# Patient Record
Sex: MALE | Race: WHITE | NOT HISPANIC OR LATINO | Employment: OTHER | URBAN - METROPOLITAN AREA
[De-identification: names, ages, dates, MRNs, and addresses within clinical notes are randomized per-mention and may not be internally consistent; named-entity substitution may affect disease eponyms.]

---

## 2017-02-13 ENCOUNTER — TELEPHONE (OUTPATIENT)
Dept: HEPATOLOGY | Facility: CLINIC | Age: 52
End: 2017-02-13

## 2017-02-13 NOTE — TELEPHONE ENCOUNTER
I spoke with patient.  He reports not feeling well and asked that I reschedule his appt to 3/21/17; done and reminder notice mailed.

## 2017-02-20 ENCOUNTER — HOSPITAL ENCOUNTER (INPATIENT)
Facility: HOSPITAL | Age: 52
LOS: 3 days | Discharge: HOME | DRG: 638 | End: 2017-02-24
Attending: EMERGENCY MEDICINE | Admitting: INTERNAL MEDICINE
Payer: MEDICARE

## 2017-02-20 DIAGNOSIS — K74.60 CIRRHOSIS OF LIVER WITH ASCITES, UNSPECIFIED HEPATIC CIRRHOSIS TYPE: ICD-10-CM

## 2017-02-20 DIAGNOSIS — L03.90 CELLULITIS, UNSPECIFIED CELLULITIS SITE: Primary | ICD-10-CM

## 2017-02-20 DIAGNOSIS — L98.499 INFECTED ULCER OF SKIN, WITH UNSPECIFIED SEVERITY: ICD-10-CM

## 2017-02-20 DIAGNOSIS — I25.10 CORONARY ARTERY DISEASE INVOLVING NATIVE CORONARY ARTERY OF NATIVE HEART WITHOUT ANGINA PECTORIS: ICD-10-CM

## 2017-02-20 DIAGNOSIS — L08.9 INFECTED ULCER OF SKIN: ICD-10-CM

## 2017-02-20 DIAGNOSIS — N18.9 ANEMIA IN CHRONIC KIDNEY DISEASE(285.21): ICD-10-CM

## 2017-02-20 DIAGNOSIS — I10 POORLY-CONTROLLED HYPERTENSION: ICD-10-CM

## 2017-02-20 DIAGNOSIS — D63.1 ANEMIA IN CHRONIC KIDNEY DISEASE(285.21): ICD-10-CM

## 2017-02-20 DIAGNOSIS — R10.9 ABDOMINAL PAIN, UNSPECIFIED LOCATION: ICD-10-CM

## 2017-02-20 DIAGNOSIS — N18.6 ESRF (END STAGE RENAL FAILURE): ICD-10-CM

## 2017-02-20 DIAGNOSIS — R18.8 CIRRHOSIS OF LIVER WITH ASCITES, UNSPECIFIED HEPATIC CIRRHOSIS TYPE: ICD-10-CM

## 2017-02-20 DIAGNOSIS — L98.499 INFECTED ULCER OF SKIN: ICD-10-CM

## 2017-02-20 DIAGNOSIS — L08.9 INFECTED ULCER OF SKIN, WITH UNSPECIFIED SEVERITY: ICD-10-CM

## 2017-02-20 PROCEDURE — 96365 THER/PROPH/DIAG IV INF INIT: CPT

## 2017-02-20 PROCEDURE — 99284 EMERGENCY DEPT VISIT MOD MDM: CPT | Mod: 25

## 2017-02-20 RX ORDER — HYDRALAZINE HYDROCHLORIDE 25 MG/1
25 TABLET, FILM COATED ORAL 3 TIMES DAILY
Status: ON HOLD | COMMUNITY
End: 2017-04-05

## 2017-02-20 NOTE — IP AVS SNAPSHOT
[address] [place] [address]  Phone: [phone]           Patient Discharge Instructions     Our goal is to set you up for success. This packet includes information on your condition, medications, and your home care. It will help you to care for yourself so you don't get sicker and need to go back to the hospital.     Please ask your nurse if you have any questions.        There are many details to remember when preparing to leave the hospital. Here is what you will need to do:    1. Take your medicine. If you are prescribed medications, review your Medication List in the following pages. You may have new medications to  at the pharmacy and others that you'll need to stop taking. Review the instructions for how and when to take your medications. Talk with your doctor or nurses if you are unsure of what to do.     2. Go to your follow-up appointments. Specific follow-up information is listed in the following pages. Your may be contacted by a transition nurse or clinical provider about future appointments. Be sure we have all of the phone numbers to reach you, if needed. Please contact your provider's office if you are unable to make an appointment.     3. Watch for warning signs. Your doctor or nurse will give you detailed warning signs to watch for and when to call for assistance. These instructions may also include educational information about your condition. If you experience any of warning signs to your health, call your doctor.               ** Verify the list of medication(s) below is accurate and up to date. Carry this with you in case of emergency. If your medications have changed, please notify your healthcare provider.             Medication List      START taking these medications        Additional Info                      ciprofloxacin HCl 500 MG tablet   Commonly known as:  CIPRO   Quantity:  10 tablet   Refills:  0   Dose:  500 mg     Instructions:  Take 1 tablet (500 mg total) by mouth once daily. Take at noon time     Begin Date    AM    Noon    PM    Bedtime         CHANGE how you take these medications        Additional Info                      methadone 10 MG tablet   Commonly known as:  DOLOPHINE   Refills:  0   Dose:  120 mg   What changed:  Another medication with the same name was removed. Continue taking this medication, and follow the directions you see here.    Last time this was given:  120 mg on 2/24/2017  7:57 AM   Instructions:  Take 120 mg by mouth once daily.     Begin Date    AM    Noon    PM    Bedtime         CONTINUE taking these medications        Additional Info                      amlodipine 5 MG tablet   Commonly known as:  NORVASC   Quantity:  30 tablet   Refills:  0   Dose:  5 mg    Last time this was given:  5 mg on 2/24/2017  7:58 AM   Instructions:  Take 1 tablet (5 mg total) by mouth 2 (two) times daily.     Begin Date    AM    Noon    PM    Bedtime       aspirin 325 MG tablet   Quantity:  30 tablet   Refills:  1   Dose:  325 mg    Last time this was given:  325 mg on 2/24/2017  7:58 AM   Instructions:  Take 1 tablet (325 mg total) by mouth once daily.     Begin Date    AM    Noon    PM    Bedtime       atorvastatin 40 MG tablet   Commonly known as:  LIPITOR   Quantity:  30 tablet   Refills:  1   Dose:  40 mg    Last time this was given:  40 mg on 2/23/2017  9:09 PM   Instructions:  Take 1 tablet (40 mg total) by mouth every evening.     Begin Date    AM    Noon    PM    Bedtime       blood-glucose meter Misc   Commonly known as:  PHARMACIST CHOICE GLUCOSE SYS   Quantity:  1 each   Refills:  0   Dose:  1 Device    Instructions:  1 Device by Misc.(Non-Drug; Combo Route) route once.     Begin Date    AM    Noon    PM    Bedtime       calcium acetate 667 mg capsule   Commonly known as:  PHOSLO   Refills:  0   Dose:  2001 mg    Last time this was given:  2,001 mg on 2/24/2017 12:24 PM   Instructions:  Take 2,001 mg  by mouth 3 (three) times daily with meals.     Begin Date    AM    Noon    PM    Bedtime       carvedilol 12.5 MG tablet   Commonly known as:  COREG   Quantity:  60 tablet   Refills:  0   Dose:  12.5 mg    Last time this was given:  12.5 mg on 2/24/2017  7:58 AM   Instructions:  Take 1 tablet (12.5 mg total) by mouth 2 (two) times daily.     Begin Date    AM    Noon    PM    Bedtime       clopidogrel 75 mg tablet   Commonly known as:  PLAVIX   Quantity:  30 tablet   Refills:  1   Dose:  75 mg    Last time this was given:  75 mg on 2/24/2017  7:57 AM   Instructions:  Take 1 tablet (75 mg total) by mouth once daily.     Begin Date    AM    Noon    PM    Bedtime       famotidine 20 MG tablet   Commonly known as:  PEPCID   Quantity:  30 tablet   Refills:  1   Dose:  20 mg    Last time this was given:  20 mg on 2/24/2017  7:58 AM   Instructions:  Take 1 tablet (20 mg total) by mouth once daily.     Begin Date    AM    Noon    PM    Bedtime       hydrALAZINE 25 MG tablet   Commonly known as:  APRESOLINE   Refills:  0   Dose:  25 mg    Last time this was given:  50 mg on 2/24/2017  5:05 AM   Instructions:  Take 25 mg by mouth 3 (three) times daily.     Begin Date    AM    Noon    PM    Bedtime       lisinopril 40 MG tablet   Commonly known as:  PRINIVIL,ZESTRIL   Quantity:  30 tablet   Refills:  1   Dose:  40 mg    Last time this was given:  40 mg on 2/24/2017  7:57 AM   Instructions:  Take 1 tablet (40 mg total) by mouth once daily.     Begin Date    AM    Noon    PM    Bedtime       minoxidil 2.5 MG tablet   Commonly known as:  LONITEN   Quantity:  120 tablet   Refills:  0   Dose:  5 mg    Last time this was given:  5 mg on 2/24/2017  7:58 AM   Instructions:  Take 2 tablets (5 mg total) by mouth 2 (two) times daily.     Begin Date    AM    Noon    PM    Bedtime       ondansetron 4 MG tablet   Commonly known as:  ZOFRAN   Quantity:  12 tablet   Refills:  0   Dose:  4 mg    Instructions:  Take 1 tablet (4 mg total) by  mouth every 8 (eight) hours as needed for Nausea.     Begin Date    AM    Noon    PM    Bedtime         STOP taking these medications     sucralfate 1 gram tablet   Commonly known as:  CARAFATE            Where to Get Your Medications      You can get these medications from any pharmacy     Bring a paper prescription for each of these medications     ciprofloxacin HCl 500 MG tablet                  Please bring to all follow up appointments:    1. A copy of your discharge instructions.  2. All medicines you are currently taking in their original bottles.  3. Identification and insurance card.    Please arrive 15 minutes ahead of scheduled appointment time.    Please call 24 hours in advance if you must reschedule your appointment and/or time.        Your Scheduled Appointments     Mar 21, 2017  9:40 AM CDT   Consult with Jennifer B. Scheuermann, PA   [facility]    [address]  [facility] [address] [phone]              Follow-up Information     Follow up with Delbert Redd NP In 1 week.    Specialty:  Family Medicine    Contact information:    [facility]  [place] [address] [phone]          Follow up with Yamil Perrin DPM In 1 week.    Specialty:  Podiatry    Contact information:    [address] [facility] [address]  [facility]  [facility] [address] [phone]          Please follow up.    Contact information:    Continue routine HD as before.        Discharge Instructions     Future Orders    Call MD for:     Comments:    For worsening symptoms, chest pain, shortness of breath, increased abdominal pain, high grade fever, stroke or stroke like symptoms, immediately go to the nearest Emergency Room or call 911 as soon as possible.    Diet general     Comments:    Cardiac/ 2 gram sodium low cholesterol diet    Questions:    Total calories:      Fat restriction, if any:      Protein restriction, if any:      Na restriction, if any:      Fluid restriction:      Additional  restrictions:      Diet renal     Other restrictions (specify):     Comments:    Fall precautions        Primary Diagnosis     Your primary diagnosis was:  Infected Ulcer Of Skin      Admission Information     Date & Time Provider Department CSN    2/20/2017 11:59 PM Adela Peng MD [facility] [id number]      Care Providers     Provider Role Specialty Primary office phone    Adela Peng MD Attending Provider Internal Medicine [phone]    Tej Carranza MD Consulting Physician  Nephrology [phone]    Bryant Ding DPM Consulting Physician  Podiatry [phone]    Rufina Lenz MD Consulting Physician  Infectious Diseases [phone]    Antwan Dove MD Consulting Physician  Orthopedic Surgery [phone]    Yamil Perrin DPM Consulting Physician  Podiatry [phone]      Your Vitals Were     BP                   175/87 (BP Location: Right arm)           Recent Lab Values        6/5/2015 5/16/2016 5/17/2016 7/28/2016                  3:08 AM  5:48 PM 12:07 AM  8:10 AM        A1C 4.7 4.2 (L) 4.3 (L) 4.5        Comment for A1C at  8:10 AM on 7/28/2016:  According to ADA guidelines, hemoglobin A1C <7.0% represents  optimal control in non-pregnant diabetic patients.  Different  metrics may apply to specific populations.   Standards of Medical Care in Diabetes - 2016.  For the purpose of screening for the presence of diabetes:  <5.7%     Consistent with the absence of diabetes  5.7-6.4%  Consistent with increasing risk for diabetes   (prediabetes)  >or=6.5%  Consistent with diabetes  Currently no consensus exists for use of hemoglobin A1C  for diagnosis of diabetes for children.        Pending Labs     Order Current Status    Blood culture Preliminary result    Blood culture Preliminary result      Allergies as of 2/24/2017        Reactions    Antibiotic Hc     Hx of in 2013      [facility]     [facility] Nurse Care Line - 24/7 Assistance  Unless otherwise directed  by your provider, please contact [facility], our nurse care line that is available for 24/7 assistance.     Registered nurses in the [facility] provide clinical advisement, health education, appointment booking, and other advisory services.  Call for this free service at [phone].        Advance Directives     An advance directive is a document which, in the event you are no longer able to make decisions for yourself, tells your healthcare team what kind of treatment you do or do not want to receive, or who you would like to make those decisions for you.  If you do not currently have an advance directive, [facility] encourages you to create one.  For more information call:  [phone]), [phone] ([phone]),  or log on to [email or web address].        Language Assistance Services     ATTENTION: Language assistance services are available, free of charge. Please call [phone].      ATENCIÓN: Si habla español, tiene a iraheta disposición servicios gratuitos de asistencia lingüística. Llame al [phone].     CHÚ Ý: N?u b?n nói Ti?ng Vi?t, có các d?ch v? h? tr? ngôn ng? mi?n phí dành cho b?n. G?i s? [phone].        Stroke Education              Pneumonmia Discharge Instructions                Chronic Kindey Disease Education             Diabetes Discharge Instructions                                   [facility] Sign-Up     Activating your [facility] account is as easy as 1-2-3!     1) Visit [email or web address], select Sign Up Now, enter this activation code and your date of birth, then select Next.  [email or web address]  Expires: 4/10/2017  1:43 PM      2) Create a username and password to use when you visit [facility] in the future and select a security question in case you lose your password and select Next.    3) Enter your e-mail address and click Sign Up!    Additional Information  If you have questions, please e-mail [email or web address] or call [phone] to talk  to our [facility] staff. Remember, [facility] is NOT to be used for urgent needs. For medical emergencies, dial 911.          [facility] complies with applicable Federal civil rights laws and does not discriminate on the basis of race, color, national origin, age, disability, or sex.

## 2017-02-21 PROBLEM — K74.60 CIRRHOSIS OF LIVER WITH ASCITES: Status: ACTIVE | Noted: 2017-02-21

## 2017-02-21 PROBLEM — B18.2 CHRONIC HEPATITIS C WITHOUT HEPATIC COMA: Status: ACTIVE | Noted: 2017-02-21

## 2017-02-21 PROBLEM — F11.20 METHADONE DEPENDENCE: Status: ACTIVE | Noted: 2017-02-21

## 2017-02-21 PROBLEM — E87.20 METABOLIC ACIDOSIS: Status: ACTIVE | Noted: 2017-02-21

## 2017-02-21 PROBLEM — E87.5 HYPERKALEMIA: Status: ACTIVE | Noted: 2017-02-21

## 2017-02-21 PROBLEM — R18.8 CIRRHOSIS OF LIVER WITH ASCITES: Status: ACTIVE | Noted: 2017-02-21

## 2017-02-21 LAB
ALBUMIN SERPL BCP-MCNC: 3.6 G/DL
ALP SERPL-CCNC: 91 U/L
ALT SERPL W/O P-5'-P-CCNC: 7 U/L
ANION GAP SERPL CALC-SCNC: 17 MMOL/L
AST SERPL-CCNC: 14 U/L
BASOPHILS # BLD AUTO: 0 K/UL
BASOPHILS NFR BLD: 0.1 %
BILIRUB SERPL-MCNC: 0.6 MG/DL
BUN SERPL-MCNC: 68 MG/DL
CALCIUM SERPL-MCNC: 8.3 MG/DL
CHLORIDE SERPL-SCNC: 99 MMOL/L
CO2 SERPL-SCNC: 22 MMOL/L
CREAT SERPL-MCNC: 7.2 MG/DL
CRP SERPL-MCNC: 27.9 MG/L
DIFFERENTIAL METHOD: ABNORMAL
EOSINOPHIL # BLD AUTO: 0.3 K/UL
EOSINOPHIL NFR BLD: 3.4 %
ERYTHROCYTE [DISTWIDTH] IN BLOOD BY AUTOMATED COUNT: 15.9 %
ERYTHROCYTE [SEDIMENTATION RATE] IN BLOOD BY WESTERGREN METHOD: 41 MM/HR
EST. GFR  (AFRICAN AMERICAN): 9 ML/MIN/1.73 M^2
EST. GFR  (NON AFRICAN AMERICAN): 8 ML/MIN/1.73 M^2
GLUCOSE SERPL-MCNC: 78 MG/DL
HCT VFR BLD AUTO: 26.3 %
HGB BLD-MCNC: 8.8 G/DL
LYMPHOCYTES # BLD AUTO: 1.5 K/UL
LYMPHOCYTES NFR BLD: 16.4 %
MCH RBC QN AUTO: 31.3 PG
MCHC RBC AUTO-ENTMCNC: 33.5 %
MCV RBC AUTO: 93 FL
MONOCYTES # BLD AUTO: 0.8 K/UL
MONOCYTES NFR BLD: 8.5 %
NEUTROPHILS # BLD AUTO: 6.6 K/UL
NEUTROPHILS NFR BLD: 71.6 %
PLATELET # BLD AUTO: 212 K/UL
PMV BLD AUTO: 6.7 FL
POTASSIUM SERPL-SCNC: 5.9 MMOL/L
PROT SERPL-MCNC: 7.8 G/DL
RBC # BLD AUTO: 2.82 M/UL
SODIUM SERPL-SCNC: 138 MMOL/L
WBC # BLD AUTO: 9.2 K/UL

## 2017-02-21 PROCEDURE — 12000002 HC ACUTE/MED SURGE SEMI-PRIVATE ROOM

## 2017-02-21 PROCEDURE — 85651 RBC SED RATE NONAUTOMATED: CPT

## 2017-02-21 PROCEDURE — 25000003 PHARM REV CODE 250: Performed by: INTERNAL MEDICINE

## 2017-02-21 PROCEDURE — 87040 BLOOD CULTURE FOR BACTERIA: CPT

## 2017-02-21 PROCEDURE — 87070 CULTURE OTHR SPECIMN AEROBIC: CPT

## 2017-02-21 PROCEDURE — 99900035 HC TECH TIME PER 15 MIN (STAT)

## 2017-02-21 PROCEDURE — 85025 COMPLETE CBC W/AUTO DIFF WBC: CPT

## 2017-02-21 PROCEDURE — 94761 N-INVAS EAR/PLS OXIMETRY MLT: CPT

## 2017-02-21 PROCEDURE — 94640 AIRWAY INHALATION TREATMENT: CPT

## 2017-02-21 PROCEDURE — 25000003 PHARM REV CODE 250: Performed by: PHYSICIAN ASSISTANT

## 2017-02-21 PROCEDURE — 86140 C-REACTIVE PROTEIN: CPT

## 2017-02-21 PROCEDURE — 87077 CULTURE AEROBIC IDENTIFY: CPT

## 2017-02-21 PROCEDURE — 63600175 PHARM REV CODE 636 W HCPCS: Performed by: PHYSICIAN ASSISTANT

## 2017-02-21 PROCEDURE — 80100016 HC MAINTENANCE HEMODIALYSIS

## 2017-02-21 PROCEDURE — 80053 COMPREHEN METABOLIC PANEL: CPT

## 2017-02-21 PROCEDURE — 27000221 HC OXYGEN, UP TO 24 HOURS

## 2017-02-21 PROCEDURE — 36415 COLL VENOUS BLD VENIPUNCTURE: CPT

## 2017-02-21 PROCEDURE — 99223 1ST HOSP IP/OBS HIGH 75: CPT | Mod: ,,, | Performed by: INTERNAL MEDICINE

## 2017-02-21 PROCEDURE — 25000003 PHARM REV CODE 250: Performed by: EMERGENCY MEDICINE

## 2017-02-21 PROCEDURE — 87186 SC STD MICRODIL/AGAR DIL: CPT

## 2017-02-21 PROCEDURE — 25000242 PHARM REV CODE 250 ALT 637 W/ HCPCS: Performed by: PHYSICIAN ASSISTANT

## 2017-02-21 PROCEDURE — 63600175 PHARM REV CODE 636 W HCPCS: Performed by: INTERNAL MEDICINE

## 2017-02-21 RX ORDER — MINOXIDIL 2.5 MG/1
5 TABLET ORAL 2 TIMES DAILY
Status: DISCONTINUED | OUTPATIENT
Start: 2017-02-21 | End: 2017-02-24 | Stop reason: HOSPADM

## 2017-02-21 RX ORDER — HYDRALAZINE HYDROCHLORIDE 20 MG/ML
10 INJECTION INTRAMUSCULAR; INTRAVENOUS EVERY 6 HOURS PRN
Status: DISCONTINUED | OUTPATIENT
Start: 2017-02-21 | End: 2017-02-21

## 2017-02-21 RX ORDER — LISINOPRIL 40 MG/1
40 TABLET ORAL DAILY
Status: DISCONTINUED | OUTPATIENT
Start: 2017-02-21 | End: 2017-02-24 | Stop reason: HOSPADM

## 2017-02-21 RX ORDER — CARVEDILOL 6.25 MG/1
12.5 TABLET ORAL 2 TIMES DAILY
Status: DISCONTINUED | OUTPATIENT
Start: 2017-02-21 | End: 2017-02-24 | Stop reason: HOSPADM

## 2017-02-21 RX ORDER — FAMOTIDINE 20 MG/1
20 TABLET, FILM COATED ORAL DAILY
Status: DISCONTINUED | OUTPATIENT
Start: 2017-02-21 | End: 2017-02-24 | Stop reason: HOSPADM

## 2017-02-21 RX ORDER — CLOPIDOGREL BISULFATE 75 MG/1
75 TABLET ORAL DAILY
Status: DISCONTINUED | OUTPATIENT
Start: 2017-02-21 | End: 2017-02-24 | Stop reason: HOSPADM

## 2017-02-21 RX ORDER — HEPARIN SODIUM 5000 [USP'U]/ML
5000 INJECTION, SOLUTION INTRAVENOUS; SUBCUTANEOUS EVERY 8 HOURS
Status: DISCONTINUED | OUTPATIENT
Start: 2017-02-21 | End: 2017-02-24 | Stop reason: HOSPADM

## 2017-02-21 RX ORDER — HYDRALAZINE HYDROCHLORIDE 25 MG/1
25 TABLET, FILM COATED ORAL 3 TIMES DAILY
Status: DISCONTINUED | OUTPATIENT
Start: 2017-02-21 | End: 2017-02-22

## 2017-02-21 RX ORDER — IPRATROPIUM BROMIDE AND ALBUTEROL SULFATE 2.5; .5 MG/3ML; MG/3ML
3 SOLUTION RESPIRATORY (INHALATION) EVERY 6 HOURS
Status: DISCONTINUED | OUTPATIENT
Start: 2017-02-21 | End: 2017-02-24 | Stop reason: HOSPADM

## 2017-02-21 RX ORDER — ACETAMINOPHEN 325 MG/1
650 TABLET ORAL EVERY 6 HOURS PRN
Status: DISCONTINUED | OUTPATIENT
Start: 2017-02-21 | End: 2017-02-24 | Stop reason: HOSPADM

## 2017-02-21 RX ORDER — HYDRALAZINE HYDROCHLORIDE 20 MG/ML
10 INJECTION INTRAMUSCULAR; INTRAVENOUS
Status: DISCONTINUED | OUTPATIENT
Start: 2017-02-21 | End: 2017-02-24 | Stop reason: HOSPADM

## 2017-02-21 RX ORDER — ASPIRIN 325 MG
325 TABLET ORAL DAILY
Status: DISCONTINUED | OUTPATIENT
Start: 2017-02-21 | End: 2017-02-24 | Stop reason: HOSPADM

## 2017-02-21 RX ORDER — SODIUM CHLORIDE 9 MG/ML
INJECTION, SOLUTION INTRAVENOUS ONCE
Status: DISCONTINUED | OUTPATIENT
Start: 2017-02-21 | End: 2017-02-24

## 2017-02-21 RX ORDER — SODIUM CHLORIDE 9 MG/ML
INJECTION, SOLUTION INTRAVENOUS
Status: DISCONTINUED | OUTPATIENT
Start: 2017-02-21 | End: 2017-02-24

## 2017-02-21 RX ORDER — ATORVASTATIN CALCIUM 40 MG/1
40 TABLET, FILM COATED ORAL NIGHTLY
Status: DISCONTINUED | OUTPATIENT
Start: 2017-02-21 | End: 2017-02-24 | Stop reason: HOSPADM

## 2017-02-21 RX ORDER — AMLODIPINE BESYLATE 5 MG/1
5 TABLET ORAL 2 TIMES DAILY
Status: DISCONTINUED | OUTPATIENT
Start: 2017-02-21 | End: 2017-02-24 | Stop reason: HOSPADM

## 2017-02-21 RX ORDER — METHADONE HYDROCHLORIDE 10 MG/1
120 TABLET ORAL DAILY
Status: ON HOLD | COMMUNITY
End: 2017-03-05 | Stop reason: HOSPADM

## 2017-02-21 RX ORDER — CLINDAMYCIN PHOSPHATE 900 MG/50ML
900 INJECTION, SOLUTION INTRAVENOUS
Status: COMPLETED | OUTPATIENT
Start: 2017-02-21 | End: 2017-02-21

## 2017-02-21 RX ORDER — AMOXICILLIN 250 MG
1 CAPSULE ORAL 2 TIMES DAILY PRN
Status: DISCONTINUED | OUTPATIENT
Start: 2017-02-21 | End: 2017-02-24 | Stop reason: HOSPADM

## 2017-02-21 RX ORDER — CLINDAMYCIN PHOSPHATE 600 MG/50ML
600 INJECTION, SOLUTION INTRAVENOUS
Status: DISCONTINUED | OUTPATIENT
Start: 2017-02-21 | End: 2017-02-21

## 2017-02-21 RX ORDER — ONDANSETRON 2 MG/ML
4 INJECTION INTRAMUSCULAR; INTRAVENOUS EVERY 6 HOURS PRN
Status: DISCONTINUED | OUTPATIENT
Start: 2017-02-21 | End: 2017-02-24 | Stop reason: HOSPADM

## 2017-02-21 RX ORDER — IPRATROPIUM BROMIDE AND ALBUTEROL SULFATE 2.5; .5 MG/3ML; MG/3ML
3 SOLUTION RESPIRATORY (INHALATION) EVERY 4 HOURS PRN
Status: DISCONTINUED | OUTPATIENT
Start: 2017-02-21 | End: 2017-02-21

## 2017-02-21 RX ORDER — METHADONE HYDROCHLORIDE 10 MG/1
120 TABLET ORAL DAILY
Status: DISCONTINUED | OUTPATIENT
Start: 2017-02-21 | End: 2017-02-24 | Stop reason: HOSPADM

## 2017-02-21 RX ADMIN — LISINOPRIL 40 MG: 40 TABLET ORAL at 09:02

## 2017-02-21 RX ADMIN — MINOXIDIL 5 MG: 2.5 TABLET ORAL at 09:02

## 2017-02-21 RX ADMIN — EPOETIN ALFA 5000 UNITS: 20000 SOLUTION INTRAVENOUS; SUBCUTANEOUS at 09:02

## 2017-02-21 RX ADMIN — FAMOTIDINE 20 MG: 20 TABLET, FILM COATED ORAL at 09:02

## 2017-02-21 RX ADMIN — ATORVASTATIN CALCIUM 40 MG: 40 TABLET, FILM COATED ORAL at 03:02

## 2017-02-21 RX ADMIN — CLINDAMYCIN IN 5 PERCENT DEXTROSE 900 MG: 18 INJECTION, SOLUTION INTRAVENOUS at 01:02

## 2017-02-21 RX ADMIN — HEPARIN SODIUM 5000 UNITS: 5000 INJECTION, SOLUTION INTRAVENOUS; SUBCUTANEOUS at 03:02

## 2017-02-21 RX ADMIN — HYDRALAZINE HYDROCHLORIDE 10 MG: 20 INJECTION INTRAMUSCULAR; INTRAVENOUS at 03:02

## 2017-02-21 RX ADMIN — ATORVASTATIN CALCIUM 40 MG: 40 TABLET, FILM COATED ORAL at 10:02

## 2017-02-21 RX ADMIN — ASPIRIN 325 MG ORAL TABLET 325 MG: 325 PILL ORAL at 09:02

## 2017-02-21 RX ADMIN — IPRATROPIUM BROMIDE AND ALBUTEROL SULFATE 3 ML: .5; 3 SOLUTION RESPIRATORY (INHALATION) at 11:02

## 2017-02-21 RX ADMIN — HYDRALAZINE HYDROCHLORIDE 25 MG: 25 TABLET, FILM COATED ORAL at 03:02

## 2017-02-21 RX ADMIN — HEPARIN SODIUM 5000 UNITS: 5000 INJECTION, SOLUTION INTRAVENOUS; SUBCUTANEOUS at 10:02

## 2017-02-21 RX ADMIN — HYDRALAZINE HYDROCHLORIDE 25 MG: 25 TABLET, FILM COATED ORAL at 05:02

## 2017-02-21 RX ADMIN — PIPERACILLIN SODIUM AND TAZOBACTAM SODIUM 4.5 G: 4; .5 INJECTION, POWDER, LYOPHILIZED, FOR SOLUTION INTRAVENOUS at 06:02

## 2017-02-21 RX ADMIN — ONDANSETRON 4 MG: 2 INJECTION INTRAMUSCULAR; INTRAVENOUS at 06:02

## 2017-02-21 RX ADMIN — HYDRALAZINE HYDROCHLORIDE 10 MG: 20 INJECTION INTRAMUSCULAR; INTRAVENOUS at 12:02

## 2017-02-21 RX ADMIN — HEPARIN SODIUM 5000 UNITS: 5000 INJECTION, SOLUTION INTRAVENOUS; SUBCUTANEOUS at 05:02

## 2017-02-21 RX ADMIN — HYDRALAZINE HYDROCHLORIDE 25 MG: 25 TABLET, FILM COATED ORAL at 10:02

## 2017-02-21 RX ADMIN — VANCOMYCIN HYDROCHLORIDE 1250 MG: 1 INJECTION, POWDER, LYOPHILIZED, FOR SOLUTION INTRAVENOUS at 10:02

## 2017-02-21 RX ADMIN — MINOXIDIL 5 MG: 2.5 TABLET ORAL at 10:02

## 2017-02-21 RX ADMIN — AMLODIPINE BESYLATE 5 MG: 5 TABLET ORAL at 10:02

## 2017-02-21 RX ADMIN — CARVEDILOL 12.5 MG: 6.25 TABLET, FILM COATED ORAL at 10:02

## 2017-02-21 RX ADMIN — ONDANSETRON 4 MG: 2 INJECTION INTRAMUSCULAR; INTRAVENOUS at 02:02

## 2017-02-21 RX ADMIN — METHADONE HYDROCHLORIDE 120 MG: 10 TABLET ORAL at 09:02

## 2017-02-21 RX ADMIN — CLOPIDOGREL BISULFATE 75 MG: 75 TABLET ORAL at 09:02

## 2017-02-21 RX ADMIN — AMLODIPINE BESYLATE 5 MG: 5 TABLET ORAL at 09:02

## 2017-02-21 NOTE — PLAN OF CARE
The pt was awake and alert and able to verify info on the face sheet as correct. He lives at home with his son. He uses [facility] and has Medicare and [place] Medicaid insurance. He goes to dialysis at [facility] in [place], at 8 am and Sat at 10 am. He has no questions or concerns at this time. Brenda BERTHA Chavira, [facility]     02/21/17 1356   Discharge Assessment   Assessment Type Discharge Planning Assessment   Confirmed/corrected address and phone number on facesheet? Yes   Assessment information obtained from? Patient   Communicated expected length of stay with patient/caregiver yes   Type of Healthcare Directive Received (spouse Soledad Aguirre [phone])   If Healthcare Directive is received, is it scanned into Epic? no (comment)   Prior to hospitilization cognitive status: Alert/Oriented   Prior to hospitalization functional status: Independent   Current cognitive status: Alert/Oriented   Current Functional Status: Independent   Arrived From home or self-care   Lives With child(suni), adult   Able to Return to Prior Arrangements yes   Is patient able to care for self after discharge? Yes   Readmission Within The Last 30 Days no previous admission in last 30 days   Patient currently being followed by outpatient case management? No   Patient currently receives home health services? No   Does the patient currently use HME? No   Patient currently receives private duty nursing? No   Patient currently receives any other outside agency services? No   Equipment Currently Used at Home glucometer   Do you have any problems affording any of your prescribed medications? No  ([facility] )   Is the patient taking medications as prescribed? yes   Do you have any financial concerns preventing you from receiving the healthcare you need? No   Does the patient have transportation to healthcare appointments? Yes   Transportation Available car   On Dialysis? Yes   If yes, what is the name of the dialysis unit? [facility]  in [place], S 8 am and 10 am aon Saturdays    Does the patient receive outpatient dialysis? Yes   Does the patient receive services at the Coumadin Clinic? No   Are there any open cases? No   Discharge Plan A Home   Discharge Plan B Home with family   Patient/Family In Agreement With Plan yes

## 2017-02-21 NOTE — ASSESSMENT & PLAN NOTE
Chronic  Serial abdominal exams  Consider further evaluation - CT, paracentesis, consulting GI for EGD

## 2017-02-21 NOTE — ED NOTES
Pt presents to ED with c/o right lower leg swelling and pain. Pt reports that symptoms began a few days ago and have progressively worsened. Pt is AAOx4. Skin warm, dry to touch. Respirations even, nonlabored. NAD noted. Warmth and redness also noted to right lower leg.

## 2017-02-21 NOTE — PROGRESS NOTES
Was notified by charge nurse that Dr. Ding will not be back until next Wednesday. Called and informed Dr. Peng.

## 2017-02-21 NOTE — PLAN OF CARE
02/21/17 1150   Patient Assessment/Suction   Level of Consciousness (AVPU) alert   Expansion/Accessory Muscles/Retractions expansion symmetric   All Lung Fields Breath Sounds wheezes, inspiratory;wheezes, expiratory;coarse   Rhythm/Pattern, Respiratory shortness of breath reported   PRE-TX-O2-ETCO2   O2 Device (Oxygen Therapy) nasal cannula   $ Is the patient on Oxygen? Yes   Flow (L/min) 2   Oxygen Concentration (%) 28   SpO2 96 %   Pulse (!) 45   Resp 16   Aerosol Therapy   $ Aerosol Therapy Charges Aerosol Treatment   Respiratory Treatment Status given   SVN/Inhaler Treatment Route mask   Patient Tolerance good   Post-Treatment   Post-treatment Heart Rate (beats/min) 46   Post-treatment Resp Rate (breaths/min) 16   All Fields Breath Sounds aeration increased   Ready to Wean/Extubation Screen   FIO2<60 (chart decimal) 0.28   prn tx given for wheezing

## 2017-02-21 NOTE — CONSULTS
[facility] Consultation Note    Reason for consult: esrd    Referring Physician:     HPI: pt with foot ulcer.  He undergoes hd three times per week.  Needs inhouse hd    Past Medical History   Diagnosis Date    Anticoagulant long-term use     Arthritis     Asthma     Back pain     Diabetes mellitus     Encounter for blood transfusion     Eye abnormality right eye     injured as a child    Gastritis     Hemodialysis patient     Hypertension     Pneumonia 2013     Spend 6weeks in hospital at [place]    Renal disorder     Stroke          Past Surgical History   Procedure Laterality Date    Back surgery      Av fistula placement      Cholecystectomy      Eye surgery       R eye         Social History     Social History    Marital status:      Spouse name: N/A    Number of children: N/A    Years of education: N/A     Social History Main Topics    Smoking status: Former Smoker     Years: 10.00     Quit date: 9/1/2015    Smokeless tobacco: Former User     Quit date: 2/16/2016    Alcohol use No    Drug use: No    Sexual activity: Yes     Other Topics Concern    None     Social History Narrative         Family History   Problem Relation Age of Onset    Kidney disease Brother          Review of patient's allergies indicates:   Allergen Reactions    Antibiotic hc      Hx of in 2013           No current facility-administered medications on file prior to encounter.      Current Outpatient Prescriptions on File Prior to Encounter   Medication Sig Dispense Refill    aspirin 325 MG tablet Take 1 tablet (325 mg total) by mouth once daily. 30 tablet 1    atorvastatin (LIPITOR) 40 MG tablet Take 1 tablet (40 mg total) by mouth every evening. 30 tablet 1    carvedilol (COREG) 12.5 MG tablet Take 1 tablet (12.5 mg total) by mouth 2 (two) times daily. 60 tablet 0    clopidogrel (PLAVIX) 75 mg tablet Take 1 tablet (75 mg total) by mouth once daily. 30 tablet 1    famotidine (PEPCID)  20 MG tablet Take 1 tablet (20 mg total) by mouth once daily. 30 tablet 1    lisinopril (PRINIVIL,ZESTRIL) 40 MG tablet Take 1 tablet (40 mg total) by mouth once daily. 30 tablet 1    minoxidil (LONITEN) 2.5 MG tablet Take 2 tablets (5 mg total) by mouth 2 (two) times daily. 120 tablet 0    [DISCONTINUED] methadone (DOLOPHINE) 5 mg/5 mL solution Take 80 mg by mouth once daily.      amlodipine (NORVASC) 5 MG tablet Take 1 tablet (5 mg total) by mouth 2 (two) times daily. 30 tablet 0    blood-glucose meter (PHARMACIST CHOICE GLUCOSE SYS) Misc 1 Device by Misc.(Non-Drug; Combo Route) route once. 1 each 0    ondansetron (ZOFRAN) 4 MG tablet Take 1 tablet (4 mg total) by mouth every 8 (eight) hours as needed for Nausea. 12 tablet 0         Scheduled Meds:   sodium chloride 0.9%   Intravenous Once    amlodipine  5 mg Oral BID    aspirin  325 mg Oral Daily    atorvastatin  40 mg Oral QHS    carvedilol  12.5 mg Oral BID    clopidogrel  75 mg Oral Daily    epoetin harshad (PROCRIT) injection  5,000 Units Intravenous Once    famotidine  20 mg Oral Daily    heparin (porcine)  5,000 Units Subcutaneous Q8H    hydrALAZINE  25 mg Oral TID    lisinopril  40 mg Oral Daily    methadone  120 mg Oral Daily    minoxidil  5 mg Oral BID    piperacillin-tazobactam 4.5 g in dextrose 5 % 100 mL IVPB (ready to mix system)  4.5 g Intravenous Q12H    vancomycin (VANCOCIN) IVPB  15 mg/kg Intravenous Once     Continuous Infusions:   PRN Meds:.sodium chloride 0.9%, acetaminophen, albuterol-ipratropium 2.5mg-0.5mg/3mL, hydrALAZINE, ondansetron, senna-docusate 8.6-50 mg    Constitutional: no fever or chills   Eyes: no visual changes ns  Respiratory: mild sob  Cardiovascular: no chest pain or palpitations .  Gastrointestinal: no nausea or vomiting, no abdominal pain or change in bowel habits   Genitourinary: no hematuria or dysuria   Musculoskeletal: back pain  Neurological: no seizures or tremors, Increased mobility problems,  freezing,   bradykinesia. No asterixis         heent one eye has a pale opacity   Lungs bilateral wheezing  Neuro alert  Ext good avf  Neuro alert      Vitals:    02/21/17 0132 02/21/17 0200 02/21/17 0454 02/21/17 0709   BP: (!) 178/89 (!) 237/96 (!) 208/96 (!) 228/93   BP Location:  Right arm Right arm Right arm   Patient Position:  Lying Lying Lying   BP Method:  Automatic Automatic Automatic   Pulse:  (!) 52 (!) 59 (!) 47   Resp:  18 18 18   Temp:  98.7 °F (37.1 °C) 98.2 °F (36.8 °C) 98.2 °F (36.8 °C)   TempSrc:  Oral Oral Oral   SpO2:  95% 98% (!) 94%   Weight:       Height:           I/O last 3 completed shifts:  In: 420 [P.O.:420]  Out: 200 [Emesis/NG output:200]        Recent Labs  Lab 02/21/17  0040   CALCIUM 8.3*   PROT 7.8      K 5.9*   CO2 22*   CL 99   BUN 68*   CREATININE 7.2*   ALKPHOS 91   ALT 7*   AST 14   BILITOT 0.6         Recent Labs  Lab 02/21/17  0040   WBC 9.20   RBC 2.82*   HGB 8.8*   HCT 26.3*      MCV 93   MCH 31.3*   MCHC 33.5           RADIOLOGY: pending      ASSESSMENT:  1. esrd  2.hyperkalemia  3.  anemia      PLAN:  1. Hemodialysis today  2. uf with hd as tolerated  3.  Fluid restrict  4.  reagan with hd

## 2017-02-21 NOTE — PROGRESS NOTES
Called and confirmed with methadone clinic [phone] that pt takes 120mg of methadone every morning. Called and informed Dr. Peng of dosage confirmation and that pt's BP was 228/93 and HR 49. Order to resume methadone, hold coreg for HR below 55, and hydralazine IV PRN. OK to give morning minoxidil, amlodipine, and lisinopril.

## 2017-02-21 NOTE — ED PROVIDER NOTES
Encounter Date: 2/20/2017    SCRIBE #1 NOTE: Kathy LOPEZ am scribing for, and in the presence of, Dr. Narayanan.       History     Chief Complaint   Patient presents with    Cellulitis     right lower extremity. Reports was checked out by a Nurse practitioner at dialysis. Dialysis [facility], Sat     Review of patient's allergies indicates:   Allergen Reactions    Antibiotic hc      Hx of in 2013       HPI Comments: 02/21/2017  12:22 AM     Chief Complaint: Right leg swelling and pain      The patient is a 52 y.o. male with a PMHx of anticoagulant long-term use; arthritis; asthma; back pain; DM; encounter for blood transfusion; eye abnormality-right eye; gastritis; hemodialysis patient; HTN; PNA; renal disorder; and stroke who is presenting with an acute onset of worsening right leg swelling and pain that started 4 days ago. Pt stated that he believes he has cellulitis. Associated symptom of intermittent chills. Pt denied missing his most recent dialysis and his next dialysis appointment is this morning. No vomiting or diarrhea. Pt has a past surgical history that includes Back surgery; AV fistula placement; Cholecystectomy; and Eye surgery.      The history is provided by the patient.     Past Medical History   Diagnosis Date    Anticoagulant long-term use     Arthritis     Asthma     Back pain     Diabetes mellitus     Encounter for blood transfusion     Eye abnormality right eye     injured as a child    Gastritis     Hemodialysis patient     Hypertension     Pneumonia 2013     Spend 6weeks in hospital at [place]    Renal disorder     Stroke      No past medical history pertinent negatives.  Past Surgical History   Procedure Laterality Date    Back surgery      Av fistula placement      Cholecystectomy      Eye surgery       R eye     Family History   Problem Relation Age of Onset    Kidney disease Brother      Social History   Substance Use Topics    Smoking status: Former Smoker      Years: 10.00     Quit date: 9/1/2015    Smokeless tobacco: Former User     Quit date: 2/16/2016    Alcohol use No     Review of Systems   Constitutional: Positive for chills (Intermittent chills.).   HENT: Negative for sore throat.    Eyes: Negative for visual disturbance.   Respiratory: Negative for shortness of breath.    Cardiovascular: Positive for leg swelling (Right leg swelling.). Negative for chest pain.   Gastrointestinal: Negative for diarrhea and vomiting.   Genitourinary: Negative for dysuria.   Musculoskeletal: Positive for myalgias (Right leg pain.).   Skin: Negative for rash.   Neurological: Negative for weakness.   Hematological: Does not bruise/bleed easily.   Psychiatric/Behavioral: Negative for confusion.   All other systems reviewed and are negative.      Physical Exam   Initial Vitals   BP Pulse Resp Temp SpO2   02/20/17 2254 02/20/17 2254 02/20/17 2254 02/20/17 2254 02/20/17 2254   216/89 55 20 97.9 °F (36.6 °C) 98 %     Physical Exam    Nursing note and vitals reviewed.  Constitutional: He appears well-developed and well-nourished. He is not diaphoretic.  Non-toxic appearance. He does not have a sickly appearance. He does not appear ill. No distress.   HENT:   Head: Normocephalic and atraumatic.   Eyes: EOM are normal.   Blind in right eye.   Neck: Normal range of motion. Neck supple. Normal range of motion present. No rigidity.   Cardiovascular: Normal rate, regular rhythm, normal heart sounds and intact distal pulses. Exam reveals no gallop and no friction rub.    No murmur heard.  Pulmonary/Chest: Breath sounds normal. No respiratory distress. He has no wheezes. He has no rhonchi. He has no rales.   Musculoskeletal: Normal range of motion.   Right lower extremity swollen and tender.   Neurological: He is alert and oriented to person, place, and time.   Skin: Skin is warm and dry. No rash noted.   Right lower extremity warm.   Psychiatric: He has a normal mood and affect. His behavior is  normal. Judgment and thought content normal.         ED Course   Procedures  Labs Reviewed - No data to display          Medical Decision Making:   History:   Old Medical Records: I decided to obtain old medical records.  Clinical Tests:   Lab Tests: Ordered and Reviewed  Radiological Study: Ordered and Reviewed            Scribe Attestation:   Scribe #1: I performed the above scribed service and the documentation accurately describes the services I performed. I attest to the accuracy of the note.    Attending Attestation:           Physician Attestation for Scribe:  Physician Attestation Statement for Scribe #1: I, Dr. Narayanan, reviewed documentation, as scribed by Kathy Sharma in my presence, and it is both accurate and complete.                 ED Course   Comment By Time   IMPRESSION:  No evidence of deep venous thrombosis within the right lower extremity.  Right groin lymphadenopathy, with the largest abnormal in appearance. Correlation with physical  exam is recommended, with possible follow-up imaging or biopsy as clinically indicated. Yazan Narayanan MD 02/21 8133     Clinical Impression:   The encounter diagnosis was Cellulitis, unspecified cellulitis site.      52-year-old male with a history of dialysis presents to the ER for right lower extremity warmth and swelling.  Patient concerned he has cellulitis and he almost certainly does.  Patient be admitted to hospital medicine for further care.  No evidence of a DVT.  No evidence of sepsis at this time.     Yazan Narayanan MD  02/21/17 2048

## 2017-02-21 NOTE — H&P
[facility] Hospital Medicine  History & Physical    Patient Name: João Aguirre  MRN: [id number]  Admission Date: 2/20/2017  Attending Physician: Adela Peng MD   Primary Care Provider: Delbert Redd NP         Patient information was obtained from patient, past medical records and ER records.     Subjective:     Principal Problem:Infected ulcer of skin    Chief Complaint:   Chief Complaint   Patient presents with    Cellulitis     right lower extremity. Reports was checked out by a Nurse practitioner at dialysis. Dialysis Tues, Thurs, Sat        HPI: Mr. Aguirre presents for evaluation of RIGHT foot swelling and pain. He reports chronic ulcer to RIGHT plantar surface. He is unsure how long he has had it. He denies discharge. He reports swelling and pain to his RIGHT leg for the past few days. He reports chronic bilateral lower extremity paresthesia which has not worsened. He denies coldness to his extremities. He denies fever but reports chills. He reports shortness of breath and congestive type cough. He reports history of smoking cigars but denies smoking cigars in many years. He reports being inappropriately diagnosed with asthma. He no longer uses an inhaler. He denies chest pain or palpitations. He reports chronic abdominal pain and vomiting. He reports being diagnosed with cirrhosis. He denies alcohol consumption. He denies history of IV drug use. He reports history of DM which he states resolved with HD.    Past Medical History   Diagnosis Date    Anticoagulant long-term use     Arthritis     Asthma     Back pain     Diabetes mellitus     Encounter for blood transfusion     Eye abnormality right eye     injured as a child    Gastritis     Hemodialysis patient     Hypertension     Pneumonia 2013     Spend 6weeks in hospital at [place]    Renal disorder     Stroke        Past Surgical History   Procedure Laterality Date    Back surgery      Av fistula placement       Cholecystectomy      Eye surgery       R eye       Review of patient's allergies indicates:   Allergen Reactions    Antibiotic hc      Hx of in 2013         No current facility-administered medications on file prior to encounter.      Current Outpatient Prescriptions on File Prior to Encounter   Medication Sig    aspirin 325 MG tablet Take 1 tablet (325 mg total) by mouth once daily.    atorvastatin (LIPITOR) 40 MG tablet Take 1 tablet (40 mg total) by mouth every evening.    carvedilol (COREG) 12.5 MG tablet Take 1 tablet (12.5 mg total) by mouth 2 (two) times daily.    clopidogrel (PLAVIX) 75 mg tablet Take 1 tablet (75 mg total) by mouth once daily.    famotidine (PEPCID) 20 MG tablet Take 1 tablet (20 mg total) by mouth once daily.    lisinopril (PRINIVIL,ZESTRIL) 40 MG tablet Take 1 tablet (40 mg total) by mouth once daily.    methadone (DOLOPHINE) 5 mg/5 mL solution Take 80 mg by mouth once daily.    minoxidil (LONITEN) 2.5 MG tablet Take 2 tablets (5 mg total) by mouth 2 (two) times daily.    amlodipine (NORVASC) 5 MG tablet Take 1 tablet (5 mg total) by mouth 2 (two) times daily.    blood-glucose meter (PHARMACIST CHOICE GLUCOSE SYS) Misc 1 Device by Misc.(Non-Drug; Combo Route) route once.    ondansetron (ZOFRAN) 4 MG tablet Take 1 tablet (4 mg total) by mouth every 8 (eight) hours as needed for Nausea.     Family History     Problem Relation (Age of Onset)    Kidney disease Brother        Social History Main Topics    Smoking status: Former Smoker     Years: 10.00     Quit date: 9/1/2015    Smokeless tobacco: Former User     Quit date: 2/16/2016    Alcohol use No    Drug use: No    Sexual activity: Yes     Review of Systems   Constitutional: Positive for chills. Negative for fever.   HENT: Positive for congestion. Negative for sore throat.    Eyes: Negative for photophobia and discharge.   Respiratory: Positive for cough, shortness of breath and wheezing.    Cardiovascular: Positive  for leg swelling. Negative for chest pain and palpitations.   Gastrointestinal: Positive for abdominal pain, nausea and vomiting.   Genitourinary: Negative for flank pain and hematuria.   Musculoskeletal: Negative for neck pain and neck stiffness.   Skin: Positive for wound. Negative for rash.   Neurological: Negative for syncope and headaches.   Psychiatric/Behavioral: Negative for confusion. The patient is nervous/anxious.      Objective:     Vital Signs (Most Recent):  Temp: 98.2 °F (36.8 °C) (02/21/17 0454)  Pulse: (!) 59 (02/21/17 0454)  Resp: 18 (02/21/17 0454)  BP: (!) 208/96 (02/21/17 0454)  SpO2: 98 % (02/21/17 0454) Vital Signs (24h Range):  Temp:  [97.9 °F (36.6 °C)-98.7 °F (37.1 °C)] 98.2 °F (36.8 °C)  Pulse:  [52-59] 59  Resp:  [18-20] 18  SpO2:  [90 %-98 %] 98 %  BP: (178-237)/() 208/96     Weight: 90.7 kg (200 lb)  Body mass index is 27.12 kg/(m^2).    Physical Exam   Constitutional: He is oriented to person, place, and time. He appears well-developed.   HENT:   Head: Normocephalic and atraumatic.   Eyes: Right eye exhibits no discharge. Left eye exhibits no discharge. No scleral icterus.   Neck: Neck supple. No JVD present.   Cardiovascular: Normal rate, regular rhythm and intact distal pulses.    Murmur heard.  Pulses equal   Pulmonary/Chest: Effort normal. He has wheezes. He has rhonchi.   Abdominal: Soft. Bowel sounds are normal. There is hepatomegaly. There is tenderness. There is no rebound and no guarding.   Musculoskeletal: He exhibits edema and tenderness.   See photos   Neurological: He is alert and oriented to person, place, and time.   Skin: Skin is warm and dry. He is not diaphoretic.   See photos   Psychiatric: His behavior is normal. Thought content normal.   Nursing note and vitals reviewed.                   Significant Labs:   CBC:   Recent Labs  Lab 02/21/17  0040   WBC 9.20   HGB 8.8*   HCT 26.3*        CMP:   Recent Labs  Lab 02/21/17  0040      K 5.9*   CL 99    CO2 22*   GLU 78   BUN 68*   CREATININE 7.2*   CALCIUM 8.3*   PROT 7.8   ALBUMIN 3.6   BILITOT 0.6   ALKPHOS 91   AST 14   ALT 7*   ANIONGAP 17*   EGFRNONAA 8*       Significant Imaging:     RIGHT lower extremity venous doppler:  No DVT  Lymphadenopathy    Assessment/Plan:     * Infected ulcer of skin  Blood cultures  IV Vancomycin and Zosyn  MRI to rule out OM        Poorly-controlled hypertension  Continue Carvedilol, Lisinopril, Hydralazine, Amlodipine      Hyperkalemia  Consult a nephrologist for routine HD  Monitor on telemtry      ESRD (T,Th,Sat) dialysis onset 2013  Consult nephrologist for routine HD      Abdominal pain  Chronic  Serial abdominal exams  Consider further evaluation - CT, paracentesis, consulting GI for EGD    Metabolic acidosis  Due to ESRD and infection.  HD. Continue treatment of infection as per above.      Coronary artery disease involving native coronary artery of native heart without angina pectoris  Continue Aspirin, BP-control, Lipid control  Monitor for signs and symptoms of ACS      Anemia in chronic kidney disease  Trend H/H  Monitor for bleeding  Transfuse if Hb <7      Cirrhosis of liver with ascites  Consider paracentesis to rule out SBP      Methadone dependence  Contact Methadone clinic to confirm dose      VTE Risk Mitigation         Ordered     heparin (porcine) injection 5,000 Units  Every 8 hours     Route:  Subcutaneous        02/21/17 0154     Medium Risk of VTE  Once      02/21/17 0154        Valdo Agrawal PA-C  Department of Hospital Medicine   [facility]

## 2017-02-21 NOTE — CONSULTS
Consult Note  Infectious Disease    Reason for Consult:  DIABETIC FOOT INFECTION    HPI: João Aguirre is a chronically ill appearing 52 y.o. male with ESRD on hemodialysis, a history of diabetes with neuropathy, and chronic hep C with cirrhosis, noted increasing redness and swelling of the right leg for a few days PTA. He has had an ulcer on the plantar surface for months, has not sought any podiatry care, does not check his feet daily, and does not wear appropriate shoes. He denies walking barefoot. He came to the ED because of his right leg and was admitted with a diagnosis of cellulitis. He has no history of sTaph infections. His last tetanus is unknown.    Review of patient's allergies indicates:   Allergen Reactions    Antibiotic hc      Hx of in 2013       Past Medical History   Diagnosis Date    Anticoagulant long-term use     Arthritis     Asthma     Back pain     Diabetes mellitus     Encounter for blood transfusion     Eye abnormality right eye     injured as a child    Gastritis     Hemodialysis patient     Hypertension     Pneumonia 2013     Spend 6weeks in hospital at [place]    Renal disorder     Stroke    CHRONIC HEPATITIS C WITH CIRRHOSIS, HISTORY OF ASCITES AND NEED FOR PARACENTESIS  HISTORY OF ELEVATED C PEPTIDE, WAS SUPPOSED TO HAVE EUS, BUT THIS NEVER OCCURRED  OPIATE DEPENDENCE ON METHADONE  PROBABLE COPD,       Past Surgical History   Procedure Laterality Date    Back surgery      Av fistula placement      Cholecystectomy      Eye surgery       R eye     Social History     Social History    Marital status:      Spouse name: N/A    Number of children: N/A    Years of education: N/A     Social History Main Topics    Smoking status: Former Smoker     Years: 10.00     Quit date: 9/1/2015    Smokeless tobacco: Former User     Quit date: 2/16/2016    Alcohol use No    Drug use: USES MARIJUANA BID    Sexual activity: Yes     Other Topics Concern    None      Social History Narrative     Family History   Problem Relation Age of Onset    Kidney disease Brother        Pertinent medications noted:     Review of Systems:   Chills,no documented fever, no sweats  No sinus congestion, purulent nasal discharge,  No pain in mouth or throat.  No chest pain,   positive cough, no sputum production, pleurisy, hemoptysis,   Am nausea, vomiting, no diarrhea, constipation, blood in stool, but has had abdominal pain for over a year. He reports that he did not follow up with hepatology/[facility] because appointments were cancelled and then he was told that they do not take his insurance. He does not have a PCP and has not pursued gastroenterology care on his own. He becomes very tearful regarding these events.   No swelling of joints, redness of joints, injuries,  No diabetes, thyroid, hypogonadal conditions. Reports he had no required treatment for diabetes since he went on dialysis.   No bleeding, lymphadenopathy, malignancy,     EXAM & DIAGNOSTICS REVIEWED:   Vitals:     Temp:  [97.9 °F (36.6 °C)-98.7 °F (37.1 °C)]   Temp: 97.9 °F (36.6 °C) (02/21/17 1100)  Pulse: (!) 45 (02/21/17 1150)  Resp: 16 (02/21/17 1150)  BP: (!) 176/83 (02/21/17 1529)  SpO2: 96 % (02/21/17 1150)    Intake/Output Summary (Last 24 hours) at 02/21/17 1617  Last data filed at 02/21/17 0500   Gross per 24 hour   Intake              420 ml   Output              200 ml   Net              220 ml       General:  In NAD. Looks non toxic. Alert and attentive, cooperative. Becomes tearful  Eyes:  Anicteric, right cornea is scarred,  EOMI  ENT:  Mouth w/ pink MMM, no lesions/exudate, poor dentition, largely edentulous  Neck:  Trachea midline, supple, no adenopathy appreciated  Lungs: Wet cough, loose rhonchi  Heart:  RRR, no gallop/murmur noted  Abd:  soft, left sided soreness to palpation, also epigastrium,  ND, normal BS, no masses/organomegaly appreciated. Do not appreciate any ascites  :  Voids  Musc:  Joints without  effusion, swelling,  erythema, synovitis,   Skin:  Generally warm, dry, normal for color. No rashes. No palmar or plantar    lesions. No subungual petechiae. Poor hygiene  Wound: Right foot Plantar ulcer, less than 1 cm with palpable abscess between ulcer and base of 3 rd toe. Able to express pus. Prepped with chlorhexidine and betadine, then expressed pus for culture.   Neuro: AAOx3, speech clear, moves all extrems equally  Extrem: Right foot and lower leg are cellulitic to proximal tibia. No lymphangitis or palpable lymphadenitis. Pulses are bounding. No ischemia  VAD:    Lines/Tubes/Drains:    General Labs reviewed:    Recent Labs  Lab 02/21/17 0040   WBC 9.20   RBC 2.82*   HGB 8.8*   HCT 26.3*      MCV 93   MCH 31.3*   MCHC 33.5       Recent Labs  Lab 02/21/17 0040   CALCIUM 8.3*   PROT 7.8      K 5.9*   CO2 22*   CL 99   BUN 68*   CREATININE 7.2*   ALKPHOS 91   ALT 7*   AST 14   BILITOT 0.6       Micro:  Microbiology Results (last 7 days)     Procedure Component Value Units Date/Time    Aerobic culture [[id number]]     Order Status:  No result Specimen:  Abscess from Foot, Right     Blood culture [[id number]] Collected:  02/21/17 0634    Order Status:  Sent Specimen:  Blood Updated:  02/21/17 0954    Narrative:       Take 2 sets. Take both aerobic and anaerobic bottles.    Blood culture [[id number]] Collected:  02/21/17 0640    Order Status:  Sent Specimen:  Blood from Antecubital, Right Updated:  02/21/17 0954    Narrative:       PLEASE OBTAIN CULTURE WITH DIALYSIS. Take 2 sets. Take both  aerobic and anaerobic bottles.        Imaging Reviewed:   MRI of the foot shows no osteomyelitis, but also did not show the abscess that is present clinically      IMPRESSION & PLAN   1. Abscess right foot due to chronic plantar ulcer and cellulitis of foot and right lower leg  2. Diabetes (history of ) with neuropathy  3. ESRD on hemodialysis  4. COPD, smoking marijuana BID with chronic bronchitic cough  5.   Cirrhosis, chronic hep C, no GI or hepatology follow up since last year when this was diagnosed  6. Chronic opiate dependence, on methadone  7. depression    Recommendation:   Continue vanc and zosyn  I am sending a culture of the abscess  Will need surgical  incision and drainage  Discussed daily foot exam in all patients with neuropathy  Check hemoglobin a1c (though sugars normal here)  Administer Tdap  Encouraged him to seek regular Gi/hepatology care and to minimize use of marijuana because of his lungs    Thanks , will follow

## 2017-02-21 NOTE — ED NOTES
Dr. Narayanan notified of pt BP. Pt has taken his BP medication for tonight. Was ordered to send pt to the floor at this time. Attempted to call reports to PCU. Was told the RN will call back for report shortly.

## 2017-02-21 NOTE — ED NOTES
Pt O2 saturation noted to be 89% on room air. Pt placed on O2 at 2 L/min by NC. Pt shows no sign of respiratory distress. Respirations even, nonlabored. NAD noted.

## 2017-02-21 NOTE — SUBJECTIVE & OBJECTIVE
Past Medical History   Diagnosis Date    Anticoagulant long-term use     Arthritis     Asthma     Back pain     Diabetes mellitus     Encounter for blood transfusion     Eye abnormality right eye     injured as a child    Gastritis     Hemodialysis patient     Hypertension     Pneumonia 2013     Spend 6weeks in hospital at [facility]    Renal disorder     Stroke        Past Surgical History   Procedure Laterality Date    Back surgery      Av fistula placement      Cholecystectomy      Eye surgery       R eye       Review of patient's allergies indicates:   Allergen Reactions    Antibiotic hc      Hx of in 2013         No current facility-administered medications on file prior to encounter.      Current Outpatient Prescriptions on File Prior to Encounter   Medication Sig    aspirin 325 MG tablet Take 1 tablet (325 mg total) by mouth once daily.    atorvastatin (LIPITOR) 40 MG tablet Take 1 tablet (40 mg total) by mouth every evening.    carvedilol (COREG) 12.5 MG tablet Take 1 tablet (12.5 mg total) by mouth 2 (two) times daily.    clopidogrel (PLAVIX) 75 mg tablet Take 1 tablet (75 mg total) by mouth once daily.    famotidine (PEPCID) 20 MG tablet Take 1 tablet (20 mg total) by mouth once daily.    lisinopril (PRINIVIL,ZESTRIL) 40 MG tablet Take 1 tablet (40 mg total) by mouth once daily.    methadone (DOLOPHINE) 5 mg/5 mL solution Take 80 mg by mouth once daily.    minoxidil (LONITEN) 2.5 MG tablet Take 2 tablets (5 mg total) by mouth 2 (two) times daily.    amlodipine (NORVASC) 5 MG tablet Take 1 tablet (5 mg total) by mouth 2 (two) times daily.    blood-glucose meter (PHARMACIST CHOICE GLUCOSE SYS) Misc 1 Device by Misc.(Non-Drug; Combo Route) route once.    ondansetron (ZOFRAN) 4 MG tablet Take 1 tablet (4 mg total) by mouth every 8 (eight) hours as needed for Nausea.     Family History     Problem Relation (Age of Onset)    Kidney disease Brother        Social History Main Topics     Smoking status: Former Smoker     Years: 10.00     Quit date: 9/1/2015    Smokeless tobacco: Former User     Quit date: 2/16/2016    Alcohol use No    Drug use: No    Sexual activity: Yes     Review of Systems   Constitutional: Positive for chills. Negative for fever.   HENT: Positive for congestion. Negative for sore throat.    Eyes: Negative for photophobia and discharge.   Respiratory: Positive for cough, shortness of breath and wheezing.    Cardiovascular: Positive for leg swelling. Negative for chest pain and palpitations.   Gastrointestinal: Positive for abdominal pain, nausea and vomiting.   Genitourinary: Negative for flank pain and hematuria.   Musculoskeletal: Negative for neck pain and neck stiffness.   Skin: Positive for wound. Negative for rash.   Neurological: Negative for syncope and headaches.   Psychiatric/Behavioral: Negative for confusion. The patient is nervous/anxious.      Objective:     Vital Signs (Most Recent):  Temp: 98.2 °F (36.8 °C) (02/21/17 0454)  Pulse: (!) 59 (02/21/17 0454)  Resp: 18 (02/21/17 0454)  BP: (!) 208/96 (02/21/17 0454)  SpO2: 98 % (02/21/17 0454) Vital Signs (24h Range):  Temp:  [97.9 °F (36.6 °C)-98.7 °F (37.1 °C)] 98.2 °F (36.8 °C)  Pulse:  [52-59] 59  Resp:  [18-20] 18  SpO2:  [90 %-98 %] 98 %  BP: (178-237)/() 208/96     Weight: 90.7 kg (200 lb)  Body mass index is 27.12 kg/(m^2).    Physical Exam   Constitutional: He is oriented to person, place, and time. He appears well-developed.   HENT:   Head: Normocephalic and atraumatic.   Eyes: Right eye exhibits no discharge. Left eye exhibits no discharge. No scleral icterus.   Neck: Neck supple. No JVD present.   Cardiovascular: Normal rate, regular rhythm and intact distal pulses.    Murmur heard.  Pulses equal   Pulmonary/Chest: Effort normal. He has wheezes. He has rhonchi.   Abdominal: Soft. Bowel sounds are normal. There is hepatomegaly. There is tenderness. There is no rebound and no guarding.    Musculoskeletal: He exhibits edema and tenderness.   See photos   Neurological: He is alert and oriented to person, place, and time.   Skin: Skin is warm and dry. He is not diaphoretic.   See photos   Psychiatric: His behavior is normal. Thought content normal.   Nursing note and vitals reviewed.                   Significant Labs:   CBC:   Recent Labs  Lab 02/21/17 0040   WBC 9.20   HGB 8.8*   HCT 26.3*        CMP:   Recent Labs  Lab 02/21/17 0040      K 5.9*   CL 99   CO2 22*   GLU 78   BUN 68*   CREATININE 7.2*   CALCIUM 8.3*   PROT 7.8   ALBUMIN 3.6   BILITOT 0.6   ALKPHOS 91   AST 14   ALT 7*   ANIONGAP 17*   EGFRNONAA 8*       Significant Imaging:     RIGHT lower extremity venous doppler:  No DVT  Lymphadenopathy

## 2017-02-22 LAB
ALBUMIN SERPL BCP-MCNC: 3.1 G/DL
ALP SERPL-CCNC: 79 U/L
ALT SERPL W/O P-5'-P-CCNC: 8 U/L
ANION GAP SERPL CALC-SCNC: 11 MMOL/L
AST SERPL-CCNC: 11 U/L
BASOPHILS # BLD AUTO: 0 K/UL
BASOPHILS NFR BLD: 0.2 %
BILIRUB SERPL-MCNC: 0.6 MG/DL
BUN SERPL-MCNC: 48 MG/DL
CALCIUM SERPL-MCNC: 8.1 MG/DL
CHLORIDE SERPL-SCNC: 101 MMOL/L
CO2 SERPL-SCNC: 26 MMOL/L
CREAT SERPL-MCNC: 5.7 MG/DL
DIFFERENTIAL METHOD: ABNORMAL
EOSINOPHIL # BLD AUTO: 0.2 K/UL
EOSINOPHIL NFR BLD: 3.9 %
ERYTHROCYTE [DISTWIDTH] IN BLOOD BY AUTOMATED COUNT: 16.6 %
EST. GFR  (AFRICAN AMERICAN): 12 ML/MIN/1.73 M^2
EST. GFR  (NON AFRICAN AMERICAN): 11 ML/MIN/1.73 M^2
GLUCOSE SERPL-MCNC: 77 MG/DL
HCT VFR BLD AUTO: 24.7 %
HGB BLD-MCNC: 8.1 G/DL
LYMPHOCYTES # BLD AUTO: 0.9 K/UL
LYMPHOCYTES NFR BLD: 14.7 %
MAGNESIUM SERPL-MCNC: 2.3 MG/DL
MCH RBC QN AUTO: 30.8 PG
MCHC RBC AUTO-ENTMCNC: 32.9 %
MCV RBC AUTO: 94 FL
MONOCYTES # BLD AUTO: 0.6 K/UL
MONOCYTES NFR BLD: 9.8 %
NEUTROPHILS # BLD AUTO: 4.3 K/UL
NEUTROPHILS NFR BLD: 71.4 %
PHOSPHATE SERPL-MCNC: 6.6 MG/DL
PLATELET # BLD AUTO: 207 K/UL
PMV BLD AUTO: 6.2 FL
POTASSIUM SERPL-SCNC: 5.1 MMOL/L
PROT SERPL-MCNC: 6.9 G/DL
RBC # BLD AUTO: 2.63 M/UL
SODIUM SERPL-SCNC: 138 MMOL/L
WBC # BLD AUTO: 6 K/UL

## 2017-02-22 PROCEDURE — 99233 SBSQ HOSP IP/OBS HIGH 50: CPT | Mod: ,,, | Performed by: INTERNAL MEDICINE

## 2017-02-22 PROCEDURE — 12000002 HC ACUTE/MED SURGE SEMI-PRIVATE ROOM

## 2017-02-22 PROCEDURE — 83735 ASSAY OF MAGNESIUM: CPT

## 2017-02-22 PROCEDURE — 63600175 PHARM REV CODE 636 W HCPCS: Performed by: INTERNAL MEDICINE

## 2017-02-22 PROCEDURE — 36415 COLL VENOUS BLD VENIPUNCTURE: CPT

## 2017-02-22 PROCEDURE — 84100 ASSAY OF PHOSPHORUS: CPT

## 2017-02-22 PROCEDURE — 25000003 PHARM REV CODE 250: Performed by: INTERNAL MEDICINE

## 2017-02-22 PROCEDURE — 80053 COMPREHEN METABOLIC PANEL: CPT

## 2017-02-22 PROCEDURE — 25000003 PHARM REV CODE 250: Performed by: PHYSICIAN ASSISTANT

## 2017-02-22 PROCEDURE — 94761 N-INVAS EAR/PLS OXIMETRY MLT: CPT

## 2017-02-22 PROCEDURE — 85025 COMPLETE CBC W/AUTO DIFF WBC: CPT

## 2017-02-22 PROCEDURE — 27000221 HC OXYGEN, UP TO 24 HOURS

## 2017-02-22 PROCEDURE — 25000242 PHARM REV CODE 250 ALT 637 W/ HCPCS: Performed by: INTERNAL MEDICINE

## 2017-02-22 PROCEDURE — 94640 AIRWAY INHALATION TREATMENT: CPT

## 2017-02-22 PROCEDURE — 63600175 PHARM REV CODE 636 W HCPCS: Performed by: PHYSICIAN ASSISTANT

## 2017-02-22 RX ORDER — CALCIUM ACETATE 667 MG/1
2001 CAPSULE ORAL
COMMUNITY
End: 2017-08-11

## 2017-02-22 RX ORDER — HYDRALAZINE HYDROCHLORIDE 25 MG/1
50 TABLET, FILM COATED ORAL 3 TIMES DAILY
Status: DISCONTINUED | OUTPATIENT
Start: 2017-02-22 | End: 2017-02-24 | Stop reason: HOSPADM

## 2017-02-22 RX ORDER — CALCIUM ACETATE 667 MG/1
2001 CAPSULE ORAL
Status: DISCONTINUED | OUTPATIENT
Start: 2017-02-22 | End: 2017-02-24 | Stop reason: HOSPADM

## 2017-02-22 RX ADMIN — HEPARIN SODIUM 5000 UNITS: 5000 INJECTION, SOLUTION INTRAVENOUS; SUBCUTANEOUS at 04:02

## 2017-02-22 RX ADMIN — HEPARIN SODIUM 5000 UNITS: 5000 INJECTION, SOLUTION INTRAVENOUS; SUBCUTANEOUS at 09:02

## 2017-02-22 RX ADMIN — HYDRALAZINE HYDROCHLORIDE 50 MG: 25 TABLET, FILM COATED ORAL at 09:02

## 2017-02-22 RX ADMIN — ATORVASTATIN CALCIUM 40 MG: 40 TABLET, FILM COATED ORAL at 09:02

## 2017-02-22 RX ADMIN — LISINOPRIL 40 MG: 40 TABLET ORAL at 10:02

## 2017-02-22 RX ADMIN — HYDRALAZINE HYDROCHLORIDE 50 MG: 25 TABLET, FILM COATED ORAL at 04:02

## 2017-02-22 RX ADMIN — CARVEDILOL 12.5 MG: 6.25 TABLET, FILM COATED ORAL at 10:02

## 2017-02-22 RX ADMIN — PIPERACILLIN SODIUM AND TAZOBACTAM SODIUM 4.5 G: 4; .5 INJECTION, POWDER, LYOPHILIZED, FOR SOLUTION INTRAVENOUS at 05:02

## 2017-02-22 RX ADMIN — MINOXIDIL 5 MG: 2.5 TABLET ORAL at 09:02

## 2017-02-22 RX ADMIN — ASPIRIN 325 MG ORAL TABLET 325 MG: 325 PILL ORAL at 10:02

## 2017-02-22 RX ADMIN — CLOPIDOGREL BISULFATE 75 MG: 75 TABLET ORAL at 10:02

## 2017-02-22 RX ADMIN — IPRATROPIUM BROMIDE AND ALBUTEROL SULFATE 3 ML: .5; 3 SOLUTION RESPIRATORY (INHALATION) at 12:02

## 2017-02-22 RX ADMIN — HYDRALAZINE HYDROCHLORIDE 25 MG: 25 TABLET, FILM COATED ORAL at 05:02

## 2017-02-22 RX ADMIN — HYDRALAZINE HYDROCHLORIDE 10 MG: 20 INJECTION INTRAMUSCULAR; INTRAVENOUS at 11:02

## 2017-02-22 RX ADMIN — CALCIUM ACETATE 2001 MG: 667 CAPSULE ORAL at 04:02

## 2017-02-22 RX ADMIN — MINOXIDIL 5 MG: 2.5 TABLET ORAL at 10:02

## 2017-02-22 RX ADMIN — IPRATROPIUM BROMIDE AND ALBUTEROL SULFATE 3 ML: .5; 3 SOLUTION RESPIRATORY (INHALATION) at 07:02

## 2017-02-22 RX ADMIN — METHADONE HYDROCHLORIDE 120 MG: 10 TABLET ORAL at 10:02

## 2017-02-22 RX ADMIN — AMLODIPINE BESYLATE 5 MG: 5 TABLET ORAL at 09:02

## 2017-02-22 RX ADMIN — FAMOTIDINE 20 MG: 20 TABLET, FILM COATED ORAL at 10:02

## 2017-02-22 RX ADMIN — IPRATROPIUM BROMIDE AND ALBUTEROL SULFATE 3 ML: .5; 3 SOLUTION RESPIRATORY (INHALATION) at 01:02

## 2017-02-22 RX ADMIN — PIPERACILLIN SODIUM AND TAZOBACTAM SODIUM 4.5 G: 4; .5 INJECTION, POWDER, LYOPHILIZED, FOR SOLUTION INTRAVENOUS at 06:02

## 2017-02-22 RX ADMIN — AMLODIPINE BESYLATE 5 MG: 5 TABLET ORAL at 10:02

## 2017-02-22 RX ADMIN — HEPARIN SODIUM 5000 UNITS: 5000 INJECTION, SOLUTION INTRAVENOUS; SUBCUTANEOUS at 05:02

## 2017-02-22 NOTE — PROGRESS NOTES
Afebrile   Was missing with nurse unaware for 2 hours today    Surgical consult still pending  Orthopedics refused consult  Culture from abscess growing a gram neg brian  Abscess unchanged, but can decompress and get pus from ulceration    Will continue current antibiotics and discuss alternatives with Dr. Peng

## 2017-02-22 NOTE — PLAN OF CARE
02/22/17 0057   Patient Assessment/Suction   Level of Consciousness (AVPU) alert   All Lung Fields Breath Sounds wheezes, expiratory   Cough Type none   PRE-TX-O2-ETCO2   O2 Device (Oxygen Therapy) nasal cannula   Flow (L/min) 2   Oxygen Concentration (%) 28   SpO2 (!) 94 %   Pulse Oximetry Type Intermittent   Pulse (!) 53   Resp 16   Aerosol Therapy   $ Aerosol Therapy Charges Aerosol Treatment   Respiratory Treatment Status given   SVN/Inhaler Treatment Route mask;with air   Position During Treatment HOB at 45 degrees   Patient Tolerance good   Post-Treatment   Post-treatment Heart Rate (beats/min) 57   Post-treatment Resp Rate (breaths/min) 18   All Fields Breath Sounds unchanged   Ready to Wean/Extubation Screen   FIO2<60 (chart decimal) 0.28

## 2017-02-22 NOTE — PROGRESS NOTES
Progress Note  Hospital Medicine  Patient Name:João Aguirre  MRN:  [id number]  Patient Class: IP- Inpatient  Admit Date: 2/20/2017  Length of Stay: 1 days  Expected Discharge Date:   Attending Physician: Adela Peng MD  Primary Care Provider:  Delbert Redd NP    SUBJECTIVE:     Principal Problem: Infected ulcer of skin  Initial history of present illness: Mr. Aguirre presents for evaluation of RIGHT foot swelling and pain. He reports chronic ulcer to RIGHT plantar surface. He is unsure how long he has had it. He denies discharge. He reports swelling and pain to his RIGHT leg for the past few days. He reports chronic bilateral lower extremity paresthesia which has not worsened. He denies coldness to his extremities. He denies fever but reports chills. He reports shortness of breath and congestive type cough. He reports history of smoking cigars but denies smoking cigars in many years. He reports being inappropriately diagnosed with asthma. He no longer uses an inhaler. He denies chest pain or palpitations. He reports chronic abdominal pain and vomiting. He reports being diagnosed with cirrhosis. He denies alcohol consumption. He denies history of IV drug use. He reports history of DM which he states resolved with HD.    PMH/PSH/SH/FH/Meds: reviewed.    Symptoms/Review of Systems: Right foot redness and swelling improving. No shortness of breath, cough, chest pain or headache, fever or abdominal pain.     Diet:  Adequate intake.    Activity level: Normal.    Pain:  Chronic pain syndrome    OBJECTIVE:   Vital Signs (Most Recent):      Temp: 98.1 °F (36.7 °C) (02/22/17 0820)  Pulse: (!) 59 (02/22/17 0820)  Resp: 18 (02/22/17 0820)  BP: (!) 180/86 (02/22/17 0820)  SpO2: (!) 94 % (02/22/17 0820)       Vital Signs Range (Last 24H):  Temp:  [97.5 °F (36.4 °C)-98.5 °F (36.9 °C)]   Pulse:  [45-65]   Resp:  [16-18]   BP: (175-214)/(75-97)   SpO2:  [94 %-99 %]     Weight: 90.7 kg (200 lb)  Body mass index is 27.12  kg/(m^2).    Intake/Output Summary (Last 24 hours) at 02/22/17 1115  Last data filed at 02/21/17 2126   Gross per 24 hour   Intake             1150 ml   Output             4500 ml   Net            -3350 ml     Physical Examination:  Constitutional: He is oriented to person, place, and time. He appears well-developed.   HENT:   Head: Normocephalic and atraumatic.   Eyes: Right eye exhibits no discharge. Left eye exhibits no discharge. No scleral icterus.   Neck: Neck supple. No JVD present.   Cardiovascular: Normal rate, regular rhythm and intact distal pulses.   Murmur heard.  Pulmonary/Chest: Effort normal. He has wheezes. He has rhonchi.   Abdominal: Soft. Bowel sounds are normal. There is hepatomegaly. There is tenderness. There is no rebound and no guarding.   Musculoskeletal: He exhibits edema and tenderness.   Neurological: He is alert and oriented to person, place, and time.   Skin: Skin is warm and dry. He is not diaphoretic.   Psychiatric: His behavior is normal. Thought content normal.     CBC:    Recent Labs  Lab 02/21/17  0040 02/22/17  0440   WBC 9.20 6.00   RBC 2.82* 2.63*   HGB 8.8* 8.1*   HCT 26.3* 24.7*    207   MCV 93 94   MCH 31.3* 30.8   MCHC 33.5 32.9   BMP    Recent Labs  Lab 02/21/17  0040 02/22/17  0440   GLU 78 77    138   K 5.9* 5.1   CL 99 101   CO2 22* 26   BUN 68* 48*   CREATININE 7.2* 5.7*   CALCIUM 8.3* 8.1*   MG  --  2.3      Diagnostic Results:  Microbiology Results (last 7 days)     Procedure Component Value Units Date/Time    Aerobic culture [[id number]] Collected:  02/21/17 1620    Order Status:  Sent Specimen:  Abscess from Foot, Right Updated:  02/21/17 2058    Blood culture [[id number]] Collected:  02/21/17 0634    Order Status:  Completed Specimen:  Blood Updated:  02/21/17 1915     Blood Culture, Routine No Growth to date    Narrative:       Take 2 sets. Take both aerobic and anaerobic bottles.    Blood culture [[id number]] Collected:  02/21/17 0640    Order Status:   Completed Specimen:  Blood from Antecubital, Right Updated:  02/21/17 1915     Blood Culture, Routine No Growth to date    Narrative:       PLEASE OBTAIN CULTURE WITH DIALYSIS. Take 2 sets. Take both  aerobic and anaerobic bottles.       RIGHT lower extremity venous doppler:  No DVT  Lymphadenopathy    RLE arterial doppler:   1. Findings suggesting a moderate degree of inflow disease to the right lower extremity, with flow maintained to the right foot.  No high-grade peripheral arterial stenosis.  2.  Enlarged right groin lymph node.  Enlargement is greater than expected for reactive lymphadenopathy, and clinical correlation with consideration for biopsy recommended.    RLE MRI:   1.  Plantar ulcer superficial to the 2nd MTP joint.  No evidence for underlying osteomyelitis or soft tissue abscess.  2.  Diffuse edema within the forefoot consistent with dependent edema and/or cellulitis.    CXR: Mild bilateral infiltrates suggesting mild CHF, right basal atelectasis, cardiomegaly.  Infiltrates appear slightly more prominent today than on the prior exam.    Assessment/Plan:   * Infected ulcer of skin  Blood cultures  IV Vancomycin and Zosyn. Follow ID recommendations.  MRI results reviewed.  Await orthopedics evaluation. No podiatry services available.     Poorly-controlled hypertension  Continue Carvedilol, Lisinopril, Hydralazine, Amlodipine.  Increase Hydralazine 50 mg TID.      Hyperkalemia - resolved  Monitor on telemtry     ESRD (T,Th,Sat) dialysis onset 2013  HD as per nephrology.      Abdominal pain  Chronic  Serial abdominal exams  Consider further evaluation - CT, paracentesis, consulting GI for EGD     Metabolic acidosis  Due to ESRD and infection.  HD. Continue treatment of infection as per above.     Coronary artery disease involving native coronary artery of native heart without angina pectoris  Continue Aspirin, BP-control, Lipid control  Monitor for signs and symptoms of ACS     Anemia in chronic kidney  disease  Trend H/H  Monitor for bleeding  Transfuse if Hb <7.    Cirrhosis of liver with ascites  Clinically monitor.      Methadone dependence  Contact Methadone clinic to confirm dose  VTE Risk Mitigation         Ordered     heparin (porcine) injection 5,000 Units  Every 8 hours     Route:  Subcutaneous        02/21/17 0154     Medium Risk of VTE  Once      02/21/17 0154        Adela Peng MD  Department of Hospital Medicine   [facility]

## 2017-02-22 NOTE — PLAN OF CARE
02/22/17 0728   Patient Assessment/Suction   Level of Consciousness (AVPU) alert   All Lung Fields Breath Sounds wheezes, expiratory   Cough Type good   PRE-TX-O2-ETCO2   O2 Device (Oxygen Therapy) nasal cannula   $ Is the patient on Oxygen? Yes   Flow (L/min) 2   Oxygen Concentration (%) 28   SpO2 97 %   Pulse Oximetry Type Intermittent   $ Pulse Oximetry - Multiple Charge Pulse Oximetry - Multiple   Pulse (!) 51   Resp 16   Positioning HOB elevated 30 degrees   Aerosol Therapy   $ Aerosol Therapy Charges Aerosol Treatment   Respiratory Treatment Status given   SVN/Inhaler Treatment Route mask   Position During Treatment HOB at 30-45 degrees   Patient Tolerance good   Post-Treatment   Post-treatment Heart Rate (beats/min) 58   Post-treatment Resp Rate (breaths/min) 16   All Fields Breath Sounds aeration increased   Ready to Wean/Extubation Screen   FIO2<60 (chart decimal) 0.28

## 2017-02-22 NOTE — PROGRESS NOTES
PT TOLERATED HD WELL. TOTAL UF 4 LITERS. PT RECEIVED EPOGEN 5,000 UNITS IV WITH HD. REPORT GIVEN  TO BRAVO BECERRA

## 2017-02-22 NOTE — PROGRESS NOTES
[facility] Progress Note    Subjective: esrd        Scheduled Meds:   sodium chloride 0.9%   Intravenous Once    albuterol-ipratropium 2.5mg-0.5mg/3mL  3 mL Nebulization Q6H    amlodipine  5 mg Oral BID    aspirin  325 mg Oral Daily    atorvastatin  40 mg Oral QHS    carvedilol  12.5 mg Oral BID    clopidogrel  75 mg Oral Daily    famotidine  20 mg Oral Daily    heparin (porcine)  5,000 Units Subcutaneous Q8H    hydrALAZINE  50 mg Oral TID    lisinopril  40 mg Oral Daily    methadone  120 mg Oral Daily    minoxidil  5 mg Oral BID    piperacillin-tazobactam 4.5 g in dextrose 5 % 100 mL IVPB (ready to mix system)  4.5 g Intravenous Q12H     Continuous Infusions:   PRN Meds:.sodium chloride 0.9%, acetaminophen, hydrALAZINE, ondansetron, senna-docusate 8.6-50 mg        Vitals:    02/22/17 0728 02/22/17 0820 02/22/17 1100 02/22/17 1353   BP:  (!) 180/86 (!) 150/70    BP Location:  Right arm Right arm    Patient Position:  Sitting Lying    BP Method:  Automatic Automatic    Pulse: (!) 51 (!) 59 (!) 52 68   Resp: 16 18 16 16   Temp:  98.1 °F (36.7 °C) 98.2 °F (36.8 °C)    TempSrc:  Oral Oral    SpO2: 97% (!) 94% (!) 93% 97%   Weight:       Height:             I/O last 3 completed shifts:  In: 1570 [P.O.:1070; Other:500]  Out: 4700 [Emesis/NG output:200; Other:4500]      GEN: WDWN, NAD, afebrile    CVS: S1/S2 +,    PULM: bilateral wheezes  ABD: +BS, soft, NTND    EXT: NO C/C/E    Dialysis access: avf        Recent Labs  Lab 02/22/17  0440   CALCIUM 8.1*   PROT 6.9      K 5.1   CO2 26      BUN 48*   CREATININE 5.7*   ALKPHOS 79   ALT 8*   AST 11   BILITOT 0.6         Recent Labs  Lab 02/22/17  0440   WBC 6.00   RBC 2.63*   HGB 8.1*   HCT 24.7*      MCV 94   MCH 30.8   MCHC 32.9           ASSESSMENT:  1. esrd  2 hypertension  3. anemia      PLAN:  1. Dialysis tomorrow  2. uf 3 liters  3. Epogen with hd  4.  Renal dose medication for crcl 10

## 2017-02-23 LAB — BACTERIA SPEC AEROBE CULT: NORMAL

## 2017-02-23 PROCEDURE — 25000003 PHARM REV CODE 250: Performed by: INTERNAL MEDICINE

## 2017-02-23 PROCEDURE — 94761 N-INVAS EAR/PLS OXIMETRY MLT: CPT

## 2017-02-23 PROCEDURE — 12000002 HC ACUTE/MED SURGE SEMI-PRIVATE ROOM

## 2017-02-23 PROCEDURE — 63600175 PHARM REV CODE 636 W HCPCS: Performed by: PHYSICIAN ASSISTANT

## 2017-02-23 PROCEDURE — 80100014 HC HEMODIALYSIS 1:1

## 2017-02-23 PROCEDURE — 99232 SBSQ HOSP IP/OBS MODERATE 35: CPT | Mod: ,,, | Performed by: INTERNAL MEDICINE

## 2017-02-23 PROCEDURE — 63600175 PHARM REV CODE 636 W HCPCS: Performed by: INTERNAL MEDICINE

## 2017-02-23 PROCEDURE — 25000242 PHARM REV CODE 250 ALT 637 W/ HCPCS: Performed by: INTERNAL MEDICINE

## 2017-02-23 PROCEDURE — 94640 AIRWAY INHALATION TREATMENT: CPT

## 2017-02-23 PROCEDURE — 25000003 PHARM REV CODE 250: Performed by: PHYSICIAN ASSISTANT

## 2017-02-23 RX ORDER — SODIUM CHLORIDE 9 MG/ML
INJECTION, SOLUTION INTRAVENOUS
Status: DISCONTINUED | OUTPATIENT
Start: 2017-02-23 | End: 2017-02-24 | Stop reason: HOSPADM

## 2017-02-23 RX ORDER — HEPARIN SODIUM 5000 [USP'U]/ML
5000 INJECTION, SOLUTION INTRAVENOUS; SUBCUTANEOUS
Status: DISCONTINUED | OUTPATIENT
Start: 2017-02-23 | End: 2017-02-24 | Stop reason: HOSPADM

## 2017-02-23 RX ORDER — SODIUM CHLORIDE 9 MG/ML
INJECTION, SOLUTION INTRAVENOUS ONCE
Status: COMPLETED | OUTPATIENT
Start: 2017-02-23 | End: 2017-02-23

## 2017-02-23 RX ADMIN — CALCIUM ACETATE 2001 MG: 667 CAPSULE ORAL at 12:02

## 2017-02-23 RX ADMIN — CALCIUM ACETATE 2001 MG: 667 CAPSULE ORAL at 05:02

## 2017-02-23 RX ADMIN — ATORVASTATIN CALCIUM 40 MG: 40 TABLET, FILM COATED ORAL at 09:02

## 2017-02-23 RX ADMIN — HEPARIN SODIUM 5000 UNITS: 5000 INJECTION, SOLUTION INTRAVENOUS; SUBCUTANEOUS at 05:02

## 2017-02-23 RX ADMIN — HEPARIN SODIUM 5000 UNITS: 5000 INJECTION, SOLUTION INTRAVENOUS; SUBCUTANEOUS at 09:02

## 2017-02-23 RX ADMIN — AMLODIPINE BESYLATE 5 MG: 5 TABLET ORAL at 09:02

## 2017-02-23 RX ADMIN — LISINOPRIL 40 MG: 40 TABLET ORAL at 08:02

## 2017-02-23 RX ADMIN — PIPERACILLIN SODIUM AND TAZOBACTAM SODIUM 4.5 G: 4; .5 INJECTION, POWDER, LYOPHILIZED, FOR SOLUTION INTRAVENOUS at 05:02

## 2017-02-23 RX ADMIN — METHADONE HYDROCHLORIDE 120 MG: 10 TABLET ORAL at 08:02

## 2017-02-23 RX ADMIN — IPRATROPIUM BROMIDE AND ALBUTEROL SULFATE 3 ML: .5; 3 SOLUTION RESPIRATORY (INHALATION) at 07:02

## 2017-02-23 RX ADMIN — FAMOTIDINE 20 MG: 20 TABLET, FILM COATED ORAL at 08:02

## 2017-02-23 RX ADMIN — SODIUM CHLORIDE: 0.9 INJECTION, SOLUTION INTRAVENOUS at 08:02

## 2017-02-23 RX ADMIN — EPOETIN ALFA 5000 UNITS: 20000 SOLUTION INTRAVENOUS; SUBCUTANEOUS at 02:02

## 2017-02-23 RX ADMIN — IPRATROPIUM BROMIDE AND ALBUTEROL SULFATE 3 ML: .5; 3 SOLUTION RESPIRATORY (INHALATION) at 01:02

## 2017-02-23 RX ADMIN — HYDRALAZINE HYDROCHLORIDE 50 MG: 25 TABLET, FILM COATED ORAL at 05:02

## 2017-02-23 RX ADMIN — ONDANSETRON 4 MG: 2 INJECTION INTRAMUSCULAR; INTRAVENOUS at 05:02

## 2017-02-23 RX ADMIN — CLOPIDOGREL BISULFATE 75 MG: 75 TABLET ORAL at 08:02

## 2017-02-23 RX ADMIN — MINOXIDIL 5 MG: 2.5 TABLET ORAL at 09:02

## 2017-02-23 RX ADMIN — ASPIRIN 325 MG ORAL TABLET 325 MG: 325 PILL ORAL at 08:02

## 2017-02-23 RX ADMIN — MINOXIDIL 5 MG: 2.5 TABLET ORAL at 08:02

## 2017-02-23 RX ADMIN — CARVEDILOL 12.5 MG: 6.25 TABLET, FILM COATED ORAL at 08:02

## 2017-02-23 RX ADMIN — CALCIUM ACETATE 2001 MG: 667 CAPSULE ORAL at 08:02

## 2017-02-23 RX ADMIN — AMLODIPINE BESYLATE 5 MG: 5 TABLET ORAL at 08:02

## 2017-02-23 RX ADMIN — HYDRALAZINE HYDROCHLORIDE 50 MG: 25 TABLET, FILM COATED ORAL at 09:02

## 2017-02-23 NOTE — PROGRESS NOTES
[facility] Progress Note    Subjective: esrd    Breathing better.  No n,v    Scheduled Meds:   sodium chloride 0.9%   Intravenous Once    sodium chloride 0.9%   Intravenous Once    albuterol-ipratropium 2.5mg-0.5mg/3mL  3 mL Nebulization Q6H    amlodipine  5 mg Oral BID    aspirin  325 mg Oral Daily    atorvastatin  40 mg Oral QHS    calcium acetate  2,001 mg Oral TID WM    carvedilol  12.5 mg Oral BID    clopidogrel  75 mg Oral Daily    epoetin harshad (PROCRIT) injection  5,000 Units Intravenous Once    famotidine  20 mg Oral Daily    heparin (porcine)  5,000 Units Subcutaneous Q8H    hydrALAZINE  50 mg Oral TID    lisinopril  40 mg Oral Daily    methadone  120 mg Oral Daily    minoxidil  5 mg Oral BID    piperacillin-tazobactam 4.5 g in dextrose 5 % 100 mL IVPB (ready to mix system)  4.5 g Intravenous Q12H     Continuous Infusions:   PRN Meds:.sodium chloride 0.9%, sodium chloride 0.9%, acetaminophen, heparin (porcine), hydrALAZINE, ondansetron, senna-docusate 8.6-50 mg        Vitals:    02/22/17 2300 02/23/17 0117 02/23/17 0500 02/23/17 0705   BP: (!) 171/65  (!) 186/86 (!) 186/90   BP Location:       Patient Position:       BP Method:       Pulse: 81 (!) 55 (!) 58 61   Resp: 18 15 17 20   Temp: 98.1 °F (36.7 °C)  98.2 °F (36.8 °C) 98.1 °F (36.7 °C)   TempSrc: Oral  Oral    SpO2: 95% 95% 98% (!) 93%   Weight:       Height:             I/O last 3 completed shifts:  In: 1740 [P.O.:1040; Other:500; IV Piggyback:200]  Out: 4500 [Other:4500]      GEN: WDWN, NAD, afebrile    CVS: S1/S2 +,    PULM: bilateral wheezes  ABD: +BS, soft, NTND    EXT: NO C/C/E    Dialysis access: avf      No results for input(s): GLUCOSE, CALCIUM, PROT, NA, K, CO2, CL, BUN, CREATININE, ALKPHOS, ALT, AST, BILITOT in the last 24 hours.    Invalid input(s):  PHOSPHORUS ALBUMIN    No results for input(s): WBC, RBC, HGB, HCT, PLT, MCV, MCH, MCHC in the last 24 hours.        ASSESSMENT:  1. esrd  2 hypertension  3.  Anemia  4.  Infected bullae s/p drainage    PLAN:  1. Dialysis today  2. uf 3 liters  3. Epogen with hd  4.  Renal dose medication for crcl 10  5. abx per id.  6.  Stable for d/c from renal standpoint after hd

## 2017-02-23 NOTE — PLAN OF CARE
02/23/17 0705   Patient Assessment/Suction   Level of Consciousness (AVPU) alert   Respiratory Effort Normal;Unlabored   All Lung Fields Breath Sounds coarse;rhonchi   Cough Type good;nonproductive;loose;congested   PRE-TX-O2-ETCO2   O2 Device (Oxygen Therapy) room air   SpO2 (!) 93 %   Pulse 61   Resp 20   Aerosol Therapy   $ Aerosol Therapy Charges Aerosol Treatment   Respiratory Treatment Status given   SVN/Inhaler Treatment Route mask   Patient Tolerance good   Post-Treatment   Post-treatment Heart Rate (beats/min) 58   Post-treatment Resp Rate (breaths/min) 20   All Fields Breath Sounds wheezes, expiratory;wheezes, inspiratory   Pt assessed, no distress noted. Pt receives Duoneb Q6, tols txs well.

## 2017-02-23 NOTE — PLAN OF CARE
Problem: Patient Care Overview  Goal: Plan of Care Review  Outcome: Ongoing (interventions implemented as appropriate)  Pt received on room air. Aerosol Tx given, tolerated well.

## 2017-02-23 NOTE — PROGRESS NOTES
Progress Note  [facility] Medicine  Patient Name:João Aguirre  MRN:  [id number]  Patient Class: IP- Inpatient  Admit Date: 2/20/2017  Length of Stay: 2 days  Expected Discharge Date:   Attending Physician: Adela Peng MD  Primary Care Provider:  Delbert Redd NP    SUBJECTIVE:     Principal Problem: Infected ulcer of skin  Initial history of present illness: Mr. Aguirre presents for evaluation of RIGHT foot swelling and pain. He reports chronic ulcer to RIGHT plantar surface. He is unsure how long he has had it. He denies discharge. He reports swelling and pain to his RIGHT leg for the past few days. He reports chronic bilateral lower extremity paresthesia which has not worsened. He denies coldness to his extremities. He denies fever but reports chills. He reports shortness of breath and congestive type cough. He reports history of smoking cigars but denies smoking cigars in many years. He reports being inappropriately diagnosed with asthma. He no longer uses an inhaler. He denies chest pain or palpitations. He reports chronic abdominal pain and vomiting. He reports being diagnosed with cirrhosis. He denies alcohol consumption. He denies history of IV drug use. He reports history of DM which he states resolved with HD.    PMH/PSH/SH/FH/Meds: reviewed.    Symptoms/Review of Systems: s/p right fott abscess bedside I+D. No shortness of breath, cough, chest pain or headache, fever or abdominal pain.     Diet:  Adequate intake.    Activity level: Normal.    Pain:  Chronic pain syndrome    OBJECTIVE:   Vital Signs (Most Recent):      Temp: 98.1 °F (36.7 °C) (02/23/17 0705)  Pulse: 61 (02/23/17 0705)  Resp: 20 (02/23/17 0705)  BP: (!) 186/90 (02/23/17 0705)  SpO2: (!) 93 % (02/23/17 0705)       Vital Signs Range (Last 24H):  Temp:  [98.1 °F (36.7 °C)-98.2 °F (36.8 °C)]   Pulse:  [50-81]   Resp:  [15-20]   BP: (155-186)/(65-90)   SpO2:  [93 %-98 %]     Weight: 90.7 kg (200 lb)  Body mass index is 27.12  kg/(m^2).    Intake/Output Summary (Last 24 hours) at 02/23/17 1136  Last data filed at 02/23/17 0600   Gross per 24 hour   Intake             1140 ml   Output                0 ml   Net             1140 ml     Physical Examination:  Constitutional: He is oriented to person, place, and time. He appears well-developed.   HENT:   Head: Normocephalic and atraumatic.   Eyes: Right eye exhibits no discharge. Left eye exhibits no discharge. No scleral icterus.   Neck: Neck supple. No JVD present.   Cardiovascular: Normal rate, regular rhythm and intact distal pulses.   Murmur heard.  Pulmonary/Chest: Effort normal. He has wheezes. He has rhonchi.   Abdominal: Soft. Bowel sounds are normal. There is hepatomegaly. There is tenderness. There is no rebound and no guarding.   Musculoskeletal: He exhibits edema and tenderness.   Neurological: He is alert and oriented to person, place, and time.   Skin: Skin is warm and dry. He is not diaphoretic.   Psychiatric: His behavior is normal. Thought content normal.     CBC:    Recent Labs  Lab 02/21/17  0040 02/22/17  0440   WBC 9.20 6.00   RBC 2.82* 2.63*   HGB 8.8* 8.1*   HCT 26.3* 24.7*    207   MCV 93 94   MCH 31.3* 30.8   MCHC 33.5 32.9   BMP    Recent Labs  Lab 02/21/17  0040 02/22/17  0440   GLU 78 77    138   K 5.9* 5.1   CL 99 101   CO2 22* 26   BUN 68* 48*   CREATININE 7.2* 5.7*   CALCIUM 8.3* 8.1*   MG  --  2.3      Diagnostic Results:  Microbiology Results (last 7 days)     Procedure Component Value Units Date/Time    Blood culture [[id number]] Collected:  02/21/17 0634    Order Status:  Completed Specimen:  Blood Updated:  02/22/17 1412     Blood Culture, Routine No Growth to date     Blood Culture, Routine No Growth to date    Narrative:       Take 2 sets. Take both aerobic and anaerobic bottles.    Blood culture [[id number]] Collected:  02/21/17 0640    Order Status:  Completed Specimen:  Blood from Antecubital, Right Updated:  02/22/17 1412     Blood Culture,  Routine No Growth to date     Blood Culture, Routine No Growth to date    Narrative:       PLEASE OBTAIN CULTURE WITH DIALYSIS. Take 2 sets. Take both  aerobic and anaerobic bottles.    Aerobic culture [[id number]] Collected:  02/21/17 1620    Order Status:  Completed Specimen:  Abscess from Foot, Right Updated:  02/22/17 1324     Aerobic Bacterial Culture --     GRAM NEGATIVE EDYTA  Moderate  Identification and susceptibility pending         RIGHT lower extremity venous doppler:  No DVT  Lymphadenopathy    RLE arterial doppler:   1. Findings suggesting a moderate degree of inflow disease to the right lower extremity, with flow maintained to the right foot.  No high-grade peripheral arterial stenosis.  2.  Enlarged right groin lymph node.  Enlargement is greater than expected for reactive lymphadenopathy, and clinical correlation with consideration for biopsy recommended.    RLE MRI:   1.  Plantar ulcer superficial to the 2nd MTP joint.  No evidence for underlying osteomyelitis or soft tissue abscess.  2.  Diffuse edema within the forefoot consistent with dependent edema and/or cellulitis.    CXR: Mild bilateral infiltrates suggesting mild CHF, right basal atelectasis, cardiomegaly.  Infiltrates appear slightly more prominent today than on the prior exam.    Assessment/Plan:   * Infected ulcer of skin - Foot cellulitis and abscess s/p I+D  Blood cultures  IV Vancomycin and Zosyn. Follow ID recommendations.  Very appreciative of Dr. Perrin's assistance.     Poorly-controlled hypertension  Continue Carvedilol, Lisinopril, Hydralazine, Amlodipine.  Increase Hydralazine 50 mg TID.      Hyperkalemia - resolved  Monitor on telemtry     ESRD (T,Th,Sat) dialysis onset 2013  HD as per nephrology.      Abdominal pain  Chronic  Serial abdominal exams  Consider further evaluation - CT, paracentesis, consulting GI for EGD     Metabolic acidosis  Due to ESRD and infection.  HD. Continue treatment of infection as per  above.     Coronary artery disease involving native coronary artery of native heart without angina pectoris  Continue Aspirin, BP-control, Lipid control  Monitor for signs and symptoms of ACS     Anemia in chronic kidney disease  Trend H/H  Monitor for bleeding  Transfuse if Hb <7.    Cirrhosis of liver with ascites  Clinically monitor.      Methadone dependence  Contact Methadone clinic to confirm dose  VTE Risk Mitigation         Ordered     heparin (porcine) injection 5,000 Units  As needed (PRN)     Route:  Intravenous        02/23/17 0526     heparin (porcine) injection 5,000 Units  Every 8 hours     Route:  Subcutaneous        02/21/17 0154     Medium Risk of VTE  Once      02/21/17 0154        Adela Peng MD  Department of Hospital Medicine   [facility]

## 2017-02-23 NOTE — PROGRESS NOTES
Afebrile    Appreciate Dr. Perrin's assistance  Seen in dialysis and foot bandage not disturbed     Citrobacter freundii     CULTURE, AEROBIC  (SPECIFY SOURCE)     Amikacin <=16  Sensitive     Amox/K Clav'ate >16/8  Resistant     Amp/Sulbactam <=8/4  Resistant     Cefazolin >16  Resistant     Cefepime <=8  Sensitive     Ceftriaxone <=8  Sensitive     Ciprofloxacin <=1  Sensitive     Ertapenem <=2  Sensitive     Gentamicin <=4  Sensitive     Meropenem <=4  Sensitive     Piperacillin/Tazo <=16  Sensitive     Tetracycline <=4  Sensitive     Tobramycin <=4  Sensitive     Trimeth/Sulfa <=2/38  Sensitive      A; abscess and cellulitis right foot, neuropathy       ESRD, cirrhosis, smoker    Rec: wound care consult per Dr. Perrin request           If wound looks ok tomorrow, could probably be discharged on cipro 500 mg once daily in the afternoon for 10 days          Dr. Perrin was willing to see in his office too.    thanks

## 2017-02-23 NOTE — PLAN OF CARE
Problem: Patient Care Overview  Goal: Plan of Care Review  Outcome: Ongoing (interventions implemented as appropriate)  Patient alert and oriented resting in bed. NAD. Denies pain or SOB. VSS. Plan of care reviewed with patient. Pt on HD Verbalizes understanding.Call light in reach. Pt free from fall or injury. Will monitor.

## 2017-02-23 NOTE — CONSULTS
Consulted by Dr Lundy for right foot abscess needing Incision and drainage and debridement.     Patient is a 53 yo male with puncture wound to right foot 5 months ago. Stated he thought it was fine, but developed cellulitis recently.       Cultures done and noted by Dr Lundy.   MRI negative for Osteomyelitis.       O) Large bulla plantar right foot under the second metatarsal tracking from a large plantar ulcerated callous. Likely from shearing of the plantar lesion.    There is purulence within the bulla.       Mild erythema dorsum of foot.     A) Infected bulla and plantar ulceration right foot.     P) Bedside incision and drainage done tonight.   Should be fine with wound care and antibiotic therapy.   Patient can follow up with me in office upon discharge.

## 2017-02-24 VITALS
WEIGHT: 200 LBS | OXYGEN SATURATION: 94 % | RESPIRATION RATE: 20 BRPM | BODY MASS INDEX: 27.09 KG/M2 | HEIGHT: 72 IN | DIASTOLIC BLOOD PRESSURE: 87 MMHG | SYSTOLIC BLOOD PRESSURE: 175 MMHG | TEMPERATURE: 98 F | HEART RATE: 51 BPM

## 2017-02-24 PROBLEM — L03.90 CELLULITIS: Status: ACTIVE | Noted: 2017-02-24

## 2017-02-24 LAB
ANION GAP SERPL CALC-SCNC: 16 MMOL/L
BASOPHILS # BLD AUTO: 0 K/UL
BASOPHILS NFR BLD: 0.3 %
BUN SERPL-MCNC: 42 MG/DL
CALCIUM SERPL-MCNC: 9.1 MG/DL
CHLORIDE SERPL-SCNC: 99 MMOL/L
CO2 SERPL-SCNC: 23 MMOL/L
CREAT SERPL-MCNC: 6.4 MG/DL
DIFFERENTIAL METHOD: ABNORMAL
EOSINOPHIL # BLD AUTO: 0.3 K/UL
EOSINOPHIL NFR BLD: 3.9 %
ERYTHROCYTE [DISTWIDTH] IN BLOOD BY AUTOMATED COUNT: 15.7 %
EST. GFR  (AFRICAN AMERICAN): 11 ML/MIN/1.73 M^2
EST. GFR  (NON AFRICAN AMERICAN): 9 ML/MIN/1.73 M^2
GLUCOSE SERPL-MCNC: 71 MG/DL
HCT VFR BLD AUTO: 27.9 %
HGB BLD-MCNC: 9.3 G/DL
LYMPHOCYTES # BLD AUTO: 1.2 K/UL
LYMPHOCYTES NFR BLD: 18.2 %
MCH RBC QN AUTO: 30.8 PG
MCHC RBC AUTO-ENTMCNC: 33.3 %
MCV RBC AUTO: 92 FL
MONOCYTES # BLD AUTO: 0.6 K/UL
MONOCYTES NFR BLD: 8.9 %
NEUTROPHILS # BLD AUTO: 4.5 K/UL
NEUTROPHILS NFR BLD: 68.7 %
PLATELET # BLD AUTO: 241 K/UL
PMV BLD AUTO: 6.3 FL
POTASSIUM SERPL-SCNC: 5.3 MMOL/L
RBC # BLD AUTO: 3.02 M/UL
SODIUM SERPL-SCNC: 138 MMOL/L
WBC # BLD AUTO: 6.6 K/UL

## 2017-02-24 PROCEDURE — 85025 COMPLETE CBC W/AUTO DIFF WBC: CPT

## 2017-02-24 PROCEDURE — 25000242 PHARM REV CODE 250 ALT 637 W/ HCPCS: Performed by: INTERNAL MEDICINE

## 2017-02-24 PROCEDURE — 36415 COLL VENOUS BLD VENIPUNCTURE: CPT

## 2017-02-24 PROCEDURE — 94761 N-INVAS EAR/PLS OXIMETRY MLT: CPT

## 2017-02-24 PROCEDURE — 80048 BASIC METABOLIC PNL TOTAL CA: CPT

## 2017-02-24 PROCEDURE — 25000003 PHARM REV CODE 250: Performed by: INTERNAL MEDICINE

## 2017-02-24 PROCEDURE — 94640 AIRWAY INHALATION TREATMENT: CPT

## 2017-02-24 PROCEDURE — 63600175 PHARM REV CODE 636 W HCPCS: Performed by: PHYSICIAN ASSISTANT

## 2017-02-24 PROCEDURE — 99239 HOSP IP/OBS DSCHRG MGMT >30: CPT | Mod: ,,, | Performed by: INTERNAL MEDICINE

## 2017-02-24 PROCEDURE — 25000003 PHARM REV CODE 250: Performed by: PHYSICIAN ASSISTANT

## 2017-02-24 RX ORDER — CIPROFLOXACIN 500 MG/1
500 TABLET ORAL DAILY
Qty: 10 TABLET | Refills: 0 | Status: SHIPPED | OUTPATIENT
Start: 2017-02-24 | End: 2017-03-06

## 2017-02-24 RX ADMIN — HEPARIN SODIUM 5000 UNITS: 5000 INJECTION, SOLUTION INTRAVENOUS; SUBCUTANEOUS at 05:02

## 2017-02-24 RX ADMIN — PIPERACILLIN SODIUM AND TAZOBACTAM SODIUM 4.5 G: 4; .5 INJECTION, POWDER, LYOPHILIZED, FOR SOLUTION INTRAVENOUS at 05:02

## 2017-02-24 RX ADMIN — CALCIUM ACETATE 2001 MG: 667 CAPSULE ORAL at 07:02

## 2017-02-24 RX ADMIN — ONDANSETRON 4 MG: 2 INJECTION INTRAMUSCULAR; INTRAVENOUS at 08:02

## 2017-02-24 RX ADMIN — MINOXIDIL 5 MG: 2.5 TABLET ORAL at 07:02

## 2017-02-24 RX ADMIN — FAMOTIDINE 20 MG: 20 TABLET, FILM COATED ORAL at 07:02

## 2017-02-24 RX ADMIN — CARVEDILOL 12.5 MG: 6.25 TABLET, FILM COATED ORAL at 07:02

## 2017-02-24 RX ADMIN — CLOPIDOGREL BISULFATE 75 MG: 75 TABLET ORAL at 07:02

## 2017-02-24 RX ADMIN — AMLODIPINE BESYLATE 5 MG: 5 TABLET ORAL at 07:02

## 2017-02-24 RX ADMIN — IPRATROPIUM BROMIDE AND ALBUTEROL SULFATE 3 ML: .5; 3 SOLUTION RESPIRATORY (INHALATION) at 12:02

## 2017-02-24 RX ADMIN — IPRATROPIUM BROMIDE AND ALBUTEROL SULFATE 3 ML: .5; 3 SOLUTION RESPIRATORY (INHALATION) at 07:02

## 2017-02-24 RX ADMIN — LISINOPRIL 40 MG: 40 TABLET ORAL at 07:02

## 2017-02-24 RX ADMIN — CALCIUM ACETATE 2001 MG: 667 CAPSULE ORAL at 12:02

## 2017-02-24 RX ADMIN — HYDRALAZINE HYDROCHLORIDE 50 MG: 25 TABLET, FILM COATED ORAL at 05:02

## 2017-02-24 RX ADMIN — METHADONE HYDROCHLORIDE 120 MG: 10 TABLET ORAL at 07:02

## 2017-02-24 RX ADMIN — ASPIRIN 325 MG ORAL TABLET 325 MG: 325 PILL ORAL at 07:02

## 2017-02-24 NOTE — PROGRESS NOTES
92% sats on room air. Q6 Duoneb aero tx given and tolerated well. NC connected to flow meter and accessible to pt.

## 2017-02-24 NOTE — PROGRESS NOTES
Spoke with Dr. Perrin's nurse, she said to have pt use betadine and wrap his wound with gauze daily.

## 2017-02-24 NOTE — PLAN OF CARE
02/24/17 0710   Patient Assessment/Suction   Level of Consciousness (AVPU) alert   Respiratory Effort Normal;Unlabored   All Lung Fields Breath Sounds wheezes, expiratory;wheezes, inspiratory   PRE-TX-O2-ETCO2   O2 Device (Oxygen Therapy) room air   SpO2 95 %   Pulse Oximetry Type Intermittent   $ Pulse Oximetry - Multiple Charge Pulse Oximetry - Multiple   Pulse 60   Resp 20   Aerosol Therapy   $ Aerosol Therapy Charges Aerosol Treatment   Respiratory Treatment Status given   SVN/Inhaler Treatment Route mask;with air   Patient Tolerance good   Post-Treatment   Post-treatment Heart Rate (beats/min) 65   Post-treatment Resp Rate (breaths/min) 20   All Fields Breath Sounds aeration increased;wheezes, expiratory   Pt assessed, no distress noted. Pt receives Duoneb Q6, tols txs well.

## 2017-02-24 NOTE — PLAN OF CARE
Problem: Patient Care Overview  Goal: Plan of Care Review  Outcome: Ongoing (interventions implemented as appropriate)  Patient alert and oriented resting in bed. NAD. Denies pain or SOB. VSS. Plan of care reviewed with patient. Pt on HD.  Verbalizes understanding.Call light in reach. Pt free from fall or injury. Will monitor.

## 2017-02-24 NOTE — DISCHARGE SUMMARY
Discharge Summary  Hospital Medicine    Admit Date: 2/20/2017    Date and Time: 2/24/20171:30 PM    Discharge Attending Physician: Adela Peng MD    Primary Care Physician: Delbert Redd NP    Diagnoses:  Active Hospital Problems    Diagnosis  POA    *Infected ulcer of skin [L98.499, L08.9]  Yes    Metabolic acidosis [E87.2]  Yes    Cirrhosis of liver with ascites [K74.60]  Yes    Hyperkalemia [E87.5]  Yes    Methadone dependence [F11.20]  Yes    Abdominal pain [R10.9]  Yes    Anemia in chronic kidney disease [N18.9, D63.1]  Yes    Poorly-controlled hypertension [I10]  Yes    ESRD (T,Th,Sat) dialysis onset 2013 [N18.6]  Yes    Coronary artery disease involving native coronary artery of native heart without angina pectoris [I25.10]  Yes      Resolved Hospital Problems    Diagnosis Date Resolved POA   No resolved problems to display.     Discharged Condition: Good    Hospital Course:   Mr. Aguirre presents for evaluation of RIGHT foot swelling and pain. He reports chronic ulcer to RIGHT plantar surface. He is unsure how long he has had it. He denies discharge. He reports swelling and pain to his RIGHT leg for the past few days. He reports chronic bilateral lower extremity paresthesia which has not worsened. He denies coldness to his extremities. He denied fever but reports chills. He reports shortness of breath and congestive type cough. He reported history of smoking cigars but denies smoking cigars in many years. He reports being inappropriately diagnosed with asthma. He no longer uses an inhaler. He denied chest pain or palpitations. He reported chronic abdominal pain and vomiting. He reports being diagnosed with cirrhosis. He denied alcohol consumption. He denies history of IV drug use. He reports history of DM which he stated resolved with HD. Patient was admitted to Hospitalist medicine service. Patient was evaluated by Dr. Carranza and continued on routine HD. US or LE reviewed. Patient was  evaluated by Dr. Perrin and bedside I&D performed. Microbiology followed by Dr. Lenz. Patient to follow up with Dr. Perrin as an outpatient. Continue PO antibiotics. Symptoms improved. Patient was discharged home in stable condition with following discharge plan of care. Total time with the patient was 30 minutes and greater than 50% was spent in counseling and coordination of care. The assessment and plan have been discussed at length. Physicians' notes reviewed. Labs and procedure reviewed.     Consults: Dr. Lenz, Dr. Carranza, Dr. Perrin    Significant Diagnostic Studies:   RIGHT lower extremity venous doppler:  No DVT  Lymphadenopathy     RLE arterial doppler:   1. Findings suggesting a moderate degree of inflow disease to the right lower extremity, with flow maintained to the right foot.  No high-grade peripheral arterial stenosis.  2.  Enlarged right groin lymph node.  Enlargement is greater than expected for reactive lymphadenopathy, and clinical correlation with consideration for biopsy recommended.     RLE MRI:   1.  Plantar ulcer superficial to the 2nd MTP joint.  No evidence for underlying osteomyelitis or soft tissue abscess.  2.  Diffuse edema within the forefoot consistent with dependent edema and/or cellulitis.     CXR: Mild bilateral infiltrates suggesting mild CHF, right basal atelectasis, cardiomegaly.  Infiltrates appear slightly more prominent today than on the prior exam.    Microbiology Results (last 7 days)     Procedure Component Value Units Date/Time    Blood culture [[id number]] Collected:  02/21/17 0634    Order Status:  Completed Specimen:  Blood Updated:  02/23/17 1412     Blood Culture, Routine No Growth to date     Blood Culture, Routine No Growth to date     Blood Culture, Routine No Growth to date    Narrative:       Take 2 sets. Take both aerobic and anaerobic bottles.    Blood culture [[id number]] Collected:  02/21/17 0640    Order Status:  Completed Specimen:  Blood from  Antecubital, Right Updated:  02/23/17 1412     Blood Culture, Routine No Growth to date     Blood Culture, Routine No Growth to date     Blood Culture, Routine No Growth to date    Narrative:       PLEASE OBTAIN CULTURE WITH DIALYSIS. Take 2 sets. Take both  aerobic and anaerobic bottles.    Aerobic culture [[id number]]  (Susceptibility) Collected:  02/21/17 1620    Order Status:  Completed Specimen:  Abscess from Foot, Right Updated:  02/23/17 1259     Aerobic Bacterial Culture --     CITROBACTER FREUNDII  Moderate  Skin socorro also present          Special Treatments/Procedures: None  Disposition: Home or Self Care    Medications:  Reconciled Home Medications: Current Discharge Medication List      START taking these medications    Details   ciprofloxacin HCl (CIPRO) 500 MG tablet Take 1 tablet (500 mg total) by mouth once daily. Take at noon time  Qty: 10 tablet, Refills: 0         CONTINUE these medications which have NOT CHANGED    Details   aspirin 325 MG tablet Take 1 tablet (325 mg total) by mouth once daily.  Qty: 30 tablet, Refills: 1      atorvastatin (LIPITOR) 40 MG tablet Take 1 tablet (40 mg total) by mouth every evening.  Qty: 30 tablet, Refills: 1      calcium acetate (PHOSLO) 667 mg capsule Take 2,001 mg by mouth 3 (three) times daily with meals.      carvedilol (COREG) 12.5 MG tablet Take 1 tablet (12.5 mg total) by mouth 2 (two) times daily.  Qty: 60 tablet, Refills: 0      clopidogrel (PLAVIX) 75 mg tablet Take 1 tablet (75 mg total) by mouth once daily.  Qty: 30 tablet, Refills: 1      famotidine (PEPCID) 20 MG tablet Take 1 tablet (20 mg total) by mouth once daily.  Qty: 30 tablet, Refills: 1      hydrALAZINE (APRESOLINE) 25 MG tablet Take 25 mg by mouth 3 (three) times daily.      lisinopril (PRINIVIL,ZESTRIL) 40 MG tablet Take 1 tablet (40 mg total) by mouth once daily.  Qty: 30 tablet, Refills: 1      methadone (DOLOPHINE) 10 MG tablet Take 120 mg by mouth once daily.      minoxidil (LONITEN)  2.5 MG tablet Take 2 tablets (5 mg total) by mouth 2 (two) times daily.  Qty: 120 tablet, Refills: 0      amlodipine (NORVASC) 5 MG tablet Take 1 tablet (5 mg total) by mouth 2 (two) times daily.  Qty: 30 tablet, Refills: 0      blood-glucose meter (PHARMACIST CHOICE GLUCOSE SYS) Misc 1 Device by Misc.(Non-Drug; Combo Route) route once.  Qty: 1 each, Refills: 0      ondansetron (ZOFRAN) 4 MG tablet Take 1 tablet (4 mg total) by mouth every 8 (eight) hours as needed for Nausea.  Qty: 12 tablet, Refills: 0         STOP taking these medications       methadone (DOLOPHINE) 5 mg/5 mL solution Comments:   Reason for Stopping:         sucralfate (CARAFATE) 1 gram tablet Comments:   Reason for Stopping:               Discharge Procedure Orders  Diet general   Order Comments: Cardiac/ 2 gram sodium low cholesterol diet     Diet renal     Other restrictions (specify):   Order Comments: Fall precautions     Call MD for:   Order Comments: For worsening symptoms, chest pain, shortness of breath, increased abdominal pain, high grade fever, stroke or stroke like symptoms, immediately go to the nearest Emergency Room or call 911 as soon as possible.     Change dressing (specify)   Order Comments: Dressing change: use betadine and wrap wound with gauze daily.       Follow-up Information     Follow up with Delbert Redd NP In 1 week.    Specialty:  Family Medicine    Contact information:    [address]  [place] [address] [phone]          Follow up with Yamil Perrin DPM In 1 week.    Specialty:  Podiatry    Contact information:    [address] [facility] [address]  [facility]  [facility] [address]  [phone]          Please follow up.    Contact information:    Continue routine HD as before.

## 2017-02-25 NOTE — PLAN OF CARE
02/25/17 0908   Final Note   Assessment Type Final Discharge Note   Discharge Disposition Home   Discharge planning education complete? Yes

## 2017-02-26 LAB
BACTERIA BLD CULT: NORMAL
BACTERIA BLD CULT: NORMAL

## 2017-03-01 ENCOUNTER — HOSPITAL ENCOUNTER (INPATIENT)
Facility: HOSPITAL | Age: 52
LOS: 5 days | Discharge: HOME | DRG: 640 | End: 2017-03-06
Attending: EMERGENCY MEDICINE | Admitting: HOSPITALIST
Payer: MEDICARE

## 2017-03-01 DIAGNOSIS — J81.1 PULMONARY EDEMA: ICD-10-CM

## 2017-03-01 DIAGNOSIS — I12.0 BENIGN HYPERTENSION WITH ESRD (END-STAGE RENAL DISEASE): ICD-10-CM

## 2017-03-01 DIAGNOSIS — D63.1 ANEMIA IN CHRONIC KIDNEY DISEASE(285.21): ICD-10-CM

## 2017-03-01 DIAGNOSIS — N18.6 BENIGN HYPERTENSION WITH ESRD (END-STAGE RENAL DISEASE): ICD-10-CM

## 2017-03-01 DIAGNOSIS — R09.02 HYPOXIA: ICD-10-CM

## 2017-03-01 DIAGNOSIS — Z91.158 NONCOMPLIANCE WITH RENAL DIALYSIS: Primary | ICD-10-CM

## 2017-03-01 DIAGNOSIS — L03.115 CELLULITIS OF RIGHT LEG: ICD-10-CM

## 2017-03-01 DIAGNOSIS — R18.8 CIRRHOSIS OF LIVER WITH ASCITES, UNSPECIFIED HEPATIC CIRRHOSIS TYPE: ICD-10-CM

## 2017-03-01 DIAGNOSIS — J81.0 ACUTE PULMONARY EDEMA: ICD-10-CM

## 2017-03-01 DIAGNOSIS — I25.10 CORONARY ARTERY DISEASE INVOLVING NATIVE CORONARY ARTERY OF NATIVE HEART WITHOUT ANGINA PECTORIS: ICD-10-CM

## 2017-03-01 DIAGNOSIS — N18.9 ANEMIA IN CHRONIC KIDNEY DISEASE(285.21): ICD-10-CM

## 2017-03-01 DIAGNOSIS — K74.60 CIRRHOSIS OF LIVER WITH ASCITES, UNSPECIFIED HEPATIC CIRRHOSIS TYPE: ICD-10-CM

## 2017-03-01 DIAGNOSIS — N18.6 ESRF (END STAGE RENAL FAILURE): ICD-10-CM

## 2017-03-01 DIAGNOSIS — E16.2 HYPOGLYCEMIA: ICD-10-CM

## 2017-03-01 LAB
ALBUMIN SERPL BCP-MCNC: 3.4 G/DL
ALP SERPL-CCNC: 82 U/L
ALT SERPL W/O P-5'-P-CCNC: 12 U/L
ANION GAP SERPL CALC-SCNC: 19 MMOL/L
ANION GAP SERPL CALC-SCNC: 21 MMOL/L
APTT BLDCRRT: 32 SEC
AST SERPL-CCNC: 21 U/L
BASOPHILS # BLD AUTO: 0 K/UL
BASOPHILS # BLD AUTO: 0 K/UL
BASOPHILS NFR BLD: 0.1 %
BASOPHILS NFR BLD: 0.1 %
BILIRUB SERPL-MCNC: 0.7 MG/DL
BUN SERPL-MCNC: 75 MG/DL
BUN SERPL-MCNC: 78 MG/DL
CALCIUM SERPL-MCNC: 7.2 MG/DL
CALCIUM SERPL-MCNC: 7.6 MG/DL
CHLORIDE SERPL-SCNC: 99 MMOL/L
CHLORIDE SERPL-SCNC: 99 MMOL/L
CO2 SERPL-SCNC: 19 MMOL/L
CO2 SERPL-SCNC: 20 MMOL/L
CREAT SERPL-MCNC: 11.2 MG/DL
CREAT SERPL-MCNC: 11.3 MG/DL
DIFFERENTIAL METHOD: ABNORMAL
DIFFERENTIAL METHOD: ABNORMAL
EOSINOPHIL # BLD AUTO: 0 K/UL
EOSINOPHIL # BLD AUTO: 0.1 K/UL
EOSINOPHIL NFR BLD: 0.2 %
EOSINOPHIL NFR BLD: 0.6 %
ERYTHROCYTE [DISTWIDTH] IN BLOOD BY AUTOMATED COUNT: 15.2 %
ERYTHROCYTE [DISTWIDTH] IN BLOOD BY AUTOMATED COUNT: 16.1 %
EST. GFR  (AFRICAN AMERICAN): 5 ML/MIN/1.73 M^2
EST. GFR  (AFRICAN AMERICAN): 5 ML/MIN/1.73 M^2
EST. GFR  (NON AFRICAN AMERICAN): 5 ML/MIN/1.73 M^2
EST. GFR  (NON AFRICAN AMERICAN): 5 ML/MIN/1.73 M^2
ESTIMATED AVG GLUCOSE: 80 MG/DL
GLUCOSE SERPL-MCNC: 36 MG/DL
GLUCOSE SERPL-MCNC: 38 MG/DL
GLUCOSE SERPL-MCNC: 45 MG/DL
GLUCOSE SERPL-MCNC: 46 MG/DL
HBA1C MFR BLD HPLC: 4.4 %
HCT VFR BLD AUTO: 25.9 %
HCT VFR BLD AUTO: 29 %
HGB BLD-MCNC: 8.5 G/DL
HGB BLD-MCNC: 9.7 G/DL
INR PPP: 1.2
LYMPHOCYTES # BLD AUTO: 0.9 K/UL
LYMPHOCYTES # BLD AUTO: 1 K/UL
LYMPHOCYTES NFR BLD: 11.4 %
LYMPHOCYTES NFR BLD: 11.7 %
MAGNESIUM SERPL-MCNC: 2.7 MG/DL
MCH RBC QN AUTO: 30.5 PG
MCH RBC QN AUTO: 30.7 PG
MCHC RBC AUTO-ENTMCNC: 32.9 %
MCHC RBC AUTO-ENTMCNC: 33.3 %
MCV RBC AUTO: 92 FL
MCV RBC AUTO: 93 FL
MONOCYTES # BLD AUTO: 0.5 K/UL
MONOCYTES # BLD AUTO: 0.6 K/UL
MONOCYTES NFR BLD: 6.2 %
MONOCYTES NFR BLD: 6.4 %
NEUTROPHILS # BLD AUTO: 6.3 K/UL
NEUTROPHILS # BLD AUTO: 6.9 K/UL
NEUTROPHILS NFR BLD: 81.5 %
NEUTROPHILS NFR BLD: 81.8 %
PHOSPHATE SERPL-MCNC: 10.5 MG/DL
PLATELET # BLD AUTO: 162 K/UL
PLATELET # BLD AUTO: 176 K/UL
PMV BLD AUTO: 6.6 FL
PMV BLD AUTO: 6.8 FL
POCT GLUCOSE: 104 MG/DL (ref 70–110)
POCT GLUCOSE: 151 MG/DL (ref 70–110)
POCT GLUCOSE: 34 MG/DL (ref 70–110)
POCT GLUCOSE: 37 MG/DL (ref 70–110)
POCT GLUCOSE: 41 MG/DL (ref 70–110)
POCT GLUCOSE: 41 MG/DL (ref 70–110)
POCT GLUCOSE: 43 MG/DL (ref 70–110)
POCT GLUCOSE: 44 MG/DL (ref 70–110)
POCT GLUCOSE: 52 MG/DL (ref 70–110)
POCT GLUCOSE: 53 MG/DL (ref 70–110)
POCT GLUCOSE: 55 MG/DL (ref 70–110)
POCT GLUCOSE: 60 MG/DL (ref 70–110)
POCT GLUCOSE: 64 MG/DL (ref 70–110)
POCT GLUCOSE: 69 MG/DL (ref 70–110)
POCT GLUCOSE: 74 MG/DL (ref 70–110)
POCT GLUCOSE: 77 MG/DL (ref 70–110)
POCT GLUCOSE: 92 MG/DL (ref 70–110)
POCT GLUCOSE: 98 MG/DL (ref 70–110)
POTASSIUM SERPL-SCNC: 4.7 MMOL/L
POTASSIUM SERPL-SCNC: 5.5 MMOL/L
PROT SERPL-MCNC: 8 G/DL
PROTHROMBIN TIME: 12.7 SEC
RBC # BLD AUTO: 2.79 M/UL
RBC # BLD AUTO: 3.15 M/UL
SODIUM SERPL-SCNC: 138 MMOL/L
SODIUM SERPL-SCNC: 139 MMOL/L
WBC # BLD AUTO: 7.8 K/UL
WBC # BLD AUTO: 8.6 K/UL

## 2017-03-01 PROCEDURE — 25000003 PHARM REV CODE 250: Performed by: INTERNAL MEDICINE

## 2017-03-01 PROCEDURE — 80100014 HC HEMODIALYSIS 1:1

## 2017-03-01 PROCEDURE — 36415 COLL VENOUS BLD VENIPUNCTURE: CPT

## 2017-03-01 PROCEDURE — 25000003 PHARM REV CODE 250: Performed by: NURSE PRACTITIONER

## 2017-03-01 PROCEDURE — 63600175 PHARM REV CODE 636 W HCPCS: Performed by: INTERNAL MEDICINE

## 2017-03-01 PROCEDURE — 25000003 PHARM REV CODE 250: Performed by: EMERGENCY MEDICINE

## 2017-03-01 PROCEDURE — 82962 GLUCOSE BLOOD TEST: CPT

## 2017-03-01 PROCEDURE — 83735 ASSAY OF MAGNESIUM: CPT

## 2017-03-01 PROCEDURE — 84100 ASSAY OF PHOSPHORUS: CPT

## 2017-03-01 PROCEDURE — 84681 ASSAY OF C-PEPTIDE: CPT

## 2017-03-01 PROCEDURE — 20000000 HC ICU ROOM

## 2017-03-01 PROCEDURE — 85025 COMPLETE CBC W/AUTO DIFF WBC: CPT

## 2017-03-01 PROCEDURE — 85730 THROMBOPLASTIN TIME PARTIAL: CPT

## 2017-03-01 PROCEDURE — 93005 ELECTROCARDIOGRAM TRACING: CPT

## 2017-03-01 PROCEDURE — 99285 EMERGENCY DEPT VISIT HI MDM: CPT | Mod: 25

## 2017-03-01 PROCEDURE — 82947 ASSAY GLUCOSE BLOOD QUANT: CPT | Mod: 91

## 2017-03-01 PROCEDURE — 80048 BASIC METABOLIC PNL TOTAL CA: CPT

## 2017-03-01 PROCEDURE — 25000242 PHARM REV CODE 250 ALT 637 W/ HCPCS: Performed by: INTERNAL MEDICINE

## 2017-03-01 PROCEDURE — 85610 PROTHROMBIN TIME: CPT

## 2017-03-01 PROCEDURE — 99220 PR INITIAL OBSERVATION CARE,LEVL III: CPT | Mod: ,,, | Performed by: INTERNAL MEDICINE

## 2017-03-01 PROCEDURE — 83520 IMMUNOASSAY QUANT NOS NONAB: CPT

## 2017-03-01 PROCEDURE — 63600175 PHARM REV CODE 636 W HCPCS: Performed by: NURSE PRACTITIONER

## 2017-03-01 PROCEDURE — 94640 AIRWAY INHALATION TREATMENT: CPT

## 2017-03-01 PROCEDURE — 27000221 HC OXYGEN, UP TO 24 HOURS

## 2017-03-01 PROCEDURE — 80053 COMPREHEN METABOLIC PANEL: CPT

## 2017-03-01 PROCEDURE — 94761 N-INVAS EAR/PLS OXIMETRY MLT: CPT

## 2017-03-01 PROCEDURE — 96374 THER/PROPH/DIAG INJ IV PUSH: CPT

## 2017-03-01 PROCEDURE — 83036 HEMOGLOBIN GLYCOSYLATED A1C: CPT

## 2017-03-01 RX ORDER — SODIUM CHLORIDE 9 MG/ML
INJECTION, SOLUTION INTRAVENOUS
Status: DISCONTINUED | OUTPATIENT
Start: 2017-03-01 | End: 2017-03-01

## 2017-03-01 RX ORDER — FAMOTIDINE 20 MG/1
20 TABLET, FILM COATED ORAL DAILY
Status: DISCONTINUED | OUTPATIENT
Start: 2017-03-01 | End: 2017-03-06 | Stop reason: HOSPADM

## 2017-03-01 RX ORDER — PANTOPRAZOLE SODIUM 40 MG/1
40 TABLET, DELAYED RELEASE ORAL DAILY
Status: DISCONTINUED | OUTPATIENT
Start: 2017-03-01 | End: 2017-03-06 | Stop reason: HOSPADM

## 2017-03-01 RX ORDER — CARVEDILOL 6.25 MG/1
12.5 TABLET ORAL 2 TIMES DAILY
Status: DISCONTINUED | OUTPATIENT
Start: 2017-03-01 | End: 2017-03-04

## 2017-03-01 RX ORDER — ALBUTEROL SULFATE 90 UG/1
AEROSOL, METERED RESPIRATORY (INHALATION)
COMMUNITY
Start: 2014-10-13 | End: 2020-01-01 | Stop reason: HOSPADM

## 2017-03-01 RX ORDER — CIPROFLOXACIN 500 MG/1
500 TABLET ORAL DAILY
Status: DISCONTINUED | OUTPATIENT
Start: 2017-03-01 | End: 2017-03-06 | Stop reason: HOSPADM

## 2017-03-01 RX ORDER — DEXTROSE MONOHYDRATE 50 MG/ML
INJECTION, SOLUTION INTRAVENOUS CONTINUOUS
Status: DISCONTINUED | OUTPATIENT
Start: 2017-03-01 | End: 2017-03-02

## 2017-03-01 RX ORDER — METHADONE HYDROCHLORIDE 10 MG/1
120 TABLET ORAL DAILY
Status: DISCONTINUED | OUTPATIENT
Start: 2017-03-01 | End: 2017-03-02

## 2017-03-01 RX ORDER — SODIUM CHLORIDE 9 MG/ML
INJECTION, SOLUTION INTRAVENOUS ONCE
Status: DISCONTINUED | OUTPATIENT
Start: 2017-03-01 | End: 2017-03-04

## 2017-03-01 RX ORDER — HEPARIN SODIUM 5000 [USP'U]/ML
5000 INJECTION, SOLUTION INTRAVENOUS; SUBCUTANEOUS EVERY 8 HOURS
Status: DISCONTINUED | OUTPATIENT
Start: 2017-03-01 | End: 2017-03-06 | Stop reason: HOSPADM

## 2017-03-01 RX ORDER — LISINOPRIL 40 MG/1
40 TABLET ORAL DAILY
Status: DISCONTINUED | OUTPATIENT
Start: 2017-03-01 | End: 2017-03-05

## 2017-03-01 RX ORDER — GLUCAGON 1 MG
1 KIT INJECTION
Status: DISCONTINUED | OUTPATIENT
Start: 2017-03-01 | End: 2017-03-06 | Stop reason: HOSPADM

## 2017-03-01 RX ORDER — IBUPROFEN 200 MG
16 TABLET ORAL
Status: DISCONTINUED | OUTPATIENT
Start: 2017-03-01 | End: 2017-03-01

## 2017-03-01 RX ORDER — DEXTROSE 50 % IN WATER (D50W) INTRAVENOUS SYRINGE
25 ONCE
Status: COMPLETED | OUTPATIENT
Start: 2017-03-01 | End: 2017-03-01

## 2017-03-01 RX ORDER — HYDRALAZINE HYDROCHLORIDE 20 MG/ML
10 INJECTION INTRAMUSCULAR; INTRAVENOUS EVERY 6 HOURS PRN
Status: DISCONTINUED | OUTPATIENT
Start: 2017-03-01 | End: 2017-03-03

## 2017-03-01 RX ORDER — SEVELAMER CARBONATE 800 MG/1
1600 TABLET, FILM COATED ORAL
COMMUNITY
Start: 2016-03-11 | End: 2017-03-11

## 2017-03-01 RX ORDER — HYDRALAZINE HYDROCHLORIDE 25 MG/1
25 TABLET, FILM COATED ORAL 3 TIMES DAILY
Status: DISCONTINUED | OUTPATIENT
Start: 2017-03-01 | End: 2017-03-03

## 2017-03-01 RX ORDER — ASPIRIN 325 MG
325 TABLET ORAL DAILY
Status: DISCONTINUED | OUTPATIENT
Start: 2017-03-01 | End: 2017-03-06 | Stop reason: HOSPADM

## 2017-03-01 RX ORDER — ATORVASTATIN CALCIUM 40 MG/1
40 TABLET, FILM COATED ORAL NIGHTLY
Status: DISCONTINUED | OUTPATIENT
Start: 2017-03-01 | End: 2017-03-06 | Stop reason: HOSPADM

## 2017-03-01 RX ORDER — ALBUTEROL SULFATE 2.5 MG/.5ML
2.5 SOLUTION RESPIRATORY (INHALATION) EVERY 4 HOURS PRN
Status: DISCONTINUED | OUTPATIENT
Start: 2017-03-01 | End: 2017-03-03

## 2017-03-01 RX ORDER — CLOPIDOGREL BISULFATE 75 MG/1
75 TABLET ORAL DAILY
Status: DISCONTINUED | OUTPATIENT
Start: 2017-03-01 | End: 2017-03-06 | Stop reason: HOSPADM

## 2017-03-01 RX ORDER — HEPARIN SODIUM 5000 [USP'U]/ML
5000 INJECTION, SOLUTION INTRAVENOUS; SUBCUTANEOUS ONCE
Status: DISCONTINUED | OUTPATIENT
Start: 2017-03-01 | End: 2017-03-04

## 2017-03-01 RX ORDER — SEVELAMER CARBONATE 800 MG/1
1600 TABLET, FILM COATED ORAL
Status: DISCONTINUED | OUTPATIENT
Start: 2017-03-01 | End: 2017-03-01

## 2017-03-01 RX ORDER — IBUPROFEN 200 MG
16 TABLET ORAL
Status: DISCONTINUED | OUTPATIENT
Start: 2017-03-01 | End: 2017-03-06 | Stop reason: HOSPADM

## 2017-03-01 RX ORDER — ONDANSETRON 2 MG/ML
4 INJECTION INTRAMUSCULAR; INTRAVENOUS EVERY 6 HOURS PRN
Status: DISCONTINUED | OUTPATIENT
Start: 2017-03-01 | End: 2017-03-06 | Stop reason: HOSPADM

## 2017-03-01 RX ORDER — INSULIN ASPART 100 [IU]/ML
0-5 INJECTION, SOLUTION INTRAVENOUS; SUBCUTANEOUS
Status: DISCONTINUED | OUTPATIENT
Start: 2017-03-01 | End: 2017-03-01

## 2017-03-01 RX ORDER — ALBUTEROL SULFATE 90 UG/1
2 AEROSOL, METERED RESPIRATORY (INHALATION) 4 TIMES DAILY PRN
Status: DISCONTINUED | OUTPATIENT
Start: 2017-03-01 | End: 2017-03-01

## 2017-03-01 RX ORDER — DEXTROSE 50 % IN WATER (D50W) INTRAVENOUS SYRINGE
25
Status: COMPLETED | OUTPATIENT
Start: 2017-03-01 | End: 2017-03-01

## 2017-03-01 RX ORDER — CALCIUM ACETATE 667 MG/1
2001 CAPSULE ORAL
Status: DISCONTINUED | OUTPATIENT
Start: 2017-03-01 | End: 2017-03-06 | Stop reason: HOSPADM

## 2017-03-01 RX ORDER — SODIUM CHLORIDE 9 MG/ML
INJECTION, SOLUTION INTRAVENOUS
Status: DISCONTINUED | OUTPATIENT
Start: 2017-03-01 | End: 2017-03-06 | Stop reason: HOSPADM

## 2017-03-01 RX ORDER — MINOXIDIL 2.5 MG/1
5 TABLET ORAL 2 TIMES DAILY
Status: DISCONTINUED | OUTPATIENT
Start: 2017-03-01 | End: 2017-03-06

## 2017-03-01 RX ORDER — AMLODIPINE BESYLATE 5 MG/1
5 TABLET ORAL 2 TIMES DAILY
Status: DISCONTINUED | OUTPATIENT
Start: 2017-03-01 | End: 2017-03-06 | Stop reason: HOSPADM

## 2017-03-01 RX ORDER — GLUCAGON 1 MG
1 KIT INJECTION
Status: DISCONTINUED | OUTPATIENT
Start: 2017-03-01 | End: 2017-03-01

## 2017-03-01 RX ORDER — IBUPROFEN 200 MG
24 TABLET ORAL
Status: DISCONTINUED | OUTPATIENT
Start: 2017-03-01 | End: 2017-03-06 | Stop reason: HOSPADM

## 2017-03-01 RX ORDER — IBUPROFEN 200 MG
24 TABLET ORAL
Status: DISCONTINUED | OUTPATIENT
Start: 2017-03-01 | End: 2017-03-01

## 2017-03-01 RX ADMIN — AMLODIPINE BESYLATE 5 MG: 5 TABLET ORAL at 08:03

## 2017-03-01 RX ADMIN — ALBUTEROL SULFATE 2.5 MG: 2.5 SOLUTION RESPIRATORY (INHALATION) at 03:03

## 2017-03-01 RX ADMIN — HYDRALAZINE HYDROCHLORIDE 25 MG: 25 TABLET ORAL at 02:03

## 2017-03-01 RX ADMIN — HEPARIN SODIUM 5000 UNITS: 5000 INJECTION, SOLUTION INTRAVENOUS; SUBCUTANEOUS at 02:03

## 2017-03-01 RX ADMIN — ALBUTEROL SULFATE 2.5 MG: 2.5 SOLUTION RESPIRATORY (INHALATION) at 08:03

## 2017-03-01 RX ADMIN — ASPIRIN 325 MG ORAL TABLET 325 MG: 325 PILL ORAL at 09:03

## 2017-03-01 RX ADMIN — DEXTROSE: 5 SOLUTION INTRAVENOUS at 04:03

## 2017-03-01 RX ADMIN — DEXTROSE MONOHYDRATE 25 G: 25 INJECTION, SOLUTION INTRAVENOUS at 05:03

## 2017-03-01 RX ADMIN — HYDRALAZINE HYDROCHLORIDE 10 MG: 20 INJECTION INTRAMUSCULAR; INTRAVENOUS at 03:03

## 2017-03-01 RX ADMIN — HYDRALAZINE HYDROCHLORIDE 10 MG: 20 INJECTION INTRAMUSCULAR; INTRAVENOUS at 04:03

## 2017-03-01 RX ADMIN — CARVEDILOL 12.5 MG: 6.25 TABLET, FILM COATED ORAL at 08:03

## 2017-03-01 RX ADMIN — CIPROFLOXACIN HYDROCHLORIDE 500 MG: 500 TABLET, FILM COATED ORAL at 09:03

## 2017-03-01 RX ADMIN — PANTOPRAZOLE SODIUM 40 MG: 40 TABLET, DELAYED RELEASE ORAL at 09:03

## 2017-03-01 RX ADMIN — HYDRALAZINE HYDROCHLORIDE 25 MG: 25 TABLET ORAL at 05:03

## 2017-03-01 RX ADMIN — AMLODIPINE BESYLATE 5 MG: 5 TABLET ORAL at 04:03

## 2017-03-01 RX ADMIN — HEPARIN SODIUM 5000 UNITS: 5000 INJECTION, SOLUTION INTRAVENOUS; SUBCUTANEOUS at 10:03

## 2017-03-01 RX ADMIN — METHADONE HYDROCHLORIDE 120 MG: 10 TABLET ORAL at 10:03

## 2017-03-01 RX ADMIN — CALCIUM ACETATE 2001 MG: 667 CAPSULE ORAL at 12:03

## 2017-03-01 RX ADMIN — ERYTHROPOIETIN 5000 UNITS: 20000 INJECTION, SOLUTION INTRAVENOUS; SUBCUTANEOUS at 11:03

## 2017-03-01 RX ADMIN — SEVELAMER CARBONATE 1600 MG: 800 TABLET, FILM COATED ORAL at 09:03

## 2017-03-01 RX ADMIN — SEVELAMER CARBONATE 1600 MG: 800 TABLET, FILM COATED ORAL at 12:03

## 2017-03-01 RX ADMIN — HEPARIN SODIUM 5000 UNITS: 5000 INJECTION, SOLUTION INTRAVENOUS; SUBCUTANEOUS at 05:03

## 2017-03-01 RX ADMIN — DEXTROSE MONOHYDRATE 25 G: 25 INJECTION, SOLUTION INTRAVENOUS at 04:03

## 2017-03-01 RX ADMIN — CARVEDILOL 12.5 MG: 6.25 TABLET, FILM COATED ORAL at 04:03

## 2017-03-01 RX ADMIN — FAMOTIDINE 20 MG: 20 TABLET, FILM COATED ORAL at 09:03

## 2017-03-01 RX ADMIN — DEXTROSE MONOHYDRATE 25 G: 25 INJECTION, SOLUTION INTRAVENOUS at 12:03

## 2017-03-01 RX ADMIN — CLOPIDOGREL BISULFATE 75 MG: 75 TABLET ORAL at 09:03

## 2017-03-01 RX ADMIN — DEXTROSE MONOHYDRATE 25 G: 25 INJECTION, SOLUTION INTRAVENOUS at 07:03

## 2017-03-01 RX ADMIN — DEXTROSE MONOHYDRATE 25 G: 25 INJECTION, SOLUTION INTRAVENOUS at 02:03

## 2017-03-01 RX ADMIN — ATORVASTATIN CALCIUM 40 MG: 40 TABLET, FILM COATED ORAL at 08:03

## 2017-03-01 RX ADMIN — ATORVASTATIN CALCIUM 40 MG: 40 TABLET, FILM COATED ORAL at 04:03

## 2017-03-01 RX ADMIN — DEXTROSE MONOHYDRATE 12.5 G: 25 INJECTION, SOLUTION INTRAVENOUS at 11:03

## 2017-03-01 RX ADMIN — HYDRALAZINE HYDROCHLORIDE 25 MG: 25 TABLET ORAL at 09:03

## 2017-03-01 RX ADMIN — Medication 16 G: at 09:03

## 2017-03-01 RX ADMIN — CALCIUM ACETATE 2001 MG: 667 CAPSULE ORAL at 09:03

## 2017-03-01 RX ADMIN — Medication 16 G: at 05:03

## 2017-03-01 RX ADMIN — MINOXIDIL 5 MG: 2.5 TABLET ORAL at 08:03

## 2017-03-01 NOTE — ASSESSMENT & PLAN NOTE
Monitor BG q 4 hours, treat with dextrose as required.  Consider adding D5/D10 if continued hypoglycemia, however will defer for now given fluid overload.

## 2017-03-01 NOTE — SUBJECTIVE & OBJECTIVE
Past Medical History:   Diagnosis Date    Anticoagulant long-term use     Arthritis     Asthma     Back pain     Diabetes mellitus     Encounter for blood transfusion     Eye abnormality right eye    injured as a child    Gastritis     Hemodialysis patient     Hypertension     Pneumonia 2013    Spend 6weeks in hospital at [facility]    Renal disorder     Stroke        Past Surgical History:   Procedure Laterality Date    AV FISTULA PLACEMENT      BACK SURGERY      CHOLECYSTECTOMY      EYE SURGERY      R eye       Review of patient's allergies indicates:   Allergen Reactions    Antibiotic hc      Hx of in 2013         No current facility-administered medications on file prior to encounter.      Current Outpatient Prescriptions on File Prior to Encounter   Medication Sig    amlodipine (NORVASC) 5 MG tablet Take 1 tablet (5 mg total) by mouth 2 (two) times daily.    aspirin 325 MG tablet Take 1 tablet (325 mg total) by mouth once daily.    atorvastatin (LIPITOR) 40 MG tablet Take 1 tablet (40 mg total) by mouth every evening.    blood-glucose meter (PHARMACIST CHOICE GLUCOSE SYS) Misc 1 Device by Misc.(Non-Drug; Combo Route) route once.    calcium acetate (PHOSLO) 667 mg capsule Take 2,001 mg by mouth 3 (three) times daily with meals.    carvedilol (COREG) 12.5 MG tablet Take 1 tablet (12.5 mg total) by mouth 2 (two) times daily.    ciprofloxacin HCl (CIPRO) 500 MG tablet Take 1 tablet (500 mg total) by mouth once daily. Take at noon time    clopidogrel (PLAVIX) 75 mg tablet Take 1 tablet (75 mg total) by mouth once daily.    famotidine (PEPCID) 20 MG tablet Take 1 tablet (20 mg total) by mouth once daily.    hydrALAZINE (APRESOLINE) 25 MG tablet Take 25 mg by mouth 3 (three) times daily.    lisinopril (PRINIVIL,ZESTRIL) 40 MG tablet Take 1 tablet (40 mg total) by mouth once daily.    methadone (DOLOPHINE) 10 MG tablet Take 120 mg by mouth once daily.    minoxidil (LONITEN) 2.5 MG tablet  Take 2 tablets (5 mg total) by mouth 2 (two) times daily.    ondansetron (ZOFRAN) 4 MG tablet Take 1 tablet (4 mg total) by mouth every 8 (eight) hours as needed for Nausea.     Family History     Problem Relation (Age of Onset)    Kidney disease Brother        Social History Main Topics    Smoking status: Former Smoker     Years: 10.00     Quit date: 9/1/2015    Smokeless tobacco: Former User     Quit date: 2/16/2016    Alcohol use No    Drug use: No    Sexual activity: Yes     Review of Systems   Constitutional: Positive for activity change, chills, diaphoresis and fatigue. Negative for appetite change and fever.   HENT: Negative for congestion, postnasal drip, sinus pressure, sore throat and trouble swallowing.    Eyes: Positive for visual disturbance (some blurred vision yesterday that has resolved). Negative for photophobia.   Respiratory: Positive for cough, shortness of breath and wheezing. Negative for chest tightness.    Cardiovascular: Negative for chest pain, palpitations and leg swelling.   Gastrointestinal: Positive for abdominal pain (generalized soreness), nausea and vomiting. Negative for abdominal distention, constipation and diarrhea.   Genitourinary: Negative for dysuria.        Some urination   Musculoskeletal: Positive for arthralgias, back pain and myalgias.   Skin: Negative for color change.   Neurological: Positive for weakness and headaches (mild). Negative for dizziness.   Psychiatric/Behavioral: Negative for agitation and confusion. The patient is not nervous/anxious.      Objective:     Vital Signs (Most Recent):  Temp: 98.6 °F (37 °C) (03/01/17 0411)  Pulse: 61 (03/01/17 0519)  Resp: 17 (03/01/17 0411)  BP: (!) 190/87 (03/01/17 0519)  SpO2: 95 % (03/01/17 0519) Vital Signs (24h Range):  Temp:  [96.8 °F (36 °C)-98.6 °F (37 °C)] 98.6 °F (37 °C)  Pulse:  [54-62] 61  Resp:  [16-17] 17  SpO2:  [83 %-97 %] 95 %  BP: (190-221)/(87-99) 190/87     Weight: 90.7 kg (200 lb)  Body mass index  is 27.12 kg/(m^2).    Physical Exam   Constitutional: He is oriented to person, place, and time. He appears well-developed and well-nourished. No distress.   HENT:   Head: Normocephalic and atraumatic.   Eyes: Conjunctivae and EOM are normal. Pupils are equal, round, and reactive to light. Right eye exhibits no discharge. Left eye exhibits no discharge.   Neck: Normal range of motion. Neck supple. JVD present.   Cardiovascular: Normal rate, regular rhythm, normal heart sounds and intact distal pulses.    Pulmonary/Chest: Effort normal. He has wheezes. He has rales (bibasilar rales).   Abdominal: Soft. Bowel sounds are normal. He exhibits no distension. There is no tenderness. There is no guarding.   Musculoskeletal: Normal range of motion. He exhibits no edema.   Neurological: He is alert and oriented to person, place, and time. No cranial nerve deficit.   Skin: Skin is warm and dry. There is erythema (BLEs R>L).   Psychiatric: He has a normal mood and affect. His behavior is normal. Judgment and thought content normal.        Significant Labs:   CBC:   Recent Labs  Lab 03/01/17  0147 03/01/17  0614   WBC 8.60 7.80   HGB 9.7* 8.5*   HCT 29.0* 25.9*    162     CMP:   Recent Labs  Lab 03/01/17  0147      K 4.7   CL 99   CO2 19*   GLU 36*   BUN 75*   CREATININE 11.2*   CALCIUM 7.6*   PROT 8.0   ALBUMIN 3.4*   BILITOT 0.7   ALKPHOS 82   AST 21   ALT 12   ANIONGAP 21*   EGFRNONAA 5*       Significant Imaging:   CXR:  Cardiomegaly and moderate to severe bilateral pulmonary edema pattern consistent with pulmonary edema/CHF.  Small right pleural effusion also noted.

## 2017-03-01 NOTE — ED PROVIDER NOTES
Chief complaint:  No chief complaint on file.      HPI:  João Aguirre is a 52 y.o. male presenting with generalized fatigue, any by chills and body aches in the setting of new cough with rhinorrhea.  Cough is nonproductive.  He complains of mild dyspnea.  Recent admission for right lower family cellulitis with no perceived worsening.  No measured fevers at home.  He arrives via EMS after missing dialysis for the last 2 sessions is discharged for cellulitis in the RLE.      ROS: As per HPI and below:  No headache, neck pain, chest pain, hemoptysis, measured fever, abdominal pain, vomiting, diarrhea, rashes, hematuria. The patient/family denies diplopia, dysphagia, joint swelling, easy bruising.    Review of patient's allergies indicates:   Allergen Reactions    Antibiotic hc      Hx of in 2013         Patient's Medications   New Prescriptions    No medications on file   Previous Medications    AMLODIPINE (NORVASC) 5 MG TABLET    Take 1 tablet (5 mg total) by mouth 2 (two) times daily.    ASPIRIN 325 MG TABLET    Take 1 tablet (325 mg total) by mouth once daily.    ATORVASTATIN (LIPITOR) 40 MG TABLET    Take 1 tablet (40 mg total) by mouth every evening.    BLOOD-GLUCOSE METER (PHARMACIST CHOICE GLUCOSE SYS) MISC    1 Device by Misc.(Non-Drug; Combo Route) route once.    CALCIUM ACETATE (PHOSLO) 667 MG CAPSULE    Take 2,001 mg by mouth 3 (three) times daily with meals.    CARVEDILOL (COREG) 12.5 MG TABLET    Take 1 tablet (12.5 mg total) by mouth 2 (two) times daily.    CIPROFLOXACIN HCL (CIPRO) 500 MG TABLET    Take 1 tablet (500 mg total) by mouth once daily. Take at noon time    CLOPIDOGREL (PLAVIX) 75 MG TABLET    Take 1 tablet (75 mg total) by mouth once daily.    FAMOTIDINE (PEPCID) 20 MG TABLET    Take 1 tablet (20 mg total) by mouth once daily.    HYDRALAZINE (APRESOLINE) 25 MG TABLET    Take 25 mg by mouth 3 (three) times daily.    LISINOPRIL (PRINIVIL,ZESTRIL) 40 MG TABLET    Take 1 tablet (40 mg  total) by mouth once daily.    METHADONE (DOLOPHINE) 10 MG TABLET    Take 120 mg by mouth once daily.    MINOXIDIL (LONITEN) 2.5 MG TABLET    Take 2 tablets (5 mg total) by mouth 2 (two) times daily.    ONDANSETRON (ZOFRAN) 4 MG TABLET    Take 1 tablet (4 mg total) by mouth every 8 (eight) hours as needed for Nausea.   Modified Medications    No medications on file   Discontinued Medications    No medications on file       PMH:  As per HPI and below:  Past Medical History:   Diagnosis Date    Anticoagulant long-term use     Arthritis     Asthma     Back pain     Diabetes mellitus     Encounter for blood transfusion     Eye abnormality right eye    injured as a child    Gastritis     Hemodialysis patient     Hypertension     Pneumonia 2013    Spend 6weeks in hospital at [place]    Renal disorder     Stroke      Past Surgical History:   Procedure Laterality Date    AV FISTULA PLACEMENT      BACK SURGERY      CHOLECYSTECTOMY      EYE SURGERY      R eye       Social History     Social History    Marital status:      Spouse name: N/A    Number of children: N/A    Years of education: N/A     Social History Main Topics    Smoking status: Former Smoker     Years: 10.00     Quit date: 9/1/2015    Smokeless tobacco: Former User     Quit date: 2/16/2016    Alcohol use No    Drug use: No    Sexual activity: Yes     Other Topics Concern    Not on file     Social History Narrative       Family History   Problem Relation Age of Onset    Kidney disease Brother        Physical Exam:    Vitals:    03/01/17 0127   Pulse: 60   Resp: 16   Temp: 96.8 °F (36 °C)     GENERAL:  No apparent distress.  Alert.    HEENT:  Moist mucous membranes.  Normocephalic and atraumatic.  Opacification to the right cornea noted.  Patient is blind in right eye.  NECK:  No swelling.  Midline trachea.   CARDIOVASCULAR:  Regular rate and irregular rhythm.  2+ radial pulses.  No murmurs.  PULMONARY:  Coarse rhonchi with slight  wheeze bilaterally.  No rales.  No tachypnea or accessory muscle use.  Patient easily speaks in complete sentences.  ABDOMEN:  Non-tender and non-distended.    EXTREMITIES:  Warm and well perfused.  Brisk capillary refill.  LUE forearm fistula with palpable thrill.  Small, 2 cm area of ecchymoses to plantar surface of the right foot with no erythema, discharge, tenderness to palpation, induration or fluctuance.    NEUROLOGICAL:  Normal mental status.  Appropriate and conversant.  5/5 strength and sensation.  CN III through XII intact as limited by R eye blindness.  SKIN:  No rashes or ecchymoses.    BACK:  Atraumatic.  No CVA tenderness to palpation.      Labs Reviewed   CBC W/ AUTO DIFFERENTIAL   COMPREHENSIVE METABOLIC PANEL       Current Discharge Medication List      CONTINUE these medications which have NOT CHANGED    Details   amlodipine (NORVASC) 5 MG tablet Take 1 tablet (5 mg total) by mouth 2 (two) times daily.  Qty: 30 tablet, Refills: 0      aspirin 325 MG tablet Take 1 tablet (325 mg total) by mouth once daily.  Qty: 30 tablet, Refills: 1      atorvastatin (LIPITOR) 40 MG tablet Take 1 tablet (40 mg total) by mouth every evening.  Qty: 30 tablet, Refills: 1      blood-glucose meter (PHARMACIST CHOICE GLUCOSE SYS) Misc 1 Device by Misc.(Non-Drug; Combo Route) route once.  Qty: 1 each, Refills: 0      calcium acetate (PHOSLO) 667 mg capsule Take 2,001 mg by mouth 3 (three) times daily with meals.      carvedilol (COREG) 12.5 MG tablet Take 1 tablet (12.5 mg total) by mouth 2 (two) times daily.  Qty: 60 tablet, Refills: 0      ciprofloxacin HCl (CIPRO) 500 MG tablet Take 1 tablet (500 mg total) by mouth once daily. Take at noon time  Qty: 10 tablet, Refills: 0      clopidogrel (PLAVIX) 75 mg tablet Take 1 tablet (75 mg total) by mouth once daily.  Qty: 30 tablet, Refills: 1      famotidine (PEPCID) 20 MG tablet Take 1 tablet (20 mg total) by mouth once daily.  Qty: 30 tablet, Refills: 1      hydrALAZINE  (APRESOLINE) 25 MG tablet Take 25 mg by mouth 3 (three) times daily.      lisinopril (PRINIVIL,ZESTRIL) 40 MG tablet Take 1 tablet (40 mg total) by mouth once daily.  Qty: 30 tablet, Refills: 1      methadone (DOLOPHINE) 10 MG tablet Take 120 mg by mouth once daily.      minoxidil (LONITEN) 2.5 MG tablet Take 2 tablets (5 mg total) by mouth 2 (two) times daily.  Qty: 120 tablet, Refills: 0      ondansetron (ZOFRAN) 4 MG tablet Take 1 tablet (4 mg total) by mouth every 8 (eight) hours as needed for Nausea.  Qty: 12 tablet, Refills: 0             Orders Placed This Encounter   Procedures    X-Ray Chest 1 View    CBC auto differential    Comprehensive metabolic panel    Cardiac Monitoring - Adult    EKG 12-lead    Insert peripheral IV       Imaging Results     None          ED Course   Comment By Time   EKG:  Sinus bradycardia, rate of 58, normal intervals except mild prolonged QTc at 508 ms.  R axis.  Old T-wave inversions in aVL and V2 compared to last several prior EKGs.  No sign of new ST/T wave changes or acute ischemia or infarction. Enoc Marinelli MD 03/01 0144   CXR:  B/l pulmonary edema. (my read) Enoc Marinelli MD 03/01 0204           MDM:    52 y.o. male with complaint of generalized weakness in the setting of dialysis noncompliance.  Work was undertaken to rule out any obligations such as electrolyte derangement or volume overload contributing to pulmonary edema.  Her breathing is normal with room air oxygen saturations 92% on initial assessment.  EKG performed given her comorbidities but low suspicion for ACS.  I do not think further cardiac biomarker is indicated.  No focal neurological complaints or deficits to suggest central neurologic process such as CVA.  I do not think further brain imaging or lumbar puncture are indicated.    CXR shows volume overload with supplemental oxygen added for O2 sat decreasing to high 80s on RA with further observation.  This easily corrects with O2.   No BiPAP or other treatment is indicated apart from eventual dialysis in AM for volume overload.  No hyperkalemia.  No indication for emergent dialysis from ED.  I have spoken with hospitalist service who will assume care.      Diagnoses:    1. Dialysis noncompliance  2. Volume overload with pulmonary edema  3. Hypoxia     Enoc Marinelli MD  03/01/17 0242

## 2017-03-01 NOTE — CONSULTS
[facility] Consultation Note    Reason for consult: esrd    Referring Physician:  Hospital medicine    HPI: patient admitted with hypoglycemia.  Needs dialysis    Past Medical History:   Diagnosis Date    Anticoagulant long-term use     Arthritis     Asthma     Back pain     Diabetes mellitus     Encounter for blood transfusion     Eye abnormality right eye    injured as a child    Gastritis     Hemodialysis patient     Hypertension     Pneumonia 2013    Spend 6weeks in hospital at [place]    Renal disorder     Stroke          Past Surgical History:   Procedure Laterality Date    AV FISTULA PLACEMENT      BACK SURGERY      CHOLECYSTECTOMY      EYE SURGERY      R eye         Social History     Social History    Marital status:      Spouse name: N/A    Number of children: N/A    Years of education: N/A     Social History Main Topics    Smoking status: Former Smoker     Years: 10.00     Quit date: 9/1/2015    Smokeless tobacco: Former User     Quit date: 2/16/2016    Alcohol use No    Drug use: No    Sexual activity: Yes     Other Topics Concern    None     Social History Narrative         Family History   Problem Relation Age of Onset    Kidney disease Brother          Review of patient's allergies indicates:   Allergen Reactions    Antibiotic hc      Hx of in 2013           No current facility-administered medications on file prior to encounter.      Current Outpatient Prescriptions on File Prior to Encounter   Medication Sig Dispense Refill    amlodipine (NORVASC) 5 MG tablet Take 1 tablet (5 mg total) by mouth 2 (two) times daily. 30 tablet 0    aspirin 325 MG tablet Take 1 tablet (325 mg total) by mouth once daily. 30 tablet 1    atorvastatin (LIPITOR) 40 MG tablet Take 1 tablet (40 mg total) by mouth every evening. 30 tablet 1    blood-glucose meter (PHARMACIST CHOICE GLUCOSE SYS) Misc 1 Device by Misc.(Non-Drug; Combo Route) route once. 1 each 0    calcium  acetate (PHOSLO) 667 mg capsule Take 2,001 mg by mouth 3 (three) times daily with meals.      carvedilol (COREG) 12.5 MG tablet Take 1 tablet (12.5 mg total) by mouth 2 (two) times daily. 60 tablet 0    ciprofloxacin HCl (CIPRO) 500 MG tablet Take 1 tablet (500 mg total) by mouth once daily. Take at noon time 10 tablet 0    clopidogrel (PLAVIX) 75 mg tablet Take 1 tablet (75 mg total) by mouth once daily. 30 tablet 1    famotidine (PEPCID) 20 MG tablet Take 1 tablet (20 mg total) by mouth once daily. 30 tablet 1    hydrALAZINE (APRESOLINE) 25 MG tablet Take 25 mg by mouth 3 (three) times daily.      lisinopril (PRINIVIL,ZESTRIL) 40 MG tablet Take 1 tablet (40 mg total) by mouth once daily. 30 tablet 1    methadone (DOLOPHINE) 10 MG tablet Take 120 mg by mouth once daily.      minoxidil (LONITEN) 2.5 MG tablet Take 2 tablets (5 mg total) by mouth 2 (two) times daily. 120 tablet 0    ondansetron (ZOFRAN) 4 MG tablet Take 1 tablet (4 mg total) by mouth every 8 (eight) hours as needed for Nausea. 12 tablet 0         Scheduled Meds:   sodium chloride 0.9%   Intravenous Once    amlodipine  5 mg Oral BID    aspirin  325 mg Oral Daily    atorvastatin  40 mg Oral QHS    calcium acetate  2,001 mg Oral TID WM    carvedilol  12.5 mg Oral BID    ciprofloxacin HCl  500 mg Oral Daily    clopidogrel  75 mg Oral Daily    famotidine  20 mg Oral Daily    heparin (porcine)  5,000 Units Subcutaneous Q8H    heparin (porcine)  5,000 Units Intravenous Once    hydrALAZINE  25 mg Oral TID    lisinopril  40 mg Oral Daily    methadone  120 mg Oral Daily    minoxidil  5 mg Oral BID    pantoprazole  40 mg Oral Daily     Continuous Infusions:   dextrose 5 %       PRN Meds:.sodium chloride 0.9%, albuterol sulfate, dextrose 50%, dextrose 50%, glucagon (human recombinant), glucose, glucose, hydrALAZINE, ondansetron      ROS: 12 point ROS conducted and pertinent +/-'s in HPI. Otherwise remainder of ROS negative except for  weakness, sob, low blood sugar      Vitals:    03/01/17 1230 03/01/17 1300 03/01/17 1330 03/01/17 1400   BP: (!) 207/87 (!) 185/87 (!) 185/100 (!) 185/88   BP Location:       Patient Position:       BP Method:       Pulse: (!) 56 (!) 57 (!) 59 (!) 57   Resp:    20   Temp:    98.2 °F (36.8 °C)   TempSrc:       SpO2:       Weight:       Height:                  GEN: WDWN, NAD, afebrile    HEENT: PERRLA, EOMI, nonicteric, NC/AT, mucosa  , NO LAD/thyroidmeagly    CVS: S1/S2 +,    PULM: CTAB, NO W/R/R    ABD: +BS, soft, NTND    EXT: NO C/C/E       Neuro: AA, answering questions appropriately, nonfocal and moves extremities bilaterally, no asterixis         Recent Labs  Lab 03/01/17  0147 03/01/17  0614   CALCIUM 7.6* 7.2*   PROT 8.0  --     138   K 4.7 5.5*   CO2 19* 20*   CL 99 99   BUN 75* 78*   CREATININE 11.2* 11.3*   ALKPHOS 82  --    ALT 12  --    AST 21  --    BILITOT 0.7  --          Recent Labs  Lab 03/01/17  0614   WBC 7.80   RBC 2.79*   HGB 8.5*   HCT 25.9*      MCV 93   MCH 30.5   MCHC 32.9                   ASSESSMENT:  1. esrd  2. Pulmonary edema  3. Hyperkalemia  4. Anemia  5.  hypoglycemia      PLAN:  1. Dialysis today and then t,th,sat  2. Epogen with hd  3. uf 3 - 4 liters per tx as tolerated  4.  Hypoglycemia eval per primary

## 2017-03-01 NOTE — ASSESSMENT & PLAN NOTE
Secondary to missed dialysis treatment.  Consult nephrology for dialysis management.  Supplemental O2 via NC.

## 2017-03-01 NOTE — UM SECONDARY REVIEW
Physician Advisor External    Level of Care Issue    Approved Inpatient for admit 3/1/2017 per Dr. Juarez at EHR

## 2017-03-01 NOTE — ASSESSMENT & PLAN NOTE
Uncontrolled.  Continue oral BP medication regimen.  Treat hypertension with PRN hydralazine.  Monitor BP closely and titrate medications as required for sustained BP control.

## 2017-03-01 NOTE — ED NOTES
O2 sats at 83-84 on room air while asleep. Pt placed on oxygen at 2 lpm per nasal cannula and sats up to 91

## 2017-03-01 NOTE — ED NOTES
Pt presents with complaints of nausea and not feeling well for the last couple of days. Missed dialysis yesteday due to not feeling well. Complains of feeling sweaty. Pt complains of back and abdominal pain at this time also. No noted abdominal distention. Noted to have a dressing that is falling off on right foot. Healing wound to pad of foot just below great toe and second toe. Healing with superficial scabbing. No noted redness or drainage from site. Redressed.

## 2017-03-01 NOTE — H&P
"[facility] Medicine  History & Physical    Patient Name: João Aguirre  MRN: [id number]  Admission Date: 3/1/2017  Attending Physician: Adela Peng MD   Primary Care Provider: Delbert Redd NP         Patient information was obtained from patient and ER records.     Subjective:     Principal Problem:Pulmonary edema    Chief Complaint:   Chief Complaint   Patient presents with    General Illness     pt c/o generalized illness, fever and chills        HPI: João Aguirre is a 52 y.o. Male with PMHx significant for ESRD, HTN, HepC, and CVA.  He was admitted to the service of hospital medicine with pulmonary edema resulting from missed dialysis treatment.  He was brought to ED via EMS with complaint of fatigue and weakness, as well as chills.  He stated the symptoms began gradually Saturday after being discharged from the hospital Friday.  He acknowledges missing dialysis Tuesday, however did not come to the hospital at that time because he began to feel better.  He did not take his evening medications on Tuesday. His symptoms worsened during the night and he broke out in a "cold sweat".  His symptoms are associated with SOB worsened with exertion and accompanied by a congested cough.  He denies any fever or chest pain.  He described his cellulitis as improved since discharge from the hospital.  He was found to be hypoglycemic in ED and responded to glucose administration.  His CXR revealed bilateral pulmonary infiltrates.  Other pertinent medical history as below:    Past Medical History:   Diagnosis Date    Anticoagulant long-term use     Arthritis     Asthma     Back pain     Diabetes mellitus     Encounter for blood transfusion     Eye abnormality right eye    injured as a child    Gastritis     Hemodialysis patient     Hypertension     Pneumonia 2013    Spend 6weeks in hospital at [place]    Renal disorder     Stroke        Past Surgical History:   Procedure " Laterality Date    AV FISTULA PLACEMENT      BACK SURGERY      CHOLECYSTECTOMY      EYE SURGERY      R eye       Review of patient's allergies indicates:   Allergen Reactions    Antibiotic hc      Hx of in 2013         No current facility-administered medications on file prior to encounter.      Current Outpatient Prescriptions on File Prior to Encounter   Medication Sig    amlodipine (NORVASC) 5 MG tablet Take 1 tablet (5 mg total) by mouth 2 (two) times daily.    aspirin 325 MG tablet Take 1 tablet (325 mg total) by mouth once daily.    atorvastatin (LIPITOR) 40 MG tablet Take 1 tablet (40 mg total) by mouth every evening.    blood-glucose meter (PHARMACIST CHOICE GLUCOSE SYS) Misc 1 Device by Misc.(Non-Drug; Combo Route) route once.    calcium acetate (PHOSLO) 667 mg capsule Take 2,001 mg by mouth 3 (three) times daily with meals.    carvedilol (COREG) 12.5 MG tablet Take 1 tablet (12.5 mg total) by mouth 2 (two) times daily.    ciprofloxacin HCl (CIPRO) 500 MG tablet Take 1 tablet (500 mg total) by mouth once daily. Take at noon time    clopidogrel (PLAVIX) 75 mg tablet Take 1 tablet (75 mg total) by mouth once daily.    famotidine (PEPCID) 20 MG tablet Take 1 tablet (20 mg total) by mouth once daily.    hydrALAZINE (APRESOLINE) 25 MG tablet Take 25 mg by mouth 3 (three) times daily.    lisinopril (PRINIVIL,ZESTRIL) 40 MG tablet Take 1 tablet (40 mg total) by mouth once daily.    methadone (DOLOPHINE) 10 MG tablet Take 120 mg by mouth once daily.    minoxidil (LONITEN) 2.5 MG tablet Take 2 tablets (5 mg total) by mouth 2 (two) times daily.    ondansetron (ZOFRAN) 4 MG tablet Take 1 tablet (4 mg total) by mouth every 8 (eight) hours as needed for Nausea.     Family History     Problem Relation (Age of Onset)    Kidney disease Brother        Social History Main Topics    Smoking status: Former Smoker     Years: 10.00     Quit date: 9/1/2015    Smokeless tobacco: Former User     Quit date:  2/16/2016    Alcohol use No    Drug use: No    Sexual activity: Yes     Review of Systems   Constitutional: Positive for activity change, chills, diaphoresis and fatigue. Negative for appetite change and fever.   HENT: Negative for congestion, postnasal drip, sinus pressure, sore throat and trouble swallowing.    Eyes: Positive for visual disturbance (some blurred vision yesterday that has resolved). Negative for photophobia.   Respiratory: Positive for cough, shortness of breath and wheezing. Negative for chest tightness.    Cardiovascular: Negative for chest pain, palpitations and leg swelling.   Gastrointestinal: Positive for abdominal pain (generalized soreness), nausea and vomiting. Negative for abdominal distention, constipation and diarrhea.   Genitourinary: Negative for dysuria.        Some urination   Musculoskeletal: Positive for arthralgias, back pain and myalgias.   Skin: Negative for color change.   Neurological: Positive for weakness and headaches (mild). Negative for dizziness.   Psychiatric/Behavioral: Negative for agitation and confusion. The patient is not nervous/anxious.      Objective:     Vital Signs (Most Recent):  Temp: 98.6 °F (37 °C) (03/01/17 0411)  Pulse: 61 (03/01/17 0519)  Resp: 17 (03/01/17 0411)  BP: (!) 190/87 (03/01/17 0519)  SpO2: 95 % (03/01/17 0519) Vital Signs (24h Range):  Temp:  [96.8 °F (36 °C)-98.6 °F (37 °C)] 98.6 °F (37 °C)  Pulse:  [54-62] 61  Resp:  [16-17] 17  SpO2:  [83 %-97 %] 95 %  BP: (190-221)/(87-99) 190/87     Weight: 90.7 kg (200 lb)  Body mass index is 27.12 kg/(m^2).    Physical Exam   Constitutional: He is oriented to person, place, and time. He appears well-developed and well-nourished. No distress.   HENT:   Head: Normocephalic and atraumatic.   Eyes: Conjunctivae and EOM are normal. Pupils are equal, round, and reactive to light. Right eye exhibits no discharge. Left eye exhibits no discharge.   Neck: Normal range of motion. Neck supple. JVD present.    Cardiovascular: Normal rate, regular rhythm, normal heart sounds and intact distal pulses.    Pulmonary/Chest: Effort normal. He has wheezes. He has rales (bibasilar rales).   Abdominal: Soft. Bowel sounds are normal. He exhibits no distension. There is no tenderness. There is no guarding.   Musculoskeletal: Normal range of motion. He exhibits no edema.   Neurological: He is alert and oriented to person, place, and time. No cranial nerve deficit.   Skin: Skin is warm and dry. There is erythema (BLEs R>L).   Psychiatric: He has a normal mood and affect. His behavior is normal. Judgment and thought content normal.        Significant Labs:   CBC:   Recent Labs  Lab 03/01/17  0147 03/01/17  0614   WBC 8.60 7.80   HGB 9.7* 8.5*   HCT 29.0* 25.9*    162     CMP:   Recent Labs  Lab 03/01/17 0147      K 4.7   CL 99   CO2 19*   GLU 36*   BUN 75*   CREATININE 11.2*   CALCIUM 7.6*   PROT 8.0   ALBUMIN 3.4*   BILITOT 0.7   ALKPHOS 82   AST 21   ALT 12   ANIONGAP 21*   EGFRNONAA 5*       Significant Imaging:   CXR:  Cardiomegaly and moderate to severe bilateral pulmonary edema pattern consistent with pulmonary edema/CHF.  Small right pleural effusion also noted.    EKG: Sinus bradycardia 58. Right axis deviation.  Increased amplitude noted in T waves with prolonged QT (my read).  Assessment/Plan:     * Pulmonary edema  Secondary to missed dialysis treatment.  Consult nephrology for dialysis management.  Supplemental O2 via NC.        ESRD (T,Th,Sat) dialysis onset 2013  Non-compliant, will require nephrology consult for dialysis as patient is in pulmonary edema.  Renal diet, continue home medications, monitor closely.  Discussed importance of dialysis compliance, patient verbalized understanding but will likely need continued reinforcement.      Benign hypertension with ESRD (end-stage renal disease)  Uncontrolled.  Continue oral BP medication regimen.  Treat hypertension with PRN hydralazine.  Monitor BP closely  and titrate medications as required for sustained BP control.      Coronary artery disease involving native coronary artery of native heart without angina pectoris  Continue ASA, Plavix, BBlockade, statin therapy.  Monitor patient on telemetry.        Hypoglycemia  Monitor BG q 4 hours, treat with dextrose as required.  Consider adding D5/D10 if continued hypoglycemia, however will defer for now given fluid overload.      Anemia in chronic kidney disease  Current CBC reviewed-   Lab Results   Component Value Date    WBC 7.80 03/01/2017    HGB 8.5 (L) 03/01/2017    HCT 25.9 (L) 03/01/2017    MCV 93 03/01/2017     03/01/2017     Monitor serial CBC and transfuse if patient becomes hemodynamically unstable, symptomatic or H/H drops below 7/21.         Cellulitis of right leg  Improving with oral regimen.  Continue cipro (due to stop 3/6).  Monitor closely.      Methadone dependence  Confirm dosing with methadone clinic prior to resuming.       VTE Risk Mitigation         Ordered     heparin (porcine) injection 5,000 Units  Every 8 hours     Route:  Subcutaneous        03/01/17 0410     Medium Risk of VTE  Once      03/01/17 0410        Shayna Bautista NP  Department of Hospital Medicine   [facility]

## 2017-03-01 NOTE — PROGRESS NOTES
Patient glucose 55, MD notified, stat glucose order, patient asymptomatic at this time, awaiting results to treat as ordered.

## 2017-03-01 NOTE — PROGRESS NOTES
Critical lab result, phosphorus 10.5, MD notified, no new orders at this time, patient scheduled to be dialyzed, glucose was also low, rechecked and is 77 at this time, will continue to monitor.

## 2017-03-01 NOTE — PROGRESS NOTES
Spoke to Dr. Morgan. Stated that she was familiar with patient and to call the office and orders will be given at that time. Notified NP suit of situation.   Verbalized understanding. No new orders.

## 2017-03-01 NOTE — ASSESSMENT & PLAN NOTE
Non-compliant, will require nephrology consult for dialysis as patient is in pulmonary edema.  Renal diet, continue home medications, monitor closely.  Discussed importance of dialysis compliance, patient verbalized understanding but will likely need continued reinforcement.

## 2017-03-01 NOTE — ED NOTES
Pt served sandwich box. Pt noted to be diaphoretic. Pt has history of hypoglycemia and says he has to eat about 5 times a day but hasn't been eating because of not feeling well and vomiting. Pt respirations have noted crackles. Pt alert and easily aroused from sleep.

## 2017-03-01 NOTE — ASSESSMENT & PLAN NOTE
Current CBC reviewed-   Lab Results   Component Value Date    WBC 7.80 03/01/2017    HGB 8.5 (L) 03/01/2017    HCT 25.9 (L) 03/01/2017    MCV 93 03/01/2017     03/01/2017     Monitor serial CBC and transfuse if patient becomes hemodynamically unstable, symptomatic or H/H drops below 7/21.

## 2017-03-01 NOTE — IP AVS SNAPSHOT
[address] [place] [address]  Phone: [phone]           Patient Discharge Instructions     Our goal is to set you up for success. This packet includes information on your condition, medications, and your home care. It will help you to care for yourself so you don't get sicker and need to go back to the hospital.     Please ask your nurse if you have any questions.        There are many details to remember when preparing to leave the hospital. Here is what you will need to do:    1. Take your medicine. If you are prescribed medications, review your Medication List in the following pages. You may have new medications to  at the pharmacy and others that you'll need to stop taking. Review the instructions for how and when to take your medications. Talk with your doctor or nurses if you are unsure of what to do.     2. Go to your follow-up appointments. Specific follow-up information is listed in the following pages. Your may be contacted by a transition nurse or clinical provider about future appointments. Be sure we have all of the phone numbers to reach you, if needed. Please contact your provider's office if you are unable to make an appointment.     3. Watch for warning signs. Your doctor or nurse will give you detailed warning signs to watch for and when to call for assistance. These instructions may also include educational information about your condition. If you experience any of warning signs to your health, call your doctor.               ** Verify the list of medication(s) below is accurate and up to date. Carry this with you in case of emergency. If your medications have changed, please notify your healthcare provider.             Medication List      CHANGE how you take these medications        Additional Info    Begin Date AM Noon PM Bedtime    methadone 10 MG tablet   Commonly known as:  DOLOPHINE   Refills:  0   Dose:  60 mg   What changed:  how  much to take    Last time this was given:  60 mg on 3/6/2017  9:12 AM   Instructions:  Take 6 tablets (60 mg total) by mouth once daily.                               minoxidil 2.5 MG tablet   Commonly known as:  LONITEN   Quantity:  90 tablet   Refills:  0   Dose:  7.5 mg   What changed:  how much to take    Last time this was given:  5 mg on 3/6/2017  9:11 AM   Instructions:  Take 3 tablets (7.5 mg total) by mouth 2 (two) times daily.                                    CONTINUE taking these medications        Additional Info    Begin Date AM Noon PM Bedtime    amlodipine 5 MG tablet   Commonly known as:  NORVASC   Quantity:  30 tablet   Refills:  0   Dose:  5 mg    Last time this was given:  5 mg on 3/6/2017  9:11 AM   Instructions:  Take 1 tablet (5 mg total) by mouth 2 (two) times daily.                                  aspirin 325 MG tablet   Quantity:  30 tablet   Refills:  1   Dose:  325 mg    Last time this was given:  325 mg on 3/6/2017  9:12 AM   Instructions:  Take 1 tablet (325 mg total) by mouth once daily.                               atorvastatin 40 MG tablet   Commonly known as:  LIPITOR   Quantity:  30 tablet   Refills:  1   Dose:  40 mg    Last time this was given:  40 mg on 3/5/2017  8:42 PM   Instructions:  Take 1 tablet (40 mg total) by mouth every evening.                               blood-glucose meter Misc   Commonly known as:  PHARMACIST CHOICE GLUCOSE SYS   Quantity:  1 each   Refills:  0   Dose:  1 Device    Instructions:  1 Device by Misc.(Non-Drug; Combo Route) route once.                            calcium acetate 667 mg capsule   Commonly known as:  PHOSLO   Refills:  0   Dose:  2001 mg    Last time this was given:  2,001 mg on 3/6/2017 12:07 PM   Instructions:  Take 2,001 mg by mouth 3 (three) times daily with meals.                                     carvedilol 12.5 MG tablet   Commonly known as:  COREG   Quantity:  60 tablet   Refills:  0   Dose:  12.5 mg    Last time this was  given:  6.25 mg on 3/6/2017  9:11 AM   Instructions:  Take 1 tablet (12.5 mg total) by mouth 2 (two) times daily.                                  ciprofloxacin HCl 500 MG tablet   Commonly known as:  CIPRO   Quantity:  10 tablet   Refills:  0   Dose:  500 mg    Last time this was given:  500 mg on 3/6/2017  9:11 AM   Instructions:  Take 1 tablet (500 mg total) by mouth once daily. Take at noon time                               clopidogrel 75 mg tablet   Commonly known as:  PLAVIX   Quantity:  30 tablet   Refills:  1   Dose:  75 mg    Last time this was given:  75 mg on 3/6/2017  9:12 AM   Instructions:  Take 1 tablet (75 mg total) by mouth once daily.                               famotidine 20 MG tablet   Commonly known as:  PEPCID   Quantity:  30 tablet   Refills:  1   Dose:  20 mg    Last time this was given:  20 mg on 3/6/2017  9:12 AM   Instructions:  Take 1 tablet (20 mg total) by mouth once daily.                               hydrALAZINE 25 MG tablet   Commonly known as:  APRESOLINE   Refills:  0   Dose:  25 mg    Last time this was given:  50 mg on 3/4/2017  1:18 PM   Instructions:  Take 25 mg by mouth 3 (three) times daily.                                     lisinopril 40 MG tablet   Commonly known as:  PRINIVIL,ZESTRIL   Quantity:  30 tablet   Refills:  1   Dose:  40 mg    Last time this was given:  40 mg on 3/5/2017  8:18 AM   Instructions:  Take 1 tablet (40 mg total) by mouth once daily.                               ondansetron 4 MG tablet   Commonly known as:  ZOFRAN   Quantity:  12 tablet   Refills:  0   Dose:  4 mg    Instructions:  Take 1 tablet (4 mg total) by mouth every 8 (eight) hours as needed for Nausea.                            PROAIR HFA 90 mcg/actuation inhaler   Refills:  0   Generic drug:  albuterol    Instructions:  INHALE TWO PUFFS EVERY 4 TO 6 HOURS AS NEEDED                            sevelamer carbonate 800 mg Tab   Commonly known as:  RENVELA   Refills:  0   Dose:  1600 mg     Last time this was given:  1,600 mg on 3/1/2017 12:48 PM   Instructions:  Take 1,600 mg by mouth.                                          Where to Get Your Medications      You can get these medications from any pharmacy     Bring a paper prescription for each of these medications     minoxidil 2.5 MG tablet         Information about where to get these medications is not yet available     ! Ask your nurse or doctor about these medications     methadone 10 MG tablet                  Please bring to all follow up appointments:    1. A copy of your discharge instructions.  2. All medicines you are currently taking in their original bottles.  3. Identification and insurance card.    Please arrive 15 minutes ahead of scheduled appointment time.    Please call 24 hours in advance if you must reschedule your appointment and/or time.        Your Scheduled Appointments     Mar 21, 2017  9:40 AM CDT   Consult with Jennifer B. Scheuermann, PA   [facility]    [address] [facility] [address] [place] [id number] [phone]              Follow-up Information     Follow up with Tej Carranza MD In 2 weeks.    Specialty:  Nephrology    Contact information:    [address] [facility]  [facility] [address]  [phone]          Please follow up.    Contact information:    Continue routine HD as before. next HD in AM.        Please follow up.    Contact information:    Follow up Insulinoma lab results in 1 week. for low blood sugar evaluation.        Follow up with Bryant Ding DPM In 1 week.    Specialties:  Podiatry, Wound Care    Contact information:    [address] [facility] [address]  [phone]          Follow up with Delbert Redd NP On 3/13/2017.    Why:  @1:45pm     Contact information:    [phone]  [address] [facility] [address] [place], [address]        Discharge Instructions     Future Orders    Activity as tolerated     Call MD for:  difficulty breathing or increased cough     Call  MD for:  severe uncontrolled pain     Call MD for:  temperature >100.4     Call MD for:     Comments:    For worsening symptoms, chest pain, shortness of breath, increased abdominal pain, high grade fever, stroke or stroke like symptoms, immediately go to the nearest Emergency Room or call 911 as soon as possible.    Diet general     Comments:    Cardiac/ 2 gram sodium low cholesterol diet.  Low potassium diet    Questions:    Total calories:      Fat restriction, if any:      Protein restriction, if any:      Na restriction, if any:      Fluid restriction:      Additional restrictions:      Diet renal     Diet renal     Other restrictions (specify):     Comments:    Fall precautions        Discharge Instructions       Thank you for choosing [facility] for your medical care. The primary doctor who is taking care of you at the time of your discharge is Adela Peng MD.     You were admitted to the hospital with Pulmonary edema.     Please note your discharge instructions, including diet/activity restrictions, follow-up appointments, and medication changes.  If you have any questions about your medical issues, prescriptions, or any other questions, please feel free to contact the [facility] Medicine Dept at [phone] and we will help.    If you are previously with Home health, outpatient PT/OT or under a therapy program, you are cleared to return to those programs.    Please direct all long term medication refills and follow up to your primary care provider, Delbert Redd NP. Thank you again for letting us take care of your health care needs.        Discharge References/Attachments     METHADONE TABLETS (ENGLISH)    HEMODIALYSIS (ENGLISH)        Primary Diagnosis     Your primary diagnosis was:  High Potassium Levels      Admission Information     Date & Time Provider Department CSN    3/1/2017  1:21 AM Adela Peng MD [facility] [id number]      Care Providers      Provider Role Specialty Primary office phone    Adela Peng MD Attending Provider Internal Medicine [phone]    Tej Carranza MD Consulting Physician  Nephrology [phone]      Important Medicare Message          Most Recent Value    Important Message from Medicare Regarding Discharge Appeal Rights  Explained to patient/caregiver, Signed/date by patient/caregiver yes 03/06/2017 1030      Your Vitals Were     BP Pulse Temp Resp Height Weight    193/86 (BP Location: Right arm, Patient Position: Lying, BP Method: Automatic) 99 98.7 °F (37.1 °C) (Oral) 18 6' (1.829 m) 90.8 kg (200 lb 2.8 oz)    SpO2 BMI             98% 27.15 kg/m2         Recent Lab Values        6/5/2015 5/16/2016 5/17/2016 7/28/2016 3/1/2017               3:08 AM  5:48 PM 12:07 AM  8:10 AM  6:14 AM       A1C 4.7 4.2 (L) 4.3 (L) 4.5 4.4 (L)       Comment for A1C at  8:10 AM on 7/28/2016:  According to ADA guidelines, hemoglobin A1C <7.0% represents  optimal control in non-pregnant diabetic patients.  Different  metrics may apply to specific populations.   Standards of Medical Care in Diabetes - 2016.  For the purpose of screening for the presence of diabetes:  <5.7%     Consistent with the absence of diabetes  5.7-6.4%  Consistent with increasing risk for diabetes   (prediabetes)  >or=6.5%  Consistent with diabetes  Currently no consensus exists for use of hemoglobin A1C  for diagnosis of diabetes for children.      Comment for A1C at  6:14 AM on 3/1/2017:  According to ADA guidelines, hemoglobin A1C <7.0% represents  optimal control in non-pregnant diabetic patients.  Different  metrics may apply to specific populations.   Standards of Medical Care in Diabetes - 2016.  For the purpose of screening for the presence of diabetes:  <5.7%     Consistent with the absence of diabetes  5.7-6.4%  Consistent with increasing risk for diabetes   (prediabetes)  >or=6.5%  Consistent with diabetes  Currently no consensus exists for use of hemoglobin A1C  for  diagnosis of diabetes for children.        Allergies as of 3/6/2017        Reactions    Antibiotic Hc     Hx of in 2013      [facility]     [facility] Nurse Care Line - 24/7 Assistance  Unless otherwise directed by your provider, please contact [facility], our nurse care line that is available for 24/7 assistance.     Registered nurses in the [facility] provide clinical advisement, health education, appointment booking, and other advisory services.  Call for this free service at [phone].        Advance Directives     An advance directive is a document which, in the event you are no longer able to make decisions for yourself, tells your healthcare team what kind of treatment you do or do not want to receive, or who you would like to make those decisions for you.  If you do not currently have an advance directive, [facility] encourages you to create one.  For more information call:  [phone]), [phone] ([phone]),  or log on to [email or web address].        Smoking Cessation     If you would like to quit smoking:   You may be eligible for free services if you are a [place] resident and started smoking cigarettes before September 1, 1988.  Call the Smoking Cessation Trust (SCT) toll free at (559) 910-6084 or (597) 376-1918.   Call 0-956-QUIT-NOW if you do not meet the above criteria.            Language Assistance Services     ATTENTION: Language assistance services are available, free of charge. Please call [phone].      ATENCIÓN: Si habla español, tiene a iraheta disposición servicios gratuitos de asistencia lingüística. Llame al [phone].     CHÚ Ý: N?u b?n nói Ti?ng Vi?t, có các d?ch v? h? tr? ngôn ng? mi?n phí dành cho b?n. G?i s? [phone].        Stroke Education              Pneumonmia Discharge Instructions                Chronic Kindey Disease Education             Diabetes Discharge Instructions                                    [facility] Sign-Up     Activating your [facility] account is as easy as 1-2-3!     1) Visit [email or web address], select Sign Up Now, enter this activation code and your date of birth, then select Next.  [email or web address]  Expires: 4/10/2017  1:43 PM      2) Create a username and password to use when you visit [facility] in the future and select a security question in case you lose your password and select Next.    3) Enter your e-mail address and click Sign Up!    Additional Information  If you have questions, please e-mail [email or web address] or call [phone] to talk to our [facility] staff. Remember, [facility] is NOT to be used for urgent needs. For medical emergencies, dial 911.          [facility] complies with applicable Federal civil rights laws and does not discriminate on the basis of race, color, national origin, age, disability, or sex.

## 2017-03-01 NOTE — PLAN OF CARE
The pt was awake and alert and able to verify all info on the face sheet as correct. He is a previous admit from 2/20/17. He lives at home with his wife and son. He goes to dialysis on T, TH at 8 am and Sat at 10 am. He missed Tuesday because he was not feeling well and knew that he was coming to the hospital. Dr. Redd is his PCP and he has Medicare and [place] Medicaid coverage. He has no questions or concerns at this time and I wrote my name and phone number on the pts white board. Brenda Chavira [facility]     03/01/17 1049   Discharge Assessment   Assessment Type Discharge Planning Assessment   Confirmed/corrected address and phone number on facesheet? Yes   Assessment information obtained from? Patient   Communicated expected length of stay with patient/caregiver no   Type of Healthcare Directive Received (Spouse Soledad Traylor [phone])   If Healthcare Directive is received, is it scanned into Epic? no (comment)   Prior to hospitilization cognitive status: Alert/Oriented   Prior to hospitalization functional status: Independent   Current cognitive status: Alert/Oriented   Current Functional Status: Independent   Arrived From home or self-care   Lives With child(suni), adult;spouse   Able to Return to Prior Arrangements yes   Is patient able to care for self after discharge? Yes   How many people do you have in your home that can help with your care after discharge? 2   Readmission Within The Last 30 Days other (see comments)  ([facility] 2/20/17)   Patient currently being followed by outpatient case management? No   Patient currently receives home health services? No   Does the patient currently use HME? No   Patient currently receives private duty nursing? No   Patient currently receives any other outside agency services? No   Equipment Currently Used at Home none   Do you have any problems affording any of your prescribed medications? No  ([facility] )   Is the patient taking medications as prescribed? yes   Do  you have any financial concerns preventing you from receiving the healthcare you need? No   Does the patient have transportation to healthcare appointments? Yes   Transportation Available family or friend will provide   On Dialysis? Yes   If yes, what is the name of the dialysis unit? T, TH at 8am and S at 10 am at [facility]    Does the patient receive outpatient dialysis? Yes   Does the patient receive services at the Coumadin Clinic? No   Are there any open cases? No   Discharge Plan A Home   Discharge Plan B Home with family   Patient/Family In Agreement With Plan yes

## 2017-03-02 LAB
ANION GAP SERPL CALC-SCNC: 13 MMOL/L
BASOPHILS # BLD AUTO: 0 K/UL
BASOPHILS NFR BLD: 0.5 %
BUN SERPL-MCNC: 51 MG/DL
CALCIUM SERPL-MCNC: 7.7 MG/DL
CHLORIDE SERPL-SCNC: 102 MMOL/L
CO2 SERPL-SCNC: 22 MMOL/L
CREAT SERPL-MCNC: 7.6 MG/DL
DIFFERENTIAL METHOD: ABNORMAL
EOSINOPHIL # BLD AUTO: 0.1 K/UL
EOSINOPHIL NFR BLD: 2.2 %
ERYTHROCYTE [DISTWIDTH] IN BLOOD BY AUTOMATED COUNT: 16.3 %
EST. GFR  (AFRICAN AMERICAN): 9 ML/MIN/1.73 M^2
EST. GFR  (NON AFRICAN AMERICAN): 7 ML/MIN/1.73 M^2
GLUCOSE SERPL-MCNC: 33 MG/DL
HCT VFR BLD AUTO: 27.5 %
HGB BLD-MCNC: 8.9 G/DL
LYMPHOCYTES # BLD AUTO: 1.9 K/UL
LYMPHOCYTES NFR BLD: 30.9 %
MAGNESIUM SERPL-MCNC: 2.3 MG/DL
MCH RBC QN AUTO: 30.4 PG
MCHC RBC AUTO-ENTMCNC: 32.3 %
MCV RBC AUTO: 94 FL
MONOCYTES # BLD AUTO: 0.6 K/UL
MONOCYTES NFR BLD: 10.2 %
NEUTROPHILS # BLD AUTO: 3.5 K/UL
NEUTROPHILS NFR BLD: 56.2 %
PHOSPHATE SERPL-MCNC: 5.5 MG/DL
PLATELET # BLD AUTO: 163 K/UL
PMV BLD AUTO: 6.9 FL
POCT GLUCOSE: 123 MG/DL (ref 70–110)
POCT GLUCOSE: 129 MG/DL (ref 70–110)
POCT GLUCOSE: 33 MG/DL (ref 70–110)
POCT GLUCOSE: 42 MG/DL (ref 70–110)
POCT GLUCOSE: 46 MG/DL (ref 70–110)
POCT GLUCOSE: 47 MG/DL (ref 70–110)
POCT GLUCOSE: 47 MG/DL (ref 70–110)
POCT GLUCOSE: 54 MG/DL (ref 70–110)
POCT GLUCOSE: 54 MG/DL (ref 70–110)
POCT GLUCOSE: 55 MG/DL (ref 70–110)
POCT GLUCOSE: 56 MG/DL (ref 70–110)
POCT GLUCOSE: 57 MG/DL (ref 70–110)
POCT GLUCOSE: 58 MG/DL (ref 70–110)
POCT GLUCOSE: 59 MG/DL (ref 70–110)
POCT GLUCOSE: 62 MG/DL (ref 70–110)
POCT GLUCOSE: 64 MG/DL (ref 70–110)
POCT GLUCOSE: 67 MG/DL (ref 70–110)
POCT GLUCOSE: 67 MG/DL (ref 70–110)
POCT GLUCOSE: 69 MG/DL (ref 70–110)
POCT GLUCOSE: 77 MG/DL (ref 70–110)
POCT GLUCOSE: 85 MG/DL (ref 70–110)
POCT GLUCOSE: 90 MG/DL (ref 70–110)
POCT GLUCOSE: 90 MG/DL (ref 70–110)
POCT GLUCOSE: 97 MG/DL (ref 70–110)
POTASSIUM SERPL-SCNC: 5.1 MMOL/L
RBC # BLD AUTO: 2.92 M/UL
SODIUM SERPL-SCNC: 137 MMOL/L
WBC # BLD AUTO: 6.3 K/UL

## 2017-03-02 PROCEDURE — 83735 ASSAY OF MAGNESIUM: CPT

## 2017-03-02 PROCEDURE — 85025 COMPLETE CBC W/AUTO DIFF WBC: CPT

## 2017-03-02 PROCEDURE — 84100 ASSAY OF PHOSPHORUS: CPT

## 2017-03-02 PROCEDURE — 27000221 HC OXYGEN, UP TO 24 HOURS

## 2017-03-02 PROCEDURE — 63600175 PHARM REV CODE 636 W HCPCS: Performed by: NURSE PRACTITIONER

## 2017-03-02 PROCEDURE — 25000003 PHARM REV CODE 250: Performed by: NURSE PRACTITIONER

## 2017-03-02 PROCEDURE — 36415 COLL VENOUS BLD VENIPUNCTURE: CPT

## 2017-03-02 PROCEDURE — 80048 BASIC METABOLIC PNL TOTAL CA: CPT

## 2017-03-02 PROCEDURE — 63600175 PHARM REV CODE 636 W HCPCS: Performed by: INTERNAL MEDICINE

## 2017-03-02 PROCEDURE — 25000242 PHARM REV CODE 250 ALT 637 W/ HCPCS: Performed by: INTERNAL MEDICINE

## 2017-03-02 PROCEDURE — 25000003 PHARM REV CODE 250: Performed by: INTERNAL MEDICINE

## 2017-03-02 PROCEDURE — 80100014 HC HEMODIALYSIS 1:1

## 2017-03-02 PROCEDURE — 99233 SBSQ HOSP IP/OBS HIGH 50: CPT | Mod: ,,, | Performed by: INTERNAL MEDICINE

## 2017-03-02 PROCEDURE — 20000000 HC ICU ROOM

## 2017-03-02 PROCEDURE — 94640 AIRWAY INHALATION TREATMENT: CPT

## 2017-03-02 PROCEDURE — 94761 N-INVAS EAR/PLS OXIMETRY MLT: CPT

## 2017-03-02 RX ORDER — DEXTROSE MONOHYDRATE 100 MG/ML
INJECTION, SOLUTION INTRAVENOUS CONTINUOUS
Status: DISCONTINUED | OUTPATIENT
Start: 2017-03-02 | End: 2017-03-02

## 2017-03-02 RX ORDER — DEXTROSE MONOHYDRATE 100 MG/ML
INJECTION, SOLUTION INTRAVENOUS CONTINUOUS
Status: DISCONTINUED | OUTPATIENT
Start: 2017-03-02 | End: 2017-03-04

## 2017-03-02 RX ORDER — METHADONE HYDROCHLORIDE 10 MG/1
60 TABLET ORAL DAILY
Status: DISCONTINUED | OUTPATIENT
Start: 2017-03-03 | End: 2017-03-06 | Stop reason: HOSPADM

## 2017-03-02 RX ADMIN — Medication 16 G: at 07:03

## 2017-03-02 RX ADMIN — HEPARIN SODIUM 5000 UNITS: 5000 INJECTION, SOLUTION INTRAVENOUS; SUBCUTANEOUS at 03:03

## 2017-03-02 RX ADMIN — Medication 16 G: at 08:03

## 2017-03-02 RX ADMIN — ASPIRIN 325 MG ORAL TABLET 325 MG: 325 PILL ORAL at 08:03

## 2017-03-02 RX ADMIN — HEPARIN SODIUM 5000 UNITS: 5000 INJECTION, SOLUTION INTRAVENOUS; SUBCUTANEOUS at 07:03

## 2017-03-02 RX ADMIN — ALBUTEROL SULFATE 2.5 MG: 2.5 SOLUTION RESPIRATORY (INHALATION) at 08:03

## 2017-03-02 RX ADMIN — PANTOPRAZOLE SODIUM 40 MG: 40 TABLET, DELAYED RELEASE ORAL at 08:03

## 2017-03-02 RX ADMIN — DEXTROSE MONOHYDRATE 25 G: 25 INJECTION, SOLUTION INTRAVENOUS at 12:03

## 2017-03-02 RX ADMIN — METHADONE HYDROCHLORIDE 120 MG: 10 TABLET ORAL at 08:03

## 2017-03-02 RX ADMIN — Medication 16 G: at 06:03

## 2017-03-02 RX ADMIN — LISINOPRIL 40 MG: 40 TABLET ORAL at 08:03

## 2017-03-02 RX ADMIN — FAMOTIDINE 20 MG: 20 TABLET, FILM COATED ORAL at 08:03

## 2017-03-02 RX ADMIN — MINOXIDIL 5 MG: 2.5 TABLET ORAL at 08:03

## 2017-03-02 RX ADMIN — HYDRALAZINE HYDROCHLORIDE 25 MG: 25 TABLET ORAL at 07:03

## 2017-03-02 RX ADMIN — DEXTROSE: 10 SOLUTION INTRAVENOUS at 01:03

## 2017-03-02 RX ADMIN — ALBUTEROL SULFATE 2.5 MG: 2.5 SOLUTION RESPIRATORY (INHALATION) at 11:03

## 2017-03-02 RX ADMIN — HYDRALAZINE HYDROCHLORIDE 25 MG: 25 TABLET ORAL at 10:03

## 2017-03-02 RX ADMIN — AMLODIPINE BESYLATE 5 MG: 5 TABLET ORAL at 08:03

## 2017-03-02 RX ADMIN — ONDANSETRON 4 MG: 2 INJECTION INTRAMUSCULAR; INTRAVENOUS at 07:03

## 2017-03-02 RX ADMIN — CIPROFLOXACIN HYDROCHLORIDE 500 MG: 500 TABLET, FILM COATED ORAL at 08:03

## 2017-03-02 RX ADMIN — DEXTROSE MONOHYDRATE 25 G: 25 INJECTION, SOLUTION INTRAVENOUS at 03:03

## 2017-03-02 RX ADMIN — ALBUTEROL SULFATE 2.5 MG: 2.5 SOLUTION RESPIRATORY (INHALATION) at 04:03

## 2017-03-02 RX ADMIN — DEXTROSE MONOHYDRATE 12.5 G: 25 INJECTION, SOLUTION INTRAVENOUS at 03:03

## 2017-03-02 RX ADMIN — DEXTROSE MONOHYDRATE 12.5 G: 25 INJECTION, SOLUTION INTRAVENOUS at 11:03

## 2017-03-02 RX ADMIN — CLOPIDOGREL BISULFATE 75 MG: 75 TABLET ORAL at 08:03

## 2017-03-02 RX ADMIN — ERYTHROPOIETIN 10000 UNITS: 10000 INJECTION, SOLUTION INTRAVENOUS; SUBCUTANEOUS at 12:03

## 2017-03-02 RX ADMIN — DEXTROSE MONOHYDRATE 12.5 G: 25 INJECTION, SOLUTION INTRAVENOUS at 10:03

## 2017-03-02 RX ADMIN — HYDRALAZINE HYDROCHLORIDE 25 MG: 25 TABLET ORAL at 03:03

## 2017-03-02 RX ADMIN — HEPARIN SODIUM 5000 UNITS: 5000 INJECTION, SOLUTION INTRAVENOUS; SUBCUTANEOUS at 10:03

## 2017-03-02 RX ADMIN — CALCIUM ACETATE 2001 MG: 667 CAPSULE ORAL at 07:03

## 2017-03-02 RX ADMIN — ATORVASTATIN CALCIUM 40 MG: 40 TABLET, FILM COATED ORAL at 08:03

## 2017-03-02 RX ADMIN — DEXTROSE MONOHYDRATE 12.5 G: 25 INJECTION, SOLUTION INTRAVENOUS at 09:03

## 2017-03-02 RX ADMIN — DEXTROSE MONOHYDRATE 12.5 G: 25 INJECTION, SOLUTION INTRAVENOUS at 08:03

## 2017-03-02 RX ADMIN — DEXTROSE: 10 SOLUTION INTRAVENOUS at 04:03

## 2017-03-02 NOTE — NURSING
"Patient given 0900 meds which included 120 mg Methadone. This nurse stepped out of room to get more water for patient and when returned patient was found hiding 4 (40mg) Methadone tablets in bed. Patient was asked what pills were doing in bed and patient stated, "Oh, I don't know how those got there." This nurse stood at bedside to ensure patient swallowed all medication. Dr. Carranza here and notified of finding.   "

## 2017-03-02 NOTE — PROGRESS NOTES
"Progress Note  Hospital Medicine  Patient Name:João Aguirre  MRN:  [id number]  Patient Class: IP- Inpatient  Admit Date: 3/1/2017  Length of Stay: 1 days  Expected Discharge Date:   Attending Physician: Adela Peng MD  Primary Care Provider:  Delbert Redd NP    SUBJECTIVE:     Principal Problem: Pulmonary edema  Initial history of present illness: João Aguirre is a 52 y.o. Male with PMHx significant for ESRD, HTN, HepC, and CVA. He was admitted to the service of hospital medicine with pulmonary edema resulting from missed dialysis treatment. He was brought to ED via EMS with complaint of fatigue and weakness, as well as chills. He stated the symptoms began gradually Saturday after being discharged from the hospital Friday. He acknowledges missing dialysis Tuesday, however did not come to the hospital at that time because he began to feel better. He did not take his evening medications on Tuesday. His symptoms worsened during the night and he broke out in a "cold sweat". His symptoms are associated with SOB worsened with exertion and accompanied by a congested cough. He denies any fever or chest pain. He described his cellulitis as improved since discharge from the hospital. He was found to be hypoglycemic in ED and responded to glucose administration. His CXR revealed bilateral pulmonary infiltrates.    PMH/PSH/SH/FH/Meds: reviewed.    Symptoms/Review of Systems:  Getting HD in ICU; continues with hypoglycemia requiring use of IV D 10 infusion. No shortness of breath, cough, chest pain or headache, fever or abdominal pain.     Diet:  Adequate intake.    Activity level: Normal.    Pain:  Chronic pain    OBJECTIVE:   Vital Signs (Most Recent):      Temp: 98.3 °F (36.8 °C) (03/02/17 0715)  Pulse: (!) 51 (03/02/17 0900)  Resp: 18 (03/02/17 0900)  BP: (!) 195/87 (03/02/17 0838)  SpO2: 100 % (03/02/17 0900)       Vital Signs Range (Last 24H):  Temp:  [98 °F (36.7 °C)-98.6 °F (37 °C)]   Pulse:  [47-94] "   Resp:  [15-41]   BP: ()/()   SpO2:  [85 %-100 %]     Weight: 96.7 kg (213 lb 3 oz)  Body mass index is 28.91 kg/(m^2).    Intake/Output Summary (Last 24 hours) at 03/02/17 1108  Last data filed at 03/02/17 0600   Gross per 24 hour   Intake          1963.33 ml   Output             4000 ml   Net         -2036.67 ml     Physical Examination:  Constitutional: He is oriented to person, place, and time. He appears well-developed and well-nourished. No distress.   HENT:   Head: Normocephalic and atraumatic.   Eyes: Conjunctivae and EOM are normal. Pupils are equal, round, and reactive to light. Right eye exhibits no discharge. Left eye exhibits no discharge.   Neck: Normal range of motion. Neck supple. JVD present.   Cardiovascular: Normal rate, regular rhythm, normal heart sounds and intact distal pulses.   Pulmonary/Chest: CTAB  Abdominal: Soft. Bowel sounds are normal. He exhibits no distension. There is no tenderness. There is no guarding.   Musculoskeletal: Normal range of motion. He exhibits no edema.   Neurological: He is alert and oriented to person, place, and time. No cranial nerve deficit.   Skin: Skin is warm and dry. There is erythema (BLEs R>L).   Psychiatric: He has a normal mood and affect. His behavior is normal. Judgment and thought content normal.     CBC:    Recent Labs  Lab 03/01/17 0147 03/01/17  0614 03/02/17  0354   WBC 8.60 7.80 6.30   RBC 3.15* 2.79* 2.92*   HGB 9.7* 8.5* 8.9*   HCT 29.0* 25.9* 27.5*    162 163   MCV 92 93 94   MCH 30.7 30.5 30.4   MCHC 33.3 32.9 32.3   BMP    Recent Labs  Lab 03/01/17  0147 03/01/17  0614 03/01/17  1217 03/01/17  2307 03/02/17  0354   GLU 36* 38* 46* 45* 33*    138  --   --  137   K 4.7 5.5*  --   --  5.1   CL 99 99  --   --  102   CO2 19* 20*  --   --  22*   BUN 75* 78*  --   --  51*   CREATININE 11.2* 11.3*  --   --  7.6*   CALCIUM 7.6* 7.2*  --   --  7.7*   MG  --  2.7*  --   --  2.3      Diagnostic Results:  Microbiology Results  (last 7 days)     ** No results found for the last 168 hours. **         CXR: Cardiomegaly and moderate to severe bilateral pulmonary edema pattern consistent with pulmonary edema/CHF.  Small right pleural effusion also noted.  Assessment/Plan:   Unexplained hypoglycemia  Insulinoma work up.  Urine Sulfonylurea screen  Continue IV dextrose 10.  Will reduce methadone dose as it may be contributing to hypoglycemia.    Pulmonary edema  Supplemental O2 via NC.   HD as per nephrology team.     ESRD (T,Th,Sat) dialysis onset 2013  Non-compliant, will require nephrology consult for dialysis as patient is in pulmonary edema. Renal diet, continue home medications, monitor closely. Discussed importance of dialysis compliance, patient verbalized understanding but will likely need continued reinforcement.     Benign hypertension with ESRD (end-stage renal disease)  Uncontrolled. Continue oral BP medication regimen. Treat hypertension with PRN hydralazine. Monitor BP closely and titrate medications as required for sustained BP control.     Coronary artery disease involving native coronary artery of native heart without angina pectoris  Continue ASA, Plavix, BBlockade, statin therapy. Monitor patient on telemetry.       Anemia in chronic kidney disease  Current CBC reviewed-         Lab Results   Component Value Date     WBC 7.80 03/01/2017     HGB 8.5 (L) 03/01/2017     HCT 25.9 (L) 03/01/2017     MCV 93 03/01/2017      03/01/2017      Monitor serial CBC and transfuse if patient becomes hemodynamically unstable, symptomatic or H/H drops below 7/21.       Cellulitis of right leg  Improving with oral regimen. Continue cipro (due to stop 3/6). Monitor closely.     Methadone dependence  Confirm dosing with methadone clinic prior to resuming.     VTE Risk Mitigation         Ordered     heparin (porcine) injection 5,000 Units  Once     Route:  Intravenous        03/01/17 1800     heparin (porcine) injection 5,000 Units  Every 8  hours     Route:  Subcutaneous        03/01/17 0410     Medium Risk of VTE  Once      03/01/17 0410        Adela Peng MD  Department of Hospital Medicine   [facility]

## 2017-03-02 NOTE — PLAN OF CARE
Problem: Patient Care Overview  Goal: Individualization & Mutuality  Outcome: Ongoing (interventions implemented as appropriate)  Q hour accu-ck monitoring being done; pt continue to have very low glucose levels with D50% being given IVP; pt without s/s; pt eating well;  Pt without injuries or falls; VSS within pt's normal range; D10% infusing as ordered

## 2017-03-02 NOTE — PROGRESS NOTES
POCT glucose @1651 60mg/dL.  Primary RNJudah notified.  Patient awaiting ICU bed.  Will continue to monitor.      POCT glucose 1714 41mg/dL and recheck 37mg/dL.  Primary RNJudah, notified.

## 2017-03-02 NOTE — PROGRESS NOTES
[facility] Progress Note    Subjective: esrd        Scheduled Meds:   sodium chloride 0.9%   Intravenous Once    sodium chloride 0.9%   Intravenous Once    amlodipine  5 mg Oral BID    aspirin  325 mg Oral Daily    atorvastatin  40 mg Oral QHS    calcium acetate  2,001 mg Oral TID WM    carvedilol  12.5 mg Oral BID    ciprofloxacin HCl  500 mg Oral Daily    clopidogrel  75 mg Oral Daily    epoetin harshad (PROCRIT) injection  10,000 Units Intravenous Every Tues, Thurs, Sat    famotidine  20 mg Oral Daily    heparin (porcine)  5,000 Units Subcutaneous Q8H    heparin (porcine)  5,000 Units Intravenous Once    heparin (porcine)  5,000 Units Intravenous Once    hydrALAZINE  25 mg Oral TID    lisinopril  40 mg Oral Daily    methadone  120 mg Oral Daily    minoxidil  5 mg Oral BID    pantoprazole  40 mg Oral Daily     Continuous Infusions:   dextrose 10 % in water (D10W) 50 mL/hr at 03/02/17 0100     PRN Meds:.sodium chloride 0.9%, albuterol sulfate, dextrose 50%, dextrose 50%, glucagon (human recombinant), glucose, glucose, hydrALAZINE, ondansetron        Vitals:    03/02/17 0811 03/02/17 0838 03/02/17 0855 03/02/17 0900   BP: (!) 195/87 (!) 195/87     BP Location:       Patient Position:       BP Method:       Pulse: (!) 51  (!) 52 (!) 51   Resp:   15 18   Temp:       TempSrc:       SpO2:   98% 100%   Weight:       Height:             I/O last 3 completed shifts:  In: 1963.3 [P.O.:375; I.V.:1088.3; Other:500]  Out: 4000 [Other:4000]      GEN: WDWN, NAD, afebrile    CVS: S1/S2 +, no rub,    PULM: CTAB, NO W/R/R    ABD: +BS, soft, NTND    EXT: NO C/C/E    Neuro: no asterixis  Dialysis access:  avf        Recent Labs  Lab 03/02/17  0354   CALCIUM 7.7*      K 5.1   CO2 22*      BUN 51*   CREATININE 7.6*         Recent Labs  Lab 03/02/17  0354   WBC 6.30   RBC 2.92*   HGB 8.9*   HCT 27.5*      MCV 94   MCH 30.4   MCHC 32.3           ASSESSMENT:  1. esrd  2. Anemia  3. Refractory  hypoglycemia  4.  Mild hyperkalemia      PLAN:  1. Dialysis today  2. The patient's RN Audra showed me an interesting article she found while researching hypoglycemia.    The article suggest that Methadone can induced hypoglycemia in a dose dependent manner.    3.  uf 3 -4 liters  4.  Observe pt taking his medication (he's hoarding pills)

## 2017-03-02 NOTE — PLAN OF CARE
Problem: Patient Care Overview  Goal: Plan of Care Review  Nc 2 lpm in use with sats 98%, resp txs PRN

## 2017-03-02 NOTE — PROGRESS NOTES
Pt's bld glucose 45; 50%dextrose being given; IVF's D10% infusing at 50cc/hr. New order noted; pt eating and drinking; pt AAO;

## 2017-03-02 NOTE — PROGRESS NOTES
Patient is continuing to have persisting hypoglycemia. In the past he was evaluated for Insulinoma as we.. Will check C-peptide level and Pro-Insulin level.

## 2017-03-02 NOTE — PROGRESS NOTES
Results for BRET ALLEN (MRN [id number]) as of 3/2/2017 05:29   Ref. Range 3/2/2017 03:54   Glucose Latest Ref Range: 70 - 110 mg/dL 33 (LL)   D50% IVP given at 0400; acc-ck at 0530 90

## 2017-03-03 LAB
ANION GAP SERPL CALC-SCNC: 11 MMOL/L
BASOPHILS # BLD AUTO: 0 K/UL
BASOPHILS NFR BLD: 0.5 %
BUN SERPL-MCNC: 45 MG/DL
CALCIUM SERPL-MCNC: 8.2 MG/DL
CHLORIDE SERPL-SCNC: 101 MMOL/L
CO2 SERPL-SCNC: 24 MMOL/L
CREAT SERPL-MCNC: 6.5 MG/DL
DIFFERENTIAL METHOD: ABNORMAL
EOSINOPHIL # BLD AUTO: 0.2 K/UL
EOSINOPHIL NFR BLD: 2.6 %
ERYTHROCYTE [DISTWIDTH] IN BLOOD BY AUTOMATED COUNT: 16 %
EST. GFR  (AFRICAN AMERICAN): 10 ML/MIN/1.73 M^2
EST. GFR  (NON AFRICAN AMERICAN): 9 ML/MIN/1.73 M^2
GLUCOSE SERPL-MCNC: 76 MG/DL
HCT VFR BLD AUTO: 27.8 %
HGB BLD-MCNC: 8.9 G/DL
IGF BP3 SERPL-MCNC: 2 MCG/ML
LYMPHOCYTES # BLD AUTO: 1.8 K/UL
LYMPHOCYTES NFR BLD: 28.6 %
MAGNESIUM SERPL-MCNC: 2.3 MG/DL
MCH RBC QN AUTO: 30.1 PG
MCHC RBC AUTO-ENTMCNC: 32 %
MCV RBC AUTO: 94 FL
MONOCYTES # BLD AUTO: 0.8 K/UL
MONOCYTES NFR BLD: 12.9 %
NEUTROPHILS # BLD AUTO: 3.5 K/UL
NEUTROPHILS NFR BLD: 55.4 %
PHOSPHATE SERPL-MCNC: 4.9 MG/DL
PLATELET # BLD AUTO: 167 K/UL
PMV BLD AUTO: 6.8 FL
POCT GLUCOSE: 101 MG/DL (ref 70–110)
POCT GLUCOSE: 112 MG/DL (ref 70–110)
POCT GLUCOSE: 120 MG/DL (ref 70–110)
POCT GLUCOSE: 130 MG/DL (ref 70–110)
POCT GLUCOSE: 144 MG/DL (ref 70–110)
POCT GLUCOSE: 151 MG/DL (ref 70–110)
POCT GLUCOSE: 151 MG/DL (ref 70–110)
POCT GLUCOSE: 59 MG/DL (ref 70–110)
POCT GLUCOSE: 74 MG/DL (ref 70–110)
POCT GLUCOSE: 75 MG/DL (ref 70–110)
POCT GLUCOSE: 78 MG/DL (ref 70–110)
POCT GLUCOSE: 82 MG/DL (ref 70–110)
POCT GLUCOSE: 86 MG/DL (ref 70–110)
POCT GLUCOSE: 87 MG/DL (ref 70–110)
POCT GLUCOSE: 91 MG/DL (ref 70–110)
POCT GLUCOSE: 94 MG/DL (ref 70–110)
POCT GLUCOSE: 96 MG/DL (ref 70–110)
POTASSIUM SERPL-SCNC: 5.1 MMOL/L
RBC # BLD AUTO: 2.95 M/UL
SODIUM SERPL-SCNC: 136 MMOL/L
WBC # BLD AUTO: 6.3 K/UL

## 2017-03-03 PROCEDURE — 99233 SBSQ HOSP IP/OBS HIGH 50: CPT | Mod: ,,, | Performed by: INTERNAL MEDICINE

## 2017-03-03 PROCEDURE — 25000003 PHARM REV CODE 250: Performed by: NURSE PRACTITIONER

## 2017-03-03 PROCEDURE — 94761 N-INVAS EAR/PLS OXIMETRY MLT: CPT

## 2017-03-03 PROCEDURE — 63600175 PHARM REV CODE 636 W HCPCS: Performed by: INTERNAL MEDICINE

## 2017-03-03 PROCEDURE — 83735 ASSAY OF MAGNESIUM: CPT

## 2017-03-03 PROCEDURE — 25000242 PHARM REV CODE 250 ALT 637 W/ HCPCS: Performed by: INTERNAL MEDICINE

## 2017-03-03 PROCEDURE — 20000000 HC ICU ROOM

## 2017-03-03 PROCEDURE — 25000003 PHARM REV CODE 250: Performed by: INTERNAL MEDICINE

## 2017-03-03 PROCEDURE — 63600175 PHARM REV CODE 636 W HCPCS: Performed by: NURSE PRACTITIONER

## 2017-03-03 PROCEDURE — 36415 COLL VENOUS BLD VENIPUNCTURE: CPT

## 2017-03-03 PROCEDURE — 80048 BASIC METABOLIC PNL TOTAL CA: CPT

## 2017-03-03 PROCEDURE — 25000242 PHARM REV CODE 250 ALT 637 W/ HCPCS: Performed by: PHYSICIAN ASSISTANT

## 2017-03-03 PROCEDURE — 84100 ASSAY OF PHOSPHORUS: CPT

## 2017-03-03 PROCEDURE — 94640 AIRWAY INHALATION TREATMENT: CPT

## 2017-03-03 PROCEDURE — 85025 COMPLETE CBC W/AUTO DIFF WBC: CPT

## 2017-03-03 RX ORDER — ALBUTEROL SULFATE 2.5 MG/.5ML
2.5 SOLUTION RESPIRATORY (INHALATION)
Status: DISCONTINUED | OUTPATIENT
Start: 2017-03-03 | End: 2017-03-03

## 2017-03-03 RX ORDER — ALBUTEROL SULFATE 2.5 MG/.5ML
2.5 SOLUTION RESPIRATORY (INHALATION) EVERY 4 HOURS
Status: DISCONTINUED | OUTPATIENT
Start: 2017-03-03 | End: 2017-03-05

## 2017-03-03 RX ORDER — HYDRALAZINE HYDROCHLORIDE 20 MG/ML
20 INJECTION INTRAMUSCULAR; INTRAVENOUS EVERY 6 HOURS PRN
Status: DISCONTINUED | OUTPATIENT
Start: 2017-03-03 | End: 2017-03-06 | Stop reason: HOSPADM

## 2017-03-03 RX ORDER — HYDRALAZINE HYDROCHLORIDE 25 MG/1
50 TABLET, FILM COATED ORAL 3 TIMES DAILY
Status: DISCONTINUED | OUTPATIENT
Start: 2017-03-03 | End: 2017-03-04

## 2017-03-03 RX ADMIN — ALBUTEROL SULFATE 2.5 MG: 2.5 SOLUTION RESPIRATORY (INHALATION) at 07:03

## 2017-03-03 RX ADMIN — LISINOPRIL 40 MG: 40 TABLET ORAL at 08:03

## 2017-03-03 RX ADMIN — ALBUTEROL SULFATE 2.5 MG: 2.5 SOLUTION RESPIRATORY (INHALATION) at 12:03

## 2017-03-03 RX ADMIN — ASPIRIN 325 MG ORAL TABLET 325 MG: 325 PILL ORAL at 08:03

## 2017-03-03 RX ADMIN — CALCIUM ACETATE 2001 MG: 667 CAPSULE ORAL at 05:03

## 2017-03-03 RX ADMIN — METHADONE HYDROCHLORIDE 60 MG: 10 TABLET ORAL at 08:03

## 2017-03-03 RX ADMIN — ALBUTEROL SULFATE 2.5 MG: 2.5 SOLUTION RESPIRATORY (INHALATION) at 03:03

## 2017-03-03 RX ADMIN — CLOPIDOGREL BISULFATE 75 MG: 75 TABLET ORAL at 08:03

## 2017-03-03 RX ADMIN — CARVEDILOL 12.5 MG: 6.25 TABLET, FILM COATED ORAL at 02:03

## 2017-03-03 RX ADMIN — HYDRALAZINE HYDROCHLORIDE 25 MG: 25 TABLET ORAL at 06:03

## 2017-03-03 RX ADMIN — HYDRALAZINE HYDROCHLORIDE 50 MG: 25 TABLET ORAL at 10:03

## 2017-03-03 RX ADMIN — ATORVASTATIN CALCIUM 40 MG: 40 TABLET, FILM COATED ORAL at 08:03

## 2017-03-03 RX ADMIN — FAMOTIDINE 20 MG: 20 TABLET, FILM COATED ORAL at 08:03

## 2017-03-03 RX ADMIN — AMLODIPINE BESYLATE 5 MG: 5 TABLET ORAL at 08:03

## 2017-03-03 RX ADMIN — CALCIUM ACETATE 2001 MG: 667 CAPSULE ORAL at 12:03

## 2017-03-03 RX ADMIN — CALCIUM ACETATE 2001 MG: 667 CAPSULE ORAL at 08:03

## 2017-03-03 RX ADMIN — MINOXIDIL 5 MG: 2.5 TABLET ORAL at 09:03

## 2017-03-03 RX ADMIN — HYDRALAZINE HYDROCHLORIDE 50 MG: 25 TABLET ORAL at 02:03

## 2017-03-03 RX ADMIN — PANTOPRAZOLE SODIUM 40 MG: 40 TABLET, DELAYED RELEASE ORAL at 08:03

## 2017-03-03 RX ADMIN — ONDANSETRON 4 MG: 2 INJECTION INTRAMUSCULAR; INTRAVENOUS at 11:03

## 2017-03-03 RX ADMIN — ALBUTEROL SULFATE 2.5 MG: 2.5 SOLUTION RESPIRATORY (INHALATION) at 04:03

## 2017-03-03 RX ADMIN — HEPARIN SODIUM 5000 UNITS: 5000 INJECTION, SOLUTION INTRAVENOUS; SUBCUTANEOUS at 10:03

## 2017-03-03 RX ADMIN — HEPARIN SODIUM 5000 UNITS: 5000 INJECTION, SOLUTION INTRAVENOUS; SUBCUTANEOUS at 02:03

## 2017-03-03 RX ADMIN — HYDRALAZINE HYDROCHLORIDE 10 MG: 20 INJECTION INTRAMUSCULAR; INTRAVENOUS at 11:03

## 2017-03-03 RX ADMIN — CIPROFLOXACIN HYDROCHLORIDE 500 MG: 500 TABLET, FILM COATED ORAL at 08:03

## 2017-03-03 RX ADMIN — HEPARIN SODIUM 5000 UNITS: 5000 INJECTION, SOLUTION INTRAVENOUS; SUBCUTANEOUS at 06:03

## 2017-03-03 RX ADMIN — MINOXIDIL 5 MG: 2.5 TABLET ORAL at 08:03

## 2017-03-03 RX ADMIN — DEXTROSE MONOHYDRATE 12.5 G: 25 INJECTION, SOLUTION INTRAVENOUS at 12:03

## 2017-03-03 RX ADMIN — PROMETHAZINE HYDROCHLORIDE 25 MG: 25 INJECTION INTRAMUSCULAR; INTRAVENOUS at 01:03

## 2017-03-03 NOTE — PLAN OF CARE
03/02/17 2032   Patient Assessment/Suction   All Lung Fields Breath Sounds coarse;rhonchi   PRE-TX-O2-ETCO2   O2 Device (Oxygen Therapy) room air   SpO2 100 %   Pulse (!) 54   Resp (!) 22   Aerosol Therapy   $ Aerosol Therapy Charges Aerosol Treatment   Respiratory Treatment Status given   SVN/Inhaler Treatment Route mask   Position During Treatment HOB at 30-45 degrees   Patient Tolerance good   Post-Treatment   Post-treatment Heart Rate (beats/min) 52   Post-treatment Resp Rate (breaths/min) 30   All Fields Breath Sounds aeration increased

## 2017-03-03 NOTE — PLAN OF CARE
Problem: Patient Care Overview  Goal: Individualization & Mutuality  Outcome: Ongoing (interventions implemented as appropriate)  Pt with good appetite, and eating in between meals trying to keep bld sugar up; continue to supplement low glucose with glucose tabs and/ or dextrose; pt AAO; without s/s of low bld glucose;

## 2017-03-03 NOTE — PHYSICIAN QUERY
"PT Name: João Aguirre  MR #: [id number]    Physician Query Form -Respiratory Condition Clarification    Reviewer  Ext [phone] Jayden Escoto RN CDS    This form is a permanent document in the medical record.    Query Date: March 3, 2017    By submitting this query, we are merely seeking further clarification of documentation. Please utilize your independent clinical judgment when addressing the question(s) below.  (The Medical record reflects the following:)   Indicators   Supporting Clinical Findings Location in Medical Record   X "Wheezing", "Productive cough", "SOB", "MUNROE", "Use of accessory muscles" documented He complains of mild dyspnea    He complains of mild dyspnea ED Prov note 3/1      H/P 3/1   X Chest X-Ray =  Cardiomegaly and moderate to severe bilateral pulmonary edema pattern consistent with pulmonary edema/CHF. Small right pleural effusion also noted.     CXR 3/1   X "Hypoxia" documented Hypoxia  ED prov note 3/1    Respiratory Distress or Failure documented     X RR=        PaO2=      PaCO2=      O2 sat= RR 13-71    O2 sat decreasing to high 80s on RA    O2 sat %  Flowsheet 3/1-3/3    ED prov note 3/1      Flowsheet 3/1-3/3   X Treatment: Supplemental O2 via NC.   HD as per nephrology team.       ESRD (T,Th,Sat) dialysis onset 2013   Non-compliant, will require nephrology consult for dialysis as patient is in pulmonary edema. Renal diet, continue home medications, monitor closely. Discussed importance of dialysis compliance,     Dialysis today and then t,th,sat Hosp PN 3/3                              Neph Consult 3/1        BiPAP/Intubation     X Supplemental O2/Home O2:     X Oxygen dependence O2 at 2l per nc Flowsheet 3/1-3/3    Other:     Provider, please specify diagnosis or diagnoses associated with above clinical findings.    [  ] Acute Respiratory Failure  [  ] Chronic Respiratory Failure  [  ] Acute on Chronic Respiratory Failure  [ x ] Other Respiratory Diagnosis (Specify) "   Volume overload     [  ] Clinically Undetermined    Please document in your progress notes daily for the duration of treatment, until resolved, and include in your discharge summary.

## 2017-03-03 NOTE — PLAN OF CARE
Problem: Patient Care Overview  Goal: Individualization & Mutuality  Outcome: Ongoing (interventions implemented as appropriate)  Blood sugars continue to be labile throughout day with several doses of D50 1/2 amp given. Pt snacked throughout day to help maintain sugar level. AHD done for 3 hrs with 4L removed.

## 2017-03-03 NOTE — PROGRESS NOTES
"Call placed to Dr Peng re: pt vomiting and wretching after coughing. Noted to have received zofran at 1120- pt states he wants Dr Peng called for something else for nausea.    1243-pt noted eating, pt's nurse Radha at bedside asking pt if pt feels nauseated- pt shaking his head no. Radha stating if pt is vomiting, he shouldn't eat presently. Pt wanting a 2nd plate of food- Radha stating that if pt is nauseated he shouldn't eat- pt raising his voice stating "call the fricking doctor and get me something else for nausea, and get me more food I'm hungry".   1247- Dr Peng returned call.  "

## 2017-03-03 NOTE — PROGRESS NOTES
"Progress Note  Hospital Medicine  Patient Name:João Aguirre  MRN:  [id number]  Patient Class: IP- Inpatient  Admit Date: 3/1/2017  Length of Stay: 2 days  Expected Discharge Date:   Attending Physician: Adela Peng MD  Primary Care Provider:  Delbert Redd NP    SUBJECTIVE:     Principal Problem: Pulmonary edema  Initial history of present illness: João Aguirre is a 52 y.o. Male with PMHx significant for ESRD, HTN, HepC, and CVA. He was admitted to the service of hospital medicine with pulmonary edema resulting from missed dialysis treatment. He was brought to ED via EMS with complaint of fatigue and weakness, as well as chills. He stated the symptoms began gradually Saturday after being discharged from the hospital Friday. He acknowledges missing dialysis Tuesday, however did not come to the hospital at that time because he began to feel better. He did not take his evening medications on Tuesday. His symptoms worsened during the night and he broke out in a "cold sweat". His symptoms are associated with SOB worsened with exertion and accompanied by a congested cough. He denies any fever or chest pain. He described his cellulitis as improved since discharge from the hospital. He was found to be hypoglycemic in ED and responded to glucose administration. His CXR revealed bilateral pulmonary infiltrates.    PMH/PSH/SH/FH/Meds: reviewed.    Symptoms/Review of Systems:  Blood sugars starting to improve, on D 10 IV infusion. No shortness of breath, cough, chest pain or headache, fever or abdominal pain.     Diet:  Adequate intake.    Activity level: Normal.    Pain:  Chronic pain    OBJECTIVE:   Vital Signs (Most Recent):      Temp: 98.6 °F (37 °C) (03/03/17 0800)  Pulse: 60 (03/03/17 0800)  Resp: (!) 56 (03/03/17 0800)  BP: (!) 197/84 (03/03/17 0800)  SpO2: 95 % (03/03/17 0800)       Vital Signs Range (Last 24H):  Temp:  [97 °F (36.1 °C)-98.7 °F (37.1 °C)]   Pulse:  [52-62]   Resp:  [13-71]   BP: " (141-218)/()   SpO2:  [89 %-100 %]     Weight: 96.7 kg (213 lb 3 oz)  Body mass index is 28.91 kg/(m^2).    Intake/Output Summary (Last 24 hours) at 03/03/17 0918  Last data filed at 03/03/17 0600   Gross per 24 hour   Intake             2995 ml   Output             4500 ml   Net            -1505 ml     Physical Examination:  Constitutional: He is oriented to person, place, and time. He appears well-developed and well-nourished. No distress.   HENT:   Head: Normocephalic and atraumatic.   Eyes: Conjunctivae and EOM are normal. Pupils are equal, round, and reactive to light. Right eye exhibits no discharge. Left eye exhibits no discharge.   Neck: Normal range of motion. Neck supple. JVD present.   Cardiovascular: Normal rate, regular rhythm, normal heart sounds and intact distal pulses.   Pulmonary/Chest: CTAB  Abdominal: Soft. Bowel sounds are normal. He exhibits no distension. There is no tenderness. There is no guarding.   Musculoskeletal: Normal range of motion. He exhibits no edema.   Neurological: He is alert and oriented to person, place, and time. No cranial nerve deficit.   Skin: Skin is warm and dry. Right foot recent debdridement site healing nicely.  Psychiatric: He has a normal mood and affect. His behavior is normal. Judgment and thought content normal.     CBC:    Recent Labs  Lab 03/01/17 0614 03/02/17 0354 03/03/17  0331   WBC 7.80 6.30 6.30   RBC 2.79* 2.92* 2.95*   HGB 8.5* 8.9* 8.9*   HCT 25.9* 27.5* 27.8*    163 167   MCV 93 94 94   MCH 30.5 30.4 30.1   MCHC 32.9 32.3 32.0   BMP    Recent Labs  Lab 03/01/17 0614 03/01/17  2307 03/02/17  0354 03/03/17  0331   GLU 38*  < > 45* 33* 76     --   --  137 136   K 5.5*  --   --  5.1 5.1   CL 99  --   --  102 101   CO2 20*  --   --  22* 24   BUN 78*  --   --  51* 45*   CREATININE 11.3*  --   --  7.6* 6.5*   CALCIUM 7.2*  --   --  7.7* 8.2*   MG 2.7*  --   --  2.3 2.3   < > = values in this interval not displayed.   Diagnostic  Results:  Microbiology Results (last 7 days)     ** No results found for the last 168 hours. **         CXR: Cardiomegaly and moderate to severe bilateral pulmonary edema pattern consistent with pulmonary edema/CHF.  Small right pleural effusion also noted.  Assessment/Plan:   Unexplained hypoglycemia  Insulinoma work up pending.  Urine Sulfonylurea screen  Continue IV dextrose 10.    Pulmonary edema  Supplemental O2 via NC.   HD as per nephrology team.     ESRD (T,Th,Sat) dialysis onset 2013  Non-compliant, will require nephrology consult for dialysis as patient is in pulmonary edema. Renal diet, continue home medications, monitor closely. Discussed importance of dialysis compliance, patient verbalized understanding but will likely need continued reinforcement.     Benign hypertension with ESRD (end-stage renal disease)  Uncontrolled. Continue oral BP medication regimen. Treat hypertension with PRN hydralazine.   Increase Hydralazine 50 mg TID.   Monitor BP closely and titrate medications as required for sustained BP control.     Coronary artery disease involving native coronary artery of native heart without angina pectoris  Continue ASA, Plavix, BBlockade, statin therapy. Monitor patient on telemetry.       Anemia in chronic kidney disease  Current CBC reviewed-         Lab Results   Component Value Date     WBC 7.80 03/01/2017     HGB 8.5 (L) 03/01/2017     HCT 25.9 (L) 03/01/2017     MCV 93 03/01/2017      03/01/2017      Monitor serial CBC and transfuse if patient becomes hemodynamically unstable, symptomatic or H/H drops below 7/21.       Cellulitis of right leg  Improving with oral regimen. Continue cipro (due to stop 3/6). Monitor closely.     Methadone dependence  Confirm dosing with methadone clinic prior to resuming.     VTE Risk Mitigation         Ordered     heparin (porcine) injection 5,000 Units  Once     Route:  Intravenous        03/01/17 1800     heparin (porcine) injection 5,000 Units   Every 8 hours     Route:  Subcutaneous        03/01/17 0410     Medium Risk of VTE  Once      03/01/17 0410        Adela ePng MD  Department of Hospital Medicine   [facility]

## 2017-03-03 NOTE — PLAN OF CARE
Problem: Patient Care Overview  Goal: Plan of Care Review  Outcome: Ongoing (interventions implemented as appropriate)  Pt on room air with Q4 albuterol treatments, BS inspiratory and expiratory wheeze.

## 2017-03-03 NOTE — PROGRESS NOTES
"Wife insisting on coming into ICU despite visiting hours closed at present for nursing report exchange. Wife bringing in what appears to be coffee and a "croissant with egg, cheese and sausage" to pt because "his sugar is low and he needs to eat". Wife's attitude non compromising with giving food to pt, despite being told that pt is on a special diet. Wife agreeing to leave pt's room so nursing report can continue.  "

## 2017-03-03 NOTE — PROGRESS NOTES
Pt given hydralazine 10mg IV prn for BP of 217/98 at 1102.  Pt's BP is now 196/84 @ 1150 will continue to monitor BP.

## 2017-03-03 NOTE — PROGRESS NOTES
[facility] Progress Note    Subjective: esrd        Scheduled Meds:   sodium chloride 0.9%   Intravenous Once    sodium chloride 0.9%   Intravenous Once    albuterol sulfate  2.5 mg Nebulization Q4H    amlodipine  5 mg Oral BID    aspirin  325 mg Oral Daily    atorvastatin  40 mg Oral QHS    calcium acetate  2,001 mg Oral TID WM    carvedilol  12.5 mg Oral BID    ciprofloxacin HCl  500 mg Oral Daily    clopidogrel  75 mg Oral Daily    epoetin harshad (PROCRIT) injection  10,000 Units Intravenous Every Tues, Thurs, Sat    famotidine  20 mg Oral Daily    heparin (porcine)  5,000 Units Subcutaneous Q8H    heparin (porcine)  5,000 Units Intravenous Once    heparin (porcine)  5,000 Units Intravenous Once    hydrALAZINE  50 mg Oral TID    lisinopril  40 mg Oral Daily    methadone  60 mg Oral Daily    minoxidil  5 mg Oral BID    pantoprazole  40 mg Oral Daily     Continuous Infusions:   dextrose 10 % in water (D10W) 50 mL/hr at 03/02/17 1658     PRN Meds:.sodium chloride 0.9%, dextrose 50%, dextrose 50%, glucagon (human recombinant), glucose, glucose, hydrALAZINE, ondansetron, promethazine (PHENERGAN) IVPB        Vitals:    03/03/17 0945 03/03/17 1005 03/03/17 1105 03/03/17 1207   BP: (!) 194/84 (!) 205/88 (!) 196/84    BP Location:   Right arm    Patient Position:   Lying    BP Method:   Automatic    Pulse: 61 60 (!) 58 71   Resp: (!) 64 (!) 65 (!) 56 (!) 22   Temp:   98.2 °F (36.8 °C)    TempSrc:   Oral    SpO2: 98% 98% 99% 98%   Weight:       Height:             I/O last 3 completed shifts:  In: 4938.3 [P.O.:2075; I.V.:2363.3; Other:500]  Out: 4500 [Other:4500]      GEN: WDWN, NAD, afebrile    CVS: S1/S2 +, no rub,    PULM: CTAB, NO W/R/R    ABD: +BS, soft, NTND    EXT: NO C/C/E    Neuro: no asterixis  Dialysis access:  avf        Recent Labs  Lab 03/03/17  0331   CALCIUM 8.2*      K 5.1   CO2 24      BUN 45*   CREATININE 6.5*         Recent Labs  Lab 03/03/17 0331   WBC 6.30    RBC 2.95*   HGB 8.9*   HCT 27.8*      MCV 94   MCH 30.1   MCHC 32.0           ASSESSMENT:  1. esrd  2. Anemia  3. Refractory hypoglycemia  4.  Mild hyperkalemia  5.  Nausea, vomiting      PLAN:  1.  Dialysis tomorrow  2.  Antiemetics (withdrawal?)    3.  Consider switching methadone to another opiate  4.  Prn iv hydralazine for bp excesses

## 2017-03-03 NOTE — PROGRESS NOTES
"Pt noted actively wretching. Wife into bedside with cup of fluid "it's coffee", wanting to know if she can give pt coffee. Explained to wife that pt has been vomiting, coffee is acidic, and coffee would not be a good recommendation for pt at this time. Wife's response is that pt is vomiting because he "has phlegm and needs the coffee to thin it out". Again explanation given by this nurse that pt has been actively vomiting, coffee is acidic, and it is not recommended to give pt coffee at this time. Wife stating "ok", proceeding into pt's room with coffee in hand.   Shortly after wife entering pt's room, pt requesting nausea medicine.    "

## 2017-03-04 LAB
ANION GAP SERPL CALC-SCNC: 14 MMOL/L
BASOPHILS # BLD AUTO: 0 K/UL
BASOPHILS NFR BLD: 0.5 %
BUN SERPL-MCNC: 70 MG/DL
CALCIUM SERPL-MCNC: 8.5 MG/DL
CHLORIDE SERPL-SCNC: 99 MMOL/L
CO2 SERPL-SCNC: 22 MMOL/L
CREAT SERPL-MCNC: 8.3 MG/DL
DIFFERENTIAL METHOD: ABNORMAL
EOSINOPHIL # BLD AUTO: 0.2 K/UL
EOSINOPHIL NFR BLD: 2.4 %
ERYTHROCYTE [DISTWIDTH] IN BLOOD BY AUTOMATED COUNT: 15.5 %
EST. GFR  (AFRICAN AMERICAN): 8 ML/MIN/1.73 M^2
EST. GFR  (NON AFRICAN AMERICAN): 7 ML/MIN/1.73 M^2
GLUCOSE SERPL-MCNC: 87 MG/DL
HCT VFR BLD AUTO: 26.5 %
HGB BLD-MCNC: 8.6 G/DL
LYMPHOCYTES # BLD AUTO: 2.1 K/UL
LYMPHOCYTES NFR BLD: 29.6 %
MAGNESIUM SERPL-MCNC: 2.5 MG/DL
MCH RBC QN AUTO: 30.4 PG
MCHC RBC AUTO-ENTMCNC: 32.6 %
MCV RBC AUTO: 93 FL
MONOCYTES # BLD AUTO: 0.7 K/UL
MONOCYTES NFR BLD: 10.3 %
NEUTROPHILS # BLD AUTO: 4.1 K/UL
NEUTROPHILS NFR BLD: 57.2 %
PHOSPHATE SERPL-MCNC: 5.2 MG/DL
PLATELET # BLD AUTO: 164 K/UL
PMV BLD AUTO: 6.9 FL
POCT GLUCOSE: 108 MG/DL (ref 70–110)
POCT GLUCOSE: 111 MG/DL (ref 70–110)
POCT GLUCOSE: 114 MG/DL (ref 70–110)
POCT GLUCOSE: 115 MG/DL (ref 70–110)
POCT GLUCOSE: 134 MG/DL (ref 70–110)
POCT GLUCOSE: 143 MG/DL (ref 70–110)
POCT GLUCOSE: 161 MG/DL (ref 70–110)
POCT GLUCOSE: 92 MG/DL (ref 70–110)
POCT GLUCOSE: 92 MG/DL (ref 70–110)
POCT GLUCOSE: 95 MG/DL (ref 70–110)
POTASSIUM SERPL-SCNC: 5.8 MMOL/L
RBC # BLD AUTO: 2.84 M/UL
SODIUM SERPL-SCNC: 135 MMOL/L
WBC # BLD AUTO: 7.3 K/UL

## 2017-03-04 PROCEDURE — 94640 AIRWAY INHALATION TREATMENT: CPT

## 2017-03-04 PROCEDURE — 25000003 PHARM REV CODE 250: Performed by: NURSE PRACTITIONER

## 2017-03-04 PROCEDURE — 80048 BASIC METABOLIC PNL TOTAL CA: CPT

## 2017-03-04 PROCEDURE — 25000242 PHARM REV CODE 250 ALT 637 W/ HCPCS: Performed by: PHYSICIAN ASSISTANT

## 2017-03-04 PROCEDURE — 80100014 HC HEMODIALYSIS 1:1

## 2017-03-04 PROCEDURE — 85025 COMPLETE CBC W/AUTO DIFF WBC: CPT

## 2017-03-04 PROCEDURE — 12000002 HC ACUTE/MED SURGE SEMI-PRIVATE ROOM

## 2017-03-04 PROCEDURE — 63600175 PHARM REV CODE 636 W HCPCS: Performed by: INTERNAL MEDICINE

## 2017-03-04 PROCEDURE — 25000003 PHARM REV CODE 250: Performed by: INTERNAL MEDICINE

## 2017-03-04 PROCEDURE — 83735 ASSAY OF MAGNESIUM: CPT

## 2017-03-04 PROCEDURE — 94761 N-INVAS EAR/PLS OXIMETRY MLT: CPT

## 2017-03-04 PROCEDURE — 36415 COLL VENOUS BLD VENIPUNCTURE: CPT

## 2017-03-04 PROCEDURE — 84100 ASSAY OF PHOSPHORUS: CPT

## 2017-03-04 RX ORDER — CARVEDILOL 6.25 MG/1
6.25 TABLET ORAL 2 TIMES DAILY
Status: DISCONTINUED | OUTPATIENT
Start: 2017-03-05 | End: 2017-03-04

## 2017-03-04 RX ORDER — CARVEDILOL 6.25 MG/1
6.25 TABLET ORAL 2 TIMES DAILY
Status: DISCONTINUED | OUTPATIENT
Start: 2017-03-04 | End: 2017-03-06 | Stop reason: HOSPADM

## 2017-03-04 RX ADMIN — CIPROFLOXACIN HYDROCHLORIDE 500 MG: 500 TABLET, FILM COATED ORAL at 01:03

## 2017-03-04 RX ADMIN — ALBUTEROL SULFATE 2.5 MG: 2.5 SOLUTION RESPIRATORY (INHALATION) at 11:03

## 2017-03-04 RX ADMIN — FAMOTIDINE 20 MG: 20 TABLET, FILM COATED ORAL at 01:03

## 2017-03-04 RX ADMIN — HEPARIN SODIUM 5000 UNITS: 5000 INJECTION, SOLUTION INTRAVENOUS; SUBCUTANEOUS at 10:03

## 2017-03-04 RX ADMIN — MINOXIDIL 5 MG: 2.5 TABLET ORAL at 10:03

## 2017-03-04 RX ADMIN — ASPIRIN 325 MG ORAL TABLET 325 MG: 325 PILL ORAL at 01:03

## 2017-03-04 RX ADMIN — MINOXIDIL 5 MG: 2.5 TABLET ORAL at 09:03

## 2017-03-04 RX ADMIN — DEXTROSE: 10 SOLUTION INTRAVENOUS at 06:03

## 2017-03-04 RX ADMIN — LISINOPRIL 40 MG: 40 TABLET ORAL at 09:03

## 2017-03-04 RX ADMIN — ALBUTEROL SULFATE 2.5 MG: 2.5 SOLUTION RESPIRATORY (INHALATION) at 07:03

## 2017-03-04 RX ADMIN — ATORVASTATIN CALCIUM 40 MG: 40 TABLET, FILM COATED ORAL at 10:03

## 2017-03-04 RX ADMIN — CARVEDILOL 6.25 MG: 6.25 TABLET, FILM COATED ORAL at 10:03

## 2017-03-04 RX ADMIN — ERYTHROPOIETIN 10000 UNITS: 10000 INJECTION, SOLUTION INTRAVENOUS; SUBCUTANEOUS at 11:03

## 2017-03-04 RX ADMIN — CALCIUM ACETATE 2001 MG: 667 CAPSULE ORAL at 05:03

## 2017-03-04 RX ADMIN — HEPARIN SODIUM 5000 UNITS: 5000 INJECTION, SOLUTION INTRAVENOUS; SUBCUTANEOUS at 01:03

## 2017-03-04 RX ADMIN — CLOPIDOGREL BISULFATE 75 MG: 75 TABLET ORAL at 01:03

## 2017-03-04 RX ADMIN — HEPARIN SODIUM 5000 UNITS: 5000 INJECTION, SOLUTION INTRAVENOUS; SUBCUTANEOUS at 06:03

## 2017-03-04 RX ADMIN — CALCIUM ACETATE 2001 MG: 667 CAPSULE ORAL at 01:03

## 2017-03-04 RX ADMIN — HYDRALAZINE HYDROCHLORIDE 50 MG: 25 TABLET ORAL at 01:03

## 2017-03-04 RX ADMIN — ALBUTEROL SULFATE 2.5 MG: 2.5 SOLUTION RESPIRATORY (INHALATION) at 04:03

## 2017-03-04 RX ADMIN — AMLODIPINE BESYLATE 5 MG: 5 TABLET ORAL at 09:03

## 2017-03-04 RX ADMIN — HYDRALAZINE HYDROCHLORIDE 50 MG: 25 TABLET ORAL at 06:03

## 2017-03-04 RX ADMIN — PANTOPRAZOLE SODIUM 40 MG: 40 TABLET, DELAYED RELEASE ORAL at 01:03

## 2017-03-04 RX ADMIN — ALBUTEROL SULFATE 2.5 MG: 2.5 SOLUTION RESPIRATORY (INHALATION) at 12:03

## 2017-03-04 RX ADMIN — METHADONE HYDROCHLORIDE 60 MG: 10 TABLET ORAL at 01:03

## 2017-03-04 RX ADMIN — AMLODIPINE BESYLATE 5 MG: 5 TABLET ORAL at 10:03

## 2017-03-04 NOTE — PROGRESS NOTES
[facility] Progress Note    Subjective: esrd      Scheduled Meds:   sodium chloride 0.9%   Intravenous Once    sodium chloride 0.9%   Intravenous Once    albuterol sulfate  2.5 mg Nebulization Q4H    amlodipine  5 mg Oral BID    aspirin  325 mg Oral Daily    atorvastatin  40 mg Oral QHS    calcium acetate  2,001 mg Oral TID WM    carvedilol  12.5 mg Oral BID    ciprofloxacin HCl  500 mg Oral Daily    clopidogrel  75 mg Oral Daily    epoetin harshad (PROCRIT) injection  10,000 Units Intravenous Every Tues, Thurs, Sat    famotidine  20 mg Oral Daily    heparin (porcine)  5,000 Units Subcutaneous Q8H    heparin (porcine)  5,000 Units Intravenous Once    heparin (porcine)  5,000 Units Intravenous Once    hydrALAZINE  50 mg Oral TID    lisinopril  40 mg Oral Daily    methadone  60 mg Oral Daily    minoxidil  5 mg Oral BID    pantoprazole  40 mg Oral Daily     Continuous Infusions:   dextrose 10 % in water (D10W) 50 mL/hr at 03/04/17 0604     PRN Meds:.sodium chloride 0.9%, dextrose 50%, dextrose 50%, glucagon (human recombinant), glucose, glucose, hydrALAZINE, ondansetron, promethazine (PHENERGAN) IVPB        Vitals:    03/04/17 0300 03/04/17 0400 03/04/17 0439 03/04/17 0600   BP: (!) 157/70 (!) 171/98  (!) 180/84   BP Location:       Patient Position:       BP Method:       Pulse: (!) 54 (!) 54 (!) 58 (!) 55   Resp: 19 20 18 17   Temp:  98.2 °F (36.8 °C)     TempSrc:  Oral     SpO2:  98% 98%    Weight:    94.8 kg (208 lb 15.9 oz)   Height:             I/O last 3 completed shifts:  In: 2470 [P.O.:620; I.V.:1850]  Out: 0       GEN: WDWN, NAD, afebrile    CVS: S1/S2 +, no rub,    PULM: CTAB, NO W/R/R    ABD: +BS, soft, NTND    EXT: NO C/C/E    Neuro: no asterixis  Dialysis access:  avf        Recent Labs  Lab 03/04/17  0341   CALCIUM 8.5*   *   K 5.8*   CO2 22*   CL 99   BUN 70*   CREATININE 8.3*         Recent Labs  Lab 03/04/17  0340   WBC 7.30   RBC 2.84*   HGB 8.6*   HCT 26.5*   PLT  164   MCV 93   MCH 30.4   MCHC 32.6           ASSESSMENT:  1. esrd  2. Anemia  3. Refractory hypoglycemia  4.  Mild hyperkalemia  5.  Nausea, vomiting      PLAN:  1.  Dialysis TTS  2.  Antiemetics (withdrawal?)    3.  Consider switching methadone to another opiate  4.  Prn iv hydralazine for bp excesses

## 2017-03-04 NOTE — PLAN OF CARE
03/03/17 1930   Patient Assessment/Suction   All Lung Fields Breath Sounds coarse;wheezes, expiratory   PRE-TX-O2-ETCO2   O2 Device (Oxygen Therapy) room air   Pulse (!) 59   Resp (!) 22   Aerosol Therapy   $ Aerosol Therapy Charges Aerosol Treatment   Respiratory Treatment Status given   SVN/Inhaler Treatment Route mask   Position During Treatment HOB at 45 degrees   Patient Tolerance good   Post-Treatment   Post-treatment Heart Rate (beats/min) 58   Post-treatment Resp Rate (breaths/min) 40   All Fields Breath Sounds aeration increased

## 2017-03-04 NOTE — PLAN OF CARE
Problem: Patient Care Overview  Goal: Plan of Care Review  Outcome: Ongoing (interventions implemented as appropriate)  Pt on room air with Q4 duoneb treatments

## 2017-03-04 NOTE — PROGRESS NOTES
Progress Note  Hospital Medicine    Admit Date: 3/1/2017    SUBJECTIVE:     Follow-up For:  Pulmonary edema      Interval history (See H&P for complete P,F,SHx) : Patient BP continues to be elevated. Underwent HD today. Dextrose gtt weaned off. Patient back to symptomatic baseline.    Review of Systems: List if applicable  Review of Systems   Constitutional: Positive for malaise/fatigue. Negative for chills and fever.   Respiratory: Positive for shortness of breath. Negative for cough.    Cardiovascular: Negative for chest pain and leg swelling.   Genitourinary: Negative for dysuria.   Musculoskeletal: Negative for back pain.   Skin: Negative for rash.   Neurological: Negative for focal weakness.   Psychiatric/Behavioral: Negative for depression.         OBJECTIVE:     Vital Signs Range (Last 24H):  Temp:  [97.1 °F (36.2 °C)-99.2 °F (37.3 °C)]   Pulse:  [52-67]   Resp:  [15-35]   BP: (138-214)/(70-98)   SpO2:  [93 %-100 %]     I & O (Last 24H):    Intake/Output Summary (Last 24 hours) at 03/04/17 1753  Last data filed at 03/04/17 1221   Gross per 24 hour   Intake             1820 ml   Output             4500 ml   Net            -2680 ml       Estimated body mass index is 28.34 kg/(m^2) as calculated from the following:    Height as of this encounter: 6' (1.829 m).    Weight as of this encounter: 94.8 kg (208 lb 15.9 oz).    Physical Exam   Constitutional: He is oriented to person, place, and time and well-developed, well-nourished, and in no distress.   HENT:   Nose: Nose normal.   Mouth/Throat: Oropharynx is clear and moist.   bitemporal wasting   Eyes: Conjunctivae are normal. Pupils are equal, round, and reactive to light.   Neck: Neck supple. No thyromegaly present.   Cardiovascular: Normal rate and regular rhythm.  Exam reveals no gallop and no friction rub.    No murmur heard.  Fistula intact with good thrill   Pulmonary/Chest: Effort normal. No respiratory distress. He has wheezes. He has rales.   Bilateral  rales/wheezes   Abdominal: Soft. He exhibits no distension. There is no tenderness. There is no guarding.   Musculoskeletal: He exhibits no edema or tenderness.   Neurological: He is alert and oriented to person, place, and time. He displays normal reflexes. No cranial nerve deficit. Gait normal.   Skin: No rash noted. He is not diaphoretic. No erythema.   Psychiatric: Affect and judgment normal.   Nursing note and vitals reviewed.    Laboratory/Diagnostic Data:  Reviewed and noted in plan where applicable- Please see chart for full lab data.    Medications:  Medication list was reviewed and changes noted under Assessment/Plan.    ASSESSMENT/PLAN:     Active Problems:    Active Hospital Problems    Diagnosis  POA    *Pulmonary edema [J81.1]-  D/t noncomplaince with HD. Doing better after fluid removed. BP remains elevated.  Yes    Benign hypertension with ESRD (end-stage renal disease) [I12.0, N18.6]- BP remains elevated. Will monitor overnight and if remains high in AM, increase meds.  Yes    Noncompliance with renal dialysis [Z91.15]-  Compliance encouraged.  Not Applicable    Methadone dependence [F11.20]-  Reduced dosage of methadone. Monitor.  Yes    Hypoglycemia [E16.2]-  Improved. Likely result of high methadone dosage. Monitor FSGs qAC/HS.   Yes    ESRD (T,Th,Sat) dialysis onset 2013 [N18.6]- Nephrology consulted. Continue Chronic hemodialysis. Monitor daily electrolytes and defer dialysis orders to nephrology.  Yes    Coronary artery disease involving native coronary artery of native heart without angina pectoris [I25.10]- Patient with known CAD s/p stent placement. Will continue Aspirin and Statin and monitor for S/Sx of angina/ACS. Continue to monitor on telemetry.     Yes    Anemia in chronic kidney disease [N18.9, D63.1]-   Current CBC reviewed-   Lab Results   Component Value Date    WBC 7.30 03/04/2017    HGB 8.6 (L) 03/04/2017    HCT 26.5 (L) 03/04/2017    MCV 93 03/04/2017      03/04/2017     Monitor serial CBC and transfuse if patient becomes hemodynamically unstable, symptomatic or H/H drops below 7/21.   Yes    Cellulitis of right leg [L03.115]  Yes      Resolved Hospital Problems    Diagnosis Date Resolved POA   No resolved problems to display.       VTE Risk Mitigation         Ordered     heparin (porcine) injection 5,000 Units  Every 8 hours     Route:  Subcutaneous        03/01/17 0410     Medium Risk of VTE  Once      03/01/17 0410

## 2017-03-04 NOTE — PROGRESS NOTES
Report called to Jarrell Hui LPN. Pt transferred to PCU to room 205-1. Pt stable.  Family notified of pt's move.

## 2017-03-04 NOTE — PROGRESS NOTES
HD tx completed with net removal of 4000 ml removed. Pt reed tx well with nc's voiced. Needles x2 removed from lfa, pressure applied x 5min. Hemostasis achieved.

## 2017-03-04 NOTE — PLAN OF CARE
Problem: Patient Care Overview  Goal: Plan of Care Review  Verbalized best sleep he has had in 2 days. Accu checks now every 2 hours and never dropped below 70 with D10 infusing at 50c/hr.-180 with some inaccurate readings noted above 180 due to patient moving arm while pressure is taking.

## 2017-03-05 PROBLEM — J81.1 PULMONARY EDEMA: Status: RESOLVED | Noted: 2017-03-01 | Resolved: 2017-03-05

## 2017-03-05 LAB
ANION GAP SERPL CALC-SCNC: 11 MMOL/L
ANION GAP SERPL CALC-SCNC: 14 MMOL/L
BASOPHILS # BLD AUTO: 0 K/UL
BASOPHILS NFR BLD: 0.4 %
BUN SERPL-MCNC: 65 MG/DL
BUN SERPL-MCNC: 78 MG/DL
CALCIUM SERPL-MCNC: 9.2 MG/DL
CALCIUM SERPL-MCNC: 9.5 MG/DL
CHLORIDE SERPL-SCNC: 98 MMOL/L
CHLORIDE SERPL-SCNC: 99 MMOL/L
CO2 SERPL-SCNC: 24 MMOL/L
CO2 SERPL-SCNC: 27 MMOL/L
CREAT SERPL-MCNC: 6.4 MG/DL
CREAT SERPL-MCNC: 7.2 MG/DL
DIFFERENTIAL METHOD: ABNORMAL
EOSINOPHIL # BLD AUTO: 0.3 K/UL
EOSINOPHIL NFR BLD: 3.3 %
ERYTHROCYTE [DISTWIDTH] IN BLOOD BY AUTOMATED COUNT: 15.5 %
EST. GFR  (AFRICAN AMERICAN): 11 ML/MIN/1.73 M^2
EST. GFR  (AFRICAN AMERICAN): 9 ML/MIN/1.73 M^2
EST. GFR  (NON AFRICAN AMERICAN): 8 ML/MIN/1.73 M^2
EST. GFR  (NON AFRICAN AMERICAN): 9 ML/MIN/1.73 M^2
GLUCOSE SERPL-MCNC: 67 MG/DL
GLUCOSE SERPL-MCNC: 92 MG/DL
HCT VFR BLD AUTO: 28.3 %
HGB BLD-MCNC: 9.3 G/DL
LYMPHOCYTES # BLD AUTO: 1.8 K/UL
LYMPHOCYTES NFR BLD: 20.4 %
MAGNESIUM SERPL-MCNC: 2.5 MG/DL
MCH RBC QN AUTO: 30.8 PG
MCHC RBC AUTO-ENTMCNC: 32.9 %
MCV RBC AUTO: 94 FL
MONOCYTES # BLD AUTO: 1.3 K/UL
MONOCYTES NFR BLD: 14.8 %
NEUTROPHILS # BLD AUTO: 5.3 K/UL
NEUTROPHILS NFR BLD: 61.1 %
PHOSPHATE SERPL-MCNC: 4.4 MG/DL
PLATELET # BLD AUTO: 208 K/UL
PMV BLD AUTO: 6.7 FL
POCT GLUCOSE: 105 MG/DL (ref 70–110)
POCT GLUCOSE: 120 MG/DL (ref 70–110)
POCT GLUCOSE: 123 MG/DL (ref 70–110)
POCT GLUCOSE: 84 MG/DL (ref 70–110)
POTASSIUM SERPL-SCNC: 5.9 MMOL/L
POTASSIUM SERPL-SCNC: 5.9 MMOL/L
RBC # BLD AUTO: 3.03 M/UL
SODIUM SERPL-SCNC: 136 MMOL/L
SODIUM SERPL-SCNC: 137 MMOL/L
WBC # BLD AUTO: 8.7 K/UL

## 2017-03-05 PROCEDURE — 25000003 PHARM REV CODE 250: Performed by: NURSE PRACTITIONER

## 2017-03-05 PROCEDURE — 85025 COMPLETE CBC W/AUTO DIFF WBC: CPT

## 2017-03-05 PROCEDURE — 94640 AIRWAY INHALATION TREATMENT: CPT

## 2017-03-05 PROCEDURE — 25000003 PHARM REV CODE 250: Performed by: HOSPITALIST

## 2017-03-05 PROCEDURE — 63600175 PHARM REV CODE 636 W HCPCS: Performed by: INTERNAL MEDICINE

## 2017-03-05 PROCEDURE — 80048 BASIC METABOLIC PNL TOTAL CA: CPT

## 2017-03-05 PROCEDURE — 83735 ASSAY OF MAGNESIUM: CPT

## 2017-03-05 PROCEDURE — 63600175 PHARM REV CODE 636 W HCPCS: Performed by: NURSE PRACTITIONER

## 2017-03-05 PROCEDURE — 25000242 PHARM REV CODE 250 ALT 637 W/ HCPCS: Performed by: PHYSICIAN ASSISTANT

## 2017-03-05 PROCEDURE — 84100 ASSAY OF PHOSPHORUS: CPT

## 2017-03-05 PROCEDURE — 94761 N-INVAS EAR/PLS OXIMETRY MLT: CPT

## 2017-03-05 PROCEDURE — 12000002 HC ACUTE/MED SURGE SEMI-PRIVATE ROOM

## 2017-03-05 PROCEDURE — 36415 COLL VENOUS BLD VENIPUNCTURE: CPT

## 2017-03-05 PROCEDURE — 25000242 PHARM REV CODE 250 ALT 637 W/ HCPCS: Performed by: INTERNAL MEDICINE

## 2017-03-05 PROCEDURE — 25000003 PHARM REV CODE 250: Performed by: INTERNAL MEDICINE

## 2017-03-05 RX ORDER — ALBUTEROL SULFATE 2.5 MG/.5ML
2.5 SOLUTION RESPIRATORY (INHALATION) EVERY 8 HOURS
Status: DISCONTINUED | OUTPATIENT
Start: 2017-03-05 | End: 2017-03-06 | Stop reason: HOSPADM

## 2017-03-05 RX ORDER — METHADONE HYDROCHLORIDE 10 MG/1
60 TABLET ORAL DAILY
Refills: 0 | Status: ON HOLD
Start: 2017-03-05 | End: 2017-10-19

## 2017-03-05 RX ADMIN — CLOPIDOGREL BISULFATE 75 MG: 75 TABLET ORAL at 08:03

## 2017-03-05 RX ADMIN — METHADONE HYDROCHLORIDE 60 MG: 10 TABLET ORAL at 08:03

## 2017-03-05 RX ADMIN — PANTOPRAZOLE SODIUM 40 MG: 40 TABLET, DELAYED RELEASE ORAL at 08:03

## 2017-03-05 RX ADMIN — CALCIUM ACETATE 2001 MG: 667 CAPSULE ORAL at 08:03

## 2017-03-05 RX ADMIN — SODIUM POLYSTYRENE SULFONATE 30 G: 15 SUSPENSION ORAL; RECTAL at 05:03

## 2017-03-05 RX ADMIN — CALCIUM ACETATE 2001 MG: 667 CAPSULE ORAL at 11:03

## 2017-03-05 RX ADMIN — CARVEDILOL 6.25 MG: 6.25 TABLET, FILM COATED ORAL at 08:03

## 2017-03-05 RX ADMIN — HEPARIN SODIUM 5000 UNITS: 5000 INJECTION, SOLUTION INTRAVENOUS; SUBCUTANEOUS at 06:03

## 2017-03-05 RX ADMIN — CIPROFLOXACIN HYDROCHLORIDE 500 MG: 500 TABLET, FILM COATED ORAL at 08:03

## 2017-03-05 RX ADMIN — ALBUTEROL SULFATE 2.5 MG: 2.5 SOLUTION RESPIRATORY (INHALATION) at 07:03

## 2017-03-05 RX ADMIN — ALBUTEROL SULFATE 2.5 MG: 2.5 SOLUTION RESPIRATORY (INHALATION) at 11:03

## 2017-03-05 RX ADMIN — ATORVASTATIN CALCIUM 40 MG: 40 TABLET, FILM COATED ORAL at 08:03

## 2017-03-05 RX ADMIN — HYDRALAZINE HYDROCHLORIDE 20 MG: 20 INJECTION INTRAMUSCULAR; INTRAVENOUS at 04:03

## 2017-03-05 RX ADMIN — PATIROMER 8.4 G: 8.4 POWDER, FOR SUSPENSION ORAL at 11:03

## 2017-03-05 RX ADMIN — CALCIUM ACETATE 2001 MG: 667 CAPSULE ORAL at 05:03

## 2017-03-05 RX ADMIN — ONDANSETRON 4 MG: 2 INJECTION INTRAMUSCULAR; INTRAVENOUS at 04:03

## 2017-03-05 RX ADMIN — AMLODIPINE BESYLATE 5 MG: 5 TABLET ORAL at 08:03

## 2017-03-05 RX ADMIN — HEPARIN SODIUM 5000 UNITS: 5000 INJECTION, SOLUTION INTRAVENOUS; SUBCUTANEOUS at 08:03

## 2017-03-05 RX ADMIN — MINOXIDIL 5 MG: 2.5 TABLET ORAL at 08:03

## 2017-03-05 RX ADMIN — HEPARIN SODIUM 5000 UNITS: 5000 INJECTION, SOLUTION INTRAVENOUS; SUBCUTANEOUS at 05:03

## 2017-03-05 RX ADMIN — ALBUTEROL SULFATE 2.5 MG: 2.5 SOLUTION RESPIRATORY (INHALATION) at 04:03

## 2017-03-05 RX ADMIN — ASPIRIN 325 MG ORAL TABLET 325 MG: 325 PILL ORAL at 08:03

## 2017-03-05 RX ADMIN — LISINOPRIL 40 MG: 40 TABLET ORAL at 08:03

## 2017-03-05 RX ADMIN — FAMOTIDINE 20 MG: 20 TABLET, FILM COATED ORAL at 08:03

## 2017-03-05 NOTE — PLAN OF CARE
03/04/17 1946   Patient Assessment/Suction   All Lung Fields Breath Sounds coarse;wheezes, expiratory;wheezes, inspiratory   PRE-TX-O2-ETCO2   O2 Device (Oxygen Therapy) room air   SpO2 100 %   Pulse Oximetry Type Intermittent   $ Pulse Oximetry - Multiple Charge Pulse Oximetry - Multiple   Pulse 68   Resp 18   Aerosol Therapy   $ Aerosol Therapy Charges Aerosol Treatment   Respiratory Treatment Status given   SVN/Inhaler Treatment Route mask   Patient Tolerance good   Post-Treatment   Post-treatment Heart Rate (beats/min) 59   Post-treatment Resp Rate (breaths/min) 20   All Fields Breath Sounds aeration increased

## 2017-03-05 NOTE — PLAN OF CARE
Problem: Patient Care Overview  Goal: Plan of Care Review  Outcome: Ongoing (interventions implemented as appropriate)  AAO  Continent x2  Noncompliant with dialysis  Pt blind in the right eye  Ambulates around room and to restroom  Denies pain discomfort at present time  Pt room near nurses station   Will continue to monitor

## 2017-03-05 NOTE — PROGRESS NOTES
03/05/17 0715   Patient Assessment/Suction   Level of Consciousness (AVPU) alert   Respiratory Effort Normal   All Lung Fields Breath Sounds clear   Cough Type none   PRE-TX-O2-ETCO2   O2 Device (Oxygen Therapy) room air   SpO2 96 %   Pulse Oximetry Type Intermittent   $ Pulse Oximetry - Multiple Charge Pulse Oximetry - Multiple   Pulse 72   Resp 16   Aerosol Therapy   $ Aerosol Therapy Charges Aerosol Treatment   Respiratory Treatment Status given   SVN/Inhaler Treatment Route mask;with oxygen   Position During Treatment HOB at 30 degrees   Patient Tolerance good   Post-Treatment   Post-treatment Heart Rate (beats/min) 68   Post-treatment Resp Rate (breaths/min) 16   All Fields Breath Sounds clear

## 2017-03-05 NOTE — PROGRESS NOTES
Progress Note  Hospital Medicine    Admit Date: 3/1/2017    SUBJECTIVE:     Follow-up For:  Pulmonary edema      Interval history (See H&P for complete P,F,SHx) : Patient BP continues to be elevated. Underwent HD today. Dextrose gtt weaned off. Patient back to symptomatic baseline.    Review of Systems: List if applicable  Review of Systems   Constitutional: Positive for malaise/fatigue. Negative for chills and fever.   Respiratory: Positive for shortness of breath. Negative for cough.    Cardiovascular: Negative for chest pain and leg swelling.   Genitourinary: Negative for dysuria.   Musculoskeletal: Negative for back pain.   Skin: Negative for rash.   Neurological: Negative for focal weakness.   Psychiatric/Behavioral: Negative for depression.         OBJECTIVE:     Vital Signs Range (Last 24H):  Temp:  [97.8 °F (36.6 °C)-98.6 °F (37 °C)]   Pulse:  [56-72]   Resp:  [16-18]   BP: (125-194)/(64-88)   SpO2:  [94 %-100 %]     I & O (Last 24H):  No intake or output data in the 24 hours ending 03/05/17 1712    Estimated body mass index is 28.34 kg/(m^2) as calculated from the following:    Height as of this encounter: 6' (1.829 m).    Weight as of this encounter: 94.8 kg (208 lb 15.9 oz).    Physical Exam   Constitutional: He is oriented to person, place, and time and well-developed, well-nourished, and in no distress.   HENT:   Nose: Nose normal.   Mouth/Throat: Oropharynx is clear and moist.   bitemporal wasting   Eyes: Conjunctivae are normal. Pupils are equal, round, and reactive to light.   Neck: Neck supple. No thyromegaly present.   Cardiovascular: Normal rate and regular rhythm.  Exam reveals no gallop and no friction rub.    No murmur heard.  Fistula intact with good thrill   Pulmonary/Chest: Effort normal. No respiratory distress. He has rales.   Bilateral rales/crackles   Abdominal: Soft. He exhibits no distension. There is no tenderness. There is no guarding.   Musculoskeletal: He exhibits no edema or  tenderness.   Neurological: He is alert and oriented to person, place, and time. He displays normal reflexes. No cranial nerve deficit. Gait normal.   Skin: No rash noted. He is not diaphoretic. No erythema.   Psychiatric: Affect and judgment normal.   Nursing note and vitals reviewed.    Laboratory/Diagnostic Data:  Reviewed and noted in plan where applicable- Please see chart for full lab data.    Medications:  Medication list was reviewed and changes noted under Assessment/Plan.    ASSESSMENT/PLAN:     Active Problems:    Active Hospital Problems    Diagnosis  POA    *Pulmonary edema [J81.1]-  D/t noncomplaince with HD. Doing better after fluid removed. BP remains elevated.  Yes    Benign hypertension with ESRD (end-stage renal disease) [I12.0, N18.6]- BP remains elevated. Will monitor and if remains high in AM, increase meds.  Yes    Hyperkalemia-  Given Valtessa by nephrology w/o significant effect. Stop ACEi, order Kayexelate and D/C if improved by AM.  Not Applicable    Methadone dependence [F11.20]-  Reduced dosage of methadone. Monitor.  Yes    Hypoglycemia [E16.2]-  Improved. Likely result of high methadone dosage. Monitor FSGs qAC/HS.   Yes    ESRD (T,Th,Sat) dialysis onset 2013 [N18.6]- Nephrology consulted. Continue Chronic hemodialysis. Monitor daily electrolytes and defer dialysis orders to nephrology.  Yes    Coronary artery disease involving native coronary artery of native heart without angina pectoris [I25.10]- Patient with known CAD s/p stent placement. Will continue Aspirin and Statin and monitor for S/Sx of angina/ACS. Continue to monitor on telemetry.     Yes    Anemia in chronic kidney disease [N18.9, D63.1]-   Current CBC reviewed-   Lab Results   Component Value Date    WBC 8.70 03/05/2017    HGB 9.3 (L) 03/05/2017    HCT 28.3 (L) 03/05/2017    MCV 94 03/05/2017     03/05/2017     Monitor serial CBC and transfuse if patient becomes hemodynamically unstable, symptomatic or H/H  drops below 7/21.   Yes    Cellulitis of right leg [L03.115]- Resolved  Yes      Resolved Hospital Problems    Diagnosis Date Resolved POA   No resolved problems to display.       VTE Risk Mitigation         Ordered     heparin (porcine) injection 5,000 Units  Every 8 hours     Route:  Subcutaneous        03/01/17 0410     Medium Risk of VTE  Once      03/01/17 0410

## 2017-03-05 NOTE — DISCHARGE INSTRUCTIONS
Thank you for choosing [facility] for your medical care. The primary doctor who is taking care of you at the time of your discharge is Adela Peng MD.     You were admitted to the hospital with Pulmonary edema.     Please note your discharge instructions, including diet/activity restrictions, follow-up appointments, and medication changes.  If you have any questions about your medical issues, prescriptions, or any other questions, please feel free to contact the [facility] Medicine Dept at [phone] and we will help.    If you are previously with Home health, outpatient PT/OT or under a therapy program, you are cleared to return to those programs.    Please direct all long term medication refills and follow up to your primary care provider, Delbert Redd NP. Thank you again for letting us take care of your health care needs.

## 2017-03-05 NOTE — PROGRESS NOTES
[facility] Progress Note    Subjective: esrd      Scheduled Meds:   albuterol sulfate  2.5 mg Nebulization Q4H    amlodipine  5 mg Oral BID    aspirin  325 mg Oral Daily    atorvastatin  40 mg Oral QHS    calcium acetate  2,001 mg Oral TID WM    carvedilol  6.25 mg Oral BID    ciprofloxacin HCl  500 mg Oral Daily    clopidogrel  75 mg Oral Daily    epoetin harshad (PROCRIT) injection  10,000 Units Intravenous Every Tues, Thurs, Sat    famotidine  20 mg Oral Daily    heparin (porcine)  5,000 Units Subcutaneous Q8H    lisinopril  40 mg Oral Daily    methadone  60 mg Oral Daily    minoxidil  5 mg Oral BID    pantoprazole  40 mg Oral Daily     Continuous Infusions:     PRN Meds:.sodium chloride 0.9%, dextrose 50%, dextrose 50%, glucagon (human recombinant), glucose, glucose, hydrALAZINE, ondansetron, promethazine (PHENERGAN) IVPB        Vitals:    03/04/17 2354 03/05/17 0400 03/05/17 0452 03/05/17 0500   BP:   (!) 194/88 (!) 147/72   BP Location:       Patient Position:       BP Method:       Pulse: 66 (!) 56     Resp: 18 18     Temp:  98.6 °F (37 °C)     TempSrc:       SpO2: 96% 97%     Weight:       Height:             I/O last 3 completed shifts:  In: 1820 [P.O.:120; I.V.:1200; Other:500]  Out: 4500 [Other:4500]      GEN: WDWN, NAD, afebrile    CVS: S1/S2 +, no rub,    PULM: CTAB, NO W/R/R    ABD: +BS, soft, NTND    EXT: NO C/C/E    Neuro: no asterixis  Dialysis access:  avf        Recent Labs  Lab 03/05/17  0505   CALCIUM 9.2      K 5.9*   CO2 27   CL 99   BUN 65*   CREATININE 6.4*         Recent Labs  Lab 03/05/17  0505   WBC 8.70   RBC 3.03*   HGB 9.3*   HCT 28.3*      MCV 94   MCH 30.8   MCHC 32.9           ASSESSMENT:  1. esrd  2. Anemia  3. Refractory hypoglycemia  4.  hyperkalemia  5.  Nausea, vomiting      PLAN:  1.  Dialysis TTS  2.  Antiemetics (withdrawal?)    3.  veltassa x one dose today  4.  Prn iv hydralazine for bp excesses

## 2017-03-06 VITALS
BODY MASS INDEX: 27.11 KG/M2 | OXYGEN SATURATION: 98 % | WEIGHT: 200.19 LBS | DIASTOLIC BLOOD PRESSURE: 86 MMHG | HEART RATE: 99 BPM | SYSTOLIC BLOOD PRESSURE: 193 MMHG | HEIGHT: 72 IN | TEMPERATURE: 99 F | RESPIRATION RATE: 18 BRPM

## 2017-03-06 PROBLEM — J81.0 ACUTE PULMONARY EDEMA: Status: ACTIVE | Noted: 2017-03-01

## 2017-03-06 LAB
ANION GAP SERPL CALC-SCNC: 13 MMOL/L
BASOPHILS # BLD AUTO: 0 K/UL
BASOPHILS NFR BLD: 0.4 %
BUN SERPL-MCNC: 97 MG/DL
C PEPTIDE SERPL-MCNC: 15.2 NG/ML
CALCIUM SERPL-MCNC: 9.2 MG/DL
CHLORIDE SERPL-SCNC: 98 MMOL/L
CO2 SERPL-SCNC: 28 MMOL/L
CREAT SERPL-MCNC: 8.4 MG/DL
DIFFERENTIAL METHOD: ABNORMAL
EOSINOPHIL # BLD AUTO: 0.3 K/UL
EOSINOPHIL NFR BLD: 3.7 %
ERYTHROCYTE [DISTWIDTH] IN BLOOD BY AUTOMATED COUNT: 15.5 %
EST. GFR  (AFRICAN AMERICAN): 8 ML/MIN/1.73 M^2
EST. GFR  (NON AFRICAN AMERICAN): 7 ML/MIN/1.73 M^2
GLUCOSE SERPL-MCNC: 80 MG/DL
HCT VFR BLD AUTO: 25.6 %
HGB BLD-MCNC: 8.4 G/DL
LYMPHOCYTES # BLD AUTO: 2.2 K/UL
LYMPHOCYTES NFR BLD: 29.8 %
MAGNESIUM SERPL-MCNC: 2.5 MG/DL
MCH RBC QN AUTO: 30.7 PG
MCHC RBC AUTO-ENTMCNC: 32.9 %
MCV RBC AUTO: 93 FL
MONOCYTES # BLD AUTO: 1.2 K/UL
MONOCYTES NFR BLD: 16.7 %
NEUTROPHILS # BLD AUTO: 3.7 K/UL
NEUTROPHILS NFR BLD: 49.4 %
PHOSPHATE SERPL-MCNC: 4.5 MG/DL
PLATELET # BLD AUTO: 227 K/UL
PMV BLD AUTO: 6.7 FL
POCT GLUCOSE: 119 MG/DL (ref 70–110)
POCT GLUCOSE: 81 MG/DL (ref 70–110)
POTASSIUM SERPL-SCNC: 5.4 MMOL/L
RBC # BLD AUTO: 2.75 M/UL
SODIUM SERPL-SCNC: 139 MMOL/L
WBC # BLD AUTO: 7.4 K/UL

## 2017-03-06 PROCEDURE — 80048 BASIC METABOLIC PNL TOTAL CA: CPT

## 2017-03-06 PROCEDURE — 94640 AIRWAY INHALATION TREATMENT: CPT

## 2017-03-06 PROCEDURE — 99239 HOSP IP/OBS DSCHRG MGMT >30: CPT | Mod: ,,, | Performed by: INTERNAL MEDICINE

## 2017-03-06 PROCEDURE — 63600175 PHARM REV CODE 636 W HCPCS: Performed by: INTERNAL MEDICINE

## 2017-03-06 PROCEDURE — 85025 COMPLETE CBC W/AUTO DIFF WBC: CPT

## 2017-03-06 PROCEDURE — 63600175 PHARM REV CODE 636 W HCPCS: Performed by: NURSE PRACTITIONER

## 2017-03-06 PROCEDURE — 25000242 PHARM REV CODE 250 ALT 637 W/ HCPCS: Performed by: INTERNAL MEDICINE

## 2017-03-06 PROCEDURE — 84100 ASSAY OF PHOSPHORUS: CPT

## 2017-03-06 PROCEDURE — 83735 ASSAY OF MAGNESIUM: CPT

## 2017-03-06 PROCEDURE — 25000003 PHARM REV CODE 250: Performed by: INTERNAL MEDICINE

## 2017-03-06 PROCEDURE — 94761 N-INVAS EAR/PLS OXIMETRY MLT: CPT

## 2017-03-06 PROCEDURE — 25000003 PHARM REV CODE 250: Performed by: NURSE PRACTITIONER

## 2017-03-06 PROCEDURE — 36415 COLL VENOUS BLD VENIPUNCTURE: CPT

## 2017-03-06 RX ORDER — MINOXIDIL 2.5 MG/1
7.5 TABLET ORAL 2 TIMES DAILY
Qty: 90 TABLET | Refills: 0 | Status: SHIPPED | OUTPATIENT
Start: 2017-03-06 | End: 2018-08-26

## 2017-03-06 RX ORDER — MINOXIDIL 2.5 MG/1
10 TABLET ORAL 2 TIMES DAILY
Status: DISCONTINUED | OUTPATIENT
Start: 2017-03-06 | End: 2017-03-06 | Stop reason: HOSPADM

## 2017-03-06 RX ADMIN — ALBUTEROL SULFATE 2.5 MG: 2.5 SOLUTION RESPIRATORY (INHALATION) at 07:03

## 2017-03-06 RX ADMIN — ASPIRIN 325 MG ORAL TABLET 325 MG: 325 PILL ORAL at 09:03

## 2017-03-06 RX ADMIN — CALCIUM ACETATE 2001 MG: 667 CAPSULE ORAL at 09:03

## 2017-03-06 RX ADMIN — PANTOPRAZOLE SODIUM 40 MG: 40 TABLET, DELAYED RELEASE ORAL at 09:03

## 2017-03-06 RX ADMIN — AMLODIPINE BESYLATE 5 MG: 5 TABLET ORAL at 09:03

## 2017-03-06 RX ADMIN — CIPROFLOXACIN HYDROCHLORIDE 500 MG: 500 TABLET, FILM COATED ORAL at 09:03

## 2017-03-06 RX ADMIN — ONDANSETRON 4 MG: 2 INJECTION INTRAMUSCULAR; INTRAVENOUS at 12:03

## 2017-03-06 RX ADMIN — MINOXIDIL 5 MG: 2.5 TABLET ORAL at 09:03

## 2017-03-06 RX ADMIN — CLOPIDOGREL BISULFATE 75 MG: 75 TABLET ORAL at 09:03

## 2017-03-06 RX ADMIN — HYDRALAZINE HYDROCHLORIDE 20 MG: 20 INJECTION INTRAMUSCULAR; INTRAVENOUS at 12:03

## 2017-03-06 RX ADMIN — FAMOTIDINE 20 MG: 20 TABLET, FILM COATED ORAL at 09:03

## 2017-03-06 RX ADMIN — CARVEDILOL 6.25 MG: 6.25 TABLET, FILM COATED ORAL at 09:03

## 2017-03-06 RX ADMIN — HEPARIN SODIUM 5000 UNITS: 5000 INJECTION, SOLUTION INTRAVENOUS; SUBCUTANEOUS at 05:03

## 2017-03-06 RX ADMIN — CALCIUM ACETATE 2001 MG: 667 CAPSULE ORAL at 12:03

## 2017-03-06 RX ADMIN — METHADONE HYDROCHLORIDE 60 MG: 10 TABLET ORAL at 09:03

## 2017-03-06 NOTE — NURSING
Pt's medication and discharge instructions given and reviewed, understanding verbalized.  PIV and telemetry monitor removed. Discharged home with family.

## 2017-03-06 NOTE — PLAN OF CARE
03/05/17 2350   Patient Assessment/Suction   All Lung Fields Breath Sounds clear   PRE-TX-O2-ETCO2   O2 Device (Oxygen Therapy) room air   SpO2 (!) 92 %   Pulse Oximetry Type Intermittent   $ Pulse Oximetry - Multiple Charge Pulse Oximetry - Multiple   Pulse 61   Resp 18   Temp 98.2 °F (36.8 °C)   BP (!) 143/67   Aerosol Therapy   $ Aerosol Therapy Charges Aerosol Treatment   Respiratory Treatment Status given   SVN/Inhaler Treatment Route mask   Patient Tolerance good   Post-Treatment   Post-treatment Heart Rate (beats/min) 66   Post-treatment Resp Rate (breaths/min) 18   All Fields Breath Sounds clear

## 2017-03-06 NOTE — PROGRESS NOTES
Scheduled pt hospital FU with Delbert Redd NP on 3/13/17 @1:45pm; put on AVS for pt along with address and phone number....BRAVO Basurto CM

## 2017-03-06 NOTE — DISCHARGE SUMMARY
"Discharge Summary  Hospital Medicine    Admit Date: 3/1/2017    Date and Time: 3/6/286610:23 AM    Discharge Attending Physician: Adela Peng MD    Primary Care Physician: Delbert Redd NP    Diagnoses:  Active Hospital Problems    Diagnosis  POA    *Hyperkalemia, diminished renal excretion [E87.5]  Yes    Benign hypertension with ESRD (end-stage renal disease) [I12.0, N18.6]  Yes    Noncompliance with renal dialysis [Z91.15]  Not Applicable    Methadone dependence [F11.20]  Yes    Cirrhosis of liver with ascites [K74.60]  Yes    Hypoglycemia [E16.2]  Yes    ESRD (T,Th,Sat) dialysis onset 2013 [N18.6]  Yes    Coronary artery disease involving native coronary artery of native heart without angina pectoris [I25.10]  Yes    Anemia in chronic kidney disease [N18.9, D63.1]  Yes      Resolved Hospital Problems    Diagnosis Date Resolved POA    Pulmonary edema [J81.1] 03/05/2017 Yes    Cellulitis of right leg [L03.115] 03/05/2017 Yes     Discharged Condition: Good    Hospital Course:   João Aguirre is a 52 y.o. Male with PMHx significant for ESRD, HTN, HepC, and CVA. He was admitted to the service of hospital medicine with pulmonary edema resulting from missed dialysis treatment. He was brought to ED via EMS with complaint of fatigue and weakness, as well as chills. He stated the symptoms began gradually Saturday after being discharged from the hospital Friday. He acknowledges missing dialysis Tuesday, however did not come to the hospital at that time because he began to feel better. He did not take his evening medications on Tuesday. His symptoms worsened during the night and he broke out in a "cold sweat". His symptoms were associated with SOB worsened with exertion and accompanied by a congested cough. He denies any fever or chest pain. He described his cellulitis as improved since discharge from the hospital. He was found to be hypoglycemic in ED and responded to glucose administration. His CXR " revealed bilateral pulmonary infiltrates. Patient was admitted to Hospitalist medicine service. Patient was evaluated by Dr. Carranza and underwent emergent HD. During hospital course patient was noted to be significantly hypoglycemic. Required use of IV Dextrose and close monitoring in ICU. Insulinoma work up is still pending. It was thought high-dose Methadone was contributing to hypoglycemia. Dose reduced. Patient tolerated well. Blood glucose improved. Patient is counseled on medications and HD compliance and compliance with low potassium diet. Patient required medical treatment for elevated potassium. Symptoms improved. Patient was discharged home in stable condition with following discharge plan of care. Total time with the patient was 30 minutes and greater than 50% was spent in counseling and coordination of care. The assessment and plan have been discussed at length. Physicians' notes reviewed. Labs and procedure reviewed.     Consults: Dr. Carranza     Significant Diagnostic Studies:   CXR: Cardiomegaly and moderate to severe bilateral pulmonary edema pattern consistent with pulmonary edema/CHF.  Small right pleural effusion also noted.    Microbiology Results (last 7 days)     ** No results found for the last 168 hours. **        Special Treatments/Procedures: None  Disposition: Home or Self Care    Medications:  Reconciled Home Medications: Current Discharge Medication List      CONTINUE these medications which have CHANGED    Details   methadone (DOLOPHINE) 10 MG tablet Take 6 tablets (60 mg total) by mouth once daily.  Refills: 0      minoxidil (LONITEN) 2.5 MG tablet Take 3 tablets (7.5 mg total) by mouth 2 (two) times daily.  Qty: 90 tablet, Refills: 0         CONTINUE these medications which have NOT CHANGED    Details   albuterol (PROAIR HFA) 90 mcg/actuation inhaler INHALE TWO PUFFS EVERY 4 TO 6 HOURS AS NEEDED      amlodipine (NORVASC) 5 MG tablet Take 1 tablet (5 mg total) by mouth 2 (two) times  daily.  Qty: 30 tablet, Refills: 0      aspirin 325 MG tablet Take 1 tablet (325 mg total) by mouth once daily.  Qty: 30 tablet, Refills: 1      atorvastatin (LIPITOR) 40 MG tablet Take 1 tablet (40 mg total) by mouth every evening.  Qty: 30 tablet, Refills: 1      blood-glucose meter (PHARMACIST CHOICE GLUCOSE SYS) Misc 1 Device by Misc.(Non-Drug; Combo Route) route once.  Qty: 1 each, Refills: 0      calcium acetate (PHOSLO) 667 mg capsule Take 2,001 mg by mouth 3 (three) times daily with meals.      carvedilol (COREG) 12.5 MG tablet Take 1 tablet (12.5 mg total) by mouth 2 (two) times daily.  Qty: 60 tablet, Refills: 0      ciprofloxacin HCl (CIPRO) 500 MG tablet Take 1 tablet (500 mg total) by mouth once daily. Take at noon time  Qty: 10 tablet, Refills: 0      clopidogrel (PLAVIX) 75 mg tablet Take 1 tablet (75 mg total) by mouth once daily.  Qty: 30 tablet, Refills: 1      famotidine (PEPCID) 20 MG tablet Take 1 tablet (20 mg total) by mouth once daily.  Qty: 30 tablet, Refills: 1      hydrALAZINE (APRESOLINE) 25 MG tablet Take 25 mg by mouth 3 (three) times daily.      lisinopril (PRINIVIL,ZESTRIL) 40 MG tablet Take 1 tablet (40 mg total) by mouth once daily.  Qty: 30 tablet, Refills: 1      ondansetron (ZOFRAN) 4 MG tablet Take 1 tablet (4 mg total) by mouth every 8 (eight) hours as needed for Nausea.  Qty: 12 tablet, Refills: 0      sevelamer carbonate (RENVELA) 800 mg Tab Take 1,600 mg by mouth.             Discharge Procedure Orders  Diet renal     Diet general   Order Comments: Cardiac/ 2 gram sodium low cholesterol diet.  Low potassium diet     Diet renal     Activity as tolerated     Call MD for:  temperature >100.4     Call MD for:  severe uncontrolled pain     Call MD for:  difficulty breathing or increased cough     Other restrictions (specify):   Order Comments: Fall precautions     Call MD for:   Order Comments: For worsening symptoms, chest pain, shortness of breath, increased abdominal pain,  high grade fever, stroke or stroke like symptoms, immediately go to the nearest Emergency Room or call 911 as soon as possible.       Follow-up Information     Follow up with Delbert Redd NP In 2 weeks.    Specialty:  Family Medicine    Contact information:    [address] [facility] [address]  [place] [address] [phone]          Follow up with Tej Carranza MD In 2 weeks.    Specialty:  Nephrology    Contact information:    [address] [facility] [place]  [place] [address]  [phone]          Please follow up.    Contact information:    Continue routine HD as before. next HD in AM.        Please follow up.    Contact information:    Follow up Insulinoma lab results in 1 week. for low blood sugar evaluation.        Follow up with Bryant Ding DPM In 1 week.    Specialties:  Podiatry, Wound Care    Contact information:    [address] [place] [address]  [phone]

## 2017-03-06 NOTE — PLAN OF CARE
03/06/17 0725   Patient Assessment/Suction   Respiratory Effort Unlabored   Expansion/Accessory Muscles/Retractions no use of accessory muscles   All Lung Fields Breath Sounds clear   Cough Frequency infrequent   Cough Type none   PRE-TX-O2-ETCO2   O2 Device (Oxygen Therapy) room air   SpO2 99 %   Pulse Oximetry Type Intermittent   $ Pulse Oximetry - Multiple Charge Pulse Oximetry - Multiple   Pulse 60   Resp 18   Aerosol Therapy   $ Aerosol Therapy Charges Aerosol Treatment   Respiratory Treatment Status given   SVN/Inhaler Treatment Route mask   Position During Treatment HOB at 45 degrees   Patient Tolerance good   Post-Treatment   Post-treatment Heart Rate (beats/min) 64   Post-treatment Resp Rate (breaths/min) 18   All Fields Breath Sounds aeration increased       Aerosol treatments q 8 hours. Patient tolerated well.

## 2017-03-06 NOTE — PROGRESS NOTES
[facility] Progress Note    Subjective: esrd        Scheduled Meds:   albuterol sulfate  2.5 mg Nebulization Q8H    amlodipine  5 mg Oral BID    aspirin  325 mg Oral Daily    atorvastatin  40 mg Oral QHS    calcium acetate  2,001 mg Oral TID WM    carvedilol  6.25 mg Oral BID    ciprofloxacin HCl  500 mg Oral Daily    clopidogrel  75 mg Oral Daily    epoetin harshad (PROCRIT) injection  10,000 Units Intravenous Every Tues, Thurs, Sat    famotidine  20 mg Oral Daily    heparin (porcine)  5,000 Units Subcutaneous Q8H    methadone  60 mg Oral Daily    minoxidil  10 mg Oral BID    pantoprazole  40 mg Oral Daily     Continuous Infusions:     PRN Meds:.sodium chloride 0.9%, dextrose 50%, dextrose 50%, glucagon (human recombinant), glucose, glucose, hydrALAZINE, ondansetron, promethazine (PHENERGAN) IVPB        Vitals:    03/06/17 0400 03/06/17 0600 03/06/17 0725 03/06/17 0800   BP: (!) 180/83   (!) 167/77   BP Location: Right arm   Right arm   Patient Position: Lying   Lying   BP Method: Automatic   Automatic   Pulse: 71  60 60   Resp: 18  18 18   Temp: 97.9 °F (36.6 °C)   98.7 °F (37.1 °C)   TempSrc: Oral   Oral   SpO2:   99% 97%   Weight:  90.8 kg (200 lb 2.8 oz)     Height:                    GEN: WDWN, NAD, afebrile    CVS: S1/S2 +, no rub,    PULM: CTAB, NO W/R/R    ABD: +BS, soft, NTND    EXT: NO C/C/E    Neuro: no asterixis  Dialysis access:  avf        Recent Labs  Lab 03/06/17  0422   CALCIUM 9.2      K 5.4*   CO2 28   CL 98   BUN 97*   CREATININE 8.4*         Recent Labs  Lab 03/06/17  0422   WBC 7.40   RBC 2.75*   HGB 8.4*   HCT 25.6*      MCV 93   MCH 30.7   MCHC 32.9           ASSESSMENT:  1. esrd  2. Anemia  3. Refractory hypoglycemia--better on lower dose methadone  4.  Mild hyperkalemia  5.  Nausea, vomiting--resolved    PLAN:  1.  Dialysis t,th,sat  2.  Ok to d/c home today, increase minoxidil to 7.5mg po bid at d/c    3.  Low k diet  4.  Prn iv hydralazine for bp  excesses

## 2017-03-06 NOTE — PLAN OF CARE
Problem: Patient Care Overview  Goal: Plan of Care Review  Outcome: Ongoing (interventions implemented as appropriate)  AAO  Continent to bowel and bladder  Denies nausea and vomiting   BP and glucose monitored closely  Son present in Pt room   V/S stable  Pt safe   Will continue to monitor

## 2017-03-07 NOTE — PLAN OF CARE
03/07/17 0827   Final Note   Assessment Type Discharge Planning Assessment   Discharge Disposition Home   Discharge planning education complete? Yes

## 2017-03-31 ENCOUNTER — TELEPHONE (OUTPATIENT)
Dept: HEPATOLOGY | Facility: CLINIC | Age: 52
End: 2017-03-31

## 2017-03-31 NOTE — TELEPHONE ENCOUNTER
Attempted to speak with pt to reschedule consult. Left message with call back number.  Pt has no showed twice. Letter will be mailed.

## 2017-04-01 ENCOUNTER — HOSPITAL ENCOUNTER (EMERGENCY)
Facility: HOSPITAL | Age: 52
Discharge: OTHER INSTITUTION | End: 2017-04-01
Attending: EMERGENCY MEDICINE
Payer: MEDICARE

## 2017-04-01 VITALS
HEART RATE: 73 BPM | RESPIRATION RATE: 18 BRPM | WEIGHT: 200 LBS | SYSTOLIC BLOOD PRESSURE: 121 MMHG | DIASTOLIC BLOOD PRESSURE: 62 MMHG | HEIGHT: 72 IN | TEMPERATURE: 99 F | BODY MASS INDEX: 27.09 KG/M2 | OXYGEN SATURATION: 99 %

## 2017-04-01 DIAGNOSIS — R55 SYNCOPE, UNSPECIFIED SYNCOPE TYPE: ICD-10-CM

## 2017-04-01 DIAGNOSIS — E16.2 HYPOGLYCEMIA: Primary | ICD-10-CM

## 2017-04-01 DIAGNOSIS — I10 HYPERTENSION, UNCONTROLLED: ICD-10-CM

## 2017-04-01 PROBLEM — R56.1 SEIZURE AFTER HEAD INJURY: Status: ACTIVE | Noted: 2017-04-01

## 2017-04-01 PROBLEM — R56.9 SEIZURE: Status: ACTIVE | Noted: 2017-04-01

## 2017-04-01 LAB
ALBUMIN SERPL BCP-MCNC: 3.6 G/DL
ALP SERPL-CCNC: 87 U/L
ALT SERPL W/O P-5'-P-CCNC: 11 U/L
ANION GAP SERPL CALC-SCNC: 13 MMOL/L
AST SERPL-CCNC: 21 U/L
BASOPHILS # BLD AUTO: 0.1 K/UL
BASOPHILS NFR BLD: 0.8 %
BILIRUB SERPL-MCNC: 0.6 MG/DL
BUN SERPL-MCNC: 12 MG/DL
CALCIUM SERPL-MCNC: 8 MG/DL
CHLORIDE SERPL-SCNC: 102 MMOL/L
CO2 SERPL-SCNC: 23 MMOL/L
CREAT SERPL-MCNC: 3.4 MG/DL
DIFFERENTIAL METHOD: ABNORMAL
EOSINOPHIL # BLD AUTO: 0.1 K/UL
EOSINOPHIL NFR BLD: 1 %
ERYTHROCYTE [DISTWIDTH] IN BLOOD BY AUTOMATED COUNT: 15.4 %
EST. GFR  (AFRICAN AMERICAN): 23 ML/MIN/1.73 M^2
EST. GFR  (NON AFRICAN AMERICAN): 20 ML/MIN/1.73 M^2
GLUCOSE SERPL-MCNC: 96 MG/DL
HCT VFR BLD AUTO: 32.3 %
HGB BLD-MCNC: 10.1 G/DL
LYMPHOCYTES # BLD AUTO: 1.2 K/UL
LYMPHOCYTES NFR BLD: 14.7 %
MCH RBC QN AUTO: 30.1 PG
MCHC RBC AUTO-ENTMCNC: 31.4 %
MCV RBC AUTO: 96 FL
MONOCYTES # BLD AUTO: 0.5 K/UL
MONOCYTES NFR BLD: 6.2 %
NEUTROPHILS # BLD AUTO: 6.2 K/UL
NEUTROPHILS NFR BLD: 77.3 %
PLATELET # BLD AUTO: 166 K/UL
PMV BLD AUTO: 6.9 FL
POCT GLUCOSE: 100 MG/DL (ref 70–110)
POCT GLUCOSE: 118 MG/DL (ref 70–110)
POTASSIUM SERPL-SCNC: 3.6 MMOL/L
PROT SERPL-MCNC: 8.5 G/DL
RBC # BLD AUTO: 3.38 M/UL
SODIUM SERPL-SCNC: 138 MMOL/L
TROPONIN I SERPL DL<=0.01 NG/ML-MCNC: 0.11 NG/ML
WBC # BLD AUTO: 8 K/UL

## 2017-04-01 PROCEDURE — 85025 COMPLETE CBC W/AUTO DIFF WBC: CPT

## 2017-04-01 PROCEDURE — 93005 ELECTROCARDIOGRAM TRACING: CPT

## 2017-04-01 PROCEDURE — 99285 EMERGENCY DEPT VISIT HI MDM: CPT | Mod: ,,, | Performed by: EMERGENCY MEDICINE

## 2017-04-01 PROCEDURE — 25000003 PHARM REV CODE 250: Performed by: HOSPITALIST

## 2017-04-01 PROCEDURE — 99285 EMERGENCY DEPT VISIT HI MDM: CPT | Mod: 27

## 2017-04-01 PROCEDURE — 63600175 PHARM REV CODE 636 W HCPCS: Performed by: EMERGENCY MEDICINE

## 2017-04-01 PROCEDURE — 25000003 PHARM REV CODE 250: Performed by: EMERGENCY MEDICINE

## 2017-04-01 PROCEDURE — 96375 TX/PRO/DX INJ NEW DRUG ADDON: CPT

## 2017-04-01 PROCEDURE — 96365 THER/PROPH/DIAG IV INF INIT: CPT

## 2017-04-01 PROCEDURE — 82962 GLUCOSE BLOOD TEST: CPT

## 2017-04-01 PROCEDURE — 36415 COLL VENOUS BLD VENIPUNCTURE: CPT

## 2017-04-01 PROCEDURE — 99285 EMERGENCY DEPT VISIT HI MDM: CPT | Mod: 25

## 2017-04-01 PROCEDURE — 84484 ASSAY OF TROPONIN QUANT: CPT

## 2017-04-01 PROCEDURE — 80053 COMPREHEN METABOLIC PANEL: CPT

## 2017-04-01 PROCEDURE — 96376 TX/PRO/DX INJ SAME DRUG ADON: CPT

## 2017-04-01 PROCEDURE — 63600175 PHARM REV CODE 636 W HCPCS: Performed by: HOSPITALIST

## 2017-04-01 PROCEDURE — 96367 TX/PROPH/DG ADDL SEQ IV INF: CPT

## 2017-04-01 RX ORDER — AMOXICILLIN 250 MG
1 CAPSULE ORAL DAILY PRN
Status: CANCELLED | OUTPATIENT
Start: 2017-04-01

## 2017-04-01 RX ORDER — LORAZEPAM 2 MG/ML
1 INJECTION INTRAMUSCULAR
Status: COMPLETED | OUTPATIENT
Start: 2017-04-01 | End: 2017-04-01

## 2017-04-01 RX ORDER — HYDRALAZINE HYDROCHLORIDE 25 MG/1
25 TABLET, FILM COATED ORAL 3 TIMES DAILY
Status: CANCELLED | OUTPATIENT
Start: 2017-04-01

## 2017-04-01 RX ORDER — IBUPROFEN 200 MG
24 TABLET ORAL
Status: CANCELLED | OUTPATIENT
Start: 2017-04-01

## 2017-04-01 RX ORDER — ONDANSETRON 2 MG/ML
8 INJECTION INTRAMUSCULAR; INTRAVENOUS
Status: COMPLETED | OUTPATIENT
Start: 2017-04-01 | End: 2017-04-01

## 2017-04-01 RX ORDER — CLOPIDOGREL BISULFATE 75 MG/1
75 TABLET ORAL DAILY
Status: CANCELLED | OUTPATIENT
Start: 2017-04-02

## 2017-04-01 RX ORDER — CARVEDILOL 6.25 MG/1
12.5 TABLET ORAL 2 TIMES DAILY
Status: CANCELLED | OUTPATIENT
Start: 2017-04-01

## 2017-04-01 RX ORDER — HYDRALAZINE HYDROCHLORIDE 20 MG/ML
10 INJECTION INTRAMUSCULAR; INTRAVENOUS
Status: COMPLETED | OUTPATIENT
Start: 2017-04-01 | End: 2017-04-01

## 2017-04-01 RX ORDER — HYDROMORPHONE HYDROCHLORIDE 1 MG/ML
0.5 INJECTION, SOLUTION INTRAMUSCULAR; INTRAVENOUS; SUBCUTANEOUS
Status: COMPLETED | OUTPATIENT
Start: 2017-04-01 | End: 2017-04-01

## 2017-04-01 RX ORDER — IBUPROFEN 200 MG
16 TABLET ORAL
Status: CANCELLED | OUTPATIENT
Start: 2017-04-01

## 2017-04-01 RX ORDER — MINOXIDIL 2.5 MG/1
7.5 TABLET ORAL 2 TIMES DAILY
Status: CANCELLED | OUTPATIENT
Start: 2017-04-01

## 2017-04-01 RX ORDER — ACETAMINOPHEN 325 MG/1
TABLET ORAL
Status: DISCONTINUED
Start: 2017-04-01 | End: 2017-04-01 | Stop reason: HOSPADM

## 2017-04-01 RX ORDER — ACETAMINOPHEN 325 MG/1
650 TABLET ORAL EVERY 6 HOURS PRN
Status: DISCONTINUED | OUTPATIENT
Start: 2017-04-01 | End: 2017-04-01 | Stop reason: HOSPADM

## 2017-04-01 RX ORDER — HYDRALAZINE HYDROCHLORIDE 25 MG/1
25 TABLET, FILM COATED ORAL
Status: COMPLETED | OUTPATIENT
Start: 2017-04-01 | End: 2017-04-01

## 2017-04-01 RX ORDER — INSULIN ASPART 100 [IU]/ML
0-5 INJECTION, SOLUTION INTRAVENOUS; SUBCUTANEOUS
Status: CANCELLED | OUTPATIENT
Start: 2017-04-01

## 2017-04-01 RX ORDER — GLUCAGON 1 MG
1 KIT INJECTION
Status: CANCELLED | OUTPATIENT
Start: 2017-04-01

## 2017-04-01 RX ORDER — AMLODIPINE BESYLATE 5 MG/1
5 TABLET ORAL 2 TIMES DAILY
Status: CANCELLED | OUTPATIENT
Start: 2017-04-01

## 2017-04-01 RX ORDER — LORAZEPAM 2 MG/ML
INJECTION INTRAMUSCULAR
Status: COMPLETED
Start: 2017-04-01 | End: 2017-04-01

## 2017-04-01 RX ORDER — LISINOPRIL 10 MG/1
40 TABLET ORAL DAILY
Status: CANCELLED | OUTPATIENT
Start: 2017-04-02

## 2017-04-01 RX ORDER — ONDANSETRON 2 MG/ML
4 INJECTION INTRAMUSCULAR; INTRAVENOUS EVERY 8 HOURS PRN
Status: CANCELLED | OUTPATIENT
Start: 2017-04-01

## 2017-04-01 RX ADMIN — DEXTROSE MONOHYDRATE 2000 MG: 5 INJECTION, SOLUTION INTRAVENOUS at 08:04

## 2017-04-01 RX ADMIN — HYDRALAZINE HYDROCHLORIDE 10 MG: 20 INJECTION INTRAMUSCULAR; INTRAVENOUS at 06:04

## 2017-04-01 RX ADMIN — PROMETHAZINE HYDROCHLORIDE 12.5 MG: 25 INJECTION INTRAMUSCULAR; INTRAVENOUS at 06:04

## 2017-04-01 RX ADMIN — HYDRALAZINE HYDROCHLORIDE 25 MG: 25 TABLET ORAL at 05:04

## 2017-04-01 RX ADMIN — ONDANSETRON 8 MG: 2 INJECTION INTRAMUSCULAR; INTRAVENOUS at 05:04

## 2017-04-01 RX ADMIN — LORAZEPAM 1 MG: 2 INJECTION, SOLUTION INTRAMUSCULAR; INTRAVENOUS at 07:04

## 2017-04-01 RX ADMIN — HYDROMORPHONE HYDROCHLORIDE 0.5 MG: 1 INJECTION, SOLUTION INTRAMUSCULAR; INTRAVENOUS; SUBCUTANEOUS at 07:04

## 2017-04-01 RX ADMIN — ACETAMINOPHEN 650 MG: 325 TABLET, FILM COATED ORAL at 07:04

## 2017-04-01 NOTE — ED PROVIDER NOTES
"Encounter Date: 4/1/2017    SCRIBE #1 NOTE: I, Marly Hoffman , am scribing for, and in the presence of,  Dr. Narayanan . I have scribed the entire note.       History     Chief Complaint   Patient presents with    Hypoglycemia    Hypertension    Vomiting     Review of patient's allergies indicates:   Allergen Reactions    Antibiotic hc      Hx of in 2013       HPI Comments:     04/01/2017  4:48 PM     Chief Complaint: Syncopal episode/ sz activity       The patient is a 52 y.o. Male with a PMHx of long tern anticoagulant use, DM type I, hemodialysis pt. HTN, renal disorder, and stroke who is presenting per EMS with the acute onset of a syncopal episode that occurred x 1 hour PTA. Per pt's son, the pt "suddenly passed out" while walking, hitting his head on a nearby door, and then had "approximately 30 seconds of sz like activity". Per EMS, the pt was hypoglycemic and hypertensive at arrival with a GCS of 14. The pt now endorses a mild HA and generalized weakness. He states that he "didn't get to take his Hydralazine". Pt's son denies hx of alcohol abuse. Pertinent past surgical hx includes back surgery, AV fistula placement. The pt has been seen multiple times for in this ED for non compliance related issues.           The history is provided by the patient, medical records, the EMS personnel and a relative. The history is limited by the condition of the patient (Patient does not recall the event.  All history by family and EMS.  No family is here all history was obtained by phone.).     Past Medical History:   Diagnosis Date    Anticoagulant long-term use     Arthritis     Asthma     Back pain     Diabetes mellitus     Encounter for blood transfusion     Eye abnormality right eye    injured as a child    Gastritis     Hemodialysis patient     Hypertension     Pneumonia 2013    Spend 6weeks in hospital at [place]    Renal disorder     Stroke      Past Surgical History:   Procedure Laterality Date    " AV FISTULA PLACEMENT      BACK SURGERY      CHOLECYSTECTOMY      EYE SURGERY      R eye     Family History   Problem Relation Age of Onset    Kidney disease Brother      Social History   Substance Use Topics    Smoking status: Former Smoker     Years: 10.00     Quit date: 9/1/2015    Smokeless tobacco: Former User     Quit date: 2/16/2016    Alcohol use No     Review of Systems   Constitutional: Negative for chills.   Respiratory: Negative for shortness of breath.    Cardiovascular: Negative for chest pain.   Genitourinary: Negative for flank pain.   Neurological: Positive for seizures, syncope, weakness (weakness) and headaches.   All other systems reviewed and are negative.      Physical Exam   Initial Vitals   BP Pulse Resp Temp SpO2   -- 04/01/17 1641 04/01/17 1641 04/01/17 1641 --    75 20 97.8 °F (36.6 °C)      Physical Exam    Nursing note and vitals reviewed.  Constitutional: He appears well-developed and well-nourished. He is not diaphoretic. No distress.   HENT:   Head: Normocephalic and atraumatic.   Mouth/Throat: Oropharynx is clear and moist.   Eyes:   Right-sided ocular opacity     Neck: Normal range of motion and full passive range of motion without pain. Neck supple. No spinous process tenderness and no muscular tenderness present. Normal range of motion present. No rigidity.   Cardiovascular: Normal rate, regular rhythm, normal heart sounds and intact distal pulses. Exam reveals no gallop and no friction rub.    No murmur heard.  Pulmonary/Chest: He has wheezes. He has rhonchi.   Scattered wheezing and rhonchi    Abdominal: Soft. He exhibits no distension. There is no tenderness.   Musculoskeletal: Normal range of motion. He exhibits tenderness.   Neurological: He is alert.   Skin: Skin is warm and dry.   Fistula noted to the LUE with good thrill    Psychiatric: He has a normal mood and affect.         ED Course   Procedures  Labs Reviewed   CBC W/ AUTO DIFFERENTIAL - Abnormal; Notable for  the following:        Result Value    RBC 3.38 (*)     Hemoglobin 10.1 (*)     Hematocrit 32.3 (*)     MCHC 31.4 (*)     RDW 15.4 (*)     MPV 6.9 (*)     Gran% 77.3 (*)     Lymph% 14.7 (*)     All other components within normal limits   POCT GLUCOSE - Abnormal; Notable for the following:     POCT Glucose 118 (*)     All other components within normal limits   COMPREHENSIVE METABOLIC PANEL   TROPONIN I     EKG Readings: (Independently Interpreted)   Initial Reading: No STEMI. Previous EKG: Compared with most recent EKG Previous EKG Date: No change from March 1. Rhythm: Normal Sinus Rhythm. Heart Rate: 68. Ectopy: No Ectopy. Conduction: Normal. ST Segments: Normal ST Segments. T Waves: Normal. T Waves Flipped: V1 and V2. Axis: Right Axis Deviation. Clinical Impression: Normal Sinus Rhythm          Medical Decision Making:   History:   I obtained history from: EMS provider and someone other than patient.       <> Summary of History: Family, EMS  Old Medical Records: I decided to obtain old medical records.  Clinical Tests:   Lab Tests: Ordered and Reviewed  Radiological Study: Ordered and Reviewed  Medical Tests: Ordered and Reviewed            Scribe Attestation:   Scribe #1: I performed the above scribed service and the documentation accurately describes the services I performed. I attest to the accuracy of the note.    Attending Attestation:           Physician Attestation for Scribe:  Physician Attestation Statement for Scribe #1: I, Dr. Narayanan , reviewed documentation, as scribed by Marly Hoffman  in my presence, and it is both accurate and complete.                 ED Course   Comment By Time   IMPRESSION:  Small posterior left frontal old infarct.  No acute intracranial pathology.  Thank you for allowing us to participate in the care of your patient.  Dictated and Authenticated by: Jose Luis Judd MD  04/01/2017 6:09 PM Central Time ([place] & [place]) Yazan Narayanan MD 04/01 1812   Fee to admit Yazan Narayanan,  MD 04/01 1834     Clinical Impression:   There were no encounter diagnoses.          52-year-old male with a history of hypertension diabetes dialysis patient presents to the ER after an episode of syncope at home today.  Reports not taking his hydralazine today but he did get his dialysis.  He did complete dialysis today and had been at home for about 30 minutes.  Patient does not recall this event.  Son states the patient was walking across a room and then collapsed striking his head on the door on the way down.  He then had about 30 seconds of generalized shaking and foaming at the mouth and then awoke and was confused.  No distress on arrival.  EMS reports hypoglycemia at the scene with a blood glucose of 50 and he did receive D50 with improvement in his blood sugar so hypoglycemia might be playing a role as well.  No sign of sepsis.  There is no laceration to repair.  He has no obvious sign of any head trauma.  Head CT does not show any subdural or subarachnoid blood or fracture.  No neck pain so no cervical CT was obtained.  EKG is no change from prior.  Troponin is elevated but it is always elevated in this patient.  Dr. newman of \[facility]\"" medicine to admit.  Hypertensive in the emergency department but he was given IV hydralazine with improvement.  I don't think the patient needs to be in the ICU or on antihypertensive drip.     Yazan Narayanan MD  04/01/17 2860

## 2017-04-01 NOTE — ED NOTES
Patient complains of paint to right foot.  Noted small abrasions on left great toe and second toe.  Cleaned with [employer] wound cleanser and dressed with bandaids.  Also cleaned and dressed healed blister on ball of right foot.

## 2017-04-02 ENCOUNTER — HOSPITAL ENCOUNTER (INPATIENT)
Facility: HOSPITAL | Age: 52
LOS: 3 days | Discharge: HOME | DRG: 100 | End: 2017-04-05
Attending: EMERGENCY MEDICINE | Admitting: EMERGENCY MEDICINE
Payer: MEDICARE

## 2017-04-02 DIAGNOSIS — N18.6 ESRF (END STAGE RENAL FAILURE): Chronic | ICD-10-CM

## 2017-04-02 DIAGNOSIS — I10 POORLY-CONTROLLED HYPERTENSION: Chronic | ICD-10-CM

## 2017-04-02 DIAGNOSIS — I50.9 CHF (CONGESTIVE HEART FAILURE): ICD-10-CM

## 2017-04-02 DIAGNOSIS — R56.9 SEIZURE: ICD-10-CM

## 2017-04-02 DIAGNOSIS — E11.649 HYPOGLYCEMIA ASSOCIATED WITH TYPE 2 DIABETES MELLITUS: Primary | ICD-10-CM

## 2017-04-02 DIAGNOSIS — R55 SYNCOPE: ICD-10-CM

## 2017-04-02 DIAGNOSIS — B18.2 CHRONIC HEPATITIS C WITHOUT HEPATIC COMA: Chronic | ICD-10-CM

## 2017-04-02 PROBLEM — F11.20 METHADONE DEPENDENCE: Chronic | Status: ACTIVE | Noted: 2017-02-21

## 2017-04-02 PROBLEM — E78.2 MIXED HYPERLIPIDEMIA: Chronic | Status: ACTIVE | Noted: 2017-04-02

## 2017-04-02 PROBLEM — E87.5 HYPERKALEMIA, DIMINISHED RENAL EXCRETION: Status: RESOLVED | Noted: 2017-02-21 | Resolved: 2017-04-02

## 2017-04-02 LAB
ANION GAP SERPL CALC-SCNC: 11 MMOL/L
BASOPHILS # BLD AUTO: 0.01 K/UL
BASOPHILS NFR BLD: 0.1 %
BUN SERPL-MCNC: 21 MG/DL
CALCIUM SERPL-MCNC: 7.6 MG/DL
CHLORIDE SERPL-SCNC: 100 MMOL/L
CO2 SERPL-SCNC: 30 MMOL/L
CORTIS SERPL-MCNC: 12.5 UG/DL
CORTIS SERPL-MCNC: 19.3 UG/DL
CORTIS SERPL-MCNC: 20.3 UG/DL
CREAT SERPL-MCNC: 5.2 MG/DL
DIFFERENTIAL METHOD: ABNORMAL
EOSINOPHIL # BLD AUTO: 0.1 K/UL
EOSINOPHIL NFR BLD: 1.3 %
ERYTHROCYTE [DISTWIDTH] IN BLOOD BY AUTOMATED COUNT: 14.9 %
EST. GFR  (AFRICAN AMERICAN): 13.7 ML/MIN/1.73 M^2
EST. GFR  (NON AFRICAN AMERICAN): 11.8 ML/MIN/1.73 M^2
GLUCOSE SERPL-MCNC: 81 MG/DL
HCT VFR BLD AUTO: 28.2 %
HGB BLD-MCNC: 9.3 G/DL
LYMPHOCYTES # BLD AUTO: 0.9 K/UL
LYMPHOCYTES NFR BLD: 11.2 %
MAGNESIUM SERPL-MCNC: 2.1 MG/DL
MCH RBC QN AUTO: 31.4 PG
MCHC RBC AUTO-ENTMCNC: 33 %
MCV RBC AUTO: 95 FL
MONOCYTES # BLD AUTO: 0.4 K/UL
MONOCYTES NFR BLD: 5.2 %
NEUTROPHILS # BLD AUTO: 6.7 K/UL
NEUTROPHILS NFR BLD: 82.1 %
PHOSPHATE SERPL-MCNC: 5.9 MG/DL
PLATELET # BLD AUTO: 131 K/UL
PMV BLD AUTO: 9.5 FL
POCT GLUCOSE: 102 MG/DL (ref 70–110)
POCT GLUCOSE: 79 MG/DL (ref 70–110)
POCT GLUCOSE: 83 MG/DL (ref 70–110)
POCT GLUCOSE: 85 MG/DL (ref 70–110)
POCT GLUCOSE: 85 MG/DL (ref 70–110)
POCT GLUCOSE: 94 MG/DL (ref 70–110)
POTASSIUM SERPL-SCNC: 4.2 MMOL/L
RBC # BLD AUTO: 2.96 M/UL
SODIUM SERPL-SCNC: 141 MMOL/L
WBC # BLD AUTO: 8.15 K/UL

## 2017-04-02 PROCEDURE — 25000003 PHARM REV CODE 250: Performed by: PHYSICIAN ASSISTANT

## 2017-04-02 PROCEDURE — 80307 DRUG TEST PRSMV CHEM ANLYZR: CPT

## 2017-04-02 PROCEDURE — 99220 PR INITIAL OBSERVATION CARE,LEVL III: CPT | Mod: ,,, | Performed by: PHYSICIAN ASSISTANT

## 2017-04-02 PROCEDURE — 84100 ASSAY OF PHOSPHORUS: CPT

## 2017-04-02 PROCEDURE — 63600175 PHARM REV CODE 636 W HCPCS: Performed by: INTERNAL MEDICINE

## 2017-04-02 PROCEDURE — 63600175 PHARM REV CODE 636 W HCPCS: Performed by: HOSPITALIST

## 2017-04-02 PROCEDURE — 80048 BASIC METABOLIC PNL TOTAL CA: CPT

## 2017-04-02 PROCEDURE — 82533 TOTAL CORTISOL: CPT | Mod: 91

## 2017-04-02 PROCEDURE — 11000001 HC ACUTE MED/SURG PRIVATE ROOM

## 2017-04-02 PROCEDURE — 27000221 HC OXYGEN, UP TO 24 HOURS

## 2017-04-02 PROCEDURE — 99233 SBSQ HOSP IP/OBS HIGH 50: CPT | Mod: GC,,, | Performed by: PSYCHIATRY & NEUROLOGY

## 2017-04-02 PROCEDURE — 36415 COLL VENOUS BLD VENIPUNCTURE: CPT

## 2017-04-02 PROCEDURE — 82533 TOTAL CORTISOL: CPT

## 2017-04-02 PROCEDURE — 83735 ASSAY OF MAGNESIUM: CPT

## 2017-04-02 PROCEDURE — 99222 1ST HOSP IP/OBS MODERATE 55: CPT | Mod: ,,, | Performed by: INTERNAL MEDICINE

## 2017-04-02 PROCEDURE — 85025 COMPLETE CBC W/AUTO DIFF WBC: CPT

## 2017-04-02 RX ORDER — ACETAMINOPHEN 325 MG/1
650 TABLET ORAL EVERY 4 HOURS PRN
Status: DISCONTINUED | OUTPATIENT
Start: 2017-04-02 | End: 2017-04-05 | Stop reason: HOSPADM

## 2017-04-02 RX ORDER — COSYNTROPIN 0.25 MG/ML
0.25 INJECTION, POWDER, FOR SOLUTION INTRAMUSCULAR; INTRAVENOUS ONCE
Status: COMPLETED | OUTPATIENT
Start: 2017-04-02 | End: 2017-04-02

## 2017-04-02 RX ORDER — CARVEDILOL 6.25 MG/1
12.5 TABLET ORAL 2 TIMES DAILY
Status: DISCONTINUED | OUTPATIENT
Start: 2017-04-02 | End: 2017-04-02

## 2017-04-02 RX ORDER — ATORVASTATIN CALCIUM 20 MG/1
40 TABLET, FILM COATED ORAL NIGHTLY
Status: DISCONTINUED | OUTPATIENT
Start: 2017-04-02 | End: 2017-04-05 | Stop reason: HOSPADM

## 2017-04-02 RX ORDER — FAMOTIDINE 20 MG/1
20 TABLET, FILM COATED ORAL DAILY
Status: DISCONTINUED | OUTPATIENT
Start: 2017-04-02 | End: 2017-04-05 | Stop reason: HOSPADM

## 2017-04-02 RX ORDER — MIDAZOLAM HYDROCHLORIDE 1 MG/ML
2 INJECTION INTRAMUSCULAR; INTRAVENOUS ONCE
Status: COMPLETED | OUTPATIENT
Start: 2017-04-02 | End: 2017-04-02

## 2017-04-02 RX ORDER — ONDANSETRON 2 MG/ML
4 INJECTION INTRAMUSCULAR; INTRAVENOUS EVERY 8 HOURS PRN
Status: DISCONTINUED | OUTPATIENT
Start: 2017-04-02 | End: 2017-04-05 | Stop reason: HOSPADM

## 2017-04-02 RX ORDER — GLUCAGON 1 MG
1 KIT INJECTION
Status: DISCONTINUED | OUTPATIENT
Start: 2017-04-02 | End: 2017-04-05 | Stop reason: HOSPADM

## 2017-04-02 RX ORDER — LOPERAMIDE HYDROCHLORIDE 2 MG/1
2 CAPSULE ORAL
Status: DISCONTINUED | OUTPATIENT
Start: 2017-04-02 | End: 2017-04-05 | Stop reason: HOSPADM

## 2017-04-02 RX ORDER — AMLODIPINE BESYLATE 5 MG/1
5 TABLET ORAL 2 TIMES DAILY
Status: DISCONTINUED | OUTPATIENT
Start: 2017-04-02 | End: 2017-04-05 | Stop reason: HOSPADM

## 2017-04-02 RX ORDER — LISINOPRIL 20 MG/1
40 TABLET ORAL DAILY
Status: DISCONTINUED | OUTPATIENT
Start: 2017-04-02 | End: 2017-04-05 | Stop reason: HOSPADM

## 2017-04-02 RX ORDER — SODIUM CHLORIDE 0.9 % (FLUSH) 0.9 %
3 SYRINGE (ML) INJECTION EVERY 8 HOURS
Status: DISCONTINUED | OUTPATIENT
Start: 2017-04-02 | End: 2017-04-05 | Stop reason: HOSPADM

## 2017-04-02 RX ORDER — CLOPIDOGREL BISULFATE 75 MG/1
75 TABLET ORAL DAILY
Status: DISCONTINUED | OUTPATIENT
Start: 2017-04-02 | End: 2017-04-05 | Stop reason: HOSPADM

## 2017-04-02 RX ORDER — ASPIRIN 325 MG
325 TABLET ORAL DAILY
Status: DISCONTINUED | OUTPATIENT
Start: 2017-04-02 | End: 2017-04-05 | Stop reason: HOSPADM

## 2017-04-02 RX ORDER — POLYETHYLENE GLYCOL 3350 17 G/17G
17 POWDER, FOR SOLUTION ORAL 3 TIMES DAILY PRN
Status: DISCONTINUED | OUTPATIENT
Start: 2017-04-02 | End: 2017-04-05 | Stop reason: HOSPADM

## 2017-04-02 RX ORDER — METHADONE HYDROCHLORIDE 10 MG/1
60 TABLET ORAL DAILY
Status: DISCONTINUED | OUTPATIENT
Start: 2017-04-02 | End: 2017-04-05 | Stop reason: HOSPADM

## 2017-04-02 RX ORDER — HYDRALAZINE HYDROCHLORIDE 25 MG/1
25 TABLET, FILM COATED ORAL 3 TIMES DAILY
Status: DISCONTINUED | OUTPATIENT
Start: 2017-04-02 | End: 2017-04-03

## 2017-04-02 RX ORDER — INSULIN ASPART 100 [IU]/ML
0-5 INJECTION, SOLUTION INTRAVENOUS; SUBCUTANEOUS
Status: DISCONTINUED | OUTPATIENT
Start: 2017-04-02 | End: 2017-04-05 | Stop reason: HOSPADM

## 2017-04-02 RX ORDER — MINOXIDIL 2.5 MG/1
7.5 TABLET ORAL 2 TIMES DAILY
Status: DISCONTINUED | OUTPATIENT
Start: 2017-04-02 | End: 2017-04-05 | Stop reason: HOSPADM

## 2017-04-02 RX ORDER — CARVEDILOL 25 MG/1
25 TABLET ORAL 2 TIMES DAILY
Status: DISCONTINUED | OUTPATIENT
Start: 2017-04-02 | End: 2017-04-03

## 2017-04-02 RX ORDER — CALCIUM ACETATE 667 MG/1
2001 CAPSULE ORAL
Status: DISCONTINUED | OUTPATIENT
Start: 2017-04-02 | End: 2017-04-05 | Stop reason: HOSPADM

## 2017-04-02 RX ORDER — IBUPROFEN 200 MG
24 TABLET ORAL
Status: DISCONTINUED | OUTPATIENT
Start: 2017-04-02 | End: 2017-04-05 | Stop reason: HOSPADM

## 2017-04-02 RX ORDER — NALOXONE HCL 0.4 MG/ML
0.4 VIAL (ML) INJECTION
Status: DISCONTINUED | OUTPATIENT
Start: 2017-04-02 | End: 2017-04-05 | Stop reason: HOSPADM

## 2017-04-02 RX ORDER — IBUPROFEN 200 MG
16 TABLET ORAL
Status: DISCONTINUED | OUTPATIENT
Start: 2017-04-02 | End: 2017-04-05 | Stop reason: HOSPADM

## 2017-04-02 RX ADMIN — MIDAZOLAM HYDROCHLORIDE 2 MG: 1 INJECTION, SOLUTION INTRAMUSCULAR; INTRAVENOUS at 02:04

## 2017-04-02 RX ADMIN — AMLODIPINE BESYLATE 5 MG: 5 TABLET ORAL at 08:04

## 2017-04-02 RX ADMIN — DOCUSATE SODIUM 100 MG: 50 CAPSULE, LIQUID FILLED ORAL at 08:04

## 2017-04-02 RX ADMIN — HYDRALAZINE HYDROCHLORIDE 25 MG: 25 TABLET ORAL at 01:04

## 2017-04-02 RX ADMIN — CALCIUM ACETATE 2001 MG: 667 CAPSULE ORAL at 08:04

## 2017-04-02 RX ADMIN — FAMOTIDINE 20 MG: 20 TABLET, FILM COATED ORAL at 09:04

## 2017-04-02 RX ADMIN — ACETAMINOPHEN 650 MG: 325 TABLET ORAL at 05:04

## 2017-04-02 RX ADMIN — Medication 3 ML: at 09:04

## 2017-04-02 RX ADMIN — METHADONE HYDROCHLORIDE 60 MG: 10 TABLET ORAL at 08:04

## 2017-04-02 RX ADMIN — ATORVASTATIN CALCIUM 40 MG: 20 TABLET, FILM COATED ORAL at 05:04

## 2017-04-02 RX ADMIN — Medication 3 ML: at 08:04

## 2017-04-02 RX ADMIN — CARVEDILOL 12.5 MG: 6.25 TABLET, FILM COATED ORAL at 08:04

## 2017-04-02 RX ADMIN — CALCIUM ACETATE 2001 MG: 667 CAPSULE ORAL at 04:04

## 2017-04-02 RX ADMIN — HYDRALAZINE HYDROCHLORIDE 25 MG: 25 TABLET ORAL at 08:04

## 2017-04-02 RX ADMIN — CLOPIDOGREL 75 MG: 75 TABLET, FILM COATED ORAL at 08:04

## 2017-04-02 RX ADMIN — MINOXIDIL 7.5 MG: 2.5 TABLET ORAL at 09:04

## 2017-04-02 RX ADMIN — ASPIRIN 325 MG ORAL TABLET 325 MG: 325 PILL ORAL at 08:04

## 2017-04-02 RX ADMIN — MINOXIDIL 7.5 MG: 2.5 TABLET ORAL at 08:04

## 2017-04-02 RX ADMIN — LISINOPRIL 40 MG: 20 TABLET ORAL at 08:04

## 2017-04-02 RX ADMIN — COSYNTROPIN 0.25 MG: 0.25 INJECTION, POWDER, LYOPHILIZED, FOR SOLUTION INTRAVENOUS at 03:04

## 2017-04-02 RX ADMIN — ATORVASTATIN CALCIUM 40 MG: 20 TABLET, FILM COATED ORAL at 08:04

## 2017-04-02 RX ADMIN — Medication 3 ML: at 01:04

## 2017-04-02 RX ADMIN — HYDRALAZINE HYDROCHLORIDE 25 MG: 25 TABLET ORAL at 05:04

## 2017-04-02 RX ADMIN — CALCIUM ACETATE 2001 MG: 667 CAPSULE ORAL at 11:04

## 2017-04-02 NOTE — CONSULTS
[facility]  Neurology  Consult Note    Patient Name: João Aguirre  MRN: [id number]  Admission Date: 4/2/2017  Hospital Length of Stay: 0 days  Code Status: Full Code   Attending Provider: Samuel Pino MD   Consulting Provider: Johnathan Torres MD  Primary Care Physician: Delbert Redd NP  Principal Problem:Post-traumatic seizures    Consults   Subjective:     Chief Complaint:  convulsions     HPI:   51 y/o M with a history of ESRD, DM, Hep C, CVA on plavix, HTN, HLD, and methadone dependence who presents from [facility] for seizure workup. Pt received dialysis yesterday and reports not feeling well after dialysis. He had a syncopal episode at home and has no memory of the following events until [place] [facility]. Per chart review, he was home following dialysis and had an episode of syncope and hit his head on a door, followed by approximately 30s of generalized shaking and foaming at the mouth. Pt was confused following this episode. He was brought to [facility] and found to be hypoglycemic and hypertensive which improved after he received D50 and IV hydralazine. CT head at that time showed no evidence of hemorrhage or fracture. Pt was being transferred to the floor from the ED when he had another episode of convulsions, and was subsequently transferred here for further evaluation and management. He denies HA, visual changes, focal weakness, CP, SOB, f/c. He reports having a previous seizure about 20 years ago.     Past Medical History:   Diagnosis Date    Anticoagulant long-term use     Arthritis     Asthma     Back pain     Diabetes mellitus     Encounter for blood transfusion     Eye abnormality right eye    injured as a child    Gastritis     Hemodialysis patient     Hypertension     Pneumonia 2013    Spend 6weeks in hospital at [place]    Renal disorder     Stroke        Past Surgical History:   Procedure Laterality Date    AV FISTULA PLACEMENT      BACK SURGERY       CHOLECYSTECTOMY      EYE SURGERY      R eye       Review of patient's allergies indicates:   Allergen Reactions    Antibiotic hc      Hx of in 2013         Current Facility-Administered Medications on File Prior to Encounter   Medication    [COMPLETED] hydrALAZINE injection 10 mg    [COMPLETED] hydrALAZINE injection 10 mg    [COMPLETED] hydrALAZINE tablet 25 mg    [COMPLETED] HYDROmorphone injection 0.5 mg    [COMPLETED] lorazepam (ATIVAN) 2 mg/mL injection    [COMPLETED] lorazepam injection 1 mg    [COMPLETED] ondansetron injection 8 mg    [COMPLETED] promethazine (PHENERGAN) 12.5 mg in dextrose 5 % 50 mL IVPB    [DISCONTINUED] acetaminophen (TYLENOL) 325 MG tablet    [DISCONTINUED] acetaminophen tablet 650 mg    [DISCONTINUED] levetiracetam (KEPPRA) 2,000 mg in dextrose 5 % 100 mL IVPB     Current Outpatient Prescriptions on File Prior to Encounter   Medication Sig    albuterol (PROAIR HFA) 90 mcg/actuation inhaler INHALE TWO PUFFS EVERY 4 TO 6 HOURS AS NEEDED    amlodipine (NORVASC) 5 MG tablet Take 1 tablet (5 mg total) by mouth 2 (two) times daily.    aspirin 325 MG tablet Take 1 tablet (325 mg total) by mouth once daily.    atorvastatin (LIPITOR) 40 MG tablet Take 1 tablet (40 mg total) by mouth every evening.    blood-glucose meter (PHARMACIST CHOICE GLUCOSE SYS) Misc 1 Device by Misc.(Non-Drug; Combo Route) route once.    calcium acetate (PHOSLO) 667 mg capsule Take 2,001 mg by mouth 3 (three) times daily with meals.    carvedilol (COREG) 12.5 MG tablet Take 1 tablet (12.5 mg total) by mouth 2 (two) times daily.    clopidogrel (PLAVIX) 75 mg tablet Take 1 tablet (75 mg total) by mouth once daily.    famotidine (PEPCID) 20 MG tablet Take 1 tablet (20 mg total) by mouth once daily.    hydrALAZINE (APRESOLINE) 25 MG tablet Take 25 mg by mouth 3 (three) times daily.    lisinopril (PRINIVIL,ZESTRIL) 40 MG tablet Take 1 tablet (40 mg total) by mouth once daily.    methadone  (DOLOPHINE) 10 MG tablet Take 6 tablets (60 mg total) by mouth once daily.    minoxidil (LONITEN) 2.5 MG tablet Take 3 tablets (7.5 mg total) by mouth 2 (two) times daily.    ondansetron (ZOFRAN) 4 MG tablet Take 1 tablet (4 mg total) by mouth every 8 (eight) hours as needed for Nausea.     Family History     Problem Relation (Age of Onset)    Kidney disease Brother        Social History Main Topics    Smoking status: Former Smoker     Years: 10.00     Quit date: 9/1/2015    Smokeless tobacco: Former User     Quit date: 2/16/2016    Alcohol use No    Drug use: No    Sexual activity: Yes     Review of Systems   Constitutional: Positive for fatigue. Negative for chills and fever.   HENT: Negative for congestion, sore throat and trouble swallowing.    Eyes: Negative for visual disturbance.   Respiratory: Negative for cough and shortness of breath.    Cardiovascular: Negative for chest pain.   Gastrointestinal: Positive for nausea and vomiting. Negative for abdominal pain.   Endocrine: Negative for polydipsia and polyuria.   Musculoskeletal: Positive for myalgias.   Neurological: Positive for seizures. Negative for dizziness, speech difficulty, weakness and headaches.   Psychiatric/Behavioral: Positive for confusion. Negative for agitation.     Objective:     Vital Signs (Most Recent):  Temp: 97.6 °F (36.4 °C) (04/02/17 0852)  Pulse: 60 (04/02/17 0852)  Resp: 17 (04/02/17 0852)  BP: (!) 186/85 (04/02/17 0852)  SpO2: (!) 94 % (04/02/17 0852) Vital Signs (24h Range):  Temp:  [97.6 °F (36.4 °C)-100.1 °F (37.8 °C)] 97.6 °F (36.4 °C)  Pulse:  [48-84] 60  Resp:  [15-22] 17  SpO2:  [87 %-99 %] 94 %  BP: (121-253)/() 186/85     Weight: 72.5 kg (159 lb 13.3 oz)  Body mass index is 21.68 kg/(m^2).    Physical Exam   Constitutional: He is oriented to person, place, and time. He appears well-developed and well-nourished. No distress.   HENT:   Head: Normocephalic.   Eyes: EOM are normal.   Post-traumatic changes to R  eye   Neck: Normal range of motion. Neck supple.   Pulmonary/Chest: Effort normal. No respiratory distress.   Musculoskeletal: Normal range of motion. He exhibits tenderness.   Neurological: He is alert and oriented to person, place, and time.   A&Ox3  CN II-XII grossly intact  Strength 5/5 in UE and LE bilaterally  No sensory deficits  DTRs 1+ at patella and brachioradialis  No abnormal coordination   Skin: Skin is warm and dry.   Psychiatric: He has a normal mood and affect. His behavior is normal.       NEUROLOGICAL EXAMINATION:     MENTAL STATUS   Oriented to person, place, and time.     CRANIAL NERVES     CN III, IV, VI   Extraocular motions are normal.       Significant Labs:   CBC:   Recent Labs  Lab 04/01/17 1712 04/02/17  0819   WBC 8.00 8.15   HGB 10.1* 9.3*   HCT 32.3* 28.2*    131*     CMP:   Recent Labs  Lab 04/01/17 1712 04/02/17  0819   GLU 96 81    141   K 3.6 4.2    100   CO2 23 30*   BUN 12 21*   CREATININE 3.4* 5.2*   CALCIUM 8.0* 7.6*   MG  --  2.1   PROT 8.5*  --    ALBUMIN 3.6  --    BILITOT 0.6  --    ALKPHOS 87  --    AST 21  --    ALT 11  --    ANIONGAP 13 11   EGFRNONAA 20* 11.8*     POCT Glucose:   Recent Labs  Lab 04/01/17 1953 04/02/17  0557 04/02/17  0857   POCTGLUCOSE 100 85 83     All pertinent lab results from the past 24 hours have been reviewed.    Significant Imaging: I have reviewed all pertinent imaging results/findings within the past 24 hours.    Assessment and Plan:     Seizure  - initial episode at home following dialysis yesterday; second episode during transport from ED to floor at OSH  - hypoglycemic and hypertensive on initial presentation, now improved  - CT head showing remote posterior left frontal cortical infarct but no acute intracranial abnormalities  - differential includes dialysis dysequilibrium syndrome, post-traumatic seizures, and convulsive syncope    Recs:  - MRI brain WO, epilepsy protocol  - routine EEG  - serum tox screen      VTE  Risk Mitigation         Ordered     Medium Risk of VTE  Once      04/02/17 0157     Place ZACHARY hose  Until discontinued      04/02/17 0157     Place sequential compression device  Until discontinued      04/02/17 0157          Thank you for your consult. I will follow-up with patient. Please contact us if you have any additional questions.    Johnathan Torres MD  Neurology  [facility]

## 2017-04-02 NOTE — ED NOTES
Tech at side to transfer patient from bed to , pt stood and sat down in  and seizure activity was noted immediately. Notified Dr. Bui of pt's status, new orders received. BRAVO Zambrano and BRAVO Campbell to bedside to monitor patient's airway and keep him from harming self during seizure. Seizure lasted estimated 1 1/2 minute.

## 2017-04-02 NOTE — ASSESSMENT & PLAN NOTE
- Hypoglycemic on arrival in [facility] ER.  Given D50 with improvement to 96.  - Patient with multiple admissions for hypoglycemia.  He is not on any diabetes medications at this time.  Last admission patient was put in ICU for severe hypoglycemia, initially believed to be related to methadone use. His dose was decreased and there was some improvement in his sugars. Insulinoma labs drawn during last admit, but were pending at discharge. My review of labs reveals elevated C-peptide of 15 with corresponding glucose of 46 at the time lab was drawn.   - Will consult endocrinology   - Diabetic renal diet

## 2017-04-02 NOTE — ASSESSMENT & PLAN NOTE
- Hypertensive urgency at OSH resolved with IV hydralazine 10mg.  - Continue home amlodipine 5mg BID, Coreg 12.5mg BID, lisinopril 40mg daily, Hydralazine 25mg TID, and minoxidil 7.5mg BID.  - will increase coreg to 25 mg BID, titrate other medications as necessary.

## 2017-04-02 NOTE — IP AVS SNAPSHOT
[facility]  [address]  [facility] [address]  Phone: [phone]           Patient Discharge Instructions   Our goal is to set you up for success. This packet includes information on your condition, medications, and your home care.  It will help you care for yourself to prevent having to return to the hospital.     Please ask your nurse if you have any questions.      There are many details to remember when preparing to leave the hospital. Here is what you will need to do:    1. Take your medicine. If you are prescribed medications, review your Medication List on the following pages. You may have new medications to  at the pharmacy and others that you'll need to stop taking. Review the instructions for how and when to take your medications. Talk with your doctor or nurses if you are unsure of what to do.     2. Go to your follow-up appointments. Specific follow-up information is listed in the following pages. Your may be contacted by a nurse or clinical provider about future appointments. Be sure we have all of the phone numbers to reach you. Please contact your provider's office if you are unable to make an appointment.     3. Watch for warning signs. Your doctor or nurse will give you detailed warning signs to watch for and when to call for assistance. These instructions may also include educational information about your condition. If you experience any of warning signs to your health, call your doctor.           [facility]  Unless otherwise directed by your provider, please   contact [facility], our nurse care line   that is available for 24/7 assistance.     [phone] (toll-free)     Registered nurses in the [facility]   provide: appointment scheduling, clinical advisement, health education, and other advisory services.                  ** Verify the list of medication(s) below is accurate and up to date. Carry this with you in case of  emergency. If your medications have changed, please notify your healthcare provider.             Medication List      CHANGE how you take these medications        Additional Info                      hydrALAZINE 25 MG tablet   Commonly known as:  APRESOLINE   Quantity:  60 tablet   Refills:  2   Dose:  50 mg   What changed:  how much to take    Last time this was given:  50 mg on 4/5/2017  1:34 PM   Instructions:  Take 2 tablets (50 mg total) by mouth 3 (three) times daily.     Begin Date    AM    Noon    PM    Bedtime         CONTINUE taking these medications        Additional Info                      amlodipine 5 MG tablet   Commonly known as:  NORVASC   Quantity:  30 tablet   Refills:  0   Dose:  5 mg    Last time this was given:  5 mg on 4/5/2017  8:08 AM   Instructions:  Take 1 tablet (5 mg total) by mouth 2 (two) times daily.     Begin Date    AM    Noon    PM    Bedtime       aspirin 325 MG tablet   Quantity:  30 tablet   Refills:  1   Dose:  325 mg    Last time this was given:  325 mg on 4/5/2017  8:08 AM   Instructions:  Take 1 tablet (325 mg total) by mouth once daily.     Begin Date    AM    Noon    PM    Bedtime       atorvastatin 40 MG tablet   Commonly known as:  LIPITOR   Quantity:  30 tablet   Refills:  1   Dose:  40 mg    Last time this was given:  40 mg on 4/4/2017  9:30 PM   Instructions:  Take 1 tablet (40 mg total) by mouth every evening.     Begin Date    AM    Noon    PM    Bedtime       blood-glucose meter Misc   Commonly known as:  PHARMACIST CHOICE GLUCOSE SYS   Quantity:  1 each   Refills:  0   Dose:  1 Device    Instructions:  1 Device by Misc.(Non-Drug; Combo Route) route once.     Begin Date    AM    Noon    PM    Bedtime       calcium acetate 667 mg capsule   Commonly known as:  PHOSLO   Refills:  0   Dose:  2001 mg    Last time this was given:  2,001 mg on 4/5/2017 11:10 AM   Instructions:  Take 2,001 mg by mouth 3 (three) times daily with meals.     Begin Date    AM    Noon    PM     Bedtime       carvedilol 12.5 MG tablet   Commonly known as:  COREG   Quantity:  60 tablet   Refills:  0   Dose:  12.5 mg    Last time this was given:  12.5 mg on 4/5/2017  8:08 AM   Instructions:  Take 1 tablet (12.5 mg total) by mouth 2 (two) times daily.     Begin Date    AM    Noon    PM    Bedtime       clopidogrel 75 mg tablet   Commonly known as:  PLAVIX   Quantity:  30 tablet   Refills:  1   Dose:  75 mg    Last time this was given:  75 mg on 4/5/2017  8:08 AM   Instructions:  Take 1 tablet (75 mg total) by mouth once daily.     Begin Date    AM    Noon    PM    Bedtime       famotidine 20 MG tablet   Commonly known as:  PEPCID   Quantity:  30 tablet   Refills:  1   Dose:  20 mg    Last time this was given:  20 mg on 4/5/2017  8:08 AM   Instructions:  Take 1 tablet (20 mg total) by mouth once daily.     Begin Date    AM    Noon    PM    Bedtime       lisinopril 40 MG tablet   Commonly known as:  PRINIVIL,ZESTRIL   Quantity:  30 tablet   Refills:  1   Dose:  40 mg    Last time this was given:  40 mg on 4/5/2017  8:07 AM   Instructions:  Take 1 tablet (40 mg total) by mouth once daily.     Begin Date    AM    Noon    PM    Bedtime       methadone 10 MG tablet   Commonly known as:  DOLOPHINE   Refills:  0   Dose:  60 mg    Last time this was given:  60 mg on 4/5/2017  8:08 AM   Instructions:  Take 6 tablets (60 mg total) by mouth once daily.     Begin Date    AM    Noon    PM    Bedtime       minoxidil 2.5 MG tablet   Commonly known as:  LONITEN   Quantity:  90 tablet   Refills:  0   Dose:  7.5 mg    Last time this was given:  7.5 mg on 4/5/2017  8:20 AM   Instructions:  Take 3 tablets (7.5 mg total) by mouth 2 (two) times daily.     Begin Date    AM    Noon    PM    Bedtime       ondansetron 4 MG tablet   Commonly known as:  ZOFRAN   Quantity:  12 tablet   Refills:  0   Dose:  4 mg    Instructions:  Take 1 tablet (4 mg total) by mouth every 8 (eight) hours as needed for Nausea.     Begin Date    AM    Noon     PM    Bedtime       PROAIR HFA 90 mcg/actuation inhaler   Refills:  0   Generic drug:  albuterol    Instructions:  INHALE TWO PUFFS EVERY 4 TO 6 HOURS AS NEEDED     Begin Date    AM    Noon    PM    Bedtime            Where to Get Your Medications      These medications were sent to [facility] [place] [facility] [address] [address]  [address] [address] [address]     Phone:  [phone]     hydrALAZINE 25 MG tablet    ondansetron 4 MG tablet                  Please bring to all follow up appointments:    1. A copy of your discharge instructions.  2. All medicines you are currently taking in their original bottles.  3. Identification and insurance card.    Please arrive 15 minutes ahead of scheduled appointment time.    Please call 24 hours in advance if you must reschedule your appointment and/or time.        Your Scheduled Appointments     Apr 21, 2017  8:40 AM CDT   New Patient with MD [place] - Neurology ([facility])    [address]  [place] [address] [phone]              Follow-up Information     Follow up with Delbert Redd NP.    Specialty:  Family Medicine    Contact information:    [address] [place] [address]  [place] [address] [phone]          Please follow up.    Why:  Please follow up with cardiologist in [place] where previously established.         Please follow up.    Why:  Please follow up with nephrology in [place] where previously established.         Follow up with Doug Bravo MD.    Specialty:  Neurology    Why:  Please follow up with Dr. Bravo as scheduled, the office will call you with appointmetn information.     Contact information:    [address]  [address]  [place] [address] [phone]        Referrals     Future Orders    Ambulatory Referral to Hepatology     Questions:    Is this a Hep-C patient?:  Yes    Ambulatory Referral to Physical/Occupational Therapy     Questions:    Post Surgical?:  No    Eval and  Treat:  Yes    Duration:  90 days    Frequency (times per week):      Precautions:      Location:      OT Location:      Restore Functional ADL Training?:      Gait Training:      Therapeutic Exercises:      Wound Care:      Traction:      Electric Stimulation:      IONTO.:      U/S:      Developmental Stimulation?:      Specialty Programs:          Discharge Instructions     Future Orders    Activity as tolerated     Call MD for:  increased confusion or weakness     Comments:    Syncope, decreased blood glucose    Diet renal         Primary Diagnosis     Your primary diagnosis was:  Seizure      Admission Information     Date & Time Provider Department CSN    4/2/2017 12:20 AM Alexandro Akbar MD [facility] [id number]      Care Providers     Provider Role Specialty Primary office phone    Alexandro Akbar MD Attending Provider Hospitalist [phone]    Alexandro Akbar MD Team Attending  Hospitalist [phone]    Samuel Pino MD Team Attending  Hospitalist [phone]    Dionne Franco MD Consulting Physician  Nephrology [phone]      Your Vitals Were     BP Pulse Temp Resp Height Weight    158/72 (BP Location: Right arm, Patient Position: Lying, BP Method: Automatic) 52 98.2 °F (36.8 °C) (Oral) 16 6' (1.829 m) 72.5 kg (159 lb 13.3 oz)    SpO2 BMI             97% 21.68 kg/m2         Recent Lab Values        6/5/2015 5/16/2016 5/17/2016 7/28/2016 3/1/2017               3:08 AM  5:48 PM 12:07 AM  8:10 AM  6:14 AM       A1C 4.7 4.2 (L) 4.3 (L) 4.5 4.4 (L)       Comment for A1C at  8:10 AM on 7/28/2016:  According to ADA guidelines, hemoglobin A1C <7.0% represents  optimal control in non-pregnant diabetic patients.  Different  metrics may apply to specific populations.   Standards of Medical Care in Diabetes - 2016.  For the purpose of screening for the presence of diabetes:  <5.7%     Consistent with the absence of diabetes  5.7-6.4%  Consistent with increasing risk for diabetes    (prediabetes)  >or=6.5%  Consistent with diabetes  Currently no consensus exists for use of hemoglobin A1C  for diagnosis of diabetes for children.      Comment for A1C at  6:14 AM on 3/1/2017:  According to ADA guidelines, hemoglobin A1C <7.0% represents  optimal control in non-pregnant diabetic patients.  Different  metrics may apply to specific populations.   Standards of Medical Care in Diabetes - 2016.  For the purpose of screening for the presence of diabetes:  <5.7%     Consistent with the absence of diabetes  5.7-6.4%  Consistent with increasing risk for diabetes   (prediabetes)  >or=6.5%  Consistent with diabetes  Currently no consensus exists for use of hemoglobin A1C  for diagnosis of diabetes for children.        Pending Labs     Order Current Status    ACTH In process      Allergies as of 4/5/2017        Reactions    Antibiotic Hc     Hx of in 2013      Advance Directives     An advance directive is a document which, in the event you are no longer able to make decisions for yourself, tells your healthcare team what kind of treatment you do or do not want to receive, or who you would like to make those decisions for you.  If you do not currently have an advance directive, [facility] encourages you to create one.  For more information call:  [phone]), [phone] ([phone]),  or log on to [email or web address].        Language Assistance Services     ATTENTION: Language assistance services are available, free of charge. Please call [phone].      ATENCIÓN: Si habla español, tiene a iraheta disposición servicios gratuitos de asistencia lingüística. Llame al [phone].     [facility] Ý: N?u b?n nói Ti?ng Vi?t, có các d?ch v? h? tr? ngôn ng? mi?n phí dành cho b?n. G?i s? [phone].        Stroke Education              Pneumonmia Discharge Instructions                Chronic Kindey Disease Education             Diabetes Discharge Instructions                                    [facility] Sign-Up     Activating your [facility] account is as easy as 1-2-3!     1) Visit [email or web address], select Sign Up Now, enter this activation code and your date of birth, then select Next.  [email or web address]  Expires: 4/10/2017  2:43 PM      2) Create a username and password to use when you visit [facility] in the future and select a security question in case you lose your password and select Next.    3) Enter your e-mail address and click Sign Up!    Additional Information  If you have questions, please e-mail [email or web address] or call [phone] to talk to our [facility] staff. Remember, [facility] is NOT to be used for urgent needs. For medical emergencies, dial 911.          [facility] complies with applicable Federal civil rights laws and does not discriminate on the basis of race, color, national origin, age, disability, or sex.

## 2017-04-02 NOTE — ASSESSMENT & PLAN NOTE
Insulin mediated vs non insulin mediated  -Non-insulin mediated evaluation will consist of evaluation of counter regulatory hormones.  -Etiologies include: ESRD, malnutrition (however appears with good nutrition status), deficient glycogen stores from Hep C cirrhosis (would likely manifest with hypoglycemia early am or overnight).   -Will order ACTH stim test to evaluate for adrenal insufficiency  -Will order IGF-1 to evaluate for GH hormone deficiency  -Insulin mediated evaluation will consist of: Insulin, c peptide, iraheta screen, glucose, pro-insulin, cortisol, and GH at the time of Hypoglycemia for bg less than 60 and asymptomatic. Draw labs first.

## 2017-04-02 NOTE — ED TRIAGE NOTES
Transfer from [facility], sent here for neuro eval after new onset of seizures. Last seizure witnessed at [facility].

## 2017-04-02 NOTE — ED NOTES
Accepted in transfer to [facility] ED by Dr Ching.  Call report to: [phone].

## 2017-04-02 NOTE — ASSESSMENT & PLAN NOTE
- Will consult neurology.    - CT with no acute changes.  Consider MRI.  - Seizure precautions, fall precautions, neuro checks q4hrs.  EEG monitoring.

## 2017-04-02 NOTE — NURSING
Received call from [facility] EEG tech stated patient would have to go on monitor tomorrow morning. RN explained that order had been in since 0100, EEG staff aware. Bedside RN updated.

## 2017-04-02 NOTE — ASSESSMENT & PLAN NOTE
- Neurology following: MRI w/o and EEG today  - CT with no acute changes.   - Seizure precautions, fall precautions, neuro checks q4hrs.

## 2017-04-02 NOTE — PROVIDER PROGRESS NOTES - EMERGENCY DEPT.
Encounter Date: 4/1/2017    ED Physician Progress Notes        Physician Note:   7:51 PM  Sz occurred while transferring pt in wheel chair

## 2017-04-02 NOTE — UM SECONDARY REVIEW
Physician Advisor External - Case referred to EHR via email from IZABELA Ortega RN    Level of Care Issue

## 2017-04-02 NOTE — ASSESSMENT & PLAN NOTE
- Will consult nephrology.  - [facility]  He makes minimal urine.  - He completed HD today.  No need for emergent dialysis at this time.  - Continue Phoslo 2001mg TID WM.

## 2017-04-02 NOTE — ED NOTES
[facility] service on unit to transport patient to [facility] main campus.

## 2017-04-02 NOTE — SUBJECTIVE & OBJECTIVE
Past Medical History:   Diagnosis Date    Anticoagulant long-term use     Arthritis     Asthma     Back pain     Diabetes mellitus     Encounter for blood transfusion     Eye abnormality right eye    injured as a child    Gastritis     Hemodialysis patient     Hypertension     Pneumonia 2013    Spend 6weeks in hospital at [place]    Renal disorder     Stroke        Past Surgical History:   Procedure Laterality Date    AV FISTULA PLACEMENT      BACK SURGERY      CHOLECYSTECTOMY      EYE SURGERY      R eye       Review of patient's allergies indicates:   Allergen Reactions    Antibiotic hc      Hx of in 2013         Current Facility-Administered Medications on File Prior to Encounter   Medication    [COMPLETED] hydrALAZINE injection 10 mg    [COMPLETED] hydrALAZINE injection 10 mg    [COMPLETED] hydrALAZINE tablet 25 mg    [COMPLETED] HYDROmorphone injection 0.5 mg    [COMPLETED] lorazepam (ATIVAN) 2 mg/mL injection    [COMPLETED] lorazepam injection 1 mg    [COMPLETED] ondansetron injection 8 mg    [COMPLETED] promethazine (PHENERGAN) 12.5 mg in dextrose 5 % 50 mL IVPB    [DISCONTINUED] acetaminophen (TYLENOL) 325 MG tablet    [DISCONTINUED] acetaminophen tablet 650 mg    [DISCONTINUED] levetiracetam (KEPPRA) 2,000 mg in dextrose 5 % 100 mL IVPB     Current Outpatient Prescriptions on File Prior to Encounter   Medication Sig    albuterol (PROAIR HFA) 90 mcg/actuation inhaler INHALE TWO PUFFS EVERY 4 TO 6 HOURS AS NEEDED    amlodipine (NORVASC) 5 MG tablet Take 1 tablet (5 mg total) by mouth 2 (two) times daily.    aspirin 325 MG tablet Take 1 tablet (325 mg total) by mouth once daily.    atorvastatin (LIPITOR) 40 MG tablet Take 1 tablet (40 mg total) by mouth every evening.    blood-glucose meter (PHARMACIST CHOICE GLUCOSE SYS) Misc 1 Device by Misc.(Non-Drug; Combo Route) route once.    calcium acetate (PHOSLO) 667 mg capsule Take 2,001 mg by mouth 3 (three) times daily with  meals.    carvedilol (COREG) 12.5 MG tablet Take 1 tablet (12.5 mg total) by mouth 2 (two) times daily.    clopidogrel (PLAVIX) 75 mg tablet Take 1 tablet (75 mg total) by mouth once daily.    famotidine (PEPCID) 20 MG tablet Take 1 tablet (20 mg total) by mouth once daily.    hydrALAZINE (APRESOLINE) 25 MG tablet Take 25 mg by mouth 3 (three) times daily.    lisinopril (PRINIVIL,ZESTRIL) 40 MG tablet Take 1 tablet (40 mg total) by mouth once daily.    methadone (DOLOPHINE) 10 MG tablet Take 6 tablets (60 mg total) by mouth once daily.    minoxidil (LONITEN) 2.5 MG tablet Take 3 tablets (7.5 mg total) by mouth 2 (two) times daily.    ondansetron (ZOFRAN) 4 MG tablet Take 1 tablet (4 mg total) by mouth every 8 (eight) hours as needed for Nausea.     Family History     Problem Relation (Age of Onset)    Kidney disease Brother        Social History Main Topics    Smoking status: Former Smoker     Years: 10.00     Quit date: 9/1/2015    Smokeless tobacco: Former User     Quit date: 2/16/2016    Alcohol use No    Drug use: No    Sexual activity: Yes     Review of Systems   Constitutional: Positive for fatigue. Negative for chills and fever.   HENT: Negative for congestion, sore throat and trouble swallowing.    Eyes: Negative for visual disturbance.   Respiratory: Negative for cough and shortness of breath.    Cardiovascular: Negative for chest pain.   Gastrointestinal: Positive for nausea and vomiting. Negative for abdominal pain.   Endocrine: Negative for polydipsia and polyuria.   Musculoskeletal: Positive for myalgias.   Neurological: Positive for seizures. Negative for dizziness, speech difficulty, weakness and headaches.   Psychiatric/Behavioral: Positive for confusion. Negative for agitation.     Objective:     Vital Signs (Most Recent):  Temp: 97.6 °F (36.4 °C) (04/02/17 0852)  Pulse: 60 (04/02/17 0852)  Resp: 17 (04/02/17 0852)  BP: (!) 186/85 (04/02/17 0852)  SpO2: (!) 94 % (04/02/17 0852) Vital  Signs (24h Range):  Temp:  [97.6 °F (36.4 °C)-100.1 °F (37.8 °C)] 97.6 °F (36.4 °C)  Pulse:  [48-84] 60  Resp:  [15-22] 17  SpO2:  [87 %-99 %] 94 %  BP: (121-253)/() 186/85     Weight: 72.5 kg (159 lb 13.3 oz)  Body mass index is 21.68 kg/(m^2).    Physical Exam   Constitutional: He is oriented to person, place, and time. He appears well-developed and well-nourished. No distress.   HENT:   Head: Normocephalic.   Eyes: EOM are normal.   Post-traumatic changes to R eye   Neck: Normal range of motion. Neck supple.   Pulmonary/Chest: Effort normal. No respiratory distress.   Musculoskeletal: Normal range of motion. He exhibits tenderness.   Neurological: He is alert and oriented to person, place, and time.   A&Ox3  CN II-XII grossly intact  Strength 5/5 in UE and LE bilaterally  No sensory deficits  DTRs 1+ at patella and brachioradialis  No abnormal coordination   Skin: Skin is warm and dry.   Psychiatric: He has a normal mood and affect. His behavior is normal.       NEUROLOGICAL EXAMINATION:     MENTAL STATUS   Oriented to person, place, and time.     CRANIAL NERVES     CN III, IV, VI   Extraocular motions are normal.       Significant Labs:   CBC:   Recent Labs  Lab 04/01/17 1712 04/02/17  0819   WBC 8.00 8.15   HGB 10.1* 9.3*   HCT 32.3* 28.2*    131*     CMP:   Recent Labs  Lab 04/01/17 1712 04/02/17  0819   GLU 96 81    141   K 3.6 4.2    100   CO2 23 30*   BUN 12 21*   CREATININE 3.4* 5.2*   CALCIUM 8.0* 7.6*   MG  --  2.1   PROT 8.5*  --    ALBUMIN 3.6  --    BILITOT 0.6  --    ALKPHOS 87  --    AST 21  --    ALT 11  --    ANIONGAP 13 11   EGFRNONAA 20* 11.8*     POCT Glucose:   Recent Labs  Lab 04/01/17 1953 04/02/17  0557 04/02/17  0857   POCTGLUCOSE 100 85 83     All pertinent lab results from the past 24 hours have been reviewed.    Significant Imaging: I have reviewed all pertinent imaging results/findings within the past 24 hours.

## 2017-04-02 NOTE — H&P
"[facility] Hospital Medicine  History & Physical    Patient Name: João Aguirre  MRN: [id number]  Admission Date: 4/2/2017  Attending Physician: Ab Tomlinson MD   Primary Care Provider: Delbert Redd NP    [facility] Medicine Team: [facility] HOSP MED E Alma Rosa Harrison PA-C     Patient information was obtained from patient, past medical records and ER records.     Subjective:     Principal Problem:Post-traumatic seizures    Chief Complaint:   Chief Complaint   Patient presents with    transfer [facility] to the ed for neuro eval of seizures         HPI: Patient is a 52 year old gentleman with a h/o DM II, ESRD on HD, Hep C, CVA on Plavix, HTN, HLD, and noncompliance.  Patient is a transfer from [facility] for neurology consult.  No family at bedside.  HPI primarily obtained from ER notes, as patient does not remember events.  Following dialysis today, patient's son reports he "suddenly passed out" while walking.  He hit his head on a nearby door and then had "approximately 30 seconds of generalized shaking and foaming at the mouth."  He was confused following episode and does not remember it.  Per EMS, patient was hypoglycemic and hypertensive with a GCS of 14 on their arrival.  Glucose noted to be 50, so he received D50 with improvement to 96.  He was given IV hydralazine with improvement in BP.  CT of head showed no evidence of any subdural or subarachnoid hemorrhage or fracture.  ECG had no acute changes.  Troponin elevated to 0.112; however, this is lower than all of his past troponins on record.  Following work-up in [facility] ER, patient was transferred to a wheelchair for admission and had a second episode of convulsions.  He was then transferred to [facility] ER.    On exam, patient states he "doesn't feel good" but is unable to elaborate much further.  He denies chest pain, SOB, dizziness, palpitations, fever/chills, abdominal pain.  He complains of N/V.  Denies history of " Your basic metabolic panel which includes electrolytes,kidney function is low, but stable.  and  -Glucose (diabetic screening test) is elevated    Follow up in 6 month(s)       seizures or sick contacts.  Denies headache, vision changes, unilateral weakness.      Past Medical History:   Diagnosis Date    Anticoagulant long-term use     Arthritis     Asthma     Back pain     Diabetes mellitus     Encounter for blood transfusion     Eye abnormality right eye    injured as a child    Gastritis     Hemodialysis patient     Hypertension     Pneumonia 2013    Spend 6weeks in hospital at [place]    Renal disorder     Stroke        Past Surgical History:   Procedure Laterality Date    AV FISTULA PLACEMENT      BACK SURGERY      CHOLECYSTECTOMY      EYE SURGERY      R eye       Review of patient's allergies indicates:   Allergen Reactions    Antibiotic hc      Hx of in 2013         Current Facility-Administered Medications on File Prior to Encounter   Medication    [COMPLETED] hydrALAZINE injection 10 mg    [COMPLETED] hydrALAZINE injection 10 mg    [COMPLETED] hydrALAZINE tablet 25 mg    [COMPLETED] HYDROmorphone injection 0.5 mg    [COMPLETED] lorazepam (ATIVAN) 2 mg/mL injection    [COMPLETED] lorazepam injection 1 mg    [COMPLETED] ondansetron injection 8 mg    [COMPLETED] promethazine (PHENERGAN) 12.5 mg in dextrose 5 % 50 mL IVPB    [DISCONTINUED] acetaminophen (TYLENOL) 325 MG tablet    [DISCONTINUED] acetaminophen tablet 650 mg    [DISCONTINUED] levetiracetam (KEPPRA) 2,000 mg in dextrose 5 % 100 mL IVPB     Current Outpatient Prescriptions on File Prior to Encounter   Medication Sig    albuterol (PROAIR HFA) 90 mcg/actuation inhaler INHALE TWO PUFFS EVERY 4 TO 6 HOURS AS NEEDED    amlodipine (NORVASC) 5 MG tablet Take 1 tablet (5 mg total) by mouth 2 (two) times daily.    aspirin 325 MG tablet Take 1 tablet (325 mg total) by mouth once daily.    atorvastatin (LIPITOR) 40 MG tablet Take 1 tablet (40 mg total) by mouth every evening.    blood-glucose meter (PHARMACIST CHOICE GLUCOSE SYS) Misc 1 Device by Misc.(Non-Drug; Combo Route) route once.    calcium  acetate (PHOSLO) 667 mg capsule Take 2,001 mg by mouth 3 (three) times daily with meals.    carvedilol (COREG) 12.5 MG tablet Take 1 tablet (12.5 mg total) by mouth 2 (two) times daily.    clopidogrel (PLAVIX) 75 mg tablet Take 1 tablet (75 mg total) by mouth once daily.    famotidine (PEPCID) 20 MG tablet Take 1 tablet (20 mg total) by mouth once daily.    hydrALAZINE (APRESOLINE) 25 MG tablet Take 25 mg by mouth 3 (three) times daily.    lisinopril (PRINIVIL,ZESTRIL) 40 MG tablet Take 1 tablet (40 mg total) by mouth once daily.    methadone (DOLOPHINE) 10 MG tablet Take 6 tablets (60 mg total) by mouth once daily.    minoxidil (LONITEN) 2.5 MG tablet Take 3 tablets (7.5 mg total) by mouth 2 (two) times daily.    ondansetron (ZOFRAN) 4 MG tablet Take 1 tablet (4 mg total) by mouth every 8 (eight) hours as needed for Nausea.     Family History     Problem Relation (Age of Onset)    Kidney disease Brother        Social History Main Topics    Smoking status: Former Smoker     Years: 10.00     Quit date: 9/1/2015    Smokeless tobacco: Former User     Quit date: 2/16/2016    Alcohol use No    Drug use: No    Sexual activity: Yes     Review of Systems   Constitutional: Positive for fatigue. Negative for activity change, appetite change, chills, diaphoresis, fever and unexpected weight change.   HENT: Negative for congestion, rhinorrhea, sore throat, trouble swallowing and voice change.    Eyes: Negative for visual disturbance.   Respiratory: Negative for cough, choking, chest tightness, shortness of breath and wheezing.    Cardiovascular: Negative for chest pain, palpitations and leg swelling.   Gastrointestinal: Positive for nausea and vomiting. Negative for abdominal distention, abdominal pain, anal bleeding, blood in stool, constipation and diarrhea.   Endocrine: Negative for cold intolerance, heat intolerance, polydipsia and polyuria.   Genitourinary: Negative for dysuria, flank pain, frequency,  hematuria and urgency.   Musculoskeletal: Negative for arthralgias, back pain, joint swelling and myalgias.   Skin: Negative for color change and rash.   Neurological: Positive for seizures. Negative for dizziness, syncope, facial asymmetry, speech difficulty, weakness, light-headedness, numbness and headaches.   Hematological: Negative for adenopathy. Does not bruise/bleed easily.   Psychiatric/Behavioral: Negative for agitation, confusion, hallucinations and suicidal ideas.     Objective:     Vital Signs (Most Recent):  Temp: 98 °F (36.7 °C) (04/02/17 0135)  Pulse: 69 (04/02/17 0135)  Resp: 15 (04/02/17 0135)  BP: (!) 157/88 (04/02/17 0135)  SpO2: 97 % (04/02/17 0135) Vital Signs (24h Range):  Temp:  [97.8 °F (36.6 °C)-100.1 °F (37.8 °C)] 98 °F (36.7 °C)  Pulse:  [48-84] 69  Resp:  [15-22] 15  SpO2:  [87 %-99 %] 97 %  BP: (121-253)/() 157/88     Weight: 72.6 kg (160 lb)  Body mass index is 21.7 kg/(m^2).    Physical Exam   Constitutional: He is oriented to person, place, and time. He appears well-developed and well-nourished. No distress.   HENT:   Head: Normocephalic and atraumatic.   Eyes: Pupils are equal, round, and reactive to light.   Neck: Neck supple. Carotid bruit is not present. No thyromegaly present.   Cardiovascular: Normal rate and regular rhythm.  Exam reveals no gallop.    No murmur heard.  Pulmonary/Chest: Effort normal and breath sounds normal. No respiratory distress. He has no wheezes.   Abdominal: Bowel sounds are normal. He exhibits no distension. There is no splenomegaly or hepatomegaly. There is no tenderness.   Musculoskeletal: Normal range of motion. He exhibits no edema.   Neurological: He is alert and oriented to person, place, and time. He has normal strength. No cranial nerve deficit or sensory deficit. GCS eye subscore is 4. GCS verbal subscore is 5. GCS motor subscore is 6.   Skin: Skin is warm and dry. No rash noted.   Psychiatric: He has a normal mood and affect. His  behavior is normal.        Significant Labs: All pertinent labs within the past 24 hours have been reviewed.    Significant Imaging: I have reviewed all pertinent imaging results/findings within the past 24 hours.    Assessment/Plan:     * Post-traumatic seizures  - Will consult neurology.    - CT with no acute changes.  Consider MRI.  - Seizure precautions, fall precautions, neuro checks q4hrs.  EEG monitoring.      Hypoglycemia associated with type 2 diabetes mellitus  - Hypoglycemic on arrival in OSH ER.  Give D50 with improvement to 96.  - Patient with multiple admissions for hypoglycemia.  He is not on any diabetes medications at this time.  Will monitor closely.  - Diabetic renal diet.    Elevated troponin I level  - Troponin of 0.112.  ECG unchanged.  This is lower than previous troponins.  Do not suspect ACS at this time.      Poorly-controlled hypertension  - Hypertensive urgency at OSH resolved with IV hydralazine 10mg.  - Continue home amlodipine 5mg BID, Coreg 12.5mg BID, lisinopril 40mg daily, Hydralazine 25mg TID, and minoxidil 7.5mg BID.      Coronary artery disease involving native coronary artery of native heart without angina pectoris  - See above.  Continue secondary prevention.      ESRD (T,Th,Sat) dialysis onset 2013  - Will consult nephrology.  - Patient on TuThSa schedule.  He makes minimal urine.  - He completed HD today.  No need for emergent dialysis at this time.  - Continue Phoslo 2001mg TID WM.      Anemia in chronic kidney disease  - H/H improved from baseline to 10.1/32.3.      Diabetes mellitus type 2 in nonobese  - See above.      H/O: CVA (cerebrovascular accident)  - No evidence of acute infarct on CT head.  Continue Plavix, secondary prevention.      Chronic hepatitis C without hepatic coma  - LFTs unremarkable.  Patient with no complaints of abdominal pain.      Methadone dependence  - Continue methadone 60mg daily.  Patient with chronic back pain.      Mixed hyperlipidemia  -  Continue Lipitor 40mg qHS.      VTE Risk Mitigation         Ordered     Medium Risk of VTE  Once      04/02/17 0157     Place ZACHARY hose  Until discontinued      04/02/17 0157     Place sequential compression device  Until discontinued      04/02/17 0157        Alma Rosa Harrison PA-C  Department of Hospital Medicine   [facility]

## 2017-04-02 NOTE — ASSESSMENT & PLAN NOTE
- Will consult nephrology.  - Patient on TuTa schedule.  He makes minimal urine.  - Completed HD on Saturday PTA.  No need for emergent dialysis at this time.  - Continue Phoslo 2001mg TID WM.

## 2017-04-02 NOTE — SUBJECTIVE & OBJECTIVE
PMH, PSH, FH, SH updated and reviewed         Review of Systems      REVIEW OF SYSTEMS  Constitutional: Negative for weight changes.  Eyes: Positive for visual disturbance.  Respiratory: Negative for cough.   Cardiovascular: Negative for chest pain.  Gastrointestinal: Negative for nausea.  Endocrine: Negative for polyuria, polydipsia.  Musculoskeletal: Negative for back pain.  Skin: Negative for rash.  Neurological: Positive for syncope.  Psychiatric/Behavioral: Negative for depression.      Recent Labs  Lab 04/01/17  1712 04/02/17  0819   GLU 96 81   CALCIUM 8.0* 7.6*   ALBUMIN 3.6  --    PROT 8.5*  --     141   K 3.6 4.2   CO2 23 30*    100   BUN 12 21*   CREATININE 3.4* 5.2*   ALKPHOS 87  --    ALT 11  --    AST 21  --    BILITOT 0.6  --      Lab Results   Component Value Date    HGBA1C 4.4 (L) 03/01/2017       Nutritional status:   Body mass index is 21.68 kg/(m^2).  Lab Results   Component Value Date    ALBUMIN 3.6 04/01/2017    ALBUMIN 3.4 (L) 03/01/2017    ALBUMIN 3.1 (L) 02/22/2017     No results found for: PREALBUMIN    Estimated Creatinine Clearance: 17.2 mL/min (based on Cr of 5.2).        PHYSICAL EXAMINATION:  Vitals:    04/02/17 1130   BP: 111/60   Pulse: (!) 58   Resp: 18   Temp: 98.7 °F (37.1 °C)     Body mass index is 21.68 kg/(m^2).    Physical Exam    PHYSICAL EXAMINATION  /60 (BP Location: Right arm, Patient Position: Lying, BP Method: Automatic)  Pulse (!) 58  Temp 98.7 °F (37.1 °C) (Oral)   Resp 18  Ht 6' (1.829 m)  Wt 72.5 kg (159 lb 13.3 oz)  SpO2 96%  BMI 21.68 kg/m2  Constitutional:  Well developed, well nourished, NAD.  ENT: External ears no masses with nose patent; normal hearing. Right Cataract. Right facial droop  Neck:  Supple; trachea midline; no thyromegaly.   Cardiovascular: Normal heart sounds, no LE edema.     Lungs:  Normal effort; lungs anterior bilaterally clear to auscultation.  Abdomen:  Soft, no masses,  no hernias.  MS: No clubbing or cyanosis  of nails noted; normal gait   Skin: No rashes, lesions, or ulcers; no nodules.  Psychiatric: Good judgement and insight; normal mood and affect.  Neurological: Cranial nerves are grossly intact. Mild decrease in vibration sense in the bilateral lower extremities.        Current Medications and/or Treatments Impacting Glycemic Control  Immunotherapy:  Immunosuppressants     None        Steroids:   Hormones     None        Pressors:    Autonomic Drugs     None        Hyperglycemia/Diabetes Medications: Antihyperglycemics     Start     Stop Route Frequency Ordered    04/02/17 0301  insulin aspart pen 0-5 Units      -- SubQ Before meals & nightly PRN 04/02/17 0201          Labs Reviewed and Include     Recent Labs  Lab 04/01/17  1712 04/02/17  0819   GLU 96 81   CALCIUM 8.0* 7.6*   ALBUMIN 3.6  --    PROT 8.5*  --     141   K 3.6 4.2   CO2 23 30*    100   BUN 12 21*   CREATININE 3.4* 5.2*   ALKPHOS 87  --    ALT 11  --    AST 21  --    BILITOT 0.6  --      Lab Results   Component Value Date    WBC 8.15 04/02/2017    HGB 9.3 (L) 04/02/2017    HCT 28.2 (L) 04/02/2017    MCV 95 04/02/2017     (L) 04/02/2017

## 2017-04-02 NOTE — NURSING
Called and spoke with [facility] EEG tech who stated staff would be available after 1300 to place patient on EEG. Per bedside RN patient is to go for MRI this afternoon around 1300, will call EEG staff when patient returns to unit.

## 2017-04-02 NOTE — PROGRESS NOTES
"[facility] Hospital Medicine  Progress Note    Patient Name: João Aguirre  MRN: [id number]  Patient Class: OP- Observation   Admission Date: 4/2/2017  Length of Stay: 0 days  Attending Physician: Samuel Pino MD  Primary Care Provider: Delbert Redd NP    [facility] Medicine Team: [facility] HOSP MED E Roscoe Martini PA-C    Subjective:     Principal Problem:Seizure    HPI:  Patient is a 52 year old gentleman with a h/o DM II, ESRD on HD, Hep C, CVA on Plavix, HTN, HLD, and noncompliance.  Patient is a transfer from [facility] for neurology consult.  No family at bedside.  HPI primarily obtained from ER notes, as patient does not remember events.  Following dialysis today, patient's son reports he "suddenly passed out" while walking.  He hit his head on a nearby door and then had "approximately 30 seconds of generalized shaking and foaming at the mouth."  He was confused following episode and does not remember it.  Per EMS, patient was hypoglycemic and hypertensive with a GCS of 14 on their arrival.  Glucose noted to be 50, so he received D50 with improvement to 96.  He was given IV hydralazine with improvement in BP.  CT of head showed no evidence of any subdural or subarachnoid hemorrhage or fracture.  ECG had no acute changes.  Troponin elevated to 0.112; however, this is lower than all of his past troponins on record.  Following work-up in [facility] ER, patient was transferred to a wheelchair for admission and had a second episode of convulsions.  He was then transferred to [facility] ER.    On exam, patient states he "doesn't feel good" but is unable to elaborate much further.  He denies chest pain, SOB, dizziness, palpitations, fever/chills, abdominal pain.  He complains of N/V.  Denies history of seizures or sick contacts.  Denies headache, vision changes, unilateral weakness.      Hospital Course:  Patient admitted into observation for evaluation of seizure. Patient reports a remote history of " seizure event ~20 years ago. Neurology consulted.    Interval History: Subjectively feels better, no seizure events overnight. MRI and EEG for today. Patient makes little to no urine, will cancel urine studies, blood tox lab ordered. Chart review reveals labs concerning for insulinoma, will consult endocrinology.     Review of Systems   Constitutional: Positive for fatigue. Negative for chills and fever.   HENT: Negative for congestion, sore throat and trouble swallowing.    Eyes: Negative for visual disturbance.   Respiratory: Negative for cough and shortness of breath.    Cardiovascular: Negative for chest pain, palpitations and leg swelling.   Gastrointestinal: Negative for abdominal pain, nausea and vomiting.   Genitourinary:        Anuric   Musculoskeletal: Positive for myalgias.   Neurological: Positive for seizures (no events overnight). Negative for dizziness, speech difficulty, weakness and headaches.   Psychiatric/Behavioral: Positive for confusion. Negative for agitation.     Objective:     Vital Signs (Most Recent):  Temp: 97.6 °F (36.4 °C) (04/02/17 0852)  Pulse: 60 (04/02/17 0852)  Resp: 17 (04/02/17 0852)  BP: (!) 186/85 (04/02/17 0852)  SpO2: (!) 94 % (04/02/17 0852) Vital Signs (24h Range):  Temp:  [97.6 °F (36.4 °C)-100.1 °F (37.8 °C)] 97.6 °F (36.4 °C)  Pulse:  [48-84] 60  Resp:  [15-22] 17  SpO2:  [87 %-99 %] 94 %  BP: (121-253)/() 186/85     Weight: 72.5 kg (159 lb 13.3 oz)  Body mass index is 21.68 kg/(m^2).    Intake/Output Summary (Last 24 hours) at 04/02/17 1032  Last data filed at 04/02/17 0600   Gross per 24 hour   Intake               60 ml   Output                0 ml   Net               60 ml      Physical Exam   Constitutional: He is oriented to person, place, and time. No distress.   HENT:   Bitemporal wasting   Eyes:   Right eye haziness   Cardiovascular: Normal rate and regular rhythm.    Murmur heard.  Pulmonary/Chest: Effort normal and breath sounds normal. No respiratory  distress.   Abdominal: Soft. Bowel sounds are normal. He exhibits no distension.   Musculoskeletal: Normal range of motion. He exhibits no edema.   Neurological: He is alert and oriented to person, place, and time. He displays no tremor. No cranial nerve deficit. He displays no seizure activity.   No focal neurologic deficits   Psychiatric: His affect is blunt.   Nursing note and vitals reviewed.      Significant Labs:   CBC:   Recent Labs  Lab 04/01/17  1712 04/02/17  0819   WBC 8.00 8.15   HGB 10.1* 9.3*   HCT 32.3* 28.2*    131*     CMP:   Recent Labs  Lab 04/01/17  1712 04/02/17  0819    141   K 3.6 4.2    100   CO2 23 30*   GLU 96 81   BUN 12 21*   CREATININE 3.4* 5.2*   CALCIUM 8.0* 7.6*   PROT 8.5*  --    ALBUMIN 3.6  --    BILITOT 0.6  --    ALKPHOS 87  --    AST 21  --    ALT 11  --    ANIONGAP 13 11   EGFRNONAA 20* 11.8*     All pertinent labs within the past 24 hours have been reviewed.    Significant Imaging: I have reviewed all pertinent imaging results/findings within the past 24 hours.    Assessment/Plan:      * Seizure  - Neurology following: MRI w/o and EEG today  - CT with no acute changes.   - Seizure precautions, fall precautions, neuro checks q4hrs.  - serum tox screen as patient anuric    Hypoglycemia associated with type 2 diabetes mellitus  - Hypoglycemic on arrival in [facility] ER.  Given D50 with improvement to 96.  - Patient with multiple admissions for hypoglycemia.  He is not on any diabetes medications at this time.  Last admission patient was put in ICU for severe hypoglycemia, initially believed to be related to methadone use. His dose was decreased and there was some improvement in his sugars. Insulinoma labs drawn during last admit, but were pending at discharge. My review of labs reveals elevated C-peptide of 15 with corresponding glucose of 46 at the time lab was drawn.   - Will consult endocrinology   - Diabetic renal diet    Poorly-controlled hypertension  -  Hypertensive urgency at OSH resolved with IV hydralazine 10mg.  - Continue home amlodipine 5mg BID, Coreg 12.5mg BID, lisinopril 40mg daily, Hydralazine 25mg TID, and minoxidil 7.5mg BID.  - will increase coreg to 25 mg BID, titrate other medications as necessary.    Coronary artery disease involving native coronary artery of native heart without angina pectoris  - See above.  Continue secondary prevention.    ESRD (T,Th,Sat) dialysis onset 2013  - Will consult nephrology.  - Patient on TuThSa schedule.  He makes minimal urine.  - Completed HD on Saturday PTA.  No need for emergent dialysis at this time.  - Continue Phoslo 2001mg TID WM.    Anemia in chronic kidney disease  - H/H improved from baseline to 10.1/32.3.    Elevated troponin I level  - Troponin of 0.112.  ECG unchanged.  This is lower than previous troponins.  Do not suspect ACS at this time.    Diabetes mellitus type 2 in nonobese  - See above.    H/O: CVA (cerebrovascular accident)  - No evidence of acute infarct on CT head.  Continue Plavix, secondary prevention.    Chronic hepatitis C without hepatic coma  - LFTs unremarkable.  Patient with no complaints of abdominal pain.    Methadone dependence  - Continue methadone 60mg daily.  Patient with chronic back pain.    Mixed hyperlipidemia  - Continue Lipitor 40mg qHS.    VTE Risk Mitigation         Ordered     Medium Risk of VTE  Once      04/02/17 0157     Place ZACHARY hose  Until discontinued      04/02/17 0157     Place sequential compression device  Until discontinued      04/02/17 0157          Roscoe Martini PA-C  Department of Hospital Medicine   [facility]

## 2017-04-02 NOTE — PLAN OF CARE
Problem: Patient Care Overview  Goal: Plan of Care Review  Outcome: Ongoing (interventions implemented as appropriate)  Pt alert to self and place; sinus concepcion on telemetry; pt denies pain; up with assistance; no acute distress noted

## 2017-04-02 NOTE — CONSULTS
[facility]  Endocrinology  Consult Note    Consult Requested by: Samuel Pino MD   Reason for admit: Seizure    HISTORY OF PRESENT ILLNESS:  Consult requested by: BENITA Pino    Reason for consult: hypoglycemia    HPI:  Patient male with active medical problems of HTN, Type 2 DM, hx of stroke, ESRD who presents to [facility] as a transfer with a diagnosis of seizure and hypoglycemia.  Patient with a documented blood glucose of 50 by EMS prior to transfer.  Given amp of D50 and blood glucose rise to 96.  Patient has no recollection of events other than occurring after Dialysis.  Of not patient has had previous admission for hypoglycemia unrelated to Dialysis.      Medications and/or Treatments Impacting Glycemic Control:  Immunotherapy:    Immunosuppressants     None        Steroids:   Hormones     None        Pressors:    Autonomic Drugs     None          Prescriptions Prior to Admission   Medication Sig Dispense Refill Last Dose    albuterol (PROAIR HFA) 90 mcg/actuation inhaler INHALE TWO PUFFS EVERY 4 TO 6 HOURS AS NEEDED       amlodipine (NORVASC) 5 MG tablet Take 1 tablet (5 mg total) by mouth 2 (two) times daily. 30 tablet 0 Unknown    aspirin 325 MG tablet Take 1 tablet (325 mg total) by mouth once daily. 30 tablet 1 2/19/2017    atorvastatin (LIPITOR) 40 MG tablet Take 1 tablet (40 mg total) by mouth every evening. 30 tablet 1 2/20/2017    blood-glucose meter (PHARMACIST CHOICE GLUCOSE SYS) Misc 1 Device by Misc.(Non-Drug; Combo Route) route once. 1 each 0     calcium acetate (PHOSLO) 667 mg capsule Take 2,001 mg by mouth 3 (three) times daily with meals.       carvedilol (COREG) 12.5 MG tablet Take 1 tablet (12.5 mg total) by mouth 2 (two) times daily. 60 tablet 0 2/20/2017    clopidogrel (PLAVIX) 75 mg tablet Take 1 tablet (75 mg total) by mouth once daily. 30 tablet 1 2/20/2017    famotidine (PEPCID) 20 MG tablet Take 1 tablet (20 mg total) by mouth once daily. 30 tablet 1 2/20/2017     hydrALAZINE (APRESOLINE) 25 MG tablet Take 25 mg by mouth 3 (three) times daily.   2/20/2017    lisinopril (PRINIVIL,ZESTRIL) 40 MG tablet Take 1 tablet (40 mg total) by mouth once daily. 30 tablet 1 2/20/2017    methadone (DOLOPHINE) 10 MG tablet Take 6 tablets (60 mg total) by mouth once daily.  0     minoxidil (LONITEN) 2.5 MG tablet Take 3 tablets (7.5 mg total) by mouth 2 (two) times daily. 90 tablet 0     ondansetron (ZOFRAN) 4 MG tablet Take 1 tablet (4 mg total) by mouth every 8 (eight) hours as needed for Nausea. 12 tablet 0 Unknown       Current Facility-Administered Medications   Medication Dose Route Frequency Provider Last Rate Last Dose    acetaminophen tablet 650 mg  650 mg Oral Q4H PRN Alma Rosa Harrison PA-C   650 mg at 04/02/17 0514    amlodipine tablet 5 mg  5 mg Oral BID Alma Rosa Harrison PA-C   5 mg at 04/02/17 0858    aspirin tablet 325 mg  325 mg Oral Daily Alma Rosa Harrison PA-C   325 mg at 04/02/17 0858    atorvastatin tablet 40 mg  40 mg Oral QHS Alma Rosa Harrison PA-C   40 mg at 04/02/17 0514    calcium acetate capsule 2,001 mg  2,001 mg Oral TID  Alma Rosa Harrison PA-C   2,001 mg at 04/02/17 1138    carvedilol tablet 25 mg  25 mg Oral BID Roscoe Martini PA-C        clopidogrel tablet 75 mg  75 mg Oral Daily Alma Rosa Harrison PA-C   75 mg at 04/02/17 0859    dextrose 50% injection 12.5 g  12.5 g Intravenous PRN Alma Rosa Harrison PA-C        dextrose 50% injection 25 g  25 g Intravenous PRN Alma Rosa Harrison PA-C        docusate sodium capsule 100 mg  100 mg Oral BID Alma Rosa Harrison PA-C   100 mg at 04/02/17 0859    famotidine tablet 20 mg  20 mg Oral Daily Alma Rosa Harrison PA-C   20 mg at 04/02/17 0905    glucagon (human recombinant) injection 1 mg  1 mg Intramuscular PRN Alma Rosa Harrison PA-C        glucose chewable tablet 16 g  16 g Oral PRN Alma Rosa Harrison PA-C        glucose chewable tablet 24 g  24 g Oral PRN Alma Rosa  CARROLL Harrison PA-C        hydrALAZINE tablet 25 mg  25 mg Oral TID Alma Rosa Harrison PA-C   25 mg at 04/02/17 0514    insulin aspart pen 0-5 Units  0-5 Units Subcutaneous QID (AC + HS) PRN Alma Rosa Harrison PA-C        lisinopril tablet 40 mg  40 mg Oral Daily Alma Rosa Harrison PA-C   40 mg at 04/02/17 0859    loperamide capsule 2 mg  2 mg Oral PRN Alma Rosa Harrison PA-C        methadone tablet 60 mg  60 mg Oral Daily Alma Rosa Harrison PA-C   60 mg at 04/02/17 0859    minoxidil tablet 7.5 mg  7.5 mg Oral BID Alma Rosa Harrison PA-C   7.5 mg at 04/02/17 0900    ondansetron injection 4 mg  4 mg Intravenous Q8H PRN Alma Rosa Harrison PA-C        polyethylene glycol packet 17 g  17 g Oral TID PRN Alma Rosa Harrison PA-C        promethazine (PHENERGAN) 6.25 mg in dextrose 5 % 50 mL IVPB  6.25 mg Intravenous Q6H PRN Alma Rosa Harrison PA-C        sodium chloride 0.9% flush 3 mL  3 mL Intravenous Q8H Alma Rosa Harrison PA-C   3 mL at 04/02/17 0900           PMH, PSH, FH, SH updated and reviewed         Review of Systems      REVIEW OF SYSTEMS  Constitutional: Negative for weight changes.  Eyes: Positive for visual disturbance.  Respiratory: Negative for cough.   Cardiovascular: Negative for chest pain.  Gastrointestinal: Negative for nausea.  Endocrine: Negative for polyuria, polydipsia.  Musculoskeletal: Negative for back pain.  Skin: Negative for rash.  Neurological: Positive for syncope.  Psychiatric/Behavioral: Negative for depression.      Recent Labs  Lab 04/01/17  1712 04/02/17  0819   GLU 96 81   CALCIUM 8.0* 7.6*   ALBUMIN 3.6  --    PROT 8.5*  --     141   K 3.6 4.2   CO2 23 30*    100   BUN 12 21*   CREATININE 3.4* 5.2*   ALKPHOS 87  --    ALT 11  --    AST 21  --    BILITOT 0.6  --      Lab Results   Component Value Date    HGBA1C 4.4 (L) 03/01/2017       Nutritional status:   Body mass index is 21.68 kg/(m^2).  Lab Results   Component Value Date    ALBUMIN 3.6  04/01/2017    ALBUMIN 3.4 (L) 03/01/2017    ALBUMIN 3.1 (L) 02/22/2017     No results found for: PREALBUMIN    Estimated Creatinine Clearance: 17.2 mL/min (based on Cr of 5.2).        PHYSICAL EXAMINATION:  Vitals:    04/02/17 1130   BP: 111/60   Pulse: (!) 58   Resp: 18   Temp: 98.7 °F (37.1 °C)     Body mass index is 21.68 kg/(m^2).    Physical Exam    PHYSICAL EXAMINATION  /60 (BP Location: Right arm, Patient Position: Lying, BP Method: Automatic)  Pulse (!) 58  Temp 98.7 °F (37.1 °C) (Oral)   Resp 18  Ht 6' (1.829 m)  Wt 72.5 kg (159 lb 13.3 oz)  SpO2 96%  BMI 21.68 kg/m2  Constitutional:  Well developed, well nourished, NAD.  ENT: External ears no masses with nose patent; normal hearing. Right Cataract. Right facial droop  Neck:  Supple; trachea midline; no thyromegaly.   Cardiovascular: Normal heart sounds, no LE edema.     Lungs:  Normal effort; lungs anterior bilaterally clear to auscultation.  Abdomen:  Soft, no masses,  no hernias.  MS: No clubbing or cyanosis of nails noted; normal gait   Skin: No rashes, lesions, or ulcers; no nodules.  Psychiatric: Good judgement and insight; normal mood and affect.  Neurological: Cranial nerves are grossly intact. Mild decrease in vibration sense in the bilateral lower extremities.        Current Medications and/or Treatments Impacting Glycemic Control  Immunotherapy:  Immunosuppressants     None        Steroids:   Hormones     None        Pressors:    Autonomic Drugs     None        Hyperglycemia/Diabetes Medications: Antihyperglycemics     Start     Stop Route Frequency Ordered    04/02/17 0301  insulin aspart pen 0-5 Units      -- SubQ Before meals & nightly PRN 04/02/17 0201          Labs Reviewed and Include     Recent Labs  Lab 04/01/17  1712 04/02/17  0819   GLU 96 81   CALCIUM 8.0* 7.6*   ALBUMIN 3.6  --    PROT 8.5*  --     141   K 3.6 4.2   CO2 23 30*    100   BUN 12 21*   CREATININE 3.4* 5.2*   ALKPHOS 87  --    ALT 11  --    AST 21   --    BILITOT 0.6  --      Lab Results   Component Value Date    WBC 8.15 04/02/2017    HGB 9.3 (L) 04/02/2017    HCT 28.2 (L) 04/02/2017    MCV 95 04/02/2017     (L) 04/02/2017       .     ASSESSMENT and PLAN:    * Seizure  Being evaluated by Neurology      Coronary artery disease involving native coronary artery of native heart without angina pectoris  Avoid hypoglycemia      Anemia in chronic kidney disease  Can falsely lower a1c      H/O: CVA (cerebrovascular accident)  Avoid hypoglycemia      Hypoglycemia associated with type 2 diabetes mellitus  Insulin mediated vs non insulin mediated  -Non-insulin mediated evaluation will consist of evaluation of counter regulatory hormones.  -Etiologies include: ESRD, malnutrition (however appears with good nutrition status), deficient glycogen stores from Hep C cirrhosis (would likely manifest with hypoglycemia early am or overnight).   -Will order ACTH stim test to evaluate for adrenal insufficiency  -Will order IGF-1 to evaluate for GH hormone deficiency  -Insulin mediated evaluation will consist of: Insulin, c peptide, iraheta screen, glucose, pro-insulin, cortisol, and GH at the time of Hypoglycemia for bg less than 60 and asymptomatic. Draw labs first.        DISCHARGE NEEDS: will assess daily    Tj Lindo MD  Endocrinology  [facility], Madai Love MD,  have personally taken the history and examined the patient and agree with the resident's note as stated above.      Add B-OH is hypoglycemia noted     No insulin levels at prior hypo events-- there were c peptides but with ESRD hard to interpret

## 2017-04-02 NOTE — ED PROVIDER NOTES
Encounter Date: 4/1/2017    SCRIBE #1 NOTE: I, Juli Hurley, am scribing for, and in the presence of,  Dr. Tomlinson. I have scribed the following portions of the note - the Resident attestation.       History     Chief Complaint   Patient presents with    transfer [facility] to the ed for neuro eval of seizures      Review of patient's allergies indicates:   Allergen Reactions    Antibiotic hc      Hx of in 2013   n     HPI Comments: Mr Aguirre presents from [facility] for possible seizure evaluation. He has a history of HTN, DM and is on dialysis, received dialysis today and arrived home to have a brief syncopal event while standing during which he struck his head. On EMS arrival, he was hypertensive and hypoglycemic (blood glucose of 50). Received D50 and taken to [facility]. He had an essentially negative work up there and planned admission to hospital medicine when he had another syncopal/seizure like event upon transfer to a wheelchair. For that reason, they elected to transfer him here. The patient has no real complaints at this time.     Past Medical History:   Diagnosis Date    Anticoagulant long-term use     Arthritis     Asthma     Back pain     Diabetes mellitus     Encounter for blood transfusion     Eye abnormality right eye    injured as a child    Gastritis     Hemodialysis patient     Hypertension     Pneumonia 2013    Spend 6weeks in hospital at [facility]    Renal disorder     Stroke      Past Surgical History:   Procedure Laterality Date    AV FISTULA PLACEMENT      BACK SURGERY      CHOLECYSTECTOMY      EYE SURGERY      R eye     Family History   Problem Relation Age of Onset    Kidney disease Brother      Social History   Substance Use Topics    Smoking status: Former Smoker     Years: 10.00     Quit date: 9/1/2015    Smokeless tobacco: Former User     Quit date: 2/16/2016    Alcohol use No     Review of Systems   Constitutional: Negative for fever.    HENT: Negative for sore throat.    Respiratory: Negative for shortness of breath.    Cardiovascular: Negative for chest pain.   Gastrointestinal: Negative for nausea.   Genitourinary: Negative for dysuria.   Musculoskeletal: Negative for back pain.   Skin: Negative for rash.   Neurological: Positive for seizures, syncope and headaches. Negative for weakness.   Hematological: Does not bruise/bleed easily.       Physical Exam   Initial Vitals   BP Pulse Resp Temp SpO2   04/01/17 2239 04/01/17 2239 04/01/17 2239 04/01/17 2239 --   170/86 68 18 98.5 °F (36.9 °C)      Physical Exam    Constitutional: Vital signs are normal. He appears well-developed and well-nourished.   HENT:   Head: Normocephalic and atraumatic.   Eyes: No scleral icterus.   Right eye opacity   Neck: Normal range of motion. Neck supple. No thyromegaly present. No tracheal deviation present.   Cardiovascular: Normal rate and regular rhythm.   Left forearm fistula with palpable thril   Pulmonary/Chest: No respiratory distress. He has rales.   Abdominal: Soft. He exhibits no distension.   Musculoskeletal: Normal range of motion.   Neurological: He is alert and oriented to person, place, and time. He has normal strength. No cranial nerve deficit or sensory deficit.         ED Course   Procedures  Labs Reviewed   BASIC METABOLIC PANEL   CBC W/ AUTO DIFFERENTIAL   MAGNESIUM   PHOSPHORUS   TOXICOLOGY SCREEN, URINE, RANDOM (COMPLIANCE)   URINALYSIS   ALCOHOL,URINE MEDICAL (ETHANOL)   POCT GLUCOSE MONITORING CONTINUOUS             Medical Decision Making:   History:   Old Medical Records: I decided to obtain old medical records.  Clinical Tests:   Lab Tests: Reviewed  Other:   I have discussed this case with another health care provider.       APC / Resident Notes:   Emergent evaluation of a 52M with HTN, DM, ESRD on HD who presents for multiple syncopal v seizure like events today. Currently with normal exam. Differential includes orthostatic syncope,  hypoglycemia, arrythmia, seizure. Admitted to IM for obs.        Scribe Attestation:   Scribe #1: I performed the above scribed service and the documentation accurately describes the services I performed. I attest to the accuracy of the note.    Attending Attestation:   Physician Attestation Statement for Resident:  As the supervising MD   Physician Attestation Statement: I have personally seen and examined this patient.   I agree with the above history. -:   As the supervising MD I agree with the above PE.    As the supervising MD I agree with the above treatment, course, plan, and disposition.   -: Pt arrives as transfer from the [facility] for evaluation of seizure activity. Pt still confused. He has had work up at outside facility. Will admit to medicine.   I have reviewed the following: records from a referring facility and old records at this facility.          Physician Attestation for Scribe:  Physician Attestation Statement for Scribe #1: I, Dr. Tomlinson, reviewed documentation, as scribed by Juli Hurley in my presence, and it is both accurate and complete.                 ED Course     Clinical Impression:   The encounter diagnosis was Syncope.    Disposition:   Disposition: Admitted       Kaylyn Kent MD  Resident  04/02/17 5864

## 2017-04-02 NOTE — SUBJECTIVE & OBJECTIVE
Past Medical History:   Diagnosis Date    Anticoagulant long-term use     Arthritis     Asthma     Back pain     Diabetes mellitus     Encounter for blood transfusion     Eye abnormality right eye    injured as a child    Gastritis     Hemodialysis patient     Hypertension     Pneumonia 2013    Spend 6weeks in hospital at [place]    Renal disorder     Stroke        Past Surgical History:   Procedure Laterality Date    AV FISTULA PLACEMENT      BACK SURGERY      CHOLECYSTECTOMY      EYE SURGERY      R eye       Review of patient's allergies indicates:   Allergen Reactions    Antibiotic hc      Hx of in 2013         Current Facility-Administered Medications on File Prior to Encounter   Medication    [COMPLETED] hydrALAZINE injection 10 mg    [COMPLETED] hydrALAZINE injection 10 mg    [COMPLETED] hydrALAZINE tablet 25 mg    [COMPLETED] HYDROmorphone injection 0.5 mg    [COMPLETED] lorazepam (ATIVAN) 2 mg/mL injection    [COMPLETED] lorazepam injection 1 mg    [COMPLETED] ondansetron injection 8 mg    [COMPLETED] promethazine (PHENERGAN) 12.5 mg in dextrose 5 % 50 mL IVPB    [DISCONTINUED] acetaminophen (TYLENOL) 325 MG tablet    [DISCONTINUED] acetaminophen tablet 650 mg    [DISCONTINUED] levetiracetam (KEPPRA) 2,000 mg in dextrose 5 % 100 mL IVPB     Current Outpatient Prescriptions on File Prior to Encounter   Medication Sig    albuterol (PROAIR HFA) 90 mcg/actuation inhaler INHALE TWO PUFFS EVERY 4 TO 6 HOURS AS NEEDED    amlodipine (NORVASC) 5 MG tablet Take 1 tablet (5 mg total) by mouth 2 (two) times daily.    aspirin 325 MG tablet Take 1 tablet (325 mg total) by mouth once daily.    atorvastatin (LIPITOR) 40 MG tablet Take 1 tablet (40 mg total) by mouth every evening.    blood-glucose meter (PHARMACIST CHOICE GLUCOSE SYS) Misc 1 Device by Misc.(Non-Drug; Combo Route) route once.    calcium acetate (PHOSLO) 667 mg capsule Take 2,001 mg by mouth 3 (three) times daily with  meals.    carvedilol (COREG) 12.5 MG tablet Take 1 tablet (12.5 mg total) by mouth 2 (two) times daily.    clopidogrel (PLAVIX) 75 mg tablet Take 1 tablet (75 mg total) by mouth once daily.    famotidine (PEPCID) 20 MG tablet Take 1 tablet (20 mg total) by mouth once daily.    hydrALAZINE (APRESOLINE) 25 MG tablet Take 25 mg by mouth 3 (three) times daily.    lisinopril (PRINIVIL,ZESTRIL) 40 MG tablet Take 1 tablet (40 mg total) by mouth once daily.    methadone (DOLOPHINE) 10 MG tablet Take 6 tablets (60 mg total) by mouth once daily.    minoxidil (LONITEN) 2.5 MG tablet Take 3 tablets (7.5 mg total) by mouth 2 (two) times daily.    ondansetron (ZOFRAN) 4 MG tablet Take 1 tablet (4 mg total) by mouth every 8 (eight) hours as needed for Nausea.     Family History     Problem Relation (Age of Onset)    Kidney disease Brother        Social History Main Topics    Smoking status: Former Smoker     Years: 10.00     Quit date: 9/1/2015    Smokeless tobacco: Former User     Quit date: 2/16/2016    Alcohol use No    Drug use: No    Sexual activity: Yes     Review of Systems   Constitutional: Positive for fatigue. Negative for activity change, appetite change, chills, diaphoresis, fever and unexpected weight change.   HENT: Negative for congestion, rhinorrhea, sore throat, trouble swallowing and voice change.    Eyes: Negative for visual disturbance.   Respiratory: Negative for cough, choking, chest tightness, shortness of breath and wheezing.    Cardiovascular: Negative for chest pain, palpitations and leg swelling.   Gastrointestinal: Positive for nausea and vomiting. Negative for abdominal distention, abdominal pain, anal bleeding, blood in stool, constipation and diarrhea.   Endocrine: Negative for cold intolerance, heat intolerance, polydipsia and polyuria.   Genitourinary: Negative for dysuria, flank pain, frequency, hematuria and urgency.   Musculoskeletal: Negative for arthralgias, back pain, joint  swelling and myalgias.   Skin: Negative for color change and rash.   Neurological: Positive for seizures. Negative for dizziness, syncope, facial asymmetry, speech difficulty, weakness, light-headedness, numbness and headaches.   Hematological: Negative for adenopathy. Does not bruise/bleed easily.   Psychiatric/Behavioral: Negative for agitation, confusion, hallucinations and suicidal ideas.     Objective:     Vital Signs (Most Recent):  Temp: 98 °F (36.7 °C) (04/02/17 0135)  Pulse: 69 (04/02/17 0135)  Resp: 15 (04/02/17 0135)  BP: (!) 157/88 (04/02/17 0135)  SpO2: 97 % (04/02/17 0135) Vital Signs (24h Range):  Temp:  [97.8 °F (36.6 °C)-100.1 °F (37.8 °C)] 98 °F (36.7 °C)  Pulse:  [48-84] 69  Resp:  [15-22] 15  SpO2:  [87 %-99 %] 97 %  BP: (121-253)/() 157/88     Weight: 72.6 kg (160 lb)  Body mass index is 21.7 kg/(m^2).    Physical Exam   Constitutional: He is oriented to person, place, and time. He appears well-developed and well-nourished. No distress.   HENT:   Head: Normocephalic and atraumatic.   Eyes: Pupils are equal, round, and reactive to light.   Neck: Neck supple. Carotid bruit is not present. No thyromegaly present.   Cardiovascular: Normal rate and regular rhythm.  Exam reveals no gallop.    No murmur heard.  Pulmonary/Chest: Effort normal and breath sounds normal. No respiratory distress. He has no wheezes.   Abdominal: Bowel sounds are normal. He exhibits no distension. There is no splenomegaly or hepatomegaly. There is no tenderness.   Musculoskeletal: Normal range of motion. He exhibits no edema.   Neurological: He is alert and oriented to person, place, and time. He has normal strength. No cranial nerve deficit or sensory deficit. GCS eye subscore is 4. GCS verbal subscore is 5. GCS motor subscore is 6.   Skin: Skin is warm and dry. No rash noted.   Psychiatric: He has a normal mood and affect. His behavior is normal.        Significant Labs: All pertinent labs within the past 24 hours  have been reviewed.    Significant Imaging: I have reviewed all pertinent imaging results/findings within the past 24 hours.

## 2017-04-02 NOTE — ASSESSMENT & PLAN NOTE
- Hypertensive urgency at OSH resolved with IV hydralazine 10mg.  - Continue home amlodipine 5mg BID, Coreg 12.5mg BID, lisinopril 40mg daily, Hydralazine 25mg TID, and minoxidil 7.5mg BID.

## 2017-04-02 NOTE — UM SECONDARY REVIEW
Physician Advisor External    Level of Care Issue    Approved Inpatient     Case has been approved for inpatient By EHR Dr Adames. KIMBERLEE Lloyd RN

## 2017-04-02 NOTE — ASSESSMENT & PLAN NOTE
- Troponin of 0.112.  ECG unchanged.  This is lower than previous troponins.  Do not suspect ACS at this time.

## 2017-04-02 NOTE — PROGRESS NOTES
Patient was unable to keep head still in MRI. Called nurse for sedation and 2mg versed was ordered. Patient became agitated when tech tried applying for pads to minimize motion. Best images possible for patient.

## 2017-04-02 NOTE — PROGRESS NOTES
Pt moving in MRI scanner resulting in motion & poor images, nurse notified & MD ordered versed 2mg iv, allergies verified in chart & med given, pt remained calm & still for approx 3 minutes then began to constantly move, lift head out of brain coil, high safety & fall risk, will notify pts floor nurse, MRI unsuccessful

## 2017-04-02 NOTE — ASSESSMENT & PLAN NOTE
- Hypoglycemic on arrival in OSH ER.  Give D50 with improvement to 96.  - Patient with multiple admissions for hypoglycemia.  He is not on any diabetes medications at this time.  Will monitor closely.  - Diabetic renal diet.

## 2017-04-02 NOTE — SUBJECTIVE & OBJECTIVE
Interval History: Subjectively feels better, no seizure events overnight. MRI and EEG for today. Patient makes little to no urine, will cancel urine studies.     Review of Systems   Constitutional: Positive for fatigue. Negative for chills and fever.   HENT: Negative for congestion, sore throat and trouble swallowing.    Eyes: Negative for visual disturbance.   Respiratory: Negative for cough and shortness of breath.    Cardiovascular: Negative for chest pain, palpitations and leg swelling.   Gastrointestinal: Negative for abdominal pain, nausea and vomiting.   Genitourinary:        Anuric   Musculoskeletal: Positive for myalgias.   Neurological: Positive for seizures (no events overnight). Negative for dizziness, speech difficulty, weakness and headaches.   Psychiatric/Behavioral: Positive for confusion. Negative for agitation.     Objective:     Vital Signs (Most Recent):  Temp: 97.6 °F (36.4 °C) (04/02/17 0852)  Pulse: 60 (04/02/17 0852)  Resp: 17 (04/02/17 0852)  BP: (!) 186/85 (04/02/17 0852)  SpO2: (!) 94 % (04/02/17 0852) Vital Signs (24h Range):  Temp:  [97.6 °F (36.4 °C)-100.1 °F (37.8 °C)] 97.6 °F (36.4 °C)  Pulse:  [48-84] 60  Resp:  [15-22] 17  SpO2:  [87 %-99 %] 94 %  BP: (121-253)/() 186/85     Weight: 72.5 kg (159 lb 13.3 oz)  Body mass index is 21.68 kg/(m^2).    Intake/Output Summary (Last 24 hours) at 04/02/17 1032  Last data filed at 04/02/17 0600   Gross per 24 hour   Intake               60 ml   Output                0 ml   Net               60 ml      Physical Exam   Constitutional: He is oriented to person, place, and time. No distress.   HENT:   Bitemporal wasting   Eyes:   Right eye haziness   Cardiovascular: Normal rate and regular rhythm.    Murmur heard.  Pulmonary/Chest: Effort normal and breath sounds normal. No respiratory distress.   Abdominal: Soft. Bowel sounds are normal. He exhibits no distension.   Musculoskeletal: Normal range of motion. He exhibits no edema.    Neurological: He is alert and oriented to person, place, and time. He displays no tremor. No cranial nerve deficit. He displays no seizure activity.   No focal neurologic deficits   Psychiatric: His affect is blunt.   Nursing note and vitals reviewed.      Significant Labs:   CBC:   Recent Labs  Lab 04/01/17 1712 04/02/17  0819   WBC 8.00 8.15   HGB 10.1* 9.3*   HCT 32.3* 28.2*    131*     CMP:   Recent Labs  Lab 04/01/17 1712 04/02/17  0819    141   K 3.6 4.2    100   CO2 23 30*   GLU 96 81   BUN 12 21*   CREATININE 3.4* 5.2*   CALCIUM 8.0* 7.6*   PROT 8.5*  --    ALBUMIN 3.6  --    BILITOT 0.6  --    ALKPHOS 87  --    AST 21  --    ALT 11  --    ANIONGAP 13 11   EGFRNONAA 20* 11.8*     All pertinent labs within the past 24 hours have been reviewed.    Significant Imaging: I have reviewed all pertinent imaging results/findings within the past 24 hours.

## 2017-04-03 PROBLEM — R55 SYNCOPE: Status: ACTIVE | Noted: 2017-04-03

## 2017-04-03 LAB
ANION GAP SERPL CALC-SCNC: 16 MMOL/L
B-OH-BUTYR BLD STRIP-SCNC: 0 MMOL/L
BASOPHILS # BLD AUTO: 0.04 K/UL
BASOPHILS NFR BLD: 0.5 %
BUN SERPL-MCNC: 40 MG/DL
CALCIUM SERPL-MCNC: 8.4 MG/DL
CHLORIDE SERPL-SCNC: 98 MMOL/L
CO2 SERPL-SCNC: 27 MMOL/L
CREAT SERPL-MCNC: 7.1 MG/DL
DIFFERENTIAL METHOD: ABNORMAL
EOSINOPHIL # BLD AUTO: 0.5 K/UL
EOSINOPHIL NFR BLD: 6.5 %
ERYTHROCYTE [DISTWIDTH] IN BLOOD BY AUTOMATED COUNT: 14.8 %
EST. GFR  (AFRICAN AMERICAN): 9.4 ML/MIN/1.73 M^2
EST. GFR  (NON AFRICAN AMERICAN): 8.1 ML/MIN/1.73 M^2
GLUCOSE SERPL-MCNC: 64 MG/DL
HCT VFR BLD AUTO: 29 %
HGB BLD-MCNC: 9.3 G/DL
LYMPHOCYTES # BLD AUTO: 1.2 K/UL
LYMPHOCYTES NFR BLD: 16.1 %
MCH RBC QN AUTO: 31 PG
MCHC RBC AUTO-ENTMCNC: 32.1 %
MCV RBC AUTO: 97 FL
MONOCYTES # BLD AUTO: 0.5 K/UL
MONOCYTES NFR BLD: 6.6 %
NEUTROPHILS # BLD AUTO: 5.1 K/UL
NEUTROPHILS NFR BLD: 70 %
PLATELET # BLD AUTO: 117 K/UL
PMV BLD AUTO: 9.5 FL
POCT GLUCOSE: 103 MG/DL (ref 70–110)
POCT GLUCOSE: 161 MG/DL (ref 70–110)
POCT GLUCOSE: 172 MG/DL (ref 70–110)
POCT GLUCOSE: 71 MG/DL (ref 70–110)
POCT GLUCOSE: 82 MG/DL (ref 70–110)
POTASSIUM SERPL-SCNC: 4.6 MMOL/L
RBC # BLD AUTO: 3 M/UL
SODIUM SERPL-SCNC: 141 MMOL/L
WBC # BLD AUTO: 7.28 K/UL

## 2017-04-03 PROCEDURE — 99233 SBSQ HOSP IP/OBS HIGH 50: CPT | Mod: GC,,, | Performed by: PSYCHIATRY & NEUROLOGY

## 2017-04-03 PROCEDURE — 11000001 HC ACUTE MED/SURG PRIVATE ROOM

## 2017-04-03 PROCEDURE — 99233 SBSQ HOSP IP/OBS HIGH 50: CPT | Mod: ,,, | Performed by: PHYSICIAN ASSISTANT

## 2017-04-03 PROCEDURE — 25000003 PHARM REV CODE 250: Performed by: PHYSICIAN ASSISTANT

## 2017-04-03 PROCEDURE — 80048 BASIC METABOLIC PNL TOTAL CA: CPT

## 2017-04-03 PROCEDURE — 97530 THERAPEUTIC ACTIVITIES: CPT

## 2017-04-03 PROCEDURE — 99232 SBSQ HOSP IP/OBS MODERATE 35: CPT | Mod: GC,,, | Performed by: INTERNAL MEDICINE

## 2017-04-03 PROCEDURE — 95951 HC EEG MONITORING/VIDEO RECORD: CPT

## 2017-04-03 PROCEDURE — 85025 COMPLETE CBC W/AUTO DIFF WBC: CPT

## 2017-04-03 PROCEDURE — 82010 KETONE BODYS QUAN: CPT

## 2017-04-03 PROCEDURE — 95819 EEG AWAKE AND ASLEEP: CPT | Mod: 26,,, | Performed by: PSYCHIATRY & NEUROLOGY

## 2017-04-03 PROCEDURE — 97161 PT EVAL LOW COMPLEX 20 MIN: CPT

## 2017-04-03 PROCEDURE — 63600175 PHARM REV CODE 636 W HCPCS: Performed by: PHYSICIAN ASSISTANT

## 2017-04-03 PROCEDURE — 36415 COLL VENOUS BLD VENIPUNCTURE: CPT

## 2017-04-03 PROCEDURE — 95957 EEG DIGITAL ANALYSIS: CPT

## 2017-04-03 RX ORDER — HYDRALAZINE HYDROCHLORIDE 25 MG/1
50 TABLET, FILM COATED ORAL 3 TIMES DAILY
Status: DISCONTINUED | OUTPATIENT
Start: 2017-04-03 | End: 2017-04-05 | Stop reason: HOSPADM

## 2017-04-03 RX ORDER — CARVEDILOL 6.25 MG/1
12.5 TABLET ORAL 2 TIMES DAILY
Status: DISCONTINUED | OUTPATIENT
Start: 2017-04-03 | End: 2017-04-05 | Stop reason: HOSPADM

## 2017-04-03 RX ORDER — SODIUM CHLORIDE 9 MG/ML
INJECTION, SOLUTION INTRAVENOUS ONCE
Status: COMPLETED | OUTPATIENT
Start: 2017-04-04 | End: 2017-04-04

## 2017-04-03 RX ORDER — SODIUM CHLORIDE 9 MG/ML
INJECTION, SOLUTION INTRAVENOUS
Status: DISCONTINUED | OUTPATIENT
Start: 2017-04-04 | End: 2017-04-05 | Stop reason: HOSPADM

## 2017-04-03 RX ADMIN — CALCIUM ACETATE 2001 MG: 667 CAPSULE ORAL at 11:04

## 2017-04-03 RX ADMIN — AMLODIPINE BESYLATE 5 MG: 5 TABLET ORAL at 08:04

## 2017-04-03 RX ADMIN — DOCUSATE SODIUM 100 MG: 50 CAPSULE, LIQUID FILLED ORAL at 09:04

## 2017-04-03 RX ADMIN — MINOXIDIL 7.5 MG: 2.5 TABLET ORAL at 08:04

## 2017-04-03 RX ADMIN — FAMOTIDINE 20 MG: 20 TABLET, FILM COATED ORAL at 08:04

## 2017-04-03 RX ADMIN — AMLODIPINE BESYLATE 5 MG: 5 TABLET ORAL at 09:04

## 2017-04-03 RX ADMIN — ASPIRIN 325 MG ORAL TABLET 325 MG: 325 PILL ORAL at 08:04

## 2017-04-03 RX ADMIN — ONDANSETRON 4 MG: 2 INJECTION INTRAMUSCULAR; INTRAVENOUS at 08:04

## 2017-04-03 RX ADMIN — CARVEDILOL 25 MG: 25 TABLET, FILM COATED ORAL at 08:04

## 2017-04-03 RX ADMIN — CLOPIDOGREL 75 MG: 75 TABLET, FILM COATED ORAL at 08:04

## 2017-04-03 RX ADMIN — HYDRALAZINE HYDROCHLORIDE 50 MG: 25 TABLET, FILM COATED ORAL at 09:04

## 2017-04-03 RX ADMIN — Medication 3 ML: at 09:04

## 2017-04-03 RX ADMIN — ATORVASTATIN CALCIUM 40 MG: 20 TABLET, FILM COATED ORAL at 09:04

## 2017-04-03 RX ADMIN — DOCUSATE SODIUM 100 MG: 50 CAPSULE, LIQUID FILLED ORAL at 08:04

## 2017-04-03 RX ADMIN — Medication 3 ML: at 02:04

## 2017-04-03 RX ADMIN — Medication 3 ML: at 05:04

## 2017-04-03 RX ADMIN — CARVEDILOL 12.5 MG: 6.25 TABLET, FILM COATED ORAL at 09:04

## 2017-04-03 RX ADMIN — METHADONE HYDROCHLORIDE 60 MG: 10 TABLET ORAL at 08:04

## 2017-04-03 RX ADMIN — LISINOPRIL 40 MG: 20 TABLET ORAL at 08:04

## 2017-04-03 RX ADMIN — MINOXIDIL 7.5 MG: 2.5 TABLET ORAL at 09:04

## 2017-04-03 RX ADMIN — HYDRALAZINE HYDROCHLORIDE 25 MG: 25 TABLET ORAL at 05:04

## 2017-04-03 RX ADMIN — CALCIUM ACETATE 2001 MG: 667 CAPSULE ORAL at 08:04

## 2017-04-03 RX ADMIN — CALCIUM ACETATE 2001 MG: 667 CAPSULE ORAL at 05:04

## 2017-04-03 NOTE — ASSESSMENT & PLAN NOTE
- Initial episode at home following dialysis [04/01/17] - second episode during transport from ED to floor at OSH  - Hypoglycemic and hypertensive on initial presentation, now improved  - CT head showing remote posterior left frontal cortical infarct but no acute intracranial abnormalities  - Differential includes dialysis dysequilibrium syndrome, post-traumatic seizures, and convulsive syncope  - [04/02/17] - MRI brain noted no acute process seen  - EEG in progress  - Serum tox screen in process    Recs:  - Epilepsy protocol  - Consider Keppra pending results of the EEG

## 2017-04-03 NOTE — SUBJECTIVE & OBJECTIVE
Interval HPI:   Overnight events: FB.  Patient asymptomatic.  ACTH stim test appropriate.    Results for BRET ALLEN (MRN [id number]) as of 4/3/2017 09:29   Ref. Range 2017 13:11 2017 16:15 2017 16:45   Cortisol Latest Units: ug/dL 12.5 19.3 20.3       Eating:   <25%  Nausea: No  Hypoglycemia and intervention: B  Fever: No  TPN and/or TF: No  If yes, type of TF/TPN and rate: N/A    BP (!) 198/80 (BP Location: Right arm, Patient Position: Lying, BP Method: Automatic)  Pulse 65  Temp 98.2 °F (36.8 °C) (Oral)   Resp 19  Ht 6' (1.829 m)  Wt 72.5 kg (159 lb 13.3 oz)  SpO2 95%  BMI 21.68 kg/m2    Labs Reviewed and Include      Recent Labs  Lab 17  0607   GLU 64*   CALCIUM 8.4*      K 4.6   CO2 27   CL 98   BUN 40*   CREATININE 7.1*     Lab Results   Component Value Date    WBC 7.28 2017    HGB 9.3 (L) 2017    HCT 29.0 (L) 2017    MCV 97 2017     (L) 2017     No results for input(s): TSH, FREET4 in the last 168 hours.  Lab Results   Component Value Date    HGBA1C 4.4 (L) 2017       Nutritional status:   Body mass index is 21.68 kg/(m^2).  Lab Results   Component Value Date    ALBUMIN 3.6 2017    ALBUMIN 3.4 (L) 2017    ALBUMIN 3.1 (L) 2017     No results found for: PREALBUMIN    Estimated Creatinine Clearance: 12.6 mL/min (based on Cr of 7.1).    Accu-Checks  Recent Labs      17   1656  17   1953  17   0557  17   0857  17   1137  17   1321  17   1643  17   2212  17   0533  17   0737   POCTGLUCOSE  118*  100  85  83  85  94  79  102  71  82       Current Medications and/or Treatments Impacting Glycemic Control  Immunotherapy:  Immunosuppressants     None        Steroids:   Hormones     None        Pressors:    Autonomic Drugs     None        Hyperglycemia/Diabetes Medications: Antihyperglycemics     Start     Stop Route Frequency Ordered    17 0300   insulin aspart pen 0-5 Units      -- SubQ Before meals & nightly PRN 04/02/17 4820

## 2017-04-03 NOTE — ASSESSMENT & PLAN NOTE
- Hypoglycemic on arrival in OSH ER.  Given D50 with improvement to 96.  - Patient with multiple admissions for hypoglycemia.  He is not on any diabetes medications at this time.  Last admission patient was put in ICU for severe hypoglycemia, initially believed to be related to methadone use. His dose was decreased and there was some improvement in his sugars. Insulinoma labs drawn during last admit, but were pending at discharge.  - Endocrine consulted and reccs appreciated: hypoglycemia mostly in AM and felt 2/2 to depleted glycogen stores in setting of Hep C cirrhosis.   - ACTH stim test, IGF-1, insulin, c-peptide, iraheta screen, glucose, pro-insulin, cortisol, and GH tests ordered at time of hypoglycemia for bg less than 60 and asymptomatic.  - Renal diet

## 2017-04-03 NOTE — CONSULTS
[facility]  Consult Note Nephrology    Consult Requested By: Samuel Pino MD  Reason for Consult: ESRD on iHD    SUBJECTIVE:     History of Present Illness:  João Aguirre is a 52 y.o. male, who is admitted to Hospital medicine with a PMHx relevant for DMT2, ESRD on HD TTS, Hep C, CVA on Plavix, HTN, HLD, and noncompliance as transfer from OSH secondary to suspected seizure. He also found Hypoglycemic per EMS reports post HD after syncopal episode and upon arrival to ED at OSH. He dialyzes TThS for 4 hrs x 3.5 hrs     Past Medical History:   Diagnosis Date    Anticoagulant long-term use     Arthritis     Asthma     Back pain     Diabetes mellitus     Encounter for blood transfusion     Eye abnormality right eye    injured as a child    Gastritis     Hemodialysis patient     Hypertension     Pneumonia 2013    Spend 6weeks in hospital at [place]    Renal disorder     Stroke      Past Surgical History:   Procedure Laterality Date    AV FISTULA PLACEMENT      BACK SURGERY      CHOLECYSTECTOMY      EYE SURGERY      R eye     Family History   Problem Relation Age of Onset    Kidney disease Brother      Social History   Substance Use Topics    Smoking status: Former Smoker     Years: 10.00     Quit date: 9/1/2015    Smokeless tobacco: Former User     Quit date: 2/16/2016    Alcohol use No     Review of patient's allergies indicates:   Allergen Reactions    Antibiotic hc      Hx of in 2013         Current Facility-Administered Medications   Medication Dose Route Frequency Provider Last Rate Last Dose    acetaminophen tablet 650 mg  650 mg Oral Q4H PRN Alma Rosa Harrison PA-C   650 mg at 04/02/17 0514    amlodipine tablet 5 mg  5 mg Oral BID Alma Rosa Harrison PA-C   5 mg at 04/03/17 0835    aspirin tablet 325 mg  325 mg Oral Daily Alma Rosa Harrison PA-C   325 mg at 04/03/17 0835    atorvastatin tablet 40 mg  40 mg Oral QHS Alma Rosa Harrison PA-C   40 mg at  04/02/17 2016    calcium acetate capsule 2,001 mg  2,001 mg Oral TID  Alma Rosa Harrison PA-C   2,001 mg at 04/03/17 0834    carvedilol tablet 25 mg  25 mg Oral BID Roscoe Martini PA-C   25 mg at 04/03/17 0841    clopidogrel tablet 75 mg  75 mg Oral Daily Alma Rosa Harrison PA-C   75 mg at 04/03/17 0835    dextrose 50% injection 12.5 g  12.5 g Intravenous PRN Alma Rosa Harrison PA-C        dextrose 50% injection 25 g  25 g Intravenous PRN Alma Rosa Harrison PA-C        docusate sodium capsule 100 mg  100 mg Oral BID Alma Rosa Harrison PA-C   100 mg at 04/03/17 0835    famotidine tablet 20 mg  20 mg Oral Daily Alma Rosa Harrison PA-C   20 mg at 04/03/17 0836    glucagon (human recombinant) injection 1 mg  1 mg Intramuscular PRN Alma Rosa Harrison PA-C        glucose chewable tablet 16 g  16 g Oral PRN Alma Rosa Harrison PA-C        glucose chewable tablet 24 g  24 g Oral PRN Alma Rosa Harrison PA-C        hydrALAZINE tablet 25 mg  25 mg Oral TID Alma Rosa Harrison PA-C   25 mg at 04/03/17 0529    insulin aspart pen 0-5 Units  0-5 Units Subcutaneous QID (AC + HS) PRN Alma Rosa Harrison PA-C        lisinopril tablet 40 mg  40 mg Oral Daily Alma Rosa Harrison PA-C   40 mg at 04/03/17 0835    loperamide capsule 2 mg  2 mg Oral PRN Alma Rosa Harrison PA-C        methadone tablet 60 mg  60 mg Oral Daily Alma Rosa Harrison PA-C   60 mg at 04/03/17 0835    minoxidil tablet 7.5 mg  7.5 mg Oral BID Alma Rosa Harrison PA-C   7.5 mg at 04/03/17 0837    naloxone 0.4 mg/mL injection 0.4 mg  0.4 mg Intravenous PRN Roscoe Martini PA-C        ondansetron injection 4 mg  4 mg Intravenous Q8H PRN Alma Rosa Harrison PA-C   4 mg at 04/03/17 0835    polyethylene glycol packet 17 g  17 g Oral TID PRN Alma Rosa Harrison PA-C        promethazine (PHENERGAN) 6.25 mg in dextrose 5 % 50 mL IVPB  6.25 mg Intravenous Q6H PRN Alma Rosa Harrison PA-C        sodium chloride 0.9% flush 3 mL   3 mL Intravenous Q8H Alma Rosa Harrison PA-C   3 mL at 04/03/17 0530       No Known Allergies     Review of Systems:  Constitutional: no fever or chills, Positive Fatigue  Respiratory: no cough or shortness of breath  Cardiovascular: no chest pain or palpitations  Gastrointestinal: no nausea or vomiting, no abdominal pain or change in bowel habits  Hematologic/Lymphatic: no easy bruising or lymphadenopathy  Musculoskeletal: no arthralgias positive  Myalgias, positive Seizures  Neurological: no seizures or tremors, positive confusion          OBJECTIVE:     Vital Signs (Most Recent)  Temp: 98.2 °F (36.8 °C) (04/03/17 0727)  Pulse: 65 (04/03/17 0727)  Resp: 19 (04/03/17 0727)  BP: (!) 198/80 (04/03/17 0727)  SpO2: 95 % (04/03/17 0727)    Vital Signs Range (Last 24H):  Temp:  [98.2 °F (36.8 °C)-99.5 °F (37.5 °C)]   Pulse:  [49-69]   Resp:  [15-19]   BP: (111-198)/(59-88)   SpO2:  [93 %-98 %]       Intake/Output Summary (Last 24 hours) at 04/03/17 0910  Last data filed at 04/03/17 0600   Gross per 24 hour   Intake              300 ml   Output                0 ml   Net              300 ml       Physical Exam:  General: Well developed, well nourished in NAD  HEENT: Conjunctiva clear; Oropharynx clear, Bitemporal wasting  Neck: No JVD noted, Supple  CV- Normal S1, S2 with SM 2/6 murmurs,gallops,rubs  Resp- Lungs CTA Bilaterally, Unlabored  Abdomen- NTND, BS normoactive x4 quads, soft  Extrem- No cyanosis, clubbing, edema.  Skin- No rashes, lesions, ulcers  Neuro: awake, Oriented x3, no FND      Laboratory:  CBC:   Recent Labs  Lab 04/03/17  0607   WBC 7.28   RBC 3.00*   HGB 9.3*   HCT 29.0*   *   MCV 97   MCH 31.0   MCHC 32.1     BMP:   Recent Labs  Lab 04/02/17  0819 04/03/17  0607   GLU 81 64*    98   CO2 30* 27   BUN 21* 40*   CREATININE 5.2* 7.1*   CALCIUM 7.6* 8.4*   MG 2.1  --      CMP:   Recent Labs  Lab 04/01/17  1712  04/03/17  0607   GLU 96  < > 64*   CALCIUM 8.0*  < > 8.4*   ALBUMIN 3.6  --   --     PROT 8.5*  --   --      < > 141   K 3.6  < > 4.6   CO2 23  < > 27     < > 98   BUN 12  < > 40*   CREATININE 3.4*  < > 7.1*   ALKPHOS 87  --   --    ALT 11  --   --    AST 21  --   --    BILITOT 0.6  --   --    < > = values in this interval not displayed.  PTH: No results for input(s): PTH in the last 168 hours.  Coagulation: No results for input(s): INR, APTT in the last 168 hours.    Invalid input(s): PT  Cardiac Markers: No results for input(s): CKMB, TROPONINT, MYOGLOBIN in the last 168 hours.  Microbiology Results (last 7 days)     ** No results found for the last 168 hours. **          Diagnostic Results:  Labs: Reviewed  ECG: Reviewed  X-Ray: Reviewed    ASSESSMENT/PLAN:     Active Hospital Problems    Diagnosis  POA    *Seizure [R56.9]  Yes    Mixed hyperlipidemia [E78.2]  Yes     Chronic    Convulsions [R56.9]  Yes    Methadone dependence [F11.20]  Yes     Chronic    Chronic hepatitis C without hepatic coma [B18.2]  Yes     Chronic    Hypoglycemia associated with type 2 diabetes mellitus [E11.649]  Yes    H/O: CVA (cerebrovascular accident) [Z86.73]  Not Applicable     Chronic    Poorly-controlled hypertension [I10]  Yes     Chronic    Coronary artery disease involving native coronary artery of native heart without angina pectoris [I25.10]  Yes     Chronic    ESRD (T,Th,Sat) dialysis onset 2013 [N18.6]  Yes     Chronic    Anemia in chronic kidney disease [N18.9, D63.1]  Yes     Chronic    Elevated troponin I level [R74.8]  Yes     Chronic      Resolved Hospital Problems    Diagnosis Date Resolved POA    Diabetes mellitus type 2 in nonobese [E11.9] 04/02/2017 Yes     Chronic       João Aguirre is a 52 y.o. male with DMT2, ESRD on HD TTS, Hep C, CVA on Plavix, HTN, HLD, and noncompliance as transfer from OSH secondary to suspected seizure. Nephrology consulted for ESRD    ESRD on IHD TTS   On HD for: unknow  Duration of outpatient dialysis session - 3.5 hrs  EDW -  TBD  Residual Leanna Function - minimal  - Will provide dialysis for metabolic clearance and volume in am  -   - Target ultrafiltration 2-3 lts in am s tolerated keep MAO . 65  - Dialysate adjusted to wcurrent labs   Aceess:    Active problem in hospital  - AMS hypoglycemia  seizures     Anemia of Chronic Kidney Disease   - Will resume ERNESTO [id number] uts with HD  - Hg 9.3  - Adequate iron stores as per most recent profile   - Will request iron studies to assess further needs of supplementation     Mineral Bone Disease in CKD   - Renal Function Panel Daily for electrolytes monitoring  - Phos will check Phos daily  - Already on binders as outpatient Please continue Phoslo 2001 AC and snacks  - CoCa 8.6    Nutrition   - Renal Diet    HTN   - Uncontrol on admission but improving BP, will continue to monitor. Goal for BP is <130 mmHg SBP and BDP <80 mmHg.   - Continue Home regiment  Case discuss with Staff further recs with attestation.    Alonso Bonilla MD  Nephrology Fellow PGY4  [phone]    Patient seen and examined on HD with Dr Bonilla;   I have reviewed and agree with assessment and plan

## 2017-04-03 NOTE — PLAN OF CARE
Problem: Physical Therapy Goal  Goal: Physical Therapy Goal  Outcome: Outcome(s) achieved Date Met:  04/03/17  Pt evaluation complete. Pt safe to d/c home from a mobility standpoint without needs for skilled PT at this time.     TONIE CADENA, PT  4/3/2017

## 2017-04-03 NOTE — PROGRESS NOTES
[facility]  Neurology  Progress Note    Patient Name: João Aguirre  MRN: [id number]  Admission Date: 4/2/2017  Hospital Length of Stay: 1 days  Code Status: Full Code   Attending Provider: Samuel Pino MD  Primary Care Physician: Delbert Redd NP   Principal Problem:Seizure      Subjective:     Interval History: Patient's BP was elevated overnight into 190's/80's, 140's/ 90's as of [~11:00].  The patient had no complaints.      Current Facility-Administered Medications   Medication Dose Route Frequency Provider Last Rate Last Dose    acetaminophen tablet 650 mg  650 mg Oral Q4H PRN Alma Rosa Harrison PA-C   650 mg at 04/02/17 0514    amlodipine tablet 5 mg  5 mg Oral BID JEFRY Zarate-C   5 mg at 04/03/17 0835    aspirin tablet 325 mg  325 mg Oral Daily JEFRY Zarate-C   325 mg at 04/03/17 0835    atorvastatin tablet 40 mg  40 mg Oral QHS JAMES ZarateC   40 mg at 04/02/17 2016    calcium acetate capsule 2,001 mg  2,001 mg Oral TID WM JEFRY Zarate-C   2,001 mg at 04/03/17 1108    carvedilol tablet 25 mg  25 mg Oral BID JEFRY Matthews-C   25 mg at 04/03/17 0841    clopidogrel tablet 75 mg  75 mg Oral Daily JEFRY Zarate-C   75 mg at 04/03/17 0835    dextrose 50% injection 12.5 g  12.5 g Intravenous PRN JEFRY Zarate-HARI        dextrose 50% injection 25 g  25 g Intravenous PRN Alma Rosa Harrison PA-C        docusate sodium capsule 100 mg  100 mg Oral BID JAMES ZarateC   100 mg at 04/03/17 0835    famotidine tablet 20 mg  20 mg Oral Daily JEFRY Zarate-C   20 mg at 04/03/17 0836    glucagon (human recombinant) injection 1 mg  1 mg Intramuscular PRN Alma Rosa Harrison PA-C        glucose chewable tablet 16 g  16 g Oral PRN JEFRY Zarate-HARI        glucose chewable tablet 24 g  24 g Oral PRN Alma Rosa Harrison PA-C        hydrALAZINE tablet 25 mg  25 mg Oral TID Alma Rosa  CARROLL Harrison PA-C   25 mg at 04/03/17 0529    insulin aspart pen 0-5 Units  0-5 Units Subcutaneous QID (AC + HS) PRN Alma Rosa Harrison PA-C        lisinopril tablet 40 mg  40 mg Oral Daily Alma Rosa Harrison PA-C   40 mg at 04/03/17 0835    loperamide capsule 2 mg  2 mg Oral PRN Alma Rosa Harrison PA-C        methadone tablet 60 mg  60 mg Oral Daily Alma Rosa Harrison PA-C   60 mg at 04/03/17 0835    minoxidil tablet 7.5 mg  7.5 mg Oral BID Alma Rosa Harrison PA-C   7.5 mg at 04/03/17 0837    naloxone 0.4 mg/mL injection 0.4 mg  0.4 mg Intravenous PRN Roscoe Martini PA-C        ondansetron injection 4 mg  4 mg Intravenous Q8H PRN Alma Rosa Harrison PA-C   4 mg at 04/03/17 0835    polyethylene glycol packet 17 g  17 g Oral TID PRN Alma Rosa Harrison PA-C        promethazine (PHENERGAN) 6.25 mg in dextrose 5 % 50 mL IVPB  6.25 mg Intravenous Q6H PRN Alma Rosa Harrison PA-C        sodium chloride 0.9% flush 3 mL  3 mL Intravenous Q8H Alma Rosa Harrison PA-C   3 mL at 04/03/17 0530       Review of Systems   Gastrointestinal: Vomiting: Affirmed almost daily AM vomiting of yellow-colored vomitus for the last approximately 1-2 weeks.   Neurological: Negative for weakness and headaches.        With respect to seizures, the patient denied family history, head trauma, ETOH, and new medications.  He affirmed not sleeping well for months, adding that he experiences frequent awakenings and gets approximately 4 hours of sleep per night.  He also affirmed infrequent caffeine use, as well as a right infection resolved with antibiotics approximately 1 month ago.    In reference to his most recent seizure, he denies memory of it, as well as tongue biting.  He affirmed defecating and that the seizure was witnessed by both his son and mother.     Objective:     Vital Signs (Most Recent):  Temp: 98.4 °F (36.9 °C) (04/03/17 1103)  Pulse: (!) 48 (04/03/17 1110)  Resp: 17 (04/03/17 1103)  BP: (!) 149/93 (04/03/17  1103)  SpO2: (!) 94 % (04/03/17 1103) Vital Signs (24h Range):  Temp:  [98.2 °F (36.8 °C)-99.5 °F (37.5 °C)] 98.4 °F (36.9 °C)  Pulse:  [48-69] 48  Resp:  [15-19] 17  SpO2:  [93 %-98 %] 94 %  BP: (125-198)/(59-93) 149/93     Weight: 72.5 kg (159 lb 13.3 oz)  Body mass index is 21.68 kg/(m^2).    Physical Exam    NEUROLOGICAL EXAMINATION:     CRANIAL NERVES   Cranial nerves II through XII intact.     MOTOR EXAM   Muscle bulk: normal  Overall muscle tone: normal  Right arm tone: normal  Left arm tone: normal  Right leg tone: normal  Left leg tone: normal       5/5 bilateral UE and LE strength     SENSORY EXAM        Normal and equal bilateral facial, UE, and LE sensation       Significant Labs:   CBC:   Recent Labs  Lab 04/01/17 1712 04/02/17  0819 04/03/17  0607   WBC 8.00 8.15 7.28   HGB 10.1* 9.3* 9.3*   HCT 32.3* 28.2* 29.0*    131* 117*     CMP:   Recent Labs  Lab 04/01/17 1712 04/02/17  0819 04/03/17  0607   GLU 96 81 64*    141 141   K 3.6 4.2 4.6    100 98   CO2 23 30* 27   BUN 12 21* 40*   CREATININE 3.4* 5.2* 7.1*   CALCIUM 8.0* 7.6* 8.4*   MG  --  2.1  --    PROT 8.5*  --   --    ALBUMIN 3.6  --   --    BILITOT 0.6  --   --    ALKPHOS 87  --   --    AST 21  --   --    ALT 11  --   --    ANIONGAP 13 11 16   EGFRNONAA 20* 11.8* 8.1*       Significant Imaging: I have reviewed all pertinent imaging results/findings within the past 24 hours.    Assessment and Plan:     * Seizure  - Initial episode at home following dialysis [04/01/17] - second episode during transport from ED to floor at [facility]  - Hypoglycemic and hypertensive on initial presentation, now improved  - CT head showing remote posterior left frontal cortical infarct but no acute intracranial abnormalities  - Differential includes dialysis dysequilibrium syndrome, post-traumatic seizures, and convulsive syncope  - [04/02/17] - MRI brain noted no acute process seen  - EEG in progress  - Serum tox screen in process    Recs:  -  Epilepsy protocol  - Consider Keppra pending results of the EEG        VTE Risk Mitigation         Ordered     Medium Risk of VTE  Once      04/02/17 0157     Place ZACHARY hose  Until discontinued      04/02/17 0157     Place sequential compression device  Until discontinued      04/02/17 0157          Ab Barcenas MD  Neurology  [facility]

## 2017-04-03 NOTE — SUBJECTIVE & OBJECTIVE
Interval History: Some bradycardia overnight, coreg held and now hypertensive this AM. No complaints today. MRI limited by motion, but reviewed by neurology, no need for repeat. EEG for today. AEDs pending EEG results    Review of Systems   Constitutional: Positive for fatigue. Negative for chills and fever.   HENT: Negative for congestion, sore throat and trouble swallowing.    Eyes: Negative for visual disturbance.   Respiratory: Negative for cough and shortness of breath.    Cardiovascular: Negative for chest pain, palpitations and leg swelling.   Gastrointestinal: Negative for abdominal pain, nausea and vomiting.   Genitourinary:        Anuric   Musculoskeletal: Positive for arthralgias and myalgias.   Neurological: Positive for seizures (no events overnight). Negative for dizziness, speech difficulty, weakness and headaches.   Psychiatric/Behavioral: Positive for confusion. Negative for agitation.     Objective:     Vital Signs (Most Recent):  Temp: 98.2 °F (36.8 °C) (04/03/17 0727)  Pulse: 65 (04/03/17 0727)  Resp: 19 (04/03/17 0727)  BP: (!) 198/80 (04/03/17 0727)  SpO2: 95 % (04/03/17 0727) Vital Signs (24h Range):  Temp:  [98.2 °F (36.8 °C)-99.5 °F (37.5 °C)] 98.2 °F (36.8 °C)  Pulse:  [49-69] 65  Resp:  [15-19] 19  SpO2:  [93 %-98 %] 95 %  BP: (111-198)/(59-88) 198/80     Weight: 72.5 kg (159 lb 13.3 oz)  Body mass index is 21.68 kg/(m^2).    Intake/Output Summary (Last 24 hours) at 04/03/17 0921  Last data filed at 04/03/17 0600   Gross per 24 hour   Intake              300 ml   Output                0 ml   Net              300 ml      Physical Exam   Constitutional: He is oriented to person, place, and time. No distress.   HENT:   Bitemporal wasting   Eyes:   Right eye haziness   Cardiovascular: Normal rate and regular rhythm.    Murmur heard.  Pulmonary/Chest: Effort normal and breath sounds normal. No respiratory distress.   Abdominal: Soft. Bowel sounds are normal. He exhibits no distension.    Musculoskeletal: Normal range of motion. He exhibits no edema.   Neurological: He is alert and oriented to person, place, and time. He displays no tremor. No cranial nerve deficit. He displays no seizure activity.   No focal neurologic deficits   Psychiatric: His affect is blunt.   Nursing note and vitals reviewed.      Significant Labs:   CBC:   Recent Labs  Lab 04/01/17 1712 04/02/17  0819 04/03/17  0607   WBC 8.00 8.15 7.28   HGB 10.1* 9.3* 9.3*   HCT 32.3* 28.2* 29.0*    131* 117*     CMP:   Recent Labs  Lab 04/01/17 1712 04/02/17  0819 04/03/17  0607    141 141   K 3.6 4.2 4.6    100 98   CO2 23 30* 27   GLU 96 81 64*   BUN 12 21* 40*   CREATININE 3.4* 5.2* 7.1*   CALCIUM 8.0* 7.6* 8.4*   PROT 8.5*  --   --    ALBUMIN 3.6  --   --    BILITOT 0.6  --   --    ALKPHOS 87  --   --    AST 21  --   --    ALT 11  --   --    ANIONGAP 13 11 16   EGFRNONAA 20* 11.8* 8.1*     All pertinent labs within the past 24 hours have been reviewed.    Significant Imaging: I have reviewed all pertinent imaging results/findings within the past 24 hours.

## 2017-04-03 NOTE — PT/OT/SLP EVAL
Physical Therapy  Evaluation/ Discharge    João Aguirre   MRN: [id number]   Admitting Diagnosis: Seizure    PT Received On: 17  PT Start Time: 0952     PT Stop Time: 1020    PT Total Time (min): 28 min       Billable Minutes:  Evaluation 18 and Therapeutic Activity 10    Diagnosis: Seizure      Past Medical History:   Diagnosis Date    Anticoagulant long-term use     Arthritis     Asthma     Back pain     Diabetes mellitus     Encounter for blood transfusion     Eye abnormality right eye    injured as a child    Gastritis     Hemodialysis patient     Hypertension     Pneumonia     Spend 6weeks in hospital at [place]    Renal disorder     Stroke       Past Surgical History:   Procedure Laterality Date    AV FISTULA PLACEMENT      BACK SURGERY      CHOLECYSTECTOMY      EYE SURGERY      R eye       Referring physician: KAR Pino  Date referred to PT: 2017    General Precautions: Standard, fall  Orthopedic Precautions: N/A   Braces: N/A            Patient History:  Lives With: child(suni), dependent  Living Arrangements: apartment  Home Layout: Able to live on 1st floor  Living Environment Comment: Pt lives with 18 y/o son in 1st floor apt without MAIRA. Pt reports (I) with ADLs and amb. 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   Equipment Currently Used at Home: none  DME owned (not currently used): none    Previous Level of Function:  Ambulation Skills: independent  Transfer Skills: independent  ADL Skills: independent    Subjective:  Communicated with RN prior to session.  Pt agreeable to therapy session.   Chief Complaint: B foot pain  Patient goals: return home    Pain Ratin/10   Location - Side: Bilateral  Location - Orientation: generalized  Location: foot  Pain Addressed: Reposition, Distraction  Pain Rating Post-Intervention: 5/10    Objective:         Cognitive Exam:  Oriented to: Person, Place, Time and Situation    Follows Commands/attention: Follows multistep   commands  Communication: clear/fluent  Safety awareness/insight to disability: intact    Physical Exam:  Postural examination/scapula alignment: Rounded shoulder    Skin integrity: Visible skin intact  Edema: Mild B LE    Sensation:   Impaired  light/touch B feet    Lower Extremity Range of Motion:  Right Lower Extremity: WFL  Left Lower Extremity: WFL    Lower Extremity Strength:  Right Lower Extremity: WFL  Left Lower Extremity: WFL     Gross motor coordination: WFL    Functional Mobility:  Bed Mobility:  Supine to Sit: Supervision  Sit to Supine: Supervision    Transfers:  Sit <> Stand Assistance: Supervision  Sit <> Stand Assistive Device: No Assistive Device  Bed <> Chair Technique: Stand Pivot  Bed <> Chair Assistance: Supervision  Bed <> Chair Assistive Device: No Assistive Device    Gait:   Gait Distance: ~400ft no LOB or SOB   Assistance 1: Supervision  Gait Assistive Device: No device  Gait Pattern: reciprocal     Balance:   Static Sit: GOOD: Takes MODERATE challenges from all directions  Dynamic Sit: GOOD: Maintains balance through MODERATE excursions of active trunk movement  Static Stand: GOOD: Takes MODERATE challenges from all directions  Dynamic stand: GOOD: Needs SUPERVISION only during gait and able to self right with moderate     Therapeutic Activities and Exercises:  Pt educated on role of PT/POC.  Pt demonstrated dynamic standing with S; pertubations applied and able to  object of floor without LOB.  Pt reports pain in B feet, demonstrated increased supination; educated on benefits of OP PT.   Pt safe to amb in hallway with RN staff.     AM-PAC 6 CLICK MOBILITY  How much help from another person does this patient currently need?   1 = Unable, Total/Dependent Assistance  2 = A lot, Maximum/Moderate Assistance  3 = A little, Minimum/Contact Guard/Supervision  4 = None, Modified [place]/Independent    Turning over in bed (including adjusting bedclothes, sheets and blankets)?: 4  Sitting  down on and standing up from a chair with arms (e.g., wheelchair, bedside commode, etc.): 4  Moving from lying on back to sitting on the side of the bed?: 4  Moving to and from a bed to a chair (including a wheelchair)?: 4  Need to walk in hospital room?: 3  Climbing 3-5 steps with a railing?: 3  Total Score: 22     AM-PAC Raw Score CMS G-Code Modifier Level of Impairment Assistance   6 % Total / Unable   7 - 9 CM 80 - 100% Maximal Assist   10 - 14 CL 60 - 80% Moderate Assist   15 - 19 CK 40 - 60% Moderate Assist   20 - 22 CJ 20 - 40% Minimal Assist   23 CI 1-20% SBA / CGA   24 CH 0% Independent/ Mod I     Patient left up in chair with all lines intact, call button in reach and RN notified.    Assessment:   João Aguirre is a 52 y.o. male with a medical diagnosis of Seizure. Pt performed bed mobility, transfers and amb with S. Pt safe to d/c home from a mobility standpoint without needs for skilled PT at this time. Pt with c/o pain in B feet, pt demonstrated increased supination with amb. Pt would benefit from OP PT to further address B foot pain.     Discharge recommendations: Discharge Facility/Level Of Care Needs: outpatient PT     Barriers to discharge: Barriers to Discharge: None    Equipment recommendations: Equipment Needed After Discharge: none     GOALS:   Physical Therapy Goals     Not on file      Multidisciplinary Problems (Resolved)        Problem: Physical Therapy Goal    Goal Priority Disciplines Outcome Goal Variances Interventions   Physical Therapy Goal   (Resolved)     PT/OT, PT Outcome(s) achieved               PLAN:    D/C PT.     TONIE CADENA, PT  04/03/2017

## 2017-04-03 NOTE — ASSESSMENT & PLAN NOTE
Insulin mediated vs non insulin mediated  -Non-insulin mediated evaluation will consisted of evaluation of counter regulatory hormones.  -Etiologies include: ESRD, malnutrition (however appears with good nutrition status), deficient glycogen stores from Hep C cirrhosis (would likely manifest with hypoglycemia early am or overnight) Likely etiology as fbg low normal in am  -ACTH stim test normal  -IGF-1 pending to evluate GH hormone deficiency  -Unlikely Insulin mediated however evaluation will consist of: Insulin, c peptide, iraheta screen, glucose, pro-insulin, cortisol, and GH at the time of Hypoglycemia for bg less than 60 and asymptomatic. Draw labs first.

## 2017-04-03 NOTE — ASSESSMENT & PLAN NOTE
- Neurology following: MRI limited by motion, but reviewed by Neuro and no need for repeat MRI at this time. EEG today. AEDs based on EEG.  - CT with no acute changes.   - Seizure precautions, fall precautions, neuro checks q4hrs.

## 2017-04-03 NOTE — PLAN OF CARE
Problem: Patient Care Overview  Goal: Plan of Care Review  Outcome: Ongoing (interventions implemented as appropriate)    04/03/17 1831   Coping/Psychosocial   Plan Of Care Reviewed With Patient; Safety: call light in reach, patient oriented to room & instructed how to notify nurse if assistance is needed, current questions/concerns addressed, bed in lowest position with wheels locked & side rails up X 3. Pt and family were educated regarding fall precaution and taking appropriate action.  Activity: is up with 1 assist.  Neurological: Oriented x3/4  Respiratory: O2 sat WNL w/a. Pt desats when asleep.  Cardiac: BP hyperstensive. HR NS to concepcion, asymptomatic.  Afebrile this shift. Intake/Output: No bm today, offered prn laxative, pt refused. Pt is oliguric, a dialysis patient Diet intake adequate Pain: schedule meds Skin: bruised, scabbed, but intact. Plan: Plan of care reviewed with the patient. All questions were addressed. Accuchecks AC&HS, no insulin needed.  EEG to be done. Denies any other concerns. Will continue to monitor.         Problem: Diabetes, Type 2 (Adult)  Intervention: Support/Optimize Psychosocial Response to Condition    04/03/17 4212   Coping/Psychosocial Interventions   Supportive Measures active listening utilized;decision-making supported;goal setting facilitated;positive reinforcement provided;relaxation techniques promoted;problem solving facilitated;verbalization of feelings encouraged;self-care encouraged       Intervention: Optimize Glycemic Control    04/03/17 1831   Nutrition Interventions   Glycemic Management blood glucose monitoring         Goal: Signs and Symptoms of Listed Potential Problems Will be Absent, Minimized or Managed (Diabetes, Type 2)  Signs and symptoms of listed potential problems will be absent, minimized or managed by discharge/transition of care (reference Diabetes, Type 2 (Adult) CPG).   Outcome: Ongoing (interventions implemented as appropriate)    04/03/17 1831    Diabetes, Type 2   Problems Assessed (Type 2 Diabetes) hypoglycemia         Problem: Fall Risk (Adult)  Intervention: Patient Rounds    04/03/17 1700   Safety Interventions   Patient Rounds bed in low position;bed wheels locked;call light in reach;clutter free environment maintained;ID band on;placement of personal items at bedside;toileting offered;visualized patient       Intervention: Safety Promotion/Fall Prevention    04/03/17 1831         assistive device/personal item within reach;bed alarm refused;diversional activities provided;Fall Risk reviewed with patient/family;Fall Risk signage in place;medications reviewed;lighting adjusted;nonskid shoes/socks when out of bed;side rails raised x 3;commode/urinal/bedpan at bedside         04/03/17 1831   Safety Interventions   Safety Promotion/Fall Prevention assistive device/personal item within reach;bed alarm refused;diversional activities provided;Fall Risk reviewed with patient/family;Fall Risk signage in place;medications reviewed;lighting adjusted;nonskid shoes/socks when out of bed;side rails raised x 3;commode/urinal/bedpan at bedside;high risk medications identified         Goal: Identify Related Risk Factors and Signs and Symptoms  Related risk factors and signs and symptoms are identified upon initiation of Human Response Clinical Practice Guideline (CPG)   Outcome: Ongoing (interventions implemented as appropriate)    04/03/17 1831   Fall Risk   Related Risk Factors (Fall Risk) history of falls         Problem: Pressure Ulcer Risk (Gareth Scale) (Adult,Obstetrics,Pediatric)  Intervention: Promote/Optimize Nutrition    04/03/17 1831   Nutrition Interventions   Oral Nutrition Promotion medicated;physical activity promoted;rest periods promoted;safe use of adaptive equipment encouraged       Intervention: Prevent/Minimize Sheer/Friction Injuries    04/03/17 1831   Skin Interventions   Pressure Reduction Techniques frequent weight shift encouraged;heels  elevated off bed       Intervention: Turn/Reposition Often    04/03/17 1831   Skin Interventions   Pressure Reduction Techniques frequent weight shift encouraged;heels elevated off bed   Positioning   Body Position positioned/repositioned independently         Goal: Identify Related Risk Factors and Signs and Symptoms  Related risk factors and signs and symptoms are identified upon initiation of Human Response Clinical Practice Guideline (CPG)     04/03/17 1831   Pressure Ulcer Risk (Gareth Scale)   Related Risk Factors (Pressure Ulcer Risk (Gareth Scale)) cognitive impairment;hospitalization prolonged;medication

## 2017-04-03 NOTE — PROGRESS NOTES
[facility]  Endocrinology  Progress Note    Admit Date: 2017     Consult requested by: BENITA Pino    Reason for consult: hypoglycemia    HPI:  Patient male with active medical problems of HTN, Type 2 DM, hx of stroke, ESRD who presents to [facility] as a transfer with a diagnosis of seizure and hypoglycemia.  Patient with a documented blood glucose of 50 by EMS prior to transfer.  Given amp of D50 and blood glucose rise to 96.  Patient has no recollection of events other than occurring after Dialysis.  Of not patient has had previous admission for hypoglycemia unrelated to Dialysis.      Interval HPI:   Overnight events: FB.  Patient asymptomatic.  ACTH stim test appropriate.    Results for BRET ALLEN (MRN [id number]) as of 4/3/2017 09:29   Ref. Range 2017 13:11 2017 16:15 2017 16:45   Cortisol Latest Units: ug/dL 12.5 19.3 20.3       Eating:   <25%  Nausea: No  Hypoglycemia and intervention: B  Fever: No  TPN and/or TF: No  If yes, type of TF/TPN and rate: N/A    BP (!) 198/80 (BP Location: Right arm, Patient Position: Lying, BP Method: Automatic)  Pulse 65  Temp 98.2 °F (36.8 °C) (Oral)   Resp 19  Ht 6' (1.829 m)  Wt 72.5 kg (159 lb 13.3 oz)  SpO2 95%  BMI 21.68 kg/m2    Labs Reviewed and Include      Recent Labs  Lab 17  0607   GLU 64*   CALCIUM 8.4*      K 4.6   CO2 27   CL 98   BUN 40*   CREATININE 7.1*     Lab Results   Component Value Date    WBC 7.28 2017    HGB 9.3 (L) 2017    HCT 29.0 (L) 2017    MCV 97 2017     (L) 2017     No results for input(s): TSH, FREET4 in the last 168 hours.  Lab Results   Component Value Date    HGBA1C 4.4 (L) 2017       Nutritional status:   Body mass index is 21.68 kg/(m^2).  Lab Results   Component Value Date    ALBUMIN 3.6 2017    ALBUMIN 3.4 (L) 2017    ALBUMIN 3.1 (L) 2017     No results found for: PREALBUMIN    Estimated Creatinine Clearance:  12.6 mL/min (based on Cr of 7.1).    Accu-Checks  Recent Labs      04/01/17   1656  04/01/17   1953  04/02/17   0557  04/02/17   0857  04/02/17   1137  04/02/17   1321  04/02/17   1643  04/02/17   2212  04/03/17   0533  04/03/17   0737   POCTGLUCOSE  118*  100  85  83  85  94  79  102  71  82       Current Medications and/or Treatments Impacting Glycemic Control  Immunotherapy:  Immunosuppressants     None        Steroids:   Hormones     None        Pressors:    Autonomic Drugs     None        Hyperglycemia/Diabetes Medications: Antihyperglycemics     Start     Stop Route Frequency Ordered    04/02/17 0301  insulin aspart pen 0-5 Units      -- SubQ Before meals & nightly PRN 04/02/17 0201          ASSESSMENT and PLAN    Coronary artery disease involving native coronary artery of native heart without angina pectoris  Avoid hypoglycemia      Anemia in chronic kidney disease  Can falsely lower a1c      Hypoglycemia associated with type 2 diabetes mellitus  Insulin mediated vs non insulin mediated  -Non-insulin mediated evaluation will consisted of evaluation of counter regulatory hormones.  -Etiologies include: ESRD, malnutrition (however appears with good nutrition status), deficient glycogen stores from Hep C cirrhosis (would likely manifest with hypoglycemia early am or overnight) Likely etiology as fbg low normal in am  -ACTH stim test normal  -IGF-1 pending to evluate GH hormone deficiency  -Unlikely Insulin mediated however evaluation will consist of: Insulin, c peptide, iraheta screen, glucose, pro-insulin, cortisol, and GH at the time of Hypoglycemia for bg less than 60 and asymptomatic. Draw labs first.      Chronic hepatitis C without hepatic coma  Cirrhosis on ct scan        Tj Lindo MD  Endocrinology  [facility]

## 2017-04-03 NOTE — PLAN OF CARE
Problem: Patient Care Overview  Goal: Plan of Care Review  Outcome: Ongoing (interventions implemented as appropriate)  Plan of care discussed with patient AAOX2. Vital signs stable afebrile on tele SB coreg held last night do to low heart rate. Blood sugar 104 at 2200 free from fall bed alarm on . On o2 off and on last 0000 sat 94% on RA still not eating very lethargic plan to do eeg monitoring repeat MRI of the brain. Patient able to turn himself

## 2017-04-03 NOTE — ASSESSMENT & PLAN NOTE
- Hypertensive urgency at OSH resolved with IV hydralazine 10mg.  - Continue home amlodipine 5 mg BID, Coreg 12.5 mg BID, lisinopril 40mg daily, minoxidil 7.5mg BID,  increase hydralazine to 50 mg TID  - Initially increased coreg to 25 mg BID resulted in bradycardia, will decrease back to 12.5 mg BID and increase hydralazine to 50 mg TID

## 2017-04-03 NOTE — PLAN OF CARE
Spoke to the pt at bedside, he stated he was transferred here from [facility]. Lives with 18 y/o son, indept, no DME, no HH. Pt stated he is , but he is not living with his wife they are not currently getting along. Pt stated he drives himself to HD 3 days a week. Pt stated he is not sure how he is going to get back to his home in [place]. Encouraged pt to start talking to his family to see who would be available to drive over and pick him up. Advised CM/SW would cont to follow and assist with DCP needs.     Of Note: Pt was admitted to [facility] from 3/1-3/6 and per SW notes there pt goes to [facility], at 0800 and Sat 1000.      04/03/17 0833   Discharge Assessment   Assessment Type Discharge Planning Assessment   Confirmed/corrected address and phone number on facesheet? Yes   Assessment information obtained from? Patient   Prior to hospitilization cognitive status: Alert/Oriented   Prior to hospitalization functional status: Independent   Current cognitive status: Alert/Oriented   Current Functional Status: Independent   Arrived From other (see comments)  (Transfer from [facility])   Lives With child(suni), dependent  (18 y/o son)   Able to Return to Prior Arrangements yes   Is patient able to care for self after discharge? Yes   Patient's perception of discharge disposition home or selfcare   Readmission Within The Last 30 Days other (see comments)   Patient currently being followed by outpatient case management? No   Patient currently receives home health services? No   Does the patient currently use HME? No   Patient currently receives private duty nursing? No   Patient currently receives any other outside agency services? No   Equipment Currently Used at Home none   Do you have any problems affording any of your prescribed medications? No   Is the patient taking medications as prescribed? yes   Do you have any financial concerns preventing you from receiving the healthcare you need? No   Does the patient have  transportation to healthcare appointments? Yes   Transportation Available car   On Dialysis? Yes   If yes, what is the name of the dialysis unit? ([facility] [place] [place]-MWF 0800, pt drives herself)   Does the patient receive outpatient dialysis? Yes   Does the patient receive services at the [facility]? No   Are there any open cases? No   Discharge Plan A Home;Home with family   Discharge Plan B Home;Home with family;Home Health   Patient/Family In Agreement With Plan yes

## 2017-04-03 NOTE — SUBJECTIVE & OBJECTIVE
Subjective:     Interval History: Patient's BP was elevated overnight into 190's/80's, 140's/ 90's as of [~11:00].  The patient had no complaints.      Current Facility-Administered Medications   Medication Dose Route Frequency Provider Last Rate Last Dose    acetaminophen tablet 650 mg  650 mg Oral Q4H PRN Alma Rosa Harrison PA-C   650 mg at 04/02/17 0514    amlodipine tablet 5 mg  5 mg Oral BID Alma Rosa Harrison PA-C   5 mg at 04/03/17 0835    aspirin tablet 325 mg  325 mg Oral Daily Alma Rosa Harrison PA-C   325 mg at 04/03/17 0835    atorvastatin tablet 40 mg  40 mg Oral QHS Alma Rosa Harrison PA-C   40 mg at 04/02/17 2016    calcium acetate capsule 2,001 mg  2,001 mg Oral TID WM Alma Rosa Harrison PA-C   2,001 mg at 04/03/17 1108    carvedilol tablet 25 mg  25 mg Oral BID Roscoe Martini PA-C   25 mg at 04/03/17 0841    clopidogrel tablet 75 mg  75 mg Oral Daily Alma Rosa Harrison PA-C   75 mg at 04/03/17 0835    dextrose 50% injection 12.5 g  12.5 g Intravenous PRN Alma Rosa Harrison PA-C        dextrose 50% injection 25 g  25 g Intravenous PRN Alma Rosa Harrison PA-C        docusate sodium capsule 100 mg  100 mg Oral BID Alma Rosa Harrison PA-C   100 mg at 04/03/17 0835    famotidine tablet 20 mg  20 mg Oral Daily Alma Rosa Harrison PA-C   20 mg at 04/03/17 0836    glucagon (human recombinant) injection 1 mg  1 mg Intramuscular PRN Alma Rosa Harrison PA-C        glucose chewable tablet 16 g  16 g Oral PRN Alma Rosa Harrison PA-C        glucose chewable tablet 24 g  24 g Oral PRN Alma Rosa Harrison PA-C        hydrALAZINE tablet 25 mg  25 mg Oral TID Alma Rosa Harrison PA-C   25 mg at 04/03/17 0529    insulin aspart pen 0-5 Units  0-5 Units Subcutaneous QID (AC + HS) PRN Alma Rosa Harrison PA-C        lisinopril tablet 40 mg  40 mg Oral Daily Alma Rosa Harrison PA-C   40 mg at 04/03/17 0835    loperamide capsule 2 mg  2 mg Oral PRN Alma Rosa Harrison PA-C         methadone tablet 60 mg  60 mg Oral Daily Alma Rosa Harrison PA-C   60 mg at 04/03/17 0835    minoxidil tablet 7.5 mg  7.5 mg Oral BID Alma Rosa Harrison PA-C   7.5 mg at 04/03/17 0837    naloxone 0.4 mg/mL injection 0.4 mg  0.4 mg Intravenous PRN Roscoe Martini PA-C        ondansetron injection 4 mg  4 mg Intravenous Q8H PRN Alma Rosa Harrison PA-C   4 mg at 04/03/17 0835    polyethylene glycol packet 17 g  17 g Oral TID PRN Alma Rosa Harrison PA-C        promethazine (PHENERGAN) 6.25 mg in dextrose 5 % 50 mL IVPB  6.25 mg Intravenous Q6H PRN Alma Rosa Harrison PA-C        sodium chloride 0.9% flush 3 mL  3 mL Intravenous Q8H Alma Rosa Harrison PA-C   3 mL at 04/03/17 0530       Review of Systems   Gastrointestinal: Vomiting: Affirmed almost daily AM vomiting of yellow-colored vomitus for the last approximately 1-2 weeks.   Neurological: Negative for weakness and headaches.        With respect to seizures, the patient denied family history, head trauma, ETOH, and new medications.  He affirmed not sleeping well for months, adding that he experiences frequent awakenings and gets approximately 4 hours of sleep per night.  He also affirmed infrequent caffeine use, as well as a right infection resolved with antibiotics approximately 1 month ago.    In reference to his most recent seizure, he denies memory of it, as well as tongue biting.  He affirmed defecating and that the seizure was witnessed by both his son and mother.     Objective:     Vital Signs (Most Recent):  Temp: 98.4 °F (36.9 °C) (04/03/17 1103)  Pulse: (!) 48 (04/03/17 1110)  Resp: 17 (04/03/17 1103)  BP: (!) 149/93 (04/03/17 1103)  SpO2: (!) 94 % (04/03/17 1103) Vital Signs (24h Range):  Temp:  [98.2 °F (36.8 °C)-99.5 °F (37.5 °C)] 98.4 °F (36.9 °C)  Pulse:  [48-69] 48  Resp:  [15-19] 17  SpO2:  [93 %-98 %] 94 %  BP: (125-198)/(59-93) 149/93     Weight: 72.5 kg (159 lb 13.3 oz)  Body mass index is 21.68 kg/(m^2).    Physical  Exam    NEUROLOGICAL EXAMINATION:     CRANIAL NERVES   Cranial nerves II through XII intact.     MOTOR EXAM   Muscle bulk: normal  Overall muscle tone: normal  Right arm tone: normal  Left arm tone: normal  Right leg tone: normal  Left leg tone: normal       5/5 bilateral UE and LE strength     SENSORY EXAM        Normal and equal bilateral facial, UE, and LE sensation       Significant Labs:   CBC:   Recent Labs  Lab 04/01/17 1712 04/02/17  0819 04/03/17  0607   WBC 8.00 8.15 7.28   HGB 10.1* 9.3* 9.3*   HCT 32.3* 28.2* 29.0*    131* 117*     CMP:   Recent Labs  Lab 04/01/17 1712 04/02/17 0819 04/03/17  0607   GLU 96 81 64*    141 141   K 3.6 4.2 4.6    100 98   CO2 23 30* 27   BUN 12 21* 40*   CREATININE 3.4* 5.2* 7.1*   CALCIUM 8.0* 7.6* 8.4*   MG  --  2.1  --    PROT 8.5*  --   --    ALBUMIN 3.6  --   --    BILITOT 0.6  --   --    ALKPHOS 87  --   --    AST 21  --   --    ALT 11  --   --    ANIONGAP 13 11 16   EGFRNONAA 20* 11.8* 8.1*       Significant Imaging: I have reviewed all pertinent imaging results/findings within the past 24 hours.

## 2017-04-03 NOTE — CONSULTS
Mr Aguirre has ESRD; maintenance HD 3 times per week  Will arrange for HD during hospitalizaton  Full consult to follow    Dimitris Dasilva MD

## 2017-04-03 NOTE — PROGRESS NOTES
"[facility] Hospital Medicine  Progress Note    Patient Name: João Aguirre  MRN: [id number]  Patient Class: IP- Inpatient   Admission Date: 4/2/2017  Length of Stay: 1 days  Attending Physician: Samuel Pino MD  Primary Care Provider: Delbert Redd NP    [facility] Medicine Team: [facility] HOSP MED E Roscoe Martini PA-C    Subjective:     Principal Problem:Seizure    HPI:  Patient is a 52 year old gentleman with a h/o DM II, ESRD on HD, Hep C, CVA on Plavix, HTN, HLD, and noncompliance.  Patient is a transfer from [facility] for neurology consult.  No family at bedside.  HPI primarily obtained from ER notes, as patient does not remember events.  Following dialysis today, patient's son reports he "suddenly passed out" while walking.  He hit his head on a nearby door and then had "approximately 30 seconds of generalized shaking and foaming at the mouth."  He was confused following episode and does not remember it.  Per EMS, patient was hypoglycemic and hypertensive with a GCS of 14 on their arrival.  Glucose noted to be 50, so he received D50 with improvement to 96.  He was given IV hydralazine with improvement in BP.  CT of head showed no evidence of any subdural or subarachnoid hemorrhage or fracture.  ECG had no acute changes.  Troponin elevated to 0.112; however, this is lower than all of his past troponins on record.  Following work-up in [facility] ER, patient was transferred to a wheelchair for admission and had a second episode of convulsions.  He was then transferred to [facility] ER.    On exam, patient states he "doesn't feel good" but is unable to elaborate much further.  He denies chest pain, SOB, dizziness, palpitations, fever/chills, abdominal pain.  He complains of N/V.  Denies history of seizures or sick contacts.  Denies headache, vision changes, unilateral weakness.      Hospital Course:  Patient admitted into observation for evaluation of seizure. Patient reports a remote history of " seizure event ~20 years ago. CT head showed remote posterior left frontal cortical infarct but no acute intracranial abnormalities. Neurology consulted and following. MRI showed no acute abnormalities. EEG pending. Endocrinology consulted for hypoglycemia as patient with multiple admissions for hypoglycemia requiring ICU admission in the past. Hypoglycemia not believed to be insulin mediated and secondary to low glycogen stores in setting of Hep C cirrhosis.    Interval History: Some bradycardia overnight, coreg held and now hypertensive this AM. No complaints today. MRI limited by motion, but reviewed by neurology, no need for repeat. EEG for today. AEDs pending EEG results    Review of Systems   Constitutional: Positive for fatigue. Negative for chills and fever.   HENT: Negative for congestion, sore throat and trouble swallowing.    Eyes: Negative for visual disturbance.   Respiratory: Negative for cough and shortness of breath.    Cardiovascular: Negative for chest pain, palpitations and leg swelling.   Gastrointestinal: Negative for abdominal pain, nausea and vomiting.   Genitourinary:        Anuric   Musculoskeletal: Positive for arthralgias and myalgias.   Neurological: Positive for seizures (no events overnight). Negative for dizziness, speech difficulty, weakness and headaches.   Psychiatric/Behavioral: Positive for confusion. Negative for agitation.     Objective:     Vital Signs (Most Recent):  Temp: 98.2 °F (36.8 °C) (04/03/17 0727)  Pulse: 65 (04/03/17 0727)  Resp: 19 (04/03/17 0727)  BP: (!) 198/80 (04/03/17 0727)  SpO2: 95 % (04/03/17 0727) Vital Signs (24h Range):  Temp:  [98.2 °F (36.8 °C)-99.5 °F (37.5 °C)] 98.2 °F (36.8 °C)  Pulse:  [49-69] 65  Resp:  [15-19] 19  SpO2:  [93 %-98 %] 95 %  BP: (111-198)/(59-88) 198/80     Weight: 72.5 kg (159 lb 13.3 oz)  Body mass index is 21.68 kg/(m^2).    Intake/Output Summary (Last 24 hours) at 04/03/17 0921  Last data filed at 04/03/17 0600   Gross per 24 hour    Intake              300 ml   Output                0 ml   Net              300 ml      Physical Exam   Constitutional: He is oriented to person, place, and time. No distress.   HENT:   Bitemporal wasting   Eyes:   Right eye haziness   Cardiovascular: Normal rate and regular rhythm.    Murmur heard.  Pulmonary/Chest: Effort normal and breath sounds normal. No respiratory distress.   Abdominal: Soft. Bowel sounds are normal. He exhibits no distension.   Musculoskeletal: Normal range of motion. He exhibits no edema.   Neurological: He is alert and oriented to person, place, and time. He displays no tremor. No cranial nerve deficit. He displays no seizure activity.   No focal neurologic deficits   Psychiatric: His affect is blunt.   Nursing note and vitals reviewed.      Significant Labs:   CBC:   Recent Labs  Lab 04/01/17 1712 04/02/17 0819 04/03/17  0607   WBC 8.00 8.15 7.28   HGB 10.1* 9.3* 9.3*   HCT 32.3* 28.2* 29.0*    131* 117*     CMP:   Recent Labs  Lab 04/01/17 1712 04/02/17 0819 04/03/17  0607    141 141   K 3.6 4.2 4.6    100 98   CO2 23 30* 27   GLU 96 81 64*   BUN 12 21* 40*   CREATININE 3.4* 5.2* 7.1*   CALCIUM 8.0* 7.6* 8.4*   PROT 8.5*  --   --    ALBUMIN 3.6  --   --    BILITOT 0.6  --   --    ALKPHOS 87  --   --    AST 21  --   --    ALT 11  --   --    ANIONGAP 13 11 16   EGFRNONAA 20* 11.8* 8.1*     All pertinent labs within the past 24 hours have been reviewed.    Significant Imaging: I have reviewed all pertinent imaging results/findings within the past 24 hours.    Assessment/Plan:      * Seizure  - Neurology following: MRI limited by motion, but reviewed by Neuro and no need for repeat MRI at this time. EEG today. AEDs based on EEG.  - CT with no acute changes.   - Seizure precautions, fall precautions, neuro checks q4hrs.    Hypoglycemia associated with type 2 diabetes mellitus  - Hypoglycemic on arrival in OSH ER.  Given D50 with improvement to 96.  - Patient with  multiple admissions for hypoglycemia.  He is not on any diabetes medications at this time.  Last admission patient was put in ICU for severe hypoglycemia, initially believed to be related to methadone use. His dose was decreased and there was some improvement in his sugars. Insulinoma labs drawn during last admit, but were pending at discharge.  - Endocrine consulted and reccs appreciated: hypoglycemia mostly in AM and felt 2/2 to depleted glycogen stores in setting of Hep C cirrhosis.   - ACTH stim test, IGF-1, insulin, c-peptide, iraheta screen, glucose, pro-insulin, cortisol, and GH tests ordered at time of hypoglycemia for bg less than 60 and asymptomatic.  - Renal diet    ESRD (T,Th,Sat) dialysis onset 2013  - Will consult nephrology.  - Patient on TuThSa schedule.  He makes minimal urine.  - Completed HD on Saturday PTA.  No need for emergent dialysis at this time.  - Continue Phoslo 2001mg TID WM.    Elevated troponin I level  - Troponin of 0.112.  ECG unchanged.  This is lower than previous troponins.  Do not suspect ACS at this time.    Poorly-controlled hypertension  - Hypertensive urgency at OSH resolved with IV hydralazine 10mg.  - Continue home amlodipine 5 mg BID, Coreg 12.5 mg BID, lisinopril 40mg daily, minoxidil 7.5mg BID,  increase hydralazine to 50 mg TID  - Initially increased coreg to 25 mg BID resulted in bradycardia, will decrease back to 12.5 mg BID and increase hydralazine to 50 mg TID    Coronary artery disease involving native coronary artery of native heart without angina pectoris  - See above.  Continue secondary prevention.    Anemia in chronic kidney disease  - H/H improved from baseline to 10.1/32.3.    H/O: CVA (cerebrovascular accident)  - No evidence of acute infarct on CT head.  Continue Plavix, secondary prevention.    Chronic hepatitis C without hepatic coma  - LFTs unremarkable.  Patient with no complaints of abdominal pain.    Methadone dependence  - Continue methadone 60mg daily.   Patient with chronic back pain.    Mixed hyperlipidemia  - Continue Lipitor 40mg qHS.    VTE Risk Mitigation         Ordered     Medium Risk of VTE  Once      04/02/17 0157     Place ZACHARY hose  Until discontinued      04/02/17 0157     Place sequential compression device  Until discontinued      04/02/17 0157          Roscoe Martini PA-C  Department of Hospital Medicine   [facility]

## 2017-04-04 PROBLEM — R05.9 COUGH: Status: ACTIVE | Noted: 2017-04-04

## 2017-04-04 LAB
ALBUMIN SERPL BCP-MCNC: 3.2 G/DL
AMPHETAMINES SERPL QL: NEGATIVE
ANION GAP SERPL CALC-SCNC: 13 MMOL/L
BARBITURATES SERPL QL SCN: NEGATIVE
BASOPHILS # BLD AUTO: 0.03 K/UL
BASOPHILS NFR BLD: 0.5 %
BENZODIAZ SERPL QL: NEGATIVE
BNP SERPL-MCNC: 2292 PG/ML
BUN SERPL-MCNC: 66 MG/DL
CALCIUM SERPL-MCNC: 7.9 MG/DL
CANNABINOIDS SERPL QL: POSITIVE
CHLORIDE SERPL-SCNC: 97 MMOL/L
CO2 SERPL-SCNC: 28 MMOL/L
COCAINE, BLOOD: NEGATIVE
CREAT SERPL-MCNC: 8.8 MG/DL
DIFFERENTIAL METHOD: ABNORMAL
EOSINOPHIL # BLD AUTO: 0.5 K/UL
EOSINOPHIL NFR BLD: 8.4 %
ERYTHROCYTE [DISTWIDTH] IN BLOOD BY AUTOMATED COUNT: 14.6 %
EST. GFR  (AFRICAN AMERICAN): 7.2 ML/MIN/1.73 M^2
EST. GFR  (NON AFRICAN AMERICAN): 6.3 ML/MIN/1.73 M^2
ETHANOL SERPL-MCNC: NEGATIVE MG/DL
GLUCOSE SERPL-MCNC: 73 MG/DL
HCT VFR BLD AUTO: 27.2 %
HGB BLD-MCNC: 9.2 G/DL
LYMPHOCYTES # BLD AUTO: 1.2 K/UL
LYMPHOCYTES NFR BLD: 20.4 %
MCH RBC QN AUTO: 31.5 PG
MCHC RBC AUTO-ENTMCNC: 33.8 %
MCV RBC AUTO: 93 FL
METHADONE SERPL QL SCN: POSITIVE
MONOCYTES # BLD AUTO: 0.7 K/UL
MONOCYTES NFR BLD: 11.9 %
NEUTROPHILS # BLD AUTO: 3.4 K/UL
NEUTROPHILS NFR BLD: 58.5 %
OPIATES SERPL QL SCN: NEGATIVE
PCP SERPL QL SCN: NEGATIVE
PHOSPHATE SERPL-MCNC: 8.1 MG/DL
PLATELET # BLD AUTO: 125 K/UL
PMV BLD AUTO: 9.2 FL
POCT GLUCOSE: 102 MG/DL (ref 70–110)
POCT GLUCOSE: 129 MG/DL (ref 70–110)
POCT GLUCOSE: 78 MG/DL (ref 70–110)
POCT GLUCOSE: 98 MG/DL (ref 70–110)
POTASSIUM SERPL-SCNC: 4.8 MMOL/L
PROPOXYPH SERPL QL: NEGATIVE
RBC # BLD AUTO: 2.92 M/UL
SODIUM SERPL-SCNC: 138 MMOL/L
WBC # BLD AUTO: 5.82 K/UL

## 2017-04-04 PROCEDURE — 99233 SBSQ HOSP IP/OBS HIGH 50: CPT | Mod: ,,, | Performed by: INTERNAL MEDICINE

## 2017-04-04 PROCEDURE — 25000003 PHARM REV CODE 250: Performed by: PHYSICIAN ASSISTANT

## 2017-04-04 PROCEDURE — 80100016 HC MAINTENANCE HEMODIALYSIS

## 2017-04-04 PROCEDURE — 94640 AIRWAY INHALATION TREATMENT: CPT

## 2017-04-04 PROCEDURE — 11000001 HC ACUTE MED/SURG PRIVATE ROOM

## 2017-04-04 PROCEDURE — 80069 RENAL FUNCTION PANEL: CPT

## 2017-04-04 PROCEDURE — 83880 ASSAY OF NATRIURETIC PEPTIDE: CPT

## 2017-04-04 PROCEDURE — 36415 COLL VENOUS BLD VENIPUNCTURE: CPT

## 2017-04-04 PROCEDURE — 99232 SBSQ HOSP IP/OBS MODERATE 35: CPT | Mod: GC,,, | Performed by: INTERNAL MEDICINE

## 2017-04-04 PROCEDURE — 25000242 PHARM REV CODE 250 ALT 637 W/ HCPCS: Performed by: PHYSICIAN ASSISTANT

## 2017-04-04 PROCEDURE — 25000003 PHARM REV CODE 250: Performed by: HOSPITALIST

## 2017-04-04 PROCEDURE — 99232 SBSQ HOSP IP/OBS MODERATE 35: CPT | Mod: GC,,, | Performed by: PSYCHIATRY & NEUROLOGY

## 2017-04-04 PROCEDURE — 85025 COMPLETE CBC W/AUTO DIFF WBC: CPT

## 2017-04-04 PROCEDURE — 99232 SBSQ HOSP IP/OBS MODERATE 35: CPT | Mod: ,,, | Performed by: PHYSICIAN ASSISTANT

## 2017-04-04 PROCEDURE — 63600175 PHARM REV CODE 636 W HCPCS: Performed by: PHYSICIAN ASSISTANT

## 2017-04-04 RX ORDER — GUAIFENESIN 600 MG/1
600 TABLET, EXTENDED RELEASE ORAL 2 TIMES DAILY
Status: DISCONTINUED | OUTPATIENT
Start: 2017-04-04 | End: 2017-04-05 | Stop reason: HOSPADM

## 2017-04-04 RX ORDER — IPRATROPIUM BROMIDE AND ALBUTEROL SULFATE 2.5; .5 MG/3ML; MG/3ML
3 SOLUTION RESPIRATORY (INHALATION)
Status: DISCONTINUED | OUTPATIENT
Start: 2017-04-04 | End: 2017-04-05 | Stop reason: HOSPADM

## 2017-04-04 RX ADMIN — AMLODIPINE BESYLATE 5 MG: 5 TABLET ORAL at 08:04

## 2017-04-04 RX ADMIN — Medication 3 ML: at 09:04

## 2017-04-04 RX ADMIN — CARVEDILOL 12.5 MG: 6.25 TABLET, FILM COATED ORAL at 09:04

## 2017-04-04 RX ADMIN — MINOXIDIL 7.5 MG: 2.5 TABLET ORAL at 09:04

## 2017-04-04 RX ADMIN — CLOPIDOGREL 75 MG: 75 TABLET, FILM COATED ORAL at 09:04

## 2017-04-04 RX ADMIN — METHADONE HYDROCHLORIDE 60 MG: 10 TABLET ORAL at 09:04

## 2017-04-04 RX ADMIN — MINOXIDIL 7.5 MG: 2.5 TABLET ORAL at 08:04

## 2017-04-04 RX ADMIN — CALCIUM ACETATE 2001 MG: 667 CAPSULE ORAL at 09:04

## 2017-04-04 RX ADMIN — FAMOTIDINE 20 MG: 20 TABLET, FILM COATED ORAL at 09:04

## 2017-04-04 RX ADMIN — SODIUM CHLORIDE: 0.9 INJECTION, SOLUTION INTRAVENOUS at 05:04

## 2017-04-04 RX ADMIN — DOCUSATE SODIUM 100 MG: 50 CAPSULE, LIQUID FILLED ORAL at 09:04

## 2017-04-04 RX ADMIN — HYDRALAZINE HYDROCHLORIDE 50 MG: 25 TABLET, FILM COATED ORAL at 05:04

## 2017-04-04 RX ADMIN — ATORVASTATIN CALCIUM 40 MG: 20 TABLET, FILM COATED ORAL at 09:04

## 2017-04-04 RX ADMIN — CARVEDILOL 12.5 MG: 6.25 TABLET, FILM COATED ORAL at 08:04

## 2017-04-04 RX ADMIN — ASPIRIN 325 MG ORAL TABLET 325 MG: 325 PILL ORAL at 09:04

## 2017-04-04 RX ADMIN — PANTOPRAZOLE SODIUM 600 MG: 40 TABLET, DELAYED RELEASE ORAL at 08:04

## 2017-04-04 RX ADMIN — CALCIUM ACETATE 2001 MG: 667 CAPSULE ORAL at 11:04

## 2017-04-04 RX ADMIN — AMLODIPINE BESYLATE 5 MG: 5 TABLET ORAL at 09:04

## 2017-04-04 RX ADMIN — Medication 3 ML: at 05:04

## 2017-04-04 RX ADMIN — IPRATROPIUM BROMIDE AND ALBUTEROL SULFATE 3 ML: .5; 3 SOLUTION RESPIRATORY (INHALATION) at 08:04

## 2017-04-04 RX ADMIN — PANTOPRAZOLE SODIUM 600 MG: 40 TABLET, DELAYED RELEASE ORAL at 11:04

## 2017-04-04 RX ADMIN — LISINOPRIL 40 MG: 20 TABLET ORAL at 09:04

## 2017-04-04 RX ADMIN — ONDANSETRON 4 MG: 2 INJECTION INTRAMUSCULAR; INTRAVENOUS at 09:04

## 2017-04-04 RX ADMIN — ONDANSETRON 4 MG: 2 INJECTION INTRAMUSCULAR; INTRAVENOUS at 08:04

## 2017-04-04 RX ADMIN — Medication 3 ML: at 08:04

## 2017-04-04 RX ADMIN — DOCUSATE SODIUM 100 MG: 50 CAPSULE, LIQUID FILLED ORAL at 08:04

## 2017-04-04 RX ADMIN — HYDRALAZINE HYDROCHLORIDE 50 MG: 25 TABLET, FILM COATED ORAL at 09:04

## 2017-04-04 NOTE — NURSING
Pt AAOX4.  C/o pain and nausea during breakfast; methadone and zofran given per order. Pt also c/o a new cough; Chest XR taken. Pt will have a 2D Echo tomorrow.  BG monitored ACHS/WDL.   Pt up ad donita.  Shower and linen change this am.  Pt ate 100% breakfast and lunch. Fall precautions/risks and seizure precautions discusssed with patient. Pt verbalized understanding. Non skid socks, appropriate bands on.  Hourly roundings in place. Personal belongings and call light within reach.  Dialysis this afternoon. Will continue to monitor BG, HTN, pain, and seizures.

## 2017-04-04 NOTE — ASSESSMENT & PLAN NOTE
Insulin mediated vs non insulin mediated  -Non-insulin mediated evaluation will consisted of evaluation of counter regulatory hormones.  -Etiologies include: ESRD, malnutrition (however appears with good nutrition status), deficient glycogen stores from Hep C cirrhosis (would likely manifest with hypoglycemia early am or overnight) Likely etiology as fbg low normal in am.  Case reports of Methadone causing hypoglycemia.  -ACTH stim test normal  -IGF-1 pending to evluate GH hormone deficiency  -Unlikely Insulin mediated however evaluation will consist of: Insulin, c peptide, iraheta screen, glucose, pro-insulin, cortisol, and GH at the time of Hypoglycemia for bg less than 60 and asymptomatic. Draw labs first.    D/C planning: will sign off

## 2017-04-04 NOTE — PROGRESS NOTES
[facility]  Neurology  Progress Note    Patient Name: João Aguirre  MRN: [id number]  Admission Date: 4/2/2017  Hospital Length of Stay: 2 days  Code Status: Full Code   Attending Provider: Alexandro Akbar MD  Primary Care Physician: Delbert Redd NP   Principal Problem:Seizure      Subjective:     Interval History: No acute events reported overnight.  HR [48-61], BP [110-167 / 55-77], SaO2 [91-95].  Pt complaining of right ankle pain sustained with seizure.  EEG pending.      Current Facility-Administered Medications   Medication Dose Route Frequency Provider Last Rate Last Dose    0.9%  NaCl infusion   Intravenous PRN Alonso Bonilla MD        0.9%  NaCl infusion   Intravenous Once Alonso Bonilla MD        acetaminophen tablet 650 mg  650 mg Oral Q4H PRN Alma Rosa Harrison PA-C   650 mg at 04/02/17 0514    amlodipine tablet 5 mg  5 mg Oral BID Alma Rosa Harrison PA-C   5 mg at 04/04/17 0903    aspirin tablet 325 mg  325 mg Oral Daily JAMES ZarateC   325 mg at 04/04/17 0901    atorvastatin tablet 40 mg  40 mg Oral QHS JAMES ZarateC   40 mg at 04/03/17 2135    calcium acetate capsule 2,001 mg  2,001 mg Oral TID WM Alma Rosa Harrison PA-C   2,001 mg at 04/04/17 1129    carvedilol tablet 12.5 mg  12.5 mg Oral BID Roscoe Martini PA-C   12.5 mg at 04/04/17 0902    clopidogrel tablet 75 mg  75 mg Oral Daily Alma Rosa Harrison PA-C   75 mg at 04/04/17 0901    dextrose 50% injection 12.5 g  12.5 g Intravenous PRN Alma Rosa Harrison PA-C        dextrose 50% injection 25 g  25 g Intravenous PRN Alma Rosa Harrison PA-C        docusate sodium capsule 100 mg  100 mg Oral BID Alma Rosa Harrison PA-C   100 mg at 04/04/17 0903    famotidine tablet 20 mg  20 mg Oral Daily Alma Rosa Harrison PA-C   20 mg at 04/04/17 0903    glucagon (human recombinant) injection 1 mg  1 mg Intramuscular PRN Alma Rosa Harrison PA-C        glucose chewable  tablet 16 g  16 g Oral PRN Alma Rosa Harrison PA-C        glucose chewable tablet 24 g  24 g Oral PRN Alma Rosa Harrison PA-C        guaifenesin 12 hr tablet 600 mg  600 mg Oral BID Lorena Clancy PA-C   600 mg at 04/04/17 1129    hydrALAZINE tablet 50 mg  50 mg Oral TID Roscoe Martini PA-C   50 mg at 04/04/17 0544    insulin aspart pen 0-5 Units  0-5 Units Subcutaneous QID (AC + HS) PRN Alma Rosa Harrison PA-C        lisinopril tablet 40 mg  40 mg Oral Daily Alma Rosa Harrison PA-C   40 mg at 04/04/17 0903    loperamide capsule 2 mg  2 mg Oral PRN Alma Rosa Harrison PA-C        methadone tablet 60 mg  60 mg Oral Daily Alma Rosa Harrison PA-C   60 mg at 04/04/17 0901    minoxidil tablet 7.5 mg  7.5 mg Oral BID Alma Rosa Harrison PA-C   7.5 mg at 04/04/17 0903    naloxone 0.4 mg/mL injection 0.4 mg  0.4 mg Intravenous PRN Roscoe Martini PA-C        ondansetron injection 4 mg  4 mg Intravenous Q8H PRN Alma Rosa Harrison PA-C   4 mg at 04/04/17 0916    polyethylene glycol packet 17 g  17 g Oral TID PRN Alma Rosa Harrison PA-C        promethazine (PHENERGAN) 6.25 mg in dextrose 5 % 50 mL IVPB  6.25 mg Intravenous Q6H PRN Alma Rosa Harrison PA-C        sodium chloride 0.9% flush 3 mL  3 mL Intravenous Q8H Alma Rosa Harrison PA-C   3 mL at 04/04/17 0544       Review of Systems   Constitutional:        Pt affirmed sleeping well overnight.   Respiratory: Positive for cough (productive of mucus) and chest tightness.    Gastrointestinal: Positive for nausea. Negative for abdominal pain, constipation, diarrhea and vomiting.   Genitourinary: Negative for dysuria.   Musculoskeletal:        Right ankle pain 2/2 fall experienced with seizure incident     Objective:     Vital Signs (Most Recent):  Temp: 98.1 °F (36.7 °C) (04/04/17 1121)  Pulse: (!) 50 (04/04/17 1121)  Resp: 18 (04/04/17 1121)  BP: (!) 167/76 (04/04/17 1121)  SpO2: (!) 92 % (04/04/17 1121) Vital Signs (24h Range):  Temp:  [98.1  °F (36.7 °C)-98.9 °F (37.2 °C)] 98.1 °F (36.7 °C)  Pulse:  [48-61] 50  Resp:  [17-18] 18  SpO2:  [91 %-93 %] 92 %  BP: (110-167)/(55-77) 167/76     Weight: 72.5 kg (159 lb 13.3 oz)  Body mass index is 21.68 kg/(m^2).    Physical Exam   Constitutional: He appears well-developed and well-nourished. No distress.   HENT:   Mouth/Throat: Oropharynx is clear and moist. No oropharyngeal exudate.   Eyes: Conjunctivae are normal.   Pt is blind in right eye.  Left eye reactive to light, EOM intact, no discharge, no scleral icterus.   Neck: Normal range of motion.   Pulmonary/Chest: No stridor.   Musculoskeletal: Normal range of motion. He exhibits no edema or deformity.   Neurological: He is alert. No cranial nerve deficit. He exhibits normal muscle tone. Coordination normal.   Skin: Skin is warm and dry. He is not diaphoretic.   Psychiatric: He has a normal mood and affect.   Vitals reviewed.      NEUROLOGICAL EXAMINATION:     MENTAL STATUS   Level of consciousness: alert    CRANIAL NERVES   Cranial nerves II through XII intact.     MOTOR EXAM        Bilateral finger to target, random alternating motion with hands normal.  5/5 biceps, triceps, handgrip strength.  Normal, bilateral leg raise, leg flexion, leg extension, plantarflexion, and dorsiflexion.     SENSORY EXAM        Normal and bilateral sensation over the face, shoulders, arms, forearms, hands, thighs, legs, and feet.       Significant Labs:   Blood Culture: No results for input(s): LABBLOO in the last 48 hours.  CBC:   Recent Labs  Lab 04/03/17  0607 04/04/17  0525   WBC 7.28 5.82   HGB 9.3* 9.2*   HCT 29.0* 27.2*   * 125*     CMP:   Recent Labs  Lab 04/03/17  0607 04/04/17  0525   GLU 64* 73    138   K 4.6 4.8   CL 98 97   CO2 27 28   BUN 40* 66*   CREATININE 7.1* 8.8*   CALCIUM 8.4* 7.9*   ALBUMIN  --  3.2*   ANIONGAP 16 13   EGFRNONAA 8.1* 6.3*     POCT Glucose:   Recent Labs  Lab 04/03/17  2144 04/04/17  0806 04/04/17  1117   POCTGLUCOSE 172* 102  78       Significant Imaging: I have reviewed all pertinent imaging results/findings within the past 24 hours.    Assessment and Plan:     * Seizure  - Initial episode at home following dialysis [04/01/17] - second episode during transport from ED to floor at OSH  - Hypoglycemic and hypertensive on initial presentation, now improved  - CT head showing remote posterior left frontal cortical infarct but no acute intracranial abnormalities  - Differential includes dialysis dysequilibrium syndrome, post-traumatic seizures, and convulsive syncope  - [04/02/17] - MRI brain noted no acute process seen  - [04/02/17] Serum tox screen positive for methadone (prescribed medication)  - EEG in progress    Recs:  - Epilepsy protocol  - Consider Keppra pending results of the EEG        VTE Risk Mitigation         Ordered     Medium Risk of VTE  Once      04/02/17 0157     Place ZACHARY hose  Until discontinued      04/02/17 0157     Place sequential compression device  Until discontinued      04/02/17 0157          Ab Barcenas MD  Neurology  [facility]

## 2017-04-04 NOTE — ASSESSMENT & PLAN NOTE
- Nephrology following, HD scheduled for today (4/4/17)  - Patient on TuTa schedule.  He makes minimal urine.  - Completed HD on Saturday PTA.   - Continue Phoslo 2001mg TID WM.

## 2017-04-04 NOTE — SUBJECTIVE & OBJECTIVE
Subjective:     Interval History: No acute events reported overnight.  HR [48-61], BP [110-167 / 55-77], SaO2 [91-95].  Pt complaining of right ankle pain sustained with seizure.  EEG pending.      Current Facility-Administered Medications   Medication Dose Route Frequency Provider Last Rate Last Dose    0.9%  NaCl infusion   Intravenous PRN Alonso Bonilla MD        0.9%  NaCl infusion   Intravenous Once Alonso Bonilla MD        acetaminophen tablet 650 mg  650 mg Oral Q4H PRN Alma Rosa Harrison PA-C   650 mg at 04/02/17 0514    amlodipine tablet 5 mg  5 mg Oral BID Alma Rosa Harrison PA-C   5 mg at 04/04/17 0903    aspirin tablet 325 mg  325 mg Oral Daily Alma Rosa Harrison PA-C   325 mg at 04/04/17 0901    atorvastatin tablet 40 mg  40 mg Oral QHS JAMES ZarateC   40 mg at 04/03/17 2135    calcium acetate capsule 2,001 mg  2,001 mg Oral TID WM Alma Rosa Harrison PA-C   2,001 mg at 04/04/17 1129    carvedilol tablet 12.5 mg  12.5 mg Oral BID JEFRY Matthews-HARI   12.5 mg at 04/04/17 0902    clopidogrel tablet 75 mg  75 mg Oral Daily Alma Rosa Harrison PA-C   75 mg at 04/04/17 0901    dextrose 50% injection 12.5 g  12.5 g Intravenous PRN Alma Rosa Harrison PA-C        dextrose 50% injection 25 g  25 g Intravenous PRN Alma Rosa Harrison PA-C        docusate sodium capsule 100 mg  100 mg Oral BID Alma Rosa Harrison PA-C   100 mg at 04/04/17 0903    famotidine tablet 20 mg  20 mg Oral Daily Alma Rosa Harrison PA-C   20 mg at 04/04/17 0903    glucagon (human recombinant) injection 1 mg  1 mg Intramuscular PRN Alma Rosa Harrison PA-C        glucose chewable tablet 16 g  16 g Oral PRN Alma Rosa Harrison PA-C        glucose chewable tablet 24 g  24 g Oral PRN Alma Rosa Harrison PA-C        guaifenesin 12 hr tablet 600 mg  600 mg Oral BID Lorena Clancy PA-C   600 mg at 04/04/17 1129    hydrALAZINE tablet 50 mg  50 mg Oral TID Roscoe Martini PA-C   50 mg at  04/04/17 0544    insulin aspart pen 0-5 Units  0-5 Units Subcutaneous QID (AC + HS) PRN Alma Rosa Harrison PA-C        lisinopril tablet 40 mg  40 mg Oral Daily Alma Rosa Harrison PA-C   40 mg at 04/04/17 0903    loperamide capsule 2 mg  2 mg Oral PRN Alma Rosa Harrison PA-C        methadone tablet 60 mg  60 mg Oral Daily Alma Rosa Harrison PA-C   60 mg at 04/04/17 0901    minoxidil tablet 7.5 mg  7.5 mg Oral BID Alma Rosa Harrison PA-C   7.5 mg at 04/04/17 0903    naloxone 0.4 mg/mL injection 0.4 mg  0.4 mg Intravenous PRN Roscoe Martini PA-C        ondansetron injection 4 mg  4 mg Intravenous Q8H PRN Alma Rosa Harrison PA-C   4 mg at 04/04/17 0916    polyethylene glycol packet 17 g  17 g Oral TID PRN Alma Rosa Harrison PA-C        promethazine (PHENERGAN) 6.25 mg in dextrose 5 % 50 mL IVPB  6.25 mg Intravenous Q6H PRN Alma Rosa Harrison PA-C        sodium chloride 0.9% flush 3 mL  3 mL Intravenous Q8H Alma Rosa Harrison PA-C   3 mL at 04/04/17 0544       Review of Systems   Constitutional:        Pt affirmed sleeping well overnight.   Respiratory: Positive for cough (productive of mucus) and chest tightness.    Gastrointestinal: Positive for nausea. Negative for abdominal pain, constipation, diarrhea and vomiting.   Genitourinary: Negative for dysuria.   Musculoskeletal:        Right ankle pain 2/2 fall experienced with seizure incident     Objective:     Vital Signs (Most Recent):  Temp: 98.1 °F (36.7 °C) (04/04/17 1121)  Pulse: (!) 50 (04/04/17 1121)  Resp: 18 (04/04/17 1121)  BP: (!) 167/76 (04/04/17 1121)  SpO2: (!) 92 % (04/04/17 1121) Vital Signs (24h Range):  Temp:  [98.1 °F (36.7 °C)-98.9 °F (37.2 °C)] 98.1 °F (36.7 °C)  Pulse:  [48-61] 50  Resp:  [17-18] 18  SpO2:  [91 %-93 %] 92 %  BP: (110-167)/(55-77) 167/76     Weight: 72.5 kg (159 lb 13.3 oz)  Body mass index is 21.68 kg/(m^2).    Physical Exam   Constitutional: He appears well-developed and well-nourished. No distress.    HENT:   Mouth/Throat: Oropharynx is clear and moist. No oropharyngeal exudate.   Eyes: Conjunctivae are normal.   Pt is blind in right eye.  Left eye reactive to light, EOM intact, no discharge, no scleral icterus.   Neck: Normal range of motion.   Pulmonary/Chest: No stridor.   Musculoskeletal: Normal range of motion. He exhibits no edema or deformity.   Neurological: He is alert. No cranial nerve deficit. He exhibits normal muscle tone. Coordination normal.   Skin: Skin is warm and dry. He is not diaphoretic.   Psychiatric: He has a normal mood and affect.   Vitals reviewed.      NEUROLOGICAL EXAMINATION:     MENTAL STATUS   Level of consciousness: alert    CRANIAL NERVES   Cranial nerves II through XII intact.     MOTOR EXAM        Bilateral finger to target, random alternating motion with hands normal.  5/5 biceps, triceps, handgrip strength.  Normal, bilateral leg raise, leg flexion, leg extension, plantarflexion, and dorsiflexion.     SENSORY EXAM        Normal and bilateral sensation over the face, shoulders, arms, forearms, hands, thighs, legs, and feet.       Significant Labs:   Blood Culture: No results for input(s): LABBLOO in the last 48 hours.  CBC:   Recent Labs  Lab 04/03/17  0607 04/04/17  0525   WBC 7.28 5.82   HGB 9.3* 9.2*   HCT 29.0* 27.2*   * 125*     CMP:   Recent Labs  Lab 04/03/17  0607 04/04/17  0525   GLU 64* 73    138   K 4.6 4.8   CL 98 97   CO2 27 28   BUN 40* 66*   CREATININE 7.1* 8.8*   CALCIUM 8.4* 7.9*   ALBUMIN  --  3.2*   ANIONGAP 16 13   EGFRNONAA 8.1* 6.3*     POCT Glucose:   Recent Labs  Lab 04/03/17  2144 04/04/17  0806 04/04/17  1117   POCTGLUCOSE 172* 102 78       Significant Imaging: I have reviewed all pertinent imaging results/findings within the past 24 hours.

## 2017-04-04 NOTE — ASSESSMENT & PLAN NOTE
- Initial episode at home following dialysis [04/01/17] - second episode during transport from ED to floor at OSH  - Hypoglycemic and hypertensive on initial presentation, now improved  - CT head showing remote posterior left frontal cortical infarct but no acute intracranial abnormalities  - Differential includes dialysis dysequilibrium syndrome, post-traumatic seizures, and convulsive syncope  - [04/02/17] - MRI brain noted no acute process seen  - [04/02/17] Serum tox screen positive for methadone (prescribed medication)  - EEG in progress    Recs:  - Epilepsy protocol  - Consider Keppra pending results of the EEG

## 2017-04-04 NOTE — ASSESSMENT & PLAN NOTE
- Neurology following: MRI limited by motion, but reviewed by Neuro and no need for repeat MRI at this time.   - EEG completed, results pending. AEDs based on EEG.  - CT with no acute changes.   - Seizure precautions, fall precautions, neuro checks q4hrs.

## 2017-04-04 NOTE — PROGRESS NOTES
[facility]  Endocrinology  Progress Note    Admit Date: 2017     Consult requested by: BENITA Pino    Reason for consult: hypoglycemia    HPI:  Patient male with active medical problems of HTN, Type 2 DM, hx of stroke, ESRD who presents to [facility] as a transfer with a diagnosis of seizure and hypoglycemia.  Patient with a documented blood glucose of 50 by EMS prior to transfer.  Given amp of D50 and blood glucose rise to 96.  Patient has no recollection of events other than occurring after Dialysis.  Of not patient has had previous admission for hypoglycemia unrelated to Dialysis.      Interval HPI:   Overnight events: No sz or hypoglycemia overnight. Fb  Eatin%  Nausea: No  Hypoglycemia and intervention: No  Fever: No  TPN and/or TF: No  If yes, type of TF/TPN and rate: n/a    BP (!) 164/77 (BP Location: Right arm, Patient Position: Lying, BP Method: Automatic)  Pulse (!) 52  Temp 98.1 °F (36.7 °C) (Oral)   Resp 18  Ht 6' (1.829 m)  Wt 72.5 kg (159 lb 13.3 oz)  SpO2 (!) 93%  BMI 21.68 kg/m2    Labs Reviewed and Include    No results for input(s): GLU, CALCIUM, ALBUMIN, PROT, NA, K, CO2, CL, BUN, CREATININE, ALKPHOS, ALT, AST, BILITOT in the last 24 hours.  Lab Results   Component Value Date    WBC 5.82 2017    HGB 9.2 (L) 2017    HCT 27.2 (L) 2017    MCV 93 2017     (L) 2017     No results for input(s): TSH, FREET4 in the last 168 hours.  Lab Results   Component Value Date    HGBA1C 4.4 (L) 2017       Nutritional status:   Body mass index is 21.68 kg/(m^2).  Lab Results   Component Value Date    ALBUMIN 3.6 2017    ALBUMIN 3.4 (L) 2017    ALBUMIN 3.1 (L) 2017     No results found for: PREALBUMIN    Estimated Creatinine Clearance: 12.6 mL/min (based on Cr of 7.1).    Accu-Checks  Recent Labs      17   1137  17   1321  17   1643  17   2212  17   0533  17   0737  17   1258   04/03/17   1721  04/03/17   2144  04/04/17   0806   POCTGLUCOSE  85  94  79  102  71  82  161*  103  172*  102       Current Medications and/or Treatments Impacting Glycemic Control  Immunotherapy:    Immunosuppressants     None        Steroids:   Hormones     None        Pressors:    Autonomic Drugs     None        Hyperglycemia/Diabetes Medications:   Antihyperglycemics     Start     Stop Route Frequency Ordered    04/02/17 0301  insulin aspart pen 0-5 Units      -- SubQ Before meals & nightly PRN 04/02/17 0201          ASSESSMENT and PLAN    * Seizure  Being evaluated by Neurology  EEG pending      Coronary artery disease involving native coronary artery of native heart without angina pectoris  Avoid hypoglycemia      Anemia in chronic kidney disease  Can falsely lower a1c      H/O: CVA (cerebrovascular accident)  Avoid hypoglycemia      Hypoglycemia associated with type 2 diabetes mellitus  Insulin mediated vs non insulin mediated  -Non-insulin mediated evaluation will consisted of evaluation of counter regulatory hormones.  -Etiologies include: ESRD, malnutrition (however appears with good nutrition status), deficient glycogen stores from Hep C cirrhosis (would likely manifest with hypoglycemia early am or overnight) Likely etiology as fbg low normal in am.  Case reports of Methadone causing hypoglycemia.  -ACTH stim test normal  -IGF-1 pending to evluate GH hormone deficiency  -Unlikely Insulin mediated however evaluation will consist of: Insulin, c peptide, iraheta screen, glucose, pro-insulin, cortisol, and GH at the time of Hypoglycemia for bg less than 60 and asymptomatic. Draw labs first.    D/C planning: will sign off      Chronic hepatitis C without hepatic coma  Cirrhosis on ct scan        Tj Lindo MD  Endocrinology  [facility]

## 2017-04-04 NOTE — PLAN OF CARE
Problem: Patient Care Overview  Goal: Plan of Care Review  Outcome: Ongoing (interventions implemented as appropriate)  No acute events overnight. AOX3. VSS throughout shift. Administered all scheduled medications as ordered. POCT glucose was WDL. Pt slept throughout majority of night with no complaints. Scheduled to go to dialysis today.

## 2017-04-04 NOTE — PROGRESS NOTES
Report received from MIRANDA Sepulveda RN. Pt arrived from [facility] AA4. Maintenance dialysis initiated via LFA fistula without difficulty.

## 2017-04-04 NOTE — ASSESSMENT & PLAN NOTE
- Reports productive cough starting today  - CXR repeated, stable from prior with enlarged heart, mild diffuse increase in pulmonary vascular markings, some patchy increased parenchymal markings noted as well.  - BNP 2292 today, per chart review this is improved from prior admissions (BNP 4055 7/28/16, >4900 10/18/15).  - Most recent 2D echo on record 6/5/15 with EF 25, diastolic dysfunction. Will repeat tomorrow.

## 2017-04-04 NOTE — PROGRESS NOTES
"[facility] Hospital Medicine  Progress Note    Patient Name: João Aguirre  MRN: [id number]  Patient Class: IP- Inpatient   Admission Date: 4/2/2017  Length of Stay: 2 days  Attending Physician: Alexandro Akbar MD  Primary Care Provider: Delbert Redd NP    [facility] Medicine Team: [facility] Lorena Clancy PA-C    Subjective:     Principal Problem:Seizure    HPI:  Patient is a 52 year old gentleman with a h/o DM II, ESRD on HD, Hep C, CVA on Plavix, HTN, HLD, and noncompliance.  Patient is a transfer from [facility] for neurology consult.  No family at bedside.  HPI primarily obtained from ER notes, as patient does not remember events.  Following dialysis today, patient's son reports he "suddenly passed out" while walking.  He hit his head on a nearby door and then had "approximately 30 seconds of generalized shaking and foaming at the mouth."  He was confused following episode and does not remember it.  Per EMS, patient was hypoglycemic and hypertensive with a GCS of 14 on their arrival.  Glucose noted to be 50, so he received D50 with improvement to 96.  He was given IV hydralazine with improvement in BP.  CT of head showed no evidence of any subdural or subarachnoid hemorrhage or fracture.  ECG had no acute changes.  Troponin elevated to 0.112; however, this is lower than all of his past troponins on record.  Following work-up in [facility] ER, patient was transferred to a wheelchair for admission and had a second episode of convulsions.  He was then [place] [facility]    On exam, patient states he "doesn't feel good" but is unable to elaborate much further.  He denies chest pain, SOB, dizziness, palpitations, fever/chills, abdominal pain.  He complains of N/V.  Denies history of seizures or sick contacts.  Denies headache, vision changes, unilateral weakness.      Hospital Course:  Patient admitted into observation for evaluation of seizure. Patient reports a remote history of " "seizure event ~20 years ago. CT head showed remote posterior left frontal cortical infarct but no acute intracranial abnormalities. Neurology consulted and following. MRI showed no acute abnormalities. EEG pending. Endocrinology consulted for hypoglycemia as patient with multiple admissions for hypoglycemia requiring ICU admission in the past. Hypoglycemia not believed to be insulin mediated and secondary to low glycogen stores in setting of Hep C cirrhosis. Discussed with endocrinology, if patient has continued episodes of hypoglycemia as outpatient as liver function improves then worthwhile investigating methadone as possible cause of hypoglycemia. Patient with complaint of cough, some wheezing/crackles on exam 4/4/17, CXR completed with mild diffuse increase in pulmonary vascular markings, some patchy increased parenchymal markings noted as well, consistent with prior CXR 4/1. BNP 2292, improved from prior BNP in 4000s.     Interval History: Reports productive cough today, and that each morning upon waking he experiences nausea and an episode of bilious vomiting. Denies fevers, chills. EEG completed, results pending. HD this afternoon.     Review of Systems   Constitutional: Positive for fatigue. Negative for chills and fever.   HENT: Positive for congestion. Negative for sore throat and trouble swallowing.    Respiratory: Positive for cough. Negative for shortness of breath.    Cardiovascular: Negative for chest pain, palpitations and leg swelling.   Gastrointestinal: Positive for vomiting (reports daily in morning for "months"). Negative for abdominal pain and nausea.   Genitourinary:        Anuric   Musculoskeletal: Positive for arthralgias and myalgias.   Neurological: Positive for seizures (no events overnight). Negative for speech difficulty, weakness and headaches.   Psychiatric/Behavioral: Negative for agitation and confusion.     Objective:     Vital Signs (Most Recent):  Temp: 98.1 °F (36.7 °C) " (04/04/17 0748)  Pulse: (!) 52 (04/04/17 0748)  Resp: 18 (04/04/17 0748)  BP: (!) 164/77 (04/04/17 0748)  SpO2: (!) 93 % (04/04/17 0748) Vital Signs (24h Range):  Temp:  [98.1 °F (36.7 °C)-98.9 °F (37.2 °C)] 98.1 °F (36.7 °C)  Pulse:  [48-61] 52  Resp:  [17-18] 18  SpO2:  [91 %-93 %] 93 %  BP: (110-164)/(55-77) 164/77     Weight: 72.5 kg (159 lb 13.3 oz)  Body mass index is 21.68 kg/(m^2).    Intake/Output Summary (Last 24 hours) at 04/04/17 1108  Last data filed at 04/04/17 0000   Gross per 24 hour   Intake              420 ml   Output              200 ml   Net              220 ml      Physical Exam   Constitutional: He is oriented to person, place, and time. No distress.   Eyes:   Right eye haziness   Cardiovascular: Normal rate and regular rhythm.    Murmur heard.  Pulmonary/Chest: Effort normal. No respiratory distress. He has wheezes.   Abdominal: Soft. Bowel sounds are normal. He exhibits no distension.   Musculoskeletal: Normal range of motion. He exhibits no edema.   Neurological: He is alert and oriented to person, place, and time. He displays no tremor. No cranial nerve deficit. He displays no seizure activity.   No focal neurologic deficits   Psychiatric: His affect is blunt.       Significant Labs: All pertinent labs within the past 24 hours have been reviewed.    Significant Imaging: I have reviewed all pertinent imaging results/findings within the past 24 hours.    Assessment/Plan:      * Seizure  - Neurology following: MRI limited by motion, but reviewed by Neuro and no need for repeat MRI at this time.   - EEG completed, results pending. AEDs based on EEG.  - CT with no acute changes.   - Seizure precautions, fall precautions, neuro checks q4hrs.    Hypoglycemia associated with type 2 diabetes mellitus  - Hypoglycemic on arrival in OSH ER.  Given D50 with improvement to 96.  - Patient with multiple admissions for hypoglycemia.  He is not on any diabetes medications at this time.  Last admission  patient was put in ICU for severe hypoglycemia, initially believed to be related to methadone use. His dose was decreased and there was some improvement in his sugars. Insulinoma labs drawn during last admit, but were pending at discharge.  - Endocrine consulted and reccs appreciated: hypoglycemia mostly in AM and felt 2/2 to depleted glycogen stores in setting of Hep C cirrhosis. Case reports of Methadone causing hypoglycemia. If patient continues to experience episodes of hypoglycemia as liver function improves, further investigation into methadone as possible contributing factor warranted.   - ACTH stim test, IGF-1, insulin, c-peptide, iraheta screen, glucose, pro-insulin, cortisol, and GH tests to be ordered at time of hypoglycemia for bg less than 60 and asymptomatic, patient has not had episode of bg <60.  - Renal diet    ESRD (T,Th,Sat) dialysis onset 2013  - Nephrology following, HD scheduled for today (4/4/17)  - Patient on TuTa schedule.  He makes minimal urine.  - Completed HD on Saturday PTA.   - Continue Phoslo 2001mg TID WM.    Elevated troponin I level  - Troponin of 0.112.  ECG unchanged.  This is lower than previous troponins.  Do not suspect ACS at this time.    Poorly-controlled hypertension  - Hypertensive urgency at OSH resolved with IV hydralazine 10mg.  - Continue home amlodipine 5 mg BID, Coreg 12.5 mg BID, lisinopril 40mg daily, minoxidil 7.5mg BID,  hydralazine 50 mg TID  - Initially increased coreg to 25 mg BID resulted in bradycardia, decreased back to 12.5 mg BID and increase hydralazine to 50 mg TID    Coronary artery disease involving native coronary artery of native heart without angina pectoris  - See above.  Continue secondary prevention.    Anemia in chronic kidney disease  - H/H stable, 9.2/27.2 today    H/O: CVA (cerebrovascular accident)  - No evidence of acute infarct on CT head.  Continue Plavix, secondary prevention.    Chronic hepatitis C without hepatic coma  - LFTs  unremarkable.  Patient with no complaints of abdominal pain.    Methadone dependence  - Continue methadone 60mg daily.  Patient with chronic back pain.  - Case reports of Methadone causing hypoglycemia, if patient continues to have episodes of hypoglycemia will require further investigation into methadone contributing     Mixed hyperlipidemia  - Continue Lipitor 40mg qHS.    Cough  - Reports productive cough starting today  - CXR repeated, stable from prior with enlarged heart, mild diffuse increase in pulmonary vascular markings, some patchy increased parenchymal markings noted as well.  - BNP 2292 today, per chart review this is improved from prior admissions (BNP 4055 7/28/16, >4900 10/18/15).  - Most recent 2D echo on record 6/5/15 with EF 25, diastolic dysfunction. Will repeat tomorrow.       VTE Risk Mitigation         Ordered     Medium Risk of VTE  Once      04/02/17 0157     Place ZACHARY hose  Until discontinued      04/02/17 0157     Place sequential compression device  Until discontinued      04/02/17 0157          Lorena Clancy PA-C  Department of Hospital Medicine   [facility]  Staff: Dr. Akbar

## 2017-04-04 NOTE — ASSESSMENT & PLAN NOTE
- Hypoglycemic on arrival in OSH ER.  Given D50 with improvement to 96.  - Patient with multiple admissions for hypoglycemia.  He is not on any diabetes medications at this time.  Last admission patient was put in ICU for severe hypoglycemia, initially believed to be related to methadone use. His dose was decreased and there was some improvement in his sugars. Insulinoma labs drawn during last admit, but were pending at discharge.  - Endocrine consulted and reccs appreciated: hypoglycemia mostly in AM and felt 2/2 to depleted glycogen stores in setting of Hep C cirrhosis. Case reports of Methadone causing hypoglycemia. If patient continues to experience episodes of hypoglycemia as liver function improves, further investigation into methadone as possible contributing factor warranted.   - ACTH stim test, IGF-1, insulin, c-peptide, iraheta screen, glucose, pro-insulin, cortisol, and GH tests to be ordered at time of hypoglycemia for bg less than 60 and asymptomatic, patient has not had episode of bg <60.  - Renal diet

## 2017-04-04 NOTE — ASSESSMENT & PLAN NOTE
- Hypertensive urgency at OSH resolved with IV hydralazine 10mg.  - Continue home amlodipine 5 mg BID, Coreg 12.5 mg BID, lisinopril 40mg daily, minoxidil 7.5mg BID,  hydralazine 50 mg TID  - Initially increased coreg to 25 mg BID resulted in bradycardia, decreased back to 12.5 mg BID and increase hydralazine to 50 mg TID

## 2017-04-04 NOTE — PROGRESS NOTES
[facility]  Consult Note Nephrology    Admit Date: 4/2/2017  Consult Requested By: Alexandro Akbar MD   LOS: 2 days     SUBJECTIVE:     Follow-up For:  ESRD on iHD    Interval History:  NAEON. EEG negative for seizures, no further hypoglycemia, seen in [facility] while on HD. Tolerated well with complains or complications.     Review of Systems:  Constitutional: no fever or chills  Respiratory: no cough or shortness of breath  Cardiovascular: no chest pain or palpitations  Gastrointestinal: no nausea or vomiting, no abdominal pain or change in bowel habits  Hematologic/Lymphatic: no easy bruising or lymphadenopathy  Musculoskeletal: no arthralgias or myalgias  Neurological: no seizures or tremors      OBJECTIVE:     Vital Signs (Most Recent)  Temp: 98.7 °F (37.1 °C) (04/04/17 1425)  Pulse: (!) 55 (04/04/17 1745)  Resp: 18 (04/04/17 1425)  BP: (!) 162/75 (04/04/17 1745)  SpO2: (!) 92 % (04/04/17 1121)    Vital Signs Range (Last 24H):  Temp:  [98.1 °F (36.7 °C)-98.9 °F (37.2 °C)]   Pulse:  [48-61]   Resp:  [17-18]   BP: (131-190)/(61-92)   SpO2:  [91 %-93 %]       Intake/Output Summary (Last 24 hours) at 04/04/17 1841  Last data filed at 04/04/17 0930   Gross per 24 hour   Intake              360 ml   Output              101 ml   Net              259 ml         Physical Exam:   General: Well developed, well nourished in NAD  HEENT: Conjunctiva clear; Oropharynx clear  Neck: No JVD noted, Supple  CV- Normal S1, S2 with no murmurs,gallops,rubs  Resp- Lungs CTA Bilaterally, Unlabored  Abdomen- NTND, BS normoactive x4 quads, soft  Extrem- No cyanosis, clubbing, edema.  Skin- No rashes, lesions, ulcers  Neuro: awake, Oriented x3, no FND      Laboratory:  CBC:   Recent Labs  Lab 04/04/17  0525   WBC 5.82   RBC 2.92*   HGB 9.2*   HCT 27.2*   *   MCV 93   MCH 31.5*   MCHC 33.8     BMP:   Recent Labs  Lab 04/02/17  0819  04/04/17  0525   GLU 81  < > 73     < > 97   CO2 30*  < > 28   BUN 21*  < > 66*    CREATININE 5.2*  < > 8.8*   CALCIUM 7.6*  < > 7.9*   MG 2.1  --   --    < > = values in this interval not displayed.  CMP:   Recent Labs  Lab 04/01/17  1712  04/04/17  0525   GLU 96  < > 73   CALCIUM 8.0*  < > 7.9*   ALBUMIN 3.6  --  3.2*   PROT 8.5*  --   --      < > 138   K 3.6  < > 4.8   CO2 23  < > 28     < > 97   BUN 12  < > 66*   CREATININE 3.4*  < > 8.8*   ALKPHOS 87  --   --    ALT 11  --   --    AST 21  --   --    BILITOT 0.6  --   --    < > = values in this interval not displayed.  PTH: No results for input(s): PTH in the last 168 hours.  Coagulation: No results for input(s): INR, APTT in the last 168 hours.    Invalid input(s): PT  Cardiac Markers: No results for input(s): CKMB, TROPONINT, MYOGLOBIN in the last 168 hours.  ABGs: No results for input(s): PH, PCO2, HCO3, POCSATURATED, BE in the last 168 hours.    Labs reviewed  Diagnostic Results:  X-Ray: Reviewed  US: Reviewed  Echo: Reviewed    ASSESSMENT/PLAN:     Active Hospital Problems    Diagnosis  POA    *Seizure [R56.9]  Yes    Cough [R05]  No    Syncope [R55]  Yes    Mixed hyperlipidemia [E78.2]  Yes     Chronic    Convulsions [R56.9]  Yes    Methadone dependence [F11.20]  Yes     Chronic    Chronic hepatitis C without hepatic coma [B18.2]  Yes     Chronic    Hypoglycemia associated with type 2 diabetes mellitus [E11.649]  Yes    H/O: CVA (cerebrovascular accident) [Z86.73]  Not Applicable     Chronic    Poorly-controlled hypertension [I10]  Yes     Chronic    Coronary artery disease involving native coronary artery of native heart without angina pectoris [I25.10]  Yes     Chronic    ESRD (T,Th,Sat) dialysis onset 2013 [N18.6]  Yes     Chronic    Anemia in chronic kidney disease [N18.9, D63.1]  Yes     Chronic    Elevated troponin I level [R74.8]  Yes     Chronic      Resolved Hospital Problems    Diagnosis Date Resolved POA   No resolved problems to display.       João Aguirre is a 52 y.o. male with DMT2,  ESRD on HD TTS, Hep C, CVA on Plavix, HTN, HLD, and noncompliance as transfer from OSH secondary to suspected seizure. Nephrology consulted for ESRD     ESRD on IHD TTS   On HD for: unknow  Duration of outpatient dialysis session - 3.5 hrs  EDW - TBD  Residual Leanna Function - minimal  - Will provide dialysis for metabolic clearance and volume management x 3.5 hrs  - Seen while on HD tolerated well no complains  -   - Target ultrafiltration 1-2 lts in am s tolerated keep MAP . 65  - Dialysate adjusted to current labs   Aceess: PHILIPPE AVF with good thrill and Bruit     Active problem in hospital  - AMS hypoglycemia  - seizures      Anemia of Chronic Kidney Disease   - Will resume ERNESTO [id number] uts with HD  - Hg 9.2  - Adequate iron stores as per most recent profile   - Will request iron studies to assess further needs of supplementation      Mineral Bone Disease in CKD   - Renal Function Panel Daily for electrolytes monitoring  - Phos will check Phos daily  - Already on binders as outpatient Please continue Phoslo 2001 AC and snacks  - CoCa 8.5     Nutrition   - Renal Diet     HTN   - Uncontrol  BP, will continue to monitor. Goal for BP is <130 mmHg SBP and BDP <80 mmHg.   - Reume Coreg and hydralazine  - Cont amlodipine   - Cont Minoxidil  Case discuss with Staff further recs with attestation.     Alonso Bonilla MD  Nephrology Fellow PGY4  [phone]    Patient seen and examined on HD with Dr Bonilla;   I have reviewed and agree with assessment and plan

## 2017-04-04 NOTE — PROGRESS NOTES
[facility]  Neurology  Progress Note    Patient Name: João Aguirre  MRN: [id number]  Admission Date: 4/2/2017  Hospital Length of Stay: 2 days  Code Status: Full Code   Attending Provider: Alexandro Akbar MD  Primary Care Physician: Delbert Redd NP   Principal Problem:Seizure       Subjective:      Interval History: No acute events reported overnight. HR [48-61], BP [110-167 / 55-77], SaO2 [91-95]. Pt complaining of right ankle pain sustained with seizure. EEG pending.                  Current Facility-Administered Medications   Medication Dose Route Frequency Provider Last Rate Last Dose    0.9% NaCl infusion    Intravenous PRN Alonso Bonilla MD          0.9% NaCl infusion    Intravenous Once Alonso Bonilla MD          acetaminophen tablet 650 mg 650 mg Oral Q4H PRN Alma Rosa Harrison PA-C    650 mg at 04/02/17 0514    amlodipine tablet 5 mg 5 mg Oral BID Alma Rosa Harrison PA-C    5 mg at 04/04/17 0903    aspirin tablet 325 mg 325 mg Oral Daily JAMES ZarateC    325 mg at 04/04/17 0901    atorvastatin tablet 40 mg 40 mg Oral QHS JAMES ZarateC    40 mg at 04/03/17 2135    calcium acetate capsule 2,001 mg 2,001 mg Oral TID WM Alma Rosa Harrison PA-C    2,001 mg at 04/04/17 1129    carvedilol tablet 12.5 mg 12.5 mg Oral BID Roscoe Martini PA-C    12.5 mg at 04/04/17 0902    clopidogrel tablet 75 mg 75 mg Oral Daily Alma Rosa Harrison PA-C    75 mg at 04/04/17 0901    dextrose 50% injection 12.5 g 12.5 g Intravenous PRN Alma Rosa Harrison PA-C          dextrose 50% injection 25 g 25 g Intravenous PRN Alma Rosa Harrison PA-C          docusate sodium capsule 100 mg 100 mg Oral BID Alma Rosa Harrison PA-C    100 mg at 04/04/17 0903    famotidine tablet 20 mg 20 mg Oral Daily Alma Rosa Harrison PA-C    20 mg at 04/04/17 0903    glucagon (human recombinant) injection 1 mg 1 mg Intramuscular PRN Alma Rosa Harrison PA-C           glucose chewable tablet 16 g 16 g Oral PRN Alma Rosa Harrison PA-C          glucose chewable tablet 24 g 24 g Oral PRN Alma Rosa Harrison PA-C          guaifenesin 12 hr tablet 600 mg 600 mg Oral BID Lorena Clancy PA-C    600 mg at 04/04/17 1129    hydrALAZINE tablet 50 mg 50 mg Oral TID Roscoe Martini PA-C    50 mg at 04/04/17 0544    insulin aspart pen 0-5 Units 0-5 Units Subcutaneous QID (AC + HS) PRN Alma Rosa Harrison PA-C          lisinopril tablet 40 mg 40 mg Oral Daily Alma Rosa Harrison PA-C    40 mg at 04/04/17 0903    loperamide capsule 2 mg 2 mg Oral PRN Alma Rosa Harrison PA-C          methadone tablet 60 mg 60 mg Oral Daily Alma Rosa Harrison PA-C    60 mg at 04/04/17 0901    minoxidil tablet 7.5 mg 7.5 mg Oral BID Alma Rosa Harrison PA-C    7.5 mg at 04/04/17 0903    naloxone 0.4 mg/mL injection 0.4 mg 0.4 mg Intravenous PRN Roscoe Martini PA-C          ondansetron injection 4 mg 4 mg Intravenous Q8H PRN Alma Rosa Harrison PA-C    4 mg at 04/04/17 0916    polyethylene glycol packet 17 g 17 g Oral TID PRN Alma Rosa Harrison PA-C          promethazine (PHENERGAN) 6.25 mg in dextrose 5 % 50 mL IVPB 6.25 mg Intravenous Q6H PRN Alma Rosa Harrison PA-C          sodium chloride 0.9% flush 3 mL 3 mL Intravenous Q8H Alma Rosa Harrison PA-C    3 mL at 04/04/17 0544         Review of Systems   Constitutional:   Pt affirmed sleeping well overnight.   Respiratory: Positive for cough (productive of mucus) and chest tightness.   Gastrointestinal: Positive for nausea. Negative for abdominal pain, constipation, diarrhea and vomiting.   Genitourinary: Negative for dysuria.   Musculoskeletal:   Right ankle pain 2/2 fall experienced with seizure incident      Objective:      Vital Signs (Most Recent):  Temp: 98.1 °F (36.7 °C) (04/04/17 1121)  Pulse: (!) 50 (04/04/17 1121)  Resp: 18 (04/04/17 1121)  BP: (!) 167/76 (04/04/17 1121)  SpO2: (!) 92 % (04/04/17 1121) Vital Signs  (24h Range):  Temp: [98.1 °F (36.7 °C)-98.9 °F (37.2 °C)] 98.1 °F (36.7 °C)  Pulse: [48-61] 50  Resp: [17-18] 18  SpO2: [91 %-93 %] 92 %  BP: (110-167)/(55-77) 167/76      Weight: 72.5 kg (159 lb 13.3 oz)  Body mass index is 21.68 kg/(m^2).     Physical Exam   Constitutional: He appears well-developed and well-nourished. No distress.   HENT:   Mouth/Throat: Oropharynx is clear and moist. No oropharyngeal exudate.   Eyes: Conjunctivae are normal.   Pt is blind in right eye. Left eye reactive to light, EOM intact, no discharge, no scleral icterus.   Neck: Normal range of motion.   Pulmonary/Chest: No stridor.   Musculoskeletal: Normal range of motion. He exhibits no edema or deformity.   Neurological: He is alert. No cranial nerve deficit. He exhibits normal muscle tone. Coordination normal.   Skin: Skin is warm and dry. He is not diaphoretic.   Psychiatric: He has a normal mood and affect.   Vitals reviewed.        NEUROLOGICAL EXAMINATION:      MENTAL STATUS   Level of consciousness: alert     CRANIAL NERVES   Cranial nerves II through XII intact.      MOTOR EXAM   Bilateral finger to target, random alternating motion with hands normal. 5/5 biceps, triceps, handgrip strength. Normal, bilateral leg raise, leg flexion, leg extension, plantarflexion, and dorsiflexion.      SENSORY EXAM   Normal and bilateral sensation over the face, shoulders, arms, forearms, hands, thighs, legs, and feet.         Significant Labs:   Blood Culture: No results for input(s): LABBLOO in the last 48 hours.  CBC:   Recent Labs  Lab 04/03/17  0607 04/04/17  0525   WBC 7.28 5.82   HGB 9.3* 9.2*   HCT 29.0* 27.2*   * 125*      CMP:   Recent Labs  Lab 04/03/17  0607 04/04/17  0525   GLU 64* 73    138   K 4.6 4.8   CL 98 97   CO2 27 28   BUN 40* 66*   CREATININE 7.1* 8.8*   CALCIUM 8.4* 7.9*   ALBUMIN --  3.2*   ANIONGAP 16 13   EGFRNONAA 8.1* 6.3*      POCT Glucose:   Recent Labs  Lab 04/03/17  2144 04/04/17  0806 04/04/17  1117    POCTGLUCOSE 172* 102 78         Significant Imaging: I have reviewed all pertinent imaging results/findings within the past 24 hours.      Assessment and Plan:     * Seizure  - Initial episode at home following dialysis [04/01/17] - second episode during transport from ED to floor at OSH  - Hypoglycemic and hypertensive on initial presentation, now improved  - CT head showing remote posterior left frontal cortical infarct but no acute intracranial abnormalities  - Differential includes dialysis dysequilibrium syndrome, post-traumatic seizures, and convulsive syncope  - [04/02/17] - MRI brain noted no acute process seen  - [04/02/17] Serum tox screen positive for methadone (prescribed medication)    Recs:  - Epilepsy protocol  - obtain routine EEG  - May consider Keppra pending results of the EEG        VTE Risk Mitigation         Ordered     Medium Risk of VTE  Once      04/02/17 0157     Place ZACHARY hose  Until discontinued      04/02/17 0157     Place sequential compression device  Until discontinued      04/02/17 0157          Ab Barcenas MD  Neurology  [facility]    I have seen the patient and reviewed the resident's history, physical, assessment, and plan. I have personally interviewed and examined the patient at bedside and agree with the findings. The Resident Physician and I have spent a total of more than 50% of a 25 minute visit in chart review, lab review, personal image review, patient centered care, coordination of care, and seeing the patient.     Patient at baseline today. Difficult to determine if episode representative of true seizure or, more likely, convulsive syncope. MRI is WNL. If EEG unremarkable, no indication for antiepileptic therapy at this time.    Doug Bravo MD  Department of Neurology  [facility]

## 2017-04-04 NOTE — PROCEDURES
DATE OF STUDY:  04/03/2017    EEG NUMBER:  [id number]    REFERRING PHYSICIAN:  Enoc Mccullough M.D.    This EEG was performed to assess for subclinical seizures.    ELECTROENCEPHALOGRAM REPORT    METHODOLOGY:  Electroencephalographic (EEG) recording is recorded with   electrodes placed according to the International 10-20 placement system.  Thirty   two (32) channels of digital signal (sampling rate of 512/sec), including T1   and T2, were simultaneously recorded from the scalp and may include EKG, EMG,   and/or eye monitors.  Recording band pass was 0.1 to 512 Hz.  Digital video   recording of the patient is simultaneously recorded with the EEG.  The patient   is instructed to report clinical symptoms which may occur during the recording   session.  EEG and video recording are stored and archived in digital format.    Activation procedures, which include photic stimulation, hyperventilation and   instructing patients to perform simple tasks, are done in selected patients.    The EEG is displayed on a monitor screen and can be reviewed using different   montages.  Computer assisted-analysis is employed to detect spike and   electrographic seizure activity.  The entire record is submitted for computer   analysis.  The entire recording is visually reviewed, and the times identified   by computer analysis as being spikes or seizures are reviewed again.    Compressed spectral analysis (CSA) is also performed on the activity recorded   from each individual channel.  This is displayed as a power display of   frequencies from 0 to 30 Hz over time.  The CSA is reviewed looking for   asymmetries in power between homologous areas of the scalp, then compared with   the original EEG recording.    [employer] software was also utilized in the review of this study.  This software   suite analyzes the EEG recording in multiple domains.  Coherence and rhythmicity   are computed to identify EEG sections which may contain organized  seizures.    Each channel undergoes analysis to detect the presence of spike and sharp waves   which have special and morphological characteristics of epileptic activity.  The   routine EEG recording is converted from special into frequency domain.  This is   then displayed comparing homologous areas to identify areas of significant   asymmetry.  Algorithm to identify non-cortically generated artifact is used to   separate artifact from the EEG.    EEG FINDINGS:  The recording was obtained with a number of standard bipolar and   referential montages during wakefulness, drowsiness and sleep.  In the alert   state, the background was mildly disorganized with a nonsustained posterior   dominant rhythm of 8 Hz, which was symmetric.  Excessive mixed theta range   activity was noted during wakefulness and drowsiness.  During drowsiness, the   background rhythm waxed and waned and there were periods of slowing.  During   stage II sleep, symmetric V waves and sleep spindles were noted.  The background   was punctuated by prolonged bursts of frontal maximum rhythmical delta   activity, which was occasionally diffuse.  There were no interictal epileptiform   abnormalities and no clinical or electrographic seizures were recorded.  The   EKG channel revealed sinus rhythm.    IMPRESSION:  This is an abnormal EEG during wakefulness, drowsiness and sleep.    Bursts of diffuse disorganized slowing of the background along with frontal   intermittent rhythmical delta activity were noted.    CLINICAL CORRELATION:  The patient is a 52-year-old male who is being evaluated   for an episode of loss of consciousness along with convulsions.  The patient is   currently not maintained on anti-seizure medications.  This is an abnormal EEG   during wakefulness, drowsiness and sleep.  The overall degree of slowing and   disorganization along with periods of frontal intermittent rhythmical delta   activity is suggestive of a mild-to-moderate  degree of encephalopathy,   nonspecific to the cause.  This likely represents a toxic metabolic   encephalopathy.  There is no evidence of an epileptic process on this recording.    No seizures were recorded during this study.      FAK/TAMIR  dd: 04/04/2017 16:29:03 (CDT)  td: 04/04/2017 16:53:36 (CDT)  Doc ID   #[id number]  Job ID #[id number]    CC:

## 2017-04-04 NOTE — SUBJECTIVE & OBJECTIVE
"Interval History: Reports productive cough today, and that each morning upon waking he experiences nausea and an episode of bilious vomiting. Denies fevers, chills. EEG completed, results pending. HD this afternoon.     Review of Systems   Constitutional: Positive for fatigue. Negative for chills and fever.   HENT: Positive for congestion. Negative for sore throat and trouble swallowing.    Respiratory: Positive for cough. Negative for shortness of breath.    Cardiovascular: Negative for chest pain, palpitations and leg swelling.   Gastrointestinal: Positive for vomiting (reports daily in morning for "months"). Negative for abdominal pain and nausea.   Genitourinary:        Anuric   Musculoskeletal: Positive for arthralgias and myalgias.   Neurological: Positive for seizures (no events overnight). Negative for speech difficulty, weakness and headaches.   Psychiatric/Behavioral: Negative for agitation and confusion.     Objective:     Vital Signs (Most Recent):  Temp: 98.1 °F (36.7 °C) (04/04/17 0748)  Pulse: (!) 52 (04/04/17 0748)  Resp: 18 (04/04/17 0748)  BP: (!) 164/77 (04/04/17 0748)  SpO2: (!) 93 % (04/04/17 0748) Vital Signs (24h Range):  Temp:  [98.1 °F (36.7 °C)-98.9 °F (37.2 °C)] 98.1 °F (36.7 °C)  Pulse:  [48-61] 52  Resp:  [17-18] 18  SpO2:  [91 %-93 %] 93 %  BP: (110-164)/(55-77) 164/77     Weight: 72.5 kg (159 lb 13.3 oz)  Body mass index is 21.68 kg/(m^2).    Intake/Output Summary (Last 24 hours) at 04/04/17 1108  Last data filed at 04/04/17 0000   Gross per 24 hour   Intake              420 ml   Output              200 ml   Net              220 ml      Physical Exam   Constitutional: He is oriented to person, place, and time. No distress.   Eyes:   Right eye haziness   Cardiovascular: Normal rate and regular rhythm.    Murmur heard.  Pulmonary/Chest: Effort normal. No respiratory distress. He has wheezes.   Abdominal: Soft. Bowel sounds are normal. He exhibits no distension.   Musculoskeletal: Normal " range of motion. He exhibits no edema.   Neurological: He is alert and oriented to person, place, and time. He displays no tremor. No cranial nerve deficit. He displays no seizure activity.   No focal neurologic deficits   Psychiatric: His affect is blunt.       Significant Labs: All pertinent labs within the past 24 hours have been reviewed.    Significant Imaging: I have reviewed all pertinent imaging results/findings within the past 24 hours.

## 2017-04-04 NOTE — ASSESSMENT & PLAN NOTE
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

## 2017-04-04 NOTE — ASSESSMENT & PLAN NOTE
- Continue methadone 60mg daily.  Patient with chronic back pain.  - Case reports of Methadone causing hypoglycemia, if patient continues to have episodes of hypoglycemia will require further investigation into methadone contributing

## 2017-04-04 NOTE — SUBJECTIVE & OBJECTIVE
Interval HPI:   Overnight events: No sz or hypoglycemia overnight. Fb  Eatin%  Nausea: No  Hypoglycemia and intervention: No  Fever: No  TPN and/or TF: No  If yes, type of TF/TPN and rate: n/a    BP (!) 164/77 (BP Location: Right arm, Patient Position: Lying, BP Method: Automatic)  Pulse (!) 52  Temp 98.1 °F (36.7 °C) (Oral)   Resp 18  Ht 6' (1.829 m)  Wt 72.5 kg (159 lb 13.3 oz)  SpO2 (!) 93%  BMI 21.68 kg/m2    Labs Reviewed and Include    No results for input(s): GLU, CALCIUM, ALBUMIN, PROT, NA, K, CO2, CL, BUN, CREATININE, ALKPHOS, ALT, AST, BILITOT in the last 24 hours.  Lab Results   Component Value Date    WBC 5.82 2017    HGB 9.2 (L) 2017    HCT 27.2 (L) 2017    MCV 93 2017     (L) 2017     No results for input(s): TSH, FREET4 in the last 168 hours.  Lab Results   Component Value Date    HGBA1C 4.4 (L) 2017       Nutritional status:   Body mass index is 21.68 kg/(m^2).  Lab Results   Component Value Date    ALBUMIN 3.6 2017    ALBUMIN 3.4 (L) 2017    ALBUMIN 3.1 (L) 2017     No results found for: PREALBUMIN    Estimated Creatinine Clearance: 12.6 mL/min (based on Cr of 7.1).    Accu-Checks  Recent Labs      17   1137  17   1321  17   1643  17   2212  17   0533  17   0737  17   1258  17   1721  17   2144  17   0806   POCTGLUCOSE  85  94  79  102  71  82  161*  103  172*  102       Current Medications and/or Treatments Impacting Glycemic Control  Immunotherapy:    Immunosuppressants     None        Steroids:   Hormones     None        Pressors:    Autonomic Drugs     None        Hyperglycemia/Diabetes Medications:   Antihyperglycemics     Start     Stop Route Frequency Ordered    17 0301  insulin aspart pen 0-5 Units      -- SubQ Before meals & nightly PRN 17 3186

## 2017-04-05 ENCOUNTER — DOCUMENTATION ONLY (OUTPATIENT)
Dept: TRANSPLANT | Facility: CLINIC | Age: 52
End: 2017-04-05

## 2017-04-05 VITALS
WEIGHT: 159.81 LBS | OXYGEN SATURATION: 97 % | SYSTOLIC BLOOD PRESSURE: 158 MMHG | HEIGHT: 72 IN | DIASTOLIC BLOOD PRESSURE: 72 MMHG | TEMPERATURE: 98 F | HEART RATE: 50 BPM | RESPIRATION RATE: 16 BRPM | BODY MASS INDEX: 21.65 KG/M2

## 2017-04-05 LAB
ALBUMIN SERPL BCP-MCNC: 3.1 G/DL
ANION GAP SERPL CALC-SCNC: 11 MMOL/L
B-OH-BUTYR BLD STRIP-SCNC: 0 MMOL/L
BASOPHILS # BLD AUTO: 0.04 K/UL
BASOPHILS NFR BLD: 0.7 %
BUN SERPL-MCNC: 39 MG/DL
CALCIUM SERPL-MCNC: 8.7 MG/DL
CHLORIDE SERPL-SCNC: 106 MMOL/L
CO2 SERPL-SCNC: 23 MMOL/L
CREAT SERPL-MCNC: 6.5 MG/DL
DIFFERENTIAL METHOD: ABNORMAL
EOSINOPHIL # BLD AUTO: 0.4 K/UL
EOSINOPHIL NFR BLD: 7.6 %
ERYTHROCYTE [DISTWIDTH] IN BLOOD BY AUTOMATED COUNT: 14.5 %
EST. GFR  (AFRICAN AMERICAN): 10.4 ML/MIN/1.73 M^2
EST. GFR  (NON AFRICAN AMERICAN): 9 ML/MIN/1.73 M^2
GLUCOSE SERPL-MCNC: 80 MG/DL
HAV IGM SERPL QL IA: NEGATIVE
HBV CORE IGM SERPL QL IA: NEGATIVE
HBV SURFACE AG SERPL QL IA: NEGATIVE
HCT VFR BLD AUTO: 25.6 %
HCV AB SERPL QL IA: POSITIVE
HGB BLD-MCNC: 8.7 G/DL
LYMPHOCYTES # BLD AUTO: 1.1 K/UL
LYMPHOCYTES NFR BLD: 18.6 %
MCH RBC QN AUTO: 31.5 PG
MCHC RBC AUTO-ENTMCNC: 34 %
MCV RBC AUTO: 93 FL
MONOCYTES # BLD AUTO: 0.7 K/UL
MONOCYTES NFR BLD: 11.6 %
NEUTROPHILS # BLD AUTO: 3.5 K/UL
NEUTROPHILS NFR BLD: 61.3 %
PHOSPHATE SERPL-MCNC: 6.2 MG/DL
PLATELET # BLD AUTO: 129 K/UL
PMV BLD AUTO: 9.9 FL
POCT GLUCOSE: 97 MG/DL (ref 70–110)
POTASSIUM SERPL-SCNC: 4.9 MMOL/L
RBC # BLD AUTO: 2.76 M/UL
SODIUM SERPL-SCNC: 140 MMOL/L
WBC # BLD AUTO: 5.76 K/UL

## 2017-04-05 PROCEDURE — 99233 SBSQ HOSP IP/OBS HIGH 50: CPT | Mod: GC,,, | Performed by: PSYCHIATRY & NEUROLOGY

## 2017-04-05 PROCEDURE — 25000242 PHARM REV CODE 250 ALT 637 W/ HCPCS: Performed by: PHYSICIAN ASSISTANT

## 2017-04-05 PROCEDURE — 94761 N-INVAS EAR/PLS OXIMETRY MLT: CPT

## 2017-04-05 PROCEDURE — 94640 AIRWAY INHALATION TREATMENT: CPT

## 2017-04-05 PROCEDURE — 82010 KETONE BODYS QUAN: CPT

## 2017-04-05 PROCEDURE — 80074 ACUTE HEPATITIS PANEL: CPT

## 2017-04-05 PROCEDURE — 25000003 PHARM REV CODE 250: Performed by: PHYSICIAN ASSISTANT

## 2017-04-05 PROCEDURE — 85025 COMPLETE CBC W/AUTO DIFF WBC: CPT

## 2017-04-05 PROCEDURE — 63600175 PHARM REV CODE 636 W HCPCS: Performed by: PHYSICIAN ASSISTANT

## 2017-04-05 PROCEDURE — 99239 HOSP IP/OBS DSCHRG MGMT >30: CPT | Mod: ,,, | Performed by: PHYSICIAN ASSISTANT

## 2017-04-05 PROCEDURE — 27000221 HC OXYGEN, UP TO 24 HOURS

## 2017-04-05 PROCEDURE — 80069 RENAL FUNCTION PANEL: CPT

## 2017-04-05 PROCEDURE — 36415 COLL VENOUS BLD VENIPUNCTURE: CPT

## 2017-04-05 RX ORDER — CLONIDINE HYDROCHLORIDE 0.1 MG/1
0.1 TABLET ORAL ONCE
Status: DISCONTINUED | OUTPATIENT
Start: 2017-04-05 | End: 2017-04-05 | Stop reason: HOSPADM

## 2017-04-05 RX ORDER — CLONIDINE HYDROCHLORIDE 0.1 MG/1
0.1 TABLET ORAL ONCE
Status: DISCONTINUED | OUTPATIENT
Start: 2017-04-05 | End: 2017-04-05

## 2017-04-05 RX ORDER — ONDANSETRON 4 MG/1
4 TABLET, FILM COATED ORAL EVERY 8 HOURS PRN
Qty: 12 TABLET | Refills: 0 | Status: SHIPPED | OUTPATIENT
Start: 2017-04-05 | End: 2018-08-26

## 2017-04-05 RX ORDER — HYDRALAZINE HYDROCHLORIDE 25 MG/1
50 TABLET, FILM COATED ORAL 3 TIMES DAILY
Qty: 60 TABLET | Refills: 2 | Status: SHIPPED | OUTPATIENT
Start: 2017-04-05 | End: 2017-08-11

## 2017-04-05 RX ADMIN — CALCIUM ACETATE 2001 MG: 667 CAPSULE ORAL at 05:04

## 2017-04-05 RX ADMIN — CARVEDILOL 12.5 MG: 6.25 TABLET, FILM COATED ORAL at 08:04

## 2017-04-05 RX ADMIN — DOCUSATE SODIUM 100 MG: 50 CAPSULE, LIQUID FILLED ORAL at 08:04

## 2017-04-05 RX ADMIN — IPRATROPIUM BROMIDE AND ALBUTEROL SULFATE 3 ML: .5; 3 SOLUTION RESPIRATORY (INHALATION) at 11:04

## 2017-04-05 RX ADMIN — IPRATROPIUM BROMIDE AND ALBUTEROL SULFATE 3 ML: .5; 3 SOLUTION RESPIRATORY (INHALATION) at 03:04

## 2017-04-05 RX ADMIN — Medication 3 ML: at 01:04

## 2017-04-05 RX ADMIN — IPRATROPIUM BROMIDE AND ALBUTEROL SULFATE 3 ML: .5; 3 SOLUTION RESPIRATORY (INHALATION) at 07:04

## 2017-04-05 RX ADMIN — ACETAMINOPHEN 650 MG: 325 TABLET ORAL at 03:04

## 2017-04-05 RX ADMIN — FAMOTIDINE 20 MG: 20 TABLET, FILM COATED ORAL at 08:04

## 2017-04-05 RX ADMIN — LISINOPRIL 40 MG: 20 TABLET ORAL at 08:04

## 2017-04-05 RX ADMIN — HYDRALAZINE HYDROCHLORIDE 50 MG: 25 TABLET, FILM COATED ORAL at 05:04

## 2017-04-05 RX ADMIN — CALCIUM ACETATE 2001 MG: 667 CAPSULE ORAL at 08:04

## 2017-04-05 RX ADMIN — ASPIRIN 325 MG ORAL TABLET 325 MG: 325 PILL ORAL at 08:04

## 2017-04-05 RX ADMIN — HYDRALAZINE HYDROCHLORIDE 50 MG: 25 TABLET, FILM COATED ORAL at 01:04

## 2017-04-05 RX ADMIN — METHADONE HYDROCHLORIDE 60 MG: 10 TABLET ORAL at 08:04

## 2017-04-05 RX ADMIN — AMLODIPINE BESYLATE 5 MG: 5 TABLET ORAL at 08:04

## 2017-04-05 RX ADMIN — CLOPIDOGREL 75 MG: 75 TABLET, FILM COATED ORAL at 08:04

## 2017-04-05 RX ADMIN — PANTOPRAZOLE SODIUM 600 MG: 40 TABLET, DELAYED RELEASE ORAL at 08:04

## 2017-04-05 RX ADMIN — ONDANSETRON 4 MG: 2 INJECTION INTRAMUSCULAR; INTRAVENOUS at 08:04

## 2017-04-05 RX ADMIN — CALCIUM ACETATE 2001 MG: 667 CAPSULE ORAL at 11:04

## 2017-04-05 RX ADMIN — MINOXIDIL 7.5 MG: 2.5 TABLET ORAL at 08:04

## 2017-04-05 RX ADMIN — Medication 3 ML: at 05:04

## 2017-04-05 NOTE — ASSESSMENT & PLAN NOTE
- LFTs unremarkable.  Patient with no complaints of abdominal pain.  - Hepatitis panel with positive hepatitis C Ab, negative otherwise

## 2017-04-05 NOTE — SUBJECTIVE & OBJECTIVE
Subjective:     Interval History: Overnight, the patient was bradycardic (50-60 BPM), hypertensive (151-190 / 92-97), with SaO2 (92-87).  The patient received hydralazine and clonidine.  HD removed 3.5 L of fluid.  The patient has no complaints.  EEG does not show epileptiform abnormalities.      Current Facility-Administered Medications   Medication Dose Route Frequency Provider Last Rate Last Dose    0.9%  NaCl infusion   Intravenous PRN Alonso Bonilla MD        acetaminophen tablet 650 mg  650 mg Oral Q4H PRN Alma Rosa Harrison PA-C   650 mg at 04/02/17 0514    albuterol-ipratropium 2.5mg-0.5mg/3mL nebulizer solution 3 mL  3 mL Nebulization Q4H [place] Lorena Clancy PA-C   3 mL at 04/05/17 0716    amlodipine tablet 5 mg  5 mg Oral BID Alma Rosa Harrison PA-C   5 mg at 04/05/17 0808    aspirin tablet 325 mg  325 mg Oral Daily Alma Rosa Harrison PA-C   325 mg at 04/05/17 0808    atorvastatin tablet 40 mg  40 mg Oral QHS Alma Rosa Harrison PA-C   40 mg at 04/04/17 2130    calcium acetate capsule 2,001 mg  2,001 mg Oral TID WM Alma Rosa Harrison PA-C   2,001 mg at 04/05/17 1110    carvedilol tablet 12.5 mg  12.5 mg Oral BID Roscoe Martini PA-C   12.5 mg at 04/05/17 0808    cloNIDine tablet 0.1 mg  0.1 mg Oral Once Daniele Crowder NP        clopidogrel tablet 75 mg  75 mg Oral Daily Alma Rosa Harrison PA-C   75 mg at 04/05/17 0808    dextrose 50% injection 12.5 g  12.5 g Intravenous PRN Alma Rosa Harrison PA-C        dextrose 50% injection 25 g  25 g Intravenous PRN Alma Rosa Harrison PA-C        docusate sodium capsule 100 mg  100 mg Oral BID Alma Rosa Harrison PA-C   100 mg at 04/05/17 0807    famotidine tablet 20 mg  20 mg Oral Daily Alma Rosa Harrison PA-C   20 mg at 04/05/17 0808    glucagon (human recombinant) injection 1 mg  1 mg Intramuscular PRN Alma Rosa Harrison PA-C        glucose chewable tablet 16 g  16 g Oral PRN Alma Rosa Harrison PA-C        glucose chewable  "tablet 24 g  24 g Oral PRN Alma Rosa Harrison PA-C        guaifenesin 12 hr tablet 600 mg  600 mg Oral BID Lorena Clancy PA-C   600 mg at 04/05/17 0808    hydrALAZINE tablet 50 mg  50 mg Oral TID Roscoe Martini PA-C   50 mg at 04/05/17 0507    insulin aspart pen 0-5 Units  0-5 Units Subcutaneous QID (AC + HS) PRN Alma Rosa Harrison PA-C        lisinopril tablet 40 mg  40 mg Oral Daily Alma Rosa Harrison PA-C   40 mg at 04/05/17 0807    loperamide capsule 2 mg  2 mg Oral PRN Alma Rosa Harrison PA-C        methadone tablet 60 mg  60 mg Oral Daily Alma Rosa Harrison PA-C   60 mg at 04/05/17 0808    minoxidil tablet 7.5 mg  7.5 mg Oral BID Alma Rosa Harrison PA-C   7.5 mg at 04/05/17 0820    naloxone 0.4 mg/mL injection 0.4 mg  0.4 mg Intravenous PRN Roscoe Martini PA-C        ondansetron injection 4 mg  4 mg Intravenous Q8H PRN Alma Rosa Harrison PA-C   4 mg at 04/05/17 0806    polyethylene glycol packet 17 g  17 g Oral TID PRN Alma Rosa Harrison PA-C        promethazine (PHENERGAN) 6.25 mg in dextrose 5 % 50 mL IVPB  6.25 mg Intravenous Q6H PRN Alma Rosa Harrison PA-C        sodium chloride 0.9% flush 3 mL  3 mL Intravenous Q8H Alma Rosa Harrison PA-C   3 mL at 04/05/17 0507       Review of Systems   Constitutional:        Pt affirmed sleeping well overnight.   Eyes: Negative for pain and visual disturbance.   Respiratory: Positive for cough and shortness of breath (Improved with breathing treatment).    Cardiovascular: Negative for chest pain.   Gastrointestinal: Positive for nausea (Pt notes he is often nauseated in the morning). Negative for constipation, diarrhea and vomiting.   Genitourinary: Negative for dysuria.   Neurological: Positive for dizziness, weakness ("mild" weakness - "tired") and headaches (6/10). Numbness: Numbness over bilateral feet, denies tingling.   Hematological: Bruises/bleeds easily: Productive - improved with breathing treatment.     Objective:     Vital " Signs (Most Recent):  Temp: 98 °F (36.7 °C) (04/05/17 1100)  Pulse: (!) 50 (04/05/17 1100)  Resp: 18 (04/05/17 1100)  BP: (!) 161/72 (04/05/17 1100)  SpO2: 97 % (04/05/17 1100) Vital Signs (24h Range):  Temp:  [98 °F (36.7 °C)-98.8 °F (37.1 °C)] 98 °F (36.7 °C)  Pulse:  [50-60] 50  Resp:  [16-18] 18  SpO2:  [92 %-97 %] 97 %  BP: (151-190)/(60-92) 161/72     Weight: 72.5 kg (159 lb 13.3 oz)  Body mass index is 21.68 kg/(m^2).    Physical Exam   Constitutional: He appears well-developed and well-nourished. No distress.   HENT:   Mouth/Throat: Oropharynx is clear and moist. No oropharyngeal exudate.   Eyes: Conjunctivae and EOM are normal. Pupils are equal, round, and reactive to light. Right eye exhibits no discharge. Left eye exhibits no discharge. No scleral icterus.   Neck: Normal range of motion.   Pulmonary/Chest: No stridor.   Musculoskeletal: Normal range of motion. He exhibits tenderness (Tenderness over right ankle (pt referenced fall during most recent alleged seizure)). He exhibits no edema or deformity.   Neurological: He is alert. No cranial nerve deficit. He exhibits normal muscle tone. Coordination normal.   Skin: Skin is warm and dry. He is not diaphoretic. No erythema.   Psychiatric: He has a normal mood and affect. His behavior is normal.   Vitals reviewed.      NEUROLOGICAL EXAMINATION:     MENTAL STATUS   Level of consciousness: alert       Directed speech.     CRANIAL NERVES   Cranial nerves II through XII intact.     CN III, IV, VI   Pupils are equal, round, and reactive to light.  Extraocular motions are normal.     MOTOR EXAM        5/5 bilateral bicep, tricep, handgrip, leg flexion, leg extension, plantarflexion, and dorsiflexion.  Normal bilateral leg raise.     SENSORY EXAM        Normal and equal sensation over the face, shoulders, arms, forearms, hands, thighs, legs, and feet.     GAIT AND COORDINATION        Normal finger to target.  Normal alternating movements with bilateral hands.        Significant Labs:   CBC:   Recent Labs  Lab 04/04/17  0525 04/05/17 0425   WBC 5.82 5.76   HGB 9.2* 8.7*   HCT 27.2* 25.6*   * 129*     CMP:   Recent Labs  Lab 04/04/17  0525 04/05/17 0425   GLU 73 80    140   K 4.8 4.9   CL 97 106   CO2 28 23   BUN 66* 39*   CREATININE 8.8* 6.5*   CALCIUM 7.9* 8.7   ALBUMIN 3.2* 3.1*   ANIONGAP 13 11   EGFRNONAA 6.3* 9.0*     All pertinent lab results from the past 24 hours have been reviewed.      Significant Imaging:     CXR: I have reviewed all pertinent results/findings within the past 24 hours.  EEG: I have reviewed all pertinent results/findings within the past 24 hours.

## 2017-04-05 NOTE — NURSING
SHASHI Jenkins @ [id number] will start the process for arranging transportation. Will continue to assess.

## 2017-04-05 NOTE — PLAN OF CARE
SHASHI has arranged medicaid transportation w/[place] medicaid transportation service ([phone]).  SHASHI spoke w/[place], dispatcher, to arrange medicaid transportation. SHASHI provided identifying information to set up ride. [place] inputted information in the system and stated that arrangements have been escalated to short notice team to secure transportation.  Transportation can take from 30 mins to 3 hrs.  SHASHI has notified nurse ([place] [id number]).    Gregory Romero LMSW  [id number]

## 2017-04-05 NOTE — ASSESSMENT & PLAN NOTE
- Hypertensive urgency at OSH resolved with IV hydralazine 10mg.  - Continue home amlodipine 5 mg BID, Coreg 12.5 mg BID, lisinopril 40mg daily, minoxidil 7.5mg BID,  hydralazine 50 mg TID  - Initially increased coreg to 25 mg BID but resulted in bradycardia, decreased back to 12.5 mg BID and increased hydralazine to 50 mg TID  - HR at baseline per chart review

## 2017-04-05 NOTE — ASSESSMENT & PLAN NOTE
- Nephrology following, HD last completed 4/4/17  - Patient on TuThSa schedule.  He makes minimal urine.  - Completed HD on Saturday PTA.   - Continue Phoslo 2001mg TID WM.

## 2017-04-05 NOTE — LETTER
April 5, 2017    Romel Aguirre  [address] [place] [address]      Dear Romel Aguirre:    Your doctor has referred you to the [facility]. You will be contacted by our office and an initial appointment will then be scheduled for you.    We look forward to seeing you soon. If you have any further questions, please contact us at [phone].       Sincerely,        [facility]   [address] [place] [id number]  [phone]

## 2017-04-05 NOTE — NURSING
Transport service called they will call when they are downstairs. They are coming from [place] will be here closer to 1900.

## 2017-04-05 NOTE — PLAN OF CARE
Problem: Patient Care Overview  Goal: Plan of Care Review  Outcome: Ongoing (interventions implemented as appropriate)  3.5hr HD treatment completed 3.5L of fluid removed pt tolerated well. Both needles of a LFA fistula removed and pressure held until hemostasis achieved dressing applied. Report given to Derick SAUL RN.

## 2017-04-05 NOTE — DISCHARGE SUMMARY
"[facility] Hospital Medicine  Discharge Summary      Patient Name: João Aguirre  MRN: [id number]  Admission Date: 4/2/2017  Hospital Length of Stay: 3 days  Discharge Date and Time:  04/05/2017 1:04 PM  Attending Physician: Alexandro Akbar MD   Discharging Provider: Lorena Clancy PA-C  Primary Care Provider: Delbert Redd NP  [facility] Medicine Team: [facility] Lorena Clancy PA-C    HPI:   Patient is a 52 year old gentleman with a h/o DM II, ESRD on HD, Hep C, CVA on Plavix, HTN, HLD, and noncompliance.  Patient is a transfer from [facility] for neurology consult.  No family at bedside.  HPI primarily obtained from ER notes, as patient does not remember events.  Following dialysis today, patient's son reports he "suddenly passed out" while walking.  He hit his head on a nearby door and then had "approximately 30 seconds of generalized shaking and foaming at the mouth."  He was confused following episode and does not remember it.  Per EMS, patient was hypoglycemic and hypertensive with a GCS of 14 on their arrival.  Glucose noted to be 50, so he received D50 with improvement to 96.  He was given IV hydralazine with improvement in BP.  CT of head showed no evidence of any subdural or subarachnoid hemorrhage or fracture.  ECG had no acute changes.  Troponin elevated to 0.112; however, this is lower than all of his past troponins on record.  Following work-up in OSH ER, patient was transferred to a wheelchair for admission and had a second episode of convulsions.  He was then transferred to [facility].    On exam, patient states he "doesn't feel good" but is unable to elaborate much further.  He denies chest pain, SOB, dizziness, palpitations, fever/chills, abdominal pain.  He complains of N/V.  Denies history of seizures or sick contacts.  Denies headache, vision changes, unilateral weakness.      * No surgery found *      Indwelling Lines/Drains at time of discharge: "   Lines/Drains/Airways     Drain                 Hemodialysis AV Fistula Left forearm -- days              Hospital Course:   Patient admitted into observation for evaluation of seizure. Patient reports a remote history of seizure event ~20 years ago. CT head showed remote posterior left frontal cortical infarct but no acute intracranial abnormalities. Neurology consulted and following. MRI showed no acute abnormalities. EEG negative for seizures. Endocrinology consulted for hypoglycemia as patient with multiple admissions for hypoglycemia requiring ICU admission in the past. Hypoglycemia not believed to be insulin mediated and secondary to low glycogen stores in setting of Hep C cirrhosis. Discussed with endocrinology, if patient has continued episodes of hypoglycemia as outpatient as liver function improves then worthwhile investigating methadone as possible cause of hypoglycemia. Patient with complaint of cough, some wheezing/crackles on exam 4/4/17, CXR completed with mild diffuse increase in pulmonary vascular markings, some patchy increased parenchymal markings noted as well, consistent with prior CXR 4/1 with mild improvement. BNP 2292, improved from prior BNP in 4000s. Patient with improvement in cough, denies shortness of breath, pulmonary exam improved on day of discharge. Patient bradycardic intermittently during admission, per chart review baseline HR 50-60s. Will continue home BP regimen with increased hydralazine dosage. Patient to follow up with cardiology and nephrology where he is previously established in [place]. Reports he does not have a hepatologist, ambulatory referral placed to hepatology. Stable for discharge home today, to follow up with Dr. Bravo in Neurology clinic on discharge as well.      Consults:   Consults         Status Ordering Provider     Inpatient consult to Endocrinology  Once     Provider:  (Not yet assigned)    Completed GLENIS SHEPARD     Inpatient consult to Nephrology   Once     Provider:  (Not yet assigned)    Completed GAMALIEL HUANG     Inpatient consult to Neurology   Once     Provider:  (Not yet assigned)    Completed GAMALIEL HUANG     IP consult case management/social work  Once     Provider:  (Not yet assigned)    Acknowledged GAMALIEL HUANG          Significant Diagnostic Studies: Radiology: X-Ray: CXR: X-Ray Chest 1 View (CXR):   Results for orders placed or performed during the hospital encounter of 04/02/17   X-Ray Chest 1 View    Narrative    Chest single view compared to April 1.  Heart is enlarged.  Mild diffuse increase in pulmonary vascular markings.  Some patchy increased parenchymal markings noted as well.    Impression abnormal study similar to prior.  Findings most consistent with congestive failure.      Electronically signed by: FLOR DOYLE MD  Date:     04/04/17  Time:    11:43      MRI: No acute process seen.  CT scan: Head; no acute intracranial abnormalities, remote posterior left frontal cortical infarct    Pending Diagnostic Studies:     Procedure Component Value Units Date/Time    ACTH [[id number]] Collected:  04/02/17 1340    Order Status:  Sent Lab Status:  In process Updated:  04/02/17 1341    Specimen:  Blood from Blood     Narrative:       Prior to cosyntropin dose        Final Active Diagnoses:    Diagnosis Date Noted POA    PRINCIPAL PROBLEM:  Seizure [R56.9] 04/02/2017 Yes    Hypoglycemia associated with type 2 diabetes mellitus [E11.649] 05/16/2016 Yes    ESRD (T,Th,Sat) dialysis onset 2013 [N18.6] 08/29/2015 Yes     Chronic    Elevated troponin I level [R74.8] 08/29/2015 Yes     Chronic    Cough [R05] 04/04/2017 No    Syncope [R55] 04/03/2017 Yes    Mixed hyperlipidemia [E78.2] 04/02/2017 Yes     Chronic    Convulsions [R56.9] 04/02/2017 Yes    Methadone dependence [F11.20] 02/21/2017 Yes     Chronic    Chronic hepatitis C without hepatic coma [B18.2] 02/21/2017 Yes     Chronic    H/O: CVA (cerebrovascular  accident) [Z86.73] 05/03/2016 Not Applicable     Chronic    Poorly-controlled hypertension [I10] 08/29/2015 Yes     Chronic    Coronary artery disease involving native coronary artery of native heart without angina pectoris [I25.10] 08/29/2015 Yes     Chronic    Anemia in chronic kidney disease [N18.9, D63.1] 08/29/2015 Yes     Chronic      Problems Resolved During this Admission:    Diagnosis Date Noted Date Resolved POA      * Seizure  - Neurology following: MRI limited by motion, but reviewed by Neuro and no need for repeat MRI at this time.   - EEG completed, no evidence of epileptic process. Will not require AEDs at this time.   - CT with no acute changes.   - Seizure precautions, fall precautions, neuro checks q4hrs.  - Stable for discharge from neurology standpoint, patient to follow up with Dr. Bravo in clinic after discharge.     Hypoglycemia associated with type 2 diabetes mellitus  - Hypoglycemic on arrival in OSH ER.  Given D50 with improvement to 96.  - Patient with multiple admissions for hypoglycemia.  He is not on any diabetes medications at this time.  Last admission patient was put in ICU for severe hypoglycemia, initially believed to be related to methadone use. His dose was decreased and there was some improvement in his sugars. Insulinoma labs drawn during last admit, but were pending at discharge.  - Endocrine consulted and reccs appreciated: hypoglycemia mostly in AM and felt 2/2 to depleted glycogen stores in setting of Hep C cirrhosis. Case reports of Methadone causing hypoglycemia. If patient continues to experience episodes of hypoglycemia as liver function improves, further investigation into methadone as possible contributing factor warranted.   - ACTH stim test, IGF-1, insulin, c-peptide, iraheta screen, glucose, pro-insulin, cortisol, and GH tests to be ordered at time of hypoglycemia for bg less than 60 and asymptomatic, patient has not had episode of bg <60.  - Renal diet    ESRD  (T,Th,Sat) dialysis onset 2013  - Nephrology following, HD last completed 4/4/17  - Patient on TuThSa schedule.  He makes minimal urine.  - Completed HD on Saturday PTA.   - Continue Phoslo 2001mg TID WM.    Elevated troponin I level  - Troponin of 0.112.  ECG unchanged.  This is lower than previous troponins.  Do not suspect ACS at this time.    Poorly-controlled hypertension  - Hypertensive urgency at OSH resolved with IV hydralazine 10mg.  - Continue home amlodipine 5 mg BID, Coreg 12.5 mg BID, lisinopril 40mg daily, minoxidil 7.5mg BID,  hydralazine 50 mg TID  - Initially increased coreg to 25 mg BID but resulted in bradycardia, decreased back to 12.5 mg BID and increased hydralazine to 50 mg TID  - HR at baseline per chart review    Coronary artery disease involving native coronary artery of native heart without angina pectoris  - See above.  Continue secondary prevention.    Anemia in chronic kidney disease  - H/H stable, 8.7/25.6 today    H/O: CVA (cerebrovascular accident)  - No evidence of acute infarct on CT head.  Continue Plavix, secondary prevention.    Chronic hepatitis C without hepatic coma  - LFTs unremarkable.  Patient with no complaints of abdominal pain.  - Hepatitis panel with positive hepatitis C Ab, negative otherwise    Methadone dependence  - Continue methadone 60mg daily.  Patient with chronic back pain.  - Case reports of Methadone causing hypoglycemia, if patient continues to have episodes of hypoglycemia will require further investigation into methadone contributing     Mixed hyperlipidemia  - Continue Lipitor 40mg qHS.      Discharged Condition: stable    Disposition: Home or Self Care    Follow Up:  Follow-up Information     Follow up with Delbert Redd NP.    Specialty:  Family Medicine    Contact information:    [address] [place] [address] [address]  [place] [address] [phone]          Please follow up.    Why:  Please follow up with cardiologist in [place] where previously  established.         Please follow up.    Why:  Please follow up with nephrology in [place] where previously established.         Follow up with Doug Bravo MD.    Specialty:  Neurology    Why:  Please follow up with Dr. Bravo as scheduled, the office will call you with appointmetn information.     Contact information:    [address]  [address]  [place] [id number] [phone]          Patient Instructions:     Ambulatory Referral to Physical/Occupational Therapy   Referral Priority: Routine Referral Type: Physical Medicine   Referral Reason: Specialty Services Required    Number of Visits Requested: 1      Ambulatory Referral to Hepatology   Referral Priority: Routine Referral Type: Consultation   Referral Reason: Specialty Services Required    Number of Visits Requested: 1      Diet renal     Activity as tolerated     Call MD for:  increased confusion or weakness   Order Comments: Syncope, decreased blood glucose       Medications:  Reconciled Home Medications:   Current Discharge Medication List      CONTINUE these medications which have CHANGED    Details   hydrALAZINE (APRESOLINE) 25 MG tablet Take 2 tablets (50 mg total) by mouth 3 (three) times daily.  Qty: 60 tablet, Refills: 2      ondansetron (ZOFRAN) 4 MG tablet Take 1 tablet (4 mg total) by mouth every 8 (eight) hours as needed for Nausea.  Qty: 12 tablet, Refills: 0         CONTINUE these medications which have NOT CHANGED    Details   albuterol (PROAIR HFA) 90 mcg/actuation inhaler INHALE TWO PUFFS EVERY 4 TO 6 HOURS AS NEEDED      amlodipine (NORVASC) 5 MG tablet Take 1 tablet (5 mg total) by mouth 2 (two) times daily.  Qty: 30 tablet, Refills: 0      aspirin 325 MG tablet Take 1 tablet (325 mg total) by mouth once daily.  Qty: 30 tablet, Refills: 1      atorvastatin (LIPITOR) 40 MG tablet Take 1 tablet (40 mg total) by mouth every evening.  Qty: 30 tablet, Refills: 1      blood-glucose meter (PHARMACIST CHOICE GLUCOSE SYS) [facility] 1  Device by Misc.(Non-Drug; Combo Route) route once.  Qty: 1 each, Refills: 0      calcium acetate (PHOSLO) 667 mg capsule Take 2,001 mg by mouth 3 (three) times daily with meals.      carvedilol (COREG) 12.5 MG tablet Take 1 tablet (12.5 mg total) by mouth 2 (two) times daily.  Qty: 60 tablet, Refills: 0      clopidogrel (PLAVIX) 75 mg tablet Take 1 tablet (75 mg total) by mouth once daily.  Qty: 30 tablet, Refills: 1      famotidine (PEPCID) 20 MG tablet Take 1 tablet (20 mg total) by mouth once daily.  Qty: 30 tablet, Refills: 1      lisinopril (PRINIVIL,ZESTRIL) 40 MG tablet Take 1 tablet (40 mg total) by mouth once daily.  Qty: 30 tablet, Refills: 1      methadone (DOLOPHINE) 10 MG tablet Take 6 tablets (60 mg total) by mouth once daily.  Refills: 0      minoxidil (LONITEN) 2.5 MG tablet Take 3 tablets (7.5 mg total) by mouth 2 (two) times daily.  Qty: 90 tablet, Refills: 0           Time spent on the discharge of patient: 30 minutes    Lorena Clancy PA-C  Department of Hospital Medicine  [facility]  Staff: Dr. Akbar

## 2017-04-05 NOTE — ASSESSMENT & PLAN NOTE
"- Initial episode at home following dialysis [04/01/17] - second episode during transport from ED to floor at OSH  - Hypoglycemic and hypertensive on initial presentation, now improved  - CT head showing remote posterior left frontal cortical infarct but no acute intracranial abnormalities  - Differential includes dialysis dysequilibrium syndrome, post-traumatic seizures, and convulsive syncope  - [04/02/17] - MRI brain noted no acute process seen  - [04/02/17] - Serum tox screen positive for methadone (prescribed medication)  - [04/03/17] - EEG noted "There is no evidence of an epileptic process on this recording. No seizures were recorded during this study."    Recommendations:  - No additional recommendations.  - Pt to follow-up with Dr. Bravo in clinic after discharge.  - General Neurology is signing-off.    "

## 2017-04-05 NOTE — NURSING
Discharge instructions provided to pt, all medications reviewed along with times for next administration. IV still in, as transportation is set to be here close to 1900, tele also on pt.

## 2017-04-05 NOTE — PLAN OF CARE
VSS and afebrile. Bed in lower and locked position, with call light within reach. Pt verbalized understanding to call for assistance. Pt is free from falls/injury thus far.    --orders for d/c today waiting on transportation to [place] with his insurance.     See flowsheet for full assessment. Care ongoing.

## 2017-04-05 NOTE — ASSESSMENT & PLAN NOTE
- Neurology following: MRI limited by motion, but reviewed by Neuro and no need for repeat MRI at this time.   - EEG completed, no evidence of epileptic process. Will not require AEDs at this time.   - CT with no acute changes.   - Seizure precautions, fall precautions, neuro checks q4hrs.  - Stable for discharge from neurology standpoint, patient to follow up with Dr. Bravo in clinic after discharge.

## 2017-04-05 NOTE — PLAN OF CARE
Problem: Patient Care Overview  Goal: Plan of Care Review  Outcome: Ongoing (interventions implemented as appropriate)  Pt returned from dialysis at beginning of shift. AOX4. BP was increased with systolic around 190; administered scheduled hydralazine. BP remained high with second set of vitals. Team was notified. One time dose of clonidine was ordered and held due to BP trending down around 0200. Pt's O2 sats were around 87-90% while sleeping; Applied 1 L O2 via NC with sats around 97%. Pt remained bradycardic with HR around 48-50; asymptomatic. Slept throughout most of night. Remained free from fall/injury throughout shift. Scheduled for 2D echo today.

## 2017-04-05 NOTE — NURSING
Left message for Leonarda CHADWICK and Tiara DANIELLE, regarding pt's need of transportation to [place], states this is done thru his medicaid. Will continue to assess.

## 2017-04-05 NOTE — PROGRESS NOTES
[facility]  Neurology  Progress Note    Patient Name: João Aguirre  MRN: [id number]  Admission Date: 4/2/2017  Hospital Length of Stay: 3 days  Code Status: Full Code   Attending Provider: Alexandro Akbar MD  Primary Care Physician: Delbert Redd NP   Principal Problem:Seizure      Subjective:     Interval History: Overnight, the patient was bradycardic (50-60 BPM), hypertensive (151-190 / 92-97), with SaO2 (92-87).  The patient received hydralazine and clonidine.  HD removed 3.5 L of fluid.  The patient has no complaints.  EEG does not show epileptiform abnormalities.      Current Facility-Administered Medications   Medication Dose Route Frequency Provider Last Rate Last Dose    0.9%  NaCl infusion   Intravenous PRN Alonso Bonilla MD        acetaminophen tablet 650 mg  650 mg Oral Q4H PRN Alma Rosa Harrison PA-C   650 mg at 04/02/17 0514    albuterol-ipratropium 2.5mg-0.5mg/3mL nebulizer solution 3 mL  3 mL Nebulization Q4H [place] Lorena Clancy PA-C   3 mL at 04/05/17 0716    amlodipine tablet 5 mg  5 mg Oral BID Alma Rosa Harrison PA-C   5 mg at 04/05/17 0808    aspirin tablet 325 mg  325 mg Oral Daily Alma Rosa Harrison PA-C   325 mg at 04/05/17 0808    atorvastatin tablet 40 mg  40 mg Oral QHS Alma Rosa Harrison PA-C   40 mg at 04/04/17 2130    calcium acetate capsule 2,001 mg  2,001 mg Oral TID  Alma Rosa Harrison PA-C   2,001 mg at 04/05/17 1110    carvedilol tablet 12.5 mg  12.5 mg Oral BID Roscoe Martini PA-C   12.5 mg at 04/05/17 0808    cloNIDine tablet 0.1 mg  0.1 mg Oral Once Daniele Crowder NP        clopidogrel tablet 75 mg  75 mg Oral Daily Alma Rosa Harrison PA-C   75 mg at 04/05/17 0808    dextrose 50% injection 12.5 g  12.5 g Intravenous PRN Alma Rosa Harrison PA-C        dextrose 50% injection 25 g  25 g Intravenous PRN Alma Rosa Harrison PA-C        docusate sodium capsule 100 mg  100 mg Oral BID Alma Rosa Harrison PA-C   100 mg  at 04/05/17 0807    famotidine tablet 20 mg  20 mg Oral Daily Alma Rosa Harrison PA-C   20 mg at 04/05/17 0808    glucagon (human recombinant) injection 1 mg  1 mg Intramuscular PRN Alma Rosa Harrison PA-C        glucose chewable tablet 16 g  16 g Oral PRN Alma Rosa Harrison PA-C        glucose chewable tablet 24 g  24 g Oral PRN Alma Rosa Harrison PA-C        guaifenesin 12 hr tablet 600 mg  600 mg Oral BID Lorena Clancy PA-C   600 mg at 04/05/17 0808    hydrALAZINE tablet 50 mg  50 mg Oral TID Roscoe Martini PA-C   50 mg at 04/05/17 0507    insulin aspart pen 0-5 Units  0-5 Units Subcutaneous QID (AC + HS) PRN Alma Rosa Harrison PA-C        lisinopril tablet 40 mg  40 mg Oral Daily Alma Rosa Harrison PA-C   40 mg at 04/05/17 0807    loperamide capsule 2 mg  2 mg Oral PRN Alma Rosa Harrison PA-C        methadone tablet 60 mg  60 mg Oral Daily Alma Rosa Harrison PA-C   60 mg at 04/05/17 0808    minoxidil tablet 7.5 mg  7.5 mg Oral BID Alma Rosa Harrison PA-C   7.5 mg at 04/05/17 0820    naloxone 0.4 mg/mL injection 0.4 mg  0.4 mg Intravenous PRN Roscoe Martini PA-C        ondansetron injection 4 mg  4 mg Intravenous Q8H PRN Alma Rosa Harrison PA-C   4 mg at 04/05/17 0806    polyethylene glycol packet 17 g  17 g Oral TID PRN Alma Rosa Harrison PA-C        promethazine (PHENERGAN) 6.25 mg in dextrose 5 % 50 mL IVPB  6.25 mg Intravenous Q6H PRN Alma Rosa Harrison PA-C        sodium chloride 0.9% flush 3 mL  3 mL Intravenous Q8H Alma Rosa Harrison PA-C   3 mL at 04/05/17 0507       Review of Systems   Constitutional:        Pt affirmed sleeping well overnight.   Eyes: Negative for pain and visual disturbance.   Respiratory: Positive for cough and shortness of breath (Improved with breathing treatment).    Cardiovascular: Negative for chest pain.   Gastrointestinal: Positive for nausea (Pt notes he is often nauseated in the morning). Negative for constipation, diarrhea and  "vomiting.   Genitourinary: Negative for dysuria.   Neurological: Positive for dizziness, weakness ("mild" weakness - "tired") and headaches (6/10). Numbness: Numbness over bilateral feet, denies tingling.   Hematological: Bruises/bleeds easily: Productive - improved with breathing treatment.     Objective:     Vital Signs (Most Recent):  Temp: 98 °F (36.7 °C) (04/05/17 1100)  Pulse: (!) 50 (04/05/17 1100)  Resp: 18 (04/05/17 1100)  BP: (!) 161/72 (04/05/17 1100)  SpO2: 97 % (04/05/17 1100) Vital Signs (24h Range):  Temp:  [98 °F (36.7 °C)-98.8 °F (37.1 °C)] 98 °F (36.7 °C)  Pulse:  [50-60] 50  Resp:  [16-18] 18  SpO2:  [92 %-97 %] 97 %  BP: (151-190)/(60-92) 161/72     Weight: 72.5 kg (159 lb 13.3 oz)  Body mass index is 21.68 kg/(m^2).    Physical Exam   Constitutional: He appears well-developed and well-nourished. No distress.   HENT:   Mouth/Throat: Oropharynx is clear and moist. No oropharyngeal exudate.   Eyes: Conjunctivae and EOM are normal. Pupils are equal, round, and reactive to light. Right eye exhibits no discharge. Left eye exhibits no discharge. No scleral icterus.   Neck: Normal range of motion.   Pulmonary/Chest: No stridor.   Musculoskeletal: Normal range of motion. He exhibits tenderness (Tenderness over right ankle (pt referenced fall during most recent alleged seizure)). He exhibits no edema or deformity.   Neurological: He is alert. No cranial nerve deficit. He exhibits normal muscle tone. Coordination normal.   Skin: Skin is warm and dry. He is not diaphoretic. No erythema.   Psychiatric: He has a normal mood and affect. His behavior is normal.   Vitals reviewed.      NEUROLOGICAL EXAMINATION:     MENTAL STATUS   Level of consciousness: alert       Directed speech.     CRANIAL NERVES   Cranial nerves II through XII intact.     CN III, IV, VI   Pupils are equal, round, and reactive to light.  Extraocular motions are normal.     MOTOR EXAM        5/5 bilateral bicep, tricep, handgrip, leg " "flexion, leg extension, plantarflexion, and dorsiflexion.  Normal bilateral leg raise.     SENSORY EXAM        Normal and equal sensation over the face, shoulders, arms, forearms, hands, thighs, legs, and feet.     GAIT AND COORDINATION        Normal finger to target.  Normal alternating movements with bilateral hands.       Significant Labs:   CBC:   Recent Labs  Lab 04/04/17 0525 04/05/17 0425   WBC 5.82 5.76   HGB 9.2* 8.7*   HCT 27.2* 25.6*   * 129*     CMP:   Recent Labs  Lab 04/04/17 0525 04/05/17  0425   GLU 73 80    140   K 4.8 4.9   CL 97 106   CO2 28 23   BUN 66* 39*   CREATININE 8.8* 6.5*   CALCIUM 7.9* 8.7   ALBUMIN 3.2* 3.1*   ANIONGAP 13 11   EGFRNONAA 6.3* 9.0*     All pertinent lab results from the past 24 hours have been reviewed.      Significant Imaging:     CXR: I have reviewed all pertinent results/findings within the past 24 hours.  EEG: I have reviewed all pertinent results/findings within the past 24 hours.    Assessment and Plan:     * Seizure  - Initial episode at home following dialysis [04/01/17] - second episode during transport from ED to floor at OSH  - Hypoglycemic and hypertensive on initial presentation, now improved  - CT head showing remote posterior left frontal cortical infarct but no acute intracranial abnormalities  - Differential includes dialysis dysequilibrium syndrome, post-traumatic seizures, and convulsive syncope  - [04/02/17] - MRI brain noted no acute process seen  - [04/02/17] - Serum tox screen positive for methadone (prescribed medication)  - [04/03/17] - EEG noted "There is no evidence of an epileptic process on this recording. No seizures were recorded during this study."    Recommendations:  - No additional recommendations.  - Pt to follow-up with Dr. Bravo in clinic after discharge.  - General Neurology is signing-off.        VTE Risk Mitigation         Ordered     Medium Risk of VTE  Once      04/02/17 0157     Place ZACHARY hose  Until " discontinued      04/02/17 0157     Place sequential compression device  Until discontinued      04/02/17 0157          Ab Barcenas MD  Neurology  [facility]

## 2017-04-06 NOTE — NURSING
Pt records reviewed.   Pt will be referred to Hepatitis C clinic    Initial referral received  from the [facility]que.   Referring Provider/diagnosis    CELY MERCHANT Provider:      Diagnosis:   Chronic hepatitis C without hepatic coma          Attempts to contact pt unsuccessful. Pt no showed twice.     Referral letter sent to provider and patient.

## 2017-04-18 ENCOUNTER — HOSPITAL ENCOUNTER (EMERGENCY)
Facility: HOSPITAL | Age: 52
Discharge: HOME | End: 2017-04-18
Attending: EMERGENCY MEDICINE
Payer: MEDICARE

## 2017-04-18 VITALS
OXYGEN SATURATION: 97 % | TEMPERATURE: 98 F | HEART RATE: 57 BPM | RESPIRATION RATE: 20 BRPM | BODY MASS INDEX: 21.56 KG/M2 | WEIGHT: 159 LBS | DIASTOLIC BLOOD PRESSURE: 93 MMHG | SYSTOLIC BLOOD PRESSURE: 204 MMHG

## 2017-04-18 DIAGNOSIS — R56.9 SEIZURE: Primary | ICD-10-CM

## 2017-04-18 LAB
ALBUMIN SERPL BCP-MCNC: 3.6 G/DL
ALP SERPL-CCNC: 84 U/L
ALT SERPL W/O P-5'-P-CCNC: 7 U/L
ANION GAP SERPL CALC-SCNC: 12 MMOL/L
AST SERPL-CCNC: 17 U/L
BASOPHILS # BLD AUTO: 0 K/UL
BASOPHILS NFR BLD: 0.5 %
BILIRUB SERPL-MCNC: 0.6 MG/DL
BUN SERPL-MCNC: 14 MG/DL
CALCIUM SERPL-MCNC: 8.2 MG/DL
CHLORIDE SERPL-SCNC: 96 MMOL/L
CO2 SERPL-SCNC: 31 MMOL/L
CREAT SERPL-MCNC: 3.8 MG/DL
DIFFERENTIAL METHOD: ABNORMAL
EOSINOPHIL # BLD AUTO: 0.4 K/UL
EOSINOPHIL NFR BLD: 5.2 %
ERYTHROCYTE [DISTWIDTH] IN BLOOD BY AUTOMATED COUNT: 15.9 %
EST. GFR  (AFRICAN AMERICAN): 20 ML/MIN/1.73 M^2
EST. GFR  (NON AFRICAN AMERICAN): 17 ML/MIN/1.73 M^2
GLUCOSE SERPL-MCNC: 76 MG/DL
HCT VFR BLD AUTO: 30.7 %
HGB BLD-MCNC: 9.8 G/DL
LYMPHOCYTES # BLD AUTO: 0.9 K/UL
LYMPHOCYTES NFR BLD: 12.1 %
MCH RBC QN AUTO: 30.1 PG
MCHC RBC AUTO-ENTMCNC: 32 %
MCV RBC AUTO: 94 FL
MONOCYTES # BLD AUTO: 0.5 K/UL
MONOCYTES NFR BLD: 6.5 %
NEUTROPHILS # BLD AUTO: 5.7 K/UL
NEUTROPHILS NFR BLD: 75.7 %
PLATELET # BLD AUTO: 255 K/UL
PMV BLD AUTO: 6.1 FL
POTASSIUM SERPL-SCNC: 4.1 MMOL/L
PROT SERPL-MCNC: 8.6 G/DL
RBC # BLD AUTO: 3.26 M/UL
SODIUM SERPL-SCNC: 139 MMOL/L
WBC # BLD AUTO: 7.5 K/UL

## 2017-04-18 PROCEDURE — 93010 ELECTROCARDIOGRAM REPORT: CPT | Mod: ,,, | Performed by: INTERNAL MEDICINE

## 2017-04-18 PROCEDURE — 93005 ELECTROCARDIOGRAM TRACING: CPT

## 2017-04-18 PROCEDURE — 85025 COMPLETE CBC W/AUTO DIFF WBC: CPT

## 2017-04-18 PROCEDURE — 80053 COMPREHEN METABOLIC PANEL: CPT

## 2017-04-18 PROCEDURE — 99284 EMERGENCY DEPT VISIT MOD MDM: CPT

## 2017-04-18 PROCEDURE — 25000003 PHARM REV CODE 250: Performed by: EMERGENCY MEDICINE

## 2017-04-18 PROCEDURE — 36415 COLL VENOUS BLD VENIPUNCTURE: CPT

## 2017-04-18 RX ORDER — LEVETIRACETAM 500 MG/1
TABLET ORAL
Qty: 70 TABLET | Refills: 2 | Status: SHIPPED | OUTPATIENT
Start: 2017-04-18 | End: 2017-08-11

## 2017-04-18 RX ORDER — LEVETIRACETAM 500 MG/1
TABLET ORAL
Qty: 70 TABLET | Refills: 2 | Status: SHIPPED | OUTPATIENT
Start: 2017-04-18 | End: 2017-04-18

## 2017-04-18 RX ORDER — LEVETIRACETAM 500 MG/1
1000 TABLET ORAL
Status: COMPLETED | OUTPATIENT
Start: 2017-04-18 | End: 2017-04-18

## 2017-04-18 RX ORDER — ONDANSETRON 4 MG/1
8 TABLET, ORALLY DISINTEGRATING ORAL
Status: COMPLETED | OUTPATIENT
Start: 2017-04-18 | End: 2017-04-18

## 2017-04-18 RX ADMIN — LEVETIRACETAM 1000 MG: 500 TABLET ORAL at 06:04

## 2017-04-18 RX ADMIN — ONDANSETRON 8 MG: 4 TABLET, ORALLY DISINTEGRATING ORAL at 06:04

## 2017-04-18 NOTE — ED AVS SNAPSHOT
[facility] [address] [place] [place] [id number]               João Aguirre   2017  4:02 PM   ED    Description:  Male : 1965   Department:  [facility]           Your Care was Coordinated By:     Provider Role From To    Daniele Aviles MD Attending Provider [id number] --      Reason for Visit     Seizures           Diagnoses this Visit        Comments    Seizure    -  Primary       ED Disposition     ED Disposition Condition Comment    Discharge             To Do List           Follow-up Information     Follow up with Doug Bravo MD.    Specialty:  Neurology    Why:  Call tomorrow to schedule a follow up appointment.     Contact information:    [address]  [address]  [place] [id number] [phone]         These Medications        Disp Refills Start End    levetiracetam (KEPPRA) 500 MG Tab 70 tablet 2 2017     Take 500 mg twice a day.  Take an extra tablet right after dialysis (making 3 tablets on your dialysis days)    Pharm[facility]: [facility] [place] [facility] [address] [place] [address] Ph #: [phone]         [facility] On Call     [facility] - 24/ Assistance  Unless otherwise directed by your provider, please contact [facility], our nurse care line that is available for / assistance.     Registered nurses in the [facility] provide: appointment scheduling, clinical advisement, health education, and other advisory services.  Call: [phone] (toll free)               Medications           Message regarding Medications     Verify the changes and/or additions to your medication regime listed below are the same as discussed with your clinician today.  If any of these changes or additions are incorrect, please notify your healthcare provider.        START taking these NEW medications        Refills    levetiracetam (KEPPRA) 500 MG Tab 2    Sig: Take 500 mg  twice a day.  Take an extra tablet right after dialysis (making 3 tablets on your dialysis days)    Class: Print      These medications were administered today        Dose Freq    levetiracetam tablet 1,000 mg 1,000 mg ED 1 Time    Sig: Take 2 tablets (1,000 mg total) by mouth ED 1 Time.    Class: Normal    Route: Oral    ondansetron disintegrating tablet 8 mg 8 mg ED 1 Time    Sig: Take 2 tablets (8 mg total) by mouth ED 1 Time.    Class: Normal    Route: Oral           Verify that the below list of medications is an accurate representation of the medications you are currently taking.  If none reported, the list may be blank. If incorrect, please contact your healthcare provider. Carry this list with you in case of emergency.           Current Medications     albuterol (PROAIR HFA) 90 mcg/actuation inhaler INHALE TWO PUFFS EVERY 4 TO 6 HOURS AS NEEDED    amlodipine (NORVASC) 5 MG tablet Take 1 tablet (5 mg total) by mouth 2 (two) times daily.    aspirin 325 MG tablet Take 1 tablet (325 mg total) by mouth once daily.    atorvastatin (LIPITOR) 40 MG tablet Take 1 tablet (40 mg total) by mouth every evening.    blood-glucose meter (PHARMACIST CHOICE GLUCOSE SYS) Misc 1 Device by Misc.(Non-Drug; Combo Route) route once.    calcium acetate (PHOSLO) 667 mg capsule Take 2,001 mg by mouth 3 (three) times daily with meals.    carvedilol (COREG) 12.5 MG tablet Take 1 tablet (12.5 mg total) by mouth 2 (two) times daily.    clopidogrel (PLAVIX) 75 mg tablet Take 1 tablet (75 mg total) by mouth once daily.    famotidine (PEPCID) 20 MG tablet Take 1 tablet (20 mg total) by mouth once daily.    hydrALAZINE (APRESOLINE) 25 MG tablet Take 2 tablets (50 mg total) by mouth 3 (three) times daily.    levetiracetam (KEPPRA) 500 MG Tab Take 500 mg twice a day.  Take an extra tablet right after dialysis (making 3 tablets on your dialysis days)    levetiracetam tablet 1,000 mg Take 2 tablets (1,000 mg total) by mouth ED 1 Time.     lisinopril (PRINIVIL,ZESTRIL) 40 MG tablet Take 1 tablet (40 mg total) by mouth once daily.    methadone (DOLOPHINE) 10 MG tablet Take 6 tablets (60 mg total) by mouth once daily.    minoxidil (LONITEN) 2.5 MG tablet Take 3 tablets (7.5 mg total) by mouth 2 (two) times daily.    ondansetron (ZOFRAN) 4 MG tablet Take 1 tablet (4 mg total) by mouth every 8 (eight) hours as needed for Nausea.           Clinical Reference Information           Your Vitals Were     BP Pulse Temp Resp Weight SpO2    171/79 56 97.8 °F (36.6 °C) (Oral) 20 72.1 kg (159 lb) 93%    BMI                21.56 kg/m2          Allergies as of 4/18/2017        Reactions    Antibiotic Hc     Hx of in 2013      Immunizations Administered on Date of Encounter - 4/18/2017     None      ED Micro, Lab, POCT     Start Ordered       Status Ordering Provider    04/18/17 1628 04/18/17 1628  CBC auto differential  STAT      Final result     04/18/17 1628 04/18/17 1628  Comprehensive metabolic panel  STAT      Final result       ED Imaging Orders     None        Discharge Instructions         Seizure: New Onset, Unknown Cause (Adult)  You have had a seizure today. A seizure happens when a burst of random, uncontrolled electrical activity occurs in the brain. A seizure can have many causes. Often its not possible to figure out the exact cause of a seizure from a single exam. You might need other tests. Having a single seizure doesnt mean that you will continue to have seizures or that you have epilepsy. But until doctors know the cause of your seizure, you should assume that another seizure is possible.  Home care  Follow these tips when caring for yourself at home. For this seizure:  · Seizures arent predictable. So avoid doing anything that might cause danger to you or other people if you have another seizure. Dont drive, ride a bike, or operate dangerous equipment.  · Dont take a bath alone. Take a shower instead.  · Dont swim alone until your healthcare  "provider says that you are no longer in danger of having another seizure.  · Tell your close friends and relatives about your seizure. Teach them what to do for you if it happens again.  · If medicine was prescribed to prevent seizures, take it exactly as directed. It does not work when taken "as needed." Missing doses will increase the risk of having another seizure.  · Follow a regular sleep schedule such that you get at least 6 to 8 hours of restful sleep every night. This is especially important when you are sick and have a cold, flu, or another type of infection.  · Don't drink alcoholic beverages until your doctor says it's OK. Do not ever use recreational drugs.  For future seizures, if you are alone:  If you feel a seizure coming on, lie down on a bed or on the floor with something soft under your head. Lie on your left side, not on your back. This will keep you from falling. It will also let fluid drain out of your mouth and prevent choking. Be sure you are clear of any objects that might injure you during the seizure. Call 911 if there is time.  For future seizures, if someone is with you:  The person should help you get into a safe position and call 911. The person shouldnt try to force anything in your mouth once the seizure begins. This could harm your teeth or jaw.  After a seizure, you may be drowsy or confused. The person should stay with you until you are fully awake. The person shouldnt offer you anything to eat or drink during that time. Call 911 or go to the emergency department so that you can be looked at.  Follow-up care  Follow up with your healthcare provider, or as advised. You may need other tests to help figure out what caused your seizure. These tests may include brain wave tests (EEG) or brain scans (MRI or CT scans). Keep a seizure calendar to record how often you have a seizure. If you are being started on anti-seizure medicine, make sure that you use additional birth control " protection. Seizure medicine can affect how well birth control pills work, and you could become pregnant. Don't drink alcohol until your doctor tells you its OK. Do not ever use recreational drugs.  Note: For the safety of yourself and others on the road, certain states require that the treating doctor tell the Public Health Department about any adult who is treated for a seizure and is at risk of more seizures. In this case, the Department of Motor Vehicles will be told. A restriction will be put on your s license until a doctor gives you medical clearance to drive again. Contact your treating doctor to find out if your state requires the reporting of patients with a seizures condition.  When to seek medical advice  Call your healthcare provider right away if any of these occur:  · Another seizure  · Fever over 100.4ºF (38.0ºC), or as advised  · Unusual irritability, drowsiness, or confusion  · Headache or neck pain that gets worse  Date Last Reviewed: 8/1/2016  © 3241-6381 [employer]. [address] [place] [address]. All rights reserved. This information is not intended as a substitute for professional medical care. Always follow your healthcare professional's instructions.          Your Scheduled Appointments     Apr 21, 2017  8:40 AM CDT   New Patient with MD Balbir Henrqiuez - Neurology ([facility])    [address] [place] [address]  [place] [address] [phone]              [facility] Sign-Up     Activating your [facility] account is as easy as 1-2-3!     1) Visit [email or web address], select Sign Up Now, enter this activation code and your date of birth, then select Next.  [email or web address]  Expires: 6/2/2017  4:41 PM      2) Create a username and password to use when you visit [facility] in the future and select a security question in case you lose your password and select Next.    3) Enter your e-mail address and click Sign Up!    Additional Information  If you have  questions, please e-mail [email or web address] or call [phone] to talk to our [employer] staff. Remember, [facility] is NOT to be used for urgent needs. For medical emergencies, dial 911.         Smoking Cessation     If you would like to quit smoking:   You may be eligible for free services if you are a [place] resident and started smoking cigarettes before September 1, 1988.  Call the Smoking Cessation Trust (SCT) toll free at (138) 682-3487 or (448) 293-0372.   Call 7-741-QUIT-NOW if you do not meet the above criteria.   Contact us via email: [email or web address]   View our website for more information: [email or web address]         [facility] complies with applicable Federal civil rights laws and does not discriminate on the basis of race, color, national origin, age, disability, or sex.        Language Assistance Services     ATTENTION: Language assistance services are available, free of charge. Please call [phone].      ATENCIÓN: Si habla español, tiene a iraheta disposición servicios gratuitos de asistencia lingüística. Llame al [phone].     CHÚ Ý: N?u b?n nói Ti?ng Vi?t, có các d?ch v? h? tr? ngôn ng? mi?n phí dành cho b?n. G?i s? [phone].

## 2017-04-18 NOTE — DISCHARGE INSTRUCTIONS
"  Seizure: New Onset, Unknown Cause (Adult)  You have had a seizure today. A seizure happens when a burst of random, uncontrolled electrical activity occurs in the brain. A seizure can have many causes. Often its not possible to figure out the exact cause of a seizure from a single exam. You might need other tests. Having a single seizure doesnt mean that you will continue to have seizures or that you have epilepsy. But until doctors know the cause of your seizure, you should assume that another seizure is possible.  Home care  Follow these tips when caring for yourself at home. For this seizure:  · Seizures arent predictable. So avoid doing anything that might cause danger to you or other people if you have another seizure. Dont drive, ride a bike, or operate dangerous equipment.  · Dont take a bath alone. Take a shower instead.  · Dont swim alone until your healthcare provider says that you are no longer in danger of having another seizure.  · Tell your close friends and relatives about your seizure. Teach them what to do for you if it happens again.  · If medicine was prescribed to prevent seizures, take it exactly as directed. It does not work when taken "as needed." Missing doses will increase the risk of having another seizure.  · Follow a regular sleep schedule such that you get at least 6 to 8 hours of restful sleep every night. This is especially important when you are sick and have a cold, flu, or another type of infection.  · Don't drink alcoholic beverages until your doctor says it's OK. Do not ever use recreational drugs.  For future seizures, if you are alone:  If you feel a seizure coming on, lie down on a bed or on the floor with something soft under your head. Lie on your left side, not on your back. This will keep you from falling. It will also let fluid drain out of your mouth and prevent choking. Be sure you are clear of any objects that might injure you during the seizure. Call 911 if " there is time.  For future seizures, if someone is with you:  The person should help you get into a safe position and call 911. The person shouldnt try to force anything in your mouth once the seizure begins. This could harm your teeth or jaw.  After a seizure, you may be drowsy or confused. The person should stay with you until you are fully awake. The person shouldnt offer you anything to eat or drink during that time. Call 911 or go to the emergency department so that you can be looked at.  Follow-up care  Follow up with your healthcare provider, or as advised. You may need other tests to help figure out what caused your seizure. These tests may include brain wave tests (EEG) or brain scans (MRI or CT scans). Keep a seizure calendar to record how often you have a seizure. If you are being started on anti-seizure medicine, make sure that you use additional birth control protection. Seizure medicine can affect how well birth control pills work, and you could become pregnant. Don't drink alcohol until your doctor tells you its OK. Do not ever use recreational drugs.  Note: For the safety of yourself and others on the road, certain states require that the treating doctor tell the Public Health Department about any adult who is treated for a seizure and is at risk of more seizures. In this case, the Department of Motor Vehicles will be told. A restriction will be put on your s license until a doctor gives you medical clearance to drive again. Contact your treating doctor to find out if your state requires the reporting of patients with a seizures condition.  When to seek medical advice  Call your healthcare provider right away if any of these occur:  · Another seizure  · Fever over 100.4ºF (38.0ºC), or as advised  · Unusual irritability, drowsiness, or confusion  · Headache or neck pain that gets worse  Date Last Reviewed: 8/1/2016  © 8841-2731 [employer]. [address] [place] [address], [place]  [address]. All rights reserved. This information is not intended as a substitute for professional medical care. Always follow your healthcare professional's instructions.

## 2017-04-18 NOTE — ED PROVIDER NOTES
"Encounter Date: 4/18/2017    SCRIBE #1 NOTE: I, Elias Duncan, am scribing for, and in the presence of,  Dr. Aviles. I have scribed the entire note.       History     Chief Complaint   Patient presents with    Seizures     had grand mal seizure that lasted approx 1.5 minutes after his dialysis treatment.     Review of patient's allergies indicates:   Allergen Reactions    Antibiotic hc      Hx of in 2013       HPI Comments: 04/18/2017  4:13 PM     Chief Complaint: seizure       The patient is a 52 y.o. male who is presenting from dialysis today after he had a seizure. He reports he began to feel "jittery" towards the end of dialysis, and then had a seizure. He does not remember the episode. He felt slightly confused after the episode, but states he does not feel as jittery now. He endorses having a seizure 2 weeks ago, and hx of seizures in the past, however he is not on seizure medication. When he was brought in by EMS, his BP was in the 200's and his blood sugar was 92. He stats he has been on dialysis for 4 years, and has not missed a dialysis appointment recently. He denies CP, SOB, or stroke. He can move all of his limbs without difficulty. Pt mentioned that he has n/v every morning, and does not take medication for sx's. There are no other complaints at this time. He has a past medical history of Anticoagulant long-term use; Arthritis; Asthma; Back pain; Diabetes mellitus; Encounter for blood transfusion; Eye abnormality (right eye); Gastritis; Hemodialysis patient; Hypertension; Pneumonia (2013); Renal disorder; and Stroke. He has a past surgical history that includes Back surgery; AV fistula placement; Cholecystectomy; and Eye surgery.          The history is provided by the patient.     Past Medical History:   Diagnosis Date    Anticoagulant long-term use     Arthritis     Asthma     Back pain     Diabetes mellitus     Encounter for blood transfusion     Eye abnormality right eye    injured as a " child    Gastritis     Hemodialysis patient     Hypertension     Pneumonia 2013    Spend 6weeks in hospital at [facility]    Renal disorder     Stroke      Past Surgical History:   Procedure Laterality Date    AV FISTULA PLACEMENT      BACK SURGERY      CHOLECYSTECTOMY      EYE SURGERY      R eye     Family History   Problem Relation Age of Onset    Kidney disease Brother      Social History   Substance Use Topics    Smoking status: Former Smoker     Years: 10.00     Quit date: 9/1/2015    Smokeless tobacco: Former User     Quit date: 2/16/2016    Alcohol use No     Review of Systems   Constitutional: Negative for fever.   HENT: Negative for sore throat.    Eyes: Negative for visual disturbance.   Respiratory: Negative for shortness of breath.    Cardiovascular: Negative for chest pain.   Gastrointestinal: Positive for nausea and vomiting.   Genitourinary: Negative for dysuria.   Musculoskeletal: Negative for back pain.   Skin: Negative for rash.   Neurological: Positive for seizures. Negative for weakness.   Hematological: Does not bruise/bleed easily.   Psychiatric/Behavioral: Positive for confusion.       Physical Exam   Initial Vitals   BP Pulse Resp Temp SpO2   04/18/17 1608 04/18/17 1608 04/18/17 1608 04/18/17 1608 04/18/17 1608   216/91 63 16 97.8 °F (36.6 °C) 93 %     Physical Exam    Nursing note and vitals reviewed.  Constitutional: He appears well-developed and well-nourished. No distress.   HENT:   Head: Normocephalic and atraumatic.   Eyes:   Cataract and hazziness in right eye, and blind in the left eye.    Neck: Neck supple.   Cardiovascular: Normal rate and regular rhythm. Exam reveals no gallop and no friction rub.    Murmur heard.  2/6 holosystolic murmur heard best at left sternal boarder.    Pulmonary/Chest: Breath sounds normal. No respiratory distress. He has no wheezes.   Abdominal: Soft. Bowel sounds are normal. He exhibits no distension. There is tenderness.   Diffuse abdominal  tenderness   Musculoskeletal: Normal range of motion. He exhibits no edema or tenderness.   Venous stasis diease of right leg.    Neurological: He is alert and oriented to person, place, and time.   Skin: Skin is warm and dry.   Psychiatric: He has a normal mood and affect.         ED Course   Procedures  Labs Reviewed - No data to display          Medical Decision Making:   It is unclear whether or not the patient is actually having seizures.  His mental status is back to baseline.  I don't think he has a head bleed.  He had recent images with similar symptoms I don't think he needs recurrent imaging at this time.  I spoke with Dr. Ching who recommends getting him 1000 mg of Keppra by mouth and then starting on 500 mg of Keppra twice a day but also to take another 500 mg tablet after dialysis.  The patient has a follow-up appointment in the next few days with neurology.  I believe this was a seizure secondary to what was described by the dialysis nurse, and I don't believe this was hypoglycemia given that EMS status his glucose is normal.  There is also no hypotension.  I believe the patient is stable for discharge.            Scribe Attestation:   Scribe #1: I performed the above scribed service and the documentation accurately describes the services I performed. I attest to the accuracy of the note.    Attending Attestation:           Physician Attestation for Scribe:  Physician Attestation Statement for Scribe #1: I, Dr. Aviles, reviewed documentation, as scribed by Elias Duncan in my presence, and it is both accurate and complete.                 ED Course     Clinical Impression:   The encounter diagnosis was Seizure.          Daniele Aviles MD  04/18/17 1826       Daniele Aviles MD  04/18/17 1827

## 2017-05-26 ENCOUNTER — HOSPITAL ENCOUNTER (INPATIENT)
Facility: HOSPITAL | Age: 52
LOS: 2 days | Discharge: HOME | DRG: 304 | End: 2017-05-28
Attending: HOSPITALIST | Admitting: HOSPITALIST
Payer: MEDICARE

## 2017-05-26 DIAGNOSIS — I16.1 HYPERTENSIVE EMERGENCY: ICD-10-CM

## 2017-05-26 DIAGNOSIS — I21.4 ACUTE NON-ST-ELEVATION MYOCARDIAL INFARCTION: ICD-10-CM

## 2017-05-26 DIAGNOSIS — I16.0 HYPERTENSIVE URGENCY: ICD-10-CM

## 2017-05-26 DIAGNOSIS — N18.6 ESRF (END STAGE RENAL FAILURE): Primary | Chronic | ICD-10-CM

## 2017-05-26 PROBLEM — G40.909 SEIZURE DISORDER: Status: ACTIVE | Noted: 2017-04-02

## 2017-05-26 LAB — POCT GLUCOSE: 74 MG/DL (ref 70–110)

## 2017-05-26 PROCEDURE — 80069 RENAL FUNCTION PANEL: CPT

## 2017-05-26 PROCEDURE — 85025 COMPLETE CBC W/AUTO DIFF WBC: CPT

## 2017-05-26 PROCEDURE — 86706 HEP B SURFACE ANTIBODY: CPT

## 2017-05-26 PROCEDURE — 90935 HEMODIALYSIS ONE EVALUATION: CPT

## 2017-05-26 PROCEDURE — 80100016 HC MAINTENANCE HEMODIALYSIS

## 2017-05-26 PROCEDURE — 87340 HEPATITIS B SURFACE AG IA: CPT

## 2017-05-26 PROCEDURE — 20000000 HC ICU ROOM

## 2017-05-26 PROCEDURE — 36415 COLL VENOUS BLD VENIPUNCTURE: CPT

## 2017-05-26 PROCEDURE — 25000003 PHARM REV CODE 250: Performed by: FAMILY MEDICINE

## 2017-05-26 PROCEDURE — 63600175 PHARM REV CODE 636 W HCPCS: Performed by: FAMILY MEDICINE

## 2017-05-26 PROCEDURE — 25000003 PHARM REV CODE 250: Performed by: HOSPITALIST

## 2017-05-26 RX ORDER — POTASSIUM CHLORIDE 20 MEQ/15ML
40 SOLUTION ORAL
Status: DISCONTINUED | OUTPATIENT
Start: 2017-05-26 | End: 2017-05-28 | Stop reason: HOSPADM

## 2017-05-26 RX ORDER — SODIUM CHLORIDE 0.9 % (FLUSH) 0.9 %
3 SYRINGE (ML) INJECTION EVERY 8 HOURS
Status: DISCONTINUED | OUTPATIENT
Start: 2017-05-26 | End: 2017-05-28 | Stop reason: HOSPADM

## 2017-05-26 RX ORDER — PROMETHAZINE HYDROCHLORIDE 25 MG/1
25 TABLET ORAL 2 TIMES DAILY PRN
COMMUNITY
End: 2017-08-11

## 2017-05-26 RX ORDER — FAMOTIDINE 20 MG/1
20 TABLET, FILM COATED ORAL 2 TIMES DAILY
Status: DISCONTINUED | OUTPATIENT
Start: 2017-05-26 | End: 2017-05-26 | Stop reason: DRUGHIGH

## 2017-05-26 RX ORDER — ASPIRIN 325 MG
325 TABLET ORAL DAILY
Status: DISCONTINUED | OUTPATIENT
Start: 2017-05-27 | End: 2017-05-28 | Stop reason: HOSPADM

## 2017-05-26 RX ORDER — LEVETIRACETAM 500 MG/1
500 TABLET ORAL 2 TIMES DAILY
Status: DISCONTINUED | OUTPATIENT
Start: 2017-05-26 | End: 2017-05-28 | Stop reason: HOSPADM

## 2017-05-26 RX ORDER — PROMETHAZINE HYDROCHLORIDE 25 MG/1
25 TABLET ORAL EVERY 6 HOURS PRN
Status: DISCONTINUED | OUTPATIENT
Start: 2017-05-26 | End: 2017-05-26 | Stop reason: SDUPTHER

## 2017-05-26 RX ORDER — CLOPIDOGREL BISULFATE 75 MG/1
75 TABLET ORAL DAILY
Status: DISCONTINUED | OUTPATIENT
Start: 2017-05-27 | End: 2017-05-28 | Stop reason: HOSPADM

## 2017-05-26 RX ORDER — LANOLIN ALCOHOL/MO/W.PET/CERES
800 CREAM (GRAM) TOPICAL
Status: DISCONTINUED | OUTPATIENT
Start: 2017-05-26 | End: 2017-05-28 | Stop reason: HOSPADM

## 2017-05-26 RX ORDER — HYDROCODONE BITARTRATE AND ACETAMINOPHEN 10; 325 MG/1; MG/1
1 TABLET ORAL EVERY 4 HOURS PRN
Status: DISCONTINUED | OUTPATIENT
Start: 2017-05-26 | End: 2017-05-28 | Stop reason: HOSPADM

## 2017-05-26 RX ORDER — HYDROCODONE BITARTRATE AND ACETAMINOPHEN 5; 325 MG/1; MG/1
1 TABLET ORAL EVERY 4 HOURS PRN
Status: DISCONTINUED | OUTPATIENT
Start: 2017-05-26 | End: 2017-05-28 | Stop reason: HOSPADM

## 2017-05-26 RX ORDER — SODIUM,POTASSIUM PHOSPHATES 280-250MG
2 POWDER IN PACKET (EA) ORAL
Status: DISCONTINUED | OUTPATIENT
Start: 2017-05-26 | End: 2017-05-28 | Stop reason: HOSPADM

## 2017-05-26 RX ORDER — AMLODIPINE BESYLATE 5 MG/1
5 TABLET ORAL 2 TIMES DAILY
Status: DISCONTINUED | OUTPATIENT
Start: 2017-05-26 | End: 2017-05-28 | Stop reason: HOSPADM

## 2017-05-26 RX ORDER — RAMELTEON 8 MG/1
8 TABLET ORAL NIGHTLY PRN
Status: DISCONTINUED | OUTPATIENT
Start: 2017-05-26 | End: 2017-05-28 | Stop reason: HOSPADM

## 2017-05-26 RX ORDER — PANTOPRAZOLE SODIUM 40 MG/1
40 TABLET, DELAYED RELEASE ORAL DAILY
Status: DISCONTINUED | OUTPATIENT
Start: 2017-05-27 | End: 2017-05-28 | Stop reason: HOSPADM

## 2017-05-26 RX ORDER — METHADONE HYDROCHLORIDE 10 MG/1
60 TABLET ORAL DAILY
Status: DISCONTINUED | OUTPATIENT
Start: 2017-05-27 | End: 2017-05-28 | Stop reason: HOSPADM

## 2017-05-26 RX ORDER — MINOXIDIL 2.5 MG/1
7.5 TABLET ORAL 2 TIMES DAILY
Status: DISCONTINUED | OUTPATIENT
Start: 2017-05-26 | End: 2017-05-28 | Stop reason: HOSPADM

## 2017-05-26 RX ORDER — POTASSIUM CHLORIDE 20 MEQ/15ML
60 SOLUTION ORAL
Status: DISCONTINUED | OUTPATIENT
Start: 2017-05-26 | End: 2017-05-28 | Stop reason: HOSPADM

## 2017-05-26 RX ORDER — ACETAMINOPHEN 325 MG/1
650 TABLET ORAL EVERY 4 HOURS PRN
Status: DISCONTINUED | OUTPATIENT
Start: 2017-05-26 | End: 2017-05-28 | Stop reason: HOSPADM

## 2017-05-26 RX ORDER — ONDANSETRON 2 MG/ML
8 INJECTION INTRAMUSCULAR; INTRAVENOUS EVERY 12 HOURS PRN
Status: DISCONTINUED | OUTPATIENT
Start: 2017-05-26 | End: 2017-05-28 | Stop reason: HOSPADM

## 2017-05-26 RX ORDER — FAMOTIDINE 20 MG/1
20 TABLET, FILM COATED ORAL NIGHTLY
Status: DISCONTINUED | OUTPATIENT
Start: 2017-05-26 | End: 2017-05-28 | Stop reason: HOSPADM

## 2017-05-26 RX ORDER — SODIUM CHLORIDE 9 MG/ML
INJECTION, SOLUTION INTRAVENOUS ONCE
Status: DISCONTINUED | OUTPATIENT
Start: 2017-05-26 | End: 2017-05-27

## 2017-05-26 RX ORDER — CALCIUM ACETATE 667 MG/1
2001 CAPSULE ORAL
Status: DISCONTINUED | OUTPATIENT
Start: 2017-05-27 | End: 2017-05-28 | Stop reason: HOSPADM

## 2017-05-26 RX ORDER — PROMETHAZINE HYDROCHLORIDE 25 MG/1
25 TABLET ORAL EVERY 6 HOURS PRN
Status: DISCONTINUED | OUTPATIENT
Start: 2017-05-26 | End: 2017-05-28 | Stop reason: HOSPADM

## 2017-05-26 RX ORDER — HYDRALAZINE HYDROCHLORIDE 25 MG/1
25 TABLET, FILM COATED ORAL 3 TIMES DAILY
Status: DISCONTINUED | OUTPATIENT
Start: 2017-05-26 | End: 2017-05-27

## 2017-05-26 RX ORDER — ATORVASTATIN CALCIUM 40 MG/1
40 TABLET, FILM COATED ORAL NIGHTLY
Status: DISCONTINUED | OUTPATIENT
Start: 2017-05-26 | End: 2017-05-28 | Stop reason: HOSPADM

## 2017-05-26 RX ORDER — CARVEDILOL 6.25 MG/1
12.5 TABLET ORAL 2 TIMES DAILY
Status: DISCONTINUED | OUTPATIENT
Start: 2017-05-26 | End: 2017-05-28 | Stop reason: HOSPADM

## 2017-05-26 RX ORDER — SODIUM CHLORIDE 9 MG/ML
INJECTION, SOLUTION INTRAVENOUS
Status: DISCONTINUED | OUTPATIENT
Start: 2017-05-26 | End: 2017-05-27

## 2017-05-26 RX ADMIN — CARVEDILOL 12.5 MG: 6.25 TABLET, FILM COATED ORAL at 10:05

## 2017-05-26 RX ADMIN — PROMETHAZINE HYDROCHLORIDE 25 MG: 25 TABLET ORAL at 10:05

## 2017-05-26 RX ADMIN — AMLODIPINE BESYLATE 5 MG: 5 TABLET ORAL at 10:05

## 2017-05-26 RX ADMIN — LEVETIRACETAM 500 MG: 500 TABLET ORAL at 10:05

## 2017-05-26 RX ADMIN — MINOXIDIL 7.5 MG: 2.5 TABLET ORAL at 10:05

## 2017-05-26 RX ADMIN — FAMOTIDINE 20 MG: 20 TABLET, FILM COATED ORAL at 10:05

## 2017-05-26 RX ADMIN — SODIUM CHLORIDE, PRESERVATIVE FREE 3 ML: 5 INJECTION INTRAVENOUS at 10:05

## 2017-05-26 RX ADMIN — ONDANSETRON 8 MG: 2 INJECTION INTRAMUSCULAR; INTRAVENOUS at 09:05

## 2017-05-26 RX ADMIN — ATORVASTATIN CALCIUM 40 MG: 40 TABLET, FILM COATED ORAL at 10:05

## 2017-05-26 NOTE — CONSULTS
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 after dialysis (possibly pseudo seizure), presents to OSH with poorly articulated complains of generalized weakness, nausea, vomiting (mostly in AM). Admits to falling with some abrasions to his left foot. Denies seizures, LOC, but has some headaches. Tox screen at OSH is positive for marijuana only. K is stable. Lat HD per patient was on Wednesday, as he missed Tuesday. He reports having seizure after removing 5L and agrees to UF 3L as safe margin. BP on admission 223/100.      Past Medical History:   Diagnosis Date    Anticoagulant long-term use     Arthritis     Asthma     Back pain     Diabetes mellitus     Encounter for blood transfusion     Eye abnormality right eye    injured as a child    Gastritis     Hemodialysis patient     Hypertension     Pneumonia 2013    Spend 6weeks in hospital at [place]    Renal disorder     Stroke      Past Surgical History:   Procedure Laterality Date    AV FISTULA PLACEMENT      BACK SURGERY      CHOLECYSTECTOMY      EYE SURGERY      R eye     Family History   Problem Relation Age of Onset    Kidney disease Brother      Social History   Substance Use Topics    Smoking status: Former Smoker     Years: 10.00     Quit date: 9/1/2015    Smokeless tobacco: Former User     Quit date: 2/16/2016    Alcohol use No       Review of patient's allergies indicates:   Allergen Reactions    Antibiotic hc      Hx of in 2013         Outpatient meds:  No current facility-administered  medications on file prior to encounter.      Current Outpatient Prescriptions on File Prior to Encounter   Medication Sig Dispense Refill    albuterol (PROAIR HFA) 90 mcg/actuation inhaler INHALE TWO PUFFS EVERY 4 TO 6 HOURS AS NEEDED      amlodipine (NORVASC) 5 MG tablet Take 1 tablet (5 mg total) by mouth 2 (two) times daily. 30 tablet 0    aspirin 325 MG tablet Take 1 tablet (325 mg total) by mouth once daily. 30 tablet 1    atorvastatin (LIPITOR) 40 MG tablet Take 1 tablet (40 mg total) by mouth every evening. 30 tablet 1    blood-glucose meter (PHARMACIST CHOICE GLUCOSE SYS) Misc 1 Device by Misc.(Non-Drug; Combo Route) route once. 1 each 0    calcium acetate (PHOSLO) 667 mg capsule Take 2,001 mg by mouth 3 (three) times daily with meals.      carvedilol (COREG) 12.5 MG tablet Take 1 tablet (12.5 mg total) by mouth 2 (two) times daily. 60 tablet 0    clopidogrel (PLAVIX) 75 mg tablet Take 1 tablet (75 mg total) by mouth once daily. 30 tablet 1    famotidine (PEPCID) 20 MG tablet Take 1 tablet (20 mg total) by mouth once daily. 30 tablet 1    hydrALAZINE (APRESOLINE) 25 MG tablet Take 2 tablets (50 mg total) by mouth 3 (three) times daily. 60 tablet 2    levetiracetam (KEPPRA) 500 MG Tab Take 500 mg twice a day.  Take an extra tablet right after dialysis (making 3 tablets on your dialysis days) 70 tablet 2    lisinopril (PRINIVIL,ZESTRIL) 40 MG tablet Take 1 tablet (40 mg total) by mouth once daily. 30 tablet 1    methadone (DOLOPHINE) 10 MG tablet Take 6 tablets (60 mg total) by mouth once daily.  0    minoxidil (LONITEN) 2.5 MG tablet Take 3 tablets (7.5 mg total) by mouth 2 (two) times daily. 90 tablet 0    ondansetron (ZOFRAN) 4 MG tablet Take 1 tablet (4 mg total) by mouth every 8 (eight) hours as needed for Nausea. 12 tablet 0       Scheduled meds:      Infusions:      PRN meds:      Review of Systems:  Review of Systems   Constitutional: Positive for malaise/fatigue. Negative for chills,  fever and weight loss.   HENT: Negative for hearing loss and nosebleeds.    Eyes: Negative for blurred vision, double vision and photophobia.   Respiratory: Negative for cough, shortness of breath and wheezing.    Cardiovascular: Negative for chest pain, palpitations and leg swelling.   Gastrointestinal: Positive for abdominal pain, nausea and vomiting. Negative for blood in stool, constipation, diarrhea, heartburn and melena.   Genitourinary: Negative for dysuria, frequency and urgency.   Musculoskeletal: Negative for falls, joint pain and myalgias.   Skin: Negative for itching and rash.   Neurological: Positive for headaches. Negative for dizziness, speech change, focal weakness and loss of consciousness.   Endo/Heme/Allergies: Does not bruise/bleed easily.   Psychiatric/Behavioral: Positive for substance abuse. Negative for depression. The patient is not nervous/anxious.        OBJECTIVE:     Vital Signs and IO (Last 24H):  Temp:  [98.5 °F (36.9 °C)]   Pulse:  [78]   Resp:  [33]   BP: (223)/(107)   SpO2:  [95 %]   No intake/output data recorded.    Physical Exam:  Physical Exam   Constitutional: He is oriented to person, place, and time. He appears well-developed and well-nourished. He appears distressed.   HENT:   Head: Normocephalic and atraumatic.   Mouth/Throat: Mucous membranes are dry.   Eyes: EOM are normal. Pupils are equal, round, and reactive to light. No scleral icterus.   Neck: Neck supple.   Cardiovascular: Normal rate and regular rhythm.    Pulmonary/Chest: Effort normal. No stridor. No respiratory distress.   Abdominal: Soft. He exhibits no distension.   Musculoskeletal: Normal range of motion. He exhibits no edema or deformity.   Neurological: He is alert and oriented to person, place, and time. No cranial nerve deficit.   Skin: Skin is warm and dry. No rash noted. He is not diaphoretic. No erythema.   Psychiatric: He has a normal mood and affect. His behavior is normal.   Nursing note and vitals  reviewed.      Body mass index is 29.45 kg/m².    Laboratory:  No results for input(s): NA, K, CL, CO2, BUN, CREATININE, ESTGFRAFRICA, EGFRNONAA, GLUCOSE, CALCIUM, ALBUMIN, PHOS in the last 168 hours.    Invalid input(s): AG, EGFR    No results for input(s): WBC, HGB, HCT, PLT, MCV, MCHC, NEUTOPHILPCT, MONO in the last 168 hours.    Invalid input(s):  EOSINOPHIL    No results for input(s): ALKPHOS, BILITOT, BILIDIR, PROT, ALBUMIN, ALT, AST in the last 168 hours.      ASSESSMENT/PLAN:     Active Hospital Problems    Diagnosis  POA    Hypertensive emergency [I16.1]  Yes      Resolved Hospital Problems    Diagnosis Date Resolved POA   No resolved problems to display.         ESRD on HD TTS via LUE RC AVF.  Requires emergent therapy now fir hypertensive emergency.  Next HD TBD after re-evaluation in AM.  Renal diet - low K, low phos.  No IVs or BP checks on access arm.    Anemia of CKD  Stable. Monitor.  No need for ERNESTO.  No need for IV iron.    MBD / Secondary HPT  Monitor phos levels. Low phos diet.  Resume phos binders when able to eat better.    HTN, now symptomatic hypertensive emergency.  Continue home meds.  Will do HD with 2-3L UF as tolerated, maintain SBP > 160 for now.  Will re-evaluate for further UF tomorrow.  Low sodium diet.    Thank you for allowing us to participate in the care of your patient!   We will follow the patient and provide recommendations as needed.    Dr. Morgan will round on the patient tomorrow.    Peter Gupta MD    [facility]  [address] [place] [id number]    [phone] - tel  [phone] - fax    5/26/2017     Suggest hydralazine PRN for BP control.  Records from OSH and in [facility] reviewed and summarized in HPI.

## 2017-05-26 NOTE — PLAN OF CARE
Pt arrived via EMS.  BP 200s/100s.  Calm but increasingly agitated through out admit questions, aishwarya re home medications and which pharmacy used.  Jean to gravity at this time.  Wounds noted and added to flowsheet.  Sites cleaned with soap and water.  Nephrology at bedside.  Dialysis nurses made aware per nephrology.  Message sent re pt arrival to hospital medicine.  Pt states last dialysis was Wednesday.  Access to left FA.  Thrill and bruit present.  intermittent nausea present.  Left foot elevated to pillows d/t pain from fall PTA.

## 2017-05-27 PROBLEM — I16.1 HYPERTENSIVE EMERGENCY: Status: ACTIVE | Noted: 2017-05-27

## 2017-05-27 LAB
ANION GAP SERPL CALC-SCNC: 15 MMOL/L
BUN SERPL-MCNC: 37 MG/DL
CALCIUM SERPL-MCNC: 8 MG/DL
CHLORIDE SERPL-SCNC: 103 MMOL/L
CO2 SERPL-SCNC: 23 MMOL/L
CREAT SERPL-MCNC: 8.6 MG/DL
EST. GFR  (AFRICAN AMERICAN): 7 ML/MIN/1.73 M^2
EST. GFR  (NON AFRICAN AMERICAN): 6 ML/MIN/1.73 M^2
GLUCOSE SERPL-MCNC: 84 MG/DL
MAGNESIUM SERPL-MCNC: 2.4 MG/DL
PHOSPHATE SERPL-MCNC: 7.3 MG/DL
POCT GLUCOSE: 83 MG/DL (ref 70–110)
POCT GLUCOSE: 89 MG/DL (ref 70–110)
POTASSIUM SERPL-SCNC: 4.3 MMOL/L
SODIUM SERPL-SCNC: 141 MMOL/L

## 2017-05-27 PROCEDURE — 80100016 HC MAINTENANCE HEMODIALYSIS

## 2017-05-27 PROCEDURE — 84100 ASSAY OF PHOSPHORUS: CPT

## 2017-05-27 PROCEDURE — 25000003 PHARM REV CODE 250: Performed by: FAMILY MEDICINE

## 2017-05-27 PROCEDURE — 80048 BASIC METABOLIC PNL TOTAL CA: CPT

## 2017-05-27 PROCEDURE — 83735 ASSAY OF MAGNESIUM: CPT

## 2017-05-27 PROCEDURE — 94761 N-INVAS EAR/PLS OXIMETRY MLT: CPT

## 2017-05-27 PROCEDURE — 25000003 PHARM REV CODE 250: Performed by: INTERNAL MEDICINE

## 2017-05-27 PROCEDURE — G0257 UNSCHED DIALYSIS ESRD PT HOS: HCPCS

## 2017-05-27 PROCEDURE — 25000003 PHARM REV CODE 250: Performed by: HOSPITALIST

## 2017-05-27 PROCEDURE — 63600175 PHARM REV CODE 636 W HCPCS: Performed by: NURSE PRACTITIONER

## 2017-05-27 PROCEDURE — 12000002 HC ACUTE/MED SURGE SEMI-PRIVATE ROOM

## 2017-05-27 PROCEDURE — 63600175 PHARM REV CODE 636 W HCPCS: Performed by: FAMILY MEDICINE

## 2017-05-27 PROCEDURE — 36415 COLL VENOUS BLD VENIPUNCTURE: CPT

## 2017-05-27 RX ORDER — HYDRALAZINE HYDROCHLORIDE 20 MG/ML
10 INJECTION INTRAMUSCULAR; INTRAVENOUS EVERY 6 HOURS PRN
Status: DISCONTINUED | OUTPATIENT
Start: 2017-05-27 | End: 2017-05-28 | Stop reason: HOSPADM

## 2017-05-27 RX ORDER — LISINOPRIL 40 MG/1
40 TABLET ORAL DAILY
Status: DISCONTINUED | OUTPATIENT
Start: 2017-05-28 | End: 2017-05-28 | Stop reason: HOSPADM

## 2017-05-27 RX ORDER — HYDRALAZINE HYDROCHLORIDE 25 MG/1
50 TABLET, FILM COATED ORAL 3 TIMES DAILY
Status: DISCONTINUED | OUTPATIENT
Start: 2017-05-27 | End: 2017-05-28 | Stop reason: HOSPADM

## 2017-05-27 RX ADMIN — HYDROCODONE BITARTRATE AND ACETAMINOPHEN 1 TABLET: 5; 325 TABLET ORAL at 10:05

## 2017-05-27 RX ADMIN — FAMOTIDINE 20 MG: 20 TABLET, FILM COATED ORAL at 08:05

## 2017-05-27 RX ADMIN — ONDANSETRON 8 MG: 2 INJECTION INTRAMUSCULAR; INTRAVENOUS at 08:05

## 2017-05-27 RX ADMIN — AMLODIPINE BESYLATE 5 MG: 5 TABLET ORAL at 08:05

## 2017-05-27 RX ADMIN — ATORVASTATIN CALCIUM 40 MG: 40 TABLET, FILM COATED ORAL at 08:05

## 2017-05-27 RX ADMIN — MINOXIDIL 7.5 MG: 2.5 TABLET ORAL at 08:05

## 2017-05-27 RX ADMIN — CALCIUM ACETATE 2001 MG: 667 CAPSULE ORAL at 08:05

## 2017-05-27 RX ADMIN — LEVETIRACETAM 500 MG: 500 TABLET ORAL at 08:05

## 2017-05-27 RX ADMIN — SODIUM CHLORIDE, PRESERVATIVE FREE 3 ML: 5 INJECTION INTRAVENOUS at 05:05

## 2017-05-27 RX ADMIN — CALCIUM ACETATE 2001 MG: 667 CAPSULE ORAL at 05:05

## 2017-05-27 RX ADMIN — CALCIUM ACETATE 667 MG: 667 CAPSULE ORAL at 01:05

## 2017-05-27 RX ADMIN — HYDRALAZINE HYDROCHLORIDE 10 MG: 20 INJECTION INTRAMUSCULAR; INTRAVENOUS at 10:05

## 2017-05-27 RX ADMIN — SODIUM CHLORIDE, PRESERVATIVE FREE 3 ML: 5 INJECTION INTRAVENOUS at 01:05

## 2017-05-27 RX ADMIN — METHADONE HYDROCHLORIDE 60 MG: 10 TABLET ORAL at 08:05

## 2017-05-27 RX ADMIN — CLOPIDOGREL BISULFATE 75 MG: 75 TABLET ORAL at 08:05

## 2017-05-27 RX ADMIN — CARVEDILOL 12.5 MG: 6.25 TABLET, FILM COATED ORAL at 08:05

## 2017-05-27 RX ADMIN — PANTOPRAZOLE SODIUM 40 MG: 40 TABLET, DELAYED RELEASE ORAL at 08:05

## 2017-05-27 RX ADMIN — PROMETHAZINE HYDROCHLORIDE 25 MG: 25 TABLET ORAL at 09:05

## 2017-05-27 RX ADMIN — HYDRALAZINE HYDROCHLORIDE 25 MG: 25 TABLET, FILM COATED ORAL at 05:05

## 2017-05-27 RX ADMIN — HYDROCODONE BITARTRATE AND ACETAMINOPHEN 1 TABLET: 5; 325 TABLET ORAL at 09:05

## 2017-05-27 RX ADMIN — HYDRALAZINE HYDROCHLORIDE 25 MG: 25 TABLET, FILM COATED ORAL at 01:05

## 2017-05-27 RX ADMIN — ASPIRIN 325 MG ORAL TABLET 325 MG: 325 PILL ORAL at 08:05

## 2017-05-27 RX ADMIN — PROMETHAZINE HYDROCHLORIDE 25 MG: 25 TABLET ORAL at 08:05

## 2017-05-27 RX ADMIN — HYDRALAZINE HYDROCHLORIDE 50 MG: 25 TABLET, FILM COATED ORAL at 09:05

## 2017-05-27 NOTE — SUBJECTIVE & OBJECTIVE
Past Medical History:   Diagnosis Date    Anticoagulant long-term use     Arthritis     Asthma     Back pain     Diabetes mellitus     Encounter for blood transfusion     Eye abnormality right eye    injured as a child    Gastritis     Gastroparesis     Hemodialysis patient     Hypertension     Pneumonia 2013    Spend 6weeks in hospital at [place]    Renal disorder     Stroke        Past Surgical History:   Procedure Laterality Date    AV FISTULA PLACEMENT      BACK SURGERY      CHOLECYSTECTOMY      EYE SURGERY      R eye       Review of patient's allergies indicates:   Allergen Reactions    Antibiotic hc      Hx of in 2013         No current facility-administered medications on file prior to encounter.      Current Outpatient Prescriptions on File Prior to Encounter   Medication Sig    albuterol (PROAIR HFA) 90 mcg/actuation inhaler INHALE TWO PUFFS EVERY 4 TO 6 HOURS AS NEEDED    amlodipine (NORVASC) 5 MG tablet Take 1 tablet (5 mg total) by mouth 2 (two) times daily.    aspirin 325 MG tablet Take 1 tablet (325 mg total) by mouth once daily.    atorvastatin (LIPITOR) 40 MG tablet Take 1 tablet (40 mg total) by mouth every evening.    blood-glucose meter (PHARMACIST CHOICE GLUCOSE SYS) Misc 1 Device by Misc.(Non-Drug; Combo Route) route once.    calcium acetate (PHOSLO) 667 mg capsule Take 2,001 mg by mouth 3 (three) times daily with meals.    carvedilol (COREG) 12.5 MG tablet Take 1 tablet (12.5 mg total) by mouth 2 (two) times daily.    clopidogrel (PLAVIX) 75 mg tablet Take 1 tablet (75 mg total) by mouth once daily.    famotidine (PEPCID) 20 MG tablet Take 1 tablet (20 mg total) by mouth once daily.    hydrALAZINE (APRESOLINE) 25 MG tablet Take 2 tablets (50 mg total) by mouth 3 (three) times daily.    levetiracetam (KEPPRA) 500 MG Tab Take 500 mg twice a day.  Take an extra tablet right after dialysis (making 3 tablets on your dialysis days)    lisinopril (PRINIVIL,ZESTRIL) 40  MG tablet Take 1 tablet (40 mg total) by mouth once daily.    methadone (DOLOPHINE) 10 MG tablet Take 6 tablets (60 mg total) by mouth once daily.    minoxidil (LONITEN) 2.5 MG tablet Take 3 tablets (7.5 mg total) by mouth 2 (two) times daily.    ondansetron (ZOFRAN) 4 MG tablet Take 1 tablet (4 mg total) by mouth every 8 (eight) hours as needed for Nausea.     Family History     Problem Relation (Age of Onset)    Kidney disease Brother        Social History Main Topics    Smoking status: Former Smoker     Years: 10.00     Quit date: 9/1/2015    Smokeless tobacco: Never Used    Alcohol use No    Drug use:      Frequency: 4.0 times per week     Types: Other-see comments      Comment: marijuana    Sexual activity: Yes     Review of Systems   Constitutional: Negative for activity change, appetite change, chills, diaphoresis, fatigue and fever.   HENT: Negative for congestion, sinus pressure, sore throat and tinnitus.    Eyes: Negative for visual disturbance.   Respiratory: Negative for cough, chest tightness, shortness of breath and wheezing.    Cardiovascular: Negative for chest pain, palpitations and leg swelling.   Gastrointestinal: Positive for abdominal pain and nausea. Negative for abdominal distention and vomiting.   Endocrine: Negative for polydipsia, polyphagia and polyuria.   Genitourinary: Negative for dysuria.   Musculoskeletal: Negative for arthralgias and back pain.   Skin: Negative for rash and wound.   Neurological: Positive for dizziness. Negative for syncope, light-headedness and headaches.   Psychiatric/Behavioral: Negative for confusion.     Objective:     Vital Signs (Most Recent):  Temp: 98.6 °F (37 °C) (05/26/17 1845)  Pulse: 80 (05/26/17 1900)  Resp: (!) 35 (05/26/17 1900)  BP: (!) 248/109 (05/26/17 2030)  SpO2: 97 % (05/26/17 1900) Vital Signs (24h Range):  Temp:  [98.5 °F (36.9 °C)-98.6 °F (37 °C)] 98.6 °F (37 °C)  Pulse:  [74-80] 80  Resp:  [33-35] 35  SpO2:  [95 %-97 %] 97 %  BP:  (194-248)/() 248/109     Weight: 98.5 kg (217 lb 2.5 oz)  Body mass index is 29.45 kg/m².    Physical Exam   Constitutional: He appears well-developed and well-nourished. He is cooperative.  Non-toxic appearance. He does not have a sickly appearance. He does not appear ill. No distress.   HENT:   Head: Normocephalic and atraumatic.   Right Ear: External ear normal.   Left Ear: External ear normal.   Nose: Nose normal.   Mouth/Throat: Uvula is midline and oropharynx is clear and moist. Mucous membranes are dry.   Eyes: Conjunctivae and lids are normal.   R eye with deformity/ ptosis/ blindness   Neck: Trachea normal, normal range of motion and full passive range of motion without pain. Neck supple. No JVD present. No thyroid mass present.   Cardiovascular: Normal rate, regular rhythm, S1 normal, S2 normal and normal heart sounds.    Pulmonary/Chest: Effort normal and breath sounds normal.   Abdominal: Soft. Normal appearance and bowel sounds are normal. He exhibits no distension. There is no tenderness.   Musculoskeletal:        Right lower leg: He exhibits no edema.        Left lower leg: He exhibits no edema.   Neurological: He is alert. He has normal strength. No sensory deficit. He displays a negative Romberg sign. GCS eye subscore is 4. GCS verbal subscore is 5. GCS motor subscore is 6.   Skin: Skin is intact. No cyanosis.   Psychiatric: He has a normal mood and affect. His speech is normal and behavior is normal.   Nursing note and vitals reviewed.       Significant Labs: All pertinent labs within the past 24 hours have been reviewed.    Significant Imaging: I have reviewed all pertinent imaging results/findings within the past 24 hours.

## 2017-05-27 NOTE — PROGRESS NOTES
INPATIENT NEPHROLOGY PROGRESS NOTE  [facility] NEPHROLOGY    João Aguirre  05/27/2017    Reason for consultation: ESRD    Chief Complaint: No chief complaint on file.  Weakness    History of Present Illness:    Had HD yesterday and today  UF 6L in total    Plan of Care:    Assessment:  ESRD  HTN emergency  SHPT  Anemia of ESRD    Plan:    1. ESRD- Had consecutive HD treatments- will resume HD TTS.     2. HTN emergency- BP is better, s/p 6L UF. Resume lisinopril and change hydralazine to 50mg TID.    3. SHPT- Back on phos binder    4. Anemia of ESRD- EPO on hold while SBP > 170.      Thank you for allowing us to participate in this patient's care. We will continue to follow.    Medications:  No current facility-administered medications on file prior to encounter.      Current Outpatient Prescriptions on File Prior to Encounter   Medication Sig Dispense Refill    albuterol (PROAIR HFA) 90 mcg/actuation inhaler INHALE TWO PUFFS EVERY 4 TO 6 HOURS AS NEEDED      amlodipine (NORVASC) 5 MG tablet Take 1 tablet (5 mg total) by mouth 2 (two) times daily. 30 tablet 0    aspirin 325 MG tablet Take 1 tablet (325 mg total) by mouth once daily. 30 tablet 1    atorvastatin (LIPITOR) 40 MG tablet Take 1 tablet (40 mg total) by mouth every evening. 30 tablet 1    blood-glucose meter (PHARMACIST CHOICE GLUCOSE SYS) Misc 1 Device by Misc.(Non-Drug; Combo Route) route once. 1 each 0    calcium acetate (PHOSLO) 667 mg capsule Take 2,001 mg by mouth 3 (three) times daily with meals.      carvedilol (COREG) 12.5 MG tablet Take 1 tablet (12.5 mg total) by mouth 2 (two) times daily. 60 tablet 0    clopidogrel (PLAVIX) 75 mg tablet Take 1 tablet (75 mg total) by mouth once daily. 30 tablet 1    famotidine (PEPCID) 20 MG tablet Take 1 tablet (20 mg total) by mouth once daily. 30 tablet 1    hydrALAZINE (APRESOLINE) 25 MG tablet Take 2 tablets (50 mg total) by mouth 3 (three) times daily. 60 tablet 2    levetiracetam  (KEPPRA) 500 MG Tab Take 500 mg twice a day.  Take an extra tablet right after dialysis (making 3 tablets on your dialysis days) 70 tablet 2    lisinopril (PRINIVIL,ZESTRIL) 40 MG tablet Take 1 tablet (40 mg total) by mouth once daily. 30 tablet 1    methadone (DOLOPHINE) 10 MG tablet Take 6 tablets (60 mg total) by mouth once daily.  0    minoxidil (LONITEN) 2.5 MG tablet Take 3 tablets (7.5 mg total) by mouth 2 (two) times daily. 90 tablet 0    ondansetron (ZOFRAN) 4 MG tablet Take 1 tablet (4 mg total) by mouth every 8 (eight) hours as needed for Nausea. 12 tablet 0     Scheduled Meds:   sodium chloride 0.9%   Intravenous Once    amlodipine  5 mg Oral BID    aspirin  325 mg Oral Daily    atorvastatin  40 mg Oral QHS    calcium acetate  2,001 mg Oral TID WM    carvedilol  12.5 mg Oral BID    clopidogrel  75 mg Oral Daily    famotidine  20 mg Oral QHS    hydrALAZINE  25 mg Oral TID    levetiracetam  500 mg Oral BID    methadone  60 mg Oral Daily    minoxidil  7.5 mg Oral BID    pantoprazole  40 mg Oral Daily    sodium chloride 0.9%  3 mL Intravenous Q8H     Continuous Infusions:   PRN Meds:.acetaminophen, hydrocodone-acetaminophen 10-325mg, hydrocodone-acetaminophen 5-325mg, magnesium oxide, magnesium oxide, ondansetron, pneumoc 13-jarvis conj-dip cr(PF), potassium chloride 10%, potassium chloride 10%, potassium chloride 10%, potassium, sodium phosphates, potassium, sodium phosphates, potassium, sodium phosphates, promethazine, ramelteon    Allergies:  Antibiotic hc    Vital Signs:  Temp Readings from Last 3 Encounters:   05/27/17 98.5 °F (36.9 °C) (Oral)   04/18/17 97.8 °F (36.6 °C) (Oral)   04/05/17 98.2 °F (36.8 °C) (Oral)       Pulse Readings from Last 3 Encounters:   05/27/17 (!) 55   04/18/17 (!) 57   04/05/17 (!) 50       BP Readings from Last 3 Encounters:   05/27/17 (!) 193/91   04/18/17 (!) 204/93   04/05/17 (!) 158/72     Weight:  Wt Readings from Last 3 Encounters:   05/27/17 95.8 kg (211  lb 3.2 oz)   04/18/17 72.1 kg (159 lb)   04/02/17 72.5 kg (159 lb 13.3 oz)     Review of Systems:  Review of Systems - All 14 systems reviewed and negative, except as noted in HPI    Physical Exam:  Constitutional: nad  Neuro: aao x 3, nonfocal  Psych: normal affect  Neck: supple  Cards: RRR, no m/r/g, no edema  Lungs: no wheeze or crackles  GI: s/nt/nd +BS  MSK: no joint swelling  Skin: warm, dry  Access: AVF + thrill and bruit    Results:  Lab Results   Component Value Date     05/27/2017    K 4.3 05/27/2017     05/27/2017    CO2 23 05/27/2017    BUN 37 (H) 05/27/2017    CREATININE 8.6 (H) 05/27/2017    CALCIUM 8.0 (L) 05/27/2017    ANIONGAP 15 05/27/2017    ESTGFRAFRICA 7 (A) 05/27/2017    EGFRNONAA 6 (A) 05/27/2017       Lab Results   Component Value Date    CALCIUM 8.0 (L) 05/27/2017    PHOS 7.3 (H) 05/27/2017       Recent Labs  Lab 05/26/17  1853   WBC 8.50   RBC 3.81*   HGB 11.4*   HCT 34.6*   *   MCV 91   MCH 29.8   MCHC 32.8     I have personally reviewed pertinent radiological imaging and reports.

## 2017-05-27 NOTE — UM SECONDARY REVIEW
Physician Advisor External    Level of Care Issue    Case sent to EHR for review of admit 5/26/2017

## 2017-05-27 NOTE — HPI
"Pt transferred from outside ED for hypertensive emergency. He reported to the ED with complaints of dizziness and being "jumpy". He did not go to dialysis yesterday. He says he is compliant with his home medications daily. He has nausea and abd pain. He does smoke marijuana regularly. He denies tobacco and EtoH use. He is a poor historian.     At outside ED he received clonidine and IV hydralazine x2. Documentation states the patient is non compliant with htn meds and dialysis.     [facility] and [facility] has PMHx of HTN, ESRD on HD, Hep C, methadone dependence, CVA and recent seizure.   "

## 2017-05-27 NOTE — UM SECONDARY REVIEW
Physician Advisor External    Level of Care Issue    Per Dr. Albrecht at [facility] pt is OP appropriate for 5/26/2017.    1240, Level of care discussed with Dr. Seymour.  Pt will remain INPT at this time.  Dr. Seymour will re-evalaute tomorrow.

## 2017-05-27 NOTE — NURSING
Received from ICU.  Awake and alert.  No distress.  Oriented to room and surroundings.  Call light in reach.

## 2017-05-27 NOTE — H&P
"[facility] Medicine  History & Physical    Patient Name: João Aguirre  MRN: [id number]  Admission Date: 5/26/2017  Attending Physician: Xena Marie MD  Primary Care Provider: Primary Doctor No       Patient information was obtained from patient, past medical records and ER records.     Subjective:     Principal Problem:Hypertensive urgency       HPI: Pt transferred from outside ED for hypertensive emergency. He reported to the ED with complaints of dizziness and being "jumpy". He did not go to dialysis yesterday. He says he is compliant with his home medications daily. He has nausea and abd pain. He does smoke marijuana regularly. He denies tobacco and EtoH use. He is a poor historian.     At outside ED he received clonidine and IV hydralazine x2. Documentation states the patient is non compliant with htn meds and dialysis.     [facility] chart reviewed and pt has PMHx of HTN, ESRD on HD, Hep C, methadone dependence, CVA and recent seizure.     Past Medical History:   Diagnosis Date    Anticoagulant long-term use     Arthritis     Asthma     Back pain     Diabetes mellitus     Encounter for blood transfusion     Eye abnormality right eye    injured as a child    Gastritis     Gastroparesis     Hemodialysis patient     Hypertension     Pneumonia 2013    Spend 6weeks in hospital at [place]    Renal disorder     Stroke        Past Surgical History:   Procedure Laterality Date    AV FISTULA PLACEMENT      BACK SURGERY      CHOLECYSTECTOMY      EYE SURGERY      R eye       Review of patient's allergies indicates:   Allergen Reactions    Antibiotic hc      Hx of in 2013         No current facility-administered medications on file prior to encounter.      Current Outpatient Prescriptions on File Prior to Encounter   Medication Sig    albuterol (PROAIR HFA) 90 mcg/actuation inhaler INHALE TWO PUFFS EVERY 4 TO 6 HOURS AS NEEDED    amlodipine (NORVASC) 5 MG " tablet Take 1 tablet (5 mg total) by mouth 2 (two) times daily.    aspirin 325 MG tablet Take 1 tablet (325 mg total) by mouth once daily.    atorvastatin (LIPITOR) 40 MG tablet Take 1 tablet (40 mg total) by mouth every evening.    blood-glucose meter (PHARMACIST CHOICE GLUCOSE SYS) Misc 1 Device by Misc.(Non-Drug; Combo Route) route once.    calcium acetate (PHOSLO) 667 mg capsule Take 2,001 mg by mouth 3 (three) times daily with meals.    carvedilol (COREG) 12.5 MG tablet Take 1 tablet (12.5 mg total) by mouth 2 (two) times daily.    clopidogrel (PLAVIX) 75 mg tablet Take 1 tablet (75 mg total) by mouth once daily.    famotidine (PEPCID) 20 MG tablet Take 1 tablet (20 mg total) by mouth once daily.    hydrALAZINE (APRESOLINE) 25 MG tablet Take 2 tablets (50 mg total) by mouth 3 (three) times daily.    levetiracetam (KEPPRA) 500 MG Tab Take 500 mg twice a day.  Take an extra tablet right after dialysis (making 3 tablets on your dialysis days)    lisinopril (PRINIVIL,ZESTRIL) 40 MG tablet Take 1 tablet (40 mg total) by mouth once daily.    methadone (DOLOPHINE) 10 MG tablet Take 6 tablets (60 mg total) by mouth once daily.    minoxidil (LONITEN) 2.5 MG tablet Take 3 tablets (7.5 mg total) by mouth 2 (two) times daily.    ondansetron (ZOFRAN) 4 MG tablet Take 1 tablet (4 mg total) by mouth every 8 (eight) hours as needed for Nausea.     Family History     Problem Relation (Age of Onset)    Kidney disease Brother        Social History Main Topics    Smoking status: Former Smoker     Years: 10.00     Quit date: 9/1/2015    Smokeless tobacco: Never Used    Alcohol use No    Drug use:      Frequency: 4.0 times per week     Types: Other-see comments      Comment: marijuana    Sexual activity: Yes     Review of Systems   Constitutional: Negative for activity change, appetite change, chills, diaphoresis, fatigue and fever.   HENT: Negative for congestion, sinus pressure, sore throat and tinnitus.    Eyes:  Negative for visual disturbance.   Respiratory: Negative for cough, chest tightness, shortness of breath and wheezing.    Cardiovascular: Negative for chest pain, palpitations and leg swelling.   Gastrointestinal: Positive for abdominal pain and nausea. Negative for abdominal distention and vomiting.   Endocrine: Negative for polydipsia, polyphagia and polyuria.   Genitourinary: Negative for dysuria.   Musculoskeletal: Negative for arthralgias and back pain.   Skin: Negative for rash and wound.   Neurological: Positive for dizziness. Negative for syncope, light-headedness and headaches.   Psychiatric/Behavioral: Negative for confusion.     Objective:     Vital Signs (Most Recent):  Temp: 98.6 °F (37 °C) (05/26/17 1845)  Pulse: 80 (05/26/17 1900)  Resp: (!) 35 (05/26/17 1900)  BP: (!) 248/109 (05/26/17 2030)  SpO2: 97 % (05/26/17 1900) Vital Signs (24h Range):  Temp:  [98.5 °F (36.9 °C)-98.6 °F (37 °C)] 98.6 °F (37 °C)  Pulse:  [74-80] 80  Resp:  [33-35] 35  SpO2:  [95 %-97 %] 97 %  BP: (194-248)/() 248/109     Weight: 98.5 kg (217 lb 2.5 oz)  Body mass index is 29.45 kg/m².    Physical Exam   Constitutional: He appears well-developed and well-nourished. He is cooperative.  Non-toxic appearance. He does not have a sickly appearance. He does not appear ill. No distress.   HENT:   Head: Normocephalic and atraumatic.   Right Ear: External ear normal.   Left Ear: External ear normal.   Nose: Nose normal.   Mouth/Throat: Uvula is midline and oropharynx is clear and moist. Mucous membranes are dry.   Eyes: Conjunctivae and lids are normal.   R eye with deformity/ ptosis/ blindness   Neck: Trachea normal, normal range of motion and full passive range of motion without pain. Neck supple. No JVD present. No thyroid mass present.   Cardiovascular: Normal rate, regular rhythm, S1 normal, S2 normal and normal heart sounds.    Pulmonary/Chest: Effort normal and breath sounds normal.   Abdominal: Soft. Normal appearance and  bowel sounds are normal. He exhibits no distension. There is no tenderness.   Musculoskeletal:        Right lower leg: He exhibits no edema.        Left lower leg: He exhibits no edema.   Neurological: He is alert. He has normal strength. No sensory deficit. He displays a negative Romberg sign. GCS eye subscore is 4. GCS verbal subscore is 5. GCS motor subscore is 6.   Skin: Skin is intact. No cyanosis.   Psychiatric: He has a normal mood and affect. His speech is normal and behavior is normal.   Nursing note and vitals reviewed.       Significant Labs: All pertinent labs within the past 24 hours have been reviewed.    Significant Imaging: I have reviewed all pertinent imaging results/findings within the past 24 hours.    Assessment/Plan:     Seizure disorder    Chronic. Last seizure about 3 weeks ago. Continue home seizure meds.           Noncompliance with renal dialysis    Encourage compliance with routine dialysis.           Benign hypertension with ESRD (end-stage renal disease)    Chronic. Uncontrolled. Resume home meds.           Methadone dependence    Chronic. Continue daily methadone at home dose.           Chronic hepatitis C without hepatic coma    Chronic. Stable.           Blindness of right eye    Chronic. Stable. Routine eye care.           ESRD (T,Th,Sat) dialysis onset 2013    Chronic. Consult nephrology for routine dialysis.           Coronary artery disease involving native coronary artery of native heart without angina pectoris    Chronic. Stable. Continue statin and antiplatelets.           * Hypertensive urgency    Associated with abd pain and nausea.   2/2 non compliance with medical treatment. Resume home meds. Will adjust meds prn.             VTE Risk Mitigation         Ordered     Medium Risk of VTE  Once      05/26/17 1955     Place sequential compression device  Until discontinued      05/26/17 1955     Place ZACHARY hose  Until discontinued      05/26/17 1955        Xena Marie  MD  Department of Hospital Medicine   [facility]

## 2017-05-27 NOTE — PROGRESS NOTES
Progress Note    Admit Date: 5/26/2017   LOS: 1 day     SUBJECTIVE:     Interval history: No acute events reported overnight.  The patient is currently without complaints.  He will have hemodialysis soon.    Scheduled Meds:   sodium chloride 0.9%   Intravenous Once    amlodipine  5 mg Oral BID    aspirin  325 mg Oral Daily    atorvastatin  40 mg Oral QHS    calcium acetate  2,001 mg Oral TID WM    carvedilol  12.5 mg Oral BID    clopidogrel  75 mg Oral Daily    famotidine  20 mg Oral QHS    hydrALAZINE  25 mg Oral TID    levetiracetam  500 mg Oral BID    methadone  60 mg Oral Daily    minoxidil  7.5 mg Oral BID    pantoprazole  40 mg Oral Daily    sodium chloride 0.9%  3 mL Intravenous Q8H     Continuous Infusions:   PRN Meds:acetaminophen, hydrocodone-acetaminophen 10-325mg, hydrocodone-acetaminophen 5-325mg, magnesium oxide, magnesium oxide, ondansetron, pneumoc 13-jarvis conj-dip cr(PF), potassium chloride 10%, potassium chloride 10%, potassium chloride 10%, potassium, sodium phosphates, potassium, sodium phosphates, potassium, sodium phosphates, promethazine, ramelteon    Review of patient's allergies indicates:   Allergen Reactions    Antibiotic hc        Review of Systems  Negative for chest pain/shortness of breath.    OBJECTIVE:     Vital Signs (Most Recent)  Temp: 98 °F (36.7 °C) (05/27/17 1300)  Pulse: (!) 51 (05/27/17 1300)  Resp: (!) 38 (05/27/17 1300)  BP: (!) 154/83 (05/27/17 1300)  SpO2: (!) 91 % (05/27/17 1300)    Vital Signs Range (Last 24H):  Temp:  [97.8 °F (36.6 °C)-99.1 °F (37.3 °C)]   Pulse:  [50-80]   Resp:  [15-45]   BP: (131-248)/()   SpO2:  [88 %-97 %]     I & O (Last 24H):  Intake/Output Summary (Last 24 hours) at 05/27/17 1531  Last data filed at 05/27/17 1400   Gross per 24 hour   Intake             1480 ml   Output             7075 ml   Net            -5595 ml     Physical Exam:  Constitutional: He appears well-developed and well-nourished. He is cooperative.   Non-toxic appearance. He does not have a sickly appearance. He does not appear ill. No distress.   HENT:   Head: Normocephalic and atraumatic.   Right Ear: External ear normal.   Left Ear: External ear normal.   Nose: Nose normal.   Mouth/Throat: Uvula is midline and oropharynx is clear and moist.   Eyes: Conjunctivae and lids are normal.   R eye with deformity/ ptosis/ blindness   Neck: Trachea normal, normal range of motion and full passive range of motion without pain. Neck supple. No JVD present. No thyroid mass present.   Cardiovascular: Normal rate, regular rhythm, S1 normal, S2 normal and normal heart sounds.    Pulmonary/Chest: Effort normal and breath sounds normal.   Abdominal: Soft. Normal appearance and bowel sounds are normal. He exhibits no distension. There is no tenderness.   Musculoskeletal:     Laboratory:  Lab Results   Component Value Date    WBC 8.50 05/26/2017    HGB 11.4 (L) 05/26/2017    HCT 34.6 (L) 05/26/2017    MCV 91 05/26/2017     (L) 05/26/2017     BMP  Lab Results   Component Value Date     05/27/2017    K 4.3 05/27/2017     05/27/2017    CO2 23 05/27/2017    BUN 37 (H) 05/27/2017    CREATININE 8.6 (H) 05/27/2017    CALCIUM 8.0 (L) 05/27/2017    ANIONGAP 15 05/27/2017    ESTGFRAFRICA 7 (A) 05/27/2017    EGFRNONAA 6 (A) 05/27/2017         ASSESSMENT/PLAN:     Seizure disorder     Chronic. Last seizure about 3 weeks ago. Continue home seizure meds.           Noncompliance with renal dialysis     Encourage compliance with routine dialysis.           Benign hypertension with ESRD (end-stage renal disease)     Chronic. Uncontrolled. Resume home meds.           Methadone dependence     Chronic. Continue daily methadone at home dose.           Chronic hepatitis C without hepatic coma     Chronic. Stable.           Blindness of right eye     Chronic. Stable. Routine eye care.           ESRD (T,Th,Sat) dialysis onset 2013     Chronic. Consult nephrology for routine dialysis.            Coronary artery disease involving native coronary artery of native heart without angina pectoris     Chronic. Stable. Continue statin and antiplatelets.           * Hypertensive urgency     Associated with abd pain and nausea.   2/2 non compliance with medical treatment. Resume home meds. Will adjust meds prn.                    VTE Risk Mitigation          Ordered       Medium Risk of VTE  Once       05/26/17 1955       Place sequential compression device  Until discontinued       05/26/17 1955       Place ZACHARY hose  Until discontinued       05/26/17 1955           Portions of this note were created using Dragon voice recognition software. There may be voice recognition errors found in the text, and attempts were made to correct these errors prior to signature    Dario Seymour MD    Family Medicine  5/27/2017

## 2017-05-27 NOTE — ASSESSMENT & PLAN NOTE
Associated with abd pain and nausea.   2/2 non compliance with medical treatment. Resume home meds. Will adjust meds prn.

## 2017-05-27 NOTE — PROGRESS NOTES
Famotidine therapy for João Aguirre [id number] has been evaluated according to the pharmacy practice protocol.      Based on the patient's Estimated Creatinine Clearance: 10.1 mL/min (based on Cr of 10.5)., famotidine therapy has been adjusted to 20 mg once daily.    Thank you,  Pawan Gordon, PharmD

## 2017-05-27 NOTE — PLAN OF CARE
05/27/17 1727   Discharge Assessment   Assessment Type Discharge Planning Assessment   Confirmed/corrected address and phone number on facesheet? Yes   Assessment information obtained from? Patient   Communicated expected length of stay with patient/caregiver no   Prior to hospitilization cognitive status: Alert/Oriented;No Deficits   Prior to hospitalization functional status: Independent   Current cognitive status: Alert/Oriented;No Deficits   Current Functional Status: Independent   Arrived From home or self-care   Lives With child(suni), dependent   Able to Return to Prior Arrangements yes   Is patient able to care for self after discharge? Yes   Patient's perception of discharge disposition home or selfcare   Patient currently being followed by outpatient case management? No   Patient currently receives home health services? No   Does the patient currently use HME? No   Patient currently receives private duty nursing? No   Equipment Currently Used at Home none   Do you have any problems affording any of your prescribed medications? Yes   Is the patient taking medications as prescribed? yes   Do you have any financial concerns preventing you from receiving the healthcare you need? No   Does the patient have transportation to healthcare appointments? Yes   Transportation Available car   On Dialysis? Yes   If yes, what is the name of the dialysis unit? [facility]   Does the patient receive outpatient dialysis? Yes   Does the patient receive services at the [facility]? No   Discharge Plan A Home with family   Patient/Family In Agreement With Plan yes        met with patient in his room in order to complete assessment.  Pt reported that he is  from his wife.  He lives with his 17 year old son in [place], [place].  Pt plans to return home with his son and continue his T, TH, Sat dialysis at [facility].

## 2017-05-28 VITALS
OXYGEN SATURATION: 98 % | WEIGHT: 211.19 LBS | RESPIRATION RATE: 18 BRPM | BODY MASS INDEX: 28.6 KG/M2 | HEART RATE: 52 BPM | TEMPERATURE: 98 F | HEIGHT: 72 IN | DIASTOLIC BLOOD PRESSURE: 73 MMHG | SYSTOLIC BLOOD PRESSURE: 152 MMHG

## 2017-05-28 LAB
ANION GAP SERPL CALC-SCNC: 17 MMOL/L
BUN SERPL-MCNC: 43 MG/DL
CALCIUM SERPL-MCNC: 9.4 MG/DL
CHLORIDE SERPL-SCNC: 98 MMOL/L
CO2 SERPL-SCNC: 23 MMOL/L
CREAT SERPL-MCNC: 8.6 MG/DL
EST. GFR  (AFRICAN AMERICAN): 7 ML/MIN/1.73 M^2
EST. GFR  (NON AFRICAN AMERICAN): 6 ML/MIN/1.73 M^2
GLUCOSE SERPL-MCNC: 102 MG/DL
MAGNESIUM SERPL-MCNC: 2.4 MG/DL
PHOSPHATE SERPL-MCNC: 7 MG/DL
POCT GLUCOSE: 110 MG/DL (ref 70–110)
POTASSIUM SERPL-SCNC: 4.5 MMOL/L
SODIUM SERPL-SCNC: 138 MMOL/L

## 2017-05-28 PROCEDURE — G0378 HOSPITAL OBSERVATION PER HR: HCPCS

## 2017-05-28 PROCEDURE — 25000003 PHARM REV CODE 250: Performed by: INTERNAL MEDICINE

## 2017-05-28 PROCEDURE — 36415 COLL VENOUS BLD VENIPUNCTURE: CPT

## 2017-05-28 PROCEDURE — 25000003 PHARM REV CODE 250: Performed by: FAMILY MEDICINE

## 2017-05-28 PROCEDURE — 94761 N-INVAS EAR/PLS OXIMETRY MLT: CPT

## 2017-05-28 PROCEDURE — 94760 N-INVAS EAR/PLS OXIMETRY 1: CPT

## 2017-05-28 PROCEDURE — 83735 ASSAY OF MAGNESIUM: CPT

## 2017-05-28 PROCEDURE — 80048 BASIC METABOLIC PNL TOTAL CA: CPT

## 2017-05-28 PROCEDURE — 84100 ASSAY OF PHOSPHORUS: CPT

## 2017-05-28 PROCEDURE — 63600175 PHARM REV CODE 636 W HCPCS: Performed by: NURSE PRACTITIONER

## 2017-05-28 RX ORDER — HYDROXYZINE PAMOATE 25 MG/1
25 CAPSULE ORAL ONCE
Status: COMPLETED | OUTPATIENT
Start: 2017-05-28 | End: 2017-05-28

## 2017-05-28 RX ORDER — LEVETIRACETAM 500 MG/1
500 TABLET ORAL 2 TIMES DAILY
Qty: 60 TABLET | Refills: 0 | Status: SHIPPED | OUTPATIENT
Start: 2017-05-28 | End: 2017-08-11 | Stop reason: SDUPTHER

## 2017-05-28 RX ADMIN — PANTOPRAZOLE SODIUM 40 MG: 40 TABLET, DELAYED RELEASE ORAL at 08:05

## 2017-05-28 RX ADMIN — HYDROXYZINE PAMOATE 25 MG: 25 CAPSULE ORAL at 03:05

## 2017-05-28 RX ADMIN — HYDROCODONE BITARTRATE AND ACETAMINOPHEN 1 TABLET: 10; 325 TABLET ORAL at 02:05

## 2017-05-28 RX ADMIN — LEVETIRACETAM 500 MG: 500 TABLET ORAL at 08:05

## 2017-05-28 RX ADMIN — METHADONE HYDROCHLORIDE 60 MG: 10 TABLET ORAL at 08:05

## 2017-05-28 RX ADMIN — CALCIUM ACETATE 2001 MG: 667 CAPSULE ORAL at 08:05

## 2017-05-28 RX ADMIN — SODIUM CHLORIDE, PRESERVATIVE FREE 3 ML: 5 INJECTION INTRAVENOUS at 05:05

## 2017-05-28 RX ADMIN — HYDRALAZINE HYDROCHLORIDE 10 MG: 20 INJECTION INTRAMUSCULAR; INTRAVENOUS at 06:05

## 2017-05-28 RX ADMIN — HYDRALAZINE HYDROCHLORIDE 50 MG: 25 TABLET, FILM COATED ORAL at 05:05

## 2017-05-28 RX ADMIN — CLOPIDOGREL BISULFATE 75 MG: 75 TABLET ORAL at 08:05

## 2017-05-28 RX ADMIN — AMLODIPINE BESYLATE 5 MG: 5 TABLET ORAL at 08:05

## 2017-05-28 RX ADMIN — MINOXIDIL 7.5 MG: 2.5 TABLET ORAL at 08:05

## 2017-05-28 RX ADMIN — SODIUM CHLORIDE, PRESERVATIVE FREE 3 ML: 5 INJECTION INTRAVENOUS at 03:05

## 2017-05-28 RX ADMIN — ASPIRIN 325 MG ORAL TABLET 325 MG: 325 PILL ORAL at 08:05

## 2017-05-28 RX ADMIN — LISINOPRIL 40 MG: 40 TABLET ORAL at 08:05

## 2017-05-28 RX ADMIN — RAMELTEON 8 MG: 8 TABLET, FILM COATED ORAL at 02:05

## 2017-05-28 RX ADMIN — CARVEDILOL 12.5 MG: 6.25 TABLET, FILM COATED ORAL at 08:05

## 2017-05-28 NOTE — PLAN OF CARE
Problem: Hypertensive Disease/Crisis (Arterial) (Adult)  Intervention: Gradually Decrease Blood Pressure  BP meds given as ordered and PRN. Pt BP improving slightly.       Problem: Diabetes, Type 2 (Adult)  Intervention: Optimize Glycemic Control  Pt BG has remained WDL this shift

## 2017-05-28 NOTE — [facility] SECONDARY REVIEW
Physician Advisor External    Level of Care Issue    Per Dr. Rivero at [facility], pt is OP appropriate for 5/27/2017

## 2017-05-28 NOTE — DISCHARGE SUMMARY
"[facility] Medicine  Discharge Summary      Patient Name: João Aguirre  MRN: [id number]  Admission Date: 5/26/2017  Hospital Length of Stay: 2 days  Discharge Date and Time: No discharge date for patient encounter.  Attending Physician: Dario Seymour MD   Discharging Provider: Dario Seymour MD  Primary Care Provider: Primary Doctor No      HPI:   Pt transferred from outside ED for hypertensive emergency. He reported to the ED with complaints of dizziness and being "jumpy". He did not go to dialysis yesterday. He says he is compliant with his home medications daily. He has nausea and abd pain. He does smoke marijuana regularly. He denies tobacco and EtoH use. He is a poor historian.     At outside ED he received clonidine and IV hydralazine x2. Documentation states the patient is non compliant with htn meds and dialysis.     [facility] reviewed and pt has PMHx of HTN, ESRD on HD, Hep C, methadone dependence, CVA and recent seizure.     * No surgery found *      Indwelling Lines/Drains at time of discharge:   Lines/Drains/Airways     Drain                 Hemodialysis AV Fistula Left forearm [id number] days              Hospital Course:   Patient dialyze while inpatient.  Did well over short course of hospitalization.  Had improvement in "jumpy" feeling.  Near baseline at discharge.  Patient examined by me prior to discharge with unlabored breathing and no acute distress.  Deemed appropriate for discharge to home.     Consults:   Consults         Status Ordering Provider     Nephrology  Once     Provider:  Tej Carranza MD    Acknowledged PINA WALLER          Significant Diagnostic Studies: Labs:   BMP:   Recent Labs  Lab 05/26/17  1853 05/27/17  0328 05/28/17  0436   GLU 81 84 102    141 138   K 4.2 4.3 4.5    103 98   CO2 21* 23 23   BUN 51* 37* 43*   CREATININE 10.5* 8.6* 8.6*   CALCIUM 7.3* 8.0* 9.4   MG  --  2.4 2.4    and CBC   Recent Labs  Lab " 05/26/17 1853   WBC 8.50   HGB 11.4*   HCT 34.6*   *       Pending Diagnostic Studies:     Procedure Component Value Units Date/Time    Hepatitis B surface antibody [[id number]] Collected:  05/26/17 1853    Order Status:  Sent Lab Status:  In process Updated:  05/27/17 0037    Specimen:  Blood from Blood     Hepatitis B surface antigen [[id number]] Collected:  05/26/17 1853    Order Status:  Sent Lab Status:  In process Updated:  05/27/17 0037    Specimen:  Blood from Blood         Final Active Diagnoses:    Diagnosis Date Noted POA    PRINCIPAL PROBLEM:  Hypertensive urgency [I16.0] 07/28/2016 Yes    Hypertensive emergency [I16.1] 05/27/2017 Yes    Seizure disorder [G40.909] 04/02/2017 Yes    Benign hypertension with ESRD (end-stage renal disease) [I12.0, N18.6] 03/01/2017 Yes    Noncompliance with renal dialysis [Z91.15] 03/01/2017 Not Applicable    Chronic hepatitis C without hepatic coma [B18.2] 02/21/2017 Yes     Chronic    Methadone dependence [F11.20] 02/21/2017 Yes     Chronic    Blindness of right eye [H54.41] 08/30/2015 Yes    Coronary artery disease involving native coronary artery of native heart without angina pectoris [I25.10] 08/29/2015 Yes     Chronic    ESRD (T,Th,Sat) dialysis onset 2013 [N18.6] 08/29/2015 Yes     Chronic      Problems Resolved During this Admission:    Diagnosis Date Noted Date Resolved POA      No new Assessment & Plan notes have been filed under this hospital service since the last note was generated.  Service: Hospital Medicine      Discharged Condition: good    Disposition: Home or Self Care    Follow Up:  Follow-up Information     Primary Doctor No. Call in 1 week.               Patient Instructions:     Diet renal     Activity as tolerated     Call MD for:  temperature >100.4     Call MD for:  redness, tenderness, or signs of infection (pain, swelling, redness, odor or green/yellow discharge around incision site)     Call MD for:  severe uncontrolled pain      Call MD for:  persistent nausea and vomiting or diarrhea       Medications:  Reconciled Home Medications:   Current Discharge Medication List      CONTINUE these medications which have NOT CHANGED    Details   promethazine (PHENERGAN) 25 MG tablet Take 25 mg by mouth 2 (two) times daily as needed for Nausea. 30 distributed 5/13/2017      albuterol (PROAIR HFA) 90 mcg/actuation inhaler INHALE TWO PUFFS EVERY 4 TO 6 HOURS AS NEEDED      amlodipine (NORVASC) 5 MG tablet Take 1 tablet (5 mg total) by mouth 2 (two) times daily.  Qty: 30 tablet, Refills: 0      aspirin 325 MG tablet Take 1 tablet (325 mg total) by mouth once daily.  Qty: 30 tablet, Refills: 1      atorvastatin (LIPITOR) 40 MG tablet Take 1 tablet (40 mg total) by mouth every evening.  Qty: 30 tablet, Refills: 1      blood-glucose meter (PHARMACIST CHOICE GLUCOSE SYS) Misc 1 Device by Misc.(Non-Drug; Combo Route) route once.  Qty: 1 each, Refills: 0      calcium acetate (PHOSLO) 667 mg capsule Take 2,001 mg by mouth 3 (three) times daily with meals.      carvedilol (COREG) 12.5 MG tablet Take 1 tablet (12.5 mg total) by mouth 2 (two) times daily.  Qty: 60 tablet, Refills: 0      clopidogrel (PLAVIX) 75 mg tablet Take 1 tablet (75 mg total) by mouth once daily.  Qty: 30 tablet, Refills: 1      famotidine (PEPCID) 20 MG tablet Take 1 tablet (20 mg total) by mouth once daily.  Qty: 30 tablet, Refills: 1      hydrALAZINE (APRESOLINE) 25 MG tablet Take 2 tablets (50 mg total) by mouth 3 (three) times daily.  Qty: 60 tablet, Refills: 2      levetiracetam (KEPPRA) 500 MG Tab Take 500 mg twice a day.  Take an extra tablet right after dialysis (making 3 tablets on your dialysis days)  Qty: 70 tablet, Refills: 2      lisinopril (PRINIVIL,ZESTRIL) 40 MG tablet Take 1 tablet (40 mg total) by mouth once daily.  Qty: 30 tablet, Refills: 1      methadone (DOLOPHINE) 10 MG tablet Take 6 tablets (60 mg total) by mouth once daily.  Refills: 0      minoxidil (LONITEN)  2.5 MG tablet Take 3 tablets (7.5 mg total) by mouth 2 (two) times daily.  Qty: 90 tablet, Refills: 0      ondansetron (ZOFRAN) 4 MG tablet Take 1 tablet (4 mg total) by mouth every 8 (eight) hours as needed for Nausea.  Qty: 12 tablet, Refills: 0           Time spent on the discharge of patient: 35 minutes    Dario Seymour MD  Department of Hospital Medicine  [facility]

## 2017-05-28 NOTE — NURSING
Notified KG Mojica NP of pt high BP. Ordered to give nightly BP meds early and recheck pt one hour later. NP will place PRN BP orders for use if needed at recheck/

## 2017-05-28 NOTE — PLAN OF CARE
05/28/17 1223   Final Note   Assessment Type Final Discharge Note   Discharge Disposition Home   Discharge planning education complete? Yes

## 2017-05-28 NOTE — NURSING
Dr vazquez notified of pt agitated behavior and elevated BP. New orders placed. OK to give IV hydralizine early.

## 2017-05-28 NOTE — PROGRESS NOTES
05/28/17 0745   Patient Assessment/Suction   Level of Consciousness (AVPU) alert   PRE-TX-O2-ETCO2   O2 Device (Oxygen Therapy) room air   SpO2 95 %   Pulse Oximetry Type Intermittent   $ Pulse Oximetry - Multiple Charge Pulse Oximetry - Multiple

## 2017-05-28 NOTE — NURSING
Notified Dr Marie of pt 205/93 BP and intention to give PRN IV hydralzine. Informed Dr Marie of previous BP medications given this shift. No new orders placed. Will continue to monitor.

## 2017-05-28 NOTE — HOSPITAL COURSE
"Patient dialyze while inpatient.  Did well over short course of hospitalization.  Had improvement in "jumpy" feeling.  Near baseline at discharge.  Patient examined by me prior to discharge with unlabored breathing and no acute distress.  Deemed appropriate for discharge to home.  "

## 2017-05-28 NOTE — UM SECONDARY REVIEW
Condition Code 44 completed, signed by attending, discussed with patient.  HARVEY delivered to patient, signed by patient.  Patient verbalized understanding and denied any questions at this time.

## 2017-05-29 LAB
ALBUMIN SERPL BCP-MCNC: 3.6 G/DL
ANION GAP SERPL CALC-SCNC: 18 MMOL/L
BASOPHILS # BLD AUTO: 0 K/UL
BASOPHILS NFR BLD: 0.2 %
BUN SERPL-MCNC: 51 MG/DL
CALCIUM SERPL-MCNC: 7.3 MG/DL
CHLORIDE SERPL-SCNC: 103 MMOL/L
CO2 SERPL-SCNC: 21 MMOL/L
CREAT SERPL-MCNC: 10.5 MG/DL
DIFFERENTIAL METHOD: ABNORMAL
EOSINOPHIL # BLD AUTO: 0.2 K/UL
EOSINOPHIL NFR BLD: 1.8 %
ERYTHROCYTE [DISTWIDTH] IN BLOOD BY AUTOMATED COUNT: 16 %
EST. GFR  (AFRICAN AMERICAN): 6 ML/MIN/1.73 M^2
EST. GFR  (NON AFRICAN AMERICAN): 5 ML/MIN/1.73 M^2
GLUCOSE SERPL-MCNC: 81 MG/DL
HBV SURFACE AB SER-ACNC: NEGATIVE M[IU]/ML
HBV SURFACE AG SERPL QL IA: NEGATIVE
HCT VFR BLD AUTO: 34.6 %
HGB BLD-MCNC: 11.4 G/DL
LYMPHOCYTES # BLD AUTO: 0.9 K/UL
LYMPHOCYTES NFR BLD: 10.7 %
MCH RBC QN AUTO: 29.8 PG
MCHC RBC AUTO-ENTMCNC: 32.8 %
MCV RBC AUTO: 91 FL
MONOCYTES # BLD AUTO: 0.5 K/UL
MONOCYTES NFR BLD: 5.7 %
NEUTROPHILS # BLD AUTO: 7 K/UL
NEUTROPHILS NFR BLD: 81.6 %
PHOSPHATE SERPL-MCNC: 8.8 MG/DL
PLATELET # BLD AUTO: 144 K/UL
PMV BLD AUTO: 6.8 FL
POTASSIUM SERPL-SCNC: 4.2 MMOL/L
RBC # BLD AUTO: 3.81 M/UL
SODIUM SERPL-SCNC: 142 MMOL/L
WBC # BLD AUTO: 8.5 K/UL

## 2017-06-05 PROBLEM — J81.0 ACUTE PULMONARY EDEMA: Status: RESOLVED | Noted: 2017-03-01 | Resolved: 2017-06-05

## 2017-08-11 ENCOUNTER — HOSPITAL ENCOUNTER (EMERGENCY)
Facility: HOSPITAL | Age: 52
Discharge: HOME | End: 2017-08-11
Attending: EMERGENCY MEDICINE
Payer: MEDICARE

## 2017-08-11 VITALS
BODY MASS INDEX: 28.4 KG/M2 | RESPIRATION RATE: 16 BRPM | TEMPERATURE: 98 F | DIASTOLIC BLOOD PRESSURE: 84 MMHG | OXYGEN SATURATION: 99 % | HEART RATE: 65 BPM | SYSTOLIC BLOOD PRESSURE: 174 MMHG | WEIGHT: 209.44 LBS

## 2017-08-11 DIAGNOSIS — M79.673 FOOT PAIN: ICD-10-CM

## 2017-08-11 DIAGNOSIS — L03.90 CELLULITIS, UNSPECIFIED CELLULITIS SITE: Primary | ICD-10-CM

## 2017-08-11 LAB
ANION GAP SERPL CALC-SCNC: 17 MMOL/L
BASOPHILS # BLD AUTO: 0 K/UL
BASOPHILS NFR BLD: 0.4 %
BUN SERPL-MCNC: 54 MG/DL
CALCIUM SERPL-MCNC: 8.3 MG/DL
CHLORIDE SERPL-SCNC: 100 MMOL/L
CO2 SERPL-SCNC: 25 MMOL/L
CREAT SERPL-MCNC: 10.5 MG/DL
CRP SERPL-MCNC: 13.4 MG/L
DIFFERENTIAL METHOD: ABNORMAL
EOSINOPHIL # BLD AUTO: 0.3 K/UL
EOSINOPHIL NFR BLD: 4 %
ERYTHROCYTE [DISTWIDTH] IN BLOOD BY AUTOMATED COUNT: 16.4 %
EST. GFR  (AFRICAN AMERICAN): 6 ML/MIN/1.73 M^2
EST. GFR  (NON AFRICAN AMERICAN): 5 ML/MIN/1.73 M^2
GLUCOSE SERPL-MCNC: 68 MG/DL
HCT VFR BLD AUTO: 27.6 %
HGB BLD-MCNC: 9.1 G/DL
LYMPHOCYTES # BLD AUTO: 1.6 K/UL
LYMPHOCYTES NFR BLD: 24.5 %
MCH RBC QN AUTO: 29.9 PG
MCHC RBC AUTO-ENTMCNC: 33.1 G/DL
MCV RBC AUTO: 90 FL
MONOCYTES # BLD AUTO: 0.6 K/UL
MONOCYTES NFR BLD: 9.3 %
NEUTROPHILS # BLD AUTO: 4.2 K/UL
NEUTROPHILS NFR BLD: 61.8 %
PLATELET # BLD AUTO: 188 K/UL
PMV BLD AUTO: 6.5 FL
POCT GLUCOSE: 75 MG/DL (ref 70–110)
POTASSIUM SERPL-SCNC: 5.4 MMOL/L
RBC # BLD AUTO: 3.06 M/UL
SODIUM SERPL-SCNC: 142 MMOL/L
WBC # BLD AUTO: 6.7 K/UL

## 2017-08-11 PROCEDURE — 25000003 PHARM REV CODE 250: Performed by: EMERGENCY MEDICINE

## 2017-08-11 PROCEDURE — 80048 BASIC METABOLIC PNL TOTAL CA: CPT

## 2017-08-11 PROCEDURE — 82962 GLUCOSE BLOOD TEST: CPT

## 2017-08-11 PROCEDURE — 85025 COMPLETE CBC W/AUTO DIFF WBC: CPT

## 2017-08-11 PROCEDURE — 86140 C-REACTIVE PROTEIN: CPT

## 2017-08-11 PROCEDURE — S0077 INJECTION, CLINDAMYCIN PHOSP: HCPCS | Performed by: EMERGENCY MEDICINE

## 2017-08-11 PROCEDURE — 36415 COLL VENOUS BLD VENIPUNCTURE: CPT

## 2017-08-11 PROCEDURE — 99284 EMERGENCY DEPT VISIT MOD MDM: CPT | Mod: 25

## 2017-08-11 PROCEDURE — 96372 THER/PROPH/DIAG INJ SC/IM: CPT

## 2017-08-11 RX ORDER — LEVETIRACETAM 500 MG/1
500 TABLET ORAL
Status: COMPLETED | OUTPATIENT
Start: 2017-08-11 | End: 2017-08-11

## 2017-08-11 RX ORDER — MINOXIDIL 2.5 MG/1
7.5 TABLET ORAL
Status: COMPLETED | OUTPATIENT
Start: 2017-08-11 | End: 2017-08-11

## 2017-08-11 RX ORDER — CLINDAMYCIN PHOSPHATE 150 MG/ML
600 INJECTION, SOLUTION INTRAVENOUS
Status: COMPLETED | OUTPATIENT
Start: 2017-08-11 | End: 2017-08-11

## 2017-08-11 RX ORDER — HYDRALAZINE HYDROCHLORIDE 25 MG/1
50 TABLET, FILM COATED ORAL
Status: COMPLETED | OUTPATIENT
Start: 2017-08-11 | End: 2017-08-11

## 2017-08-11 RX ORDER — CARVEDILOL 6.25 MG/1
12.5 TABLET ORAL
Status: COMPLETED | OUTPATIENT
Start: 2017-08-11 | End: 2017-08-11

## 2017-08-11 RX ORDER — HYDRALAZINE HYDROCHLORIDE 50 MG/1
50 TABLET, FILM COATED ORAL 3 TIMES DAILY
COMMUNITY
End: 2019-01-01 | Stop reason: CLARIF

## 2017-08-11 RX ORDER — LEVETIRACETAM 500 MG/1
TABLET ORAL
Qty: 70 TABLET | Refills: 2 | Status: ON HOLD | OUTPATIENT
Start: 2017-08-11 | End: 2020-01-01 | Stop reason: HOSPADM

## 2017-08-11 RX ORDER — AMLODIPINE BESYLATE 5 MG/1
5 TABLET ORAL 2 TIMES DAILY
Status: ON HOLD | COMMUNITY
End: 2020-01-01

## 2017-08-11 RX ORDER — CLINDAMYCIN HYDROCHLORIDE 150 MG/1
300 CAPSULE ORAL 4 TIMES DAILY
Qty: 56 CAPSULE | Refills: 0 | Status: SHIPPED | OUTPATIENT
Start: 2017-08-11 | End: 2017-08-18

## 2017-08-11 RX ORDER — LISINOPRIL 10 MG/1
40 TABLET ORAL
Status: COMPLETED | OUTPATIENT
Start: 2017-08-11 | End: 2017-08-11

## 2017-08-11 RX ADMIN — HYDRALAZINE HYDROCHLORIDE 50 MG: 25 TABLET, FILM COATED ORAL at 05:08

## 2017-08-11 RX ADMIN — LISINOPRIL 40 MG: 10 TABLET ORAL at 05:08

## 2017-08-11 RX ADMIN — LEVETIRACETAM 500 MG: 500 TABLET ORAL at 05:08

## 2017-08-11 RX ADMIN — CARVEDILOL 12.5 MG: 6.25 TABLET ORAL at 05:08

## 2017-08-11 RX ADMIN — CLINDAMYCIN PHOSPHATE 600 MG: 150 INJECTION, SOLUTION INTRAMUSCULAR; INTRAVENOUS at 06:08

## 2017-08-11 RX ADMIN — MINOXIDIL 7.5 MG: 2.5 TABLET ORAL at 05:08

## 2017-08-11 NOTE — ED PROVIDER NOTES
Encounter Date: 8/11/2017    SCRIBE #1 NOTE: I, Sunni Byrd, am scribing for, and in the presence of, Dr. Bui.       History     Chief Complaint   Patient presents with    Cellulitis     rt. foot /  missed dialysis thursday 08/11/2017 4:52 PM     Chief Complaint: Right foot swelling      João Aguirre is a 52 y.o. male who presents to the ED with complaint of worsening right foot swelling with associated redness x 5 days. Pt reports history of HTN and diabetes and noncompliance with all medications for two days due to transportation issues. On arrival in the ED, the pt's blood pressure was noted to be 231/98. Pt also endorses intermittent SOB. The pt reports two seizures a few months ago and states he is out of the prescription for Keppra. PMHx includes: renal disorder (on dialysis), DM, HTN, and arthritis.      The history is provided by the patient.     Review of patient's allergies indicates:   Allergen Reactions    Antibiotic hc      Past Medical History:   Diagnosis Date    Anticoagulant long-term use     Arthritis     Asthma     Back pain     Diabetes mellitus     Encounter for blood transfusion     Eye abnormality right eye    injured as a child    Gastritis     Gastroparesis     Hemodialysis patient     Hypertension     Pneumonia 2013    Spend 6weeks in hospital at [place]    Renal disorder     Stroke      Past Surgical History:   Procedure Laterality Date    AV FISTULA PLACEMENT      BACK SURGERY      CHOLECYSTECTOMY      EYE SURGERY      R eye     Family History   Problem Relation Age of Onset    Kidney disease Brother      Social History   Substance Use Topics    Smoking status: Former Smoker     Years: 10.00     Quit date: 9/1/2015    Smokeless tobacco: Never Used    Alcohol use No     Review of Systems   Constitutional: Negative for activity change, appetite change, chills, fatigue and fever.   Eyes: Negative for visual disturbance.   Respiratory: Positive  for shortness of breath. Negative for apnea.    Cardiovascular: Negative for chest pain and palpitations.   Gastrointestinal: Negative for abdominal distention and abdominal pain.   Genitourinary: Negative for difficulty urinating.   Musculoskeletal: Negative for neck pain.   Skin: Positive for color change (R foot redness and swelling). Negative for pallor and rash.   Neurological: Negative for headaches.   Hematological: Does not bruise/bleed easily.   Psychiatric/Behavioral: Negative for agitation.       Physical Exam     Initial Vitals [08/11/17 1646]   BP Pulse Resp Temp SpO2   (!) 231/98 62 18 98.1 °F (36.7 °C) 95 %      MAP       142.33         Physical Exam    Nursing note and vitals reviewed.  Constitutional: He appears well-developed and well-nourished.   HENT:   Head: Normocephalic and atraumatic.   Eyes: Conjunctivae are normal.   Neck: Normal range of motion. Neck supple.   Cardiovascular: Normal rate, regular rhythm and normal heart sounds.   No murmur heard.  Pulmonary/Chest: No respiratory distress. He has no wheezes. He has no rhonchi. He has no rales.   Abdominal: Soft. There is no tenderness.   Musculoskeletal: Normal range of motion.   Neurological: He is alert and oriented to person, place, and time.   Skin: There is erythema.   3 cm ulceration on the plantar surface of the right second metatarsal head. There is swelling over the R ankle and R foot with mild erythema and mild calor of the R foot.   Psychiatric: He has a normal mood and affect.         ED Course   Procedures  Labs Reviewed   CBC W/ AUTO DIFFERENTIAL   BASIC METABOLIC PANEL   C-REACTIVE PROTEIN   POCT GLUCOSE MONITORING CONTINUOUS             Medical Decision Making:   History:   Old Medical Records: I decided to obtain old medical records.  Independently Interpreted Test(s):   I have ordered and independently interpreted X-rays - see summary below.       <> Summary of X-Ray Reading(s): Right foot x-rays independently interpreted  by me demonstrates osteoarthritis with no periosteal elevation to suggest osteomyelitis  Clinical Tests:   Lab Tests: Ordered and Reviewed  Radiological Study: Ordered and Reviewed  ED Management:  João Aguirre is a 52 y.o. male who presents with  pain and swelling to the right foot.  He has a history of diabetes but currently has a normal glucose.  He has no evidence of systemic infection with no significant elevation of CRP or CBC making outpatient oral antibiotics (clindamycin 300 mg every 6 hours) a reasonable option.  He is encouraged to return immediately for worsening symptoms.  He missed dialysis yesterday and is scheduled for dialysis tomorrow.  He does not appear volume overloaded and does not have any evidence of metabolic acidosis or hyperkalemia.            Scribe Attestation:   Scribe #1: I performed the above scribed service and the documentation accurately describes the services I performed. I attest to the accuracy of the note.    Attending Attestation:           Physician Attestation for Scribe:  Physician Attestation Statement for Scribe #1: I, Dr. Bui, reviewed documentation, as scribed by Sunni Byrd in my presence, and it is both accurate and complete.                 ED Course     Clinical Impression:   The encounter diagnosis was Foot pain.                           Aron Bui III, MD  08/11/17 2177

## 2017-10-17 ENCOUNTER — HOSPITAL ENCOUNTER (INPATIENT)
Facility: HOSPITAL | Age: 52
LOS: 2 days | Discharge: HOME | DRG: 640 | End: 2017-10-19
Attending: EMERGENCY MEDICINE | Admitting: INTERNAL MEDICINE
Payer: MEDICARE

## 2017-10-17 DIAGNOSIS — R10.9 ABDOMINAL PAIN, UNSPECIFIED ABDOMINAL LOCATION: ICD-10-CM

## 2017-10-17 DIAGNOSIS — E87.5 HYPERKALEMIA: Primary | ICD-10-CM

## 2017-10-17 DIAGNOSIS — N18.6 ESRF (END STAGE RENAL FAILURE): Chronic | ICD-10-CM

## 2017-10-17 DIAGNOSIS — Z86.73 H/O: CVA (CEREBROVASCULAR ACCIDENT): Chronic | ICD-10-CM

## 2017-10-17 LAB
ALBUMIN SERPL BCP-MCNC: 3.8 G/DL
ALP SERPL-CCNC: 130 U/L
ALT SERPL W/O P-5'-P-CCNC: 14 U/L
ANION GAP SERPL CALC-SCNC: 15 MMOL/L
AST SERPL-CCNC: 20 U/L
BASOPHILS # BLD AUTO: 0 K/UL
BASOPHILS NFR BLD: 0.5 %
BILIRUB SERPL-MCNC: 1 MG/DL
BUN SERPL-MCNC: 62 MG/DL
CALCIUM SERPL-MCNC: 8.8 MG/DL
CHLORIDE SERPL-SCNC: 102 MMOL/L
CO2 SERPL-SCNC: 23 MMOL/L
CREAT SERPL-MCNC: 9.7 MG/DL
DIFFERENTIAL METHOD: ABNORMAL
EOSINOPHIL # BLD AUTO: 0.1 K/UL
EOSINOPHIL NFR BLD: 1.4 %
ERYTHROCYTE [DISTWIDTH] IN BLOOD BY AUTOMATED COUNT: 16.8 %
EST. GFR  (AFRICAN AMERICAN): 6 ML/MIN/1.73 M^2
EST. GFR  (NON AFRICAN AMERICAN): 6 ML/MIN/1.73 M^2
GLUCOSE SERPL-MCNC: 71 MG/DL
HCT VFR BLD AUTO: 38.9 %
HGB BLD-MCNC: 12.9 G/DL
LIPASE SERPL-CCNC: 27 U/L
LYMPHOCYTES # BLD AUTO: 1 K/UL
LYMPHOCYTES NFR BLD: 13.6 %
MCH RBC QN AUTO: 30.3 PG
MCHC RBC AUTO-ENTMCNC: 33.1 G/DL
MCV RBC AUTO: 92 FL
MONOCYTES # BLD AUTO: 0.2 K/UL
MONOCYTES NFR BLD: 3.4 %
NEUTROPHILS # BLD AUTO: 5.9 K/UL
NEUTROPHILS NFR BLD: 81.1 %
PLATELET # BLD AUTO: 182 K/UL
PMV BLD AUTO: 6.5 FL
POCT GLUCOSE: 125 MG/DL (ref 70–110)
POCT GLUCOSE: 40 MG/DL (ref 70–110)
POCT GLUCOSE: 73 MG/DL (ref 70–110)
POTASSIUM SERPL-SCNC: 6.7 MMOL/L
PROT SERPL-MCNC: 8.8 G/DL
RBC # BLD AUTO: 4.25 M/UL
SODIUM SERPL-SCNC: 140 MMOL/L
WBC # BLD AUTO: 7.3 K/UL

## 2017-10-17 PROCEDURE — 96372 THER/PROPH/DIAG INJ SC/IM: CPT

## 2017-10-17 PROCEDURE — 63600175 PHARM REV CODE 636 W HCPCS: Performed by: INTERNAL MEDICINE

## 2017-10-17 PROCEDURE — 99223 1ST HOSP IP/OBS HIGH 75: CPT | Mod: AI,,, | Performed by: INTERNAL MEDICINE

## 2017-10-17 PROCEDURE — 80053 COMPREHEN METABOLIC PANEL: CPT

## 2017-10-17 PROCEDURE — 63600175 PHARM REV CODE 636 W HCPCS: Performed by: EMERGENCY MEDICINE

## 2017-10-17 PROCEDURE — 36415 COLL VENOUS BLD VENIPUNCTURE: CPT

## 2017-10-17 PROCEDURE — 96376 TX/PRO/DX INJ SAME DRUG ADON: CPT

## 2017-10-17 PROCEDURE — 25000003 PHARM REV CODE 250: Performed by: EMERGENCY MEDICINE

## 2017-10-17 PROCEDURE — 93005 ELECTROCARDIOGRAM TRACING: CPT

## 2017-10-17 PROCEDURE — 96366 THER/PROPH/DIAG IV INF ADDON: CPT

## 2017-10-17 PROCEDURE — 80100016 HC MAINTENANCE HEMODIALYSIS

## 2017-10-17 PROCEDURE — 96375 TX/PRO/DX INJ NEW DRUG ADDON: CPT

## 2017-10-17 PROCEDURE — 82962 GLUCOSE BLOOD TEST: CPT

## 2017-10-17 PROCEDURE — 99285 EMERGENCY DEPT VISIT HI MDM: CPT | Mod: 25

## 2017-10-17 PROCEDURE — 83690 ASSAY OF LIPASE: CPT

## 2017-10-17 PROCEDURE — 85025 COMPLETE CBC W/AUTO DIFF WBC: CPT

## 2017-10-17 PROCEDURE — 96365 THER/PROPH/DIAG IV INF INIT: CPT

## 2017-10-17 PROCEDURE — 11000001 HC ACUTE MED/SURG PRIVATE ROOM

## 2017-10-17 RX ORDER — DEXTROSE 50 % IN WATER (D50W) INTRAVENOUS SYRINGE
Status: DISPENSED
Start: 2017-10-17 | End: 2017-10-18

## 2017-10-17 RX ORDER — DEXTROSE 50 % IN WATER (D50W) INTRAVENOUS SYRINGE
25
Status: COMPLETED | OUTPATIENT
Start: 2017-10-17 | End: 2017-10-17

## 2017-10-17 RX ORDER — CALCIUM GLUCONATE 98 MG/ML
1 INJECTION, SOLUTION INTRAVENOUS
Status: COMPLETED | OUTPATIENT
Start: 2017-10-17 | End: 2017-10-17

## 2017-10-17 RX ORDER — DICYCLOMINE HYDROCHLORIDE 10 MG/ML
20 INJECTION INTRAMUSCULAR
Status: COMPLETED | OUTPATIENT
Start: 2017-10-17 | End: 2017-10-17

## 2017-10-17 RX ORDER — HYDRALAZINE HYDROCHLORIDE 20 MG/ML
10 INJECTION INTRAMUSCULAR; INTRAVENOUS
Status: DISCONTINUED | OUTPATIENT
Start: 2017-10-17 | End: 2017-10-18

## 2017-10-17 RX ORDER — SODIUM CHLORIDE 9 MG/ML
INJECTION, SOLUTION INTRAVENOUS ONCE
Status: CANCELLED | OUTPATIENT
Start: 2017-10-17 | End: 2017-10-17

## 2017-10-17 RX ORDER — SODIUM CHLORIDE 9 MG/ML
INJECTION, SOLUTION INTRAVENOUS
Status: CANCELLED | OUTPATIENT
Start: 2017-10-17

## 2017-10-17 RX ORDER — HEPARIN SODIUM 5000 [USP'U]/ML
5000 INJECTION, SOLUTION INTRAVENOUS; SUBCUTANEOUS EVERY 12 HOURS
Status: DISCONTINUED | OUTPATIENT
Start: 2017-10-17 | End: 2017-10-19 | Stop reason: HOSPADM

## 2017-10-17 RX ADMIN — INSULIN HUMAN 10 UNITS: 100 INJECTION, SOLUTION PARENTERAL at 04:10

## 2017-10-17 RX ADMIN — DEXTROSE MONOHYDRATE 25 G: 25 INJECTION, SOLUTION INTRAVENOUS at 04:10

## 2017-10-17 RX ADMIN — HYDRALAZINE HYDROCHLORIDE 10 MG: 20 INJECTION INTRAMUSCULAR; INTRAVENOUS at 11:10

## 2017-10-17 RX ADMIN — HYDRALAZINE HYDROCHLORIDE 10 MG: 20 INJECTION INTRAMUSCULAR; INTRAVENOUS at 06:10

## 2017-10-17 RX ADMIN — PROMETHAZINE HYDROCHLORIDE 12.5 MG: 25 INJECTION INTRAMUSCULAR; INTRAVENOUS at 04:10

## 2017-10-17 RX ADMIN — HEPARIN SODIUM 5000 UNITS: 5000 INJECTION, SOLUTION INTRAVENOUS; SUBCUTANEOUS at 11:10

## 2017-10-17 RX ADMIN — DICYCLOMINE HYDROCHLORIDE 20 MG: 10 INJECTION INTRAMUSCULAR at 02:10

## 2017-10-17 RX ADMIN — DEXTROSE MONOHYDRATE 25 G: 25 INJECTION, SOLUTION INTRAVENOUS at 05:10

## 2017-10-17 RX ADMIN — PROMETHAZINE HYDROCHLORIDE 12.5 MG: 25 INJECTION INTRAMUSCULAR; INTRAVENOUS at 02:10

## 2017-10-17 RX ADMIN — CALCIUM GLUCONATE 1 G: 94 INJECTION, SOLUTION INTRAVENOUS at 04:10

## 2017-10-17 NOTE — H&P
PCP: Primary Doctor No    History & Physical    Chief Complaint: Headache and diarrhea    History of Present Illness:  Patient is a 52 y.o. male admitted to Hospitalist Service from [facility] with complaint of headache and diarrhea. Patient has PMH significant for ESRD, HTN, chronic pain syndrome, DM-2 and nicotine addiction. Patient gets HD on Mon, Wed and Fri. Patient presented with nausea, vomiting and non-specific abdominal pain for 2 days. He reported that he did not eat anything abnormal prior to the onset of the abdominal pain, and that no one else in his home is experiencing similar symptoms. Taking Zofran does not alleviate the symptoms. The patient has regular weekly dialysis treatments, which he has been on for 4 years, and his last treatment was 3 days ago. He reports that he does not drink alcohol. Patient did not get HD today. Patient denied chest pain, shortness of breath, headache, vision changes, focal neuro-deficits, cough or fever. Upon arrival, BP note dto be 218/93 mmHg.    Past Medical History:   Diagnosis Date    Anticoagulant long-term use     Arthritis     Asthma     Back pain     Diabetes mellitus     Encounter for blood transfusion     Eye abnormality right eye    injured as a child    Gastritis     Gastroparesis     Hemodialysis patient     Hypertension     Pneumonia 2013    Spend 6weeks in hospital at [place]    Renal disorder     Stroke      Past Surgical History:   Procedure Laterality Date    AV FISTULA PLACEMENT      BACK SURGERY      CHOLECYSTECTOMY      EYE SURGERY      R eye     Family History   Problem Relation Age of Onset    Kidney disease Brother      Social History   Substance Use Topics    Smoking status: Former Smoker     Years: 10.00     Quit date: 9/1/2015    Smokeless tobacco: Never Used    Alcohol use No      Review of patient's allergies indicates:   Allergen Reactions    Antibiotic hc        (Not in a hospital  admission)  Review of Systems:  Constitutional: no fever or chills  Eyes: no visual changes  Ears, nose, mouth, throat, and face: no nasal congestion or sore throat  Respiratory: see HPI  Cardiovascular: no chest pain or palpitations  Gastrointestinal: no nausea or vomiting, no abdominal pain or change in bowel habits  Genitourinary: no hematuria or dysuria  Integument/breast: no rash or pruritis  Hematologic/lymphatic: no easy bruising or lymphadenopathy  Musculoskeletal: no arthralgias or myalgias  Neurological: no seizures or tremors.  Behavioral/Psych: no auditory or visual hallucinations  Endocrine: no heat or cold intolerance     OBJECTIVE:     Vital Signs (Most Recent)  Temp: 97.8 °F (36.6 °C) (10/17/17 1254)  Pulse: (!) 54 (10/17/17 1608)  Resp: 20 (10/17/17 1254)  BP: (!) 218/93 (10/17/17 1608)  SpO2: 97 % (10/17/17 1608)    Physical Exam:  General appearance: well developed, appears stated age  Head: normocephalic, atraumatic  Eyes:  conjunctivae/corneas clear. PERRL.  Nose: Nares normal. Septum midline. Devitalized right eye.  Throat: lips, mucosa, and tongue normal; teeth and gums normal, no throat erythema.  Neck: supple, symmetrical, trachea midline, no JVD and thyroid not enlarged, symmetric, no tenderness/mass/nodules  Lungs:  clear to auscultation bilaterally and normal respiratory effort  Chest wall: no tenderness  Heart: regular rate and rhythm, S1, S2 normal, no murmur, click, rub or gallop  Abdomen: soft, mild epigastric tenderness, non-distented; bowel sounds normal; no masses,  no organomegaly  Extremities: no cyanosis, clubbing or edema.   Pulses: 2+ and symmetric  Skin: Skin color, texture, turgor normal. No rashes or lesions.  Lymph nodes: Cervical, supraclavicular, and axillary nodes normal.  Neurologic: Normal strength and tone. No focal numbness or weakness. CNII-XII intact.      Laboratory:   CBC:   Recent Labs  Lab 10/17/17  1439   WBC 7.30   RBC 4.25*   HGB 12.9*   HCT 38.9*       MCV 92   MCH 30.3   MCHC 33.1     CMP:   Recent Labs  Lab 10/17/17  1439   GLU 71   CALCIUM 8.8   ALBUMIN 3.8   PROT 8.8*      K 6.7*   CO2 23      BUN 62*   CREATININE 9.7*   ALKPHOS 130   ALT 14   AST 20   BILITOT 1.0       Hemoglobin A1C   Date Value Ref Range Status   03/01/2017 4.4 (L) 4.5 - 6.2 % Final     Comment:     According to ADA guidelines, hemoglobin A1C <7.0% represents  optimal control in non-pregnant diabetic patients.  Different  metrics may apply to specific populations.   Standards of Medical Care in Diabetes - 2016.  For the purpose of screening for the presence of diabetes:  <5.7%     Consistent with the absence of diabetes  5.7-6.4%  Consistent with increasing risk for diabetes   (prediabetes)  >or=6.5%  Consistent with diabetes  Currently no consensus exists for use of hemoglobin A1C  for diagnosis of diabetes for children.     07/28/2016 4.5 4.5 - 6.2 % Final     Comment:     According to ADA guidelines, hemoglobin A1C <7.0% represents  optimal control in non-pregnant diabetic patients.  Different  metrics may apply to specific populations.   Standards of Medical Care in Diabetes - 2016.  For the purpose of screening for the presence of diabetes:  <5.7%     Consistent with the absence of diabetes  5.7-6.4%  Consistent with increasing risk for diabetes   (prediabetes)  >or=6.5%  Consistent with diabetes  Currently no consensus exists for use of hemoglobin A1C  for diagnosis of diabetes for children.     05/17/2016 4.3 (L) 4.5 - 6.2 % Final     Microbiology Results (last 7 days)     ** No results found for the last 168 hours. **        Diagnostic Results: None    Assessment/Plan:     Hyperkalemia    Needs emergent HD. Already received IV Ca gluconate and D50 ampule with IV Insulin.    Seizure disorder     Chronic. Last seizure about 3 weeks ago. Continue home seizure meds.           Noncompliance with renal dialysis     Encourage compliance with routine dialysis.           Benign  hypertension with ESRD (end-stage renal disease)     Chronic. Uncontrolled. Resume home meds.           Methadone dependence     Chronic. Continue daily methadone at home dose.           Chronic hepatitis C without hepatic coma     Chronic. Stable.           Blindness of right eye     Chronic. Stable. Routine eye care.           ESRD (T,Th,Sat) dialysis onset 2013     Chronic. Consult nephrology for emergent dialysis.           Coronary artery disease involving native coronary artery of native heart without angina pectoris     Chronic. Stable. Continue statin and antiplatelets.           * Hypertensive urgency     Associated with abd pain and nausea.   2/2 non compliance with medical treatment. Resume home meds. Will adjust meds prn.         DVT prophylaxis: Heparin 5K sq q 12 hrs.    Adela Peng MD  Department of Hospital Medicine   [facility]

## 2017-10-17 NOTE — ED PROVIDER NOTES
Encounter Date: 10/17/2017    SCRIBE #1 NOTE: IYohana, am scribing for, and in the presence of, Dr. Lowery.       History     Chief Complaint   Patient presents with    Headache     started Sunday     Diarrhea    Abdominal Pain       10/17/2017 2:25 PM     Chief complaint: Epigastric Abdominal Pain      João Aguirre is a 52 y.o. male with hx of  DM, HTN, Renal Disorder, Gastritis, and Gastroparesiswho presents to the ED complaining of abdominal pain x2 days with associated nausea, vomiting, and diarrhea. He reports that he did not eat anything abnormal prior to the onset of the abdominal pain, and that no one else in his home is experiencing similar symptoms. Taking Zofran does not alleviate the symptoms. The patient has regular weekly dialysis treatments, which he has been on for 4 years, and his last treatment was 3 days ago. He reports that he does not drink alcohol. The patient denies fever, hematemesis, hematochezia, or any other symptoms at this time. He has a surgical history of cholecystectomy.      The history is provided by the patient.     Review of patient's allergies indicates:   Allergen Reactions    Antibiotic hc      Past Medical History:   Diagnosis Date    Anticoagulant long-term use     Arthritis     Asthma     Back pain     Diabetes mellitus     Encounter for blood transfusion     Eye abnormality right eye    injured as a child    Gastritis     Gastroparesis     Hemodialysis patient     Hypertension     Pneumonia 2013    Spend 6weeks in hospital at [place]    Renal disorder     Stroke      Past Surgical History:   Procedure Laterality Date    AV FISTULA PLACEMENT      BACK SURGERY      CHOLECYSTECTOMY      EYE SURGERY      R eye     Family History   Problem Relation Age of Onset    Kidney disease Brother      Social History   Substance Use Topics    Smoking status: Former Smoker     Years: 10.00     Quit date: 9/1/2015    Smokeless tobacco: Never Used     Alcohol use No     Review of Systems   Constitutional: Negative for fever.   HENT: Negative for sore throat.    Respiratory: Negative for shortness of breath.    Cardiovascular: Negative for chest pain.   Gastrointestinal: Positive for abdominal pain, diarrhea, nausea and vomiting. Negative for blood in stool.        Negative for hematemesis   Genitourinary: Negative for dysuria.   Musculoskeletal: Negative for back pain.   Skin: Negative for rash.   Neurological: Negative for weakness.   Hematological: Does not bruise/bleed easily.       Physical Exam     Initial Vitals [10/17/17 1254]   BP Pulse Resp Temp SpO2   (!) 196/86 (!) 56 20 97.8 °F (36.6 °C) 96 %      MAP       122.67         Physical Exam    Nursing note and vitals reviewed.  Constitutional: He appears well-developed and well-nourished. He is not diaphoretic. No distress.   HENT:   Head: Normocephalic and atraumatic.   Eyes: EOM are normal. Pupils are equal, round, and reactive to light.   Opaque right eye   Neck: Normal range of motion. Neck supple.   Cardiovascular: Normal rate, regular rhythm, normal heart sounds and intact distal pulses. Exam reveals no gallop and no friction rub.    No murmur heard.  Pulmonary/Chest: Breath sounds normal. No respiratory distress. He has no wheezes. He has no rhonchi. He has no rales.   Abdominal: Soft. Bowel sounds are normal. There is tenderness in the epigastric area. There is no rebound and no guarding.   Mild epigastric tenderness.   Musculoskeletal: Normal range of motion.   Neurological: He is alert and oriented to person, place, and time.   Skin: Skin is warm.   Psychiatric: He has a normal mood and affect. His behavior is normal. Judgment and thought content normal.         ED Course   Critical Care  Date/Time: 10/17/2017 4:28 PM  Performed by: NADIRA MARC  Authorized by: NADIRA MARC   Direct patient critical care time: 13 minutes  Ordering / reviewing critical care time: 11  minutes  Documentation critical care time: 10 minutes  Consulting other physicians critical care time: 8 minutes  Total critical care time (exclusive of procedural time) : 42 minutes  Critical care was time spent personally by me on the following activities: examination of patient, ordering and review of laboratory studies, ordering and performing treatments and interventions, re-evaluation of patient's condition, development of treatment plan with patient or surrogate and obtaining history from patient or surrogate.        Labs Reviewed   COMPREHENSIVE METABOLIC PANEL - Abnormal; Notable for the following:        Result Value    Potassium 6.7 (*)     BUN, Bld 62 (*)     Creatinine 9.7 (*)     Total Protein 8.8 (*)     eGFR if  6 (*)     eGFR if non  6 (*)     All other components within normal limits    Narrative:      K  critical result(s) called and verbal readback obtained from Winsome Alonzo, 10/17/2017 15:33   CBC W/ AUTO DIFFERENTIAL - Abnormal; Notable for the following:     RBC 4.25 (*)     Hemoglobin 12.9 (*)     Hematocrit 38.9 (*)     RDW 16.8 (*)     MPV 6.5 (*)     Mono # 0.2 (*)     Gran% 81.1 (*)     Lymph% 13.6 (*)     Mono% 3.4 (*)     All other components within normal limits   LIPASE             Medical Decision Making:   History:   Old Medical Records: I decided to obtain old medical records.  Initial Assessment:   52-year-old male presented with a chief complaint of vomiting and diarrhea.  Differential Diagnosis:   My differential diagnosis includes  Pancreatitis, Enteritis, Dehydration, Viral Illness  Clinical Tests:   Lab Tests: Ordered and Reviewed  Medical Tests: Ordered and Reviewed  ED Management:  The patient was emergently evaluated in the emergency, his evaluation was significant  for a middle-aged male with mild epigastric tenderness.  The patient's labs were concerning for a severely elevated potassium low.  The patient's EKG just show mild peaked T  waves are my independent interpretation.  The patient will require emergent dialysis.  The patient's symptoms were treated in the emergency Department, with IM Bentyl and IV Phenergan.  For the patient's hyperkalemia he was aggressively treated with IV calcium, IV dextrose, and IV insulin.  I will admit him to the hospitalist service for further care.  I've discussed the case with the hospitalist on-call, Dr. Peng.  He has accepted the patient for admission.  Additionally I have discussed the case with nephrology on-call, Dr. Carranza, who agrees to dialyze the patient.              Scribe Attestation:   Scribe #1: I performed the above scribed service and the documentation accurately describes the services I performed. I attest to the accuracy of the note.    Attending Attestation:           Physician Attestation for Scribe:  Physician Attestation Statement for Scribe #1: I, Beverley, reviewed documentation, as scribed by KG Santa in my presence, and it is both accurate and complete.                 ED Course      Clinical Impression:   The encounter diagnosis was Hyperkalemia.                           David Lowery MD  10/17/17 [id number]

## 2017-10-17 NOTE — ED NOTES
Pt presents to ED with c/o generalized abdominal pain that began two days ago. Pt also reports nausea. Denies vomiting or diarrhea. Pt last dialysis was 3 days ago. Pt missed dialysis appointment today. Pt is AAOx4. Skin warm, dry to touch. Respirations even, nonlabored. NAD noted. Abdomen is soft, round. Tenderness noted to epigastric area.

## 2017-10-18 PROBLEM — I13.10 MALIGNANT HTN WITH HEART DISEASE, W/O CHF, WITH CHRONIC KIDNEY DISEASE: Status: ACTIVE | Noted: 2017-05-27

## 2017-10-18 LAB
ALBUMIN SERPL BCP-MCNC: 4.1 G/DL
ALP SERPL-CCNC: 130 U/L
ALT SERPL W/O P-5'-P-CCNC: 17 U/L
ANION GAP SERPL CALC-SCNC: 18 MMOL/L
ANION GAP SERPL CALC-SCNC: 18 MMOL/L
AST SERPL-CCNC: 21 U/L
BASOPHILS # BLD AUTO: 0 K/UL
BASOPHILS NFR BLD: 0.2 %
BILIRUB SERPL-MCNC: 1.2 MG/DL
BUN SERPL-MCNC: 39 MG/DL
BUN SERPL-MCNC: 39 MG/DL
CALCIUM SERPL-MCNC: 10.3 MG/DL
CALCIUM SERPL-MCNC: 10.3 MG/DL
CHLORIDE SERPL-SCNC: 99 MMOL/L
CHLORIDE SERPL-SCNC: 99 MMOL/L
CO2 SERPL-SCNC: 21 MMOL/L
CO2 SERPL-SCNC: 21 MMOL/L
CREAT SERPL-MCNC: 7.3 MG/DL
CREAT SERPL-MCNC: 7.3 MG/DL
DIFFERENTIAL METHOD: ABNORMAL
EOSINOPHIL # BLD AUTO: 0.2 K/UL
EOSINOPHIL NFR BLD: 2.8 %
ERYTHROCYTE [DISTWIDTH] IN BLOOD BY AUTOMATED COUNT: 16 %
EST. GFR  (AFRICAN AMERICAN): 9 ML/MIN/1.73 M^2
EST. GFR  (AFRICAN AMERICAN): 9 ML/MIN/1.73 M^2
EST. GFR  (NON AFRICAN AMERICAN): 8 ML/MIN/1.73 M^2
EST. GFR  (NON AFRICAN AMERICAN): 8 ML/MIN/1.73 M^2
GLUCOSE SERPL-MCNC: 112 MG/DL
GLUCOSE SERPL-MCNC: 112 MG/DL
HCT VFR BLD AUTO: 43.6 %
HGB BLD-MCNC: 14.6 G/DL
LYMPHOCYTES # BLD AUTO: 1 K/UL
LYMPHOCYTES NFR BLD: 13.9 %
MAGNESIUM SERPL-MCNC: 2.3 MG/DL
MCH RBC QN AUTO: 30.7 PG
MCHC RBC AUTO-ENTMCNC: 33.5 G/DL
MCV RBC AUTO: 92 FL
MONOCYTES # BLD AUTO: 0.4 K/UL
MONOCYTES NFR BLD: 5.3 %
NEUTROPHILS # BLD AUTO: 5.5 K/UL
NEUTROPHILS NFR BLD: 77.8 %
PHOSPHATE SERPL-MCNC: 6.3 MG/DL
PLATELET # BLD AUTO: 177 K/UL
PMV BLD AUTO: 6.9 FL
POCT GLUCOSE: 91 MG/DL (ref 70–110)
POTASSIUM SERPL-SCNC: 4.9 MMOL/L
POTASSIUM SERPL-SCNC: 4.9 MMOL/L
PROT SERPL-MCNC: 9.6 G/DL
RBC # BLD AUTO: 4.77 M/UL
SODIUM SERPL-SCNC: 138 MMOL/L
SODIUM SERPL-SCNC: 138 MMOL/L
WBC # BLD AUTO: 7.1 K/UL

## 2017-10-18 PROCEDURE — 11000001 HC ACUTE MED/SURG PRIVATE ROOM

## 2017-10-18 PROCEDURE — 84100 ASSAY OF PHOSPHORUS: CPT

## 2017-10-18 PROCEDURE — 25000003 PHARM REV CODE 250: Performed by: INTERNAL MEDICINE

## 2017-10-18 PROCEDURE — 25000003 PHARM REV CODE 250: Performed by: EMERGENCY MEDICINE

## 2017-10-18 PROCEDURE — 83735 ASSAY OF MAGNESIUM: CPT

## 2017-10-18 PROCEDURE — 63600175 PHARM REV CODE 636 W HCPCS: Performed by: EMERGENCY MEDICINE

## 2017-10-18 PROCEDURE — 85025 COMPLETE CBC W/AUTO DIFF WBC: CPT

## 2017-10-18 PROCEDURE — 63600175 PHARM REV CODE 636 W HCPCS: Performed by: HOSPITALIST

## 2017-10-18 PROCEDURE — 80053 COMPREHEN METABOLIC PANEL: CPT

## 2017-10-18 PROCEDURE — 36415 COLL VENOUS BLD VENIPUNCTURE: CPT

## 2017-10-18 PROCEDURE — 25000003 PHARM REV CODE 250: Performed by: HOSPITALIST

## 2017-10-18 PROCEDURE — 63600175 PHARM REV CODE 636 W HCPCS: Performed by: INTERNAL MEDICINE

## 2017-10-18 RX ORDER — HYDRALAZINE HYDROCHLORIDE 20 MG/ML
10 INJECTION INTRAMUSCULAR; INTRAVENOUS EVERY 4 HOURS PRN
Status: DISCONTINUED | OUTPATIENT
Start: 2017-10-18 | End: 2017-10-19 | Stop reason: HOSPADM

## 2017-10-18 RX ORDER — METHADONE HYDROCHLORIDE 10 MG/1
60 TABLET ORAL DAILY
Status: DISCONTINUED | OUTPATIENT
Start: 2017-10-18 | End: 2017-10-18

## 2017-10-18 RX ORDER — METOCLOPRAMIDE HYDROCHLORIDE 5 MG/ML
10 INJECTION INTRAMUSCULAR; INTRAVENOUS EVERY 6 HOURS
Status: DISCONTINUED | OUTPATIENT
Start: 2017-10-18 | End: 2017-10-18

## 2017-10-18 RX ORDER — METOCLOPRAMIDE HYDROCHLORIDE 5 MG/5ML
10 SOLUTION ORAL EVERY 6 HOURS
Status: DISCONTINUED | OUTPATIENT
Start: 2017-10-18 | End: 2017-10-18

## 2017-10-18 RX ORDER — HYDRALAZINE HYDROCHLORIDE 25 MG/1
50 TABLET, FILM COATED ORAL 3 TIMES DAILY
Status: DISCONTINUED | OUTPATIENT
Start: 2017-10-18 | End: 2017-10-19 | Stop reason: HOSPADM

## 2017-10-18 RX ORDER — LEVETIRACETAM 500 MG/1
500 TABLET ORAL 2 TIMES DAILY
Status: DISCONTINUED | OUTPATIENT
Start: 2017-10-18 | End: 2017-10-19 | Stop reason: HOSPADM

## 2017-10-18 RX ORDER — METHADONE HYDROCHLORIDE 10 MG/1
30 TABLET ORAL ONCE
Status: COMPLETED | OUTPATIENT
Start: 2017-10-18 | End: 2017-10-18

## 2017-10-18 RX ORDER — LISINOPRIL 40 MG/1
40 TABLET ORAL DAILY
Status: DISCONTINUED | OUTPATIENT
Start: 2017-10-18 | End: 2017-10-19 | Stop reason: HOSPADM

## 2017-10-18 RX ORDER — METHADONE HYDROCHLORIDE 10 MG/1
90 TABLET ORAL DAILY
Status: DISCONTINUED | OUTPATIENT
Start: 2017-10-19 | End: 2017-10-19 | Stop reason: HOSPADM

## 2017-10-18 RX ORDER — ACETAMINOPHEN 325 MG/1
650 TABLET ORAL EVERY 6 HOURS PRN
Status: DISCONTINUED | OUTPATIENT
Start: 2017-10-18 | End: 2017-10-19 | Stop reason: HOSPADM

## 2017-10-18 RX ORDER — HYDRALAZINE HYDROCHLORIDE 20 MG/ML
10 INJECTION INTRAMUSCULAR; INTRAVENOUS ONCE
Status: COMPLETED | OUTPATIENT
Start: 2017-10-18 | End: 2017-10-18

## 2017-10-18 RX ORDER — METOCLOPRAMIDE HYDROCHLORIDE 5 MG/ML
10 INJECTION INTRAMUSCULAR; INTRAVENOUS EVERY 6 HOURS PRN
Status: DISCONTINUED | OUTPATIENT
Start: 2017-10-18 | End: 2017-10-19 | Stop reason: HOSPADM

## 2017-10-18 RX ORDER — AMLODIPINE BESYLATE 5 MG/1
5 TABLET ORAL 2 TIMES DAILY
Status: DISCONTINUED | OUTPATIENT
Start: 2017-10-18 | End: 2017-10-19 | Stop reason: HOSPADM

## 2017-10-18 RX ADMIN — HYDRALAZINE HYDROCHLORIDE 10 MG: 20 INJECTION INTRAMUSCULAR; INTRAVENOUS at 09:10

## 2017-10-18 RX ADMIN — HYDRALAZINE HYDROCHLORIDE 10 MG: 20 INJECTION INTRAMUSCULAR; INTRAVENOUS at 04:10

## 2017-10-18 RX ADMIN — HYDRALAZINE HYDROCHLORIDE 10 MG: 20 INJECTION INTRAMUSCULAR; INTRAVENOUS at 03:10

## 2017-10-18 RX ADMIN — METOCLOPRAMIDE 10 MG: 5 INJECTION, SOLUTION INTRAMUSCULAR; INTRAVENOUS at 12:10

## 2017-10-18 RX ADMIN — METHADONE HYDROCHLORIDE 30 MG: 10 TABLET ORAL at 04:10

## 2017-10-18 RX ADMIN — HYDRALAZINE HYDROCHLORIDE 50 MG: 25 TABLET, FILM COATED ORAL at 01:10

## 2017-10-18 RX ADMIN — METOCLOPRAMIDE 10 MG: 5 INJECTION, SOLUTION INTRAMUSCULAR; INTRAVENOUS at 07:10

## 2017-10-18 RX ADMIN — AMLODIPINE BESYLATE 5 MG: 5 TABLET ORAL at 12:10

## 2017-10-18 RX ADMIN — PROMETHAZINE HYDROCHLORIDE 6.25 MG: 25 INJECTION INTRAMUSCULAR; INTRAVENOUS at 03:10

## 2017-10-18 RX ADMIN — LEVETIRACETAM 500 MG: 500 TABLET ORAL at 12:10

## 2017-10-18 RX ADMIN — NICARDIPINE HYDROCHLORIDE 5 MG/HR: 0.2 INJECTION, SOLUTION INTRAVENOUS at 05:10

## 2017-10-18 RX ADMIN — LISINOPRIL 40 MG: 40 TABLET ORAL at 12:10

## 2017-10-18 RX ADMIN — AMLODIPINE BESYLATE 5 MG: 5 TABLET ORAL at 09:10

## 2017-10-18 RX ADMIN — PROMETHAZINE HYDROCHLORIDE 6.25 MG: 25 INJECTION INTRAMUSCULAR; INTRAVENOUS at 10:10

## 2017-10-18 RX ADMIN — METHADONE HYDROCHLORIDE 60 MG: 10 TABLET ORAL at 03:10

## 2017-10-18 RX ADMIN — PROMETHAZINE HYDROCHLORIDE 6.25 MG: 25 INJECTION INTRAMUSCULAR; INTRAVENOUS at 04:10

## 2017-10-18 RX ADMIN — METOCLOPRAMIDE 10 MG: 5 INJECTION, SOLUTION INTRAMUSCULAR; INTRAVENOUS at 05:10

## 2017-10-18 RX ADMIN — LEVETIRACETAM 500 MG: 500 TABLET ORAL at 09:10

## 2017-10-18 RX ADMIN — HEPARIN SODIUM 5000 UNITS: 5000 INJECTION, SOLUTION INTRAVENOUS; SUBCUTANEOUS at 09:10

## 2017-10-18 RX ADMIN — ACETAMINOPHEN 650 MG: 325 TABLET, FILM COATED ORAL at 03:10

## 2017-10-18 RX ADMIN — HYDRALAZINE HYDROCHLORIDE 50 MG: 25 TABLET, FILM COATED ORAL at 09:10

## 2017-10-18 NOTE — CONSULTS
Nephrology Consult Note        Patient Name: João Aguirre  MRN: [id number]    Patient Class: IP- Inpatient   Admission Date: 10/17/2017  Length of Stay: 1 days    Attending Physician: Adela Peng MD  Primary Care Provider: Primary Doctor No    Reason for Consult: ESRD on HD, hyperkalemia    SUBJECTIVE:     HPI: 52M with ESRD on HD TTS, HTN, chronic pain syndrome, DM-2 and nicotine addiction admitted for nausea, vomiting and non-specific abdominal pain for 2 days. Had HD last night for hyperkalemia.    Past Medical History:   Diagnosis Date    Anticoagulant long-term use     Arthritis     Asthma     Back pain     Diabetes mellitus     Encounter for blood transfusion     Eye abnormality right eye    injured as a child    Gastritis     Gastroparesis     Hemodialysis patient     Hypertension     Pneumonia 2013    Spend 6weeks in hospital at [facility]    Renal disorder     Stroke      Past Surgical History:   Procedure Laterality Date    AV FISTULA PLACEMENT      BACK SURGERY      CHOLECYSTECTOMY      EYE SURGERY      R eye     Family History   Problem Relation Age of Onset    Kidney disease Brother      Social History   Substance Use Topics    Smoking status: Former Smoker     Years: 10.00     Quit date: 9/1/2015    Smokeless tobacco: Never Used    Alcohol use No       Review of patient's allergies indicates:   Allergen Reactions    Antibiotic hc        Outpatient meds:  No current facility-administered medications on file prior to encounter.      Current Outpatient Prescriptions on File Prior to Encounter   Medication Sig Dispense Refill    albuterol (PROAIR HFA) 90 mcg/actuation inhaler INHALE TWO PUFFS EVERY 4 TO 6 HOURS AS NEEDED      amlodipine (NORVASC) 5 MG tablet Take 5 mg by mouth 2 (two) times daily.      aspirin 325 MG tablet Take 1 tablet (325 mg total) by mouth once daily. 30 tablet 1    hydrALAZINE (APRESOLINE) 50 MG tablet Take 50 mg by mouth 3 (three) times daily.       levetiracetam (KEPPRA) 500 MG Tab Take 500 mg twice a day.  Take an extra tablet right after dialysis (making 3 tablets on your dialysis days) 70 tablet 2    lisinopril (PRINIVIL,ZESTRIL) 40 MG tablet Take 1 tablet (40 mg total) by mouth once daily. 30 tablet 1    methadone (DOLOPHINE) 10 MG tablet Take 6 tablets (60 mg total) by mouth once daily.  0    minoxidil (LONITEN) 2.5 MG tablet Take 3 tablets (7.5 mg total) by mouth 2 (two) times daily. 90 tablet 0    ondansetron (ZOFRAN) 4 MG tablet Take 1 tablet (4 mg total) by mouth every 8 (eight) hours as needed for Nausea. 12 tablet 0       Scheduled meds:   amlodipine  5 mg Oral BID    heparin (porcine)  5,000 Units Subcutaneous Q12H    hydrALAZINE  50 mg Oral TID    levetiracetam  500 mg Oral BID    lisinopril  40 mg Oral Daily    methadone  60 mg Oral Daily    metoclopramide HCl  10 mg Intravenous Q6H       Infusions:   niCARdipine Stopped (10/18/17 1350)       PRN meds:  acetaminophen, hydrALAZINE, promethazine (PHENERGAN) IVPB    Review of Systems:  Review of Systems   Constitutional: Negative for chills, fever, malaise/fatigue and weight loss.   HENT: Negative for hearing loss and nosebleeds.    Eyes: Negative for blurred vision, double vision and photophobia.   Respiratory: Negative for cough, shortness of breath and wheezing.    Cardiovascular: Negative for chest pain, palpitations and leg swelling.   Gastrointestinal: Positive for abdominal pain and nausea. Negative for constipation, diarrhea, heartburn and vomiting.   Genitourinary: Negative for dysuria, frequency and urgency.   Musculoskeletal: Negative for falls, joint pain and myalgias.   Skin: Negative for itching and rash.   Neurological: Negative for dizziness, speech change, focal weakness, loss of consciousness and headaches.   Endo/Heme/Allergies: Does not bruise/bleed easily.   Psychiatric/Behavioral: Negative for depression and substance abuse. The patient is not nervous/anxious.         OBJECTIVE:     Vital Signs and IO (Last 24H):  Temp:  [97 °F (36.1 °C)-98.7 °F (37.1 °C)]   Pulse:  [48-82]   Resp:  [16-18]   BP: (129-242)/()   SpO2:  [94 %-99 %]   I/O last 3 completed shifts:  In: 500 [Other:500]  Out: 4500 [Other:4500]    Wt Readings from Last 5 Encounters:   10/17/17 94.8 kg (209 lb)   08/11/17 95 kg (209 lb 7 oz)   05/27/17 95.8 kg (211 lb 3.2 oz)   04/18/17 72.1 kg (159 lb)   04/02/17 72.5 kg (159 lb 13.3 oz)         Physical Exam:  Physical Exam   Constitutional: He is oriented to person, place, and time. He appears well-developed and well-nourished. He appears distressed (due to pain).   HENT:   Head: Normocephalic and atraumatic.   Mouth/Throat: Oropharynx is clear and moist.   Eyes: EOM are normal. Pupils are equal, round, and reactive to light. No scleral icterus.   Neck: Neck supple.   Cardiovascular: Normal rate and regular rhythm.    Pulmonary/Chest: Effort normal. No stridor. No respiratory distress.   Abdominal: Soft. He exhibits no distension and no mass. There is tenderness. There is no rebound and no guarding.   Musculoskeletal: Normal range of motion. He exhibits no edema or deformity.   RUE AVF with good thrill and bruit.   Neurological: He is alert and oriented to person, place, and time. No cranial nerve deficit.   Skin: Skin is warm and dry. No rash noted. He is not diaphoretic. No erythema.   Psychiatric: He has a normal mood and affect. His behavior is normal.       Body mass index is 28.35 kg/m².    Laboratory:    Recent Labs  Lab 10/17/17  1439 10/18/17  0650    138  138   K 6.7* 4.9  4.9    99  99   CO2 23 21*  21*   BUN 62* 39*  39*   CREATININE 9.7* 7.3*  7.3*   ESTGFRAFRICA 6* 9*  9*   EGFRNONAA 6* 8*  8*   CALCIUM 8.8 10.3  10.3   ALBUMIN 3.8 4.1   PHOS  --  6.3*         Recent Labs  Lab 10/17/17  1439 10/18/17  0650   WBC 7.30 7.10   HGB 12.9* 14.6   HCT 38.9* 43.6    177   MCV 92 92   MCHC 33.1 33.5   MONO 3.4*  0.2* 5.3   0.4         Recent Labs  Lab 10/17/17  1439 10/18/17  0650   ALKPHOS 130 130   BILITOT 1.0 1.2*   PROT 8.8* 9.6*   ALBUMIN 3.8 4.1   ALT 14 17   AST 20 21       ASSESSMENT/PLAN:     Active Hospital Problems    Diagnosis  POA    Hyperkalemia [E87.5]  Yes    Chronic hepatitis C without hepatic coma [B18.2]  Yes     Chronic    Abdominal pain [R10.9]  Yes    H/O: CVA (cerebrovascular accident) [Z86.73]  Not Applicable     Chronic    Intractable vomiting with nausea [R11.2]  Yes    Blindness of right eye [H54.40]  Yes    Coronary artery disease involving native coronary artery of native heart without angina pectoris [I25.10]  Yes     Chronic    ESRD (T,Th,Sat) dialysis onset 2013 [N18.6]  Yes     Chronic      Resolved Hospital Problems    Diagnosis Date Resolved POA   No resolved problems to display.       ESRD on HD TTS via RUE AVF  Continue current dialysis prescription.  Next HD Thursday.  Renal diet - low K, low phos.  No IVs or BP checks on access arm.    Anemia of CKD  Stable. Monitor.  No need for ERNESTO.  No need for IV iron.    MBD / Secondary HPT  Monitor phos levels. Low phos diet. Phos binders if Phos > 5.5.    HTN  Difficult to assess control if pain is uncontrolled.  Tolerate asymptomatic HTN up to -160.  Continue home meds. PRN Hydralazine as needed.  Low sodium diet.    Thank you for allowing us to participate in the care of your patient!   We will follow the patient and provide recommendations as needed.    Peter Gupta MD    [facility]  [address] [place] [address]  [place] [address]    [phone] - tel  [phone] - fax    10/18/2017

## 2017-10-18 NOTE — PLAN OF CARE
Problem: Patient Care Overview  Goal: Plan of Care Review  POC reviewed with pt, understanding verbalized. 4 L removed during dialysis. SR on tele. Denies pain/SOB. Blood glucose monitoring. Up ad donita. Safety maintained. Will continue to monitor.

## 2017-10-18 NOTE — NURSING
Called dr Morris questioned if to administer scheduled amlodipine, patient on nicardipine drip,  Dr Morris stated to administer scheduled amlodipine, check BP in 1 hour if 180/90 or less discontinue the nicardipine drip

## 2017-10-18 NOTE — PROGRESS NOTES
Pt c/o nausea, dry heaving, phenergan was given with no results, dr. johnson notified, reglan ordered

## 2017-10-18 NOTE — PROGRESS NOTES
[facility] Hospital Medicine  Progress Note    Patient Name: João Aguirre  MRN: [id number]  Patient Class: IP- Inpatient   Admission Date: 10/17/2017  Length of Stay: 1 days  Attending Physician: Adela Peng MD  Primary Care Provider: Primary Doctor No        Subjective:     Principal Problem:Hyperkalemia    HPI:  Patient is a 52 y.o. male admitted to Hospitalist Service from [facility] with complaint of headache and diarrhea. Patient has PMH significant for ESRD, HTN, chronic pain syndrome, DM-2 and nicotine addiction. Patient gets HD on Mon, Wed and Fri. Patient presented with nausea, vomiting and non-specific abdominal pain for 2 days. He reported that he did not eat anything abnormal prior to the onset of the abdominal pain, and that no one else in his home is experiencing similar symptoms. Taking Zofran does not alleviate the symptoms. The patient has regular weekly dialysis treatments, which he has been on for 4 years, and his last treatment was 3 days ago. He reports that he does not drink alcohol. Patient did not get HD today. Patient denied chest pain, shortness of breath, headache, vision changes, focal neuro-deficits, cough or fever. Upon arrival, BP note dto be 218/93 mmHg.    Hospital Course:  No notes on file    Interval History:   abd pain was severe this am -but resolved in evening.     Review of Systems   Constitutional: Negative for chills and fever.   Respiratory: Negative for cough and shortness of breath.    Cardiovascular: Negative for chest pain and leg swelling.   Gastrointestinal: Positive for abdominal pain and nausea. Negative for vomiting.     Objective:     Vital Signs (Most Recent):  Temp: 98.2 °F (36.8 °C) (10/18/17 1640)  Pulse: 75 (10/18/17 1640)  Resp: 18 (10/18/17 1640)  BP: (!) 178/85 (10/18/17 1640)  SpO2: 95 % (10/18/17 1640) Vital Signs (24h Range):  Temp:  [97 °F (36.1 °C)-98.7 °F (37.1 °C)] 98.2 °F (36.8 °C)  Pulse:   [48-82] 75  Resp:  [16-18] 18  SpO2:  [94 %-99 %] 95 %  BP: (129-242)/() 178/85     Weight: 94.8 kg (209 lb)  Body mass index is 28.35 kg/m².    Intake/Output Summary (Last 24 hours) at 10/18/17 3878  Last data filed at 10/18/17 1350   Gross per 24 hour   Intake           717.08 ml   Output             4500 ml   Net         -3782.92 ml      Physical Exam   Constitutional: He is oriented to person, place, and time. He appears well-developed and well-nourished.   HENT:   Nose: Nose normal.   Mouth/Throat: Oropharynx is clear and moist.   Eyes: Conjunctivae are normal. Right eye exhibits discharge. Left eye exhibits no discharge. No scleral icterus.   Neck: No JVD present.   Cardiovascular: Normal rate, regular rhythm and normal heart sounds.    Pulmonary/Chest: Effort normal and breath sounds normal.   Abdominal: Soft. Bowel sounds are normal. There is tenderness (mild llq; ). There is no rebound and no guarding.   Musculoskeletal: He exhibits tenderness. He exhibits no edema.   Lymphadenopathy:     He has no cervical adenopathy.   Neurological: He is alert and oriented to person, place, and time.   Skin: Skin is warm and dry. Capillary refill takes less than 2 seconds.   Psychiatric: He has a normal mood and affect. His behavior is normal.   Nursing note and vitals reviewed.      Significant Labs: All pertinent labs within the past 24 hours have been reviewed.    Significant Imaging: I have reviewed and interpreted all pertinent imaging results/findings within the past 24 hours.    Assessment/Plan:      * Hyperkalemia    Acute, resolved -for HD tomorrow. Continue to monitor--on telemetry           Chronic hepatitis C without hepatic coma    Chronic, stable.   Liver enzymes wnl          Abdominal pain      Acute, resolved  Associated with diarrhea-/nausea-? Viral ? If drug withdrawal altho denies he ran out of methadone.   Improved after reglan-? Gastroparesis  Consider imaging /stool studies if recurrent sx-         H/O: CVA (cerebrovascular accident)    No new sx -continue \bp control - leave at 25% MAP x 24 hours of accelerated htn   Continue asa, statin            Intractable vomiting with nausea    Acute resolve, ? Etiology           Coronary artery disease involving native coronary artery of native heart without angina pectoris      Chronic, stable, continue home medications          VTE Risk Mitigation         Ordered     heparin (porcine) injection 5,000 Units  Every 12 hours     Route:  Subcutaneous        10/17/17 1804     Medium Risk of VTE  Once      10/17/17 1804     Place ZACHARY hose  Until discontinued      10/17/17 1804     Place sequential compression device  Until discontinued      10/17/17 1804        dispo-home w        Natasha Morris MD  Department of Hospital Medicine   [facility]

## 2017-10-18 NOTE — SUBJECTIVE & OBJECTIVE
Interval History:   abd pain was severe this am -but resolved in evening.     Review of Systems   Constitutional: Negative for chills and fever.   Respiratory: Negative for cough and shortness of breath.    Cardiovascular: Negative for chest pain and leg swelling.   Gastrointestinal: Positive for abdominal pain and nausea. Negative for vomiting.     Objective:     Vital Signs (Most Recent):  Temp: 98.2 °F (36.8 °C) (10/18/17 1640)  Pulse: 75 (10/18/17 1640)  Resp: 18 (10/18/17 1640)  BP: (!) 178/85 (10/18/17 1640)  SpO2: 95 % (10/18/17 1640) Vital Signs (24h Range):  Temp:  [97 °F (36.1 °C)-98.7 °F (37.1 °C)] 98.2 °F (36.8 °C)  Pulse:  [48-82] 75  Resp:  [16-18] 18  SpO2:  [94 %-99 %] 95 %  BP: (129-242)/() 178/85     Weight: 94.8 kg (209 lb)  Body mass index is 28.35 kg/m².    Intake/Output Summary (Last 24 hours) at 10/18/17 1758  Last data filed at 10/18/17 1350   Gross per 24 hour   Intake           717.08 ml   Output             4500 ml   Net         -3782.92 ml      Physical Exam   Constitutional: He is oriented to person, place, and time. He appears well-developed and well-nourished.   HENT:   Nose: Nose normal.   Mouth/Throat: Oropharynx is clear and moist.   Eyes: Conjunctivae are normal. Right eye exhibits discharge. Left eye exhibits no discharge. No scleral icterus.   Neck: No JVD present.   Cardiovascular: Normal rate, regular rhythm and normal heart sounds.    Pulmonary/Chest: Effort normal and breath sounds normal.   Abdominal: Soft. Bowel sounds are normal. There is tenderness (mild llq; ). There is no rebound and no guarding.   Musculoskeletal: He exhibits tenderness. He exhibits no edema.   Lymphadenopathy:     He has no cervical adenopathy.   Neurological: He is alert and oriented to person, place, and time.   Skin: Skin is warm and dry. Capillary refill takes less than 2 seconds.   Psychiatric: He has a normal mood and affect. His behavior is normal.   Nursing note and vitals  reviewed.      Significant Labs: All pertinent labs within the past 24 hours have been reviewed.    Significant Imaging: I have reviewed and interpreted all pertinent imaging results/findings within the past 24 hours.

## 2017-10-18 NOTE — ASSESSMENT & PLAN NOTE
No new sx -continue \bp control - leave at 25% MAP x 24 hours of accelerated htn   Continue asa, statin

## 2017-10-18 NOTE — PLAN OF CARE
The pt lives at home with his spouse and children. He denies HH or DME. He goes to Dialysis in [place] T,TH,S 10:30 am. He had an appt with Dr. Russell as a PCP in [facility] but he missed his appt. He uses [facility] and he has Medicare and [place] Medicaid. Brenda Chavira LMSW     10/18/17 0958   Discharge Assessment   Assessment Type Discharge Planning Assessment   Confirmed/corrected address and phone number on facesheet? Yes   Assessment information obtained from? Patient   Communicated expected length of stay with patient/caregiver no   Prior to hospitilization cognitive status: Alert/Oriented   Prior to hospitalization functional status: Independent   Current cognitive status: Alert/Oriented   Current Functional Status: Independent   Lives With child(suni), dependent;spouse   Able to Return to Prior Arrangements yes   Is patient able to care for self after discharge? Yes   Readmission Within The Last 30 Days no previous admission in last 30 days   Patient currently being followed by outpatient case management? No   Patient currently receives any other outside agency services? No   Equipment Currently Used at Home none   Do you have any problems affording any of your prescribed medications? No   Is the patient taking medications as prescribed? yes   Does the patient have transportation home? Yes   Transportation Available family or friend will provide   Dialysis Name and Scheduled days [place] T,TH, S 10:30am    Does the patient receive services at the [facility]? No   Discharge Plan A Home   Discharge Plan B Home with family   Patient/Family In Agreement With Plan yes

## 2017-10-18 NOTE — NURSING
Pt /103 after second dose of IV hydralazine. Dr. Griffin notified. New orders received. Will continue to monitor.

## 2017-10-18 NOTE — NURSING
Pt complains of headache and nausea. /104. Hydralazine, phenergan, and tylenol given per PRN orders. Will continue to monitor.

## 2017-10-18 NOTE — ASSESSMENT & PLAN NOTE
Acute, resolved  Associated with diarrhea-/nausea-? Viral ? If drug withdrawal altho denies he ran out of methadone.   Improved after reglan-? Gastroparesis  Consider imaging /stool studies if recurrent sx-

## 2017-10-18 NOTE — NURSING
Pt /108. Dr. Griffin notified ordered to give an additional dose of hydralazine 10 mg IV. Will continue to monitor.

## 2017-10-19 VITALS
WEIGHT: 209 LBS | OXYGEN SATURATION: 98 % | TEMPERATURE: 98 F | RESPIRATION RATE: 18 BRPM | HEIGHT: 72 IN | DIASTOLIC BLOOD PRESSURE: 81 MMHG | SYSTOLIC BLOOD PRESSURE: 166 MMHG | BODY MASS INDEX: 28.31 KG/M2 | HEART RATE: 59 BPM

## 2017-10-19 LAB
ALBUMIN SERPL BCP-MCNC: 3.5 G/DL
ALP SERPL-CCNC: 106 U/L
ALT SERPL W/O P-5'-P-CCNC: 16 U/L
ANION GAP SERPL CALC-SCNC: 19 MMOL/L
ANION GAP SERPL CALC-SCNC: 19 MMOL/L
AST SERPL-CCNC: 16 U/L
BASOPHILS # BLD AUTO: 0.1 K/UL
BASOPHILS NFR BLD: 1.1 %
BILIRUB SERPL-MCNC: 0.8 MG/DL
BUN SERPL-MCNC: 59 MG/DL
BUN SERPL-MCNC: 59 MG/DL
CALCIUM SERPL-MCNC: 9.1 MG/DL
CALCIUM SERPL-MCNC: 9.1 MG/DL
CHLORIDE SERPL-SCNC: 100 MMOL/L
CHLORIDE SERPL-SCNC: 100 MMOL/L
CO2 SERPL-SCNC: 20 MMOL/L
CO2 SERPL-SCNC: 20 MMOL/L
CREAT SERPL-MCNC: 9.5 MG/DL
CREAT SERPL-MCNC: 9.5 MG/DL
DIFFERENTIAL METHOD: ABNORMAL
EOSINOPHIL # BLD AUTO: 0.2 K/UL
EOSINOPHIL NFR BLD: 2.5 %
ERYTHROCYTE [DISTWIDTH] IN BLOOD BY AUTOMATED COUNT: 15.9 %
EST. GFR  (AFRICAN AMERICAN): 7 ML/MIN/1.73 M^2
EST. GFR  (AFRICAN AMERICAN): 7 ML/MIN/1.73 M^2
EST. GFR  (NON AFRICAN AMERICAN): 6 ML/MIN/1.73 M^2
EST. GFR  (NON AFRICAN AMERICAN): 6 ML/MIN/1.73 M^2
GLUCOSE SERPL-MCNC: 95 MG/DL
GLUCOSE SERPL-MCNC: 95 MG/DL
HCT VFR BLD AUTO: 35.3 %
HGB BLD-MCNC: 11.6 G/DL
LYMPHOCYTES # BLD AUTO: 1.7 K/UL
LYMPHOCYTES NFR BLD: 27.9 %
MAGNESIUM SERPL-MCNC: 2.5 MG/DL
MCH RBC QN AUTO: 30.5 PG
MCHC RBC AUTO-ENTMCNC: 32.8 G/DL
MCV RBC AUTO: 93 FL
MONOCYTES # BLD AUTO: 0.7 K/UL
MONOCYTES NFR BLD: 11.9 %
NEUTROPHILS # BLD AUTO: 3.4 K/UL
NEUTROPHILS NFR BLD: 56.6 %
PHOSPHATE SERPL-MCNC: 9 MG/DL
PLATELET # BLD AUTO: 198 K/UL
PMV BLD AUTO: 6.5 FL
POTASSIUM SERPL-SCNC: 5.4 MMOL/L
POTASSIUM SERPL-SCNC: 5.4 MMOL/L
PROT SERPL-MCNC: 7.9 G/DL
RBC # BLD AUTO: 3.81 M/UL
SODIUM SERPL-SCNC: 139 MMOL/L
SODIUM SERPL-SCNC: 139 MMOL/L
WBC # BLD AUTO: 6.1 K/UL

## 2017-10-19 PROCEDURE — 63600175 PHARM REV CODE 636 W HCPCS: Performed by: INTERNAL MEDICINE

## 2017-10-19 PROCEDURE — 25000003 PHARM REV CODE 250: Performed by: INTERNAL MEDICINE

## 2017-10-19 PROCEDURE — 63600175 PHARM REV CODE 636 W HCPCS: Performed by: HOSPITALIST

## 2017-10-19 PROCEDURE — 85025 COMPLETE CBC W/AUTO DIFF WBC: CPT

## 2017-10-19 PROCEDURE — 80053 COMPREHEN METABOLIC PANEL: CPT

## 2017-10-19 PROCEDURE — 36415 COLL VENOUS BLD VENIPUNCTURE: CPT

## 2017-10-19 PROCEDURE — 84100 ASSAY OF PHOSPHORUS: CPT

## 2017-10-19 PROCEDURE — 25000003 PHARM REV CODE 250: Performed by: HOSPITALIST

## 2017-10-19 PROCEDURE — 80100016 HC MAINTENANCE HEMODIALYSIS

## 2017-10-19 PROCEDURE — 83735 ASSAY OF MAGNESIUM: CPT

## 2017-10-19 RX ORDER — SEVELAMER CARBONATE 800 MG/1
800 TABLET, FILM COATED ORAL
Status: DISCONTINUED | OUTPATIENT
Start: 2017-10-19 | End: 2017-10-19 | Stop reason: HOSPADM

## 2017-10-19 RX ORDER — SODIUM CHLORIDE 9 MG/ML
INJECTION, SOLUTION INTRAVENOUS
Status: CANCELLED | OUTPATIENT
Start: 2017-10-19

## 2017-10-19 RX ORDER — SODIUM CHLORIDE 9 MG/ML
INJECTION, SOLUTION INTRAVENOUS
Status: DISCONTINUED | OUTPATIENT
Start: 2017-10-19 | End: 2017-10-19 | Stop reason: HOSPADM

## 2017-10-19 RX ORDER — HEPARIN SODIUM 5000 [USP'U]/ML
5000 INJECTION, SOLUTION INTRAVENOUS; SUBCUTANEOUS
Status: CANCELLED | OUTPATIENT
Start: 2017-10-19

## 2017-10-19 RX ORDER — METHADONE HYDROCHLORIDE 10 MG/1
90 TABLET ORAL DAILY
Refills: 0 | Status: ON HOLD
Start: 2017-10-19 | End: 2020-01-01 | Stop reason: HOSPADM

## 2017-10-19 RX ORDER — SODIUM CHLORIDE 9 MG/ML
INJECTION, SOLUTION INTRAVENOUS ONCE
Status: DISCONTINUED | OUTPATIENT
Start: 2017-10-19 | End: 2017-10-19

## 2017-10-19 RX ORDER — CALCIUM ACETATE 667 MG/1
667 CAPSULE ORAL
Qty: 90 CAPSULE | Refills: 11 | Status: SHIPPED | OUTPATIENT
Start: 2017-10-19 | End: 2019-01-01 | Stop reason: CLARIF

## 2017-10-19 RX ORDER — SODIUM CHLORIDE 9 MG/ML
INJECTION, SOLUTION INTRAVENOUS ONCE
Status: CANCELLED | OUTPATIENT
Start: 2017-10-19 | End: 2017-10-19

## 2017-10-19 RX ORDER — SEVELAMER CARBONATE 800 MG/1
800 TABLET, FILM COATED ORAL
Qty: 90 TABLET | Refills: 11 | Status: SHIPPED | OUTPATIENT
Start: 2017-10-19 | End: 2017-10-19 | Stop reason: HOSPADM

## 2017-10-19 RX ADMIN — METOCLOPRAMIDE 10 MG: 5 INJECTION, SOLUTION INTRAMUSCULAR; INTRAVENOUS at 01:10

## 2017-10-19 RX ADMIN — LISINOPRIL 40 MG: 40 TABLET ORAL at 08:10

## 2017-10-19 RX ADMIN — HYDRALAZINE HYDROCHLORIDE 50 MG: 25 TABLET, FILM COATED ORAL at 03:10

## 2017-10-19 RX ADMIN — HYDRALAZINE HYDROCHLORIDE 50 MG: 25 TABLET, FILM COATED ORAL at 05:10

## 2017-10-19 RX ADMIN — HEPARIN SODIUM 5000 UNITS: 5000 INJECTION, SOLUTION INTRAVENOUS; SUBCUTANEOUS at 08:10

## 2017-10-19 RX ADMIN — LEVETIRACETAM 500 MG: 500 TABLET ORAL at 08:10

## 2017-10-19 RX ADMIN — METHADONE HYDROCHLORIDE 90 MG: 10 TABLET ORAL at 08:10

## 2017-10-19 RX ADMIN — AMLODIPINE BESYLATE 5 MG: 5 TABLET ORAL at 08:10

## 2017-10-19 NOTE — NURSING
Patient discharged home, discharge teaching completed new medication, medication administration, when to return to ed) patient verbalized understanding, denies questions/ concerns, PIV and telemetry removed, tolerated by patient, patient given copy of discharge instruction, patient left unit in wheelchair son at side

## 2017-10-19 NOTE — HOSPITAL COURSE
admtted with hyperkalemia and abd pain , n/v/d and received emergency HD as pt is ESRD on HD already. His abd pain resovled after ivf, bowel rest and his methadone was resumed but he denies any missed doses of medication. He had HD again the day of dc . His am lab revealed hyperphosphatemia so he was initiated on renvela. His initial bp was quite elevated also likely related to missed doses or oral medications from vomiting. He had no  Acute neurologic symptoms . He was initiated on cardene gtt then transitioned to his oral medications.   PE -ALERT nad  heent-nontraumatic, oral mmm   lungs clear   cor rrr   abd soft, bsx4,  ext -no c/c/e   neuro-Ox3, sensation intact   skin -w/d  Psych-cooperative and calm. Insight fair.

## 2017-10-19 NOTE — DISCHARGE INSTRUCTIONS
Thank you for choosing [facility] for your medical care. The primary doctor who is taking care of you at the time of your discharge is Adela Peng MD.     Your were admitted to the hospital with Hyperkalemia.     Please note your discharge instructions, including diet/activity restrictions, follow-up appointments, and medication changes.  If you have any questions about your medical issues, prescriptions, or any other questions, please feel free to contact the [facility] Medicine Dept at [phone] and we will help.    Please direct all long term medication refills and follow up to your primary care provider, Primary Doctor No. Thank you again for letting us take care of your health care needs.  PLEASE RESUME YOUR CALCIUM SUPPLEMENT/PHOSPHOROUS BINDER THAT YOU ARE ALREADY ON.

## 2017-10-19 NOTE — PROGRESS NOTES
INPATIENT NEPHROLOGY PROGRESS NOTE  [facility] NEPHROLOGY    João Aguirre  10/19/2017    Reason for consultation:      esrd and hyperkalemia    Chief Complaint:   Chief Complaint   Patient presents with    Headache     started Sunday     Diarrhea    Abdominal Pain        History of Present Illness:     52M with ESRD on HD TTS, HTN, chronic pain syndrome, DM-2 and nicotine addiction admitted for nausea, vomiting and non-specific abdominal pain for 2 days. He was urgently dialyzed Tuesday night due to hyperkalemia    10/19--seen on dialysis.  No n,v, chest pain or sob          Plan of Care:     Assessment:    esrd  Hyperkalemia  Hyperphosphatemia  anemia    Plan:     1. esrd--seen on dialysis    2. Volume/Blood Pressure- uf 3 liters    3. Electrolytes/Acid Base- 2 k bath    4. Bone Mineral Metabolism-renvela 800mg tid with food    5. Anemia of CKD-reagan with hd    6. Medications-renal dose for crcl 10    7. Access-functional    Thank you for allowing us to participate in this patient's care. We will continue to follow.    Medications:  No current facility-administered medications on file prior to encounter.      Current Outpatient Prescriptions on File Prior to Encounter   Medication Sig Dispense Refill    albuterol (PROAIR HFA) 90 mcg/actuation inhaler INHALE TWO PUFFS EVERY 4 TO 6 HOURS AS NEEDED      amlodipine (NORVASC) 5 MG tablet Take 5 mg by mouth 2 (two) times daily.      aspirin 325 MG tablet Take 1 tablet (325 mg total) by mouth once daily. 30 tablet 1    hydrALAZINE (APRESOLINE) 50 MG tablet Take 50 mg by mouth 3 (three) times daily.      levetiracetam (KEPPRA) 500 MG Tab Take 500 mg twice a day.  Take an extra tablet right after dialysis (making 3 tablets on your dialysis days) 70 tablet 2    lisinopril (PRINIVIL,ZESTRIL) 40 MG tablet Take 1 tablet (40 mg total) by mouth once daily. 30 tablet 1    methadone (DOLOPHINE) 10 MG tablet Take 6 tablets (60 mg total) by mouth once daily.  0     minoxidil (LONITEN) 2.5 MG tablet Take 3 tablets (7.5 mg total) by mouth 2 (two) times daily. 90 tablet 0    ondansetron (ZOFRAN) 4 MG tablet Take 1 tablet (4 mg total) by mouth every 8 (eight) hours as needed for Nausea. 12 tablet 0     Scheduled Meds:   sodium chloride 0.9%   Intravenous Once    amlodipine  5 mg Oral BID    heparin (porcine)  5,000 Units Subcutaneous Q12H    hydrALAZINE  50 mg Oral TID    levetiracetam  500 mg Oral BID    lisinopril  40 mg Oral Daily    methadone  90 mg Oral Daily     Continuous Infusions:   PRN Meds:.sodium chloride 0.9%, acetaminophen, hydrALAZINE, metoclopramide HCl    Allergies:  Antibiotic hc    Vital Signs:  Temp Readings from Last 3 Encounters:   10/19/17 98.1 °F (36.7 °C)   08/11/17 98.1 °F (36.7 °C) (Oral)   05/28/17 98.2 °F (36.8 °C)       Pulse Readings from Last 3 Encounters:   10/19/17 70   08/11/17 65   05/28/17 (!) 52       BP Readings from Last 3 Encounters:   10/19/17 (!) 167/77   08/11/17 (!) 174/84   05/28/17 (!) 152/73       Weight:  Wt Readings from Last 3 Encounters:   10/17/17 94.8 kg (209 lb)   08/11/17 95 kg (209 lb 7 oz)   05/27/17 95.8 kg (211 lb 3.2 oz)       Review of Systems:  Review of Systems - All 14 systems reviewed and negative, except as noted in HPI    Physical Exam:    Constitutional: NAD  Neuro: No asterixis  Psych: Calm  EENT: NCAT, anicteric sclera   Neck:  supple  Cards: No rub  Lungs: right sided wheezes  GI:  Pos bowel sounds, no hsm, NTND   MSK:  WNWD  Skin: no purpura, rashes  Access: functional    Results:  Lab Results   Component Value Date     10/19/2017     10/19/2017    K 5.4 (H) 10/19/2017    K 5.4 (H) 10/19/2017     10/19/2017     10/19/2017    CO2 20 (L) 10/19/2017    CO2 20 (L) 10/19/2017    BUN 59 (H) 10/19/2017    BUN 59 (H) 10/19/2017    CREATININE 9.5 (H) 10/19/2017    CREATININE 9.5 (H) 10/19/2017    CALCIUM 9.1 10/19/2017    CALCIUM 9.1 10/19/2017    ANIONGAP 19 (H) 10/19/2017    ANIONGAP  19 (H) 10/19/2017    ESTGFRAFRICA 7 (A) 10/19/2017    ESTGFRAFRICA 7 (A) 10/19/2017    EGFRNONAA 6 (A) 10/19/2017    EGFRNONAA 6 (A) 10/19/2017       Lab Results   Component Value Date    CALCIUM 9.1 10/19/2017    CALCIUM 9.1 10/19/2017    PHOS 9.0 (HH) 10/19/2017         Recent Labs  Lab 10/19/17  0435   WBC 6.10   RBC 3.81*   HGB 11.6*   HCT 35.3*      MCV 93   MCH 30.5   MCHC 32.8       I have personally reviewed pertinent radiological imaging and reports.

## 2017-10-19 NOTE — DISCHARGE SUMMARY
[facility] Medicine  Discharge Summary      Patient Name: João Aguirre  MRN: [id number]  Admission Date: 10/17/2017  Hospital Length of Stay: 2 days  Discharge Date and Time:  10/19/2017 3:19 PM  Attending Physician: Adela Peng MD   Discharging Provider: Natasha Morris MD  Primary Care Provider: Primary Doctor No      HPI:   No notes on file    * No surgery found *      Indwelling Lines/Drains at time of discharge:   Lines/Drains/Airways     Drain                 Hemodialysis AV Fistula Left forearm -- days              Hospital Course:   admtted with hyperkalemia and abd pain , n/v/d and received emergency HD as pt is ESRD on HD already. His abd pain resovled after ivf, bowel rest and his methadone was resumed but he denies any missed doses of medication. He had HD again the day of dc . His am lab revealed hyperphosphatemia so he was initiated on renvela BUT  REPORTS HE takes phoslo at home so he will resume that. . His initial bp was quite elevated also likely related to missed doses or oral medications from vomiting. He had no  Acute neurologic symptoms . He was initiated on cardene gtt then transitioned to his oral medications.   PE -ALERT nad  heent-nontraumatic, oral mmm   lungs clear   cor rrr   abd soft, bsx4,  ext -no c/c/e   neuro-Ox3, sensation intact   skin -w/d  Psych-cooperative and calm. Insight fair.      Consults:   Consults         Status Ordering Provider     Inpatient consult to Nephrology  Once     Provider:  Tej Carranza MD    Completed NADIRA MARC          Significant Diagnostic Studies:   Results for JOÃO AGUIRRE (MRN [id number]) as of 10/19/2017 15:19   Ref. Range 10/18/2017 06:50 10/19/2017 04:35   WBC Latest Ref Range: 3.90 - 12.70 K/uL 7.10 6.10   RBC Latest Ref Range: 4.60 - 6.20 M/uL 4.77 3.81 (L)   Hemoglobin Latest Ref Range: 14.0 - 18.0 g/dL 14.6 11.6 (L)   Hematocrit Latest Ref Range: 40.0 - 54.0 % 43.6 35.3 (L)   MCV Latest  Ref Range: 82 - 98 fL 92 93   MCH Latest Ref Range: 27.0 - 31.0 pg 30.7 30.5   MCHC Latest Ref Range: 32.0 - 36.0 g/dL 33.5 32.8   RDW Latest Ref Range: 11.5 - 14.5 % 16.0 (H) 15.9 (H)   Platelets Latest Ref Range: 150 - 350 K/uL 177 198     Results for BRET ALLEN (MRN [id number]) as of 10/19/2017 15:19   Ref. Range 10/18/2017 06:50 10/19/2017 04:35 10/19/2017 04:35   Sodium Latest Ref Range: 136 - 145 mmol/L 138 139 139   Potassium Latest Ref Range: 3.5 - 5.1 mmol/L 4.9 5.4 (H) 5.4 (H)   Chloride Latest Ref Range: 95 - 110 mmol/L 99 100 100   CO2 Latest Ref Range: 23 - 29 mmol/L 21 (L) 20 (L) 20 (L)   Anion Gap Latest Ref Range: 8 - 16 mmol/L 18 (H) 19 (H) 19 (H)   BUN, Bld Latest Ref Range: 6 - 20 mg/dL 39 (H) 59 (H) 59 (H)   Creatinine Latest Ref Range: 0.5 - 1.4 mg/dL 7.3 (H) 9.5 (H) 9.5 (H)   eGFR if non African American Latest Ref Range: >60 mL/min/1.73 m^2 8 (A) 6 (A) 6 (A)   eGFR if African American Latest Ref Range: >60 mL/min/1.73 m^2 9 (A) 7 (A) 7 (A)   Glucose Latest Ref Range: 70 - 110 mg/dL 112 (H) 95 95   Calcium Latest Ref Range: 8.7 - 10.5 mg/dL 10.3 9.1 9.1   Phosphorus Latest Ref Range: 2.7 - 4.5 mg/dL  9.0 (HH)    Magnesium Latest Ref Range: 1.6 - 2.6 mg/dL  2.5    Alkaline Phosphatase Latest Ref Range: 55 - 135 U/L 130  106   Total Protein Latest Ref Range: 6.0 - 8.4 g/dL 9.6 (H)  7.9   Albumin Latest Ref Range: 3.5 - 5.2 g/dL 4.1  3.5   Total Bilirubin Latest Ref Range: 0.1 - 1.0 mg/dL 1.2 (H)  0.8   AST Latest Ref Range: 10 - 40 U/L 21  16   ALT Latest Ref Range: 10 - 44 U/L 17  16     Pending Diagnostic Studies:     None        Final Active Diagnoses:    Diagnosis Date Noted POA    PRINCIPAL PROBLEM:  Hyperkalemia [E87.5] 10/17/2017 Yes    Chronic hepatitis C without hepatic coma [B18.2] 02/21/2017 Yes     Chronic    Abdominal pain [R10.9] 08/01/2016 Yes    H/O: CVA (cerebrovascular accident) [Z86.73] 05/03/2016 Not Applicable     Chronic    Intractable vomiting with nausea  [R11.2] 10/26/2015 Yes    Blindness of right eye [H54.40] 08/30/2015 Yes    Coronary artery disease involving native coronary artery of native heart without angina pectoris [I25.10] 08/29/2015 Yes     Chronic    ESRD (T,Th,Sat) dialysis onset 2013 [N18.6] 08/29/2015 Yes     Chronic      Problems Resolved During this Admission:    Diagnosis Date Noted Date Resolved POA      No new Assessment & Plan notes have been filed under this hospital service since the last note was generated.  Service: Hospital Medicine      Discharged Condition: fair    Disposition: Home or Self Care    Follow Up:  Follow-up Information     Primary Doctor No.    Why:  with HD as scheduled.                Patient Instructions:     Diet renal     Sponge bath only until clinic visit     Activity as tolerated       Medications:  Reconciled Home Medications:   Current Discharge Medication List      START taking these medications    Details   sevelamer carbonate (RENVELA) 800 mg Tab Take 1 tablet (800 mg total) by mouth 3 (three) times daily with meals.  Qty: 90 tablet, Refills: 11         CONTINUE these medications which have NOT CHANGED    Details   albuterol (PROAIR HFA) 90 mcg/actuation inhaler INHALE TWO PUFFS EVERY 4 TO 6 HOURS AS NEEDED      amlodipine (NORVASC) 5 MG tablet Take 5 mg by mouth 2 (two) times daily.      aspirin 325 MG tablet Take 1 tablet (325 mg total) by mouth once daily.  Qty: 30 tablet, Refills: 1      hydrALAZINE (APRESOLINE) 50 MG tablet Take 50 mg by mouth 3 (three) times daily.      levetiracetam (KEPPRA) 500 MG Tab Take 500 mg twice a day.  Take an extra tablet right after dialysis (making 3 tablets on your dialysis days)  Qty: 70 tablet, Refills: 2      lisinopril (PRINIVIL,ZESTRIL) 40 MG tablet Take 1 tablet (40 mg total) by mouth once daily.  Qty: 30 tablet, Refills: 1      methadone (DOLOPHINE) 10 MG tablet Take 6 tablets (60 mg total) by mouth once daily.  Refills: 0      minoxidil (LONITEN) 2.5 MG tablet Take  3 tablets (7.5 mg total) by mouth 2 (two) times daily.  Qty: 90 tablet, Refills: 0      ondansetron (ZOFRAN) 4 MG tablet Take 1 tablet (4 mg total) by mouth every 8 (eight) hours as needed for Nausea.  Qty: 12 tablet, Refills: 0           Time spent on the discharge of patient: 34 minutes      Natasha Morris MD  Department of Hospital Medicine  [facility]

## 2017-10-20 NOTE — PLAN OF CARE
10/20/17 1511   Final Note   Assessment Type Final Discharge Note   Discharge Disposition Home

## 2018-04-24 NOTE — ED NOTES
Report called to Radha (RN, [facility]).  [facility] by [facility]).   Pulse Duration (In Milliseconds): 20

## 2018-08-26 ENCOUNTER — HOSPITAL ENCOUNTER (INPATIENT)
Facility: HOSPITAL | Age: 53
LOS: 12 days | Discharge: HOME HEALTH | DRG: 853 | End: 2018-09-07
Attending: EMERGENCY MEDICINE | Admitting: HOSPITALIST
Payer: MEDICARE

## 2018-08-26 DIAGNOSIS — A41.9 SEPTIC SHOCK: ICD-10-CM

## 2018-08-26 DIAGNOSIS — I48.91 ATRIAL FIBRILLATION: ICD-10-CM

## 2018-08-26 DIAGNOSIS — L02.415 CELLULITIS AND ABSCESS OF RIGHT LOWER EXTREMITY: ICD-10-CM

## 2018-08-26 DIAGNOSIS — L97.512 ULCERATED, FOOT, RIGHT, WITH FAT LAYER EXPOSED: ICD-10-CM

## 2018-08-26 DIAGNOSIS — R65.20 SEVERE SEPSIS: ICD-10-CM

## 2018-08-26 DIAGNOSIS — L03.115 CELLULITIS AND ABSCESS OF RIGHT LOWER EXTREMITY: ICD-10-CM

## 2018-08-26 DIAGNOSIS — R79.89 ELEVATED TROPONIN: ICD-10-CM

## 2018-08-26 DIAGNOSIS — A04.72 C. DIFFICILE COLITIS: ICD-10-CM

## 2018-08-26 DIAGNOSIS — R11.2 INTRACTABLE VOMITING WITH NAUSEA: ICD-10-CM

## 2018-08-26 DIAGNOSIS — A41.9 SEVERE SEPSIS: ICD-10-CM

## 2018-08-26 DIAGNOSIS — L08.9 INFECTED SKIN ULCER WITH FAT LAYER EXPOSED: ICD-10-CM

## 2018-08-26 DIAGNOSIS — L03.90 CELLULITIS, UNSPECIFIED CELLULITIS SITE: ICD-10-CM

## 2018-08-26 DIAGNOSIS — R11.2 INTRACTABLE VOMITING WITH NAUSEA, UNSPECIFIED VOMITING TYPE: Primary | ICD-10-CM

## 2018-08-26 DIAGNOSIS — L98.492 INFECTED SKIN ULCER WITH FAT LAYER EXPOSED: ICD-10-CM

## 2018-08-26 DIAGNOSIS — R65.21 SEPTIC SHOCK: ICD-10-CM

## 2018-08-26 PROBLEM — R07.9 CHEST PAIN: Status: ACTIVE | Noted: 2018-08-26

## 2018-08-26 PROBLEM — I20.0 UNSTABLE ANGINA: Status: ACTIVE | Noted: 2018-08-26

## 2018-08-26 PROBLEM — Z95.5 HISTORY OF CORONARY ARTERY STENT PLACEMENT: Status: ACTIVE | Noted: 2018-08-26

## 2018-08-26 LAB
ALBUMIN SERPL BCP-MCNC: 2.9 G/DL
ALP SERPL-CCNC: 285 U/L
ALT SERPL W/O P-5'-P-CCNC: 15 U/L
ANION GAP SERPL CALC-SCNC: 21 MMOL/L
AST SERPL-CCNC: 29 U/L
BASOPHILS NFR BLD: 0 %
BILIRUB SERPL-MCNC: 0.9 MG/DL
BUN SERPL-MCNC: 65 MG/DL
CALCIUM SERPL-MCNC: 10.1 MG/DL
CHLORIDE SERPL-SCNC: 97 MMOL/L
CHOLEST SERPL-MCNC: 89 MG/DL
CHOLEST/HDLC SERPL: ABNORMAL {RATIO}
CO2 SERPL-SCNC: 18 MMOL/L
CREAT SERPL-MCNC: 9.6 MG/DL
DIFFERENTIAL METHOD: ABNORMAL
EOSINOPHIL NFR BLD: 0 %
ERYTHROCYTE [DISTWIDTH] IN BLOOD BY AUTOMATED COUNT: 17.2 %
EST. GFR  (AFRICAN AMERICAN): 6 ML/MIN/1.73 M^2
EST. GFR  (NON AFRICAN AMERICAN): 6 ML/MIN/1.73 M^2
GLUCOSE SERPL-MCNC: 77 MG/DL
HCT VFR BLD AUTO: 41.8 %
HDLC SERPL-MCNC: <5 MG/DL
HDLC SERPL: ABNORMAL %
HGB BLD-MCNC: 13.6 G/DL
LDLC SERPL CALC-MCNC: ABNORMAL MG/DL
LIPASE SERPL-CCNC: 13 U/L
LYMPHOCYTES NFR BLD: 1 %
MCH RBC QN AUTO: 32 PG
MCHC RBC AUTO-ENTMCNC: 32.6 G/DL
MCV RBC AUTO: 98 FL
MONOCYTES NFR BLD: 0 %
NEUTROPHILS NFR BLD: 99 %
NONHDLC SERPL-MCNC: ABNORMAL MG/DL
PLATELET # BLD AUTO: 155 K/UL
PMV BLD AUTO: 7.5 FL
POTASSIUM SERPL-SCNC: 3.7 MMOL/L
PROT SERPL-MCNC: 7.7 G/DL
RBC # BLD AUTO: 4.25 M/UL
SODIUM SERPL-SCNC: 136 MMOL/L
TRIGL SERPL-MCNC: 234 MG/DL
TROPONIN I SERPL DL<=0.01 NG/ML-MCNC: 0.6 NG/ML
TROPONIN I SERPL DL<=0.01 NG/ML-MCNC: 0.74 NG/ML
TROPONIN I SERPL DL<=0.01 NG/ML-MCNC: 0.76 NG/ML
TSH SERPL DL<=0.005 MIU/L-ACNC: 0.84 UIU/ML
WBC # BLD AUTO: 11.1 K/UL

## 2018-08-26 PROCEDURE — 25000003 PHARM REV CODE 250: Performed by: NURSE PRACTITIONER

## 2018-08-26 PROCEDURE — 84484 ASSAY OF TROPONIN QUANT: CPT | Mod: 91

## 2018-08-26 PROCEDURE — A4216 STERILE WATER/SALINE, 10 ML: HCPCS | Performed by: EMERGENCY MEDICINE

## 2018-08-26 PROCEDURE — 25000003 PHARM REV CODE 250: Performed by: EMERGENCY MEDICINE

## 2018-08-26 PROCEDURE — 80053 COMPREHEN METABOLIC PANEL: CPT

## 2018-08-26 PROCEDURE — 96375 TX/PRO/DX INJ NEW DRUG ADDON: CPT

## 2018-08-26 PROCEDURE — 85027 COMPLETE CBC AUTOMATED: CPT

## 2018-08-26 PROCEDURE — 99285 EMERGENCY DEPT VISIT HI MDM: CPT | Mod: 25

## 2018-08-26 PROCEDURE — 96374 THER/PROPH/DIAG INJ IV PUSH: CPT

## 2018-08-26 PROCEDURE — 63600175 PHARM REV CODE 636 W HCPCS: Performed by: NURSE PRACTITIONER

## 2018-08-26 PROCEDURE — 84443 ASSAY THYROID STIM HORMONE: CPT

## 2018-08-26 PROCEDURE — 12000002 HC ACUTE/MED SURGE SEMI-PRIVATE ROOM

## 2018-08-26 PROCEDURE — 36415 COLL VENOUS BLD VENIPUNCTURE: CPT

## 2018-08-26 PROCEDURE — 63600175 PHARM REV CODE 636 W HCPCS: Performed by: EMERGENCY MEDICINE

## 2018-08-26 PROCEDURE — 85007 BL SMEAR W/DIFF WBC COUNT: CPT

## 2018-08-26 PROCEDURE — 83690 ASSAY OF LIPASE: CPT

## 2018-08-26 PROCEDURE — 25000003 PHARM REV CODE 250: Performed by: HOSPITALIST

## 2018-08-26 PROCEDURE — 93010 ELECTROCARDIOGRAM REPORT: CPT | Mod: ,,, | Performed by: INTERNAL MEDICINE

## 2018-08-26 PROCEDURE — 93005 ELECTROCARDIOGRAM TRACING: CPT

## 2018-08-26 PROCEDURE — 80061 LIPID PANEL: CPT

## 2018-08-26 PROCEDURE — 84484 ASSAY OF TROPONIN QUANT: CPT

## 2018-08-26 RX ORDER — HYDRALAZINE HYDROCHLORIDE 25 MG/1
50 TABLET, FILM COATED ORAL 3 TIMES DAILY
Status: DISCONTINUED | OUTPATIENT
Start: 2018-08-26 | End: 2018-08-27

## 2018-08-26 RX ORDER — SODIUM CHLORIDE 0.9 % (FLUSH) 0.9 %
3 SYRINGE (ML) INJECTION EVERY 8 HOURS
Status: DISCONTINUED | OUTPATIENT
Start: 2018-08-26 | End: 2018-09-07 | Stop reason: HOSPADM

## 2018-08-26 RX ORDER — LORAZEPAM 2 MG/ML
1 INJECTION INTRAMUSCULAR
Status: COMPLETED | OUTPATIENT
Start: 2018-08-26 | End: 2018-08-26

## 2018-08-26 RX ORDER — METOCLOPRAMIDE HYDROCHLORIDE 5 MG/ML
10 INJECTION INTRAMUSCULAR; INTRAVENOUS EVERY 8 HOURS
Status: DISCONTINUED | OUTPATIENT
Start: 2018-08-26 | End: 2018-08-26

## 2018-08-26 RX ORDER — DIPHENHYDRAMINE HYDROCHLORIDE 50 MG/ML
25 INJECTION INTRAMUSCULAR; INTRAVENOUS
Status: COMPLETED | OUTPATIENT
Start: 2018-08-26 | End: 2018-08-26

## 2018-08-26 RX ORDER — MINOXIDIL 2.5 MG/1
7.5 TABLET ORAL 2 TIMES DAILY
Status: DISCONTINUED | OUTPATIENT
Start: 2018-08-26 | End: 2018-08-29

## 2018-08-26 RX ORDER — LEVETIRACETAM 500 MG/1
500 TABLET ORAL 2 TIMES DAILY
Status: DISCONTINUED | OUTPATIENT
Start: 2018-08-26 | End: 2018-09-07 | Stop reason: HOSPADM

## 2018-08-26 RX ORDER — HEPARIN SODIUM 5000 [USP'U]/ML
5000 INJECTION, SOLUTION INTRAVENOUS; SUBCUTANEOUS EVERY 8 HOURS
Status: DISCONTINUED | OUTPATIENT
Start: 2018-08-26 | End: 2018-08-28

## 2018-08-26 RX ORDER — CALCIUM ACETATE 667 MG/1
667 CAPSULE ORAL
Status: DISCONTINUED | OUTPATIENT
Start: 2018-08-26 | End: 2018-09-03

## 2018-08-26 RX ORDER — LISINOPRIL 40 MG/1
40 TABLET ORAL DAILY
Status: DISCONTINUED | OUTPATIENT
Start: 2018-08-26 | End: 2018-08-27

## 2018-08-26 RX ORDER — PANTOPRAZOLE SODIUM 40 MG/1
40 TABLET, DELAYED RELEASE ORAL DAILY
Status: DISCONTINUED | OUTPATIENT
Start: 2018-08-26 | End: 2018-08-28

## 2018-08-26 RX ORDER — ASPIRIN 325 MG
325 TABLET ORAL DAILY
Status: DISCONTINUED | OUTPATIENT
Start: 2018-08-26 | End: 2018-08-28

## 2018-08-26 RX ORDER — METHADONE HYDROCHLORIDE 10 MG/1
90 TABLET ORAL DAILY
Status: DISCONTINUED | OUTPATIENT
Start: 2018-08-26 | End: 2018-08-29

## 2018-08-26 RX ORDER — MORPHINE SULFATE 8 MG/ML
8 INJECTION INTRAMUSCULAR; INTRAVENOUS; SUBCUTANEOUS
Status: COMPLETED | OUTPATIENT
Start: 2018-08-26 | End: 2018-08-26

## 2018-08-26 RX ORDER — AMLODIPINE BESYLATE 5 MG/1
5 TABLET ORAL 2 TIMES DAILY
Status: DISCONTINUED | OUTPATIENT
Start: 2018-08-26 | End: 2018-08-27

## 2018-08-26 RX ORDER — ONDANSETRON 2 MG/ML
4 INJECTION INTRAMUSCULAR; INTRAVENOUS EVERY 6 HOURS PRN
Status: DISCONTINUED | OUTPATIENT
Start: 2018-08-26 | End: 2018-09-07 | Stop reason: HOSPADM

## 2018-08-26 RX ORDER — METOCLOPRAMIDE HYDROCHLORIDE 5 MG/ML
5 INJECTION INTRAMUSCULAR; INTRAVENOUS EVERY 6 HOURS PRN
Status: DISCONTINUED | OUTPATIENT
Start: 2018-08-26 | End: 2018-09-07 | Stop reason: HOSPADM

## 2018-08-26 RX ORDER — METOCLOPRAMIDE HYDROCHLORIDE 5 MG/ML
10 INJECTION INTRAMUSCULAR; INTRAVENOUS
Status: COMPLETED | OUTPATIENT
Start: 2018-08-26 | End: 2018-08-26

## 2018-08-26 RX ORDER — ACETAMINOPHEN 325 MG/1
650 TABLET ORAL EVERY 6 HOURS PRN
Status: DISCONTINUED | OUTPATIENT
Start: 2018-08-26 | End: 2018-08-29

## 2018-08-26 RX ADMIN — HEPARIN SODIUM 5000 UNITS: 5000 INJECTION, SOLUTION INTRAVENOUS; SUBCUTANEOUS at 01:08

## 2018-08-26 RX ADMIN — HEPARIN SODIUM 5000 UNITS: 5000 INJECTION, SOLUTION INTRAVENOUS; SUBCUTANEOUS at 10:08

## 2018-08-26 RX ADMIN — DIPHENHYDRAMINE HYDROCHLORIDE 25 MG: 50 INJECTION, SOLUTION INTRAMUSCULAR; INTRAVENOUS at 06:08

## 2018-08-26 RX ADMIN — METHADONE HYDROCHLORIDE 90 MG: 10 TABLET ORAL at 01:08

## 2018-08-26 RX ADMIN — MORPHINE SULFATE 8 MG: 8 INJECTION INTRAVENOUS at 06:08

## 2018-08-26 RX ADMIN — MINOXIDIL 7.5 MG: 2.5 TABLET ORAL at 01:08

## 2018-08-26 RX ADMIN — Medication 3 ML: at 01:08

## 2018-08-26 RX ADMIN — AMLODIPINE BESYLATE 5 MG: 5 TABLET ORAL at 01:08

## 2018-08-26 RX ADMIN — CALCIUM ACETATE 667 MG: 667 CAPSULE ORAL at 05:08

## 2018-08-26 RX ADMIN — LEVETIRACETAM 500 MG: 500 TABLET ORAL at 01:08

## 2018-08-26 RX ADMIN — METOCLOPRAMIDE 5 MG: 5 INJECTION, SOLUTION INTRAMUSCULAR; INTRAVENOUS at 09:08

## 2018-08-26 RX ADMIN — LISINOPRIL 40 MG: 40 TABLET ORAL at 01:08

## 2018-08-26 RX ADMIN — Medication 3 ML: at 10:08

## 2018-08-26 RX ADMIN — METOCLOPRAMIDE 10 MG: 5 INJECTION, SOLUTION INTRAMUSCULAR; INTRAVENOUS at 06:08

## 2018-08-26 RX ADMIN — METOCLOPRAMIDE 5 MG: 5 INJECTION, SOLUTION INTRAMUSCULAR; INTRAVENOUS at 01:08

## 2018-08-26 RX ADMIN — PROMETHAZINE HYDROCHLORIDE 6.25 MG: 25 INJECTION INTRAMUSCULAR; INTRAVENOUS at 11:08

## 2018-08-26 RX ADMIN — HYDRALAZINE HYDROCHLORIDE 50 MG: 25 TABLET, FILM COATED ORAL at 08:08

## 2018-08-26 RX ADMIN — LORAZEPAM 1 MG: 2 INJECTION, SOLUTION INTRAMUSCULAR; INTRAVENOUS at 07:08

## 2018-08-26 RX ADMIN — ONDANSETRON HYDROCHLORIDE 4 MG: 2 INJECTION, SOLUTION INTRAMUSCULAR; INTRAVENOUS at 08:08

## 2018-08-26 RX ADMIN — MINOXIDIL 7.5 MG: 2.5 TABLET ORAL at 08:08

## 2018-08-26 RX ADMIN — ASPIRIN 325 MG ORAL TABLET 325 MG: 325 PILL ORAL at 01:08

## 2018-08-26 RX ADMIN — CALCIUM ACETATE 667 MG: 667 CAPSULE ORAL at 01:08

## 2018-08-26 RX ADMIN — AMLODIPINE BESYLATE 5 MG: 5 TABLET ORAL at 08:08

## 2018-08-26 RX ADMIN — HYDRALAZINE HYDROCHLORIDE 50 MG: 25 TABLET, FILM COATED ORAL at 03:08

## 2018-08-26 RX ADMIN — LEVETIRACETAM 500 MG: 500 TABLET ORAL at 08:08

## 2018-08-26 RX ADMIN — PANTOPRAZOLE SODIUM 40 MG: 40 TABLET, DELAYED RELEASE ORAL at 01:08

## 2018-08-26 NOTE — ASSESSMENT & PLAN NOTE
Monitor closely  Patient requesting food on admit   Prn antiemetic medication  Lipase and  Wbcs Wnl   Check KUB  Add PPI

## 2018-08-26 NOTE — PROVIDER PROGRESS NOTES - EMERGENCY DEPT.
Encounter Date: 8/26/2018    ED Physician Progress Notes        Physician Note:   53-year-old male presented with a chief complaint of abdominal pain and persistent nausea and vomiting.  The patient was received from Dr. Aviles at the change of shift pending his labs.  The patient's labs were concerning for an elevated troponin, and the patient has intractable nausea and vomiting.  The patient will be admitted to the hospitalist service for serial troponins, as well as IV medications to control his vomiting. I have discussed the case with the hospitalist on call, Dr. Law.  He has accepted the patient for admission.

## 2018-08-26 NOTE — H&P
[facility] Hospital Medicine  History & Physical    Patient Name: João Aguirre  MRN: [id number]  Admission Date: 8/26/2018  Attending Physician: Beau Law MD   Primary Care Provider: Primary Doctor No      Patient information was obtained from patient and ER records.     Subjective:     Principal Problem:Intractable vomiting with nausea    Chief Complaint:   Chief Complaint   Patient presents with    Abdominal Pain     with N/V; missed dialysis Saturday        HPI: This is a 53-year-old male with a past medical history of end-stage renal disease on hemodialysis Tuesday, Thursday, and  Saturday, diabetes, chronic back pain on methadone, CAD with prior coronary stent placement 2 years ago, hypertension, prior pneumonia, prior stroke, and recurrent episodes of mid epigastric pain and vomiting but no formal diagnosis of gastroparesis who presents to emergency department for evaluation of epigastric, abdominal pain, and CP with inability to tolerate liquids or food for the past few days.  Patient was seen here in the emergency department yesterday where he underwent basic labs and was given antiemetics.  He was discharged home with a prescription for Reglan and Benadryl which she has not been able to fill secondary to being home vomiting. He presents today via EMS because the symptoms have not improved and are worsening.  He did not get dialysis yesterday.  He denies any shortness of breath but does have an exacerbation of his chronic back pain which is thought to be secondary to him vomiting. The patient is unable hold down his chronic pain medicine which includes methadone.  Patient does not have a medication list with him at this time.    Patient found to have elevated troponin of 0.763 and reports he has Hx of CAD with prior coronary stent placement 2 years ago but has not been following up with cardiology. He does report some left sided chest pain and occasionally diaphoresis  along with his nausea and vomiting. His EKG currently with no significant St elevation or depression. He does have Hx ESRD requiring HD with prior elevated troponins in past of 0.16-0.17. Patient discussed with Dr. Law. Will consult cardiology and trend troponins due to prior cardiac Hx and continue monitoring GI status as well.          Past Medical History:   Diagnosis Date    Anticoagulant long-term use     Arthritis     Asthma     Back pain     Diabetes mellitus     Encounter for blood transfusion     Eye abnormality right eye    injured as a child    Gastritis     Gastroparesis     Hemodialysis patient     Hypertension     Pneumonia 2013    Spend 6weeks in hospital at [place]    Renal disorder     Stroke        Past Surgical History:   Procedure Laterality Date    AV FISTULA PLACEMENT      BACK SURGERY      CHOLECYSTECTOMY      EYE SURGERY      R eye       Review of patient's allergies indicates:   Allergen Reactions    Antibiotic hc        No current facility-administered medications on file prior to encounter.      Current Outpatient Medications on File Prior to Encounter   Medication Sig    albuterol (PROAIR HFA) 90 mcg/actuation inhaler INHALE TWO PUFFS EVERY 4 TO 6 HOURS AS NEEDED    amlodipine (NORVASC) 5 MG tablet Take 5 mg by mouth 2 (two) times daily.    aspirin 325 MG tablet Take 1 tablet (325 mg total) by mouth once daily.    calcium acetate (PHOSLO) 667 mg capsule Take 1 capsule (667 mg total) by mouth 3 (three) times daily with meals.    hydrALAZINE (APRESOLINE) 50 MG tablet Take 50 mg by mouth 3 (three) times daily.    levetiracetam (KEPPRA) 500 MG Tab Take 500 mg twice a day.  Take an extra tablet right after dialysis (making 3 tablets on your dialysis days)    lisinopril (PRINIVIL,ZESTRIL) 40 MG tablet Take 1 tablet (40 mg total) by mouth once daily.    methadone (DOLOPHINE) 10 MG tablet Take 9 tablets (90 mg total) by mouth once daily.    metoclopramide HCl  (REGLAN) 10 MG tablet Take 1 tablet (10 mg total) by mouth 3 (three) times daily as needed (nausea/vomiting).    minoxidil (LONITEN) 2.5 MG tablet Take 3 tablets (7.5 mg total) by mouth 2 (two) times daily.    [DISCONTINUED] ondansetron (ZOFRAN) 4 MG tablet Take 1 tablet (4 mg total) by mouth every 8 (eight) hours as needed for Nausea.     Family History     Problem Relation (Age of Onset)    Kidney disease Brother        Tobacco Use    Smoking status: Former Smoker     Years: 10.00     Last attempt to quit: 2015     Years since quittin.9    Smokeless tobacco: Never Used   Substance and Sexual Activity    Alcohol use: No    Drug use: Yes     Frequency: 4.0 times per week     Types: Other-see comments     Comment: marijuana    Sexual activity: Yes     Review of Systems   Constitutional: Positive for activity change, appetite change, chills, diaphoresis and fatigue. Negative for fever.   HENT: Negative for ear pain and facial swelling.    Eyes: Negative for pain and redness.   Respiratory: Positive for chest tightness. Negative for cough, choking and shortness of breath.    Cardiovascular: Positive for chest pain. Negative for palpitations and leg swelling.   Gastrointestinal: Positive for abdominal pain, diarrhea, nausea and vomiting. Negative for abdominal distention and constipation.   Endocrine: Negative for polydipsia and polyphagia.   Genitourinary: Negative for difficulty urinating, dysuria, flank pain and hematuria.        Reports rarely voids   Musculoskeletal: Positive for back pain. Negative for gait problem, neck pain and neck stiffness.        Chronic back pain   Skin: Negative for color change.   Allergic/Immunologic: Negative for food allergies.   Neurological: Negative for seizures, syncope, facial asymmetry, speech difficulty and weakness.   Hematological: Does not bruise/bleed easily.   Psychiatric/Behavioral: Negative for agitation, behavioral problems, confusion, hallucinations and  suicidal ideas. The patient is not nervous/anxious.      Objective:     Vital Signs (Most Recent):  Temp: 98.1 °F (36.7 °C) (08/26/18 1114)  Pulse: 79 (08/26/18 1114)  Resp: 12 (08/26/18 1114)  BP: 105/74 (08/26/18 1114)  SpO2: 96 % (08/26/18 1114) Vital Signs (24h Range):  Temp:  [98.1 °F (36.7 °C)-99.2 °F (37.3 °C)] 98.1 °F (36.7 °C)  Pulse:  [] 79  Resp:  [12-16] 12  SpO2:  [94 %-100 %] 96 %  BP: (105-188)/(70-92) 105/74     Weight: 94.8 kg (209 lb)  Body mass index is 28.35 kg/m².    Physical Exam   Constitutional: He is oriented to person, place, and time. He appears well-developed and well-nourished. No distress.   HENT:   Head: Normocephalic and atraumatic.   Eyes: Conjunctivae are normal. Right eye exhibits no discharge. Left eye exhibits no discharge.   Right eye blindness   Neck: Normal range of motion. Neck supple. No JVD present.   Cardiovascular: Normal rate, regular rhythm, normal heart sounds and intact distal pulses.   Av shunt to arm with good thrill and bruit   Pulmonary/Chest: Effort normal and breath sounds normal. No stridor. No respiratory distress. He has no wheezes. He has no rales. He exhibits no tenderness.   Abdominal: Soft. Bowel sounds are normal. He exhibits no distension. There is no tenderness. There is no guarding.   Genitourinary:   Genitourinary Comments: Not examined   Musculoskeletal: Normal range of motion. He exhibits no edema.   Neurological: He is alert and oriented to person, place, and time. No cranial nerve deficit.   Skin: Skin is warm and dry. Capillary refill takes less than 2 seconds. He is not diaphoretic.   Psychiatric: He has a normal mood and affect. His behavior is normal. Judgment and thought content normal.           Significant Labs: Reviewed    Significant Imaging: Reviewed    Assessment/Plan:     * Intractable vomiting with nausea    Monitor closely  Patient requesting food on admit   Prn antiemetic medication  Lipase and  Wbcs Wnl   Check KUB  Add  PPI          Elevated troponin    Cp/ Possible UA vs ACS/ Hx CAD with prior coronary stent placement-  Also Hx ESRD with HD  Telemetry  Trend troponin- Elevated on admit on 0.763  EKG shows no significant ST elevation or depression  Cardiology consulted  Continue asa, add sq heparin  Check lipid panel and TSH        Benign hypertension with ESRD (end-stage renal disease)    Continue home medications          Methadone dependence    Hx chronic pain with chronic pain syndrome-  Continue home methadone  Patient reports he has been taking his methadone regularly and has not run out          Chronic hepatitis C without hepatic coma    With Hx of liver cirrhosis          Chronic back pain    With chronic pain syndrome-  Continue home methadone          ESRD (T,Th,Sat) dialysis onset 2013    Patient reports he missed his HD yesterday  Nephrology consulted for renal/HD management          Coronary artery disease involving native coronary artery of native heart without angina pectoris    With prior coronary stent placement 2016/ Hx ischemic cardiomyopathy-  Continue home medications-  Patient reports he has been taking his asa            VTE Risk Mitigation (From admission, onward)        Ordered     heparin (porcine) injection 5,000 Units  Every 8 hours      08/26/18 1139     IP VTE HIGH RISK PATIENT  Once      08/26/18 1106     Place sequential compression device  Until discontinued      08/26/18 1106     Place ZACHARY hose  Until discontinued      08/26/18 1106        LISETTE Pope  Department of Hospital Medicine   [facility]    Time spent seeing patient( greater than 1/2 spent in direct contact) :  78 minutes

## 2018-08-26 NOTE — HPI
This is a 53-year-old male with a past medical history of end-stage renal disease on hemodialysis Tuesday, Thursday, and  Saturday, diabetes, chronic back pain on methadone, CAD with prior coronary stent placement 2 years ago, hypertension, prior pneumonia, prior stroke, and recurrent episodes of mid epigastric pain and vomiting but no formal diagnosis of gastroparesis who presents to emergency department for evaluation of epigastric, abdominal pain, and CP with inability to tolerate liquids or food for the past few days.  Patient was seen here in the emergency department yesterday where he underwent basic labs and was given antiemetics.  He was discharged home with a prescription for Reglan and Benadryl which she has not been able to fill secondary to being home vomiting. He presents today via EMS because the symptoms have not improved and are worsening.  He did not get dialysis yesterday.  He denies any shortness of breath but does have an exacerbation of his chronic back pain which is thought to be secondary to him vomiting. The patient is unable hold down his chronic pain medicine which includes methadone.  Patient does not have a medication list with him at this time.    Patient found to have elevated troponin of 0.763 and reports he has Hx of CAD with prior coronary stent placement 2 years ago but has not been following up with cardiology. He does report some left sided chest pain and occasionally diaphoresis along with his nausea and vomiting. His EKG currently with no significant St elevation or depression. He does have Hx ESRD requiring HD with prior elevated troponins in past of 0.16-0.17. Patient discussed with Dr. Law. Will consult cardiology and trend troponins due to prior cardiac Hx and continue monitoring GI status as well.

## 2018-08-26 NOTE — ED PROVIDER NOTES
Encounter Date: 8/26/2018       History     Chief Complaint   Patient presents with    Abdominal Pain     with N/V; missed dialysis Saturday     Patient is a 53-year-old male with a past medical history of end-stage renal disease on hemodialysis Tuesday Thursday Saturday, diabetes, chronic back pain on methadone, long-term anticoagulant use hypertension, prior pneumonia, prior stroke, and recurrent episodes of mid epigastric pain and vomiting but no formal diagnosis of gastroparesis presents to emergency department for evaluation of epigastric abdominal pain with inability to tolerate liquids or food for the past few days.  Patient was seen here in the emergency department yesterday where he underwent basic labs and was given antiemetics.  He was discharged home with a prescription for Reglan and Benadryl which she has not been able to fill secondary to being home vomiting. He presents today via EMS because the symptoms have not improved and are worsening.  He did not get dialysis yesterday.  He denies any shortness of breath but does have an exacerbation of his chronic back pain which is thought to be secondary to him vomiting. The patient is unable hold down his chronic pain medicine which includes methadone.  Patient does not have a medication list with him at this time.          Review of patient's allergies indicates:   Allergen Reactions    Antibiotic hc      Past Medical History:   Diagnosis Date    Anticoagulant long-term use     Arthritis     Asthma     Back pain     Diabetes mellitus     Encounter for blood transfusion     Eye abnormality right eye    injured as a child    Gastritis     Gastroparesis     Hemodialysis patient     Hypertension     Pneumonia 2013    Spend 6weeks in hospital at [place]    Renal disorder     Stroke      Past Surgical History:   Procedure Laterality Date    AV FISTULA PLACEMENT      BACK SURGERY      CHOLECYSTECTOMY      EYE SURGERY      R eye     Family  History   Problem Relation Age of Onset    Kidney disease Brother      Social History     Tobacco Use    Smoking status: Former Smoker     Years: 10.00     Last attempt to quit: 2015     Years since quittin.9    Smokeless tobacco: Never Used   Substance Use Topics    Alcohol use: No    Drug use: Yes     Frequency: 4.0 times per week     Types: Other-see comments     Comment: marijuana     Review of Systems   Constitutional: Positive for appetite change, diaphoresis and fatigue. Negative for chills and fever.   HENT: Negative for congestion, rhinorrhea and sore throat.    Eyes: Negative for pain.   Respiratory: Negative for cough, shortness of breath and wheezing.    Cardiovascular: Positive for chest pain. Negative for palpitations and leg swelling.   Gastrointestinal: Positive for abdominal pain, nausea and vomiting. Negative for diarrhea.   Endocrine: Negative for polydipsia and polyuria.   Genitourinary: Negative for dysuria and flank pain.   Musculoskeletal: Positive for back pain (chronic). Negative for myalgias and neck pain.   Skin: Negative for rash.   Neurological: Negative for dizziness, weakness, light-headedness, numbness and headaches.   Psychiatric/Behavioral: The patient is nervous/anxious.        Physical Exam     Initial Vitals [18 0546]   BP Pulse Resp Temp SpO2   (!) 188/92 106 16 99.2 °F (37.3 °C) 95 %      MAP       --         Physical Exam    Constitutional: He appears well-nourished.  Non-toxic appearance. He appears ill. He appears distressed (mild 2/2 abdominal pain).   HENT:   Head: Normocephalic and atraumatic.   Eyes: Pupils are equal, round, and reactive to light. No scleral icterus.   Cardiovascular: Regular rhythm. Exam reveals no gallop and no friction rub.    Machine length systolic murmur with tachycardia   Pulmonary/Chest: Effort normal. He has no wheezes. He has no rales.   Abdominal: Soft. Bowel sounds are normal. He exhibits pulsatile midline mass (In the  epigastrium and left subcostal region). He exhibits no distension, no pulsatile liver, no fluid wave, no ascites and no mass. There is tenderness in the epigastric area. There is no rigidity, no rebound, no guarding, no CVA tenderness, no tenderness at McBurney's point and negative June's sign. No hernia.   Neurological: He is alert and oriented to person, place, and time.   Skin: Skin is warm and dry. Capillary refill takes less than 2 seconds. No rash noted.   Psychiatric:   Anxious affect           ED Course   Procedures  Labs Reviewed   CBC W/ AUTO DIFFERENTIAL   COMPREHENSIVE METABOLIC PANEL   LIPASE   TROPONIN I          Imaging Results    None          Medical Decision Making:   Initial Assessment:   Patient with epigastric abdominal pain and vomiting as well as back pain.  Differential Diagnosis:   This is likely his chronic gastroparesis which is exacerbated because he is not able to take his full dose of methadone is having opiate withdrawal.  The vomiting is likely causing him to have an exacerbation of his chronic back pain in the setting of decreased opiate intake as well. I do not believe he is having a AAA, STEMI, bowel obstruction, perforated viscus, dissection.  ED Management:  Bedside ultrasound showed no signs of abdominal aortic aneurysm.  He definitely has an enlarged heart but mailed have a small pericardial effusion.  He does not appear to have cardiac tamponade. I will give him Reglan and Benadryl and morphine for nausea vomiting in opiate withdrawal.  Hopefully this improves his symptoms. Awaiting x-rays at this time as well as labs.  Anticipate CT scan and admission if truly unable to tolerate po.   Care transferred to Dr. Lowery.                       Clinical Impression:   The primary encounter diagnosis was Intractable vomiting with nausea, unspecified vomiting type. Diagnoses of Elevated troponin, Intractable vomiting with nausea, and Atrial fibrillation were also pertinent to this  visit.                             Daniele Aviles MD  08/27/18 4267

## 2018-08-26 NOTE — ASSESSMENT & PLAN NOTE
With prior coronary stent placement 2016/ Hx ischemic cardiomyopathy-  Continue home medications-  Patient reports he has been taking his asa

## 2018-08-26 NOTE — ASSESSMENT & PLAN NOTE
Cp/ Possible UA vs ACS/ Hx CAD with prior coronary stent placement-  Also Hx ESRD with HD  Telemetry  Trend troponin- Elevated on admit on 0.763  EKG shows no significant ST elevation or depression  Cardiology consulted  Continue asa, add sq heparin  Check lipid panel and TSH

## 2018-08-26 NOTE — SUBJECTIVE & OBJECTIVE
Past Medical History:   Diagnosis Date    Anticoagulant long-term use     Arthritis     Asthma     Back pain     Diabetes mellitus     Encounter for blood transfusion     Eye abnormality right eye    injured as a child    Gastritis     Gastroparesis     Hemodialysis patient     Hypertension     Pneumonia 2013    Spend 6weeks in hospital at [place]    Renal disorder     Stroke        Past Surgical History:   Procedure Laterality Date    AV FISTULA PLACEMENT      BACK SURGERY      CHOLECYSTECTOMY      EYE SURGERY      R eye       Review of patient's allergies indicates:   Allergen Reactions    Antibiotic hc        No current facility-administered medications on file prior to encounter.      Current Outpatient Medications on File Prior to Encounter   Medication Sig    albuterol (PROAIR HFA) 90 mcg/actuation inhaler INHALE TWO PUFFS EVERY 4 TO 6 HOURS AS NEEDED    amlodipine (NORVASC) 5 MG tablet Take 5 mg by mouth 2 (two) times daily.    aspirin 325 MG tablet Take 1 tablet (325 mg total) by mouth once daily.    calcium acetate (PHOSLO) 667 mg capsule Take 1 capsule (667 mg total) by mouth 3 (three) times daily with meals.    hydrALAZINE (APRESOLINE) 50 MG tablet Take 50 mg by mouth 3 (three) times daily.    levetiracetam (KEPPRA) 500 MG Tab Take 500 mg twice a day.  Take an extra tablet right after dialysis (making 3 tablets on your dialysis days)    lisinopril (PRINIVIL,ZESTRIL) 40 MG tablet Take 1 tablet (40 mg total) by mouth once daily.    methadone (DOLOPHINE) 10 MG tablet Take 9 tablets (90 mg total) by mouth once daily.    metoclopramide HCl (REGLAN) 10 MG tablet Take 1 tablet (10 mg total) by mouth 3 (three) times daily as needed (nausea/vomiting).    minoxidil (LONITEN) 2.5 MG tablet Take 3 tablets (7.5 mg total) by mouth 2 (two) times daily.    [DISCONTINUED] ondansetron (ZOFRAN) 4 MG tablet Take 1 tablet (4 mg total) by mouth every 8 (eight) hours as needed for Nausea.      Family History     Problem Relation (Age of Onset)    Kidney disease Brother        Tobacco Use    Smoking status: Former Smoker     Years: 10.00     Last attempt to quit: 2015     Years since quittin.9    Smokeless tobacco: Never Used   Substance and Sexual Activity    Alcohol use: No    Drug use: Yes     Frequency: 4.0 times per week     Types: Other-see comments     Comment: marijuana    Sexual activity: Yes     Review of Systems   Constitutional: Positive for activity change, appetite change, chills, diaphoresis and fatigue. Negative for fever.   HENT: Negative for ear pain and facial swelling.    Eyes: Negative for pain and redness.   Respiratory: Positive for chest tightness. Negative for cough, choking and shortness of breath.    Cardiovascular: Positive for chest pain. Negative for palpitations and leg swelling.   Gastrointestinal: Positive for abdominal pain, diarrhea, nausea and vomiting. Negative for abdominal distention and constipation.   Endocrine: Negative for polydipsia and polyphagia.   Genitourinary: Negative for difficulty urinating, dysuria, flank pain and hematuria.        Reports rarely voids   Musculoskeletal: Positive for back pain. Negative for gait problem, neck pain and neck stiffness.        Chronic back pain   Skin: Negative for color change.   Allergic/Immunologic: Negative for food allergies.   Neurological: Negative for seizures, syncope, facial asymmetry, speech difficulty and weakness.   Hematological: Does not bruise/bleed easily.   Psychiatric/Behavioral: Negative for agitation, behavioral problems, confusion, hallucinations and suicidal ideas. The patient is not nervous/anxious.      Objective:     Vital Signs (Most Recent):  Temp: 98.1 °F (36.7 °C) (18 1114)  Pulse: 79 (18 1114)  Resp: 12 (18 1114)  BP: 105/74 (18 1114)  SpO2: 96 % (18 1114) Vital Signs (24h Range):  Temp:  [98.1 °F (36.7 °C)-99.2 °F (37.3 °C)] 98.1 °F (36.7  °C)  Pulse:  [] 79  Resp:  [12-16] 12  SpO2:  [94 %-100 %] 96 %  BP: (105-188)/(70-92) 105/74     Weight: 94.8 kg (209 lb)  Body mass index is 28.35 kg/m².    Physical Exam   Constitutional: He is oriented to person, place, and time. He appears well-developed and well-nourished. No distress.   HENT:   Head: Normocephalic and atraumatic.   Eyes: Conjunctivae are normal. Right eye exhibits no discharge. Left eye exhibits no discharge.   Right eye blindness   Neck: Normal range of motion. Neck supple. No JVD present.   Cardiovascular: Normal rate, regular rhythm, normal heart sounds and intact distal pulses.   Av shunt to arm with good thrill and bruit   Pulmonary/Chest: Effort normal and breath sounds normal. No stridor. No respiratory distress. He has no wheezes. He has no rales. He exhibits no tenderness.   Abdominal: Soft. Bowel sounds are normal. He exhibits no distension. There is no tenderness. There is no guarding.   Genitourinary:   Genitourinary Comments: Not examined   Musculoskeletal: Normal range of motion. He exhibits no edema.   Neurological: He is alert and oriented to person, place, and time. No cranial nerve deficit.   Skin: Skin is warm and dry. Capillary refill takes less than 2 seconds. He is not diaphoretic.   Psychiatric: He has a normal mood and affect. His behavior is normal. Judgment and thought content normal.           Significant Labs: Reviewed    Significant Imaging: Reviewed

## 2018-08-26 NOTE — ED NOTES
"Hospitalist group requests 2nd Floor/Telemetry bed--house supervisor aware & new assignment made. Attempted report to 2nd Floor--"RN to call back"  "

## 2018-08-26 NOTE — UM SECONDARY REVIEW
Physician Advisor External    Level of Care Issue    Approved Inpatient for admit 8/26/18 by dr doss at [facility]..

## 2018-08-26 NOTE — ASSESSMENT & PLAN NOTE
Hx chronic pain with chronic pain syndrome-  Continue home methadone  Patient reports he has been taking his methadone regularly and has not run out

## 2018-08-27 PROBLEM — D69.6 THROMBOCYTOPENIA: Status: ACTIVE | Noted: 2018-08-27

## 2018-08-27 PROBLEM — I48.0 PAROXYSMAL ATRIAL FIBRILLATION WITH RVR: Status: ACTIVE | Noted: 2018-08-27

## 2018-08-27 LAB
ANION GAP SERPL CALC-SCNC: 17 MMOL/L
ANISOCYTOSIS BLD QL SMEAR: SLIGHT
ANISOCYTOSIS BLD QL SMEAR: SLIGHT
BASOPHILS # BLD AUTO: 0 K/UL
BASOPHILS # BLD AUTO: ABNORMAL K/UL
BASOPHILS NFR BLD: 0 %
BASOPHILS NFR BLD: 0 %
BUN SERPL-MCNC: 85 MG/DL
CALCIUM SERPL-MCNC: 9.2 MG/DL
CHLORIDE SERPL-SCNC: 95 MMOL/L
CO2 SERPL-SCNC: 21 MMOL/L
CREAT SERPL-MCNC: 10.1 MG/DL
DIFFERENTIAL METHOD: ABNORMAL
DIFFERENTIAL METHOD: ABNORMAL
DOHLE BOD BLD QL SMEAR: PRESENT
EOSINOPHIL # BLD AUTO: 0 K/UL
EOSINOPHIL # BLD AUTO: ABNORMAL K/UL
EOSINOPHIL NFR BLD: 0 %
EOSINOPHIL NFR BLD: 0 %
ERYTHROCYTE [DISTWIDTH] IN BLOOD BY AUTOMATED COUNT: 17.3 %
ERYTHROCYTE [DISTWIDTH] IN BLOOD BY AUTOMATED COUNT: 17.7 %
EST. GFR  (AFRICAN AMERICAN): 6 ML/MIN/1.73 M^2
EST. GFR  (NON AFRICAN AMERICAN): 5 ML/MIN/1.73 M^2
ESTIMATED AVG GLUCOSE: ABNORMAL MG/DL
GLUCOSE SERPL-MCNC: 46 MG/DL
HBA1C MFR BLD HPLC: <4 %
HCT VFR BLD AUTO: 33.4 %
HCT VFR BLD AUTO: 40.9 %
HGB BLD-MCNC: 11 G/DL
HGB BLD-MCNC: 13.4 G/DL
LYMPHOCYTES # BLD AUTO: 0.7 K/UL
LYMPHOCYTES # BLD AUTO: ABNORMAL K/UL
LYMPHOCYTES NFR BLD: 4.2 %
LYMPHOCYTES NFR BLD: 8 %
MCH RBC QN AUTO: 32 PG
MCH RBC QN AUTO: 32.3 PG
MCHC RBC AUTO-ENTMCNC: 32.7 G/DL
MCHC RBC AUTO-ENTMCNC: 33 G/DL
MCV RBC AUTO: 98 FL
MCV RBC AUTO: 98 FL
MONOCYTES # BLD AUTO: 1.7 K/UL
MONOCYTES # BLD AUTO: ABNORMAL K/UL
MONOCYTES NFR BLD: 10.2 %
MONOCYTES NFR BLD: 3 %
NEUTROPHILS # BLD AUTO: 14 K/UL
NEUTROPHILS NFR BLD: 78 %
NEUTROPHILS NFR BLD: 85.6 %
NEUTS BAND NFR BLD MANUAL: 11 %
PLATELET # BLD AUTO: 83 K/UL
PLATELET # BLD AUTO: 90 K/UL
PLATELET BLD QL SMEAR: ABNORMAL
PLATELET BLD QL SMEAR: ABNORMAL
PMV BLD AUTO: 7.6 FL
PMV BLD AUTO: 8.2 FL
POCT GLUCOSE: 201 MG/DL (ref 70–110)
POCT GLUCOSE: 34 MG/DL (ref 70–110)
POCT GLUCOSE: 44 MG/DL (ref 70–110)
POCT GLUCOSE: 56 MG/DL (ref 70–110)
POCT GLUCOSE: 74 MG/DL (ref 70–110)
POCT GLUCOSE: 84 MG/DL (ref 70–110)
POCT GLUCOSE: 86 MG/DL (ref 70–110)
POLYCHROMASIA BLD QL SMEAR: ABNORMAL
POTASSIUM SERPL-SCNC: 4.5 MMOL/L
RBC # BLD AUTO: 3.41 M/UL
RBC # BLD AUTO: 4.18 M/UL
SODIUM SERPL-SCNC: 133 MMOL/L
WBC # BLD AUTO: 16.3 K/UL
WBC # BLD AUTO: 9.2 K/UL

## 2018-08-27 PROCEDURE — 85025 COMPLETE CBC W/AUTO DIFF WBC: CPT

## 2018-08-27 PROCEDURE — 25000003 PHARM REV CODE 250: Performed by: NURSE PRACTITIONER

## 2018-08-27 PROCEDURE — 63600175 PHARM REV CODE 636 W HCPCS: Performed by: NURSE PRACTITIONER

## 2018-08-27 PROCEDURE — 36415 COLL VENOUS BLD VENIPUNCTURE: CPT

## 2018-08-27 PROCEDURE — 25000003 PHARM REV CODE 250: Performed by: EMERGENCY MEDICINE

## 2018-08-27 PROCEDURE — 96372 THER/PROPH/DIAG INJ SC/IM: CPT

## 2018-08-27 PROCEDURE — 96374 THER/PROPH/DIAG INJ IV PUSH: CPT

## 2018-08-27 PROCEDURE — 85007 BL SMEAR W/DIFF WBC COUNT: CPT

## 2018-08-27 PROCEDURE — A4216 STERILE WATER/SALINE, 10 ML: HCPCS | Performed by: EMERGENCY MEDICINE

## 2018-08-27 PROCEDURE — 12000002 HC ACUTE/MED SURGE SEMI-PRIVATE ROOM

## 2018-08-27 PROCEDURE — 80100016 HC MAINTENANCE HEMODIALYSIS

## 2018-08-27 PROCEDURE — 96365 THER/PROPH/DIAG IV INF INIT: CPT

## 2018-08-27 PROCEDURE — 83036 HEMOGLOBIN GLYCOSYLATED A1C: CPT

## 2018-08-27 PROCEDURE — 80048 BASIC METABOLIC PNL TOTAL CA: CPT

## 2018-08-27 PROCEDURE — 25000003 PHARM REV CODE 250: Performed by: HOSPITALIST

## 2018-08-27 PROCEDURE — 93005 ELECTROCARDIOGRAM TRACING: CPT

## 2018-08-27 PROCEDURE — 85027 COMPLETE CBC AUTOMATED: CPT

## 2018-08-27 RX ORDER — IBUPROFEN 200 MG
16 TABLET ORAL
Status: DISCONTINUED | OUTPATIENT
Start: 2018-08-27 | End: 2018-09-07 | Stop reason: HOSPADM

## 2018-08-27 RX ORDER — IBUPROFEN 200 MG
24 TABLET ORAL
Status: DISCONTINUED | OUTPATIENT
Start: 2018-08-27 | End: 2018-09-07 | Stop reason: HOSPADM

## 2018-08-27 RX ORDER — SODIUM CHLORIDE 9 MG/ML
INJECTION, SOLUTION INTRAVENOUS CONTINUOUS
Status: DISCONTINUED | OUTPATIENT
Start: 2018-08-27 | End: 2018-08-29

## 2018-08-27 RX ORDER — METOPROLOL TARTRATE 1 MG/ML
5 INJECTION, SOLUTION INTRAVENOUS
Status: DISPENSED | OUTPATIENT
Start: 2018-08-27 | End: 2018-08-27

## 2018-08-27 RX ADMIN — DEXTROSE MONOHYDRATE 25 G: 500 INJECTION PARENTERAL at 09:08

## 2018-08-27 RX ADMIN — LEVETIRACETAM 500 MG: 500 TABLET ORAL at 12:08

## 2018-08-27 RX ADMIN — ASPIRIN 325 MG ORAL TABLET 325 MG: 325 PILL ORAL at 12:08

## 2018-08-27 RX ADMIN — MINOXIDIL 7.5 MG: 2.5 TABLET ORAL at 09:08

## 2018-08-27 RX ADMIN — HEPARIN SODIUM 5000 UNITS: 5000 INJECTION, SOLUTION INTRAVENOUS; SUBCUTANEOUS at 01:08

## 2018-08-27 RX ADMIN — DEXTROSE MONOHYDRATE 12.5 G: 500 INJECTION PARENTERAL at 06:08

## 2018-08-27 RX ADMIN — METOCLOPRAMIDE 5 MG: 5 INJECTION, SOLUTION INTRAMUSCULAR; INTRAVENOUS at 06:08

## 2018-08-27 RX ADMIN — METOCLOPRAMIDE 5 MG: 5 INJECTION, SOLUTION INTRAMUSCULAR; INTRAVENOUS at 05:08

## 2018-08-27 RX ADMIN — Medication 3 ML: at 05:08

## 2018-08-27 RX ADMIN — LEVETIRACETAM 500 MG: 500 TABLET ORAL at 09:08

## 2018-08-27 RX ADMIN — Medication 3 ML: at 01:08

## 2018-08-27 RX ADMIN — Medication 3 ML: at 09:08

## 2018-08-27 RX ADMIN — METOPROLOL TARTRATE 5 MG: 5 INJECTION INTRAVENOUS at 01:08

## 2018-08-27 RX ADMIN — CALCIUM ACETATE 667 MG: 667 CAPSULE ORAL at 05:08

## 2018-08-27 RX ADMIN — CALCIUM ACETATE 667 MG: 667 CAPSULE ORAL at 12:08

## 2018-08-27 RX ADMIN — HEPARIN SODIUM 5000 UNITS: 5000 INJECTION, SOLUTION INTRAVENOUS; SUBCUTANEOUS at 09:08

## 2018-08-27 RX ADMIN — SODIUM CHLORIDE 500 ML: 0.9 INJECTION, SOLUTION INTRAVENOUS at 01:08

## 2018-08-27 RX ADMIN — HEPARIN SODIUM 5000 UNITS: 5000 INJECTION, SOLUTION INTRAVENOUS; SUBCUTANEOUS at 05:08

## 2018-08-27 RX ADMIN — METHADONE HYDROCHLORIDE 90 MG: 10 TABLET ORAL at 12:08

## 2018-08-27 RX ADMIN — PANTOPRAZOLE SODIUM 40 MG: 40 TABLET, DELAYED RELEASE ORAL at 12:08

## 2018-08-27 NOTE — ASSESSMENT & PLAN NOTE
Resolved at this time  Monitor for recurrence  Prn antiemetic medication  Lipase and  Wbcs Wnl   KUB results noted  Continue  PPI

## 2018-08-27 NOTE — PLAN OF CARE
Problem: Patient Care Overview  Goal: Plan of Care Review  Outcome: Ongoing (interventions implemented as appropriate)   08/27/18 0572   Coping/Psychosocial   Plan Of Care Reviewed With patient   Alert and oriented.urinal at bedside. PRN meds given for nausea. Free of fall or injury. Safety maintained. Call light in each. Will continue to monitor.

## 2018-08-27 NOTE — CONSULTS
[facility]  Cardiology  Consult Note    Patient Name: João Aguirre  MRN: [id number]  Admission Date: 8/26/2018  Hospital Length of Stay: 1 days  Code Status: Full Code   Attending Provider: Beau Law MD   Consulting Provider: Renny Kee MD  Primary Care Physician: Primary Doctor No  Principal Problem:Intractable vomiting with nausea    Patient information was obtained from patient and ER records.     Consults  Subjective:     Chief Complaint:  afib elevated troponins     HPI:   This is a 53-year-old male with a past medical history of end-stage renal disease on hemodialysis Tuesday, Thursday, and  Saturday, diabetes, chronic back pain on methadone, CAD with prior coronary stent placement 2 years ago, hypertension, prior pneumonia, prior stroke, and recurrent episodes of mid epigastric pain and vomiting but no formal diagnosis of gastroparesis who presents to emergency department for evaluation of epigastric, abdominal pain, and CP with inability to tolerate liquids or food for the past few days.  Patient was seen here in the emergency department yesterday where he underwent basic labs and was given antiemetics.  He was discharged home with a prescription for Reglan and Benadryl which she has not been able to fill secondary to being home vomiting. He presents today via EMS because the symptoms have not improved and are worsening.  He did not get dialysis yesterday.  He denies any shortness of breath but does have an exacerbation of his chronic back pain which is thought to be secondary to him vomiting. The patient is unable hold down his chronic pain medicine which includes methadone.  Patient does not have a medication list with him at this time.     Patient found to have elevated troponin of 0.763 and reports he has Hx of CAD with prior coronary stent placement 2 years ago but has not been following up with cardiology. He does report some left sided chest pain and  occasionally diaphoresis along with his nausea and vomiting. His EKG currently with no significant St elevation or depression. He does have Hx ESRD requiring HD with prior elevated troponins in past of 0.16-0.17.       Past Medical History:   Diagnosis Date    Anticoagulant long-term use     Arthritis     Asthma     Back pain     Coronary artery disease 2013    stent    Diabetes mellitus     Encounter for blood transfusion     Eye abnormality right eye    injured as a child    Gastritis     Gastroparesis     Hemodialysis patient     Hypertension     Pneumonia 2013    Spend 6weeks in hospital at [place]    Renal disorder     Stroke        Past Surgical History:   Procedure Laterality Date    AV FISTULA PLACEMENT      BACK SURGERY      CARDIAC SURGERY  2013    heart stent    CHOLECYSTECTOMY      EYE SURGERY      R eye       Review of patient's allergies indicates:   Allergen Reactions    Antibiotic hc      Counter acts with methodone       No current facility-administered medications on file prior to encounter.      Current Outpatient Medications on File Prior to Encounter   Medication Sig    albuterol (PROAIR HFA) 90 mcg/actuation inhaler INHALE TWO PUFFS EVERY 4 TO 6 HOURS AS NEEDED    amlodipine (NORVASC) 5 MG tablet Take 5 mg by mouth 2 (two) times daily.    aspirin 325 MG tablet Take 1 tablet (325 mg total) by mouth once daily.    calcium acetate (PHOSLO) 667 mg capsule Take 1 capsule (667 mg total) by mouth 3 (three) times daily with meals.    hydrALAZINE (APRESOLINE) 50 MG tablet Take 50 mg by mouth 3 (three) times daily.    levetiracetam (KEPPRA) 500 MG Tab Take 500 mg twice a day.  Take an extra tablet right after dialysis (making 3 tablets on your dialysis days)    lisinopril (PRINIVIL,ZESTRIL) 40 MG tablet Take 1 tablet (40 mg total) by mouth once daily.    methadone (DOLOPHINE) 10 MG tablet Take 9 tablets (90 mg total) by mouth once daily.    metoclopramide HCl (REGLAN) 10 MG  tablet Take 1 tablet (10 mg total) by mouth 3 (three) times daily as needed (nausea/vomiting).    minoxidil (LONITEN) 2.5 MG tablet Take 3 tablets (7.5 mg total) by mouth 2 (two) times daily.     Family History     Problem Relation (Age of Onset)    Aneurysm Mother, Father (77)    Diabetes Brother    Heart disease Father, Paternal Grandmother    Kidney disease Brother        Tobacco Use    Smoking status: Former Smoker     Years: 10.00     Last attempt to quit: 2015     Years since quittin.9    Smokeless tobacco: Never Used   Substance and Sexual Activity    Alcohol use: No    Drug use: Yes     Frequency: 4.0 times per week     Types: Other-see comments     Comment: marijuana    Sexual activity: Yes     Review of Systems   Constitution: Negative for fever.   HENT: Negative.    Cardiovascular: Positive for irregular heartbeat. Negative for chest pain.   Respiratory: Negative.    Skin: Negative.    Musculoskeletal: Negative.      Objective:     Vital Signs (Most Recent):  Temp: 97.5 °F (36.4 °C) (18 1520)  Pulse: 60 (18 1520)  Resp: 20 (18 1520)  BP: (!) 109/51 (18 1520)  SpO2: 95 % (18 1520) Vital Signs (24h Range):  Temp:  [96.8 °F (36 °C)-97.6 °F (36.4 °C)] 97.5 °F (36.4 °C)  Pulse:  [] 60  Resp:  [16-20] 20  SpO2:  [93 %-98 %] 95 %  BP: ()/(51-83) 109/51     Weight: 95.3 kg (210 lb)  Body mass index is 28.48 kg/m².    SpO2: 95 %  O2 Device (Oxygen Therapy): room air      Intake/Output Summary (Last 24 hours) at 2018 1728  Last data filed at 2018 1115  Gross per 24 hour   Intake 500 ml   Output 500 ml   Net 0 ml       Lines/Drains/Airways     Drain                 Hemodialysis AV Fistula Left forearm -- days         Hemodialysis AV Fistula Left forearm -- days          Peripheral Intravenous Line                 Peripheral IV - Single Lumen 18 Right Wrist 1 day                Physical Exam   Constitutional: He is oriented to person, place, and  time. He appears well-developed.   Eyes: Pupils are equal, round, and reactive to light.   Neck: Normal range of motion.   Cardiovascular: Normal rate and regular rhythm.   Pulmonary/Chest: Effort normal and breath sounds normal.   Abdominal: Soft. Bowel sounds are normal.   Neurological: He is alert and oriented to person, place, and time.       Significant Labs:   ABG: No results for input(s): PH, PCO2, HCO3, POCSATURATED, BE in the last 48 hours., Blood Culture: No results for input(s): LABBLOO in the last 48 hours., CMP   Recent Labs   Lab  08/26/18   0610  08/27/18   0452   NA  136  133*   K  3.7  4.5   CL  97  95   CO2  18*  21*   GLU  77  46*   BUN  65*  85*   CREATININE  9.6*  10.1*   CALCIUM  10.1  9.2   PROT  7.7   --    ALBUMIN  2.9*   --    BILITOT  0.9   --    ALKPHOS  285*   --    AST  29   --    ALT  15   --    ANIONGAP  21*  17*   ESTGFRAFRICA  6*  6*   EGFRNONAA  6*  5*   , Lipid Panel   Recent Labs   Lab  08/26/18   1155   CHOL  89*   HDL  <5*   LDLCALC  Unable to calculate   TRIG  234*   CHOLHDL  Unable to calculate    and Troponin   Recent Labs   Lab  08/26/18   0610  08/26/18   1155  08/26/18   1809   TROPONINI  0.763*  0.745*  0.600*       Significant Imaging: X-Ray: CXR: X-Ray Chest 1 View (CXR): No results found for this visit on 08/26/18.  Assessment and Plan:     Active Diagnoses:    Diagnosis Date Noted POA    PRINCIPAL PROBLEM:  Intractable vomiting with nausea [R11.2] 10/26/2015 Yes    Paroxysmal atrial fibrillation with RVR [I48.0] 08/27/2018 No    Thrombocytopenia [D69.6] 08/27/2018 Yes    Chest pain [R07.9] 08/26/2018 Yes    Elevated troponin [R74.8] 08/26/2018 Yes    History of coronary artery stent placement [Z95.5] 08/26/2018 Not Applicable    Unstable angina [I20.0] 08/26/2018 Yes    Benign hypertension with ESRD (end-stage renal disease) [I12.0, N18.6] 03/01/2017 Yes    Chronic hepatitis C without hepatic coma [B18.2] 02/21/2017 Yes     Chronic    Cirrhosis of liver  with ascites [K74.60] 02/21/2017 Yes    Methadone dependence [F11.20] 02/21/2017 Yes     Chronic    Abdominal pain [R10.9] 08/01/2016 Yes    Dehydration [E86.0] 10/26/2015 Yes    Chronic back pain [M54.9, G89.29] 08/30/2015 Yes    Coronary artery disease involving native coronary artery of native heart without angina pectoris [I25.10] 08/29/2015 Yes     Chronic    ESRD (T,Th,Sat) dialysis onset 2013 [N18.6] 08/29/2015 Yes     Chronic    Ischemic cardiomyopathy, LVEF 25%-30% [I25.5] 08/29/2015 Yes      Problems Resolved During this Admission:       VTE Risk Mitigation (From admission, onward)        Ordered     heparin (porcine) injection 5,000 Units  Every 8 hours      08/26/18 1139     IP VTE HIGH RISK PATIENT  Once      08/26/18 1106     Place sequential compression device  Until discontinued      08/26/18 1106     Place ZACHARY hose  Until discontinued      08/26/18 1106          Thank you for your consult. I will follow-up with patient. Please contact us if you have any additional questions.    Renny Kee MD  Cardiology   [facility]

## 2018-08-27 NOTE — NURSING
Report given BRAVO Winn with Dialysis. Pt being wheeled to dialysis room via w/c. Pt AAOx4, umer, NAD noted.

## 2018-08-27 NOTE — NURSING
DX: intractable nausea and vomiting. Pt is complaining of pain in back and stomach. Pt is asking if he can have something for pain other than tylenol. His also vomiting gave zofran at 2004 and reglan at 2103. Pt stated its not working. SHEILA Doyle notified.

## 2018-08-27 NOTE — CONSULTS
INPATIENT NEPHROLOGY CONSULT   [facility] NEPHROLOGY    João Aguirre  08/27/2018    Reason for consultation:  esrd    Chief Complaint:   Chief Complaint   Patient presents with    Abdominal Pain     with N/V; missed dialysis Saturday          History of Present Illness:     Patient is a 53-year-old male with a past medical history of end-stage renal disease on hemodialysis Tuesday Thursday Saturday, diabetes, chronic back pain on methadone, long-term anticoagulant use hypertension, prior pneumonia, prior stroke, and recurrent episodes of mid epigastric pain and vomiting but no formal diagnosis of gastroparesis presents to emergency department for evaluation of epigastric abdominal pain with inability to tolerate liquids or food for the past few days.  Patient was seen here in the emergency department yesterday where he underwent basic labs and was given antiemetics.  He was discharged home with a prescription for Reglan and Benadryl which she has not been able to fill secondary to being home vomiting. He presents today via EMS because the symptoms have not improved and are worsening.  He did not get dialysis yesterday.  He denies any shortness of breath but does have an exacerbation of his chronic back pain which is thought to be secondary to him vomiting. The patient is unable hold down his chronic pain medicine which includes methadone.  Patient does not have a medication list with him at this time.       8/27  Seen on dialysis.  No nausea currently.  No chest pain or sob        Plan of Care:       Assessment:  esrd  Nausea  Anemia  hypotension    Plan:     seen on dialysis   Put back on t,th,sat schedule  reagan with hd  uf as tolerated  Hold hydralazine      Thank you for allowing us to participate in this patient's care. We will continue to follow.    Vital Signs:  Temp Readings from Last 3 Encounters:   08/27/18 97 °F (36.1 °C) (Tympanic)   08/25/18 97.8 °F (36.6 °C) (Oral)   10/19/17 97.6 °F (36.4  °C)       Pulse Readings from Last 3 Encounters:   18 (!) 58   18 75   10/19/17 (!) 59       BP Readings from Last 3 Encounters:   18 104/83   18 133/71   10/19/17 (!) 166/81       Weight:  Wt Readings from Last 3 Encounters:   18 95.3 kg (210 lb)   18 94.8 kg (209 lb)   10/17/17 94.8 kg (209 lb 0 oz)       Past Medical & Surgical History:  Past Medical History:   Diagnosis Date    Anticoagulant long-term use     Arthritis     Asthma     Back pain     Coronary artery disease 2013    stent    Diabetes mellitus     Encounter for blood transfusion     Eye abnormality right eye    injured as a child    Gastritis     Gastroparesis     Hemodialysis patient     Hypertension     Pneumonia 2013    Spend 6weeks in hospital at [facility]    Renal disorder     Stroke        Past Surgical History:   Procedure Laterality Date    AV FISTULA PLACEMENT      BACK SURGERY      CARDIAC SURGERY  2013    heart stent    CHOLECYSTECTOMY      EYE SURGERY      R eye       Past Social History:  Social History     Socioeconomic History    Marital status: Legally      Spouse name: None    Number of children: None    Years of education: None    Highest education level: None   Social Needs    Financial resource strain: None    Food insecurity - worry: None    Food insecurity - inability: None    Transportation needs - medical: None    Transportation needs - non-medical: None   Occupational History    None   Tobacco Use    Smoking status: Former Smoker     Years: 10.00     Last attempt to quit: 2015     Years since quittin.9    Smokeless tobacco: Never Used   Substance and Sexual Activity    Alcohol use: No    Drug use: Yes     Frequency: 4.0 times per week     Types: Other-see comments     Comment: marijuana    Sexual activity: Yes   Other Topics Concern    None   Social History Narrative    None       Medications:  No current facility-administered medications  on file prior to encounter.      Current Outpatient Medications on File Prior to Encounter   Medication Sig Dispense Refill    albuterol (PROAIR HFA) 90 mcg/actuation inhaler INHALE TWO PUFFS EVERY 4 TO 6 HOURS AS NEEDED      amlodipine (NORVASC) 5 MG tablet Take 5 mg by mouth 2 (two) times daily.      aspirin 325 MG tablet Take 1 tablet (325 mg total) by mouth once daily. 30 tablet 1    calcium acetate (PHOSLO) 667 mg capsule Take 1 capsule (667 mg total) by mouth 3 (three) times daily with meals. 90 capsule 11    hydrALAZINE (APRESOLINE) 50 MG tablet Take 50 mg by mouth 3 (three) times daily.      levetiracetam (KEPPRA) 500 MG Tab Take 500 mg twice a day.  Take an extra tablet right after dialysis (making 3 tablets on your dialysis days) 70 tablet 2    lisinopril (PRINIVIL,ZESTRIL) 40 MG tablet Take 1 tablet (40 mg total) by mouth once daily. 30 tablet 1    methadone (DOLOPHINE) 10 MG tablet Take 9 tablets (90 mg total) by mouth once daily.  0    metoclopramide HCl (REGLAN) 10 MG tablet Take 1 tablet (10 mg total) by mouth 3 (three) times daily as needed (nausea/vomiting). 10 tablet 0    minoxidil (LONITEN) 2.5 MG tablet Take 3 tablets (7.5 mg total) by mouth 2 (two) times daily. 90 tablet 0     Scheduled Meds:   amLODIPine  5 mg Oral BID    aspirin  325 mg Oral Daily    calcium acetate  667 mg Oral TID WM    heparin (porcine)  5,000 Units Subcutaneous Q8H    hydrALAZINE  50 mg Oral TID    levETIRAcetam  500 mg Oral BID    lisinopril  40 mg Oral Daily    methadone  90 mg Oral Daily    minoxidil  7.5 mg Oral BID    pantoprazole  40 mg Oral Daily    sodium chloride 0.9%  3 mL Intravenous Q8H     Continuous Infusions:  PRN Meds:.acetaminophen, dextrose 50%, dextrose 50%, glucose, glucose, metoclopramide HCl, ondansetron    Allergies:  Antibiotic hc    Past Family History:  Reviewed; refer to Hospitalist Admission Note    Review of Systems:  Review of Systems - All 14 systems reviewed and  negative, except as noted in HPI    Physical Exam:    /83   Pulse (!) 58   Temp 97 °F (36.1 °C) (Tympanic)   Resp 18   Ht 6' (1.829 m)   Wt 95.3 kg (210 lb)   SpO2 96%   BMI 28.48 kg/m²     General Appearance:    Alert, cooperative, no distress, appears stated age   Head:    Normocephalic, without obvious abnormality, atraumatic   Eyes:    PER, conjunctiva/corneas clear, EOM's intact in both eyes        Throat:   Lips, mucosa, and tongue normal; teeth and gums normal   Back:     Symmetric, no curvature, ROM normal, no CVA tenderness   Lungs:      Exp wheezes   Chest wall:    No tenderness or deformity   Heart:    Regular rate and rhythm, S1 and S2 normal, no murmur, rub   or gallop   Abdomen:     Soft, non-tender, bowel sounds active all four quadrants,     no masses, no organomegaly   Extremities:   Extremities normal, atraumatic, no cyanosis or edema   Pulses:   2+ and symmetric all extremities   MSK:   No joint or muscle swelling, tenderness or deformity   Skin:   Skin color, texture, turgor normal, no rashes or lesions   Neurologic:   CNII-XII intact, normal strength and sensation       Throughout.  No flap     Results:  Lab Results   Component Value Date     (L) 08/27/2018    K 4.5 08/27/2018    CL 95 08/27/2018    CO2 21 (L) 08/27/2018    BUN 85 (H) 08/27/2018    CREATININE 10.1 (H) 08/27/2018    CALCIUM 9.2 08/27/2018    ANIONGAP 17 (H) 08/27/2018    ESTGFRAFRICA 6 (A) 08/27/2018    EGFRNONAA 5 (A) 08/27/2018       Lab Results   Component Value Date    CALCIUM 9.2 08/27/2018    PHOS 9.0 (HH) 10/19/2017       Recent Labs   Lab  08/27/18   0452   WBC  16.30*   RBC  3.41*   HGB  11.0*   HCT  33.4*   PLT  83*   MCV  98   MCH  32.3*   MCHC  33.0       I have personally reviewed pertinent radiological imaging and reports.    Tej Carranza MD  Nephrology  [facility] [place]  [phone]

## 2018-08-27 NOTE — ASSESSMENT & PLAN NOTE
Patient reports he missed his HD 8/25/18  Nephrology consulted for renal/HD management  Pt had HD today

## 2018-08-27 NOTE — HOSPITAL COURSE
He was noted to have an elevated troponin level and Cardiology was also consulted. Nephrology was consulted for renal/ HD management. Patient initially noted to be in SR, however, later he converted to atrial fibrillation with some RVR with heart rate up to 120-130s. Dr. Kee was notified and patient given IV Bblockade.  The patient later converted back to SR. He was placed on toprol Xl 50mg po daily.  The patient was noted to have a decrease in his platelets and sq heparin was discontinued. Developed fever with evidence of sepsis overnight, now on broad spectrum antibiotics with thombocytopenia and coagulopathy.and noted to have diabetic foot ulcer /cellulitis of right thigh, lower leg and ankle area. Dr Ding from podiatry did I and D on foot ulcer/abscess and wound care continued. Osteomyelitis ruled out. THe cellulits of legs eventually formed several small abscess leg which were I and D  By Dr. Rothman, general surgery -prior to dc and wound care with packing changes continued. HH to contiue as outpt. abx transitioned to po cefdinir at dc -per Dr Lenz, ID who followed pt in hospital .   Diarrhea tested positive for C diff -po vancomycin started in hospital however requires PA from insurance and this was not complete at time of dc. I was not notified until after pt dc so called pharmacy to start po flagyl until PA obtained.    Pain control -initially methadone decreased due to sepis then resumed and he is to fu at methadone clinic.   DM was controlled -had some hypoglycemia requiring D10 but then appetite improved.   ESRD-pt received continue HD and meds renal adjusted.   PE -ALERT nad-temporal wasting noted.   heent-nontraumatic, oral mmm   lungs clear   cor irreg/irreg    abd soft, bsx4,  ext -dressing intact -4 small aabscess with packing r upper /lower leg and right heel plantar area .    neuro-Ox3, sensation intact   skin -w/d  Psych-cooperative and calm. Insight fair.   Query compliance -seems  knowledgeable about situation and need for improved blood sugar control -requested new glucometer and bp cuff-advised by CM would have to purchase

## 2018-08-27 NOTE — PROGRESS NOTES
[facility] Hospital Medicine  Progress Note    Patient Name: João Aguirre  MRN: [id number]  Patient Class: IP- Inpatient   Admission Date: 8/26/2018  Length of Stay: 1 days  Attending Physician: Beau Law MD  Primary Care Provider: Primary Doctor No      Subjective:     Principal Problem:Intractable vomiting with nausea    HPI:  This is a 53-year-old male with a past medical history of end-stage renal disease on hemodialysis Tuesday, Thursday, and  Saturday, diabetes, chronic back pain on methadone, CAD with prior coronary stent placement 2 years ago, hypertension, prior pneumonia, prior stroke, and recurrent episodes of mid epigastric pain and vomiting but no formal diagnosis of gastroparesis who presents to emergency department for evaluation of epigastric, abdominal pain, and CP with inability to tolerate liquids or food for the past few days.  Patient was seen here in the emergency department yesterday where he underwent basic labs and was given antiemetics.  He was discharged home with a prescription for Reglan and Benadryl which she has not been able to fill secondary to being home vomiting. He presents today via EMS because the symptoms have not improved and are worsening.  He did not get dialysis yesterday.  He denies any shortness of breath but does have an exacerbation of his chronic back pain which is thought to be secondary to him vomiting. The patient is unable hold down his chronic pain medicine which includes methadone.  Patient does not have a medication list with him at this time.    Patient found to have elevated troponin of 0.763 and reports he has Hx of CAD with prior coronary stent placement 2 years ago but has not been following up with cardiology. He does report some left sided chest pain and occasionally diaphoresis along with his nausea and vomiting. His EKG currently with no significant St elevation or depression. He does have Hx ESRD requiring HD with  prior elevated troponins in past of 0.16-0.17. Patient discussed with Dr. Law. Will consult cardiology and trend troponins due to prior cardiac Hx and continue monitoring GI status as well.          Hospital Course:  The patient was monitored closely during his stay. He was placed on continuous telemetry monitoring. He was noted to have an elevated troponin level and Cardiology was also consulted. Nephrology was consulted for renal/ HD management. Patient initially noted to be in SR, however, later he converted to atrial fibrillation with some RVR with heart rate up to 120-130s. Dr. Kee was notified and patient given IV Bblockade. He was also scheduled for a Lexiscan stress test.     Interval History: Patient initially noted to be in SR on admit, however, this am around 1100, he converted to atrial fibrillation with some RVR with heart rate up to 120-130s. Dr. Kee was notified and patient given IV Bblockade. He was also scheduled for a Lexiscan stress test. Patient also with some mild hypotension and 500 ML NS IVF bolus ordered. His hydralazine and lisinopril was discontinued.      Review of Systems   Constitutional: Positive for fatigue. Negative for activity change, appetite change, chills, diaphoresis and fever.   HENT: Negative for ear pain and facial swelling.    Eyes: Negative for pain and redness.   Respiratory: Negative for cough, choking, chest tightness and shortness of breath.    Cardiovascular: Negative for chest pain, palpitations and leg swelling.   Gastrointestinal: Negative for abdominal distention, abdominal pain, constipation, diarrhea, nausea and vomiting.   Endocrine: Negative for polydipsia and polyphagia.   Genitourinary: Negative for difficulty urinating, dysuria, flank pain and hematuria.        Reports rarely voids   Musculoskeletal: Positive for back pain. Negative for gait problem, neck pain and neck stiffness.        Chronic back pain   Skin: Negative for color change.    Allergic/Immunologic: Negative for food allergies.   Neurological: Negative for seizures, syncope, facial asymmetry, speech difficulty and weakness.   Hematological: Does not bruise/bleed easily.   Psychiatric/Behavioral: Negative for agitation, behavioral problems, confusion, hallucinations and suicidal ideas. The patient is not nervous/anxious.      Objective:     Vital Signs (Most Recent):  Temp: 97.5 °F (36.4 °C) (08/27/18 1520)  Pulse: 60 (08/27/18 1520)  Resp: 20 (08/27/18 1520)  BP: (!) 109/51 (08/27/18 1520)  SpO2: 95 % (08/27/18 1520) Vital Signs (24h Range):  Temp:  [96.8 °F (36 °C)-97.6 °F (36.4 °C)] 97.5 °F (36.4 °C)  Pulse:  [] 60  Resp:  [16-20] 20  SpO2:  [93 %-98 %] 95 %  BP: ()/(51-83) 109/51     Weight: 95.3 kg (210 lb)  Body mass index is 28.48 kg/m².    Physical Exam   Constitutional: He is oriented to person, place, and time. He appears well-developed and well-nourished. No distress.   HENT:   Head: Normocephalic and atraumatic.   Eyes: Conjunctivae are normal. Right eye exhibits no discharge. Left eye exhibits no discharge.   Right eye blindness   Neck: Normal range of motion. Neck supple. No JVD present.   Cardiovascular: Normal heart sounds and intact distal pulses.   Av shunt to arm with good thrill and bruit  Irregular rhythm- Afib with RVR   Pulmonary/Chest: Effort normal and breath sounds normal. No stridor. No respiratory distress. He has no wheezes. He has no rales. He exhibits no tenderness.   Abdominal: Soft. Bowel sounds are normal. He exhibits no distension. There is no tenderness. There is no guarding.   Genitourinary:   Genitourinary Comments: Not examined   Musculoskeletal: Normal range of motion. He exhibits no edema.   Neurological: He is alert and oriented to person, place, and time. No cranial nerve deficit.   Skin: Skin is warm and dry. Capillary refill takes less than 2 seconds. He is not diaphoretic.   Psychiatric: He has a normal mood and affect. His  behavior is normal. Judgment and thought content normal.         Labs: Reviewed    Assessment/Plan:      * Intractable vomiting with nausea    Resolved at this time  Monitor for recurrence  Prn antiemetic medication  Lipase and  Wbcs Wnl   KUB results noted  Continue  PPI          Paroxysmal atrial fibrillation with RVR    Discussed with Dr. Kee  Add Iv lopressor  Monitor rate  Give NS 500ml  IV bolus for suspected mild dehydration  Lexiscan stress test in am          Elevated troponin    Cp/ Possible UA vs ACS/ Hx CAD with prior coronary stent placement-  Also Hx ESRD with HD  Telemetry  Trend troponin- Elevated on admit on 0.763  EKG shows no significant ST elevation or depression  Cardiology consulted- Dr. Kee  Continue asa and sq heparin  Lipid panel and TSH noted        Chest pain    Cardiac Vs GI/ Elevated troponin/ Hx CAD with prior coronary stent placement-  Nausea/vomiting resolved this am  Telemetry- Now with afib with RVR  Trend troponins- Elevated  Cardiology consult- Dr. Kee          Benign hypertension with ESRD (end-stage renal disease)    Mild hypotensive this am  Home hydralazine discontinued  Patient reports has not been taking lisinopril- Discontinued this am  Monitor          Methadone dependence    Hx chronic pain with chronic pain syndrome-  Continue home methadone  Patient reports he has been taking his methadone regularly and has not run out          Chronic hepatitis C without hepatic coma    With Hx of liver cirrhosis          Abdominal pain    Resolved this am          Dehydration    Will give NS 500ml IV bolus today          Chronic back pain    With chronic pain syndrome-  Continue home methadone          ESRD (T,Th,Sat) dialysis onset 2013    Patient reports he missed his HD 8/25/18  Nephrology consulted for renal/HD management  Pt had HD today          Coronary artery disease involving native coronary artery of native heart without angina pectoris    With prior coronary  stent placement 2016/ Hx ischemic cardiomyopathy-  Continue home medications-  Patient reports he has been taking his asa            VTE Risk Mitigation (From admission, onward)        Ordered     heparin (porcine) injection 5,000 Units  Every 8 hours      08/26/18 1139     IP VTE HIGH RISK PATIENT  Once      08/26/18 1106     Place sequential compression device  Until discontinued      08/26/18 1106     Place ZACHARY hose  Until discontinued      08/26/18 1106        LISETTE Pope  Department of Hospital Medicine   [facility]    Time spent seeing patient( greater than 1/2 spent in direct contact) : 54 minutes

## 2018-08-27 NOTE — NURSING
Pt BG 46, gave 2 cups of apple juice. Will recheck.    0630-recheck Bg is 56. Give 12.5g dextrose. Will continue to monitor.    0721- recheck BG 84. Pt still drinking apple juice.

## 2018-08-27 NOTE — PLAN OF CARE
Problem: Patient Care Overview  Goal: Plan of Care Review  Medications reviewed and given   Call light within reach  Bed low/wheels locked  Up with assist  Safety maintained  NAD noted

## 2018-08-27 NOTE — PROGRESS NOTES
Patient admitted with chest pain and  elevated troponin now with new onset of afib with RVR. Patient discussed with Dr. Kee- Will give IV lopressor and schedule for Lexiscan stress test for in am.

## 2018-08-27 NOTE — ASSESSMENT & PLAN NOTE
Discussed with Dr. Kee  Add Iv lopressor  Monitor rate  Give NS 500ml  IV bolus for suspected mild dehydration  Lexiscan stress test in am

## 2018-08-27 NOTE — ASSESSMENT & PLAN NOTE
Cp/ Possible UA vs ACS/ Hx CAD with prior coronary stent placement-  Also Hx ESRD with HD  Telemetry  Trend troponin- Elevated on admit on 0.763  EKG shows no significant ST elevation or depression  Cardiology consulted- Dr. Kee  Continue asa and sq heparin  Lipid panel and TSH noted

## 2018-08-27 NOTE — ASSESSMENT & PLAN NOTE
Mild hypotensive this am  Home hydralazine discontinued  Patient reports has not been taking lisinopril- Discontinued this am  Monitor

## 2018-08-27 NOTE — SUBJECTIVE & OBJECTIVE
Interval History: Patient initially noted to be in SR on admit, however, this am around 1100, he converted to atrial fibrillation with some RVR with heart rate up to 120-130s. Dr. Kee was notified and patient given IV Bblockade. He was also scheduled for a Lexiscan stress test. Patient also with some mild hypotension and 500 ML NS IVF bolus ordered. His hydralazine and lisinopril was discontinued.      Review of Systems   Constitutional: Positive for fatigue. Negative for activity change, appetite change, chills, diaphoresis and fever.   HENT: Negative for ear pain and facial swelling.    Eyes: Negative for pain and redness.   Respiratory: Negative for cough, choking, chest tightness and shortness of breath.    Cardiovascular: Negative for chest pain, palpitations and leg swelling.   Gastrointestinal: Negative for abdominal distention, abdominal pain, constipation, diarrhea, nausea and vomiting.   Endocrine: Negative for polydipsia and polyphagia.   Genitourinary: Negative for difficulty urinating, dysuria, flank pain and hematuria.        Reports rarely voids   Musculoskeletal: Positive for back pain. Negative for gait problem, neck pain and neck stiffness.        Chronic back pain   Skin: Negative for color change.   Allergic/Immunologic: Negative for food allergies.   Neurological: Negative for seizures, syncope, facial asymmetry, speech difficulty and weakness.   Hematological: Does not bruise/bleed easily.   Psychiatric/Behavioral: Negative for agitation, behavioral problems, confusion, hallucinations and suicidal ideas. The patient is not nervous/anxious.      Objective:     Vital Signs (Most Recent):  Temp: 97.5 °F (36.4 °C) (08/27/18 1520)  Pulse: 60 (08/27/18 1520)  Resp: 20 (08/27/18 1520)  BP: (!) 109/51 (08/27/18 1520)  SpO2: 95 % (08/27/18 1520) Vital Signs (24h Range):  Temp:  [96.8 °F (36 °C)-97.6 °F (36.4 °C)] 97.5 °F (36.4 °C)  Pulse:  [] 60  Resp:  [16-20] 20  SpO2:  [93 %-98 %] 95 %  BP:  ()/(51-83) 109/51     Weight: 95.3 kg (210 lb)  Body mass index is 28.48 kg/m².    Physical Exam   Constitutional: He is oriented to person, place, and time. He appears well-developed and well-nourished. No distress.   HENT:   Head: Normocephalic and atraumatic.   Eyes: Conjunctivae are normal. Right eye exhibits no discharge. Left eye exhibits no discharge.   Right eye blindness   Neck: Normal range of motion. Neck supple. No JVD present.   Cardiovascular: Normal heart sounds and intact distal pulses.   Av shunt to arm with good thrill and bruit  Irregular rhythm- Afib with RVR   Pulmonary/Chest: Effort normal and breath sounds normal. No stridor. No respiratory distress. He has no wheezes. He has no rales. He exhibits no tenderness.   Abdominal: Soft. Bowel sounds are normal. He exhibits no distension. There is no tenderness. There is no guarding.   Genitourinary:   Genitourinary Comments: Not examined   Musculoskeletal: Normal range of motion. He exhibits no edema.   Neurological: He is alert and oriented to person, place, and time. No cranial nerve deficit.   Skin: Skin is warm and dry. Capillary refill takes less than 2 seconds. He is not diaphoretic.   Psychiatric: He has a normal mood and affect. His behavior is normal. Judgment and thought content normal.         Labs: Reviewed

## 2018-08-27 NOTE — PROGRESS NOTES
08/27/18 1115   Handoff Report   Given To Margarita   Vital Signs   Temp 97 °F (36.1 °C)   Pulse 74   Resp 18   /73   Post-Hemodialysis Assessment   Rinseback Volume (mL) 250 mL   Blood Volume Processed (Liters) 52 L   Dialyzer Clearance Lightly streaked   Duration of Treatment (minutes) 180 minutes   Hemodialysis Intake (mL) 500 mL   Total UF (mL) 500 mL   Net Fluid Removal 0   Patient Response to Treatment tolerated well   Post-Treatment Weight 96 kg (211 lb 10.3 oz)   Treatment Weight Change 0   Arterial bleeding stop time (min) 6 min   Venous bleeding stop time (min) 6 min   Post-Hemodialysis Comments needles pulled without problem, call from 2nd floor, pt in afibb, transported to room for stat EKG

## 2018-08-27 NOTE — ASSESSMENT & PLAN NOTE
Cardiac Vs GI/ Elevated troponin/ Hx CAD with prior coronary stent placement-  Nausea/vomiting resolved this am  Telemetry- Now with afib with RVR  Trend troponins- Elevated  Cardiology consult- Dr. Kee

## 2018-08-27 NOTE — PLAN OF CARE
Patient denies having a PCP, however states his Renal doctor is Dr Gifford.  Pharmacy is [facility].  Patient drives himself for dialysis TTS at 10:30 to [facility] in [place].  Denies HH/DME.  Discharge plan is home; no needs.       08/27/18 1323   Discharge Assessment   Assessment Type Discharge Planning Assessment   Confirmed/corrected address and phone number on facesheet? Yes   Assessment information obtained from? Patient   Prior to hospitilization cognitive status: Alert/Oriented   Prior to hospitalization functional status: Independent   Current cognitive status: Alert/Oriented   Current Functional Status: Independent   Lives With spouse   Able to Return to Prior Arrangements yes   Is patient able to care for self after discharge? Yes   Patient's perception of discharge disposition home or selfcare   Readmission Within The Last 30 Days no previous admission in last 30 days   Patient currently being followed by outpatient case management? No   Patient currently receives any other outside agency services? No   Equipment Currently Used at Home none   Do you have any problems affording any of your prescribed medications? No   Is the patient taking medications as prescribed? yes   Does the patient have transportation home? Yes   Transportation Available family or friend will provide   Dialysis Name and Scheduled days [facility] TTS @ 10:30   Discharge Plan A Home   Patient/Family In Agreement With Plan yes

## 2018-08-28 LAB
ALBUMIN SERPL BCP-MCNC: 2 G/DL
ALP SERPL-CCNC: 149 U/L
ALT SERPL W/O P-5'-P-CCNC: 11 U/L
ANION GAP SERPL CALC-SCNC: 11 MMOL/L
ANISOCYTOSIS BLD QL SMEAR: SLIGHT
AST SERPL-CCNC: 19 U/L
BASOPHILS # BLD AUTO: 0 K/UL
BASOPHILS NFR BLD: 0.2 %
BILIRUB DIRECT SERPL-MCNC: 0.6 MG/DL
BILIRUB SERPL-MCNC: 0.9 MG/DL
BUN SERPL-MCNC: 50 MG/DL
CALCIUM SERPL-MCNC: 8.6 MG/DL
CHLORIDE SERPL-SCNC: 99 MMOL/L
CO2 SERPL-SCNC: 24 MMOL/L
CREAT SERPL-MCNC: 6.7 MG/DL
DIFFERENTIAL METHOD: ABNORMAL
EOSINOPHIL # BLD AUTO: 0.1 K/UL
EOSINOPHIL NFR BLD: 0.6 %
ERYTHROCYTE [DISTWIDTH] IN BLOOD BY AUTOMATED COUNT: 18.3 %
EST. GFR  (AFRICAN AMERICAN): 10 ML/MIN/1.73 M^2
EST. GFR  (NON AFRICAN AMERICAN): 9 ML/MIN/1.73 M^2
GLUCOSE SERPL-MCNC: 39 MG/DL
GLUCOSE SERPL-MCNC: 40 MG/DL
HCT VFR BLD AUTO: 32.6 %
HGB BLD-MCNC: 10.8 G/DL
LYMPHOCYTES # BLD AUTO: 0.6 K/UL
LYMPHOCYTES NFR BLD: 6 %
MCH RBC QN AUTO: 32.4 PG
MCHC RBC AUTO-ENTMCNC: 33.2 G/DL
MCV RBC AUTO: 98 FL
MONOCYTES # BLD AUTO: 1.2 K/UL
MONOCYTES NFR BLD: 12.6 %
NEUTROPHILS # BLD AUTO: 7.9 K/UL
NEUTROPHILS NFR BLD: 80.6 %
OB PNL STL: POSITIVE
PLATELET # BLD AUTO: 64 K/UL
PLATELET BLD QL SMEAR: ABNORMAL
PMV BLD AUTO: 8.2 FL
POCT GLUCOSE: 103 MG/DL (ref 70–110)
POCT GLUCOSE: 31 MG/DL (ref 70–110)
POCT GLUCOSE: 36 MG/DL (ref 70–110)
POCT GLUCOSE: 36 MG/DL (ref 70–110)
POCT GLUCOSE: 82 MG/DL (ref 70–110)
POLYCHROMASIA BLD QL SMEAR: ABNORMAL
POTASSIUM SERPL-SCNC: 4.3 MMOL/L
PROT SERPL-MCNC: 5.4 G/DL
RBC # BLD AUTO: 3.33 M/UL
SODIUM SERPL-SCNC: 134 MMOL/L
WBC # BLD AUTO: 9.9 K/UL

## 2018-08-28 PROCEDURE — 27000221 HC OXYGEN, UP TO 24 HOURS

## 2018-08-28 PROCEDURE — 25000003 PHARM REV CODE 250: Performed by: EMERGENCY MEDICINE

## 2018-08-28 PROCEDURE — 94761 N-INVAS EAR/PLS OXIMETRY MLT: CPT

## 2018-08-28 PROCEDURE — 12000002 HC ACUTE/MED SURGE SEMI-PRIVATE ROOM

## 2018-08-28 PROCEDURE — 80100016 HC MAINTENANCE HEMODIALYSIS

## 2018-08-28 PROCEDURE — 25000003 PHARM REV CODE 250: Performed by: HOSPITALIST

## 2018-08-28 PROCEDURE — 25000003 PHARM REV CODE 250: Performed by: NURSE PRACTITIONER

## 2018-08-28 PROCEDURE — A4216 STERILE WATER/SALINE, 10 ML: HCPCS | Performed by: EMERGENCY MEDICINE

## 2018-08-28 PROCEDURE — 82272 OCCULT BLD FECES 1-3 TESTS: CPT

## 2018-08-28 PROCEDURE — 82947 ASSAY GLUCOSE BLOOD QUANT: CPT

## 2018-08-28 PROCEDURE — 80076 HEPATIC FUNCTION PANEL: CPT

## 2018-08-28 PROCEDURE — 85025 COMPLETE CBC W/AUTO DIFF WBC: CPT

## 2018-08-28 PROCEDURE — 63600175 PHARM REV CODE 636 W HCPCS: Performed by: NURSE PRACTITIONER

## 2018-08-28 PROCEDURE — 80048 BASIC METABOLIC PNL TOTAL CA: CPT

## 2018-08-28 PROCEDURE — 36415 COLL VENOUS BLD VENIPUNCTURE: CPT

## 2018-08-28 RX ORDER — PANTOPRAZOLE SODIUM 40 MG/1
40 TABLET, DELAYED RELEASE ORAL 2 TIMES DAILY
Status: DISCONTINUED | OUTPATIENT
Start: 2018-08-28 | End: 2018-09-07 | Stop reason: HOSPADM

## 2018-08-28 RX ORDER — DEXTROSE MONOHYDRATE 100 MG/ML
INJECTION, SOLUTION INTRAVENOUS CONTINUOUS
Status: DISCONTINUED | OUTPATIENT
Start: 2018-08-29 | End: 2018-08-31

## 2018-08-28 RX ORDER — METOPROLOL SUCCINATE 25 MG/1
25 TABLET, EXTENDED RELEASE ORAL DAILY
Status: DISCONTINUED | OUTPATIENT
Start: 2018-08-28 | End: 2018-08-30

## 2018-08-28 RX ORDER — ASPIRIN 81 MG/1
81 TABLET ORAL DAILY
Status: DISCONTINUED | OUTPATIENT
Start: 2018-08-28 | End: 2018-08-29

## 2018-08-28 RX ADMIN — DEXTROSE MONOHYDRATE 25 G: 500 INJECTION PARENTERAL at 10:08

## 2018-08-28 RX ADMIN — METOPROLOL SUCCINATE 25 MG: 25 TABLET, EXTENDED RELEASE ORAL at 03:08

## 2018-08-28 RX ADMIN — DEXTROSE MONOHYDRATE 25 G: 500 INJECTION PARENTERAL at 05:08

## 2018-08-28 RX ADMIN — CALCIUM ACETATE 667 MG: 667 CAPSULE ORAL at 12:08

## 2018-08-28 RX ADMIN — METHADONE HYDROCHLORIDE 90 MG: 10 TABLET ORAL at 12:08

## 2018-08-28 RX ADMIN — PANTOPRAZOLE SODIUM 40 MG: 40 TABLET, DELAYED RELEASE ORAL at 09:08

## 2018-08-28 RX ADMIN — Medication 3 ML: at 03:08

## 2018-08-28 RX ADMIN — CALCIUM ACETATE 667 MG: 667 CAPSULE ORAL at 04:08

## 2018-08-28 RX ADMIN — ACETAMINOPHEN 650 MG: 325 TABLET, FILM COATED ORAL at 03:08

## 2018-08-28 RX ADMIN — Medication 3 ML: at 06:08

## 2018-08-28 RX ADMIN — MINOXIDIL 7.5 MG: 2.5 TABLET ORAL at 09:08

## 2018-08-28 RX ADMIN — HEPARIN SODIUM 5000 UNITS: 5000 INJECTION, SOLUTION INTRAVENOUS; SUBCUTANEOUS at 05:08

## 2018-08-28 RX ADMIN — LEVETIRACETAM 500 MG: 500 TABLET ORAL at 09:08

## 2018-08-28 RX ADMIN — ASPIRIN 81 MG: 81 TABLET, COATED ORAL at 12:08

## 2018-08-28 RX ADMIN — Medication 3 ML: at 09:08

## 2018-08-28 NOTE — PLAN OF CARE
Problem: Patient Care Overview  Goal: Plan of Care Review  Outcome: Ongoing (interventions implemented as appropriate)  POC reviewed with pt. Pt AAOx4, semi dubose's with HOB elevated. 1.5L removed post hemodialysis. Pt spiked temp post treatment; tylenol administered. Monitoring temperature qhr post medication administration; temperature reduced since post hour administration check.

## 2018-08-28 NOTE — SUBJECTIVE & OBJECTIVE
Interval History:  Patient converted back to SR and remains in SR. Dr. Kee called to check on Patient and placed patient on Toprol xl 25 mg po daily and canceled prior scheduled Lexiscan stress test. Patient had HD today and reported some nausea and vomiting afterwards but denied any complaints of abdominal pain or diarrhea. Platelets trending downward. Sq heparin discontinued. Kept on asa for now. Patient discussed with Dr. Lake. Monitor closely.      Review of Systems   Constitutional: Positive for fatigue. Negative for activity change, appetite change, chills, diaphoresis and fever.   HENT: Negative for ear discharge, ear pain and facial swelling.    Eyes: Negative for pain and redness.   Respiratory: Negative for cough, choking, chest tightness and shortness of breath.    Cardiovascular: Negative for chest pain, palpitations and leg swelling.   Gastrointestinal: Positive for nausea and vomiting. Negative for abdominal distention, abdominal pain, constipation and diarrhea.   Endocrine: Negative for polydipsia and polyphagia.   Genitourinary: Negative for difficulty urinating, dysuria, flank pain and hematuria.        Reports rarely voids   Musculoskeletal: Positive for back pain. Negative for gait problem, neck pain and neck stiffness.        Chronic back pain   Skin: Negative for color change.   Allergic/Immunologic: Negative for food allergies.   Neurological: Negative for seizures, syncope, facial asymmetry, speech difficulty and weakness.   Hematological: Does not bruise/bleed easily.   Psychiatric/Behavioral: Negative for agitation, behavioral problems, confusion, hallucinations and suicidal ideas. The patient is not nervous/anxious.      Objective:     Vital Signs (Most Recent):  Temp: (!) 101.3 °F (38.5 °C) (08/28/18 1630)  Pulse: 74 (08/28/18 1525)  Resp: 20 (08/28/18 1525)  BP: 123/68 (08/28/18 1525)  SpO2: 96 % (08/28/18 1525) Vital Signs (24h Range):  Temp:  [97.7 °F (36.5 °C)-102.1 °F (38.9 °C)]  101.3 °F (38.5 °C)  Pulse:  [52-82] 74  Resp:  [17-20] 20  SpO2:  [84 %-100 %] 96 %  BP: ()/(55-89) 123/68     Weight: 95.3 kg (210 lb)  Body mass index is 28.48 kg/m².    Physical Exam   Constitutional: He is oriented to person, place, and time. He appears well-developed and well-nourished. No distress.   HENT:   Head: Normocephalic and atraumatic.   Eyes: Conjunctivae are normal. Right eye exhibits no discharge. Left eye exhibits no discharge.   Right eye blindness   Neck: Normal range of motion. Neck supple. No JVD present.   Cardiovascular: Normal rate, regular rhythm, normal heart sounds and intact distal pulses.   Av shunt to arm with good thrill and bruit      Pulmonary/Chest: Effort normal and breath sounds normal. No stridor. No respiratory distress. He has no wheezes. He has no rales.   Abdominal: Soft. Bowel sounds are normal. He exhibits no distension. There is no tenderness. There is no guarding.   Genitourinary:   Genitourinary Comments: Not examined   Musculoskeletal: Normal range of motion. He exhibits no edema.   Neurological: He is alert and oriented to person, place, and time. No cranial nerve deficit.   Skin: Skin is warm and dry. Capillary refill takes less than 2 seconds. He is not diaphoretic.   Psychiatric: He has a normal mood and affect. His behavior is normal. Judgment and thought content normal.         Labs: Reviewed

## 2018-08-28 NOTE — NURSING
Pt rounds. Patient o2 on room air low 80's. O2 @ 2 L NC applied and sats increased to 93. Pt c/o of having an increase of SOB . Advised patient to call for assistance when needed the restroom due to previous medication given and low oxygen level. Patient verbalize understanding.

## 2018-08-28 NOTE — PROGRESS NOTES
08/28/18 0900   Patient Assessment/Suction   Level of Consciousness (AVPU) alert   Respiratory Effort Normal;Unlabored   PRE-TX-O2-ETCO2   O2 Device (Oxygen Therapy) room air   SpO2 95 %   Pulse Oximetry Type Intermittent   $ Pulse Oximetry - Multiple Charge Pulse Oximetry - Multiple   Pulse 60   Resp 18

## 2018-08-28 NOTE — NURSING
Pt POCT  Less then 34, recheck was 201 and then third check less then 44. Patient c/o feeling weak. Patient in an out of sleep. 25 g of D50 given . Will continue to monitor.

## 2018-08-28 NOTE — ASSESSMENT & PLAN NOTE
Mild hypotensive   Home hydralazine discontinued  Patient reports has not been taking lisinopril- Discontinued this am  Monitor closely

## 2018-08-28 NOTE — PROGRESS NOTES
Patient discussed with Dr. Kee. Patient started on toprol Xl 25 mg po daily and patient is to follow up with his normal cardiologist after discharge.

## 2018-08-28 NOTE — PLAN OF CARE
Problem: Patient Care Overview  Goal: Plan of Care Review  Outcome: Ongoing (interventions implemented as appropriate)  Patient alert and oriented resting in bed. NAD. Denies pain or SOB. VSS. Urinal at bedside. O2@2L N[place]. Normal Sr. Plan of care reviewed with patient. Verbalizes understanding.Call light in reach. Pt free from fall or injury. Will monitor.

## 2018-08-28 NOTE — NURSING
"Patient POCT glucose on recheck is 86. Patient states " I am feeling better, thank you." will continue to monitor .  "

## 2018-08-28 NOTE — NURSING
"Vincenzo, RT in patient room to evaluate patient and patient is in the bathroom without oxygen. Patient states to nurse " I am breathing ok to use the toilet". Patient states to nurse " I will be here a good min" I advised patient to call when finished for assistance back to bed. Will continue to monitor.    "

## 2018-08-29 ENCOUNTER — ANESTHESIA EVENT (OUTPATIENT)
Dept: INTENSIVE CARE | Facility: HOSPITAL | Age: 53
DRG: 853 | End: 2018-08-29
Payer: MEDICARE

## 2018-08-29 ENCOUNTER — ANESTHESIA (OUTPATIENT)
Dept: INTENSIVE CARE | Facility: HOSPITAL | Age: 53
DRG: 853 | End: 2018-08-29
Payer: MEDICARE

## 2018-08-29 PROBLEM — R65.20 SEVERE SEPSIS: Status: ACTIVE | Noted: 2018-08-29

## 2018-08-29 PROBLEM — E16.2 HYPOGLYCEMIA: Status: ACTIVE | Noted: 2018-08-29

## 2018-08-29 PROBLEM — N18.6 TYPE 2 DIABETES MELLITUS WITH CHRONIC KIDNEY DISEASE ON CHRONIC DIALYSIS, WITHOUT LONG-TERM CURRENT USE OF INSULIN: Status: ACTIVE | Noted: 2018-08-29

## 2018-08-29 PROBLEM — I20.0 UNSTABLE ANGINA: Status: RESOLVED | Noted: 2018-08-26 | Resolved: 2018-08-29

## 2018-08-29 PROBLEM — E11.22 TYPE 2 DIABETES MELLITUS WITH CHRONIC KIDNEY DISEASE ON CHRONIC DIALYSIS, WITHOUT LONG-TERM CURRENT USE OF INSULIN: Status: ACTIVE | Noted: 2018-08-29

## 2018-08-29 PROBLEM — A41.9 SEVERE SEPSIS: Status: ACTIVE | Noted: 2018-08-29

## 2018-08-29 PROBLEM — Z99.2 TYPE 2 DIABETES MELLITUS WITH CHRONIC KIDNEY DISEASE ON CHRONIC DIALYSIS, WITHOUT LONG-TERM CURRENT USE OF INSULIN: Status: ACTIVE | Noted: 2018-08-29

## 2018-08-29 LAB
ALBUMIN SERPL BCP-MCNC: 2.1 G/DL
ALP SERPL-CCNC: 303 U/L
ALT SERPL W/O P-5'-P-CCNC: 11 U/L
AMMONIA PLAS-SCNC: 26 UMOL/L
ANION GAP SERPL CALC-SCNC: 14 MMOL/L
AST SERPL-CCNC: 24 U/L
BASOPHILS NFR BLD: 0 %
BILIRUB SERPL-MCNC: 1.9 MG/DL
BUN SERPL-MCNC: 30 MG/DL
CALCIUM SERPL-MCNC: 9.5 MG/DL
CHLORIDE SERPL-SCNC: 99 MMOL/L
CO2 SERPL-SCNC: 24 MMOL/L
CREAT SERPL-MCNC: 5.2 MG/DL
DIFFERENTIAL METHOD: ABNORMAL
EOSINOPHIL NFR BLD: 0 %
ERYTHROCYTE [DISTWIDTH] IN BLOOD BY AUTOMATED COUNT: 17.8 %
EST. GFR  (AFRICAN AMERICAN): 14 ML/MIN/1.73 M^2
EST. GFR  (NON AFRICAN AMERICAN): 12 ML/MIN/1.73 M^2
FIBRINOGEN PPP-MCNC: 531 MG/DL
GLUCOSE SERPL-MCNC: 113 MG/DL
GLUCOSE SERPL-MCNC: 56 MG/DL
HCT VFR BLD AUTO: 34 %
HGB BLD-MCNC: 11.3 G/DL
INR PPP: 1.9
LDH SERPL L TO P-CCNC: 242 U/L
LYMPHOCYTES NFR BLD: 4 %
MCH RBC QN AUTO: 32.2 PG
MCHC RBC AUTO-ENTMCNC: 33.2 G/DL
MCV RBC AUTO: 97 FL
MONOCYTES NFR BLD: 4 %
NEUTROPHILS NFR BLD: 87 %
NEUTS BAND NFR BLD MANUAL: 5 %
PHOSPHATE SERPL-MCNC: 1.3 MG/DL
PLATELET # BLD AUTO: 26 K/UL
PLATELET BLD QL SMEAR: ABNORMAL
PMV BLD AUTO: 8.8 FL
POCT GLUCOSE: 218 MG/DL (ref 70–110)
POCT GLUCOSE: 30 MG/DL (ref 70–110)
POCT GLUCOSE: 343 MG/DL (ref 70–110)
POCT GLUCOSE: 41 MG/DL (ref 70–110)
POCT GLUCOSE: 44 MG/DL (ref 70–110)
POCT GLUCOSE: 51 MG/DL (ref 70–110)
POCT GLUCOSE: 52 MG/DL (ref 70–110)
POCT GLUCOSE: 56 MG/DL (ref 70–110)
POCT GLUCOSE: 57 MG/DL (ref 70–110)
POCT GLUCOSE: 87 MG/DL (ref 70–110)
POCT GLUCOSE: 98 MG/DL (ref 70–110)
POTASSIUM SERPL-SCNC: 3.1 MMOL/L
PROCALCITONIN SERPL IA-MCNC: >100 NG/ML
PROT SERPL-MCNC: 6.1 G/DL
PROTHROMBIN TIME: 19.4 SEC
RBC # BLD AUTO: 3.51 M/UL
SODIUM SERPL-SCNC: 137 MMOL/L
VANCOMYCIN TROUGH SERPL-MCNC: <1.1 UG/ML
WBC # BLD AUTO: 21.5 K/UL

## 2018-08-29 PROCEDURE — 25000003 PHARM REV CODE 250: Performed by: INTERNAL MEDICINE

## 2018-08-29 PROCEDURE — 82947 ASSAY GLUCOSE BLOOD QUANT: CPT

## 2018-08-29 PROCEDURE — 76937 US GUIDE VASCULAR ACCESS: CPT | Performed by: ANESTHESIOLOGY

## 2018-08-29 PROCEDURE — 25000003 PHARM REV CODE 250: Performed by: NURSE PRACTITIONER

## 2018-08-29 PROCEDURE — 84100 ASSAY OF PHOSPHORUS: CPT

## 2018-08-29 PROCEDURE — 83615 LACTATE (LD) (LDH) ENZYME: CPT

## 2018-08-29 PROCEDURE — 94761 N-INVAS EAR/PLS OXIMETRY MLT: CPT

## 2018-08-29 PROCEDURE — 63600175 PHARM REV CODE 636 W HCPCS: Performed by: INTERNAL MEDICINE

## 2018-08-29 PROCEDURE — 87077 CULTURE AEROBIC IDENTIFY: CPT | Mod: 59

## 2018-08-29 PROCEDURE — 85610 PROTHROMBIN TIME: CPT

## 2018-08-29 PROCEDURE — 82533 TOTAL CORTISOL: CPT

## 2018-08-29 PROCEDURE — 25000003 PHARM REV CODE 250: Performed by: HOSPITALIST

## 2018-08-29 PROCEDURE — 85384 FIBRINOGEN ACTIVITY: CPT

## 2018-08-29 PROCEDURE — 25000003 PHARM REV CODE 250: Performed by: EMERGENCY MEDICINE

## 2018-08-29 PROCEDURE — 20000000 HC ICU ROOM

## 2018-08-29 PROCEDURE — 80202 ASSAY OF VANCOMYCIN: CPT

## 2018-08-29 PROCEDURE — 25500020 PHARM REV CODE 255

## 2018-08-29 PROCEDURE — 85027 COMPLETE CBC AUTOMATED: CPT

## 2018-08-29 PROCEDURE — 86850 RBC ANTIBODY SCREEN: CPT

## 2018-08-29 PROCEDURE — 82140 ASSAY OF AMMONIA: CPT

## 2018-08-29 PROCEDURE — 80053 COMPREHEN METABOLIC PANEL: CPT

## 2018-08-29 PROCEDURE — A4216 STERILE WATER/SALINE, 10 ML: HCPCS | Performed by: EMERGENCY MEDICINE

## 2018-08-29 PROCEDURE — 36556 INSERT NON-TUNNEL CV CATH: CPT

## 2018-08-29 PROCEDURE — 85007 BL SMEAR W/DIFF WBC COUNT: CPT

## 2018-08-29 PROCEDURE — 87186 SC STD MICRODIL/AGAR DIL: CPT

## 2018-08-29 PROCEDURE — 25000003 PHARM REV CODE 250: Performed by: ANESTHESIOLOGY

## 2018-08-29 PROCEDURE — 84145 PROCALCITONIN (PCT): CPT

## 2018-08-29 PROCEDURE — 36556 INSERT NON-TUNNEL CV CATH: CPT | Mod: ,,, | Performed by: ANESTHESIOLOGY

## 2018-08-29 PROCEDURE — 87040 BLOOD CULTURE FOR BACTERIA: CPT | Mod: 59

## 2018-08-29 PROCEDURE — 36415 COLL VENOUS BLD VENIPUNCTURE: CPT

## 2018-08-29 PROCEDURE — 25000003 PHARM REV CODE 250

## 2018-08-29 RX ORDER — DEXAMETHASONE SODIUM PHOSPHATE 4 MG/ML
4 INJECTION, SOLUTION INTRA-ARTICULAR; INTRALESIONAL; INTRAMUSCULAR; INTRAVENOUS; SOFT TISSUE EVERY 24 HOURS
Status: DISCONTINUED | OUTPATIENT
Start: 2018-08-30 | End: 2018-08-31

## 2018-08-29 RX ORDER — SODIUM CHLORIDE 9 MG/ML
INJECTION, SOLUTION INTRAVENOUS
Status: DISPENSED
Start: 2018-08-29 | End: 2018-08-29

## 2018-08-29 RX ORDER — HYDROCODONE BITARTRATE AND ACETAMINOPHEN 500; 5 MG/1; MG/1
TABLET ORAL
Status: DISCONTINUED | OUTPATIENT
Start: 2018-08-29 | End: 2018-09-07

## 2018-08-29 RX ORDER — METHADONE HYDROCHLORIDE 10 MG/1
30 TABLET ORAL DAILY
Status: DISCONTINUED | OUTPATIENT
Start: 2018-08-29 | End: 2018-08-29

## 2018-08-29 RX ORDER — ACETAMINOPHEN 500 MG
1000 TABLET ORAL EVERY 6 HOURS PRN
Status: DISCONTINUED | OUTPATIENT
Start: 2018-08-29 | End: 2018-09-07 | Stop reason: HOSPADM

## 2018-08-29 RX ORDER — NOREPINEPHRINE BITARTRATE 0.03 MG/ML
0.02 INJECTION, SOLUTION INTRAVENOUS CONTINUOUS
Status: DISCONTINUED | OUTPATIENT
Start: 2018-08-29 | End: 2018-08-31

## 2018-08-29 RX ORDER — MAGNESIUM SULFATE HEPTAHYDRATE 40 MG/ML
2 INJECTION, SOLUTION INTRAVENOUS
Status: DISCONTINUED | OUTPATIENT
Start: 2018-08-29 | End: 2018-09-07 | Stop reason: HOSPADM

## 2018-08-29 RX ORDER — HEPARIN SODIUM 5000 [USP'U]/ML
5000 INJECTION, SOLUTION INTRAVENOUS; SUBCUTANEOUS
Status: DISCONTINUED | OUTPATIENT
Start: 2018-08-29 | End: 2018-08-29

## 2018-08-29 RX ORDER — SODIUM CHLORIDE 9 MG/ML
INJECTION, SOLUTION INTRAVENOUS
Status: DISCONTINUED | OUTPATIENT
Start: 2018-08-29 | End: 2018-09-05

## 2018-08-29 RX ORDER — SODIUM CHLORIDE 9 MG/ML
INJECTION, SOLUTION INTRAVENOUS ONCE
Status: DISCONTINUED | OUTPATIENT
Start: 2018-08-29 | End: 2018-08-31

## 2018-08-29 RX ORDER — METHADONE HYDROCHLORIDE 10 MG/1
20 TABLET ORAL DAILY
Status: DISCONTINUED | OUTPATIENT
Start: 2018-08-30 | End: 2018-09-03

## 2018-08-29 RX ORDER — SODIUM CHLORIDE 0.9 % (FLUSH) 0.9 %
3 SYRINGE (ML) INJECTION
Status: DISCONTINUED | OUTPATIENT
Start: 2018-08-29 | End: 2018-09-07 | Stop reason: HOSPADM

## 2018-08-29 RX ORDER — LIDOCAINE HYDROCHLORIDE 10 MG/ML
INJECTION, SOLUTION EPIDURAL; INFILTRATION; INTRACAUDAL; PERINEURAL
Status: COMPLETED
Start: 2018-08-29 | End: 2018-08-29

## 2018-08-29 RX ORDER — HEPARIN SODIUM 5000 [USP'U]/ML
5000 INJECTION, SOLUTION INTRAVENOUS; SUBCUTANEOUS
Status: DISCONTINUED | OUTPATIENT
Start: 2018-08-29 | End: 2018-08-31

## 2018-08-29 RX ORDER — METOPROLOL TARTRATE 1 MG/ML
5 INJECTION, SOLUTION INTRAVENOUS ONCE AS NEEDED
Status: DISCONTINUED | OUTPATIENT
Start: 2018-08-29 | End: 2018-08-29

## 2018-08-29 RX ADMIN — PIPERACILLIN SODIUM AND TAZOBACTAM SODIUM 2.25 G: 2; .25 INJECTION, POWDER, FOR SOLUTION INTRAVENOUS at 02:08

## 2018-08-29 RX ADMIN — LIDOCAINE HYDROCHLORIDE: 10 INJECTION, SOLUTION EPIDURAL; INFILTRATION; INTRACAUDAL; PERINEURAL at 10:08

## 2018-08-29 RX ADMIN — IOHEXOL 30 ML: 350 INJECTION, SOLUTION INTRAVENOUS at 11:08

## 2018-08-29 RX ADMIN — VANCOMYCIN HYDROCHLORIDE 1500 MG: 1 INJECTION, POWDER, LYOPHILIZED, FOR SOLUTION INTRAVENOUS at 10:08

## 2018-08-29 RX ADMIN — LEVETIRACETAM 500 MG: 500 TABLET ORAL at 09:08

## 2018-08-29 RX ADMIN — POTASSIUM PHOSPHATE, MONOBASIC AND POTASSIUM PHOSPHATE, DIBASIC 30 MMOL: 224; 236 INJECTION, SOLUTION INTRAVENOUS at 05:08

## 2018-08-29 RX ADMIN — DEXTROSE MONOHYDRATE 25 G: 500 INJECTION PARENTERAL at 06:08

## 2018-08-29 RX ADMIN — DEXTROSE MONOHYDRATE 12.5 G: 500 INJECTION PARENTERAL at 06:08

## 2018-08-29 RX ADMIN — IOHEXOL 100 ML: 350 INJECTION, SOLUTION INTRAVENOUS at 11:08

## 2018-08-29 RX ADMIN — CALCIUM ACETATE 667 MG: 667 CAPSULE ORAL at 05:08

## 2018-08-29 RX ADMIN — Medication 3 ML: at 06:08

## 2018-08-29 RX ADMIN — DEXTROSE MONOHYDRATE 25 G: 500 INJECTION PARENTERAL at 04:08

## 2018-08-29 RX ADMIN — PANTOPRAZOLE SODIUM 40 MG: 40 TABLET, DELAYED RELEASE ORAL at 09:08

## 2018-08-29 RX ADMIN — DEXTROSE: 10 SOLUTION INTRAVENOUS at 12:08

## 2018-08-29 RX ADMIN — PIPERACILLIN SODIUM AND TAZOBACTAM SODIUM 2.25 G: 2; .25 INJECTION, POWDER, FOR SOLUTION INTRAVENOUS at 11:08

## 2018-08-29 RX ADMIN — LIDOCAINE HYDROCHLORIDE 20 MG: 10 INJECTION, SOLUTION EPIDURAL; INFILTRATION; INTRACAUDAL; PERINEURAL at 10:08

## 2018-08-29 RX ADMIN — DEXTROSE MONOHYDRATE 12.5 G: 500 INJECTION PARENTERAL at 03:08

## 2018-08-29 RX ADMIN — ACETAMINOPHEN 1000 MG: 500 TABLET ORAL at 05:08

## 2018-08-29 RX ADMIN — ACETAMINOPHEN 1000 MG: 500 TABLET ORAL at 12:08

## 2018-08-29 RX ADMIN — CALCIUM ACETATE 667 MG: 667 CAPSULE ORAL at 09:08

## 2018-08-29 RX ADMIN — ASPIRIN 81 MG: 81 TABLET, COATED ORAL at 09:08

## 2018-08-29 RX ADMIN — CALCIUM ACETATE 667 MG: 667 CAPSULE ORAL at 12:08

## 2018-08-29 RX ADMIN — METOPROLOL SUCCINATE 25 MG: 25 TABLET, EXTENDED RELEASE ORAL at 09:08

## 2018-08-29 RX ADMIN — SODIUM CHLORIDE 250 ML: 0.9 INJECTION, SOLUTION INTRAVENOUS at 06:08

## 2018-08-29 RX ADMIN — Medication 0.02 MCG/KG/MIN: at 08:08

## 2018-08-29 RX ADMIN — SODIUM CHLORIDE 250 ML: 0.9 INJECTION, SOLUTION INTRAVENOUS at 07:08

## 2018-08-29 RX ADMIN — Medication 3 ML: at 10:08

## 2018-08-29 RX ADMIN — METHADONE HYDROCHLORIDE 30 MG: 10 TABLET ORAL at 09:08

## 2018-08-29 RX ADMIN — SODIUM CHLORIDE 500 ML: 0.9 INJECTION, SOLUTION INTRAVENOUS at 10:08

## 2018-08-29 RX ADMIN — Medication 16 G: at 06:08

## 2018-08-29 RX ADMIN — DEXTROSE MONOHYDRATE 25 G: 500 INJECTION PARENTERAL at 05:08

## 2018-08-29 NOTE — ASSESSMENT & PLAN NOTE
With Hx of possible liver cirrhosis  Likely contributing to thrombocytopenia  Ammonia normal  Monitor LFTs

## 2018-08-29 NOTE — ASSESSMENT & PLAN NOTE
Management per cardiology, not felt to be ACS  Has reasons for demand ischemia, no chest pain at present

## 2018-08-29 NOTE — ASSESSMENT & PLAN NOTE
Likely from sepsis  Lipase and  Wbcs Wnl, Imaging and exam without evidence of acute abdominal process  Continue  PPI

## 2018-08-29 NOTE — ASSESSMENT & PLAN NOTE
Plts with acute drop in the 20s without active bleeding.  Holding lovenox, recommend no heparin with HD.  Transfuse for bleeding or plts <20.    Monitor closely

## 2018-08-29 NOTE — ASSESSMENT & PLAN NOTE
Likely from sepsis, liver dysfunction, decreased PO intake.  Improved on D10, will continue and monitor closely

## 2018-08-29 NOTE — NURSING
CBG 30 on R, 41 on L. 25 g IV dextrose administered. Patient's nurse notified. Stat glucose ordered. Will cont to monitor.

## 2018-08-29 NOTE — PLAN OF CARE
Results for NAOMI ALLEN (MRN [id number]) as of 8/29/2018 16:59   Ref. Range 8/29/2018 16:04 8/29/2018 16:06 8/29/2018 16:49 8/29/2018 16:51   POCT Glucose Latest Ref Range: 70 - 110 mg/dL 30 (LL) 41 (LL) 51 (L) 44 (LL)     Amp of D50 given after results of 30 and 41.    Dr. Lake notified of above results. Ordered to give second amp of D50 and Orange juice.

## 2018-08-29 NOTE — PROGRESS NOTES
INPATIENT NEPHROLOGY PROGRESS NOTE  [facility] NEPHROLOGY  (late entry for 8/28)    João Aguirre  08/29/2018    Reason for consultation:         esrd    Chief Complaint:   Chief Complaint   Patient presents with    Abdominal Pain     with N/V; missed dialysis Saturday        History of Present Illness:          Patient is a 53-year-old male with a past medical history of end-stage renal disease on hemodialysis Tuesday Thursday Saturday, diabetes, chronic back pain on methadone, long-term anticoagulant use hypertension, prior pneumonia, prior stroke, and recurrent episodes of mid epigastric pain and vomiting but no formal diagnosis of gastroparesis presents to emergency department for evaluation of epigastric abdominal pain with inability to tolerate liquids or food for the past few days.  Patient was seen here in the emergency department yesterday where he underwent basic labs and was given antiemetics.  He was discharged home with a prescription for Reglan and Benadryl which she has not been able to fill secondary to being home vomiting. He presents today via EMS because the symptoms have not improved and are worsening.  He did not get dialysis yesterday.  He denies any shortness of breath but does have an exacerbation of his chronic back pain which is thought to be secondary to him vomiting. The patient is unable hold down his chronic pain medicine which includes methadone.  Patient does not have a medication list with him at this time.        8/27  Seen on dialysis.  No nausea currently.  No chest pain or sob    8/28  Seen on dialysis.  sleeping          Plan of Care:     Assessment:     esrd  Hypertension  anemia    Plan:      seen on dialysis  Continue t,th,sat hd  uf as tolerated  reagan with hd    Thank you for allowing us to participate in this patient's care. We will continue to follow.    Medications:  No current facility-administered medications on file prior to encounter.      Current Outpatient  Medications on File Prior to Encounter   Medication Sig Dispense Refill    albuterol (PROAIR HFA) 90 mcg/actuation inhaler INHALE TWO PUFFS EVERY 4 TO 6 HOURS AS NEEDED      amlodipine (NORVASC) 5 MG tablet Take 5 mg by mouth 2 (two) times daily.      aspirin 325 MG tablet Take 1 tablet (325 mg total) by mouth once daily. 30 tablet 1    calcium acetate (PHOSLO) 667 mg capsule Take 1 capsule (667 mg total) by mouth 3 (three) times daily with meals. 90 capsule 11    hydrALAZINE (APRESOLINE) 50 MG tablet Take 50 mg by mouth 3 (three) times daily.      levetiracetam (KEPPRA) 500 MG Tab Take 500 mg twice a day.  Take an extra tablet right after dialysis (making 3 tablets on your dialysis days) 70 tablet 2    lisinopril (PRINIVIL,ZESTRIL) 40 MG tablet Take 1 tablet (40 mg total) by mouth once daily. 30 tablet 1    methadone (DOLOPHINE) 10 MG tablet Take 9 tablets (90 mg total) by mouth once daily.  0    metoclopramide HCl (REGLAN) 10 MG tablet Take 1 tablet (10 mg total) by mouth 3 (three) times daily as needed (nausea/vomiting). 10 tablet 0    minoxidil (LONITEN) 2.5 MG tablet Take 3 tablets (7.5 mg total) by mouth 2 (two) times daily. 90 tablet 0     Scheduled Meds:   sodium chloride 0.9%   Intravenous Once    sodium chloride 0.9%   Intravenous Once    aspirin  81 mg Oral Daily    calcium acetate  667 mg Oral TID WM    epoetin harshad (PROCRIT) injection  5,000 Units Intravenous Once    levETIRAcetam  500 mg Oral BID    methadone  30 mg Oral Daily    metoprolol succinate  25 mg Oral Daily    pantoprazole  40 mg Oral BID    piperacillin-tazobactam (ZOSYN) IVPB  2.25 g Intravenous Q8H    potassium phosphate IVPB  30 mmol Intravenous Once    sodium chloride 0.9%  3 mL Intravenous Q8H    sodium chloride 0.9%         Continuous Infusions:   sodium chloride 0.9% 500 mL (08/27/18 1356)    dextrose 10 % in water (D10W) 50 mL/hr at 08/29/18 0016     PRN Meds:.sodium chloride 0.9%, sodium chloride 0.9%,  acetaminophen, dextrose 50%, dextrose 50%, glucose, glucose, heparin (porcine), metoclopramide HCl, ondansetron    Allergies:  Antibiotic hc    Vital Signs:  Temp Readings from Last 3 Encounters:   08/29/18 97.2 °F (36.2 °C)   08/25/18 97.8 °F (36.6 °C) (Oral)   10/19/17 97.6 °F (36.4 °C)       Pulse Readings from Last 3 Encounters:   08/29/18 64   08/25/18 75   10/19/17 (!) 59       BP Readings from Last 3 Encounters:   08/29/18 100/67   08/25/18 133/71   10/19/17 (!) 166/81       Weight:  Wt Readings from Last 3 Encounters:   08/26/18 95.3 kg (210 lb)   08/25/18 94.8 kg (209 lb)   10/17/17 94.8 kg (209 lb 0 oz)       Review of Systems:  Review of Systems - All 14 systems reviewed and negative, except as noted in HPI    Physical Exam:    Constitutional: NAD  Neuro: No asterixis  Psych: Calm  EENT: NCAT, anicteric sclera   Neck:  supple  Cards: No rub  Lungs: clear to ausculation  GI:  Pos bowel sounds, no hsm, NTND   MSK:  WNWD  Skin: no purpura, rashes       Results:  Lab Results   Component Value Date     08/29/2018    K 3.1 (L) 08/29/2018    CL 99 08/29/2018    CO2 24 08/29/2018    BUN 30 (H) 08/29/2018    CREATININE 5.2 (H) 08/29/2018    CALCIUM 9.5 08/29/2018    ANIONGAP 14 08/29/2018    ESTGFRAFRICA 14 (A) 08/29/2018    EGFRNONAA 12 (A) 08/29/2018       Lab Results   Component Value Date    CALCIUM 9.5 08/29/2018    PHOS 1.3 (L) 08/29/2018       Recent Labs   Lab  08/29/18   0704   WBC  21.50*   RBC  3.51*   HGB  11.3*   HCT  34.0*   PLT  26*   MCV  97   MCH  32.2*   MCHC  33.2       I have personally reviewed pertinent radiological imaging and reports.

## 2018-08-29 NOTE — ASSESSMENT & PLAN NOTE
Unclear source  Check CT abdomen/pelvis to further evaluated  Start empiric vancomycin/zosyn  Hemodynamics are now improved and stable after fluids   Check procalcitonin, fibrinogen  Follow cultures  Low threshold to move to ICU if becomes unstable

## 2018-08-29 NOTE — PROGRESS NOTES
INPATIENT NEPHROLOGY PROGRESS NOTE  [facility] NEPHROLOGY  (late entry for 8/28)    João Aguirre  08/29/2018    Reason for consultation:         esrd    Chief Complaint:   Chief Complaint   Patient presents with    Abdominal Pain     with N/V; missed dialysis Saturday        History of Present Illness:          Patient is a 53-year-old male with a past medical history of end-stage renal disease on hemodialysis Tuesday Thursday Saturday, diabetes, chronic back pain on methadone, long-term anticoagulant use hypertension, prior pneumonia, prior stroke, and recurrent episodes of mid epigastric pain and vomiting but no formal diagnosis of gastroparesis presents to emergency department for evaluation of epigastric abdominal pain with inability to tolerate liquids or food for the past few days.  Patient was seen here in the emergency department yesterday where he underwent basic labs and was given antiemetics.  He was discharged home with a prescription for Reglan and Benadryl which she has not been able to fill secondary to being home vomiting. He presents today via EMS because the symptoms have not improved and are worsening.  He did not get dialysis yesterday.  He denies any shortness of breath but does have an exacerbation of his chronic back pain which is thought to be secondary to him vomiting. The patient is unable hold down his chronic pain medicine which includes methadone.  Patient does not have a medication list with him at this time.        8/27  Seen on dialysis.  No nausea currently.  No chest pain or sob    8/28  Seen on dialysis.  Sleeping  8/29  sleeping          Plan of Care:     Assessment:     esrd  Hypertension  anemia  Fever    Plan:      seen on dialysis yesterday  Continue t,th,sat hd  uf as tolerated  reagan with hd  abx per primary    Thank you for allowing us to participate in this patient's care. We will continue to follow.    Medications:  No current facility-administered medications on  file prior to encounter.      Current Outpatient Medications on File Prior to Encounter   Medication Sig Dispense Refill    albuterol (PROAIR HFA) 90 mcg/actuation inhaler INHALE TWO PUFFS EVERY 4 TO 6 HOURS AS NEEDED      amlodipine (NORVASC) 5 MG tablet Take 5 mg by mouth 2 (two) times daily.      aspirin 325 MG tablet Take 1 tablet (325 mg total) by mouth once daily. 30 tablet 1    calcium acetate (PHOSLO) 667 mg capsule Take 1 capsule (667 mg total) by mouth 3 (three) times daily with meals. 90 capsule 11    hydrALAZINE (APRESOLINE) 50 MG tablet Take 50 mg by mouth 3 (three) times daily.      levetiracetam (KEPPRA) 500 MG Tab Take 500 mg twice a day.  Take an extra tablet right after dialysis (making 3 tablets on your dialysis days) 70 tablet 2    lisinopril (PRINIVIL,ZESTRIL) 40 MG tablet Take 1 tablet (40 mg total) by mouth once daily. 30 tablet 1    methadone (DOLOPHINE) 10 MG tablet Take 9 tablets (90 mg total) by mouth once daily.  0    metoclopramide HCl (REGLAN) 10 MG tablet Take 1 tablet (10 mg total) by mouth 3 (three) times daily as needed (nausea/vomiting). 10 tablet 0    minoxidil (LONITEN) 2.5 MG tablet Take 3 tablets (7.5 mg total) by mouth 2 (two) times daily. 90 tablet 0     Scheduled Meds:   sodium chloride 0.9%   Intravenous Once    sodium chloride 0.9%   Intravenous Once    aspirin  81 mg Oral Daily    calcium acetate  667 mg Oral TID WM    epoetin harshad (PROCRIT) injection  5,000 Units Intravenous Once    levETIRAcetam  500 mg Oral BID    methadone  30 mg Oral Daily    metoprolol succinate  25 mg Oral Daily    pantoprazole  40 mg Oral BID    piperacillin-tazobactam (ZOSYN) IVPB  2.25 g Intravenous Q8H    potassium phosphate IVPB  30 mmol Intravenous Once    sodium chloride 0.9%  3 mL Intravenous Q8H    sodium chloride 0.9%         Continuous Infusions:   sodium chloride 0.9% 500 mL (08/27/18 1356)    dextrose 10 % in water (D10W) 50 mL/hr at 08/29/18 0016     PRN  Meds:.sodium chloride 0.9%, sodium chloride 0.9%, acetaminophen, dextrose 50%, dextrose 50%, glucose, glucose, heparin (porcine), metoclopramide HCl, ondansetron    Allergies:  Antibiotic hc    Vital Signs:  Temp Readings from Last 3 Encounters:   08/29/18 97.2 °F (36.2 °C)   08/25/18 97.8 °F (36.6 °C) (Oral)   10/19/17 97.6 °F (36.4 °C)       Pulse Readings from Last 3 Encounters:   08/29/18 64   08/25/18 75   10/19/17 (!) 59       BP Readings from Last 3 Encounters:   08/29/18 100/67   08/25/18 133/71   10/19/17 (!) 166/81       Weight:  Wt Readings from Last 3 Encounters:   08/26/18 95.3 kg (210 lb)   08/25/18 94.8 kg (209 lb)   10/17/17 94.8 kg (209 lb 0 oz)       Review of Systems:  Review of Systems - All 14 systems reviewed and negative, except as noted in HPI    Physical Exam:    Constitutional: NAD  Neuro: No asterixis  Psych: Calm  EENT: NCAT, anicteric sclera   Neck:  supple  Cards: No rub  Lungs: clear to ausculation  GI:  Pos bowel sounds, no hsm, NTND   MSK:  WNWD  Skin: no purpura, rashes       Results:  Lab Results   Component Value Date     08/29/2018    K 3.1 (L) 08/29/2018    CL 99 08/29/2018    CO2 24 08/29/2018    BUN 30 (H) 08/29/2018    CREATININE 5.2 (H) 08/29/2018    CALCIUM 9.5 08/29/2018    ANIONGAP 14 08/29/2018    ESTGFRAFRICA 14 (A) 08/29/2018    EGFRNONAA 12 (A) 08/29/2018       Lab Results   Component Value Date    CALCIUM 9.5 08/29/2018    PHOS 1.3 (L) 08/29/2018       Recent Labs   Lab  08/29/18   0704   WBC  21.50*   RBC  3.51*   HGB  11.3*   HCT  34.0*   PLT  26*   MCV  97   MCH  32.2*   MCHC  33.2       I have personally reviewed pertinent radiological imaging and reports.

## 2018-08-29 NOTE — PROGRESS NOTES
[facility] Hospital Medicine  Progress Note    Patient Name: João Aguirre  MRN: [id number]  Patient Class: IP- Inpatient   Admission Date: 8/26/2018  Length of Stay: 3 days  Attending Physician: Gilles Lake MD  Primary Care Provider: Primary Doctor No    Subjective:     Principal Problem:Intractable vomiting with nausea    HPI:  This is a 53-year-old male with a past medical history of end-stage renal disease on hemodialysis Tuesday, Thursday, and  Saturday, diabetes, chronic back pain on methadone, CAD with prior coronary stent placement 2 years ago, hypertension, prior pneumonia, prior stroke, and recurrent episodes of mid epigastric pain and vomiting but no formal diagnosis of gastroparesis who presents to emergency department for evaluation of epigastric, abdominal pain, and CP with inability to tolerate liquids or food for the past few days.  Patient was seen here in the emergency department yesterday where he underwent basic labs and was given antiemetics.  He was discharged home with a prescription for Reglan and Benadryl which she has not been able to fill secondary to being home vomiting. He presents today via EMS because the symptoms have not improved and are worsening.  He did not get dialysis yesterday.  He denies any shortness of breath but does have an exacerbation of his chronic back pain which is thought to be secondary to him vomiting. The patient is unable hold down his chronic pain medicine which includes methadone.  Patient does not have a medication list with him at this time.    Patient found to have elevated troponin of 0.763 and reports he has Hx of CAD with prior coronary stent placement 2 years ago but has not been following up with cardiology. He does report some left sided chest pain and occasionally diaphoresis along with his nausea and vomiting. His EKG currently with no significant St elevation or depression. He does have Hx ESRD requiring HD with prior  elevated troponins in past of 0.16-0.17. Patient discussed with Dr. Law. Will consult cardiology and trend troponins due to prior cardiac Hx and continue monitoring GI status as well.          Hospital Course:  The patient was monitored closely during his stay. He was placed on continuous telemetry monitoring. He was noted to have an elevated troponin level and Cardiology was also consulted. Nephrology was consulted for renal/ HD management. Patient initially noted to be in SR, however, later he converted to atrial fibrillation with some RVR with heart rate up to 120-130s. Dr. Kee was notified and patient given IV Bblockade.  The patient later converted back to SR. He was placed on toprol Xl 50mg po daily. The patient was noted to have a decrease in his platelets and sq heparin was discontinued.     Interval History:  Patient converted back to SR and remains in SR. Dr. Kee called to check on Patient and placed patient on Toprol xl 25 mg po daily and canceled prior scheduled Lexiscan stress test. Patient had HD today and reported some nausea and vomiting afterwards but denied any complaints of abdominal pain or diarrhea. Platelets trending downward. Sq heparin discontinued. Kept on asa for now. Patient with low grade temperature of 100. Patient  discussed with Dr. Lake. Monitor closely. Repeat labs in am.      Review of Systems   Constitutional: Positive for fatigue. Negative for activity change, appetite change, chills, diaphoresis and fever.   HENT: Negative for ear discharge, ear pain and facial swelling.    Eyes: Negative for pain and redness.   Respiratory: Negative for cough, choking, chest tightness and shortness of breath.    Cardiovascular: Negative for chest pain, palpitations and leg swelling.   Gastrointestinal: Positive for nausea and vomiting. Negative for abdominal distention, abdominal pain, constipation and diarrhea.   Endocrine: Negative for polydipsia and polyphagia.   Genitourinary:  Negative for difficulty urinating, dysuria, flank pain and hematuria.        Reports rarely voids   Musculoskeletal: Positive for back pain. Negative for gait problem, neck pain and neck stiffness.        Chronic back pain   Skin: Negative for color change.   Allergic/Immunologic: Negative for food allergies.   Neurological: Negative for seizures, syncope, facial asymmetry, speech difficulty and weakness.   Hematological: Does not bruise/bleed easily.   Psychiatric/Behavioral: Negative for agitation, behavioral problems, confusion, hallucinations and suicidal ideas. The patient is not nervous/anxious.      Objective:     Vital Signs (Most Recent):  Temp: (!) 101.3 °F (38.5 °C) (08/28/18 1630)  Pulse: 74 (08/28/18 1525)  Resp: 20 (08/28/18 1525)  BP: 123/68 (08/28/18 1525)  SpO2: 96 % (08/28/18 1525) Vital Signs (24h Range):  Temp:  [97.7 °F (36.5 °C)-102.1 °F (38.9 °C)] 101.3 °F (38.5 °C)  Pulse:  [52-82] 74  Resp:  [17-20] 20  SpO2:  [84 %-100 %] 96 %  BP: ()/(55-89) 123/68     Weight: 95.3 kg (210 lb)  Body mass index is 28.48 kg/m².    Physical Exam   Constitutional: He is oriented to person, place, and time. He appears well-developed and well-nourished. No distress.   HENT:   Head: Normocephalic and atraumatic.   Eyes: Conjunctivae are normal. Right eye exhibits no discharge. Left eye exhibits no discharge.   Right eye blindness   Neck: Normal range of motion. Neck supple. No JVD present.   Cardiovascular: Normal rate, regular rhythm, normal heart sounds and intact distal pulses.   Av shunt to arm with good thrill and bruit      Pulmonary/Chest: Effort normal and breath sounds normal. No stridor. No respiratory distress. He has no wheezes. He has no rales.   Abdominal: Soft. Bowel sounds are normal. He exhibits no distension. There is no tenderness. There is no guarding.   Genitourinary:   Genitourinary Comments: Not examined   Musculoskeletal: Normal range of motion. He exhibits no edema.   Neurological:  He is alert and oriented to person, place, and time. No cranial nerve deficit.   Skin: Skin is warm and dry. Capillary refill takes less than 2 seconds. He is not diaphoretic.   Psychiatric: He has a normal mood and affect. His behavior is normal. Judgment and thought content normal.         Labs: Reviewed     Assessment/Plan:      * Intractable vomiting with nausea    Initially resolved but now reported another episode  today   Monitor closely  Prn antiemetic medication  Lipase and  Wbcs Wnl   KUB results noted  Continue  PPI          Paroxysmal atrial fibrillation with RVR    Resolves   Patient converted back to SR  Dr. Kee called to check on patient and ordered Toprol xl 25mg po daily and canceled  Lexiscan Stress test for today          Elevated troponin    Cp/ Possible UA vs ACS/ Hx CAD with prior coronary stent placement-  Also Hx ESRD with HD  Telemetry  Trend troponin- Elevated on admit on 0.763  EKG shows no significant ST elevation or depression  Cardiology consulted- Dr. Kee  Continue asa   Sq heparin discontinued today due to worsening thrombocytopenia  Lipid panel and TSH noted        Chest pain    Cardiac Vs GI/ Elevated troponin/ Hx CAD with prior coronary stent placement-  Initial Nausea/vomiting resolved but now returned  Telemetry-S/p afib with RVR converted back to SR- Remains SR at this time  Trend troponins- Elevated  Cardiology following-  Dr. Kee          Benign hypertension with ESRD (end-stage renal disease)    Mild hypotensive noted intermittently    Home hydralazine discontinued  Patient reports has not been taking lisinopril at home and was also discontinued   Monitor closely          Methadone dependence    Hx chronic pain with chronic pain syndrome-  Continue home methadone  Patient reports he has been taking his methadone regularly and has not run out          Chronic hepatitis C without hepatic coma    With Hx of liver cirrhosis          Dehydration    Patient received  NS  500ml IV bolus yesterday          Chronic back pain    With chronic pain syndrome-  Continue home methadone          ESRD (T,Th,Sat) dialysis onset 2013    Patient reports he missed his HD 8/25/18  Nephrology consulted for renal/HD management  HD as per nephrology          Coronary artery disease involving native coronary artery of native heart without angina pectoris    With prior coronary stent placement 2016/ Hx ischemic cardiomyopathy-  Continue home medications-  Patient reports he has been taking his asa at home            VTE Risk Mitigation (From admission, onward)        Ordered     heparin (porcine) injection 5,000 Units  As needed (PRN)      08/29/18 0717     IP VTE HIGH RISK PATIENT  Once      08/26/18 1106     Place sequential compression device  Until discontinued      08/26/18 1106     Place ZACHARY hose  Until discontinued      08/26/18 1106          LISETTE Ppoe  Department of Hospital Medicine   [facility]    Time spent seeing patient( greater than 1/2 spent in direct contact) : 67 minutes

## 2018-08-29 NOTE — ASSESSMENT & PLAN NOTE
With prior coronary stent placement 2016/ Hx ischemic cardiomyopathy-  Cardiology following  ON metoprolol.  Hold asa with thrombocytopenia

## 2018-08-29 NOTE — PROGRESS NOTES
Pt blood pressure 80/58 machine and 88/56 manually. WICHO Bautista NP notified. New order for 250 cc normal saline bolus noted and given. Will repeat b/p after bolus complete. Also pt accucheck 57. Dextrose 12.5 gm IVP given. Will continue to monitor.

## 2018-08-29 NOTE — PROGRESS NOTES
Temp 102.3 orally. WICHO Bautista NP notified. New order for blood culture and chest x ray noted. Also tylenol 1000 mg po ordered prn for elevated temp. New orders noted.

## 2018-08-29 NOTE — PROGRESS NOTES
[facility] Medicine  Progress Note    Patient Name: João Aguirre  MRN: [id number]  Patient Class: IP- Inpatient   Admission Date: 8/26/2018  Length of Stay: 3 days  Attending Physician: Gilles Lake MD  Primary Care Provider: Primary Doctor No        Subjective:     Principal Problem:Severe sepsis    HPI:  This is a 53-year-old male with a past medical history of end-stage renal disease on hemodialysis Tuesday, Thursday, and  Saturday, diabetes, chronic back pain on methadone, CAD with prior coronary stent placement 2 years ago, hypertension, prior pneumonia, prior stroke, and recurrent episodes of mid epigastric pain and vomiting but no formal diagnosis of gastroparesis who presents to emergency department for evaluation of epigastric, abdominal pain, and CP with inability to tolerate liquids or food for the past few days.  Patient was seen here in the emergency department yesterday where he underwent basic labs and was given antiemetics.  He was discharged home with a prescription for Reglan and Benadryl which she has not been able to fill secondary to being home vomiting. He presents today via EMS because the symptoms have not improved and are worsening.  He did not get dialysis yesterday.  He denies any shortness of breath but does have an exacerbation of his chronic back pain which is thought to be secondary to him vomiting. The patient is unable hold down his chronic pain medicine which includes methadone.  Patient does not have a medication list with him at this time.    Patient found to have elevated troponin of 0.763 and reports he has Hx of CAD with prior coronary stent placement 2 years ago but has not been following up with cardiology. He does report some left sided chest pain and occasionally diaphoresis along with his nausea and vomiting. His EKG currently with no significant St elevation or depression. He does have Hx ESRD requiring HD with prior elevated  troponins in past of 0.16-0.17. Patient discussed with Dr. Law. Will consult cardiology and trend troponins due to prior cardiac Hx and continue monitoring GI status as well.          Hospital Course:  He was noted to have an elevated troponin level and Cardiology was also consulted. Nephrology was consulted for renal/ HD management. Patient initially noted to be in SR, however, later he converted to atrial fibrillation with some RVR with heart rate up to 120-130s. Dr. Kee was notified and patient given IV Bblockade.  The patient later converted back to SR. He was placed on toprol Xl 50mg po daily.  The patient was noted to have a decrease in his platelets and sq heparin was discontinued. Developed fever with evidence of sepsis overnight, now on broad spectrum antibiotics with thombocytopenia and coagulopathy.    Interval History:   Follow up abd    Febrile overnight, hypotensive responded to IV fluids. Hypglycemic, now on D10.  Pt reports feeling a little better than he did on admission.  Reports some peristent poorly localized abdominal pain, no nausea/vomiting, no chest pain or shortness of breath.  No bleeding.      Review of Systems  Objective:     Vital Signs (Most Recent):  Temp: 97.2 °F (36.2 °C) (08/29/18 0713)  Pulse: 64 (08/29/18 0713)  Resp: 16 (08/29/18 0713)  BP: 100/67 (08/29/18 0713)  SpO2: 95 % (08/29/18 0744) Vital Signs (24h Range):  Temp:  [96.7 °F (35.9 °C)-102.3 °F (39.1 °C)] 97.2 °F (36.2 °C)  Pulse:  [] 64  Resp:  [16-20] 16  SpO2:  [95 %-97 %] 95 %  BP: ()/(56-74) 100/67     Weight: 95.3 kg (210 lb)  Body mass index is 28.48 kg/m².    Intake/Output Summary (Last 24 hours) at 8/29/2018 1327  Last data filed at 8/29/2018 0600  Gross per 24 hour   Intake 686.67 ml   Output --   Net 686.67 ml      Physical Exam   Constitutional: He is oriented to person, place, and time.   Appears weak, no acute distress   Eyes:   Chronic right eye changes   Cardiovascular: Normal rate and  regular rhythm.   Pulmonary/Chest: Effort normal and breath sounds normal. He has no wheezes. He has no rales.   Abdominal: Soft. Bowel sounds are normal. He exhibits no distension.   Diffusely tender to palpation without rebound or guarding   Neurological: He is oriented to person, place, and time.       Significant Labs:   Bilirubin:   Recent Labs   Lab  08/25/18   0656  08/26/18   0610  08/28/18   0447  08/29/18   0512   BILIDIR   --    --   0.6*   --    BILITOT  1.2*  0.9  0.9  1.9*     Blood Culture: No results for input(s): LABBLOO in the last 48 hours.  CBC:   Recent Labs   Lab  08/28/18   0447 08/29/18   0704   WBC  9.90  21.50*   HGB  10.8*  11.3*   HCT  32.6*  34.0*   PLT  64*  26*     CMP:   Recent Labs   Lab  08/28/18   0447  08/28/18   2212  08/29/18   0512   NA  134*   --   137   K  4.3   --   3.1*   CL  99   --   99   CO2  24   --   24   GLU  39*  40*  56*   BUN  50*   --   30*   CREATININE  6.7*   --   5.2*   CALCIUM  8.6*   --   9.5   PROT  5.4*   --   6.1   ALBUMIN  2.0*   --   2.1*   BILITOT  0.9   --   1.9*   ALKPHOS  149*   --   303*   AST  19   --   24   ALT  11   --   11   ANIONGAP  11   --   14   EGFRNONAA  9*   --   12*     Coagulation:   Recent Labs   Lab  08/29/18   0512   INR  1.9*       Significant Imaging: I have reviewed all pertinent imaging results/findings within the past 24 hours.    Assessment/Plan:      * Severe sepsis    Unclear source  Check CT abdomen/pelvis to further evaluated  Start empiric vancomycin/zosyn  Hemodynamics are now improved and stable after fluids   Check procalcitonin, fibrinogen  Follow cultures  Low threshold to move to ICU if becomes unstable          Thrombocytopenia    Plts with acute drop in the 20s without active bleeding.  Holding lovenox, recommend no heparin with HD.  Transfuse for bleeding or plts <20.    Monitor closely        ESRD (T,Th,Sat) dialysis onset 2013    HD yesterday  Nephrology following for renal/HD management            Coronary  artery disease involving native coronary artery of native heart without angina pectoris    With prior coronary stent placement 2016/ Hx ischemic cardiomyopathy-  Cardiology following  ON metoprolol.  Hold asa with thrombocytopenia          Hypoglycemia    Likely from sepsis, liver dysfunction, decreased PO intake.  Improved on D10, will continue and monitor closely        Paroxysmal atrial fibrillation with RVR    Discussed with Dr. Kee  Add Iv lopressor  Monitor rate  Give NS 500ml  IV bolus for suspected mild dehydration  Lexiscan stress test in am          Elevated troponin    Cp/ Possible UA vs ACS/ Hx CAD with prior coronary stent placement-  Also Hx ESRD with HD  Telemetry  Trend troponin- Elevated on admit on 0.763  EKG shows no significant ST elevation or depression  Cardiology consulted- Dr. Kee  Continue asa and sq heparin  Lipid panel and TSH noted        Chest pain    Cardiac Vs GI/ Elevated troponin/ Hx CAD with prior coronary stent placement-  Nausea/vomiting resolved this am  Telemetry- Now with afib with RVR  Trend troponins- Elevated  Cardiology consult- Dr. Kee          Benign hypertension with ESRD (end-stage renal disease)    Mild hypotensive   Home hydralazine discontinued  Patient reports has not been taking lisinopril- Discontinued this am  Monitor closely          Methadone dependence    Hx chronic pain with chronic pain syndrome-  Continue home methadone  Patient reports he has been taking his methadone regularly and has not run out          Chronic hepatitis C without hepatic coma    With Hx of liver cirrhosis  Likely contributing to thrombocytopenia          Dehydration    Will give NS 500ml IV bolus today          Intractable vomiting with nausea    Resolved at this time  Monitor for recurrence  Prn antiemetic medication  Lipase and  Wbcs Wnl   KUB results noted  Continue  PPI          Chronic back pain    With chronic pain syndrome-  Continue home methadone            VTE  Risk Mitigation (From admission, onward)        Ordered     heparin (porcine) injection 5,000 Units  As needed (PRN)      08/29/18 0717     IP VTE HIGH RISK PATIENT  Once      08/26/18 1106     Place sequential compression device  Until discontinued      08/26/18 1106     Place ZACHARY hose  Until discontinued      08/26/18 1106              Gilles Lake MD  Department of Hospital Medicine   [facility]

## 2018-08-29 NOTE — ASSESSMENT & PLAN NOTE
Management per cardiology.    Stable heart rate at present.  Careful with beta blocker/CCBs in the setting of hypotension

## 2018-08-29 NOTE — PROGRESS NOTES
08/29/18 1815   Vital Signs   Temp (!) 102.5 °F (39.2 °C)   Temp src Oral   Pulse 99   Heart Rate Source Monitor   Resp 18   SpO2 (!) 93 %   O2 Device (Oxygen Therapy) nasal cannula   BP (!) 96/58   MAP (mmHg) 68   BP Location Right arm   Patient Position Lying     BG re-checked: 62. Pt alert and responsive to voice. Attempted to give glucose tablets. Pt ate one glucose tablet. Pt ate a few bites of his dinner and then refused to eat anymore after multiple attempts. Reached out to Dr. Lake again and notified him of pts status. Order given to transfer pt to ICU for closer monitoring.     Pt transferred to ICU bed 509. Report given at pts bedside. Attempted to reach pts wife. No answer. Reached pts son by phone. He stated he will update pts wife, Soledad.

## 2018-08-29 NOTE — PLAN OF CARE
08/29/18 0744   Patient Assessment/Suction   Level of Consciousness (AVPU) alert   Respiratory Effort Unlabored   All Lung Fields Breath Sounds clear   PRE-TX-[id number]-ETCO2   O2 Device (Oxygen Therapy) room air   SpO2 95 %   Pulse Oximetry Type Intermittent   $ Pulse Oximetry - Multiple Charge Pulse Oximetry - Multiple

## 2018-08-29 NOTE — PROGRESS NOTES
Recheck blood glucose. Result 56. WICHO Bautista NP notified. States ok to treat pt with Dextrose per standing order. Pt given Dextrose 12.5 gram IVP. Will continue to monitor.

## 2018-08-29 NOTE — CONSULTS
Date: 8/29/2018   João Aguirre [id number] is a 53 y.o. male who has been consulted for vancomycin dosing.    The patient has the following labs:     Creatinine (mg/dl)    WBC Count   Serum creatinine: 5.2 mg/dL (H) 08/29/18 0512  Estimated creatinine clearance: 19.9 mL/min (A) Lab Results   Component Value Date    WBC 21.50 (H) 08/29/2018        Current weight is 95.3 kg (210 lb)    Vancomycin being given for bacteremia.    Pt is on HD on T, Th, Sat.  Pharmacy will dose by level.  Vanc 1500 mg given initially.   Target goal is 15-20 mg/dL.    Vancomycin level ordered with AM lab before next HD 8/30/18.      Pharmacy will follow Vanc daily.  Vanc post HD doses will be adjusted if needed.   Patient will be followed by pharmacy for changes in renal function, toxicity, and efficacy.    Thank you for allowing us to participate in this patient's care.     Jose Maria Toscano, Pharmacist

## 2018-08-29 NOTE — PROGRESS NOTES
21:58 blood sugar per accucheck result 36. Random glucose ordered per lab. Result 40. Pt given dextrose 25 g IVP.  23:33 blood sugar recheck per accucheck. Result 52. WICHO Bautista NP notified of result. New order for D 10 IV fluid at 50 cc/hr ordered and given. Will continue to monitor.

## 2018-08-29 NOTE — SUBJECTIVE & OBJECTIVE
Interval History:   Follow up abd    Febrile overnight, hypotensive responded to IV fluids. Hypglycemic, now on D10.  Pt reports feeling a little better than he did on admission.  Reports some peristent poorly localized abdominal pain, no nausea/vomiting, no chest pain or shortness of breath.  No bleeding.      Review of Systems  Objective:     Vital Signs (Most Recent):  Temp: 97.2 °F (36.2 °C) (08/29/18 0713)  Pulse: 64 (08/29/18 0713)  Resp: 16 (08/29/18 0713)  BP: 100/67 (08/29/18 0713)  SpO2: 95 % (08/29/18 0744) Vital Signs (24h Range):  Temp:  [96.7 °F (35.9 °C)-102.3 °F (39.1 °C)] 97.2 °F (36.2 °C)  Pulse:  [] 64  Resp:  [16-20] 16  SpO2:  [95 %-97 %] 95 %  BP: ()/(56-74) 100/67     Weight: 95.3 kg (210 lb)  Body mass index is 28.48 kg/m².    Intake/Output Summary (Last 24 hours) at 8/29/2018 1327  Last data filed at 8/29/2018 0600  Gross per 24 hour   Intake 686.67 ml   Output --   Net 686.67 ml      Physical Exam   Constitutional: He is oriented to person, place, and time.   Appears weak, no acute distress   Eyes:   Chronic right eye changes   Cardiovascular: Normal rate and regular rhythm.   Pulmonary/Chest: Effort normal and breath sounds normal. He has no wheezes. He has no rales.   Abdominal: Soft. Bowel sounds are normal. He exhibits no distension.   Diffusely tender to palpation without rebound or guarding   Neurological: He is oriented to person, place, and time.       Significant Labs:   Bilirubin:   Recent Labs   Lab  08/25/18   0656  08/26/18   0610  08/28/18 0447 08/29/18   0512   BILIDIR   --    --   0.6*   --    BILITOT  1.2*  0.9  0.9  1.9*     Blood Culture: No results for input(s): LABBLOO in the last 48 hours.  CBC:   Recent Labs   Lab  08/28/18   0447 08/29/18   0704   WBC  9.90  21.50*   HGB  10.8*  11.3*   HCT  32.6*  34.0*   PLT  64*  26*     CMP:   Recent Labs   Lab  08/28/18 0447 08/28/18 2212  08/29/18   0512   NA  134*   --   137   K  4.3   --   3.1*   CL  99    --   99   CO2  24   --   24   GLU  39*  40*  56*   BUN  50*   --   30*   CREATININE  6.7*   --   5.2*   CALCIUM  8.6*   --   9.5   PROT  5.4*   --   6.1   ALBUMIN  2.0*   --   2.1*   BILITOT  0.9   --   1.9*   ALKPHOS  149*   --   303*   AST  19   --   24   ALT  11   --   11   ANIONGAP  11   --   14   EGFRNONAA  9*   --   12*     Coagulation:   Recent Labs   Lab  08/29/18   0512   INR  1.9*       Significant Imaging: I have reviewed all pertinent imaging results/findings within the past 24 hours.

## 2018-08-29 NOTE — PROGRESS NOTES
250 cc bolus complete. B/P 100/67. Blood sugar 87 after D50 25 g given. Pt alert and cooperative.

## 2018-08-30 PROBLEM — R65.21 SEPTIC SHOCK: Status: ACTIVE | Noted: 2018-08-29

## 2018-08-30 LAB
ABO + RH BLD: NORMAL
ALBUMIN SERPL BCP-MCNC: 1.7 G/DL
ALP SERPL-CCNC: 173 U/L
ALT SERPL W/O P-5'-P-CCNC: 9 U/L
ANION GAP SERPL CALC-SCNC: 12 MMOL/L
ANISOCYTOSIS BLD QL SMEAR: SLIGHT
AST SERPL-CCNC: 13 U/L
BASOPHILS # BLD AUTO: 0 K/UL
BASOPHILS NFR BLD: 0 %
BILIRUB SERPL-MCNC: 1.2 MG/DL
BLD GP AB SCN CELLS X3 SERPL QL: NORMAL
BLD PROD TYP BPU: NORMAL
BLOOD UNIT EXPIRATION DATE: NORMAL
BLOOD UNIT TYPE CODE: 7300
BLOOD UNIT TYPE: NORMAL
BUN SERPL-MCNC: 40 MG/DL
CALCIUM SERPL-MCNC: 8 MG/DL
CHLORIDE SERPL-SCNC: 96 MMOL/L
CO2 SERPL-SCNC: 24 MMOL/L
CODING SYSTEM: NORMAL
CORTIS SERPL-MCNC: 26.7 UG/DL
CREAT SERPL-MCNC: 5.7 MG/DL
DIFFERENTIAL METHOD: ABNORMAL
DISPENSE STATUS: NORMAL
EOSINOPHIL # BLD AUTO: 0 K/UL
EOSINOPHIL NFR BLD: 0.1 %
ERYTHROCYTE [DISTWIDTH] IN BLOOD BY AUTOMATED COUNT: 18.3 %
EST. GFR  (AFRICAN AMERICAN): 12 ML/MIN/1.73 M^2
EST. GFR  (NON AFRICAN AMERICAN): 11 ML/MIN/1.73 M^2
GGT SERPL-CCNC: 103 U/L
GLUCOSE SERPL-MCNC: 130 MG/DL
GLUCOSE SERPL-MCNC: 96 MG/DL
HCT VFR BLD AUTO: 31.4 %
HGB BLD-MCNC: 10.3 G/DL
INR PPP: 2.2
LACTATE SERPL-SCNC: 1.3 MMOL/L
LYMPHOCYTES # BLD AUTO: 2.1 K/UL
LYMPHOCYTES NFR BLD: 10 %
MCH RBC QN AUTO: 31.9 PG
MCHC RBC AUTO-ENTMCNC: 32.7 G/DL
MCV RBC AUTO: 98 FL
MONOCYTES # BLD AUTO: 2.2 K/UL
MONOCYTES NFR BLD: 10.8 %
NEUTROPHILS # BLD AUTO: 16.3 K/UL
NEUTROPHILS NFR BLD: 79.1 %
PHOSPHATE SERPL-MCNC: 3.4 MG/DL
PLATELET # BLD AUTO: 15 K/UL
PLATELET BLD QL SMEAR: ABNORMAL
PMV BLD AUTO: 10.3 FL
POCT GLUCOSE: 123 MG/DL (ref 70–110)
POCT GLUCOSE: 139 MG/DL (ref 70–110)
POCT GLUCOSE: 141 MG/DL (ref 70–110)
POCT GLUCOSE: 149 MG/DL (ref 70–110)
POCT GLUCOSE: 379 MG/DL (ref 70–110)
POCT GLUCOSE: 43 MG/DL (ref 70–110)
POCT GLUCOSE: 56 MG/DL (ref 70–110)
POCT GLUCOSE: 62 MG/DL (ref 70–110)
POCT GLUCOSE: 95 MG/DL (ref 70–110)
POCT GLUCOSE: 98 MG/DL (ref 70–110)
POTASSIUM SERPL-SCNC: 4.3 MMOL/L
PROT SERPL-MCNC: 5.2 G/DL
PROTHROMBIN TIME: 21.8 SEC
RBC # BLD AUTO: 3.22 M/UL
SODIUM SERPL-SCNC: 132 MMOL/L
TRANS PLATPHERESIS VOL PATIENT: NORMAL ML
VANCOMYCIN SERPL-MCNC: 17 UG/ML
WBC # BLD AUTO: 20.6 K/UL

## 2018-08-30 PROCEDURE — 84100 ASSAY OF PHOSPHORUS: CPT

## 2018-08-30 PROCEDURE — 27000221 HC OXYGEN, UP TO 24 HOURS

## 2018-08-30 PROCEDURE — 82947 ASSAY GLUCOSE BLOOD QUANT: CPT

## 2018-08-30 PROCEDURE — 87077 CULTURE AEROBIC IDENTIFY: CPT

## 2018-08-30 PROCEDURE — 80100016 HC MAINTENANCE HEMODIALYSIS

## 2018-08-30 PROCEDURE — 84681 ASSAY OF C-PEPTIDE: CPT

## 2018-08-30 PROCEDURE — 63600175 PHARM REV CODE 636 W HCPCS: Performed by: INTERNAL MEDICINE

## 2018-08-30 PROCEDURE — 36415 COLL VENOUS BLD VENIPUNCTURE: CPT

## 2018-08-30 PROCEDURE — 80074 ACUTE HEPATITIS PANEL: CPT

## 2018-08-30 PROCEDURE — 82977 ASSAY OF GGT: CPT

## 2018-08-30 PROCEDURE — 20000000 HC ICU ROOM

## 2018-08-30 PROCEDURE — 63600175 PHARM REV CODE 636 W HCPCS: Performed by: NURSE PRACTITIONER

## 2018-08-30 PROCEDURE — A4216 STERILE WATER/SALINE, 10 ML: HCPCS | Performed by: EMERGENCY MEDICINE

## 2018-08-30 PROCEDURE — P9035 PLATELET PHERES LEUKOREDUCED: HCPCS

## 2018-08-30 PROCEDURE — 25000003 PHARM REV CODE 250: Performed by: HOSPITALIST

## 2018-08-30 PROCEDURE — 25000003 PHARM REV CODE 250: Performed by: NURSE PRACTITIONER

## 2018-08-30 PROCEDURE — 94761 N-INVAS EAR/PLS OXIMETRY MLT: CPT

## 2018-08-30 PROCEDURE — 83605 ASSAY OF LACTIC ACID: CPT

## 2018-08-30 PROCEDURE — 85610 PROTHROMBIN TIME: CPT

## 2018-08-30 PROCEDURE — 25000003 PHARM REV CODE 250: Performed by: EMERGENCY MEDICINE

## 2018-08-30 PROCEDURE — 25000003 PHARM REV CODE 250: Performed by: INTERNAL MEDICINE

## 2018-08-30 PROCEDURE — 87070 CULTURE OTHR SPECIMN AEROBIC: CPT

## 2018-08-30 PROCEDURE — 80202 ASSAY OF VANCOMYCIN: CPT

## 2018-08-30 PROCEDURE — 85025 COMPLETE CBC W/AUTO DIFF WBC: CPT

## 2018-08-30 PROCEDURE — 25000003 PHARM REV CODE 250

## 2018-08-30 PROCEDURE — 80053 COMPREHEN METABOLIC PANEL: CPT

## 2018-08-30 PROCEDURE — 87186 SC STD MICRODIL/AGAR DIL: CPT

## 2018-08-30 PROCEDURE — 83525 ASSAY OF INSULIN: CPT

## 2018-08-30 RX ORDER — HYDROCODONE BITARTRATE AND ACETAMINOPHEN 500; 5 MG/1; MG/1
TABLET ORAL
Status: DISCONTINUED | OUTPATIENT
Start: 2018-08-30 | End: 2018-09-07

## 2018-08-30 RX ORDER — LIDOCAINE HYDROCHLORIDE 10 MG/ML
INJECTION, SOLUTION EPIDURAL; INFILTRATION; INTRACAUDAL; PERINEURAL
Status: COMPLETED | OUTPATIENT
Start: 2018-08-29 | End: 2018-08-29

## 2018-08-30 RX ORDER — METHADONE HYDROCHLORIDE 5 MG/5ML
SOLUTION ORAL
Status: COMPLETED
Start: 2018-08-30 | End: 2018-08-30

## 2018-08-30 RX ORDER — HYDROCODONE BITARTRATE AND ACETAMINOPHEN 5; 325 MG/1; MG/1
1 TABLET ORAL EVERY 4 HOURS PRN
Status: DISCONTINUED | OUTPATIENT
Start: 2018-08-30 | End: 2018-09-07 | Stop reason: HOSPADM

## 2018-08-30 RX ORDER — PHYTONADIONE 10 MG/ML
2 INJECTION, EMULSION INTRAMUSCULAR; INTRAVENOUS; SUBCUTANEOUS ONCE
Status: DISCONTINUED | OUTPATIENT
Start: 2018-08-30 | End: 2018-08-31

## 2018-08-30 RX ADMIN — LEVETIRACETAM 500 MG: 500 TABLET ORAL at 08:08

## 2018-08-30 RX ADMIN — DEXTROSE MONOHYDRATE 25 G: 500 INJECTION PARENTERAL at 01:08

## 2018-08-30 RX ADMIN — HYDROCODONE BITARTRATE AND ACETAMINOPHEN 1 TABLET: 5; 325 TABLET ORAL at 08:08

## 2018-08-30 RX ADMIN — PANTOPRAZOLE SODIUM 40 MG: 40 TABLET, DELAYED RELEASE ORAL at 12:08

## 2018-08-30 RX ADMIN — Medication 3 ML: at 06:08

## 2018-08-30 RX ADMIN — ONDANSETRON HYDROCHLORIDE 4 MG: 2 INJECTION, SOLUTION INTRAMUSCULAR; INTRAVENOUS at 09:08

## 2018-08-30 RX ADMIN — Medication 3 ML: at 10:08

## 2018-08-30 RX ADMIN — Medication 3 ML: at 02:08

## 2018-08-30 RX ADMIN — PIPERACILLIN SODIUM AND TAZOBACTAM SODIUM 2.25 G: 2; .25 INJECTION, POWDER, FOR SOLUTION INTRAVENOUS at 05:08

## 2018-08-30 RX ADMIN — LEVETIRACETAM 500 MG: 500 TABLET ORAL at 12:08

## 2018-08-30 RX ADMIN — METHADONE HYDROCHLORIDE 20 MG: 5 SOLUTION ORAL at 02:08

## 2018-08-30 RX ADMIN — PANTOPRAZOLE SODIUM 40 MG: 40 TABLET, DELAYED RELEASE ORAL at 08:08

## 2018-08-30 RX ADMIN — PIPERACILLIN SODIUM AND TAZOBACTAM SODIUM 2.25 G: 2; .25 INJECTION, POWDER, FOR SOLUTION INTRAVENOUS at 10:08

## 2018-08-30 RX ADMIN — CALCIUM ACETATE 667 MG: 667 CAPSULE ORAL at 12:08

## 2018-08-30 RX ADMIN — CALCIUM ACETATE 667 MG: 667 CAPSULE ORAL at 08:08

## 2018-08-30 RX ADMIN — DEXAMETHASONE SODIUM PHOSPHATE 4 MG: 4 INJECTION, SOLUTION INTRAMUSCULAR; INTRAVENOUS at 12:08

## 2018-08-30 RX ADMIN — PIPERACILLIN SODIUM AND TAZOBACTAM SODIUM 2.25 G: 2; .25 INJECTION, POWDER, FOR SOLUTION INTRAVENOUS at 02:08

## 2018-08-30 NOTE — PROGRESS NOTES
Patient and his wife relate that there had been $5.00 on patient's bedside table yesterday on PCU when he was transferred over to ICU.  Is not in patient's current room.  Call placed by this RN to PCU; spoke with Erika.  Neither nurse involved in patient's transfer to ICU yesterday, is working today.  Per Erika, they will check to see if money had been turned in and held pending finding owner.

## 2018-08-30 NOTE — PROGRESS NOTES
Pt had nausea and vomitting at middle of hd causing to decrease goal and turn UF off completely at times. At end of tx noticed irregular heart rate. Kept UF off at end and HR normalized. Total UF 1.4 Liters. Gave report to BRAVO Berger.

## 2018-08-30 NOTE — PHYSICIAN QUERY
"PT Name: João Aguirre  MR #: [id number]    Physician Query Form - Cause and Effect Relationship Clarification      Rufina Motley RN, CCDS  Contact Info: [phone]  [email or web address]      This form is a permanent document in the medical record.     Query Date: August 30, 2018    By submitting this query, we are merely seeking further clarification of documentation. Please utilize your independent clinical judgment when addressing the question(s) below.    The Medical record contains the following:  Supporting Clinical Findings   Location in record   8/26/2018 06:10  Hemoglobin: 13.6 (L)    Hematocrit: 41.8    8/30/2018 03:15   Hemoglobin: 10.3 (L)    Hematocrit: 31.4 (L)                                                                                                                                                                      Lab   esrd  Hypertension  Anemia    Plan:   seen on dialysis   Continue t,th,sat hd  uf as tolerated  reagan with hd  abx per primary                                                                                                                                                                                       Nephro PN 8/30/18       Provider, please clarify if there is any correlation between "Anemia" and "ESRD".           Are the conditions:     [ x ] Due to or associated with each other     [  ] Unrelated to each other     [  ] Other (Please Specify): _________________________     [  ] Clinically Undetermined                                                                                                                                                                                              "

## 2018-08-30 NOTE — CONSULTS
João Aguirre [id number] is a 53 y.o. male who had been consulted for vancomycin dosing.    Vancomycin has been discontinued.  Pharmacy consult for vancomycin dosing in no longer required.      Thank you for allowing us to participate in this patient's care.     Jaky Serrano, PharmD.  08/30/2018

## 2018-08-30 NOTE — PROGRESS NOTES
"Results for NAOMI ALLEN (MRN [id number]) as of 8/30/2018 13:49   Ref. Range 8/30/2018 12:39   POCT Glucose Latest Ref Range: 70 - 110 mg/dL 56 (L)       Result "secure chatted" to Dr. Lake.  Orders.  D10W resumed, one amp D50 given IV.  "

## 2018-08-30 NOTE — SUBJECTIVE & OBJECTIVE
Interval History:   Follow up septic shock    Moved to the ICU yesterday with refractory hypoglycemia and hypotension.  Overnight he had a central line placed, started on levophed, given crystalloid.  Improved hemodynamics this morning and now off levophed.  Intermittent bradycardia throughout the night, now NSR.  Elevated Bps reading last night thought to be false per nursing and due to him laying on his bp cuff. Pt complains only of right leg tenderness in his calf and thigh over what appears to be a veinous cord. Says he is overall feeling better. No abd pain, no cp, no sob.  Fever curve improved    Review of Systems  Objective:     Vital Signs (Most Recent):  Temp: 98 °F (36.7 °C) (08/30/18 0400)  Pulse: 73 (08/30/18 0600)  Resp: (!) 25 (08/30/18 0600)  BP: (!) 169/81 (08/30/18 0600)  SpO2: 100 % (08/30/18 0600) Vital Signs (24h Range):  Temp:  [97.8 °F (36.6 °C)-102.5 °F (39.2 °C)] 98 °F (36.7 °C)  Pulse:  [43-99] 73  Resp:  [14-34] 25  SpO2:  [92 %-100 %] 100 %  BP: ()/(47-93) 169/81     Weight: 92.5 kg (203 lb 14.8 oz)  Body mass index is 27.66 kg/m².    Intake/Output Summary (Last 24 hours) at 8/30/2018 0752  Last data filed at 8/30/2018 0600  Gross per 24 hour   Intake 2231.44 ml   Output --   Net 2231.44 ml      Physical Exam   Constitutional: No distress.   Awake and alert   Cardiovascular: Normal rate and regular rhythm.   No murmur heard.  Pulmonary/Chest: Effort normal and breath sounds normal. He has no wheezes. He has no rales.   Abdominal: Soft. Bowel sounds are normal. He exhibits no distension. There is no tenderness.   Musculoskeletal:   Venous stasis type changes to lower extremities bilaterally right more than left, linear area of induration and tenderness along calf and thigh without surrounding tenderness       Significant Labs:   CBC:   Recent Labs   Lab  08/29/18   0704  08/30/18   0315   WBC  21.50*  20.60*   HGB  11.3*  10.3*   HCT  34.0*  31.4*   PLT  26*  15*     CMP:   Recent  Labs   Lab  08/29/18   0512  08/29/18   1627  08/30/18   0315   NA  137   --   132*   K  3.1*   --   4.3   CL  99   --   96   CO2  24   --   24   GLU  56*  113*  96   BUN  30*   --   40*   CREATININE  5.2*   --   5.7*   CALCIUM  9.5   --   8.0*   PROT  6.1   --   5.2*   ALBUMIN  2.1*   --   1.7*   BILITOT  1.9*   --   1.2*   ALKPHOS  303*   --   173*   AST  24   --   13   ALT  11   --   9*   ANIONGAP  14   --   12   EGFRNONAA  12*   --   11*     Coagulation:   Recent Labs   Lab  08/30/18   0315   INR  2.2*       Significant Imaging: I have reviewed all pertinent imaging results/findings within the past 24 hours.

## 2018-08-30 NOTE — PROGRESS NOTES
[facility] Hospital Medicine  Progress Note    Patient Name: João Aguirre  MRN: [id number]  Patient Class: IP- Inpatient   Admission Date: 8/26/2018  Length of Stay: 4 days  Attending Physician: Gilles Lake MD  Primary Care Provider: Primary Doctor No        Subjective:     Principal Problem:Septic shock    HPI:  This is a 53-year-old male with a past medical history of end-stage renal disease on hemodialysis Tuesday, Thursday, and  Saturday, diabetes, chronic back pain on methadone, CAD with prior coronary stent placement 2 years ago, hypertension, prior pneumonia, prior stroke, and recurrent episodes of mid epigastric pain and vomiting but no formal diagnosis of gastroparesis who presents to emergency department for evaluation of epigastric, abdominal pain, and CP with inability to tolerate liquids or food for the past few days.  Patient was seen here in the emergency department yesterday where he underwent basic labs and was given antiemetics.  He was discharged home with a prescription for Reglan and Benadryl which she has not been able to fill secondary to being home vomiting. He presents today via EMS because the symptoms have not improved and are worsening.  He did not get dialysis yesterday.  He denies any shortness of breath but does have an exacerbation of his chronic back pain which is thought to be secondary to him vomiting. The patient is unable hold down his chronic pain medicine which includes methadone.  Patient does not have a medication list with him at this time.    Patient found to have elevated troponin of 0.763 and reports he has Hx of CAD with prior coronary stent placement 2 years ago but has not been following up with cardiology. He does report some left sided chest pain and occasionally diaphoresis along with his nausea and vomiting. His EKG currently with no significant St elevation or depression. He does have Hx ESRD requiring HD with prior elevated  troponins in past of 0.16-0.17. Patient discussed with Dr. Law. Will consult cardiology and trend troponins due to prior cardiac Hx and continue monitoring GI status as well.          Hospital Course:  He was noted to have an elevated troponin level and Cardiology was also consulted. Nephrology was consulted for renal/ HD management. Patient initially noted to be in SR, however, later he converted to atrial fibrillation with some RVR with heart rate up to 120-130s. Dr. Kee was notified and patient given IV Bblockade.  The patient later converted back to SR. He was placed on toprol Xl 50mg po daily.  The patient was noted to have a decrease in his platelets and sq heparin was discontinued. Developed fever with evidence of sepsis overnight, now on broad spectrum antibiotics with thombocytopenia and coagulopathy.    Interval History:   Follow up septic shock    Moved to the ICU yesterday with refractory hypoglycemia and hypotension.  Overnight he had a central line placed, started on levophed, given crystalloid.  Improved hemodynamics this morning and now off levophed.  Intermittent bradycardia throughout the night, now NSR.  Elevated Bps reading last night thought to be false per nursing and due to him laying on his bp cuff. Pt complains only of right leg tenderness in his calf and thigh over what appears to be a veinous cord. Says he is overall feeling better. No abd pain, no cp, no sob.  Fever curve improved    Review of Systems  Objective:     Vital Signs (Most Recent):  Temp: 98 °F (36.7 °C) (08/30/18 0400)  Pulse: 73 (08/30/18 0600)  Resp: (!) 25 (08/30/18 0600)  BP: (!) 169/81 (08/30/18 0600)  SpO2: 100 % (08/30/18 0600) Vital Signs (24h Range):  Temp:  [97.8 °F (36.6 °C)-102.5 °F (39.2 °C)] 98 °F (36.7 °C)  Pulse:  [43-99] 73  Resp:  [14-34] 25  SpO2:  [92 %-100 %] 100 %  BP: ()/(47-93) 169/81     Weight: 92.5 kg (203 lb 14.8 oz)  Body mass index is 27.66 kg/m².    Intake/Output Summary (Last 24  hours) at 8/30/2018 0752  Last data filed at 8/30/2018 0600  Gross per 24 hour   Intake 2231.44 ml   Output --   Net 2231.44 ml      Physical Exam   Constitutional: No distress.   Awake and alert   Cardiovascular: Normal rate and regular rhythm.   No murmur heard.  Pulmonary/Chest: Effort normal and breath sounds normal. He has no wheezes. He has no rales.   Abdominal: Soft. Bowel sounds are normal. He exhibits no distension. There is no tenderness.   Musculoskeletal:   Venous stasis type changes to lower extremities bilaterally right more than left, linear area of induration and tenderness along calf and thigh without surrounding tenderness       Significant Labs:   CBC:   Recent Labs   Lab  08/29/18   0704  08/30/18   0315   WBC  21.50*  20.60*   HGB  11.3*  10.3*   HCT  34.0*  31.4*   PLT  26*  15*     CMP:   Recent Labs   Lab  08/29/18   0512  08/29/18   1627  08/30/18   0315   NA  137   --   132*   K  3.1*   --   4.3   CL  99   --   96   CO2  24   --   24   GLU  56*  113*  96   BUN  30*   --   40*   CREATININE  5.2*   --   5.7*   CALCIUM  9.5   --   8.0*   PROT  6.1   --   5.2*   ALBUMIN  2.1*   --   1.7*   BILITOT  1.9*   --   1.2*   ALKPHOS  303*   --   173*   AST  24   --   13   ALT  11   --   9*   ANIONGAP  14   --   12   EGFRNONAA  12*   --   11*     Coagulation:   Recent Labs   Lab  08/30/18   0315   INR  2.2*       Significant Imaging: I have reviewed all pertinent imaging results/findings within the past 24 hours.    Assessment/Plan:      * Septic shock    Transferred to ICU 8/29 with hypotension  Complicated with hypoglycemia, thrombocytopenia, elevated INR with normal fibrinogen  Clinically improved overnight.  Required short term pressors, now off.  Crystalloid prn   Blood cultures with GNR, speciation and susceptibilities pending  Unclear source to this point  Continue zosyn, dc vanc  Maintain euvolemia  Repeat blood cultures tomorrow  Check lower extremity U/s to evaluate leg pain  Remains  critically ill with multiple signficant medical comordities increasing his risk of poor outcome, will continue to monitor in the ICU today            Thrombocytopenia    Likely from sepsis with underlying chronic liver disease.  Plts slightly decreased again today to 15. Will transfuse 1 unit platelets.  Holding lovenox, recommend no heparin with HD.          Elevated troponin    Management per cardiology, not felt to be ACS  Has reasons for demand ischemia, no chest pain at present        Chronic hepatitis C without hepatic coma    With Hx of possible liver cirrhosis  Likely contributing to thrombocytopenia  Ammonia normal  Monitor LFTs          Ischemic cardiomyopathy, LVEF 25%-30%    See elevated troponin  No evidence of acute CHF          ESRD (T,Th,Sat) dialysis onset 2013    HD 8/28  Nephrology following for renal/HD management  Electrolytes stable            Coronary artery disease involving native coronary artery of native heart without angina pectoris    With prior coronary stent placement 2016/ Hx ischemic cardiomyopathy-  Cardiology following  ON metoprolol.  Hold asa with thrombocytopenia          Hypoglycemia    Likely from sepsis, liver dysfunction, decreased PO intake.  Improved on D10, will continue and monitor closely        Paroxysmal atrial fibrillation with RVR    Management per cardiology.    Stable heart rate at present.  Careful with beta blocker/CCBs in the setting of hypotension          Chest pain    Chest pain free at present  See elevated troponin for plan          Benign hypertension with ESRD (end-stage renal disease)    Intermittently hypotensive  Stop minoxidil  Monitor closely          Methadone dependence    Methadone dose decreased with acute illness.  Continue to monitor closely          Cirrhosis of liver with ascites    Possible diagnosis though CT abdomen did not note cirrhosis or ascites          Dehydration    Will give NS 500ml IV bolus today          Intractable vomiting  with nausea    Likely from sepsis  Lipase and  Wbcs Wnl, Imaging and exam without evidence of acute abdominal process  Continue  PPI          Chronic back pain    With chronic pain syndrome-  Continue home methadone            VTE Risk Mitigation (From admission, onward)        Ordered     heparin (porcine) injection 5,000 Units  As needed (PRN)      08/29/18 0717     IP VTE HIGH RISK PATIENT  Once      08/26/18 1106     Place sequential compression device  Until discontinued      08/26/18 1106     Place ZACHARY hose  Until discontinued      08/26/18 1106          Critical care time spent on the evaluation and treatment of severe organ dysfunction, review of pertinent labs and imaging studies, discussions with consulting providers and discussions with patient/family:50 minutes.    Gilles Lake MD  Department of Hospital Medicine   [facility]

## 2018-08-30 NOTE — ASSESSMENT & PLAN NOTE
Likely from sepsis with underlying chronic liver disease.  Plts slightly decreased again today to 15. Will transfuse 1 unit platelets.  Holding lovenox, recommend no heparin with HD.

## 2018-08-30 NOTE — ANESTHESIA PROCEDURE NOTES
Central Line    Diagnosis: Hypotension,   Patient location during procedure: ICU  Procedure start time: 8/29/2018 10:10 PM  Timeout: 8/29/2018 10:10 PM  Procedure end time: 8/30/2018 10:40 PM  Staffing  Anesthesiologist: Yamil Mcnally MD  Performed: anesthesiologist   Anesthesiologist was present at the time of the procedure.  Preanesthetic Checklist  Completed: patient identified, site marked, surgical consent, pre-op evaluation, timeout performed, IV checked, risks and benefits discussed, monitors and equipment checked and anesthesia consent given  Indication  Indication: vascular access, med administration     Anesthesia   local infiltration    Central Line  Skin Prep: skin prepped with ChloraPrep, skin prep agent completely dried prior to procedure  maximum sterile barriers used during central venous catheter insertion  hand hygiene performed prior to central venous catheter insertion  Location: left internal jugular,   Catheter type: quad lumen  Catheter Size: 8.5 Fr  Inserted Catheter Length: 16 cm  Ultrasound: vascular probe with ultrasound  Vessel Caliber: medium, patent  Vascular Doppler:  not done, compressibility normal  Needle advanced into vessel with real time Ultrasound guidance.  Guidewire confirmed in vessel.  Sterile sheath used.  Image recorded and saved.  Manometry: none  Insertion Attempts: 1   Securement:line sutured, chlorhexidine patch, sterile dressing applied and blood return through all ports     Post-Procedure  X-Ray: no pneumothorax on x-ray, placement verified by x-ray, tip termination and successful placement  Tip termination comments: SVC   Adverse Events:none  Additional Notes  VSS

## 2018-08-30 NOTE — ASSESSMENT & PLAN NOTE
Transferred to ICU 8/29 with hypotension  Complicated with hypoglycemia, thrombocytopenia, elevated INR with normal fibrinogen  Clinically improved overnight.  Required short term pressors, now off.  Crystalloid prn   Blood cultures with GNR, speciation and susceptibilities pending  Unclear source to this point  Continue zosyn, dc vanc  Maintain euvolemia  Repeat blood cultures tomorrow  Check lower extremity U/s to evaluate leg pain  Remains critically ill with multiple signficant medical comordities increasing his risk of poor outcome, will continue to monitor in the ICU today

## 2018-08-30 NOTE — PLAN OF CARE
Problem: Patient Care Overview  Goal: Plan of Care Review  Outcome: Ongoing (interventions implemented as appropriate)  02 at 2lpm nc.  Hr 78 and 02 saturation 100%.

## 2018-08-30 NOTE — PLAN OF CARE
Problem: Patient Care Overview  Goal: Plan of Care Review  Shift goals discussed with patient and family,via telephone, with time given for questions and answers.    Comments: Safety maintained, tolerated central line access well. Consent was obtained via telephone from spouse with charge RN. Rested well, pressure improved this am. Update given to Hospitalist

## 2018-08-30 NOTE — PROGRESS NOTES
INPATIENT NEPHROLOGY PROGRESS NOTE  [facility] NEPHROLOGY  (late entry for 8/28)    João Aguirre  08/30/2018    Reason for consultation:         esrd    Chief Complaint:   Chief Complaint   Patient presents with    Abdominal Pain     with N/V; missed dialysis Saturday        History of Present Illness:          Patient is a 53-year-old male with a past medical history of end-stage renal disease on hemodialysis Tuesday Thursday Saturday, diabetes, chronic back pain on methadone, long-term anticoagulant use hypertension, prior pneumonia, prior stroke, and recurrent episodes of mid epigastric pain and vomiting but no formal diagnosis of gastroparesis presents to emergency department for evaluation of epigastric abdominal pain with inability to tolerate liquids or food for the past few days.  Patient was seen here in the emergency department yesterday where he underwent basic labs and was given antiemetics.  He was discharged home with a prescription for Reglan and Benadryl which she has not been able to fill secondary to being home vomiting. He presents today via EMS because the symptoms have not improved and are worsening.  He did not get dialysis yesterday.  He denies any shortness of breath but does have an exacerbation of his chronic back pain which is thought to be secondary to him vomiting. The patient is unable hold down his chronic pain medicine which includes methadone.  Patient does not have a medication list with him at this time.        8/27  Seen on dialysis.  No nausea currently.  No chest pain or sob    8/28  Seen on dialysis.  Sleeping  8/29  Sleeping  8/30  Transferred to ICU.  No n,v,chest pain, sob          Plan of Care:     Assessment:     esrd  Hypertension  anemia  Fever/bacteremia (GNR)    Plan:      seen on dialysis   Continue t,th,sat hd  uf as tolerated  reagan with hd  abx per primary    Thank you for allowing us to participate in this patient's care. We will continue to  follow.    Medications:  No current facility-administered medications on file prior to encounter.      Current Outpatient Medications on File Prior to Encounter   Medication Sig Dispense Refill    albuterol (PROAIR HFA) 90 mcg/actuation inhaler INHALE TWO PUFFS EVERY 4 TO 6 HOURS AS NEEDED      amlodipine (NORVASC) 5 MG tablet Take 5 mg by mouth 2 (two) times daily.      aspirin 325 MG tablet Take 1 tablet (325 mg total) by mouth once daily. 30 tablet 1    calcium acetate (PHOSLO) 667 mg capsule Take 1 capsule (667 mg total) by mouth 3 (three) times daily with meals. 90 capsule 11    hydrALAZINE (APRESOLINE) 50 MG tablet Take 50 mg by mouth 3 (three) times daily.      levetiracetam (KEPPRA) 500 MG Tab Take 500 mg twice a day.  Take an extra tablet right after dialysis (making 3 tablets on your dialysis days) 70 tablet 2    lisinopril (PRINIVIL,ZESTRIL) 40 MG tablet Take 1 tablet (40 mg total) by mouth once daily. 30 tablet 1    methadone (DOLOPHINE) 10 MG tablet Take 9 tablets (90 mg total) by mouth once daily.  0    metoclopramide HCl (REGLAN) 10 MG tablet Take 1 tablet (10 mg total) by mouth 3 (three) times daily as needed (nausea/vomiting). 10 tablet 0    minoxidil (LONITEN) 2.5 MG tablet Take 3 tablets (7.5 mg total) by mouth 2 (two) times daily. 90 tablet 0     Scheduled Meds:   sodium chloride 0.9%   Intravenous Once    sodium chloride 0.9%   Intravenous Once    calcium acetate  667 mg Oral TID WM    dexamethasone  4 mg Intravenous Daily    epoetin harshad (PROCRIT) injection  5,000 Units Intravenous Once    levETIRAcetam  500 mg Oral BID    methadone  20 mg Oral Daily    pantoprazole  40 mg Oral BID    phytonadione vitamin k  2 mg Subcutaneous Once    piperacillin-tazobactam (ZOSYN) IVPB  2.25 g Intravenous Q8H    sodium chloride 0.9%  3 mL Intravenous Q8H     Continuous Infusions:   dextrose 10 % in water (D10W) Stopped (08/30/18 0746)    norepinephrine bitartrate-D5W Stopped (08/30/18  0543)     PRN Meds:.sodium chloride, sodium chloride, sodium chloride 0.9%, sodium chloride 0.9%, acetaminophen, dextrose 50%, dextrose 50%, glucose, glucose, heparin (porcine), HYDROcodone-acetaminophen, magnesium sulfate IVPB, metoclopramide HCl, ondansetron, sodium chloride 0.9%    Allergies:  Antibiotic hc    Vital Signs:  Temp Readings from Last 3 Encounters:   08/30/18 98 °F (36.7 °C) (Oral)   08/25/18 97.8 °F (36.6 °C) (Oral)   10/19/17 97.6 °F (36.4 °C)       Pulse Readings from Last 3 Encounters:   08/30/18 73   08/25/18 75   10/19/17 (!) 59       BP Readings from Last 3 Encounters:   08/30/18 (!) 169/81   08/25/18 133/71   10/19/17 (!) 166/81       Weight:  Wt Readings from Last 3 Encounters:   08/30/18 92.5 kg (203 lb 14.8 oz)   08/25/18 94.8 kg (209 lb)   10/17/17 94.8 kg (209 lb 0 oz)       Review of Systems:  Review of Systems - All 14 systems reviewed and negative, except as noted in HPI    Physical Exam:    Constitutional: NAD  Neuro: No asterixis  Psych: Calm  EENT: NCAT, anicteric sclera   Neck:  supple  Cards: No rub  Lungs: clear to ausculation  GI:  Tender to palpation  MSK:  WNWD  Skin: no purpura, rashes       Results:  Lab Results   Component Value Date     (L) 08/30/2018    K 4.3 08/30/2018    CL 96 08/30/2018    CO2 24 08/30/2018    BUN 40 (H) 08/30/2018    CREATININE 5.7 (H) 08/30/2018    CALCIUM 8.0 (L) 08/30/2018    ANIONGAP 12 08/30/2018    ESTGFRAFRICA 12 (A) 08/30/2018    EGFRNONAA 11 (A) 08/30/2018       Lab Results   Component Value Date    CALCIUM 8.0 (L) 08/30/2018    PHOS 3.4 08/30/2018       Recent Labs   Lab  08/30/18   0315   WBC  20.60*   RBC  3.22*   HGB  10.3*   HCT  31.4*   PLT  15*   MCV  98   MCH  31.9*   MCHC  32.7       I have personally reviewed pertinent radiological imaging and reports.

## 2018-08-31 ENCOUNTER — TELEPHONE (OUTPATIENT)
Dept: ADMINISTRATIVE | Facility: OTHER | Age: 53
End: 2018-08-31

## 2018-08-31 PROBLEM — L97.519 RIGHT FOOT ULCER: Status: ACTIVE | Noted: 2018-08-31

## 2018-08-31 LAB
ALBUMIN SERPL BCP-MCNC: 1.8 G/DL
ALP SERPL-CCNC: 163 U/L
ALT SERPL W/O P-5'-P-CCNC: 9 U/L
ANION GAP SERPL CALC-SCNC: 10 MMOL/L
ANISOCYTOSIS BLD QL SMEAR: SLIGHT
AST SERPL-CCNC: 10 U/L
BACTERIA BLD CULT: NORMAL
BASOPHILS # BLD AUTO: ABNORMAL K/UL
BASOPHILS NFR BLD: 0 %
BILIRUB SERPL-MCNC: 1.2 MG/DL
BUN SERPL-MCNC: 37 MG/DL
C PEPTIDE SERPL-MCNC: 8.87 NG/ML
CALCIUM SERPL-MCNC: 8.4 MG/DL
CHLORIDE SERPL-SCNC: 98 MMOL/L
CO2 SERPL-SCNC: 25 MMOL/L
CREAT SERPL-MCNC: 4.3 MG/DL
DIFFERENTIAL METHOD: ABNORMAL
EOSINOPHIL # BLD AUTO: ABNORMAL K/UL
EOSINOPHIL NFR BLD: 0 %
ERYTHROCYTE [DISTWIDTH] IN BLOOD BY AUTOMATED COUNT: 18.1 %
EST. GFR  (AFRICAN AMERICAN): 17 ML/MIN/1.73 M^2
EST. GFR  (NON AFRICAN AMERICAN): 15 ML/MIN/1.73 M^2
GLUCOSE SERPL-MCNC: 102 MG/DL
HAV IGM SERPL QL IA: NEGATIVE
HBV CORE IGM SERPL QL IA: NEGATIVE
HBV SURFACE AG SERPL QL IA: NEGATIVE
HCT VFR BLD AUTO: 30.4 %
HCV AB SERPL QL IA: POSITIVE
HGB BLD-MCNC: 10.1 G/DL
INR PPP: 1.4
INSULIN COLLECTION INTERVAL: NORMAL
INSULIN SERPL-ACNC: 9.9 UU/ML
LYMPHOCYTES # BLD AUTO: ABNORMAL K/UL
LYMPHOCYTES NFR BLD: 3 %
MCH RBC QN AUTO: 31.9 PG
MCHC RBC AUTO-ENTMCNC: 33.2 G/DL
MCV RBC AUTO: 96 FL
MONOCYTES # BLD AUTO: ABNORMAL K/UL
MONOCYTES NFR BLD: 2 %
NEUTROPHILS NFR BLD: 87 %
NEUTS BAND NFR BLD MANUAL: 8 %
PHOSPHATE SERPL-MCNC: 3.1 MG/DL
PLATELET # BLD AUTO: 42 K/UL
PLATELET BLD QL SMEAR: ABNORMAL
PMV BLD AUTO: 10.1 FL
POCT GLUCOSE: 108 MG/DL (ref 70–110)
POCT GLUCOSE: 121 MG/DL (ref 70–110)
POCT GLUCOSE: 144 MG/DL (ref 70–110)
POCT GLUCOSE: 66 MG/DL (ref 70–110)
POCT GLUCOSE: 87 MG/DL (ref 70–110)
POLYCHROMASIA BLD QL SMEAR: ABNORMAL
POTASSIUM SERPL-SCNC: 4.8 MMOL/L
PROT SERPL-MCNC: 5.7 G/DL
PROTHROMBIN TIME: 14.2 SEC
RBC # BLD AUTO: 3.16 M/UL
SODIUM SERPL-SCNC: 133 MMOL/L
WBC # BLD AUTO: 15 K/UL

## 2018-08-31 PROCEDURE — 25000003 PHARM REV CODE 250: Performed by: INTERNAL MEDICINE

## 2018-08-31 PROCEDURE — 63600175 PHARM REV CODE 636 W HCPCS: Performed by: INTERNAL MEDICINE

## 2018-08-31 PROCEDURE — A4216 STERILE WATER/SALINE, 10 ML: HCPCS | Performed by: EMERGENCY MEDICINE

## 2018-08-31 PROCEDURE — 36415 COLL VENOUS BLD VENIPUNCTURE: CPT

## 2018-08-31 PROCEDURE — 25000003 PHARM REV CODE 250: Performed by: NURSE PRACTITIONER

## 2018-08-31 PROCEDURE — 25000003 PHARM REV CODE 250: Performed by: EMERGENCY MEDICINE

## 2018-08-31 PROCEDURE — 63600175 PHARM REV CODE 636 W HCPCS: Performed by: NURSE PRACTITIONER

## 2018-08-31 PROCEDURE — 27000221 HC OXYGEN, UP TO 24 HOURS

## 2018-08-31 PROCEDURE — 87040 BLOOD CULTURE FOR BACTERIA: CPT

## 2018-08-31 PROCEDURE — 25000003 PHARM REV CODE 250: Performed by: HOSPITALIST

## 2018-08-31 PROCEDURE — 85610 PROTHROMBIN TIME: CPT

## 2018-08-31 PROCEDURE — 80053 COMPREHEN METABOLIC PANEL: CPT

## 2018-08-31 PROCEDURE — 12000002 HC ACUTE/MED SURGE SEMI-PRIVATE ROOM

## 2018-08-31 PROCEDURE — 94761 N-INVAS EAR/PLS OXIMETRY MLT: CPT

## 2018-08-31 PROCEDURE — 84100 ASSAY OF PHOSPHORUS: CPT

## 2018-08-31 PROCEDURE — 85027 COMPLETE CBC AUTOMATED: CPT

## 2018-08-31 PROCEDURE — 85007 BL SMEAR W/DIFF WBC COUNT: CPT

## 2018-08-31 PROCEDURE — 94799 UNLISTED PULMONARY SVC/PX: CPT

## 2018-08-31 RX ORDER — DEXTROSE MONOHYDRATE 100 MG/ML
INJECTION, SOLUTION INTRAVENOUS CONTINUOUS
Status: DISCONTINUED | OUTPATIENT
Start: 2018-08-31 | End: 2018-09-03

## 2018-08-31 RX ADMIN — LEVETIRACETAM 500 MG: 500 TABLET ORAL at 09:08

## 2018-08-31 RX ADMIN — ONDANSETRON HYDROCHLORIDE 4 MG: 2 INJECTION, SOLUTION INTRAMUSCULAR; INTRAVENOUS at 09:08

## 2018-08-31 RX ADMIN — ONDANSETRON HYDROCHLORIDE 4 MG: 2 INJECTION, SOLUTION INTRAMUSCULAR; INTRAVENOUS at 11:08

## 2018-08-31 RX ADMIN — DEXAMETHASONE SODIUM PHOSPHATE 4 MG: 4 INJECTION, SOLUTION INTRAMUSCULAR; INTRAVENOUS at 09:08

## 2018-08-31 RX ADMIN — Medication 3 ML: at 03:08

## 2018-08-31 RX ADMIN — PIPERACILLIN SODIUM AND TAZOBACTAM SODIUM 2.25 G: 2; .25 INJECTION, POWDER, FOR SOLUTION INTRAVENOUS at 09:08

## 2018-08-31 RX ADMIN — HYDROCODONE BITARTRATE AND ACETAMINOPHEN 1 TABLET: 5; 325 TABLET ORAL at 05:08

## 2018-08-31 RX ADMIN — CALCIUM ACETATE 667 MG: 667 CAPSULE ORAL at 05:08

## 2018-08-31 RX ADMIN — Medication 3 ML: at 09:08

## 2018-08-31 RX ADMIN — HYDROCODONE BITARTRATE AND ACETAMINOPHEN 1 TABLET: 5; 325 TABLET ORAL at 09:08

## 2018-08-31 RX ADMIN — Medication 3 ML: at 05:08

## 2018-08-31 RX ADMIN — CALCIUM ACETATE 667 MG: 667 CAPSULE ORAL at 09:08

## 2018-08-31 RX ADMIN — PANTOPRAZOLE SODIUM 40 MG: 40 TABLET, DELAYED RELEASE ORAL at 09:08

## 2018-08-31 RX ADMIN — CALCIUM ACETATE 667 MG: 667 CAPSULE ORAL at 11:08

## 2018-08-31 RX ADMIN — PIPERACILLIN SODIUM AND TAZOBACTAM SODIUM 2.25 G: 2; .25 INJECTION, POWDER, FOR SOLUTION INTRAVENOUS at 06:08

## 2018-08-31 RX ADMIN — METHADONE HYDROCHLORIDE 20 MG: 10 TABLET ORAL at 11:08

## 2018-08-31 RX ADMIN — PIPERACILLIN SODIUM AND TAZOBACTAM SODIUM 2.25 G: 2; .25 INJECTION, POWDER, FOR SOLUTION INTRAVENOUS at 05:08

## 2018-08-31 NOTE — PLAN OF CARE
Problem: Patient Care Overview  Goal: Plan of Care Review  Outcome: Ongoing (interventions implemented as appropriate)  Fluid from abscess on base of right foot collected by Dr. Lenz.  Specimen labeled and taken to lab by this RN.

## 2018-08-31 NOTE — PROGRESS NOTES
08/31/18 0704   Patient Assessment/Suction   Level of Consciousness (AVPU) alert   PRE-TX-O2-ETCO2   O2 Device (Oxygen Therapy) room air   SpO2 99 %   Pulse Oximetry Type Continuous   Pulse (!) 52   Resp 16   BP (!) 150/77

## 2018-08-31 NOTE — TELEPHONE ENCOUNTER
María from [facility] called for an Inpatient consult order that was placed on patient for a Right Foot Abcess. He is currently hospitalized at [facility] on the 2nd floor, room [address]A. Dr Ding was notified of consult and plans to see patient tomorrow.

## 2018-08-31 NOTE — CONSULTS
Consult received for Renal/DM diet education. Remote RD covering today, 8/31, and unable to provide verbal education to pt. On-site RD will be available 9/3. Pt has been scheduled for RD f/u for diet education on 9/3. Thanks!

## 2018-08-31 NOTE — ASSESSMENT & PLAN NOTE
Transferred to ICU 8/29 with hypotension  Complicated with hypoglycemia, thrombocytopenia, elevated INR with normal fibrinogen  Blood cultures with pasteurella and proteus.  Source likely his right foot ulcer with abscess that was drained by ID 8/30 with associated cellulitis and thrombophebitis  Much improved now, HD stable.   Transferred from ICU today  Appreciate ID assistance  Continue zosyn  Repeat blood cultures and right foot wound cultures pending  Maintain euvolemia  MRI foot without evidence of persistent abscess or osteomyelitis

## 2018-08-31 NOTE — PLAN OF CARE
Problem: Patient Care Overview  Goal: Plan of Care Review  Outcome: Ongoing (interventions implemented as appropriate)  Patient free of falls and injuries this shift. Blood glucose is been more consistent than yesterday. Remains on D10 @25ml/hr. Blood glucose every 4hrs. Dialysis shunt to lt forearm remains with a good thrill/bruit noted. Reza on the monitor. Rested with closed eyes most of the night.

## 2018-08-31 NOTE — ASSESSMENT & PLAN NOTE
Likely from sepsis with underlying chronic liver disease.  Plts decreased to 15 8/30 and transfused 1 unit.  Improved today.  No evidence of bleeding.  Holding lovenox, recommend no heparin with HD.  Monitor closely

## 2018-08-31 NOTE — PROGRESS NOTES
INPATIENT NEPHROLOGY PROGRESS NOTE  [facility] NEPHROLOGY  (late entry for 8/28)    João Aguirre  08/31/2018    Reason for consultation:         esrd    Chief Complaint:   Chief Complaint   Patient presents with    Abdominal Pain     with N/V; missed dialysis Saturday        History of Present Illness:          Patient is a 53-year-old male with a past medical history of end-stage renal disease on hemodialysis Tuesday Thursday Saturday, diabetes, chronic back pain on methadone, long-term anticoagulant use hypertension, prior pneumonia, prior stroke, and recurrent episodes of mid epigastric pain and vomiting but no formal diagnosis of gastroparesis presents to emergency department for evaluation of epigastric abdominal pain with inability to tolerate liquids or food for the past few days.  Patient was seen here in the emergency department yesterday where he underwent basic labs and was given antiemetics.  He was discharged home with a prescription for Reglan and Benadryl which she has not been able to fill secondary to being home vomiting. He presents today via EMS because the symptoms have not improved and are worsening.  He did not get dialysis yesterday.  He denies any shortness of breath but does have an exacerbation of his chronic back pain which is thought to be secondary to him vomiting. The patient is unable hold down his chronic pain medicine which includes methadone.  Patient does not have a medication list with him at this time.        8/27  Seen on dialysis.  No nausea currently.  No chest pain or sob    8/28  Seen on dialysis.  Sleeping  8/29  Sleeping  8/30  Transferred to ICU.  No n,v,chest pain, sob          Plan of Care:     Assessment:     esrd  Hypertension  anemia  Fever/bacteremia (GNR), sepsis  cellulitis    Plan:      seen on dialysis yesterday  Continue t,th,sat hd  uf as tolerated  reagan with hd  abx per primary    Thank you for allowing us to participate in this patient's care. We  will continue to follow.    Medications:  No current facility-administered medications on file prior to encounter.      Current Outpatient Medications on File Prior to Encounter   Medication Sig Dispense Refill    albuterol (PROAIR HFA) 90 mcg/actuation inhaler INHALE TWO PUFFS EVERY 4 TO 6 HOURS AS NEEDED      amlodipine (NORVASC) 5 MG tablet Take 5 mg by mouth 2 (two) times daily.      aspirin 325 MG tablet Take 1 tablet (325 mg total) by mouth once daily. 30 tablet 1    calcium acetate (PHOSLO) 667 mg capsule Take 1 capsule (667 mg total) by mouth 3 (three) times daily with meals. 90 capsule 11    hydrALAZINE (APRESOLINE) 50 MG tablet Take 50 mg by mouth 3 (three) times daily.      levetiracetam (KEPPRA) 500 MG Tab Take 500 mg twice a day.  Take an extra tablet right after dialysis (making 3 tablets on your dialysis days) 70 tablet 2    lisinopril (PRINIVIL,ZESTRIL) 40 MG tablet Take 1 tablet (40 mg total) by mouth once daily. 30 tablet 1    methadone (DOLOPHINE) 10 MG tablet Take 9 tablets (90 mg total) by mouth once daily.  0    metoclopramide HCl (REGLAN) 10 MG tablet Take 1 tablet (10 mg total) by mouth 3 (three) times daily as needed (nausea/vomiting). 10 tablet 0    minoxidil (LONITEN) 2.5 MG tablet Take 3 tablets (7.5 mg total) by mouth 2 (two) times daily. 90 tablet 0     Scheduled Meds:   sodium chloride 0.9%   Intravenous Once    sodium chloride 0.9%   Intravenous Once    calcium acetate  667 mg Oral TID WM    dexamethasone  4 mg Intravenous Daily    epoetin harshad (PROCRIT) injection  5,000 Units Intravenous Once    levETIRAcetam  500 mg Oral BID    methadone  20 mg Oral Daily    pantoprazole  40 mg Oral BID    phytonadione vitamin k  2 mg Subcutaneous Once    piperacillin-tazobactam (ZOSYN) IVPB  2.25 g Intravenous Q8H    sodium chloride 0.9%  3 mL Intravenous Q8H     Continuous Infusions:   dextrose 10 % in water (D10W) 50 mL/hr at 08/30/18 1247    norepinephrine bitartrate-D5W  Stopped (08/30/18 0543)     PRN Meds:.sodium chloride, sodium chloride, sodium chloride 0.9%, sodium chloride 0.9%, acetaminophen, dextrose 50%, dextrose 50%, glucose, glucose, heparin (porcine), HYDROcodone-acetaminophen, magnesium sulfate IVPB, metoclopramide HCl, ondansetron, sodium chloride 0.9%    Allergies:  Antibiotic hc    Vital Signs:  Temp Readings from Last 3 Encounters:   08/31/18 98.2 °F (36.8 °C) (Oral)   08/25/18 97.8 °F (36.6 °C) (Oral)   10/19/17 97.6 °F (36.4 °C)       Pulse Readings from Last 3 Encounters:   08/31/18 (!) 52   08/25/18 75   10/19/17 (!) 59       BP Readings from Last 3 Encounters:   08/31/18 (!) 150/77   08/25/18 133/71   10/19/17 (!) 166/81       Weight:  Wt Readings from Last 3 Encounters:   08/30/18 92.9 kg (204 lb 12.9 oz)   08/25/18 94.8 kg (209 lb)   10/17/17 94.8 kg (209 lb 0 oz)       Review of Systems:  Review of Systems - All 14 systems reviewed and negative, except as noted in HPI    Physical Exam:    Constitutional: NAD  Neuro: No asterixis  Psych: Calm  EENT: NCAT, anicteric sclera   Neck:  supple  Cards: No rub  Lungs: clear to ausculation  GI:  Tender to palpation  MSK:  WNWD  Skin: no purpura, rashes       Results:  Lab Results   Component Value Date     (L) 08/31/2018    K 4.8 08/31/2018    CL 98 08/31/2018    CO2 25 08/31/2018    BUN 37 (H) 08/31/2018    CREATININE 4.3 (H) 08/31/2018    CALCIUM 8.4 (L) 08/31/2018    ANIONGAP 10 08/31/2018    ESTGFRAFRICA 17 (A) 08/31/2018    EGFRNONAA 15 (A) 08/31/2018       Lab Results   Component Value Date    CALCIUM 8.4 (L) 08/31/2018    PHOS 3.1 08/31/2018       Recent Labs   Lab  08/31/18   0348   WBC  15.00*   RBC  3.16*   HGB  10.1*   HCT  30.4*   PLT  42*   MCV  96   MCH  31.9*   MCHC  33.2       I have personally reviewed pertinent radiological imaging and reports.

## 2018-08-31 NOTE — CONSULTS
Consult Note  Infectious Disease    Reason for Consult:  sepsis    HPI: João Aguirre is a chronically ill appearing 53 y.o. male with whom I am familiar from last year when he had an abscess under right plantar surface associated with callous. He required debridement and oral antibiotics. He has been admitted a number of times in the interim. He never followed up with podiatry or hepatology.  He was seen in the ED the day before admission for intractable nausea and vomiting. He was assessed and given anti-emetics and released, but returned with same symptoms, elevated troponin and was admitted. Subsequently, he developed fever, chills and hypotension and was admitted to the ICU and required pressors. Blood cultures have grown Pasteurella and Proteus. He was given vanc and zosyn and vanc was stopped. He had an US of both lower extremities due to pain but no DVT was discovered.  He does still have the right foot callous. He was unaware that there was a open blister between the callous and his toes. He does have a dog in the home which has access to his bed. He cannot confirm or refute that the dog has been in contact with his foot callous. He walks in sock feet but not barefoot. He has also had a number of severe hypoglycemic episodes requiring D50 and D10.     Review of patient's allergies indicates:   Allergen Reactions    Antibiotic hc      Counter acts with methodone     Past Medical History:   Diagnosis Date    Anticoagulant long-term use     Arthritis     Asthma     Back pain     Coronary artery disease 2013    stent    Diabetes mellitus     Encounter for blood transfusion     Eye abnormality right eye    injured as a child    Gastritis     Gastroparesis     Hemodialysis patient     Hypertension     Pneumonia 2013    Right foot abscess with citrobacter 10/2017    Renal disorder     Stroke      Past Surgical History:   Procedure Laterality Date    AV FISTULA PLACEMENT      BACK  SURGERY      CARDIAC SURGERY  2013    heart stent    CHOLECYSTECTOMY      EYE SURGERY      R eye     Social History     Socioeconomic History    Marital status: Legally      Spouse name: None    Number of children: None    Years of education: None    Highest education level: None   Social Needs    Financial resource strain: None    Food insecurity - worry: None    Food insecurity - inability: None    Transportation needs - medical: None    Transportation needs - non-medical: None   Occupational History    None   Tobacco Use    Smoking status: Former Smoker     Years: 10.00     Last attempt to quit: 2015     Years since quittin.9    Smokeless tobacco: Never Used   Substance and Sexual Activity    Alcohol use: No    Drug use: Yes     Frequency: 4.0 times per week     Types: Other-see comments     Comment: marijuana    Sexual activity: Yes   Other Topics Concern    None   Social History Narrative    None     Family History   Problem Relation Age of Onset    Aneurysm Mother         brain    Aneurysm Father 77        stomach     Heart disease Father     Kidney disease Brother     Diabetes Brother         hyperglycemia and HAYDER causseddeath    Heart disease Paternal Grandmother        Pertinent medications noted:     Review of Systems:   No chills, fever, sweats until he came to the hospital  No change in vision, loss of vision or diplopia  No pain in mouth or throat. No problems with teeth, gums  No chest pain, palpitations, syncope  minimal cough, no sputum production, pleurisy, hemoptysis, shortness of breath, dyspnea on exertion  Prolonged  nausea, vomiting, nodiarrhea, constipation, blood in stool, or focal abd pain  No swelling of joints, redness of joints, injuries, or new focal pain  No unusual headaches, dizziness, vertigo, numbness, paresthesias,  falls  Chronic pain, on methadone  History of  Diabetes, no thyroid, hypogonadal conditions  No bleeding, lymphadenopathy,  anemia, malignancy,     EXAM & DIAGNOSTICS REVIEWED:   Vitals:     Temp:  [97.8 °F (36.6 °C)-99.1 °F (37.3 °C)]   Temp: 99.1 °F (37.3 °C) (08/30/18 1315)  Pulse: (!) 52 (08/30/18 1830)  Resp: (!) 22 (08/30/18 1830)  BP: (!) 109/59 (08/30/18 1830)  SpO2: 100 % (08/30/18 1830)    Intake/Output Summary (Last 24 hours) at 8/30/2018 2015  Last data filed at 8/30/2018 1315  Gross per 24 hour   Intake 2578.44 ml   Output 1912 ml   Net 666.44 ml       General:  In NAD. Looks chronically ill. Alert and attentive, cooperative  Eyes:  Anicteric, right cornea scarred, EOMI  ENT:  Mouth w/ pink MMM, no lesions/exudate, edentulous  Neck:  Trachea midline, supple, no adenopathy appreciated  Lungs: clear  Heart:  RRR, no gallop/murmur noted  Abd:  soft, NT, ND, normal BS, no masses/organomegaly appreciated.  :  Voids  Musc:  Joints without effusion, swelling,  erythema, synovitis,   Skin:  Generally warm, dry, normal for color. No rashes.  Wound: Right plantar callous, full thickness with opening, tracking to cavity and through a blister which opened between the 3/4 toes. Prep with betadine and culture obtained.  Neuro: AAOx3, speech clear, moves all extrems equally  Extrem: No edema, but has lymphangitis from medial lower leg to mid medal thigh with superficial thrombophlebitis and firm area mid medial thigh. Tender groin nodes. No focal abscess in leg.    VAD: Left arm AVF has noredness, tenderness or drainage    Lines/Tubes/Drains:    General Labs reviewed:  Recent Labs   Lab  08/30/18 0315   WBC  20.60*   RBC  3.22*   HGB  10.3*   HCT  31.4*   PLT  15*   MCV  98   MCH  31.9*   MCHC  32.7     Recent Labs   Lab  08/30/18 0315   CALCIUM  8.0*   PROT  5.2*   NA  132*   K  4.3   CO2  24   CL  96   BUN  40*   CREATININE  5.7*   ALKPHOS  173*   ALT  9*   AST  13   BILITOT  1.2*     Micro:  Microbiology Results (last 7 days)     Procedure Component Value Units Date/Time    Aerobic culture [[id number]] Collected:  08/30/18 0704     Order Status:  Sent Specimen:  Abscess from Foot, Right Updated:  08/30/18 1946    Blood culture [[id number]] Collected:  08/29/18 0052    Order Status:  Completed Specimen:  Blood from Antecubital, Right Updated:  08/30/18 1627     Blood Culture, Routine Gram stain peds bottle: Gram negative rods     Blood Culture, Routine Results called to and read back by: Eulalio Carlisle RN 08/29/2018  20:31     Blood Culture, Routine --     PASTEURELLA MULTOCIDA  For susceptibility see order #[id number]      Blood culture [[id number]] Collected:  08/29/18 0053    Order Status:  Completed Specimen:  Blood from Antecubital, Right Updated:  08/30/18 1624     Blood Culture, Routine Gram stain peds bottle: Gram negative rods     Blood Culture, Routine Results called to and read back by:Eulalio Carlisle RN 08/29/2018  20:32     Blood Culture, Routine --     PROTEUS VULGARIS  Susceptibility pending       Blood Culture, Routine --     PASTEURELLA MULTOCIDA  Susceptibility pending          Imaging Reviewed:   CXR, US right leg      IMPRESSION & PLAN   1. Sepsis, with Proteus and Pasteurella, right leg cellulitis, lymphangitis, ?superficial thrombophlebitis  2. Right foot callous(longstanding) with abscess beneath the callous, draining toward the base of the right 3/4 interspace  3. ESRD on dialysis  4. Severe hypoglycemia, life threatening  5. Cirrhosis, with hepatitis C  6.  Severe thrombocytopenia likely due to sepsis  7. Nausea and vomiting, suspected gastroparesis, history of diabetes in the past    Recommendation:   Continue zosyn  Submitted culture from abscess  Podiatry consult  MRI right forefoot tomorrow  Check insulin and C peptide?(ordered)

## 2018-08-31 NOTE — CONSULTS
Called again to Dr Perrin's office b/c have not heard back from him or NP.  took info and is sending it on to Dr Perrin.

## 2018-08-31 NOTE — SUBJECTIVE & OBJECTIVE
Interval History:   Follow up septic shock    Still intermittently hypoglycemic and remains on D10.  He is feeling much better, pain in right leg persists but improved.  MRI today without osteomyelitis.      Review of Systems   Constitutional: Negative for chills and fever.   Respiratory: Negative for cough and shortness of breath.    Cardiovascular: Negative for chest pain.   Gastrointestinal: Negative for abdominal pain, diarrhea, nausea and vomiting.     Objective:     Vital Signs (Most Recent):  Temp: 97.7 °F (36.5 °C) (08/31/18 1537)  Pulse: (!) 57 (08/31/18 1537)  Resp: 20 (08/31/18 1537)  BP: (!) 188/85 (08/31/18 1537)  SpO2: (!) 92 % (08/31/18 1635) Vital Signs (24h Range):  Temp:  [97.2 °F (36.2 °C)-98.2 °F (36.8 °C)] 97.7 °F (36.5 °C)  Pulse:  [46-91] 57  Resp:  [11-30] 20  SpO2:  [91 %-100 %] 92 %  BP: (108-198)/(59-88) 188/85     Weight: 92.9 kg (204 lb 12.9 oz)  Body mass index is 27.78 kg/m².    Intake/Output Summary (Last 24 hours) at 8/31/2018 1800  Last data filed at 8/31/2018 1300  Gross per 24 hour   Intake 710 ml   Output --   Net 710 ml      Physical Exam   Constitutional: He is oriented to person, place, and time.   Chronically ill appearing man in no acute distress, appears much better, non toxic   Cardiovascular: Normal rate and regular rhythm.   No murmur heard.  Pulmonary/Chest: Effort normal and breath sounds normal. He has no wheezes. He has no rales.   Abdominal: Soft. Bowel sounds are normal. He exhibits no distension. There is no tenderness.   Musculoskeletal:   rle with stable thrombophelbitis, distal erythema, plantar ulcer with dressing in place without purulence   Neurological: He is alert and oriented to person, place, and time.       Significant Labs:   BMP:   Recent Labs   Lab  08/31/18   0348   GLU  102   NA  133*   K  4.8   CL  98   CO2  25   BUN  37*   CREATININE  4.3*   CALCIUM  8.4*     CBC:   Recent Labs   Lab  08/30/18   0315  08/31/18   0348   WBC  20.60*  15.00*   HGB   10.3*  10.1*   HCT  31.4*  30.4*   PLT  15*  42*     CMP:   Recent Labs   Lab  08/30/18 0315 08/30/18 2125 08/31/18 0348   NA  132*   --   133*   K  4.3   --   4.8   CL  96   --   98   CO2  24   --   25   GLU  96  130*  102   BUN  40*   --   37*   CREATININE  5.7*   --   4.3*   CALCIUM  8.0*   --   8.4*   PROT  5.2*   --   5.7*   ALBUMIN  1.7*   --   1.8*   BILITOT  1.2*   --   1.2*   ALKPHOS  173*   --   163*   AST  13   --   10   ALT  9*   --   9*   ANIONGAP  12   --   10   EGFRNONAA  11*   --   15*     Coagulation:   Recent Labs   Lab  08/31/18 0348   INR  1.4*       Significant Imaging: I have reviewed all pertinent imaging results/findings within the past 24 hours.

## 2018-08-31 NOTE — ASSESSMENT & PLAN NOTE
Likely from sepsis, liver dysfunction, decreased PO intake.  Persistent but overall improved, still requiring D10 drip  Insulin and c-peptide level pending to rule out insulinoma   Wean D10 as tolerated

## 2018-09-01 LAB
ALBUMIN SERPL BCP-MCNC: 1.8 G/DL
ALP SERPL-CCNC: 226 U/L
ALT SERPL W/O P-5'-P-CCNC: 10 U/L
ANION GAP SERPL CALC-SCNC: 11 MMOL/L
AST SERPL-CCNC: 14 U/L
BASOPHILS # BLD AUTO: 0 K/UL
BASOPHILS NFR BLD: 0 %
BILIRUB SERPL-MCNC: 1 MG/DL
BUN SERPL-MCNC: 69 MG/DL
CALCIUM SERPL-MCNC: 8.5 MG/DL
CHLORIDE SERPL-SCNC: 97 MMOL/L
CO2 SERPL-SCNC: 22 MMOL/L
CREAT SERPL-MCNC: 5.5 MG/DL
DIFFERENTIAL METHOD: ABNORMAL
EOSINOPHIL # BLD AUTO: 0 K/UL
EOSINOPHIL NFR BLD: 0.1 %
ERYTHROCYTE [DISTWIDTH] IN BLOOD BY AUTOMATED COUNT: 19.3 %
EST. GFR  (AFRICAN AMERICAN): 13 ML/MIN/1.73 M^2
EST. GFR  (NON AFRICAN AMERICAN): 11 ML/MIN/1.73 M^2
GLUCOSE SERPL-MCNC: 98 MG/DL
HCT VFR BLD AUTO: 30.8 %
HGB BLD-MCNC: 10.1 G/DL
INR PPP: 1.1
LYMPHOCYTES # BLD AUTO: 0.6 K/UL
LYMPHOCYTES NFR BLD: 5.9 %
MCH RBC QN AUTO: 31.7 PG
MCHC RBC AUTO-ENTMCNC: 32.8 G/DL
MCV RBC AUTO: 97 FL
MONOCYTES # BLD AUTO: 0.4 K/UL
MONOCYTES NFR BLD: 4.1 %
NEUTROPHILS # BLD AUTO: 9.1 K/UL
NEUTROPHILS NFR BLD: 89.9 %
PHOSPHATE SERPL-MCNC: 4 MG/DL
PLATELET # BLD AUTO: 32 K/UL
PMV BLD AUTO: 9.7 FL
POCT GLUCOSE: 107 MG/DL (ref 70–110)
POCT GLUCOSE: 71 MG/DL (ref 70–110)
POCT GLUCOSE: 78 MG/DL (ref 70–110)
POTASSIUM SERPL-SCNC: 4.9 MMOL/L
PROT SERPL-MCNC: 5.8 G/DL
PROTHROMBIN TIME: 10.9 SEC
RBC # BLD AUTO: 3.18 M/UL
SODIUM SERPL-SCNC: 130 MMOL/L
WBC # BLD AUTO: 10.1 K/UL

## 2018-09-01 PROCEDURE — 63600175 PHARM REV CODE 636 W HCPCS: Performed by: NURSE PRACTITIONER

## 2018-09-01 PROCEDURE — 36415 COLL VENOUS BLD VENIPUNCTURE: CPT

## 2018-09-01 PROCEDURE — 25000003 PHARM REV CODE 250: Performed by: HOSPITALIST

## 2018-09-01 PROCEDURE — 94761 N-INVAS EAR/PLS OXIMETRY MLT: CPT

## 2018-09-01 PROCEDURE — 11042 DBRDMT SUBQ TIS 1ST 20SQCM/<: CPT | Mod: ,,, | Performed by: PODIATRIST

## 2018-09-01 PROCEDURE — 84100 ASSAY OF PHOSPHORUS: CPT

## 2018-09-01 PROCEDURE — 80053 COMPREHEN METABOLIC PANEL: CPT

## 2018-09-01 PROCEDURE — 85610 PROTHROMBIN TIME: CPT

## 2018-09-01 PROCEDURE — A4216 STERILE WATER/SALINE, 10 ML: HCPCS | Performed by: EMERGENCY MEDICINE

## 2018-09-01 PROCEDURE — 85025 COMPLETE CBC W/AUTO DIFF WBC: CPT

## 2018-09-01 PROCEDURE — 12000002 HC ACUTE/MED SURGE SEMI-PRIVATE ROOM

## 2018-09-01 PROCEDURE — 25000003 PHARM REV CODE 250: Performed by: NURSE PRACTITIONER

## 2018-09-01 PROCEDURE — 63600175 PHARM REV CODE 636 W HCPCS: Performed by: INTERNAL MEDICINE

## 2018-09-01 PROCEDURE — 25000003 PHARM REV CODE 250: Performed by: PODIATRIST

## 2018-09-01 PROCEDURE — 80100016 HC MAINTENANCE HEMODIALYSIS

## 2018-09-01 PROCEDURE — 0JBQ0ZZ EXCISION OF RIGHT FOOT SUBCUTANEOUS TISSUE AND FASCIA, OPEN APPROACH: ICD-10-PCS | Performed by: PODIATRIST

## 2018-09-01 PROCEDURE — 25000003 PHARM REV CODE 250: Performed by: EMERGENCY MEDICINE

## 2018-09-01 PROCEDURE — 99232 SBSQ HOSP IP/OBS MODERATE 35: CPT | Mod: 25,,, | Performed by: PODIATRIST

## 2018-09-01 PROCEDURE — 25000003 PHARM REV CODE 250: Performed by: INTERNAL MEDICINE

## 2018-09-01 RX ORDER — METHADONE HYDROCHLORIDE 10 MG/1
20 TABLET ORAL ONCE
Status: COMPLETED | OUTPATIENT
Start: 2018-09-01 | End: 2018-09-01

## 2018-09-01 RX ADMIN — ONDANSETRON HYDROCHLORIDE 4 MG: 2 INJECTION, SOLUTION INTRAMUSCULAR; INTRAVENOUS at 06:09

## 2018-09-01 RX ADMIN — CALCIUM ACETATE 667 MG: 667 CAPSULE ORAL at 08:09

## 2018-09-01 RX ADMIN — Medication 3 ML: at 09:09

## 2018-09-01 RX ADMIN — DEXTROSE: 10 SOLUTION INTRAVENOUS at 08:09

## 2018-09-01 RX ADMIN — PANTOPRAZOLE SODIUM 40 MG: 40 TABLET, DELAYED RELEASE ORAL at 09:09

## 2018-09-01 RX ADMIN — HYDROCODONE BITARTRATE AND ACETAMINOPHEN 1 TABLET: 5; 325 TABLET ORAL at 06:09

## 2018-09-01 RX ADMIN — PIPERACILLIN SODIUM AND TAZOBACTAM SODIUM 2.25 G: 2; .25 INJECTION, POWDER, FOR SOLUTION INTRAVENOUS at 06:09

## 2018-09-01 RX ADMIN — PIPERACILLIN SODIUM AND TAZOBACTAM SODIUM 2.25 G: 2; .25 INJECTION, POWDER, FOR SOLUTION INTRAVENOUS at 10:09

## 2018-09-01 RX ADMIN — CALCIUM ACETATE 667 MG: 667 CAPSULE ORAL at 06:09

## 2018-09-01 RX ADMIN — Medication 3 ML: at 02:09

## 2018-09-01 RX ADMIN — Medication 3 ML: at 06:09

## 2018-09-01 RX ADMIN — CALCIUM ACETATE 667 MG: 667 CAPSULE ORAL at 12:09

## 2018-09-01 RX ADMIN — HYDROCODONE BITARTRATE AND ACETAMINOPHEN 1 TABLET: 5; 325 TABLET ORAL at 09:09

## 2018-09-01 RX ADMIN — METHADONE HYDROCHLORIDE 20 MG: 10 TABLET ORAL at 02:09

## 2018-09-01 RX ADMIN — DEXTROSE: 10 SOLUTION INTRAVENOUS at 06:09

## 2018-09-01 RX ADMIN — LEVETIRACETAM 500 MG: 500 TABLET ORAL at 09:09

## 2018-09-01 RX ADMIN — COLLAGENASE SANTYL: 250 OINTMENT TOPICAL at 01:09

## 2018-09-01 RX ADMIN — PIPERACILLIN SODIUM AND TAZOBACTAM SODIUM 2.25 G: 2; .25 INJECTION, POWDER, FOR SOLUTION INTRAVENOUS at 02:09

## 2018-09-01 NOTE — PROGRESS NOTES
"Wound Debridement  Date/Time: 9/1/2018 7:33 AM  Performed by: Bryant Ding DPM  Authorized by: Bryant Ding DPM     Time out: Immediately prior to procedure a "time out" was called to verify the correct patient, procedure, equipment, support staff and site/side marked as required.    Consent Done?:  Yes (Verbal)  Local anesthesia used?: No      Wound Details:    Location:  Right foot    Location:  Right 3rd Metatarsal Head    Type of Debridement:  Excisional       Length (cm):  2.5       Area (sq cm):  2       Width (cm):  0.8       Percent Debrided (%):  100       Depth (cm):  0.3       Total Area Debrided (sq cm):  2    Depth of debridement:  Subcutaneous tissue    Tissue debrided:  Dermis, Epidermis and Subcutaneous    Devitalized tissue debrided:  Callus, Sough, Necrotic/Eschar and Biofilm    Instruments:  Curette and Blade    Bleeding:  Minimal  Hemostasis Achieved: Yes    Method Used:  Pressure  Patient tolerance:  Patient tolerated the procedure well with no immediate complications        "

## 2018-09-01 NOTE — PROGRESS NOTES
Progress Note  Infectious Disease    Follow-up For:  Cellulitis, bacteremia    SUBJECTIVE:   ROS:  Feels better, out of ICU, eating food from outside. No SOB, nausea, abd pain. Right leg .Discussed foot ulcer, bacteremia and podiatric follow up with family member.     Antibiotics (From admission, onward)    Start     Stop Route Frequency Ordered    08/29/18 1430  piperacillin-tazobactam 2.25 g in NaCl 0.9% 50 mL IVPB (ready to mix system)      -- IV Every 8 hours (non-standard times) 08/29/18 1318          Pertinent Medications noted:    EXAM & DIAGNOSTICS REVIEWED:   Vitals:     Temp:  [97.2 °F (36.2 °C)-98.2 °F (36.8 °C)]   Temp: 98.2 °F (36.8 °C) (08/31/18 2024)  Pulse: (!) 56 (08/31/18 2024)  Resp: 18 (08/31/18 2024)  BP: (!) 188/85 (08/31/18 1537)  SpO2: 96 % (08/31/18 2024)    Intake/Output Summary (Last 24 hours) at 8/31/2018 2048  Last data filed at 8/31/2018 1300  Gross per 24 hour   Intake 710 ml   Output --   Net 710 ml       General:  In NAD. Looks more comfortable  Eyes:  Anicteric, PERRL, EOMI  ENT:  Mouth w/ pink MMM, no lesions/exudate,   poor  dentition  Neck:  Trachea midline, supple, no adenopathy appreciated  Lungs:  clear  Heart:  RRR, no gallop/murmur noted  Abd:  soft, NT, ND, normal BS, no masses/organomegaly appreciated.  :  Voids  Musc:  Joints without effusion, erythema, synovitis,   Skin:  Generally warm, dry, normal for color. No rashes  Wound: Right plantar ulceration beneath the callous, no purulence today.   Neuro: AAOx3, speech clear, moves all extrems equally  Extrem: No edema, and there is improved cellulitis right medial leg. Still has tender cord calf and medial thigh.   VAD:      Lines/Tubes/Drains:    General Labs reviewed:  Recent Labs   Lab  08/31/18   0348   WBC  15.00*   RBC  3.16*   HGB  10.1*   HCT  30.4*   PLT  42*   MCV  96   MCH  31.9*   MCHC  33.2     Recent Labs   Lab  08/31/18   0348   CALCIUM  8.4*   PROT  5.7*   NA  133*   K  4.8   CO2  25   CL  98    BUN  37*   CREATININE  4.3*   ALKPHOS  163*   ALT  9*   AST  10   BILITOT  1.2*       Micro: reviewed    Imaging Reviewed: MRI of forefoot, no osteomyelitis    ASSESSMENT & PLAN      1.         Sepsis, with Proteus and Pasteurella, right leg cellulitis, lymphangitis, ?superficial thrombophlebitis  2.         Right foot callous(longstanding) with abscess beneath the callous, draining toward the base of the right 3/4 interspace  3.         ESRD on dialysis  4.         Severe hypoglycemia, life threatening  5.         Cirrhosis, with hepatitis C  6.         Severe thrombocytopenia likely due to sepsis  7.         Nausea and vomiting, suspected gastroparesis, history of diabetes in the past       Recommendation:   Continue zosyn, wound care  Podiatry consult pending

## 2018-09-01 NOTE — PLAN OF CARE
Problem: Patient Care Overview  Goal: Plan of Care Review  Outcome: Ongoing (interventions implemented as appropriate)  Pt with wife and son at bedside. Wife went and got pt food from outside. Dr Lenz saw food and talked to pt about his food and a DM diet. Norco given for pain in back. IV fluids infusing. Denies needs at present.

## 2018-09-01 NOTE — PROGRESS NOTES
INPATIENT NEPHROLOGY PROGRESS NOTE  [facility] NEPHROLOGY  (late entry for 8/28)    João Aguirre  09/01/2018    Reason for consultation:         esrd    Chief Complaint:   Chief Complaint   Patient presents with    Abdominal Pain     with N/V; missed dialysis Saturday        History of Present Illness:          Patient is a 53-year-old male with a past medical history of end-stage renal disease on hemodialysis Tuesday Thursday Saturday, diabetes, chronic back pain on methadone, long-term anticoagulant use hypertension, prior pneumonia, prior stroke, and recurrent episodes of mid epigastric pain and vomiting but no formal diagnosis of gastroparesis presents to emergency department for evaluation of epigastric abdominal pain with inability to tolerate liquids or food for the past few days.  Patient was seen here in the emergency department yesterday where he underwent basic labs and was given antiemetics.  He was discharged home with a prescription for Reglan and Benadryl which she has not been able to fill secondary to being home vomiting. He presents today via EMS because the symptoms have not improved and are worsening.  He did not get dialysis yesterday.  He denies any shortness of breath but does have an exacerbation of his chronic back pain which is thought to be secondary to him vomiting. The patient is unable hold down his chronic pain medicine which includes methadone.  Patient does not have a medication list with him at this time.        8/27  Seen on dialysis.  No nausea currently.  No chest pain or sob    8/28  Seen on dialysis.  Sleeping  8/29  Sleeping  8/30  Transferred to ICU.  No n,v,chest pain, sob  9/1  HD TTS.  Seen today on dialysis.  Platelets low, holding heparin w/HD.  Denies pain.      Plan of Care:     Assessment:     esrd  Hypertension  anemia  Fever/bacteremia (GNR), sepsis  cellulitis    Plan:      seen on dialysis  Continue t,th,sat hd  uf as tolerated  reagan with hd  abx per  primary    Thank you for allowing us to participate in this patient's care. We will continue to follow.    Medications:  No current facility-administered medications on file prior to encounter.      Current Outpatient Medications on File Prior to Encounter   Medication Sig Dispense Refill    albuterol (PROAIR HFA) 90 mcg/actuation inhaler INHALE TWO PUFFS EVERY 4 TO 6 HOURS AS NEEDED      amlodipine (NORVASC) 5 MG tablet Take 5 mg by mouth 2 (two) times daily.      aspirin 325 MG tablet Take 1 tablet (325 mg total) by mouth once daily. 30 tablet 1    calcium acetate (PHOSLO) 667 mg capsule Take 1 capsule (667 mg total) by mouth 3 (three) times daily with meals. 90 capsule 11    hydrALAZINE (APRESOLINE) 50 MG tablet Take 50 mg by mouth 3 (three) times daily.      levetiracetam (KEPPRA) 500 MG Tab Take 500 mg twice a day.  Take an extra tablet right after dialysis (making 3 tablets on your dialysis days) 70 tablet 2    lisinopril (PRINIVIL,ZESTRIL) 40 MG tablet Take 1 tablet (40 mg total) by mouth once daily. 30 tablet 1    methadone (DOLOPHINE) 10 MG tablet Take 9 tablets (90 mg total) by mouth once daily.  0    metoclopramide HCl (REGLAN) 10 MG tablet Take 1 tablet (10 mg total) by mouth 3 (three) times daily as needed (nausea/vomiting). 10 tablet 0    minoxidil (LONITEN) 2.5 MG tablet Take 3 tablets (7.5 mg total) by mouth 2 (two) times daily. 90 tablet 0     Scheduled Meds:   calcium acetate  667 mg Oral TID WM    collagenase   Topical (Top) Daily    levETIRAcetam  500 mg Oral BID    methadone  20 mg Oral Daily    pantoprazole  40 mg Oral BID    piperacillin-tazobactam (ZOSYN) IVPB  2.25 g Intravenous Q8H    sodium chloride 0.9%  3 mL Intravenous Q8H     Continuous Infusions:   dextrose 10 % in water (D10W) 25 mL/hr at 09/01/18 0808     PRN Meds:.sodium chloride, sodium chloride, sodium chloride 0.9%, sodium chloride 0.9%, acetaminophen, dextrose 50%, dextrose 50%, glucose, glucose,  HYDROcodone-acetaminophen, magnesium sulfate IVPB, metoclopramide HCl, ondansetron, sodium chloride 0.9%    Allergies:  Antibiotic hc    Vital Signs:  Temp Readings from Last 3 Encounters:   09/01/18 96.2 °F (35.7 °C) (Tympanic)   08/25/18 97.8 °F (36.6 °C) (Oral)   10/19/17 97.6 °F (36.4 °C)       Pulse Readings from Last 3 Encounters:   09/01/18 (!) 53   08/25/18 75   10/19/17 (!) 59       BP Readings from Last 3 Encounters:   09/01/18 (!) 146/85   08/25/18 133/71   10/19/17 (!) 166/81       Weight:  Wt Readings from Last 3 Encounters:   08/30/18 92.9 kg (204 lb 12.9 oz)   08/25/18 94.8 kg (209 lb)   10/17/17 94.8 kg (209 lb 0 oz)       Review of Systems:  Review of Systems - All 14 systems reviewed and negative, except as noted in HPI    Physical Exam:    Constitutional: NAD  Neuro: No asterixis  Psych: Calm  EENT: NCAT, anicteric sclera   Neck:  supple  Cards: No rub  Lungs: clear to ausculation  GI:  Tender to palpation  MSK:  WNWD  Skin: no purpura, rashes       Results:  Lab Results   Component Value Date     (L) 09/01/2018    K 4.9 09/01/2018    CL 97 09/01/2018    CO2 22 (L) 09/01/2018    BUN 69 (H) 09/01/2018    CREATININE 5.5 (H) 09/01/2018    CALCIUM 8.5 (L) 09/01/2018    ANIONGAP 11 09/01/2018    ESTGFRAFRICA 13 (A) 09/01/2018    EGFRNONAA 11 (A) 09/01/2018       Lab Results   Component Value Date    CALCIUM 8.5 (L) 09/01/2018    PHOS 4.0 09/01/2018       Recent Labs   Lab  09/01/18   0500   WBC  10.10   RBC  3.18*   HGB  10.1*   HCT  30.8*   PLT  32*   MCV  97   MCH  31.7*   MCHC  32.8       I have personally reviewed pertinent radiological imaging and reports.

## 2018-09-01 NOTE — PLAN OF CARE
Problem: Patient Care Overview  Goal: Plan of Care Review  Outcome: Ongoing (interventions implemented as appropriate)  Pt alert and oriented. Vitals stable. Dialysis today,removed 2 liters. Ointment applied right foot. Blood glucose monitored. Free from falls. Bed locked in low position. Call light in reach.

## 2018-09-01 NOTE — PROGRESS NOTES
Progress Note  Infectious Disease    Follow-up For:  Cellulitis, bacteremia    SUBJECTIVE:   ROS:  Feels better, out of ICU, eating food from outside. No SOB, nausea, abd pain. Right leg .Discussed foot ulcer, bacteremia and podiatric follow up with family member.     Antibiotics (From admission, onward)    Start     Stop Route Frequency Ordered    08/29/18 1430  piperacillin-tazobactam 2.25 g in NaCl 0.9% 50 mL IVPB (ready to mix system)      -- IV Every 8 hours (non-standard times) 08/29/18 1318          Pertinent Medications noted:    EXAM & DIAGNOSTICS REVIEWED:   Vitals:     Temp:  [96.1 °F (35.6 °C)-98.2 °F (36.8 °C)]   Temp: 96.2 °F (35.7 °C) (09/01/18 1240)  Pulse: (!) 58 (09/01/18 1240)  Resp: 18 (09/01/18 1240)  BP: (!) 162/69 (09/01/18 1240)  SpO2: 98 % (09/01/18 0739)    Intake/Output Summary (Last 24 hours) at 9/1/2018 1411  Last data filed at 9/1/2018 1240  Gross per 24 hour   Intake 500 ml   Output 2500 ml   Net -2000 ml       General:  In NAD. Looks more comfortable  Eyes:  Anicteric, PERRL, EOMI  ENT:  Mouth w/ pink MMM, no lesions/exudate,   poor  dentition  Neck:    Lungs:    Heart:    Abd:     :  Voids  Musc:  Joints without effusion, erythema, synovitis,   Skin:  Generally warm, dry, normal for color. No rashes  Wound: Right plantar ulcerationmuch  now that the callous has been debrided, no purulence today.   Neuro: AAOx3, speech clear, moves all extrems equally  Extrem: No edema, and there is improved cellulitis right medial leg. Still has tender cord calf and  Tender masses with bruising medial thigh. Not suppurative.    VAD:  Left IJ and peripheral    Lines/Tubes/Drains:    General Labs reviewed:  Recent Labs   Lab  09/01/18   0500   WBC  10.10   RBC  3.18*   HGB  10.1*   HCT  30.8*   PLT  32*   MCV  97   MCH  31.7*   MCHC  32.8     Recent Labs   Lab  09/01/18   0500   CALCIUM  8.5*   PROT  5.8*   NA  130*   K  4.9   CO2  22*   CL  97   BUN  69*   CREATININE  5.5*    ALKPHOS  226*   ALT  10   AST  14   BILITOT  1.0       Micro: reviewed    Imaging Reviewed: MRI of forefoot, no osteomyelitis    ASSESSMENT & PLAN      1.         Sepsis, with Proteus and Pasteurella, right leg cellulitis, lymphangitis, ?superficial thrombophlebitis  2.         Right foot callous(longstanding) with abscess beneath the callous, draining toward the base of the right 3/4 interspace  3.         ESRD on dialysis  4.         Severe hypoglycemia, life threatening  5.         Cirrhosis, with hepatitis C  6.         Severe thrombocytopenia likely due to sepsis  7.         Nausea and vomiting, suspected gastroparesis, history of diabetes in the past       Recommendation:   Continue zosyn, wound care  Post op shoe and warm compresses  Monitor medial thigh masses for suppuration  Can we get by without the TLC?

## 2018-09-01 NOTE — NURSING
Results for NAOMI ALLEN (MRN [id number]) as of 9/1/2018 07:47   Ref. Range 9/1/2018 05:00   Platelets Latest Ref Range: 150 - 350 K/uL 32 (LL)   On call NP aware, no new orders noted.  Pt stable no s/s of distress.

## 2018-09-01 NOTE — CONSULTS
[facility]  Podiatry  Consult Note    Patient Name: João Aguirre  MRN: [id number]  Admission Date: 8/26/2018  Hospital Length of Stay: 6 days  Attending Physician: Natasha Morris MD  Primary Care Provider: Primary Doctor No     Consults  Subjective:     History of Present Illness: wound right foot.  Gradual onset, worsening over past several weeks, aggravated by increased weight bearing, shoe gear, pressure. Improved in ICU with sepsis management, wound care.  No self care.  Denies trauma, surgery right foot.  Scheduled Meds:   calcium acetate  667 mg Oral TID WM    levETIRAcetam  500 mg Oral BID    methadone  20 mg Oral Daily    pantoprazole  40 mg Oral BID    piperacillin-tazobactam (ZOSYN) IVPB  2.25 g Intravenous Q8H    sodium chloride 0.9%  3 mL Intravenous Q8H     Continuous Infusions:   dextrose 10 % in water (D10W) 25 mL/hr at 09/01/18 0631     PRN Meds:sodium chloride, sodium chloride, sodium chloride 0.9%, sodium chloride 0.9%, acetaminophen, dextrose 50%, dextrose 50%, glucose, glucose, HYDROcodone-acetaminophen, magnesium sulfate IVPB, metoclopramide HCl, ondansetron, sodium chloride 0.9%    Review of patient's allergies indicates:   Allergen Reactions    Antibiotic hc      Counter acts with methodone        Past Medical History:   Diagnosis Date    Anticoagulant long-term use     Arthritis     Asthma     Back pain     Coronary artery disease 2013    stent    Diabetes mellitus     Encounter for blood transfusion     Eye abnormality right eye    injured as a child    Gastritis     Gastroparesis     Hemodialysis patient     Hypertension     Pneumonia 2013    Spend 6weeks in hospital at [place]    Renal disorder     Stroke      Past Surgical History:   Procedure Laterality Date    AV FISTULA PLACEMENT      BACK SURGERY      CARDIAC SURGERY  2013    heart stent    CHOLECYSTECTOMY      EYE SURGERY      R eye       Family History     Problem Relation  (Age of Onset)    Aneurysm Mother, Father (77)    Diabetes Brother    Heart disease Father, Paternal Grandmother    Kidney disease Brother        Tobacco Use    Smoking status: Former Smoker     Years: 10.00     Last attempt to quit: 9/1/2015     Years since quitting: 3.0    Smokeless tobacco: Never Used   Substance and Sexual Activity    Alcohol use: No    Drug use: Yes     Frequency: 4.0 times per week     Types: Other-see comments     Comment: marijuana    Sexual activity: Yes     Review of Systems  Objective:     Vital Signs (Most Recent):  Temp: 96.9 °F (36.1 °C) (09/01/18 0320)  Pulse: (!) 57 (09/01/18 0320)  Resp: 17 (09/01/18 0320)  BP: (!) 186/88 (08/31/18 2308)  SpO2: 96 % (09/01/18 0320) Vital Signs (24h Range):  Temp:  [96.9 °F (36.1 °C)-98.2 °F (36.8 °C)] 96.9 °F (36.1 °C)  Pulse:  [50-91] 57  Resp:  [17-30] 17  SpO2:  [92 %-100 %] 96 %  BP: (143-198)/(70-88) 186/88     Weight: 92.9 kg (204 lb 12.9 oz)  Body mass index is 27.78 kg/m².    Foot Exam    Laboratory:  A1C:   Recent Labs   Lab  08/27/18   0452   HGBA1C  <4.0*     Blood Cultures:   Recent Labs   Lab  08/31/18   0537  08/31/18   0544   LABBLOO  No Growth to date  No Growth to date     BMP:   Recent Labs   Lab  09/01/18   0500   GLU  98   NA  130*   K  4.9   CL  97   CO2  22*   BUN  69*   CREATININE  5.5*   CALCIUM  8.5*     CBC:   Recent Labs   Lab  09/01/18   0500   WBC  10.10   RBC  3.18*   HGB  10.1*   HCT  30.8*   PLT  32*   MCV  97   MCH  31.7*   MCHC  32.8     CMP:   Recent Labs   Lab  09/01/18   0500   GLU  98   CALCIUM  8.5*   ALBUMIN  1.8*   PROT  5.8*   NA  130*   K  4.9   CO2  22*   CL  97   BUN  69*   CREATININE  5.5*   ALKPHOS  226*   ALT  10   AST  14   BILITOT  1.0     Coagulation:   Recent Labs   Lab  09/01/18   0500   INR  1.1     CRP: No results for input(s): CRP in the last 168 hours.  ESR: No results for input(s): SEDRATE in the last 168 hours.  Prealbumin: No results for input(s): PREALBUMIN in the last 48  hours.  Wound Cultures: No results for input(s): LABAERO in the last 4320 hours.  Microbiology Results (last 7 days)     Procedure Component Value Units Date/Time    Blood culture [[id number]] Collected:  08/31/18 0544    Order Status:  Completed Specimen:  Blood from Peripheral, Right  Hand Updated:  08/31/18 1715     Blood Culture, Routine No Growth to date    Blood culture [[id number]] Collected:  08/31/18 0537    Order Status:  Completed Specimen:  Blood from Antecubital, Right Updated:  08/31/18 1715     Blood Culture, Routine No Growth to date    Blood culture [[id number]] Collected:  08/29/18 0052    Order Status:  Completed Specimen:  Blood from Antecubital, Right Updated:  08/31/18 0826     Blood Culture, Routine Gram stain peds bottle: Gram negative rods     Blood Culture, Routine Results called to and read back by: Eulalio Carlisle RN 08/29/2018  20:31     Blood Culture, Routine --     PASTEURELLA MULTOCIDA  For susceptibility see order #[id number]      Blood culture [[id number]]  (Susceptibility) Collected:  08/29/18 0053    Order Status:  Completed Specimen:  Blood from Antecubital, Right Updated:  08/31/18 0824     Blood Culture, Routine Gram stain peds bottle: Gram negative rods     Blood Culture, Routine Results called to and read back by:Eulalio Carlisle RN 08/29/2018  20:32     Blood Culture, Routine PROTEUS VULGARIS     Blood Culture, Routine PASTEURELLA MULTOCIDA    Aerobic culture [[id number]] Collected:  08/30/18 1835    Order Status:  Sent Specimen:  Abscess from Foot, Right Updated:  08/30/18 2354        Specimen (12h ago, onward)    None          Diagnostic Results:  MRI: I have reviewed all pertinent results/findings within the past 24 hours.  wound, no abscess    Clinical Findings:  Wound:  Plantar central forefoot right    Size:    Pre:[id number]  Post: [id number]    Base:  Granular, pink with moderate serous/serosanaguinous drainage only.  No pus, tracking, fluctuance, malodor, or cardinal signs  infection.    Borders:  Hyperkeratotic, debriding to flat, pink, blanchable skin edges without undermining.    Otherwise, Skin is normal age and health appropriate color, turgor, texture, and temperature bilateral lower extremities without ulceration, hyperpigmentation, discoloration, masses nodules or cords palpated.  No ecchymosis, erythema, edema, or cardinal signs of infection bilateral lower extremities.    DP and PT pulses 2/4 bilateral, capillary refill 3 seconds all toes/distal feet, all toes/both feet warm to touch.  Negative lymphadenopathy bilateral popliteal fossa and tarsal tunnel.  Negavie lower extremity edema bilateral.    Normal angle, base, station of gait. All ten toes without clubbing, cyanosis, or signs of ischemia.  No pain to palpation bilateral lower extremities.  Range of motion, stability, muscle strength, and muscle tone normal bilateral feet and legs.    Diminished/loss of protective sensation all toes bilateral to 10 gram monofilament.    Decreased/absent vibratory sensation bilateral feet to 128Hz tuning fork.    Negative tinel sign to percussion sural, superficial peroneal, deep peroneal, saphenous, and posterior tibial nerves right and left ankles and feet.          Assessment/Plan:     Active Diagnoses:    Diagnosis Date Noted POA    PRINCIPAL PROBLEM:  Septic shock [A41.9, R65.21] 08/29/2018 No    Right foot ulcer [L97.519] 08/31/2018 Yes    Type 2 diabetes mellitus with chronic kidney disease on chronic dialysis, without long-term current use of insulin [E11.22, N18.6, Z99.2] 08/29/2018 Not Applicable    Hypoglycemia [E16.2] 08/29/2018 No    Paroxysmal atrial fibrillation with RVR [I48.0] 08/27/2018 No    Thrombocytopenia [D69.6] 08/27/2018 Yes    Chest pain [R07.9] 08/26/2018 Yes    Elevated troponin [R74.8] 08/26/2018 Yes    History of coronary artery stent placement [Z95.5] 08/26/2018 Not Applicable    Benign hypertension with ESRD (end-stage renal disease) [I12.0,  N18.6] 03/01/2017 Yes    Chronic hepatitis C without hepatic coma [B18.2] 02/21/2017 Yes     Chronic    Cirrhosis of liver with ascites [K74.60] 02/21/2017 Yes    Methadone dependence [F11.20] 02/21/2017 Yes     Chronic    Intractable vomiting with nausea [R11.2] 10/26/2015 Yes    Dehydration [E86.0] 10/26/2015 Yes    Coronary artery disease involving native coronary artery of native heart without angina pectoris [I25.10] 08/29/2015 Yes     Chronic    ESRD (T,Th,Sat) dialysis onset 2013 [N18.6] 08/29/2015 Yes     Chronic    Ischemic cardiomyopathy, LVEF 25%-30% [I25.5] 08/29/2015 Yes      Problems Resolved During this Admission:    Diagnosis Date Noted Date Resolved POA    Unstable angina [I20.0] 08/26/2018 08/29/2018 Yes    Abdominal pain [R10.9] 08/01/2016 08/28/2018 Yes     Debrided wound - see note.  Santyl to wound base cover with kerlix, tape to secure.  Surgical shoe right.  Ambulate minimally in sx shoe right and casual shoe left.  Needs wound care on discharge.    Thank you for your consult. I will sign off. Please contact us if you have any additional questions.    Bryant Ding DPM  Podiatry  [facility]

## 2018-09-01 NOTE — PLAN OF CARE
Problem: Patient Care Overview  Goal: Plan of Care Review  Outcome: Ongoing (interventions implemented as appropriate)  Pt on room air with 98% sats

## 2018-09-01 NOTE — PROGRESS NOTES
HD:  Pt stable, tx complete, lines reinfused, needles removed, hemostasis achieved, + thrill and bruit post tx.  Pt tolerated tx well.    NET UF:  2000 mL

## 2018-09-02 LAB
ALBUMIN SERPL BCP-MCNC: 1.9 G/DL
ALP SERPL-CCNC: 353 U/L
ALT SERPL W/O P-5'-P-CCNC: 14 U/L
ANION GAP SERPL CALC-SCNC: 11 MMOL/L
AST SERPL-CCNC: 23 U/L
BASOPHILS # BLD AUTO: 0 K/UL
BASOPHILS NFR BLD: 0.1 %
BILIRUB SERPL-MCNC: 1 MG/DL
BUN SERPL-MCNC: 55 MG/DL
CALCIUM SERPL-MCNC: 8 MG/DL
CHLORIDE SERPL-SCNC: 99 MMOL/L
CO2 SERPL-SCNC: 22 MMOL/L
CREAT SERPL-MCNC: 4.6 MG/DL
DIFFERENTIAL METHOD: ABNORMAL
EOSINOPHIL # BLD AUTO: 0.1 K/UL
EOSINOPHIL NFR BLD: 0.6 %
ERYTHROCYTE [DISTWIDTH] IN BLOOD BY AUTOMATED COUNT: 18 %
EST. GFR  (AFRICAN AMERICAN): 16 ML/MIN/1.73 M^2
EST. GFR  (NON AFRICAN AMERICAN): 14 ML/MIN/1.73 M^2
GLUCOSE SERPL-MCNC: 63 MG/DL
HCT VFR BLD AUTO: 30.2 %
HGB BLD-MCNC: 10 G/DL
INR PPP: 1
LYMPHOCYTES # BLD AUTO: 1 K/UL
LYMPHOCYTES NFR BLD: 9.1 %
MCH RBC QN AUTO: 31.8 PG
MCHC RBC AUTO-ENTMCNC: 33.2 G/DL
MCV RBC AUTO: 96 FL
MONOCYTES # BLD AUTO: 0.2 K/UL
MONOCYTES NFR BLD: 1.9 %
NEUTROPHILS # BLD AUTO: 9.2 K/UL
NEUTROPHILS NFR BLD: 88.3 %
PHOSPHATE SERPL-MCNC: 2.4 MG/DL
PLATELET # BLD AUTO: 37 K/UL
PMV BLD AUTO: 9.8 FL
POCT GLUCOSE: 52 MG/DL (ref 70–110)
POCT GLUCOSE: 53 MG/DL (ref 70–110)
POCT GLUCOSE: 55 MG/DL (ref 70–110)
POCT GLUCOSE: 65 MG/DL (ref 70–110)
POCT GLUCOSE: 73 MG/DL (ref 70–110)
POCT GLUCOSE: 77 MG/DL (ref 70–110)
POCT GLUCOSE: 90 MG/DL (ref 70–110)
POCT GLUCOSE: 90 MG/DL (ref 70–110)
POTASSIUM SERPL-SCNC: 4.3 MMOL/L
PROT SERPL-MCNC: 5.7 G/DL
PROTHROMBIN TIME: 10.4 SEC
RBC # BLD AUTO: 3.16 M/UL
SODIUM SERPL-SCNC: 132 MMOL/L
WBC # BLD AUTO: 10.4 K/UL

## 2018-09-02 PROCEDURE — 63600175 PHARM REV CODE 636 W HCPCS: Performed by: NURSE PRACTITIONER

## 2018-09-02 PROCEDURE — 25000003 PHARM REV CODE 250: Performed by: HOSPITALIST

## 2018-09-02 PROCEDURE — 12000002 HC ACUTE/MED SURGE SEMI-PRIVATE ROOM

## 2018-09-02 PROCEDURE — 25000003 PHARM REV CODE 250: Performed by: NURSE PRACTITIONER

## 2018-09-02 PROCEDURE — 36415 COLL VENOUS BLD VENIPUNCTURE: CPT

## 2018-09-02 PROCEDURE — 85025 COMPLETE CBC W/AUTO DIFF WBC: CPT

## 2018-09-02 PROCEDURE — 94761 N-INVAS EAR/PLS OXIMETRY MLT: CPT

## 2018-09-02 PROCEDURE — 80053 COMPREHEN METABOLIC PANEL: CPT

## 2018-09-02 PROCEDURE — 85610 PROTHROMBIN TIME: CPT

## 2018-09-02 PROCEDURE — 84100 ASSAY OF PHOSPHORUS: CPT

## 2018-09-02 PROCEDURE — 63600175 PHARM REV CODE 636 W HCPCS: Performed by: INTERNAL MEDICINE

## 2018-09-02 PROCEDURE — A4216 STERILE WATER/SALINE, 10 ML: HCPCS | Performed by: EMERGENCY MEDICINE

## 2018-09-02 PROCEDURE — 25000003 PHARM REV CODE 250: Performed by: INTERNAL MEDICINE

## 2018-09-02 PROCEDURE — 25000003 PHARM REV CODE 250: Performed by: EMERGENCY MEDICINE

## 2018-09-02 RX ORDER — HYDRALAZINE HYDROCHLORIDE 20 MG/ML
10 INJECTION INTRAMUSCULAR; INTRAVENOUS EVERY 6 HOURS PRN
Status: DISCONTINUED | OUTPATIENT
Start: 2018-09-02 | End: 2018-09-07 | Stop reason: HOSPADM

## 2018-09-02 RX ORDER — HYDRALAZINE HYDROCHLORIDE 25 MG/1
50 TABLET, FILM COATED ORAL 3 TIMES DAILY
Status: DISCONTINUED | OUTPATIENT
Start: 2018-09-02 | End: 2018-09-07 | Stop reason: HOSPADM

## 2018-09-02 RX ORDER — AMLODIPINE BESYLATE 5 MG/1
5 TABLET ORAL DAILY
Status: DISCONTINUED | OUTPATIENT
Start: 2018-09-02 | End: 2018-09-07 | Stop reason: HOSPADM

## 2018-09-02 RX ORDER — LISINOPRIL 40 MG/1
40 TABLET ORAL DAILY
Status: DISCONTINUED | OUTPATIENT
Start: 2018-09-02 | End: 2018-09-07 | Stop reason: HOSPADM

## 2018-09-02 RX ADMIN — PANTOPRAZOLE SODIUM 40 MG: 40 TABLET, DELAYED RELEASE ORAL at 08:09

## 2018-09-02 RX ADMIN — HYDROCODONE BITARTRATE AND ACETAMINOPHEN 1 TABLET: 5; 325 TABLET ORAL at 05:09

## 2018-09-02 RX ADMIN — AMLODIPINE BESYLATE 5 MG: 5 TABLET ORAL at 09:09

## 2018-09-02 RX ADMIN — HYDRALAZINE HYDROCHLORIDE 50 MG: 25 TABLET, FILM COATED ORAL at 10:09

## 2018-09-02 RX ADMIN — CALCIUM ACETATE 667 MG: 667 CAPSULE ORAL at 05:09

## 2018-09-02 RX ADMIN — ONDANSETRON HYDROCHLORIDE 4 MG: 2 INJECTION, SOLUTION INTRAMUSCULAR; INTRAVENOUS at 08:09

## 2018-09-02 RX ADMIN — PIPERACILLIN SODIUM AND TAZOBACTAM SODIUM 2.25 G: 2; .25 INJECTION, POWDER, FOR SOLUTION INTRAVENOUS at 04:09

## 2018-09-02 RX ADMIN — LEVETIRACETAM 500 MG: 500 TABLET ORAL at 08:09

## 2018-09-02 RX ADMIN — Medication 3 ML: at 05:09

## 2018-09-02 RX ADMIN — METOCLOPRAMIDE 5 MG: 5 INJECTION, SOLUTION INTRAMUSCULAR; INTRAVENOUS at 08:09

## 2018-09-02 RX ADMIN — PIPERACILLIN SODIUM AND TAZOBACTAM SODIUM 2.25 G: 2; .25 INJECTION, POWDER, FOR SOLUTION INTRAVENOUS at 05:09

## 2018-09-02 RX ADMIN — METHADONE HYDROCHLORIDE 20 MG: 10 TABLET ORAL at 08:09

## 2018-09-02 RX ADMIN — DEXTROSE MONOHYDRATE 12.5 G: 500 INJECTION PARENTERAL at 12:09

## 2018-09-02 RX ADMIN — Medication 3 ML: at 02:09

## 2018-09-02 RX ADMIN — HYDRALAZINE HYDROCHLORIDE 10 MG: 20 INJECTION INTRAMUSCULAR; INTRAVENOUS at 06:09

## 2018-09-02 RX ADMIN — Medication 16 G: at 05:09

## 2018-09-02 RX ADMIN — CALCIUM ACETATE 667 MG: 667 CAPSULE ORAL at 08:09

## 2018-09-02 RX ADMIN — CALCIUM ACETATE 667 MG: 667 CAPSULE ORAL at 01:09

## 2018-09-02 RX ADMIN — HYDROCODONE BITARTRATE AND ACETAMINOPHEN 1 TABLET: 5; 325 TABLET ORAL at 08:09

## 2018-09-02 RX ADMIN — COLLAGENASE SANTYL: 250 OINTMENT TOPICAL at 08:09

## 2018-09-02 RX ADMIN — DEXTROSE MONOHYDRATE 12.5 G: 500 INJECTION PARENTERAL at 08:09

## 2018-09-02 RX ADMIN — DEXTROSE MONOHYDRATE 12.5 G: 500 INJECTION PARENTERAL at 05:09

## 2018-09-02 RX ADMIN — LISINOPRIL 40 MG: 40 TABLET ORAL at 09:09

## 2018-09-02 NOTE — ASSESSMENT & PLAN NOTE
Likely from sepsis-resolved   Lipase and  Wbcs Wnl, Imaging and exam without evidence of acute abdominal process  Continue  PPI

## 2018-09-02 NOTE — PLAN OF CARE
Problem: Patient Care Overview  Goal: Plan of Care Review  Outcome: Revised  Surgical shoe to right foot applied.  Warm, moist heat to left inner thigh applied 4 times tonight.  Blood sugars WDL so far tonight; D10 in progress  Afebrile  Sinus concepcion on telemetry

## 2018-09-02 NOTE — ASSESSMENT & PLAN NOTE
Intermittently hypotensive-while in septic shock -now resolved  Hypertension Medications             amlodipine (NORVASC) 5 MG tablet Take 5 mg by mouth 2 (two) times daily.    hydrALAZINE (APRESOLINE) 50 MG tablet Take 50 mg by mouth 3 (three) times daily.    lisinopril (PRINIVIL,ZESTRIL) 40 MG tablet Take 1 tablet (40 mg total) by mouth once daily.    minoxidil (LONITEN) 2.5 MG tablet Take 3 tablets (7.5 mg total) by mouth 2 (two) times daily.

## 2018-09-02 NOTE — PROGRESS NOTES
[facility] Hospital Medicine  Progress Note    Patient Name: João Aguirre  MRN: [id number]  Patient Class: IP- Inpatient   Admission Date: 8/26/2018  Length of Stay: 6 days  Attending Physician: Natasha Morris MD  Primary Care Provider: Primary Doctor No        Subjective:     Principal Problem:Septic shock    HPI:  This is a 53-year-old male with a past medical history of end-stage renal disease on hemodialysis Tuesday, Thursday, and  Saturday, diabetes, chronic back pain on methadone, CAD with prior coronary stent placement 2 years ago, hypertension, prior pneumonia, prior stroke, and recurrent episodes of mid epigastric pain and vomiting but no formal diagnosis of gastroparesis who presents to emergency department for evaluation of epigastric, abdominal pain, and CP with inability to tolerate liquids or food for the past few days.  Patient was seen here in the emergency department yesterday where he underwent basic labs and was given antiemetics.  He was discharged home with a prescription for Reglan and Benadryl which she has not been able to fill secondary to being home vomiting. He presents today via EMS because the symptoms have not improved and are worsening.  He did not get dialysis yesterday.  He denies any shortness of breath but does have an exacerbation of his chronic back pain which is thought to be secondary to him vomiting. The patient is unable hold down his chronic pain medicine which includes methadone.  Patient does not have a medication list with him at this time.    Patient found to have elevated troponin of 0.763 and reports he has Hx of CAD with prior coronary stent placement 2 years ago but has not been following up with cardiology. He does report some left sided chest pain and occasionally diaphoresis along with his nausea and vomiting. His EKG currently with no significant St elevation or depression. He does have Hx ESRD requiring HD with prior elevated  troponins in past of 0.16-0.17. Patient discussed with Dr. Law. Will consult cardiology and trend troponins due to prior cardiac Hx and continue monitoring GI status as well.          Hospital Course:  He was noted to have an elevated troponin level and Cardiology was also consulted. Nephrology was consulted for renal/ HD management. Patient initially noted to be in SR, however, later he converted to atrial fibrillation with some RVR with heart rate up to 120-130s. Dr. Kee was notified and patient given IV Bblockade.  The patient later converted back to SR. He was placed on toprol Xl 50mg po daily.  The patient was noted to have a decrease in his platelets and sq heparin was discontinued. Developed fever with evidence of sepsis overnight, now on broad spectrum antibiotics with thombocytopenia and coagulopathy.    Interval History:   Reports foot feeling much better   S/p debridement right     Review of Systems   Constitutional: Negative for chills and fever.   Respiratory: Negative for cough and shortness of breath.    Cardiovascular: Negative for chest pain.   Gastrointestinal: Negative for abdominal pain, diarrhea, nausea and vomiting.     Objective:     Vital Signs (Most Recent):  Temp: 97.8 °F (36.6 °C) (09/01/18 1933)  Pulse: (!) 59 (09/01/18 1933)  Resp: 20 (09/01/18 1933)  BP: (!) 180/80 (09/01/18 1933)  SpO2: (!) 94 % (09/01/18 1933) Vital Signs (24h Range):  Temp:  [96.1 °F (35.6 °C)-98.2 °F (36.8 °C)] 97.8 °F (36.6 °C)  Pulse:  [51-63] 59  Resp:  [17-20] 20  SpO2:  [94 %-98 %] 94 %  BP: (129-186)/() 180/80     Weight: 92.9 kg (204 lb 12.9 oz)  Body mass index is 27.78 kg/m².    Intake/Output Summary (Last 24 hours) at 9/1/2018 2208  Last data filed at 9/1/2018 1830  Gross per 24 hour   Intake 1460 ml   Output 2500 ml   Net -1040 ml      Physical Exam   Constitutional: He is oriented to person, place, and time.   Chronically ill appearing man in no acute distress, appears much better, non  toxic   Cardiovascular: Normal rate and regular rhythm.   No murmur heard.  Pulmonary/Chest: Effort normal and breath sounds normal. He has no wheezes. He has no rales.   Abdominal: Soft. Bowel sounds are normal. He exhibits no distension. There is no tenderness.   Musculoskeletal:   rle with ace wrap dressing intact    Neurological: He is alert and oriented to person, place, and time.       Significant Labs:   BMP:   Recent Labs   Lab  09/01/18   0500   GLU  98   NA  130*   K  4.9   CL  97   CO2  22*   BUN  69*   CREATININE  5.5*   CALCIUM  8.5*     CBC:   Recent Labs   Lab  08/31/18   0348  09/01/18   0500   WBC  15.00*  10.10   HGB  10.1*  10.1*   HCT  30.4*  30.8*   PLT  42*  32*       Significant Imaging: I have reviewed and interpreted all pertinent imaging results/findings within the past 24 hours.    Assessment/Plan:      * Septic shock    Transferred to ICU 8/29 with hypotension  Complicated with hypoglycemia, thrombocytopenia, elevated INR with normal fibrinogen  Blood cultures with pasteurella and proteus.  Source likely his right foot ulcer with abscess that was drained by ID 8/30 with associated cellulitis and thrombophebitis  Much improved now, HD stable.   Transferred from ICU   Appreciate ID assistance  Continue zosyn-  Repeat blood cultures and right foot wound cultures -8/30 proteus from foot and blood (8/29)  Maintain euvolemia  MRI foot without evidence of persistent abscess or osteomyelitis            Right foot ulcer    S/p debridement --follow with podiatry           Hypoglycemia    Likely from sepsis, liver dysfunction, decreased PO intake.  Persistent but overall improved, still requiring D10 drip  Insulin and c-peptide level pending to rule out insulinoma   Wean D10 as tolerated          Thrombocytopenia    Likely from sepsis with underlying chronic liver disease.  Plts decreased to 15 8/30 and transfused 1 unit.  Improved   No evidence of bleeding.  Holding lovenox, recommend no heparin  with HD.  Monitor closely        Paroxysmal atrial fibrillation with RVR    Management per cardiology.    Stable heart rate at present.  Careful with beta blocker/CCBs in the setting of hypotension          Elevated troponin    Management per cardiology, not felt to be ACS  Suspect demand ischemia and ESRD as the etiology        Chest pain    Chest pain free at present  See elevated troponin for plan          Benign hypertension with ESRD (end-stage renal disease)    Intermittently hypotensive-while in septic shock -now resolved  Hypertension Medications             amlodipine (NORVASC) 5 MG tablet Take 5 mg by mouth 2 (two) times daily.    hydrALAZINE (APRESOLINE) 50 MG tablet Take 50 mg by mouth 3 (three) times daily.    lisinopril (PRINIVIL,ZESTRIL) 40 MG tablet Take 1 tablet (40 mg total) by mouth once daily.    minoxidil (LONITEN) 2.5 MG tablet Take 3 tablets (7.5 mg total) by mouth 2 (two) times daily.                    Methadone dependence    Methadone dose decreased with acute illness.  Continue to monitor closely          Chronic hepatitis C without hepatic coma    With Hx of possible liver cirrhosis  Likely contributing to thrombocytopenia  Ammonia normal  Monitor LFTs            Cirrhosis of liver with ascites    Possible diagnosis though CT abdomen did not note cirrhosis or ascites            Dehydration    Will give NS 500ml IV bolus today          Intractable vomiting with nausea    Likely from sepsis-resolved   Lipase and  Wbcs Wnl, Imaging and exam without evidence of acute abdominal process  Continue  PPI          Chronic back pain    With chronic pain syndrome-  Continue home methadone          Ischemic cardiomyopathy, LVEF 25%-30%    See elevated troponin  No evidence of acute CHF          ESRD (T,Th,Sat) dialysis onset 2013    HD 8/28  Nephrology following for renal/HD management  Electrolytes stable            Coronary artery disease involving native coronary artery of native heart without  angina pectoris    With prior coronary stent placement 2016/ Hx ischemic cardiomyopathy-   on ace I   Cardiology following  ON metoprolol.  Hold asa with thrombocytopenia            VTE Risk Mitigation (From admission, onward)        Ordered     IP VTE HIGH RISK PATIENT  Once      08/26/18 1106     Place sequential compression device  Until discontinued      08/26/18 1106     Place ZACHARY hose  Until discontinued      08/26/18 1106              Natasha Morris MD  Department of Hospital Medicine   [facility]

## 2018-09-02 NOTE — PROGRESS NOTES
Progress Note  Infectious Disease    Follow-up For:  Cellulitis, bacteremia    SUBJECTIVE:   ROS:  Feels that compresses have helped but no change clinically. Still super tender. No palpable abscesses, ?hematomata in tissue from low platelets.    Antibiotics (From admission, onward)    Start     Stop Route Frequency Ordered    08/29/18 1430  piperacillin-tazobactam 2.25 g in NaCl 0.9% 50 mL IVPB (ready to mix system)      -- IV Every 8 hours (non-standard times) 08/29/18 1318          Pertinent Medications noted:    EXAM & DIAGNOSTICS REVIEWED:   Vitals:     Temp:  [97.8 °F (36.6 °C)-99.4 °F (37.4 °C)]   Temp: 98.9 °F (37.2 °C) (09/02/18 0849)  Pulse: 79 (09/02/18 0849)  Resp: 18 (09/02/18 0849)  BP: (!) 181/86 (09/02/18 0849)  SpO2: 100 % (09/02/18 0849)    Intake/Output Summary (Last 24 hours) at 9/2/2018 1447  Last data filed at 9/2/2018 0550  Gross per 24 hour   Intake 1715.83 ml   Output 2 ml   Net 1713.83 ml       General:  In NAD. Looks more comfortable  Eyes:  Anicteric, PERRL, EOMI  ENT:  Mouth w/ pink MMM, no lesions/exudate,   poor  dentition  Neck:    Lungs:    Heart:    Abd:     :  Voids  Musc:  Joints without effusion, erythema, synovitis,   Skin:  Generally warm, dry, normal for color. No rashes  Wound: Right plantar ulceration uch  now that the callous has been debrided, no purulence today.   Neuro: AAOx3, speech clear, moves all extrems equally  Extrem: No edema, and there is improved cellulitis right medial leg. Still has tender cord calf and  Tender masses with bruising medial thigh. Not suppurative. No change from yesterday    VAD:  Left IJ and peripheral    Lines/Tubes/Drains:    General Labs reviewed:  Recent Labs   Lab  09/02/18   0440   WBC  10.40   RBC  3.16*   HGB  10.0*   HCT  30.2*   PLT  37*   MCV  96   MCH  31.8*   MCHC  33.2     Recent Labs   Lab  09/02/18   0440   CALCIUM  8.0*   PROT  5.7*   NA  132*   K  4.3   CO2  22*   CL  99   BUN  55*   CREATININE  4.6*   ALKPHOS  353*    ALT  14   AST  23   BILITOT  1.0       Micro: reviewed    Imaging Reviewed: MRI of forefoot, no osteomyelitis    ASSESSMENT & PLAN      1.         Sepsis, with Proteus and Pasteurella, right leg cellulitis, lymphangitis, ?superficial thrombophlebitis  2.         Right foot callous(longstanding) with abscess beneath the callous, draining toward the base of the right 3/4 interspace  3.         ESRD on dialysis  4.         Severe hypoglycemia, life threatening  5.         Cirrhosis, with hepatitis C  6.         Severe thrombocytopenia likely due to sepsis  7.         Nausea and vomiting, suspected gastroparesis, history of diabetes in the past  8. diarrhea       Recommendation:   Continue zosyn, wound care  Check Cdiff  Continue  warm compresses  Monitor medial thigh masses for suppuration and will obtain Ct thigh tomorrow  Can we get by without the TLC?

## 2018-09-02 NOTE — SUBJECTIVE & OBJECTIVE
Interval History:   Reports foot feeling much better   S/p debridement right     Review of Systems   Constitutional: Negative for chills and fever.   Respiratory: Negative for cough and shortness of breath.    Cardiovascular: Negative for chest pain.   Gastrointestinal: Negative for abdominal pain, diarrhea, nausea and vomiting.     Objective:     Vital Signs (Most Recent):  Temp: 97.8 °F (36.6 °C) (09/01/18 1933)  Pulse: (!) 59 (09/01/18 1933)  Resp: 20 (09/01/18 1933)  BP: (!) 180/80 (09/01/18 1933)  SpO2: (!) 94 % (09/01/18 1933) Vital Signs (24h Range):  Temp:  [96.1 °F (35.6 °C)-98.2 °F (36.8 °C)] 97.8 °F (36.6 °C)  Pulse:  [51-63] 59  Resp:  [17-20] 20  SpO2:  [94 %-98 %] 94 %  BP: (129-186)/() 180/80     Weight: 92.9 kg (204 lb 12.9 oz)  Body mass index is 27.78 kg/m².    Intake/Output Summary (Last 24 hours) at 9/1/2018 2208  Last data filed at 9/1/2018 1830  Gross per 24 hour   Intake 1460 ml   Output 2500 ml   Net -1040 ml      Physical Exam   Constitutional: He is oriented to person, place, and time.   Chronically ill appearing man in no acute distress, appears much better, non toxic   Cardiovascular: Normal rate and regular rhythm.   No murmur heard.  Pulmonary/Chest: Effort normal and breath sounds normal. He has no wheezes. He has no rales.   Abdominal: Soft. Bowel sounds are normal. He exhibits no distension. There is no tenderness.   Musculoskeletal:   rle with ace wrap dressing intact    Neurological: He is alert and oriented to person, place, and time.       Significant Labs:   BMP:   Recent Labs   Lab  09/01/18   0500   GLU  98   NA  130*   K  4.9   CL  97   CO2  22*   BUN  69*   CREATININE  5.5*   CALCIUM  8.5*     CBC:   Recent Labs   Lab  08/31/18   0348  09/01/18   0500   WBC  15.00*  10.10   HGB  10.1*  10.1*   HCT  30.4*  30.8*   PLT  42*  32*       Significant Imaging: I have reviewed and interpreted all pertinent imaging results/findings within the past 24 hours.

## 2018-09-02 NOTE — ASSESSMENT & PLAN NOTE
Transferred to ICU 8/29 with hypotension  Complicated with hypoglycemia, thrombocytopenia, elevated INR with normal fibrinogen  Blood cultures with pasteurella and proteus.  Source likely his right foot ulcer with abscess that was drained by ID 8/30 with associated cellulitis and thrombophebitis  Much improved now, HD stable.   Transferred from ICU   Appreciate ID assistance  Continue zosyn-  Repeat blood cultures and right foot wound cultures -8/30 proteus from foot and blood (8/29)  Maintain euvolemia  MRI foot without evidence of persistent abscess or osteomyelitis

## 2018-09-02 NOTE — ASSESSMENT & PLAN NOTE
With prior coronary stent placement 2016/ Hx ischemic cardiomyopathy-   on ace I   Cardiology following  ON metoprolol.  Hold asa with thrombocytopenia

## 2018-09-02 NOTE — ASSESSMENT & PLAN NOTE
Likely from sepsis with underlying chronic liver disease.  Plts decreased to 15 8/30 and transfused 1 unit.  Improved   No evidence of bleeding.  Holding lovenox, recommend no heparin with HD.  Monitor closely

## 2018-09-03 LAB
ALBUMIN SERPL BCP-MCNC: 1.9 G/DL
ALP SERPL-CCNC: 310 U/L
ALT SERPL W/O P-5'-P-CCNC: 11 U/L
ANION GAP SERPL CALC-SCNC: 12 MMOL/L
AST SERPL-CCNC: 16 U/L
BASOPHILS # BLD AUTO: 0 K/UL
BASOPHILS NFR BLD: 0.3 %
BILIRUB SERPL-MCNC: 1.2 MG/DL
BUN SERPL-MCNC: 72 MG/DL
C DIFF GDH STL QL: POSITIVE
C DIFF TOX A+B STL QL IA: NEGATIVE
C DIFF TOX GENS STL QL NAA+PROBE: POSITIVE
CALCIUM SERPL-MCNC: 8.2 MG/DL
CHLORIDE SERPL-SCNC: 96 MMOL/L
CO2 SERPL-SCNC: 20 MMOL/L
CREAT SERPL-MCNC: 5.7 MG/DL
DIFFERENTIAL METHOD: ABNORMAL
EOSINOPHIL # BLD AUTO: 0.1 K/UL
EOSINOPHIL NFR BLD: 0.8 %
ERYTHROCYTE [DISTWIDTH] IN BLOOD BY AUTOMATED COUNT: 17.7 %
EST. GFR  (AFRICAN AMERICAN): 12 ML/MIN/1.73 M^2
EST. GFR  (NON AFRICAN AMERICAN): 11 ML/MIN/1.73 M^2
GLUCOSE SERPL-MCNC: 93 MG/DL
HCT VFR BLD AUTO: 31 %
HGB BLD-MCNC: 10.3 G/DL
INR PPP: 1.1
LYMPHOCYTES # BLD AUTO: 1.3 K/UL
LYMPHOCYTES NFR BLD: 8.9 %
MCH RBC QN AUTO: 31.8 PG
MCHC RBC AUTO-ENTMCNC: 33.4 G/DL
MCV RBC AUTO: 95 FL
MONOCYTES # BLD AUTO: 0.4 K/UL
MONOCYTES NFR BLD: 2.8 %
NEUTROPHILS # BLD AUTO: 12.3 K/UL
NEUTROPHILS NFR BLD: 87.2 %
PHOSPHATE SERPL-MCNC: 1.9 MG/DL
PLATELET # BLD AUTO: 44 K/UL
PMV BLD AUTO: 9.5 FL
POCT GLUCOSE: 102 MG/DL (ref 70–110)
POCT GLUCOSE: 56 MG/DL (ref 70–110)
POCT GLUCOSE: 84 MG/DL (ref 70–110)
POCT GLUCOSE: 91 MG/DL (ref 70–110)
POCT GLUCOSE: 93 MG/DL (ref 70–110)
POCT GLUCOSE: 94 MG/DL (ref 70–110)
POTASSIUM SERPL-SCNC: 4.4 MMOL/L
PROT SERPL-MCNC: 6 G/DL
PROTHROMBIN TIME: 10.6 SEC
RBC # BLD AUTO: 3.25 M/UL
SODIUM SERPL-SCNC: 128 MMOL/L
WBC # BLD AUTO: 14.1 K/UL

## 2018-09-03 PROCEDURE — 87045 FECES CULTURE AEROBIC BACT: CPT

## 2018-09-03 PROCEDURE — 94799 UNLISTED PULMONARY SVC/PX: CPT

## 2018-09-03 PROCEDURE — 87427 SHIGA-LIKE TOXIN AG IA: CPT | Mod: 59

## 2018-09-03 PROCEDURE — 36415 COLL VENOUS BLD VENIPUNCTURE: CPT

## 2018-09-03 PROCEDURE — A4216 STERILE WATER/SALINE, 10 ML: HCPCS | Performed by: EMERGENCY MEDICINE

## 2018-09-03 PROCEDURE — 25000003 PHARM REV CODE 250: Performed by: HOSPITALIST

## 2018-09-03 PROCEDURE — 63600175 PHARM REV CODE 636 W HCPCS: Performed by: NURSE PRACTITIONER

## 2018-09-03 PROCEDURE — 25000003 PHARM REV CODE 250: Performed by: INTERNAL MEDICINE

## 2018-09-03 PROCEDURE — 12000002 HC ACUTE/MED SURGE SEMI-PRIVATE ROOM

## 2018-09-03 PROCEDURE — 25000003 PHARM REV CODE 250: Performed by: EMERGENCY MEDICINE

## 2018-09-03 PROCEDURE — 87046 STOOL CULTR AEROBIC BACT EA: CPT

## 2018-09-03 PROCEDURE — 25000003 PHARM REV CODE 250: Performed by: NURSE PRACTITIONER

## 2018-09-03 PROCEDURE — 97802 MEDICAL NUTRITION INDIV IN: CPT

## 2018-09-03 PROCEDURE — 63600175 PHARM REV CODE 636 W HCPCS: Performed by: INTERNAL MEDICINE

## 2018-09-03 PROCEDURE — 85025 COMPLETE CBC W/AUTO DIFF WBC: CPT

## 2018-09-03 PROCEDURE — 87493 C DIFF AMPLIFIED PROBE: CPT

## 2018-09-03 PROCEDURE — 84100 ASSAY OF PHOSPHORUS: CPT

## 2018-09-03 PROCEDURE — 87449 NOS EACH ORGANISM AG IA: CPT

## 2018-09-03 PROCEDURE — 80053 COMPREHEN METABOLIC PANEL: CPT

## 2018-09-03 PROCEDURE — 85610 PROTHROMBIN TIME: CPT

## 2018-09-03 RX ORDER — METHADONE HYDROCHLORIDE 10 MG/1
20 TABLET ORAL ONCE
Status: COMPLETED | OUTPATIENT
Start: 2018-09-03 | End: 2018-09-03

## 2018-09-03 RX ORDER — DEXTROSE MONOHYDRATE 100 MG/ML
INJECTION, SOLUTION INTRAVENOUS CONTINUOUS
Status: ACTIVE | OUTPATIENT
Start: 2018-09-03 | End: 2018-09-04

## 2018-09-03 RX ORDER — DICYCLOMINE HYDROCHLORIDE 10 MG/ML
10 INJECTION INTRAMUSCULAR EVERY 6 HOURS PRN
Status: DISCONTINUED | OUTPATIENT
Start: 2018-09-03 | End: 2018-09-07 | Stop reason: HOSPADM

## 2018-09-03 RX ORDER — METHADONE HYDROCHLORIDE 10 MG/1
40 TABLET ORAL DAILY
Status: DISCONTINUED | OUTPATIENT
Start: 2018-09-04 | End: 2018-09-05

## 2018-09-03 RX ADMIN — Medication 3 ML: at 03:09

## 2018-09-03 RX ADMIN — DEXTROSE: 10 SOLUTION INTRAVENOUS at 03:09

## 2018-09-03 RX ADMIN — PANTOPRAZOLE SODIUM 40 MG: 40 TABLET, DELAYED RELEASE ORAL at 09:09

## 2018-09-03 RX ADMIN — PIPERACILLIN SODIUM AND TAZOBACTAM SODIUM 2.25 G: 2; .25 INJECTION, POWDER, FOR SOLUTION INTRAVENOUS at 03:09

## 2018-09-03 RX ADMIN — METHADONE HYDROCHLORIDE 20 MG: 10 TABLET ORAL at 06:09

## 2018-09-03 RX ADMIN — PIPERACILLIN SODIUM AND TAZOBACTAM SODIUM 2.25 G: 2; .25 INJECTION, POWDER, FOR SOLUTION INTRAVENOUS at 09:09

## 2018-09-03 RX ADMIN — DICYCLOMINE HYDROCHLORIDE 10 MG: 10 INJECTION INTRAMUSCULAR at 06:09

## 2018-09-03 RX ADMIN — HYDROCODONE BITARTRATE AND ACETAMINOPHEN 1 TABLET: 5; 325 TABLET ORAL at 06:09

## 2018-09-03 RX ADMIN — HYDRALAZINE HYDROCHLORIDE 50 MG: 25 TABLET, FILM COATED ORAL at 03:09

## 2018-09-03 RX ADMIN — CEFTRIAXONE 1 G: 1 INJECTION, SOLUTION INTRAVENOUS at 03:09

## 2018-09-03 RX ADMIN — METHADONE HYDROCHLORIDE 20 MG: 10 TABLET ORAL at 09:09

## 2018-09-03 RX ADMIN — DEXTROSE MONOHYDRATE 12.5 G: 500 INJECTION PARENTERAL at 12:09

## 2018-09-03 RX ADMIN — METOCLOPRAMIDE 5 MG: 5 INJECTION, SOLUTION INTRAMUSCULAR; INTRAVENOUS at 03:09

## 2018-09-03 RX ADMIN — ONDANSETRON HYDROCHLORIDE 4 MG: 2 INJECTION, SOLUTION INTRAMUSCULAR; INTRAVENOUS at 09:09

## 2018-09-03 RX ADMIN — ONDANSETRON HYDROCHLORIDE 4 MG: 2 INJECTION, SOLUTION INTRAMUSCULAR; INTRAVENOUS at 01:09

## 2018-09-03 RX ADMIN — HYDRALAZINE HYDROCHLORIDE 50 MG: 25 TABLET, FILM COATED ORAL at 09:09

## 2018-09-03 RX ADMIN — Medication 125 MG: at 06:09

## 2018-09-03 RX ADMIN — ACETAMINOPHEN 1000 MG: 500 TABLET ORAL at 09:09

## 2018-09-03 RX ADMIN — DEXTROSE: 10 SOLUTION INTRAVENOUS at 09:09

## 2018-09-03 RX ADMIN — ONDANSETRON HYDROCHLORIDE 4 MG: 2 INJECTION, SOLUTION INTRAMUSCULAR; INTRAVENOUS at 03:09

## 2018-09-03 RX ADMIN — HYDRALAZINE HYDROCHLORIDE 10 MG: 20 INJECTION INTRAMUSCULAR; INTRAVENOUS at 12:09

## 2018-09-03 RX ADMIN — HYDROCODONE BITARTRATE AND ACETAMINOPHEN 1 TABLET: 5; 325 TABLET ORAL at 01:09

## 2018-09-03 RX ADMIN — LEVETIRACETAM 500 MG: 500 TABLET ORAL at 09:09

## 2018-09-03 RX ADMIN — LISINOPRIL 40 MG: 40 TABLET ORAL at 09:09

## 2018-09-03 RX ADMIN — AMLODIPINE BESYLATE 5 MG: 5 TABLET ORAL at 09:09

## 2018-09-03 RX ADMIN — METOCLOPRAMIDE 5 MG: 5 INJECTION, SOLUTION INTRAMUSCULAR; INTRAVENOUS at 09:09

## 2018-09-03 RX ADMIN — HYDROCODONE BITARTRATE AND ACETAMINOPHEN 1 TABLET: 5; 325 TABLET ORAL at 10:09

## 2018-09-03 RX ADMIN — COLLAGENASE SANTYL: 250 OINTMENT TOPICAL at 09:09

## 2018-09-03 RX ADMIN — Medication 3 ML: at 09:09

## 2018-09-03 NOTE — ASSESSMENT & PLAN NOTE
Likely from sepsis, liver dysfunction, decreased PO intake.  Persistent but overall improved, still requiring D10 drip-until today --will dc   Insulin and c-peptide level pending to rule out insulinoma   Wean D10 as tolerated

## 2018-09-03 NOTE — PLAN OF CARE
Problem: Patient Care Overview  Goal: Individualization & Mutuality  Recommendation/Intervention:   1) Continue renal diet   2) Add DM restriction only if glucose becomes elevated   3) 5-6 small frequent meals low in fat and fiber   4) Recommend Phos supplement as needed  5) Educated patient on diet for  DM, ESRD, and gastroparesis. Handouts provided.       Goals: PO intakes continue 75% of most meals during this admit.  2)  Pt will reads diet education by RD f/u.   Nutrition Goal Status: new  Communication of JILL Recs: other (comment)

## 2018-09-03 NOTE — ASSESSMENT & PLAN NOTE
S/p debridement --follow with podiatry   Proteus -from culture 8/30-same as culture from blood   Continue on zosyn  As per ID -transition to po abx in am -discussed with Dr Lenz  Arrange outpt wound care -discussed with CM -

## 2018-09-03 NOTE — PROGRESS NOTES
Pt's blood sugar at 20:35 at 55.  Pt given D50 as ordered.  Recheck blood sugar at 21:29 at 73.  Message sent to NP as pt is having persistent hypoglycemia.  New orders received to increase D10 to 50 ml/hr.

## 2018-09-03 NOTE — SUBJECTIVE & OBJECTIVE
Interval History:   Pt seen/examined-foot doing well   Appetite improved  bp elevated     Review of Systems   Constitutional: Negative for chills and fever.   Respiratory: Negative for cough and shortness of breath.    Cardiovascular: Negative for chest pain.   Gastrointestinal: Negative for abdominal pain, diarrhea, nausea and vomiting.   Musculoskeletal:        Right foot pain      Objective:     Vital Signs (Most Recent):  Temp: 98.4 °F (36.9 °C) (09/02/18 2001)  Pulse: 72 (09/02/18 2001)  Resp: 18 (09/02/18 1714)  BP: (!) 188/90 (09/02/18 2001)  SpO2: 100 % (09/02/18 2001) Vital Signs (24h Range):  Temp:  [98.4 °F (36.9 °C)-99.4 °F (37.4 °C)] 98.4 °F (36.9 °C)  Pulse:  [56-79] 72  Resp:  [18] 18  SpO2:  [93 %-100 %] 100 %  BP: (164-201)/(67-93) 188/90     Weight: 92.9 kg (204 lb 12.9 oz)  Body mass index is 27.78 kg/m².    Intake/Output Summary (Last 24 hours) at 9/2/2018 2118  Last data filed at 9/2/2018 1711  Gross per 24 hour   Intake 970.83 ml   Output 2 ml   Net 968.83 ml      Physical Exam   Constitutional: He is oriented to person, place, and time. He appears well-developed and well-nourished.   Chronically ill appearing man in no acute distress, appears much better, non toxic   HENT:   Nose: Nose normal.   Mouth/Throat: Oropharynx is clear and moist.   Eyes: Conjunctivae are normal. No scleral icterus.   Cardiovascular: Normal rate and regular rhythm.   No murmur heard.  Pulmonary/Chest: Effort normal and breath sounds normal. He has no wheezes. He has no rales.   Abdominal: Soft. Bowel sounds are normal. He exhibits no distension. There is no tenderness.   Musculoskeletal:   rle with ace wrap dressing intact    Neurological: He is alert and oriented to person, place, and time.   Nursing note and vitals reviewed.      Significant Labs:   BMP:   Recent Labs   Lab  09/02/18   0440   GLU  63*   NA  132*   K  4.3   CL  99   CO2  22*   BUN  55*   CREATININE  4.6*   CALCIUM  8.0*     CBC:   Recent Labs   Lab   09/01/18   0500  09/02/18   0440   WBC  10.10  10.40   HGB  10.1*  10.0*   HCT  30.8*  30.2*   PLT  32*  37*       Significant Imaging: I have reviewed and interpreted all pertinent imaging results/findings within the past 24 hours.

## 2018-09-03 NOTE — PLAN OF CARE
09/03/18 0801   Patient Assessment/Suction   Level of Consciousness (AVPU) alert   Respiratory Effort Normal;Unlabored   All Lung Fields Breath Sounds clear   PRE-TX-O2-ETCO2   O2 Device (Oxygen Therapy) room air   SpO2 98 %   Pulse Oximetry Type Intermittent

## 2018-09-03 NOTE — PROGRESS NOTES
Progress Note  Infectious Disease    Follow-up For:  Cellulitis, bacteremia    SUBJECTIVE:   ROS:  Feels that compresses have helped but no change clinically. Still super tender. No palpable abscesses, ?hematomata in tissue from low platelets. No abscess on Ct. He had 2 loose stools overnight, CDag pos, toxin neg, pcr pending. No abd pain or nausea. Eating fairly well. Does not feel ready to go home.    Antibiotics (From admission, onward)    Start     Stop Route Frequency Ordered    09/03/18 1800  vancomycin 250mg / 10ml oral suspension 125 mg      -- Oral Every 6 hours 09/03/18 1335    09/03/18 1515  cefTRIAXone (ROCEPHIN) 1 g in dextrose 5 % 50 mL IVPB      -- IV Every 24 hours (non-standard times) 09/03/18 1409          Pertinent Medications noted:    EXAM & DIAGNOSTICS REVIEWED:   Vitals:     Temp:  [97.8 °F (36.6 °C)-99.2 °F (37.3 °C)]   Temp: 97.9 °F (36.6 °C) (09/03/18 0811)  Pulse: 69 (09/03/18 1139)  Resp: 16 (09/03/18 1139)  BP: (!) 165/82 (09/03/18 0811)  SpO2: 98 % (09/03/18 1139)    Intake/Output Summary (Last 24 hours) at 9/3/2018 1409  Last data filed at 9/3/2018 0600  Gross per 24 hour   Intake 1345.83 ml   Output --   Net 1345.83 ml       General:  In NAD. Looks more comfortable  Eyes:  Anicteric, PERRL, EOMI  ENT:  Mouth w/ pink MMM, no lesions/exudate,   poor  dentition  Neck:    Lungs:    Heart:    Abd:   soft and non tender,   :  Voids  Musc:  Joints without effusion, erythema, synovitis,   Skin:  Generally warm, dry, normal for color. No rashes  Wound: Right plantar ulceration uch  now that the callous has been debrided, no purulence today.   Neuro: AAOx3, speech clear, moves all extrems equally  Extrem: No edema, and there is improved cellulitis right medial leg. Still has tender nodules calf and  Tender masses with bruising medial thigh. Not suppurative. No change from yesterday    VAD:   peripheral    Lines/Tubes/Drains:    General Labs reviewed:  Recent Labs   Lab  09/03/18   4099    WBC  14.10*   RBC  3.25*   HGB  10.3*   HCT  31.0*   PLT  44*   MCV  95   MCH  31.8*   MCHC  33.4     Recent Labs   Lab  09/03/18   0445   CALCIUM  8.2*   PROT  6.0   NA  128*   K  4.4   CO2  20*   CL  96   BUN  72*   CREATININE  5.7*   ALKPHOS  310*   ALT  11   AST  16   BILITOT  1.2*       Micro: reviewed    Imaging Reviewed: MRI of forefoot, no osteomyelitis  Reviewed C tof thigh and n o abscesses evident    ASSESSMENT & PLAN      1.         Sepsis, with Proteus and Pasteurella, right leg cellulitis, lymphangitis, ?superficial thrombophlebitis with small but tender ecchymoses  2.         Right foot callous(longstanding) with abscess beneath the callous, draining toward the base of the right 3/4 interspace  3.         ESRD on dialysis  4.         Severe hypoglycemia, life threatening  5.         Cirrhosis, with hepatitis C  6.         Severe thrombocytopenia likely due to sepsis  7.         Nausea and vomiting, suspected gastroparesis, history of diabetes in the past  8. Diarrhea, Cdiff antigen pos, toxin neg, pcr pending       Recommendation:   Will de-escalate to rocephin, continue wound care  Add oral vanc pending pcr  Continue  warm compresses  Monitor medial thigh masses for suppuration   Home soon?

## 2018-09-03 NOTE — PLAN OF CARE
Problem: Patient Care Overview  Goal: Plan of Care Review  Outcome: Revised  Persistent hypoglycemia tonight; D10 infusion increased to 50 ml/hr.  Continue to monitor blood sugars every 4 hours as ordered and prn  Frequent, loose, foul-smelling stools tonight.  Stool specimen sent to lab.  Contact isolaton precautions started.  Abdominal cramping and nausea tonight; prn zofran given; new orders for bentyl.  Warm compresses to right thigh--pt states this help the pain.  Monitor am labs this morning.  Next dialysis scheduled for Tuesday.

## 2018-09-03 NOTE — ASSESSMENT & PLAN NOTE
Contributing Nutrition Diagnosis  Altered carbohydrate metabolism    Related to (etiology):   Endocrine dysfunction    Signs and Symptoms (as evidenced by):   Current diagnosis with pt having hypoglycemia  Recent Labs   Lab  09/03/18   1057   POCTGLUCOSE  94     Lab Results   Component Value Date    HGBA1C <4.0 (A) 08/27/2018     Interventions/Recommendations (treatment strategy):  1) Add diabetic diet to order if blood sugars become high 2) Diabetic education provided. Handout given.     Nutrition Diagnosis Status:   New

## 2018-09-03 NOTE — ASSESSMENT & PLAN NOTE
Methadone dose decreased with acute illness.  Continue to monitor closely-extra dose today and increase dose in am for pain control

## 2018-09-03 NOTE — PROGRESS NOTES
[facility]  Adult Nutrition  Progress Note    SUMMARY       Recommendations    Recommendation/Intervention:   1) Continue renal diet   2) Add DM restriction only if glucose becomes elevated   3) 5-6 small frequent meals low in fat and fiber   4) Recommend Phos supplement as needed  5) Educated patient on diet for  DM, ESRD, and gastroparesis. Handouts provided.       Goals: PO intakes continue 75% of most meals during this admit.  2)  Pt will reads diet education by RD f/u.   Nutrition Goal Status: new  Communication of RD Recs: other (comment)    Reason for Assessment    Reason for Assessment: RD follow-up  Relevant Medical History: ESRD on HD, DM2, HTN, CAD, chroic Hep. C. liver cirrhosis, methadone dependance, stroke, asthma, gastroparesis  Interdisciplinary Rounds: did not attend  General Information Comments: (Diet education given. Discussed treatment for hypoglycemia, glucose control and gastroparesis as well. Pt verbalized understanding.) Promote nutrition goals to keep Phos and glucose WNL.  Discharge Plan: Recommend renal diet with 5-6 small meals daily.     Nutrition Risk Screen    Nutrition Risk Screen: no indicators present    Nutrition/Diet History    Food Preferences: (Likes tuna fish salad sandwiches.)  Do you have any cultural, spiritual, Christian conflicts, given your current situation?: pt denies  Food Allergies: NKFA(No intolerances)  Factors Affecting Nutritional Intake: diarrhea, nausea/vomiting    Anthropometrics    Temp: 97.9 °F (36.6 °C)  Height Method: Measured  Height: 6'  Height (inches): 72 in  Weight Method: Bed Scale  Weight: 92.9 kg (204 lb 12.9 oz)  Weight (lb): 204.81 lb  Ideal Body Weight (IBW), Male: 178 lb  % Ideal Body Weight, Male (lb): 117.98 lb  BMI (Calculated): 28.5  BMI Grade: 25 - 29.9 - overweight  Weight Loss: unintentional  Usual Body Weight (UBW), k kg  Weight Change Amount: 5 lb  % Usual Body Weight: 97.99        Lab/Procedures/Meds    Pertinent Labs Reviewed: reviewed  BMP  Lab Results   Component Value Date     (L) 09/03/2018    K 4.4 09/03/2018    CL 96 09/03/2018    CO2 20 (L) 09/03/2018    BUN 72 (H) 09/03/2018    CREATININE 5.7 (H) 09/03/2018    CALCIUM 8.2 (L) 09/03/2018    ANIONGAP 12 09/03/2018    ESTGFRAFRICA 12 (A) 09/03/2018    EGFRNONAA 11 (A) 09/03/2018     Lab Results   Component Value Date    CALCIUM 8.2 (L) 09/03/2018    PHOS 1.9 (L) 09/03/2018     Lab Results   Component Value Date    ALBUMIN 1.9 (L) 09/03/2018     See below: HgB A1C and Glucose  Pertinent Medications Reviewed:  sodium chloride, sodium chloride, sodium chloride 0.9%, sodium chloride 0.9%, acetaminophen, dextrose 50%, dextrose 50%, dicyclomine, glucose, glucose, hydrALAZINE, HYDROcodone-acetaminophen, magnesium sulfate IVPB, metoclopramide HCl, ondansetron, sodium chloride 0.9%     Physical Findings/Assessment    Overall Physical Appearance: (Large body frame, mild muscle wasting at temples and cheeks and mild fat wasting in orbital area)    Estimated/Assessed Needs    Weight Used For Calorie Calculations: 91 kg per NHANES SBW  Energy Calorie Requirements (kcal): 2730  Energy Need Method: 30 Kcal/kg per National Kid[employer] guidelines  Protein Requirements: 111.7  Weight Used For Protein Calculations: 92.9 kg (204 lb 12.9 oz)  Fluid Requirements (mL): per primary or UOP + 1000  mls  CHO Requirement: 50% EEN (Pt with DM but hypoglycemic, no restriction at this time)      Nutrition Prescription Ordered    Current Diet Order: Renal    Evaluation of Received Nutrient/Fluid Intake    Energy Calories Required: meeting needs  Protein Required: meeting needs  Fluid Required: meeting needs  Tolerance: tolerating  % Intake of Estimated Energy Needs: 75% (one 25% today)  % Intake meals: 75% (one 25% today)    Nutrition Risk    Level of Risk/Frequency of Follow-up: (1 x wkly)     Assessment and Plan    Type 2 diabetes mellitus with  chronic kidney disease on chronic dialysis, without long-term current use of insulin    Contributing Nutrition Diagnosis  Altered carbohydrate metabolism    Related to (etiology):   Endocrine dysfunction    Signs and Symptoms (as evidenced by):   Current diagnosis with pt having hypoglycemia  Recent Labs   Lab  09/03/18   1057   POCTGLUCOSE  94     Lab Results   Component Value Date    HGBA1C <4.0 (A) 08/27/2018     Interventions/Recommendations (treatment strategy):  1) Add diabetic diet to order if blood sugars become high 2) Diabetic education provided. Handout given.     Nutrition Diagnosis Status:   New               Monitor and Evaluation    Food and Nutrient Intake: energy intake  Knowledge/Beliefs/Attitudes: food and nutrition knowledge/skill  Biochemical Data, Medical Tests and Procedures: electrolyte and renal panel, glucose/endocrine profile     Nutrition Follow-Up    RD Follow-up?: Yes

## 2018-09-03 NOTE — PROGRESS NOTES
INPATIENT NEPHROLOGY PROGRESS NOTE  [facility] NEPHROLOGY  (late entry for 8/28)    João Aguirre  09/03/2018    Reason for consultation:         esrd    Chief Complaint:   Chief Complaint   Patient presents with    Abdominal Pain     with N/V; missed dialysis Saturday        History of Present Illness:          Patient is a 53-year-old male with a past medical history of end-stage renal disease on hemodialysis Tuesday Thursday Saturday, diabetes, chronic back pain on methadone, long-term anticoagulant use hypertension, prior pneumonia, prior stroke, and recurrent episodes of mid epigastric pain and vomiting but no formal diagnosis of gastroparesis presents to emergency department for evaluation of epigastric abdominal pain with inability to tolerate liquids or food for the past few days.  Patient was seen here in the emergency department yesterday where he underwent basic labs and was given antiemetics.  He was discharged home with a prescription for Reglan and Benadryl which she has not been able to fill secondary to being home vomiting. He presents today via EMS because the symptoms have not improved and are worsening.  He did not get dialysis yesterday.  He denies any shortness of breath but does have an exacerbation of his chronic back pain which is thought to be secondary to him vomiting. The patient is unable hold down his chronic pain medicine which includes methadone.  Patient does not have a medication list with him at this time.        8/27  Seen on dialysis.  No nausea currently.  No chest pain or sob    8/28  Seen on dialysis.  Sleeping  8/29  Sleeping  8/30  Transferred to ICU.  No n,v,chest pain, sob  9/1  HD TTS.  Seen today on dialysis.  Platelets low, holding heparin w/HD.  Denies pain.  9/2  2L UF yesterday, tolerated well.  Pressures high, resumed lisinopril and amlodipine today.  Platelets 37, heparin on hold.  States having abd cramping today.  9/3  Persistent hypoglycemia during the  night, has developed diarrhea.  Na+ low, on D10 gtt for now.  Phos 1.9, discontinued binder until phos comes back up.  BP still high, but a little better than yesterday.  No fevers.  Stool for Cdiff collected and results pending.  Denies pain and SOB, no edema.        Plan of Care:     Assessment:     esrd  Hypertension  anemia  Fever/bacteremia (GNR), sepsis  cellulitis    Plan:      seen on dialysis  Continue t,th,sat hd  uf as tolerated  reagan with hd  abx per primary    Thank you for allowing us to participate in this patient's care. We will continue to follow.    Medications:  No current facility-administered medications on file prior to encounter.      Current Outpatient Medications on File Prior to Encounter   Medication Sig Dispense Refill    albuterol (PROAIR HFA) 90 mcg/actuation inhaler INHALE TWO PUFFS EVERY 4 TO 6 HOURS AS NEEDED      amlodipine (NORVASC) 5 MG tablet Take 5 mg by mouth 2 (two) times daily.      aspirin 325 MG tablet Take 1 tablet (325 mg total) by mouth once daily. 30 tablet 1    calcium acetate (PHOSLO) 667 mg capsule Take 1 capsule (667 mg total) by mouth 3 (three) times daily with meals. 90 capsule 11    hydrALAZINE (APRESOLINE) 50 MG tablet Take 50 mg by mouth 3 (three) times daily.      levetiracetam (KEPPRA) 500 MG Tab Take 500 mg twice a day.  Take an extra tablet right after dialysis (making 3 tablets on your dialysis days) 70 tablet 2    lisinopril (PRINIVIL,ZESTRIL) 40 MG tablet Take 1 tablet (40 mg total) by mouth once daily. 30 tablet 1    methadone (DOLOPHINE) 10 MG tablet Take 9 tablets (90 mg total) by mouth once daily.  0    metoclopramide HCl (REGLAN) 10 MG tablet Take 1 tablet (10 mg total) by mouth 3 (three) times daily as needed (nausea/vomiting). 10 tablet 0    minoxidil (LONITEN) 2.5 MG tablet Take 3 tablets (7.5 mg total) by mouth 2 (two) times daily. 90 tablet 0     Scheduled Meds:   amLODIPine  5 mg Oral Daily    calcium acetate  667 mg Oral TID WM     collagenase   Topical (Top) Daily    hydrALAZINE  50 mg Oral TID    levETIRAcetam  500 mg Oral BID    lisinopril  40 mg Oral Daily    methadone  20 mg Oral Daily    pantoprazole  40 mg Oral BID    piperacillin-tazobactam (ZOSYN) IVPB  2.25 g Intravenous Q8H    sodium chloride 0.9%  3 mL Intravenous Q8H     Continuous Infusions:   dextrose 10 % in water (D10W) 50 mL/hr at 09/03/18 0313     PRN Meds:.sodium chloride, sodium chloride, sodium chloride 0.9%, sodium chloride 0.9%, acetaminophen, dextrose 50%, dextrose 50%, dicyclomine, glucose, glucose, hydrALAZINE, HYDROcodone-acetaminophen, magnesium sulfate IVPB, metoclopramide HCl, ondansetron, sodium chloride 0.9%    Allergies:  Antibiotic hc    Vital Signs:  Temp Readings from Last 3 Encounters:   09/03/18 97.9 °F (36.6 °C)   08/25/18 97.8 °F (36.6 °C) (Oral)   10/19/17 97.6 °F (36.4 °C)       Pulse Readings from Last 3 Encounters:   09/03/18 67   08/25/18 75   10/19/17 (!) 59       BP Readings from Last 3 Encounters:   09/03/18 (!) 165/82   08/25/18 133/71   10/19/17 (!) 166/81       Weight:  Wt Readings from Last 3 Encounters:   08/30/18 92.9 kg (204 lb 12.9 oz)   08/25/18 94.8 kg (209 lb)   10/17/17 94.8 kg (209 lb 0 oz)       Review of Systems:  Review of Systems - All 14 systems reviewed and negative, except as noted in HPI    Physical Exam:    Constitutional: NAD  Neuro: No asterixis  Psych: Calm  EENT: NCAT, anicteric sclera   Neck:  supple  Cards: No rub  Lungs: clear to ausculation  GI:  Tender to palpation  MSK:  WNWD  Skin: no purpura, rashes       Results:  Lab Results   Component Value Date     (L) 09/03/2018    K 4.4 09/03/2018    CL 96 09/03/2018    CO2 20 (L) 09/03/2018    BUN 72 (H) 09/03/2018    CREATININE 5.7 (H) 09/03/2018    CALCIUM 8.2 (L) 09/03/2018    ANIONGAP 12 09/03/2018    ESTGFRAFRICA 12 (A) 09/03/2018    EGFRNONAA 11 (A) 09/03/2018       Lab Results   Component Value Date    CALCIUM 8.2 (L) 09/03/2018    PHOS 1.9 (L)  09/03/2018       Recent Labs   Lab  09/03/18   0445   WBC  14.10*   RBC  3.25*   HGB  10.3*   HCT  31.0*   PLT  44*   MCV  95   MCH  31.8*   MCHC  33.4       I have personally reviewed pertinent radiological imaging and reports.

## 2018-09-03 NOTE — ASSESSMENT & PLAN NOTE
Transferred to ICU 8/29 with hypotension  Complicated with hypoglycemia, thrombocytopenia, elevated INR with normal fibrinogen  Blood cultures with pasteurella and proteus.  Source likely his right foot ulcer with abscess that was drained by ID 8/30 with associated cellulitis and thrombophebitis  Much improved now, HD stable.   Transferred from ICU   Appreciate ID assistance  Continue zosyn-  Repeat blood cultures and right foot wound cultures -8/30 proteus from foot and blood (8/29)  Right thigh masses -for CT thigh am -following with ID   MRI foot without evidence of persistent abscess or osteomyelitis

## 2018-09-03 NOTE — PROGRESS NOTES
[facility] Hospital Medicine  Progress Note    Patient Name: João Aguirre  MRN: [id number]  Patient Class: IP- Inpatient   Admission Date: 8/26/2018  Length of Stay: 7 days  Attending Physician: Natasha Morirs MD  Primary Care Provider: Primary Doctor No        Subjective:     Principal Problem:Septic shock    HPI:  This is a 53-year-old male with a past medical history of end-stage renal disease on hemodialysis Tuesday, Thursday, and  Saturday, diabetes, chronic back pain on methadone, CAD with prior coronary stent placement 2 years ago, hypertension, prior pneumonia, prior stroke, and recurrent episodes of mid epigastric pain and vomiting but no formal diagnosis of gastroparesis who presents to emergency department for evaluation of epigastric, abdominal pain, and CP with inability to tolerate liquids or food for the past few days.  Patient was seen here in the emergency department yesterday where he underwent basic labs and was given antiemetics.  He was discharged home with a prescription for Reglan and Benadryl which she has not been able to fill secondary to being home vomiting. He presents today via EMS because the symptoms have not improved and are worsening.  He did not get dialysis yesterday.  He denies any shortness of breath but does have an exacerbation of his chronic back pain which is thought to be secondary to him vomiting. The patient is unable hold down his chronic pain medicine which includes methadone.  Patient does not have a medication list with him at this time.    Patient found to have elevated troponin of 0.763 and reports he has Hx of CAD with prior coronary stent placement 2 years ago but has not been following up with cardiology. He does report some left sided chest pain and occasionally diaphoresis along with his nausea and vomiting. His EKG currently with no significant St elevation or depression. He does have Hx ESRD requiring HD with prior elevated  troponins in past of 0.16-0.17. Patient discussed with Dr. Law. Will consult cardiology and trend troponins due to prior cardiac Hx and continue monitoring GI status as well.          Hospital Course:  He was noted to have an elevated troponin level and Cardiology was also consulted. Nephrology was consulted for renal/ HD management. Patient initially noted to be in SR, however, later he converted to atrial fibrillation with some RVR with heart rate up to 120-130s. Dr. Kee was notified and patient given IV Bblockade.  The patient later converted back to SR. He was placed on toprol Xl 50mg po daily.  The patient was noted to have a decrease in his platelets and sq heparin was discontinued. Developed fever with evidence of sepsis overnight, now on broad spectrum antibiotics with thombocytopenia and coagulopathy.    Interval History:   Pt seen/examined-foot doing well   Appetite improved  bp elevated     Review of Systems   Constitutional: Negative for chills and fever.   Respiratory: Negative for cough and shortness of breath.    Cardiovascular: Negative for chest pain.   Gastrointestinal: Negative for abdominal pain, diarrhea, nausea and vomiting.   Musculoskeletal:        Right foot pain      Objective:     Vital Signs (Most Recent):  Temp: 98.4 °F (36.9 °C) (09/02/18 2001)  Pulse: 72 (09/02/18 2001)  Resp: 18 (09/02/18 1714)  BP: (!) 188/90 (09/02/18 2001)  SpO2: 100 % (09/02/18 2001) Vital Signs (24h Range):  Temp:  [98.4 °F (36.9 °C)-99.4 °F (37.4 °C)] 98.4 °F (36.9 °C)  Pulse:  [56-79] 72  Resp:  [18] 18  SpO2:  [93 %-100 %] 100 %  BP: (164-201)/(67-93) 188/90     Weight: 92.9 kg (204 lb 12.9 oz)  Body mass index is 27.78 kg/m².    Intake/Output Summary (Last 24 hours) at 9/2/2018 2118  Last data filed at 9/2/2018 1711  Gross per 24 hour   Intake 970.83 ml   Output 2 ml   Net 968.83 ml      Physical Exam   Constitutional: He is oriented to person, place, and time. He appears well-developed and  well-nourished.   Chronically ill appearing man in no acute distress, appears much better, non toxic   HENT:   Nose: Nose normal.   Mouth/Throat: Oropharynx is clear and moist.   Eyes: Conjunctivae are normal. No scleral icterus.   Cardiovascular: Normal rate and regular rhythm.   No murmur heard.  Pulmonary/Chest: Effort normal and breath sounds normal. He has no wheezes. He has no rales.   Abdominal: Soft. Bowel sounds are normal. He exhibits no distension. There is no tenderness.   Musculoskeletal:   rle with ace wrap dressing intact    Neurological: He is alert and oriented to person, place, and time.   Nursing note and vitals reviewed.      Significant Labs:   BMP:   Recent Labs   Lab  09/02/18   0440   GLU  63*   NA  132*   K  4.3   CL  99   CO2  22*   BUN  55*   CREATININE  4.6*   CALCIUM  8.0*     CBC:   Recent Labs   Lab  09/01/18   0500  09/02/18   0440   WBC  10.10  10.40   HGB  10.1*  10.0*   HCT  30.8*  30.2*   PLT  32*  37*       Significant Imaging: I have reviewed and interpreted all pertinent imaging results/findings within the past 24 hours.    Assessment/Plan:      * Septic shock    Transferred to ICU 8/29 with hypotension  Complicated with hypoglycemia, thrombocytopenia, elevated INR with normal fibrinogen  Blood cultures with pasteurella and proteus.  Source likely his right foot ulcer with abscess that was drained by ID 8/30 with associated cellulitis and thrombophebitis  Much improved now, HD stable.   Transferred from ICU   Appreciate ID assistance  Continue zosyn-  Repeat blood cultures and right foot wound cultures -8/30 proteus from foot and blood (8/29)  Right thigh masses -for CT thigh am -following with ID   MRI foot without evidence of persistent abscess or osteomyelitis            Right foot ulcer    S/p debridement --follow with podiatry   Proteus -from culture 8/30-same as culture from blood   Continue on zosyn  As per ID           Hypoglycemia    Likely from sepsis, liver  dysfunction, decreased PO intake.  Persistent but overall improved, still requiring D10 drip  Insulin and c-peptide level pending to rule out insulinoma   Wean D10 as tolerated          Thrombocytopenia    Likely from sepsis with underlying chronic liver disease.  Plts decreased to 15 8/30 and transfused 1 unit.  Improved   No evidence of bleeding.  Holding lovenox, recommend no heparin with HD.  Monitor closely        Paroxysmal atrial fibrillation with RVR    Management per cardiology.    Stable heart rate at present.  Careful with beta blocker/CCBs in the setting of hypotension          Elevated troponin    Management per cardiology, not felt to be ACS  Suspect demand ischemia and ESRD as the etiology        Chest pain    Chest pain free at present  See elevated troponin for plan          Benign hypertension with ESRD (end-stage renal disease)    Intermittently hypotensive-while in septic shock -now resolved  Hypertension Medications             amlodipine (NORVASC) 5 MG tablet Take 5 mg by mouth 2 (two) times daily.    hydrALAZINE (APRESOLINE) 50 MG tablet Take 50 mg by mouth 3 (three) times daily.    lisinopril (PRINIVIL,ZESTRIL) 40 MG tablet Take 1 tablet (40 mg total) by mouth once daily.    minoxidil (LONITEN) 2.5 MG tablet Take 3 tablets (7.5 mg total) by mouth 2 (two) times daily.                    Methadone dependence    Methadone dose decreased with acute illness.  Continue to monitor closely          Chronic hepatitis C without hepatic coma    With Hx of possible liver cirrhosis  Likely contributing to thrombocytopenia  Ammonia normal  Monitor LFTs            Cirrhosis of liver with ascites    Possible diagnosis though CT abdomen did not note cirrhosis or ascites            Dehydration    Will give NS 500ml IV bolus today          Intractable vomiting with nausea    Likely from sepsis-resolved   Lipase and  Wbcs Wnl, Imaging and exam without evidence of acute abdominal process  Continue  PPI           Chronic back pain    With chronic pain syndrome-  Continue home methadone          Ischemic cardiomyopathy, LVEF 25%-30%    See elevated troponin  No evidence of acute CHF          ESRD (T,Th,Sat) dialysis onset 2013    HD 8/28  Nephrology following for renal/HD management  Electrolytes stable            Coronary artery disease involving native coronary artery of native heart without angina pectoris    With prior coronary stent placement 2016/ Hx ischemic cardiomyopathy-   on ace I   Cardiology following  ON metoprolol.  Hold asa with thrombocytopenia            VTE Risk Mitigation (From admission, onward)        Ordered     IP VTE HIGH RISK PATIENT  Once      08/26/18 1106     Place sequential compression device  Until discontinued      08/26/18 1106     Place ZACHARY hose  Until discontinued      08/26/18 1106          dispo-unclear     Natasha Morris MD  Department of Hospital Medicine   [facility]

## 2018-09-03 NOTE — ASSESSMENT & PLAN NOTE
S/p debridement --follow with podiatry   Proteus -from culture 8/30-same as culture from blood   Continue on zosyn  As per ID

## 2018-09-04 PROBLEM — A04.72 C. DIFFICILE COLITIS: Status: ACTIVE | Noted: 2018-09-04

## 2018-09-04 PROBLEM — E87.1 HYPONATREMIA: Status: ACTIVE | Noted: 2018-09-04

## 2018-09-04 LAB
ALBUMIN SERPL BCP-MCNC: 2 G/DL
ALP SERPL-CCNC: 320 U/L
ALT SERPL W/O P-5'-P-CCNC: 11 U/L
ANION GAP SERPL CALC-SCNC: 14 MMOL/L
AST SERPL-CCNC: 18 U/L
BACTERIA SPEC AEROBE CULT: NORMAL
BASOPHILS # BLD AUTO: 0 K/UL
BASOPHILS NFR BLD: 0.2 %
BILIRUB SERPL-MCNC: 0.9 MG/DL
BUN SERPL-MCNC: 83 MG/DL
CALCIUM SERPL-MCNC: 8 MG/DL
CHLORIDE SERPL-SCNC: 94 MMOL/L
CO2 SERPL-SCNC: 18 MMOL/L
CREAT SERPL-MCNC: 6.8 MG/DL
DIFFERENTIAL METHOD: ABNORMAL
E COLI SXT1 STL QL IA: NEGATIVE
E COLI SXT2 STL QL IA: NEGATIVE
EOSINOPHIL # BLD AUTO: 0.3 K/UL
EOSINOPHIL NFR BLD: 1.9 %
ERYTHROCYTE [DISTWIDTH] IN BLOOD BY AUTOMATED COUNT: 17.9 %
EST. GFR  (AFRICAN AMERICAN): 10 ML/MIN/1.73 M^2
EST. GFR  (NON AFRICAN AMERICAN): 8 ML/MIN/1.73 M^2
GLUCOSE SERPL-MCNC: 74 MG/DL
HCT VFR BLD AUTO: 31.3 %
HGB BLD-MCNC: 10.4 G/DL
INR PPP: 1
LYMPHOCYTES # BLD AUTO: 1.6 K/UL
LYMPHOCYTES NFR BLD: 10.7 %
MCH RBC QN AUTO: 31.6 PG
MCHC RBC AUTO-ENTMCNC: 33.3 G/DL
MCV RBC AUTO: 95 FL
MONOCYTES # BLD AUTO: 0.4 K/UL
MONOCYTES NFR BLD: 2.7 %
NEUTROPHILS # BLD AUTO: 12.6 K/UL
NEUTROPHILS NFR BLD: 84.5 %
PHOSPHATE SERPL-MCNC: 2.9 MG/DL
PLATELET # BLD AUTO: 78 K/UL
PLATELET BLD QL SMEAR: ABNORMAL
PMV BLD AUTO: 9.1 FL
POCT GLUCOSE: 119 MG/DL (ref 70–110)
POCT GLUCOSE: 123 MG/DL (ref 70–110)
POCT GLUCOSE: 136 MG/DL (ref 70–110)
POCT GLUCOSE: 71 MG/DL (ref 70–110)
POTASSIUM SERPL-SCNC: 4.9 MMOL/L
PROT SERPL-MCNC: 6.4 G/DL
PROTHROMBIN TIME: 10.5 SEC
RBC # BLD AUTO: 3.29 M/UL
SODIUM SERPL-SCNC: 126 MMOL/L
T4 FREE SERPL-MCNC: 0.7 NG/DL
TSH SERPL DL<=0.005 MIU/L-ACNC: 6.37 UIU/ML
WBC # BLD AUTO: 14.9 K/UL

## 2018-09-04 PROCEDURE — 84100 ASSAY OF PHOSPHORUS: CPT

## 2018-09-04 PROCEDURE — 85025 COMPLETE CBC W/AUTO DIFF WBC: CPT

## 2018-09-04 PROCEDURE — 97530 THERAPEUTIC ACTIVITIES: CPT

## 2018-09-04 PROCEDURE — 63600175 PHARM REV CODE 636 W HCPCS: Performed by: INTERNAL MEDICINE

## 2018-09-04 PROCEDURE — 84439 ASSAY OF FREE THYROXINE: CPT

## 2018-09-04 PROCEDURE — G8979 MOBILITY GOAL STATUS: HCPCS | Mod: CJ

## 2018-09-04 PROCEDURE — 97162 PT EVAL MOD COMPLEX 30 MIN: CPT

## 2018-09-04 PROCEDURE — 84443 ASSAY THYROID STIM HORMONE: CPT

## 2018-09-04 PROCEDURE — 25000003 PHARM REV CODE 250: Performed by: HOSPITALIST

## 2018-09-04 PROCEDURE — 97110 THERAPEUTIC EXERCISES: CPT

## 2018-09-04 PROCEDURE — 85610 PROTHROMBIN TIME: CPT

## 2018-09-04 PROCEDURE — 25000003 PHARM REV CODE 250: Performed by: EMERGENCY MEDICINE

## 2018-09-04 PROCEDURE — 94799 UNLISTED PULMONARY SVC/PX: CPT

## 2018-09-04 PROCEDURE — 80053 COMPREHEN METABOLIC PANEL: CPT

## 2018-09-04 PROCEDURE — A4216 STERILE WATER/SALINE, 10 ML: HCPCS | Performed by: EMERGENCY MEDICINE

## 2018-09-04 PROCEDURE — 12000002 HC ACUTE/MED SURGE SEMI-PRIVATE ROOM

## 2018-09-04 PROCEDURE — G8978 MOBILITY CURRENT STATUS: HCPCS | Mod: CK

## 2018-09-04 PROCEDURE — 25000003 PHARM REV CODE 250: Performed by: NURSE PRACTITIONER

## 2018-09-04 PROCEDURE — 25000003 PHARM REV CODE 250: Performed by: INTERNAL MEDICINE

## 2018-09-04 PROCEDURE — 63600175 PHARM REV CODE 636 W HCPCS: Performed by: NURSE PRACTITIONER

## 2018-09-04 PROCEDURE — 94761 N-INVAS EAR/PLS OXIMETRY MLT: CPT

## 2018-09-04 PROCEDURE — 36415 COLL VENOUS BLD VENIPUNCTURE: CPT

## 2018-09-04 RX ADMIN — Medication 3 ML: at 06:09

## 2018-09-04 RX ADMIN — HYDROCODONE BITARTRATE AND ACETAMINOPHEN 1 TABLET: 5; 325 TABLET ORAL at 03:09

## 2018-09-04 RX ADMIN — LEVETIRACETAM 500 MG: 500 TABLET ORAL at 08:09

## 2018-09-04 RX ADMIN — ONDANSETRON HYDROCHLORIDE 4 MG: 2 INJECTION, SOLUTION INTRAMUSCULAR; INTRAVENOUS at 04:09

## 2018-09-04 RX ADMIN — LISINOPRIL 40 MG: 40 TABLET ORAL at 12:09

## 2018-09-04 RX ADMIN — Medication 125 MG: at 06:09

## 2018-09-04 RX ADMIN — LEVETIRACETAM 500 MG: 500 TABLET ORAL at 11:09

## 2018-09-04 RX ADMIN — HYDRALAZINE HYDROCHLORIDE 50 MG: 25 TABLET, FILM COATED ORAL at 08:09

## 2018-09-04 RX ADMIN — Medication 125 MG: at 12:09

## 2018-09-04 RX ADMIN — Medication 125 MG: at 11:09

## 2018-09-04 RX ADMIN — PANTOPRAZOLE SODIUM 40 MG: 40 TABLET, DELAYED RELEASE ORAL at 11:09

## 2018-09-04 RX ADMIN — HYDROCODONE BITARTRATE AND ACETAMINOPHEN 1 TABLET: 5; 325 TABLET ORAL at 08:09

## 2018-09-04 RX ADMIN — CEFTRIAXONE 1 G: 1 INJECTION, SOLUTION INTRAVENOUS at 03:09

## 2018-09-04 RX ADMIN — Medication 3 ML: at 09:09

## 2018-09-04 RX ADMIN — ONDANSETRON HYDROCHLORIDE 4 MG: 2 INJECTION, SOLUTION INTRAMUSCULAR; INTRAVENOUS at 08:09

## 2018-09-04 RX ADMIN — PANTOPRAZOLE SODIUM 40 MG: 40 TABLET, DELAYED RELEASE ORAL at 08:09

## 2018-09-04 RX ADMIN — METHADONE HYDROCHLORIDE 40 MG: 10 TABLET ORAL at 11:09

## 2018-09-04 RX ADMIN — HYDRALAZINE HYDROCHLORIDE 50 MG: 25 TABLET, FILM COATED ORAL at 03:09

## 2018-09-04 RX ADMIN — Medication 3 ML: at 01:09

## 2018-09-04 RX ADMIN — COLLAGENASE SANTYL: 250 OINTMENT TOPICAL at 11:09

## 2018-09-04 RX ADMIN — AMLODIPINE BESYLATE 5 MG: 5 TABLET ORAL at 01:09

## 2018-09-04 NOTE — SUBJECTIVE & OBJECTIVE
Interval History:   Pt seen/examined on HD -feeling better -less leg pain -still requesting higher dose of methadone but did not get increased dose prior to HD   Leg swelling improved-overall feels weak and Na 126 this am       Review of Systems   Constitutional: Negative for chills and fever.   Respiratory: Negative for cough and shortness of breath.    Cardiovascular: Negative for chest pain.   Gastrointestinal: Negative for abdominal pain, diarrhea, nausea and vomiting.   Musculoskeletal: Positive for arthralgias, back pain and gait problem.        Right foot pain   Right leg pain --thigh/calf -areas of erythema/   Skin: Positive for color change and wound.     Objective:     Vital Signs (Most Recent):  Temp: 98.4 °F (36.9 °C) (09/04/18 1547)  Pulse: 70 (09/04/18 1547)  Resp: 20 (09/04/18 1547)  BP: (!) 155/79 (09/04/18 1547)  SpO2: 96 % (09/04/18 1620) Vital Signs (24h Range):  Temp:  [96.8 °F (36 °C)-98.5 °F (36.9 °C)] 98.4 °F (36.9 °C)  Pulse:  [67-92] 70  Resp:  [14-20] 20  SpO2:  [94 %-97 %] 96 %  BP: (107-175)/(70-85) 155/79     Weight: 92.9 kg (204 lb 12.9 oz)  Body mass index is 27.78 kg/m².    Intake/Output Summary (Last 24 hours) at 9/4/2018 1817  Last data filed at 9/4/2018 1220  Gross per 24 hour   Intake 500 ml   Output 2500 ml   Net -2000 ml      Physical Exam   Constitutional: He is oriented to person, place, and time. He appears well-developed and well-nourished.   Chronically ill appearing man in no acute distress, appears much better, non toxic   HENT:   Nose: Nose normal.   Mouth/Throat: Oropharynx is clear and moist.   Eyes: Conjunctivae are normal. No scleral icterus.   Cardiovascular: Normal rate and regular rhythm.   No murmur heard.  Pulmonary/Chest: Effort normal and breath sounds normal. He has no wheezes. He has no rales.   Abdominal: Soft. Bowel sounds are normal. He exhibits no distension. There is no tenderness.   Musculoskeletal:   rle with ace wrap dressing intact incision plantar  prox to right 2nd toe -wound clean; no packing; slightly red around incision   erthema right inner thigh and right inner mid calf    Neurological: He is alert and oriented to person, place, and time.   Nursing note and vitals reviewed.      Significant Labs: All pertinent labs within the past 24 hours have been reviewed.    Significant Imaging: I have reviewed and interpreted all pertinent imaging results/findings within the past 24 hours.

## 2018-09-04 NOTE — PLAN OF CARE
09/04/18 1620   PRE-TX-O2-ETCO2   O2 Device (Oxygen Therapy) room air   SpO2 96 %   Pulse Oximetry Type Intermittent   $ Pulse Oximetry - Multiple Charge Pulse Oximetry - Multiple   Incentive Spirometer   $ Incentive Spirometer Charges done with encouragement   Administration (Incentive Spirometer) done with encouragement   Number of Repetitions (Incentive Spirometer) 10   Level (mL) (Incentive Spirometer) 2000   Patient Tolerance good

## 2018-09-04 NOTE — PROGRESS NOTES
[facility] Hospital Medicine  Progress Note    Patient Name: João Aguirre  MRN: [id number]  Patient Class: IP- Inpatient   Admission Date: 8/26/2018  Length of Stay: 9 days  Attending Physician: Natasha Morris MD  Primary Care Provider: Primary Doctor No        Subjective:     Principal Problem:Septic shock    HPI:  This is a 53-year-old male with a past medical history of end-stage renal disease on hemodialysis Tuesday, Thursday, and  Saturday, diabetes, chronic back pain on methadone, CAD with prior coronary stent placement 2 years ago, hypertension, prior pneumonia, prior stroke, and recurrent episodes of mid epigastric pain and vomiting but no formal diagnosis of gastroparesis who presents to emergency department for evaluation of epigastric, abdominal pain, and CP with inability to tolerate liquids or food for the past few days.  Patient was seen here in the emergency department yesterday where he underwent basic labs and was given antiemetics.  He was discharged home with a prescription for Reglan and Benadryl which she has not been able to fill secondary to being home vomiting. He presents today via EMS because the symptoms have not improved and are worsening.  He did not get dialysis yesterday.  He denies any shortness of breath but does have an exacerbation of his chronic back pain which is thought to be secondary to him vomiting. The patient is unable hold down his chronic pain medicine which includes methadone.  Patient does not have a medication list with him at this time.    Patient found to have elevated troponin of 0.763 and reports he has Hx of CAD with prior coronary stent placement 2 years ago but has not been following up with cardiology. He does report some left sided chest pain and occasionally diaphoresis along with his nausea and vomiting. His EKG currently with no significant St elevation or depression. He does have Hx ESRD requiring HD with prior elevated  troponins in past of 0.16-0.17. Patient discussed with Dr. Law. Will consult cardiology and trend troponins due to prior cardiac Hx and continue monitoring GI status as well.          Hospital Course:  He was noted to have an elevated troponin level and Cardiology was also consulted. Nephrology was consulted for renal/ HD management. Patient initially noted to be in SR, however, later he converted to atrial fibrillation with some RVR with heart rate up to 120-130s. Dr. Kee was notified and patient given IV Bblockade.  The patient later converted back to SR. He was placed on toprol Xl 50mg po daily.  The patient was noted to have a decrease in his platelets and sq heparin was discontinued. Developed fever with evidence of sepsis overnight, now on broad spectrum antibiotics with thombocytopenia and coagulopathy.    Interval History:   Pt seen/examined on HD -feeling better -less leg pain -still requesting higher dose of methadone but did not get increased dose prior to HD   Leg swelling improved-overall feels weak and Na 126 this am       Review of Systems   Constitutional: Negative for chills and fever.   Respiratory: Negative for cough and shortness of breath.    Cardiovascular: Negative for chest pain.   Gastrointestinal: Negative for abdominal pain, diarrhea, nausea and vomiting.   Musculoskeletal: Positive for arthralgias, back pain and gait problem.        Right foot pain   Right leg pain --thigh/calf -areas of erythema/   Skin: Positive for color change and wound.     Objective:     Vital Signs (Most Recent):  Temp: 98.4 °F (36.9 °C) (09/04/18 1547)  Pulse: 70 (09/04/18 1547)  Resp: 20 (09/04/18 1547)  BP: (!) 155/79 (09/04/18 1547)  SpO2: 96 % (09/04/18 1620) Vital Signs (24h Range):  Temp:  [96.8 °F (36 °C)-98.5 °F (36.9 °C)] 98.4 °F (36.9 °C)  Pulse:  [67-92] 70  Resp:  [14-20] 20  SpO2:  [94 %-97 %] 96 %  BP: (107-175)/(70-85) 155/79     Weight: 92.9 kg (204 lb 12.9 oz)  Body mass index is 27.78  kg/m².    Intake/Output Summary (Last 24 hours) at 9/4/2018 1817  Last data filed at 9/4/2018 1220  Gross per 24 hour   Intake 500 ml   Output 2500 ml   Net -2000 ml      Physical Exam   Constitutional: He is oriented to person, place, and time. He appears well-developed and well-nourished.   Chronically ill appearing man in no acute distress, appears much better, non toxic   HENT:   Nose: Nose normal.   Mouth/Throat: Oropharynx is clear and moist.   Eyes: Conjunctivae are normal. No scleral icterus.   Cardiovascular: Normal rate and regular rhythm.   No murmur heard.  Pulmonary/Chest: Effort normal and breath sounds normal. He has no wheezes. He has no rales.   Abdominal: Soft. Bowel sounds are normal. He exhibits no distension. There is no tenderness.   Musculoskeletal:   rle with ace wrap dressing intact incision plantar prox to right 2nd toe -wound clean; no packing; slightly red around incision   erthema right inner thigh and right inner mid calf    Neurological: He is alert and oriented to person, place, and time.   Nursing note and vitals reviewed.      Significant Labs: All pertinent labs within the past 24 hours have been reviewed.    Significant Imaging: I have reviewed and interpreted all pertinent imaging results/findings within the past 24 hours.    Assessment/Plan:      * Septic shock    Transferred to ICU 8/29 with hypotension  Complicated with hypoglycemia, thrombocytopenia, elevated INR with normal fibrinogen  Blood cultures with pasteurella and proteus.  Source likely his right foot ulcer with abscess that was drained by ID 8/30 with associated cellulitis and thrombophebitis  Much improved now, HD stable.   Transferred from ICU   Appreciate ID assistance  Continue zosyn-  Repeat blood cultures and right foot wound cultures -8/30 proteus from foot and blood (8/29)  Right thigh masses -for CT thigh am -following with ID   MRI foot without evidence of persistent abscess or osteomyelitis            C.  difficile colitis      Noted positive PCR today -has been on isolation and po vancomycin -will continue on dc as per ID recommendations.         Hyponatremia    Chronic-has trended down since admit-pt has esrd-on HD today ; has been getting D10 -decreased yesterday -will dc today ; check TSH-likely due to free water, -continue to trend           Right foot ulcer    S/p debridement --follow with podiatry   Proteus -from culture 8/30-same as culture from blood   Continue on zosyn  As per ID -transition to po abx soon  -discussed with Dr Lenz  Arrange outpt wound care -discussed with CM -          Hypoglycemia    Likely from sepsis, liver dysfunction, decreased PO intake.  Persistent but overall improved, still requiring D10 drip-until today --will dc   Insulin and c-peptide level pending to rule out insulinoma   Wean D10 as tolerated          Thrombocytopenia    Likely from sepsis with underlying chronic liver disease.  Plts decreased to 15 8/30 and transfused 1 unit.  Improved   No evidence of bleeding.  Holding lovenox, recommend no heparin with HD.  Monitor closely        Paroxysmal atrial fibrillation with RVR    Management per cardiology.    Stable heart rate at present.  Careful with beta blocker/CCBs in the setting of hypotension          Elevated troponin    Management per cardiology, not felt to be ACS  Suspect demand ischemia and ESRD as the etiology        Chest pain    Chest pain free at present  See elevated troponin for plan          Benign hypertension with ESRD (end-stage renal disease)    Intermittently hypotensive-while in septic shock -now resolved  Hypertension Medications             amlodipine (NORVASC) 5 MG tablet Take 5 mg by mouth 2 (two) times daily.    hydrALAZINE (APRESOLINE) 50 MG tablet Take 50 mg by mouth 3 (three) times daily.    lisinopril (PRINIVIL,ZESTRIL) 40 MG tablet Take 1 tablet (40 mg total) by mouth once daily.    minoxidil (LONITEN) 2.5 MG tablet Take 3 tablets (7.5 mg  total) by mouth 2 (two) times daily.                    Methadone dependence    Methadone dose decreased with acute illness-now that improved and no hypotension.  Continue to monitor closely-extra dose today and increase dose in am for pain control           Chronic hepatitis C without hepatic coma    With Hx of possible liver cirrhosis  Likely contributing to thrombocytopenia  Ammonia normal  Monitor LFTs            Cirrhosis of liver with ascites    Possible diagnosis though CT abdomen did not note cirrhosis or ascites            Dehydration    Will give NS 500ml IV bolus today          Intractable vomiting with nausea    Likely from sepsis-resolved   Lipase and  Wbcs Wnl, Imaging and exam without evidence of acute abdominal process  Continue  PPI          Chronic back pain    With chronic pain syndrome-  Continue home methadone          Ischemic cardiomyopathy, LVEF 25%-30%    See elevated troponin  No evidence of acute CHF          ESRD (T,Th,Sat) dialysis onset 2013    HD 8/28  Nephrology following for renal/HD management  Electrolytes stable            Coronary artery disease involving native coronary artery of native heart without angina pectoris    With prior coronary stent placement 2016/ Hx ischemic cardiomyopathy-   on ace I   Cardiology following  ON metoprolol.  Hold asa with thrombocytopenia            VTE Risk Mitigation (From admission, onward)        Ordered     IP VTE HIGH RISK PATIENT  Once      08/26/18 1106     Place sequential compression device  Until discontinued      08/26/18 1106     Place ZACHARY hose  Until discontinued      08/26/18 1106              Natasha Morris MD  Department of Hospital Medicine   [facility]

## 2018-09-04 NOTE — PHYSICIAN QUERY
PT Name: João Aguirre  MR #: [id number]     Physician Query Form - Documentation Clarification      Rufina Motley RN, CCDS  Contact Info: [phone]  [email or web address]      This form is a permanent document in the medical record.     Query Date: September 4, 2018    By submitting this query, we are merely seeking further clarification of documentation. Please utilize your independent clinical judgment when addressing the question(s) below.    The Medical record reflects the following:    Supporting Clinical Findings Location in Medical Record   Phos 1.9, discontinued binder until phos comes back up.     Potassium Phosphate 30 mmol IV in 500 cc Infusion over 6 hours on 8/29 x1    CMP daily  Phosphorous daily   Renal PN 9/4      MAR        Orders   Phosphorous:   On 8/29: 1.3  On 8/30: 3.4  On 9/2: 2.4  On 9/3: 1.9  On 9/4: 2.9   Labs                                                                          Doctor, Please specify diagnosis or diagnoses associated with above clinical findings.    Provider Use Only    (  x) Hypophosphorous    (  ) Other electrolyte diagnosis - specify: ________________    (  ) Other - specify: ______________________                                                                                                                 [  ] Clinically undetermined

## 2018-09-04 NOTE — PT/OT/SLP EVAL
Physical Therapy Evaluation    Patient Name:  João Aguirre   MRN:  [id number]    Recommendations:     Discharge Recommendations:  home health PT   Discharge Equipment Recommendations: wheelchair   Barriers to discharge: None    Assessment:     João Aguirre is a 53 y.o. male admitted with a medical diagnosis of Septic shock.  He presents with the following impairments/functional limitations:  weakness, impaired endurance, gait instability, impaired functional mobilty, impaired self care skills, impaired balance, decreased ROM, pain, decreased lower extremity function, decreased safety awareness . Presents with poor vision, weight loss over 100 lbs, chronically ill. Pt with R foot wound s/p debridement 09-. Pt allowed minimal ambulation only. Pt to benefit from wheelchair upon discharge.    Rehab Prognosis:  fair; patient would benefit from acute skilled PT services to address these deficits and reach maximum level of function.      Recent Surgery: * No surgery found *      Plan:     During this hospitalization, patient to be seen 6 x/week to address the above listed problems via gait training, therapeutic activities, therapeutic exercises  · Plan of Care Expires:  09/28/18   Plan of Care Reviewed with: patient, spouse, son    Subjective     Communicated with nurse bowen prior to session.  Patient found supine upon PT entry to room, agreeable to evaluation.    Pt stated of weakness and hesitant to stand or ambulate  Pt had dialysis in am  Spouse and son at bedside assisting with care. Pt wanting to don sweat pants and assisted by spouse    Chief Complaint: weakness, R lower leg pain/tenderness  Patient comments/goals: get well  Pain/Comfort:  · Pain Rating 1: 5/10  · Location - Side 1: Right  · Location 1: leg  · Pain Addressed 1: Reposition, Distraction    Patients cultural, spiritual, Restorationism conflicts given the current situation:      Living Environment:  Home with family  Prior to  admission, patients level of function was assist for transfers.  Patient has the following equipment: none.  DME owned (not currently used): .  Upon discharge, patient will have assistance from family.    Objective:     Patient found with: central line, telemetry     General Precautions: Standard, blind, fall, special contact( c diff)   Orthopedic Precautions:RLE weight bearing as tolerated(po shoe- ambulate minimally)   Braces: N/A     Exams:  · Postural Exam:  Patient presented with the following abnormalities:    · -       Rounded shoulders  · -       Forward head  · -       Kyphosis  · RLE ROM: Deficits: decreased especially terminal knee extension  · RLE Strength: Deficits: 3/5  · LLE ROM: WFL  · LLE Strength: WFL    Functional Mobility:  · Bed Mobility:     · Scooting: minimum assistance  · Supine to Sit: minimum assistance  · Sit to Supine: minimum assistance  · Transfers:     · Sit to Stand:  minimum assistance and moderate assistance with rolling walker  · Gait: 3 small steps to head of bed with po shoe R foot    AM-PAC 6 CLICK MOBILITY  Total Score:15       Therapeutic Activities and Exercises:   EOB sitting, completed LE's thera ex x 10 reps with frequent cueing for task  Back to bed post PT and repositioned    Patient left HOB elevated with all lines intact, call button in reach and family present.    GOALS:   Multidisciplinary Problems     Physical Therapy Goals        Problem: Physical Therapy Goal    Goal Priority Disciplines Outcome Goal Variances Interventions   Physical Therapy Goal     PT, PT/OT Ongoing (interventions implemented as appropriate)     Description:  Goals to be met by: 2018     Patient will increase functional independence with mobility by performin. Supine to sit with MInimal Assistance  2. Sit to stand transfer with Minimal Assistance  3. Bed to chair transfer with Minimal Assistance using Rolling Walker  4. Gait  x 150 feet with Minimal Assistance using Rolling  Walker.   5. Lower extremity exercise program x20 reps per handout, with assistance as needed                      History:     Past Medical History:   Diagnosis Date    Anticoagulant long-term use     Arthritis     Asthma     Back pain     Coronary artery disease 2013    stent    Diabetes mellitus     Encounter for blood transfusion     Eye abnormality right eye    injured as a child    Gastritis     Gastroparesis     Hemodialysis patient     Hypertension     Pneumonia 2013    Spend 6weeks in hospital at [place]    Renal disorder     Stroke        Past Surgical History:   Procedure Laterality Date    AV FISTULA PLACEMENT      BACK SURGERY      CARDIAC SURGERY  2013    heart stent    CHOLECYSTECTOMY      EYE SURGERY      R eye       Clinical Decision Making:     History  Co-morbidities and personal factors that may impact the plan of care Examination  Body Structures and Functions, activity limitations and participation restrictions that may impact the plan of care Clinical Presentation   Decision Making/ Complexity Score   Co-morbidities:   [] Time since onset of injury / illness / exacerbation  [] Status of current condition  []Patient's cognitive status and safety concerns    [] Multiple Medical Problems (see med hx)  Personal Factors:   [] Patient's age  [] Prior Level of function   [] Patient's home situation (environment and family support)  [] Patient's level of motivation  [] Expected progression of patient      HISTORY:(criteria)    [] 54546 - no personal factors/history    [] 54001 - has 1-2 personal factor/comorbidity     [] 18083 - has >3 personal factor/comorbidity     Body Regions:  [] Objective examination findings  [] Head     []  Neck  [] Trunk   [] Upper Extremity  [] Lower Extremity    Body Systems:  [] For communication ability, affect, cognition, language, and learning style: the assessment of the ability to make needs known, consciousness, orientation (person, place, and  time), expected emotional /behavioral responses, and learning preferences (eg, learning barriers, education  needs)  [] For the neuromuscular system: a general assessment of gross coordinated movement (eg, balance, gait, locomotion, transfers, and transitions) and motor function  (motor control and motor learning)  [] For the musculoskeletal system: the assessment of gross symmetry, gross range of motion, gross strength, height, and weight  [] For the integumentary system: the assessment of pliability(texture), presence of scar formation, skin color, and skin integrity  [] For cardiovascular/pulmonary system: the assessment of heart rate, respiratory rate, blood pressure, and edema     Activity limitations:    [] Patient's cognitive status and saf ety concerns          [] Status of current condition      [] Weight bearing restriction  [] Cardiopulmunary Restriction    Participation Restrictions:   [] Goals and goal agreement with the patient     [] Rehab potential (prognosis) and probable outcome      Examination of Body System: (criteria)    [] 25239 - addressing 1-2 elements    [] 24068 - addressing a total of 3 or more elements     [] 69928 -  Addressing a total of 4 or more elements         Clinical Presentation: (criteria)  Choose one     On examination of body system using standardized tests and measures patient presents with (CHOOSE ONE) elements from any of the following: body structures and functions, activity limitations, and/or participation restrictions.  Leading to a clinical presentation that is considered (CHOOSE ONE)                              Clinical Decision Making  (Eval Complexity):  Choose One     Time Tracking:     PT Received On: 09/04/18  PT Start Time: 1316     PT Stop Time: 1340  PT Total Time (min): 24 min     Billable Minutes: Evaluation 8, Therapeutic Activity 8 and Therapeutic Exercise 8      Eboni Hua, EDNA  09/04/2018

## 2018-09-04 NOTE — ASSESSMENT & PLAN NOTE
Noted positive PCR today -has been on isolation and po vancomycin -will continue on dc as per ID recommendations.

## 2018-09-04 NOTE — ASSESSMENT & PLAN NOTE
Methadone dose decreased with acute illness-now that improved and no hypotension.  Continue to monitor closely-extra dose today and increase dose in am for pain control

## 2018-09-04 NOTE — PROGRESS NOTES
INPATIENT NEPHROLOGY PROGRESS NOTE  [facility] NEPHROLOGY     João Aguirre  09/04/2018    Reason for consultation:         esrd    Chief Complaint:   Chief Complaint   Patient presents with    Abdominal Pain     with N/V; missed dialysis Saturday        History of Present Illness:          53M with a ESRD on HD TTS, DM2, Hep C cirrhosis, chronic back pain on methadone, long-term anticoagulant use HTN, prior stroke, and recurrent episodes of mid epigastric pain and vomiting but no formal diagnosis of gastroparesis admitted with sepsis due to Proteus/Pasturella infection in right leg. May also have c.diff, PCR pending.        8/27  Seen on dialysis.  No nausea currently.  No chest pain or sob  8/28  Seen on dialysis.  Sleeping  8/29  Sleeping  8/30  Transferred to ICU.  No n,v,chest pain, sob  9/1  HD TTS.  Seen today on dialysis.  Platelets low, holding heparin w/HD.  Denies pain.  9/2  2L UF yesterday, tolerated well.  Pressures high, resumed lisinopril and amlodipine today.  Platelets 37, heparin on hold.  States having abd cramping today.  9/3  Persistent hypoglycemia during the night, has developed diarrhea.  Na+ low, on D10 gtt for now.  Phos 1.9, discontinued binder until phos comes back up.  BP still high, but a little better than yesterday.  No fevers.  Stool for Cdiff collected and results pending.  Denies pain and SOB, no edema.     9/4 VSS, seen and examined on HD, tolerating well. RLE cellulitis still looks pretty angry.      Plan of Care:     Assessment:     ESRD on HD  Hyponatremia - difficult to correct with HD - please limit free water.  Hypertension  Hypoglycemia  Anemia  RLE cellulitis, lymphangitis, thrombophlebitis 2/2 Proteus/Pasturella  C. Diff colitis?    Plan:     Continue HD TTS  Renal diet as tolerated. Limit free water to 1.5L per day. Getting about 500 cc 10% glucose IV per day.  Binder if Phos is high.  ERNESTO with HD PRN.  Appreciate ID input.    Thank you for allowing us to  participate in this patient's care. We will continue to follow.    Medications:  Scheduled Meds:   amLODIPine  5 mg Oral Daily    cefTRIAXone (ROCEPHIN) IVPB  1 g Intravenous Q24H    collagenase   Topical (Top) Daily    hydrALAZINE  50 mg Oral TID    levETIRAcetam  500 mg Oral BID    lisinopril  40 mg Oral Daily    methadone  40 mg Oral Daily    pantoprazole  40 mg Oral BID    sodium chloride 0.9%  3 mL Intravenous Q8H    vancomycin  125 mg Oral Q6H     Continuous Infusions:   dextrose 10 % in water (D10W) 25 mL/hr at 09/03/18 2124     PRN Meds:.sodium chloride, sodium chloride, sodium chloride 0.9%, sodium chloride 0.9%, acetaminophen, dextrose 50%, dextrose 50%, dicyclomine, glucose, glucose, hydrALAZINE, HYDROcodone-acetaminophen, magnesium sulfate IVPB, metoclopramide HCl, ondansetron, sodium chloride 0.9%    Allergies:  Antibiotic hc    Vital Signs:  Temp Readings from Last 3 Encounters:   09/04/18 98 °F (36.7 °C) (Oral)   08/25/18 97.8 °F (36.6 °C) (Oral)   10/19/17 97.6 °F (36.4 °C)       Pulse Readings from Last 3 Encounters:   09/04/18 67   08/25/18 75   10/19/17 (!) 59       BP Readings from Last 3 Encounters:   09/04/18 (!) 175/81   08/25/18 133/71   10/19/17 (!) 166/81       Weight:  Wt Readings from Last 3 Encounters:   08/30/18 92.9 kg (204 lb 12.9 oz)   08/25/18 94.8 kg (209 lb)   10/17/17 94.8 kg (209 lb 0 oz)       Physical Exam:    Constitutional: NAD  Neuro: No asterixis  Psych: Calm  EENT: NCAT, anicteric sclera   Neck:  supple  Cards: No rub  Lungs: clear to ausculation  GI:  Tender to palpation  MSK:  WNWD  Skin: no purpura, rashes       Results:  Lab Results   Component Value Date     (L) 09/04/2018    K 4.9 09/04/2018    CL 94 (L) 09/04/2018    CO2 18 (L) 09/04/2018    BUN 83 (H) 09/04/2018    CREATININE 6.8 (H) 09/04/2018    CALCIUM 8.0 (L) 09/04/2018    ANIONGAP 14 09/04/2018    ESTGFRAFRICA 10 (A) 09/04/2018    EGFRNONAA 8 (A) 09/04/2018       Lab Results   Component Value  Date    CALCIUM 8.0 (L) 09/04/2018    PHOS 2.9 09/04/2018       Recent Labs   Lab  09/04/18   0518   WBC  14.90*   RBC  3.29*   HGB  10.4*   HCT  31.3*   PLT  78*   MCV  95   MCH  31.6*   MCHC  33.3       I have personally reviewed pertinent radiological imaging and reports.

## 2018-09-04 NOTE — PLAN OF CARE
Problem: Patient Care Overview  Goal: Plan of Care Review  Outcome: Ongoing (interventions implemented as appropriate)  Pt resting quietly with pain to right leg. Heat compresses applied throughout shift. No signs of distress. Pt free from falls during shift. Will continue to monitor.

## 2018-09-04 NOTE — PROGRESS NOTES
"Progress Note  Infectious Disease    Follow-up For:  Cellulitis, bacteremia    SUBJECTIVE:   ROS:  Feels that compresses have helped but no change clinically. Still super tender. No palpable abscesses, ?hematomata in tissue from low platelets. No abscess on Ct. He had 2 loose stools overnight, CDag pos, toxin neg, pcr pending. No abd pain or nausea. Eating fairly well. Does not feel ready to go home.    9/4: Cdiff pcr positive. He reports that the diarrhea has "checked". Eating multiple small meals.     Antibiotics (From admission, onward)    Start     Stop Route Frequency Ordered    09/03/18 1800  vancomycin 250mg / 10ml oral suspension 125 mg      -- Oral Every 6 hours 09/03/18 1335    09/03/18 1515  cefTRIAXone (ROCEPHIN) 1 g in dextrose 5 % 50 mL IVPB      09/05 2359 IV Every 24 hours (non-standard times) 09/03/18 1409          Pertinent Medications noted:    EXAM & DIAGNOSTICS REVIEWED:   Vitals:     Temp:  [96.8 °F (36 °C)-98.5 °F (36.9 °C)]   Temp: 98.4 °F (36.9 °C) (09/04/18 1547)  Pulse: 70 (09/04/18 1547)  Resp: 20 (09/04/18 1547)  BP: (!) 155/79 (09/04/18 1547)  SpO2: (!) 94 % (09/04/18 1547)    Intake/Output Summary (Last 24 hours) at 9/4/2018 1643  Last data filed at 9/4/2018 1220  Gross per 24 hour   Intake 500 ml   Output 2500 ml   Net -2000 ml       General:  In NAD. Looks more comfortable  Eyes:  Anicteric, PERRL, EOMI  ENT:  Mouth w/ pink MMM, no lesions/exudate,   poor  dentition  Neck:    Lungs:    Heart:    Abd:   soft and non tender,   :  Voids  Musc:  Joints without effusion, erythema, synovitis,   Skin:  Generally warm, dry, normal for color. No rashes  Wound: Right plantar ulceration uch  now that the callous has been debrided, no purulence today.   Neuro: AAOx3, speech clear, moves all extrems equally  Extrem: No edema, and there is resolved cellulitis of the foot and right medial leg. Still has tender ecchymotic nodules,  Tender masses with bruising medial thigh and medial ankle. " The distal one may soften and drain old blood.. Not suppurative. No change from yesterday    VAD:   peripheral    Lines/Tubes/Drains:    General Labs reviewed:  Recent Labs   Lab  09/04/18 0518   WBC  14.90*   RBC  3.29*   HGB  10.4*   HCT  31.3*   PLT  78*   MCV  95   MCH  31.6*   MCHC  33.3     Recent Labs   Lab  09/04/18 0518   CALCIUM  8.0*   PROT  6.4   NA  126*   K  4.9   CO2  18*   CL  94*   BUN  83*   CREATININE  6.8*   ALKPHOS  320*   ALT  11   AST  18   BILITOT  0.9       Micro: reviewed    Imaging Reviewed: MRI of forefoot, no osteomyelitis  Reviewed C tof thigh and n o abscesses evident    ASSESSMENT & PLAN      1.         Sepsis, with Proteus and Pasteurella, right leg cellulitis, lymphangitis, ?superficial thrombophlebitis with small but tender ecchymoses  2.         Right foot callous(longstanding) with abscess beneath the callous, draining toward the base of the right 3/4 interspace  3.         ESRD on dialysis  4.         Severe hypoglycemia, life threatening  5.         Cirrhosis, with hepatitis C  6.         Severe thrombocytopenia likely due to sepsis  7.         Nausea and vomiting, suspected gastroparesis, history of diabetes in the past  8. Diarrhea, Cdiff antigen pos, toxin neg, pcr positive       Recommendation:   Will de-escalate to rocephin stop tomorrow, continue wound care  Continue oral vanc x 14d  Continue  warm compresses    Home soon? Tomorrow?

## 2018-09-04 NOTE — ASSESSMENT & PLAN NOTE
Chronic-has trended down since admit-pt has esrd-on HD today ; has been getting D10 -decreased yesterday -will dc today ; check TSH-likely due to free water, -continue to trend

## 2018-09-04 NOTE — PROGRESS NOTES
[facility] Hospital Medicine  Progress Note    Patient Name: João Aguirre  MRN: [id number]  Patient Class: IP- Inpatient   Admission Date: 8/26/2018  Length of Stay: 9 days  Attending Physician: Natasha Morris MD  Primary Care Provider: Primary Doctor No        Subjective:     Principal Problem:Septic shock    HPI:  This is a 53-year-old male with a past medical history of end-stage renal disease on hemodialysis Tuesday, Thursday, and  Saturday, diabetes, chronic back pain on methadone, CAD with prior coronary stent placement 2 years ago, hypertension, prior pneumonia, prior stroke, and recurrent episodes of mid epigastric pain and vomiting but no formal diagnosis of gastroparesis who presents to emergency department for evaluation of epigastric, abdominal pain, and CP with inability to tolerate liquids or food for the past few days.  Patient was seen here in the emergency department yesterday where he underwent basic labs and was given antiemetics.  He was discharged home with a prescription for Reglan and Benadryl which she has not been able to fill secondary to being home vomiting. He presents today via EMS because the symptoms have not improved and are worsening.  He did not get dialysis yesterday.  He denies any shortness of breath but does have an exacerbation of his chronic back pain which is thought to be secondary to him vomiting. The patient is unable hold down his chronic pain medicine which includes methadone.  Patient does not have a medication list with him at this time.    Patient found to have elevated troponin of 0.763 and reports he has Hx of CAD with prior coronary stent placement 2 years ago but has not been following up with cardiology. He does report some left sided chest pain and occasionally diaphoresis along with his nausea and vomiting. His EKG currently with no significant St elevation or depression. He does have Hx ESRD requiring HD with prior elevated  troponins in past of 0.16-0.17. Patient discussed with Dr. Law. Will consult cardiology and trend troponins due to prior cardiac Hx and continue monitoring GI status as well.          Hospital Course:  He was noted to have an elevated troponin level and Cardiology was also consulted. Nephrology was consulted for renal/ HD management. Patient initially noted to be in SR, however, later he converted to atrial fibrillation with some RVR with heart rate up to 120-130s. Dr. Kee was notified and patient given IV Bblockade.  The patient later converted back to SR. He was placed on toprol Xl 50mg po daily.  The patient was noted to have a decrease in his platelets and sq heparin was discontinued. Developed fever with evidence of sepsis overnight, now on broad spectrum antibiotics with thombocytopenia and coagulopathy.    Interval History:   Pt seen/examined-reports leg/foot getting better -still has a lot of pain -usually takes a lot more methadone      Review of Systems   Constitutional: Negative for chills and fever.   Respiratory: Negative for cough and shortness of breath.    Cardiovascular: Negative for chest pain.   Gastrointestinal: Negative for abdominal pain, diarrhea, nausea and vomiting.   Musculoskeletal: Positive for arthralgias, back pain and gait problem.        Right foot pain      Objective:     Vital Signs (Most Recent):  Temp: 97.9 °F (36.6 °C) (09/03/18 0811)  Pulse: 69 (09/03/18 1139)  Resp: 16 (09/03/18 1139)  BP: (!) 165/82 (09/03/18 0811)  SpO2: 98 % (09/03/18 1139) Vital Signs (24h Range):  Temp:  [97.8 °F (36.6 °C)-98.4 °F (36.9 °C)] 97.9 °F (36.6 °C)  Pulse:  [67-77] 69  Resp:  [16-18] 16  SpO2:  [94 %-100 %] 98 %  BP: (164-201)/(79-91) 165/82     Weight: 92.9 kg (204 lb 12.9 oz)  Body mass index is 27.78 kg/m².    Intake/Output Summary (Last 24 hours) at 9/3/2018 8183  Last data filed at 9/3/2018 1510  Gross per 24 hour   Intake 1275.83 ml   Output --   Net 1275.83 ml      Physical Exam    Constitutional: He is oriented to person, place, and time. He appears well-developed and well-nourished.   Chronically ill appearing man in no acute distress, appears much better, non toxic   HENT:   Nose: Nose normal.   Mouth/Throat: Oropharynx is clear and moist.   Eyes: Conjunctivae are normal. No scleral icterus.   Cardiovascular: Normal rate and regular rhythm.   No murmur heard.  Pulmonary/Chest: Effort normal and breath sounds normal. He has no wheezes. He has no rales.   Abdominal: Soft. Bowel sounds are normal. He exhibits no distension. There is no tenderness.   Musculoskeletal:   rle with ace wrap dressing intact incision plantar prox to right 2nd toe -wound clean; no packing; slightly red around incision    Neurological: He is alert and oriented to person, place, and time.   Nursing note and vitals reviewed.      Significant Labs: All pertinent labs within the past 24 hours have been reviewed.    Significant Imaging: I have reviewed and interpreted all pertinent imaging results/findings within the past 24 hours.    Assessment/Plan:      * Septic shock    Transferred to ICU 8/29 with hypotension  Complicated with hypoglycemia, thrombocytopenia, elevated INR with normal fibrinogen  Blood cultures with pasteurella and proteus.  Source likely his right foot ulcer with abscess that was drained by ID 8/30 with associated cellulitis and thrombophebitis  Much improved now, HD stable.   Transferred from ICU   Appreciate ID assistance  Continue zosyn-  Repeat blood cultures and right foot wound cultures -8/30 proteus from foot and blood (8/29)  Right thigh masses -for CT thigh am -following with ID   MRI foot without evidence of persistent abscess or osteomyelitis            Right foot ulcer    S/p debridement --follow with podiatry   Proteus -from culture 8/30-same as culture from blood   Continue on zosyn  As per ID -transition to po abx in am -discussed with Dr Lenz  Arrange outpt wound care -discussed  with CM -          Hypoglycemia    Likely from sepsis, liver dysfunction, decreased PO intake.  Persistent but overall improved, still requiring D10 drip-until today --will dc   Insulin and c-peptide level pending to rule out insulinoma   Wean D10 as tolerated          Thrombocytopenia    Likely from sepsis with underlying chronic liver disease.  Plts decreased to 15 8/30 and transfused 1 unit.  Improved   No evidence of bleeding.  Holding lovenox, recommend no heparin with HD.  Monitor closely        Paroxysmal atrial fibrillation with RVR    Management per cardiology.    Stable heart rate at present.  Careful with beta blocker/CCBs in the setting of hypotension          Elevated troponin    Management per cardiology, not felt to be ACS  Suspect demand ischemia and ESRD as the etiology        Chest pain    Chest pain free at present  See elevated troponin for plan          Benign hypertension with ESRD (end-stage renal disease)    Intermittently hypotensive-while in septic shock -now resolved  Hypertension Medications             amlodipine (NORVASC) 5 MG tablet Take 5 mg by mouth 2 (two) times daily.    hydrALAZINE (APRESOLINE) 50 MG tablet Take 50 mg by mouth 3 (three) times daily.    lisinopril (PRINIVIL,ZESTRIL) 40 MG tablet Take 1 tablet (40 mg total) by mouth once daily.    minoxidil (LONITEN) 2.5 MG tablet Take 3 tablets (7.5 mg total) by mouth 2 (two) times daily.                    Methadone dependence    Methadone dose decreased with acute illness.  Continue to monitor closely-extra dose today and increase dose in am for pain control           Chronic hepatitis C without hepatic coma    With Hx of possible liver cirrhosis  Likely contributing to thrombocytopenia  Ammonia normal  Monitor LFTs            Cirrhosis of liver with ascites    Possible diagnosis though CT abdomen did not note cirrhosis or ascites            Dehydration    Will give NS 500ml IV bolus today          Intractable vomiting with  nausea    Likely from sepsis-resolved   Lipase and  Wbcs Wnl, Imaging and exam without evidence of acute abdominal process  Continue  PPI          Chronic back pain    With chronic pain syndrome-  Continue home methadone          Ischemic cardiomyopathy, LVEF 25%-30%    See elevated troponin  No evidence of acute CHF          ESRD (T,Th,Sat) dialysis onset 2013    HD 8/28  Nephrology following for renal/HD management  Electrolytes stable            Coronary artery disease involving native coronary artery of native heart without angina pectoris    With prior coronary stent placement 2016/ Hx ischemic cardiomyopathy-   on ace I   Cardiology following  ON metoprolol.  Hold asa with thrombocytopenia            VTE Risk Mitigation (From admission, onward)        Ordered     IP VTE HIGH RISK PATIENT  Once      08/26/18 1106     Place sequential compression device  Until discontinued      08/26/18 1106     Place ZACHARY hose  Until discontinued      08/26/18 1106              Natasha Morris MD  Department of Hospital Medicine   [facility]

## 2018-09-04 NOTE — PLAN OF CARE
Problem: Patient Care Overview  Goal: Plan of Care Review  Outcome: Ongoing (interventions implemented as appropriate)  Pt medication for pain and nausea throughout the day. Contact isolation maintained, pt verbalized understanding for isolation. IS encouraged. No diarrhea noted today.

## 2018-09-04 NOTE — ASSESSMENT & PLAN NOTE
S/p debridement --follow with podiatry   Proteus -from culture 8/30-same as culture from blood   Continue on zosyn  As per ID -transition to po abx soon  -discussed with Dr Lenz  Arrange outpt wound care -discussed with CM -

## 2018-09-04 NOTE — PLAN OF CARE
Problem: Physical Therapy Goal  Goal: Physical Therapy Goal  Goals to be met by: 2018     Patient will increase functional independence with mobility by performin. Supine to sit with MInimal Assistance  2. Sit to stand transfer with Minimal Assistance  3. Bed to chair transfer with Minimal Assistance using Rolling Walker  4. Gait  x 150 feet with Minimal Assistance using Rolling Walker.   5. Lower extremity exercise program x20 reps per handout, with assistance as needed    Outcome: Ongoing (interventions implemented as appropriate)  PT eval and treat, EOB sitting, brief standing and few small steps with po shoe R foot. Completed LE's thera ex- back to bed post PT

## 2018-09-04 NOTE — PT/OT/SLP PROGRESS
Physical Therapy      Patient Name:  João Aguirre   MRN:  [id number]    Patient not seen today secondary to Dialysis. Will follow-up later today.    Eboni Hua, PT

## 2018-09-05 PROBLEM — L02.415 CELLULITIS AND ABSCESS OF RIGHT LOWER EXTREMITY: Status: ACTIVE | Noted: 2018-09-05

## 2018-09-05 PROBLEM — L03.115 CELLULITIS AND ABSCESS OF RIGHT LOWER EXTREMITY: Status: ACTIVE | Noted: 2018-09-05

## 2018-09-05 LAB
ALBUMIN SERPL BCP-MCNC: 1.8 G/DL
ALP SERPL-CCNC: 295 U/L
ALT SERPL W/O P-5'-P-CCNC: 8 U/L
ANION GAP SERPL CALC-SCNC: 12 MMOL/L
ANISOCYTOSIS BLD QL SMEAR: SLIGHT
AST SERPL-CCNC: 17 U/L
BACTERIA BLD CULT: NORMAL
BACTERIA BLD CULT: NORMAL
BASOPHILS # BLD AUTO: 0 K/UL
BASOPHILS NFR BLD: 0 %
BILIRUB SERPL-MCNC: 0.7 MG/DL
BUN SERPL-MCNC: 52 MG/DL
CALCIUM SERPL-MCNC: 7.6 MG/DL
CHLORIDE SERPL-SCNC: 99 MMOL/L
CO2 SERPL-SCNC: 23 MMOL/L
CREAT SERPL-MCNC: 5.6 MG/DL
DIFFERENTIAL METHOD: ABNORMAL
EOSINOPHIL # BLD AUTO: 0.2 K/UL
EOSINOPHIL NFR BLD: 2.4 %
ERYTHROCYTE [DISTWIDTH] IN BLOOD BY AUTOMATED COUNT: 17.6 %
EST. GFR  (AFRICAN AMERICAN): 12 ML/MIN/1.73 M^2
EST. GFR  (NON AFRICAN AMERICAN): 11 ML/MIN/1.73 M^2
GLUCOSE SERPL-MCNC: 67 MG/DL
HCT VFR BLD AUTO: 26.3 %
HGB BLD-MCNC: 8.8 G/DL
INR PPP: 1.1
LYMPHOCYTES # BLD AUTO: 1.1 K/UL
LYMPHOCYTES NFR BLD: 12.6 %
MCH RBC QN AUTO: 31.8 PG
MCHC RBC AUTO-ENTMCNC: 33.5 G/DL
MCV RBC AUTO: 95 FL
MONOCYTES # BLD AUTO: 0.6 K/UL
MONOCYTES NFR BLD: 6.3 %
NEUTROPHILS # BLD AUTO: 7 K/UL
NEUTROPHILS NFR BLD: 78.7 %
PHOSPHATE SERPL-MCNC: 3.5 MG/DL
PLATELET # BLD AUTO: 132 K/UL
PLATELET BLD QL SMEAR: ABNORMAL
PMV BLD AUTO: 8.7 FL
POCT GLUCOSE: 102 MG/DL (ref 70–110)
POCT GLUCOSE: 117 MG/DL (ref 70–110)
POCT GLUCOSE: 174 MG/DL (ref 70–110)
POCT GLUCOSE: 76 MG/DL (ref 70–110)
POCT GLUCOSE: 81 MG/DL (ref 70–110)
POCT GLUCOSE: 88 MG/DL (ref 70–110)
POTASSIUM SERPL-SCNC: 4.1 MMOL/L
PROT SERPL-MCNC: 5.7 G/DL
PROTHROMBIN TIME: 10.6 SEC
RBC # BLD AUTO: 2.78 M/UL
SODIUM SERPL-SCNC: 134 MMOL/L
WBC # BLD AUTO: 8.8 K/UL

## 2018-09-05 PROCEDURE — 94799 UNLISTED PULMONARY SVC/PX: CPT

## 2018-09-05 PROCEDURE — A4216 STERILE WATER/SALINE, 10 ML: HCPCS | Performed by: EMERGENCY MEDICINE

## 2018-09-05 PROCEDURE — 97530 THERAPEUTIC ACTIVITIES: CPT

## 2018-09-05 PROCEDURE — 25000003 PHARM REV CODE 250: Performed by: HOSPITALIST

## 2018-09-05 PROCEDURE — 97116 GAIT TRAINING THERAPY: CPT

## 2018-09-05 PROCEDURE — 97110 THERAPEUTIC EXERCISES: CPT

## 2018-09-05 PROCEDURE — 85610 PROTHROMBIN TIME: CPT

## 2018-09-05 PROCEDURE — 25000003 PHARM REV CODE 250: Performed by: NURSE PRACTITIONER

## 2018-09-05 PROCEDURE — 99900035 HC TECH TIME PER 15 MIN (STAT)

## 2018-09-05 PROCEDURE — 94761 N-INVAS EAR/PLS OXIMETRY MLT: CPT

## 2018-09-05 PROCEDURE — 36415 COLL VENOUS BLD VENIPUNCTURE: CPT

## 2018-09-05 PROCEDURE — 25000003 PHARM REV CODE 250: Performed by: EMERGENCY MEDICINE

## 2018-09-05 PROCEDURE — 25000003 PHARM REV CODE 250: Performed by: INTERNAL MEDICINE

## 2018-09-05 PROCEDURE — 63600175 PHARM REV CODE 636 W HCPCS: Performed by: NURSE PRACTITIONER

## 2018-09-05 PROCEDURE — 85025 COMPLETE CBC W/AUTO DIFF WBC: CPT

## 2018-09-05 PROCEDURE — 80053 COMPREHEN METABOLIC PANEL: CPT

## 2018-09-05 PROCEDURE — 84100 ASSAY OF PHOSPHORUS: CPT

## 2018-09-05 PROCEDURE — 63600175 PHARM REV CODE 636 W HCPCS: Performed by: INTERNAL MEDICINE

## 2018-09-05 PROCEDURE — 12000002 HC ACUTE/MED SURGE SEMI-PRIVATE ROOM

## 2018-09-05 RX ORDER — METHADONE HYDROCHLORIDE 10 MG/1
20 TABLET ORAL ONCE
Status: COMPLETED | OUTPATIENT
Start: 2018-09-05 | End: 2018-09-05

## 2018-09-05 RX ORDER — METHADONE HYDROCHLORIDE 10 MG/1
60 TABLET ORAL DAILY
Status: DISCONTINUED | OUTPATIENT
Start: 2018-09-06 | End: 2018-09-07 | Stop reason: HOSPADM

## 2018-09-05 RX ADMIN — CEFTRIAXONE 1 G: 1 INJECTION, SOLUTION INTRAVENOUS at 02:09

## 2018-09-05 RX ADMIN — Medication 125 MG: at 11:09

## 2018-09-05 RX ADMIN — HYDRALAZINE HYDROCHLORIDE 50 MG: 25 TABLET, FILM COATED ORAL at 08:09

## 2018-09-05 RX ADMIN — LEVETIRACETAM 500 MG: 500 TABLET ORAL at 08:09

## 2018-09-05 RX ADMIN — HYDRALAZINE HYDROCHLORIDE 10 MG: 20 INJECTION INTRAMUSCULAR; INTRAVENOUS at 12:09

## 2018-09-05 RX ADMIN — Medication 125 MG: at 06:09

## 2018-09-05 RX ADMIN — ONDANSETRON HYDROCHLORIDE 4 MG: 2 INJECTION, SOLUTION INTRAMUSCULAR; INTRAVENOUS at 05:09

## 2018-09-05 RX ADMIN — PANTOPRAZOLE SODIUM 40 MG: 40 TABLET, DELAYED RELEASE ORAL at 08:09

## 2018-09-05 RX ADMIN — Medication 125 MG: at 05:09

## 2018-09-05 RX ADMIN — METHADONE HYDROCHLORIDE 40 MG: 10 TABLET ORAL at 08:09

## 2018-09-05 RX ADMIN — HYDRALAZINE HYDROCHLORIDE 50 MG: 25 TABLET, FILM COATED ORAL at 02:09

## 2018-09-05 RX ADMIN — HYDROCODONE BITARTRATE AND ACETAMINOPHEN 1 TABLET: 5; 325 TABLET ORAL at 06:09

## 2018-09-05 RX ADMIN — LISINOPRIL 40 MG: 40 TABLET ORAL at 08:09

## 2018-09-05 RX ADMIN — Medication 3 ML: at 02:09

## 2018-09-05 RX ADMIN — ONDANSETRON HYDROCHLORIDE 4 MG: 2 INJECTION, SOLUTION INTRAMUSCULAR; INTRAVENOUS at 08:09

## 2018-09-05 RX ADMIN — AMLODIPINE BESYLATE 5 MG: 5 TABLET ORAL at 08:09

## 2018-09-05 RX ADMIN — COLLAGENASE SANTYL: 250 OINTMENT TOPICAL at 08:09

## 2018-09-05 RX ADMIN — Medication 3 ML: at 05:09

## 2018-09-05 RX ADMIN — METHADONE HYDROCHLORIDE 20 MG: 10 TABLET ORAL at 02:09

## 2018-09-05 RX ADMIN — HYDROCODONE BITARTRATE AND ACETAMINOPHEN 1 TABLET: 5; 325 TABLET ORAL at 02:09

## 2018-09-05 RX ADMIN — Medication 3 ML: at 10:09

## 2018-09-05 NOTE — PLAN OF CARE
Anticipate discharge home with HH either today or tomorrow.       09/05/18 1219   Discharge Reassessment   Assessment Type Discharge Planning Reassessment

## 2018-09-05 NOTE — PLAN OF CARE
Problem: Patient Care Overview  Goal: Plan of Care Review  Outcome: Ongoing (interventions implemented as appropriate)  Patient safe and free from falls. Room air. NSR on monitor. Scheduled methadone administered for c/o pain with some relief noted. Assistance provided to amb to BR. Swallows pills whole. Heat packs provided to raised, reddened areas to R medial thigh and shin. Bed in lowest position, wheels locked, SR raised, call light in reach.

## 2018-09-05 NOTE — PLAN OF CARE
09/05/18 1531   Patient Assessment/Suction   Level of Consciousness (AVPU) alert   All Lung Fields Breath Sounds clear   PRE-TX-O2-ETCO2   O2 Device (Oxygen Therapy) room air   SpO2 96 %   Pulse Oximetry Type Intermittent   $ Pulse Oximetry - Multiple Charge Pulse Oximetry - Multiple   Incentive Spirometer   $ Incentive Spirometer Charges done with encouragement   Administration (Incentive Spirometer) done with encouragement   Number of Repetitions (Incentive Spirometer) 10   Level (mL) (Incentive Spirometer) 2000   Patient Tolerance good

## 2018-09-05 NOTE — PLAN OF CARE
09/04/18 2030   Patient Assessment/Suction   Level of Consciousness (AVPU) alert   PRE-TX-O2-ETCO2   O2 Device (Oxygen Therapy) room air   SpO2 96 %   Pulse Oximetry Type Intermittent   Incentive Spirometer   $ Incentive Spirometer Charges done with encouragement   Administration (Incentive Spirometer) done with encouragement   Number of Repetitions (Incentive Spirometer) 10   Level (mL) (Incentive Spirometer) 2000   Patient Tolerance good

## 2018-09-05 NOTE — PROGRESS NOTES
INPATIENT NEPHROLOGY PROGRESS NOTE  [facility] NEPHROLOGY     João Aguirre  09/05/2018    Reason for consultation:     ESRD    History of Present Illness:       53M with a ESRD on HD TTS, DM2, Hep C cirrhosis, chronic back pain on methadone, long-term anticoagulant use HTN, prior stroke, and recurrent episodes of mid epigastric pain and vomiting but no formal diagnosis of gastroparesis admitted with sepsis due to Proteus/Pasturella infection in right leg. May also have c.diff, PCR pending.    8/27  Seen on dialysis.  No nausea currently.  No chest pain or sob  8/28  Seen on dialysis.  Sleeping  8/29  Sleeping  8/30  Transferred to ICU.  No n,v,chest pain, sob  9/1  HD TTS.  Seen today on dialysis.  Platelets low, holding heparin w/HD.  Denies pain.  9/2  2L UF yesterday, tolerated well.  Pressures high, resumed lisinopril and amlodipine today.  Platelets 37, heparin on hold.  States having abd cramping today.  9/3  Persistent hypoglycemia during the night, has developed diarrhea.  Na+ low, on D10 gtt for now.  Phos 1.9, discontinued binder until phos comes back up.  BP still high, but a little better than yesterday.  No fevers.  Stool for Cdiff collected and results pending.  Denies pain and SOB, no edema.  9/4 VSS, seen and examined on HD, tolerating well. RLE cellulitis still looks pretty angry.  9/5 VSS, afebrile. HD tomorrow or as needed.    Plan of Care:     Assessment:     ESRD on HD  Hyponatremia - difficult to correct with HD - please limit oral free water.  Hypertension  Hypoglycemia - on D10 @ 25cc/hr = 500 ml water /day  Anemia  RLE cellulitis, lymphangitis, thrombophlebitis 2/2 Proteus/Pasturella  C. Diff colitis?    Plan:     Continue HD TTS  Renal diet as tolerated. Limit free water to 1.5L per day. Getting about 500 cc 10% glucose IV per day.  Binders PRN if Phos is high.  ERNESTO with HD PRN to keep Hb 8-10.  Continue BP meds.  Appreciate ID input.    Thank you for allowing us to participate in  this patient's care. We will continue to follow.    Medications:  Scheduled Meds:   amLODIPine  5 mg Oral Daily    cefTRIAXone (ROCEPHIN) IVPB  1 g Intravenous Q24H    collagenase   Topical (Top) Daily    hydrALAZINE  50 mg Oral TID    levETIRAcetam  500 mg Oral BID    lisinopril  40 mg Oral Daily    methadone  40 mg Oral Daily    pantoprazole  40 mg Oral BID    sodium chloride 0.9%  3 mL Intravenous Q8H    vancomycin  125 mg Oral Q6H     Continuous Infusions:    PRN Meds:.sodium chloride, sodium chloride, sodium chloride 0.9%, sodium chloride 0.9%, acetaminophen, dextrose 50%, dextrose 50%, dicyclomine, glucose, glucose, hydrALAZINE, HYDROcodone-acetaminophen, magnesium sulfate IVPB, metoclopramide HCl, ondansetron, sodium chloride 0.9%    Allergies:  Antibiotic hc    Vital Signs:  Temp Readings from Last 3 Encounters:   09/05/18 99 °F (37.2 °C) (Temporal)   08/25/18 97.8 °F (36.6 °C) (Oral)   10/19/17 97.6 °F (36.4 °C)       Pulse Readings from Last 3 Encounters:   09/05/18 76   08/25/18 75   10/19/17 (!) 59       BP Readings from Last 3 Encounters:   09/05/18 132/74   08/25/18 133/71   10/19/17 (!) 166/81       Weight:  Wt Readings from Last 3 Encounters:   08/30/18 92.9 kg (204 lb 12.9 oz)   08/25/18 94.8 kg (209 lb)   10/17/17 94.8 kg (209 lb 0 oz)       Physical Exam:    Constitutional: NAD  Neuro: No asterixis  Psych: Calm  EENT: NCAT, anicteric sclera   Neck:  supple  Cards: No rub  Lungs: clear to ausculation  GI:  Tender to palpation  MSK:  WNWD  Skin: no purpura, rashes       Results:  Lab Results   Component Value Date     (L) 09/05/2018    K 4.1 09/05/2018    CL 99 09/05/2018    CO2 23 09/05/2018    BUN 52 (H) 09/05/2018    CREATININE 5.6 (H) 09/05/2018    CALCIUM 7.6 (L) 09/05/2018    ANIONGAP 12 09/05/2018    ESTGFRAFRICA 12 (A) 09/05/2018    EGFRNONAA 11 (A) 09/05/2018       Lab Results   Component Value Date    CALCIUM 7.6 (L) 09/05/2018    PHOS 3.5 09/05/2018       Recent Labs   Lab   09/05/18   0554   WBC  8.80   RBC  2.78*   HGB  8.8*   HCT  26.3*   PLT  132*   MCV  95   MCH  31.8*   MCHC  33.5     I have personally reviewed pertinent radiological imaging and reports.

## 2018-09-05 NOTE — PLAN OF CARE
Problem: Physical Therapy Goal  Goal: Physical Therapy Goal  Goals to be met by: 2018     Patient will increase functional independence with mobility by performin. Supine to sit with MInimal Assistance  2. Sit to stand transfer with Minimal Assistance  3. Bed to chair transfer with Minimal Assistance using Rolling Walker  4. Gait  x 150 feet with Minimal Assistance using Rolling Walker.   5. Lower extremity exercise program x20 reps per handout, with assistance as needed     Outcome: Ongoing (interventions implemented as appropriate)  Therapeutic activity : Bed mobility, transfers, gait with rw and Min A, BLE exercises.

## 2018-09-05 NOTE — PLAN OF CARE
Problem: Patient Care Overview  Goal: Plan of Care Review  Outcome: Ongoing (interventions implemented as appropriate)  POC reviewed with pt. Pt AAOX4, semi-dubose's. NAD noted; pt free from injury. Tolerated hemodialysis well today with 2L of fluid removed. Will continue to monitor.

## 2018-09-05 NOTE — PLAN OF CARE
Problem: Patient Care Overview  Goal: Plan of Care Review  Outcome: Ongoing (interventions implemented as appropriate)  Pt alert and oriented x 4. Resting, positioned supine with head of bed elevated. Plan of care reviewed with pt. Acknowledged and understood. NAD, Cardiac monitoring reading NS rhythm. Call light in reach, will continue to monitor.

## 2018-09-05 NOTE — PT/OT/SLP PROGRESS
Physical Therapy Treatment    Patient Name:  João Aguirre   MRN:  [id number]    Recommendations:     Discharge Recommendations:      Discharge Equipment Recommendations:     Barriers to discharge: None    Assessment:     João Aguirre is a 53 y.o. male admitted with a medical diagnosis of Septic shock.  He presents with the following impairments/functional limitations:  weakness, impaired endurance, gait instability, impaired functional mobilty, impaired self care skills, pain, decreased lower extremity function, decreased safety awareness .    Rehab Prognosis: fair; patient would benefit from acute skilled PT services to address these deficits and reach maximum level of function.      Recent Surgery: * No surgery found *      Plan:     During this hospitalization, patient to be seen 6 x/week to address the above listed problems via gait training, therapeutic activities  · Plan of Care Expires:  09/28/18   Plan of Care Reviewed with: patient    Subjective     Communicated with nurse Willams prior to session.  Patient found supine in bed upon PT entry to room, agreeable to treatment.      Chief Complaint: pain R leg with movement  Patient comments/goals: none stated  Pain/Comfort:  · Pain Rating 1: 6/10  · Location - Side 1: Right  · Location - Orientation 1: generalized  · Location 1: leg  · Pain Addressed 1: Pre-medicate for activity, Nurse notified    Patients cultural, spiritual, Islam conflicts given the current situation:      Objective:     Patient found with: central line, telemetry     General Precautions: Standard, blind, fall, contact   Orthopedic Precautions:RLE weight bearing as tolerated(po shoe )   Braces: N/A     Functional Mobility:  · Bed Mobility:     · Rolling Left:  contact guard assistance  · Rolling Right: contact guard assistance  · Supine to Sit: minimum assistance  · Sit to Supine: minimum assistance  · Transfers:     · Sit to Stand:  minimum assistance with rolling  walker  · Bed to Chair: minimum assistance with  rolling walker  using  Stand Pivot  · Gait: few steps to chair  and BTB with rw and Min A.      AM-PAC 6 CLICK MOBILITY          Therapeutic Activities and Exercises:   Transferred to sitting EOB with A. Stood with Min A took 3 steps with rw with increased pain RLE , unable to reach restroom. SPT to BSC with Min A. Required A to chinmay diaper following commode use. SPT , BTB . Performed BLE exercises at 10 reps each in supine : SLR's, HS, QS, Hip abd.add, AP's.    Patient left supine with all lines intact, call button in reach and nurse Екатерина notified..    GOALS:   Multidisciplinary Problems     Physical Therapy Goals        Problem: Physical Therapy Goal    Goal Priority Disciplines Outcome Goal Variances Interventions   Physical Therapy Goal     PT, PT/OT Ongoing (interventions implemented as appropriate)     Description:  Goals to be met by: 2018     Patient will increase functional independence with mobility by performin. Supine to sit with MInimal Assistance  2. Sit to stand transfer with Minimal Assistance  3. Bed to chair transfer with Minimal Assistance using Rolling Walker  4. Gait  x 150 feet with Minimal Assistance using Rolling Walker.   5. Lower extremity exercise program x20 reps per handout, with assistance as needed                      Time Tracking:     PT Received On: 18  PT Start Time: 0950     PT Stop Time: 1020  PT Total Time (min): 30 min     Billable Minutes: Gait Training 8min, Therapeutic Activity 12min and Therapeutic Exercise 10min    Treatment Type: Treatment  PT/PTA: PTA     PTA Visit Number: 1     Shaye Garcia, PTA  2018

## 2018-09-06 ENCOUNTER — ANESTHESIA EVENT (OUTPATIENT)
Dept: SURGERY | Facility: HOSPITAL | Age: 53
DRG: 853 | End: 2018-09-06
Payer: MEDICARE

## 2018-09-06 ENCOUNTER — ANESTHESIA (OUTPATIENT)
Dept: SURGERY | Facility: HOSPITAL | Age: 53
DRG: 853 | End: 2018-09-06
Payer: MEDICARE

## 2018-09-06 LAB
ALBUMIN SERPL BCP-MCNC: 1.8 G/DL
ALP SERPL-CCNC: 371 U/L
ALT SERPL W/O P-5'-P-CCNC: 10 U/L
ANION GAP SERPL CALC-SCNC: 13 MMOL/L
AST SERPL-CCNC: 25 U/L
BACTERIA STL CULT: NORMAL
BASOPHILS # BLD AUTO: 0 K/UL
BASOPHILS NFR BLD: 0.3 %
BILIRUB SERPL-MCNC: 0.6 MG/DL
BUN SERPL-MCNC: 59 MG/DL
CALCIUM SERPL-MCNC: 7.6 MG/DL
CHLORIDE SERPL-SCNC: 100 MMOL/L
CO2 SERPL-SCNC: 21 MMOL/L
CREAT SERPL-MCNC: 7.1 MG/DL
DIFFERENTIAL METHOD: ABNORMAL
EOSINOPHIL # BLD AUTO: 0.2 K/UL
EOSINOPHIL NFR BLD: 2.3 %
ERYTHROCYTE [DISTWIDTH] IN BLOOD BY AUTOMATED COUNT: 18 %
EST. GFR  (AFRICAN AMERICAN): 9 ML/MIN/1.73 M^2
EST. GFR  (NON AFRICAN AMERICAN): 8 ML/MIN/1.73 M^2
GLUCOSE SERPL-MCNC: 61 MG/DL
HCT VFR BLD AUTO: 27.4 %
HGB BLD-MCNC: 9.1 G/DL
INR PPP: 1
LYMPHOCYTES # BLD AUTO: 1.3 K/UL
LYMPHOCYTES NFR BLD: 15.9 %
MCH RBC QN AUTO: 31.8 PG
MCHC RBC AUTO-ENTMCNC: 33.3 G/DL
MCV RBC AUTO: 96 FL
MONOCYTES # BLD AUTO: 0.6 K/UL
MONOCYTES NFR BLD: 7.9 %
NEUTROPHILS # BLD AUTO: 6 K/UL
NEUTROPHILS NFR BLD: 73.6 %
PHOSPHATE SERPL-MCNC: 4.7 MG/DL
PLATELET # BLD AUTO: 186 K/UL
PLATELET BLD QL SMEAR: ABNORMAL
PMV BLD AUTO: 8.7 FL
POCT GLUCOSE: 108 MG/DL (ref 70–110)
POCT GLUCOSE: 63 MG/DL (ref 70–110)
POCT GLUCOSE: 65 MG/DL (ref 70–110)
POCT GLUCOSE: 76 MG/DL (ref 70–110)
POCT GLUCOSE: 77 MG/DL (ref 70–110)
POCT GLUCOSE: 94 MG/DL (ref 70–110)
POTASSIUM SERPL-SCNC: 4.6 MMOL/L
PROT SERPL-MCNC: 6.1 G/DL
PROTHROMBIN TIME: 10.2 SEC
RBC # BLD AUTO: 2.86 M/UL
SODIUM SERPL-SCNC: 134 MMOL/L
WBC # BLD AUTO: 8.2 K/UL

## 2018-09-06 PROCEDURE — 85025 COMPLETE CBC W/AUTO DIFF WBC: CPT

## 2018-09-06 PROCEDURE — 36000705 HC OR TIME LEV I EA ADD 15 MIN: Performed by: SURGERY

## 2018-09-06 PROCEDURE — 37000008 HC ANESTHESIA 1ST 15 MINUTES: Performed by: SURGERY

## 2018-09-06 PROCEDURE — 87070 CULTURE OTHR SPECIMN AEROBIC: CPT | Mod: 59

## 2018-09-06 PROCEDURE — D9220A PRA ANESTHESIA: Mod: CRNA,,, | Performed by: NURSE ANESTHETIST, CERTIFIED REGISTERED

## 2018-09-06 PROCEDURE — 84100 ASSAY OF PHOSPHORUS: CPT

## 2018-09-06 PROCEDURE — 63600175 PHARM REV CODE 636 W HCPCS: Performed by: ANESTHESIOLOGY

## 2018-09-06 PROCEDURE — 25000003 PHARM REV CODE 250: Performed by: INTERNAL MEDICINE

## 2018-09-06 PROCEDURE — 99900103 DSU ONLY-NO CHARGE-INITIAL HR (STAT): Performed by: SURGERY

## 2018-09-06 PROCEDURE — 63600175 PHARM REV CODE 636 W HCPCS: Performed by: NURSE ANESTHETIST, CERTIFIED REGISTERED

## 2018-09-06 PROCEDURE — 63600175 PHARM REV CODE 636 W HCPCS: Performed by: INTERNAL MEDICINE

## 2018-09-06 PROCEDURE — 85610 PROTHROMBIN TIME: CPT

## 2018-09-06 PROCEDURE — 87186 SC STD MICRODIL/AGAR DIL: CPT

## 2018-09-06 PROCEDURE — 94799 UNLISTED PULMONARY SVC/PX: CPT

## 2018-09-06 PROCEDURE — 25000003 PHARM REV CODE 250: Performed by: HOSPITALIST

## 2018-09-06 PROCEDURE — 25000003 PHARM REV CODE 250: Performed by: SURGERY

## 2018-09-06 PROCEDURE — 25000003 PHARM REV CODE 250: Performed by: EMERGENCY MEDICINE

## 2018-09-06 PROCEDURE — 87077 CULTURE AEROBIC IDENTIFY: CPT

## 2018-09-06 PROCEDURE — 27200651 HC AIRWAY, LMA: Performed by: NURSE ANESTHETIST, CERTIFIED REGISTERED

## 2018-09-06 PROCEDURE — 36415 COLL VENOUS BLD VENIPUNCTURE: CPT

## 2018-09-06 PROCEDURE — 71000033 HC RECOVERY, INTIAL HOUR: Performed by: SURGERY

## 2018-09-06 PROCEDURE — 37000009 HC ANESTHESIA EA ADD 15 MINS: Performed by: SURGERY

## 2018-09-06 PROCEDURE — 99222 1ST HOSP IP/OBS MODERATE 55: CPT | Mod: ,,, | Performed by: SURGERY

## 2018-09-06 PROCEDURE — D9220A PRA ANESTHESIA: Mod: ANES,,, | Performed by: ANESTHESIOLOGY

## 2018-09-06 PROCEDURE — 36000704 HC OR TIME LEV I 1ST 15 MIN: Performed by: SURGERY

## 2018-09-06 PROCEDURE — 87075 CULTR BACTERIA EXCEPT BLOOD: CPT

## 2018-09-06 PROCEDURE — 25000003 PHARM REV CODE 250: Performed by: ANESTHESIOLOGY

## 2018-09-06 PROCEDURE — 99900104 DSU ONLY-NO CHARGE-EA ADD'L HR (STAT): Performed by: SURGERY

## 2018-09-06 PROCEDURE — 80053 COMPREHEN METABOLIC PANEL: CPT

## 2018-09-06 PROCEDURE — 12000002 HC ACUTE/MED SURGE SEMI-PRIVATE ROOM

## 2018-09-06 PROCEDURE — 97110 THERAPEUTIC EXERCISES: CPT

## 2018-09-06 PROCEDURE — 25000003 PHARM REV CODE 250: Performed by: NURSE PRACTITIONER

## 2018-09-06 PROCEDURE — 63600175 PHARM REV CODE 636 W HCPCS: Performed by: NURSE PRACTITIONER

## 2018-09-06 PROCEDURE — 87076 CULTURE ANAEROBE IDENT EACH: CPT

## 2018-09-06 PROCEDURE — A4216 STERILE WATER/SALINE, 10 ML: HCPCS | Performed by: EMERGENCY MEDICINE

## 2018-09-06 PROCEDURE — 71000039 HC RECOVERY, EACH ADD'L HOUR: Performed by: SURGERY

## 2018-09-06 PROCEDURE — 10061 I&D ABSCESS COMP/MULTIPLE: CPT | Mod: RT,,, | Performed by: SURGERY

## 2018-09-06 PROCEDURE — 0J9L0ZZ DRAINAGE OF RIGHT UPPER LEG SUBCUTANEOUS TISSUE AND FASCIA, OPEN APPROACH: ICD-10-PCS | Performed by: SURGERY

## 2018-09-06 RX ORDER — FENTANYL CITRATE 50 UG/ML
INJECTION, SOLUTION INTRAMUSCULAR; INTRAVENOUS
Status: DISCONTINUED | OUTPATIENT
Start: 2018-09-06 | End: 2018-09-06

## 2018-09-06 RX ORDER — MIDAZOLAM HYDROCHLORIDE 1 MG/ML
INJECTION, SOLUTION INTRAMUSCULAR; INTRAVENOUS
Status: DISCONTINUED | OUTPATIENT
Start: 2018-09-06 | End: 2018-09-06

## 2018-09-06 RX ORDER — HYDROMORPHONE HYDROCHLORIDE 2 MG/ML
0.2 INJECTION, SOLUTION INTRAMUSCULAR; INTRAVENOUS; SUBCUTANEOUS EVERY 5 MIN PRN
Status: DISCONTINUED | OUTPATIENT
Start: 2018-09-06 | End: 2018-09-06 | Stop reason: HOSPADM

## 2018-09-06 RX ORDER — ONDANSETRON HYDROCHLORIDE 2 MG/ML
INJECTION, SOLUTION INTRAMUSCULAR; INTRAVENOUS
Status: DISCONTINUED | OUTPATIENT
Start: 2018-09-06 | End: 2018-09-06

## 2018-09-06 RX ORDER — SODIUM CHLORIDE, SODIUM LACTATE, POTASSIUM CHLORIDE, CALCIUM CHLORIDE 600; 310; 30; 20 MG/100ML; MG/100ML; MG/100ML; MG/100ML
INJECTION, SOLUTION INTRAVENOUS CONTINUOUS
Status: DISCONTINUED | OUTPATIENT
Start: 2018-09-06 | End: 2018-09-06

## 2018-09-06 RX ORDER — BUPIVACAINE HYDROCHLORIDE AND EPINEPHRINE 2.5; 5 MG/ML; UG/ML
INJECTION, SOLUTION EPIDURAL; INFILTRATION; INTRACAUDAL; PERINEURAL
Status: DISCONTINUED | OUTPATIENT
Start: 2018-09-06 | End: 2018-09-06 | Stop reason: HOSPADM

## 2018-09-06 RX ORDER — SODIUM CHLORIDE 0.9 % (FLUSH) 0.9 %
3 SYRINGE (ML) INJECTION EVERY 8 HOURS
Status: DISCONTINUED | OUTPATIENT
Start: 2018-09-06 | End: 2018-09-06 | Stop reason: HOSPADM

## 2018-09-06 RX ORDER — MEPERIDINE HYDROCHLORIDE 50 MG/ML
12.5 INJECTION INTRAMUSCULAR; INTRAVENOUS; SUBCUTANEOUS ONCE AS NEEDED
Status: DISCONTINUED | OUTPATIENT
Start: 2018-09-06 | End: 2018-09-06 | Stop reason: HOSPADM

## 2018-09-06 RX ORDER — DIPHENHYDRAMINE HYDROCHLORIDE 50 MG/ML
25 INJECTION INTRAMUSCULAR; INTRAVENOUS EVERY 6 HOURS PRN
Status: DISCONTINUED | OUTPATIENT
Start: 2018-09-06 | End: 2018-09-06 | Stop reason: HOSPADM

## 2018-09-06 RX ORDER — LIDOCAINE HCL/PF 100 MG/5ML
SYRINGE (ML) INTRAVENOUS
Status: DISCONTINUED | OUTPATIENT
Start: 2018-09-06 | End: 2018-09-06

## 2018-09-06 RX ORDER — SODIUM CHLORIDE 0.9 % (FLUSH) 0.9 %
3 SYRINGE (ML) INJECTION
Status: DISCONTINUED | OUTPATIENT
Start: 2018-09-06 | End: 2018-09-06 | Stop reason: HOSPADM

## 2018-09-06 RX ORDER — ONDANSETRON 2 MG/ML
4 INJECTION INTRAMUSCULAR; INTRAVENOUS DAILY PRN
Status: DISCONTINUED | OUTPATIENT
Start: 2018-09-06 | End: 2018-09-06 | Stop reason: HOSPADM

## 2018-09-06 RX ORDER — FENTANYL CITRATE 50 UG/ML
25 INJECTION, SOLUTION INTRAMUSCULAR; INTRAVENOUS EVERY 5 MIN PRN
Status: DISCONTINUED | OUTPATIENT
Start: 2018-09-06 | End: 2018-09-06 | Stop reason: HOSPADM

## 2018-09-06 RX ORDER — OXYCODONE HYDROCHLORIDE 5 MG/1
5 TABLET ORAL
Status: DISCONTINUED | OUTPATIENT
Start: 2018-09-06 | End: 2018-09-06 | Stop reason: HOSPADM

## 2018-09-06 RX ORDER — PROPOFOL 10 MG/ML
VIAL (ML) INTRAVENOUS
Status: DISCONTINUED | OUTPATIENT
Start: 2018-09-06 | End: 2018-09-06

## 2018-09-06 RX ADMIN — DEXTROSE MONOHYDRATE 12.5 G: 500 INJECTION PARENTERAL at 10:09

## 2018-09-06 RX ADMIN — Medication 3 ML: at 05:09

## 2018-09-06 RX ADMIN — CEFTRIAXONE 1 G: 1 INJECTION, SOLUTION INTRAVENOUS at 05:09

## 2018-09-06 RX ADMIN — HYDROMORPHONE HYDROCHLORIDE 0.2 MG: 2 INJECTION, SOLUTION INTRAMUSCULAR; INTRAVENOUS; SUBCUTANEOUS at 02:09

## 2018-09-06 RX ADMIN — ONDANSETRON HYDROCHLORIDE 4 MG: 2 INJECTION, SOLUTION INTRAMUSCULAR; INTRAVENOUS at 05:09

## 2018-09-06 RX ADMIN — FENTANYL CITRATE 50 MCG: 50 INJECTION, SOLUTION INTRAMUSCULAR; INTRAVENOUS at 01:09

## 2018-09-06 RX ADMIN — LEVETIRACETAM 500 MG: 500 TABLET ORAL at 09:09

## 2018-09-06 RX ADMIN — HYDRALAZINE HYDROCHLORIDE 10 MG: 20 INJECTION INTRAMUSCULAR; INTRAVENOUS at 02:09

## 2018-09-06 RX ADMIN — SODIUM CHLORIDE, SODIUM LACTATE, POTASSIUM CHLORIDE, AND CALCIUM CHLORIDE: .6; .31; .03; .02 INJECTION, SOLUTION INTRAVENOUS at 12:09

## 2018-09-06 RX ADMIN — LEVETIRACETAM 500 MG: 500 TABLET ORAL at 08:09

## 2018-09-06 RX ADMIN — METHADONE HYDROCHLORIDE 60 MG: 10 TABLET ORAL at 08:09

## 2018-09-06 RX ADMIN — HYDROMORPHONE HYDROCHLORIDE 0.2 MG: 2 INJECTION, SOLUTION INTRAMUSCULAR; INTRAVENOUS; SUBCUTANEOUS at 03:09

## 2018-09-06 RX ADMIN — ONDANSETRON 4 MG: 2 INJECTION, SOLUTION INTRAMUSCULAR; INTRAVENOUS at 01:09

## 2018-09-06 RX ADMIN — Medication 3 ML: at 09:09

## 2018-09-06 RX ADMIN — PROPOFOL 150 MG: 10 INJECTION, EMULSION INTRAVENOUS at 01:09

## 2018-09-06 RX ADMIN — Medication 125 MG: at 05:09

## 2018-09-06 RX ADMIN — OXYCODONE HYDROCHLORIDE 5 MG: 5 TABLET ORAL at 02:09

## 2018-09-06 RX ADMIN — HYDROCODONE BITARTRATE AND ACETAMINOPHEN 1 TABLET: 5; 325 TABLET ORAL at 01:09

## 2018-09-06 RX ADMIN — HYDROCODONE BITARTRATE AND ACETAMINOPHEN 1 TABLET: 5; 325 TABLET ORAL at 08:09

## 2018-09-06 RX ADMIN — LIDOCAINE HYDROCHLORIDE 50 MG: 20 INJECTION, SOLUTION INTRAVENOUS at 01:09

## 2018-09-06 RX ADMIN — PANTOPRAZOLE SODIUM 40 MG: 40 TABLET, DELAYED RELEASE ORAL at 09:09

## 2018-09-06 RX ADMIN — CEFTRIAXONE 1 G: 1 INJECTION, SOLUTION INTRAVENOUS at 01:09

## 2018-09-06 RX ADMIN — HYDRALAZINE HYDROCHLORIDE 50 MG: 25 TABLET, FILM COATED ORAL at 09:09

## 2018-09-06 RX ADMIN — MIDAZOLAM 2 MG: 1 INJECTION INTRAMUSCULAR; INTRAVENOUS at 01:09

## 2018-09-06 NOTE — PROGRESS NOTES
09/06/18 0954   Patient Assessment/Suction   Level of Consciousness (AVPU) alert   PRE-TX-O2-ETCO2   O2 Device (Oxygen Therapy) room air   SpO2 98 %   Pulse Oximetry Type Intermittent   Pulse 77   Resp 18   Incentive Spirometer   $ Incentive Spirometer Charges done with encouragement   Administration (Incentive Spirometer) done with encouragement   Number of Repetitions (Incentive Spirometer) 10   Level (mL) (Incentive Spirometer) 2000   Patient Tolerance good

## 2018-09-06 NOTE — OR NURSING
Pt resting with eyes closed. No family present. Pt states that he called his family. Update given to pt. Pt states that he is comfortable. Having some intermittent cramping. VSS. Anesthesia notified of pt's location. Declined text notifications at this time. Contact isolation maintained.

## 2018-09-06 NOTE — PROGRESS NOTES
Pt in OR.  Labs OK.  Unless he has respiratory distress or uncontrolled HTN, we will postpone HD till tomorrow.    Peter Gupta MD  3:14 PM  9/6/2018

## 2018-09-06 NOTE — OP NOTE
DATE OF PROCEDURE:  09/06/2018.    STAFF SURGEON:  Chetan Rothman M.D.    PREOPERATIVE DIAGNOSIS:  Cellulitis and abscess of the right medial leg x4.    POSTOPERATIVE DIAGNOSIS:  Cellulitis and abscess of the right medial leg x4.    PROCEDURE:  I and D of multiple abscesses on the right medial thigh.    ANESTHESIA:  General anesthesia.    INDICATION FOR PROCEDURE:  A pleasant 53-year-old gentleman admitted to the   hospital with multiple medical issues and noted to have cellulitis and erythema   in the right leg that has progressed to form abscess of the right medial leg and   I was asked to I and D.    DESCRIPTION OF PROCEDURE:  Following signing of informed consent, he received   scheduled IV antibiotics, taken to the OR and placed in supine position.    General anesthesia was administered.  Right leg was frog legged.  It was prepped   and draped in standard fashion.  Appropriate timeout procedure was performed.    I made approximately 1 cm incision in each palpable lesion which had associated   induration, cellulitis and fluctuance.  One was present in the mid thigh, one   was just above the knee, one was just below the knee and another one just above   the ankle.  Small incisions were made.  Purulent drainage was expressed from   each of these.  Cultures were taken from both the proximal and distal lesions.    I then irrigated all wounds.  I ensured adequate hemostasis.  I packed the wound   with 0.25% iodoform.  The patient was then awakened and taken to Recovery Room   in stable condition.  There were no immediate complications.  Blood loss was 10   mL.      ALBERTO/IN  dd: 09/06/2018 14:06:15 (CDT)  td: 09/06/2018 16:51:01 (DENAE)  Doc ID   #[id number]  Job ID #[id number]    CC:

## 2018-09-06 NOTE — ANESTHESIA POSTPROCEDURE EVALUATION
Anesthesia Post Evaluation    Patient: João Aguirre    Procedure(s) Performed: Procedure(s) (LRB):  INCISION AND DRAINAGE, ABSCESS (Right)    Final Anesthesia Type: general  Patient location during evaluation: PACU  Patient participation: Yes- Able to Participate  Level of consciousness: awake and alert and oriented  Post-procedure vital signs: reviewed and stable  Pain management: adequate  Airway patency: patent  PONV status at discharge: No PONV  Anesthetic complications: no      Cardiovascular status: blood pressure returned to baseline and stable  Respiratory status: unassisted and spontaneous ventilation  Hydration status: euvolemic  Follow-up not needed.        Visit Vitals  BP (!) 184/85   Pulse 81   Temp 36.8 °C (98.3 °F) (Temporal)   Resp 16   Ht 6' (1.829 m)   Wt 92.9 kg (204 lb 12.9 oz)   SpO2 96%   BMI 27.78 kg/m²       Pain/Natividad Score: Pain Assessment Performed: Yes (9/6/2018  3:20 PM)  Presence of Pain: complains of pain/discomfort (9/6/2018  3:20 PM)  Pain Rating Prior to Med Admin: 4 (9/6/2018  3:20 PM)  Pain Rating Post Med Admin: 5 (9/5/2018  3:00 PM)  Natividad Score: 10 (9/6/2018  3:20 PM)

## 2018-09-06 NOTE — PROGRESS NOTES
[facility] Hospital Medicine  Progress Note    Patient Name: João Aguirre  MRN: [id number]  Patient Class: IP- Inpatient   Admission Date: 8/26/2018  Length of Stay: 10 days  Attending Physician: Natasha Morris MD  Primary Care Provider: Primary Doctor No        Subjective:     Principal Problem:Septic shock    HPI:  This is a 53-year-old male with a past medical history of end-stage renal disease on hemodialysis Tuesday, Thursday, and  Saturday, diabetes, chronic back pain on methadone, CAD with prior coronary stent placement 2 years ago, hypertension, prior pneumonia, prior stroke, and recurrent episodes of mid epigastric pain and vomiting but no formal diagnosis of gastroparesis who presents to emergency department for evaluation of epigastric, abdominal pain, and CP with inability to tolerate liquids or food for the past few days.  Patient was seen here in the emergency department yesterday where he underwent basic labs and was given antiemetics.  He was discharged home with a prescription for Reglan and Benadryl which she has not been able to fill secondary to being home vomiting. He presents today via EMS because the symptoms have not improved and are worsening.  He did not get dialysis yesterday.  He denies any shortness of breath but does have an exacerbation of his chronic back pain which is thought to be secondary to him vomiting. The patient is unable hold down his chronic pain medicine which includes methadone.  Patient does not have a medication list with him at this time.    Patient found to have elevated troponin of 0.763 and reports he has Hx of CAD with prior coronary stent placement 2 years ago but has not been following up with cardiology. He does report some left sided chest pain and occasionally diaphoresis along with his nausea and vomiting. His EKG currently with no significant St elevation or depression. He does have Hx ESRD requiring HD with prior elevated  troponins in past of 0.16-0.17. Patient discussed with Dr. Law. Will consult cardiology and trend troponins due to prior cardiac Hx and continue monitoring GI status as well.          Hospital Course:  He was noted to have an elevated troponin level and Cardiology was also consulted. Nephrology was consulted for renal/ HD management. Patient initially noted to be in SR, however, later he converted to atrial fibrillation with some RVR with heart rate up to 120-130s. Dr. Kee was notified and patient given IV Bblockade.  The patient later converted back to SR. He was placed on toprol Xl 50mg po daily.  The patient was noted to have a decrease in his platelets and sq heparin was discontinued. Developed fever with evidence of sepsis overnight, now on broad spectrum antibiotics with thombocytopenia and coagulopathy.    No new subjective & objective note has been filed under this hospital service since the last note was generated.    Assessment/Plan:      * Septic shock    Transferred to ICU 8/29 with hypotension  Complicated with hypoglycemia, thrombocytopenia, elevated INR with normal fibrinogen  Blood cultures with pasteurella and proteus.  Source likely his right foot ulcer with abscess that was drained by ID 8/30 with associated cellulitis and thrombophebitis  Much improved now, HD stable.   Transferred from ICU   Appreciate ID assistance  Continue zosyn-  Repeat blood cultures and right foot wound cultures -8/30 proteus from foot and blood (8/29)  Right thigh masses -for CT thigh am -following with ID   MRI foot without evidence of persistent abscess or osteomyelitis            C. difficile colitis      Noted positive PCR today -has been on isolation and po vancomycin -will continue on dc as per ID recommendations.  Improving with Decreased in loose bm -        Hyponatremia    Chronic-has trended down since admit-pt has esrd-on HD today ; has been getting D10 -decreased yesterday -will dc today ; check  TSH-likely due to free water, -continue to trend           Right foot ulcer    S/p debridement --follow with podiatry   Proteus -from culture 8/30-same as culture from blood   Continue on zosyn  As per ID -transition to po abx soon  -discussed with Dr Lenz  Arrange outpt wound care -discussed with CM -          Hypoglycemia    Likely from sepsis, liver dysfunction, decreased PO intake.  Persistent but overall improved, still requiring D10 drip-until today --will dc   Insulin and c-peptide level pending to rule out insulinoma   Wean D10 as tolerated          Thrombocytopenia    Likely from sepsis with underlying chronic liver disease.  Plts decreased to 15 8/30 and transfused 1 unit.  Improved   No evidence of bleeding.  Holding lovenox, recommend no heparin with HD.  Monitor closely        Paroxysmal atrial fibrillation with RVR    Management per cardiology.    Stable heart rate at present.  Careful with beta blocker/CCBs in the setting of hypotension          Elevated troponin    Management per cardiology, not felt to be ACS  Suspect demand ischemia and ESRD as the etiology        Chest pain    Chest pain free at present  See elevated troponin for plan          Benign hypertension with ESRD (end-stage renal disease)    Intermittently hypotensive-while in septic shock -now resolved  Hypertension Medications             amlodipine (NORVASC) 5 MG tablet Take 5 mg by mouth 2 (two) times daily.    hydrALAZINE (APRESOLINE) 50 MG tablet Take 50 mg by mouth 3 (three) times daily.    lisinopril (PRINIVIL,ZESTRIL) 40 MG tablet Take 1 tablet (40 mg total) by mouth once daily.    minoxidil (LONITEN) 2.5 MG tablet Take 3 tablets (7.5 mg total) by mouth 2 (two) times daily.                    Cellulitis    Right thigh/lower leg-for I and D today           Methadone dependence    Methadone dose decreased with acute illness-now that improved and no hypotension.  Continue to monitor closely-extra dose today and increase dose in  am for pain control           Chronic hepatitis C without hepatic coma    With Hx of possible liver cirrhosis  Likely contributing to thrombocytopenia  Ammonia normal  Monitor LFTs            Cirrhosis of liver with ascites    Possible diagnosis though CT abdomen did not note cirrhosis or ascites            Dehydration    Will give NS 500ml IV bolus today          Intractable vomiting with nausea    Likely from sepsis-resolved   Lipase and  Wbcs Wnl, Imaging and exam without evidence of acute abdominal process  Continue  PPI          Chronic back pain    With chronic pain syndrome-  Continue home methadone          Ischemic cardiomyopathy, LVEF 25%-30%    See elevated troponin  No evidence of acute CHF          ESRD (T,Th,Sat) dialysis onset 2013    HD 8/28  Nephrology following for renal/HD management  Electrolytes stable            Coronary artery disease involving native coronary artery of native heart without angina pectoris    With prior coronary stent placement 2016/ Hx ischemic cardiomyopathy-   on ace I   Cardiology following  ON metoprolol.  Hold asa with thrombocytopenia            VTE Risk Mitigation (From admission, onward)        Ordered     IP VTE HIGH RISK PATIENT  Once      08/26/18 1106     Place sequential compression device  Until discontinued      08/26/18 1106     Place ZACHARY hose  Until discontinued      08/26/18 1106              Natasha Morris MD  Department of Hospital Medicine   [facility]

## 2018-09-06 NOTE — PLAN OF CARE
Problem: Patient Care Overview  Goal: Plan of Care Review  Patient sats 97% on RA/ I S done 2000ml x 10bpm

## 2018-09-06 NOTE — PROGRESS NOTES
[facility] Hospital Medicine  Progress Note    Patient Name: João Aguirre  MRN: [id number]  Patient Class: IP- Inpatient   Admission Date: 8/26/2018  Length of Stay: 10 days  Attending Physician: Natasha Morris MD  Primary Care Provider: Primary Doctor No        Subjective:     Principal Problem:Septic shock    HPI:  This is a 53-year-old male with a past medical history of end-stage renal disease on hemodialysis Tuesday, Thursday, and  Saturday, diabetes, chronic back pain on methadone, CAD with prior coronary stent placement 2 years ago, hypertension, prior pneumonia, prior stroke, and recurrent episodes of mid epigastric pain and vomiting but no formal diagnosis of gastroparesis who presents to emergency department for evaluation of epigastric, abdominal pain, and CP with inability to tolerate liquids or food for the past few days.  Patient was seen here in the emergency department yesterday where he underwent basic labs and was given antiemetics.  He was discharged home with a prescription for Reglan and Benadryl which she has not been able to fill secondary to being home vomiting. He presents today via EMS because the symptoms have not improved and are worsening.  He did not get dialysis yesterday.  He denies any shortness of breath but does have an exacerbation of his chronic back pain which is thought to be secondary to him vomiting. The patient is unable hold down his chronic pain medicine which includes methadone.  Patient does not have a medication list with him at this time.    Patient found to have elevated troponin of 0.763 and reports he has Hx of CAD with prior coronary stent placement 2 years ago but has not been following up with cardiology. He does report some left sided chest pain and occasionally diaphoresis along with his nausea and vomiting. His EKG currently with no significant St elevation or depression. He does have Hx ESRD requiring HD with prior elevated  troponins in past of 0.16-0.17. Patient discussed with Dr. Law. Will consult cardiology and trend troponins due to prior cardiac Hx and continue monitoring GI status as well.          Hospital Course:  He was noted to have an elevated troponin level and Cardiology was also consulted. Nephrology was consulted for renal/ HD management. Patient initially noted to be in SR, however, later he converted to atrial fibrillation with some RVR with heart rate up to 120-130s. Dr. Kee was notified and patient given IV Bblockade.  The patient later converted back to SR. He was placed on toprol Xl 50mg po daily.  The patient was noted to have a decrease in his platelets and sq heparin was discontinued. Developed fever with evidence of sepsis overnight, now on broad spectrum antibiotics with thombocytopenia and coagulopathy.    Interval History:   Leg pain same or slightly better  Swelling thigh seems more localized   Requests increased dose methadone    Review of Systems   Constitutional: Negative for chills and fever.   Respiratory: Negative for cough and shortness of breath.    Cardiovascular: Negative for chest pain.   Gastrointestinal: Negative for abdominal pain, diarrhea, nausea and vomiting.   Musculoskeletal: Positive for arthralgias, back pain and gait problem.        Right foot pain   Right leg pain --thigh/calf -areas of erythema/   Skin: Positive for color change and wound.     Objective:     Vital Signs (Most Recent):  Temp: 98.3 °F (36.8 °C) (09/05/18 2023)  Pulse: 76 (09/05/18 2023)  Resp: 18 (09/05/18 2023)  BP: (!) 147/78 (09/05/18 2023)  SpO2: 97 % (09/05/18 2118) Vital Signs (24h Range):  Temp:  [97.8 °F (36.6 °C)-99.6 °F (37.6 °C)] 98.3 °F (36.8 °C)  Pulse:  [67-76] 76  Resp:  [18-20] 18  SpO2:  [91 %-97 %] 97 %  BP: (128-181)/(67-84) 147/78     Weight: 92.9 kg (204 lb 12.9 oz)  Body mass index is 27.78 kg/m².    Intake/Output Summary (Last 24 hours) at 9/5/2018 9216  Last data filed at 9/5/2018  1700  Gross per 24 hour   Intake 1010 ml   Output --   Net 1010 ml      Physical Exam   Constitutional: He is oriented to person, place, and time. He appears well-developed and well-nourished.   Chronically ill appearing man in no acute distress, appears much better, non toxic   HENT:   Nose: Nose normal.   Mouth/Throat: Oropharynx is clear and moist.   Eyes: Conjunctivae are normal. No scleral icterus.   Cardiovascular: Normal rate and regular rhythm.   No murmur heard.  Pulmonary/Chest: Effort normal and breath sounds normal. He has no wheezes. He has no rales.   Abdominal: Soft. Bowel sounds are normal. He exhibits no distension. There is no tenderness.   Musculoskeletal:   rle with ace wrap dressing intact incision plantar prox to right 2nd toe -wound clean; no packing;   slightly red around incision   erthema right inner thigh and right inner mid calf -localized 3cm thigh 1.5 cm inner calf    Neurological: He is alert and oriented to person, place, and time.   Nursing note and vitals reviewed.      Significant Labs:   BMP:   Recent Labs   Lab  09/05/18   0554   GLU  67*   NA  134*   K  4.1   CL  99   CO2  23   BUN  52*   CREATININE  5.6*   CALCIUM  7.6*     CBC:   Recent Labs   Lab  09/04/18   0518  09/05/18   0554   WBC  14.90*  8.80   HGB  10.4*  8.8*   HCT  31.3*  26.3*   PLT  78*  132*       Significant Imaging: I have reviewed and interpreted all pertinent imaging results/findings within the past 24 hours.    Assessment/Plan:      * Septic shock    Transferred to ICU 8/29 with hypotension  Complicated with hypoglycemia, thrombocytopenia, elevated INR with normal fibrinogen  Blood cultures with pasteurella and proteus.  Source likely his right foot ulcer with abscess that was drained by ID 8/30 with associated cellulitis and thrombophebitis  Much improved now, HD stable.   Transferred from ICU   Appreciate ID assistance  Continue zosyn-  Repeat blood cultures and right foot wound cultures -8/30 proteus from  foot and blood (8/29)  Right thigh masses -for CT thigh am -following with ID   MRI foot without evidence of persistent abscess or osteomyelitis            C. difficile colitis      Noted positive PCR today -has been on isolation and po vancomycin -will continue on dc as per ID recommendations.  Improving with Decreased in loose bm -        Hyponatremia    Chronic-has trended down since admit-pt has esrd-on HD today ; has been getting D10 -decreased yesterday -will dc today ; check TSH-likely due to free water, -continue to trend           Right foot ulcer    S/p debridement --follow with podiatry   Proteus -from culture 8/30-same as culture from blood   Continue on zosyn  As per ID -transition to po abx soon  -discussed with Dr Lenz  Arrange outpt wound care -discussed with CM -          Hypoglycemia    Likely from sepsis, liver dysfunction, decreased PO intake.  Persistent but overall improved, still requiring D10 drip-until today --will dc   Insulin and c-peptide level pending to rule out insulinoma   Wean D10 as tolerated          Thrombocytopenia    Likely from sepsis with underlying chronic liver disease.  Plts decreased to 15 8/30 and transfused 1 unit.  Improved   No evidence of bleeding.  Holding lovenox, recommend no heparin with HD.  Monitor closely        Paroxysmal atrial fibrillation with RVR    Management per cardiology.    Stable heart rate at present.  Careful with beta blocker/CCBs in the setting of hypotension          Elevated troponin    Management per cardiology, not felt to be ACS  Suspect demand ischemia and ESRD as the etiology        Chest pain    Chest pain free at present  See elevated troponin for plan          Benign hypertension with ESRD (end-stage renal disease)    Intermittently hypotensive-while in septic shock -now resolved  Hypertension Medications             amlodipine (NORVASC) 5 MG tablet Take 5 mg by mouth 2 (two) times daily.    hydrALAZINE (APRESOLINE) 50 MG tablet Take  50 mg by mouth 3 (three) times daily.    lisinopril (PRINIVIL,ZESTRIL) 40 MG tablet Take 1 tablet (40 mg total) by mouth once daily.    minoxidil (LONITEN) 2.5 MG tablet Take 3 tablets (7.5 mg total) by mouth 2 (two) times daily.                    Methadone dependence    Methadone dose decreased with acute illness-now that improved and no hypotension.  Continue to monitor closely-extra dose today and increase dose in am for pain control           Chronic hepatitis C without hepatic coma    With Hx of possible liver cirrhosis  Likely contributing to thrombocytopenia  Ammonia normal  Monitor LFTs            Cirrhosis of liver with ascites    Possible diagnosis though CT abdomen did not note cirrhosis or ascites            Dehydration    Will give NS 500ml IV bolus today          Intractable vomiting with nausea    Likely from sepsis-resolved   Lipase and  Wbcs Wnl, Imaging and exam without evidence of acute abdominal process  Continue  PPI          Chronic back pain    With chronic pain syndrome-  Continue home methadone          Ischemic cardiomyopathy, LVEF 25%-30%    See elevated troponin  No evidence of acute CHF          ESRD (T,Th,Sat) dialysis onset 2013    HD 8/28  Nephrology following for renal/HD management  Electrolytes stable            Coronary artery disease involving native coronary artery of native heart without angina pectoris    With prior coronary stent placement 2016/ Hx ischemic cardiomyopathy-   on ace I   Cardiology following  ON metoprolol.  Hold asa with thrombocytopenia            VTE Risk Mitigation (From admission, onward)        Ordered     IP VTE HIGH RISK PATIENT  Once      08/26/18 1106     Place sequential compression device  Until discontinued      08/26/18 1106     Place ZACHARY hose  Until discontinued      08/26/18 1106      cellulitis/abscess thigh/leg-discussed  With ID-consult surg for I and D prior to dc         Natasha Morris MD  Department of Hospital Medicine   [facility]

## 2018-09-06 NOTE — PLAN OF CARE
Problem: Patient Care Overview  Goal: Plan of Care Review  Outcome: Ongoing (interventions implemented as appropriate)  Pt resting Alert and oriented x 4, positioned supine with HOB elevated. Plan of care reviewed with pt. Acknowledged and understood..Pt NPO sense midnight. No falls during shift, in NAD. Vitals stable, Cardiac monitoring reading sinus rhythm. Call light in reach. Safety and comfort measures in place. Will continue to monitor.

## 2018-09-06 NOTE — BRIEF OP NOTE
[facility]  Brief Operative Note    SUMMARY     Surgery Date: 9/6/2018     Surgeon(s) and Role:     * Chetan Rtohman MD - Primary    Assisting Surgeon: None    Pre-op Diagnosis:  Cellulitis, unspecified cellulitis site [L03.90]    Post-op Diagnosis:  Post-Op Diagnosis Codes:     * Cellulitis, unspecified cellulitis site [L03.90]    Procedure(s) (LRB):  INCISION AND DRAINAGE, ABSCESS (Right)    Anesthesia: Choice    Description of Procedure: I&D of multiple areas along medial aspect of R leg.  Areas had induration/and flucutance extending from mid thigh down to just above the ankle. Cultures taken from proximal and distal wound.  Moderate purulent drainage from all.      Description of the findings of the procedure: see above.    Estimated Blood Loss: 5 mL         Specimens:   Specimen (12h ago, onward)    None

## 2018-09-06 NOTE — PLAN OF CARE
Problem: Physical Therapy Goal  Goal: Physical Therapy Goal  Goals to be met by: 2018     Patient will increase functional independence with mobility by performin. Supine to sit with MInimal Assistance  2. Sit to stand transfer with Minimal Assistance  3. Bed to chair transfer with Minimal Assistance using Rolling Walker  4. Gait  x 150 feet with Minimal Assistance using Rolling Walker.   5. Lower extremity exercise program x20 reps per handout, with assistance as needed     Outcome: Ongoing (interventions implemented as appropriate)  BLE exercises supine in bed.

## 2018-09-06 NOTE — SUBJECTIVE & OBJECTIVE
Interval History:   Leg pain same or slightly better  Swelling thigh seems more localized   Requests increased dose methadone    Review of Systems   Constitutional: Negative for chills and fever.   Respiratory: Negative for cough and shortness of breath.    Cardiovascular: Negative for chest pain.   Gastrointestinal: Negative for abdominal pain, diarrhea, nausea and vomiting.   Musculoskeletal: Positive for arthralgias, back pain and gait problem.        Right foot pain   Right leg pain --thigh/calf -areas of erythema/   Skin: Positive for color change and wound.     Objective:     Vital Signs (Most Recent):  Temp: 98.3 °F (36.8 °C) (09/05/18 2023)  Pulse: 76 (09/05/18 2023)  Resp: 18 (09/05/18 2023)  BP: (!) 147/78 (09/05/18 2023)  SpO2: 97 % (09/05/18 2118) Vital Signs (24h Range):  Temp:  [97.8 °F (36.6 °C)-99.6 °F (37.6 °C)] 98.3 °F (36.8 °C)  Pulse:  [67-76] 76  Resp:  [18-20] 18  SpO2:  [91 %-97 %] 97 %  BP: (128-181)/(67-84) 147/78     Weight: 92.9 kg (204 lb 12.9 oz)  Body mass index is 27.78 kg/m².    Intake/Output Summary (Last 24 hours) at 9/5/2018 2216  Last data filed at 9/5/2018 1700  Gross per 24 hour   Intake 1010 ml   Output --   Net 1010 ml      Physical Exam   Constitutional: He is oriented to person, place, and time. He appears well-developed and well-nourished.   Chronically ill appearing man in no acute distress, appears much better, non toxic   HENT:   Nose: Nose normal.   Mouth/Throat: Oropharynx is clear and moist.   Eyes: Conjunctivae are normal. No scleral icterus.   Cardiovascular: Normal rate and regular rhythm.   No murmur heard.  Pulmonary/Chest: Effort normal and breath sounds normal. He has no wheezes. He has no rales.   Abdominal: Soft. Bowel sounds are normal. He exhibits no distension. There is no tenderness.   Musculoskeletal:   rle with ace wrap dressing intact incision plantar prox to right 2nd toe -wound clean; no packing;   slightly red around incision   erthema right inner  thigh and right inner mid calf -localized 3cm thigh 1.5 cm inner calf    Neurological: He is alert and oriented to person, place, and time.   Nursing note and vitals reviewed.      Significant Labs:   BMP:   Recent Labs   Lab  09/05/18   0554   GLU  67*   NA  134*   K  4.1   CL  99   CO2  23   BUN  52*   CREATININE  5.6*   CALCIUM  7.6*     CBC:   Recent Labs   Lab  09/04/18   0518  09/05/18   0554   WBC  14.90*  8.80   HGB  10.4*  8.8*   HCT  31.3*  26.3*   PLT  78*  132*       Significant Imaging: I have reviewed and interpreted all pertinent imaging results/findings within the past 24 hours.

## 2018-09-06 NOTE — ANESTHESIA PREPROCEDURE EVALUATION
09/06/2018  João Aguirre is a 53 y.o., male.    Anesthesia Evaluation    I have reviewed the Patient Summary Reports.    I have reviewed the Nursing Notes.   I have reviewed the Medications.     Review of Systems  Anesthesia Hx:  No problems with previous Anesthesia    Social:  Former Smoker    Cardiovascular:   Hypertension, well controlled Past MI CAD asymptomatic  hyperlipidemia    Pulmonary:   Pneumonia Asthma mild    Renal/:   Chronic Renal Disease, ESRD    Hepatic/GI:   Liver Disease, Hepatitis, C    Musculoskeletal:   Arthritis     Neurological:   CVA Seizures, well controlled    Endocrine:   Diabetes, well controlled, type 2        Physical Exam  General:  Well nourished    Airway/Jaw/Neck:  Airway Findings: Mouth Opening: Normal Tongue: Normal  General Airway Assessment: Adult  Oropharynx Findings:  Mallampati: II  Jaw/Neck Findings:  Neck ROM: Normal ROM     Eyes/Ears/Nose:  Eyes/Ears/Nose Findings:    Dental:  Dental Findings:   Chest/Lungs:  Chest/Lungs Findings: Normal Respiratory Rate     Heart/Vascular:  Heart Findings: Rate: Normal  Rhythm: Regular Rhythm        Mental Status:  Mental Status Findings:  Cooperative, Alert and Oriented         Anesthesia Plan  Type of Anesthesia, risks & benefits discussed:  Anesthesia Type:  general  Patient's Preference:   Intra-op Monitoring Plan: standard ASA monitors  Intra-op Monitoring Plan Comments:   Post Op Pain Control Plan: multimodal analgesia  Post Op Pain Control Plan Comments:   Induction:   IV  Beta Blocker:  Patient is not currently on a Beta-Blocker (No further documentation required).       Informed Consent: Patient understands risks and agrees with Anesthesia plan.  Questions answered. Anesthesia consent signed with patient.  ASA Score: 4     Day of Surgery Review of History & Physical:  There are no significant changes.   H&P  completed by Anesthesiologist.       Ready For Surgery From Anesthesia Perspective.

## 2018-09-06 NOTE — NURSING
Called dialysis x2 before pt goes to surgery, unable to reach them.     Blood sugar 65 cover with PRN D50

## 2018-09-06 NOTE — PLAN OF CARE
Rec'd pt from floor to bay 8. Warm blankets provided. Pt states he is comfortable. Scd wrap placed.

## 2018-09-06 NOTE — HPI
"54 yo M with multiple medical problems including but not limited to CAD, DM, ESRD, Hep C cirrhosis.  Pt admitted with sepsis presumably from chonic wound on his R foot.  Review of his records demonstrates that pt has had some cellulitis on his R leg for the last several days.  He notes that the area has come to a "head" consistent with abscess.  Ih ave been asked to I&D presumed abscess  "

## 2018-09-06 NOTE — ASSESSMENT & PLAN NOTE
Noted positive PCR today -has been on isolation and po vancomycin -will continue on dc as per ID recommendations.  Improving with Decreased in loose bm -

## 2018-09-06 NOTE — CONSULTS
"[facility]  General Surgery  Consult Note    Patient Name: João Aguirre  MRN: [id number]  Code Status: Full Code  Admission Date: 8/26/2018  Hospital Length of Stay: 11 days  Attending Physician: Natasha Morris MD  Primary Care Provider: Primary Doctor No    Patient information was obtained from patient and ER records.     Consults  Subjective:     Principal Problem: Septic shock    History of Present Illness: 54 yo M with multiple medical problems including but not limited to CAD, DM, ESRD, Hep C cirrhosis.  Pt admitted with sepsis presumably from chonic wound on his R foot.  Review of his records demonstrates that pt has had some cellulitis on his R leg for the last several days.  He notes that the area has come to a "head" consistent with abscess.  Ih ave been asked to I&D presumed abscess    No current facility-administered medications on file prior to encounter.      Current Outpatient Medications on File Prior to Encounter   Medication Sig    albuterol (PROAIR HFA) 90 mcg/actuation inhaler INHALE TWO PUFFS EVERY 4 TO 6 HOURS AS NEEDED    amlodipine (NORVASC) 5 MG tablet Take 5 mg by mouth 2 (two) times daily.    aspirin 325 MG tablet Take 1 tablet (325 mg total) by mouth once daily.    calcium acetate (PHOSLO) 667 mg capsule Take 1 capsule (667 mg total) by mouth 3 (three) times daily with meals.    hydrALAZINE (APRESOLINE) 50 MG tablet Take 50 mg by mouth 3 (three) times daily.    levetiracetam (KEPPRA) 500 MG Tab Take 500 mg twice a day.  Take an extra tablet right after dialysis (making 3 tablets on your dialysis days)    lisinopril (PRINIVIL,ZESTRIL) 40 MG tablet Take 1 tablet (40 mg total) by mouth once daily.    methadone (DOLOPHINE) 10 MG tablet Take 9 tablets (90 mg total) by mouth once daily.    metoclopramide HCl (REGLAN) 10 MG tablet Take 1 tablet (10 mg total) by mouth 3 (three) times daily as needed (nausea/vomiting).    minoxidil (LONITEN) 2.5 MG tablet Take " 3 tablets (7.5 mg total) by mouth 2 (two) times daily.       Review of patient's allergies indicates:   Allergen Reactions    Antibiotic hc      Counter acts with methodone       Past Medical History:   Diagnosis Date    Anticoagulant long-term use     Arthritis     Asthma     Back pain     Coronary artery disease 2013    stent    Diabetes mellitus     Encounter for blood transfusion     Eye abnormality right eye    injured as a child    Gastritis     Gastroparesis     Hemodialysis patient     Hypertension     Pneumonia 2013    Spend 6weeks in hospital at [place]    Renal disorder     Stroke      Past Surgical History:   Procedure Laterality Date    AV FISTULA PLACEMENT      BACK SURGERY      CARDIAC SURGERY  2013    heart stent    CHOLECYSTECTOMY      EYE SURGERY      R eye     Family History     Problem Relation (Age of Onset)    Aneurysm Mother, Father (77)    Diabetes Brother    Heart disease Father, Paternal Grandmother    Kidney disease Brother        Tobacco Use    Smoking status: Former Smoker     Years: 10.00     Last attempt to quit: 9/1/2015     Years since quitting: 3.0    Smokeless tobacco: Never Used   Substance and Sexual Activity    Alcohol use: No    Drug use: Yes     Frequency: 4.0 times per week     Types: Other-see comments     Comment: marijuana    Sexual activity: Yes     Review of Systems   Eyes: Positive for visual disturbance.   Gastrointestinal: Negative for abdominal distention and abdominal pain.   Skin: Positive for color change and wound.   Hematological: Negative for adenopathy. Bruises/bleeds easily.     Objective:     Vital Signs (Most Recent):  Temp: 97.7 °F (36.5 °C) (09/06/18 1215)  Pulse: 77 (09/06/18 1215)  Resp: 18 (09/06/18 1215)  BP: (!) 173/81 (09/06/18 1215)  SpO2: 99 %(RA) (09/06/18 1215) Vital Signs (24h Range):  Temp:  [97.6 °F (36.4 °C)-98.3 °F (36.8 °C)] 97.7 °F (36.5 °C)  Pulse:  [72-77] 77  Resp:  [16-20] 18  SpO2:  [94 %-99 %] 99 %  BP:  (129-173)/(62-81) 173/81     Weight: 92.9 kg (204 lb 12.9 oz)  Body mass index is 27.78 kg/m².    Physical Exam   Constitutional: No distress.   HENT:   Head: Normocephalic and atraumatic.   Eyes: No scleral icterus.   Neck: Normal range of motion. Neck supple. No tracheal deviation present. No thyromegaly present.   Cardiovascular: Normal rate and regular rhythm.   Pulmonary/Chest: No stridor. No respiratory distress.   Abdominal: He exhibits no distension. There is no tenderness.   Musculoskeletal: He exhibits no edema, tenderness or deformity.   Skin: He is not diaphoretic.        Vitals reviewed.      Significant Labs:  CBC:   Recent Labs   Lab  09/06/18   0520   WBC  8.20   RBC  2.86*   HGB  9.1*   HCT  27.4*   PLT  186   MCV  96   MCH  31.8*   MCHC  33.3     BMP:   Recent Labs   Lab  09/06/18   0520   GLU  61*   NA  134*   K  4.6   CL  100   CO2  21*   BUN  59*   CREATININE  7.1*   CALCIUM  7.6*       Significant Diagnostics:      Assessment/Plan:     Cellulitis    Abscess to the R leg.  To have I&D today in the OR          VTE Risk Mitigation (From admission, onward)        Ordered     IP VTE HIGH RISK PATIENT  Once      08/26/18 1106     Place sequential compression device  Until discontinued      08/26/18 1106     Place ZACHARY hose  Until discontinued      08/26/18 1106          Thank you for your consult. I will follow-up with patient. Please contact us if you have any additional questions.    Chetan Rothman MD  General Surgery  [facility]

## 2018-09-06 NOTE — PROGRESS NOTES
"Progress Note  Infectious Disease    Follow-up For:  Cellulitis, bacteremia    SUBJECTIVE:   ROS:  Feels that compresses have helped but no change clinically. Still super tender. No palpable abscesses, ?hematomata in tissue from low platelets. No abscess on Ct. He had 2 loose stools overnight, CDag pos, toxin neg, pcr pending. No abd pain or nausea. Eating fairly well. Does not feel ready to go home.    9/4: Cdiff pcr positive. He reports that the diarrhea has "checked". Eating multiple small meals.   9/5: 2 loose stools today. Eating fine. No abd pain.  Was not discharged because he cannot get his methadone refilled until 9/7. The warm compresses to the right leg have helped to soften the lesions and the proximal ones are taking on a little bit of an abscess quality. The one near ankle is more like SQ bloo.     Antibiotics (From admission, onward)    Start     Stop Route Frequency Ordered    09/06/18 1400  cefTRIAXone (ROCEPHIN) 1 g in dextrose 5 % 50 mL IVPB      09/09 1359 IV Every 24 hours (non-standard times) 09/05/18 1851    09/03/18 1800  vancomycin 250mg / 10ml oral suspension 125 mg      -- Oral Every 6 hours 09/03/18 1335          Pertinent Medications noted:    EXAM & DIAGNOSTICS REVIEWED:   Vitals:     Temp:  [97.8 °F (36.6 °C)-99.6 °F (37.6 °C)]   Temp: 98.3 °F (36.8 °C) (09/05/18 2023)  Pulse: 76 (09/05/18 2023)  Resp: 18 (09/05/18 2023)  BP: (!) 147/78 (09/05/18 2023)  SpO2: 97 % (09/05/18 2118)    Intake/Output Summary (Last 24 hours) at 9/5/2018 2200  Last data filed at 9/5/2018 1700  Gross per 24 hour   Intake 1010 ml   Output --   Net 1010 ml       General:  In NAD. Looks more comfortable  Eyes:  Anicteric, PERRL, EOMI  ENT:  Mouth w/ pink MMM, no lesions/exudate,   poor  dentition  Neck:    Lungs:    Heart:    Abd:   soft and non tender,   :  Voids  Musc:  Joints without effusion, erythema, synovitis,   Skin:  Generally warm, dry, normal for color. No rashes  Wound: Right plantar ulceration uch "  now that the callous has been debrided, no purulence today.   Neuro: AAOx3, speech clear, moves all extrems equally  Extrem: No edema, and there is resolved cellulitis of the foot and right medial leg. Still has tender ecchymotic nodules,  Tender masses with bruising medial thigh with softening and potential abscess evolution proximally and medial ankle which looks more like blood.    VAD:   peripheral    Lines/Tubes/Drains:    General Labs reviewed:  Recent Labs   Lab  09/05/18   0554   WBC  8.80   RBC  2.78*   HGB  8.8*   HCT  26.3*   PLT  132*   MCV  95   MCH  31.8*   MCHC  33.5     Recent Labs   Lab  09/05/18   0554   CALCIUM  7.6*   PROT  5.7*   NA  134*   K  4.1   CO2  23   CL  99   BUN  52*   CREATININE  5.6*   ALKPHOS  295*   ALT  8*   AST  17   BILITOT  0.7       Micro: reviewed    Imaging Reviewed: MRI of forefoot, no osteomyelitis  Reviewed C tof thigh and n o abscesses evident    ASSESSMENT & PLAN      1.         Sepsis, with Proteus and Pasteurella, right leg cellulitis, lymphangitis, ?superficial thrombophlebitis with small but tender ecchymoses, which have softened and may now represent abscesses  2.         Right foot callous(longstanding) with abscess beneath the callous, draining toward the base of the right 3/4 interspace  3.         ESRD on dialysis  4.         Severe hypoglycemia, life threatening  5.         Cirrhosis, with hepatitis C  6.         Severe thrombocytopenia likely due to sepsis  7.         Nausea and vomiting, suspected gastroparesis, history of diabetes in the past  8. Diarrhea, Cdiff antigen pos, toxin neg, pcr positive       Recommendation:   Continue  rocephin   Consult general surgery to incise and drain right leg masses  Continue oral vanc x 14d  Continue  warm compresses

## 2018-09-06 NOTE — PT/OT/SLP PROGRESS
Physical Therapy Treatment    Patient Name:  João Aguirre   MRN:  [id number]    Recommendations:     Discharge Recommendations:      Discharge Equipment Recommendations:     Barriers to discharge: None    Assessment:     João Aguirre is a 53 y.o. male admitted with a medical diagnosis of Septic shock.  He presents with the following impairments/functional limitations:  weakness, impaired endurance, impaired self care skills, impaired functional mobilty, pain, decreased lower extremity function .    Rehab Prognosis: fair; patient would benefit from acute skilled PT services to address these deficits and reach maximum level of function.      Recent Surgery: Procedure(s) (LRB):  INCISION AND DRAINAGE, ABSCESS (Right) Day of Surgery    Plan:     During this hospitalization, patient to be seen 6 x/week to address the above listed problems via gait training, therapeutic activities, therapeutic exercises  · Plan of Care Expires:  09/28/18   Plan of Care Reviewed with: patient    Subjective     Communicated with nurse Miller prior to session.  Patient found supine in bed upon PT entry to room, agreeable to treatment.      Chief Complaint: pain R leg and weakness.  Patient comments/goals: none stated  Pain/Comfort:  · Pain Rating 1: 6/10  · Location - Side 1: Right  · Location - Orientation 1: generalized  · Location 1: leg  · Pain Addressed 1: Reposition, Nurse notified    Patients cultural, spiritual, Advent conflicts given the current situation:      Objective:     Patient found with: telemetry     General Precautions: Standard, blind, contact, fall   Orthopedic Precautions:RLE weight bearing as tolerated(po shoe R foot)   Braces:       Functional Mobility:  ·       AM-PAC 6 CLICK MOBILITY          Therapeutic Activities and Exercises:   BLE exercises at 10 reps each ; SLR's, HS, QS, Hip abd /add, AP's.    Patient left supine with all lines intact, call button in reach and nurse Angela  notified..    GOALS:   Multidisciplinary Problems     Physical Therapy Goals        Problem: Physical Therapy Goal    Goal Priority Disciplines Outcome Goal Variances Interventions   Physical Therapy Goal     PT, PT/OT Ongoing (interventions implemented as appropriate)     Description:  Goals to be met by: 2018     Patient will increase functional independence with mobility by performin. Supine to sit with MInimal Assistance  2. Sit to stand transfer with Minimal Assistance  3. Bed to chair transfer with Minimal Assistance using Rolling Walker  4. Gait  x 150 feet with Minimal Assistance using Rolling Walker.   5. Lower extremity exercise program x20 reps per handout, with assistance as needed                      Time Tracking:     PT Received On: 18  PT Start Time: 1110     PT Stop Time: 1120  PT Total Time (min): 10 min     Billable Minutes: Therapeutic Exercise 10min    Treatment Type: Treatment  PT/PTA: PTA     PTA Visit Number: 2     Shaye Garcia PTA  2018

## 2018-09-06 NOTE — TRANSFER OF CARE
Anesthesia Transfer of Care Note    Patient: João Aguirre    Procedure(s) Performed: Procedure(s) (LRB):  INCISION AND DRAINAGE, ABSCESS (Right)    Patient location: PACU    Anesthesia Type: general    Transport from OR: Transported from OR on 2-3 L/min O2 by NC with adequate spontaneous ventilation    Post pain: adequate analgesia    Post assessment: no apparent anesthetic complications and tolerated procedure well    Post vital signs: stable    Level of consciousness: awake, alert and oriented    Nausea/Vomiting: no nausea/vomiting    Complications: none    Transfer of care protocol was followed      Last vitals:   Visit Vitals  BP (!) 173/81 (BP Location: Right arm, Patient Position: Lying)   Pulse 77   Temp 36.5 °C (97.7 °F) (Skin)   Resp 18   Ht 6' (1.829 m)   Wt 92.9 kg (204 lb 12.9 oz)   SpO2 99%   BMI 27.78 kg/m²

## 2018-09-07 VITALS
HEART RATE: 72 BPM | TEMPERATURE: 98 F | WEIGHT: 204.81 LBS | BODY MASS INDEX: 27.74 KG/M2 | RESPIRATION RATE: 20 BRPM | DIASTOLIC BLOOD PRESSURE: 84 MMHG | OXYGEN SATURATION: 95 % | HEIGHT: 72 IN | SYSTOLIC BLOOD PRESSURE: 169 MMHG

## 2018-09-07 LAB
ALBUMIN SERPL BCP-MCNC: 1.8 G/DL
ALP SERPL-CCNC: 388 U/L
ALT SERPL W/O P-5'-P-CCNC: 11 U/L
ANION GAP SERPL CALC-SCNC: 12 MMOL/L
AST SERPL-CCNC: 20 U/L
BASOPHILS # BLD AUTO: 0 K/UL
BASOPHILS NFR BLD: 0.3 %
BILIRUB SERPL-MCNC: 0.5 MG/DL
BUN SERPL-MCNC: 62 MG/DL
CALCIUM SERPL-MCNC: 7.6 MG/DL
CHLORIDE SERPL-SCNC: 100 MMOL/L
CO2 SERPL-SCNC: 21 MMOL/L
CREAT SERPL-MCNC: 8.5 MG/DL
DIFFERENTIAL METHOD: ABNORMAL
EOSINOPHIL # BLD AUTO: 0.2 K/UL
EOSINOPHIL NFR BLD: 1.8 %
ERYTHROCYTE [DISTWIDTH] IN BLOOD BY AUTOMATED COUNT: 18.2 %
EST. GFR  (AFRICAN AMERICAN): 7 ML/MIN/1.73 M^2
EST. GFR  (NON AFRICAN AMERICAN): 6 ML/MIN/1.73 M^2
GLUCOSE SERPL-MCNC: 58 MG/DL
HCT VFR BLD AUTO: 27.5 %
HGB BLD-MCNC: 9.1 G/DL
INR PPP: 1
LYMPHOCYTES # BLD AUTO: 1.5 K/UL
LYMPHOCYTES NFR BLD: 16.4 %
MCH RBC QN AUTO: 32.3 PG
MCHC RBC AUTO-ENTMCNC: 33.3 G/DL
MCV RBC AUTO: 97 FL
MONOCYTES # BLD AUTO: 0.7 K/UL
MONOCYTES NFR BLD: 7.3 %
NEUTROPHILS # BLD AUTO: 6.9 K/UL
NEUTROPHILS NFR BLD: 74.2 %
PHOSPHATE SERPL-MCNC: 5.8 MG/DL
PLATELET # BLD AUTO: 193 K/UL
PMV BLD AUTO: 8.3 FL
POCT GLUCOSE: 55 MG/DL (ref 70–110)
POCT GLUCOSE: 60 MG/DL (ref 70–110)
POCT GLUCOSE: 81 MG/DL (ref 70–110)
POTASSIUM SERPL-SCNC: 5 MMOL/L
PROT SERPL-MCNC: 6.2 G/DL
PROTHROMBIN TIME: 10.5 SEC
RBC # BLD AUTO: 2.83 M/UL
SODIUM SERPL-SCNC: 133 MMOL/L
WBC # BLD AUTO: 9.3 K/UL

## 2018-09-07 PROCEDURE — 94799 UNLISTED PULMONARY SVC/PX: CPT

## 2018-09-07 PROCEDURE — 85025 COMPLETE CBC W/AUTO DIFF WBC: CPT

## 2018-09-07 PROCEDURE — 85610 PROTHROMBIN TIME: CPT

## 2018-09-07 PROCEDURE — 25000003 PHARM REV CODE 250: Performed by: NURSE PRACTITIONER

## 2018-09-07 PROCEDURE — 25000003 PHARM REV CODE 250: Performed by: INTERNAL MEDICINE

## 2018-09-07 PROCEDURE — A4216 STERILE WATER/SALINE, 10 ML: HCPCS | Performed by: EMERGENCY MEDICINE

## 2018-09-07 PROCEDURE — 25000003 PHARM REV CODE 250: Performed by: HOSPITALIST

## 2018-09-07 PROCEDURE — 63600175 PHARM REV CODE 636 W HCPCS: Performed by: NURSE PRACTITIONER

## 2018-09-07 PROCEDURE — 84100 ASSAY OF PHOSPHORUS: CPT

## 2018-09-07 PROCEDURE — 80053 COMPREHEN METABOLIC PANEL: CPT

## 2018-09-07 PROCEDURE — 63600175 PHARM REV CODE 636 W HCPCS: Performed by: INTERNAL MEDICINE

## 2018-09-07 PROCEDURE — 36415 COLL VENOUS BLD VENIPUNCTURE: CPT

## 2018-09-07 PROCEDURE — 94761 N-INVAS EAR/PLS OXIMETRY MLT: CPT

## 2018-09-07 PROCEDURE — 25000003 PHARM REV CODE 250: Performed by: EMERGENCY MEDICINE

## 2018-09-07 RX ORDER — CEFDINIR 300 MG/1
300 CAPSULE ORAL DAILY
Qty: 10 CAPSULE | Refills: 0 | Status: SHIPPED | OUTPATIENT
Start: 2018-09-07 | End: 2018-09-17

## 2018-09-07 RX ADMIN — Medication 125 MG: at 05:09

## 2018-09-07 RX ADMIN — HYDROCODONE BITARTRATE AND ACETAMINOPHEN 1 TABLET: 5; 325 TABLET ORAL at 01:09

## 2018-09-07 RX ADMIN — HYDRALAZINE HYDROCHLORIDE 50 MG: 25 TABLET, FILM COATED ORAL at 02:09

## 2018-09-07 RX ADMIN — COLLAGENASE SANTYL: 250 OINTMENT TOPICAL at 08:09

## 2018-09-07 RX ADMIN — HYDRALAZINE HYDROCHLORIDE 10 MG: 20 INJECTION INTRAMUSCULAR; INTRAVENOUS at 05:09

## 2018-09-07 RX ADMIN — CEFTRIAXONE 1 G: 1 INJECTION, SOLUTION INTRAVENOUS at 02:09

## 2018-09-07 RX ADMIN — HYDROCODONE BITARTRATE AND ACETAMINOPHEN 1 TABLET: 5; 325 TABLET ORAL at 09:09

## 2018-09-07 RX ADMIN — PANTOPRAZOLE SODIUM 40 MG: 40 TABLET, DELAYED RELEASE ORAL at 08:09

## 2018-09-07 RX ADMIN — ONDANSETRON HYDROCHLORIDE 4 MG: 2 INJECTION, SOLUTION INTRAMUSCULAR; INTRAVENOUS at 06:09

## 2018-09-07 RX ADMIN — Medication 3 ML: at 02:09

## 2018-09-07 RX ADMIN — METHADONE HYDROCHLORIDE 60 MG: 10 TABLET ORAL at 08:09

## 2018-09-07 RX ADMIN — Medication 125 MG: at 12:09

## 2018-09-07 RX ADMIN — LEVETIRACETAM 500 MG: 500 TABLET ORAL at 08:09

## 2018-09-07 RX ADMIN — HYDRALAZINE HYDROCHLORIDE 50 MG: 25 TABLET, FILM COATED ORAL at 08:09

## 2018-09-07 RX ADMIN — HYDROCODONE BITARTRATE AND ACETAMINOPHEN 1 TABLET: 5; 325 TABLET ORAL at 05:09

## 2018-09-07 RX ADMIN — Medication 3 ML: at 05:09

## 2018-09-07 RX ADMIN — AMLODIPINE BESYLATE 5 MG: 5 TABLET ORAL at 08:09

## 2018-09-07 RX ADMIN — LISINOPRIL 40 MG: 40 TABLET ORAL at 08:09

## 2018-09-07 NOTE — NURSING
Pt Tele box is missing when he returned from surgery. Placed on new monitor. Will continue to monitor.

## 2018-09-07 NOTE — PROGRESS NOTES
SSC met with patient at bedside regarding home health.  Patient signed patient preference form choosing [facility].  Patient discharge address:  [address] [place]  [address] [phone].  SSC sent patient patient patient information to [facility] through [facility] for authorization and acceptance.YAMILE Gutierrez    9/10/2018 3:09:26 PM Accepted [email or web address]

## 2018-09-07 NOTE — PLAN OF CARE
Problem: Patient Care Overview  Goal: Plan of Care Review  Outcome: Ongoing (interventions implemented as appropriate)  POC reviewed with pt, verbalized understanding. Incontinence care provided. Wound care provided to wounds. Fall and safety precautions maintained. Pt awake throughout the night. Warm compresses applied to wounds through the night. PRN pain medication given PRN. Bed in lowest position, call light in reach. Will continue to monitor.

## 2018-09-07 NOTE — PROGRESS NOTES
INPATIENT NEPHROLOGY PROGRESS NOTE  [facility] NEPHROLOGY    João Aguirre  09/07/2018    Reason for consultation:     ESRD    History of Present Illness:       53M with a ESRD on HD TTS, DM2, Hep C cirrhosis, chronic back pain on methadone, long-term anticoagulant use HTN, prior stroke, and recurrent episodes of mid epigastric pain and vomiting but no formal diagnosis of gastroparesis admitted with sepsis due to Proteus/Pasturella infection in right leg. May also have c.diff, PCR pending.    8/27  Seen on dialysis.  No nausea currently.  No chest pain or sob  8/28  Seen on dialysis.  Sleeping  8/29  Sleeping  8/30  Transferred to ICU.  No n,v,chest pain, sob  9/1  HD TTS.  Seen today on dialysis.  Platelets low, holding heparin w/HD.  Denies pain.  9/2  2L UF yesterday, tolerated well.  Pressures high, resumed lisinopril and amlodipine today.  Platelets 37, heparin on hold.  States having abd cramping today.  9/3  Persistent hypoglycemia during the night, has developed diarrhea.  Na+ low, on D10 gtt for now.  Phos 1.9, discontinued binder until phos comes back up.  BP still high, but a little better than yesterday.  No fevers.  Stool for Cdiff collected and results pending.  Denies pain and SOB, no edema.  9/4 VSS, seen and examined on HD, tolerating well. RLE cellulitis still looks pretty angry.  9/5 VSS, afebrile. HD tomorrow or as needed.  9/6 VSS, no new complains, did not want to stay for HD today, prefers to go to outpatient unit tomorrow. Had surgical intervention yesterday by Dr. Rothman. OK to OH from renal stand point.    Plan of Care:     Assessment:     ESRD on HD TTS  Hyponatremia - difficult to correct with HD - please limit oral free water.  Hypertension  Hypoglycemia   Anemia  RLE cellulitis, lymphangitis, thrombophlebitis 2/2 Proteus/Pasturella, surgical debridement 9/6 Dr. Rothman  C. Diff colitis?    Plan:     Continue HD TTS  Renal diet as tolerated.  Binders PRN if Phos is high.  ERNESTO with  HD PRN to keep Hb 8-10.  Continue BP meds.  Appreciate ID and Surgical input.    Thank you for allowing us to participate in this patient's care. We will continue to follow.    Medications:  Scheduled Meds:   amLODIPine  5 mg Oral Daily    cefTRIAXone (ROCEPHIN) IVPB  1 g Intravenous Q24H    collagenase   Topical (Top) Daily    epoetin harshad (PROCRIT) injection  10,000 Units Subcutaneous Every Tues, Thurs, Sat    hydrALAZINE  50 mg Oral TID    levETIRAcetam  500 mg Oral BID    lisinopril  40 mg Oral Daily    methadone  60 mg Oral Daily    pantoprazole  40 mg Oral BID    sodium chloride 0.9%  3 mL Intravenous Q8H    vancomycin  125 mg Oral Q6H     Continuous Infusions:    PRN Meds:.acetaminophen, dextrose 50%, dextrose 50%, dicyclomine, glucose, glucose, hydrALAZINE, HYDROcodone-acetaminophen, magnesium sulfate IVPB, metoclopramide HCl, ondansetron, sodium chloride 0.9%    Allergies:  Antibiotic hc    Vital Signs:  Temp Readings from Last 3 Encounters:   09/07/18 97.5 °F (36.4 °C) (Oral)   08/25/18 97.8 °F (36.6 °C) (Oral)   10/19/17 97.6 °F (36.4 °C)       Pulse Readings from Last 3 Encounters:   09/07/18 72   08/25/18 75   10/19/17 (!) 59       BP Readings from Last 3 Encounters:   09/07/18 (!) 169/84   08/25/18 133/71   10/19/17 (!) 166/81       Weight:  Wt Readings from Last 3 Encounters:   08/30/18 92.9 kg (204 lb 12.9 oz)   08/25/18 94.8 kg (209 lb)   10/17/17 94.8 kg (209 lb 0 oz)       Physical Exam:    Constitutional: NAD  Neuro: No asterixis  Psych: Calm  EENT: NCAT, anicteric sclera   Neck:  supple  Cards: No rub  Lungs: clear to ausculation  GI:  Tender to palpation  MSK:  WNWD  Skin: no purpura, rashes       Results:  Lab Results   Component Value Date     (L) 09/07/2018    K 5.0 09/07/2018     09/07/2018    CO2 21 (L) 09/07/2018    BUN 62 (H) 09/07/2018    CREATININE 8.5 (H) 09/07/2018    CALCIUM 7.6 (L) 09/07/2018    ANIONGAP 12 09/07/2018    ESTGFRAFRICA 7 (A) 09/07/2018     EGFRNONAA 6 (A) 09/07/2018       Lab Results   Component Value Date    CALCIUM 7.6 (L) 09/07/2018    PHOS 5.8 (H) 09/07/2018       Recent Labs   Lab  09/07/18   0526   WBC  9.30   RBC  2.83*   HGB  9.1*   HCT  27.5*   PLT  193   MCV  97   MCH  32.3*   MCHC  33.3     I have personally reviewed pertinent radiological imaging and reports.

## 2018-09-07 NOTE — PLAN OF CARE
Scheduled patient with a new patient appt at [facility] to establish care.       09/07/18 1449   Final Note   Assessment Type Final Discharge Note   Discharge Disposition Home-Health   Hospital Follow Up  Appt(s) scheduled? Yes

## 2018-09-07 NOTE — PLAN OF CARE
Scheduled patient a new patient appt at the [facility] to establish care.       09/07/18 1449   Final Note   Assessment Type Final Discharge Note   Discharge Disposition Home   Hospital Follow Up  Appt(s) scheduled? Yes

## 2018-09-07 NOTE — PLAN OF CARE
Problem: Patient Care Overview  Goal: Plan of Care Review  Outcome: Ongoing (interventions implemented as appropriate)   Pt surgery went well, 4 small incisions to the right leg covered with dressings. Monitoring blood sugars, pain, and safety. Pt will receive dialysis in the morning.

## 2018-09-07 NOTE — PROGRESS NOTES
Discharge instructions given to patient and family. Verbalized understanding. Left IJ removed, pressure applied, bandage applied. Patient lied flat for 15 minutes. [facility] Assisted in w/c; and off floor to personal vehicle.

## 2018-09-07 NOTE — PLAN OF CARE
09/07/18 0816   Patient Assessment/Suction   Level of Consciousness (AVPU) alert   Respiratory Effort Normal;Unlabored   Expansion/Accessory Muscles/Retractions no retractions;no use of accessory muscles   All Lung Fields Breath Sounds clear   Rhythm/Pattern, Respiratory unlabored;pattern regular;depth regular   Cough Frequency infrequent   Cough Type good;nonproductive   PRE-TX-O2-ETCO2   O2 Device (Oxygen Therapy) room air   SpO2 98 %   Pulse Oximetry Type Intermittent   $ Pulse Oximetry - Multiple Charge Pulse Oximetry - Multiple   Pulse 74   Resp 16   Incentive Spirometer   $ Incentive Spirometer Charges done with encouragement   Administration (Incentive Spirometer) done with encouragement   Number of Repetitions (Incentive Spirometer) 10   Level (mL) (Incentive Spirometer) 2000   Patient Tolerance good

## 2018-09-07 NOTE — PROGRESS NOTES
Surgical findings noted and appreciate Dr. Rothman  Bandages not disturbed  I would expect the same organisms from his foot/blood in the abscesses, vs sterile, considering the antibiotics he has already had.  He tells me diarrhea is improved.  ?home tomorrow(so he can get his methadone?)  Would use cefdinir, dosed for renal failure  And oral vanc for2 weeks AFTER the cefdinir is complete

## 2018-09-07 NOTE — PLAN OF CARE
1200  The prescribed Cefdinir is covered by pt's insurance, however the Vanc requires a PA.  Faxed PA request to [place] [employer].    1245  Fax received from [place] [employer] stating the request is denied, due to patient having Medicare Part D coverage with [facility].    1310  Faxed PA request to [facility].       09/07/18 1202   Discharge Reassessment   Assessment Type Discharge Planning Reassessment

## 2018-09-07 NOTE — PROGRESS NOTES
Pt seen and examined.  Being d/c today.      Wt Readings from Last 3 Encounters:   08/30/18 92.9 kg (204 lb 12.9 oz)   08/25/18 94.8 kg (209 lb)   10/17/17 94.8 kg (209 lb 0 oz)     Temp Readings from Last 3 Encounters:   09/07/18 97.5 °F (36.4 °C) (Oral)   08/25/18 97.8 °F (36.6 °C) (Oral)   10/19/17 97.6 °F (36.4 °C)     BP Readings from Last 3 Encounters:   09/07/18 (!) 169/84   08/25/18 133/71   10/19/17 (!) 166/81     Pulse Readings from Last 3 Encounters:   09/07/18 72   08/25/18 75   10/19/17 (!) 59     AAOx3  Wound dressing in placed.  Changed earlier today    A/P: cont local wound care  Medical mgmt  F/u with me prn

## 2018-09-08 PROBLEM — E43 SEVERE PROTEIN-CALORIE MALNUTRITION: Status: ACTIVE | Noted: 2018-09-08

## 2018-09-08 RX ORDER — METRONIDAZOLE 500 MG/1
500 TABLET ORAL EVERY 8 HOURS
Qty: 30 TABLET | Refills: 1 | Status: CANCELLED | OUTPATIENT
Start: 2018-09-08

## 2018-09-08 NOTE — DISCHARGE SUMMARY
[facility] Hospital Medicine  Discharge Summary      Patient Name: João Aguirre  MRN: [id number]  Admission Date: 8/26/2018  Hospital Length of Stay: 12 days  Discharge Date and Time: 9/7/2018  4:56 PM  Attending Physician: No att. providers found   Discharging Provider: Natasha Morris MD  Primary Care Provider: NENA Boyle      HPI:   This is a 53-year-old male with a past medical history of end-stage renal disease on hemodialysis Tuesday, Thursday, and  Saturday, diabetes, chronic back pain on methadone, CAD with prior coronary stent placement 2 years ago, hypertension, prior pneumonia, prior stroke, and recurrent episodes of mid epigastric pain and vomiting but no formal diagnosis of gastroparesis who presents to emergency department for evaluation of epigastric, abdominal pain, and CP with inability to tolerate liquids or food for the past few days.  Patient was seen here in the emergency department yesterday where he underwent basic labs and was given antiemetics.  He was discharged home with a prescription for Reglan and Benadryl which she has not been able to fill secondary to being home vomiting. He presents today via EMS because the symptoms have not improved and are worsening.  He did not get dialysis yesterday.  He denies any shortness of breath but does have an exacerbation of his chronic back pain which is thought to be secondary to him vomiting. The patient is unable hold down his chronic pain medicine which includes methadone.  Patient does not have a medication list with him at this time.    Patient found to have elevated troponin of 0.763 and reports he has Hx of CAD with prior coronary stent placement 2 years ago but has not been following up with cardiology. He does report some left sided chest pain and occasionally diaphoresis along with his nausea and vomiting. His EKG currently with no significant St elevation or depression. He does have Hx ESRD requiring  HD with prior elevated troponins in past of 0.16-0.17. Patient discussed with Dr. Law. Will consult cardiology and trend troponins due to prior cardiac Hx and continue monitoring GI status as well.          Procedure(s) (LRB):  INCISION AND DRAINAGE, ABSCESS (Right)      Hospital Course:   He was noted to have an elevated troponin level and Cardiology was also consulted. Nephrology was consulted for renal/ HD management. Patient initially noted to be in SR, however, later he converted to atrial fibrillation with some RVR with heart rate up to 120-130s. Dr. Kee was notified and patient given IV Bblockade.  The patient later converted back to SR. He was placed on toprol Xl 50mg po daily.  The patient was noted to have a decrease in his platelets and sq heparin was discontinued. Developed fever with evidence of sepsis overnight, now on broad spectrum antibiotics with thombocytopenia and coagulopathy.and noted to have diabetic foot ulcer /cellulitis of right thigh, lower leg and ankle area. Dr Ding from podiatry did I and D on foot ulcer/abscess and wound care continued. Osteomyelitis ruled out. THe cellulits of legs eventually formed several small abscess leg which were I and D  By Dr. Rothman, general surgery -prior to dc and wound care with packing changes continued. HH to contiue as outpt. abx transitioned to po cefdinir at dc -per Dr Lenz, ID who followed pt in hospital .   Diarrhea tested positive for C diff -po vancomycin started in hospital however requires PA from insurance and this was not complete at time of dc. I was not notified until after pt dc so called pharmacy to start po flagyl until PA obtained.    Pain control -initially methadone decreased due to sepis then resumed and he is to fu at methadone clinic.   DM was controlled -had some hypoglycemia requiring D10 but then appetite improved.   ESRD-pt received continue HD and meds renal adjusted.   PE -ALERT nad-temporal wasting noted.    heent-nontraumatic, oral mmm   lungs clear   cor irreg/irreg    abd soft, bsx4,  ext -dressing intact -4 small aabscess with packing r upper /lower leg and right heel plantar area .    neuro-Ox3, sensation intact   skin -w/d  Psych-cooperative and calm. Insight fair.   Query compliance -seems knowledgeable about situation and need for improved blood sugar control -requested new glucometer and bp cuff-advised by CM would have to purchase       Consults:   Consults (From admission, onward)        Status Ordering Provider     Inpatient consult to General Surgery  Once     Provider:  Chetan Rothman MD    Completed CHANELL LENZ     Inpatient consult to Infectious Diseases  Once     Provider:  Chanell Lenz MD    Completed LAMONT DUMONT     Inpatient consult to Podiatry  Once     Provider:  Bryant Ding DPM    Completed LAMONT DUMONT     Inpatient consult to Registered Dietitian/Nutritionist  Once     Provider:  (Not yet assigned)    Completed LAMONT DUMONT          Cellulitis and abscess of right lower extremity              Cellulitis    Right thigh/lower leg-s/p I and D =has packing -discussed with surgeon. Ok for home health             Coronary artery disease involving native coronary artery of native heart without angina pectoris    With prior coronary stent placement 2016/ Hx ischemic cardiomyopathy-   on ace I   Cardiology following  ON metoprolol.  Hold asa with thrombocytopenia            Final Active Diagnoses:    Diagnosis Date Noted POA    PRINCIPAL PROBLEM:  Septic shock [A41.9, R65.21] 08/29/2018 No    Severe protein-calorie malnutrition [E43] 09/08/2018 Yes    Cellulitis and abscess of right lower extremity [L03.115, L02.415] 09/05/2018 Yes    Hyponatremia [E87.1] 09/04/2018 No    C. difficile colitis [A04.72] 09/04/2018 No    Right foot ulcer [L97.519] 08/31/2018 Yes    Type 2 diabetes mellitus with chronic kidney disease on chronic dialysis, without long-term current use  of insulin [E11.22, N18.6, Z99.2] 08/29/2018 Not Applicable    Hypoglycemia [E16.2] 08/29/2018 No    Paroxysmal atrial fibrillation with RVR [I48.0] 08/27/2018 No    Thrombocytopenia [D69.6] 08/27/2018 Yes    Chest pain [R07.9] 08/26/2018 Yes    Elevated troponin [R74.8] 08/26/2018 Yes    History of coronary artery stent placement [Z95.5] 08/26/2018 Not Applicable    Benign hypertension with ESRD (end-stage renal disease) [I12.0, N18.6] 03/01/2017 Yes    Cellulitis [L03.90] 02/24/2017 Yes    Chronic hepatitis C without hepatic coma [B18.2] 02/21/2017 Yes     Chronic    Cirrhosis of liver with ascites [K74.60] 02/21/2017 Yes    Methadone dependence [F11.20] 02/21/2017 Yes     Chronic    Intractable vomiting with nausea [R11.2] 10/26/2015 Yes    Dehydration [E86.0] 10/26/2015 Yes    Coronary artery disease involving native coronary artery of native heart without angina pectoris [I25.10] 08/29/2015 Yes     Chronic    ESRD (T,Th,Sat) dialysis onset 2013 [N18.6] 08/29/2015 Yes     Chronic    Ischemic cardiomyopathy, LVEF 25%-30% [I25.5] 08/29/2015 Yes      Problems Resolved During this Admission:    Diagnosis Date Noted Date Resolved POA    Unstable angina [I20.0] 08/26/2018 08/29/2018 Yes    Abdominal pain [R10.9] 08/01/2016 08/28/2018 Yes       Discharged Condition: fair    Disposition: Home-Health Care Svc    Follow Up:  Follow-up Information     Follow up with your normal cardiologist In 1 week.           [facility].    Specialty:  Home Health Services  Why:  Home Health  Contact information:  [address] [address] [address] [phone]                 Patient Instructions:      Referral to Home health   Referral Priority: Routine Referral Type: Home Health   Referral Reason: Specialty Services Required   Requested Specialty: Home Health Services   Number of Visits Requested: 1     Notify your health care provider if you experience any of the following:   temperature >100.4     Notify your health care provider if you experience any of the following:  severe uncontrolled pain     Notify your health care provider if you experience any of the following:  redness, tenderness, or signs of infection (pain, swelling, redness, odor or green/yellow discharge around incision site)     Activity as tolerated       Significant Diagnostic Studies:   Results for NAOMI ALLEN (MRN [id number]) as of 9/8/2018 08:30   Ref. Range 9/7/2018 05:26   WBC Latest Ref Range: 3.90 - 12.70 K/uL 9.30   RBC Latest Ref Range: 4.60 - 6.20 M/uL 2.83 (L)   Hemoglobin Latest Ref Range: 14.0 - 18.0 g/dL 9.1 (L)   Hematocrit Latest Ref Range: 40.0 - 54.0 % 27.5 (L)   MCV Latest Ref Range: 82 - 98 fL 97   MCH Latest Ref Range: 27.0 - 31.0 pg 32.3 (H)   MCHC Latest Ref Range: 32.0 - 36.0 g/dL 33.3   RDW Latest Ref Range: 11.5 - 14.5 % 18.2 (H)   Platelets Latest Ref Range: 150 - 350 K/uL 193     Component 5d ago   Stool Culture No Salmonella,Shigella,Vibrio,Campylobacter,Yersinia isolated.          Ref Range & Units 5d ago   C. diff Antigen  Positive Abnormal     C difficile Toxins A+B, EIA Negative Negative    Comment: Testing not recommended for children <24 months old.           Aerobic Bacterial Culture PROTEUS VULGARIS   Moderate   Skin socorro also present       Resulting Agency [facility]   Susceptibility      Proteus vulgaris     CULTURE, AEROBIC  (SPECIFY SOURCE)     Amox/K Clav'ate <=8/4  Sensitive     Amp/Sulbactam <=8/4  Sensitive     Cefepime <=8  Sensitive     Ceftriaxone <=8  Sensitive     Ciprofloxacin <=1  Sensitive     Ertapenem <=2  Sensitive     Gentamicin <=4  Sensitive     Meropenem <=4  Sensitive     Piperacillin/Tazo <=16  Sensitive     Tobramycin <=4  Sensitive     Trimeth/Sulfa <=2/38  Sensitive            Linear View        Results for NAOMI ALLEN (MRN [id number]) as of 9/8/2018 08:30   Ref. Range 8/31/2018 05:37 8/31/2018 05:44   Blood Culture, Routine Unknown No growth after  5... No growth after 5...     MRI-  There is an open ulceration and surrounded by granulation tissue in the forefoot underlying the 2nd and 3rd metatarsophalangeal joints. Extension a granulation tissue extends between the 2nd and 3rd toe. A focal abscess is not seen. MR evidence of osteomyelitis is not noted.  Fat stranding changes right thigh medially that could represent edema or cellulitis without drainable abscess or hematoma.  Extensive arterial calcifications and findings also consistent varicosities.    Note is made that today's study shows with appears to be a small amount of free fluid in the pelvis which is not seen on the prior abdominopelvic CT of 8/29/2018  Pending Diagnostic Studies:     None         Medications:  Reconciled Home Medications:      Medication List      START taking these medications    cefdinir 300 MG capsule  Commonly known as:  OMNICEF  Take 1 capsule (300 mg total) by mouth once daily. for 10 days     collagenase ointment  Apply topically once daily. To foot after cleansing     vancomycin 250mg / 10ml Susp  Take 5 mLs (125 mg total) by mouth every 6 (six) hours.        CONTINUE taking these medications    amLODIPine 5 MG tablet  Commonly known as:  NORVASC  Take 5 mg by mouth 2 (two) times daily.     aspirin 325 MG tablet  Take 1 tablet (325 mg total) by mouth once daily.     calcium acetate 667 mg capsule  Commonly known as:  PHOSLO  Take 1 capsule (667 mg total) by mouth 3 (three) times daily with meals.     hydrALAZINE 50 MG tablet  Commonly known as:  APRESOLINE  Take 50 mg by mouth 3 (three) times daily.     levETIRAcetam 500 MG Tab  Commonly known as:  KEPPRA  Take 500 mg twice a day.  Take an extra tablet right after dialysis (making 3 tablets on your dialysis days)     lisinopril 40 MG tablet  Commonly known as:  PRINIVIL,ZESTRIL  Take 1 tablet (40 mg total) by mouth once daily.     methadone 10 MG tablet  Commonly known as:  DOLOPHINE  Take 9 tablets (90 mg total) by mouth  once daily.     metoclopramide HCl 10 MG tablet  Commonly known as:  REGLAN  Take 1 tablet (10 mg total) by mouth 3 (three) times daily as needed (nausea/vomiting).     minoxidil 2.5 MG tablet  Commonly known as:  LONITEN  Take 3 tablets (7.5 mg total) by mouth 2 (two) times daily.     PROAIR HFA 90 mcg/actuation inhaler  Generic drug:  albuterol  INHALE TWO PUFFS EVERY 4 TO 6 HOURS AS NEEDED        STOP taking these medications    ondansetron 4 MG tablet  Commonly known as:  ZOFRAN            Indwelling Lines/Drains at time of discharge:   Lines/Drains/Airways     Drain                 Hemodialysis AV Fistula Left forearm -- days         Hemodialysis AV Fistula Left forearm -- days                Time spent on the discharge of patient: 38 minutes  Patient was seen and examined on the date of discharge and determined to be suitable for discharge.         Natasha Morris MD  Department of Hospital Medicine  [facility]

## 2018-09-10 ENCOUNTER — TELEPHONE (OUTPATIENT)
Dept: MEDSURG UNIT | Facility: HOSPITAL | Age: 53
End: 2018-09-10

## 2018-09-10 LAB
BACTERIA SPEC AEROBE CULT: NORMAL
BACTERIA SPEC AEROBE CULT: NORMAL

## 2018-09-10 NOTE — PHYSICIAN QUERY
"PT Name: João Aguirre  MR #: [id number]     Physician Query Form - Documentation Clarification      Rufina Motley RN, CCDS  Contact Info: [phone]  [email or web address]      This form is a permanent document in the medical record.     Query Date: September 10, 2018    By submitting this query, we are merely seeking further clarification of documentation. Please utilize your independent clinical judgment when addressing the question(s) below.    The Medical record reflects the following:    Supporting Clinical Findings Location in Medical Record   POSTOPERATIVE DIAGNOSIS:    Cellulitis and abscess of the right medial leg x4.   PROCEDURE:    I and D of multiple abscesses on the right medial thigh.     I made approximately 1 cm incision in each palpable lesion which had associated induration, cellulitis and fluctuance.      One was present in the mid thigh, one was just above the knee, one was just below the knee and another one just above the ankle.  Small incisions were made.  Purulent drainage was expressed from each of these.  Cultures were taken from both the proximal and distal lesions.  I then irrigated all wounds.  I ensured adequate hemostasis.  I packed the wound with 0.25% iodoform.      Blood Loss 10 ml Op Note 9/6   Incision and Drainage   MD Query 9/7                                                                          Doctor, Please specify diagnosis or diagnoses associated with above clinical findings.       Please specify depth of "incision and drainage".     Provider Use Only    (  ) Skin only    (x) Subcutaneous     (  ) Other depth - specify: ______________                                                                                                           [  ] Clinically undetermined            "

## 2018-09-10 NOTE — PLAN OF CARE
[facility] Susp[facility].  Script received from Dr Mitchell for the Vanc capsules.  Called in to pt's pharmacy, [facility].       09/10/18 1435   Discharge Reassessment   Assessment Type Discharge Planning Reassessment

## 2018-09-11 LAB
BACTERIA SPEC ANAEROBE CULT: NORMAL
BACTERIA SPEC ANAEROBE CULT: NORMAL

## 2018-12-03 ENCOUNTER — HOSPITAL ENCOUNTER (OUTPATIENT)
Dept: RADIOLOGY | Facility: HOSPITAL | Age: 53
Discharge: HOME | End: 2018-12-03
Attending: PODIATRIST
Payer: MEDICARE

## 2018-12-03 ENCOUNTER — OFFICE VISIT (OUTPATIENT)
Dept: PODIATRY | Facility: CLINIC | Age: 53
End: 2018-12-03
Payer: MEDICARE

## 2018-12-03 VITALS
BODY MASS INDEX: 27.83 KG/M2 | DIASTOLIC BLOOD PRESSURE: 73 MMHG | HEIGHT: 73 IN | SYSTOLIC BLOOD PRESSURE: 131 MMHG | RESPIRATION RATE: 18 BRPM | WEIGHT: 210 LBS | TEMPERATURE: 98 F | HEART RATE: 56 BPM

## 2018-12-03 DIAGNOSIS — Z99.2 TYPE 2 DIABETES MELLITUS WITH CHRONIC KIDNEY DISEASE ON CHRONIC DIALYSIS, WITHOUT LONG-TERM CURRENT USE OF INSULIN: Primary | ICD-10-CM

## 2018-12-03 DIAGNOSIS — M20.42 HAMMER TOES OF BOTH FEET: ICD-10-CM

## 2018-12-03 DIAGNOSIS — M20.41 HAMMER TOES OF BOTH FEET: ICD-10-CM

## 2018-12-03 DIAGNOSIS — N18.6 TYPE 2 DIABETES MELLITUS WITH CHRONIC KIDNEY DISEASE ON CHRONIC DIALYSIS, WITHOUT LONG-TERM CURRENT USE OF INSULIN: Primary | ICD-10-CM

## 2018-12-03 DIAGNOSIS — E11.22 TYPE 2 DIABETES MELLITUS WITH CHRONIC KIDNEY DISEASE ON CHRONIC DIALYSIS, WITHOUT LONG-TERM CURRENT USE OF INSULIN: Primary | ICD-10-CM

## 2018-12-03 DIAGNOSIS — M79.2 NEURITIS: ICD-10-CM

## 2018-12-03 DIAGNOSIS — L97.511 SKIN ULCER OF TOE OF RIGHT FOOT, LIMITED TO BREAKDOWN OF SKIN: ICD-10-CM

## 2018-12-03 DIAGNOSIS — L02.611 ABSCESS OF FIFTH TOE OF RIGHT FOOT: ICD-10-CM

## 2018-12-03 PROCEDURE — 99213 OFFICE O/P EST LOW 20 MIN: CPT | Mod: PBBFAC,25 | Performed by: PODIATRIST

## 2018-12-03 PROCEDURE — 73630 X-RAY EXAM OF FOOT: CPT | Mod: 26,RT,, | Performed by: RADIOLOGY

## 2018-12-03 PROCEDURE — 73630 X-RAY EXAM OF FOOT: CPT | Mod: TC,FY,RT

## 2018-12-03 PROCEDURE — 99999 PR PBB SHADOW E&M-EST. PATIENT-LVL III: CPT | Mod: PBBFAC,,, | Performed by: PODIATRIST

## 2018-12-03 PROCEDURE — 11042 DBRDMT SUBQ TIS 1ST 20SQCM/<: CPT | Mod: PBBFAC | Performed by: PODIATRIST

## 2018-12-03 PROCEDURE — 87070 CULTURE OTHR SPECIMN AEROBIC: CPT

## 2018-12-03 PROCEDURE — 99205 OFFICE O/P NEW HI 60 MIN: CPT | Mod: 25,S$PBB,, | Performed by: PODIATRIST

## 2018-12-03 RX ORDER — SEVELAMER CARBONATE 800 MG/1
800-1600 TABLET, FILM COATED ORAL
Status: ON HOLD | COMMUNITY
Start: 2018-11-13 | End: 2020-01-01 | Stop reason: HOSPADM

## 2018-12-03 RX ORDER — FAMOTIDINE 20 MG/1
20 TABLET, FILM COATED ORAL DAILY
COMMUNITY
End: 2020-01-01

## 2018-12-03 RX ORDER — FLUTICASONE PROPIONATE AND SALMETEROL 250; 50 UG/1; UG/1
1 POWDER RESPIRATORY (INHALATION) DAILY
Status: ON HOLD | COMMUNITY
End: 2020-01-01 | Stop reason: HOSPADM

## 2018-12-03 RX ORDER — CLOPIDOGREL BISULFATE 75 MG/1
TABLET ORAL
Refills: 3 | COMMUNITY
Start: 2018-10-27 | End: 2020-01-01

## 2018-12-03 RX ORDER — SUCRALFATE 1 G/1
1 TABLET ORAL
COMMUNITY
End: 2019-01-01 | Stop reason: CLARIF

## 2018-12-03 RX ORDER — GABAPENTIN 100 MG/1
100 CAPSULE ORAL 2 TIMES DAILY
Qty: 60 CAPSULE | Refills: 3 | Status: SHIPPED | OUTPATIENT
Start: 2018-12-03 | End: 2019-01-02

## 2018-12-03 RX ORDER — GABAPENTIN 300 MG/1
300 CAPSULE ORAL NIGHTLY
Qty: 30 CAPSULE | Refills: 2 | Status: SHIPPED | OUTPATIENT
Start: 2018-12-03 | End: 2019-01-01 | Stop reason: CLARIF

## 2018-12-03 RX ORDER — MINOXIDIL 2.5 MG/1
TABLET ORAL
Refills: 3 | COMMUNITY
Start: 2018-10-27 | End: 2019-01-01 | Stop reason: CLARIF

## 2018-12-03 RX ORDER — ATORVASTATIN CALCIUM 40 MG/1
TABLET, FILM COATED ORAL
Refills: 3 | COMMUNITY
Start: 2018-10-27 | End: 2019-01-01 | Stop reason: CLARIF

## 2018-12-03 NOTE — LETTER
December 6, 2018      Stu Riley MD  [address] [facility]  [place] [address]           [facility] - Podiatry/Wound Care  [address] [place] [address]  Phone: [phone]  Fax: [phone]          Patient: João Aguirre   MR Number: [id number]   YOB: 1965   Date of Visit: 12/3/2018       Dear Dr. Stu Riley:    Thank you for referring João Aguirre to me for evaluation. Attached you will find relevant portions of my assessment and plan of care.    If you have questions, please do not hesitate to call me. I look forward to following João Aguirre along with you.    Sincerely,    Marie Meyer  CC:  No Recipients    If you would like to receive this communication electronically, please contact [email or web address] or [phone] to request more information on AT Internet Link access.    For providers and/or their staff who would like to refer a patient to [facility], please contact us through our one-stop-shop provider referral line, [facility], at [phone].    If you feel you have received this communication in error or would no longer like to receive these types of communications, please e-mail [email or web address]

## 2018-12-06 LAB — BACTERIA SPEC AEROBE CULT: NORMAL

## 2018-12-07 ENCOUNTER — TELEPHONE (OUTPATIENT)
Dept: PODIATRY | Facility: CLINIC | Age: 53
End: 2018-12-07

## 2018-12-07 NOTE — TELEPHONE ENCOUNTER
----- Message from Rene Driver DPM sent at 12/7/2018 12:41 PM CST -----  Please call the patient regarding his abnormal result.Advise NL skin bacteria no need for antibiotic at this time.

## 2018-12-08 NOTE — PROGRESS NOTES
Subjective:       Patient ID: João Aguirre is a 53 y.o. male.    Chief Complaint: Diabetic Foot Exam    Patient presents today for an extensive new patient diabetic evaluation the patient is a type 2 diabetic times 20 years he is currently on dialysis and has been on dialysis for 5 years.  Patient relates sharp pain in both feet he states he has an extensive history of infection in the right foot he relates that he was septic and required multiple incision and drainage on the right lower extremity at 1 point they thought he was going to lose his right leg.  Patient has infection in all digits on the right foot.  Patient is concerned about a callus over the 5th toe right.  Patient relates almost constant burning sensation and discomfort in both feet.  HPI  Review of Systems   Musculoskeletal: Positive for arthralgias, gait problem and joint swelling.   Neurological: Positive for weakness and numbness.   All other systems reviewed and are negative.      Objective:      Physical Exam   Constitutional: He appears well-developed and well-nourished.   Cardiovascular:   Pulses:       Dorsalis pedis pulses are 0 on the right side, and 0 on the left side.        Posterior tibial pulses are 0 on the right side, and 0 on the left side.   Pulmonary/Chest: Effort normal.   Musculoskeletal: He exhibits edema, tenderness and deformity.        Right foot: There is decreased range of motion and deformity.        Left foot: There is decreased range of motion and deformity.   Feet:   Right Foot:   Protective Sensation: 4 sites tested. 2 sites sensed.   Skin Integrity: Positive for ulcer, skin breakdown, erythema, callus and dry skin.   Left Foot:   Protective Sensation: 4 sites tested. 2 sites sensed.   Skin Integrity: Positive for callus and dry skin.   Neurological: He displays abnormal reflex.   Skin: Capillary refill takes more than 3 seconds. There is erythema.   Psychiatric: He has a normal mood and affect. His  behavior is normal. Judgment and thought content normal.   Nursing note and vitals reviewed.      On evaluation patient has severe peripheral vascular disease bilateral additionally the patient has diabetic related neuropathy bilateral a hyperkeratotic lesion is present overlying the lateral portion of the 5th digit right debridement of the lesion reveals extensive amounts of purulent drainage emitting from the area patient also has areas of skin breakdown underlying the distal 2nd digit right and underlying the 3rd digit right.  Patient has severe digital contractures on both feet that are non reducible in rigid in nature.  Assessment:       1. Type 2 diabetes mellitus with chronic kidney disease on chronic dialysis, without long-term current use of insulin    2. Hammer toes of both feet    3. Skin ulcer of toe of right foot, limited to breakdown of skin    4. Neuritis    5. Abscess of fifth toe of right foot        Plan:         Following extensive diabetic new patient evaluation patient has many issues that he presents with today patient is a type 2 diabetic he is currently on dialysis x5 years he has diabetic neuropathy as well as a history of infection of the right lower extremity and peripheral vascular disease.  Patient has multiple areas of ulceration and skin breakdown this is especially noted distal 2nd digit right sub 3rd digit right patient has a hyperkeratotic lesion over 5th digit right debridement of this area excisionally reveals copious purulent drainage with significant infection in this area culture and sensitivity was performed patient will be placed on appropriate antibiotics pending culture and sensitivity.  Subsequent culture and sensitivity was negative for bacterial growth however x-rays reveal signs of osteomyelitis present in the 5th digit right I will further evaluate and discuss this with the patient on his follow-up visit.  Patient has been started on gabapentin 100 mg in the morning  100 mg at noon 300 mg at bedtime I have also evaluated in filled out the appropriate paperwork for diabetic shoes for the patient because of the severe digital contractures neuropathy and peripheral vascular disease patient is a candidate for diabetic shoes.  I do plan to follow up with patient in 2 weeks patient will clean the wound and ulcerations on the right foot with Dakin solution with a light dressing over these areas he is to keep these wounds dry and clean certainly the patient is at risk for amputation with his history of infection and signs of early osteomyelitis in the 5th digit right.  Patient was made aware debriding the 5th digit an open the area up and thoroughly draining it flushing and irrigating it will help with any infective process in the area he can expect to have some increased drainage from this area over the next several days following debridement.  Total face-to-face time including discussion evaluation extensive medical history treatment x-rays wound care equaled 70 min.  Patient's nephrologist is Dr. Riley. I did discuss at length with the patient his current medical status all areas of concern his history of peripheral vascular disease degenerative arthritis diabetic neuropathy in certainly his history of dialysis.  Complete diabetic shoe evaluation and appropriate paperwork addressed today.

## 2018-12-08 NOTE — PROCEDURES
"Wound Debridement  Date/Time: 12/3/2018 10:39 AM  Performed by: Rene Driver DPM  Authorized by: Rene Driver DPM     Time out: Immediately prior to procedure a "time out" was called to verify the correct patient, procedure, equipment, support staff and site/side marked as required.    Consent Done?:  Yes (Verbal)    Preparation: Patient was prepped and draped in usual sterile fashion    Local anesthesia used?: No      Wound Details:    Location:  Right foot    Location:  Right 5th Toe    Type of Debridement:  Excisional       Length (cm):  1       Area (sq cm):  1       Width (cm):  1       Percent Debrided (%):  100       Depth (cm):  0.5       Total Area Debrided (sq cm):  1    Depth of debridement:  Subcutaneous tissue    Tissue debrided:  Other    Devitalized tissue debrided:  Callus    Instruments:  Blade    Bleeding:  None  Patient tolerance:  Patient tolerated the procedure well with no immediate complications      "

## 2018-12-18 ENCOUNTER — OFFICE VISIT (OUTPATIENT)
Dept: PODIATRY | Facility: CLINIC | Age: 53
End: 2018-12-18
Payer: MEDICARE

## 2018-12-18 VITALS
DIASTOLIC BLOOD PRESSURE: 108 MMHG | TEMPERATURE: 97 F | BODY MASS INDEX: 27.83 KG/M2 | HEIGHT: 73 IN | WEIGHT: 210 LBS | SYSTOLIC BLOOD PRESSURE: 196 MMHG | HEART RATE: 66 BPM

## 2018-12-18 DIAGNOSIS — E11.22 TYPE 2 DIABETES MELLITUS WITH CHRONIC KIDNEY DISEASE ON CHRONIC DIALYSIS, WITHOUT LONG-TERM CURRENT USE OF INSULIN: Primary | ICD-10-CM

## 2018-12-18 DIAGNOSIS — N18.6 TYPE 2 DIABETES MELLITUS WITH CHRONIC KIDNEY DISEASE ON CHRONIC DIALYSIS, WITHOUT LONG-TERM CURRENT USE OF INSULIN: Primary | ICD-10-CM

## 2018-12-18 DIAGNOSIS — L08.9 INFECTED SKIN ULCER WITH FAT LAYER EXPOSED: ICD-10-CM

## 2018-12-18 DIAGNOSIS — L97.511 SKIN ULCER OF TOE OF RIGHT FOOT, LIMITED TO BREAKDOWN OF SKIN: ICD-10-CM

## 2018-12-18 DIAGNOSIS — Z99.2 TYPE 2 DIABETES MELLITUS WITH CHRONIC KIDNEY DISEASE ON CHRONIC DIALYSIS, WITHOUT LONG-TERM CURRENT USE OF INSULIN: Primary | ICD-10-CM

## 2018-12-18 DIAGNOSIS — L98.492 INFECTED SKIN ULCER WITH FAT LAYER EXPOSED: ICD-10-CM

## 2018-12-18 PROCEDURE — 99213 OFFICE O/P EST LOW 20 MIN: CPT | Mod: 25,S$PBB,, | Performed by: PODIATRIST

## 2018-12-18 PROCEDURE — 97597 DBRDMT OPN WND 1ST 20 CM/<: CPT | Mod: PBBFAC,PN | Performed by: PODIATRIST

## 2018-12-18 PROCEDURE — 99213 OFFICE O/P EST LOW 20 MIN: CPT | Mod: PBBFAC,PN | Performed by: PODIATRIST

## 2018-12-18 PROCEDURE — 99999 PR PBB SHADOW E&M-EST. PATIENT-LVL III: CPT | Mod: PBBFAC,,, | Performed by: PODIATRIST

## 2018-12-23 NOTE — PROGRESS NOTES
Subjective:       Patient ID: João Aguirre is a 53 y.o. male.    Chief Complaint: Follow-up; Nail Problem; and Foot Problem     Patient presents for follow-up of an ulceration overlying the 5th digit right patient states the gabapentin did help a little bit he is currently taking 100 mg in the morning 100 mg at noon 300 mg at bedtime.  Nail Problem   Associated symptoms include arthralgias, joint swelling, numbness and weakness.     Review of Systems   Musculoskeletal: Positive for arthralgias, gait problem and joint swelling.   Neurological: Positive for weakness and numbness.   All other systems reviewed and are negative.      Objective:      Physical Exam   Constitutional: He appears well-developed and well-nourished.   Cardiovascular:   Pulses:       Dorsalis pedis pulses are 0 on the right side, and 0 on the left side.        Posterior tibial pulses are 0 on the right side, and 0 on the left side.   Pulmonary/Chest: Effort normal.   Musculoskeletal: He exhibits edema, tenderness and deformity.        Right foot: There is decreased range of motion and deformity.        Left foot: There is decreased range of motion and deformity.   Feet:   Right Foot:   Protective Sensation: 4 sites tested. 2 sites sensed.   Skin Integrity: Positive for ulcer, skin breakdown, erythema, callus and dry skin.   Left Foot:   Protective Sensation: 4 sites tested. 2 sites sensed.   Skin Integrity: Positive for callus and dry skin.   Neurological: He displays abnormal reflex.   Skin: Capillary refill takes more than 3 seconds. There is erythema.   Psychiatric: He has a normal mood and affect. His behavior is normal. Judgment and thought content normal.   Nursing note and vitals reviewed.      On evaluation patient has severe peripheral vascular disease bilateral additionally the patient has diabetic related neuropathy bilateral a hyperkeratotic lesion is present overlying the lateral portion of the 5th digit right debridement  of the lesion reveals limited amounts of purulent drainage emitting from the area patient also has areas of skin breakdown underlying the distal 2nd digit right and underlying the 3rd digit right.  Patient has severe digital contractures on both feet that are non reducible in rigid in nature.  Assessment:       1. Type 2 diabetes mellitus with chronic kidney disease on chronic dialysis, without long-term current use of insulin    2. Skin ulcer of toe of right foot, limited to breakdown of skin    3. Infected skin ulcer with fat layer exposed        Plan:          Following evaluation the patient's culture and sensitivity was negative for bacterial growth like a week likely because the patient was already taking antibiotics I did excisionally debride the hyperkeratotic lesion overlying the 5th digit right there was a small amount of drainage that admitted from the area the patient's overall erythema edema of the 5th digit has dramatically improved I have advised the patient we need to monitor this closely his previous x-rays raise concern for osteomyelitis however the patient has an extensive history of previous infection of the right lower extremity this may be old signs of erosive changes to the toe were going to need to monitor this carefully.  Patient was in agreement with this he needs to continue to clean the area with Dakin solution and keep a light dressing on the area I plan to see the patient for follow-up in 3-4 weeks any increased redness swelling or pain he is to contact us immediately.  Wound debridement has been documented separately.  Total face-to-face time equaled 20 min.

## 2018-12-23 NOTE — PROCEDURES
"Wound Debridement  Date/Time: 12/18/2018 7:42 PM  Performed by: Rene Driver DPM  Authorized by: Rene Driver DPM     Time out: Immediately prior to procedure a "time out" was called to verify the correct patient, procedure, equipment, support staff and site/side marked as required.    Consent Done?:  Yes (Verbal)    Preparation: Patient was prepped and draped in usual sterile fashion    Local anesthesia used?: No      Wound Details:    Location:  Right foot    Location:  Right 5th Toe    Type of Debridement:  Excisional       Length (cm):  1       Area (sq cm):  1       Width (cm):  1       Percent Debrided (%):  100       Depth (cm):  0.3       Total Area Debrided (sq cm):  1    Depth of debridement:  Epidermis/Dermis    Tissue debrided:  Other    Devitalized tissue debrided:  Callus    Instruments:  Blade    Bleeding:  None  Patient tolerance:  Patient tolerated the procedure well with no immediate complications      "

## 2019-01-01 ENCOUNTER — HOSPITAL ENCOUNTER (EMERGENCY)
Facility: HOSPITAL | Age: 54
Discharge: HOME | End: 2019-09-06
Attending: EMERGENCY MEDICINE
Payer: MEDICARE

## 2019-01-01 ENCOUNTER — OFFICE VISIT (OUTPATIENT)
Dept: PODIATRY | Facility: CLINIC | Age: 54
End: 2019-01-01
Payer: MEDICARE

## 2019-01-01 ENCOUNTER — HOSPITAL ENCOUNTER (OUTPATIENT)
Dept: RADIOLOGY | Facility: HOSPITAL | Age: 54
Discharge: HOME | End: 2019-09-25
Attending: PODIATRIST
Payer: MEDICARE

## 2019-01-01 ENCOUNTER — TELEPHONE (OUTPATIENT)
Dept: PODIATRY | Facility: CLINIC | Age: 54
End: 2019-01-01

## 2019-01-01 VITALS
RESPIRATION RATE: 17 BRPM | OXYGEN SATURATION: 98 % | HEART RATE: 65 BPM | TEMPERATURE: 99 F | BODY MASS INDEX: 28.44 KG/M2 | HEIGHT: 72 IN | SYSTOLIC BLOOD PRESSURE: 124 MMHG | DIASTOLIC BLOOD PRESSURE: 74 MMHG | WEIGHT: 210 LBS

## 2019-01-01 VITALS
SYSTOLIC BLOOD PRESSURE: 153 MMHG | WEIGHT: 210 LBS | TEMPERATURE: 99 F | DIASTOLIC BLOOD PRESSURE: 76 MMHG | TEMPERATURE: 98 F | HEART RATE: 68 BPM | HEIGHT: 72 IN | HEIGHT: 72 IN | BODY MASS INDEX: 28.44 KG/M2 | DIASTOLIC BLOOD PRESSURE: 97 MMHG | BODY MASS INDEX: 28.44 KG/M2 | SYSTOLIC BLOOD PRESSURE: 221 MMHG | WEIGHT: 210 LBS | HEART RATE: 53 BPM

## 2019-01-01 DIAGNOSIS — N18.6 TYPE 2 DIABETES MELLITUS WITH CHRONIC KIDNEY DISEASE ON CHRONIC DIALYSIS, WITHOUT LONG-TERM CURRENT USE OF INSULIN: ICD-10-CM

## 2019-01-01 DIAGNOSIS — I96 GANGRENE OF TOE OF RIGHT FOOT: ICD-10-CM

## 2019-01-01 DIAGNOSIS — E11.22 TYPE 2 DIABETES MELLITUS WITH CHRONIC KIDNEY DISEASE ON CHRONIC DIALYSIS, WITHOUT LONG-TERM CURRENT USE OF INSULIN: ICD-10-CM

## 2019-01-01 DIAGNOSIS — M79.671 ARCH PAIN OF RIGHT FOOT: Primary | ICD-10-CM

## 2019-01-01 DIAGNOSIS — Z99.2 TYPE 2 DIABETES MELLITUS WITH CHRONIC KIDNEY DISEASE ON CHRONIC DIALYSIS, WITHOUT LONG-TERM CURRENT USE OF INSULIN: ICD-10-CM

## 2019-01-01 DIAGNOSIS — L97.511 SKIN ULCER OF TOE OF RIGHT FOOT, LIMITED TO BREAKDOWN OF SKIN: ICD-10-CM

## 2019-01-01 DIAGNOSIS — L98.492 INFECTED SKIN ULCER WITH FAT LAYER EXPOSED: Primary | ICD-10-CM

## 2019-01-01 DIAGNOSIS — L97.511 SKIN ULCER OF TOE OF RIGHT FOOT, LIMITED TO BREAKDOWN OF SKIN: Primary | ICD-10-CM

## 2019-01-01 DIAGNOSIS — M79.671 RIGHT FOOT PAIN: ICD-10-CM

## 2019-01-01 DIAGNOSIS — L08.9 INFECTED SKIN ULCER WITH FAT LAYER EXPOSED: Primary | ICD-10-CM

## 2019-01-01 DIAGNOSIS — M72.2 PLANTAR FASCIITIS: ICD-10-CM

## 2019-01-01 LAB — BACTERIA SPEC AEROBE CULT: NORMAL

## 2019-01-01 PROCEDURE — 99214 PR OFFICE/OUTPT VISIT, EST, LEVL IV, 30-39 MIN: ICD-10-PCS | Mod: S$PBB,,, | Performed by: PODIATRIST

## 2019-01-01 PROCEDURE — 99999 PR PBB SHADOW E&M-EST. PATIENT-LVL III: CPT | Mod: PBBFAC,,, | Performed by: PODIATRIST

## 2019-01-01 PROCEDURE — 99284 EMERGENCY DEPT VISIT MOD MDM: CPT | Mod: 25

## 2019-01-01 PROCEDURE — 99214 OFFICE O/P EST MOD 30 MIN: CPT | Mod: S$PBB,,, | Performed by: PODIATRIST

## 2019-01-01 PROCEDURE — 73630 X-RAY EXAM OF FOOT: CPT | Mod: TC,FY,RT

## 2019-01-01 PROCEDURE — 99215 OFFICE O/P EST HI 40 MIN: CPT | Mod: S$PBB,,, | Performed by: PODIATRIST

## 2019-01-01 PROCEDURE — 73630 X-RAY EXAM OF FOOT: CPT | Mod: 26,RT,, | Performed by: RADIOLOGY

## 2019-01-01 PROCEDURE — 87070 CULTURE OTHR SPECIMN AEROBIC: CPT

## 2019-01-01 PROCEDURE — 99215 PR OFFICE/OUTPT VISIT, EST, LEVL V, 40-54 MIN: ICD-10-PCS | Mod: S$PBB,,, | Performed by: PODIATRIST

## 2019-01-01 PROCEDURE — 73630 XR FOOT COMPLETE 3 VIEW RIGHT: ICD-10-PCS | Mod: 26,RT,, | Performed by: RADIOLOGY

## 2019-01-01 PROCEDURE — 99213 OFFICE O/P EST LOW 20 MIN: CPT | Mod: PBBFAC,25 | Performed by: PODIATRIST

## 2019-01-01 PROCEDURE — 99999 PR PBB SHADOW E&M-EST. PATIENT-LVL III: ICD-10-PCS | Mod: PBBFAC,,, | Performed by: PODIATRIST

## 2019-01-01 PROCEDURE — 99213 OFFICE O/P EST LOW 20 MIN: CPT | Mod: PBBFAC | Performed by: PODIATRIST

## 2019-01-01 RX ORDER — MINOXIDIL 2.5 MG/1
5 TABLET ORAL 2 TIMES DAILY
Status: ON HOLD | COMMUNITY
End: 2020-01-01

## 2019-01-01 RX ORDER — LEVOFLOXACIN 250 MG/1
TABLET ORAL
Refills: 0 | COMMUNITY
Start: 2019-06-29 | End: 2020-01-01

## 2019-01-01 RX ORDER — ATORVASTATIN CALCIUM 40 MG/1
TABLET, FILM COATED ORAL
Status: ON HOLD | COMMUNITY
Start: 2016-07-25 | End: 2020-01-01

## 2019-01-01 RX ORDER — DEXTROSE 4 G
1 TABLET,CHEWABLE ORAL
COMMUNITY
Start: 2016-05-25 | End: 2020-01-01 | Stop reason: HOSPADM

## 2019-01-01 RX ORDER — ALBUTEROL SULFATE 90 UG/1
AEROSOL, METERED RESPIRATORY (INHALATION)
Status: ON HOLD | COMMUNITY
Start: 2014-10-13 | End: 2020-01-01 | Stop reason: SDUPTHER

## 2019-01-01 RX ORDER — FLUTICASONE PROPIONATE 50 MCG
1 SPRAY, SUSPENSION (ML) NASAL DAILY
Status: ON HOLD | COMMUNITY
End: 2020-01-01 | Stop reason: HOSPADM

## 2019-01-01 RX ORDER — GABAPENTIN 100 MG/1
100 CAPSULE ORAL DAILY PRN
Status: ON HOLD | COMMUNITY
End: 2020-01-01 | Stop reason: HOSPADM

## 2019-01-01 RX ORDER — POLYETHYLENE GLYCOL 3350, SODIUM SULFATE, SODIUM CHLORIDE, POTASSIUM CHLORIDE, ASCORBIC ACID, SODIUM ASCORBATE 7.5-2.691G
KIT ORAL
Status: ON HOLD | COMMUNITY
Start: 2019-01-01 | End: 2020-01-01

## 2019-08-02 ENCOUNTER — HOSPITAL ENCOUNTER (EMERGENCY)
Facility: HOSPITAL | Age: 54
Discharge: HOME | End: 2019-08-02
Attending: EMERGENCY MEDICINE
Payer: MEDICARE

## 2019-08-02 VITALS
BODY MASS INDEX: 28.44 KG/M2 | RESPIRATION RATE: 20 BRPM | OXYGEN SATURATION: 97 % | TEMPERATURE: 98 F | HEIGHT: 72 IN | DIASTOLIC BLOOD PRESSURE: 96 MMHG | WEIGHT: 210 LBS | SYSTOLIC BLOOD PRESSURE: 217 MMHG | HEART RATE: 74 BPM

## 2019-08-02 DIAGNOSIS — I71.40 ABDOMINAL AORTIC ANEURYSM (AAA) WITHOUT RUPTURE: Primary | ICD-10-CM

## 2019-08-02 DIAGNOSIS — R11.2 NON-INTRACTABLE VOMITING WITH NAUSEA, UNSPECIFIED VOMITING TYPE: ICD-10-CM

## 2019-08-02 PROCEDURE — 99283 EMERGENCY DEPT VISIT LOW MDM: CPT

## 2019-08-02 RX ORDER — METOCLOPRAMIDE 10 MG/1
10 TABLET ORAL EVERY 6 HOURS PRN
Qty: 30 TABLET | Refills: 0 | Status: SHIPPED | OUTPATIENT
Start: 2019-08-02 | End: 2019-01-01 | Stop reason: CLARIF

## 2019-08-02 NOTE — ED NOTES
"Pt states that he was recently DX with a "stomach aneurysm" and told to follow up with a vascular surgeon. Pt is concerned and worried because his mother  from a brain aneurysm tearful and upset. Denies any symptoms.  Wife at bedside aware to notify nurse of needs or concerns.    "

## 2019-08-02 NOTE — ED PROVIDER NOTES
Encounter Date: 8/2/2019    SCRIBE #1 NOTE: I, César Medina Jr., am scribing for, and in the presence of, Dr. Freeman.       History     Chief Complaint   Patient presents with    General Illness     reports that he has been feeling sick for a few months. he thought he had extra fluid in his belly. had ultrasound of his abdomen one week ago. dr called yesterday and said he has an anneurysm in his abdomen. pt reports that his kidney doctor wanted him to see a specialist but he was scared and wanted to come here       Time seen by provider: 11:55 AM on 08/02/2019    João Aguirre is a 54 y.o. male with a history of Gastrisis, DM, Stroke, CAD, and Gastroparesis who presents to the ED with concerns of his aneurysm. The patient reports that he felt extra fluid in his abdomen and had an ultrasound last week. The patient's physician called him last week with report of a 3.5cm sized aneurysm in his abdomen. He also reported that his kidney doctor endorsed him to see a specialist for the aneurysm and the patient decided to report to the ED for a second opinion. The patient endorses coughing and nausea. PSHx includes Cholecystectomy, Cardiac surgery, and Right incision and drainage. The patient is allergic to Antibiotic Hc.    The history is provided by the patient.     Review of patient's allergies indicates:   Allergen Reactions    Antibiotic hc      Counter acts with methodone     Past Medical History:   Diagnosis Date    Anticoagulant long-term use     Arthritis     Asthma     Back pain     Coronary artery disease 2013    stent    Diabetes mellitus     Encounter for blood transfusion     Eye abnormality right eye    injured as a child    Gastritis     Gastroparesis     Hemodialysis patient     Hypertension     Pneumonia 2013    Spend 6weeks in hospital at [facility]    Renal disorder     Stroke      Past Surgical History:   Procedure Laterality Date    AV FISTULA PLACEMENT      BACK SURGERY       CARDIAC SURGERY  2013    heart stent    CHOLECYSTECTOMY      ESOPHAGOGASTRODUODENOSCOPY (EGD) N/A 8/1/2016    [facility] ENDO    EYE SURGERY      R eye    INCISION AND DRAINAGE, ABSCESS Right 9/6/2018    Per[facility] by [facility]tan [facility]man [facility] at [facility] OR     Family History   Problem Relation Age of Onset    Aneurysm Mother         brain    Aneurysm Father 77        stomach     Heart disease Father     Kidney disease Brother     Diabetes Brother         hyperglycemia and HAYDER causseddeath    Heart disease Paternal Grandmother      Social History     Tobacco Use    Smoking status: Former Smoker     Years: 10.00     Last attempt to quit: 9/1/2015     Years since quitting: 3.9    Smokeless tobacco: Never Used   Substance Use Topics    Alcohol use: No    Drug use: Yes     Frequency: 4.0 times per week     Types: Other-see comments     Comment: marijuana     Review of Systems   Constitutional: Negative for fever.   HENT: Negative for sore throat.    Respiratory: Positive for cough. Negative for shortness of breath.    Cardiovascular: Negative for chest pain.   Gastrointestinal: Positive for nausea.   Genitourinary: Negative for dysuria.   Musculoskeletal: Negative for back pain.   Skin: Negative for rash.   Neurological: Negative for weakness.   Hematological: Does not bruise/bleed easily.       Physical Exam     Initial Vitals [08/02/19 1137]   BP Pulse Resp Temp SpO2   (!) 217/96 74 20 98.4 °F (36.9 °C) 97 %      MAP       --         Physical Exam    Nursing note and vitals reviewed.  Constitutional: He appears well-developed and well-nourished. He is not diaphoretic. No distress.   HENT:   Head: Normocephalic and atraumatic.   Mouth/Throat: Oropharynx is clear and moist.   Eyes: Conjunctivae are normal. Pupils are equal, round, and reactive to light.   Neck: Normal range of motion. Neck supple.   Cardiovascular: Normal rate, regular rhythm and intact distal pulses. Exam  reveals no gallop and no friction rub.    Murmur heard.   Systolic murmur is present.  Pulses:       Dorsalis pedis pulses are 1+ on the right side, and 1+ on the left side.   Systolic murmur on the left sternal border.   Pulmonary/Chest: Breath sounds normal. No respiratory distress. He has no wheezes. He has no rhonchi. He has no rales.   Abdominal: Soft. Bowel sounds are normal. He exhibits no distension. There is no tenderness.   Musculoskeletal: Normal range of motion. He exhibits no edema or tenderness.   Lymphadenopathy:     He has no cervical adenopathy.   Neurological: He is alert and oriented to person, place, and time. He has normal strength.   Skin: Skin is warm and dry. No ecchymosis noted.   No skin changes.   Psychiatric: He has a normal mood and affect. Thought content normal.         ED Course   Procedures  Labs Reviewed - No data to display       Imaging Results    None          Medical Decision Making:   History:   Old Medical Records: I decided to obtain old medical records.            Scribe Attestation:   Scribe #1: I performed the above scribed service and the documentation accurately describes the services I performed. I attest to the accuracy of the note.            ED Course as of Aug 02 1217   Fri Aug 02, 2019   1208 I, Dr. Azam Freeman, personally performed the services described in this documentation. All medical record entries made by the scribe were at my direction and in my presence. I have reviewed the chart and agree that the record is accurate and complete.   Azam Freeman MD.      [NP]   1208 This is an emergent evaluation of a 54 y.o.male patient with presentation of concern about recent diagnosis of AAA, 3.4cm. No symptoms at this time. Distal pulses equal and intact bilat. Doubt aortic dissection or aortic rupture at this time.     Plan to review imaging and have pt follow up as previously referred with vasc surg in [place].    Pt also has DM and gastroparesis, requesting  medication for nausea. Will give Rx for reglan.      [NP]   1210 Radiology Report:    The proximal aorta measures 3.4 cm. Color flow Doppler confirms patency of the inferior vena cava and hepatic veins. Portal venous flow is hepatopetal.     CONCLUSION:     HYPOECHOIC TO ANECHOIC FOCUS WITHIN THE PANCREAS MAY REPRESENT A PSEUDOCYST. FURTHER EVALUATION WITH MRCP VERSUS CT IS SUGGESTED.     3.4 CM ABDOMINAL AORTIC ANEURYSM. CONSIDER CTA FOR FURTHER EVALUATION AND CHARACTERIZATION.      [NP]   1212 Radiology report confirms proximal AAA at 3.4cm. Will continue plan for outpatient visit with vasc surg.    [NP]      ED Course User Index  [NP] Azam Freeman MD     Clinical Impression:       ICD-10-CM ICD-9-CM   1. Abdominal aortic aneurysm (AAA) without rupture I71.4 441.4   2. Non-intractable vomiting with nausea, unspecified vomiting type R11.2 787.01         Disposition:   Disposition: Discharged  Condition: Stable                        Azam Freeman MD  08/02/19 9514

## 2019-08-02 NOTE — DISCHARGE INSTRUCTIONS
Thank you for choosing [facility]! We appreciate you coming to us for your medical care. We hope you feel better soon! Please come back to [facility] for all of your future medical needs.    Our goal in the emergency department is to always give you outstanding care and exceptional service. You may receive a survey by mail or e-mail in the next week regarding your experience in our ED. We would greatly appreciate your completing and returning the survey. Your feedback provides us with a way to recognize our staff who give very good care and it helps us learn how to improve when your experience was below our aspiration of excellence.       Sincerely,    Azam Freeman MD  Medical Director  Emergency Department  [facility]

## 2019-09-06 NOTE — ED NOTES
No pedal or posterior tibial pulses palpated or assessed with doppler.  Decreased sensation in bilateral lower extremities.  Pt has a history of a stroke and right sided weakness more to upper extremities.  Pt is able to dorsiflex and plantar flex bilaterally.

## 2019-09-06 NOTE — ED PROVIDER NOTES
"Encounter Date: 9/6/2019    SCRIBE #1 NOTE: I, Moses Lila, am scribing for, and in the presence of, Enoc Marinelli MD.       History     Chief Complaint   Patient presents with    Foot Pain     to right foot " I think it might be my neuropathy." Denies any injury       Time seen by provider: 2:11 PM on 09/06/2019    João Aguirre is a 54 y.o. male with DM, CKD, and prior CVA who presents to the ED with an onset of right foot pain starting two weeks ago. The foot pain is mostly in the heel of the right foot. Didn't take any pain medications prior to arrival. Denies any history of stents or bypasses in his legs. The patient denies swelling of the foot, color change, numbness or weakness or any other symptoms at this time. Patient's PSHx includes Cardiac Surgery and Incision and Drainage of abscess in his right thigh. Patient is a former smoker. Patent's allergies include Antibiotic Hc.       The history is provided by the patient.     Review of patient's allergies indicates:   Allergen Reactions    Antibiotic hc      Counter acts with methodone.        Past Medical History:   Diagnosis Date    Anticoagulant long-term use     Arthritis     Asthma     Back pain     Coronary artery disease 2013    stent    Diabetes mellitus     Encounter for blood transfusion     Eye abnormality right eye    injured as a child    Gastritis     Gastroparesis     Hemodialysis patient     Hypertension     Pneumonia 2013    Spend 6weeks in hospital at [facility]    Renal disorder     Stroke      Past Surgical History:   Procedure Laterality Date    AV FISTULA PLACEMENT      BACK SURGERY      CARDIAC SURGERY  2013    heart stent    CHOLECYSTECTOMY      ESOPHAGOGASTRODUODENOSCOPY (EGD) N/A 8/1/2016    [facility] END[facility]    EYE SURGERY      R eye    INCISION AND DRAINAGE, ABSCESS Right 9/6/2018    [facility] by [facility] at [facility] OR     Family History   Problem Relation " Age of Onset    Aneurysm Mother         brain    Aneurysm Father 77        stomach     Heart disease Father     Kidney disease Brother     Diabetes Brother         hyperglycemia and HAYDER causseddeath    Heart disease Paternal Grandmother      Social History     Tobacco Use    Smoking status: Former Smoker     Years: 10.00     Last attempt to quit: 2015     Years since quittin.0    Smokeless tobacco: Never Used   Substance Use Topics    Alcohol use: No    Drug use: Yes     Frequency: 4.0 times per week     Types: Other-see comments     Comment: marijuana     Review of Systems   Constitutional: Negative for fever.   HENT: Negative for sore throat.    Respiratory: Negative for shortness of breath.    Cardiovascular: Negative for chest pain.   Gastrointestinal: Negative for nausea.   Genitourinary: Negative for dysuria.   Musculoskeletal: Positive for arthralgias (right foot). Negative for back pain and joint swelling.   Skin: Negative for rash.   Neurological: Negative for weakness and numbness.   Hematological: Does not bruise/bleed easily.       Physical Exam     Initial Vitals [19 1405]   BP Pulse Resp Temp SpO2   124/74 65 17 98.7 °F (37.1 °C) 98 %      MAP       --         Physical Exam    Nursing note and vitals reviewed.  Constitutional: He appears well-developed and well-nourished. He is not diaphoretic. No distress.   HENT:   Head: Normocephalic and atraumatic.   Mouth/Throat: Oropharynx is clear and moist.   Eyes: Conjunctivae are normal.   Neck: Neck supple.   Cardiovascular: Normal rate, regular rhythm and intact distal pulses. Exam reveals no gallop and no friction rub.    Murmur heard.   Systolic murmur is present with a grade of 2/6.  Pulmonary/Chest: Breath sounds normal. He has no wheezes. He has no rhonchi. He has no rales.   Abdominal: Soft. He exhibits no distension. There is no tenderness.   Musculoskeletal: Normal range of motion.        Right foot: There is tenderness.  There is no swelling and no deformity.   Minimal tenderness to the plantar surface of the right foot at the base of the heel and proximal part of the plantar fascia. No tenderness on the dorsal surface.   Neurological: He is alert and oriented to person, place, and time.   Equal sensation to light touch bilaterally. 5/5 symmetric strength.    Skin: Skin is warm. No rash noted. No erythema.   Chronic skin hyperpigmentation of the distal lower extremities bilaterally. No erythema, no fluctuance, no foreign bodies. Both feet warm to touch. Capillary refill on the left foot is less then two seconds. Capillary refill on the right foot is greater then two seconds.         ED Course   Procedures  Labs Reviewed - No data to display       Imaging Results          US Lower Extremity Arteries Bilateral (Final result)  Result time 09/06/19 16:26:50   Procedure changed from US Ankle/Brach Indices W/O Stress 1-2 Levels     Final result by Penelope Luciano MD (09/06/19 16:26:50)                 Impression:      Findings consistent with severe diffuse arterial sclerotic disease both lower extremities.  There is occlusion of the mid left superficial femoral artery and multiple sites of severe stenosis with probable segmental occlusion bilaterally.  Low peak systolic velocity right common femoral artery also questions significant, severe proximal inflow stenosis.      Electronically signed by: Penelope Luciano MD  Date:    09/06/2019  Time:    16:26             Narrative:    EXAMINATION:  US LOWER EXTREMITY ARTERIES BILATERAL    CLINICAL HISTORY:  Right foot pain;    TECHNIQUE:  Bilateral spectral, color and grayscale images of the large arteries of both lower extremities were performed.    COMPARISON:  8/30/2018    FINDINGS:  There are diminished velocities and dampened waveforms with monophasic waveforms in lower extremity arteries.    In the right lower extremity the peak systolic velocity common femoral artery 48  centimeters/second velocities falling to 19 centimeters/second in the proximal superficial femoral artery, 18 centimeters/second in the popliteal artery and 16 and 17 centimeters/seconds in the posterior tibial artery and anterior tibial artery.  Peroneal artery is not visualized.  Extensive diffuse plaque is seen on images with vessels appearing segmentally severely stenotic or segmentally occluded.  Collateral vessels are noted in the leg.    On the left the peak systolic velocity common femoral arteries 104 centimeters/second.  Mid superficial femoral artery appears occluded in velocities in the distal superficial femoropopliteal artery 25 and 27 centimeters/second.  Peroneal not visualized.  Anterior tibial velocity is 106 centimeters/second and posterior tibial artery velocity 23 centimeters/second.  Severe diffuse plaque is seen on images with vessels appearing segmentally severely stenotic or segmentally occluded                               X-Ray Foot Complete Right (Final result)  Result time 09/06/19 15:32:11    Final result by Enoc Tanner MD (09/06/19 15:32:11)                 Narrative:    EXAMINATION:  XR FOOT COMPLETE 3 VIEW RIGHT    CLINICAL HISTORY:  . Pain in right foot    TECHNIQUE:  AP, lateral, and oblique views of the right foot were performed.    COMPARISON:  08/11/2017    FINDINGS:  No acute fracture.  Plantar calcaneal spur.  Cortical deformity along the head of the 5th toe proximal phalanx which could relate to remote trauma or resection arthroplasty, necessitating correlation with surgical history.  Bone infarct distal tibial shaft, a chronic finding.    There is flexion deformity of the toes although exam is nonweightbearing.  Mild degenerative change in the midfoot and 1st TMT joint.  Atherosclerosis.  Nonspecific dorsal forefoot soft tissue swelling.      Electronically signed by: Enoc Tanner  Date:    09/06/2019  Time:    15:32                          (radiology reading,  XR visualized by me)     Medical Decision Making:   History:   Old Medical Records: I decided to obtain old medical records.  Clinical Tests:   Radiological Study: Ordered and Reviewed            Scribe Attestation:   Scribe #1: I performed the above scribed service and the documentation accurately describes the services I performed. I attest to the accuracy of the note.    I, Dr. Enoc Marinelli, personally performed the services described in this documentation. All medical record entries made by the scribe were at my direction and in my presence.  I have reviewed the chart and agree that the record reflects my personal performance and is accurate and complete. Enoc Marinelli MD.  10:52 PM 09/06/2019    João Aguirre is a 54 y.o. male presenting with focal pain on the right arch at the location adjacent to the calcaneus.  There is no sign of acute infectious etiology.  I do not think antibiotics are indicated.  He is appropriate for podiatry follow-up.  Plantar fasciitis considered although I did recommend reassessment with Podiatry.  No sign of occult fracture at that location.  I doubt DVT.  He has likely chronic peripheral vascular disease marked by skin per hyperpigmentation as well as bilateral arterial ultrasound findings.  No sign of acute occlusive disease warranting emergent vascular intervention today.  He is appropriate for outpatient vascular follow-up.  I doubt acute ischemic pain at this point.  He does have collateral flow as well as some flow demonstrated to the distal foot.  Notably his symptoms have been going on for 2 weeks as well without significant change.  No sign of foreign body at this point.  Detailed return precautions reviewed.           Clinical Impression:       ICD-10-CM ICD-9-CM   1. Arch pain of right foot M79.671 729.5   2. Right foot pain M79.671 729.5         Disposition:   Disposition: Discharged  Condition: Stable                        Enoc Marinelli,  MD  09/06/19 [id number]

## 2019-09-09 NOTE — TELEPHONE ENCOUNTER
----- Message from Ashley Snowden sent at 9/9/2019  9:20 AM CDT -----  Contact: Mother Radhames [phone]  Mother called and asked if you will be able to see her son early tomorrow he went [facility] ER over the weekend for pain in his Right Foot. The first available was for 3 p.m tomorrow he is asking to be seen early in the morning

## 2019-09-19 NOTE — TELEPHONE ENCOUNTER
Advised pt of culture results no need for ABT at this time to cont. Current wound care. Pt verbalized understanding

## 2019-09-19 NOTE — TELEPHONE ENCOUNTER
----- Message from Tej Jimenez sent at 9/19/2019 12:27 PM CDT -----  Contact: Patient  Type: Needs Medical Advice    Who Called:  Patient  Pharmacy name and phone #:    [facility] [place] [facility] [address]  [address]  [place] [address]  Phone: [phone] Fax: [phone]  Best Call Back Number: [phone]  Additional Information: Patient would like to verify which medication/prescription they need to keep their foot clean. Please call to advise. Thanks!

## 2019-09-19 NOTE — TELEPHONE ENCOUNTER
----- Message from Rene Driver DPM sent at 9/19/2019  3:58 PM CDT -----  Please call the patient regarding his abnormal result.Advise C&S neg for active bacterial growth so no need for an antibiotic at this time.  He needs to continue wound care as discussed.

## 2019-09-21 PROBLEM — M72.2 PLANTAR FASCIITIS: Status: ACTIVE | Noted: 2019-01-01

## 2019-09-21 NOTE — PROGRESS NOTES
Subjective:       Patient ID: João Aguirre is a 54 y.o. male.    Chief Complaint: Foot Ulcer; Callouses; Foot Pain; Diabetes Mellitus; and Follow-up     patient presents today for follow-up he was recently seen in [facility] for an ulceration on the right foot. Patient is a dialysis patient.  Nail Problem   Associated symptoms include arthralgias, joint swelling, numbness and weakness.   Foot Ulcer   Associated symptoms include arthralgias, joint swelling, numbness and weakness.   Foot Pain   Associated symptoms include arthralgias, joint swelling, numbness and weakness.   Follow-up   Associated symptoms include arthralgias, joint swelling, numbness and weakness.     Review of Systems   Musculoskeletal: Positive for arthralgias, gait problem and joint swelling.   Neurological: Positive for weakness and numbness.   All other systems reviewed and are negative.      Objective:      Physical Exam   Constitutional: He appears well-developed and well-nourished.   Cardiovascular:   Pulses:       Dorsalis pedis pulses are 0 on the right side, and 0 on the left side.        Posterior tibial pulses are 0 on the right side, and 0 on the left side.   Pulmonary/Chest: Effort normal.   Musculoskeletal: He exhibits edema, tenderness and deformity.        Right foot: There is decreased range of motion and deformity.        Left foot: There is decreased range of motion and deformity.   Feet:   Right Foot:   Protective Sensation: 4 sites tested. 2 sites sensed.   Skin Integrity: Positive for ulcer, skin breakdown, erythema, callus and dry skin.   Left Foot:   Protective Sensation: 4 sites tested. 2 sites sensed.   Skin Integrity: Positive for callus and dry skin.   Neurological: He displays abnormal reflex.   Skin: Capillary refill takes more than 3 seconds. There is erythema.   Psychiatric: He has a normal mood and affect. His behavior is normal. Judgment and thought content normal.   Nursing note and vitals  reviewed.                  Assessment:       1. Infected skin ulcer with fat layer exposed    2. Skin ulcer of toe of right foot, limited to breakdown of skin    3. Type 2 diabetes mellitus with chronic kidney disease on chronic dialysis, without long-term current use of insulin        Plan:         Patient presents today he was last seen about 9 months ago he recently went [facility] [place] where he was seen for right foot ulcer.  Patient states he has been putting lotion in between his toes and on the bottom of his right foot where he has a wound.  Patient states the wound on the 2nd toe just got significantly worse and he is concerned the patient is currently a dialysis patient and has significant diabetic neuropathy.  On evaluation the patient has an ulceration sub 2nd metatarsal plantar right forefoot secondary to digital contracture patient has a significant ulceration encompassing the medial aspect of the 2nd digit right there is heavy drainage emitting from the area tissue breakdown necrosis of the tissue while no bone is exposed there is concern for possible osteomyelitis.  X-rays were taken today and reviewed there is no obvious signs of osteomyelitis currently noted. The ulceration on the medial aspect of the 2nd digit is approximately 1.5 cm long by 1 cm wide the ulceration sub 2nd metatarsal is 2 cm long by 1 cm wide by 4 mm deep this area sub 2nd metatarsal does not appear to be infected there does appear to be infection in the medial aspect of the 2nd digit right.  Patient is not to get the right foot wet he has not put any lotion or cream on the area he is going to clean the area with Dakin solution paint the area with Betadine put a soft 2 x 2 gauze in between the 1st and 2nd toes and gently dress the area plan to see him for follow-up in 10 days because of the patient's dialysis I waited until the culture and sensitivity report came back to determine what would be the best  antibiotic for the patient. Subsequent culture and sensitivity displayed no active growth so the patient was advised he does not need an antibiotic at this time he does need to continue current wound care I have ordered wound care supplies for him through [facility] patient did not want home health and indicated he could change the dressing himself.  Follow-up 10 days if the area gets worse he is to contact me immediately I have advised the patient once we get this under control we can deal with his plantar fascial pain bilateral. Total face-to-face time including discussion evaluation treatment discussion of all the patient's conditions wound care today x-rays review of medical records from the patient's emergency room visit equaled 45 min.This note was created using [employer] voice recognition software that occasionally misinterpreted phrases or words.

## 2019-09-28 PROBLEM — I96 GANGRENE OF TOE OF RIGHT FOOT: Status: ACTIVE | Noted: 2019-01-01

## 2019-09-29 NOTE — PROGRESS NOTES
Subjective:       Patient ID: João Aguirre is a 54 y.o. male.    Chief Complaint: Follow-up; Foot Ulcer; Diabetes Mellitus; and Foot Pain     patient presents today for follow-up he was recently seen in [facility] for an ulceration on the right foot. Patient is a dialysis patient.  Foot Ulcer   Associated symptoms include arthralgias, joint swelling, numbness and weakness.   Foot Pain   Associated symptoms include arthralgias, joint swelling, numbness and weakness.   Follow-up   Associated symptoms include arthralgias, joint swelling, numbness and weakness.   Nail Problem   Associated symptoms include arthralgias, joint swelling, numbness and weakness.     Review of Systems   Musculoskeletal: Positive for arthralgias, gait problem and joint swelling.   Neurological: Positive for weakness and numbness.   All other systems reviewed and are negative.      Objective:      Physical Exam   Constitutional: He appears well-developed and well-nourished.   Cardiovascular:   Pulses:       Dorsalis pedis pulses are 0 on the right side, and 0 on the left side.        Posterior tibial pulses are 0 on the right side, and 0 on the left side.   Pulmonary/Chest: Effort normal.   Musculoskeletal: He exhibits edema, tenderness and deformity.        Right foot: There is decreased range of motion and deformity.        Left foot: There is decreased range of motion and deformity.   Feet:   Right Foot:   Protective Sensation: 4 sites tested. 2 sites sensed.   Skin Integrity: Positive for ulcer, skin breakdown, erythema, callus and dry skin.   Left Foot:   Protective Sensation: 4 sites tested. 2 sites sensed.   Skin Integrity: Positive for callus and dry skin.   Neurological: He displays abnormal reflex.   Skin: Capillary refill takes more than 3 seconds. There is erythema.   Psychiatric: He has a normal mood and affect. His behavior is normal. Judgment and thought content normal.   Nursing note and vitals reviewed.                           Assessment:       1. Skin ulcer of toe of right foot, limited to breakdown of skin    2. Type 2 diabetes mellitus with chronic kidney disease on chronic dialysis, without long-term current use of insulin    3. Gangrene of toe of right foot        Plan:        Patient presents today for follow-up of an ulceration of the 2nd digit right.  Patient states his toe just turn black in the past couple of days he figured there was no needing calling the office because he was going to be seen today.  X-rays were taken of the patient's right foot there are no obvious signs of osteo of the 2nd digit right however the 2nd digit has become completely gangrenous necrotic and nonviable at this time I have advised the patient he needs to be admitted to the hospital however we do not have dialysis at our hospital and he needs to go to the hospital where his nephrologist is [facility].  Patient's nephrologist was contacted and advised as to the patient's situation he agreed to allow us to send the patient to [facility]'s emergency room for admission so that he can be taking care of their where his nephrologist is as the patient is receiving dialysis every other day. The entire 2nd digit right foot is necrotic this is likely an ischemic event.  Patient does have peripheral vascular disease severe bilateral I have advised the patient he is going to at least lose the toe this will need to be surgically addressed and he will require admission that is the reason were sending him to a different hospital again where his nephrologist will be able to monitor him closely I did advised the patient we contacted the nephrologist office Dr. Riley and he was in agreement with the plan.  Wound care was provided today after discussion of x-rays and discussion of treatment plan patient is going to present to the emergency room at [facility] immediately following today's visit I did not feel is  necessary to contact [facility] for transport.  Patient was in stable condition at the time of leaving the office to proceed to the emergency room.  Total face-to-face time including discussion evaluation treatment and discussion of likely treatment care and the need for surgical intervention and admission equaled 30 min.  Patient was having significant ischemic discomfort right.  This note was created using [employer] voice recognition software that occasionally misinterpreted phrases or words.

## 2019-12-27 NOTE — TELEPHONE ENCOUNTER
----- Message from Yordan Lamas sent at 12/27/2019  4:56 PM CST -----  Contact: Keith with [facility] [facility]  Type:  Sooner Apoointment Request    Caller is requesting a sooner appointment.  Caller declined first available appointment listed below.  Caller will not accept being placed on the waitlist and is requesting a message be sent to doctor.    Name of Caller:  Kirti  When is the first available appointment?  01/13/2020  Symptoms:  Ulcer on right foot  Best Call Back Number:  [phone]- Keith  Additional Information:  Pt is scheduled on 01/13/2020 and but would like ot know if he can be fit in sooner in [place].

## 2019-12-30 NOTE — TELEPHONE ENCOUNTER
Pt. Not able to come to appointment in the morning on Friday 1/03/2020.Pt stated has another doctors appointment on above date, so can only schedule for an after lunch appointment if possible.

## 2019-12-30 NOTE — TELEPHONE ENCOUNTER
Pt has a new ulcer on the bottom of right foot. He has appt on 1/7 in . Do you want to see this pt on 1/3 or wait until appt on 1/7? Please advise

## 2019-12-30 NOTE — TELEPHONE ENCOUNTER
----- Message from Netta Wynne sent at 12/30/2019  2:48 PM CST -----  Contact: Patient  Type:  Patient Returning Call    Who Called:  Patient  Who Left Message for Patient:  Katerina  Does the patient know what this is regarding?:  Scheduling sooner appt  Best Call Back Number:    Additional Information:  Calling to let the office know that he would like to accept the appointment. Would like a call back to confirm.

## 2019-12-31 NOTE — TELEPHONE ENCOUNTER
Notified pt. Dr. Driver can see him at noon on Friday 1/03/2020, pt. Stated he would be able to make appointment time.

## 2020-01-01 ENCOUNTER — HOSPITAL ENCOUNTER (INPATIENT)
Facility: HOSPITAL | Age: 55
LOS: 1 days | DRG: 951 | End: 2020-08-12
Attending: FAMILY MEDICINE | Admitting: FAMILY MEDICINE
Payer: MEDICARE

## 2020-01-01 ENCOUNTER — ANESTHESIA EVENT (OUTPATIENT)
Dept: SURGERY | Facility: HOSPITAL | Age: 55
DRG: 246 | End: 2020-01-01
Payer: MEDICARE

## 2020-01-01 ENCOUNTER — TELEPHONE (OUTPATIENT)
Dept: INFECTIOUS DISEASES | Facility: CLINIC | Age: 55
End: 2020-01-01

## 2020-01-01 ENCOUNTER — HOSPITAL ENCOUNTER (INPATIENT)
Facility: HOSPITAL | Age: 55
LOS: 9 days | Discharge: HOME | DRG: 246 | End: 2020-08-02
Attending: THORACIC SURGERY (CARDIOTHORACIC VASCULAR SURGERY) | Admitting: THORACIC SURGERY (CARDIOTHORACIC VASCULAR SURGERY)
Payer: MEDICARE

## 2020-01-01 ENCOUNTER — ANESTHESIA EVENT (OUTPATIENT)
Dept: INTENSIVE CARE | Facility: HOSPITAL | Age: 55
DRG: 628 | End: 2020-01-01
Payer: MEDICARE

## 2020-01-01 ENCOUNTER — OFFICE VISIT (OUTPATIENT)
Dept: PODIATRY | Facility: CLINIC | Age: 55
End: 2020-01-01
Payer: MEDICARE

## 2020-01-01 ENCOUNTER — TELEPHONE (OUTPATIENT)
Dept: PODIATRY | Facility: CLINIC | Age: 55
End: 2020-01-01

## 2020-01-01 ENCOUNTER — TELEPHONE (OUTPATIENT)
Dept: HEPATOLOGY | Facility: CLINIC | Age: 55
End: 2020-01-01

## 2020-01-01 ENCOUNTER — ANESTHESIA (OUTPATIENT)
Dept: INTENSIVE CARE | Facility: HOSPITAL | Age: 55
DRG: 628 | End: 2020-01-01
Payer: MEDICARE

## 2020-01-01 ENCOUNTER — ANESTHESIA (OUTPATIENT)
Dept: CARDIOLOGY | Facility: HOSPITAL | Age: 55
DRG: 628 | End: 2020-01-01
Payer: MEDICARE

## 2020-01-01 ENCOUNTER — ANESTHESIA EVENT (OUTPATIENT)
Dept: CARDIOLOGY | Facility: HOSPITAL | Age: 55
DRG: 628 | End: 2020-01-01
Payer: MEDICARE

## 2020-01-01 ENCOUNTER — EXTERNAL HOME HEALTH (OUTPATIENT)
Dept: HOME HEALTH SERVICES | Facility: HOSPITAL | Age: 55
End: 2020-01-01

## 2020-01-01 ENCOUNTER — HOSPITAL ENCOUNTER (INPATIENT)
Facility: HOSPITAL | Age: 55
LOS: 10 days | Discharge: HOSPICE | DRG: 628 | End: 2020-08-12
Attending: FAMILY MEDICINE | Admitting: FAMILY MEDICINE
Payer: MEDICARE

## 2020-01-01 ENCOUNTER — ANESTHESIA (OUTPATIENT)
Dept: SURGERY | Facility: HOSPITAL | Age: 55
DRG: 246 | End: 2020-01-01
Payer: MEDICARE

## 2020-01-01 ENCOUNTER — TELEPHONE (OUTPATIENT)
Dept: HOME HEALTH SERVICES | Facility: HOSPITAL | Age: 55
End: 2020-01-01

## 2020-01-01 VITALS
DIASTOLIC BLOOD PRESSURE: 47 MMHG | BODY MASS INDEX: 30.91 KG/M2 | SYSTOLIC BLOOD PRESSURE: 74 MMHG | TEMPERATURE: 94 F | OXYGEN SATURATION: 76 % | HEIGHT: 72 IN | RESPIRATION RATE: 27 BRPM | HEART RATE: 74 BPM | WEIGHT: 228.19 LBS

## 2020-01-01 VITALS
RESPIRATION RATE: 18 BRPM | SYSTOLIC BLOOD PRESSURE: 119 MMHG | HEART RATE: 85 BPM | HEIGHT: 72 IN | WEIGHT: 235 LBS | OXYGEN SATURATION: 97 % | BODY MASS INDEX: 31.83 KG/M2 | DIASTOLIC BLOOD PRESSURE: 59 MMHG | TEMPERATURE: 99 F

## 2020-01-01 VITALS
SYSTOLIC BLOOD PRESSURE: 174 MMHG | HEART RATE: 58 BPM | HEIGHT: 72 IN | BODY MASS INDEX: 28.44 KG/M2 | DIASTOLIC BLOOD PRESSURE: 79 MMHG | OXYGEN SATURATION: 96 % | WEIGHT: 210 LBS | RESPIRATION RATE: 20 BRPM

## 2020-01-01 VITALS — TEMPERATURE: 99 F | BODY MASS INDEX: 28.44 KG/M2 | HEIGHT: 72 IN | WEIGHT: 210 LBS

## 2020-01-01 VITALS — SYSTOLIC BLOOD PRESSURE: 84 MMHG | DIASTOLIC BLOOD PRESSURE: 54 MMHG | TEMPERATURE: 94 F

## 2020-01-01 DIAGNOSIS — M86.171 ACUTE OSTEOMYELITIS OF METATARSAL BONE, RIGHT: ICD-10-CM

## 2020-01-01 DIAGNOSIS — E11.628 DIABETIC FOOT INFECTION: ICD-10-CM

## 2020-01-01 DIAGNOSIS — N18.6 ANEMIA IN CHRONIC KIDNEY DISEASE, ON CHRONIC DIALYSIS: Chronic | ICD-10-CM

## 2020-01-01 DIAGNOSIS — I70.234 ATHEROSCLEROSIS OF NATIVE ARTERY OF RIGHT LOWER EXTREMITY WITH ULCERATION OF MIDFOOT: ICD-10-CM

## 2020-01-01 DIAGNOSIS — Z99.2 ESRD (END STAGE RENAL DISEASE) ON DIALYSIS: ICD-10-CM

## 2020-01-01 DIAGNOSIS — Z95.5 STATUS POST CORONARY ARTERY STENT PLACEMENT: ICD-10-CM

## 2020-01-01 DIAGNOSIS — N18.6 TYPE 2 DIABETES MELLITUS WITH CHRONIC KIDNEY DISEASE ON CHRONIC DIALYSIS, WITHOUT LONG-TERM CURRENT USE OF INSULIN: Primary | ICD-10-CM

## 2020-01-01 DIAGNOSIS — E11.22 TYPE 2 DIABETES MELLITUS WITH CHRONIC KIDNEY DISEASE ON CHRONIC DIALYSIS, WITHOUT LONG-TERM CURRENT USE OF INSULIN: Primary | ICD-10-CM

## 2020-01-01 DIAGNOSIS — E11.22 TYPE 2 DIABETES MELLITUS WITH CHRONIC KIDNEY DISEASE ON CHRONIC DIALYSIS, WITHOUT LONG-TERM CURRENT USE OF INSULIN: ICD-10-CM

## 2020-01-01 DIAGNOSIS — R07.9 CHEST PAIN OF UNCERTAIN ETIOLOGY: ICD-10-CM

## 2020-01-01 DIAGNOSIS — I73.9 PVD (PERIPHERAL VASCULAR DISEASE): ICD-10-CM

## 2020-01-01 DIAGNOSIS — I49.9 CARDIAC RHYTHM DISORDER OR DISTURBANCE OR CHANGE: ICD-10-CM

## 2020-01-01 DIAGNOSIS — Z99.2 TYPE 2 DIABETES MELLITUS WITH CHRONIC KIDNEY DISEASE ON CHRONIC DIALYSIS, WITHOUT LONG-TERM CURRENT USE OF INSULIN: ICD-10-CM

## 2020-01-01 DIAGNOSIS — I35.0 AORTIC STENOSIS: ICD-10-CM

## 2020-01-01 DIAGNOSIS — I73.9 PAD (PERIPHERAL ARTERY DISEASE): ICD-10-CM

## 2020-01-01 DIAGNOSIS — I50.84 END STAGE HEART FAILURE: ICD-10-CM

## 2020-01-01 DIAGNOSIS — L97.512 SKIN ULCER OF RIGHT FOOT WITH FAT LAYER EXPOSED: ICD-10-CM

## 2020-01-01 DIAGNOSIS — Z71.89 COUNSELING REGARDING ADVANCE CARE PLANNING AND GOALS OF CARE: ICD-10-CM

## 2020-01-01 DIAGNOSIS — R07.9 CHEST PAIN: ICD-10-CM

## 2020-01-01 DIAGNOSIS — I48.92 ATRIAL FIBRILLATION AND FLUTTER: ICD-10-CM

## 2020-01-01 DIAGNOSIS — L98.492 INFECTED SKIN ULCER WITH FAT LAYER EXPOSED: ICD-10-CM

## 2020-01-01 DIAGNOSIS — I25.10 MULTIPLE VESSEL CORONARY ARTERY DISEASE: ICD-10-CM

## 2020-01-01 DIAGNOSIS — I48.91 ATRIAL FIBRILLATION AND FLUTTER: ICD-10-CM

## 2020-01-01 DIAGNOSIS — N18.6 ESRD (END STAGE RENAL DISEASE) ON DIALYSIS: Primary | ICD-10-CM

## 2020-01-01 DIAGNOSIS — N18.6 ESRD (END STAGE RENAL DISEASE) ON DIALYSIS: ICD-10-CM

## 2020-01-01 DIAGNOSIS — Z99.2 TYPE 2 DIABETES MELLITUS WITH CHRONIC KIDNEY DISEASE ON CHRONIC DIALYSIS, WITHOUT LONG-TERM CURRENT USE OF INSULIN: Primary | ICD-10-CM

## 2020-01-01 DIAGNOSIS — D63.1 ANEMIA IN CHRONIC KIDNEY DISEASE, ON CHRONIC DIALYSIS: Chronic | ICD-10-CM

## 2020-01-01 DIAGNOSIS — B18.2 CHRONIC HEPATITIS C WITHOUT HEPATIC COMA: Primary | ICD-10-CM

## 2020-01-01 DIAGNOSIS — L08.9 DIABETIC FOOT INFECTION: ICD-10-CM

## 2020-01-01 DIAGNOSIS — F11.20 METHADONE DEPENDENCE: Chronic | ICD-10-CM

## 2020-01-01 DIAGNOSIS — F17.200 SMOKER: ICD-10-CM

## 2020-01-01 DIAGNOSIS — E87.5 HYPERKALEMIA: ICD-10-CM

## 2020-01-01 DIAGNOSIS — Z99.2 ESRD (END STAGE RENAL DISEASE) ON DIALYSIS: Primary | ICD-10-CM

## 2020-01-01 DIAGNOSIS — M79.2 NEURITIS: ICD-10-CM

## 2020-01-01 DIAGNOSIS — I48.91 A-FIB: ICD-10-CM

## 2020-01-01 DIAGNOSIS — I49.9 IRREGULAR CARDIAC RHYTHM: ICD-10-CM

## 2020-01-01 DIAGNOSIS — L97.512 SKIN ULCER OF RIGHT FOOT WITH FAT LAYER EXPOSED: Primary | ICD-10-CM

## 2020-01-01 DIAGNOSIS — M86.9 OSTEOMYELITIS OF RIGHT FOOT, UNSPECIFIED TYPE: ICD-10-CM

## 2020-01-01 DIAGNOSIS — I21.4 NSTEMI (NON-ST ELEVATED MYOCARDIAL INFARCTION): ICD-10-CM

## 2020-01-01 DIAGNOSIS — I05.0 MITRAL STENOSIS: ICD-10-CM

## 2020-01-01 DIAGNOSIS — L08.9 INFECTED SKIN ULCER WITH FAT LAYER EXPOSED: ICD-10-CM

## 2020-01-01 DIAGNOSIS — N18.6 TYPE 2 DIABETES MELLITUS WITH CHRONIC KIDNEY DISEASE ON CHRONIC DIALYSIS, WITHOUT LONG-TERM CURRENT USE OF INSULIN: ICD-10-CM

## 2020-01-01 DIAGNOSIS — Z51.5 PALLIATIVE CARE ENCOUNTER: ICD-10-CM

## 2020-01-01 DIAGNOSIS — Z99.2 ANEMIA IN CHRONIC KIDNEY DISEASE, ON CHRONIC DIALYSIS: Chronic | ICD-10-CM

## 2020-01-01 DIAGNOSIS — I35.0 NONRHEUMATIC AORTIC (VALVE) STENOSIS: Primary | ICD-10-CM

## 2020-01-01 DIAGNOSIS — Z71.89 GOALS OF CARE, COUNSELING/DISCUSSION: ICD-10-CM

## 2020-01-01 LAB
ABO + RH BLD: NORMAL
ALBUMIN SERPL BCP-MCNC: 2.4 G/DL (ref 3.5–5.2)
ALBUMIN SERPL BCP-MCNC: 2.5 G/DL (ref 3.5–5.2)
ALBUMIN SERPL BCP-MCNC: 2.6 G/DL (ref 3.5–5.2)
ALBUMIN SERPL BCP-MCNC: 2.7 G/DL (ref 3.5–5.2)
ALBUMIN SERPL BCP-MCNC: 2.8 G/DL (ref 3.5–5.2)
ALBUMIN SERPL BCP-MCNC: 2.8 G/DL (ref 3.5–5.2)
ALBUMIN SERPL BCP-MCNC: 2.9 G/DL (ref 3.5–5.2)
ALBUMIN SERPL BCP-MCNC: 3 G/DL (ref 3.5–5.2)
ALBUMIN SERPL BCP-MCNC: 3.1 G/DL (ref 3.5–5.2)
ALLENS TEST: ABNORMAL
ALLENS TEST: ABNORMAL
ALP SERPL-CCNC: 102 U/L (ref 55–135)
ALP SERPL-CCNC: 104 U/L (ref 55–135)
ALP SERPL-CCNC: 110 U/L (ref 55–135)
ALP SERPL-CCNC: 111 U/L (ref 55–135)
ALP SERPL-CCNC: 111 U/L (ref 55–135)
ALP SERPL-CCNC: 112 U/L (ref 55–135)
ALP SERPL-CCNC: 115 U/L (ref 55–135)
ALP SERPL-CCNC: 118 U/L (ref 55–135)
ALP SERPL-CCNC: 120 U/L (ref 55–135)
ALP SERPL-CCNC: 120 U/L (ref 55–135)
ALP SERPL-CCNC: 121 U/L (ref 55–135)
ALP SERPL-CCNC: 121 U/L (ref 55–135)
ALP SERPL-CCNC: 122 U/L (ref 55–135)
ALP SERPL-CCNC: 123 U/L (ref 55–135)
ALP SERPL-CCNC: 127 U/L (ref 55–135)
ALP SERPL-CCNC: 131 U/L (ref 55–135)
ALP SERPL-CCNC: 131 U/L (ref 55–135)
ALP SERPL-CCNC: 137 U/L (ref 55–135)
ALP SERPL-CCNC: 139 U/L (ref 55–135)
ALP SERPL-CCNC: 154 U/L (ref 55–135)
ALT SERPL W/O P-5'-P-CCNC: 12 U/L (ref 10–44)
ALT SERPL W/O P-5'-P-CCNC: 13 U/L (ref 10–44)
ALT SERPL W/O P-5'-P-CCNC: 15 U/L (ref 10–44)
ALT SERPL W/O P-5'-P-CCNC: 15 U/L (ref 10–44)
ALT SERPL W/O P-5'-P-CCNC: 16 U/L (ref 10–44)
ALT SERPL W/O P-5'-P-CCNC: 167 U/L (ref 10–44)
ALT SERPL W/O P-5'-P-CCNC: 2606 U/L (ref 10–44)
ALT SERPL W/O P-5'-P-CCNC: 394 U/L (ref 10–44)
ALT SERPL W/O P-5'-P-CCNC: 5 U/L (ref 10–44)
ALT SERPL W/O P-5'-P-CCNC: 5 U/L (ref 10–44)
ALT SERPL W/O P-5'-P-CCNC: 64 U/L (ref 10–44)
ALT SERPL W/O P-5'-P-CCNC: 69 U/L (ref 10–44)
ALT SERPL W/O P-5'-P-CCNC: 7 U/L (ref 10–44)
ALT SERPL W/O P-5'-P-CCNC: 8 U/L (ref 10–44)
ALT SERPL W/O P-5'-P-CCNC: 9 U/L (ref 10–44)
ALT SERPL W/O P-5'-P-CCNC: <5 U/L (ref 10–44)
ALT SERPL W/O P-5'-P-CCNC: <5 U/L (ref 10–44)
ANION GAP SERPL CALC-SCNC: 10 MMOL/L (ref 8–16)
ANION GAP SERPL CALC-SCNC: 11 MMOL/L (ref 8–16)
ANION GAP SERPL CALC-SCNC: 12 MMOL/L (ref 8–16)
ANION GAP SERPL CALC-SCNC: 13 MMOL/L (ref 8–16)
ANION GAP SERPL CALC-SCNC: 14 MMOL/L (ref 8–16)
ANION GAP SERPL CALC-SCNC: 15 MMOL/L (ref 8–16)
ANION GAP SERPL CALC-SCNC: 16 MMOL/L (ref 8–16)
ANION GAP SERPL CALC-SCNC: 18 MMOL/L (ref 8–16)
ANION GAP SERPL CALC-SCNC: 20 MMOL/L (ref 8–16)
ANION GAP SERPL CALC-SCNC: 22 MMOL/L (ref 8–16)
ANION GAP SERPL CALC-SCNC: 24 MMOL/L (ref 8–16)
ANION GAP SERPL CALC-SCNC: 26 MMOL/L (ref 8–16)
ANION GAP SERPL CALC-SCNC: 35 MMOL/L (ref 8–16)
ANION GAP SERPL CALC-SCNC: 35 MMOL/L (ref 8–16)
ANION GAP SERPL CALC-SCNC: 9 MMOL/L (ref 8–16)
ANISOCYTOSIS BLD QL SMEAR: ABNORMAL
ANISOCYTOSIS BLD QL SMEAR: ABNORMAL
ANISOCYTOSIS BLD QL SMEAR: SLIGHT
APTT BLDCRRT: 24.7 SEC (ref 21–32)
APTT BLDCRRT: 29.1 SEC (ref 21–32)
APTT BLDCRRT: 29.8 SEC (ref 21–32)
APTT BLDCRRT: 30.2 SEC (ref 21–32)
APTT BLDCRRT: 33.6 SEC (ref 21–32)
APTT BLDCRRT: 33.7 SEC (ref 21–32)
APTT BLDCRRT: 33.9 SEC (ref 21–32)
APTT BLDCRRT: 34.3 SEC (ref 21–32)
APTT BLDCRRT: 35 SEC (ref 21–32)
APTT BLDCRRT: 37.8 SEC (ref 21–32)
APTT BLDCRRT: 38.4 SEC (ref 21–32)
APTT BLDCRRT: 38.9 SEC (ref 21–32)
APTT BLDCRRT: 39.3 SEC (ref 21–32)
APTT BLDCRRT: 39.3 SEC (ref 21–32)
APTT BLDCRRT: 42.1 SEC (ref 21–32)
APTT BLDCRRT: 42.1 SEC (ref 21–32)
APTT BLDCRRT: 42.9 SEC (ref 21–32)
APTT BLDCRRT: 44 SEC (ref 21–32)
APTT BLDCRRT: 44.5 SEC (ref 21–32)
APTT BLDCRRT: 44.9 SEC (ref 21–32)
APTT BLDCRRT: 45 SEC (ref 21–32)
APTT BLDCRRT: 46.1 SEC (ref 21–32)
APTT BLDCRRT: 47.6 SEC (ref 21–32)
APTT BLDCRRT: 48.7 SEC (ref 21–32)
APTT BLDCRRT: 49.3 SEC (ref 21–32)
APTT BLDCRRT: 51 SEC (ref 21–32)
APTT BLDCRRT: 56.5 SEC (ref 21–32)
APTT BLDCRRT: 63.9 SEC (ref 21–32)
APTT BLDCRRT: 79.6 SEC (ref 21–32)
APTT BLDCRRT: 87.2 SEC (ref 21–32)
ASCENDING AORTA: 3.99 CM
AST SERPL-CCNC: 1096 U/L (ref 10–40)
AST SERPL-CCNC: 17 U/L (ref 10–40)
AST SERPL-CCNC: 171 U/L (ref 10–40)
AST SERPL-CCNC: 18 U/L (ref 10–40)
AST SERPL-CCNC: 182 U/L (ref 10–40)
AST SERPL-CCNC: 19 U/L (ref 10–40)
AST SERPL-CCNC: 20 U/L (ref 10–40)
AST SERPL-CCNC: 20 U/L (ref 10–40)
AST SERPL-CCNC: 22 U/L (ref 10–40)
AST SERPL-CCNC: 23 U/L (ref 10–40)
AST SERPL-CCNC: 23 U/L (ref 10–40)
AST SERPL-CCNC: 24 U/L (ref 10–40)
AST SERPL-CCNC: 24 U/L (ref 10–40)
AST SERPL-CCNC: 25 U/L (ref 10–40)
AST SERPL-CCNC: 28 U/L (ref 10–40)
AST SERPL-CCNC: 31 U/L (ref 10–40)
AST SERPL-CCNC: 427 U/L (ref 10–40)
AST SERPL-CCNC: 8955 U/L (ref 10–40)
AV INDEX (PROSTH): 0.43
AV MEAN GRADIENT: 24 MMHG
AV PEAK GRADIENT: 44 MMHG
AV VALVE AREA: 1.57 CM2
AV VELOCITY RATIO: 0.47
BACTERIA BLD CULT: NORMAL
BACTERIA SPEC AEROBE CULT: ABNORMAL
BACTERIA SPEC ANAEROBE CULT: NORMAL
BASOPHILS # BLD AUTO: 0.03 K/UL (ref 0–0.2)
BASOPHILS # BLD AUTO: 0.04 K/UL (ref 0–0.2)
BASOPHILS # BLD AUTO: 0.05 K/UL (ref 0–0.2)
BASOPHILS # BLD AUTO: 0.06 K/UL (ref 0–0.2)
BASOPHILS # BLD AUTO: 0.07 K/UL (ref 0–0.2)
BASOPHILS # BLD AUTO: 0.08 K/UL (ref 0–0.2)
BASOPHILS # BLD AUTO: 0.09 K/UL (ref 0–0.2)
BASOPHILS # BLD AUTO: 0.09 K/UL (ref 0–0.2)
BASOPHILS # BLD AUTO: 0.1 K/UL (ref 0–0.2)
BASOPHILS # BLD AUTO: ABNORMAL K/UL (ref 0–0.2)
BASOPHILS NFR BLD: 0 % (ref 0–1.9)
BASOPHILS NFR BLD: 0.2 % (ref 0–1.9)
BASOPHILS NFR BLD: 0.4 % (ref 0–1.9)
BASOPHILS NFR BLD: 0.5 % (ref 0–1.9)
BASOPHILS NFR BLD: 0.6 % (ref 0–1.9)
BASOPHILS NFR BLD: 0.7 % (ref 0–1.9)
BASOPHILS NFR BLD: 0.8 % (ref 0–1.9)
BASOPHILS NFR BLD: 0.9 % (ref 0–1.9)
BASOPHILS NFR BLD: 1 % (ref 0–1.9)
BASOPHILS NFR BLD: 1.1 % (ref 0–1.9)
BASOPHILS NFR BLD: 1.1 % (ref 0–1.9)
BILIRUB SERPL-MCNC: 0.4 MG/DL (ref 0.1–1)
BILIRUB SERPL-MCNC: 0.4 MG/DL (ref 0.1–1)
BILIRUB SERPL-MCNC: 0.5 MG/DL (ref 0.1–1)
BILIRUB SERPL-MCNC: 0.6 MG/DL (ref 0.1–1)
BILIRUB SERPL-MCNC: 0.6 MG/DL (ref 0.1–1)
BILIRUB SERPL-MCNC: 0.7 MG/DL (ref 0.1–1)
BILIRUB SERPL-MCNC: 0.8 MG/DL (ref 0.1–1)
BILIRUB SERPL-MCNC: 1 MG/DL (ref 0.1–1)
BILIRUB SERPL-MCNC: 1.1 MG/DL (ref 0.1–1)
BLD GP AB SCN CELLS X3 SERPL QL: NORMAL
BNP SERPL-MCNC: 2948 PG/ML (ref 0–99)
BSA FOR ECHO PROCEDURE: 2.25 M2
BUN SERPL-MCNC: 13 MG/DL (ref 6–20)
BUN SERPL-MCNC: 17 MG/DL (ref 6–20)
BUN SERPL-MCNC: 18 MG/DL (ref 6–20)
BUN SERPL-MCNC: 19 MG/DL (ref 6–20)
BUN SERPL-MCNC: 21 MG/DL (ref 6–20)
BUN SERPL-MCNC: 23 MG/DL (ref 6–20)
BUN SERPL-MCNC: 24 MG/DL (ref 6–20)
BUN SERPL-MCNC: 25 MG/DL (ref 6–20)
BUN SERPL-MCNC: 28 MG/DL (ref 6–20)
BUN SERPL-MCNC: 30 MG/DL (ref 6–20)
BUN SERPL-MCNC: 30 MG/DL (ref 6–20)
BUN SERPL-MCNC: 35 MG/DL (ref 6–20)
BUN SERPL-MCNC: 35 MG/DL (ref 6–20)
BUN SERPL-MCNC: 36 MG/DL (ref 6–20)
BUN SERPL-MCNC: 37 MG/DL (ref 6–20)
BUN SERPL-MCNC: 37 MG/DL (ref 6–20)
BUN SERPL-MCNC: 39 MG/DL (ref 6–20)
BUN SERPL-MCNC: 40 MG/DL (ref 6–20)
BUN SERPL-MCNC: 43 MG/DL (ref 6–20)
BUN SERPL-MCNC: 45 MG/DL (ref 6–20)
BUN SERPL-MCNC: 46 MG/DL (ref 6–20)
BUN SERPL-MCNC: 48 MG/DL (ref 6–20)
BUN SERPL-MCNC: 49 MG/DL (ref 6–20)
BUN SERPL-MCNC: 50 MG/DL (ref 6–20)
BUN SERPL-MCNC: 52 MG/DL (ref 6–20)
BUN SERPL-MCNC: 55 MG/DL (ref 6–20)
BUN SERPL-MCNC: 56 MG/DL (ref 6–20)
BUN SERPL-MCNC: 56 MG/DL (ref 6–20)
BUN SERPL-MCNC: 57 MG/DL (ref 6–20)
BUN SERPL-MCNC: 58 MG/DL (ref 6–20)
BUN SERPL-MCNC: 61 MG/DL (ref 6–20)
BUN SERPL-MCNC: 64 MG/DL (ref 6–20)
BUN SERPL-MCNC: 65 MG/DL (ref 6–20)
BUN SERPL-MCNC: 68 MG/DL (ref 6–20)
BUN SERPL-MCNC: 71 MG/DL (ref 6–20)
BUN SERPL-MCNC: 72 MG/DL (ref 6–20)
BUN SERPL-MCNC: 75 MG/DL (ref 6–20)
BUN SERPL-MCNC: 80 MG/DL (ref 6–20)
BUN SERPL-MCNC: 81 MG/DL (ref 6–20)
BUN SERPL-MCNC: 81 MG/DL (ref 6–20)
BUN SERPL-MCNC: 86 MG/DL (ref 6–20)
CALCIUM SERPL-MCNC: 10 MG/DL (ref 8.7–10.5)
CALCIUM SERPL-MCNC: 10.2 MG/DL (ref 8.7–10.5)
CALCIUM SERPL-MCNC: 10.2 MG/DL (ref 8.7–10.5)
CALCIUM SERPL-MCNC: 10.3 MG/DL (ref 8.7–10.5)
CALCIUM SERPL-MCNC: 10.6 MG/DL (ref 8.7–10.5)
CALCIUM SERPL-MCNC: 8.1 MG/DL (ref 8.7–10.5)
CALCIUM SERPL-MCNC: 8.5 MG/DL (ref 8.7–10.5)
CALCIUM SERPL-MCNC: 8.7 MG/DL (ref 8.7–10.5)
CALCIUM SERPL-MCNC: 8.8 MG/DL (ref 8.7–10.5)
CALCIUM SERPL-MCNC: 8.8 MG/DL (ref 8.7–10.5)
CALCIUM SERPL-MCNC: 8.9 MG/DL (ref 8.7–10.5)
CALCIUM SERPL-MCNC: 8.9 MG/DL (ref 8.7–10.5)
CALCIUM SERPL-MCNC: 9 MG/DL (ref 8.7–10.5)
CALCIUM SERPL-MCNC: 9.1 MG/DL (ref 8.7–10.5)
CALCIUM SERPL-MCNC: 9.2 MG/DL (ref 8.7–10.5)
CALCIUM SERPL-MCNC: 9.3 MG/DL (ref 8.7–10.5)
CALCIUM SERPL-MCNC: 9.3 MG/DL (ref 8.7–10.5)
CALCIUM SERPL-MCNC: 9.4 MG/DL (ref 8.7–10.5)
CALCIUM SERPL-MCNC: 9.5 MG/DL (ref 8.7–10.5)
CALCIUM SERPL-MCNC: 9.6 MG/DL (ref 8.7–10.5)
CALCIUM SERPL-MCNC: 9.7 MG/DL (ref 8.7–10.5)
CALCIUM SERPL-MCNC: 9.8 MG/DL (ref 8.7–10.5)
CALCIUM SERPL-MCNC: 9.9 MG/DL (ref 8.7–10.5)
CALCIUM SERPL-MCNC: 9.9 MG/DL (ref 8.7–10.5)
CHLORIDE SERPL-SCNC: 100 MMOL/L (ref 95–110)
CHLORIDE SERPL-SCNC: 101 MMOL/L (ref 95–110)
CHLORIDE SERPL-SCNC: 102 MMOL/L (ref 95–110)
CHLORIDE SERPL-SCNC: 103 MMOL/L (ref 95–110)
CHLORIDE SERPL-SCNC: 104 MMOL/L (ref 95–110)
CHLORIDE SERPL-SCNC: 105 MMOL/L (ref 95–110)
CHLORIDE SERPL-SCNC: 105 MMOL/L (ref 95–110)
CHLORIDE SERPL-SCNC: 88 MMOL/L (ref 95–110)
CHLORIDE SERPL-SCNC: 89 MMOL/L (ref 95–110)
CHLORIDE SERPL-SCNC: 91 MMOL/L (ref 95–110)
CHLORIDE SERPL-SCNC: 92 MMOL/L (ref 95–110)
CHLORIDE SERPL-SCNC: 92 MMOL/L (ref 95–110)
CHLORIDE SERPL-SCNC: 95 MMOL/L (ref 95–110)
CHLORIDE SERPL-SCNC: 96 MMOL/L (ref 95–110)
CHLORIDE SERPL-SCNC: 97 MMOL/L (ref 95–110)
CHLORIDE SERPL-SCNC: 98 MMOL/L (ref 95–110)
CHLORIDE SERPL-SCNC: 99 MMOL/L (ref 95–110)
CK SERPL-CCNC: 114 U/L (ref 20–200)
CO2 SERPL-SCNC: 14 MMOL/L (ref 23–29)
CO2 SERPL-SCNC: 14 MMOL/L (ref 23–29)
CO2 SERPL-SCNC: 16 MMOL/L (ref 23–29)
CO2 SERPL-SCNC: 18 MMOL/L (ref 23–29)
CO2 SERPL-SCNC: 18 MMOL/L (ref 23–29)
CO2 SERPL-SCNC: 19 MMOL/L (ref 23–29)
CO2 SERPL-SCNC: 19 MMOL/L (ref 23–29)
CO2 SERPL-SCNC: 20 MMOL/L (ref 23–29)
CO2 SERPL-SCNC: 21 MMOL/L (ref 23–29)
CO2 SERPL-SCNC: 22 MMOL/L (ref 23–29)
CO2 SERPL-SCNC: 23 MMOL/L (ref 23–29)
CO2 SERPL-SCNC: 24 MMOL/L (ref 23–29)
CO2 SERPL-SCNC: 25 MMOL/L (ref 23–29)
CO2 SERPL-SCNC: 26 MMOL/L (ref 23–29)
CO2 SERPL-SCNC: 27 MMOL/L (ref 23–29)
CO2 SERPL-SCNC: 27 MMOL/L (ref 23–29)
CO2 SERPL-SCNC: 28 MMOL/L (ref 23–29)
CO2 SERPL-SCNC: 28 MMOL/L (ref 23–29)
CO2 SERPL-SCNC: 9 MMOL/L (ref 23–29)
CREAT SERPL-MCNC: 1.9 MG/DL (ref 0.5–1.4)
CREAT SERPL-MCNC: 2.3 MG/DL (ref 0.5–1.4)
CREAT SERPL-MCNC: 2.6 MG/DL (ref 0.5–1.4)
CREAT SERPL-MCNC: 2.8 MG/DL (ref 0.5–1.4)
CREAT SERPL-MCNC: 2.9 MG/DL (ref 0.5–1.4)
CREAT SERPL-MCNC: 3.2 MG/DL (ref 0.5–1.4)
CREAT SERPL-MCNC: 3.4 MG/DL (ref 0.5–1.4)
CREAT SERPL-MCNC: 3.5 MG/DL (ref 0.5–1.4)
CREAT SERPL-MCNC: 3.6 MG/DL (ref 0.5–1.4)
CREAT SERPL-MCNC: 3.8 MG/DL (ref 0.5–1.4)
CREAT SERPL-MCNC: 4.1 MG/DL (ref 0.5–1.4)
CREAT SERPL-MCNC: 4.2 MG/DL (ref 0.5–1.4)
CREAT SERPL-MCNC: 4.2 MG/DL (ref 0.5–1.4)
CREAT SERPL-MCNC: 4.3 MG/DL (ref 0.5–1.4)
CREAT SERPL-MCNC: 4.4 MG/DL (ref 0.5–1.4)
CREAT SERPL-MCNC: 4.4 MG/DL (ref 0.5–1.4)
CREAT SERPL-MCNC: 4.5 MG/DL (ref 0.5–1.4)
CREAT SERPL-MCNC: 4.7 MG/DL (ref 0.5–1.4)
CREAT SERPL-MCNC: 4.8 MG/DL (ref 0.5–1.4)
CREAT SERPL-MCNC: 5 MG/DL (ref 0.5–1.4)
CREAT SERPL-MCNC: 5.1 MG/DL (ref 0.5–1.4)
CREAT SERPL-MCNC: 5.2 MG/DL (ref 0.5–1.4)
CREAT SERPL-MCNC: 5.3 MG/DL (ref 0.5–1.4)
CREAT SERPL-MCNC: 5.4 MG/DL (ref 0.5–1.4)
CREAT SERPL-MCNC: 5.4 MG/DL (ref 0.5–1.4)
CREAT SERPL-MCNC: 5.6 MG/DL (ref 0.5–1.4)
CREAT SERPL-MCNC: 5.8 MG/DL (ref 0.5–1.4)
CREAT SERPL-MCNC: 5.8 MG/DL (ref 0.5–1.4)
CREAT SERPL-MCNC: 5.9 MG/DL (ref 0.5–1.4)
CREAT SERPL-MCNC: 6.3 MG/DL (ref 0.5–1.4)
CREAT SERPL-MCNC: 6.3 MG/DL (ref 0.5–1.4)
CREAT SERPL-MCNC: 6.6 MG/DL (ref 0.5–1.4)
CREAT SERPL-MCNC: 6.8 MG/DL (ref 0.5–1.4)
CREAT SERPL-MCNC: 6.9 MG/DL (ref 0.5–1.4)
CREAT SERPL-MCNC: 7.1 MG/DL (ref 0.5–1.4)
CV ECHO LV RWT: 0.35 CM
D DIMER PPP IA.FEU-MCNC: 20.36 MG/L FEU
DELSYS: ABNORMAL
DIFFERENTIAL METHOD: ABNORMAL
DOP CALC AO PEAK VEL: 3.31 M/S
DOP CALC AO VTI: 69.42 CM
DOP CALC LVOT AREA: 3.6 CM2
DOP CALC LVOT DIAMETER: 2.15 CM
DOP CALC LVOT PEAK VEL: 1.54 M/S
DOP CALC LVOT STROKE VOLUME: 108.86 CM3
DOP CALCLVOT PEAK VEL VTI: 30 CM
E WAVE DECELERATION TIME: 293.42 MSEC
E/A RATIO: 1.34
E/E' RATIO: 82.86 M/S
ECHO LV POSTERIOR WALL: 1.13 CM (ref 0.6–1.1)
EOSINOPHIL # BLD AUTO: 0.1 K/UL (ref 0–0.5)
EOSINOPHIL # BLD AUTO: 0.2 K/UL (ref 0–0.5)
EOSINOPHIL # BLD AUTO: 0.2 K/UL (ref 0–0.5)
EOSINOPHIL # BLD AUTO: 0.4 K/UL (ref 0–0.5)
EOSINOPHIL # BLD AUTO: 0.5 K/UL (ref 0–0.5)
EOSINOPHIL # BLD AUTO: 0.6 K/UL (ref 0–0.5)
EOSINOPHIL # BLD AUTO: 0.7 K/UL (ref 0–0.5)
EOSINOPHIL # BLD AUTO: 0.7 K/UL (ref 0–0.5)
EOSINOPHIL # BLD AUTO: 0.8 K/UL (ref 0–0.5)
EOSINOPHIL # BLD AUTO: 0.8 K/UL (ref 0–0.5)
EOSINOPHIL # BLD AUTO: 0.9 K/UL (ref 0–0.5)
EOSINOPHIL # BLD AUTO: 1 K/UL (ref 0–0.5)
EOSINOPHIL # BLD AUTO: 1.1 K/UL (ref 0–0.5)
EOSINOPHIL # BLD AUTO: ABNORMAL K/UL (ref 0–0.5)
EOSINOPHIL NFR BLD: 0 % (ref 0–8)
EOSINOPHIL NFR BLD: 0.6 % (ref 0–8)
EOSINOPHIL NFR BLD: 1 % (ref 0–8)
EOSINOPHIL NFR BLD: 1 % (ref 0–8)
EOSINOPHIL NFR BLD: 1.7 % (ref 0–8)
EOSINOPHIL NFR BLD: 10 % (ref 0–8)
EOSINOPHIL NFR BLD: 11.2 % (ref 0–8)
EOSINOPHIL NFR BLD: 11.6 % (ref 0–8)
EOSINOPHIL NFR BLD: 3.7 % (ref 0–8)
EOSINOPHIL NFR BLD: 4.8 % (ref 0–8)
EOSINOPHIL NFR BLD: 4.9 % (ref 0–8)
EOSINOPHIL NFR BLD: 5.2 % (ref 0–8)
EOSINOPHIL NFR BLD: 5.4 % (ref 0–8)
EOSINOPHIL NFR BLD: 5.9 % (ref 0–8)
EOSINOPHIL NFR BLD: 6.4 % (ref 0–8)
EOSINOPHIL NFR BLD: 6.5 % (ref 0–8)
EOSINOPHIL NFR BLD: 6.8 % (ref 0–8)
EOSINOPHIL NFR BLD: 6.9 % (ref 0–8)
EOSINOPHIL NFR BLD: 7.1 % (ref 0–8)
EOSINOPHIL NFR BLD: 8 % (ref 0–8)
EOSINOPHIL NFR BLD: 8.3 % (ref 0–8)
EOSINOPHIL NFR BLD: 9.9 % (ref 0–8)
ERYTHROCYTE [DISTWIDTH] IN BLOOD BY AUTOMATED COUNT: 15.8 % (ref 11.5–14.5)
ERYTHROCYTE [DISTWIDTH] IN BLOOD BY AUTOMATED COUNT: 15.9 % (ref 11.5–14.5)
ERYTHROCYTE [DISTWIDTH] IN BLOOD BY AUTOMATED COUNT: 16 % (ref 11.5–14.5)
ERYTHROCYTE [DISTWIDTH] IN BLOOD BY AUTOMATED COUNT: 16 % (ref 11.5–14.5)
ERYTHROCYTE [DISTWIDTH] IN BLOOD BY AUTOMATED COUNT: 16.2 % (ref 11.5–14.5)
ERYTHROCYTE [DISTWIDTH] IN BLOOD BY AUTOMATED COUNT: 16.3 % (ref 11.5–14.5)
ERYTHROCYTE [DISTWIDTH] IN BLOOD BY AUTOMATED COUNT: 16.3 % (ref 11.5–14.5)
ERYTHROCYTE [DISTWIDTH] IN BLOOD BY AUTOMATED COUNT: 16.5 % (ref 11.5–14.5)
ERYTHROCYTE [DISTWIDTH] IN BLOOD BY AUTOMATED COUNT: 16.5 % (ref 11.5–14.5)
ERYTHROCYTE [DISTWIDTH] IN BLOOD BY AUTOMATED COUNT: 16.6 % (ref 11.5–14.5)
ERYTHROCYTE [DISTWIDTH] IN BLOOD BY AUTOMATED COUNT: 16.8 % (ref 11.5–14.5)
ERYTHROCYTE [DISTWIDTH] IN BLOOD BY AUTOMATED COUNT: 16.8 % (ref 11.5–14.5)
ERYTHROCYTE [DISTWIDTH] IN BLOOD BY AUTOMATED COUNT: 17 % (ref 11.5–14.5)
ERYTHROCYTE [DISTWIDTH] IN BLOOD BY AUTOMATED COUNT: 17.1 % (ref 11.5–14.5)
ERYTHROCYTE [DISTWIDTH] IN BLOOD BY AUTOMATED COUNT: 17.2 % (ref 11.5–14.5)
ERYTHROCYTE [DISTWIDTH] IN BLOOD BY AUTOMATED COUNT: 17.3 % (ref 11.5–14.5)
ERYTHROCYTE [DISTWIDTH] IN BLOOD BY AUTOMATED COUNT: 17.4 % (ref 11.5–14.5)
ERYTHROCYTE [DISTWIDTH] IN BLOOD BY AUTOMATED COUNT: 17.4 % (ref 11.5–14.5)
ERYTHROCYTE [DISTWIDTH] IN BLOOD BY AUTOMATED COUNT: 17.8 % (ref 11.5–14.5)
ERYTHROCYTE [DISTWIDTH] IN BLOOD BY AUTOMATED COUNT: 17.8 % (ref 11.5–14.5)
ERYTHROCYTE [DISTWIDTH] IN BLOOD BY AUTOMATED COUNT: 18.1 % (ref 11.5–14.5)
ERYTHROCYTE [SEDIMENTATION RATE] IN BLOOD BY WESTERGREN METHOD: 16 MM/H
ERYTHROCYTE [SEDIMENTATION RATE] IN BLOOD BY WESTERGREN METHOD: 16 MM/H
EST. GFR  (AFRICAN AMERICAN): 10 ML/MIN/1.73 M^2
EST. GFR  (AFRICAN AMERICAN): 10 ML/MIN/1.73 M^2
EST. GFR  (AFRICAN AMERICAN): 10.6 ML/MIN/1.73 M^2
EST. GFR  (AFRICAN AMERICAN): 10.6 ML/MIN/1.73 M^2
EST. GFR  (AFRICAN AMERICAN): 11.5 ML/MIN/1.73 M^2
EST. GFR  (AFRICAN AMERICAN): 11.7 ML/MIN/1.73 M^2
EST. GFR  (AFRICAN AMERICAN): 12 ML/MIN/1.73 M^2
EST. GFR  (AFRICAN AMERICAN): 12.2 ML/MIN/1.73 M^2
EST. GFR  (AFRICAN AMERICAN): 12.8 ML/MIN/1.73 M^2
EST. GFR  (AFRICAN AMERICAN): 13 ML/MIN/1.73 M^2
EST. GFR  (AFRICAN AMERICAN): 13.1 ML/MIN/1.73 M^2
EST. GFR  (AFRICAN AMERICAN): 13.4 ML/MIN/1.73 M^2
EST. GFR  (AFRICAN AMERICAN): 13.7 ML/MIN/1.73 M^2
EST. GFR  (AFRICAN AMERICAN): 14 ML/MIN/1.73 M^2
EST. GFR  (AFRICAN AMERICAN): 14 ML/MIN/1.73 M^2
EST. GFR  (AFRICAN AMERICAN): 14.7 ML/MIN/1.73 M^2
EST. GFR  (AFRICAN AMERICAN): 15.1 ML/MIN/1.73 M^2
EST. GFR  (AFRICAN AMERICAN): 15.9 ML/MIN/1.73 M^2
EST. GFR  (AFRICAN AMERICAN): 16.4 ML/MIN/1.73 M^2
EST. GFR  (AFRICAN AMERICAN): 16.4 ML/MIN/1.73 M^2
EST. GFR  (AFRICAN AMERICAN): 16.8 ML/MIN/1.73 M^2
EST. GFR  (AFRICAN AMERICAN): 17 ML/MIN/1.73 M^2
EST. GFR  (AFRICAN AMERICAN): 17 ML/MIN/1.73 M^2
EST. GFR  (AFRICAN AMERICAN): 17.3 ML/MIN/1.73 M^2
EST. GFR  (AFRICAN AMERICAN): 17.8 ML/MIN/1.73 M^2
EST. GFR  (AFRICAN AMERICAN): 18 ML/MIN/1.73 M^2
EST. GFR  (AFRICAN AMERICAN): 19.6 ML/MIN/1.73 M^2
EST. GFR  (AFRICAN AMERICAN): 20.9 ML/MIN/1.73 M^2
EST. GFR  (AFRICAN AMERICAN): 21 ML/MIN/1.73 M^2
EST. GFR  (AFRICAN AMERICAN): 22 ML/MIN/1.73 M^2
EST. GFR  (AFRICAN AMERICAN): 22.4 ML/MIN/1.73 M^2
EST. GFR  (AFRICAN AMERICAN): 24.1 ML/MIN/1.73 M^2
EST. GFR  (AFRICAN AMERICAN): 27.1 ML/MIN/1.73 M^2
EST. GFR  (AFRICAN AMERICAN): 28 ML/MIN/1.73 M^2
EST. GFR  (AFRICAN AMERICAN): 31 ML/MIN/1.73 M^2
EST. GFR  (AFRICAN AMERICAN): 35.9 ML/MIN/1.73 M^2
EST. GFR  (AFRICAN AMERICAN): 45 ML/MIN/1.73 M^2
EST. GFR  (AFRICAN AMERICAN): 9 ML/MIN/1.73 M^2
EST. GFR  (AFRICAN AMERICAN): 9.5 ML/MIN/1.73 M^2
EST. GFR  (AFRICAN AMERICAN): 9.7 ML/MIN/1.73 M^2
EST. GFR  (AFRICAN AMERICAN): 9.7 ML/MIN/1.73 M^2
EST. GFR  (NON AFRICAN AMERICAN): 10 ML/MIN/1.73 M^2
EST. GFR  (NON AFRICAN AMERICAN): 10.1 ML/MIN/1.73 M^2
EST. GFR  (NON AFRICAN AMERICAN): 10.6 ML/MIN/1.73 M^2
EST. GFR  (NON AFRICAN AMERICAN): 11 ML/MIN/1.73 M^2
EST. GFR  (NON AFRICAN AMERICAN): 11.1 ML/MIN/1.73 M^2
EST. GFR  (NON AFRICAN AMERICAN): 11.3 ML/MIN/1.73 M^2
EST. GFR  (NON AFRICAN AMERICAN): 11.6 ML/MIN/1.73 M^2
EST. GFR  (NON AFRICAN AMERICAN): 11.8 ML/MIN/1.73 M^2
EST. GFR  (NON AFRICAN AMERICAN): 12 ML/MIN/1.73 M^2
EST. GFR  (NON AFRICAN AMERICAN): 12.1 ML/MIN/1.73 M^2
EST. GFR  (NON AFRICAN AMERICAN): 12.8 ML/MIN/1.73 M^2
EST. GFR  (NON AFRICAN AMERICAN): 13.1 ML/MIN/1.73 M^2
EST. GFR  (NON AFRICAN AMERICAN): 13.8 ML/MIN/1.73 M^2
EST. GFR  (NON AFRICAN AMERICAN): 14.2 ML/MIN/1.73 M^2
EST. GFR  (NON AFRICAN AMERICAN): 14.2 ML/MIN/1.73 M^2
EST. GFR  (NON AFRICAN AMERICAN): 14.6 ML/MIN/1.73 M^2
EST. GFR  (NON AFRICAN AMERICAN): 15 ML/MIN/1.73 M^2
EST. GFR  (NON AFRICAN AMERICAN): 15.4 ML/MIN/1.73 M^2
EST. GFR  (NON AFRICAN AMERICAN): 16.9 ML/MIN/1.73 M^2
EST. GFR  (NON AFRICAN AMERICAN): 18 ML/MIN/1.73 M^2
EST. GFR  (NON AFRICAN AMERICAN): 18.1 ML/MIN/1.73 M^2
EST. GFR  (NON AFRICAN AMERICAN): 19 ML/MIN/1.73 M^2
EST. GFR  (NON AFRICAN AMERICAN): 19.3 ML/MIN/1.73 M^2
EST. GFR  (NON AFRICAN AMERICAN): 20.8 ML/MIN/1.73 M^2
EST. GFR  (NON AFRICAN AMERICAN): 23.4 ML/MIN/1.73 M^2
EST. GFR  (NON AFRICAN AMERICAN): 24 ML/MIN/1.73 M^2
EST. GFR  (NON AFRICAN AMERICAN): 27 ML/MIN/1.73 M^2
EST. GFR  (NON AFRICAN AMERICAN): 31 ML/MIN/1.73 M^2
EST. GFR  (NON AFRICAN AMERICAN): 39 ML/MIN/1.73 M^2
EST. GFR  (NON AFRICAN AMERICAN): 8 ML/MIN/1.73 M^2
EST. GFR  (NON AFRICAN AMERICAN): 8 ML/MIN/1.73 M^2
EST. GFR  (NON AFRICAN AMERICAN): 8.2 ML/MIN/1.73 M^2
EST. GFR  (NON AFRICAN AMERICAN): 8.4 ML/MIN/1.73 M^2
EST. GFR  (NON AFRICAN AMERICAN): 8.4 ML/MIN/1.73 M^2
EST. GFR  (NON AFRICAN AMERICAN): 8.7 ML/MIN/1.73 M^2
EST. GFR  (NON AFRICAN AMERICAN): 9.2 ML/MIN/1.73 M^2
EST. GFR  (NON AFRICAN AMERICAN): 9.2 ML/MIN/1.73 M^2
EST. GFR  (NON AFRICAN AMERICAN): 9.9 ML/MIN/1.73 M^2
ESTIMATED AVG GLUCOSE: 85 MG/DL (ref 68–131)
FERRITIN SERPL-MCNC: 9098 NG/ML (ref 20–300)
FIO2: 50
FIO2: 70
FLOW: 2
FRACTIONAL SHORTENING: 21 % (ref 28–44)
GLUCOSE SERPL-MCNC: 100 MG/DL (ref 70–110)
GLUCOSE SERPL-MCNC: 100 MG/DL (ref 70–110)
GLUCOSE SERPL-MCNC: 101 MG/DL (ref 70–110)
GLUCOSE SERPL-MCNC: 102 MG/DL (ref 70–110)
GLUCOSE SERPL-MCNC: 103 MG/DL (ref 70–110)
GLUCOSE SERPL-MCNC: 105 MG/DL (ref 70–110)
GLUCOSE SERPL-MCNC: 106 MG/DL (ref 70–110)
GLUCOSE SERPL-MCNC: 107 MG/DL (ref 70–110)
GLUCOSE SERPL-MCNC: 112 MG/DL (ref 70–110)
GLUCOSE SERPL-MCNC: 113 MG/DL (ref 70–110)
GLUCOSE SERPL-MCNC: 122 MG/DL (ref 70–110)
GLUCOSE SERPL-MCNC: 125 MG/DL (ref 70–110)
GLUCOSE SERPL-MCNC: 129 MG/DL (ref 70–110)
GLUCOSE SERPL-MCNC: 136 MG/DL (ref 70–110)
GLUCOSE SERPL-MCNC: 160 MG/DL (ref 70–110)
GLUCOSE SERPL-MCNC: 260 MG/DL (ref 70–110)
GLUCOSE SERPL-MCNC: 32 MG/DL (ref 70–110)
GLUCOSE SERPL-MCNC: 40 MG/DL (ref 70–110)
GLUCOSE SERPL-MCNC: 51 MG/DL (ref 70–110)
GLUCOSE SERPL-MCNC: 59 MG/DL (ref 70–110)
GLUCOSE SERPL-MCNC: 62 MG/DL (ref 70–110)
GLUCOSE SERPL-MCNC: 63 MG/DL (ref 70–110)
GLUCOSE SERPL-MCNC: 64 MG/DL (ref 70–110)
GLUCOSE SERPL-MCNC: 67 MG/DL (ref 70–110)
GLUCOSE SERPL-MCNC: 67 MG/DL (ref 70–110)
GLUCOSE SERPL-MCNC: 68 MG/DL (ref 70–110)
GLUCOSE SERPL-MCNC: 69 MG/DL (ref 70–110)
GLUCOSE SERPL-MCNC: 70 MG/DL (ref 70–110)
GLUCOSE SERPL-MCNC: 73 MG/DL (ref 70–110)
GLUCOSE SERPL-MCNC: 73 MG/DL (ref 70–110)
GLUCOSE SERPL-MCNC: 75 MG/DL (ref 70–110)
GLUCOSE SERPL-MCNC: 79 MG/DL (ref 70–110)
GLUCOSE SERPL-MCNC: 80 MG/DL (ref 70–110)
GLUCOSE SERPL-MCNC: 81 MG/DL (ref 70–110)
GLUCOSE SERPL-MCNC: 85 MG/DL (ref 70–110)
GLUCOSE SERPL-MCNC: 86 MG/DL (ref 70–110)
GLUCOSE SERPL-MCNC: 87 MG/DL (ref 70–110)
GLUCOSE SERPL-MCNC: 89 MG/DL (ref 70–110)
GLUCOSE SERPL-MCNC: 90 MG/DL (ref 70–110)
GLUCOSE SERPL-MCNC: 90 MG/DL (ref 70–110)
GLUCOSE SERPL-MCNC: 92 MG/DL (ref 70–110)
GLUCOSE SERPL-MCNC: 92 MG/DL (ref 70–110)
GLUCOSE SERPL-MCNC: 93 MG/DL (ref 70–110)
GLUCOSE SERPL-MCNC: 94 MG/DL (ref 70–110)
GLUCOSE SERPL-MCNC: 94 MG/DL (ref 70–110)
GLUCOSE SERPL-MCNC: 96 MG/DL (ref 70–110)
GLUCOSE SERPL-MCNC: 96 MG/DL (ref 70–110)
GLUCOSE SERPL-MCNC: 98 MG/DL (ref 70–110)
GRAM STN SPEC: NORMAL
GRAM STN SPEC: NORMAL
HAV IGM SERPL QL IA: NEGATIVE
HBA1C MFR BLD HPLC: 4.6 % (ref 4–5.6)
HBV CORE IGM SERPL QL IA: NEGATIVE
HBV SURFACE AB SER QL IA: POSITIVE
HBV SURFACE AB SERPL IA-ACNC: 14 MIU/ML
HBV SURFACE AG SERPL QL IA: NEGATIVE
HCO3 UR-SCNC: 11.4 MMOL/L (ref 24–28)
HCO3 UR-SCNC: 18.6 MMOL/L (ref 24–28)
HCO3 UR-SCNC: 20.2 MMOL/L (ref 24–28)
HCO3 UR-SCNC: 25.2 MMOL/L (ref 24–28)
HCO3 UR-SCNC: 27.7 MMOL/L (ref 24–28)
HCT VFR BLD AUTO: 23.9 % (ref 40–54)
HCT VFR BLD AUTO: 25.2 % (ref 40–54)
HCT VFR BLD AUTO: 25.3 % (ref 40–54)
HCT VFR BLD AUTO: 26.5 % (ref 40–54)
HCT VFR BLD AUTO: 26.6 % (ref 40–54)
HCT VFR BLD AUTO: 27.1 % (ref 40–54)
HCT VFR BLD AUTO: 27.6 % (ref 40–54)
HCT VFR BLD AUTO: 27.7 % (ref 40–54)
HCT VFR BLD AUTO: 27.8 % (ref 40–54)
HCT VFR BLD AUTO: 27.8 % (ref 40–54)
HCT VFR BLD AUTO: 28.2 % (ref 40–54)
HCT VFR BLD AUTO: 28.2 % (ref 40–54)
HCT VFR BLD AUTO: 28.7 % (ref 40–54)
HCT VFR BLD AUTO: 28.8 % (ref 40–54)
HCT VFR BLD AUTO: 29.5 % (ref 40–54)
HCT VFR BLD AUTO: 30.5 % (ref 40–54)
HCT VFR BLD AUTO: 30.9 % (ref 40–54)
HCT VFR BLD AUTO: 31.2 % (ref 40–54)
HCT VFR BLD AUTO: 31.4 % (ref 40–54)
HCT VFR BLD AUTO: 31.6 % (ref 40–54)
HCT VFR BLD AUTO: 32.8 % (ref 40–54)
HCT VFR BLD AUTO: 32.9 % (ref 40–54)
HCT VFR BLD AUTO: 35.8 % (ref 40–54)
HCT VFR BLD AUTO: 36 % (ref 40–54)
HCT VFR BLD AUTO: 37.4 % (ref 40–54)
HCT VFR BLD CALC: 30 %PCV (ref 36–54)
HCV AB SERPL QL IA: POSITIVE
HCV RNA SERPL NAA+PROBE-LOG IU: 5.75 LOG (10) IU/ML
HCV RNA SERPL QL NAA+PROBE: DETECTED IU/ML
HCV RNA SPEC NAA+PROBE-ACNC: ABNORMAL IU/ML
HGB BLD-MCNC: 10 G/DL
HGB BLD-MCNC: 10 G/DL (ref 14–18)
HGB BLD-MCNC: 10.5 G/DL (ref 14–18)
HGB BLD-MCNC: 10.5 G/DL (ref 14–18)
HGB BLD-MCNC: 10.9 G/DL (ref 14–18)
HGB BLD-MCNC: 11 G/DL (ref 14–18)
HGB BLD-MCNC: 11.5 G/DL (ref 14–18)
HGB BLD-MCNC: 7.5 G/DL (ref 14–18)
HGB BLD-MCNC: 7.8 G/DL (ref 14–18)
HGB BLD-MCNC: 7.8 G/DL (ref 14–18)
HGB BLD-MCNC: 8.2 G/DL (ref 14–18)
HGB BLD-MCNC: 8.3 G/DL (ref 14–18)
HGB BLD-MCNC: 8.4 G/DL (ref 14–18)
HGB BLD-MCNC: 8.4 G/DL (ref 14–18)
HGB BLD-MCNC: 8.5 G/DL (ref 14–18)
HGB BLD-MCNC: 8.8 G/DL (ref 14–18)
HGB BLD-MCNC: 8.9 G/DL (ref 14–18)
HGB BLD-MCNC: 9 G/DL (ref 14–18)
HGB BLD-MCNC: 9.1 G/DL (ref 14–18)
HGB BLD-MCNC: 9.4 G/DL (ref 14–18)
HGB BLD-MCNC: 9.5 G/DL (ref 14–18)
HGB BLD-MCNC: 9.6 G/DL (ref 14–18)
HGB BLD-MCNC: 9.8 G/DL (ref 14–18)
HR TR ECHO: 73 BPM
HYPOCHROMIA BLD QL SMEAR: ABNORMAL
IMM GRANULOCYTES # BLD AUTO: 0.02 K/UL (ref 0–0.04)
IMM GRANULOCYTES # BLD AUTO: 0.03 K/UL (ref 0–0.04)
IMM GRANULOCYTES # BLD AUTO: 0.03 K/UL (ref 0–0.04)
IMM GRANULOCYTES # BLD AUTO: 0.05 K/UL (ref 0–0.04)
IMM GRANULOCYTES # BLD AUTO: 0.05 K/UL (ref 0–0.04)
IMM GRANULOCYTES # BLD AUTO: 0.06 K/UL (ref 0–0.04)
IMM GRANULOCYTES # BLD AUTO: 0.07 K/UL (ref 0–0.04)
IMM GRANULOCYTES # BLD AUTO: 0.07 K/UL (ref 0–0.04)
IMM GRANULOCYTES # BLD AUTO: 0.08 K/UL (ref 0–0.04)
IMM GRANULOCYTES # BLD AUTO: 0.09 K/UL (ref 0–0.04)
IMM GRANULOCYTES # BLD AUTO: 0.09 K/UL (ref 0–0.04)
IMM GRANULOCYTES # BLD AUTO: 0.14 K/UL (ref 0–0.04)
IMM GRANULOCYTES # BLD AUTO: 0.18 K/UL (ref 0–0.04)
IMM GRANULOCYTES # BLD AUTO: 0.21 K/UL (ref 0–0.04)
IMM GRANULOCYTES # BLD AUTO: 0.24 K/UL (ref 0–0.04)
IMM GRANULOCYTES # BLD AUTO: 0.36 K/UL (ref 0–0.04)
IMM GRANULOCYTES # BLD AUTO: 0.43 K/UL (ref 0–0.04)
IMM GRANULOCYTES # BLD AUTO: 0.44 K/UL (ref 0–0.04)
IMM GRANULOCYTES # BLD AUTO: ABNORMAL K/UL (ref 0–0.04)
IMM GRANULOCYTES # BLD AUTO: ABNORMAL K/UL (ref 0–0.04)
IMM GRANULOCYTES NFR BLD AUTO: 0.3 % (ref 0–0.5)
IMM GRANULOCYTES NFR BLD AUTO: 0.4 % (ref 0–0.5)
IMM GRANULOCYTES NFR BLD AUTO: 0.4 % (ref 0–0.5)
IMM GRANULOCYTES NFR BLD AUTO: 0.5 % (ref 0–0.5)
IMM GRANULOCYTES NFR BLD AUTO: 0.6 % (ref 0–0.5)
IMM GRANULOCYTES NFR BLD AUTO: 0.6 % (ref 0–0.5)
IMM GRANULOCYTES NFR BLD AUTO: 0.7 % (ref 0–0.5)
IMM GRANULOCYTES NFR BLD AUTO: 0.7 % (ref 0–0.5)
IMM GRANULOCYTES NFR BLD AUTO: 0.8 % (ref 0–0.5)
IMM GRANULOCYTES NFR BLD AUTO: 0.8 % (ref 0–0.5)
IMM GRANULOCYTES NFR BLD AUTO: 0.9 % (ref 0–0.5)
IMM GRANULOCYTES NFR BLD AUTO: 1.4 % (ref 0–0.5)
IMM GRANULOCYTES NFR BLD AUTO: 1.5 % (ref 0–0.5)
IMM GRANULOCYTES NFR BLD AUTO: 1.6 % (ref 0–0.5)
IMM GRANULOCYTES NFR BLD AUTO: 1.6 % (ref 0–0.5)
IMM GRANULOCYTES NFR BLD AUTO: 2 % (ref 0–0.5)
IMM GRANULOCYTES NFR BLD AUTO: 2.3 % (ref 0–0.5)
IMM GRANULOCYTES NFR BLD AUTO: 2.3 % (ref 0–0.5)
IMM GRANULOCYTES NFR BLD AUTO: 3.5 % (ref 0–0.5)
IMM GRANULOCYTES NFR BLD AUTO: 4 % (ref 0–0.5)
IMM GRANULOCYTES NFR BLD AUTO: ABNORMAL % (ref 0–0.5)
IMM GRANULOCYTES NFR BLD AUTO: ABNORMAL % (ref 0–0.5)
INR PPP: 1 (ref 0.8–1.2)
INR PPP: 1.1 (ref 0.8–1.2)
INR PPP: 1.1 (ref 0.8–1.2)
INR PPP: 1.2 (ref 0.8–1.2)
INR PPP: 1.2 (ref 0.8–1.2)
INR PPP: 1.3 (ref 0.8–1.2)
INR PPP: 1.4 (ref 0.8–1.2)
INR PPP: 1.5 (ref 0.8–1.2)
INR PPP: 1.6 (ref 0.8–1.2)
INR PPP: 1.6 (ref 0.8–1.2)
INR PPP: 3.2 (ref 0.8–1.2)
INR PPP: 3.3 (ref 0.8–1.2)
INR PPP: 7.3 (ref 0.8–1.2)
INR PPP: 7.5 (ref 0.8–1.2)
INR PPP: 7.6 (ref 0.8–1.2)
INR PPP: 8.3 (ref 0.8–1.2)
INTERVENTRICULAR SEPTUM: 0.86 CM (ref 0.6–1.1)
IRON SERPL-MCNC: 151 UG/DL (ref 45–160)
LA MAJOR: 7.4 CM
LA MINOR: 7.49 CM
LA WIDTH: 5.63 CM
LACTATE SERPL-SCNC: 0.9 MMOL/L (ref 0.5–2.2)
LACTATE SERPL-SCNC: 1.4 MMOL/L (ref 0.5–2.2)
LACTATE SERPL-SCNC: >12 MMOL/L (ref 0.5–2.2)
LEFT ATRIUM SIZE: 5.77 CM
LEFT ATRIUM VOLUME INDEX: 92.7 ML/M2
LEFT ATRIUM VOLUME: 205.57 CM3
LEFT INTERNAL DIMENSION IN SYSTOLE: 5.02 CM (ref 2.1–4)
LEFT VENTRICLE DIASTOLIC VOLUME INDEX: 93.25 ML/M2
LEFT VENTRICLE DIASTOLIC VOLUME: 206.86 ML
LEFT VENTRICLE MASS INDEX: 123 G/M2
LEFT VENTRICLE SYSTOLIC VOLUME INDEX: 53.9 ML/M2
LEFT VENTRICLE SYSTOLIC VOLUME: 119.49 ML
LEFT VENTRICULAR INTERNAL DIMENSION IN DIASTOLE: 6.38 CM (ref 3.5–6)
LEFT VENTRICULAR MASS: 272.4 G
LV LATERAL E/E' RATIO: 72.5 M/S
LV SEPTAL E/E' RATIO: 96.67 M/S
LYMPHOCYTES # BLD AUTO: 0.6 K/UL (ref 1–4.8)
LYMPHOCYTES # BLD AUTO: 0.7 K/UL (ref 1–4.8)
LYMPHOCYTES # BLD AUTO: 0.8 K/UL (ref 1–4.8)
LYMPHOCYTES # BLD AUTO: 1 K/UL (ref 1–4.8)
LYMPHOCYTES # BLD AUTO: 1.1 K/UL (ref 1–4.8)
LYMPHOCYTES # BLD AUTO: 1.1 K/UL (ref 1–4.8)
LYMPHOCYTES # BLD AUTO: 1.3 K/UL (ref 1–4.8)
LYMPHOCYTES # BLD AUTO: 1.4 K/UL (ref 1–4.8)
LYMPHOCYTES # BLD AUTO: 1.4 K/UL (ref 1–4.8)
LYMPHOCYTES # BLD AUTO: 1.5 K/UL (ref 1–4.8)
LYMPHOCYTES # BLD AUTO: 1.7 K/UL (ref 1–4.8)
LYMPHOCYTES # BLD AUTO: 1.8 K/UL (ref 1–4.8)
LYMPHOCYTES # BLD AUTO: 1.8 K/UL (ref 1–4.8)
LYMPHOCYTES # BLD AUTO: 1.9 K/UL (ref 1–4.8)
LYMPHOCYTES # BLD AUTO: 2 K/UL (ref 1–4.8)
LYMPHOCYTES # BLD AUTO: 2.1 K/UL (ref 1–4.8)
LYMPHOCYTES # BLD AUTO: 2.1 K/UL (ref 1–4.8)
LYMPHOCYTES # BLD AUTO: ABNORMAL K/UL (ref 1–4.8)
LYMPHOCYTES NFR BLD: 10.1 % (ref 18–48)
LYMPHOCYTES NFR BLD: 10.2 % (ref 18–48)
LYMPHOCYTES NFR BLD: 11.3 % (ref 18–48)
LYMPHOCYTES NFR BLD: 11.9 % (ref 18–48)
LYMPHOCYTES NFR BLD: 12.1 % (ref 18–48)
LYMPHOCYTES NFR BLD: 12.7 % (ref 18–48)
LYMPHOCYTES NFR BLD: 13.2 % (ref 18–48)
LYMPHOCYTES NFR BLD: 13.7 % (ref 18–48)
LYMPHOCYTES NFR BLD: 14.9 % (ref 18–48)
LYMPHOCYTES NFR BLD: 16.1 % (ref 18–48)
LYMPHOCYTES NFR BLD: 16.4 % (ref 18–48)
LYMPHOCYTES NFR BLD: 18 % (ref 18–48)
LYMPHOCYTES NFR BLD: 19.1 % (ref 18–48)
LYMPHOCYTES NFR BLD: 19.2 % (ref 18–48)
LYMPHOCYTES NFR BLD: 19.5 % (ref 18–48)
LYMPHOCYTES NFR BLD: 2.9 % (ref 18–48)
LYMPHOCYTES NFR BLD: 21.2 % (ref 18–48)
LYMPHOCYTES NFR BLD: 23.8 % (ref 18–48)
LYMPHOCYTES NFR BLD: 24 % (ref 18–48)
LYMPHOCYTES NFR BLD: 25.8 % (ref 18–48)
LYMPHOCYTES NFR BLD: 26.3 % (ref 18–48)
LYMPHOCYTES NFR BLD: 4 % (ref 18–48)
LYMPHOCYTES NFR BLD: 6 % (ref 18–48)
LYMPHOCYTES NFR BLD: 7.2 % (ref 18–48)
LYMPHOCYTES NFR BLD: 9.5 % (ref 18–48)
MAGNESIUM SERPL-MCNC: 1.8 MG/DL (ref 1.6–2.6)
MAGNESIUM SERPL-MCNC: 2 MG/DL (ref 1.6–2.6)
MAGNESIUM SERPL-MCNC: 2.1 MG/DL (ref 1.6–2.6)
MAGNESIUM SERPL-MCNC: 2.2 MG/DL (ref 1.6–2.6)
MAGNESIUM SERPL-MCNC: 2.3 MG/DL (ref 1.6–2.6)
MAGNESIUM SERPL-MCNC: 2.4 MG/DL (ref 1.6–2.6)
MAGNESIUM SERPL-MCNC: 2.4 MG/DL (ref 1.6–2.6)
MAGNESIUM SERPL-MCNC: 2.5 MG/DL (ref 1.6–2.6)
MCH RBC QN AUTO: 32 PG (ref 27–31)
MCH RBC QN AUTO: 32.1 PG (ref 27–31)
MCH RBC QN AUTO: 32.1 PG (ref 27–31)
MCH RBC QN AUTO: 32.3 PG (ref 27–31)
MCH RBC QN AUTO: 32.4 PG (ref 27–31)
MCH RBC QN AUTO: 32.6 PG (ref 27–31)
MCH RBC QN AUTO: 32.8 PG (ref 27–31)
MCH RBC QN AUTO: 32.8 PG (ref 27–31)
MCH RBC QN AUTO: 32.9 PG (ref 27–31)
MCH RBC QN AUTO: 32.9 PG (ref 27–31)
MCH RBC QN AUTO: 33.1 PG (ref 27–31)
MCH RBC QN AUTO: 33.1 PG (ref 27–31)
MCH RBC QN AUTO: 33.2 PG (ref 27–31)
MCH RBC QN AUTO: 33.3 PG (ref 27–31)
MCH RBC QN AUTO: 33.5 PG (ref 27–31)
MCH RBC QN AUTO: 33.6 PG (ref 27–31)
MCHC RBC AUTO-ENTMCNC: 29.4 G/DL (ref 32–36)
MCHC RBC AUTO-ENTMCNC: 30 G/DL (ref 32–36)
MCHC RBC AUTO-ENTMCNC: 30.1 G/DL (ref 32–36)
MCHC RBC AUTO-ENTMCNC: 30.1 G/DL (ref 32–36)
MCHC RBC AUTO-ENTMCNC: 30.2 G/DL (ref 32–36)
MCHC RBC AUTO-ENTMCNC: 30.3 G/DL (ref 32–36)
MCHC RBC AUTO-ENTMCNC: 30.4 G/DL (ref 32–36)
MCHC RBC AUTO-ENTMCNC: 30.5 G/DL (ref 32–36)
MCHC RBC AUTO-ENTMCNC: 30.6 G/DL (ref 32–36)
MCHC RBC AUTO-ENTMCNC: 30.6 G/DL (ref 32–36)
MCHC RBC AUTO-ENTMCNC: 30.7 G/DL (ref 32–36)
MCHC RBC AUTO-ENTMCNC: 30.8 G/DL (ref 32–36)
MCHC RBC AUTO-ENTMCNC: 30.9 G/DL (ref 32–36)
MCHC RBC AUTO-ENTMCNC: 31 G/DL (ref 32–36)
MCHC RBC AUTO-ENTMCNC: 31 G/DL (ref 32–36)
MCHC RBC AUTO-ENTMCNC: 31.3 G/DL (ref 32–36)
MCHC RBC AUTO-ENTMCNC: 31.4 G/DL (ref 32–36)
MCHC RBC AUTO-ENTMCNC: 31.5 G/DL (ref 32–36)
MCHC RBC AUTO-ENTMCNC: 31.6 G/DL (ref 32–36)
MCHC RBC AUTO-ENTMCNC: 31.8 G/DL (ref 32–36)
MCHC RBC AUTO-ENTMCNC: 31.9 G/DL (ref 32–36)
MCHC RBC AUTO-ENTMCNC: 32 G/DL (ref 32–36)
MCHC RBC AUTO-ENTMCNC: 32.3 G/DL (ref 32–36)
MCV RBC AUTO: 102 FL (ref 82–98)
MCV RBC AUTO: 103 FL (ref 82–98)
MCV RBC AUTO: 103 FL (ref 82–98)
MCV RBC AUTO: 104 FL (ref 82–98)
MCV RBC AUTO: 105 FL (ref 82–98)
MCV RBC AUTO: 106 FL (ref 82–98)
MCV RBC AUTO: 107 FL (ref 82–98)
MCV RBC AUTO: 108 FL (ref 82–98)
MCV RBC AUTO: 110 FL (ref 82–98)
MCV RBC AUTO: 111 FL (ref 82–98)
MCV RBC AUTO: 113 FL (ref 82–98)
METAMYELOCYTES NFR BLD MANUAL: 4 %
MODE: ABNORMAL
MONOCYTES # BLD AUTO: 0.7 K/UL (ref 0.3–1)
MONOCYTES # BLD AUTO: 0.8 K/UL (ref 0.3–1)
MONOCYTES # BLD AUTO: 0.9 K/UL (ref 0.3–1)
MONOCYTES # BLD AUTO: 1 K/UL (ref 0.3–1)
MONOCYTES # BLD AUTO: 1.1 K/UL (ref 0.3–1)
MONOCYTES # BLD AUTO: 1.2 K/UL (ref 0.3–1)
MONOCYTES # BLD AUTO: 1.3 K/UL (ref 0.3–1)
MONOCYTES # BLD AUTO: 1.4 K/UL (ref 0.3–1)
MONOCYTES # BLD AUTO: 1.4 K/UL (ref 0.3–1)
MONOCYTES # BLD AUTO: 1.6 K/UL (ref 0.3–1)
MONOCYTES # BLD AUTO: 1.8 K/UL (ref 0.3–1)
MONOCYTES # BLD AUTO: ABNORMAL K/UL (ref 0.3–1)
MONOCYTES NFR BLD: 10.3 % (ref 4–15)
MONOCYTES NFR BLD: 10.8 % (ref 4–15)
MONOCYTES NFR BLD: 10.9 % (ref 4–15)
MONOCYTES NFR BLD: 11 % (ref 4–15)
MONOCYTES NFR BLD: 11 % (ref 4–15)
MONOCYTES NFR BLD: 11.1 % (ref 4–15)
MONOCYTES NFR BLD: 11.6 % (ref 4–15)
MONOCYTES NFR BLD: 12.1 % (ref 4–15)
MONOCYTES NFR BLD: 12.4 % (ref 4–15)
MONOCYTES NFR BLD: 12.5 % (ref 4–15)
MONOCYTES NFR BLD: 12.7 % (ref 4–15)
MONOCYTES NFR BLD: 13 % (ref 4–15)
MONOCYTES NFR BLD: 13 % (ref 4–15)
MONOCYTES NFR BLD: 13.5 % (ref 4–15)
MONOCYTES NFR BLD: 13.9 % (ref 4–15)
MONOCYTES NFR BLD: 14.3 % (ref 4–15)
MONOCYTES NFR BLD: 14.3 % (ref 4–15)
MONOCYTES NFR BLD: 14.7 % (ref 4–15)
MONOCYTES NFR BLD: 15.3 % (ref 4–15)
MONOCYTES NFR BLD: 3 % (ref 4–15)
MONOCYTES NFR BLD: 9 % (ref 4–15)
MONOCYTES NFR BLD: 9.3 % (ref 4–15)
MONOCYTES NFR BLD: 9.4 % (ref 4–15)
MONOCYTES NFR BLD: 9.7 % (ref 4–15)
MONOCYTES NFR BLD: 9.7 % (ref 4–15)
MV MEAN GRADIENT: 15 MMHG
MV PEAK A VEL: 2.17 M/S
MV PEAK E VEL: 2.9 M/S
MV PEAK GRADIENT: 33 MMHG
MV STENOSIS PRESSURE HALF TIME: 88.92 MS
MV VALVE AREA P 1/2 METHOD: 2.47 CM2
MYELOCYTES NFR BLD MANUAL: 2 %
MYELOCYTES NFR BLD MANUAL: 5 %
NEUTROPHILS # BLD AUTO: 16.4 K/UL (ref 1.8–7.7)
NEUTROPHILS # BLD AUTO: 3.9 K/UL (ref 1.8–7.7)
NEUTROPHILS # BLD AUTO: 4.2 K/UL (ref 1.8–7.7)
NEUTROPHILS # BLD AUTO: 4.2 K/UL (ref 1.8–7.7)
NEUTROPHILS # BLD AUTO: 4.3 K/UL (ref 1.8–7.7)
NEUTROPHILS # BLD AUTO: 4.3 K/UL (ref 1.8–7.7)
NEUTROPHILS # BLD AUTO: 4.5 K/UL (ref 1.8–7.7)
NEUTROPHILS # BLD AUTO: 4.6 K/UL (ref 1.8–7.7)
NEUTROPHILS # BLD AUTO: 4.9 K/UL (ref 1.8–7.7)
NEUTROPHILS # BLD AUTO: 5 K/UL (ref 1.8–7.7)
NEUTROPHILS # BLD AUTO: 5.2 K/UL (ref 1.8–7.7)
NEUTROPHILS # BLD AUTO: 5.8 K/UL (ref 1.8–7.7)
NEUTROPHILS # BLD AUTO: 6 K/UL (ref 1.8–7.7)
NEUTROPHILS # BLD AUTO: 6.1 K/UL (ref 1.8–7.7)
NEUTROPHILS # BLD AUTO: 6.2 K/UL (ref 1.8–7.7)
NEUTROPHILS # BLD AUTO: 6.3 K/UL (ref 1.8–7.7)
NEUTROPHILS # BLD AUTO: 6.5 K/UL (ref 1.8–7.7)
NEUTROPHILS # BLD AUTO: 6.9 K/UL (ref 1.8–7.7)
NEUTROPHILS # BLD AUTO: 7.1 K/UL (ref 1.8–7.7)
NEUTROPHILS # BLD AUTO: 7.7 K/UL (ref 1.8–7.7)
NEUTROPHILS # BLD AUTO: 7.7 K/UL (ref 1.8–7.7)
NEUTROPHILS # BLD AUTO: 7.8 K/UL (ref 1.8–7.7)
NEUTROPHILS # BLD AUTO: 8.2 K/UL (ref 1.8–7.7)
NEUTROPHILS # BLD AUTO: ABNORMAL K/UL (ref 1.8–7.7)
NEUTROPHILS NFR BLD: 51 % (ref 38–73)
NEUTROPHILS NFR BLD: 51.6 % (ref 38–73)
NEUTROPHILS NFR BLD: 53.9 % (ref 38–73)
NEUTROPHILS NFR BLD: 54.5 % (ref 38–73)
NEUTROPHILS NFR BLD: 55.1 % (ref 38–73)
NEUTROPHILS NFR BLD: 56 % (ref 38–73)
NEUTROPHILS NFR BLD: 57.7 % (ref 38–73)
NEUTROPHILS NFR BLD: 59.4 % (ref 38–73)
NEUTROPHILS NFR BLD: 60.1 % (ref 38–73)
NEUTROPHILS NFR BLD: 60.6 % (ref 38–73)
NEUTROPHILS NFR BLD: 62 % (ref 38–73)
NEUTROPHILS NFR BLD: 62.2 % (ref 38–73)
NEUTROPHILS NFR BLD: 62.7 % (ref 38–73)
NEUTROPHILS NFR BLD: 65 % (ref 38–73)
NEUTROPHILS NFR BLD: 66.2 % (ref 38–73)
NEUTROPHILS NFR BLD: 67.2 % (ref 38–73)
NEUTROPHILS NFR BLD: 68.3 % (ref 38–73)
NEUTROPHILS NFR BLD: 69.2 % (ref 38–73)
NEUTROPHILS NFR BLD: 70.1 % (ref 38–73)
NEUTROPHILS NFR BLD: 70.9 % (ref 38–73)
NEUTROPHILS NFR BLD: 72.3 % (ref 38–73)
NEUTROPHILS NFR BLD: 75 % (ref 38–73)
NEUTROPHILS NFR BLD: 75.1 % (ref 38–73)
NEUTROPHILS NFR BLD: 84 % (ref 38–73)
NEUTROPHILS NFR BLD: 84.6 % (ref 38–73)
NEUTS BAND NFR BLD MANUAL: 23 %
NRBC BLD-RTO: 0 /100 WBC
NRBC BLD-RTO: 1 /100 WBC
NRBC BLD-RTO: 2 /100 WBC
NRBC BLD-RTO: 6 /100 WBC
NRBC BLD-RTO: 7 /100 WBC
NRBC BLD-RTO: 9 /100 WBC
OVALOCYTES BLD QL SMEAR: ABNORMAL
PCO2 BLDA: 34.7 MMHG (ref 35–45)
PCO2 BLDA: 38.7 MMHG (ref 35–45)
PCO2 BLDA: 44.9 MMHG (ref 35–45)
PCO2 BLDA: 54.4 MMHG (ref 35–45)
PCO2 BLDA: 64 MMHG (ref 35–45)
PEEP: 5
PEEP: 5
PH SMN: 7.11 [PH] (ref 7.35–7.45)
PH SMN: 7.12 [PH] (ref 7.35–7.45)
PH SMN: 7.27 [PH] (ref 7.35–7.45)
PH SMN: 7.29 [PH] (ref 7.35–7.45)
PH SMN: 7.4 [PH] (ref 7.35–7.45)
PHOSPHATE SERPL-MCNC: 10.1 MG/DL (ref 2.7–4.5)
PHOSPHATE SERPL-MCNC: 10.1 MG/DL (ref 2.7–4.5)
PHOSPHATE SERPL-MCNC: 10.9 MG/DL (ref 2.7–4.5)
PHOSPHATE SERPL-MCNC: 3.6 MG/DL (ref 2.7–4.5)
PHOSPHATE SERPL-MCNC: 4.9 MG/DL (ref 2.7–4.5)
PHOSPHATE SERPL-MCNC: 5.4 MG/DL (ref 2.7–4.5)
PHOSPHATE SERPL-MCNC: 5.9 MG/DL (ref 2.7–4.5)
PHOSPHATE SERPL-MCNC: 6.2 MG/DL (ref 2.7–4.5)
PHOSPHATE SERPL-MCNC: 6.4 MG/DL (ref 2.7–4.5)
PHOSPHATE SERPL-MCNC: 6.5 MG/DL (ref 2.7–4.5)
PHOSPHATE SERPL-MCNC: 6.7 MG/DL (ref 2.7–4.5)
PHOSPHATE SERPL-MCNC: 7.3 MG/DL (ref 2.7–4.5)
PISA TR MAX VEL: 3.43 M/S
PLATELET # BLD AUTO: 121 K/UL (ref 150–350)
PLATELET # BLD AUTO: 123 K/UL (ref 150–350)
PLATELET # BLD AUTO: 125 K/UL (ref 150–350)
PLATELET # BLD AUTO: 129 K/UL (ref 150–350)
PLATELET # BLD AUTO: 130 K/UL (ref 150–350)
PLATELET # BLD AUTO: 146 K/UL (ref 150–350)
PLATELET # BLD AUTO: 148 K/UL (ref 150–350)
PLATELET # BLD AUTO: 159 K/UL (ref 150–350)
PLATELET # BLD AUTO: 161 K/UL (ref 150–350)
PLATELET # BLD AUTO: 162 K/UL (ref 150–350)
PLATELET # BLD AUTO: 165 K/UL (ref 150–350)
PLATELET # BLD AUTO: 171 K/UL (ref 150–350)
PLATELET # BLD AUTO: 172 K/UL (ref 150–350)
PLATELET # BLD AUTO: 173 K/UL (ref 150–350)
PLATELET # BLD AUTO: 175 K/UL (ref 150–350)
PLATELET # BLD AUTO: 176 K/UL (ref 150–350)
PLATELET # BLD AUTO: 179 K/UL (ref 150–350)
PLATELET # BLD AUTO: 179 K/UL (ref 150–350)
PLATELET # BLD AUTO: 181 K/UL (ref 150–350)
PLATELET # BLD AUTO: 189 K/UL (ref 150–350)
PLATELET # BLD AUTO: 210 K/UL (ref 150–350)
PLATELET # BLD AUTO: 220 K/UL (ref 150–350)
PLATELET # BLD AUTO: 229 K/UL (ref 150–350)
PLATELET # BLD AUTO: 231 K/UL (ref 150–350)
PLATELET # BLD AUTO: 231 K/UL (ref 150–350)
PLATELET BLD QL SMEAR: ABNORMAL
PLATELET RESPONSE PLAVIX: 264 PRU (ref 194–418)
PMV BLD AUTO: 10 FL (ref 9.2–12.9)
PMV BLD AUTO: 10.1 FL (ref 9.2–12.9)
PMV BLD AUTO: 10.2 FL (ref 9.2–12.9)
PMV BLD AUTO: 10.2 FL (ref 9.2–12.9)
PMV BLD AUTO: 10.3 FL (ref 9.2–12.9)
PMV BLD AUTO: 10.5 FL (ref 9.2–12.9)
PMV BLD AUTO: 9.5 FL (ref 9.2–12.9)
PMV BLD AUTO: 9.6 FL (ref 9.2–12.9)
PMV BLD AUTO: 9.8 FL (ref 9.2–12.9)
PMV BLD AUTO: 9.9 FL (ref 9.2–12.9)
PO2 BLDA: 25 MMHG (ref 40–60)
PO2 BLDA: 34 MMHG (ref 40–60)
PO2 BLDA: 48 MMHG (ref 40–60)
PO2 BLDA: 56 MMHG (ref 40–60)
PO2 BLDA: 80 MMHG (ref 40–60)
POC ACTIVATED CLOTTING TIME K: 180 SEC (ref 74–137)
POC ACTIVATED CLOTTING TIME K: 208 SEC (ref 74–137)
POC ACTIVATED CLOTTING TIME K: 208 SEC (ref 74–137)
POC ACTIVATED CLOTTING TIME K: 213 SEC (ref 74–137)
POC ACTIVATED CLOTTING TIME K: 219 SEC (ref 74–137)
POC BE: -18 MMOL/L
POC BE: -2 MMOL/L
POC BE: -8 MMOL/L
POC BE: -9 MMOL/L
POC BE: 3 MMOL/L
POC IONIZED CALCIUM: 1.01 MMOL/L (ref 1.06–1.42)
POC IONIZED CALCIUM: 1.04 MMOL/L (ref 1.06–1.42)
POC IONIZED CALCIUM: 1.11 MMOL/L (ref 1.06–1.42)
POC SATURATED O2: 28 % (ref 95–100)
POC SATURATED O2: 56 % (ref 95–100)
POC SATURATED O2: 79 % (ref 95–100)
POC SATURATED O2: 79 % (ref 95–100)
POC SATURATED O2: 96 % (ref 95–100)
POC TCO2: 12 MMOL/L (ref 24–29)
POC TCO2: 20 MMOL/L (ref 24–29)
POC TCO2: 22 MMOL/L (ref 24–29)
POC TCO2: 27 MMOL/L (ref 24–29)
POC TCO2: 29 MMOL/L (ref 24–29)
POCT GLUCOSE: 100 MG/DL (ref 70–110)
POCT GLUCOSE: 101 MG/DL (ref 70–110)
POCT GLUCOSE: 102 MG/DL (ref 70–110)
POCT GLUCOSE: 102 MG/DL (ref 70–110)
POCT GLUCOSE: 104 MG/DL (ref 70–110)
POCT GLUCOSE: 104 MG/DL (ref 70–110)
POCT GLUCOSE: 105 MG/DL (ref 70–110)
POCT GLUCOSE: 106 MG/DL (ref 70–110)
POCT GLUCOSE: 107 MG/DL (ref 70–110)
POCT GLUCOSE: 108 MG/DL (ref 70–110)
POCT GLUCOSE: 109 MG/DL (ref 70–110)
POCT GLUCOSE: 110 MG/DL (ref 70–110)
POCT GLUCOSE: 112 MG/DL (ref 70–110)
POCT GLUCOSE: 113 MG/DL (ref 70–110)
POCT GLUCOSE: 115 MG/DL (ref 70–110)
POCT GLUCOSE: 117 MG/DL (ref 70–110)
POCT GLUCOSE: 118 MG/DL (ref 70–110)
POCT GLUCOSE: 119 MG/DL (ref 70–110)
POCT GLUCOSE: 120 MG/DL (ref 70–110)
POCT GLUCOSE: 121 MG/DL (ref 70–110)
POCT GLUCOSE: 124 MG/DL (ref 70–110)
POCT GLUCOSE: 128 MG/DL (ref 70–110)
POCT GLUCOSE: 129 MG/DL (ref 70–110)
POCT GLUCOSE: 129 MG/DL (ref 70–110)
POCT GLUCOSE: 132 MG/DL (ref 70–110)
POCT GLUCOSE: 133 MG/DL (ref 70–110)
POCT GLUCOSE: 134 MG/DL (ref 70–110)
POCT GLUCOSE: 135 MG/DL (ref 70–110)
POCT GLUCOSE: 143 MG/DL (ref 70–110)
POCT GLUCOSE: 144 MG/DL (ref 70–110)
POCT GLUCOSE: 163 MG/DL (ref 70–110)
POCT GLUCOSE: 168 MG/DL (ref 70–110)
POCT GLUCOSE: 241 MG/DL (ref 70–110)
POCT GLUCOSE: 30 MG/DL (ref 70–110)
POCT GLUCOSE: 31 MG/DL (ref 70–110)
POCT GLUCOSE: 32 MG/DL (ref 70–110)
POCT GLUCOSE: 36 MG/DL (ref 70–110)
POCT GLUCOSE: 39 MG/DL (ref 70–110)
POCT GLUCOSE: 40 MG/DL (ref 70–110)
POCT GLUCOSE: 45 MG/DL (ref 70–110)
POCT GLUCOSE: 50 MG/DL (ref 70–110)
POCT GLUCOSE: 51 MG/DL (ref 70–110)
POCT GLUCOSE: 52 MG/DL (ref 70–110)
POCT GLUCOSE: 62 MG/DL (ref 70–110)
POCT GLUCOSE: 68 MG/DL (ref 70–110)
POCT GLUCOSE: 69 MG/DL (ref 70–110)
POCT GLUCOSE: 71 MG/DL (ref 70–110)
POCT GLUCOSE: 74 MG/DL (ref 70–110)
POCT GLUCOSE: 74 MG/DL (ref 70–110)
POCT GLUCOSE: 76 MG/DL (ref 70–110)
POCT GLUCOSE: 77 MG/DL (ref 70–110)
POCT GLUCOSE: 77 MG/DL (ref 70–110)
POCT GLUCOSE: 82 MG/DL (ref 70–110)
POCT GLUCOSE: 83 MG/DL (ref 70–110)
POCT GLUCOSE: 84 MG/DL (ref 70–110)
POCT GLUCOSE: 85 MG/DL (ref 70–110)
POCT GLUCOSE: 85 MG/DL (ref 70–110)
POCT GLUCOSE: 86 MG/DL (ref 70–110)
POCT GLUCOSE: 86 MG/DL (ref 70–110)
POCT GLUCOSE: 89 MG/DL (ref 70–110)
POCT GLUCOSE: 90 MG/DL (ref 70–110)
POCT GLUCOSE: 91 MG/DL (ref 70–110)
POCT GLUCOSE: 92 MG/DL (ref 70–110)
POCT GLUCOSE: 93 MG/DL (ref 70–110)
POCT GLUCOSE: 94 MG/DL (ref 70–110)
POCT GLUCOSE: 94 MG/DL (ref 70–110)
POCT GLUCOSE: 95 MG/DL (ref 70–110)
POCT GLUCOSE: 95 MG/DL (ref 70–110)
POCT GLUCOSE: 96 MG/DL (ref 70–110)
POCT GLUCOSE: 97 MG/DL (ref 70–110)
POCT GLUCOSE: 98 MG/DL (ref 70–110)
POIKILOCYTOSIS BLD QL SMEAR: ABNORMAL
POIKILOCYTOSIS BLD QL SMEAR: SLIGHT
POIKILOCYTOSIS BLD QL SMEAR: SLIGHT
POLYCHROMASIA BLD QL SMEAR: ABNORMAL
POTASSIUM BLD-SCNC: 4 MMOL/L (ref 3.5–5.1)
POTASSIUM BLD-SCNC: 5.1 MMOL/L (ref 3.5–5.1)
POTASSIUM BLD-SCNC: 6.5 MMOL/L (ref 3.5–5.1)
POTASSIUM SERPL-SCNC: 3.8 MMOL/L (ref 3.5–5.1)
POTASSIUM SERPL-SCNC: 3.8 MMOL/L (ref 3.5–5.1)
POTASSIUM SERPL-SCNC: 4.1 MMOL/L (ref 3.5–5.1)
POTASSIUM SERPL-SCNC: 4.2 MMOL/L (ref 3.5–5.1)
POTASSIUM SERPL-SCNC: 4.3 MMOL/L (ref 3.5–5.1)
POTASSIUM SERPL-SCNC: 4.4 MMOL/L (ref 3.5–5.1)
POTASSIUM SERPL-SCNC: 4.5 MMOL/L (ref 3.5–5.1)
POTASSIUM SERPL-SCNC: 4.6 MMOL/L (ref 3.5–5.1)
POTASSIUM SERPL-SCNC: 4.7 MMOL/L (ref 3.5–5.1)
POTASSIUM SERPL-SCNC: 4.7 MMOL/L (ref 3.5–5.1)
POTASSIUM SERPL-SCNC: 4.8 MMOL/L (ref 3.5–5.1)
POTASSIUM SERPL-SCNC: 4.9 MMOL/L (ref 3.5–5.1)
POTASSIUM SERPL-SCNC: 5 MMOL/L (ref 3.5–5.1)
POTASSIUM SERPL-SCNC: 5.1 MMOL/L (ref 3.5–5.1)
POTASSIUM SERPL-SCNC: 5.2 MMOL/L (ref 3.5–5.1)
POTASSIUM SERPL-SCNC: 5.3 MMOL/L (ref 3.5–5.1)
POTASSIUM SERPL-SCNC: 5.4 MMOL/L (ref 3.5–5.1)
POTASSIUM SERPL-SCNC: 5.5 MMOL/L (ref 3.5–5.1)
POTASSIUM SERPL-SCNC: 5.6 MMOL/L (ref 3.5–5.1)
POTASSIUM SERPL-SCNC: 5.7 MMOL/L (ref 3.5–5.1)
POTASSIUM SERPL-SCNC: 5.8 MMOL/L (ref 3.5–5.1)
POTASSIUM SERPL-SCNC: 6 MMOL/L (ref 3.5–5.1)
POTASSIUM SERPL-SCNC: 6 MMOL/L (ref 3.5–5.1)
POTASSIUM SERPL-SCNC: 6.1 MMOL/L (ref 3.5–5.1)
POTASSIUM SERPL-SCNC: 6.3 MMOL/L (ref 3.5–5.1)
POTASSIUM SERPL-SCNC: 6.4 MMOL/L (ref 3.5–5.1)
POTASSIUM SERPL-SCNC: 6.5 MMOL/L (ref 3.5–5.1)
POTASSIUM SERPL-SCNC: 6.5 MMOL/L (ref 3.5–5.1)
POTASSIUM SERPL-SCNC: 6.8 MMOL/L (ref 3.5–5.1)
POTASSIUM SERPL-SCNC: 6.9 MMOL/L (ref 3.5–5.1)
POTASSIUM SERPL-SCNC: 7.2 MMOL/L (ref 3.5–5.1)
PREALB SERPL-MCNC: 21 MG/DL (ref 20–43)
PROT SERPL-MCNC: 6.5 G/DL (ref 6–8.4)
PROT SERPL-MCNC: 6.6 G/DL (ref 6–8.4)
PROT SERPL-MCNC: 6.6 G/DL (ref 6–8.4)
PROT SERPL-MCNC: 7 G/DL (ref 6–8.4)
PROT SERPL-MCNC: 7.1 G/DL (ref 6–8.4)
PROT SERPL-MCNC: 7.2 G/DL (ref 6–8.4)
PROT SERPL-MCNC: 7.2 G/DL (ref 6–8.4)
PROT SERPL-MCNC: 7.3 G/DL (ref 6–8.4)
PROT SERPL-MCNC: 7.4 G/DL (ref 6–8.4)
PROT SERPL-MCNC: 7.5 G/DL (ref 6–8.4)
PROT SERPL-MCNC: 7.6 G/DL (ref 6–8.4)
PROT SERPL-MCNC: 7.6 G/DL (ref 6–8.4)
PROT SERPL-MCNC: 7.7 G/DL (ref 6–8.4)
PROT SERPL-MCNC: 7.7 G/DL (ref 6–8.4)
PROTHROMBIN TIME: 10.8 SEC (ref 9–12.5)
PROTHROMBIN TIME: 10.8 SEC (ref 9–12.5)
PROTHROMBIN TIME: 10.9 SEC (ref 9–12.5)
PROTHROMBIN TIME: 11.1 SEC (ref 9–12.5)
PROTHROMBIN TIME: 11.9 SEC (ref 9–12.5)
PROTHROMBIN TIME: 12.3 SEC (ref 9–12.5)
PROTHROMBIN TIME: 12.3 SEC (ref 9–12.5)
PROTHROMBIN TIME: 12.7 SEC (ref 9–12.5)
PROTHROMBIN TIME: 13.7 SEC (ref 9–12.5)
PROTHROMBIN TIME: 14.3 SEC (ref 9–12.5)
PROTHROMBIN TIME: 16.1 SEC (ref 9–12.5)
PROTHROMBIN TIME: 16.5 SEC (ref 9–12.5)
PROTHROMBIN TIME: 16.5 SEC (ref 9–12.5)
PROTHROMBIN TIME: 31.9 SEC (ref 9–12.5)
PROTHROMBIN TIME: 33.2 SEC (ref 9–12.5)
PROTHROMBIN TIME: 70.5 SEC (ref 9–12.5)
PROTHROMBIN TIME: 71.8 SEC (ref 9–12.5)
PROTHROMBIN TIME: 73.4 SEC (ref 9–12.5)
PROTHROMBIN TIME: 79.2 SEC (ref 9–12.5)
RA MAJOR: 5.21 CM
RA PRESSURE: 15 MMHG
RA WIDTH: 3.56 CM
RBC # BLD AUTO: 2.23 M/UL (ref 4.6–6.2)
RBC # BLD AUTO: 2.35 M/UL (ref 4.6–6.2)
RBC # BLD AUTO: 2.38 M/UL (ref 4.6–6.2)
RBC # BLD AUTO: 2.47 M/UL (ref 4.6–6.2)
RBC # BLD AUTO: 2.49 M/UL (ref 4.6–6.2)
RBC # BLD AUTO: 2.5 M/UL (ref 4.6–6.2)
RBC # BLD AUTO: 2.51 M/UL (ref 4.6–6.2)
RBC # BLD AUTO: 2.55 M/UL (ref 4.6–6.2)
RBC # BLD AUTO: 2.59 M/UL (ref 4.6–6.2)
RBC # BLD AUTO: 2.62 M/UL (ref 4.6–6.2)
RBC # BLD AUTO: 2.63 M/UL (ref 4.6–6.2)
RBC # BLD AUTO: 2.69 M/UL (ref 4.6–6.2)
RBC # BLD AUTO: 2.72 M/UL (ref 4.6–6.2)
RBC # BLD AUTO: 2.74 M/UL (ref 4.6–6.2)
RBC # BLD AUTO: 2.78 M/UL (ref 4.6–6.2)
RBC # BLD AUTO: 2.91 M/UL (ref 4.6–6.2)
RBC # BLD AUTO: 2.94 M/UL (ref 4.6–6.2)
RBC # BLD AUTO: 2.95 M/UL (ref 4.6–6.2)
RBC # BLD AUTO: 2.97 M/UL (ref 4.6–6.2)
RBC # BLD AUTO: 3.01 M/UL (ref 4.6–6.2)
RBC # BLD AUTO: 3.19 M/UL (ref 4.6–6.2)
RBC # BLD AUTO: 3.22 M/UL (ref 4.6–6.2)
RBC # BLD AUTO: 3.32 M/UL (ref 4.6–6.2)
RBC # BLD AUTO: 3.4 M/UL (ref 4.6–6.2)
RBC # BLD AUTO: 3.55 M/UL (ref 4.6–6.2)
RV TISSUE DOPPLER FREE WALL SYSTOLIC VELOCITY 1 (APICAL 4 CHAMBER VIEW): 11.57 CM/S
SAMPLE: ABNORMAL
SARS-COV-2 RDRP RESP QL NAA+PROBE: NEGATIVE
SARS-COV-2 RNA RESP QL NAA+PROBE: NOT DETECTED
SARS-COV-2 RNA RESP QL NAA+PROBE: NOT DETECTED
SATURATED IRON: 48 % (ref 20–50)
SCHISTOCYTES BLD QL SMEAR: ABNORMAL
SINUS: 4.04 CM
SITE: ABNORMAL
SODIUM BLD-SCNC: 130 MMOL/L (ref 136–145)
SODIUM BLD-SCNC: 133 MMOL/L (ref 136–145)
SODIUM BLD-SCNC: 136 MMOL/L (ref 136–145)
SODIUM SERPL-SCNC: 133 MMOL/L (ref 136–145)
SODIUM SERPL-SCNC: 134 MMOL/L (ref 136–145)
SODIUM SERPL-SCNC: 135 MMOL/L (ref 136–145)
SODIUM SERPL-SCNC: 136 MMOL/L (ref 136–145)
SODIUM SERPL-SCNC: 137 MMOL/L (ref 136–145)
SODIUM SERPL-SCNC: 138 MMOL/L (ref 136–145)
SODIUM SERPL-SCNC: 139 MMOL/L (ref 136–145)
SODIUM SERPL-SCNC: 140 MMOL/L (ref 136–145)
SODIUM SERPL-SCNC: 140 MMOL/L (ref 136–145)
SODIUM SERPL-SCNC: 142 MMOL/L (ref 136–145)
SODIUM SERPL-SCNC: 142 MMOL/L (ref 136–145)
SPHEROCYTES BLD QL SMEAR: ABNORMAL
STJ: 3.7 CM
TDI LATERAL: 0.04 M/S
TDI SEPTAL: 0.03 M/S
TDI: 0.04 M/S
TOTAL IRON BINDING CAPACITY: 317 UG/DL (ref 250–450)
TR MAX PG: 47 MMHG
TRANSFERRIN SERPL-MCNC: 214 MG/DL (ref 200–375)
TRICUSPID ANNULAR PLANE SYSTOLIC EXCURSION: 2.21 CM
TROPONIN I SERPL DL<=0.01 NG/ML-MCNC: 0.18 NG/ML (ref 0–0.03)
TROPONIN I SERPL DL<=0.01 NG/ML-MCNC: 0.47 NG/ML (ref 0–0.03)
TROPONIN I SERPL DL<=0.01 NG/ML-MCNC: 3.85 NG/ML (ref 0–0.03)
TROPONIN I SERPL DL<=0.01 NG/ML-MCNC: 4.77 NG/ML (ref 0–0.03)
TROPONIN I SERPL DL<=0.01 NG/ML-MCNC: 5.37 NG/ML (ref 0–0.03)
TV REST PULMONARY ARTERY PRESSURE: 62 MMHG
VANCOMYCIN SERPL-MCNC: 14.6 UG/ML
VANCOMYCIN SERPL-MCNC: 29.3 UG/ML
VT: 500
VT: 500
WBC # BLD AUTO: 10.14 K/UL (ref 3.9–12.7)
WBC # BLD AUTO: 10.18 K/UL (ref 3.9–12.7)
WBC # BLD AUTO: 10.21 K/UL (ref 3.9–12.7)
WBC # BLD AUTO: 10.43 K/UL (ref 3.9–12.7)
WBC # BLD AUTO: 10.66 K/UL (ref 3.9–12.7)
WBC # BLD AUTO: 10.87 K/UL (ref 3.9–12.7)
WBC # BLD AUTO: 11.06 K/UL (ref 3.9–12.7)
WBC # BLD AUTO: 11.16 K/UL (ref 3.9–12.7)
WBC # BLD AUTO: 14.48 K/UL (ref 3.9–12.7)
WBC # BLD AUTO: 19.37 K/UL (ref 3.9–12.7)
WBC # BLD AUTO: 21.74 K/UL (ref 3.9–12.7)
WBC # BLD AUTO: 6.63 K/UL (ref 3.9–12.7)
WBC # BLD AUTO: 6.93 K/UL (ref 3.9–12.7)
WBC # BLD AUTO: 7.06 K/UL (ref 3.9–12.7)
WBC # BLD AUTO: 7.13 K/UL (ref 3.9–12.7)
WBC # BLD AUTO: 7.21 K/UL (ref 3.9–12.7)
WBC # BLD AUTO: 8.07 K/UL (ref 3.9–12.7)
WBC # BLD AUTO: 8.13 K/UL (ref 3.9–12.7)
WBC # BLD AUTO: 8.37 K/UL (ref 3.9–12.7)
WBC # BLD AUTO: 8.78 K/UL (ref 3.9–12.7)
WBC # BLD AUTO: 8.98 K/UL (ref 3.9–12.7)
WBC # BLD AUTO: 9.13 K/UL (ref 3.9–12.7)
WBC # BLD AUTO: 9.59 K/UL (ref 3.9–12.7)
WBC # BLD AUTO: 9.68 K/UL (ref 3.9–12.7)
WBC # BLD AUTO: 9.71 K/UL (ref 3.9–12.7)

## 2020-01-01 PROCEDURE — 20000000 HC ICU ROOM

## 2020-01-01 PROCEDURE — 99222 PR INITIAL HOSPITAL CARE,LEVL II: ICD-10-PCS | Mod: ,,, | Performed by: INTERNAL MEDICINE

## 2020-01-01 PROCEDURE — 85610 PROTHROMBIN TIME: CPT | Mod: 91

## 2020-01-01 PROCEDURE — 63600175 PHARM REV CODE 636 W HCPCS: Performed by: INTERNAL MEDICINE

## 2020-01-01 PROCEDURE — 25000003 PHARM REV CODE 250: Performed by: INTERNAL MEDICINE

## 2020-01-01 PROCEDURE — 97165 OT EVAL LOW COMPLEX 30 MIN: CPT

## 2020-01-01 PROCEDURE — 36415 COLL VENOUS BLD VENIPUNCTURE: CPT

## 2020-01-01 PROCEDURE — 80074 ACUTE HEPATITIS PANEL: CPT

## 2020-01-01 PROCEDURE — 63600175 PHARM REV CODE 636 W HCPCS: Performed by: NURSE PRACTITIONER

## 2020-01-01 PROCEDURE — 99233 PR SUBSEQUENT HOSPITAL CARE,LEVL III: ICD-10-PCS | Mod: ,,, | Performed by: INTERNAL MEDICINE

## 2020-01-01 PROCEDURE — 99223 PR INITIAL HOSPITAL CARE,LEVL III: ICD-10-PCS | Mod: ,,, | Performed by: NURSE PRACTITIONER

## 2020-01-01 PROCEDURE — 80048 BASIC METABOLIC PNL TOTAL CA: CPT | Mod: 91

## 2020-01-01 PROCEDURE — 25000003 PHARM REV CODE 250: Performed by: FAMILY MEDICINE

## 2020-01-01 PROCEDURE — 27000221 HC OXYGEN, UP TO 24 HOURS

## 2020-01-01 PROCEDURE — 93010 EKG 12-LEAD: ICD-10-PCS | Mod: ,,, | Performed by: INTERNAL MEDICINE

## 2020-01-01 PROCEDURE — 84484 ASSAY OF TROPONIN QUANT: CPT | Mod: 91

## 2020-01-01 PROCEDURE — 80053 COMPREHEN METABOLIC PANEL: CPT

## 2020-01-01 PROCEDURE — 99233 PR SUBSEQUENT HOSPITAL CARE,LEVL III: ICD-10-PCS | Mod: ,,, | Performed by: HOSPITALIST

## 2020-01-01 PROCEDURE — 25000003 PHARM REV CODE 250: Performed by: PHYSICIAN ASSISTANT

## 2020-01-01 PROCEDURE — 25000003 PHARM REV CODE 250: Performed by: STUDENT IN AN ORGANIZED HEALTH CARE EDUCATION/TRAINING PROGRAM

## 2020-01-01 PROCEDURE — 36247 PR PLACE CATH SUBSUBSELECT ART,ABD/PEL: ICD-10-PCS | Mod: GC,,, | Performed by: SURGERY

## 2020-01-01 PROCEDURE — 99900035 HC TECH TIME PER 15 MIN (STAT)

## 2020-01-01 PROCEDURE — 37232 PR REVASCULARIZE TIBIAL/PERON ARTERY,ANGIOPLASTY EA ADD: CPT | Mod: RT,,, | Performed by: INTERNAL MEDICINE

## 2020-01-01 PROCEDURE — 93010 ELECTROCARDIOGRAM REPORT: CPT | Mod: ,,, | Performed by: INTERNAL MEDICINE

## 2020-01-01 PROCEDURE — 99223 PR INITIAL HOSPITAL CARE,LEVL III: ICD-10-PCS | Mod: ,,, | Performed by: STUDENT IN AN ORGANIZED HEALTH CARE EDUCATION/TRAINING PROGRAM

## 2020-01-01 PROCEDURE — 99232 PR SUBSEQUENT HOSPITAL CARE,LEVL II: ICD-10-PCS | Mod: ,,, | Performed by: INTERNAL MEDICINE

## 2020-01-01 PROCEDURE — 25000003 PHARM REV CODE 250: Performed by: NURSE PRACTITIONER

## 2020-01-01 PROCEDURE — 80100008 HC CRRT DAILY MAINTENANCE

## 2020-01-01 PROCEDURE — 90935 PR HEMODIALYSIS, ONE EVALUATION: ICD-10-PCS | Mod: ,,, | Performed by: NURSE PRACTITIONER

## 2020-01-01 PROCEDURE — 80048 BASIC METABOLIC PNL TOTAL CA: CPT

## 2020-01-01 PROCEDURE — C1887 CATHETER, GUIDING: HCPCS | Performed by: INTERNAL MEDICINE

## 2020-01-01 PROCEDURE — 94761 N-INVAS EAR/PLS OXIMETRY MLT: CPT

## 2020-01-01 PROCEDURE — 99497 PR ADVNCD CARE PLAN 30 MIN: ICD-10-PCS | Mod: 25,,, | Performed by: NURSE PRACTITIONER

## 2020-01-01 PROCEDURE — 25000003 PHARM REV CODE 250: Performed by: SURGERY

## 2020-01-01 PROCEDURE — 84484 ASSAY OF TROPONIN QUANT: CPT

## 2020-01-01 PROCEDURE — 85610 PROTHROMBIN TIME: CPT

## 2020-01-01 PROCEDURE — 99233 SBSQ HOSP IP/OBS HIGH 50: CPT | Mod: ,,, | Performed by: INTERNAL MEDICINE

## 2020-01-01 PROCEDURE — 87186 SC STD MICRODIL/AGAR DIL: CPT

## 2020-01-01 PROCEDURE — 63600175 PHARM REV CODE 636 W HCPCS: Performed by: NURSE ANESTHETIST, CERTIFIED REGISTERED

## 2020-01-01 PROCEDURE — 83605 ASSAY OF LACTIC ACID: CPT

## 2020-01-01 PROCEDURE — 63600175 PHARM REV CODE 636 W HCPCS: Performed by: FAMILY MEDICINE

## 2020-01-01 PROCEDURE — 97162 PT EVAL MOD COMPLEX 30 MIN: CPT

## 2020-01-01 PROCEDURE — 99223 PR INITIAL HOSPITAL CARE,LEVL III: ICD-10-PCS | Mod: ,,, | Performed by: INTERNAL MEDICINE

## 2020-01-01 PROCEDURE — 90935 HEMODIALYSIS ONE EVALUATION: CPT | Mod: ,,, | Performed by: NURSE PRACTITIONER

## 2020-01-01 PROCEDURE — 84100 ASSAY OF PHOSPHORUS: CPT

## 2020-01-01 PROCEDURE — 99233 PR SUBSEQUENT HOSPITAL CARE,LEVL III: ICD-10-PCS | Mod: ,,, | Performed by: NURSE PRACTITIONER

## 2020-01-01 PROCEDURE — 83735 ASSAY OF MAGNESIUM: CPT

## 2020-01-01 PROCEDURE — 99233 SBSQ HOSP IP/OBS HIGH 50: CPT | Mod: ,,, | Performed by: PODIATRIST

## 2020-01-01 PROCEDURE — 85025 COMPLETE CBC W/AUTO DIFF WBC: CPT

## 2020-01-01 PROCEDURE — 99222 1ST HOSP IP/OBS MODERATE 55: CPT | Mod: 25,,, | Performed by: SURGERY

## 2020-01-01 PROCEDURE — 99153 MOD SED SAME PHYS/QHP EA: CPT | Performed by: INTERNAL MEDICINE

## 2020-01-01 PROCEDURE — 25000003 PHARM REV CODE 250: Performed by: HOSPITALIST

## 2020-01-01 PROCEDURE — 63600175 PHARM REV CODE 636 W HCPCS: Performed by: STUDENT IN AN ORGANIZED HEALTH CARE EDUCATION/TRAINING PROGRAM

## 2020-01-01 PROCEDURE — 75710 ARTERY X-RAYS ARM/LEG: CPT | Mod: 26,GC,, | Performed by: SURGERY

## 2020-01-01 PROCEDURE — 94002 VENT MGMT INPAT INIT DAY: CPT

## 2020-01-01 PROCEDURE — 63600175 PHARM REV CODE 636 W HCPCS: Performed by: ANESTHESIOLOGY

## 2020-01-01 PROCEDURE — 75710 PR  ANGIO EXTREMITY UNILAT: ICD-10-PCS | Mod: 26,GC,, | Performed by: SURGERY

## 2020-01-01 PROCEDURE — 99223 1ST HOSP IP/OBS HIGH 75: CPT | Mod: 25,,, | Performed by: NURSE PRACTITIONER

## 2020-01-01 PROCEDURE — 80202 ASSAY OF VANCOMYCIN: CPT

## 2020-01-01 PROCEDURE — 99291 PR CRITICAL CARE, E/M 30-74 MINUTES: ICD-10-PCS | Mod: ,,, | Performed by: INTERNAL MEDICINE

## 2020-01-01 PROCEDURE — 20600001 HC STEP DOWN PRIVATE ROOM

## 2020-01-01 PROCEDURE — P9047 ALBUMIN (HUMAN), 25%, 50ML: HCPCS | Mod: JG | Performed by: INTERNAL MEDICINE

## 2020-01-01 PROCEDURE — 25000242 PHARM REV CODE 250 ALT 637 W/ HCPCS: Performed by: NURSE PRACTITIONER

## 2020-01-01 PROCEDURE — 85007 BL SMEAR W/DIFF WBC COUNT: CPT

## 2020-01-01 PROCEDURE — 99285 PR EMERGENCY DEPT VISIT,LEVEL V: ICD-10-PCS | Mod: ,,, | Performed by: PHYSICIAN ASSISTANT

## 2020-01-01 PROCEDURE — 76937 US GUIDE VASCULAR ACCESS: CPT | Performed by: ANESTHESIOLOGY

## 2020-01-01 PROCEDURE — 37225 HC FEM/POPL REVAS W/ATHER: CPT | Mod: RT | Performed by: INTERNAL MEDICINE

## 2020-01-01 PROCEDURE — 97530 THERAPEUTIC ACTIVITIES: CPT

## 2020-01-01 PROCEDURE — 99223 PR INITIAL HOSPITAL CARE,LEVL III: ICD-10-PCS | Mod: GC,,, | Performed by: HOSPITALIST

## 2020-01-01 PROCEDURE — 92928 PR STENT: ICD-10-PCS | Mod: LD,GC,, | Performed by: INTERNAL MEDICINE

## 2020-01-01 PROCEDURE — 11000001 HC ACUTE MED/SURG PRIVATE ROOM

## 2020-01-01 PROCEDURE — 99214 OFFICE O/P EST MOD 30 MIN: CPT | Mod: S$PBB,,, | Performed by: PODIATRIST

## 2020-01-01 PROCEDURE — 25000003 PHARM REV CODE 250: Performed by: NURSE ANESTHETIST, CERTIFIED REGISTERED

## 2020-01-01 PROCEDURE — 90935 HEMODIALYSIS ONE EVALUATION: CPT

## 2020-01-01 PROCEDURE — 85025 COMPLETE CBC W/AUTO DIFF WBC: CPT | Mod: 91

## 2020-01-01 PROCEDURE — 80100014 HC HEMODIALYSIS 1:1

## 2020-01-01 PROCEDURE — 63600175 PHARM REV CODE 636 W HCPCS: Mod: JG | Performed by: INTERNAL MEDICINE

## 2020-01-01 PROCEDURE — 75710 ARTERY X-RAYS ARM/LEG: CPT | Mod: 26,59,, | Performed by: INTERNAL MEDICINE

## 2020-01-01 PROCEDURE — 94640 AIRWAY INHALATION TREATMENT: CPT

## 2020-01-01 PROCEDURE — 99223 1ST HOSP IP/OBS HIGH 75: CPT | Mod: GC,,, | Performed by: SURGERY

## 2020-01-01 PROCEDURE — 99285 EMERGENCY DEPT VISIT HI MDM: CPT | Mod: 25

## 2020-01-01 PROCEDURE — 85730 THROMBOPLASTIN TIME PARTIAL: CPT | Mod: 91

## 2020-01-01 PROCEDURE — 80053 COMPREHEN METABOLIC PANEL: CPT | Mod: 91

## 2020-01-01 PROCEDURE — 25500020 PHARM REV CODE 255: Performed by: SURGERY

## 2020-01-01 PROCEDURE — 85730 THROMBOPLASTIN TIME PARTIAL: CPT

## 2020-01-01 PROCEDURE — 63600175 PHARM REV CODE 636 W HCPCS: Mod: JG | Performed by: SURGERY

## 2020-01-01 PROCEDURE — 27202415 HC CARTRIDGE, CRRT

## 2020-01-01 PROCEDURE — 99285 EMERGENCY DEPT VISIT HI MDM: CPT | Mod: ,,, | Performed by: PHYSICIAN ASSISTANT

## 2020-01-01 PROCEDURE — U0002 COVID-19 LAB TEST NON-CDC: HCPCS

## 2020-01-01 PROCEDURE — 99497 ADVNCD CARE PLAN 30 MIN: CPT | Mod: 25,,, | Performed by: NURSE PRACTITIONER

## 2020-01-01 PROCEDURE — 99152 MOD SED SAME PHYS/QHP 5/>YRS: CPT | Mod: ,,, | Performed by: INTERNAL MEDICINE

## 2020-01-01 PROCEDURE — 87116 MYCOBACTERIA CULTURE: CPT

## 2020-01-01 PROCEDURE — 99152 MOD SED SAME PHYS/QHP 5/>YRS: CPT | Performed by: INTERNAL MEDICINE

## 2020-01-01 PROCEDURE — 90945 DIALYSIS ONE EVALUATION: CPT

## 2020-01-01 PROCEDURE — 99999 PR PBB SHADOW E&M-EST. PATIENT-LVL III: ICD-10-PCS | Mod: PBBFAC,,, | Performed by: PODIATRIST

## 2020-01-01 PROCEDURE — 36600 WITHDRAWAL OF ARTERIAL BLOOD: CPT

## 2020-01-01 PROCEDURE — 25000003 PHARM REV CODE 250

## 2020-01-01 PROCEDURE — 87522 HEPATITIS C REVRS TRNSCRPJ: CPT

## 2020-01-01 PROCEDURE — 37228 HC TIB/PER REVASC W/TLA: CPT | Mod: RT | Performed by: INTERNAL MEDICINE

## 2020-01-01 PROCEDURE — 99214 PR OFFICE/OUTPT VISIT, EST, LEVL IV, 30-39 MIN: ICD-10-PCS | Mod: S$PBB,,, | Performed by: PODIATRIST

## 2020-01-01 PROCEDURE — U0003 INFECTIOUS AGENT DETECTION BY NUCLEIC ACID (DNA OR RNA); SEVERE ACUTE RESPIRATORY SYNDROME CORONAVIRUS 2 (SARS-COV-2) (CORONAVIRUS DISEASE [COVID-19]), AMPLIFIED PROBE TECHNIQUE, MAKING USE OF HIGH THROUGHPUT TECHNOLOGIES AS DESCRIBED BY CMS-2020-01-R: HCPCS

## 2020-01-01 PROCEDURE — 37000009 HC ANESTHESIA EA ADD 15 MINS: Performed by: SURGERY

## 2020-01-01 PROCEDURE — 99233 SBSQ HOSP IP/OBS HIGH 50: CPT | Mod: ,,, | Performed by: NURSE PRACTITIONER

## 2020-01-01 PROCEDURE — 99222 PR INITIAL HOSPITAL CARE,LEVL II: ICD-10-PCS | Mod: 25,,, | Performed by: SURGERY

## 2020-01-01 PROCEDURE — 63600175 PHARM REV CODE 636 W HCPCS: Mod: TB | Performed by: INTERNAL MEDICINE

## 2020-01-01 PROCEDURE — 27201247 HC HEMODIALYSIS, SET-UP & CANCEL

## 2020-01-01 PROCEDURE — 99152 PR MOD CONSCIOUS SEDATION, SAME PHYS, 5+ YRS, FIRST 15 MIN: ICD-10-PCS | Mod: GC,,, | Performed by: INTERNAL MEDICINE

## 2020-01-01 PROCEDURE — A4217 STERILE WATER/SALINE, 500 ML: HCPCS | Performed by: HOSPITALIST

## 2020-01-01 PROCEDURE — 82728 ASSAY OF FERRITIN: CPT

## 2020-01-01 PROCEDURE — 99239 HOSP IP/OBS DSCHRG MGMT >30: CPT | Mod: ,,, | Performed by: HOSPITALIST

## 2020-01-01 PROCEDURE — C9113 INJ PANTOPRAZOLE SODIUM, VIA: HCPCS | Performed by: STUDENT IN AN ORGANIZED HEALTH CARE EDUCATION/TRAINING PROGRAM

## 2020-01-01 PROCEDURE — 99223 1ST HOSP IP/OBS HIGH 75: CPT | Mod: ,,, | Performed by: INTERNAL MEDICINE

## 2020-01-01 PROCEDURE — 99223 PR INITIAL HOSPITAL CARE,LEVL III: ICD-10-PCS | Mod: 25,,, | Performed by: NURSE PRACTITIONER

## 2020-01-01 PROCEDURE — 75625 CONTRAST EXAM ABDOMINL AORTA: CPT | Mod: 26,,, | Performed by: INTERNAL MEDICINE

## 2020-01-01 PROCEDURE — 25000242 PHARM REV CODE 250 ALT 637 W/ HCPCS: Performed by: FAMILY MEDICINE

## 2020-01-01 PROCEDURE — 85347 COAGULATION TIME ACTIVATED: CPT | Performed by: INTERNAL MEDICINE

## 2020-01-01 PROCEDURE — 92979 ENDOLUMINL IVUS OCT C EA: CPT | Mod: 26,GC,, | Performed by: INTERNAL MEDICINE

## 2020-01-01 PROCEDURE — 85027 COMPLETE CBC AUTOMATED: CPT

## 2020-01-01 PROCEDURE — 99999 PR PBB SHADOW E&M-EST. PATIENT-LVL III: CPT | Mod: PBBFAC,,, | Performed by: PODIATRIST

## 2020-01-01 PROCEDURE — D9220A PRA ANESTHESIA: Mod: ANES,,, | Performed by: ANESTHESIOLOGY

## 2020-01-01 PROCEDURE — 99233 PR SUBSEQUENT HOSPITAL CARE,LEVL III: ICD-10-PCS | Mod: ,,, | Performed by: STUDENT IN AN ORGANIZED HEALTH CARE EDUCATION/TRAINING PROGRAM

## 2020-01-01 PROCEDURE — 99233 PR SUBSEQUENT HOSPITAL CARE,LEVL III: ICD-10-PCS | Mod: ,,, | Performed by: PHYSICIAN ASSISTANT

## 2020-01-01 PROCEDURE — 80100016 HC MAINTENANCE HEMODIALYSIS

## 2020-01-01 PROCEDURE — 87102 FUNGUS ISOLATION CULTURE: CPT

## 2020-01-01 PROCEDURE — 99223 PR INITIAL HOSPITAL CARE,LEVL III: ICD-10-PCS | Mod: GC,,, | Performed by: INTERNAL MEDICINE

## 2020-01-01 PROCEDURE — 37229 PR TIB/PER REVASC W/ATHERECTOMY: CPT | Mod: 59,51,RT, | Performed by: INTERNAL MEDICINE

## 2020-01-01 PROCEDURE — 99291 CRITICAL CARE FIRST HOUR: CPT | Mod: ,,, | Performed by: INTERNAL MEDICINE

## 2020-01-01 PROCEDURE — C1769 GUIDE WIRE: HCPCS | Performed by: INTERNAL MEDICINE

## 2020-01-01 PROCEDURE — P9047 ALBUMIN (HUMAN), 25%, 50ML: HCPCS | Mod: JG | Performed by: SURGERY

## 2020-01-01 PROCEDURE — 87077 CULTURE AEROBIC IDENTIFY: CPT

## 2020-01-01 PROCEDURE — 93005 ELECTROCARDIOGRAM TRACING: CPT

## 2020-01-01 PROCEDURE — 84134 ASSAY OF PREALBUMIN: CPT

## 2020-01-01 PROCEDURE — 99233 PR SUBSEQUENT HOSPITAL CARE,LEVL III: ICD-10-PCS | Mod: ,,, | Performed by: PODIATRIST

## 2020-01-01 PROCEDURE — 80069 RENAL FUNCTION PANEL: CPT | Mod: 91

## 2020-01-01 PROCEDURE — 63600175 PHARM REV CODE 636 W HCPCS: Performed by: SURGERY

## 2020-01-01 PROCEDURE — 84100 ASSAY OF PHOSPHORUS: CPT | Mod: 91

## 2020-01-01 PROCEDURE — 99222 1ST HOSP IP/OBS MODERATE 55: CPT | Mod: ,,, | Performed by: INTERNAL MEDICINE

## 2020-01-01 PROCEDURE — 36247 INS CATH ABD/L-EXT ART 3RD: CPT | Mod: GC,,, | Performed by: SURGERY

## 2020-01-01 PROCEDURE — 86901 BLOOD TYPING SEROLOGIC RH(D): CPT

## 2020-01-01 PROCEDURE — 25500020 PHARM REV CODE 255: Performed by: INTERNAL MEDICINE

## 2020-01-01 PROCEDURE — 87205 SMEAR GRAM STAIN: CPT

## 2020-01-01 PROCEDURE — C1887 CATHETER, GUIDING: HCPCS | Performed by: SURGERY

## 2020-01-01 PROCEDURE — D9220A PRA ANESTHESIA: Mod: CRNA,,, | Performed by: NURSE ANESTHETIST, CERTIFIED REGISTERED

## 2020-01-01 PROCEDURE — 63600175 PHARM REV CODE 636 W HCPCS: Performed by: EMERGENCY MEDICINE

## 2020-01-01 PROCEDURE — 99233 SBSQ HOSP IP/OBS HIGH 50: CPT | Mod: ,,, | Performed by: PHYSICIAN ASSISTANT

## 2020-01-01 PROCEDURE — 85576 BLOOD PLATELET AGGREGATION: CPT

## 2020-01-01 PROCEDURE — 82962 GLUCOSE BLOOD TEST: CPT | Performed by: SURGERY

## 2020-01-01 PROCEDURE — C1769 GUIDE WIRE: HCPCS | Performed by: SURGERY

## 2020-01-01 PROCEDURE — 99239 PR HOSPITAL DISCHARGE DAY,>30 MIN: ICD-10-PCS | Mod: ,,, | Performed by: HOSPITALIST

## 2020-01-01 PROCEDURE — C1725 CATH, TRANSLUMIN NON-LASER: HCPCS | Performed by: INTERNAL MEDICINE

## 2020-01-01 PROCEDURE — 83036 HEMOGLOBIN GLYCOSYLATED A1C: CPT

## 2020-01-01 PROCEDURE — C1874 STENT, COATED/COV W/DEL SYS: HCPCS | Performed by: INTERNAL MEDICINE

## 2020-01-01 PROCEDURE — 99213 OFFICE O/P EST LOW 20 MIN: CPT | Mod: PBBFAC | Performed by: PODIATRIST

## 2020-01-01 PROCEDURE — 92928 PRQ TCAT PLMT NTRAC ST 1 LES: CPT | Mod: LD,GC,, | Performed by: INTERNAL MEDICINE

## 2020-01-01 PROCEDURE — 99233 SBSQ HOSP IP/OBS HIGH 50: CPT | Mod: ,,, | Performed by: STUDENT IN AN ORGANIZED HEALTH CARE EDUCATION/TRAINING PROGRAM

## 2020-01-01 PROCEDURE — 99222 1ST HOSP IP/OBS MODERATE 55: CPT | Mod: ,,, | Performed by: PODIATRIST

## 2020-01-01 PROCEDURE — C1760 CLOSURE DEV, VASC: HCPCS | Performed by: INTERNAL MEDICINE

## 2020-01-01 PROCEDURE — 94003 VENT MGMT INPAT SUBQ DAY: CPT

## 2020-01-01 PROCEDURE — 37229 PR TIB/PER REVASC W/ATHERECTOMY: ICD-10-PCS | Mod: 59,51,RT, | Performed by: INTERNAL MEDICINE

## 2020-01-01 PROCEDURE — 99223 1ST HOSP IP/OBS HIGH 75: CPT | Mod: GC,,, | Performed by: INTERNAL MEDICINE

## 2020-01-01 PROCEDURE — 37229 HC TIB/PER REVASC W/ATHER: CPT | Mod: RT | Performed by: INTERNAL MEDICINE

## 2020-01-01 PROCEDURE — 99152 MOD SED SAME PHYS/QHP 5/>YRS: CPT | Mod: GC,,, | Performed by: INTERNAL MEDICINE

## 2020-01-01 PROCEDURE — 83605 ASSAY OF LACTIC ACID: CPT | Mod: 91

## 2020-01-01 PROCEDURE — 87040 BLOOD CULTURE FOR BACTERIA: CPT | Mod: 59

## 2020-01-01 PROCEDURE — 83880 ASSAY OF NATRIURETIC PEPTIDE: CPT

## 2020-01-01 PROCEDURE — 99232 SBSQ HOSP IP/OBS MODERATE 35: CPT | Mod: ,,, | Performed by: INTERNAL MEDICINE

## 2020-01-01 PROCEDURE — 82550 ASSAY OF CK (CPK): CPT

## 2020-01-01 PROCEDURE — 37225 PR FEM/POPL REVAS W/ATHERECTOMY: CPT | Mod: RT,,, | Performed by: INTERNAL MEDICINE

## 2020-01-01 PROCEDURE — C1894 INTRO/SHEATH, NON-LASER: HCPCS | Performed by: INTERNAL MEDICINE

## 2020-01-01 PROCEDURE — 75710 ARTERY X-RAYS ARM/LEG: CPT | Mod: RT | Performed by: INTERNAL MEDICINE

## 2020-01-01 PROCEDURE — 71000016 HC POSTOP RECOV ADDL HR: Performed by: SURGERY

## 2020-01-01 PROCEDURE — 37000009 HC ANESTHESIA EA ADD 15 MINS: Performed by: INTERNAL MEDICINE

## 2020-01-01 PROCEDURE — 92978 ENDOLUMINL IVUS OCT C 1ST: CPT | Performed by: INTERNAL MEDICINE

## 2020-01-01 PROCEDURE — 71000015 HC POSTOP RECOV 1ST HR: Performed by: SURGERY

## 2020-01-01 PROCEDURE — 87070 CULTURE OTHR SPECIMN AEROBIC: CPT

## 2020-01-01 PROCEDURE — 93010 ELECTROCARDIOGRAM REPORT: CPT | Mod: 76,,, | Performed by: INTERNAL MEDICINE

## 2020-01-01 PROCEDURE — 87206 SMEAR FLUORESCENT/ACID STAI: CPT

## 2020-01-01 PROCEDURE — 71000033 HC RECOVERY, INTIAL HOUR: Performed by: SURGERY

## 2020-01-01 PROCEDURE — 63600175 PHARM REV CODE 636 W HCPCS

## 2020-01-01 PROCEDURE — C1894 INTRO/SHEATH, NON-LASER: HCPCS | Performed by: SURGERY

## 2020-01-01 PROCEDURE — 92979 ENDOLUMINL IVUS OCT C EA: CPT | Performed by: INTERNAL MEDICINE

## 2020-01-01 PROCEDURE — 37000008 HC ANESTHESIA 1ST 15 MINUTES: Performed by: SURGERY

## 2020-01-01 PROCEDURE — 83735 ASSAY OF MAGNESIUM: CPT | Mod: 91

## 2020-01-01 PROCEDURE — 99223 1ST HOSP IP/OBS HIGH 75: CPT | Mod: GC,,, | Performed by: HOSPITALIST

## 2020-01-01 PROCEDURE — 36000707: Performed by: SURGERY

## 2020-01-01 PROCEDURE — C1753 CATH, INTRAVAS ULTRASOUND: HCPCS | Performed by: INTERNAL MEDICINE

## 2020-01-01 PROCEDURE — 82803 BLOOD GASES ANY COMBINATION: CPT

## 2020-01-01 PROCEDURE — 75710 PR  ANGIO EXTREMITY UNILAT: ICD-10-PCS | Mod: 26,59,, | Performed by: INTERNAL MEDICINE

## 2020-01-01 PROCEDURE — 93930 UPPER EXTREMITY STUDY: CPT | Performed by: SURGERY

## 2020-01-01 PROCEDURE — 25500020 PHARM REV CODE 255: Performed by: THORACIC SURGERY (CARDIOTHORACIC VASCULAR SURGERY)

## 2020-01-01 PROCEDURE — 75625 PR  ANGIO AORTOGRAM ABD SERIAL: ICD-10-PCS | Mod: 26,,, | Performed by: INTERNAL MEDICINE

## 2020-01-01 PROCEDURE — 86850 RBC ANTIBODY SCREEN: CPT

## 2020-01-01 PROCEDURE — 87075 CULTR BACTERIA EXCEPT BLOOD: CPT

## 2020-01-01 PROCEDURE — 99233 SBSQ HOSP IP/OBS HIGH 50: CPT | Mod: ,,, | Performed by: HOSPITALIST

## 2020-01-01 PROCEDURE — 99152 PR MOD CONSCIOUS SEDATION, SAME PHYS, 5+ YRS, FIRST 15 MIN: ICD-10-PCS | Mod: ,,, | Performed by: INTERNAL MEDICINE

## 2020-01-01 PROCEDURE — 92979 PR INTRAVASC CORONARY SO2,ADDN VESSEL: ICD-10-PCS | Mod: 26,GC,, | Performed by: INTERNAL MEDICINE

## 2020-01-01 PROCEDURE — 27201423 OPTIME MED/SURG SUP & DEVICES STERILE SUPPLY: Performed by: INTERNAL MEDICINE

## 2020-01-01 PROCEDURE — 86706 HEP B SURFACE ANTIBODY: CPT

## 2020-01-01 PROCEDURE — C9600 PERC DRUG-EL COR STENT SING: HCPCS | Mod: RC | Performed by: INTERNAL MEDICINE

## 2020-01-01 PROCEDURE — 36556 PR INSERT NON-TUNNEL CV CATH 5+ YRS OLD: ICD-10-PCS | Mod: LT,,, | Performed by: SURGERY

## 2020-01-01 PROCEDURE — 37225 PR FEM/POPL REVAS W/ATHERECTOMY: ICD-10-PCS | Mod: RT,,, | Performed by: INTERNAL MEDICINE

## 2020-01-01 PROCEDURE — 63600175 PHARM REV CODE 636 W HCPCS: Mod: TB | Performed by: NURSE PRACTITIONER

## 2020-01-01 PROCEDURE — D9220A PRA ANESTHESIA: ICD-10-PCS | Mod: ANES,,, | Performed by: ANESTHESIOLOGY

## 2020-01-01 PROCEDURE — 99222 PR INITIAL HOSPITAL CARE,LEVL II: ICD-10-PCS | Mod: ,,, | Performed by: PODIATRIST

## 2020-01-01 PROCEDURE — 99223 1ST HOSP IP/OBS HIGH 75: CPT | Mod: ,,, | Performed by: NURSE PRACTITIONER

## 2020-01-01 PROCEDURE — 99223 PR INITIAL HOSPITAL CARE,LEVL III: ICD-10-PCS | Mod: GC,,, | Performed by: SURGERY

## 2020-01-01 PROCEDURE — 25000242 PHARM REV CODE 250 ALT 637 W/ HCPCS: Performed by: STUDENT IN AN ORGANIZED HEALTH CARE EDUCATION/TRAINING PROGRAM

## 2020-01-01 PROCEDURE — 92978 ENDOLUMINL IVUS OCT C 1ST: CPT | Mod: 26,GC,, | Performed by: INTERNAL MEDICINE

## 2020-01-01 PROCEDURE — 87040 BLOOD CULTURE FOR BACTERIA: CPT

## 2020-01-01 PROCEDURE — 92978 PR IVUS, CORONARY, 1ST VESSEL: ICD-10-PCS | Mod: 26,GC,, | Performed by: INTERNAL MEDICINE

## 2020-01-01 PROCEDURE — 85379 FIBRIN DEGRADATION QUANT: CPT

## 2020-01-01 PROCEDURE — 37000008 HC ANESTHESIA 1ST 15 MINUTES: Performed by: INTERNAL MEDICINE

## 2020-01-01 PROCEDURE — 99223 1ST HOSP IP/OBS HIGH 75: CPT | Mod: ,,, | Performed by: STUDENT IN AN ORGANIZED HEALTH CARE EDUCATION/TRAINING PROGRAM

## 2020-01-01 PROCEDURE — D9220A PRA ANESTHESIA: ICD-10-PCS | Mod: CRNA,,, | Performed by: NURSE ANESTHETIST, CERTIFIED REGISTERED

## 2020-01-01 PROCEDURE — 37232 PR REVASCULARIZE TIBIAL/PERON ARTERY,ANGIOPLASTY EA ADD: ICD-10-PCS | Mod: RT,,, | Performed by: INTERNAL MEDICINE

## 2020-01-01 PROCEDURE — 83540 ASSAY OF IRON: CPT

## 2020-01-01 PROCEDURE — 63600175 PHARM REV CODE 636 W HCPCS: Performed by: PHYSICIAN ASSISTANT

## 2020-01-01 PROCEDURE — 36000706: Performed by: SURGERY

## 2020-01-01 PROCEDURE — 86920 COMPATIBILITY TEST SPIN: CPT

## 2020-01-01 PROCEDURE — 93922 UPR/L XTREMITY ART 2 LEVELS: CPT | Performed by: SURGERY

## 2020-01-01 PROCEDURE — 36556 INSERT NON-TUNNEL CV CATH: CPT | Mod: LT,,, | Performed by: SURGERY

## 2020-01-01 PROCEDURE — 97110 THERAPEUTIC EXERCISES: CPT

## 2020-01-01 DEVICE — STENT RONYX40026UX RESOLUTE ONYX 4.00X26
Type: IMPLANTABLE DEVICE | Site: CORONARY | Status: FUNCTIONAL
Brand: RESOLUTE ONYX™

## 2020-01-01 DEVICE — STENT RONYX40015UX RESOLUTE ONYX 4.00X15
Type: IMPLANTABLE DEVICE | Site: CORONARY | Status: FUNCTIONAL
Brand: RESOLUTE ONYX™

## 2020-01-01 DEVICE — STENT RONYX40018UX RESOLUTE ONYX 4.00X18
Type: IMPLANTABLE DEVICE | Site: CORONARY | Status: FUNCTIONAL
Brand: RESOLUTE ONYX™

## 2020-01-01 RX ORDER — SODIUM CHLORIDE 9 MG/ML
INJECTION, SOLUTION INTRAVENOUS
Status: DISCONTINUED | OUTPATIENT
Start: 2020-01-01 | End: 2020-01-01 | Stop reason: HOSPADM

## 2020-01-01 RX ORDER — EPHEDRINE SULFATE 50 MG/ML
INJECTION, SOLUTION INTRAVENOUS
Status: DISCONTINUED | OUTPATIENT
Start: 2020-01-01 | End: 2020-01-01

## 2020-01-01 RX ORDER — ACETAMINOPHEN 650 MG/1
650 SUPPOSITORY RECTAL EVERY 6 HOURS PRN
Status: DISCONTINUED | OUTPATIENT
Start: 2020-01-01 | End: 2020-01-01 | Stop reason: HOSPADM

## 2020-01-01 RX ORDER — OXYCODONE HYDROCHLORIDE 5 MG/1
10 TABLET ORAL EVERY 4 HOURS PRN
Status: DISCONTINUED | OUTPATIENT
Start: 2020-01-01 | End: 2020-01-01 | Stop reason: HOSPADM

## 2020-01-01 RX ORDER — CISATRACURIUM BESYLATE 2 MG/ML
INJECTION, SOLUTION INTRAVENOUS
Status: DISCONTINUED | OUTPATIENT
Start: 2020-01-01 | End: 2020-01-01 | Stop reason: HOSPADM

## 2020-01-01 RX ORDER — AMLODIPINE BESYLATE 5 MG/1
5 TABLET ORAL 2 TIMES DAILY
Status: DISCONTINUED | OUTPATIENT
Start: 2020-01-01 | End: 2020-01-01

## 2020-01-01 RX ORDER — HEPARIN SODIUM 1000 [USP'U]/ML
INJECTION, SOLUTION INTRAVENOUS; SUBCUTANEOUS
Status: DISCONTINUED | OUTPATIENT
Start: 2020-01-01 | End: 2020-01-01 | Stop reason: HOSPADM

## 2020-01-01 RX ORDER — MIDODRINE HYDROCHLORIDE 5 MG/1
10 TABLET ORAL
Status: DISCONTINUED | OUTPATIENT
Start: 2020-01-01 | End: 2020-01-01 | Stop reason: HOSPADM

## 2020-01-01 RX ORDER — POLYETHYLENE GLYCOL 3350 17 G/17G
17 POWDER, FOR SOLUTION ORAL DAILY
Status: DISCONTINUED | OUTPATIENT
Start: 2020-01-01 | End: 2020-01-01 | Stop reason: HOSPADM

## 2020-01-01 RX ORDER — HYDROMORPHONE HYDROCHLORIDE 1 MG/ML
INJECTION, SOLUTION INTRAMUSCULAR; INTRAVENOUS; SUBCUTANEOUS
Status: DISCONTINUED | OUTPATIENT
Start: 2020-01-01 | End: 2020-01-01 | Stop reason: HOSPADM

## 2020-01-01 RX ORDER — SODIUM CHLORIDE 9 MG/ML
INJECTION, SOLUTION INTRAVENOUS ONCE
Status: COMPLETED | OUTPATIENT
Start: 2020-01-01 | End: 2020-01-01

## 2020-01-01 RX ORDER — CHLORHEXIDINE GLUCONATE ORAL RINSE 1.2 MG/ML
15 SOLUTION DENTAL 2 TIMES DAILY
Status: DISCONTINUED | OUTPATIENT
Start: 2020-01-01 | End: 2020-01-01 | Stop reason: HOSPADM

## 2020-01-01 RX ORDER — SEVELAMER CARBONATE 800 MG/1
1600 TABLET, FILM COATED ORAL
Status: DISCONTINUED | OUTPATIENT
Start: 2020-01-01 | End: 2020-01-01 | Stop reason: HOSPADM

## 2020-01-01 RX ORDER — NOREPINEPHRINE BITARTRATE/D5W 8 MG/250ML
0.02 PLASTIC BAG, INJECTION (ML) INTRAVENOUS CONTINUOUS
Status: DISCONTINUED | OUTPATIENT
Start: 2020-01-01 | End: 2020-01-01

## 2020-01-01 RX ORDER — HEPARIN SODIUM,PORCINE/D5W 25000/250
12 INTRAVENOUS SOLUTION INTRAVENOUS CONTINUOUS
Status: DISCONTINUED | OUTPATIENT
Start: 2020-01-01 | End: 2020-01-01

## 2020-01-01 RX ORDER — GLYCOPYRROLATE 0.2 MG/ML
INJECTION INTRAMUSCULAR; INTRAVENOUS
Status: DISCONTINUED | OUTPATIENT
Start: 2020-01-01 | End: 2020-01-01 | Stop reason: HOSPADM

## 2020-01-01 RX ORDER — METHADONE HYDROCHLORIDE 10 MG/1
100 TABLET ORAL DAILY
Status: DISCONTINUED | OUTPATIENT
Start: 2020-01-01 | End: 2020-01-01 | Stop reason: HOSPADM

## 2020-01-01 RX ORDER — PROPOFOL 10 MG/ML
5 INJECTION, EMULSION INTRAVENOUS CONTINUOUS
Status: DISCONTINUED | OUTPATIENT
Start: 2020-01-01 | End: 2020-01-01 | Stop reason: HOSPADM

## 2020-01-01 RX ORDER — FENTANYL CITRATE-0.9 % NACL/PF 10 MCG/ML
25 PLASTIC BAG, INJECTION (ML) INTRAVENOUS CONTINUOUS
Status: DISCONTINUED | OUTPATIENT
Start: 2020-01-01 | End: 2020-01-01 | Stop reason: HOSPADM

## 2020-01-01 RX ORDER — LIDOCAINE HYDROCHLORIDE 10 MG/ML
INJECTION INFILTRATION; PERINEURAL
Status: DISCONTINUED | OUTPATIENT
Start: 2020-01-01 | End: 2020-01-01 | Stop reason: HOSPADM

## 2020-01-01 RX ORDER — ALBUMIN HUMAN 250 G/1000ML
12.5 SOLUTION INTRAVENOUS ONCE
Status: COMPLETED | OUTPATIENT
Start: 2020-01-01 | End: 2020-01-01

## 2020-01-01 RX ORDER — SODIUM CHLORIDE 0.9 % (FLUSH) 0.9 %
10 SYRINGE (ML) INJECTION
Status: DISCONTINUED | OUTPATIENT
Start: 2020-01-01 | End: 2020-01-01 | Stop reason: HOSPADM

## 2020-01-01 RX ORDER — ACETAMINOPHEN 325 MG/1
650 TABLET ORAL EVERY 4 HOURS PRN
Status: DISCONTINUED | OUTPATIENT
Start: 2020-01-01 | End: 2020-01-01

## 2020-01-01 RX ORDER — FENTANYL CITRATE 50 UG/ML
50 INJECTION, SOLUTION INTRAMUSCULAR; INTRAVENOUS
Status: DISCONTINUED | OUTPATIENT
Start: 2020-01-01 | End: 2020-01-01 | Stop reason: HOSPADM

## 2020-01-01 RX ORDER — ONDANSETRON 8 MG/1
8 TABLET, ORALLY DISINTEGRATING ORAL EVERY 8 HOURS PRN
Status: DISCONTINUED | OUTPATIENT
Start: 2020-01-01 | End: 2020-01-01 | Stop reason: HOSPADM

## 2020-01-01 RX ORDER — IODIXANOL 320 MG/ML
INJECTION, SOLUTION INTRAVASCULAR
Status: DISCONTINUED | OUTPATIENT
Start: 2020-01-01 | End: 2020-01-01 | Stop reason: HOSPADM

## 2020-01-01 RX ORDER — VANCOMYCIN HCL IN 5 % DEXTROSE 1G/250ML
1000 PLASTIC BAG, INJECTION (ML) INTRAVENOUS ONCE
Status: COMPLETED | OUTPATIENT
Start: 2020-01-01 | End: 2020-01-01

## 2020-01-01 RX ORDER — LIDOCAINE HCL/PF 100 MG/5ML
SYRINGE (ML) INTRAVENOUS
Status: DISCONTINUED | OUTPATIENT
Start: 2020-01-01 | End: 2020-01-01 | Stop reason: HOSPADM

## 2020-01-01 RX ORDER — ATROPINE SULFATE 0.1 MG/ML
INJECTION INTRAVENOUS
Status: COMPLETED
Start: 2020-01-01 | End: 2020-01-01

## 2020-01-01 RX ORDER — DEXTROSE MONOHYDRATE 100 MG/ML
INJECTION, SOLUTION INTRAVENOUS CONTINUOUS
Status: DISCONTINUED | OUTPATIENT
Start: 2020-01-01 | End: 2020-01-01

## 2020-01-01 RX ORDER — ACETAMINOPHEN 325 MG/1
650 TABLET ORAL EVERY 4 HOURS PRN
Status: DISCONTINUED | OUTPATIENT
Start: 2020-01-01 | End: 2020-01-01 | Stop reason: HOSPADM

## 2020-01-01 RX ORDER — ATORVASTATIN CALCIUM 10 MG/1
10 TABLET, FILM COATED ORAL DAILY
Status: ON HOLD | COMMUNITY
Start: 2020-01-01 | End: 2020-01-01 | Stop reason: HOSPADM

## 2020-01-01 RX ORDER — CALCIUM CARBONATE 200(500)MG
500 TABLET,CHEWABLE ORAL 2 TIMES DAILY PRN
Status: DISCONTINUED | OUTPATIENT
Start: 2020-01-01 | End: 2020-01-01 | Stop reason: HOSPADM

## 2020-01-01 RX ORDER — METOPROLOL TARTRATE 1 MG/ML
INJECTION, SOLUTION INTRAVENOUS
Status: COMPLETED
Start: 2020-01-01 | End: 2020-01-01

## 2020-01-01 RX ORDER — CEFAZOLIN SODIUM 1 G/3ML
INJECTION, POWDER, FOR SOLUTION INTRAMUSCULAR; INTRAVENOUS
Status: DISCONTINUED | OUTPATIENT
Start: 2020-01-01 | End: 2020-01-01 | Stop reason: HOSPADM

## 2020-01-01 RX ORDER — DICLOFENAC SODIUM 10 MG/G
2 GEL TOPICAL 2 TIMES DAILY
Status: DISCONTINUED | OUTPATIENT
Start: 2020-01-01 | End: 2020-01-01 | Stop reason: HOSPADM

## 2020-01-01 RX ORDER — GUAIFENESIN 600 MG/1
600 TABLET, EXTENDED RELEASE ORAL 2 TIMES DAILY
Status: DISCONTINUED | OUTPATIENT
Start: 2020-01-01 | End: 2020-01-01 | Stop reason: HOSPADM

## 2020-01-01 RX ORDER — CEFEPIME HYDROCHLORIDE 2 G/1
2 INJECTION, POWDER, FOR SOLUTION INTRAVENOUS
Status: DISCONTINUED | OUTPATIENT
Start: 2020-01-01 | End: 2020-01-01

## 2020-01-01 RX ORDER — PROMETHAZINE HYDROCHLORIDE 25 MG/1
25 TABLET ORAL EVERY 6 HOURS PRN
Status: DISCONTINUED | OUTPATIENT
Start: 2020-01-01 | End: 2020-01-01 | Stop reason: HOSPADM

## 2020-01-01 RX ORDER — ALBUTEROL SULFATE 2.5 MG/.5ML
10 SOLUTION RESPIRATORY (INHALATION) ONCE
Status: COMPLETED | OUTPATIENT
Start: 2020-01-01 | End: 2020-01-01

## 2020-01-01 RX ORDER — MIDAZOLAM HYDROCHLORIDE 1 MG/ML
INJECTION INTRAMUSCULAR; INTRAVENOUS
Status: DISCONTINUED | OUTPATIENT
Start: 2020-01-01 | End: 2020-01-01 | Stop reason: HOSPADM

## 2020-01-01 RX ORDER — ONDANSETRON 2 MG/ML
4 INJECTION INTRAMUSCULAR; INTRAVENOUS EVERY 6 HOURS PRN
Status: DISCONTINUED | OUTPATIENT
Start: 2020-01-01 | End: 2020-01-01 | Stop reason: HOSPADM

## 2020-01-01 RX ORDER — APIXABAN 2.5 MG/1
2.5 TABLET, FILM COATED ORAL DAILY
Status: ON HOLD | COMMUNITY
Start: 2019-01-01 | End: 2020-01-01 | Stop reason: HOSPADM

## 2020-01-01 RX ORDER — HYDROCODONE BITARTRATE AND ACETAMINOPHEN 500; 5 MG/1; MG/1
TABLET ORAL
Status: DISCONTINUED | OUTPATIENT
Start: 2020-01-01 | End: 2020-01-01 | Stop reason: HOSPADM

## 2020-01-01 RX ORDER — NOREPINEPHRINE BITARTRATE/D5W 8 MG/250ML
PLASTIC BAG, INJECTION (ML) INTRAVENOUS
Status: COMPLETED
Start: 2020-01-01 | End: 2020-01-01

## 2020-01-01 RX ORDER — CLOPIDOGREL BISULFATE 75 MG/1
300 TABLET ORAL ONCE
Status: COMPLETED | OUTPATIENT
Start: 2020-01-01 | End: 2020-01-01

## 2020-01-01 RX ORDER — NAPROXEN SODIUM 220 MG/1
81 TABLET, FILM COATED ORAL DAILY
Status: DISCONTINUED | OUTPATIENT
Start: 2020-01-01 | End: 2020-01-01 | Stop reason: SDUPTHER

## 2020-01-01 RX ORDER — PROPOFOL 10 MG/ML
INJECTION, EMULSION INTRAVENOUS
Status: COMPLETED
Start: 2020-01-01 | End: 2020-01-01

## 2020-01-01 RX ORDER — NITROGLYCERIN 5 MG/ML
INJECTION, SOLUTION INTRAVENOUS
Status: DISCONTINUED | OUTPATIENT
Start: 2020-01-01 | End: 2020-01-01 | Stop reason: HOSPADM

## 2020-01-01 RX ORDER — METOPROLOL TARTRATE 25 MG/1
12.5 TABLET ORAL 2 TIMES DAILY
Status: DISCONTINUED | OUTPATIENT
Start: 2020-01-01 | End: 2020-01-01

## 2020-01-01 RX ORDER — AMLODIPINE BESYLATE 10 MG/1
10 TABLET ORAL DAILY
Status: DISCONTINUED | OUTPATIENT
Start: 2020-01-01 | End: 2020-01-01 | Stop reason: HOSPADM

## 2020-01-01 RX ORDER — FENTANYL CITRATE 50 UG/ML
INJECTION, SOLUTION INTRAMUSCULAR; INTRAVENOUS
Status: DISCONTINUED | OUTPATIENT
Start: 2020-01-01 | End: 2020-01-01 | Stop reason: HOSPADM

## 2020-01-01 RX ORDER — LORAZEPAM 2 MG/ML
2 INJECTION INTRAMUSCULAR ONCE
Status: COMPLETED | OUTPATIENT
Start: 2020-01-01 | End: 2020-01-01

## 2020-01-01 RX ORDER — MIDODRINE HYDROCHLORIDE 5 MG/1
10 TABLET ORAL
Status: DISCONTINUED | OUTPATIENT
Start: 2020-01-01 | End: 2020-01-01

## 2020-01-01 RX ORDER — GABAPENTIN 100 MG/1
100 CAPSULE ORAL 2 TIMES DAILY
Qty: 60 CAPSULE | Refills: 11
Start: 2020-01-01 | End: 2020-01-01 | Stop reason: HOSPADM

## 2020-01-01 RX ORDER — SUCCINYLCHOLINE CHLORIDE 20 MG/ML
INJECTION INTRAMUSCULAR; INTRAVENOUS
Status: DISPENSED
Start: 2020-01-01 | End: 2020-01-01

## 2020-01-01 RX ORDER — ROCURONIUM BROMIDE 10 MG/ML
INJECTION, SOLUTION INTRAVENOUS
Status: DISCONTINUED | OUTPATIENT
Start: 2020-01-01 | End: 2020-01-01 | Stop reason: HOSPADM

## 2020-01-01 RX ORDER — AMIODARONE HYDROCHLORIDE 150 MG/3ML
INJECTION, SOLUTION INTRAVENOUS
Status: COMPLETED
Start: 2020-01-01 | End: 2020-01-01

## 2020-01-01 RX ORDER — LEVETIRACETAM 500 MG/1
500 TABLET ORAL 2 TIMES DAILY
Qty: 60 TABLET | Refills: 11
Start: 2020-01-01 | End: 2020-01-01 | Stop reason: HOSPADM

## 2020-01-01 RX ORDER — METOPROLOL TARTRATE 1 MG/ML
5 INJECTION, SOLUTION INTRAVENOUS ONCE
Status: COMPLETED | OUTPATIENT
Start: 2020-01-01 | End: 2020-01-01

## 2020-01-01 RX ORDER — HEPARIN SODIUM,PORCINE/D5W 25000/250
12 INTRAVENOUS SOLUTION INTRAVENOUS CONTINUOUS
Status: DISCONTINUED | OUTPATIENT
Start: 2020-01-01 | End: 2020-01-01 | Stop reason: HOSPADM

## 2020-01-01 RX ORDER — SODIUM CHLORIDE 9 MG/ML
INJECTION, SOLUTION INTRAVENOUS CONTINUOUS PRN
Status: DISCONTINUED | OUTPATIENT
Start: 2020-01-01 | End: 2020-01-01 | Stop reason: HOSPADM

## 2020-01-01 RX ORDER — OXYCODONE HYDROCHLORIDE 10 MG/1
10 TABLET ORAL EVERY 4 HOURS PRN
Refills: 0
Start: 2020-01-01 | End: 2020-01-01 | Stop reason: HOSPADM

## 2020-01-01 RX ORDER — SODIUM CHLORIDE 9 MG/ML
INJECTION, SOLUTION INTRAVENOUS ONCE
Status: DISCONTINUED | OUTPATIENT
Start: 2020-01-01 | End: 2020-01-01 | Stop reason: HOSPADM

## 2020-01-01 RX ORDER — SCOLOPAMINE TRANSDERMAL SYSTEM 1 MG/1
1 PATCH, EXTENDED RELEASE TRANSDERMAL
Status: DISCONTINUED | OUTPATIENT
Start: 2020-01-01 | End: 2020-01-01 | Stop reason: HOSPADM

## 2020-01-01 RX ORDER — IPRATROPIUM BROMIDE AND ALBUTEROL SULFATE 2.5; .5 MG/3ML; MG/3ML
3 SOLUTION RESPIRATORY (INHALATION) EVERY 6 HOURS PRN
Status: DISCONTINUED | OUTPATIENT
Start: 2020-01-01 | End: 2020-01-01 | Stop reason: HOSPADM

## 2020-01-01 RX ORDER — MAGNESIUM SULFATE HEPTAHYDRATE 40 MG/ML
2 INJECTION, SOLUTION INTRAVENOUS
Status: DISCONTINUED | OUTPATIENT
Start: 2020-01-01 | End: 2020-01-01 | Stop reason: HOSPADM

## 2020-01-01 RX ORDER — MIDODRINE HYDROCHLORIDE 5 MG/1
10 TABLET ORAL ONCE
Status: COMPLETED | OUTPATIENT
Start: 2020-01-01 | End: 2020-01-01

## 2020-01-01 RX ORDER — MIDAZOLAM HYDROCHLORIDE 1 MG/ML
INJECTION INTRAMUSCULAR; INTRAVENOUS
Status: DISPENSED
Start: 2020-01-01 | End: 2020-01-01

## 2020-01-01 RX ORDER — WARFARIN SODIUM 5 MG/1
5 TABLET ORAL DAILY
Qty: 30 TABLET | Refills: 11
Start: 2020-01-01 | End: 2020-01-01 | Stop reason: HOSPADM

## 2020-01-01 RX ORDER — SODIUM BICARBONATE 1 MEQ/ML
50 SYRINGE (ML) INTRAVENOUS ONCE
Status: COMPLETED | OUTPATIENT
Start: 2020-01-01 | End: 2020-01-01

## 2020-01-01 RX ORDER — METOCLOPRAMIDE 10 MG/1
TABLET ORAL
COMMUNITY
Start: 2019-01-01 | End: 2020-01-01

## 2020-01-01 RX ORDER — NAPROXEN SODIUM 220 MG/1
81 TABLET, FILM COATED ORAL DAILY
Status: DISCONTINUED | OUTPATIENT
Start: 2020-01-01 | End: 2020-01-01 | Stop reason: HOSPADM

## 2020-01-01 RX ORDER — FENTANYL CITRATE 50 UG/ML
INJECTION, SOLUTION INTRAMUSCULAR; INTRAVENOUS
Status: DISCONTINUED | OUTPATIENT
Start: 2020-01-01 | End: 2020-01-01

## 2020-01-01 RX ORDER — WARFARIN SODIUM 5 MG/1
5 TABLET ORAL DAILY
Status: DISCONTINUED | OUTPATIENT
Start: 2020-01-01 | End: 2020-01-01

## 2020-01-01 RX ORDER — CLOPIDOGREL BISULFATE 75 MG/1
75 TABLET ORAL DAILY
Status: DISCONTINUED | OUTPATIENT
Start: 2020-01-01 | End: 2020-01-01

## 2020-01-01 RX ORDER — SODIUM BICARBONATE 1 MEQ/ML
200 SYRINGE (ML) INTRAVENOUS ONCE
Status: COMPLETED | OUTPATIENT
Start: 2020-01-01 | End: 2020-01-01

## 2020-01-01 RX ORDER — LORAZEPAM 2 MG/ML
2 INJECTION INTRAMUSCULAR
Status: DISCONTINUED | OUTPATIENT
Start: 2020-01-01 | End: 2020-01-01 | Stop reason: HOSPADM

## 2020-01-01 RX ORDER — MIDAZOLAM HYDROCHLORIDE 1 MG/ML
INJECTION, SOLUTION INTRAMUSCULAR; INTRAVENOUS
Status: DISCONTINUED | OUTPATIENT
Start: 2020-01-01 | End: 2020-01-01 | Stop reason: HOSPADM

## 2020-01-01 RX ORDER — ATORVASTATIN CALCIUM 40 MG/1
40 TABLET, FILM COATED ORAL DAILY
Status: DISCONTINUED | OUTPATIENT
Start: 2020-01-01 | End: 2020-01-01 | Stop reason: HOSPADM

## 2020-01-01 RX ORDER — OXYCODONE HYDROCHLORIDE 5 MG/1
5 TABLET ORAL EVERY 4 HOURS PRN
Refills: 0
Start: 2020-01-01 | End: 2020-01-01 | Stop reason: HOSPADM

## 2020-01-01 RX ORDER — PROTAMINE SULFATE 10 MG/ML
INJECTION, SOLUTION INTRAVENOUS
Status: DISCONTINUED | OUTPATIENT
Start: 2020-01-01 | End: 2020-01-01

## 2020-01-01 RX ORDER — ACETAMINOPHEN 325 MG/1
650 TABLET ORAL EVERY 4 HOURS PRN
Refills: 0
Start: 2020-01-01 | End: 2020-01-01 | Stop reason: HOSPADM

## 2020-01-01 RX ORDER — AMLODIPINE BESYLATE 5 MG/1
10 TABLET ORAL DAILY
Status: DISCONTINUED | OUTPATIENT
Start: 2020-01-01 | End: 2020-01-01

## 2020-01-01 RX ORDER — ASPIRIN 81 MG/1
81 TABLET ORAL DAILY
Status: ON HOLD | COMMUNITY
End: 2020-01-01 | Stop reason: HOSPADM

## 2020-01-01 RX ORDER — METOPROLOL TARTRATE 25 MG/1
25 TABLET, FILM COATED ORAL 2 TIMES DAILY
Status: DISCONTINUED | OUTPATIENT
Start: 2020-01-01 | End: 2020-01-01

## 2020-01-01 RX ORDER — HYDROMORPHONE HYDROCHLORIDE 1 MG/ML
0.2 INJECTION, SOLUTION INTRAMUSCULAR; INTRAVENOUS; SUBCUTANEOUS EVERY 5 MIN PRN
Status: DISCONTINUED | OUTPATIENT
Start: 2020-01-01 | End: 2020-01-01 | Stop reason: HOSPADM

## 2020-01-01 RX ORDER — VASOPRESSIN 20 [USP'U]/ML
INJECTION, SOLUTION INTRAMUSCULAR; SUBCUTANEOUS
Status: DISCONTINUED | OUTPATIENT
Start: 2020-01-01 | End: 2020-01-01

## 2020-01-01 RX ORDER — MORPHINE SULFATE 2 MG/ML
2 INJECTION, SOLUTION INTRAMUSCULAR; INTRAVENOUS EVERY 4 HOURS PRN
Status: DISCONTINUED | OUTPATIENT
Start: 2020-01-01 | End: 2020-01-01 | Stop reason: HOSPADM

## 2020-01-01 RX ORDER — LIDOCAINE HYDROCHLORIDE 10 MG/ML
INJECTION, SOLUTION EPIDURAL; INFILTRATION; INTRACAUDAL; PERINEURAL
Status: DISCONTINUED | OUTPATIENT
Start: 2020-01-01 | End: 2020-01-01 | Stop reason: HOSPADM

## 2020-01-01 RX ORDER — HEPARIN SODIUM 200 [USP'U]/100ML
INJECTION, SOLUTION INTRAVENOUS
Status: DISCONTINUED | OUTPATIENT
Start: 2020-01-01 | End: 2020-01-01

## 2020-01-01 RX ORDER — PANTOPRAZOLE SODIUM 40 MG/1
40 TABLET, DELAYED RELEASE ORAL DAILY
Status: ON HOLD | COMMUNITY
Start: 2019-01-01 | End: 2020-01-01 | Stop reason: HOSPADM

## 2020-01-01 RX ORDER — WARFARIN 2.5 MG/1
2.5 TABLET ORAL DAILY
Status: DISCONTINUED | OUTPATIENT
Start: 2020-01-01 | End: 2020-01-01

## 2020-01-01 RX ORDER — SILVER SULFADIAZINE 10 G/1000G
CREAM TOPICAL DAILY
Qty: 50 G | Refills: 2 | Status: ON HOLD | OUTPATIENT
Start: 2020-01-01 | End: 2020-01-01

## 2020-01-01 RX ORDER — ONDANSETRON 8 MG/1
8 TABLET, ORALLY DISINTEGRATING ORAL EVERY 8 HOURS PRN
Qty: 6 TABLET
Start: 2020-01-01 | End: 2020-01-01 | Stop reason: HOSPADM

## 2020-01-01 RX ORDER — MIDODRINE HYDROCHLORIDE 5 MG/1
5 TABLET ORAL
Status: DISCONTINUED | OUTPATIENT
Start: 2020-01-01 | End: 2020-01-01

## 2020-01-01 RX ORDER — FENTANYL CITRATE 50 UG/ML
50 INJECTION, SOLUTION INTRAMUSCULAR; INTRAVENOUS
Status: DISPENSED | OUTPATIENT
Start: 2020-01-01 | End: 2020-01-01

## 2020-01-01 RX ORDER — AMLODIPINE BESYLATE 10 MG/1
10 TABLET ORAL DAILY
Qty: 30 TABLET | Refills: 11
Start: 2020-01-01 | End: 2020-01-01 | Stop reason: HOSPADM

## 2020-01-01 RX ORDER — DEXMEDETOMIDINE HYDROCHLORIDE 100 UG/ML
INJECTION, SOLUTION INTRAVENOUS
Status: DISCONTINUED | OUTPATIENT
Start: 2020-01-01 | End: 2020-01-01

## 2020-01-01 RX ORDER — PANTOPRAZOLE SODIUM 40 MG/1
40 TABLET, DELAYED RELEASE ORAL
Qty: 30 TABLET | Refills: 11
Start: 2020-01-01 | End: 2020-01-01 | Stop reason: HOSPADM

## 2020-01-01 RX ORDER — MIDAZOLAM HYDROCHLORIDE 1 MG/ML
2 INJECTION INTRAMUSCULAR; INTRAVENOUS ONCE
Status: COMPLETED | OUTPATIENT
Start: 2020-01-01 | End: 2020-01-01

## 2020-01-01 RX ORDER — DIPHENHYDRAMINE HYDROCHLORIDE 50 MG/ML
INJECTION INTRAMUSCULAR; INTRAVENOUS
Status: DISCONTINUED | OUTPATIENT
Start: 2020-01-01 | End: 2020-01-01 | Stop reason: HOSPADM

## 2020-01-01 RX ORDER — BISACODYL 10 MG
10 SUPPOSITORY, RECTAL RECTAL DAILY PRN
Status: DISCONTINUED | OUTPATIENT
Start: 2020-01-01 | End: 2020-01-01 | Stop reason: HOSPADM

## 2020-01-01 RX ORDER — OXYCODONE HYDROCHLORIDE 5 MG/1
5 TABLET ORAL EVERY 4 HOURS PRN
Status: DISCONTINUED | OUTPATIENT
Start: 2020-01-01 | End: 2020-01-01 | Stop reason: HOSPADM

## 2020-01-01 RX ORDER — LEVETIRACETAM 500 MG/1
500 TABLET ORAL 2 TIMES DAILY
Status: DISCONTINUED | OUTPATIENT
Start: 2020-01-01 | End: 2020-01-01 | Stop reason: HOSPADM

## 2020-01-01 RX ORDER — NAPROXEN SODIUM 220 MG/1
81 TABLET, FILM COATED ORAL DAILY
Status: COMPLETED | OUTPATIENT
Start: 2020-01-01 | End: 2020-01-01

## 2020-01-01 RX ORDER — VASOPRESSIN 20 [USP'U]/ML
INJECTION, SOLUTION INTRAMUSCULAR; SUBCUTANEOUS
Status: DISCONTINUED | OUTPATIENT
Start: 2020-01-01 | End: 2020-01-01 | Stop reason: HOSPADM

## 2020-01-01 RX ORDER — HEPARIN SODIUM 200 [USP'U]/100ML
INJECTION, SOLUTION INTRAVENOUS
Status: DISCONTINUED | OUTPATIENT
Start: 2020-01-01 | End: 2020-01-01 | Stop reason: HOSPADM

## 2020-01-01 RX ORDER — ATORVASTATIN CALCIUM 20 MG/1
40 TABLET, FILM COATED ORAL DAILY
Status: DISCONTINUED | OUTPATIENT
Start: 2020-01-01 | End: 2020-01-01 | Stop reason: HOSPADM

## 2020-01-01 RX ORDER — GABAPENTIN 100 MG/1
100 CAPSULE ORAL 2 TIMES DAILY
Status: DISCONTINUED | OUTPATIENT
Start: 2020-01-01 | End: 2020-01-01 | Stop reason: HOSPADM

## 2020-01-01 RX ORDER — HEPARIN SODIUM 1000 [USP'U]/ML
INJECTION, SOLUTION INTRAVENOUS; SUBCUTANEOUS
Status: DISCONTINUED | OUTPATIENT
Start: 2020-01-01 | End: 2020-01-01

## 2020-01-01 RX ORDER — MIDAZOLAM HYDROCHLORIDE 1 MG/ML
INJECTION, SOLUTION INTRAMUSCULAR; INTRAVENOUS
Status: DISCONTINUED | OUTPATIENT
Start: 2020-01-01 | End: 2020-01-01

## 2020-01-01 RX ORDER — ASPIRIN 325 MG
325 TABLET ORAL DAILY
Status: DISCONTINUED | OUTPATIENT
Start: 2020-01-01 | End: 2020-01-01

## 2020-01-01 RX ORDER — PANTOPRAZOLE SODIUM 40 MG/10ML
40 INJECTION, POWDER, LYOPHILIZED, FOR SOLUTION INTRAVENOUS DAILY
Status: DISCONTINUED | OUTPATIENT
Start: 2020-01-01 | End: 2020-01-01

## 2020-01-01 RX ORDER — PROTAMINE SULFATE 10 MG/ML
INJECTION, SOLUTION INTRAVENOUS
Status: DISCONTINUED | OUTPATIENT
Start: 2020-01-01 | End: 2020-01-01 | Stop reason: HOSPADM

## 2020-01-01 RX ORDER — SODIUM BICARBONATE 1 MEQ/ML
SYRINGE (ML) INTRAVENOUS
Status: COMPLETED
Start: 2020-01-01 | End: 2020-01-01

## 2020-01-01 RX ORDER — METHADONE HYDROCHLORIDE 10 MG/1
100 TABLET ORAL DAILY
Refills: 0
Start: 2020-01-01 | End: 2020-01-01 | Stop reason: HOSPADM

## 2020-01-01 RX ORDER — ATORVASTATIN CALCIUM 40 MG/1
40 TABLET, FILM COATED ORAL DAILY
Start: 2020-01-01 | End: 2020-01-01 | Stop reason: HOSPADM

## 2020-01-01 RX ORDER — EPHEDRINE SULFATE 50 MG/ML
INJECTION, SOLUTION INTRAVENOUS
Status: DISCONTINUED | OUTPATIENT
Start: 2020-01-01 | End: 2020-01-01 | Stop reason: HOSPADM

## 2020-01-01 RX ORDER — CARVEDILOL 3.12 MG/1
3.12 TABLET ORAL DAILY
Status: ON HOLD | COMMUNITY
Start: 2019-01-01 | End: 2020-01-01 | Stop reason: HOSPADM

## 2020-01-01 RX ORDER — SODIUM POLYSTYRENE SULFONATE 15 G/60ML
45 SUSPENSION ORAL; RECTAL ONCE
Status: COMPLETED | OUTPATIENT
Start: 2020-01-01 | End: 2020-01-01

## 2020-01-01 RX ORDER — DIPHENHYDRAMINE HCL 50 MG
50 CAPSULE ORAL ONCE
Status: DISCONTINUED | OUTPATIENT
Start: 2020-01-01 | End: 2020-01-01 | Stop reason: HOSPADM

## 2020-01-01 RX ORDER — CEFEPIME HYDROCHLORIDE 2 G/1
2 INJECTION, POWDER, FOR SOLUTION INTRAVENOUS
Status: CANCELLED | OUTPATIENT
Start: 2020-01-01

## 2020-01-01 RX ORDER — ALBUTEROL SULFATE 2.5 MG/.5ML
5 SOLUTION RESPIRATORY (INHALATION) ONCE
Status: DISCONTINUED | OUTPATIENT
Start: 2020-01-01 | End: 2020-01-01 | Stop reason: HOSPADM

## 2020-01-01 RX ORDER — PROMETHAZINE HYDROCHLORIDE 25 MG/ML
INJECTION, SOLUTION INTRAMUSCULAR; INTRAVENOUS
Status: DISCONTINUED | OUTPATIENT
Start: 2020-01-01 | End: 2020-01-01 | Stop reason: HOSPADM

## 2020-01-01 RX ORDER — METOPROLOL TARTRATE 1 MG/ML
5 INJECTION, SOLUTION INTRAVENOUS EVERY 5 MIN PRN
Status: DISCONTINUED | OUTPATIENT
Start: 2020-01-01 | End: 2020-01-01

## 2020-01-01 RX ORDER — MUPIROCIN 20 MG/G
OINTMENT TOPICAL 2 TIMES DAILY
Status: DISPENSED | OUTPATIENT
Start: 2020-01-01 | End: 2020-01-01

## 2020-01-01 RX ORDER — CALCIUM CARBONATE 200(500)MG
500 TABLET,CHEWABLE ORAL 2 TIMES DAILY PRN
COMMUNITY
Start: 2020-01-01 | End: 2020-01-01 | Stop reason: HOSPADM

## 2020-01-01 RX ORDER — MORPHINE SULFATE 4 MG/ML
4 INJECTION, SOLUTION INTRAMUSCULAR; INTRAVENOUS
Status: DISCONTINUED | OUTPATIENT
Start: 2020-01-01 | End: 2020-01-01 | Stop reason: HOSPADM

## 2020-01-01 RX ORDER — METOPROLOL TARTRATE 25 MG/1
25 TABLET, FILM COATED ORAL ONCE
Status: COMPLETED | OUTPATIENT
Start: 2020-01-01 | End: 2020-01-01

## 2020-01-01 RX ORDER — POLYETHYLENE GLYCOL 3350 17 G/17G
17 POWDER, FOR SOLUTION ORAL DAILY
Refills: 0
Start: 2020-01-01 | End: 2020-01-01 | Stop reason: HOSPADM

## 2020-01-01 RX ORDER — PROPOFOL 10 MG/ML
VIAL (ML) INTRAVENOUS
Status: DISCONTINUED | OUTPATIENT
Start: 2020-01-01 | End: 2020-01-01 | Stop reason: HOSPADM

## 2020-01-01 RX ORDER — PANTOPRAZOLE SODIUM 40 MG/1
40 TABLET, DELAYED RELEASE ORAL
Status: DISCONTINUED | OUTPATIENT
Start: 2020-01-01 | End: 2020-01-01 | Stop reason: HOSPADM

## 2020-01-01 RX ORDER — SUCCINYLCHOLINE CHLORIDE 20 MG/ML
INJECTION INTRAMUSCULAR; INTRAVENOUS
Status: DISCONTINUED | OUTPATIENT
Start: 2020-01-01 | End: 2020-01-01 | Stop reason: HOSPADM

## 2020-01-01 RX ORDER — ATROPINE SULFATE 0.4 MG/ML
INJECTION, SOLUTION ENDOTRACHEAL; INTRAMEDULLARY; INTRAMUSCULAR; INTRAVENOUS; SUBCUTANEOUS
Status: DISCONTINUED | OUTPATIENT
Start: 2020-01-01 | End: 2020-01-01 | Stop reason: HOSPADM

## 2020-01-01 RX ORDER — NAPROXEN SODIUM 220 MG/1
81 TABLET, FILM COATED ORAL DAILY
Status: DISCONTINUED | OUTPATIENT
Start: 2020-01-01 | End: 2020-01-01

## 2020-01-01 RX ORDER — ASPIRIN 81 MG/1
81 TABLET ORAL DAILY
Status: DISCONTINUED | OUTPATIENT
Start: 2020-01-01 | End: 2020-01-01 | Stop reason: HOSPADM

## 2020-01-01 RX ORDER — ETOMIDATE 2 MG/ML
INJECTION INTRAVENOUS
Status: DISPENSED
Start: 2020-01-01 | End: 2020-01-01

## 2020-01-01 RX ORDER — METOPROLOL TARTRATE 1 MG/ML
2.5 INJECTION, SOLUTION INTRAVENOUS ONCE
Status: COMPLETED | OUTPATIENT
Start: 2020-01-01 | End: 2020-01-01

## 2020-01-01 RX ORDER — ROCURONIUM BROMIDE 10 MG/ML
INJECTION, SOLUTION INTRAVENOUS
Status: DISPENSED
Start: 2020-01-01 | End: 2020-01-01

## 2020-01-01 RX ORDER — MORPHINE SULFATE 2 MG/ML
5 INJECTION, SOLUTION INTRAMUSCULAR; INTRAVENOUS ONCE
Status: COMPLETED | OUTPATIENT
Start: 2020-01-01 | End: 2020-01-01

## 2020-01-01 RX ORDER — FLUCONAZOLE 100 MG/1
TABLET ORAL
Status: ON HOLD | COMMUNITY
Start: 2020-01-01 | End: 2020-01-01

## 2020-01-01 RX ORDER — METRONIDAZOLE 500 MG/1
TABLET ORAL
Status: ON HOLD | COMMUNITY
Start: 2020-01-01 | End: 2020-01-01

## 2020-01-01 RX ORDER — OXYCODONE HYDROCHLORIDE 10 MG/1
10 TABLET ORAL EVERY 4 HOURS PRN
Status: DISCONTINUED | OUTPATIENT
Start: 2020-01-01 | End: 2020-01-01 | Stop reason: HOSPADM

## 2020-01-01 RX ORDER — LIDOCAINE HYDROCHLORIDE 20 MG/ML
INJECTION, SOLUTION EPIDURAL; INFILTRATION; INTRACAUDAL; PERINEURAL
Status: DISCONTINUED | OUTPATIENT
Start: 2020-01-01 | End: 2020-01-01 | Stop reason: HOSPADM

## 2020-01-01 RX ORDER — ONDANSETRON 8 MG/1
8 TABLET, ORALLY DISINTEGRATING ORAL EVERY 8 HOURS PRN
Status: DISCONTINUED | OUTPATIENT
Start: 2020-01-01 | End: 2020-01-01

## 2020-01-01 RX ORDER — CEFTAZIDIME 1 G/1
1 INJECTION, POWDER, FOR SOLUTION INTRAMUSCULAR; INTRAVENOUS ONCE
Status: COMPLETED | OUTPATIENT
Start: 2020-01-01 | End: 2020-01-01

## 2020-01-01 RX ORDER — HYDROCODONE BITARTRATE AND ACETAMINOPHEN 500; 5 MG/1; MG/1
TABLET ORAL CONTINUOUS
Status: DISCONTINUED | OUTPATIENT
Start: 2020-01-01 | End: 2020-01-01 | Stop reason: HOSPADM

## 2020-01-01 RX ORDER — NEOSTIGMINE METHYLSULFATE 1 MG/ML
INJECTION, SOLUTION INTRAVENOUS
Status: DISCONTINUED | OUTPATIENT
Start: 2020-01-01 | End: 2020-01-01 | Stop reason: HOSPADM

## 2020-01-01 RX ORDER — HYDRALAZINE HYDROCHLORIDE 50 MG/1
50 TABLET, FILM COATED ORAL
Status: ON HOLD | COMMUNITY
Start: 2020-01-01 | End: 2020-01-01 | Stop reason: HOSPADM

## 2020-01-01 RX ORDER — METHADONE HYDROCHLORIDE 10 MG/1
100 TABLET ORAL DAILY
Status: DISCONTINUED | OUTPATIENT
Start: 2020-01-01 | End: 2020-01-01

## 2020-01-01 RX ORDER — NAPROXEN SODIUM 220 MG/1
81 TABLET, FILM COATED ORAL DAILY
Refills: 0
Start: 2020-01-01 | End: 2020-01-01 | Stop reason: HOSPADM

## 2020-01-01 RX ORDER — GABAPENTIN 100 MG/1
100 CAPSULE ORAL 2 TIMES DAILY
Status: DISCONTINUED | OUTPATIENT
Start: 2020-01-01 | End: 2020-01-01

## 2020-01-01 RX ORDER — BISACODYL 10 MG
10 SUPPOSITORY, RECTAL RECTAL DAILY PRN
Refills: 0 | COMMUNITY
Start: 2020-01-01 | End: 2020-01-01 | Stop reason: HOSPADM

## 2020-01-01 RX ORDER — IPRATROPIUM BROMIDE AND ALBUTEROL SULFATE 2.5; .5 MG/3ML; MG/3ML
3 SOLUTION RESPIRATORY (INHALATION) EVERY 6 HOURS PRN
Qty: 1 BOX | Refills: 0
Start: 2020-01-01 | End: 2020-01-01 | Stop reason: HOSPADM

## 2020-01-01 RX ORDER — MUPIROCIN 20 MG/G
OINTMENT TOPICAL 2 TIMES DAILY
Status: DISCONTINUED | OUTPATIENT
Start: 2020-01-01 | End: 2020-01-01 | Stop reason: HOSPADM

## 2020-01-01 RX ORDER — LEVETIRACETAM 250 MG/1
500 TABLET ORAL 2 TIMES DAILY
Status: DISCONTINUED | OUTPATIENT
Start: 2020-01-01 | End: 2020-01-01 | Stop reason: HOSPADM

## 2020-01-01 RX ORDER — WARFARIN SODIUM 5 MG/1
5 TABLET ORAL DAILY
Status: DISCONTINUED | OUTPATIENT
Start: 2020-01-01 | End: 2020-01-01 | Stop reason: HOSPADM

## 2020-01-01 RX ORDER — SEVELAMER CARBONATE 800 MG/1
1600 TABLET, FILM COATED ORAL
Qty: 180 TABLET | Refills: 11
Start: 2020-01-01 | End: 2020-01-01 | Stop reason: HOSPADM

## 2020-01-01 RX ORDER — METHADONE HYDROCHLORIDE 10 MG/1
80 TABLET ORAL DAILY
Status: DISCONTINUED | OUTPATIENT
Start: 2020-01-01 | End: 2020-01-01

## 2020-01-01 RX ADMIN — ATORVASTATIN CALCIUM 40 MG: 40 TABLET, FILM COATED ORAL at 08:08

## 2020-01-01 RX ADMIN — GLYCOPYRROLATE 0.2 MG: 0.2 INJECTION INTRAMUSCULAR; INTRAVENOUS at 11:08

## 2020-01-01 RX ADMIN — HYDROMORPHONE HYDROCHLORIDE 0.2 MG: 1 INJECTION, SOLUTION INTRAMUSCULAR; INTRAVENOUS; SUBCUTANEOUS at 02:07

## 2020-01-01 RX ADMIN — SODIUM CHLORIDE 250 ML: 0.9 INJECTION, SOLUTION INTRAVENOUS at 01:07

## 2020-01-01 RX ADMIN — MUPIROCIN: 20 OINTMENT TOPICAL at 09:08

## 2020-01-01 RX ADMIN — Medication 0.3 MCG/KG/MIN: at 12:08

## 2020-01-01 RX ADMIN — FENTANYL CITRATE 150 MCG: 50 INJECTION, SOLUTION INTRAMUSCULAR; INTRAVENOUS at 11:08

## 2020-01-01 RX ADMIN — EPHEDRINE SULFATE 10 MG: 50 INJECTION INTRAVENOUS at 01:07

## 2020-01-01 RX ADMIN — ATORVASTATIN CALCIUM 40 MG: 40 TABLET, FILM COATED ORAL at 10:08

## 2020-01-01 RX ADMIN — VASOPRESSIN 2 UNITS: 20 INJECTION INTRAVENOUS at 11:08

## 2020-01-01 RX ADMIN — DICLOFENAC 2 G: 10 GEL TOPICAL at 09:08

## 2020-01-01 RX ADMIN — FENTANYL CITRATE 25 MCG: 50 INJECTION, SOLUTION INTRAMUSCULAR; INTRAVENOUS at 12:07

## 2020-01-01 RX ADMIN — LEVETIRACETAM 500 MG: 500 TABLET ORAL at 08:08

## 2020-01-01 RX ADMIN — EPOETIN ALFA-EPBX 10000 UNITS: 10000 INJECTION, SOLUTION INTRAVENOUS; SUBCUTANEOUS at 11:07

## 2020-01-01 RX ADMIN — PANTOPRAZOLE SODIUM 40 MG: 40 TABLET, DELAYED RELEASE ORAL at 06:08

## 2020-01-01 RX ADMIN — SEVELAMER CARBONATE 1600 MG: 800 TABLET, FILM COATED ORAL at 05:08

## 2020-01-01 RX ADMIN — METHADONE HYDROCHLORIDE 100 MG: 10 TABLET ORAL at 08:08

## 2020-01-01 RX ADMIN — PROPOFOL 100 MG: 10 INJECTION, EMULSION INTRAVENOUS at 11:08

## 2020-01-01 RX ADMIN — ATORVASTATIN CALCIUM 40 MG: 40 TABLET, FILM COATED ORAL at 09:08

## 2020-01-01 RX ADMIN — LEVETIRACETAM 500 MG: 500 TABLET ORAL at 01:07

## 2020-01-01 RX ADMIN — GABAPENTIN 100 MG: 100 CAPSULE ORAL at 09:08

## 2020-01-01 RX ADMIN — ASPIRIN 81 MG: 81 TABLET, COATED ORAL at 12:08

## 2020-01-01 RX ADMIN — SODIUM CHLORIDE: 0.9 INJECTION, SOLUTION INTRAVENOUS at 11:08

## 2020-01-01 RX ADMIN — ATORVASTATIN CALCIUM 40 MG: 20 TABLET, FILM COATED ORAL at 11:07

## 2020-01-01 RX ADMIN — HEPARIN SODIUM AND DEXTROSE 18 UNITS/KG/HR: 10000; 5 INJECTION INTRAVENOUS at 01:08

## 2020-01-01 RX ADMIN — MIDODRINE HYDROCHLORIDE 10 MG: 5 TABLET ORAL at 10:08

## 2020-01-01 RX ADMIN — WARFARIN SODIUM 5 MG: 5 TABLET ORAL at 05:07

## 2020-01-01 RX ADMIN — IPRATROPIUM BROMIDE AND ALBUTEROL SULFATE 3 ML: .5; 3 SOLUTION RESPIRATORY (INHALATION) at 11:08

## 2020-01-01 RX ADMIN — METOPROLOL TARTRATE 2.5 MG: 1 INJECTION, SOLUTION INTRAVENOUS at 01:08

## 2020-01-01 RX ADMIN — OXYCODONE HYDROCHLORIDE 10 MG: 10 TABLET ORAL at 07:08

## 2020-01-01 RX ADMIN — DICLOFENAC 2 G: 10 GEL TOPICAL at 08:08

## 2020-01-01 RX ADMIN — MIDODRINE HYDROCHLORIDE 10 MG: 5 TABLET ORAL at 08:08

## 2020-01-01 RX ADMIN — EPHEDRINE SULFATE 15 MG: 50 INJECTION INTRAVENOUS at 01:07

## 2020-01-01 RX ADMIN — HEPARIN SODIUM AND DEXTROSE 12 UNITS/KG/HR: 10000; 5 INJECTION INTRAVENOUS at 12:08

## 2020-01-01 RX ADMIN — CALCIUM GLUCONATE 1 G: 98 INJECTION, SOLUTION INTRAVENOUS at 06:07

## 2020-01-01 RX ADMIN — PANTOPRAZOLE SODIUM 40 MG: 40 INJECTION, POWDER, FOR SOLUTION INTRAVENOUS at 01:07

## 2020-01-01 RX ADMIN — DEXTROSE MONOHYDRATE 25 G: 500 INJECTION PARENTERAL at 05:08

## 2020-01-01 RX ADMIN — PANTOPRAZOLE SODIUM 40 MG: 40 TABLET, DELAYED RELEASE ORAL at 05:08

## 2020-01-01 RX ADMIN — DEXTROSE MONOHYDRATE 25 G: 500 INJECTION PARENTERAL at 03:08

## 2020-01-01 RX ADMIN — POLYETHYLENE GLYCOL 3350 17 G: 17 POWDER, FOR SOLUTION ORAL at 01:07

## 2020-01-01 RX ADMIN — METHADONE HYDROCHLORIDE 100 MG: 10 TABLET ORAL at 11:07

## 2020-01-01 RX ADMIN — LEVETIRACETAM 500 MG: 500 TABLET ORAL at 11:08

## 2020-01-01 RX ADMIN — OXYCODONE HYDROCHLORIDE 10 MG: 5 TABLET ORAL at 10:08

## 2020-01-01 RX ADMIN — TICAGRELOR 90 MG: 90 TABLET ORAL at 09:08

## 2020-01-01 RX ADMIN — Medication: at 09:08

## 2020-01-01 RX ADMIN — CEFEPIME 2 G: 2 INJECTION, POWDER, FOR SOLUTION INTRAVENOUS at 01:07

## 2020-01-01 RX ADMIN — TICAGRELOR 90 MG: 90 TABLET ORAL at 08:07

## 2020-01-01 RX ADMIN — SEVELAMER CARBONATE 1600 MG: 800 TABLET, FILM COATED ORAL at 11:08

## 2020-01-01 RX ADMIN — OXYCODONE HYDROCHLORIDE 10 MG: 5 TABLET ORAL at 12:08

## 2020-01-01 RX ADMIN — ATORVASTATIN CALCIUM 40 MG: 20 TABLET, FILM COATED ORAL at 09:08

## 2020-01-01 RX ADMIN — PROTAMINE SULFATE 10 MG: 10 INJECTION, SOLUTION INTRAVENOUS at 01:07

## 2020-01-01 RX ADMIN — ATROPINE SULFATE 1 MG: 0.1 INJECTION PARENTERAL at 08:08

## 2020-01-01 RX ADMIN — CALCIUM GLUCONATE 1000 MG: 98 INJECTION, SOLUTION INTRAVENOUS at 11:08

## 2020-01-01 RX ADMIN — IOHEXOL 125 ML: 350 INJECTION, SOLUTION INTRAVENOUS at 10:07

## 2020-01-01 RX ADMIN — SUCCINYLCHOLINE CHLORIDE 120 MG: 20 INJECTION, SOLUTION INTRAMUSCULAR; INTRAVENOUS at 11:08

## 2020-01-01 RX ADMIN — IPRATROPIUM BROMIDE AND ALBUTEROL SULFATE 3 ML: .5; 3 SOLUTION RESPIRATORY (INHALATION) at 08:08

## 2020-01-01 RX ADMIN — ASPIRIN 81 MG 81 MG: 81 TABLET ORAL at 05:08

## 2020-01-01 RX ADMIN — GABAPENTIN 100 MG: 100 CAPSULE ORAL at 08:07

## 2020-01-01 RX ADMIN — MIDAZOLAM HYDROCHLORIDE 1 MG: 1 INJECTION, SOLUTION INTRAMUSCULAR; INTRAVENOUS at 11:08

## 2020-01-01 RX ADMIN — METOPROLOL TARTRATE 12.5 MG: 25 TABLET, FILM COATED ORAL at 10:08

## 2020-01-01 RX ADMIN — PHENYLEPHRINE HYDROCHLORIDE 3.5 MCG/KG/MIN: 10 INJECTION INTRAVENOUS at 12:08

## 2020-01-01 RX ADMIN — PANTOPRAZOLE SODIUM 40 MG: 40 INJECTION, POWDER, FOR SOLUTION INTRAVENOUS at 11:07

## 2020-01-01 RX ADMIN — SEVELAMER CARBONATE 1600 MG: 800 TABLET, FILM COATED ORAL at 04:08

## 2020-01-01 RX ADMIN — DEXTROSE MONOHYDRATE 25 G: 500 INJECTION PARENTERAL at 10:08

## 2020-01-01 RX ADMIN — OXYCODONE HYDROCHLORIDE 10 MG: 10 TABLET ORAL at 03:08

## 2020-01-01 RX ADMIN — ASPIRIN 81 MG CHEWABLE TABLET 81 MG: 81 TABLET CHEWABLE at 03:08

## 2020-01-01 RX ADMIN — MIDODRINE HYDROCHLORIDE 10 MG: 5 TABLET ORAL at 11:08

## 2020-01-01 RX ADMIN — ACETAMINOPHEN 650 MG: 325 TABLET ORAL at 09:08

## 2020-01-01 RX ADMIN — TICAGRELOR 90 MG: 90 TABLET ORAL at 10:08

## 2020-01-01 RX ADMIN — HEPARIN SODIUM AND DEXTROSE 13 UNITS/KG/HR: 10000; 5 INJECTION INTRAVENOUS at 04:08

## 2020-01-01 RX ADMIN — SODIUM CHLORIDE: 0.9 INJECTION, SOLUTION INTRAVENOUS at 07:07

## 2020-01-01 RX ADMIN — VASOPRESSIN 2 UNITS: 20 INJECTION INTRAVENOUS at 01:07

## 2020-01-01 RX ADMIN — METHADONE HYDROCHLORIDE 100 MG: 10 TABLET ORAL at 09:07

## 2020-01-01 RX ADMIN — EPHEDRINE SULFATE 10 MG: 50 INJECTION INTRAVENOUS at 11:08

## 2020-01-01 RX ADMIN — OXYCODONE HYDROCHLORIDE 10 MG: 10 TABLET ORAL at 08:07

## 2020-01-01 RX ADMIN — PANTOPRAZOLE SODIUM 40 MG: 40 INJECTION, POWDER, FOR SOLUTION INTRAVENOUS at 08:07

## 2020-01-01 RX ADMIN — CEFTAZIDIME 1 G: 1 INJECTION, POWDER, FOR SOLUTION INTRAMUSCULAR; INTRAVENOUS at 07:08

## 2020-01-01 RX ADMIN — OXYCODONE HYDROCHLORIDE 5 MG: 5 TABLET ORAL at 06:08

## 2020-01-01 RX ADMIN — CALCIUM CARBONATE (ANTACID) CHEW TAB 500 MG 500 MG: 500 CHEW TAB at 11:08

## 2020-01-01 RX ADMIN — ASPIRIN 81 MG: 81 TABLET, COATED ORAL at 10:08

## 2020-01-01 RX ADMIN — CEFTAZIDIME 1 G: 1 INJECTION, POWDER, FOR SOLUTION INTRAMUSCULAR; INTRAVENOUS at 09:08

## 2020-01-01 RX ADMIN — GABAPENTIN 100 MG: 100 CAPSULE ORAL at 09:07

## 2020-01-01 RX ADMIN — OXYCODONE HYDROCHLORIDE 10 MG: 5 TABLET ORAL at 04:08

## 2020-01-01 RX ADMIN — NOREPINEPHRINE BITARTRATE 3 MCG/KG/MIN: 1 INJECTION, SOLUTION, CONCENTRATE INTRAVENOUS at 11:08

## 2020-01-01 RX ADMIN — METHADONE HYDROCHLORIDE 100 MG: 10 TABLET ORAL at 09:08

## 2020-01-01 RX ADMIN — TICAGRELOR 90 MG: 90 TABLET ORAL at 08:08

## 2020-01-01 RX ADMIN — EPHEDRINE SULFATE 20 MG: 50 INJECTION INTRAVENOUS at 11:08

## 2020-01-01 RX ADMIN — METOPROLOL TARTRATE 12.5 MG: 25 TABLET, FILM COATED ORAL at 08:08

## 2020-01-01 RX ADMIN — WARFARIN SODIUM 5 MG: 5 TABLET ORAL at 05:08

## 2020-01-01 RX ADMIN — SODIUM CHLORIDE: 0.9 INJECTION, SOLUTION INTRAVENOUS at 11:07

## 2020-01-01 RX ADMIN — ACETAMINOPHEN 650 MG: 325 TABLET ORAL at 04:08

## 2020-01-01 RX ADMIN — Medication 0.02 MCG/KG/MIN: at 09:08

## 2020-01-01 RX ADMIN — DEXTROSE: 10 SOLUTION INTRAVENOUS at 12:08

## 2020-01-01 RX ADMIN — CEFTAZIDIME 1 G: 1 INJECTION, POWDER, FOR SOLUTION INTRAMUSCULAR; INTRAVENOUS at 03:08

## 2020-01-01 RX ADMIN — METHADONE HYDROCHLORIDE 100 MG: 10 TABLET ORAL at 11:08

## 2020-01-01 RX ADMIN — OXYCODONE HYDROCHLORIDE 5 MG: 5 TABLET ORAL at 08:07

## 2020-01-01 RX ADMIN — AMLODIPINE BESYLATE 10 MG: 10 TABLET ORAL at 09:08

## 2020-01-01 RX ADMIN — ASPIRIN 81 MG 81 MG: 81 TABLET ORAL at 08:08

## 2020-01-01 RX ADMIN — ASPIRIN 325 MG ORAL TABLET 325 MG: 325 PILL ORAL at 11:07

## 2020-01-01 RX ADMIN — POLYETHYLENE GLYCOL (3350) 17 G: 17 POWDER, FOR SOLUTION ORAL at 10:08

## 2020-01-01 RX ADMIN — LEVETIRACETAM 500 MG: 500 TABLET ORAL at 09:08

## 2020-01-01 RX ADMIN — TICAGRELOR 90 MG: 90 TABLET ORAL at 11:08

## 2020-01-01 RX ADMIN — METOPROLOL TARTRATE 12.5 MG: 25 TABLET, FILM COATED ORAL at 12:08

## 2020-01-01 RX ADMIN — ATORVASTATIN CALCIUM 40 MG: 20 TABLET, FILM COATED ORAL at 09:07

## 2020-01-01 RX ADMIN — PANTOPRAZOLE SODIUM 40 MG: 40 INJECTION, POWDER, FOR SOLUTION INTRAVENOUS at 09:07

## 2020-01-01 RX ADMIN — MIDODRINE HYDROCHLORIDE 10 MG: 5 TABLET ORAL at 09:08

## 2020-01-01 RX ADMIN — GUAIFENESIN 600 MG: 600 TABLET, EXTENDED RELEASE ORAL at 08:08

## 2020-01-01 RX ADMIN — LEVETIRACETAM 500 MG: 500 TABLET ORAL at 11:07

## 2020-01-01 RX ADMIN — DEXMEDETOMIDINE HYDROCHLORIDE 8 MCG: 100 INJECTION, SOLUTION, CONCENTRATE INTRAVENOUS at 12:07

## 2020-01-01 RX ADMIN — TICAGRELOR 90 MG: 90 TABLET ORAL at 03:08

## 2020-01-01 RX ADMIN — MIDODRINE HYDROCHLORIDE 10 MG: 5 TABLET ORAL at 04:08

## 2020-01-01 RX ADMIN — ASPIRIN 325 MG ORAL TABLET 325 MG: 325 PILL ORAL at 08:07

## 2020-01-01 RX ADMIN — ATORVASTATIN CALCIUM 40 MG: 20 TABLET, FILM COATED ORAL at 03:08

## 2020-01-01 RX ADMIN — LEVETIRACETAM 500 MG: 500 TABLET ORAL at 09:07

## 2020-01-01 RX ADMIN — POLYETHYLENE GLYCOL 3350 17 G: 17 POWDER, FOR SOLUTION ORAL at 08:07

## 2020-01-01 RX ADMIN — LEVETIRACETAM 500 MG: 500 TABLET ORAL at 08:07

## 2020-01-01 RX ADMIN — SEVELAMER CARBONATE 1600 MG: 800 TABLET, FILM COATED ORAL at 05:07

## 2020-01-01 RX ADMIN — OXYCODONE HYDROCHLORIDE 5 MG: 5 TABLET ORAL at 01:08

## 2020-01-01 RX ADMIN — MIDODRINE HYDROCHLORIDE 5 MG: 5 TABLET ORAL at 02:08

## 2020-01-01 RX ADMIN — HUMAN ALBUMIN MICROSPHERES AND PERFLUTREN 0.66 MG: 10; .22 INJECTION, SOLUTION INTRAVENOUS at 09:07

## 2020-01-01 RX ADMIN — ATROPINE SULFATE 0.1 MG: 0.4 INJECTION, SOLUTION INTRAMUSCULAR; INTRAVENOUS; SUBCUTANEOUS at 11:08

## 2020-01-01 RX ADMIN — POLYETHYLENE GLYCOL 3350 17 G: 17 POWDER, FOR SOLUTION ORAL at 09:07

## 2020-01-01 RX ADMIN — PHYTONADIONE 5 MG: 10 INJECTION, EMULSION INTRAMUSCULAR; INTRAVENOUS; SUBCUTANEOUS at 10:08

## 2020-01-01 RX ADMIN — FENTANYL CITRATE 25 MCG: 50 INJECTION, SOLUTION INTRAMUSCULAR; INTRAVENOUS at 02:07

## 2020-01-01 RX ADMIN — CEFTAZIDIME 1 G: 1 INJECTION, POWDER, FOR SOLUTION INTRAMUSCULAR; INTRAVENOUS at 08:08

## 2020-01-01 RX ADMIN — HEPARIN SODIUM AND DEXTROSE 16 UNITS/KG/HR: 10000; 5 INJECTION INTRAVENOUS at 06:08

## 2020-01-01 RX ADMIN — HEPARIN SODIUM AND DEXTROSE 14 UNITS/KG/HR: 10000; 5 INJECTION INTRAVENOUS at 02:08

## 2020-01-01 RX ADMIN — SEVELAMER CARBONATE 1600 MG: 800 TABLET, FILM COATED ORAL at 07:07

## 2020-01-01 RX ADMIN — CEFTAZIDIME 1 G: 1 INJECTION, POWDER, FOR SOLUTION INTRAMUSCULAR; INTRAVENOUS at 12:08

## 2020-01-01 RX ADMIN — DEXTROSE MONOHYDRATE 25 G: 500 INJECTION PARENTERAL at 11:08

## 2020-01-01 RX ADMIN — DEXTROSE MONOHYDRATE 25 G: 25 INJECTION, SOLUTION INTRAVENOUS at 01:07

## 2020-01-01 RX ADMIN — CALCIUM GLUCONATE 1000 MG: 98 INJECTION, SOLUTION INTRAVENOUS at 06:08

## 2020-01-01 RX ADMIN — CEFTAZIDIME 1 G: 1 INJECTION, POWDER, FOR SOLUTION INTRAMUSCULAR; INTRAVENOUS at 10:08

## 2020-01-01 RX ADMIN — TICAGRELOR 180 MG: 90 TABLET ORAL at 10:07

## 2020-01-01 RX ADMIN — ASPIRIN 81 MG: 81 TABLET, COATED ORAL at 11:08

## 2020-01-01 RX ADMIN — HEPARIN SODIUM AND DEXTROSE 12 UNITS/KG/HR: 10000; 5 INJECTION INTRAVENOUS at 01:07

## 2020-01-01 RX ADMIN — ATORVASTATIN CALCIUM 40 MG: 20 TABLET, FILM COATED ORAL at 08:07

## 2020-01-01 RX ADMIN — Medication 200 MEQ: at 10:08

## 2020-01-01 RX ADMIN — SEVELAMER CARBONATE 1600 MG: 800 TABLET, FILM COATED ORAL at 12:08

## 2020-01-01 RX ADMIN — ASPIRIN 81 MG: 81 TABLET, CHEWABLE ORAL at 09:07

## 2020-01-01 RX ADMIN — AMIODARONE HYDROCHLORIDE 1 MG/MIN: 1.8 INJECTION, SOLUTION INTRAVENOUS at 03:08

## 2020-01-01 RX ADMIN — DICLOFENAC 2 G: 10 GEL TOPICAL at 01:08

## 2020-01-01 RX ADMIN — GABAPENTIN 100 MG: 100 CAPSULE ORAL at 01:07

## 2020-01-01 RX ADMIN — MIDODRINE HYDROCHLORIDE 10 MG: 5 TABLET ORAL at 02:08

## 2020-01-01 RX ADMIN — ASPIRIN 81 MG CHEWABLE TABLET 81 MG: 81 TABLET CHEWABLE at 01:07

## 2020-01-01 RX ADMIN — AMLODIPINE BESYLATE 5 MG: 5 TABLET ORAL at 01:07

## 2020-01-01 RX ADMIN — HEPARIN SODIUM AND DEXTROSE 12 UNITS/KG/HR: 10000; 5 INJECTION INTRAVENOUS at 06:08

## 2020-01-01 RX ADMIN — PHENYLEPHRINE HYDROCHLORIDE 5 MCG/KG/MIN: 10 INJECTION INTRAVENOUS at 03:08

## 2020-01-01 RX ADMIN — MORPHINE SULFATE 5 MG: 2 INJECTION, SOLUTION INTRAMUSCULAR; INTRAVENOUS at 05:08

## 2020-01-01 RX ADMIN — NOREPINEPHRINE BITARTRATE 3 MCG/KG/MIN: 1 INJECTION, SOLUTION, CONCENTRATE INTRAVENOUS at 03:08

## 2020-01-01 RX ADMIN — ASPIRIN 325 MG ORAL TABLET 325 MG: 325 PILL ORAL at 09:07

## 2020-01-01 RX ADMIN — CALCIUM GLUCONATE 1000 MG: 98 INJECTION, SOLUTION INTRAVENOUS at 08:08

## 2020-01-01 RX ADMIN — OXYCODONE HYDROCHLORIDE 5 MG: 5 TABLET ORAL at 11:08

## 2020-01-01 RX ADMIN — OXYCODONE HYDROCHLORIDE 10 MG: 5 TABLET ORAL at 09:08

## 2020-01-01 RX ADMIN — GABAPENTIN 100 MG: 100 CAPSULE ORAL at 11:08

## 2020-01-01 RX ADMIN — HEPARIN SODIUM AND DEXTROSE 12 UNITS/KG/HR: 10000; 5 INJECTION INTRAVENOUS at 01:08

## 2020-01-01 RX ADMIN — GABAPENTIN 100 MG: 100 CAPSULE ORAL at 11:07

## 2020-01-01 RX ADMIN — MIDAZOLAM HYDROCHLORIDE 2 MG: 1 INJECTION, SOLUTION INTRAMUSCULAR; INTRAVENOUS at 12:07

## 2020-01-01 RX ADMIN — SEVELAMER CARBONATE 1600 MG: 800 TABLET, FILM COATED ORAL at 11:07

## 2020-01-01 RX ADMIN — SEVELAMER CARBONATE 1600 MG: 800 TABLET, FILM COATED ORAL at 08:07

## 2020-01-01 RX ADMIN — SODIUM ZIRCONIUM CYCLOSILICATE 10 G: 10 POWDER, FOR SUSPENSION ORAL at 12:08

## 2020-01-01 RX ADMIN — DEXTROSE MONOHYDRATE 25 G: 25 INJECTION, SOLUTION INTRAVENOUS at 06:07

## 2020-01-01 RX ADMIN — PROMETHAZINE HYDROCHLORIDE 25 MG: 25 TABLET ORAL at 01:08

## 2020-01-01 RX ADMIN — PROTAMINE SULFATE 10 MG: 10 INJECTION, SOLUTION INTRAVENOUS at 02:07

## 2020-01-01 RX ADMIN — ATORVASTATIN CALCIUM 40 MG: 40 TABLET, FILM COATED ORAL at 05:08

## 2020-01-01 RX ADMIN — DEXTROSE MONOHYDRATE 25 G: 500 INJECTION PARENTERAL at 08:08

## 2020-01-01 RX ADMIN — ACETAMINOPHEN 650 MG: 325 TABLET ORAL at 08:08

## 2020-01-01 RX ADMIN — ASPIRIN 325 MG ORAL TABLET 325 MG: 325 PILL ORAL at 01:07

## 2020-01-01 RX ADMIN — IPRATROPIUM BROMIDE AND ALBUTEROL SULFATE 3 ML: .5; 2.5 SOLUTION RESPIRATORY (INHALATION) at 05:07

## 2020-01-01 RX ADMIN — OXYCODONE HYDROCHLORIDE 5 MG: 5 TABLET ORAL at 08:08

## 2020-01-01 RX ADMIN — OXYCODONE HYDROCHLORIDE 10 MG: 5 TABLET ORAL at 03:08

## 2020-01-01 RX ADMIN — SEVELAMER CARBONATE 1600 MG: 800 TABLET, FILM COATED ORAL at 12:07

## 2020-01-01 RX ADMIN — SEVELAMER CARBONATE 1600 MG: 800 TABLET, FILM COATED ORAL at 04:07

## 2020-01-01 RX ADMIN — LORAZEPAM 2 MG: 2 INJECTION INTRAMUSCULAR; INTRAVENOUS at 05:08

## 2020-01-01 RX ADMIN — SEVELAMER CARBONATE 1600 MG: 800 TABLET, FILM COATED ORAL at 06:08

## 2020-01-01 RX ADMIN — Medication: at 01:08

## 2020-01-01 RX ADMIN — OXYCODONE HYDROCHLORIDE 5 MG: 5 TABLET ORAL at 05:08

## 2020-01-01 RX ADMIN — VANCOMYCIN HYDROCHLORIDE 1500 MG: 1.5 INJECTION, POWDER, LYOPHILIZED, FOR SOLUTION INTRAVENOUS at 07:07

## 2020-01-01 RX ADMIN — ALBUMIN (HUMAN) 12.5 G: 12.5 SOLUTION INTRAVENOUS at 09:08

## 2020-01-01 RX ADMIN — METOPROLOL TARTRATE 5 MG: 5 INJECTION INTRAVENOUS at 04:08

## 2020-01-01 RX ADMIN — AMLODIPINE BESYLATE 10 MG: 5 TABLET ORAL at 08:08

## 2020-01-01 RX ADMIN — DEXTROSE MONOHYDRATE 25 G: 25 INJECTION, SOLUTION INTRAVENOUS at 10:07

## 2020-01-01 RX ADMIN — CISATRACURIUM BESYLATE 4 MG: 2 INJECTION INTRAVENOUS at 11:08

## 2020-01-01 RX ADMIN — METOPROLOL TARTRATE 5 MG: 5 INJECTION INTRAVENOUS at 06:07

## 2020-01-01 RX ADMIN — ALBUTEROL SULFATE 10 MG: 2.5 SOLUTION RESPIRATORY (INHALATION) at 06:08

## 2020-01-01 RX ADMIN — GABAPENTIN 100 MG: 100 CAPSULE ORAL at 05:08

## 2020-01-01 RX ADMIN — NEOSTIGMINE METHYLSULFATE 5 MG: 1 INJECTION INTRAVENOUS at 11:08

## 2020-01-01 RX ADMIN — SODIUM CHLORIDE: 0.9 INJECTION, SOLUTION INTRAVENOUS at 12:07

## 2020-01-01 RX ADMIN — SODIUM BICARBONATE 200 MEQ: 84 INJECTION, SOLUTION INTRAVENOUS at 10:08

## 2020-01-01 RX ADMIN — OXYCODONE HYDROCHLORIDE 5 MG: 5 TABLET ORAL at 09:08

## 2020-01-01 RX ADMIN — ACETAMINOPHEN 650 MG: 325 TABLET ORAL at 11:08

## 2020-01-01 RX ADMIN — ATORVASTATIN CALCIUM 40 MG: 20 TABLET, FILM COATED ORAL at 01:07

## 2020-01-01 RX ADMIN — OXYCODONE HYDROCHLORIDE 10 MG: 10 TABLET ORAL at 02:07

## 2020-01-01 RX ADMIN — NOREPINEPHRINE BITARTRATE 3 MCG/KG/MIN: 1 INJECTION, SOLUTION, CONCENTRATE INTRAVENOUS at 02:08

## 2020-01-01 RX ADMIN — ONDANSETRON 8 MG: 8 TABLET, ORALLY DISINTEGRATING ORAL at 08:07

## 2020-01-01 RX ADMIN — PHENYLEPHRINE HYDROCHLORIDE 3 MCG/KG/MIN: 10 INJECTION INTRAVENOUS at 04:08

## 2020-01-01 RX ADMIN — DEXTROSE MONOHYDRATE 25 G: 500 INJECTION PARENTERAL at 12:08

## 2020-01-01 RX ADMIN — OXYCODONE HYDROCHLORIDE 10 MG: 5 TABLET ORAL at 11:08

## 2020-01-01 RX ADMIN — AMIODARONE HYDROCHLORIDE 150 MG: 50 INJECTION, SOLUTION INTRAVENOUS at 03:08

## 2020-01-01 RX ADMIN — SODIUM BICARBONATE: 84 INJECTION, SOLUTION INTRAVENOUS at 11:08

## 2020-01-01 RX ADMIN — DEXTROSE MONOHYDRATE 25 G: 25 INJECTION, SOLUTION INTRAVENOUS at 12:08

## 2020-01-01 RX ADMIN — AMLODIPINE BESYLATE 5 MG: 5 TABLET ORAL at 08:07

## 2020-01-01 RX ADMIN — PHYTONADIONE 10 MG: 10 INJECTION, EMULSION INTRAMUSCULAR; INTRAVENOUS; SUBCUTANEOUS at 04:08

## 2020-01-01 RX ADMIN — EPOETIN ALFA-EPBX 10000 UNITS: 10000 INJECTION, SOLUTION INTRAVENOUS; SUBCUTANEOUS at 01:08

## 2020-01-01 RX ADMIN — HEPARIN SODIUM AND DEXTROSE 12 UNITS/KG/HR: 10000; 5 INJECTION INTRAVENOUS at 05:07

## 2020-01-01 RX ADMIN — POLYETHYLENE GLYCOL (3350) 17 G: 17 POWDER, FOR SOLUTION ORAL at 08:08

## 2020-01-01 RX ADMIN — METHADONE HYDROCHLORIDE 100 MG: 10 TABLET ORAL at 01:08

## 2020-01-01 RX ADMIN — SODIUM BICARBONATE 50 MEQ: 84 INJECTION INTRAVENOUS at 04:08

## 2020-01-01 RX ADMIN — HEPARIN SODIUM AND DEXTROSE 12 UNITS/KG/HR: 10000; 5 INJECTION INTRAVENOUS at 05:08

## 2020-01-01 RX ADMIN — HEPARIN SODIUM 5000 UNITS: 1000 INJECTION, SOLUTION INTRAVENOUS; SUBCUTANEOUS at 01:07

## 2020-01-01 RX ADMIN — SODIUM CHLORIDE: 0.9 INJECTION, SOLUTION INTRAVENOUS at 10:08

## 2020-01-01 RX ADMIN — MIDAZOLAM 2 MG: 1 INJECTION INTRAMUSCULAR; INTRAVENOUS at 09:08

## 2020-01-01 RX ADMIN — MIDODRINE HYDROCHLORIDE 5 MG: 5 TABLET ORAL at 12:08

## 2020-01-01 RX ADMIN — EPOETIN ALFA-EPBX 10000 UNITS: 10000 INJECTION, SOLUTION INTRAVENOUS; SUBCUTANEOUS at 11:08

## 2020-01-01 RX ADMIN — INSULIN HUMAN 9.98 UNITS: 100 INJECTION, SOLUTION PARENTERAL at 01:07

## 2020-01-01 RX ADMIN — MIDAZOLAM HYDROCHLORIDE 2 MG: 1 INJECTION, SOLUTION INTRAMUSCULAR; INTRAVENOUS at 11:08

## 2020-01-01 RX ADMIN — GABAPENTIN 100 MG: 100 CAPSULE ORAL at 08:08

## 2020-01-01 RX ADMIN — CALCIUM CARBONATE (ANTACID) CHEW TAB 500 MG 500 MG: 500 CHEW TAB at 12:08

## 2020-01-01 RX ADMIN — VASOPRESSIN 0.04 UNITS/MIN: 20 INJECTION INTRAVENOUS at 12:08

## 2020-01-01 RX ADMIN — AMLODIPINE BESYLATE 5 MG: 5 TABLET ORAL at 09:07

## 2020-01-01 RX ADMIN — CALCIUM GLUCONATE 1 G: 98 INJECTION, SOLUTION INTRAVENOUS at 01:07

## 2020-01-01 RX ADMIN — LEVETIRACETAM 500 MG: 500 TABLET ORAL at 10:08

## 2020-01-01 RX ADMIN — MUPIROCIN: 20 OINTMENT TOPICAL at 08:08

## 2020-01-01 RX ADMIN — DEXTROSE MONOHYDRATE 25 G: 25 INJECTION, SOLUTION INTRAVENOUS at 11:08

## 2020-01-01 RX ADMIN — AMLODIPINE BESYLATE 10 MG: 10 TABLET ORAL at 03:08

## 2020-01-01 RX ADMIN — METHADONE HYDROCHLORIDE 100 MG: 10 TABLET ORAL at 01:07

## 2020-01-01 RX ADMIN — ASPIRIN 81 MG: 81 TABLET, COATED ORAL at 04:08

## 2020-01-01 RX ADMIN — HEPARIN SODIUM AND DEXTROSE 14 UNITS/KG/HR: 10000; 5 INJECTION INTRAVENOUS at 08:08

## 2020-01-01 RX ADMIN — ASPIRIN 81 MG CHEWABLE TABLET 81 MG: 81 TABLET CHEWABLE at 09:08

## 2020-01-01 RX ADMIN — CALCIUM CARBONATE (ANTACID) CHEW TAB 500 MG 500 MG: 500 CHEW TAB at 05:07

## 2020-01-01 RX ADMIN — SEVELAMER CARBONATE 1600 MG: 800 TABLET, FILM COATED ORAL at 09:08

## 2020-01-01 RX ADMIN — METHADONE HYDROCHLORIDE 100 MG: 10 TABLET ORAL at 05:07

## 2020-01-01 RX ADMIN — ALBUMIN (HUMAN) 12.5 G: 12.5 SOLUTION INTRAVENOUS at 11:08

## 2020-01-01 RX ADMIN — LEVETIRACETAM 500 MG: 500 TABLET ORAL at 03:08

## 2020-01-01 RX ADMIN — GUAIFENESIN 600 MG: 600 TABLET, EXTENDED RELEASE ORAL at 10:08

## 2020-01-01 RX ADMIN — GUAIFENESIN 600 MG: 600 TABLET, EXTENDED RELEASE ORAL at 09:08

## 2020-01-01 RX ADMIN — TICAGRELOR 90 MG: 90 TABLET ORAL at 12:08

## 2020-01-01 RX ADMIN — ASPIRIN 81 MG: 81 TABLET, COATED ORAL at 08:08

## 2020-01-01 RX ADMIN — VASOPRESSIN 0.04 UNITS/MIN: 20 INJECTION INTRAVENOUS at 05:08

## 2020-01-01 RX ADMIN — HEPARIN SODIUM AND DEXTROSE 14 UNITS/KG/HR: 10000; 5 INJECTION INTRAVENOUS at 04:08

## 2020-01-01 RX ADMIN — SODIUM POLYSTYRENE SULFONATE 45 G: 15 SUSPENSION ORAL; RECTAL at 12:08

## 2020-01-01 RX ADMIN — DEXTROSE 0.5 MG/HR: 50 INJECTION, SOLUTION INTRAVENOUS at 09:08

## 2020-01-01 RX ADMIN — ONDANSETRON 8 MG: 8 TABLET, ORALLY DISINTEGRATING ORAL at 08:08

## 2020-01-01 RX ADMIN — METHADONE HYDROCHLORIDE 100 MG: 10 TABLET ORAL at 08:07

## 2020-01-01 RX ADMIN — GLYCOPYRROLATE 0.6 MG: 0.2 INJECTION INTRAMUSCULAR; INTRAVENOUS at 11:08

## 2020-01-01 RX ADMIN — AMLODIPINE BESYLATE 5 MG: 5 TABLET ORAL at 12:07

## 2020-01-01 RX ADMIN — PHENYLEPHRINE HYDROCHLORIDE 5 MCG/KG/MIN: 10 INJECTION INTRAVENOUS at 08:08

## 2020-01-01 RX ADMIN — NOREPINEPHRINE BITARTRATE 3 MCG/KG/MIN: 1 INJECTION, SOLUTION, CONCENTRATE INTRAVENOUS at 10:08

## 2020-01-01 RX ADMIN — LEVETIRACETAM 500 MG: 500 TABLET ORAL at 12:08

## 2020-01-01 RX ADMIN — TICAGRELOR 90 MG: 90 TABLET ORAL at 05:08

## 2020-01-01 RX ADMIN — METOPROLOL TARTRATE 5 MG: 5 INJECTION INTRAVENOUS at 02:08

## 2020-01-01 RX ADMIN — MIDODRINE HYDROCHLORIDE 5 MG: 5 TABLET ORAL at 04:08

## 2020-01-01 RX ADMIN — DICLOFENAC 2 G: 10 GEL TOPICAL at 10:08

## 2020-01-01 RX ADMIN — METHADONE HYDROCHLORIDE 100 MG: 10 TABLET ORAL at 10:08

## 2020-01-01 RX ADMIN — SEVELAMER CARBONATE 1600 MG: 800 TABLET, FILM COATED ORAL at 08:08

## 2020-01-01 RX ADMIN — SEVELAMER CARBONATE 1600 MG: 800 TABLET, FILM COATED ORAL at 10:08

## 2020-01-01 RX ADMIN — ATORVASTATIN CALCIUM 40 MG: 40 TABLET, FILM COATED ORAL at 12:08

## 2020-01-01 RX ADMIN — LIDOCAINE HYDROCHLORIDE 75 MG: 20 INJECTION, SOLUTION INTRAVENOUS at 11:08

## 2020-01-01 RX ADMIN — SEVELAMER CARBONATE 1600 MG: 800 TABLET, FILM COATED ORAL at 03:08

## 2020-01-01 RX ADMIN — METHADONE HYDROCHLORIDE 100 MG: 10 TABLET ORAL at 12:08

## 2020-01-01 RX ADMIN — DEXTROSE: 10 SOLUTION INTRAVENOUS at 05:08

## 2020-01-01 RX ADMIN — ATORVASTATIN CALCIUM 40 MG: 40 TABLET, FILM COATED ORAL at 11:08

## 2020-01-01 RX ADMIN — METOPROLOL TARTRATE 12.5 MG: 25 TABLET, FILM COATED ORAL at 09:08

## 2020-01-01 RX ADMIN — METOPROLOL TARTRATE 2.5 MG: 5 INJECTION, SOLUTION INTRAVENOUS at 01:08

## 2020-01-01 RX ADMIN — PHENYLEPHRINE HYDROCHLORIDE 0.5 MCG/KG/MIN: 10 INJECTION INTRAVENOUS at 10:08

## 2020-01-01 RX ADMIN — FENTANYL CITRATE 50 MCG: 50 INJECTION, SOLUTION INTRAMUSCULAR; INTRAVENOUS at 02:07

## 2020-01-01 RX ADMIN — OXYCODONE HYDROCHLORIDE 10 MG: 5 TABLET ORAL at 08:08

## 2020-01-01 RX ADMIN — INSULIN HUMAN 10 UNITS: 100 INJECTION, SOLUTION PARENTERAL at 08:08

## 2020-01-01 RX ADMIN — OXYCODONE HYDROCHLORIDE 5 MG: 5 TABLET ORAL at 03:07

## 2020-01-01 RX ADMIN — GABAPENTIN 100 MG: 100 CAPSULE ORAL at 03:08

## 2020-01-01 RX ADMIN — METOPROLOL TARTRATE 5 MG: 1 INJECTION, SOLUTION INTRAVENOUS at 02:08

## 2020-01-01 RX ADMIN — SODIUM POLYSTYRENE SULFONATE 30 G: 15 SUSPENSION ORAL; RECTAL at 01:07

## 2020-01-01 RX ADMIN — METOPROLOL TARTRATE 25 MG: 25 TABLET, FILM COATED ORAL at 02:08

## 2020-01-01 RX ADMIN — NOREPINEPHRINE BITARTRATE 3 MCG/KG/MIN: 1 INJECTION, SOLUTION, CONCENTRATE INTRAVENOUS at 08:08

## 2020-01-01 RX ADMIN — ASPIRIN 81 MG 81 MG: 81 TABLET ORAL at 11:08

## 2020-01-01 RX ADMIN — MIDAZOLAM HYDROCHLORIDE 1 MG: 1 INJECTION, SOLUTION INTRAMUSCULAR; INTRAVENOUS at 12:08

## 2020-01-01 RX ADMIN — ROCURONIUM BROMIDE 5 MG: 10 INJECTION, SOLUTION INTRAVENOUS at 11:08

## 2020-01-01 RX ADMIN — METOPROLOL TARTRATE 12.5 MG: 25 TABLET, FILM COATED ORAL at 01:08

## 2020-01-01 RX ADMIN — CALCIUM CARBONATE (ANTACID) CHEW TAB 500 MG 500 MG: 500 CHEW TAB at 06:08

## 2020-01-01 RX ADMIN — HEPARIN SODIUM AND DEXTROSE 12 UNITS/KG/HR: 10000; 5 INJECTION INTRAVENOUS at 10:08

## 2020-01-01 RX ADMIN — TICAGRELOR 90 MG: 90 TABLET ORAL at 01:07

## 2020-01-01 RX ADMIN — VANCOMYCIN HYDROCHLORIDE 1000 MG: 1 INJECTION, POWDER, LYOPHILIZED, FOR SOLUTION INTRAVENOUS at 03:07

## 2020-01-01 RX ADMIN — MIDODRINE HYDROCHLORIDE 10 MG: 5 TABLET ORAL at 06:08

## 2020-01-01 RX ADMIN — PHENYLEPHRINE HYDROCHLORIDE 3.5 MCG/KG/MIN: 10 INJECTION INTRAVENOUS at 01:08

## 2020-01-01 RX ADMIN — OXYCODONE HYDROCHLORIDE 10 MG: 10 TABLET ORAL at 04:08

## 2020-01-01 RX ADMIN — LEVETIRACETAM 500 MG: 500 TABLET ORAL at 05:08

## 2020-01-01 RX ADMIN — PHENYLEPHRINE HYDROCHLORIDE 5 MCG/KG/MIN: 10 INJECTION INTRAVENOUS at 11:08

## 2020-01-01 RX ADMIN — CLOPIDOGREL 300 MG: 75 TABLET, FILM COATED ORAL at 07:07

## 2020-01-01 RX ADMIN — INSULIN HUMAN 8 UNITS: 100 INJECTION, SOLUTION PARENTERAL at 06:08

## 2020-01-01 RX ADMIN — PROMETHAZINE HYDROCHLORIDE 25 MG: 25 TABLET ORAL at 09:07

## 2020-01-01 RX ADMIN — HEPARIN SODIUM AND DEXTROSE 16 UNITS/KG/HR: 10000; 5 INJECTION INTRAVENOUS at 04:08

## 2020-01-01 RX ADMIN — DEXTROSE MONOHYDRATE 25 G: 500 INJECTION PARENTERAL at 06:08

## 2020-01-01 RX ADMIN — DEXTROSE MONOHYDRATE 25 G: 25 INJECTION, SOLUTION INTRAVENOUS at 06:08

## 2020-01-01 RX ADMIN — OXYCODONE HYDROCHLORIDE 10 MG: 5 TABLET ORAL at 05:08

## 2020-01-01 RX ADMIN — HEPARIN SODIUM AND DEXTROSE 14 UNITS/KG/HR: 10000; 5 INJECTION INTRAVENOUS at 05:08

## 2020-01-01 RX ADMIN — INSULIN HUMAN 9.98 UNITS: 100 INJECTION, SOLUTION PARENTERAL at 06:07

## 2020-01-01 RX ADMIN — HEPARIN SODIUM AND DEXTROSE 12 UNITS/KG/HR: 10000; 5 INJECTION INTRAVENOUS at 07:07

## 2020-01-01 RX ADMIN — EPOETIN ALFA-EPBX 10000 UNITS: 10000 INJECTION, SOLUTION INTRAVENOUS; SUBCUTANEOUS at 09:08

## 2020-01-01 RX ADMIN — NOREPINEPHRINE BITARTRATE 0.6 MCG/KG/MIN: 1 INJECTION, SOLUTION, CONCENTRATE INTRAVENOUS at 03:08

## 2020-01-01 RX ADMIN — NOREPINEPHRINE BITARTRATE 3 MCG/KG/MIN: 1 INJECTION, SOLUTION, CONCENTRATE INTRAVENOUS at 06:08

## 2020-01-01 RX ADMIN — DEXTROSE MONOHYDRATE 25 G: 25 INJECTION, SOLUTION INTRAVENOUS at 05:07

## 2020-01-01 RX ADMIN — HEPARIN SODIUM AND DEXTROSE 14 UNITS/KG/HR: 10000; 5 INJECTION INTRAVENOUS at 09:08

## 2020-01-03 PROBLEM — L97.512 SKIN ULCER OF RIGHT FOOT WITH FAT LAYER EXPOSED: Status: ACTIVE | Noted: 2020-01-01

## 2020-01-04 NOTE — PROGRESS NOTES
Subjective:       Patient ID: João Aguirre is a 54 y.o. male.    Chief Complaint: Follow-up; Diabetes Mellitus; Foot Pain; and Foot Problem     patient presents for follow-up he was last seen in September within the cod chronic gangrenous 2nd toe right foot subsequently referred the patient to [facility] emergency room so he could be admitted to the hospital where his nephrologist is patient underwent 2nd digital amputation by vascular surgery the wound was left open and continues to heal.  Patient states he recently developed an ulcer on the bottom of his right foot and this area has been very painful he thinks it has improved over the past week.  Follow-up   Associated symptoms include arthralgias, joint swelling, numbness and weakness.   Foot Ulcer   Associated symptoms include arthralgias, joint swelling, numbness and weakness.   Foot Pain   Associated symptoms include arthralgias, joint swelling, numbness and weakness.   Nail Problem   Associated symptoms include arthralgias, joint swelling, numbness and weakness.     Review of Systems   Musculoskeletal: Positive for arthralgias, gait problem and joint swelling.   Neurological: Positive for weakness and numbness.   All other systems reviewed and are negative.      Objective:      Physical Exam   Constitutional: He appears well-developed and well-nourished.   Cardiovascular:   Pulses:       Dorsalis pedis pulses are 0 on the right side, and 0 on the left side.        Posterior tibial pulses are 0 on the right side, and 0 on the left side.   Pulmonary/Chest: Effort normal.   Musculoskeletal: He exhibits edema, tenderness and deformity.        Right foot: There is decreased range of motion and deformity.        Left foot: There is decreased range of motion and deformity.   Feet:   Right Foot:   Protective Sensation: 4 sites tested. 2 sites sensed.   Skin Integrity: Positive for ulcer, skin breakdown, erythema, callus and dry skin.   Left Foot:    Protective Sensation: 4 sites tested. 2 sites sensed.   Skin Integrity: Positive for callus and dry skin.   Neurological: He displays abnormal reflex.   Skin: Capillary refill takes more than 3 seconds. There is erythema.   Psychiatric: He has a normal mood and affect. His behavior is normal. Judgment and thought content normal.   Nursing note and vitals reviewed.                              Assessment:       1. Skin ulcer of right foot with fat layer exposed    2. Type 2 diabetes mellitus with chronic kidney disease on chronic dialysis, without long-term current use of insulin    3. Methadone dependence        Plan:        Patient presents for follow-up he was last seen in September within the cod chronic gangrenous 2nd toe right foot subsequently referred the patient to [facility]'s emergency room so he could be admitted to the hospital where his nephrologist is patient underwent 2nd digital amputation by vascular surgery the wound was left open and continues to heal.  Patient underwent digital amputation 2nd digit right about 3 months ago he states he does have a follow-up appointment scheduled with vascular surgery where the amputation site had been left open.  Ulceration sub 5th metatarsal head right is approximately 1.5 cm in diameter it does not appear to be infected there was minimal drainage it has healthy red beefy granular tissue with a small area of necrotic tissue in the center however this appears to be superficial I have advised the patient we need to initiate good wound care on this area immediately the areas to be cleaned with Dakin solution Silvadene cream is to be applied as well as an offload dressing I have taken 4x4s cut a donut type hole in the middle and placed over the dressing that is directly in contact with the wound this needs to be changed every day as directed I have sent orders to home health patient is currently currently being seen by [facility] home health.  No wound care was  provided to the amputation site 2nd digit as this is being cared for by another provider and surgeon of record.  Patient advised he is going to need to make sure he keeps a good padded dressing on this area so that it can continue to heal plan follow-up is going to be 2 weeks any problems questions concerns or changes to the area the patient is to contact us immediately patient was in understanding and agreement with this new orders were sent to [facility] health.  Patient was dispensed prescription for Silvadene cream.  Patient states he recently developed an ulcer on the bottom of his right foot and this area has been very painful he thinks it has improved over the past week.  Total face-to-face time including discussion evaluation treatment and wound care equaled 25 min.  Surgical site amputation 2nd digit right appeared stable at this time.  This note was created using [employer] voice recognition software that occasionally misinterpreted phrases or words.

## 2020-03-01 NOTE — PROGRESS NOTES
Subjective:       Patient ID: João Aguirre is a 55 y.o. male.    Chief Complaint: Follow-up (3 week)     Patient presents today for follow-up of a chronic ulceration sub 5th metatarsal head right he has a new area on his right heel patient was supposed to follow up with me in 3 weeks however it has been 7 weeks since I last saw the patient.  Follow-up   Associated symptoms include arthralgias, joint swelling, numbness and weakness.   Foot Pain   Associated symptoms include arthralgias, joint swelling, numbness and weakness.   Foot Ulcer   Associated symptoms include arthralgias, joint swelling, numbness and weakness.   Nail Problem   Associated symptoms include arthralgias, joint swelling, numbness and weakness.     Review of Systems   Musculoskeletal: Positive for arthralgias, gait problem and joint swelling.   Neurological: Positive for weakness and numbness.   All other systems reviewed and are negative.      Objective:      Physical Exam   Constitutional: He appears well-developed and well-nourished.   Cardiovascular:   Pulses:       Dorsalis pedis pulses are 0 on the right side, and 0 on the left side.        Posterior tibial pulses are 0 on the right side, and 0 on the left side.   Pulmonary/Chest: Effort normal.   Musculoskeletal: He exhibits edema, tenderness and deformity.        Right foot: There is decreased range of motion and deformity.        Left foot: There is decreased range of motion and deformity.   Feet:   Right Foot:   Protective Sensation: 4 sites tested. 2 sites sensed.   Skin Integrity: Positive for ulcer, skin breakdown, erythema, callus and dry skin.   Left Foot:   Protective Sensation: 4 sites tested. 2 sites sensed.   Skin Integrity: Positive for callus and dry skin.   Neurological: He displays abnormal reflex.   Skin: Capillary refill takes more than 3 seconds. There is erythema.   Psychiatric: He has a normal mood and affect. His behavior is normal. Judgment and thought  content normal.   Nursing note and vitals reviewed.                                      Assessment:       1. Type 2 diabetes mellitus with chronic kidney disease on chronic dialysis, without long-term current use of insulin    2. Skin ulcer of right foot with fat layer exposed    3. Neuritis    4. Smoker        Plan:         Patient presents today for followup he had been treated previously for an ulceration sub 5th metatarsal head right patient was supposed to follow-up in 3 weeks it has been 7 weeks since I last saw the patient he is a dialysis patient and states he is having a lot of pain with the area of ulceration he also has a new area that is black on the bottom of his right heel.  Ulceration sub 5th metatarsal is 2 cm long by 1.5 cm wide by 4 mm deep there is tendon exposed and extensive fibrous tissue bone is not visualized at this time patient is currently being treated by Infectious Disease with both IV and oral antibiotics for osteomyelitis.  Following evaluation and discussion I have recommended cleaning the area with Dakin solution applying Promogran with silver this is to be changed every 48 hr we will send new home health orders to [facility] home health.  The area of discoloration and dry blood on the bottom of the patient's right heel is a small hematoma underlying the skin this was non excisionally debrided healthy pink skin and tissue is noted in this area this area does need to be padded properly to prevent further breakdown irritation there is underlying healthy pink skin and tissue and no signs of infection noted in this area.  Patient will monitor these areas closely they are to be kept covered and dressed to prevent breakdown irritation and further ulceration I have expressed to the patient the need for him to be compliant he does have an extensive history of noncompliance the area of previously noted digital amputation 2nd digit right by vascular surgery appears well healed at this time  with no current drainage or infection noted.  Total face-to-face time including discussion evaluation treatment and wound care equaled 25 min.  I will consider x-rays on follow-up to evaluate osteomyelitis 5th metatarsal right this is currently being treated by Infectious Disease.  Surgical site amputation 2nd digit right appeared stable at this time.  This note was created using [employer] voice recognition software that occasionally misinterpreted phrases or words.

## 2020-07-24 PROBLEM — I05.0 MITRAL STENOSIS: Status: ACTIVE | Noted: 2020-01-01

## 2020-07-24 PROBLEM — E83.9 CHRONIC KIDNEY DISEASE-MINERAL AND BONE DISORDER: Status: ACTIVE | Noted: 2020-01-01

## 2020-07-24 PROBLEM — M89.9 CHRONIC KIDNEY DISEASE-MINERAL AND BONE DISORDER: Status: ACTIVE | Noted: 2020-01-01

## 2020-07-24 PROBLEM — I35.0 NONRHEUMATIC AORTIC (VALVE) STENOSIS: Status: ACTIVE | Noted: 2020-01-01

## 2020-07-24 PROBLEM — N18.9 CHRONIC KIDNEY DISEASE-MINERAL AND BONE DISORDER: Status: ACTIVE | Noted: 2020-01-01

## 2020-07-24 NOTE — CONSULTS
[facility]  Cardiothoracic Surgery  Consult Note    Patient Name: João Aguirre  MRN: [id number]  Admission Date: 7/24/2020  Attending Physician: Surendra Porras MD  Referring Provider: Ruben Weston MD    Patient information was obtained from patient, past medical records and ER records.     Inpatient consult to Cardiothoracic Surgery  Consult performed by: Margarita Soler PA-C  Consult ordered by: Bella Levine PA-C        Subjective:     Principal Problem: Nonrheumatic aortic (valve) stenosis    History of Present Illness: Patient is a 55 year old male with PMHx of CAD, 70%EF, HTN, HLD, DM2, ESRD on dialysis (T/TH/SAT), asthma, gastroparesis, and hx of CVA. He presents to the ED via EMS for COVID screening. Patient was transferred from [facility] for hospital admission for nonrheumatic aortic valve stenosis and multi vessel coronary artery disease. He reports having sternal chest pain for approximately two weeks. Describes pain as intermittent and tightness. Rates pain 7/10. Reports hx of MI and hx of cardiac stents. Reports associated SOB. Patient last dialyzed Wednesday for three hours without complication. He denies fever,chills, nausea, vomiting, abd pain, dysuria, diarrhea, or constipation. He is a former smoker and denies alcohol use.    No new subjective & objective note has been filed under this hospital service since the last note was generated.    Assessment/Plan:     NYHA Score: NYHA III: marked limitation of physical activity, comfortable at rest    Mitral stenosis  Patient is a 55 year old who presented from [place] for second opinion regarding AVR and CABG. Medical conditions include ESRD on HD since 2013 (T/TH/Sat), anemia, uncontrolled HTN, hx CVA, PCI, T2DM, and gastroparesis. States that for the past several weeks he has had some chest tightness and increasing SOB. Unable to quantify how far he can walk, but states not very far. Some  occasional dizziness whenever beginning to walk. Endorses palpitations. Had a stroke 3 years ago but denies residual symptoms. History of seizures, last 3 years ago. Reports having two stents done, he thinks at [facility] 2-3 years ago. Smokes cigars occasionally. Denies current alcohol use. 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 Also with non-healing DM ulcer on bottom of right foot.     Patient was admitted via the ED. Unable to see patient as he was off the floor to dialysis. Frequent conversations were had with nurse in ED this morning. K resulted at 6.3, creatinine 6.6, BUN 48. Nephrology was consulted and patient taken for HD.     TTE ordered resulting with EF 50%, mild AS, mod-severe MS from extensive calcification (MG 15, may be elevated due to volume overload), PAP 62.     - ASA   - Statin   - Norvasc 5   - Keppra 500 BID   - Pantoprazole 40 QD  - Polyethylene Glycol daily   - Sevelamer 1600 TID   - Renal diet with 1L fluid restriction per nephrology   - HD today  - Will have another session of HD tomorrow, 7/25  -  working on getting disc shipped from [facility]. Cath lab closed early today so likely won't be here until Monday. Patient unsure of what records he brought.  - Carotid US ordered   -Will be going to room 324    Seizure disorder  - Home Keppra     ESRD (T,Th,Sat) dialysis onset 2013  - Nephrology following   - Taken for HD   - HD planned for 7/25/2020        Thank you for your consult. I will follow-up with patient. Please contact us if you have any additional questions.    Margarita Soler PA-C  Cardiothoracic Surgery  [facility]      [facility] Attending Note:    I have personally taken the history and examined this patient and agree with the ROSANNA's note as stated above. 56 yo M with ESRD,  AS, MS, and CAD. Will review studies and discuss with team.

## 2020-07-24 NOTE — ED NOTES
notfied OCTAVIA CAPPS regarding patient CBG=51  ,  And 1 amp D50W (25 grams) given   Cancel potassium shift now that pt is going to dialysis verbal order read back  JEFRY Armenta/ KG RubinRN

## 2020-07-24 NOTE — ASSESSMENT & PLAN NOTE
/86    - HD will help with volume removal and HTN management.  - Goal for BP is <140 mmHg SBP and BDP <90 mmHg, maintain MAP > 65.  - Continue home hypertension regimen.

## 2020-07-24 NOTE — PLAN OF CARE
Plan of care discussed with patient. Patient had HD today; 4 L removed. Patient scheduled to HD tomorrow 7/25. Carotid US ordered. Nephrology consulted.Patient on renal diet 1 L fluid restriction. Patient is free of fall/trauma/injury. Denies CP, SOB, or pain/discomfort. All questions addressed. Will continue to monitor

## 2020-07-24 NOTE — SUBJECTIVE & OBJECTIVE
HPI: Patient is a 55 year old male with PMHx of CAD, 70%EF, HTN, HLD, DM2, ESRD on dialysis (T/TH/SAT), asthma, gastroparesis, and hx of CVA. He presents to the ED via EMS for COVID screening. Patient was transferred from [facility] for hospital admission for nonrheumatic aortic valve stenosis and multi vessel coronary artery disease. He reports having sternal chest pain for approximately two weeks. Describes pain as intermittent and tightness. Rates pain 7/10. Reports hx of MI and hx of cardiac stents. Reports associated SOB. Patient last dialyzed Wednesday for three hours without complication. He denies fever,chills, nausea, vomiting, abd pain, dysuria, diarrhea, or constipation. He is a former smoker and denies alcohol use.      Medications:  Continuous Infusions:  Scheduled Meds:   sodium chloride 0.9%   Intravenous Once    [START ON 7/25/2020] sodium chloride 0.9%   Intravenous Once    amLODIPine  5 mg Oral BID    aspirin  325 mg Oral Daily    atorvastatin  40 mg Oral Daily    levETIRAcetam  500 mg Oral BID    methadone  90 mg Oral Daily    pantoprazole  40 mg Intravenous Daily    polyethylene glycol  17 g Oral Daily    sevelamer carbonate  1,600 mg Oral TID WM     PRN Meds:acetaminophen, albuterol-ipratropium, bisacodyL, ondansetron, oxyCODONE, oxyCODONE, promethazine, sodium chloride 0.9%     Objective:     Vital Signs (Most Recent):  Temp: 98 °F (36.7 °C) (07/24/20 1120)  Pulse: 65 (07/24/20 1245)  Resp: 20 (07/24/20 1120)  BP: 134/81 (07/24/20 1245)  SpO2: 99 % (07/24/20 1100) Vital Signs (24h Range):  Temp:  [97.7 °F (36.5 °C)-98.2 °F (36.8 °C)] 98 °F (36.7 °C)  Pulse:  [54-88] 65  Resp:  [14-23] 20  SpO2:  [88 %-99 %] 99 %  BP: (125-178)/(70-86) 134/81     Weight: 99.8 kg (220 lb)  Body mass index is 29.84 kg/m².    SpO2: 99 %  O2 Device (Oxygen Therapy): nasal cannula    Intake/Output - Last 3 Shifts     None        Review of Systems   Eyes:        Blind in right eye    Respiratory:  Positive for shortness of breath.    Cardiovascular: Positive for chest pain (previously, currently pain free), palpitations and orthopnea (intermittent ). Negative for leg swelling.   Gastrointestinal: Negative for nausea.   Neurological: Positive for dizziness (occasional ) and seizures.     Physical Exam   Constitutional: He is oriented to person, place, and time. He appears well-developed and well-nourished. No distress.   HENT:   Head: Normocephalic and atraumatic.   Eyes:   Blind in right eye   Neck: Normal range of motion.   Cardiovascular: Normal rate and regular rhythm.   Murmur heard.  Pulmonary/Chest: Effort normal. No respiratory distress.   Abdominal: Soft.   Musculoskeletal:         General: No edema.   Neurological: He is alert and oriented to person, place, and time.   Skin: Skin is warm and dry. He is not diaphoretic.   Second right toe amputation   Diabetic ulcers right foot   Psychiatric: He has a normal mood and affect. His behavior is normal. Judgment and thought content normal.       Lines/Drains/Airways     Drain                 Hemodialysis AV Fistula Left forearm -- days         Hemodialysis AV Fistula Left forearm -- days         Hemodialysis AV Fistula Left forearm -- days          Peripheral Intravenous Line                 Peripheral IV - Single Lumen 07/24/20 1034 20 G;1 in Right Forearm less than 1 day                Significant Labs:  ABGs: No results for input(s): PH, PCO2, PO2, HCO3, POCSATURATED, BE in the last 48 hours.  Amylase: No results for input(s): AMYLASE in the last 48 hours.  BMP:   Recent Labs   Lab 07/24/20  0617   GLU 63*      K 6.3*      CO2 20*   BUN 48*   CREATININE 6.6*   CALCIUM 9.4   MG 2.1     Cardiac markers:   Recent Labs   Lab 07/24/20  0617   TROPONINI 0.467*     CBC:   Recent Labs   Lab 07/24/20  0617   WBC 7.13   RBC 2.95*   HGB 9.8*   HCT 31.6*   *   *   MCH 33.2*   MCHC 31.0*     CMP:   Recent Labs   Lab 07/24/20  0617   GLU 63*    CALCIUM 9.4   ALBUMIN 2.8*   PROT 7.2      K 6.3*   CO2 20*      BUN 48*   CREATININE 6.6*   ALKPHOS 127   ALT 13   AST 22   BILITOT 0.5     Coagulation:   Recent Labs   Lab 07/24/20  0617   INR 1.0   APTT 24.7     Lactic Acid: No results for input(s): LACTATE in the last 48 hours.  LFTs:   Recent Labs   Lab 07/24/20  0617   ALT 13   AST 22   ALKPHOS 127   BILITOT 0.5   PROT 7.2   ALBUMIN 2.8*     Lipase: No results for input(s): LIPASE in the last 48 hours.    Significant Diagnostics: reviewed   TTE 7/24/2020  · Eccentric left ventricular hypertrophy.  · Low normal left ventricular systolic function. The estimated ejection fraction is 50%.  · Local segmental wall motion abnormalities.  · Normal right ventricular systolic function.  · Severe left atrial enlargement.  · Mild aortic valve stenosis. Aortic valve area is 1.57 cm2; peak velocity is 3.31 m/s; mean gradient is 24 mmHg.  · Mild aortic regurgitation.  · Moderate to severe mitral stenosis from extensive annular calcification. The mean diastolic gradient across the mitral valve is 15 mmHg at a heart rate of 73 bpm. Gradients may be elevated in part due to volume overload.  · The estimated PA systolic pressure is 62 mmHg.  · Elevated central venous pressure (15 mmHg).  · LVIDD 6.38  · TAPSE 2.21    CXR 7/24/2020  The cardiomediastinal silhouette is prominent in size, similar to prior examination.  The lungs are symmetrically expanded with diffuse increased interstitial and parenchymal attenuation which can be seen in the setting of pulmonary edema although correlation for underlying infectious process is advised.  The possible small component of right pleural fluid present.  No evidence of pneumothorax.  Visualized osseous structures are intact.

## 2020-07-24 NOTE — CONSULTS
"[facility]  Nephrology  Consult Note    Patient Name: João Aguirre  MRN: [id number]  Admission Date: 7/24/2020  Hospital Length of Stay: 0 days  Attending Provider: Tej Nichole MD   Primary Care Physician: Primary Doctor No  Principal Problem:Nonrheumatic aortic (valve) stenosis    Inpatient consult to Nephrology  Consult performed by: Alannah Peoples, MICKY, FNP-C  Consult ordered by: Margarita Soler PA-C  Reason for consult: ESRD Management        Subjective:     HPI: The patient is a 54 yo M with a history of ESRD on HD TTS, DM2, MI, CAD (s/p stents), Hep C cirrhosis, chronic back pain on methadone, long-term anticoagulant use HTN, prior stroke who was admitted to [facility] with complaints of intermittent 7/10 chest pain/tightness x 2 weeks. He also endorsed associated SOB. He was found to have nonrheumatic aortic valve stenosis and multi vessel artery disease. The patient was trasnferred here for a second opinion by CTS for management/treatment. He currently denies chest pain, fever,chills, nausea, vomiting, abd pain, dysuria, diarrhea, or constipation. He does endorse complaints of shortness of breath.  In the ED, he was found with a potassium of 6.3. BNP >2900. CXR significant for "cardiomegaly and findings suggestive of possible pulmonary edema." The patient has been dialyzing MWF at the OSH. He was last dialyze on Wednesday for 3 hrs.   The patient is a TuThSat dialysis at [facility] in [place]. He typically dialyzes for 4 hrs via a PHILIPPE AVF. He states that he's been on dialysis for ~ about 6 years. He is anuric. His dry weight is 94.5 kg. He is currently 8 kg above his dry weight (Pre HD weight 102.4 kg). Nephrology consulted for management of ESRD and HD treatment.    Past Medical History:   Diagnosis Date    Anticoagulant long-term use     Arthritis     Asthma     Back pain     Coronary artery disease 2013    stent    Diabetes mellitus     Encounter " for blood transfusion     Eye abnormality right eye    injured as a child    Gastritis     Gastroparesis     Hemodialysis patient     Hypertension     Pneumonia 2013    Spend 6weeks in hospital at [facility]    Renal disorder     Stroke        Past Surgical History:   Procedure Laterality Date    AV FISTULA PLACEMENT      BACK SURGERY      CARDIAC SURGERY  2013    heart stent    CHOLECYSTECTOMY      EYE SURGERY      R eye    INCISION AND DRAINAGE OF ABSCESS Right 9/6/2018    Procedure: INCISION AND DRAINAGE, ABSCESS;  Surgeon: Chetan Rothman MD;  [facility];  Service: General;  Laterality: Right;       Review of patient's allergies indicates:   Allergen Reactions    Antibiotic hc      Counter acts with methodone.        Current Facility-Administered Medications   Medication Frequency    0.9%  NaCl infusion Once    acetaminophen tablet 650 mg Q4H PRN    albuterol-ipratropium 2.5 mg-0.5 mg/3 mL nebulizer solution 3 mL Q6H PRN    amLODIPine tablet 5 mg BID    aspirin tablet 325 mg Daily    atorvastatin tablet 40 mg Daily    bisacodyL suppository 10 mg Daily PRN    levETIRAcetam tablet 500 mg BID    methadone tablet 90 mg Daily    ondansetron disintegrating tablet 8 mg Q8H PRN    oxyCODONE immediate release tablet 10 mg Q4H PRN    oxyCODONE immediate release tablet 5 mg Q4H PRN    pantoprazole injection 40 mg Daily    polyethylene glycol packet 17 g Daily    promethazine tablet 25 mg Q6H PRN    sevelamer carbonate tablet 1,600 mg TID WM    sodium chloride 0.9% flush 10 mL PRN     Current Outpatient Medications   Medication    albuterol (PROAIR HFA) 90 mcg/actuation inhaler    albuterol (PROAIR HFA) 90 mcg/actuation inhaler    amlodipine (NORVASC) 5 MG tablet    aspirin 325 MG tablet    atorvastatin (LIPITOR) 10 MG tablet    atorvastatin (LIPITOR) 40 MG tablet    blood sugar diagnostic (TRUE METRIX GLUCOSE TEST STRIP) Strp    blood-glucose meter (PHARMACIST CHOICE GLUCOSE  SYS) Misc    carvedilol (COREG) 3.125 MG tablet    ELIQUIS 2.5 mg Tab    ferric citrate (AURYXIA) 210 mg iron Tab    fluconazole (DIFLUCAN) 100 MG tablet    fluticasone propionate (FLONASE) 50 mcg/actuation nasal spray    fluticasone-salmeterol 250-50 mcg/dose (ADVAIR DISKUS) 250-50 mcg/dose diskus inhaler    gabapentin (NEURONTIN) 100 MG capsule    hydrALAZINE (APRESOLINE) 50 MG tablet    levetiracetam (KEPPRA) 500 MG Tab    methadone (DOLOPHINE) 10 MG tablet    metroNIDAZOLE (FLAGYL) 500 MG tablet    minoxidil (LONITEN) 2.5 MG tablet    MOVIPREP 100-7.5-2.691 gram solution    pantoprazole (PROTONIX) 40 MG tablet    sevelamer carbonate (RENVELA) 800 mg Tab    silver sulfADIAZINE 1% (SILVADENE) 1 % cream     Family History     Problem Relation (Age of Onset)    Aneurysm Mother, Father (77)    Diabetes Brother    Heart disease Father, Paternal Grandmother    Kidney disease Brother        Tobacco Use    Smoking status: Former Smoker     Years: 10.00     Quit date: 2015     Years since quittin.8    Smokeless tobacco: Never Used   Substance and Sexual Activity    Alcohol use: No    Drug use: Yes     Frequency: 4.0 times per week     Types: Other-see comments     Comment: marijuana    Sexual activity: Yes     Review of Systems   Constitutional: Positive for activity change. Negative for appetite change, fatigue and fever.   HENT: Negative for hearing loss.    Eyes: Positive for visual disturbance.   Respiratory: Positive for shortness of breath. Negative for cough and wheezing.    Cardiovascular: Negative for chest pain, palpitations and leg swelling.   Gastrointestinal: Positive for nausea. Negative for abdominal distention, abdominal pain, blood in stool, diarrhea and vomiting.   Genitourinary: Positive for decreased urine volume.   Skin: Positive for pallor and wound.   Neurological: Negative for dizziness, light-headedness, numbness and headaches.   Psychiatric/Behavioral: Negative for  agitation, behavioral problems, confusion and decreased concentration.     Objective:     Vital Signs (Most Recent):  Temp: 98 °F (36.7 °C) (07/24/20 1120)  Pulse: 70 (07/24/20 1145)  Resp: 20 (07/24/20 1120)  BP: (!) 165/86 (07/24/20 1145)  SpO2: 99 % (07/24/20 1100)  O2 Device (Oxygen Therapy): nasal cannula (07/24/20 1120) Vital Signs (24h Range):  Temp:  [97.7 °F (36.5 °C)-98.2 °F (36.8 °C)] 98 °F (36.7 °C)  Pulse:  [54-88] 70  Resp:  [14-23] 20  SpO2:  [88 %-99 %] 99 %  BP: (134-178)/(70-86) 165/86     Weight: 99.8 kg (220 lb) (07/24/20 0811)  Body mass index is 29.84 kg/m².  Body surface area is 2.25 meters squared.    No intake/output data recorded.    Physical Exam  Constitutional:       Appearance: He is obese. He is not ill-appearing or toxic-appearing.   HENT:      Head: Normocephalic and atraumatic.      Nose: Nose normal.      Mouth/Throat:      Mouth: Mucous membranes are moist.      Pharynx: Oropharynx is clear.   Eyes:      General:         Right eye: No discharge.         Left eye: No discharge.   Neck:      Musculoskeletal: Normal range of motion.   Cardiovascular:      Rate and Rhythm: Normal rate.      Heart sounds: Murmur present.   Pulmonary:      Effort: Pulmonary effort is normal. No respiratory distress.      Breath sounds: Rales present.   Abdominal:      General: Abdomen is flat. There is distension.   Musculoskeletal: Normal range of motion.      Right lower leg: No edema.   Skin:     General: Skin is warm and dry.   Neurological:      General: No focal deficit present.      Mental Status: He is alert and oriented to person, place, and time. Mental status is at baseline.   Psychiatric:         Mood and Affect: Mood normal.         Behavior: Behavior normal.         Thought Content: Thought content normal.         Judgment: Judgment normal.         Significant Labs:  CBC:   Recent Labs   Lab 07/24/20  0617   WBC 7.13   RBC 2.95*   HGB 9.8*   HCT 31.6*   *   *   MCH 33.2*    MCHC 31.0*     CMP:   Recent Labs   Lab 07/24/20  0617   GLU 63*   CALCIUM 9.4   ALBUMIN 2.8*   PROT 7.2      K 6.3*   CO2 20*      BUN 48*   CREATININE 6.6*   ALKPHOS 127   ALT 13   AST 22   BILITOT 0.5     All labs within the past 24 hours have been reviewed.        Assessment/Plan:     * Nonrheumatic aortic (valve) stenosis  - Management per primary team     ESRD (T,Th,Sat) dialysis onset 2013  The patient is a 56 yo male transferred to [facility] for higher acuity of care and a CTS consult after being found with nonrheumatic aortic valve stenosis and multi vessel coronary artery disease. The patient is normally TuThSat dialysis outpatient but been dialyzing MWF at the OSH. He was last dialyze on Wednesday (7/22). He presents with a potassium of 6.3 on admit. Patient found to be 8 kg above his dry weight.     ESRD on IHD : TTS   Out patient HD Center - [facility] /  ?  Duration of outpatient dialysis session - 4 hrs  EDW: 94.5 KG   Residual Renal Function (Urine Output) - Anuric   Access: PHILIPPE AVF    - Will provide dialysis today for metabolic clearance and volume management.  - Seen on HD this AM. No issues noted.   - BFR: 300 mL/min due to cardiac issues.  - Target ultrafiltration: 3-4 L as tolerated. Patient appears to 8 kg above his dry weight.   - Dialysate adjusted to current labs   - Will plan for an additional HD treatment tomorrow for volume management and metabolic clearance.   - Avoid nephrotoxic medications  - Medications to renal parameters  - Strict Input and Output and chart  - Daily standing weights       Chronic kidney disease-mineral and bone disorder  - Renal Function Panel for electrolytes monitoring  - Recommend renal diet with 1 L fluid restriction    - Novasource with meals  - Phos 7.3  - Already on binders as outpatient. Please continue Sevelamer.  - Daily renal panel so that phos and albumin is monitored daily.     Hyperkalemia  Potassium 6.3 on admit.     - HD for  metabolic clearance. Potassium bath adjusted.     Benign hypertension with ESRD (end-stage renal disease)  /86    - HD will help with volume removal and HTN management.  - Goal for BP is <140 mmHg SBP and BDP <90 mmHg, maintain MAP > 65.  - Continue home hypertension regimen.    Anemia in chronic kidney disease  - Hgb today 9.8. Target Hgb 10-11 gm/dL.  - Will request iron studies in AM to assess further needs of supplementation   - Will review chronic care plan from outpatient dialysis center for ERNESTO dosing.     Thank you for your consult. I will follow-up with patient. Please contact us if you have any additional questions.    Alannah Peoples DNP, FNP-C  Nephrology  [facility]

## 2020-07-24 NOTE — HPI
"The patient is a 54 yo M with a history of ESRD on HD TTS, DM2, MI, CAD (s/p stents), Hep C cirrhosis, chronic back pain on methadone, long-term anticoagulant use HTN, prior stroke who was admitted to [facility] with complaints of intermittent 7/10 chest pain/tightness x 2 weeks. He also endorsed associated SOB. He was found to have nonrheumatic aortic valve stenosis and multi vessel artery disease. The patient was trasnferred here for a second opinion by CTS for management/treatment. He currently denies chest pain, fever,chills, nausea, vomiting, abd pain, dysuria, diarrhea, or constipation. He does endorse complaints of shortness of breath.  In the ED, he was found with a potassium of 6.3. BNP >2900. CXR significant for "cardiomegaly and findings suggestive of possible pulmonary edema." The patient has been dialyzing MWF at the OSH. He was last dialyze on Wednesday for 3 hrs.   The patient is a TuThSat dialysis at [facility] in [place]. He typically dialyzes for 4 hrs via a PHILIPPE AVF. He states that he's been on dialysis for ~ about 6 years. He is anuric. His dry weight is 94.5 kg. He is currently 8 kg above his dry weight (Pre HD weight 102.4 kg). Nephrology consulted for management of ESRD and HD treatment.  "

## 2020-07-24 NOTE — ED NOTES
Patient transfer from [facility] states he is here to gain a second opinion on cardio-thoracic surgery that the cardiologist advised he needed. Patient states due to his dialysis he is a high risk patient and that the other physician would not take the case. Patient stated that he spoke with the team here and they accepted him. C/o chest pain and tightness that comes and goes.

## 2020-07-24 NOTE — PROGRESS NOTES
Pt transported to unit from ED via stretcher, AAOx4, O2@2L/NC in place, tele box in place. tx initiated via left lower arm AVF, tolerated well, flows good. Dialysis days are T,Th,Sat. UF goal 4L as tolerated

## 2020-07-24 NOTE — ASSESSMENT & PLAN NOTE
Patient is a 55 year old who presented from [place] for second opinion regarding AVR and CABG. Medical conditions include ESRD on HD since 2013 (T/TH/Sat), anemia, uncontrolled HTN, hx CVA, PCI, T2DM, and gastroparesis. States that for the past several weeks he has had some chest tightness and increasing SOB. Unable to quantify how far he can walk, but states not very far. Some occasional dizziness whenever beginning to walk. Endorses palpitations. Had a stroke 3 years ago but denies residual symptoms. History of seizures, last 3 years ago. Reports having two stents done, he thinks at [facility] 2-3 years ago. Smokes cigars occasionally. Denies current alcohol use. 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 Also with non-healing DM ulcer on bottom of right foot.     Patient was admitted via the ED. Unable to see patient as he was off the floor to dialysis. Frequent conversations were had with nurse in ED this morning. K resulted at 6.3, creatinine 6.6, BUN 48. Nephrology was consulted and patient taken for HD.     TTE ordered resulting with EF 50%, mild AS, mod-severe MS from extensive calcification (MG 15, may be elevated due to volume overload), PAP 62.     - ASA   - Statin   - Norvasc 5   - Keppra 500 BID   - Pantoprazole 40 QD  - Polyethylene Glycol daily   - Sevelamer 1600 TID   - Renal diet with 1L fluid restriction per nephrology   - HD today  - Will have another session of HD tomorrow, 7/25  -  working on getting disc shipped from [facility]. Cath lab closed early today so likely won't be here until Monday. Patient unsure of what records he brought.  - Carotid US ordered   -Will be going to room 324

## 2020-07-24 NOTE — ED NOTES
Pt identifiers João Aguirre were checked and are correct  LOC: The patient is awake, alert, aware of environment with an appropriate affect. Oriented x3, speaking appropriately  APPEARANCE: Pt rates 6/10 chest tightness which he states is intermittent, 7/10 middle area back pain and 5/10 right foot pain , in no acute distress, pt is clean and well groomed, clothing properly fastened  SKIN: Skin warm, dry and intact, normal skin turgor, moist mucus membranes  Purple colored bruising noted on abd  RESPIRATORY: Airway is open and patent, respirations are spontaneous, even and unlabored, normal effort and rate Crackles auscultated to lower lung fields, clear breath sounds auscultated to upper lung fields   CARDIAC: Normal rate and rhythm, no peripheral edema noted, capillary refill < 3 seconds, bilateral radial pulses 2+  Left arm fistula noted with palpable thrill  ABDOMEN: Soft, nontender, nondistended. Bowel sounds present   NEUROLOGIC: pt is blind in right eye facial expression is symmetrical, patient moving all extremities spontaneously, normal sensation in all extremities when touched with a finger.  Follows all commands appropriately  MUSCULOSKELETAL: No obvious deformities.

## 2020-07-24 NOTE — HPI
Patient is a 55 year old male with PMHx of CAD, 70%EF, HTN, HLD, DM2, ESRD on dialysis (T/TH/SAT), asthma, gastroparesis, and hx of CVA. He presents to the ED via EMS for COVID screening. Patient was transferred from [facility] for hospital admission for nonrheumatic aortic valve stenosis and multi vessel coronary artery disease. He reports having sternal chest pain for approximately two weeks. Describes pain as intermittent and tightness. Rates pain 7/10. Reports hx of MI and hx of cardiac stents. Reports associated SOB. Patient last dialyzed Wednesday for three hours without complication. He denies fever,chills, nausea, vomiting, abd pain, dysuria, diarrhea, or constipation. He is a former smoker and denies alcohol use.

## 2020-07-24 NOTE — ED NOTES
Pt transported to Acute dialysis via stretcher on tele box [id number] Pt condition stable on leaving the ED, pt belongings are with pt and pt will notify family of room number 324 A that he will be transferred to after dialysis

## 2020-07-24 NOTE — ED NOTES
OCTAVIA CAPPS answered page for cardiothoracic surgery service and was informed of potassium result of 6.2

## 2020-07-24 NOTE — ASSESSMENT & PLAN NOTE
- Hgb today 9.8. Target Hgb 10-11 gm/dL.  - Will request iron studies in AM to assess further needs of supplementation   - Will review chronic care plan from outpatient dialysis center for ERNESTO dosing.

## 2020-07-24 NOTE — ASSESSMENT & PLAN NOTE
The patient is a 54 yo male transferred to [facility] [place] higher acuity of care and a CTS consult after being found with nonrheumatic aortic valve stenosis and multi vessel coronary artery disease. The patient is normally TuThSat dialysis outpatient but been dialyzing MWF at the OSH. He was last dialyze on Wednesday (7/22). He presents with a potassium of 6.3 on admit. Patient found to be 8 kg above his dry weight.     ESRD on IHD : [facility]   Out patient HD Center - [facility] Amanda WATTS /  ?  Duration of outpatient dialysis session - 4 hrs  EDW: 94.5 KG   Residual Renal Function (Urine Output) - Anuric   Access: PHILIPPE AVF    - Will provide dialysis today for metabolic clearance and volume management.  - Seen on HD this AM. No issues noted.   - BFR: 300 mL/min due to cardiac issues.  - Target ultrafiltration: 3-4 L as tolerated. Patient appears to 8 kg above his dry weight.   - Dialysate adjusted to current labs   - Will plan for an additional HD treatment tomorrow for volume management and metabolic clearance.   - Avoid nephrotoxic medications  - Medications to renal parameters  - Strict Input and Output and chart  - Daily standing weights

## 2020-07-24 NOTE — ASSESSMENT & PLAN NOTE
Patient is a 55 year old who presented from [place] for second opinion regarding AVR and CABG. Medical conditions include ESRD on HD since 2013 (T/TH/Sat), anemia, uncontrolled HTN, hx CVA, PCI, T2DM, and gastroparesis. States that for the past several weeks he has had some chest tightness and increasing SOB. Unable to quantify how far he can walk, but states not very far. Some occasional dizziness whenever beginning to walk. Endorses palpitations. Had a stroke 3 years ago but denies residual symptoms. History of seizures, last 3 years ago. Reports having two stents done, he thinks at [facility] 2-3 years ago. Smokes cigars occasionally. Denies current alcohol use. 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 Also with non-healing DM ulcer on bottom of right foot.     Patient was admitted via the ED. Unable to see patient as he was off the floor to dialysis. Frequent conversations were had with nurse in ED this morning. K resulted at 6.3, creatinine 6.6, BUN 48. Nephrology was consulted and patient taken for HD.     TTE ordered resulting with EF 50%, mild AS, mod-severe MS from extensive calcification (MG 15, may be elevated due to volume overload), PAP 62.     - ASA   - Statin   - Norvasc 5   - Keppra 500 BID   - Pantoprazole 40 QD  - Polyethylene Glycol daily   - Sevelamer 1600 TID   - Renal diet with 1L fluid restriction per nephrology   - HD today  - Will have another session of HD tomorrow, 7/25  -  working on getting disc shipped from [facility]. Cath lab closed early today so likely won't be here until Monday. Patient unsure of what records he brought.  - Carotid US ordered   -Will be going to room 324

## 2020-07-24 NOTE — PROGRESS NOTES
[facility]  Cardiothoracic Surgery  Progress Note    Patient Name: João Aguirre  MRN: [id number]  Admission Date: 7/24/2020  Hospital Length of Stay: 0 days  Code Status: Full Code   Attending Physician: Tej Nichole MD   Referring Provider: Ruben Weston MD  Principal Problem:Nonrheumatic aortic (valve) stenosis            Subjective:     Post-Op Info:  * No surgery found *         HPI: Patient is a 55 year old male with PMHx of CAD, 70%EF, HTN, HLD, DM2, ESRD on dialysis (T/TH/SAT), asthma, gastroparesis, and hx of CVA. He presents to the ED via EMS for COVID screening. Patient was transferred from [facility] for hospital admission for nonrheumatic aortic valve stenosis and multi vessel coronary artery disease. He reports having sternal chest pain for approximately two weeks. Describes pain as intermittent and tightness. Rates pain 7/10. Reports hx of MI and hx of cardiac stents. Reports associated SOB. Patient last dialyzed Wednesday for three hours without complication. He denies fever,chills, nausea, vomiting, abd pain, dysuria, diarrhea, or constipation. He is a former smoker and denies alcohol use.      Medications:  Continuous Infusions:  Scheduled Meds:   sodium chloride 0.9%   Intravenous Once    [START ON 7/25/2020] sodium chloride 0.9%   Intravenous Once    amLODIPine  5 mg Oral BID    aspirin  325 mg Oral Daily    atorvastatin  40 mg Oral Daily    levETIRAcetam  500 mg Oral BID    methadone  90 mg Oral Daily    pantoprazole  40 mg Intravenous Daily    polyethylene glycol  17 g Oral Daily    sevelamer carbonate  1,600 mg Oral TID WM     PRN Meds:acetaminophen, albuterol-ipratropium, bisacodyL, ondansetron, oxyCODONE, oxyCODONE, promethazine, sodium chloride 0.9%     Objective:     Vital Signs (Most Recent):  Temp: 98 °F (36.7 °C) (07/24/20 1120)  Pulse: 65 (07/24/20 1245)  Resp: 20 (07/24/20 1120)  BP: 134/81 (07/24/20 1245)  SpO2: 99 % (07/24/20 1100)  Vital Signs (24h Range):  Temp:  [97.7 °F (36.5 °C)-98.2 °F (36.8 °C)] 98 °F (36.7 °C)  Pulse:  [54-88] 65  Resp:  [14-23] 20  SpO2:  [88 %-99 %] 99 %  BP: (125-178)/(70-86) 134/81     Weight: 99.8 kg (220 lb)  Body mass index is 29.84 kg/m².    SpO2: 99 %  O2 Device (Oxygen Therapy): nasal cannula    Intake/Output - Last 3 Shifts     None        Review of Systems   Eyes:        Blind in right eye    Respiratory: Positive for shortness of breath.    Cardiovascular: Positive for chest pain (previously, currently pain free), palpitations and orthopnea (intermittent ). Negative for leg swelling.   Gastrointestinal: Negative for nausea.   Neurological: Positive for dizziness (occasional ) and seizures.     Physical Exam   Constitutional: He is oriented to person, place, and time. He appears well-developed and well-nourished. No distress.   HENT:   Head: Normocephalic and atraumatic.   Eyes:   Blind in right eye   Neck: Normal range of motion.   Cardiovascular: Normal rate and regular rhythm.   Murmur heard.  Pulmonary/Chest: Effort normal. No respiratory distress.   Abdominal: Soft.   Musculoskeletal:         General: No edema.   Neurological: He is alert and oriented to person, place, and time.   Skin: Skin is warm and dry. He is not diaphoretic.   Second right toe amputation   Diabetic ulcers right foot   Psychiatric: He has a normal mood and affect. His behavior is normal. Judgment and thought content normal.       Lines/Drains/Airways     Drain                 Hemodialysis AV Fistula Left forearm -- days         Hemodialysis AV Fistula Left forearm -- days         Hemodialysis AV Fistula Left forearm -- days          Peripheral Intravenous Line                 Peripheral IV - Single Lumen 07/24/20 1034 20 G;1 in Right Forearm less than 1 day                Significant Labs:  ABGs: No results for input(s): PH, PCO2, PO2, HCO3, POCSATURATED, BE in the last 48 hours.  Amylase: No results for input(s): AMYLASE in the  last 48 hours.  BMP:   Recent Labs   Lab 07/24/20 0617   GLU 63*      K 6.3*      CO2 20*   BUN 48*   CREATININE 6.6*   CALCIUM 9.4   MG 2.1     Cardiac markers:   Recent Labs   Lab 07/24/20 0617   TROPONINI 0.467*     CBC:   Recent Labs   Lab 07/24/20 0617   WBC 7.13   RBC 2.95*   HGB 9.8*   HCT 31.6*   *   *   MCH 33.2*   MCHC 31.0*     CMP:   Recent Labs   Lab 07/24/20 0617   GLU 63*   CALCIUM 9.4   ALBUMIN 2.8*   PROT 7.2      K 6.3*   CO2 20*      BUN 48*   CREATININE 6.6*   ALKPHOS 127   ALT 13   AST 22   BILITOT 0.5     Coagulation:   Recent Labs   Lab 07/24/20 0617   INR 1.0   APTT 24.7     Lactic Acid: No results for input(s): LACTATE in the last 48 hours.  LFTs:   Recent Labs   Lab 07/24/20 0617   ALT 13   AST 22   ALKPHOS 127   BILITOT 0.5   PROT 7.2   ALBUMIN 2.8*     Lipase: No results for input(s): LIPASE in the last 48 hours.    Significant Diagnostics: reviewed   TTE 7/24/2020  · Eccentric left ventricular hypertrophy.  · Low normal left ventricular systolic function. The estimated ejection fraction is 50%.  · Local segmental wall motion abnormalities.  · Normal right ventricular systolic function.  · Severe left atrial enlargement.  · Mild aortic valve stenosis. Aortic valve area is 1.57 cm2; peak velocity is 3.31 m/s; mean gradient is 24 mmHg.  · Mild aortic regurgitation.  · Moderate to severe mitral stenosis from extensive annular calcification. The mean diastolic gradient across the mitral valve is 15 mmHg at a heart rate of 73 bpm. Gradients may be elevated in part due to volume overload.  · The estimated PA systolic pressure is 62 mmHg.  · Elevated central venous pressure (15 mmHg).  · LVIDD 6.38  · TAPSE 2.21    CXR 7/24/2020  The cardiomediastinal silhouette is prominent in size, similar to prior examination.  The lungs are symmetrically expanded with diffuse increased interstitial and parenchymal attenuation which can be seen in the setting of  pulmonary edema although correlation for underlying infectious process is advised.  The possible small component of right pleural fluid present.  No evidence of pneumothorax.  Visualized osseous structures are intact.      Assessment/Plan:     Mitral stenosis  Patient is a 55 year old who presented from [place] for second opinion regarding AVR and CABG. Medical conditions include ESRD on HD since 2013 (T/TH/Sat), anemia, uncontrolled HTN, hx CVA, PCI, T2DM, and gastroparesis. States that for the past several weeks he has had some chest tightness and increasing SOB. Unable to quantify how far he can walk, but states not very far. Some occasional dizziness whenever beginning to walk. Endorses palpitations. Had a stroke 3 years ago but denies residual symptoms. History of seizures, last 3 years ago. Reports having two stents done, he thinks at [facility] 2-3 years ago. Smokes cigars occasionally. Denies current alcohol use. 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 Also with non-healing DM ulcer on bottom of right foot.     Patient was admitted via the ED. Unable to see patient as he was off the floor to dialysis. Frequent conversations were had with nurse in ED this morning. K resulted at 6.3, creatinine 6.6, BUN 48. Nephrology was consulted and patient taken for HD.     TTE ordered resulting with EF 50%, mild AS, mod-severe MS from extensive calcification (MG 15, may be elevated due to volume overload), PAP 62.     - ASA   - Statin   - Norvasc 5   - Keppra 500 BID   - Pantoprazole 40 QD  - Polyethylene Glycol daily   - Sevelamer 1600 TID   - Renal diet with 1L fluid restriction per nephrology   - HD today  - Will have another session of HD tomorrow, 7/25  -  working on getting disc shipped from [facility]. Cath lab closed early  today so likely won't be here until Monday. Patient unsure of what records he brought.  - Carotid US ordered   -Will be going to room 324    Seizure disorder  - Home Keppra     ESRD (T,Th,Sat) dialysis onset 2013  - Nephrology following   - Taken for HD   - HD planned for 7/25/2020      Dispo: CSU. Eval pending     Margarita Soler PA-C  Cardiothoracic Surgery  [facility]

## 2020-07-24 NOTE — ED NOTES
Pt is on a cardiac monitor, pulse oximetry, side rails up times two ,call bell is within reach of pt and bed is in low position

## 2020-07-24 NOTE — SUBJECTIVE & OBJECTIVE
Past Medical History:   Diagnosis Date    Anticoagulant long-term use     Arthritis     Asthma     Back pain     Coronary artery disease 2013    stent    Diabetes mellitus     Encounter for blood transfusion     Eye abnormality right eye    injured as a child    Gastritis     Gastroparesis     Hemodialysis patient     Hypertension     Pneumonia 2013    Spend 6weeks in hospital at [place]    Renal disorder     Stroke        Past Surgical History:   Procedure Laterality Date    AV FISTULA PLACEMENT      BACK SURGERY      CARDIAC SURGERY  2013    heart stent    CHOLECYSTECTOMY      EYE SURGERY      R eye    INCISION AND DRAINAGE OF ABSCESS Right 9/6/2018    Procedure: INCISION AND DRAINAGE, ABSCESS;  Surgeon: Chetan Rothman MD;  Location: [facility];  Service: General;  Laterality: Right;       Review of patient's allergies indicates:   Allergen Reactions    Antibiotic hc      Counter acts with methodone.        Current Facility-Administered Medications   Medication Frequency    0.9%  NaCl infusion Once    acetaminophen tablet 650 mg Q4H PRN    albuterol-ipratropium 2.5 mg-0.5 mg/3 mL nebulizer solution 3 mL Q6H PRN    amLODIPine tablet 5 mg BID    aspirin tablet 325 mg Daily    atorvastatin tablet 40 mg Daily    bisacodyL suppository 10 mg Daily PRN    levETIRAcetam tablet 500 mg BID    methadone tablet 90 mg Daily    ondansetron disintegrating tablet 8 mg Q8H PRN    oxyCODONE immediate release tablet 10 mg Q4H PRN    oxyCODONE immediate release tablet 5 mg Q4H PRN    pantoprazole injection 40 mg Daily    polyethylene glycol packet 17 g Daily    promethazine tablet 25 mg Q6H PRN    sevelamer carbonate tablet 1,600 mg TID WM    sodium chloride 0.9% flush 10 mL PRN     Current Outpatient Medications   Medication    albuterol (PROAIR HFA) 90 mcg/actuation inhaler    albuterol (PROAIR HFA) 90 mcg/actuation inhaler    amlodipine (NORVASC) 5 MG tablet    aspirin 325 MG tablet     atorvastatin (LIPITOR) 10 MG tablet    atorvastatin (LIPITOR) 40 MG tablet    blood sugar diagnostic (TRUE METRIX GLUCOSE TEST STRIP) Strp    blood-glucose meter (PHARMACIST CHOICE GLUCOSE SYS) Misc    carvedilol (COREG) 3.125 MG tablet    ELIQUIS 2.5 mg Tab    ferric citrate (AURYXIA) 210 mg iron Tab    fluconazole (DIFLUCAN) 100 MG tablet    fluticasone propionate (FLONASE) 50 mcg/actuation nasal spray    fluticasone-salmeterol 250-50 mcg/dose (ADVAIR DISKUS) 250-50 mcg/dose diskus inhaler    gabapentin (NEURONTIN) 100 MG capsule    hydrALAZINE (APRESOLINE) 50 MG tablet    levetiracetam (KEPPRA) 500 MG Tab    methadone (DOLOPHINE) 10 MG tablet    metroNIDAZOLE (FLAGYL) 500 MG tablet    minoxidil (LONITEN) 2.5 MG tablet    MOVIPREP 100-7.5-2.691 gram solution    pantoprazole (PROTONIX) 40 MG tablet    sevelamer carbonate (RENVELA) 800 mg Tab    silver sulfADIAZINE 1% (SILVADENE) 1 % cream     Family History     Problem Relation (Age of Onset)    Aneurysm Mother, Father (77)    Diabetes Brother    Heart disease Father, Paternal Grandmother    Kidney disease Brother        Tobacco Use    Smoking status: Former Smoker     Years: 10.00     Quit date: 2015     Years since quittin.8    Smokeless tobacco: Never Used   Substance and Sexual Activity    Alcohol use: No    Drug use: Yes     Frequency: 4.0 times per week     Types: Other-see comments     Comment: marijuana    Sexual activity: Yes     Review of Systems   Constitutional: Positive for activity change. Negative for appetite change, fatigue and fever.   HENT: Negative for hearing loss.    Eyes: Positive for visual disturbance.   Respiratory: Positive for shortness of breath. Negative for cough and wheezing.    Cardiovascular: Negative for chest pain, palpitations and leg swelling.   Gastrointestinal: Positive for nausea. Negative for abdominal distention, abdominal pain, blood in stool, diarrhea and vomiting.   Genitourinary:  Positive for decreased urine volume.   Skin: Positive for pallor and wound.   Neurological: Negative for dizziness, light-headedness, numbness and headaches.   Psychiatric/Behavioral: Negative for agitation, behavioral problems, confusion and decreased concentration.     Objective:     Vital Signs (Most Recent):  Temp: 98 °F (36.7 °C) (07/24/20 1120)  Pulse: 70 (07/24/20 1145)  Resp: 20 (07/24/20 1120)  BP: (!) 165/86 (07/24/20 1145)  SpO2: 99 % (07/24/20 1100)  O2 Device (Oxygen Therapy): nasal cannula (07/24/20 1120) Vital Signs (24h Range):  Temp:  [97.7 °F (36.5 °C)-98.2 °F (36.8 °C)] 98 °F (36.7 °C)  Pulse:  [54-88] 70  Resp:  [14-23] 20  SpO2:  [88 %-99 %] 99 %  BP: (134-178)/(70-86) 165/86     Weight: 99.8 kg (220 lb) (07/24/20 0811)  Body mass index is 29.84 kg/m².  Body surface area is 2.25 meters squared.    No intake/output data recorded.    Physical Exam  Constitutional:       Appearance: He is obese. He is not ill-appearing or toxic-appearing.   HENT:      Head: Normocephalic and atraumatic.      Nose: Nose normal.      Mouth/Throat:      Mouth: Mucous membranes are moist.      Pharynx: Oropharynx is clear.   Eyes:      General:         Right eye: No discharge.         Left eye: No discharge.   Neck:      Musculoskeletal: Normal range of motion.   Cardiovascular:      Rate and Rhythm: Normal rate.      Heart sounds: Murmur present.   Pulmonary:      Effort: Pulmonary effort is normal. No respiratory distress.      Breath sounds: Rales present.   Abdominal:      General: Abdomen is flat. There is distension.   Musculoskeletal: Normal range of motion.      Right lower leg: No edema.   Skin:     General: Skin is warm and dry.   Neurological:      General: No focal deficit present.      Mental Status: He is alert and oriented to person, place, and time. Mental status is at baseline.   Psychiatric:         Mood and Affect: Mood normal.         Behavior: Behavior normal.         Thought Content: Thought  content normal.         Judgment: Judgment normal.         Significant Labs:  CBC:   Recent Labs   Lab 07/24/20  0617   WBC 7.13   RBC 2.95*   HGB 9.8*   HCT 31.6*   *   *   MCH 33.2*   MCHC 31.0*     CMP:   Recent Labs   Lab 07/24/20  0617   GLU 63*   CALCIUM 9.4   ALBUMIN 2.8*   PROT 7.2      K 6.3*   CO2 20*      BUN 48*   CREATININE 6.6*   ALKPHOS 127   ALT 13   AST 22   BILITOT 0.5     All labs within the past 24 hours have been reviewed.

## 2020-07-24 NOTE — ED PROVIDER NOTES
Encounter Date: 7/22/2020       History     Chief Complaint   Patient presents with    Transfer     Tx from [facility] for COVID swab then admit     Patient is a 55 year old male with PMHx of CAD, 70%EF, HTN, HLD, DM2, ESRD on dialysis (T/TH/SAT), asthma, gastroparesis, and hx of CVA. He presents to the ED via EMS for COVID screening. Patient was transferred from [facility] for hospital admission for nonrheumatic aortic valve stenosis and multi vessel coronary artery disease. He reports having sternal chest pain for approximately two weeks. Describes pain as intermittent and tightness. Rates pain 7/10. Reports hx of MI and hx of cardiac stents. Reports associated SOB. Patient last dialyzed Wednesday for three hours without complication. He denies fever,chills, nausea, vomiting, abd pain, dysuria, diarrhea, or constipation. He is a former smoker and denies alcohol use.    The history is provided by the patient and medical records. No  was used.     Review of patient's allergies indicates:   Allergen Reactions    Antibiotic hc      Counter acts with methodone.        Past Medical History:   Diagnosis Date    Anticoagulant long-term use     Arthritis     Asthma     Back pain     Coronary artery disease 2013    stent    Diabetes mellitus     Encounter for blood transfusion     Eye abnormality right eye    injured as a child    Gastritis     Gastroparesis     Hemodialysis patient     Hypertension     Pneumonia 2013    Spend 6weeks in hospital at [place]    Renal disorder     Stroke      Past Surgical History:   Procedure Laterality Date    AV FISTULA PLACEMENT      BACK SURGERY      CARDIAC SURGERY  2013    heart stent    CHOLECYSTECTOMY      EYE SURGERY      R eye    INCISION AND DRAINAGE OF ABSCESS Right 9/6/2018    Procedure: INCISION AND DRAINAGE, ABSCESS;  Surgeon: Chetan Rothman MD;  Location: [facility];  Service: General;  Laterality: Right;     Family History    Problem Relation Age of Onset    Aneurysm Mother         brain    Aneurysm Father 77        stomach     Heart disease Father     Kidney disease Brother     Diabetes Brother         hyperglycemia and HAYDER causseddeath    Heart disease Paternal Grandmother      Social History     Tobacco Use    Smoking status: Former Smoker     Years: 10.00     Quit date: 2015     Years since quittin.8    Smokeless tobacco: Never Used   Substance Use Topics    Alcohol use: No    Drug use: Yes     Frequency: 4.0 times per week     Types: Other-see comments     Comment: marijuana     Review of Systems   Constitutional: Negative for fever.   HENT: Negative for sore throat.    Respiratory: Positive for shortness of breath.    Cardiovascular: Positive for chest pain.   Gastrointestinal: Negative for abdominal pain, nausea and vomiting.   Genitourinary: Negative for dysuria.   Musculoskeletal: Negative for back pain.   Skin: Negative for rash.   Neurological: Negative for weakness.   Hematological: Does not bruise/bleed easily.       Physical Exam     Initial Vitals [20 0419]   BP Pulse Resp Temp SpO2   (!) 145/80 88 16 97.7 °F (36.5 °C) 97 %      MAP       --         Physical Exam    Vitals reviewed.  Constitutional: He appears well-developed and well-nourished. He is Obese . No distress.   HENT:   Head: Normocephalic.   Eyes: Conjunctivae are normal.   Neck: Normal range of motion.   Cardiovascular: Normal rate and regular rhythm.   Murmur heard.  Pulmonary/Chest: No respiratory distress. He has no wheezes. He has rales.   Abdominal: Soft. Bowel sounds are normal. He exhibits no distension. There is no abdominal tenderness.   Musculoskeletal: Normal range of motion. No edema.      Comments: AV fistula noted to L forearm with palpable thrill.   Neurological: He is alert and oriented to person, place, and time.   Skin: Skin is warm and dry. No erythema.         ED Course   Procedures  Labs Reviewed   CBC W/ AUTO  DIFFERENTIAL - Abnormal; Notable for the following components:       Result Value    RBC 2.95 (*)     Hemoglobin 9.8 (*)     Hematocrit 31.6 (*)     Mean Corpuscular Volume 107 (*)     Mean Corpuscular Hemoglobin 33.2 (*)     Mean Corpuscular Hemoglobin Conc 31.0 (*)     RDW 16.3 (*)     Platelets 146 (*)     All other components within normal limits   COMPREHENSIVE METABOLIC PANEL - Abnormal; Notable for the following components:    Potassium 6.3 (*)     CO2 20 (*)     Glucose 63 (*)     BUN, Bld 48 (*)     Creatinine 6.6 (*)     Albumin 2.8 (*)     eGFR if  10.0 (*)     eGFR if non  8.7 (*)     All other components within normal limits    Narrative:     mg(order# [id number]) and phos(order# [id number]) add on per Tej Nichole MD @ 08:03 on 07/24/2020   TROPONIN I - Abnormal; Notable for the following components:    Troponin I 0.467 (*)     All other components within normal limits   B-TYPE NATRIURETIC PEPTIDE - Abnormal; Notable for the following components:    BNP 2,948 (*)     All other components within normal limits   PHOSPHORUS - Abnormal; Notable for the following components:    Phosphorus 7.3 (*)     All other components within normal limits    Narrative:     mg(order# [id number]) and phos(order# [id number]) add on per Tej Nichole MD @ 08:03 on 07/24/2020   SARS-COV-2 RNA AMPLIFICATION, QUAL   PROTIME-INR   APTT   MAGNESIUM   PHOSPHORUS   MAGNESIUM    Narrative:     mg(order# [id number]) and phos(order# [id number]) add on per Tej Nichole MD @ 08:03 on 07/24/2020   PROTIME-INR   APTT   BASIC METABOLIC PANEL   CBC W/ AUTO DIFFERENTIAL   TYPE & SCREEN        ECG Results          EKG 12-lead (In process)  Result time 07/24/20 08:01:17    In process by Interface, Lab In [facility] (07/24/20 08:01:17)                 Narrative:    Test Reason : R07.9,    Vent. Rate : 060 BPM     Atrial Rate : 060 BPM     P-R Int : 254 ms          QRS Dur : 100 ms      QT Int : 446 ms        P-R-T Axes : 054 077 064 degrees     QTc Int : 446 ms    Sinus rhythm with sinus arrhythmia with 1st degree A-V block  Low voltage QRS  Septal infarct (cited on or before 25-AUG-2018)  Abnormal ECG  When compared with ECG of 27-AUG-2018 11:32,  Significant changes have occurred    Referred By: TASIA FIGUEROA           Confirmed By:                             Imaging Results          X-Ray Chest AP Portable (Final result)  Result time 07/24/20 06:08:54    Final result by Nena Padron MD (07/24/20 06:08:54)                 Impression:      Cardiomegaly and findings suggestive of possible pulmonary edema although correlation for underlying infectious process is advised.      Electronically signed by: Nena Padron MD  Date:    07/24/2020  Time:    06:08             Narrative:    EXAMINATION:  XR CHEST AP PORTABLE    CLINICAL HISTORY:  Chest Pain;    TECHNIQUE:  Single frontal view of the chest was performed.    COMPARISON:  08/29/2018    FINDINGS:  The cardiomediastinal silhouette is prominent in size, similar to prior examination.  The lungs are symmetrically expanded with diffuse increased interstitial and parenchymal attenuation which can be seen in the setting of pulmonary edema although correlation for underlying infectious process is advised.  The possible small component of right pleural fluid present.  No evidence of pneumothorax.  Visualized osseous structures are intact.                                 Medical Decision Making:   History:   Old Medical Records: I decided to obtain old medical records.  Clinical Tests:   Lab Tests: Ordered  Radiological Study: Ordered  Medical Tests: Ordered and Reviewed  Other:   I have discussed this case with another health care provider.       <> Summary of the Discussion: Case discussed with CTS for admission.        APC / Resident Notes:   Patient is a 55 year old male presents to the ED for emergent evaluation of COVID screening.     Patient to be directly  admitted to CTS service for further management. Patient arrived to [facility] ED for COVID test. Will order labs and imaging to be followed by admitting service. Will continue to monitor.     Differential diagnoses include, but are not limited to: valvular disease, ACS, CHF, cardiac arrhythmia, or electrolyte imbalance.     5:34 AM  Case discussed with CTS for admission. They will place admission orders pending COVID swab.     I have discussed and reviewed with my supervising physician.        Clinical Impression:       ICD-10-CM ICD-9-CM   1. Nonrheumatic aortic (valve) stenosis  I35.0 424.1   2. Multiple vessel coronary artery disease  I25.10 414.00   3. Chest pain  R07.9 786.50   4. Aortic stenosis  I35.0 424.1         Disposition:   Disposition: Admitted  Condition: Fair     ED Disposition Condition    Admit

## 2020-07-24 NOTE — PROGRESS NOTES
HD tx complete, 4L removed in a tx of 3.5 hours, tolerated well. Blood returned via LLA AVF, 15g needles removed x2, gauze and tape applied, pressure held to each site for 10 minutes, hemostasis achieved. Report given to primary nurse. Transferred from unit via stretcher by transport to [facility]

## 2020-07-24 NOTE — ASSESSMENT & PLAN NOTE
- Renal Function Panel for electrolytes monitoring  - Recommend renal diet with 1 L fluid restriction    - Novasource with meals  - Phos 7.3  - Already on binders as outpatient. Please continue Sevelamer.  - Daily renal panel so that phos and albumin is monitored daily.

## 2020-07-24 NOTE — Clinical Note
195 ml injected throughout the case. 105 mL total wasted during the case. 300 mL total used in the case.

## 2020-07-25 NOTE — ASSESSMENT & PLAN NOTE
Patient is a 55 year old who presented from [place] for second opinion regarding AVR and CABG. Medical conditions include ESRD on HD since 2013 (T/TH/Sat), anemia, uncontrolled HTN, hx CVA, PCI, T2DM, and gastroparesis. States that for the past several weeks he has had some chest tightness and increasing SOB. Unable to quantify how far he can walk, but states not very far. Some occasional dizziness whenever beginning to walk. Endorses palpitations. Had a stroke 3 years ago but denies residual symptoms. History of seizures, last 3 years ago. Reports having two stents done, he thinks at [facility] 2-3 years ago. Smokes cigars occasionally. Denies current alcohol use. 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 Also with non-healing DM ulcer on bottom of right foot.     TTE ordered resulting with EF 50%, mild AS, mod-severe MS from extensive calcification (MG 15, may be elevated due to volume overload), PAP 62.     - ASA   - Statin   - Norvasc 5   - Keppra 500 BID   - Pantoprazole 40 QD  - Polyethylene Glycol daily   - Sevelamer 1600 TID   - Renal diet with 1L fluid restriction per nephrology   - HD today  -  working on getting disc shipped from [facility]. Cath lab closed early today so likely won't be here until Monday. Patient unsure of what records he brought.

## 2020-07-25 NOTE — PROGRESS NOTES
[facility]  Cardiothoracic Surgery  Progress Note    Patient Name: João Aguirre  MRN: [id number]  Admission Date: 7/24/2020  Hospital Length of Stay: 1 days  Code Status: Full Code   Attending Physician: Surendra Porras MD   Referring Provider: Ruben Weston MD  Principal Problem:Nonrheumatic aortic (valve) stenosis            Subjective:     Post-Op Info:  * No surgery found *         Interval History: NAEON; awaiting disc; dialysis today    ROS  Medications:  Continuous Infusions:  Scheduled Meds:   sodium chloride 0.9%   Intravenous Once    amLODIPine  5 mg Oral BID    aspirin  325 mg Oral Daily    atorvastatin  40 mg Oral Daily    gabapentin  100 mg Oral BID    levETIRAcetam  500 mg Oral BID    methadone  100 mg Oral Daily    pantoprazole  40 mg Intravenous Daily    polyethylene glycol  17 g Oral Daily    sevelamer carbonate  1,600 mg Oral TID WM     PRN Meds:acetaminophen, albuterol-ipratropium, bisacodyL, ondansetron, oxyCODONE, oxyCODONE, promethazine, sodium chloride 0.9%     Objective:     Vital Signs (Most Recent):  Temp: 98 °F (36.7 °C) (07/25/20 1545)  Pulse: 71 (07/25/20 1545)  Resp: 17 (07/25/20 1545)  BP: 113/73 (07/25/20 1545)  SpO2: 97 % (07/25/20 1545) Vital Signs (24h Range):  Temp:  [97.6 °F (36.4 °C)-98.3 °F (36.8 °C)] 98 °F (36.7 °C)  Pulse:  [54-71] 71  Resp:  [15-18] 17  SpO2:  [95 %-99 %] 97 %  BP: ()/(51-77) 113/73     Weight: 99.8 kg (220 lb)  Body mass index is 29.84 kg/m².    SpO2: 97 %  O2 Device (Oxygen Therapy): nasal cannula    Intake/Output - Last 3 Shifts       07/23 0700 - 07/24 0659 07/24 0700 - 07/25 0659 07/25 0700 - 07/26 0659    P.O.  240     Other  650     Total Intake(mL/kg)  890 (8.9)     Urine (mL/kg/hr)  0 (0)     Other  4650 3151    Total Output  4650 3151    Net  -3760 -3151                 Lines/Drains/Airways     Drain                 Hemodialysis AV Fistula Left forearm -- days         Hemodialysis AV Fistula Left  forearm -- days         Hemodialysis AV Fistula Left forearm -- days          Peripheral Intravenous Line                 Peripheral IV - Single Lumen 07/24/20 1034 20 G;1 in Right Forearm 1 day                Physical Exam  Ambulating    Significant Labs:  BMP:   Recent Labs   Lab 07/25/20  0409   GLU 80      K 5.4*   CL 98   CO2 24   BUN 35*   CREATININE 5.1*   CALCIUM 9.0   MG 2.0     CBC:   Recent Labs   Lab 07/25/20  0409   WBC 7.06   RBC 3.22*   HGB 10.5*   HCT 32.9*      *   MCH 32.6*   MCHC 31.9*     CMP:   Recent Labs   Lab 07/24/20 2215 07/25/20  0409   GLU 80 80   CALCIUM 9.1 9.0   ALBUMIN 2.9*  --    PROT 7.7  --     137   K 5.2* 5.4*   CO2 28 24   CL 99 98   BUN 28* 35*   CREATININE 4.7* 5.1*   ALKPHOS 137*  --    ALT 16  --    AST 22  --    BILITOT 0.6  --      LFTs:   Recent Labs   Lab 07/24/20 2215   ALT 16   AST 22   ALKPHOS 137*   BILITOT 0.6   PROT 7.7   ALBUMIN 2.9*       Significant Diagnostics:  Echo: I have reviewed all pertinent results/findings within the past 24 hours 7/24  · Eccentric left ventricular hypertrophy.  · Low normal left ventricular systolic function. The estimated ejection fraction is 50%.  · Local segmental wall motion abnormalities.  · Normal right ventricular systolic function.  · Severe left atrial enlargement.  · Mild aortic valve stenosis. Aortic valve area is 1.57 cm2; peak velocity is 3.31 m/s; mean gradient is 24 mmHg.  · Mild aortic regurgitation.  · Moderate to severe mitral stenosis from extensive annular calcification. The mean diastolic gradient across the mitral valve is 15 mmHg at a heart rate of 73 bpm. Gradients may be elevated in part due to volume overload.  · The estimated PA systolic pressure is 62 mmHg.  · Elevated central venous pressure (15 mmHg).    Assessment/Plan:     Mitral stenosis  Patient is a 55 year old who presented from [place] for second opinion regarding valve replacement and CABG. Medical conditions include  ESRD on HD since 2013 (T/TH/Sat), anemia, uncontrolled HTN, hx CVA, PCI, T2DM, and gastroparesis. States that for the past several weeks he has had some chest tightness and increasing SOB. Unable to quantify how far he can walk, but states not very far. Some occasional dizziness whenever beginning to walk. Endorses palpitations. Had a stroke 3 years ago but denies residual symptoms. History of seizures, last 3 years ago. Reports having two stents done, he thinks at [facility] 2-3 years ago. Smokes cigars occasionally. Denies current alcohol use. 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 Also with non-healing DM ulcer on bottom of right foot.     TTE ordered resulting with EF 50%, mild AS, mod-severe MS from extensive calcification (MG 15, may be elevated due to volume overload), PAP 62.     - ASA   - Statin   - Norvasc 5   - Keppra 500 BID   - Pantoprazole 40 QD  - Polyethylene Glycol daily   - Sevelamer 1600 TID   - Renal diet with 1L fluid restriction per nephrology   - HD today  -  working on getting disc shipped from [facility]. Cath lab closed early today so likely won't be here until Monday. Patient unsure of what records he brought.    Jamil Schulz MD  Cardiothoracic Surgery  [facility] Attending Note:    I have personally taken the history and examined this patient and agree with the resident's note as stated above. Pleasant 55-year-old gentleman with end-stage renal disease and mitral stenosis.  He also has coronary disease.  While I believe mitral replacement will ultimately be the best therapy for him, I am concerned about the chronic infection in his foot and the likelihood that that will subsequently infect a valve replacement.   Will ask ID and Ortho to see him, as well as vascular  surgery, to evaluate his foot for osteo.

## 2020-07-25 NOTE — PROGRESS NOTES
Potassium came back 5.2 post dialysis. Notified Dr. Ogden. No further orders. Will continue to monitor.

## 2020-07-25 NOTE — SUBJECTIVE & OBJECTIVE
Interval History: NAEON; awaiting disc; dialysis today    ROS  Medications:  Continuous Infusions:  Scheduled Meds:   sodium chloride 0.9%   Intravenous Once    amLODIPine  5 mg Oral BID    aspirin  325 mg Oral Daily    atorvastatin  40 mg Oral Daily    gabapentin  100 mg Oral BID    levETIRAcetam  500 mg Oral BID    methadone  100 mg Oral Daily    pantoprazole  40 mg Intravenous Daily    polyethylene glycol  17 g Oral Daily    sevelamer carbonate  1,600 mg Oral TID WM     PRN Meds:acetaminophen, albuterol-ipratropium, bisacodyL, ondansetron, oxyCODONE, oxyCODONE, promethazine, sodium chloride 0.9%     Objective:     Vital Signs (Most Recent):  Temp: 98 °F (36.7 °C) (07/25/20 1545)  Pulse: 71 (07/25/20 1545)  Resp: 17 (07/25/20 1545)  BP: 113/73 (07/25/20 1545)  SpO2: 97 % (07/25/20 1545) Vital Signs (24h Range):  Temp:  [97.6 °F (36.4 °C)-98.3 °F (36.8 °C)] 98 °F (36.7 °C)  Pulse:  [54-71] 71  Resp:  [15-18] 17  SpO2:  [95 %-99 %] 97 %  BP: ()/(51-77) 113/73     Weight: 99.8 kg (220 lb)  Body mass index is 29.84 kg/m².    SpO2: 97 %  O2 Device (Oxygen Therapy): nasal cannula    Intake/Output - Last 3 Shifts       07/23 0700 - 07/24 0659 07/24 0700 - 07/25 0659 07/25 0700 - 07/26 0659    P.O.  240     Other  650     Total Intake(mL/kg)  890 (8.9)     Urine (mL/kg/hr)  0 (0)     Other  4650 3151    Total Output  4650 3151    Net  -3760 -3151                 Lines/Drains/Airways     Drain                 Hemodialysis AV Fistula Left forearm -- days         Hemodialysis AV Fistula Left forearm -- days         Hemodialysis AV Fistula Left forearm -- days          Peripheral Intravenous Line                 Peripheral IV - Single Lumen 07/24/20 1034 20 G;1 in Right Forearm 1 day                Physical Exam  Ambulating    Significant Labs:  BMP:   Recent Labs   Lab 07/25/20  0409   GLU 80      K 5.4*   CL 98   CO2 24   BUN 35*   CREATININE 5.1*   CALCIUM 9.0   MG 2.0     CBC:   Recent Labs   Lab  07/25/20  0409   WBC 7.06   RBC 3.22*   HGB 10.5*   HCT 32.9*      *   MCH 32.6*   MCHC 31.9*     CMP:   Recent Labs   Lab 07/24/20 2215 07/25/20  0409   GLU 80 80   CALCIUM 9.1 9.0   ALBUMIN 2.9*  --    PROT 7.7  --     137   K 5.2* 5.4*   CO2 28 24   CL 99 98   BUN 28* 35*   CREATININE 4.7* 5.1*   ALKPHOS 137*  --    ALT 16  --    AST 22  --    BILITOT 0.6  --      LFTs:   Recent Labs   Lab 07/24/20 2215   ALT 16   AST 22   ALKPHOS 137*   BILITOT 0.6   PROT 7.7   ALBUMIN 2.9*       Significant Diagnostics:  Echo: I have reviewed all pertinent results/findings within the past 24 hours 7/24  · Eccentric left ventricular hypertrophy.  · Low normal left ventricular systolic function. The estimated ejection fraction is 50%.  · Local segmental wall motion abnormalities.  · Normal right ventricular systolic function.  · Severe left atrial enlargement.  · Mild aortic valve stenosis. Aortic valve area is 1.57 cm2; peak velocity is 3.31 m/s; mean gradient is 24 mmHg.  · Mild aortic regurgitation.  · Moderate to severe mitral stenosis from extensive annular calcification. The mean diastolic gradient across the mitral valve is 15 mmHg at a heart rate of 73 bpm. Gradients may be elevated in part due to volume overload.  · The estimated PA systolic pressure is 62 mmHg.  · Elevated central venous pressure (15 mmHg).

## 2020-07-25 NOTE — PROGRESS NOTES
3.5 hour HD Tx ended 24 minutes early due to leg cramping. Removed 2.6 L. Removed 2 needles from Lt FA AVF.pressure applied until hemostasis.

## 2020-07-25 NOTE — PLAN OF CARE
Problem: Adult Inpatient Plan of Care  Goal: Plan of Care Review  Outcome: Ongoing, Progressing    Pt remained free from falls/trauma/injuries. No complaints of CP/SOB/discomfort. Dialysis in the morning. VSS. Fall bundle in place. POC explained, no questions at this time. Pt tolerating care.

## 2020-07-25 NOTE — PROGRESS NOTES
Arrived to [facility] via stretcher. AAO x 4. Amb to scale then to bed. Agreed to 3.5 hour HD Tx.  UF goal 3L.

## 2020-07-25 NOTE — PROGRESS NOTES
Hemodialysis Note  Pt seen and evaluated while undergoing dialysis. Tolerating dialysis with current UFR, without complications. Labs reviewed and dialysate bath adjusted.     BFR: 300  UF goal: 3L as tolerated  Anemia: Hgb 10.5  MBD: Ca+ 9.0; Ph- 7.3; alb 2.9; corrected Ca+ 9.88  -on sevelamer carbonate  HTN: stable  Diet: on 1L renal diet; on Novasource renal

## 2020-07-25 NOTE — PLAN OF CARE
Plan of care discussed with patient. Patient went to HD today removed 2.5 L. Patient is free of fall/trauma/injury. Denies CP, SOB, or pain/discomfort. All questions addressed. Will continue to monitor

## 2020-07-26 PROBLEM — E11.628 DIABETIC FOOT INFECTION: Status: ACTIVE | Noted: 2020-01-01

## 2020-07-26 PROBLEM — L08.9 DIABETIC FOOT INFECTION: Status: ACTIVE | Noted: 2020-01-01

## 2020-07-26 NOTE — ASSESSMENT & PLAN NOTE
54 y/o male with a history blindness to right eye, DM, CAD, treated tuberculosis, cardiomyopathy and valvular heart disease.  He has had a chronic ulcer to the right foot, base of 5th digit for over 6 months now.  He also has had a smaller ulcer to the based of his right foot first digit.  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  He says that he was treated with IV vancomycin for 6-8 weeks and then it was stopped.  He continued to get wound care from a facility affiliated with [facility] [place].  He states that he started having puss come out of the wound again.  Per patient cultures were obtained and revealed 'staph' and 'yeast'.  He states that an MRI showed bone infection.  He was supposed to be started on IV vancomycin.  However he developed chest pain and was admitted to [facility] in [place].  He was evaluated and it was felt his chest pain was due to severe aortic stenosis and severe coronary artery disease.  He has been transferred to our facility for a higher lever of care for his cardiac disease.  I am consulted to assist with his diabetic foot infection.    Will try to obtain records from [facility] with respect to imaging and wound cultures.  Will also try to contact his infectious diseases doctors office (Dr. Matt Carrasco) in [place] to get their notes and prior treatment plan.  Hold off on antibiotics for his foot for now.  Okay to obtain MRI of right foot to assist with therapeutic decisions and also to compare to his prior one.

## 2020-07-26 NOTE — PLAN OF CARE
Patient remains free from fall and injury. Does not complain of any pain at this time. Patient in normal sinus rhythm on continuous cardiac monitoring; no S/S of cardiac distress noted at this time. BG monitoring continued; no insulin cover per sliding scale. Patient diuresing and adhering to fluid restrictions. Plan of care reviewed with patient. Call light within reach. Bed in lowest locked position. Will continue to monitor.

## 2020-07-26 NOTE — ASSESSMENT & PLAN NOTE
-Ulcers noted to right foot with, dry and ischemic in appearence, does not probe to bone, no active signs of infection noted.  -Xray ordered, consider MRI  -Recommend vascular surgery consult, pt with history of PAD and non-healing wounds  -Wound dressed with betadine and dry sterile dressing  -Pt PWB to heel only in DARCO shoe  -Podiatry will follow

## 2020-07-26 NOTE — CONSULTS
[facility]  Infectious Disease  Consult Note    Patient Name: João Aguirre  MRN: [id number]  Admission Date: 7/24/2020  Hospital Length of Stay: 2 days  Attending Physician: Surendra Porras MD  Primary Care Provider: Primary Doctor No     Isolation Status: No active isolations    Patient information was obtained from patient, past medical records and ER records.      Inpatient consult to Infectious Diseases  Consult performed by: Duran Feng MD  Consult ordered by: Jamil Schulz MD        Assessment/Plan:     Diabetic foot infection  54 y/o male with a history blindness to right eye, DM, CAD, treated tuberculosis, cardiomyopathy and valvular heart disease.  He has had a chronic ulcer to the right foot, base of 5th digit for over 6 months now.  He also has had a smaller ulcer to the based of his right foot first digit.  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  He says that he was treated with IV vancomycin for 6-8 weeks and then it was stopped.  He continued to get wound care from a facility affiliated with [place], [place].  He states that he started having puss come out of the wound again.  Per patient cultures were obtained and revealed 'staph' and 'yeast'.  He states that an MRI showed bone infection.  He was supposed to be started on IV vancomycin.  However he developed chest pain and was admitted to [facility] in [place].  He was evaluated and it was felt his chest pain was due to severe aortic stenosis and severe coronary artery disease.  He has been transferred to our facility for a higher lever of care for his cardiac disease.  I am consulted to assist with his diabetic foot infection.    Will try to obtain records from [facility] with respect to imaging and wound cultures.  Will also try to contact his infectious diseases doctors office (Dr. Matt Carrasco) in  [place] to get their notes and prior treatment plan.  Hold off on antibiotics for his foot for now.  Okay to obtain MRI of right foot to assist with therapeutic decisions and also to compare to his prior one.          Thank you for your consult. I will follow-up with patient. Please contact us if you have any additional questions.    Duran Feng MD  Infectious Disease  [facility]    Subjective:     Principal Problem: Mitral stenosis    HPI: 56 y/o male with a history blindness to right eye, DM, CAD, treated tuberculosis, cardiomyopathy and valvular heart disease.  He has had a chronic ulcer to the right foot, base of 5th digit for over 6 months now.  He also has had a smaller ulcer to the based of his right foot first digit.  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  He says that he was treated with IV vancomycin for 6-8 weeks and then it was stopped.  He continued to get wound care from a facility affiliated with [facility], [place].  He states that he started having puss come out of the wound again.  Per patient cultures were obtained and revealed 'staph' and 'yeast'.  He states that an MRI showed bone infection.  He was supposed to be started on IV vancomycin.  However he developed chest pain and was admitted to [facility] in [place].  He was evaluated and it was felt his chest pain was due to severe aortic stenosis and severe coronary artery disease.  He has been transferred to our facility for a higher lever of care for his cardiac disease.  I am consulted to assist with his diabetic foot infection.    Past Medical History:   Diagnosis Date    Anticoagulant long-term use     Arthritis     Asthma     Back pain     Coronary artery disease 2013    stent    Diabetes mellitus     Encounter for blood transfusion     Eye abnormality right eye    injured as a child    Gastritis     Gastroparesis      Hemodialysis patient     Hypertension     Pneumonia 2013    Spend 6weeks in hospital at [facility]    Renal disorder     Stroke        Past Surgical History:   Procedure Laterality Date    AV FISTULA PLACEMENT      BACK SURGERY      CARDIAC SURGERY  2013    heart stent    CHOLECYSTECTOMY      EYE SURGERY      R eye    INCISION AND DRAINAGE OF ABSCESS Right 9/6/2018    Procedure: INCISION AND DRAINAGE, ABSCESS;  Surgeon: Chetan Rothman MD;  Location: [facility];  Service: General;  Laterality: Right;       Review of patient's allergies indicates:   Allergen Reactions    Antibiotic hc      Counter acts with methodone.          Medications:  Medications Prior to Admission   Medication Sig    albuterol (PROAIR HFA) 90 mcg/actuation inhaler INHALE TWO PUFFS EVERY 4 TO 6 HOURS AS NEEDED    albuterol (PROAIR HFA) 90 mcg/actuation inhaler INHALE TWO PUFFS EVERY 4 TO 6 HOURS AS NEEDED    amlodipine (NORVASC) 5 MG tablet Take 5 mg by mouth 2 (two) times daily.    aspirin 325 MG tablet Take 1 tablet (325 mg total) by mouth once daily.    atorvastatin (LIPITOR) 10 MG tablet     atorvastatin (LIPITOR) 40 MG tablet TAKE ONE TABLET BY MOUTH DAILY    blood sugar diagnostic (TRUE METRIX GLUCOSE TEST STRIP) Strp     blood-glucose meter (PHARMACIST CHOICE GLUCOSE SYS) Misc 1 Device.    carvedilol (COREG) 3.125 MG tablet     ELIQUIS 2.5 mg Tab     ferric citrate (AURYXIA) 210 mg iron Tab Take 420 mg by mouth 3 (three) times daily.    fluconazole (DIFLUCAN) 100 MG tablet     fluticasone propionate (FLONASE) 50 mcg/actuation nasal spray 1 spray by Each Nostril route once daily.    fluticasone-salmeterol 250-50 mcg/dose (ADVAIR DISKUS) 250-50 mcg/dose diskus inhaler Inhale 1 puff into the lungs once daily.     gabapentin (NEURONTIN) 100 MG capsule Take 100 mg by mouth 2 (two) times daily.    hydrALAZINE (APRESOLINE) 50 MG tablet     levetiracetam (KEPPRA) 500 MG Tab Take 500 mg twice a day.  Take an extra tablet  right after dialysis (making 3 tablets on your dialysis days)    methadone (DOLOPHINE) 10 MG tablet Take 9 tablets (90 mg total) by mouth once daily.    metroNIDAZOLE (FLAGYL) 500 MG tablet     minoxidil (LONITEN) 2.5 MG tablet Take 5 mg by mouth 2 (two) times daily.    MOVIPREP 100-7.5-2.691 gram solution     pantoprazole (PROTONIX) 40 MG tablet     sevelamer carbonate (RENVELA) 800 mg Tab Take 1,600 mg by mouth 3 (three) times daily with meals.     silver sulfADIAZINE 1% (SILVADENE) 1 % cream Apply topically once daily.     Antibiotics (From admission, onward)    None        Antifungals (From admission, onward)    None        Antivirals (From admission, onward)    None           Immunization History   Administered Date(s) Administered    Hepatitis B, Adult 2013, 2013, 2013, 10/16/2013    Influenza - Quadrivalent - PF (6 months and older) 10/09/2014    Influenza - Trivalent - PF (ADULT) 10/07/2013, 10/12/2015, 10/07/2016    Pneumococcal Conjugate - 13 Valent 2015    Pneumococcal Polysaccharide - 23 Valent 2013       Family History     Problem Relation (Age of Onset)    Aneurysm Mother, Father (77)    Diabetes Brother    Heart disease Father, Paternal Grandmother    Kidney disease Brother        Social History     Socioeconomic History    Marital status: Legally      Spouse name: Not on file    Number of children: Not on file    Years of education: Not on file    Highest education level: Not on file   Occupational History    Not on file   Social Needs    Financial resource strain: Not on file    Food insecurity     Worry: Not on file     Inability: Not on file    Transportation needs     Medical: Not on file     Non-medical: Not on file   Tobacco Use    Smoking status: Former Smoker     Years: 10.00     Quit date: 2015     Years since quittin.9    Smokeless tobacco: Never Used   Substance and Sexual Activity    Alcohol use: No    Drug use: Yes      Frequency: 4.0 times per week     Types: Other-see comments     Comment: marijuana    Sexual activity: Yes   Lifestyle    Physical activity     Days per week: Not on file     Minutes per session: Not on file    Stress: Not on file   Relationships    Social connections     Talks on phone: Not on file     Gets together: Not on file     Attends L[employer]-day service: Not on file     Active member of club or organization: Not on file     Attends meetings of clubs or organizations: Not on file     Relationship status: Not on file   Other Topics Concern    Not on file   Social History Narrative    Not on file     Review of Systems   Respiratory: Positive for chest tightness and shortness of breath.    Cardiovascular: Positive for chest pain.   Skin: Positive for wound.   All other systems reviewed and are negative.    Objective:     Vital Signs (Most Recent):  Temp: 98.3 °F (36.8 °C) (07/26/20 1109)  Pulse: 66 (07/26/20 1500)  Resp: 15 (07/26/20 1500)  BP: 102/70 (07/26/20 1500)  SpO2: 97 % (07/26/20 1500) Vital Signs (24h Range):  Temp:  [97.9 °F (36.6 °C)-98.3 °F (36.8 °C)] 98.3 °F (36.8 °C)  Pulse:  [58-80] 66  Resp:  [15-18] 15  SpO2:  [94 %-98 %] 97 %  BP: ()/(62-76) 102/70     Weight: 99.8 kg (220 lb)  Body mass index is 29.84 kg/m².    Estimated Creatinine Clearance: 17.5 mL/min (A) (based on SCr of 5.9 mg/dL (H)).    Physical Exam  Vitals signs and nursing note reviewed.   Constitutional:       General: He is not in acute distress.  HENT:      Head: Normocephalic and atraumatic.      Right Ear: External ear normal.      Left Ear: External ear normal.      Mouth/Throat:      Mouth: Mucous membranes are moist.      Pharynx: Oropharynx is clear.   Eyes:      Comments: Right eye blindness   Neck:      Musculoskeletal: Normal range of motion and neck supple.   Cardiovascular:      Rate and Rhythm: Normal rate.      Heart sounds: Murmur present.   Pulmonary:      Effort: Pulmonary effort is normal. No  respiratory distress.      Breath sounds: Normal breath sounds.   Abdominal:      General: Abdomen is flat.      Palpations: Abdomen is soft.   Musculoskeletal: Normal range of motion.         General: No swelling or tenderness.   Skin:     General: Skin is warm and dry.      Comments: Right foot wrapped in dressings.  Pictures of foot obtained today were reviewed.   Neurological:      General: No focal deficit present.      Mental Status: He is alert and oriented to person, place, and time.         Significant Labs:   Microbiology Results (last 7 days)     ** No results found for the last 168 hours. **          Significant Imaging: I have reviewed all pertinent imaging results/findings within the past 24 hours.

## 2020-07-26 NOTE — SUBJECTIVE & OBJECTIVE
Scheduled Meds:   sodium chloride 0.9%   Intravenous Once    amLODIPine  5 mg Oral BID    aspirin  325 mg Oral Daily    atorvastatin  40 mg Oral Daily    gabapentin  100 mg Oral BID    levETIRAcetam  500 mg Oral BID    methadone  100 mg Oral Daily    pantoprazole  40 mg Intravenous Daily    polyethylene glycol  17 g Oral Daily    sevelamer carbonate  1,600 mg Oral TID WM     Continuous Infusions:  PRN Meds:acetaminophen, albuterol-ipratropium, bisacodyL, ondansetron, oxyCODONE, oxyCODONE, promethazine, sodium chloride 0.9%    Review of patient's allergies indicates:   Allergen Reactions    Antibiotic hc      Counter acts with methodone.           Past Medical History:   Diagnosis Date    Anticoagulant long-term use     Arthritis     Asthma     Back pain     Coronary artery disease 2013    stent    Diabetes mellitus     Encounter for blood transfusion     Eye abnormality right eye    injured as a child    Gastritis     Gastroparesis     Hemodialysis patient     Hypertension     Pneumonia 2013    Spend 6weeks in hospital at [facility]    Renal disorder     Stroke      Past Surgical History:   Procedure Laterality Date    AV FISTULA PLACEMENT      BACK SURGERY      CARDIAC SURGERY      heart stent    CHOLECYSTECTOMY      EYE SURGERY      R eye    INCISION AND DRAINAGE OF ABSCESS Right 2018    Procedure: INCISION AND DRAINAGE, ABSCESS;  Surgeon: Chetan Rothman MD;  Location: [facility];  Service: General;  Laterality: Right;       Family History     Problem Relation (Age of Onset)    Aneurysm Mother, Father (77)    Diabetes Brother    Heart disease Father, Paternal Grandmother    Kidney disease Brother        Tobacco Use    Smoking status: Former Smoker     Years: 10.00     Quit date: 2015     Years since quittin.9    Smokeless tobacco: Never Used   Substance and Sexual Activity    Alcohol use: No    Drug use: Yes     Frequency: 4.0 times per week     Types: Other-see  comments     Comment: marijuana    Sexual activity: Yes     Review of Systems   Constitutional: Negative for activity change, chills, diaphoresis and fever.   HENT: Negative for congestion and hearing loss.    Respiratory: Negative for cough.    Cardiovascular: Negative for leg swelling.   Gastrointestinal: Negative for nausea and vomiting.   Musculoskeletal: Positive for gait problem and myalgias. Negative for joint swelling.   Skin: Positive for color change and wound. Negative for pallor and rash.   Neurological: Positive for numbness. Negative for tremors, speech difficulty and weakness.   Psychiatric/Behavioral: Negative for agitation, confusion and suicidal ideas. The patient is not nervous/anxious.      Objective:     Vital Signs (Most Recent):  Temp: 98.3 °F (36.8 °C) (07/26/20 1109)  Pulse: 71 (07/26/20 1157)  Resp: 16 (07/26/20 0858)  BP: 130/76 (07/26/20 1109)  SpO2: 98 % (07/26/20 1109) Vital Signs (24h Range):  Temp:  [97.9 °F (36.6 °C)-98.3 °F (36.8 °C)] 98.3 °F (36.8 °C)  Pulse:  [58-80] 71  Resp:  [16-18] 16  SpO2:  [94 %-98 %] 98 %  BP: ()/(62-76) 130/76     Weight: 99.8 kg (220 lb)  Body mass index is 29.84 kg/m².    Foot Exam    General  General Appearance: appears stated age and healthy   Orientation: alert and oriented to person, place, and time   Affect: appropriate       Right Foot/Ankle     Inspection and Palpation  Ecchymosis: none  Tenderness: none   Swelling: lesser metatarsophalangeal joints   Skin Exam: callus, dry skin, skin changes, abnormal color, ulcer and erythema; no blister, no drainage and no cellulitis     Neurovascular  Dorsalis pedis: absent  Posterior tibial: absent  Saphenous nerve sensation: diminished  Tibial nerve sensation: diminished  Superficial peroneal nerve sensation: diminished  Deep peroneal nerve sensation: diminished  Sural nerve sensation: diminished    Comments  See wound description below    Left Foot/Ankle      Inspection and Palpation  Skin Exam:  skin intact;             Laboratory:  A1C: No results for input(s): HGBA1C in the last 4320 hours.  Blood Cultures: No results for input(s): LABBLOO in the last 48 hours.  CBC:   Recent Labs   Lab 07/26/20 0425   WBC 8.13   RBC 3.55*   HGB 11.5*   HCT 37.4*      *   MCH 32.4*   MCHC 30.7*     CMP:   Recent Labs   Lab 07/24/20 2215 07/26/20 0425   GLU 80   < > 81   CALCIUM 9.1   < > 10.0   ALBUMIN 2.9*  --   --    PROT 7.7  --   --       < > 138   K 5.2*   < > 5.6*   CO2 28   < > 23   CL 99   < > 102   BUN 28*   < > 49*   CREATININE 4.7*   < > 5.1*   ALKPHOS 137*  --   --    ALT 16  --   --    AST 22  --   --    BILITOT 0.6  --   --     < > = values in this interval not displayed.     CRP: No results for input(s): CRP in the last 168 hours.  ESR: No results for input(s): SEDRATE in the last 168 hours.    Diagnostic Results:  I have reviewed all pertinent imaging results/findings within the past 24 hours.    Clinical Findings:

## 2020-07-26 NOTE — PLAN OF CARE
Pt free of falls/trauma/injuries. A&Ox4 VVS Denies c/o SOB; O2Sats remain stable on room air. SATs above 90%. Pt in NSR on monitor. BG monitored, no coverage needed. Pt diuresing well. Educated on fall precautions and use of call light. Pt tolerating plan of care. Will continue to monitor.

## 2020-07-26 NOTE — ASSESSMENT & PLAN NOTE
Patient is a 55 year old who presented from [place] for second opinion regarding AVR and CABG. Medical conditions include ESRD on HD since 2013 (T/TH/Sat), anemia, uncontrolled HTN, hx CVA, PCI, T2DM, and gastroparesis. States that for the past several weeks he has had some chest tightness and increasing SOB. Unable to quantify how far he can walk, but states not very far. Some occasional dizziness whenever beginning to walk. Endorses palpitations. Had a stroke 3 years ago but denies residual symptoms. History of seizures, last 3 years ago. Reports having two stents done, he thinks at [facility] 2-3 years ago. Smokes cigars occasionally. Denies current alcohol use. 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 Also with non-healing DM ulcer on bottom of right foot.     TTE ordered resulting with EF 50%, mild AS, mod-severe MS from extensive calcification (MG 15, may be elevated due to volume overload), PAP 62.     -consult podiatry, vascular, and infectious disease for R foot ulcers, document osteo and long-term abx use  - ASA   - Statin   - Norvasc 5   - Keppra 500 BID   - Pantoprazole 40 QD  - Polyethylene Glycol daily   - Sevelamer 1600 TID   - Renal diet with 1L fluid restriction per nephrology   - HD today  -  working on getting disc shipped from [facility] [place]-reports will be in tomorrow  - f/u

## 2020-07-26 NOTE — CONSULTS
[facility]  Podiatry  Consult Note    Patient Name: João Aguirre  MRN: [id number]  Admission Date: 7/24/2020  Hospital Length of Stay: 2 days  Attending Physician: Surendra Porras MD  Primary Care Provider: Primary Doctor No     Inpatient consult to Podiatry  Consult performed by: Tomeka Camacho DPM  Consult ordered by: Jamil Schulz MD        Subjective:     History of Present Illness:  Pt is a 54 y/o male  has a past medical history of Anticoagulant long-term use, Arthritis, Asthma, Back pain, Coronary artery disease, Diabetes mellitus, Encounter for blood transfusion, Eye abnormality, Gastritis, Gastroparesis, Hemodialysis patient, Hypertension, Pneumonia, Renal disorder, and Stroke.     Consulted to podiatry for right non-healing diabetic foot wounds, pt with history of osteomyelitis and PAD. Relates to mild pain to lateral ulcer. Denies drainage. No other pedal complaints at this time.     Scheduled Meds:   sodium chloride 0.9%   Intravenous Once    amLODIPine  5 mg Oral BID    aspirin  325 mg Oral Daily    atorvastatin  40 mg Oral Daily    gabapentin  100 mg Oral BID    levETIRAcetam  500 mg Oral BID    methadone  100 mg Oral Daily    pantoprazole  40 mg Intravenous Daily    polyethylene glycol  17 g Oral Daily    sevelamer carbonate  1,600 mg Oral TID WM     Continuous Infusions:  PRN Meds:acetaminophen, albuterol-ipratropium, bisacodyL, ondansetron, oxyCODONE, oxyCODONE, promethazine, sodium chloride 0.9%    Review of patient's allergies indicates:   Allergen Reactions    Antibiotic hc      Counter acts with methodone.           Past Medical History:   Diagnosis Date    Anticoagulant long-term use     Arthritis     Asthma     Back pain     Coronary artery disease 2013    stent    Diabetes mellitus     Encounter for blood transfusion     Eye abnormality right eye    injured as a child    Gastritis     Gastroparesis     Hemodialysis patient      Hypertension     Pneumonia 2013    Spend 6weeks in hospital at [facility]    Renal disorder     Stroke      Past Surgical History:   Procedure Laterality Date    AV FISTULA PLACEMENT      BACK SURGERY      CARDIAC SURGERY  2013    heart stent    CHOLECYSTECTOMY      EYE SURGERY      R eye    INCISION AND DRAINAGE OF ABSCESS Right 2018    Procedure: INCISION AND DRAINAGE, ABSCESS;  Surgeon: Chetan Rothman MD;  Location: [facility];  Service: General;  Laterality: Right;       Family History     Problem Relation (Age of Onset)    Aneurysm Mother, Father (77)    Diabetes Brother    Heart disease Father, Paternal Grandmother    Kidney disease Brother        Tobacco Use    Smoking status: Former Smoker     Years: 10.00     Quit date: 2015     Years since quittin.9    Smokeless tobacco: Never Used   Substance and Sexual Activity    Alcohol use: No    Drug use: Yes     Frequency: 4.0 times per week     Types: Other-see comments     Comment: marijuana    Sexual activity: Yes     Review of Systems   Constitutional: Negative for activity change, chills, diaphoresis and fever.   HENT: Negative for congestion and hearing loss.    Respiratory: Negative for cough.    Cardiovascular: Negative for leg swelling.   Gastrointestinal: Negative for nausea and vomiting.   Musculoskeletal: Positive for gait problem and myalgias. Negative for joint swelling.   Skin: Positive for color change and wound. Negative for pallor and rash.   Neurological: Positive for numbness. Negative for tremors, speech difficulty and weakness.   Psychiatric/Behavioral: Negative for agitation, confusion and suicidal ideas. The patient is not nervous/anxious.      Objective:     Vital Signs (Most Recent):  Temp: 98.3 °F (36.8 °C) (20 1109)  Pulse: 71 (20 1157)  Resp: 16 (20 0858)  BP: 130/76 (20 1109)  SpO2: 98 % (20 1109) Vital Signs (24h Range):  Temp:  [97.9 °F (36.6 °C)-98.3 °F (36.8 °C)] 98.3 °F (36.8  °C)  Pulse:  [58-80] 71  Resp:  [16-18] 16  SpO2:  [94 %-98 %] 98 %  BP: ()/(62-76) 130/76     Weight: 99.8 kg (220 lb)  Body mass index is 29.84 kg/m².    Foot Exam    General  General Appearance: appears stated age and healthy   Orientation: alert and oriented to person, place, and time   Affect: appropriate       Right Foot/Ankle     Inspection and Palpation  Ecchymosis: none  Tenderness: none   Swelling: lesser metatarsophalangeal joints   Skin Exam: callus, dry skin, skin changes, abnormal color, ulcer and erythema; no blister, no drainage and no cellulitis     Neurovascular  Dorsalis pedis: absent  Posterior tibial: absent  Saphenous nerve sensation: diminished  Tibial nerve sensation: diminished  Superficial peroneal nerve sensation: diminished  Deep peroneal nerve sensation: diminished  Sural nerve sensation: diminished    Comments  See wound description below    Left Foot/Ankle      Inspection and Palpation  Skin Exam: skin intact;             Laboratory:  A1C: No results for input(s): HGBA1C in the last 4320 hours.  Blood Cultures: No results for input(s): LABBLOO in the last 48 hours.  CBC:   Recent Labs   Lab 07/26/20  0425   WBC 8.13   RBC 3.55*   HGB 11.5*   HCT 37.4*      *   MCH 32.4*   MCHC 30.7*     CMP:   Recent Labs   Lab 07/24/20  2215  07/26/20  0425   GLU 80   < > 81   CALCIUM 9.1   < > 10.0   ALBUMIN 2.9*  --   --    PROT 7.7  --   --       < > 138   K 5.2*   < > 5.6*   CO2 28   < > 23   CL 99   < > 102   BUN 28*   < > 49*   CREATININE 4.7*   < > 5.1*   ALKPHOS 137*  --   --    ALT 16  --   --    AST 22  --   --    BILITOT 0.6  --   --     < > = values in this interval not displayed.     CRP: No results for input(s): CRP in the last 168 hours.  ESR: No results for input(s): SEDRATE in the last 168 hours.    Diagnostic Results:  I have reviewed all pertinent imaging results/findings within the past 24 hours.    Clinical Findings:    Wound plantar 1st metatarsal head  with hyperkeratotic border and overlying eschar, appears stable and dry with no drainage noted, no acute signs of infection noted, does not probe to bone, no signs of abscess.         Wound plantar 5th metatarsal head with hyperkeratotic border and mixed granular necrotic base, appears stable and dry with no drainage noted, no acute signs of infection noted, no malodor. Does not probe to bone, no signs of abscess.           Assessment/Plan:     * Nonrheumatic aortic (valve) stenosis  Per primary    ESRD (end stage renal disease) on dialysis  Per primary     Mitral stenosis  Per primary    Chronic kidney disease-mineral and bone disorder  Per primary     Skin ulcer of right foot with fat layer exposed  -Ulcers noted to right foot with, dry and ischemic in appearence, does not probe to bone, no active signs of infection noted.  -Xray ordered, consider MRI  -Recommend vascular surgery consult, pt with history of PAD and non-healing wounds  -Wound dressed with betadine and dry sterile dressing  -Pt PWB to heel only in DARCO shoe  -Podiatry will follow          Hyperkalemia  Per primary    Seizure disorder  Per primary    Benign hypertension with ESRD (end-stage renal disease)  Per primary    ESRD (T,Th,Sat) dialysis onset 2013  Per primary        Thank you for your consult. I will follow-up with patient. Please contact us if you have any additional questions.    Tomeka Camacho, ARCHIEM  Podiatry  [facility]

## 2020-07-26 NOTE — SUBJECTIVE & OBJECTIVE
Interval History: no pain; no dyspnea; dialysis yesterday    Review of Systems   Constitution: Negative for malaise/fatigue.   Cardiovascular: Negative for chest pain, claudication, dyspnea on exertion, irregular heartbeat, leg swelling and palpitations.   Respiratory: Positive for shortness of breath. Negative for cough.    Hematologic/Lymphatic: Negative for bleeding problem.   Gastrointestinal: Negative for abdominal pain.   Genitourinary: Negative for dysuria.   Neurological: Negative for headaches and weakness.     Medications:  Continuous Infusions:  Scheduled Meds:   sodium chloride 0.9%   Intravenous Once    amLODIPine  5 mg Oral BID    aspirin  325 mg Oral Daily    atorvastatin  40 mg Oral Daily    gabapentin  100 mg Oral BID    levETIRAcetam  500 mg Oral BID    methadone  100 mg Oral Daily    pantoprazole  40 mg Intravenous Daily    polyethylene glycol  17 g Oral Daily    sevelamer carbonate  1,600 mg Oral TID WM     PRN Meds:acetaminophen, albuterol-ipratropium, bisacodyL, ondansetron, oxyCODONE, oxyCODONE, promethazine, sodium chloride 0.9%     Objective:     Vital Signs (Most Recent):  Temp: 98.3 °F (36.8 °C) (07/26/20 1109)  Pulse: 71 (07/26/20 1157)  Resp: 16 (07/26/20 0858)  BP: 130/76 (07/26/20 1109)  SpO2: 98 % (07/26/20 1109) Vital Signs (24h Range):  Temp:  [97.9 °F (36.6 °C)-98.3 °F (36.8 °C)] 98.3 °F (36.8 °C)  Pulse:  [58-80] 71  Resp:  [16-18] 16  SpO2:  [94 %-98 %] 98 %  BP: ()/(62-76) 130/76     Weight: 99.8 kg (220 lb)  Body mass index is 29.84 kg/m².    SpO2: 98 %  O2 Device (Oxygen Therapy): room air    Intake/Output - Last 3 Shifts       07/24 0700 - 07/25 0659 07/25 0700 - 07/26 0659 07/26 0700 - 07/27 0659    P.O. 240 1080 720    Other 650      Total Intake(mL/kg) 890 (8.9) 1080 (10.8) 720 (7.2)    Urine (mL/kg/hr) 0 (0) 0 (0)     Other 4650 3151     Total Output 4650 3151     Net -3760 -2071 +720                 Lines/Drains/Airways     Drain                  Hemodialysis AV Fistula Left forearm -- days         Hemodialysis AV Fistula Left forearm -- days         Hemodialysis AV Fistula Left forearm -- days          Peripheral Intravenous Line                 Peripheral IV - Single Lumen 07/24/20 1034 20 G;1 in Right Forearm 2 days                Physical Exam  Constitutional:       Appearance: He is well-developed.   HENT:      Head: Normocephalic and atraumatic.   Neck:      Musculoskeletal: Normal range of motion.   Cardiovascular:      Rate and Rhythm: Normal rate and regular rhythm.      Heart sounds: Normal heart sounds.   Pulmonary:      Effort: Pulmonary effort is normal.      Breath sounds: Normal breath sounds.   Abdominal:      Palpations: Abdomen is soft.   Musculoskeletal: Normal range of motion.   Skin:     General: Skin is warm and dry.      Capillary Refill: Capillary refill takes less than 2 seconds.   Neurological:      Mental Status: He is alert and oriented to person, place, and time.       R foot chronic ulcers without signs of infection but some ttp    Significant Labs:  Recent Labs   Lab 07/27/20  0744   WBC 8.37   RBC 3.40*   HGB 11.0*   HCT 36.0*      *   MCH 32.4*   MCHC 30.6*     Significant Diagnostics:  Films/MRI pending

## 2020-07-26 NOTE — SUBJECTIVE & OBJECTIVE
Past Medical History:   Diagnosis Date    Anticoagulant long-term use     Arthritis     Asthma     Back pain     Coronary artery disease 2013    stent    Diabetes mellitus     Encounter for blood transfusion     Eye abnormality right eye    injured as a child    Gastritis     Gastroparesis     Hemodialysis patient     Hypertension     Pneumonia 2013    Spend 6weeks in hospital at [place]    Renal disorder     Stroke        Past Surgical History:   Procedure Laterality Date    AV FISTULA PLACEMENT      BACK SURGERY      CARDIAC SURGERY  2013    heart stent    CHOLECYSTECTOMY      EYE SURGERY      R eye    INCISION AND DRAINAGE OF ABSCESS Right 9/6/2018    Procedure: INCISION AND DRAINAGE, ABSCESS;  Surgeon: Chetan Rothman MD;  Location: [facility];  Service: General;  Laterality: Right;       Review of patient's allergies indicates:   Allergen Reactions    Antibiotic hc      Counter acts with methodone.          Medications:  Medications Prior to Admission   Medication Sig    albuterol (PROAIR HFA) 90 mcg/actuation inhaler INHALE TWO PUFFS EVERY 4 TO 6 HOURS AS NEEDED    albuterol (PROAIR HFA) 90 mcg/actuation inhaler INHALE TWO PUFFS EVERY 4 TO 6 HOURS AS NEEDED    amlodipine (NORVASC) 5 MG tablet Take 5 mg by mouth 2 (two) times daily.    aspirin 325 MG tablet Take 1 tablet (325 mg total) by mouth once daily.    atorvastatin (LIPITOR) 10 MG tablet     atorvastatin (LIPITOR) 40 MG tablet TAKE ONE TABLET BY MOUTH DAILY    blood sugar diagnostic (TRUE METRIX GLUCOSE TEST STRIP) Strp     blood-glucose meter (PHARMACIST CHOICE GLUCOSE SYS) Misc 1 Device.    carvedilol (COREG) 3.125 MG tablet     ELIQUIS 2.5 mg Tab     ferric citrate (AURYXIA) 210 mg iron Tab Take 420 mg by mouth 3 (three) times daily.    fluconazole (DIFLUCAN) 100 MG tablet     fluticasone propionate (FLONASE) 50 mcg/actuation nasal spray 1 spray by Each Nostril route once daily.    fluticasone-salmeterol 250-50  mcg/dose (ADVAIR DISKUS) 250-50 mcg/dose diskus inhaler Inhale 1 puff into the lungs once daily.     gabapentin (NEURONTIN) 100 MG capsule Take 100 mg by mouth 2 (two) times daily.    hydrALAZINE (APRESOLINE) 50 MG tablet     levetiracetam (KEPPRA) 500 MG Tab Take 500 mg twice a day.  Take an extra tablet right after dialysis (making 3 tablets on your dialysis days)    methadone (DOLOPHINE) 10 MG tablet Take 9 tablets (90 mg total) by mouth once daily.    metroNIDAZOLE (FLAGYL) 500 MG tablet     minoxidil (LONITEN) 2.5 MG tablet Take 5 mg by mouth 2 (two) times daily.    MOVIPREP 100-7.5-2.691 gram solution     pantoprazole (PROTONIX) 40 MG tablet     sevelamer carbonate (RENVELA) 800 mg Tab Take 1,600 mg by mouth 3 (three) times daily with meals.     silver sulfADIAZINE 1% (SILVADENE) 1 % cream Apply topically once daily.     Antibiotics (From admission, onward)    None        Antifungals (From admission, onward)    None        Antivirals (From admission, onward)    None           Immunization History   Administered Date(s) Administered    Hepatitis B, Adult 04/17/2013, 05/20/2013, 06/19/2013, 10/16/2013    Influenza - Quadrivalent - PF (6 months and older) 10/09/2014    Influenza - Trivalent - PF (ADULT) 10/07/2013, 10/12/2015, 10/07/2016    Pneumococcal Conjugate - 13 Valent 11/18/2015    Pneumococcal Polysaccharide - 23 Valent 08/07/2013       Family History     Problem Relation (Age of Onset)    Aneurysm Mother, Father (77)    Diabetes Brother    Heart disease Father, Paternal Grandmother    Kidney disease Brother        Social History     Socioeconomic History    Marital status: Legally      Spouse name: Not on file    Number of children: Not on file    Years of education: Not on file    Highest education level: Not on file   Occupational History    Not on file   Social Needs    Financial resource strain: Not on file    Food insecurity     Worry: Not on file     Inability: Not on  file    Transportation needs     Medical: Not on file     Non-medical: Not on file   Tobacco Use    Smoking status: Former Smoker     Years: 10.00     Quit date: 2015     Years since quittin.9    Smokeless tobacco: Never Used   Substance and Sexual Activity    Alcohol use: No    Drug use: Yes     Frequency: 4.0 times per week     Types: Other-see comments     Comment: marijuana    Sexual activity: Yes   Lifestyle    Physical activity     Days per week: Not on file     Minutes per session: Not on file    Stress: Not on file   Relationships    Social connections     Talks on phone: Not on file     Gets together: Not on file     Attends [employer] service: Not on file     Active member of club or organization: Not on file     Attends meetings of clubs or organizations: Not on file     Relationship status: Not on file   Other Topics Concern    Not on file   Social History Narrative    Not on file     Review of Systems   Respiratory: Positive for chest tightness and shortness of breath.    Cardiovascular: Positive for chest pain.   Skin: Positive for wound.   All other systems reviewed and are negative.    Objective:     Vital Signs (Most Recent):  Temp: 98.3 °F (36.8 °C) (20 1109)  Pulse: 66 (20 1500)  Resp: 15 (20 1500)  BP: 102/70 (20 1500)  SpO2: 97 % (20 1500) Vital Signs (24h Range):  Temp:  [97.9 °F (36.6 °C)-98.3 °F (36.8 °C)] 98.3 °F (36.8 °C)  Pulse:  [58-80] 66  Resp:  [15-18] 15  SpO2:  [94 %-98 %] 97 %  BP: ()/(62-76) 102/70     Weight: 99.8 kg (220 lb)  Body mass index is 29.84 kg/m².    Estimated Creatinine Clearance: 17.5 mL/min (A) (based on SCr of 5.9 mg/dL (H)).    Physical Exam  Vitals signs and nursing note reviewed.   Constitutional:       General: He is not in acute distress.  HENT:      Head: Normocephalic and atraumatic.      Right Ear: External ear normal.      Left Ear: External ear normal.      Mouth/Throat:      Mouth: Mucous membranes are  moist.      Pharynx: Oropharynx is clear.   Eyes:      Comments: Right eye blindness   Neck:      Musculoskeletal: Normal range of motion and neck supple.   Cardiovascular:      Rate and Rhythm: Normal rate.      Heart sounds: Murmur present.   Pulmonary:      Effort: Pulmonary effort is normal. No respiratory distress.      Breath sounds: Normal breath sounds.   Abdominal:      General: Abdomen is flat.      Palpations: Abdomen is soft.   Musculoskeletal: Normal range of motion.         General: No swelling or tenderness.   Skin:     General: Skin is warm and dry.      Comments: Right foot wrapped in dressings.  Pictures of foot obtained today were reviewed.   Neurological:      General: No focal deficit present.      Mental Status: He is alert and oriented to person, place, and time.         Significant Labs:   Microbiology Results (last 7 days)     ** No results found for the last 168 hours. **          Significant Imaging: I have reviewed all pertinent imaging results/findings within the past 24 hours.

## 2020-07-26 NOTE — HPI
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  He has had a chronic ulcer to the right foot, base of 5th digit for over 6 months now.  He also has had a smaller ulcer to the based of his right foot first digit.  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  He says that he was treated with IV vancomycin for 6-8 weeks and then it was stopped.  He continued to get wound care from a facility affiliated with [facility], [place].  He states that he started having puss come out of the wound again.  Per patient cultures were obtained and revealed 'staph' and 'yeast'.  He states that an MRI showed bone infection.  He was supposed to be started on IV vancomycin.  However he developed chest pain and was admitted to [facility] in [place].  He was evaluated and it was felt his chest pain was due to severe aortic stenosis and severe coronary artery disease.  He has been transferred to our facility for a higher lever of care for his cardiac disease.  I am consulted to assist with his diabetic foot infection.

## 2020-07-27 NOTE — PLAN OF CARE
07/27/20 1411   Discharge Assessment   Assessment Type Discharge Planning Assessment   Confirmed/corrected address and phone number on facesheet? Yes   Assessment information obtained from? Patient   Expected Length of Stay (days) 7   Communicated expected length of stay with patient/caregiver yes   Prior to hospitilization cognitive status: Alert/Oriented   Prior to hospitalization functional status: Independent   Current cognitive status: Alert/Oriented   Current Functional Status: Independent   Facility Arrived From: [facility]   Lives With alone   Able to Return to Prior Arrangements yes   Is patient able to care for self after discharge? Yes   Who are your caregiver(s) and their phone number(s)? Soledad Aguirre [phone]   Patient's perception of discharge disposition home health   Patient currently being followed by outpatient case management? No   Patient currently receives any other outside agency services? Yes   Name and contact number of agency or person providing [facility] and [facility]. Have not seen for 2 weeks due to hospital stay   Is it the patient/care giver preference to resume care with the current outside agency? Yes   Equipment Currently Used at Home none   Do you have any problems affording any of your prescribed medications? TBD   Does the patient have transportation home? Yes   Transportation Anticipated family or friend will provide   Does the patient receive services at the [facility]? No   Discharge Plan A Home   Discharge Plan B Home Health   Patient/Family in Agreement with Plan yes     CM to bedside for d/c planning assessment. Pt is from [facility]. He states that he is current with [facility].  Pt is with [facility] schedule in [facility].     Pt uses his nephrologist as PCP.  Stu Riley MD  [address] [facility] [place]  [address]  Phone: [phone]    Cardiac imaging has been requested  from [facility].    Payor: MEDICARE / Plan: MEDICARE PART A & B / Product Type: [facility] /       [facility] [place] - [address]  [address]  [place] [address]  Phone: [phone] Fax: [phone]    Julie Haase RN  Case Management [phone]

## 2020-07-27 NOTE — SUBJECTIVE & OBJECTIVE
Interval History: No acute events overnight. Afebrile. No leukocytosis.  Attempting to obtain records/cultures from patient's ID physician.       Review of Systems   Constitutional: Negative for activity change, appetite change, chills, diaphoresis and fatigue.   HENT: Negative.    Eyes: Positive for visual disturbance (right eye blindness).   Respiratory: Positive for shortness of breath. Negative for chest tightness (not since admission).    Cardiovascular: Negative for chest pain (not since admission).   Gastrointestinal: Negative for nausea and vomiting.   Genitourinary:        HD   Musculoskeletal: Negative for arthralgias and myalgias.   Skin: Positive for wound.   Neurological: Negative for dizziness and headaches.   Psychiatric/Behavioral: Negative for agitation and behavioral problems.   All other systems reviewed and are negative.    Objective:     Vital Signs (Most Recent):  Temp: 98.4 °F (36.9 °C) (07/27/20 0817)  Pulse: 64 (07/27/20 0824)  Resp: 16 (07/27/20 0900)  BP: (!) 96/46 (07/27/20 0900)  SpO2: 99 % (07/27/20 0754) Vital Signs (24h Range):  Temp:  [97.9 °F (36.6 °C)-98.4 °F (36.9 °C)] 98.4 °F (36.9 °C)  Pulse:  [60-79] 64  Resp:  [15-19] 16  SpO2:  [93 %-99 %] 99 %  BP: ()/(46-86) 96/46     Weight: 99.8 kg (220 lb)  Body mass index is 29.84 kg/m².    Estimated Creatinine Clearance: 15.2 mL/min (A) (based on SCr of 6.8 mg/dL (H)).    Physical Exam  Vitals signs and nursing note reviewed.   Constitutional:       General: He is not in acute distress.     Appearance: He is not ill-appearing or toxic-appearing.   HENT:      Head: Normocephalic and atraumatic.   Eyes:      General: No scleral icterus.     Conjunctiva/sclera: Conjunctivae normal.      Comments: Right eye blindness   Neck:      Musculoskeletal: Normal range of motion and neck supple.   Cardiovascular:      Rate and Rhythm: Normal rate.      Heart sounds: Murmur present.   Pulmonary:      Effort: Pulmonary effort is normal. No  respiratory distress.      Breath sounds: Normal breath sounds.   Abdominal:      General: Abdomen is flat.      Palpations: Abdomen is soft.   Musculoskeletal: Normal range of motion.         General: No swelling or tenderness.   Skin:     General: Skin is warm and dry.      Comments: Right foot wrapped. Dressings c/d/i.  No ascending warmth/erythema.   Podiatry photos reviewed.  See below.     Dialysis access LUE    Neurological:      General: No focal deficit present.      Mental Status: He is alert and oriented to person, place, and time.   Psychiatric:         Mood and Affect: Mood normal.         Behavior: Behavior normal.                 Significant Labs:   Blood Culture: No results for input(s): LABBLOO in the last 4320 hours.  CBC:   Recent Labs   Lab 07/26/20  0425 07/27/20  0744   WBC 8.13 8.37   HGB 11.5* 11.0*   HCT 37.4* 36.0*    179     CMP:   Recent Labs   Lab 07/26/20 2001 07/27/20  0007 07/27/20  0744    135* 137  137   K 5.6*  5.6* 5.8*  5.8* 6.1*  6.1*  6.1*    101 99  99   CO2 21* 21* 25  25   GLU 69* 96 93  93   BUN 65* 72* 81*  81*   CREATININE 6.3* 6.3* 6.8*  6.8*   CALCIUM 10.6* 10.0 10.2  10.2   ANIONGAP 15 13 13  13   EGFRNONAA 9.2* 9.2* 8.4*  8.4*     Wound Culture: No results for input(s): LABAERO in the last 4320 hours.    Significant Imaging: I have reviewed all pertinent imaging results/findings within the past 24 hours.

## 2020-07-27 NOTE — TELEPHONE ENCOUNTER
----- Message from Duran Feng MD sent at 7/26/2020  5:32 PM CDT -----  Evert Dean,    Can you call [facility] and get all this patients imaging studies and cultures.  Also can you contact the infectious diseases offices of Dr. Matt Carrasco in [place], [place] and get all their clinic notes, labs and cultures.  Patient is currently in our hospital and we don't have any information.  Thanks.

## 2020-07-27 NOTE — PROGRESS NOTES
Consult received. Share Dr. Porras's concerns RE: MVR in patient with chronically infected foot. Will check non-invasive vascular studies this morning. Patient in bathroom during our visit. Will check back later.     Dionisio Werner MD PGY6  Vascular and Endovascular Surgery Fellow  07/27/2020

## 2020-07-27 NOTE — PLAN OF CARE
A A O x 4. Free of falls, traumas and injuries. Skin intact. Denies shortness of breath, chest pain, nausea, vomiting. Patient had hemodialysis today. Patient currently in vascular lab for foot ulcer. MRI of foot ulcer to be done today. Possible CABG and MVR once foot ulcer is taken care of. Plan of care reviewed with patient. Vital signs stable. Pain monitored. Will continue to monitor.

## 2020-07-27 NOTE — PLAN OF CARE
Patient remains free from fall and injury. Does not complain of any pain at this time. Patient in normal sinus rhythm on continuous cardiac monitoring; no S/S of cardiac distress noted at this time. Potassium shift performed; will follow up with morning labs. BG monitoring continued; no insulin cover per sliding scale. Patient diuresing and adhering to fluid restrictions. Plan of care reviewed with patient. Call light within reach. Bed in lowest locked position. Will continue to monitor.

## 2020-07-27 NOTE — PLAN OF CARE
Spoke with Zeeshan from [facility] [place][note 2]. He will Fed-Ex overnight the cath, ZEFERINO and carotid artery films. He will put them in the mail today and will call me with tracking number. He stated if I did not hear from him by 3:30, to call and get tracking number.    Julie Haase RN  Case Management [phone]

## 2020-07-27 NOTE — CONSULTS
"VASCULAR SURGERY SERVICE    CHIEF COMPLAINT: foot wound    HISTORY OF PRESENT ILLNESS: João Aguirre is a 55 y.o. male with history of ESRD on HD since 2013 (T/TH/Sat), anemia, uncontrolled HTN, hx CVA, PCI, T2DM, and gastroparesis admitted to cardiac surgery service for second opinion regarding mitral valve replacement with CABG.     He reports a weeks long history of non-healing diabetic foot ulcer on the medial and lateral plantar right foot. He has had a right lower extremity vascular intervention in the past (reports a "bypass" but has a transverse femoral incision only). The lesion is painful.       Past Medical History:   Diagnosis Date    Anticoagulant long-term use     Arthritis     Asthma     Back pain     Coronary artery disease 2013    stent    Diabetes mellitus     Encounter for blood transfusion     Eye abnormality right eye    injured as a child    Gastritis     Gastroparesis     Hemodialysis patient     Hypertension     Pneumonia 2013    Spend 6weeks in hospital at [place]    Renal disorder     Stroke        Past Surgical History:   Procedure Laterality Date    AV FISTULA PLACEMENT      BACK SURGERY      CARDIAC SURGERY  2013    heart stent    CHOLECYSTECTOMY      EYE SURGERY      R eye    INCISION AND DRAINAGE OF ABSCESS Right 9/6/2018    Procedure: INCISION AND DRAINAGE, ABSCESS;  Surgeon: Chetan Rothman MD;  Location: [facility];  Service: General;  Laterality: Right;         Current Facility-Administered Medications:     0.9%  NaCl infusion, , Intravenous, Once, Alannah Peoples DNP, FNP-C    0.9%  NaCl infusion, , Intravenous, Once, Alannah Peoples DNP, FNP-C    acetaminophen tablet 650 mg, 650 mg, Oral, Q4H PRN, Todd Abbasi MD    albuterol-ipratropium 2.5 mg-0.5 mg/3 mL nebulizer solution 3 mL, 3 mL, Nebulization, Q6H PRN, Todd Abbasi MD    amLODIPine tablet 5 mg, 5 mg, Oral, BID, Todd Abbasi MD, 5 mg at 07/27/20 1332    aspirin tablet " 325 mg, 325 mg, Oral, Daily, Margarita Soler PA-C, 325 mg at 07/27/20 1332    atorvastatin tablet 40 mg, 40 mg, Oral, Daily, Todd Abbasi MD, 40 mg at 07/27/20 1332    bisacodyL suppository 10 mg, 10 mg, Rectal, Daily PRN, Todd Abbasi MD    [COMPLETED] calcium gluconate 1g in dextrose 5% 100mL (ready to mix system), 1 g, Intravenous, Once, 1 g at 07/26/20 1847 **AND** calcium gluconate 1g in dextrose 5% 100mL (ready to mix system), 1 g, Intravenous, Q10 Min PRN, Alonso Quezada MD, 1 g at 07/27/20 0140    [COMPLETED] dextrose 50% injection 25 g, 25 g, Intravenous, Once, 25 g at 07/26/20 1847 **AND** dextrose 50% injection 25 g, 25 g, Intravenous, PRN **AND** [COMPLETED] insulin regular injection 9.98 Units, 0.1 Units/kg, Intravenous, Once, Alonso Quezada MD, 9.98 Units at 07/26/20 1849    [COMPLETED] dextrose 50% injection 25 g, 25 g, Intravenous, Once, 25 g at 07/27/20 0139 **AND** dextrose 50% injection 25 g, 25 g, Intravenous, PRN **AND** [COMPLETED] insulin regular injection 9.98 Units, 0.1 Units/kg, Intravenous, Once, Alonso Quezada MD, 9.98 Units at 07/27/20 0139    gabapentin capsule 100 mg, 100 mg, Oral, BID, Margarita Soler PA-C, 100 mg at 07/27/20 1334    levETIRAcetam tablet 500 mg, 500 mg, Oral, BID, Todd Abbasi MD, 500 mg at 07/27/20 1334    methadone tablet 100 mg, 100 mg, Oral, Daily, Margarita Soler PA-C, 100 mg at 07/27/20 1130    ondansetron disintegrating tablet 8 mg, 8 mg, Oral, Q8H PRN, Todd Abbasi MD, 8 mg at 07/24/20 0859    oxyCODONE immediate release tablet 10 mg, 10 mg, Oral, Q4H PRN, Todd Abbasi MD, 10 mg at 07/24/20 0811    oxyCODONE immediate release tablet 5 mg, 5 mg, Oral, Q4H PRN, Todd Abbasi MD    pantoprazole injection 40 mg, 40 mg, Intravenous, Daily, Todd Abbasi MD, 40 mg at 07/27/20 1334    polyethylene glycol packet 17 g, 17 g, Oral, Daily, Todd Abbasi MD, 17 g at 07/27/20 1334     promethazine tablet 25 mg, 25 mg, Oral, Q6H PRN, Todd Abbasi MD, 25 mg at 20 0911    sevelamer carbonate tablet 1,600 mg, 1,600 mg, Oral, TID WM, Margarita Soler PA-C, 1,600 mg at 20 1645    sodium chloride 0.9% flush 10 mL, 10 mL, Intravenous, PRN, Todd Abbasi MD    Review of patient's allergies indicates:   Allergen Reactions    Antibiotic hc      Counter acts with methodone.          Family History   Problem Relation Age of Onset    Aneurysm Mother         brain    Aneurysm Father 77        stomach     Heart disease Father     Kidney disease Brother     Diabetes Brother         hyperglycemia and HAYDER causseddeath    Heart disease Paternal Grandmother        Social History     Tobacco Use    Smoking status: Former Smoker     Years: 10.00     Quit date: 2015     Years since quittin.9    Smokeless tobacco: Never Used   Substance Use Topics    Alcohol use: No    Drug use: Yes     Frequency: 4.0 times per week     Types: Other-see comments     Comment: marijuana       REVIEW OF SYSTEMS:  General: No chills, fever, malaise, changes in weight  HEENT: No visual changes, difficulty hearing  Cardiovascular: No chest pain, palpitations, claudication  Pulmonary: No dyspnea, cough, wheezing  Gastrointestinal: No nausea, vomiting, diarrhea, constipation  Genitourinary: No dysuria, low urine output, hematuria  Endocrine: No polydipsia, polyphagia  Hematologic: No fatigue with exertion, pica, pallor  Musculoskeletal: No extremity or joint pain, no back pain, no difficulty with ambulation  Neurologic: no seizures, no headaches, no weakness  Psychiatric: no mood disturbance      PHYSICAL EXAM:   /68 (BP Location: Right arm, Patient Position: Sitting)   Pulse 75   Temp 98.1 °F (36.7 °C) (Oral)   Resp 17   Ht 6' (1.829 m)   Wt 99.8 kg (220 lb)   SpO2 98%   BMI 29.84 kg/m²   Constitutional:  Alert,   Well-appearing  In no distress.   Neurological: Normal speech  no focal  findings  CN II - XII grossly intact.    Psychiatric: Mood and affect appropriate and symmetric.   HEENT: Normocephalic / atraumatic  PERRLA  Midline trachea  No scars across the neck   Cardiac: Regular rate and rhythm.   Pulmonary: Normal pulmonary effort.   Abdomen: Soft, not distended.    Skin: Venous stasis dermatitis bilateral lower extremities  Plantar ulcers seen below         Vascular: No palpable pedal pulses  Right transverse groin incision  2+ femoral pulses bilaterally   Musculoskeletal: Normal ROM in R foot and ankle     DIAGNOSTICS:  Lower Extremity Arterial Imaging   ========================     Right Lower Extremity   Rt mid CFA PSV 61 cm/s   Rt prox DFA PSV    51 cm/s   Rt prox SFA PSV    24 cm/s   Rt mid SFA PSV 44 cm/s   Rt distal SFA PSV  27 cm/s   Rt mid POP PSV 22 cm/s   Rt mid ERENDIRA PSV 19 cm/s   Rt mid PTA PSV 17 cm/s   Left Lower Extremity   Lt mid CFA PSV 19 cm/s   Lt prox DFA PSV    67 cm/s   Lt prox SFA PSV    64 cm/s   Lt mid SFA PSV 48 cm/s   Lt distal SFA PSV  45 cm/s   Lt mid POP PSV 24 cm/s   Lt mid ERENDIRA PSV 15 cm/s   Lt mid PTA PSV 20 cm/s     Comment   ========     Technically difficult study due to calcified vessels.     Impression   =========     Right leg: Color flow duplex of the right lower extremity reveals heavily calcified vessels with monophasic waveforms throughout. There   are collaterals throughout the extremity with no evidence of a hemodynamically significant stenosis.     Left leg: Color flow duplex of the left lower extremity reveals heavily calcified vessels with monophasic waveforms throughout. There   are collaterals throughout the extremity with no evidence of a hemodynamically significant stenosis.     Indication   ========     Peripheral Vascular Disease     Pressure Lower   ============     Rt brachial A syst BP  95 mmHg   Rt PTA BP  255 mmHg   Rt dors pedis A  mmHg   Rt toe BP  0 mmHg   Rt ADDI post tibial (rt post tib A BP / max brach A BP) 2.68   Rt ADDI  (rt dors ped A BP / max brach A BP) 2.68   Rt ankle BP / max brach A BP   2.68   Rt TBI (rt toe BP / max brach A BP)    0.00   Lt PTA BP  133 mmHg   Lt dors pedis A  mmHg   Lt toe BP  0 mmHg   Lt ADDI (lt post tibial A BP / max brach A BP)  1.40   Lt ADDI dors ped (lt dors ped A BP / max brach A BP)    2.68   Lt ankle BP / max brach A BP   2.68   Lt TBI (lt toe BP / max brach A BP)    0.00     Impression   =========     Right Leg: Segmental pressures are unreliable due to medial calcinosis; however, PVR waveforms suggest severe peripheral arterial   occlusive disease. -TBI of 0 is noted.   Left Leg: Segmental pressures are unreliable due to medial calcinosis; however, PVR waveforms suggest severe peripheral arterial   occlusive disease. -TBI of 0 is noted.     EXAMINATION:  MRI FOOT (FOREFOOT) RIGHT WITHOUT CONTRAST     CLINICAL HISTORY:  Osteomyelitis suspected, diabetic;     TECHNIQUE:  Multiplanar, multisequence MR imaging of the right forefoot without the use of intravenous gadolinium IV contrast.     COMPARISON:  Right foot radiograph 07/26/2020.     FINDINGS:  Examination moderately degraded by patient motion artifact.     Bones: Postoperative changes of 2nd phalangeal and distal metatarsal amputation.  Hallux valgus deformity.  Prominent degenerative changes of the 1st metatarsophalangeal joint.     Osteomyelitis Evaluation:     Location: Abnormal signal involving the 5th metatarsal distal head.     T1 Signal: Confluent Decreased Signal     T2 Signal: Bone Marrow Edema     Cortical Margin: Indistinct     Secondary Signs: Ulcer is present adjacent to the abnormal osseous signal.     MRI Osteomyelitis Classification:Compatible with Osteomyelitis: Reticular CONFLUENT T1 and Secondary Signs ulcer/abcess/sinus tract     Tendons: Flexor and extensor tendons of the toes are within normal limits.     Ligaments: The ligaments of the foot, including the Lisfranc ligament, are intact.     Soft Tissues: Mild  subcutaneous edema involving the dorsal foot.  No focal fluid collection.  Small soft tissue ulceration is present extending laterally from distal 5th metatarsal head.  Nonspecific superficial 0.7 cm cystic lesion along the plantar aspect of the 3rd phalanx.  Diffuse muscular atrophy.     Miscellaneous: No evidence of Simmons's neuroma.     Impression:     Findings consistent with osteomyelitis of the distal 5th metatarsal head with adjacent skin ulceration.  No focal fluid collection.     Postoperative changes of 2nd phalangeal/distal metatarsal amputation.     Prominent hallux valgus deformity and degenerative changes of the 1st metatarsophalangeal joint.     Nonspecific superficial cystic lesion along the plantar aspect of the 3rd phalanx.       IMPRESSION & PLAN:  55 y.o. male with right diabetic right foot infection with MRI evidence of osteomyelitis.    Non-invasive vascular labs suggest severe tibial and microvascular disease.    Will plan for angiography in near future as schedule permits.    Suspect he will need a right below knee amputation regardless.     Dionisio Werner MD PGY6  Vascular and Endovascular Surgery Fellow  07/27/2020

## 2020-07-27 NOTE — PROGRESS NOTES
Arrived to [facility] via stretcher. AAO x 4. Agreed to 3.5 hour HD Tx. Cannulation to Lt FA AVF. Bleeding around insertion site, gauze applied. UF goal 3L.

## 2020-07-27 NOTE — PLAN OF CARE
Zeeshan from [facility] called with [employer] tracking number of [id number] for images.    Julie Haase RN  Case Management [phone]

## 2020-07-27 NOTE — ASSESSMENT & PLAN NOTE
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  He has had a chronic ulcer to the right foot, base of 5th digit for over 6 months now.  He also has had a smaller ulcer to the based of his right foot first digit.  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  He says that he was treated with IV vancomycin for 6-8 weeks and then it was stopped.  He continued to get wound care from a facility affiliated with [place], [place], and reports he has been receiving HBOT.    He states that he started having pus come out of the wound again.  Per patient cultures were obtained and revealed 'staph' and 'yeast', but we don't have these cultures.   He states that an MRI showed bone infection.  He was supposed to be started on IV vancomycin to be given after dialysis,  however he developed chest pain and was admitted to [facility] in [place].  He was evaluated and it was felt his chest pain was due to severe aortic stenosis and severe coronary artery disease.  He has been transferred to our facility for a higher lever of care for his cardiac disease.  ID consulted to assist with his diabetic foot infection.      Right ADDI PVR waveforms suggestive of severe PAD.  Bilateral arterial US show heavily calcified vessel with monophasic waveforms throughout with collaterals throughout the extremity with no evidence of hemodynamically significant stenosis.   Vascular evaluation pending.      Afebrile, no leukocytosis.  Stable and non-septic appearing.         Plan/recommendations:  1.  Continue to hold antibiotics for now.   Records request sent to [place] and Dr. Carrasco's office (Infectious disease) and awaiting response.    2.   Recommend  MRI of right foot to assist with therapeutic decisions and also to compare to his prior one.    3.  Will follow up tomorrow with further  recommendations after receipt of outside records.     Data reviewed and plan discussed with ID staff

## 2020-07-27 NOTE — PROGRESS NOTES
Tx complete. BP low during Tx. Removed 1.2L. removed 2 needles from Lt FA AVF. Pressure applied until hemostasis.

## 2020-07-27 NOTE — TELEPHONE ENCOUNTER
I have spoken to both HIM at [facility] and the staff at [facility] M.D. office and was told to fax over the request and I have done so. I am waiting for the records at this time

## 2020-07-27 NOTE — PROGRESS NOTES
[facility]    João Aguirre is a 55 y.o. male currently on hemodialysis for removal of uremic toxins and volume.     Patient seen and evaluated on hemodialysis, tolerating treatment, see HD flowsheet for vitals and assessments.    No Hypotension, chest pain, shortness of breath, cramping, nausea or vomiting.      Labs have been reviewed and the dialysate bath has been adjusted.    Labs:      Recent Labs   Lab 07/25/20 0409 07/26/20 0425 07/26/20 2001 07/27/20 0007 07/27/20  0744    138   < > 139 135* 137  137   K 5.4* 5.6*   < > 5.6*  5.6* 5.8*  5.8* 6.1*  6.1*  6.1*   CL 98 102   < > 103 101 99  99   CO2 24 23   < > 21* 21* 25  25   BUN 35* 49*   < > 65* 72* 81*  81*   CREATININE 5.1* 5.1*   < > 6.3* 6.3* 6.8*  6.8*   CALCIUM 9.0 10.0   < > 10.6* 10.0 10.2  10.2   PHOS 7.3* 7.3*  --   --   --  6.7*    < > = values in this interval not displayed.       Recent Labs   Lab 07/25/20 0409 07/26/20 0425 07/27/20  0744   WBC 7.06 8.13 8.37   HGB 10.5* 11.5* 11.0*   HCT 32.9* 37.4* 36.0*    171 179        Assessment/Plan    Ultrafiltration goal: 3 L as tolerated, keep MAP >65.  - Seen on dialysis this morning, tolerating session with current UFR, no complications.    - Patients pre HD weight 101.7 kg today; Weight post HD on Saturday (7/25) 96.5 kg (EDW 94.5 kg). UFR adjusted. Encouraged patient to adhere to fluid restrictions. Patient verbalized understanding. No distress on exam, however, patient feels that his dry weight needs adjustment. He feels that he is higher than 94.5 kg due to cramping towards the end of treatment on Saturday.   - Please continue to treat hypoglycemia with medical management vs giving the patient "juices" per patient's report.   - No lab stick/BP intake on access site  - Will continue dialysis treatments while in-patient  - Continue to monitor intake and output, daily weights   - Avoid nephrotoxic medication and renal dose " medications to GFR    Anemia of ESRD  - Hgb 11.0, within target    BMM  - Renal diet with protein intake goal 1.5 g/kg/d  - Novasource with meals  - Phos 6.7, on binders  - Daily renal function panel     Alannah Peoples, MICKY, APRN, FNP-C  Nephrology Department  Pager:  [phone]

## 2020-07-27 NOTE — PROGRESS NOTES
[facility]  Cardiothoracic Surgery  Progress Note    Patient Name: João Aguirre  MRN: [id number]  Admission Date: 7/24/2020  Hospital Length of Stay: 3 days  Code Status: Full Code   Attending Physician: Surendra Porras MD   Referring Provider: Ruben Weston MD  Principal Problem:Mitral stenosis        Subjective:     Post-Op Info:  * No surgery found *         Interval History: no pain; no dyspnea; dialysis yesterday    Review of Systems   Constitution: Negative for malaise/fatigue.   Cardiovascular: Negative for chest pain, claudication, dyspnea on exertion, irregular heartbeat, leg swelling and palpitations.   Respiratory: Positive for shortness of breath. Negative for cough.    Hematologic/Lymphatic: Negative for bleeding problem.   Gastrointestinal: Negative for abdominal pain.   Genitourinary: Negative for dysuria.   Neurological: Negative for headaches and weakness.     Medications:  Continuous Infusions:  Scheduled Meds:   sodium chloride 0.9%   Intravenous Once    amLODIPine  5 mg Oral BID    aspirin  325 mg Oral Daily    atorvastatin  40 mg Oral Daily    gabapentin  100 mg Oral BID    levETIRAcetam  500 mg Oral BID    methadone  100 mg Oral Daily    pantoprazole  40 mg Intravenous Daily    polyethylene glycol  17 g Oral Daily    sevelamer carbonate  1,600 mg Oral TID WM     PRN Meds:acetaminophen, albuterol-ipratropium, bisacodyL, ondansetron, oxyCODONE, oxyCODONE, promethazine, sodium chloride 0.9%     Objective:     Vital Signs (Most Recent):  Temp: 98.3 °F (36.8 °C) (07/26/20 1109)  Pulse: 71 (07/26/20 1157)  Resp: 16 (07/26/20 0858)  BP: 130/76 (07/26/20 1109)  SpO2: 98 % (07/26/20 1109) Vital Signs (24h Range):  Temp:  [97.9 °F (36.6 °C)-98.3 °F (36.8 °C)] 98.3 °F (36.8 °C)  Pulse:  [58-80] 71  Resp:  [16-18] 16  SpO2:  [94 %-98 %] 98 %  BP: ()/(62-76) 130/76     Weight: 99.8 kg (220 lb)  Body mass index is 29.84 kg/m².    SpO2: 98 %  O2 Device  (Oxygen Therapy): room air    Intake/Output - Last 3 Shifts       07/24 0700 - 07/25 0659 07/25 0700 - 07/26 0659 07/26 0700 - 07/27 0659    P.O. 240 1080 720    Other 650      Total Intake(mL/kg) 890 (8.9) 1080 (10.8) 720 (7.2)    Urine (mL/kg/hr) 0 (0) 0 (0)     Other 4650 3151     Total Output 4650 3151     Net -3760 -2071 +720                 Lines/Drains/Airways     Drain                 Hemodialysis AV Fistula Left forearm -- days         Hemodialysis AV Fistula Left forearm -- days         Hemodialysis AV Fistula Left forearm -- days          Peripheral Intravenous Line                 Peripheral IV - Single Lumen 07/24/20 1034 20 G;1 in Right Forearm 2 days                Physical Exam  Constitutional:       Appearance: He is well-developed.   HENT:      Head: Normocephalic and atraumatic.   Neck:      Musculoskeletal: Normal range of motion.   Cardiovascular:      Rate and Rhythm: Normal rate and regular rhythm.      Heart sounds: Normal heart sounds.   Pulmonary:      Effort: Pulmonary effort is normal.      Breath sounds: Normal breath sounds.   Abdominal:      Palpations: Abdomen is soft.   Musculoskeletal: Normal range of motion.   Skin:     General: Skin is warm and dry.      Capillary Refill: Capillary refill takes less than 2 seconds.   Neurological:      Mental Status: He is alert and oriented to person, place, and time.       R foot chronic ulcers without signs of infection but some ttp    Significant Labs:  Recent Labs   Lab 07/27/20  0744   WBC 8.37   RBC 3.40*   HGB 11.0*   HCT 36.0*      *   MCH 32.4*   MCHC 30.6*     Significant Diagnostics:  Films/MRI pending    Assessment/Plan:     * Mitral stenosis  Patient is a 55 year old who presented from [place] for second opinion regarding AVR and CABG. Medical conditions include ESRD on HD since 2013 (T/TH/Sat), anemia, uncontrolled HTN, hx CVA, PCI, T2DM, and gastroparesis. States that for the past several weeks he has had some chest  tightness and increasing SOB. Unable to quantify how far he can walk, but states not very far. Some occasional dizziness whenever beginning to walk. Endorses palpitations. Had a stroke 3 years ago but denies residual symptoms. History of seizures, last 3 years ago. Reports having two stents done, he thinks at [facility] 2-3 years ago. Smokes cigars occasionally. Denies current alcohol use. 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 Also with non-healing DM ulcer on bottom of right foot.     TTE ordered resulting with EF 50%, mild AS, mod-severe MS from extensive calcification (MG 15, may be elevated due to volume overload), PAP 62.     -consult podiatry, vascular, and infectious disease for R foot ulcers, document osteo and long-term abx use  - ASA   - Statin   - Norvasc 5   - Keppra 500 BID   - Pantoprazole 40 QD  - Polyethylene Glycol daily   - Sevelamer 1600 TID   - Renal diet with 1L fluid restriction per nephrology   - HD today  -  working on getting disc shipped from [facility]-reports will be in tomorrow  - f/u     Hyperkalemia  --Dialysis today    Seizure disorder  - Home Keppra     ESRD (T,Th,Sat) dialysis onset 2013  - Nephrology following   - Taken for HD today        Courtney Jefferson NP  Cardiothoracic Surgery  [facility]

## 2020-07-27 NOTE — PROGRESS NOTES
[facility]  Infectious Disease  Progress Note    Patient Name: João Aguirre  MRN: [id number]  Admission Date: 7/24/2020  Length of Stay: 3 days  Attending Physician: Surendra Porras MD  Primary Care Provider: Primary Doctor No    Isolation Status: No active isolations  Assessment/Plan:      Diabetic foot infection     56 y/o male with a history blindness to right eye, DM, CAD, treated tuberculosis, cardiomyopathy and valvular heart disease.  He has had a chronic ulcer to the right foot, base of 5th digit for over 6 months now.  He also has had a smaller ulcer to the based of his right foot first digit.  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  He says that he was treated with IV vancomycin for 6-8 weeks and then it was stopped.  He continued to get wound care from a facility affiliated with [facility], and reports he has been receiving HBOT.    He states that he started having pus come out of the wound again.  Per patient cultures were obtained and revealed 'staph' and 'yeast', but we don't have these cultures.   He states that an MRI showed bone infection.  He was supposed to be started on IV vancomycin to be given after dialysis,  however he developed chest pain and was admitted to [facility] in [place].  He was evaluated and it was felt his chest pain was due to severe aortic stenosis and severe coronary artery disease.  He has been transferred to our facility for a higher lever of care for his cardiac disease.  ID consulted to assist with his diabetic foot infection.      Right ADDI PVR waveforms suggestive of severe PAD.  Bilateral arterial US show heavily calcified vessel with monophasic waveforms throughout with collaterals throughout the extremity with no evidence of hemodynamically significant stenosis.   Vascular evaluation pending.      Afebrile, no leukocytosis.  Stable and  non-septic appearing.         Plan/recommendations:  1.  Continue to hold antibiotics for now.   Records request sent to [place] and Dr. Carrasco's office (Infectious disease) and awaiting response.    2.   Recommend  MRI of right foot to assist with therapeutic decisions and also to compare to his prior one.    3.  Will follow up tomorrow with further recommendations after receipt of outside records.     Data reviewed and plan discussed with ID staff        Thank you.   Please call for any questions or concerns.  VIOLA Puente, ANP-C  [phone] pager, [place] [phone]    Subjective:     Principal Problem:Mitral stenosis    HPI: 56 y/o male with a history blindness to right eye, DM, CAD, treated tuberculosis, cardiomyopathy and valvular heart disease.  He has had a chronic ulcer to the right foot, base of 5th digit for over 6 months now.  He also has had a smaller ulcer to the based of his right foot first digit.  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  He says that he was treated with IV vancomycin for 6-8 weeks and then it was stopped.  He continued to get wound care from a facility affiliated with [facility].  He states that he started having puss come out of the wound again.  Per patient cultures were obtained and revealed 'staph' and 'yeast'.  He states that an MRI showed bone infection.  He was supposed to be started on IV vancomycin.  However he developed chest pain and was admitted to [facility] in [place].  He was evaluated and it was felt his chest pain was due to severe aortic stenosis and severe coronary artery disease.  He has been transferred to our facility for a higher lever of care for his cardiac disease.  I am consulted to assist with his diabetic foot infection.  Interval History: No acute events overnight. Afebrile. No leukocytosis.  Attempting to obtain records/cultures from patient's ID physician.        Review of Systems   Constitutional: Negative for activity change, appetite change, chills, diaphoresis and fatigue.   HENT: Negative.    Eyes: Positive for visual disturbance (right eye blindness).   Respiratory: Positive for shortness of breath. Negative for chest tightness (not since admission).    Cardiovascular: Negative for chest pain (not since admission).   Gastrointestinal: Negative for nausea and vomiting.   Genitourinary:        HD   Musculoskeletal: Negative for arthralgias and myalgias.   Skin: Positive for wound.   Neurological: Negative for dizziness and headaches.   Psychiatric/Behavioral: Negative for agitation and behavioral problems.   All other systems reviewed and are negative.    Objective:     Vital Signs (Most Recent):  Temp: 98.4 °F (36.9 °C) (07/27/20 0817)  Pulse: 64 (07/27/20 0824)  Resp: 16 (07/27/20 0900)  BP: (!) 96/46 (07/27/20 0900)  SpO2: 99 % (07/27/20 0754) Vital Signs (24h Range):  Temp:  [97.9 °F (36.6 °C)-98.4 °F (36.9 °C)] 98.4 °F (36.9 °C)  Pulse:  [60-79] 64  Resp:  [15-19] 16  SpO2:  [93 %-99 %] 99 %  BP: ()/(46-86) 96/46     Weight: 99.8 kg (220 lb)  Body mass index is 29.84 kg/m².    Estimated Creatinine Clearance: 15.2 mL/min (A) (based on SCr of 6.8 mg/dL (H)).    Physical Exam  Vitals signs and nursing note reviewed.   Constitutional:       General: He is not in acute distress.     Appearance: He is not ill-appearing or toxic-appearing.   HENT:      Head: Normocephalic and atraumatic.   Eyes:      General: No scleral icterus.     Conjunctiva/sclera: Conjunctivae normal.      Comments: Right eye blindness   Neck:      Musculoskeletal: Normal range of motion and neck supple.   Cardiovascular:      Rate and Rhythm: Normal rate.      Heart sounds: Murmur present.   Pulmonary:      Effort: Pulmonary effort is normal. No respiratory distress.      Breath sounds: Normal breath sounds.   Abdominal:      General: Abdomen is flat.      Palpations: Abdomen is soft.    Musculoskeletal: Normal range of motion.         General: No swelling or tenderness.   Skin:     General: Skin is warm and dry.      Comments: Right foot wrapped. Dressings c/d/i.  No ascending warmth/erythema.   Podiatry photos reviewed.  See below.     Dialysis access LUE    Neurological:      General: No focal deficit present.      Mental Status: He is alert and oriented to person, place, and time.   Psychiatric:         Mood and Affect: Mood normal.         Behavior: Behavior normal.                 Significant Labs:   Blood Culture: No results for input(s): LABBLOO in the last 4320 hours.  CBC:   Recent Labs   Lab 07/26/20  0425 07/27/20  0744   WBC 8.13 8.37   HGB 11.5* 11.0*   HCT 37.4* 36.0*    179     CMP:   Recent Labs   Lab 07/26/20 2001 07/27/20  0007 07/27/20  0744    135* 137  137   K 5.6*  5.6* 5.8*  5.8* 6.1*  6.1*  6.1*    101 99  99   CO2 21* 21* 25  25   GLU 69* 96 93  93   BUN 65* 72* 81*  81*   CREATININE 6.3* 6.3* 6.8*  6.8*   CALCIUM 10.6* 10.0 10.2  10.2   ANIONGAP 15 13 13  13   EGFRNONAA 9.2* 9.2* 8.4*  8.4*     Wound Culture: No results for input(s): LABAERO in the last 4320 hours.    Significant Imaging: I have reviewed all pertinent imaging results/findings within the past 24 hours.

## 2020-07-28 NOTE — PROGRESS NOTES
[facility]  Vascular Surgery  Progress Note    Patient Name: João Aguirre  MRN: [id number]  Admission Date: 7/24/2020  Primary Care Provider: Primary Doctor No    Subjective:     Interval History: no acute events overnight.     Post-Op Info:  Procedure(s) (LRB):  Angiogram Extremity Unilateral (Right)           Medications:  Continuous Infusions:  Scheduled Meds:   sodium chloride 0.9%   Intravenous Once    sodium chloride 0.9%   Intravenous Once    amLODIPine  5 mg Oral BID    aspirin  325 mg Oral Daily    atorvastatin  40 mg Oral Daily    gabapentin  100 mg Oral BID    levETIRAcetam  500 mg Oral BID    methadone  100 mg Oral Daily    pantoprazole  40 mg Intravenous Daily    polyethylene glycol  17 g Oral Daily    sevelamer carbonate  1,600 mg Oral TID WM     PRN Meds:acetaminophen, albuterol-ipratropium, bisacodyL, [COMPLETED] calcium gluconate IVPB **AND** calcium gluconate IVPB, ondansetron, oxyCODONE, oxyCODONE, promethazine, sodium chloride 0.9%     Objective:     Vital Signs (Most Recent):  Temp: 97.7 °F (36.5 °C) (07/28/20 0726)  Pulse: 72 (07/28/20 0726)  Resp: 18 (07/28/20 0804)  BP: 111/60 (07/28/20 0726)  SpO2: 96 % (07/28/20 0512) Vital Signs (24h Range):  Temp:  [97.5 °F (36.4 °C)-98.6 °F (37 °C)] 97.7 °F (36.5 °C)  Pulse:  [57-84] 72  Resp:  [15-18] 18  SpO2:  [96 %-99 %] 96 %  BP: ()/(46-80) 111/60         Physical Exam  Vitals signs and nursing note reviewed.   Cardiovascular:      Rate and Rhythm: Normal rate.   Pulmonary:      Effort: Pulmonary effort is normal.   Musculoskeletal:      Comments: Venous stasis dermatitis bilateral lower extremities  Plantar ulcers x2. Palpable femoral pulses bilaterally.          Significant Labs:  CBC:   Recent Labs   Lab 07/28/20  0433   WBC 8.07   RBC 3.32*   HGB 10.9*   HCT 35.8*      *   MCH 32.8*   MCHC 30.4*     CMP:   Recent Labs   Lab 07/28/20  0432   GLU 92  92   CALCIUM 9.6  9.6     142    K 5.5*  5.5*  5.5*   CO2 25  25     102   BUN 56*  56*   CREATININE 5.3*  5.3*       Significant Diagnostics:  none    Assessment/Plan:     Skin ulcer of right foot with fat layer exposed  55 y.o. male with right diabetic right foot infection with MRI evidence of osteomyelitis. Non-invasive vascular labs suggest severe tibial and microvascular disease.     -NPO for angiogram today  -continue asa and statin        Hiren Cruz MD  Vascular Surgery  [facility]

## 2020-07-28 NOTE — PROGRESS NOTES
Patient received from recovery via bed.AAO x 4.  Left groin dressing dry and intact, no swelling or signs of bleeding noted. Maintenance dialysis began per orders via 15 gauge fistula needles to left lower arm without difficulty.

## 2020-07-28 NOTE — PROGRESS NOTES
[facility]  Cardiothoracic Surgery  Progress Note    Patient Name: João Aguirre  MRN: [id number]  Admission Date: 7/24/2020  Hospital Length of Stay: 4 days  Code Status: Full Code   Attending Physician: Surendra Porras MD   Referring Provider: Ruben Weston MD  Principal Problem:Mitral stenosis      Subjective:     Post-Op Info:  Procedure(s) (LRB):  Angiogram Extremity Unilateral (Right)   Day of Surgery     Interval History: NAEON. Potassium remains high after dialysis, will monitor to see if patient needs potassium shift. Reports today that he has been told in the past that he has hepatitis C (denies treatment) and cirrhosis, hepatology consulted for management. Will have angiogram today with vascular surgery.    Review of Systems   Constitution: Negative for decreased appetite, fever and malaise/fatigue.   Cardiovascular: Negative for chest pain, claudication, dyspnea on exertion, irregular heartbeat, leg swelling, palpitations and syncope.   Respiratory: Positive for shortness of breath. Negative for cough.    Hematologic/Lymphatic: Negative for bleeding problem.   Skin: Negative for rash.   Musculoskeletal: Negative for arthritis and myalgias.   Gastrointestinal: Negative for abdominal pain, diarrhea, melena, nausea and vomiting.   Genitourinary: Negative for dysuria.   Neurological: Negative for headaches, paresthesias and seizures.     Medications:  Continuous Infusions:  Scheduled Meds:   sodium chloride 0.9%   Intravenous Once    sodium chloride 0.9%   Intravenous Once    sodium chloride 0.9%   Intravenous Once    amLODIPine  5 mg Oral BID    aspirin  325 mg Oral Daily    atorvastatin  40 mg Oral Daily    gabapentin  100 mg Oral BID    levETIRAcetam  500 mg Oral BID    methadone  100 mg Oral Daily    pantoprazole  40 mg Intravenous Daily    polyethylene glycol  17 g Oral Daily    sevelamer carbonate  1,600 mg Oral TID WM     PRN Meds:acetaminophen,  albuterol-ipratropium, bisacodyL, [COMPLETED] calcium gluconate IVPB **AND** calcium gluconate IVPB, heparin (porcine), iodixanoL, ondansetron, oxyCODONE, oxyCODONE, promethazine, sodium chloride 0.9%     Objective:     Vital Signs (Most Recent):  Temp: 97.7 °F (36.5 °C) (07/28/20 0726)  Pulse: (!) 145 (07/28/20 1110)  Resp: 18 (07/28/20 0804)  BP: 111/60 (07/28/20 0726)  SpO2: 98 % (07/28/20 0726) Vital Signs (24h Range):  Temp:  [97.5 °F (36.4 °C)-98.6 °F (37 °C)] 97.7 °F (36.5 °C)  Pulse:  [] 145  Resp:  [16-18] 18  SpO2:  [96 %-99 %] 98 %  BP: (106-128)/(59-80) 111/60     Weight: 99.8 kg (220 lb)  Body mass index is 29.84 kg/m².    SpO2: 98 %  O2 Device (Oxygen Therapy): room air    Intake/Output - Last 3 Shifts       07/26 0700 - 07/27 0659 07/27 0700 - 07/28 0659 07/28 0700 - 07/29 0659    P.O. 1680 476     IV Piggyback 100      Total Intake(mL/kg) 1780 (17.8) 476 (4.8)     Urine (mL/kg/hr)  0 (0)     Other  1767     Total Output  1767     Net +1780 -1291            Urine Occurrence 0 x            Lines/Drains/Airways     Drain                 Hemodialysis AV Fistula Left forearm -- days         Hemodialysis AV Fistula Left forearm -- days         Hemodialysis AV Fistula Left forearm -- days          Peripheral Intravenous Line                 Peripheral IV - Single Lumen 07/24/20 1034 20 G;1 in Right Forearm 4 days                Physical Exam  Constitutional:       Appearance: He is well-developed. He is obese.   Neck:      Musculoskeletal: Neck supple.   Cardiovascular:      Rate and Rhythm: Normal rate and regular rhythm.      Heart sounds: Normal heart sounds.   Pulmonary:      Effort: Pulmonary effort is normal.      Breath sounds: Normal breath sounds.   Abdominal:      Palpations: Abdomen is soft.   Musculoskeletal:      Comments: Venous stasis dermatitis bilateral lower extremities  Plantar ulcers x2. Palpable femoral pulses bilaterally.     Skin:     General: Skin is warm and dry.  "  Neurological:      Mental Status: He is alert and oriented to person, place, and time.   Psychiatric:      Comments: Patient was tearful today during assessment stating "I don't know if I want such a big surgery"          Significant Labs:  BMP:   Recent Labs   Lab 07/28/20  0432 07/28/20  0806   GLU 92  92 85     142 140   K 5.5*  5.5*  5.5* 5.8*  5.8*     102 103   CO2 25  25 21*   BUN 56*  56* 58*   CREATININE 5.3*  5.3* 5.4*   CALCIUM 9.6  9.6 9.9   MG 2.1  --      CBC:   Recent Labs   Lab 07/28/20  0433   WBC 8.07   RBC 3.32*   HGB 10.9*   HCT 35.8*      *   MCH 32.8*   MCHC 30.4*     CMP:   Recent Labs   Lab 07/28/20  0806   GLU 85   CALCIUM 9.9      K 5.8*  5.8*   CO2 21*      BUN 58*   CREATININE 5.4*     Coagulation: No results for input(s): PT, INR, APTT in the last 48 hours.    Significant Diagnostics:  MRI Right Foot-7/27/2020  Impression:  Findings consistent with osteomyelitis of the distal 5th metatarsal head with adjacent skin ulceration.  No focal fluid collection.  Postoperative changes of 2nd phalangeal/distal metatarsal amputation.  Prominent hallux valgus deformity and degenerative changes of the 1st metatarsophalangeal joint.  Nonspecific superficial cystic lesion along the plantar aspect of the 3rd phalanx.    VAS US Arterial legs 7/27/2020  Impression   =========   Right leg: Color flow duplex of the right lower extremity reveals heavily calcified vessels with monophasic waveforms throughout. There   are collaterals throughout the extremity with no evidence of a hemodynamically significant stenosis.     Left leg: Color flow duplex of the left lower extremity reveals heavily calcified vessels with monophasic waveforms throughout. There   are collaterals throughout the extremity with no evidence of a hemodynamically significant stenosis.     VAS Ankle Brachial Indices Resting 7/27/2020  Impression   =========   Right Leg: Segmental pressures " are unreliable due to medial calcinosis; however, PVR waveforms suggest severe peripheral arterial   occlusive disease. -TBI of 0 is noted.   Left Leg: Segmental pressures are unreliable due to medial calcinosis; however, PVR waveforms suggest severe peripheral arterial   occlusive disease. -TBI of 0 is noted.     I have reviewed all pertinent imaging results/findings within the past 24 hours.    Assessment/Plan:     * Mitral stenosis  Patient is a 55 year old who presented from [place] for second opinion regarding AVR and CABG. Medical conditions include ESRD on HD since 2013 (T/TH/Sat), anemia, uncontrolled HTN, hx CVA, PCI, T2DM, and gastroparesis. States that for the past several weeks he has had some chest tightness and increasing SOB. Unable to quantify how far he can walk, but states not very far. Some occasional dizziness whenever beginning to walk. Endorses palpitations. Had a stroke 3 years ago but denies residual symptoms. History of seizures, last 3 years ago. Reports having two stents done, he thinks at [facility] 2-3 years ago. Smokes cigars occasionally. Denies current alcohol use. 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 Also with non-healing DM ulcer on bottom of right foot.     TTE ordered resulting with EF 50%, mild AS, mod-severe MS from extensive calcification (MG 15, may be elevated due to volume overload), PAP 62.     - ASA   - Statin   - Norvasc 5   - Keppra 500 BID   - Pantoprazole 40 QD  - Polyethylene Glycol daily   - Sevelamer 1600 TID   - Renal diet with 1L fluid restriction per nephrology   - HD today  - Disc with previous imaging received today, will review  -Patient reports this morning that he has been told in the past that he has Hepatitis C and Cirrhosis. He denies previous treatment for HCV. Will order labs and  place a consult for hepatology.  If patient is found to have cirrhosis and currently has osteomyelitis to the right foot, this would greatly increase his risk for cardiac surgery.    Nonrheumatic aortic (valve) stenosis  -ECHO shows mild aortic valve stenosis. Aortic valve area is 1.57 cm2; peak velocity is 3.31 m/s; mean gradient is 24 mmHg. Mild aortic regurgitation.    Skin ulcer of right foot with fat layer exposed  -Vascular following      Hyperkalemia  -Potassium today is 5.5 (lower today after dialysis yesterday)  -Monitor potassium every four hours for potassium greater than 5.4  -Will consider shifting potassium if continuing to elevate    Seizure disorder  - Home Keppra     Anemia in chronic kidney disease  -Expected  -Nephrology following    ESRD (T,Th,Sat) dialysis onset 2013  - Nephrology following   -Dialysis yesterday        Courtney Jefferson NP  Cardiothoracic Surgery  [facility]

## 2020-07-28 NOTE — SUBJECTIVE & OBJECTIVE
Interval History: NAEON. Potassium remains high after dialysis, will monitor to see if patient needs potassium shift. Reports today that he has been told in the past that he has hepatitis C (denies treatment) and cirrhosis, hepatology consulted for management. Will have angiogram today with vascular surgery.    Review of Systems   Constitution: Negative for decreased appetite, fever and malaise/fatigue.   Cardiovascular: Negative for chest pain, claudication, dyspnea on exertion, irregular heartbeat, leg swelling, palpitations and syncope.   Respiratory: Positive for shortness of breath. Negative for cough.    Hematologic/Lymphatic: Negative for bleeding problem.   Skin: Negative for rash.   Musculoskeletal: Negative for arthritis and myalgias.   Gastrointestinal: Negative for abdominal pain, diarrhea, melena, nausea and vomiting.   Genitourinary: Negative for dysuria.   Neurological: Negative for headaches, paresthesias and seizures.     Medications:  Continuous Infusions:  Scheduled Meds:   sodium chloride 0.9%   Intravenous Once    sodium chloride 0.9%   Intravenous Once    sodium chloride 0.9%   Intravenous Once    amLODIPine  5 mg Oral BID    aspirin  325 mg Oral Daily    atorvastatin  40 mg Oral Daily    gabapentin  100 mg Oral BID    levETIRAcetam  500 mg Oral BID    methadone  100 mg Oral Daily    pantoprazole  40 mg Intravenous Daily    polyethylene glycol  17 g Oral Daily    sevelamer carbonate  1,600 mg Oral TID WM     PRN Meds:acetaminophen, albuterol-ipratropium, bisacodyL, [COMPLETED] calcium gluconate IVPB **AND** calcium gluconate IVPB, heparin (porcine), iodixanoL, ondansetron, oxyCODONE, oxyCODONE, promethazine, sodium chloride 0.9%     Objective:     Vital Signs (Most Recent):  Temp: 97.7 °F (36.5 °C) (07/28/20 0726)  Pulse: (!) 145 (07/28/20 1110)  Resp: 18 (07/28/20 0804)  BP: 111/60 (07/28/20 0726)  SpO2: 98 % (07/28/20 0726) Vital Signs (24h Range):  Temp:  [97.5 °F (36.4 °C)-98.6  "°F (37 °C)] 97.7 °F (36.5 °C)  Pulse:  [] 145  Resp:  [16-18] 18  SpO2:  [96 %-99 %] 98 %  BP: (106-128)/(59-80) 111/60     Weight: 99.8 kg (220 lb)  Body mass index is 29.84 kg/m².    SpO2: 98 %  O2 Device (Oxygen Therapy): room air    Intake/Output - Last 3 Shifts       07/26 0700 - 07/27 0659 07/27 0700 - 07/28 0659 07/28 0700 - 07/29 0659    P.O. 1680 476     IV Piggyback 100      Total Intake(mL/kg) 1780 (17.8) 476 (4.8)     Urine (mL/kg/hr)  0 (0)     Other  1767     Total Output  1767     Net +1780 -1291            Urine Occurrence 0 x            Lines/Drains/Airways     Drain                 Hemodialysis AV Fistula Left forearm -- days         Hemodialysis AV Fistula Left forearm -- days         Hemodialysis AV Fistula Left forearm -- days          Peripheral Intravenous Line                 Peripheral IV - Single Lumen 07/24/20 1034 20 G;1 in Right Forearm 4 days                Physical Exam  Constitutional:       Appearance: He is well-developed. He is obese.   Neck:      Musculoskeletal: Neck supple.   Cardiovascular:      Rate and Rhythm: Normal rate and regular rhythm.      Heart sounds: Normal heart sounds.   Pulmonary:      Effort: Pulmonary effort is normal.      Breath sounds: Normal breath sounds.   Abdominal:      Palpations: Abdomen is soft.   Musculoskeletal:      Comments: Venous stasis dermatitis bilateral lower extremities  Plantar ulcers x2. Palpable femoral pulses bilaterally.     Skin:     General: Skin is warm and dry.   Neurological:      Mental Status: He is alert and oriented to person, place, and time.   Psychiatric:      Comments: Patient was tearful today during assessment stating "I don't know if I want such a big surgery"          Significant Labs:  BMP:   Recent Labs   Lab 07/28/20  0432 07/28/20  0806   GLU 92  92 85     142 140   K 5.5*  5.5*  5.5* 5.8*  5.8*     102 103   CO2 25  25 21*   BUN 56*  56* 58*   CREATININE 5.3*  5.3* 5.4*   CALCIUM 9.6  " 9.6 9.9   MG 2.1  --      CBC:   Recent Labs   Lab 07/28/20  0433   WBC 8.07   RBC 3.32*   HGB 10.9*   HCT 35.8*      *   MCH 32.8*   MCHC 30.4*     CMP:   Recent Labs   Lab 07/28/20  0806   GLU 85   CALCIUM 9.9      K 5.8*  5.8*   CO2 21*      BUN 58*   CREATININE 5.4*     Coagulation: No results for input(s): PT, INR, APTT in the last 48 hours.    Significant Diagnostics:  MRI Right Foot-7/27/2020  Impression:  Findings consistent with osteomyelitis of the distal 5th metatarsal head with adjacent skin ulceration.  No focal fluid collection.  Postoperative changes of 2nd phalangeal/distal metatarsal amputation.  Prominent hallux valgus deformity and degenerative changes of the 1st metatarsophalangeal joint.  Nonspecific superficial cystic lesion along the plantar aspect of the 3rd phalanx.    VAS US Arterial legs 7/27/2020  Impression   =========   Right leg: Color flow duplex of the right lower extremity reveals heavily calcified vessels with monophasic waveforms throughout. There   are collaterals throughout the extremity with no evidence of a hemodynamically significant stenosis.     Left leg: Color flow duplex of the left lower extremity reveals heavily calcified vessels with monophasic waveforms throughout. There   are collaterals throughout the extremity with no evidence of a hemodynamically significant stenosis.     VAS Ankle Brachial Indices Resting 7/27/2020  Impression   =========   Right Leg: Segmental pressures are unreliable due to medial calcinosis; however, PVR waveforms suggest severe peripheral arterial   occlusive disease. -TBI of 0 is noted.   Left Leg: Segmental pressures are unreliable due to medial calcinosis; however, PVR waveforms suggest severe peripheral arterial   occlusive disease. -TBI of 0 is noted.     I have reviewed all pertinent imaging results/findings within the past 24 hours.

## 2020-07-28 NOTE — PLAN OF CARE
A A O x 4. Free of falls, traumas and injuries. Dressing to R foot ulcer, dry and intact. Denies shortness of breath, chest pain, nausea, vomiting. Angiogram of bilateral extremities done today by vascular surgery. Patient went to PACU, then dialysis. Plan of care reviewed with patient. Vital signs stable. Pain monitored. Will continue to monitor.

## 2020-07-28 NOTE — TRANSFER OF CARE
Anesthesia Transfer of Care Note    Patient: João Aguirre    Procedure(s) Performed: Procedure(s) (LRB):  Angiogram Extremity Unilateral (Right)    Patient location: PACU    Anesthesia Type: MAC    Transport from OR: Transported from OR on 6-10 L/min O2 by face mask with adequate spontaneous ventilation    Post pain: adequate analgesia    Post assessment: no apparent anesthetic complications and tolerated procedure well    Post vital signs: stable    Level of consciousness: awake, alert and oriented    Nausea/Vomiting: no nausea/vomiting    Complications: none    Transfer of care protocol was followed      Last vitals:   Visit Vitals  /60   Pulse (!) 58   Temp 36.8 °C (98.2 °F) (Oral)   Resp 18   Ht 6' (1.829 m)   Wt 99.8 kg (220 lb)   SpO2 99%   BMI 29.84 kg/m²

## 2020-07-28 NOTE — ASSESSMENT & PLAN NOTE
-ECHO shows mild aortic valve stenosis. Aortic valve area is 1.57 cm2; peak velocity is 3.31 m/s; mean gradient is 24 mmHg. Mild aortic regurgitation.

## 2020-07-28 NOTE — NURSING TRANSFER
Nursing Transfer Note      7/28/2020     Transfer To: dialysis- then back to room 324    Transfer via bed    Transfer with cardiac monitoring via centralized telemetry confirmed before transfer , 2L NC     Transported by pct x 2     Medicines sent: none    Chart send with patient: Yes    Notified: spouse via text messaging system    Patient reassessed at: 7/28/2020 1533

## 2020-07-28 NOTE — SUBJECTIVE & OBJECTIVE
Medications:  Continuous Infusions:  Scheduled Meds:   sodium chloride 0.9%   Intravenous Once    sodium chloride 0.9%   Intravenous Once    amLODIPine  5 mg Oral BID    aspirin  325 mg Oral Daily    atorvastatin  40 mg Oral Daily    gabapentin  100 mg Oral BID    levETIRAcetam  500 mg Oral BID    methadone  100 mg Oral Daily    pantoprazole  40 mg Intravenous Daily    polyethylene glycol  17 g Oral Daily    sevelamer carbonate  1,600 mg Oral TID WM     PRN Meds:acetaminophen, albuterol-ipratropium, bisacodyL, [COMPLETED] calcium gluconate IVPB **AND** calcium gluconate IVPB, ondansetron, oxyCODONE, oxyCODONE, promethazine, sodium chloride 0.9%     Objective:     Vital Signs (Most Recent):  Temp: 97.7 °F (36.5 °C) (07/28/20 0726)  Pulse: 72 (07/28/20 0726)  Resp: 18 (07/28/20 0804)  BP: 111/60 (07/28/20 0726)  SpO2: 96 % (07/28/20 0512) Vital Signs (24h Range):  Temp:  [97.5 °F (36.4 °C)-98.6 °F (37 °C)] 97.7 °F (36.5 °C)  Pulse:  [57-84] 72  Resp:  [15-18] 18  SpO2:  [96 %-99 %] 96 %  BP: ()/(46-80) 111/60         Physical Exam  Vitals signs and nursing note reviewed.   Cardiovascular:      Rate and Rhythm: Normal rate.   Pulmonary:      Effort: Pulmonary effort is normal.   Musculoskeletal:      Comments: Venous stasis dermatitis bilateral lower extremities  Plantar ulcers x2. Palpable femoral pulses bilaterally.          Significant Labs:  CBC:   Recent Labs   Lab 07/28/20  0433   WBC 8.07   RBC 3.32*   HGB 10.9*   HCT 35.8*      *   MCH 32.8*   MCHC 30.4*     CMP:   Recent Labs   Lab 07/28/20 0432   GLU 92  92   CALCIUM 9.6  9.6     142   K 5.5*  5.5*  5.5*   CO2 25  25     102   BUN 56*  56*   CREATININE 5.3*  5.3*       Significant Diagnostics:  none

## 2020-07-28 NOTE — NURSING
Results for ALEJANDRONAOMI (MRN [id number]) as of 7/27/2020 23:34   Ref. Range 7/27/2020 19:39   Potassium Latest Ref Range: 3.5 - 5.1 mmol/L 5.4 (H)     Dr. Dubose notified, ordered STAT potassium redraw.

## 2020-07-28 NOTE — ASSESSMENT & PLAN NOTE
Patient is a 55 year old who presented from [place] for second opinion regarding AVR and CABG. Medical conditions include ESRD on HD since 2013 (T/TH/Sat), anemia, uncontrolled HTN, hx CVA, PCI, T2DM, and gastroparesis. States that for the past several weeks he has had some chest tightness and increasing SOB. Unable to quantify how far he can walk, but states not very far. Some occasional dizziness whenever beginning to walk. Endorses palpitations. Had a stroke 3 years ago but denies residual symptoms. History of seizures, last 3 years ago. Reports having two stents done, he thinks at [facility] 2-3 years ago. Smokes cigars occasionally. Denies current alcohol use. 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 Also with non-healing DM ulcer on bottom of right foot.     TTE ordered resulting with EF 50%, mild AS, mod-severe MS from extensive calcification (MG 15, may be elevated due to volume overload), PAP 62.     - ASA   - Statin   - Norvasc 5   - Keppra 500 BID   - Pantoprazole 40 QD  - Polyethylene Glycol daily   - Sevelamer 1600 TID   - Renal diet with 1L fluid restriction per nephrology   - HD today  - Disc with previous imaging received today, will review  -Patient reports this morning that he has been told in the past that he has Hepatitis C and Cirrhosis. He denies previous treatment for HCV. Will order labs and place a consult for hepatology.  If patient is found to have cirrhosis and currently has osteomyelitis to the right foot, this would greatly increase his risk for cardiac surgery.

## 2020-07-28 NOTE — ASSESSMENT & PLAN NOTE
55 y.o. male with right diabetic right foot infection with MRI evidence of osteomyelitis. Non-invasive vascular labs suggest severe tibial and microvascular disease.     -NPO for angiogram today  -continue asa and statin

## 2020-07-28 NOTE — SUBJECTIVE & OBJECTIVE
Interval History: No acute events overnight. Afebrile. No leukocytosis.  MRI consistent with osteo of distal 5th metatarsal head.  RLE angiogram yesterday,  unable to intervene.  Patient to be referred to Dr. Young for limb salvage.      Review of Systems   Constitutional: Negative for activity change, appetite change, chills, diaphoresis and fatigue.   HENT: Negative.    Eyes: Positive for visual disturbance (right eye blindness).   Respiratory: Positive for chest tightness (chest pain last night). Negative for shortness of breath.    Cardiovascular: Positive for chest pain (chest pain last night).   Gastrointestinal: Negative for nausea and vomiting.   Genitourinary:        HD   Musculoskeletal: Negative for arthralgias and myalgias.   Skin: Positive for wound.   Neurological: Negative for dizziness and headaches.   Psychiatric/Behavioral: Negative for agitation and behavioral problems.   All other systems reviewed and are negative.    Objective:     Vital Signs (Most Recent):  Temp: 97.9 °F (36.6 °C) (07/29/20 1415)  Pulse: 66 (07/29/20 1745)  Resp: 18 (07/29/20 1745)  BP: 105/61 (07/29/20 1745)  SpO2: 100 % (07/29/20 1745) Vital Signs (24h Range):  Temp:  [97.3 °F (36.3 °C)-98 °F (36.7 °C)] 97.9 °F (36.6 °C)  Pulse:  [] 66  Resp:  [16-18] 18  SpO2:  [92 %-100 %] 100 %  BP: ()/(37-89) 105/61     Weight: 99.8 kg (220 lb)  Body mass index is 29.84 kg/m².    Estimated Creatinine Clearance: 19.9 mL/min (A) (based on SCr of 5.2 mg/dL (H)).    Physical Exam  Vitals signs and nursing note reviewed.   Constitutional:       General: He is not in acute distress.     Appearance: He is not ill-appearing or toxic-appearing.   HENT:      Head: Normocephalic and atraumatic.   Eyes:      General: No scleral icterus.     Conjunctiva/sclera: Conjunctivae normal.      Comments: Right eye blindness   Neck:      Musculoskeletal: Normal range of motion and neck supple.   Cardiovascular:      Rate and Rhythm: Normal rate.       Heart sounds: Murmur present.   Pulmonary:      Effort: Pulmonary effort is normal. No respiratory distress.      Breath sounds: Normal breath sounds.   Abdominal:      General: Abdomen is flat.      Palpations: Abdomen is soft.   Musculoskeletal: Normal range of motion.         General: No swelling or tenderness.   Skin:     General: Skin is warm and dry.      Comments: Wounds examined.  No drainage, periwound erythema, tenderness, fluctuance.   No ascending warmth/erythema.   Podiatry photos reviewed.  See below.     Dialysis access LUE    Neurological:      General: No focal deficit present.      Mental Status: He is alert and oriented to person, place, and time.   Psychiatric:         Mood and Affect: Mood normal.         Behavior: Behavior normal.                 Significant Labs:   Blood Culture: No results for input(s): LABBLOO in the last 4320 hours.  CBC:   Recent Labs   Lab 07/28/20  0433 07/28/20  1820 07/29/20  0419   WBC 8.07 9.59 8.78   HGB 10.9* 10.5* 9.6*   HCT 35.8* 32.8* 30.5*    125* 148*     CMP:   Recent Labs   Lab 07/28/20  1820  07/29/20  0024 07/29/20  0419 07/29/20  1009      < > 138 136  136 135*   K 4.3   < > 6.1*  6.1* 5.7*  5.7*  5.7* 5.4*      < > 98 99  99 98   CO2 27   < > 28 23  23 25   GLU 73   < > 68* 69*  69* 106   BUN 35*   < > 43* 48*  48* 52*   CREATININE 3.4*   < > 4.5* 4.8*  4.8* 5.2*   CALCIUM 9.2   < > 9.6 8.9  8.9 9.0   PROT 7.4  --   --   --   --    ALBUMIN 2.9*  --   --   --   --    BILITOT 0.5  --   --   --   --    ALKPHOS 122  --   --   --   --    AST 20  --   --   --   --    ALT 15  --   --   --   --    ANIONGAP 11   < > 12 14  14 12   EGFRNONAA 19.3*   < > 13.8* 12.8*  12.8* 11.6*    < > = values in this interval not displayed.     Wound Culture: No results for input(s): LABAERO in the last 4320 hours.    Significant Imaging: I have reviewed all pertinent imaging results/findings within the past 24 hours.

## 2020-07-28 NOTE — NURSING
Pt educated and informed that MD okay with him taking amlodipine despite BP. Pt agreed to take amlodipine. Dr. Dubose notified.

## 2020-07-28 NOTE — ASSESSMENT & PLAN NOTE
54 y/o male with a history blindness to right eye, DM, CAD, treated tuberculosis, cardiomyopathy and valvular heart disease.  He has had a chronic ulcer to the right foot, base of 5th digit for over 6 months now.  He also has had a smaller ulcer to the based of his right foot first digit.  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  He says that he was treated with IV vancomycin for 6-8 weeks and then it was stopped.  He continued to get wound care from a facility affiliated with [place], [place], and reports he has been receiving HBOT.    He states that he started having pus come out of the wound again.  Per patient cultures were obtained and revealed 'staph' and 'yeast' .  Patient reported an MRI showed bone infection.   Records now obtained from ID in [place] show he grew a candida albicans and Staph Cohnii from a culture of 6/2/20.  He was supposed to be started on IV vancomycin to be given after dialysis,  however he developed chest pain and was admitted to [facility] in [place].  He was evaluated and it was felt his chest pain was due to severe aortic stenosis and severe coronary artery disease.  He was  transferred to the CTS service of our facility for a higher lever of care for his cardiac disease and consideration of MV replacement.  ID consulted to assist with his diabetic foot infection.      MRI of right foot this admission is consistent with osteomyelitis of distal 5th met head.  Non-invasive vascular studies indicative of severe PAD and he underwent angiogram yesterday Vascular surgery unable to intervene to restore flow to the right foot.    Patient to be referred to Dr. Young ([facility]) for evaluation for limb salvage.  Given lack of flow, patient is not a candidate for 5th metatarsal head amputation.       He is afebrile, no leukocytosis. Wound is stable, without acute signs of infection.    Podiatry obtained new bone cultures/biopsy today.  Appreciate their assistance.      Plan/recommendations:  1.  Continue to hold antibiotics pending new culture data.   2.  Will plan on initiating antibiotic therapy pending new cultures and review of pathology.   Understand CTS concerns with risks of surgery with active osteomyelitis.  Vascular Surgery recommending referral to Dr. Young for evaluation and limb salvage.    If unable to re-vascularize, antibiotics alone may be of limited benefit for wound healing and treatment of osteomyelitis.    3.  Will follow up tomorrow with further recommendations.     Data reviewed and plan discussed with ID staff

## 2020-07-28 NOTE — ANESTHESIA PREPROCEDURE EVALUATION
07/28/2020  João Aguirre is a 55 y.o., male.    Anesthesia Evaluation    I have reviewed the Patient Summary Reports.    I have reviewed the Nursing Notes. I have reviewed the NPO Status.      Review of Systems      Physical Exam  General:  Well nourished    Airway/Jaw/Neck:  Airway Findings: Tongue: Normal Mallampati: II  Improves to II with phonation.  TM Distance: 4 - 6 cm                 Anesthesia Plan  Type of Anesthesia, risks & benefits discussed:  Anesthesia Type:  general  Patient's Preference:   Intra-op Monitoring Plan:   Intra-op Monitoring Plan Comments:   Post Op Pain Control Plan:   Post Op Pain Control Plan Comments:   Induction:    Beta Blocker:         Informed Consent: Patient understands risks and agrees with Anesthesia plan.  Questions answered. Anesthesia consent signed with patient.  ASA Score: 4     Day of Surgery Review of History & Physical:            Ready For Surgery From Anesthesia Perspective.

## 2020-07-28 NOTE — NURSING
Results for NAOMI ALLEN (MRN [id number]) as of 7/28/2020 00:42   Ref. Range 7/27/2020 23:54   Potassium Latest Ref Range: 3.5 - 5.1 mmol/L 5.4 (H)     Repeat potassium resulted 5.4. Dr. Dubose notified. Will look into chart and put in orders.

## 2020-07-28 NOTE — PLAN OF CARE
Pt free of falls and injury. Fall precautions remain in place. Pt remains on room air. NSR on telemetry. BG monitored q6h. Potassium is being monitored q4h. Current level 5.4, see notes. Plan of care reviewed with pt. Pt resting comfortably with no complaints of pain. Pt VSS, no distress, will continue to monitor.

## 2020-07-28 NOTE — NURSING
EKG complete. Spoke to Dr. Sedrick west regards to potassium. Plan is to continue trending potassium. Will continue to monitor for muscle weakness and any other S/S.

## 2020-07-28 NOTE — NURSING
/59, pt refusing amlodipine, states that it makes his BP drop. Dr. Dubose notified, would like pt to take, will attempt again.

## 2020-07-28 NOTE — PROGRESS NOTES
Treatment Update:   Subjective:  Called to bedside after patient experienced chest tightness radiating to left neck while on HD for 2 hours. No shortness of breath.     Objective:  Heart rate 130-140 appears a fib on monitor and ekg  No diaphoresis but appears uncomfortable    Assessment and Plan:  Chest pain is pleuritic. Gave 5mg metop IV x1 which improved heart rate to  range. This also resolved his chest pain.   -follow up labs  -keep on telemetry    Hiren Cruz, PGY3  General Surgery  [phone]

## 2020-07-28 NOTE — PLAN OF CARE
ID Follow Up:    Patient off floor for angiogram/possible intervention today and not seen.   Chart reviewed.  Remains afebrile, no leukocytosis, hemodynamically stable.       Non-invasive vascular studies indicative of severe PAD of RLE, blood flow insufficient to heal wounds.  Plan for RLE angiogram and possible intervention today.  MRI of right foot is consistent with known osteomyelitis of distal 5th met head.      Cultures of 6/8 received from Dr. Carrasco's office - show candida albicans and Staph cohnii (methcillin R).       Per Primary Team (CTS) notes, patient reported possible HCV and cirrhosis.  Hepatology consult pending.             Plan/recommendations:  1.   Continue to hold antibiotics for now pending outcome of re-vascularization.    2.   Follow up Vascular intervention.  If unable to re-vascularize, antibiotics alone may be of limited benefit for wound healing/treatment of osteo  3.  Will follow up tomorrow with further recommendations pending ability to re-vascularize RLE.  If able to re-vascularize, may ask Podiatry to repeat bone biopsy (last cultures from over a month and a half ago) while patient off abx.    4.  Will follow.     Data reviewed and plan discussed with ID staff

## 2020-07-28 NOTE — ASSESSMENT & PLAN NOTE
-Potassium today is 5.5 (lower today after dialysis yesterday)  -Monitor potassium every four hours for potassium greater than 5.4  -Will consider shifting potassium if continuing to elevate

## 2020-07-29 PROBLEM — I50.30 (HFPEF) HEART FAILURE WITH PRESERVED EJECTION FRACTION: Status: ACTIVE | Noted: 2020-01-01

## 2020-07-29 PROBLEM — I95.9 HYPOTENSION: Status: ACTIVE | Noted: 2020-01-01

## 2020-07-29 PROBLEM — I48.92 ATRIAL FLUTTER: Status: ACTIVE | Noted: 2020-01-01

## 2020-07-29 NOTE — PROGRESS NOTES
Treatment update:  Labs relatively unremarkable. K initially 5.2 after 2 hours of dialysis and trop mildly elevated at 0.18. Pain resolved after rate controlled. With ESRD, expect mild tropinemia at baseline. Repeat K after 4 hours was 6.1. Shifted patient to temporize hyperkalemia with insulin and calcium gluconate until HD could be started this morning. Called dialysis unit to get patient on schedule. Will trend troponin once more to make sure no drastic elevation from mild tropinemia in setting of ESRD.     Hiren Cruz, PGY3  General Surgery  [phone]

## 2020-07-29 NOTE — PROGRESS NOTES
Dialysis completed. Needles removed from left lower arm fistula with pressure held to sites for 10 minutes each with hemostasis achieved. Gauze and tape applied. Patient dialyzed for 3.5 hours with no fluid removal . Blood pressure remained stable as long as no ultrafiltration attempted. No complaints of shortness of breath or chest pain during dialysis. Report called to Karina.

## 2020-07-29 NOTE — SUBJECTIVE & OBJECTIVE
Interval History: Chest pain with Afib with RVR during dialysis last night.  Troponin elevated to 0.18>>3.849. Will consult interventional cardiology for help with management.  US ABD done yesterday and shows cirrhosis. In the presence of cirrhosis and osteomyelitis, this patient is not a good surgical candidate at this time.  Will contact hospital medicine and attempt to transition his care to their team.  Hepatology consulted, would appreciate recommendations.  Infectious disease following.     Review of Systems   Constitution: Negative for decreased appetite, fever and malaise/fatigue.   Cardiovascular: Negative for chest pain, claudication, dyspnea on exertion, irregular heartbeat, leg swelling, palpitations and syncope.   Respiratory: Negative for cough and shortness of breath.    Hematologic/Lymphatic: Negative for bleeding problem.   Skin: Negative for rash.   Musculoskeletal: Negative for arthritis and myalgias.   Gastrointestinal: Negative for abdominal pain, diarrhea, melena, nausea and vomiting.   Genitourinary: Negative for dysuria.   Neurological: Negative for headaches, paresthesias and seizures.     Medications:  Continuous Infusions:  Scheduled Meds:   sodium chloride 0.9%   Intravenous Once    sodium chloride 0.9%   Intravenous Once    amLODIPine  5 mg Oral BID    aspirin  325 mg Oral Daily    atorvastatin  40 mg Oral Daily    gabapentin  100 mg Oral BID    levETIRAcetam  500 mg Oral BID    methadone  100 mg Oral Daily    pantoprazole  40 mg Intravenous Daily    polyethylene glycol  17 g Oral Daily    sevelamer carbonate  1,600 mg Oral TID WM     PRN Meds:acetaminophen, albuterol-ipratropium, bisacodyL, [COMPLETED] calcium gluconate IVPB **AND** calcium gluconate IVPB, [COMPLETED] calcium gluconate IVPB **AND** calcium gluconate IVPB, [COMPLETED] dextrose 50% **AND** dextrose 50% **AND** [COMPLETED] insulin regular, ondansetron, oxyCODONE, oxyCODONE, promethazine, sodium chloride 0.9%  "    Objective:     Vital Signs (Most Recent):  Temp: 97.6 °F (36.4 °C) (07/29/20 0508)  Pulse: 101 (07/29/20 0718)  Resp: 18 (07/29/20 0508)  BP: 110/71 (07/29/20 0508)  SpO2: (!) 94 % (07/29/20 0508) Vital Signs (24h Range):  Temp:  [97.3 °F (36.3 °C)-98.8 °F (37.1 °C)] 97.6 °F (36.4 °C)  Pulse:  [] 101  Resp:  [14-21] 18  SpO2:  [88 %-100 %] 94 %  BP: ()/(49-78) 110/71     Weight: 99.8 kg (220 lb)  Body mass index is 29.84 kg/m².    SpO2: (!) 94 %  O2 Device (Oxygen Therapy): room air    Intake/Output - Last 3 Shifts       07/27 0700 - 07/28 0659 07/28 0700 - 07/29 0659 07/29 0700 - 07/30 0659    P.O. 476 480     Other  650     IV Piggyback       Total Intake(mL/kg) 476 (4.8) 1130 (11.3)     Urine (mL/kg/hr) 0 (0)      Other 1767 455     Total Output 1767 455     Net -1291 +675                  Lines/Drains/Airways     Drain                 Hemodialysis AV Fistula Left forearm -- days         Hemodialysis AV Fistula Left forearm -- days         Hemodialysis AV Fistula Left forearm -- days          Peripheral Intravenous Line                 Peripheral IV - Single Lumen 07/29/20 0800 18 G Right Forearm less than 1 day                Physical Exam  Constitutional:       Appearance: He is well-developed. He is obese.   Neck:      Musculoskeletal: Neck supple.   Cardiovascular:      Rate and Rhythm: Normal rate and regular rhythm.      Heart sounds: Normal heart sounds.   Pulmonary:      Effort: Pulmonary effort is normal.      Breath sounds: Normal breath sounds.   Abdominal:      Palpations: Abdomen is soft. There is hepatomegaly.   Musculoskeletal:      Comments: Venous stasis dermatitis bilateral lower extremities  Plantar ulcers x2. Palpable femoral pulses bilaterally.     Skin:     General: Skin is warm and dry.   Neurological:      Mental Status: He is alert and oriented to person, place, and time.   Psychiatric:      Comments: Patient was tearful today during assessment stating "I don't know if I " "want such a big surgery"          Significant Labs:  BMP:   Recent Labs   Lab 07/29/20  0419   GLU 69*  69*     136   K 5.7*  5.7*  5.7*   CL 99  99   CO2 23  23   BUN 48*  48*   CREATININE 4.8*  4.8*   CALCIUM 8.9  8.9   MG 2.2     CBC:   Recent Labs   Lab 07/29/20  0419   WBC 8.78   RBC 2.97*   HGB 9.6*   HCT 30.5*   *   *   MCH 32.3*   MCHC 31.5*     CMP:   Recent Labs   Lab 07/28/20  1820  07/29/20  0419   GLU 73   < > 69*  69*   CALCIUM 9.2   < > 8.9  8.9   ALBUMIN 2.9*  --   --    PROT 7.4  --   --       < > 136  136   K 4.3   < > 5.7*  5.7*  5.7*   CO2 27   < > 23  23      < > 99  99   BUN 35*   < > 48*  48*   CREATININE 3.4*   < > 4.8*  4.8*   ALKPHOS 122  --   --    ALT 15  --   --    AST 20  --   --    BILITOT 0.5  --   --     < > = values in this interval not displayed.     Coagulation: No results for input(s): PT, INR, APTT in the last 48 hours.    Significant Diagnostics:  US Abdomen Complete-7/28/2020  Impression:  Coarse appearance of the hepatic parenchyma with nodular contour suggesting underlying cirrhosis.  Advanced atrophy of the bilateral kidneys compatible with chronic medical renal disease.  Status post cholecystectomy.  I have reviewed all pertinent imaging results/findings within the past 24 hours.  "

## 2020-07-29 NOTE — PROGRESS NOTES
[facility]  Infectious Disease  Progress Note    Patient Name: João Aguirre  MRN: [id number]  Admission Date: 7/24/2020  Length of Stay: 5 days  Attending Physician: Surendra Porras MD  Primary Care Provider: Primary Doctor No    Isolation Status: No active isolations  Assessment/Plan:      Diabetic foot infection     54 y/o male with a history blindness to right eye, DM, CAD, treated tuberculosis, cardiomyopathy and valvular heart disease.  He has had a chronic ulcer to the right foot, base of 5th digit for over 6 months now.  He also has had a smaller ulcer to the based of his right foot first digit.  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  He says that he was treated with IV vancomycin for 6-8 weeks and then it was stopped.  He continued to get wound care from a facility affiliated with [facility], [place], and reports he has been receiving HBOT.    He states that he started having pus come out of the wound again.  Per patient cultures were obtained and revealed 'staph' and 'yeast' .  Patient reported an MRI showed bone infection.   Records now obtained from ID in [place] show he grew a candida albicans and Staph Cohnii from a culture of 6/2/20.  He was supposed to be started on IV vancomycin to be given after dialysis,  however he developed chest pain and was admitted to [facility] in [place].  He was evaluated and it was felt his chest pain was due to severe aortic stenosis and severe coronary artery disease.  He was  transferred to the CTS service of our facility for a higher lever of care for his cardiac disease and consideration of MV replacement.  ID consulted to assist with his diabetic foot infection.      MRI of right foot this admission is consistent with osteomyelitis of distal 5th met head.  Non-invasive vascular studies indicative of severe PAD and he underwent angiogram yesterday  Vascular surgery unable to intervene to restore flow to the right foot.    Patient to be referred to Dr. Young ([facility]) for evaluation for limb salvage.  Given lack of flow, patient is not a candidate for 5th metatarsal head amputation.       He is afebrile, no leukocytosis. Wound is stable, without acute signs of infection.   Podiatry obtained new bone cultures/biopsy today.  Appreciate their assistance.      Plan/recommendations:  1.  Continue to hold antibiotics pending new culture data.   2.  Will plan on initiating antibiotic therapy pending new cultures and review of pathology.   Understand CTS concerns with risks of surgery with active osteomyelitis.  Vascular Surgery recommending referral to Dr. Young for evaluation and limb salvage.    If unable to re-vascularize, antibiotics alone may be of limited benefit for wound healing and treatment of osteomyelitis.    3.  Will follow up tomorrow with further recommendations.     Data reviewed and plan discussed with ID staff        Thank you.   Please call for any questions or concerns.  VIOLA Puente, ANP-C  [phone] pager, Lntdmsi [phone]  Subjective:     Principal Problem:Mitral stenosis    HPI: 56 y/o male with a history blindness to right eye, DM, CAD, treated tuberculosis, cardiomyopathy and valvular heart disease.  He has had a chronic ulcer to the right foot, base of 5th digit for over 6 months now.  He also has had a smaller ulcer to the based of his right foot first digit.  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  He says that he was treated with IV vancomycin for 6-8 weeks and then it was stopped.  He continued to get wound care from a facility affiliated with [place], [place].  He states that he started having puss come out of the wound again.  Per patient cultures were obtained and revealed 'staph' and 'yeast'.  He states that an MRI showed bone  infection.  He was supposed to be started on IV vancomycin.  However he developed chest pain and was admitted to [facility] in [place].  He was evaluated and it was felt his chest pain was due to severe aortic stenosis and severe coronary artery disease.  He has been transferred to our facility for a higher lever of care for his cardiac disease.  I am consulted to assist with his diabetic foot infection.  Interval History: No acute events overnight. Afebrile. No leukocytosis.  MRI consistent with osteo of distal 5th metatarsal head.  RLE angiogram yesterday,  unable to intervene.  Patient to be referred to Dr. Young for limb salvage.      Review of Systems   Constitutional: Negative for activity change, appetite change, chills, diaphoresis and fatigue.   HENT: Negative.    Eyes: Positive for visual disturbance (right eye blindness).   Respiratory: Positive for chest tightness (chest pain last night). Negative for shortness of breath.    Cardiovascular: Positive for chest pain (chest pain last night).   Gastrointestinal: Negative for nausea and vomiting.   Genitourinary:        HD   Musculoskeletal: Negative for arthralgias and myalgias.   Skin: Positive for wound.   Neurological: Negative for dizziness and headaches.   Psychiatric/Behavioral: Negative for agitation and behavioral problems.   All other systems reviewed and are negative.    Objective:     Vital Signs (Most Recent):  Temp: 97.9 °F (36.6 °C) (07/29/20 1415)  Pulse: 66 (07/29/20 1745)  Resp: 18 (07/29/20 1745)  BP: 105/61 (07/29/20 1745)  SpO2: 100 % (07/29/20 1745) Vital Signs (24h Range):  Temp:  [97.3 °F (36.3 °C)-98 °F (36.7 °C)] 97.9 °F (36.6 °C)  Pulse:  [] 66  Resp:  [16-18] 18  SpO2:  [92 %-100 %] 100 %  BP: ()/(37-89) 105/61     Weight: 99.8 kg (220 lb)  Body mass index is 29.84 kg/m².    Estimated Creatinine Clearance: 19.9 mL/min (A) (based on SCr of 5.2 mg/dL (H)).    Physical Exam  Vitals signs and nursing note  reviewed.   Constitutional:       General: He is not in acute distress.     Appearance: He is not ill-appearing or toxic-appearing.   HENT:      Head: Normocephalic and atraumatic.   Eyes:      General: No scleral icterus.     Conjunctiva/sclera: Conjunctivae normal.      Comments: Right eye blindness   Neck:      Musculoskeletal: Normal range of motion and neck supple.   Cardiovascular:      Rate and Rhythm: Normal rate.      Heart sounds: Murmur present.   Pulmonary:      Effort: Pulmonary effort is normal. No respiratory distress.      Breath sounds: Normal breath sounds.   Abdominal:      General: Abdomen is flat.      Palpations: Abdomen is soft.   Musculoskeletal: Normal range of motion.         General: No swelling or tenderness.   Skin:     General: Skin is warm and dry.      Comments: Wounds examined.  No drainage, periwound erythema, tenderness, fluctuance.   No ascending warmth/erythema.   Podiatry photos reviewed.  See below.     Dialysis access LUE    Neurological:      General: No focal deficit present.      Mental Status: He is alert and oriented to person, place, and time.   Psychiatric:         Mood and Affect: Mood normal.         Behavior: Behavior normal.                 Significant Labs:   Blood Culture: No results for input(s): LABBLOO in the last 4320 hours.  CBC:   Recent Labs   Lab 07/28/20  0433 07/28/20  1820 07/29/20  0419   WBC 8.07 9.59 8.78   HGB 10.9* 10.5* 9.6*   HCT 35.8* 32.8* 30.5*    125* 148*     CMP:   Recent Labs   Lab 07/28/20  1820  07/29/20  0024 07/29/20  0419 07/29/20  1009      < > 138 136  136 135*   K 4.3   < > 6.1*  6.1* 5.7*  5.7*  5.7* 5.4*      < > 98 99  99 98   CO2 27   < > 28 23  23 25   GLU 73   < > 68* 69*  69* 106   BUN 35*   < > 43* 48*  48* 52*   CREATININE 3.4*   < > 4.5* 4.8*  4.8* 5.2*   CALCIUM 9.2   < > 9.6 8.9  8.9 9.0   PROT 7.4  --   --   --   --    ALBUMIN 2.9*  --   --   --   --    BILITOT 0.5  --   --   --   --     ALKPHOS 122  --   --   --   --    AST 20  --   --   --   --    ALT 15  --   --   --   --    ANIONGAP 11   < > 12 14  14 12   EGFRNONAA 19.3*   < > 13.8* 12.8*  12.8* 11.6*    < > = values in this interval not displayed.     Wound Culture: No results for input(s): LABAERO in the last 4320 hours.    Significant Imaging: I have reviewed all pertinent imaging results/findings within the past 24 hours.

## 2020-07-29 NOTE — CARE UPDATE
RAPID RESPONSE NURSE PROACTIVE ROUNDING NOTE     Time of Visit: 1100    Admit Date: 2020  LOS: 5  Code Status: Full Code   Date of Visit: 2020  : 1965  Age: 55 y.o.  Sex: male  Race: White  Bed: 324/324 A:   MRN: [id number]  Was the patient discharged from an ICU this admission? no   Was the patient discharged from a PACU within last 24 hours?  no  Did the patient receive conscious sedation/general anesthesia in last 24 hours?  no  Was the patient in the ED within the past 24 hours?  no  Was the patient started on NIPPV within the past 24 hours?  no  Attending Physician: Surendra Porras MD  Primary Service: Networked reference to record PCT     ASSESSMENT     Notified by bedside RN via phone call.  Reason for alert: Bradycardia and Hypotension    Diagnosis: Mitral stenosis    Abnormal Vital Signs: /65 (BP Location: Right arm)   Pulse 79   Temp 98 °F (36.7 °C) (Oral)   Resp 18   Ht 6' (1.829 m)   Wt 99.8 kg (220 lb)   SpO2 (!) 94%   BMI 29.84 kg/m²      Clinical Issues: Dysrythmia    Patient  has a past medical history of Anticoagulant long-term use, Arthritis, Asthma, Back pain, Coronary artery disease, Diabetes mellitus, Encounter for blood transfusion, Eye abnormality, Gastritis, Gastroparesis, Hemodialysis patient, Hypertension, Pneumonia, Renal disorder, and Stroke.      Pt sitting on side of bed. SBP 70's. HR 40 via manual assessment. Pt c/o tiredness & generalized fatigue. Denies c/o CP, SOB, N/V. Pt states his baseline HR is 50's-60.'s. Pt assisted to lying position in bed. SBP improved to 100's. HR remained 43-46. BG resulted 119. Primary team and hospital medicine consulting service at bedside. Possible orthostatic episode? 250 mL NS bolus ordered by primary team. May be able to hold due to improved BP. Troponin elevated, repeat draw ordered, cardiology consulted.       INTERVENTIONS/ RECOMMENDATIONS     Monitor VS closely. Notify primary team of any changes in pt's  condition. CTS remains primary until 0700 7/30/2020. VSS at this time. Consider tx to ICU if pt becomes hypotensive or experiences worsening bradycardia.     Discussed plan of care with RNKarina.    PHYSICIAN ESCALATION     Yes/No  yes    Orders received and case discussed with Dr. Godinez.    Disposition: Remain in room 324 A.    FOLLOW-UP     Call back the Rapid Response Nurse, Mary Langley RN at [phone] for additional questions or concerns.

## 2020-07-29 NOTE — ASSESSMENT & PLAN NOTE
BNP 2948 on admit, continues to gain more volume and increase from his dry weight  Discussed with nephrology, aiming for volume removal

## 2020-07-29 NOTE — ASSESSMENT & PLAN NOTE
AFib RVR 7/28 that improved with metoprolol 5mg IV x 1  Previous notes indicate long term A/C at some point  Now on heparin drip for ACS protocol  PLAN  - if AFib overnight, please call cards, they saw patient today  - on heparin drip for ACS

## 2020-07-29 NOTE — HPI
55 year old male with PMH CAD s/p RCA stent, paroxysmal AF, mod-severe Mitral stenosis, HCV cirrhosis, ESRD on HD, right foot osteomyelitis, PAD, HTN transferred to [facility] for evaluation of mitral stenosis, cardiology is consulted for NSTEMI.    According to the patient, he has had right foot osteomyelitis for the past 5 months. He initially presented to [facility] several weeks ago due to chest tightness that seemed pleuritic in nature. The chest tightness began at rest and was consistent. He had this chest tightness for 2 days prior to his initial admission; it was still present the following morning when he woke up and then decided to seek medical attention. Prior to his RCA stents he does not recall what symptoms he had. Apparently he had a LHC done in [place] and CTS has requested films.    While in dialysis yesterday the patient complained of chest tightness which was also pleuritic and the same pain he had prior to admission but more intense. Although volume was not being removed in dialysis he became hypotensive and was found to be in AF with RVR, troponin was 0.18. This morning his troponin increased to 3.8 and within 6 hours it joe to 5.4. He is chest pain free. He denies orthopnea or PND but has chronic edema. Denies light headedness, dizziness or syncope.

## 2020-07-29 NOTE — ASSESSMENT & PLAN NOTE
Troponin 7/9/20 = 0.015 at OSH  Trop 7/28 at 1830 with hypotension and chest tightness 0.18 --> repeat this AM 3.8 --> 5.3  EKG 7/28 at 1830 with AFib RVR that improved with metoprolol 5mg IV x1 --> EKG 7/29 with bradycardia, possible block  PLAN  - Cards consulted: starting ACS protocol with Plavix load and heparin gtts; f/u cards recs  - f/u TTE 7/29  - hypotensive: hold BP meds for SBP < 120  - Telemetry  - Left heart cath 7/30

## 2020-07-29 NOTE — PROGRESS NOTES
Reviewed operative findings of angiogram with patient at bedside.  Will check CTA A/P with BLE runoff to better define surgical anatomy.  Refer to Dr. Salgado for potential limb salvage.     Dionisio Werner MD PGY6  Vascular and Endovascular Surgery Fellow  07/29/2020

## 2020-07-29 NOTE — HPI
"Mr. Aguirre is a 54 yo M with pmhx s/pf CAD s/p PCI at OSH ([place] ~2 years ago), ESRD (HD -TTS), HTN, HCV and possible cirrhosis, Afib, anemia, CVA, T2 DM who presented to OSH with CP, SOB, and palpitations. Pt had LHC and echo completed and deemed to have "multivessel coronary disease and valvular (severe MAC and mod AS) heart disease. Pt deemed to require surgical intervention but not a good surgical candidate. He was then transferred to [facility] for 2nd opinion for AVR and CABG.     Pt arrived with disc of angiography, which was reviewed by self with staff. Pt has severe prox RCA and subtotal occlusion of mid LAD. Echo completed at our facility and showed EF of 50%, local segmental WMA, severe LAE, moderate AS (odette-1.57, PV 3.31, MG of 24) and severe mitral annular calcification (MG 15).   "

## 2020-07-29 NOTE — NURSING
Results for NAOMI ALLEN (MRN [id number]) as of 7/29/2020 15:13   Ref. Range 7/29/2020 10:09   Troponin I Latest Ref Range: 0.000 - 0.026 ng/mL 5.368 (H)     Courtney Jefferson NP notified. Notified MD Merlyn with Cardiology. Pt currently in dialysis. Will continue to monitor.

## 2020-07-29 NOTE — SUBJECTIVE & OBJECTIVE
Past Medical History:   Diagnosis Date    Acute osteomyelitis of metatarsal bone, right     PAD right    Anticoagulant long-term use     Arthritis     Asthma     Back pain     on methadone    C. difficile colitis 09/2018    Cirrhosis of liver without ascites 2016    by liver US. +HCV    Coronary artery disease 2013    stent.  h/o STEMI and NSTEMI    Diabetes mellitus     resolved, multiple admissions for hypoglycemia requiring ICU possibley due to liver/ESRD    Encounter for blood transfusion     ESRD on hemodialysis 2013    anuric    Eye abnormality right eye    injured as a child    Gastritis     Gastroparesis     HCV (hepatitis C virus)     ? h/o tattoo exposure    Hypertension     Mitral stenosis 07/24/2020    Moderate to severe mitral stenosis    PAD (peripheral artery disease)     RLE s/p R CFA endarterectomy    Pneumonia 2013    Spend 6weeks in hospital at [place]    Stroke     Tuberculosis     treated       Past Surgical History:   Procedure Laterality Date    ANGIOGRAPHY OF LOWER EXTREMITY Right 7/28/2020    Procedure: Angiogram Extremity Unilateral;  Surgeon: MINO Vasquez II, MD;  Location: [facility] OR [address] [place] [address];  Service: Vascular;  Laterality: Right;  Left groin and rIght pedal artery access  15.5 min  247.25 mGy  68.5454 Gycm2  33ml contrast    AV FISTULA PLACEMENT      BACK SURGERY      CARDIAC SURGERY  2013    heart stent    CHOLECYSTECTOMY      EYE SURGERY      R eye    INCISION AND DRAINAGE OF ABSCESS Right 9/6/2018    Procedure: INCISION AND DRAINAGE, ABSCESS;  Surgeon: Chetan Rothman MD;  Location: [facility];  Service: General;  Laterality: Right;       Review of patient's allergies indicates:   Allergen Reactions    Antibiotic hc      Counter acts with methodone.          No current facility-administered medications on file prior to encounter.      Current Outpatient Medications on File Prior to Encounter   Medication Sig    amlodipine (NORVASC) 5 MG tablet Take 5 mg  by mouth 2 (two) times daily.    albuterol (PROAIR HFA) 90 mcg/actuation inhaler INHALE TWO PUFFS EVERY 4 TO 6 HOURS AS NEEDED    albuterol (PROAIR HFA) 90 mcg/actuation inhaler INHALE TWO PUFFS EVERY 4 TO 6 HOURS AS NEEDED    aspirin 325 MG tablet Take 1 tablet (325 mg total) by mouth once daily.    atorvastatin (LIPITOR) 10 MG tablet     atorvastatin (LIPITOR) 40 MG tablet TAKE ONE TABLET BY MOUTH DAILY    blood sugar diagnostic (TRUE METRIX GLUCOSE TEST STRIP) Strp     blood-glucose meter (PHARMACIST CHOICE GLUCOSE SYS) Misc 1 Device.    carvedilol (COREG) 3.125 MG tablet     ELIQUIS 2.5 mg Tab     ferric citrate (AURYXIA) 210 mg iron Tab Take 420 mg by mouth 3 (three) times daily.    fluconazole (DIFLUCAN) 100 MG tablet     fluticasone propionate (FLONASE) 50 mcg/actuation nasal spray 1 spray by Each Nostril route once daily.    fluticasone-salmeterol 250-50 mcg/dose (ADVAIR DISKUS) 250-50 mcg/dose diskus inhaler Inhale 1 puff into the lungs once daily.     gabapentin (NEURONTIN) 100 MG capsule Take 100 mg by mouth 2 (two) times daily.    hydrALAZINE (APRESOLINE) 50 MG tablet     levetiracetam (KEPPRA) 500 MG Tab Take 500 mg twice a day.  Take an extra tablet right after dialysis (making 3 tablets on your dialysis days)    methadone (DOLOPHINE) 10 MG tablet Take 9 tablets (90 mg total) by mouth once daily.    metroNIDAZOLE (FLAGYL) 500 MG tablet     minoxidil (LONITEN) 2.5 MG tablet Take 5 mg by mouth 2 (two) times daily.    MOVIPREP 100-7.5-2.691 gram solution     pantoprazole (PROTONIX) 40 MG tablet     sevelamer carbonate (RENVELA) 800 mg Tab Take 1,600 mg by mouth 3 (three) times daily with meals.     silver sulfADIAZINE 1% (SILVADENE) 1 % cream Apply topically once daily.     Family History     Problem Relation (Age of Onset)    Aneurysm Mother, Father (77)    Diabetes Brother    Heart disease Father, Paternal Grandmother    Kidney disease Brother        Tobacco Use    Smoking  status: Former Smoker     Years: 10.00     Quit date: 2015     Years since quittin.9    Smokeless tobacco: Never Used   Substance and Sexual Activity    Alcohol use: No    Drug use: Yes     Frequency: 4.0 times per week     Types: Other-see comments     Comment: marijuana    Sexual activity: Yes     Review of Systems   Constitutional: Negative for chills and fever.   HENT: Negative for sinus pain and sore throat.    Respiratory: Positive for chest tightness. Negative for wheezing.    Cardiovascular: Positive for chest pain and palpitations. Negative for leg swelling.   Gastrointestinal: Positive for abdominal distention. Negative for abdominal pain, blood in stool, diarrhea and nausea.   Endocrine: Negative for polyuria.   Genitourinary: Positive for enuresis. Negative for flank pain and frequency.   Musculoskeletal: Negative for back pain.   Skin: Negative for color change.   Neurological: Positive for light-headedness. Negative for weakness and headaches.   Psychiatric/Behavioral: Negative for agitation and confusion.     Objective:     Vital Signs (Most Recent):  Temp: 97.9 °F (36.6 °C) (20 1415)  Pulse: 64 (20 1800)  Resp: 18 (20 1745)  BP: 100/62 (20 1800)  SpO2: 100 % (20 1745) Vital Signs (24h Range):  Temp:  [97.3 °F (36.3 °C)-98 °F (36.7 °C)] 97.9 °F (36.6 °C)  Pulse:  [] 64  Resp:  [16-18] 18  SpO2:  [92 %-100 %] 100 %  BP: ()/(37-89) 100/62     Weight: 99.8 kg (220 lb)  Body mass index is 29.84 kg/m².    Physical Exam  Constitutional:       General: He is not in acute distress.     Appearance: He is not ill-appearing.   HENT:      Head: Normocephalic and atraumatic.      Right Ear: Tympanic membrane normal.      Left Ear: Tympanic membrane normal.      Mouth/Throat:      Mouth: Mucous membranes are dry.      Pharynx: No oropharyngeal exudate.   Eyes:      General: No scleral icterus.        Right eye: No discharge.         Left eye: No discharge.    Cardiovascular:      Rate and Rhythm: Bradycardia present. Rhythm irregular.      Heart sounds: Murmur present.      Comments: Reduced LE pulses throughout  Pulmonary:      Effort: Pulmonary effort is normal. No respiratory distress.      Breath sounds: No stridor. No wheezing or rhonchi.   Abdominal:      General: Bowel sounds are normal.      Palpations: Abdomen is soft.      Tenderness: There is no abdominal tenderness. There is no guarding.   Musculoskeletal:         General: No swelling or tenderness.      Right lower leg: No edema.      Left lower leg: No edema.   Skin:     Capillary Refill: Capillary refill takes 2 to 3 seconds.      Coloration: Skin is not jaundiced.   Neurological:      General: No focal deficit present.      Mental Status: He is alert and oriented to person, place, and time.   Psychiatric:         Mood and Affect: Mood normal.         Behavior: Behavior normal.         Significant Labs:   CBC:   Recent Labs   Lab 07/28/20  0433 07/28/20  1820 07/29/20  0419   WBC 8.07 9.59 8.78   HGB 10.9* 10.5* 9.6*   HCT 35.8* 32.8* 30.5*    125* 148*     CMP:   Recent Labs   Lab 07/28/20  1820  07/29/20  0419 07/29/20  1009 07/29/20  1402      < > 136  136 135* 136   K 4.3   < > 5.7*  5.7*  5.7* 5.4* 6.0*      < > 99  99 98 99   CO2 27   < > 23  23 25 24   GLU 73   < > 69*  69* 106 101   BUN 35*   < > 48*  48* 52* 55*   CREATININE 3.4*   < > 4.8*  4.8* 5.2* 5.6*   CALCIUM 9.2   < > 8.9  8.9 9.0 9.1   PROT 7.4  --   --   --   --    ALBUMIN 2.9*  --   --   --   --    BILITOT 0.5  --   --   --   --    ALKPHOS 122  --   --   --   --    AST 20  --   --   --   --    ALT 15  --   --   --   --    ANIONGAP 11   < > 14  14 12 13   EGFRNONAA 19.3*   < > 12.8*  12.8* 11.6* 10.6*    < > = values in this interval not displayed.     Troponin:   Recent Labs   Lab 07/28/20  1820 07/29/20  0419 07/29/20  1009   TROPONINI 0.180* 3.849* 5.368*       Significant Imaging: I have reviewed all  pertinent imaging results/findings within the past 24 hours.

## 2020-07-29 NOTE — PLAN OF CARE
Plan of care discussed with patient. Patient is free of fall/trauma/injury. Denies CP, SOB, or pain/discomfort this shift. Troponin elevated this AM, continues to be elevated at 5.368. BP dropped this AM, now sustaining 100s/50-60s. 250cc bolus x1 given. Kayexelate given for K 5.4 (down from 5.7 at shift start). Dialysis today. All questions addressed. Will continue to monitor.

## 2020-07-29 NOTE — NURSING
Pt's HR noted to be sustaining in the 50's. Rhythm ST in the 100s this AM with assessment. Pt asymptomatic, resting in bed. Courtney Jefferson NP notified. Ordered EKG STAT. Will implement. Will continue to monitor.

## 2020-07-29 NOTE — ASSESSMENT & PLAN NOTE
ESRD with HD T/Th/Sa via left AVF  Has been hypotensive on HD recently  HD was volume even 7/29  PLAN  - Careful IVF bolus 250-500mL if needed  - If not fluid responsive and symptomatic, low threshold to consult ICU

## 2020-07-29 NOTE — ASSESSMENT & PLAN NOTE
Patient presented with ulceration noted to 1st and 5th metatarsal head. MRI right foot with osteomyelitis to 5th metatarsal head.      Plan:  -s/p RLE angiogram; unable to revascularize. Plan to refer to Dr. Anglin at [facility] for potential limb salvage.   -Given that patient has poor blood flow, not a candidate for amputation of 5th metatarsal head. Surgical site and wound would not heal.   -Obtained bone biopsy at bedside today for abx tailoring. Follow up bone cx.  -ID on board. Agree that antibiotics alone may be of limited benefit for wound healing/treatment of osteo  -Will continue local wound care to be done by nursing.   -Pt PWB to heel only in DAR[employer] shoe  -Podiatry will follow

## 2020-07-29 NOTE — ASSESSMENT & PLAN NOTE
-Infectious disease following   -Recommendations include: Continue to hold antibiotics for now pending outcome of re-vascularization.  Follow up Vascular intervention.  If unable to re-vascularize, antibiotics alone may be of limited benefit for wound healing/treatment of osteo  Will follow up today with further recommendations pending ability to re-vascularize RLE.  If able to re-vascularize, may ask Podiatry to repeat bone biopsy (last cultures from over a month and a half ago) while patient off abx.

## 2020-07-29 NOTE — ASSESSMENT & PLAN NOTE
-Monitor potassium every four hours for potassium greater than 5.4  -Will consider shifting potassium if continuing to elevate; patient needed potassium shifting overnight and will receive dialysis this morning

## 2020-07-29 NOTE — ASSESSMENT & PLAN NOTE
Peristently elevated potassium but EKG 7/29 10am without peaked T-waves  Anuric, utilizing HD and insulin shifts for management thus far.   PLAN  - Kayexalate 30g x 1 -- can redose if elevated again overnight to remove total body potassium  - Insulin IV and calcium if elevated to cardioprotect  - BMP q4h  - Telemetry

## 2020-07-29 NOTE — CARE UPDATE
"Patient previous admitted to [facility] for MV replacement / repair evaluation now found to have multiple medical issues including elevated trops with chest discomfort, osteomyelitis, HCV w/ cirrhosis, ESRD on HD, hypotension, hyperkalemia, and PAD. He began having chest tightness and hypotension during HD yesterday which was stopped early after 500mL out. He was found to be in AFib RVR which responded to Metoprolol 5mg x 1. Trop 0.18 at that time. This morning he felt "off" and RN checked VS, found BP 85/64 with repeat readings 74/55, finally going up to 100/55 without intervention. EKG with bradycardia, concern for possible fascicular block. Repeat trop 3.8 this AM.     - NS bolus 250mL x 1  - Cardiology consulted for NSTEMI  - Kayexylate 30g x 1   - Labs: BMP, trop, lactate  - Continue tele  - Plan to send patient to medicine team at 7am 7/30 unless he decompensates which would require transfer to ICU service. Until 7am, CTS remains primary but medicine happy to assist.  - No urgent need for HD at this time but likely will need slow HD if this need changes    Cate Godinez MD, PGY2  Internal Medicine - Consult Services ([id number])  Spectra [phone]  Pager [phone]    "

## 2020-07-29 NOTE — PROGRESS NOTES
Patient received in dialysis via bed. Dialysis began per orders via 15 gauge fistula needles to left lower arm fistula without difficulty.

## 2020-07-29 NOTE — ASSESSMENT & PLAN NOTE
-ECHO shows mild aortic valve stenosis. Aortic valve area is 1.57 cm2; peak velocity is 3.31 m/s; mean gradient is 24 mmHg. Mild aortic regurgitation.  -Patient currently high risk for cardiac surgery secondary to cirrhosis (plt 149) and osteomyelitis

## 2020-07-29 NOTE — SUBJECTIVE & OBJECTIVE
Past Medical History:   Diagnosis Date    Acute osteomyelitis of metatarsal bone, right     PAD right    Anticoagulant long-term use     Arthritis     Asthma     Back pain     on methadone    C. difficile colitis 09/2018    Cirrhosis of liver without ascites 2016    by liver US. +HCV    Coronary artery disease 2013    stent.  h/o STEMI and NSTEMI    Diabetes mellitus     resolved, multiple admissions for hypoglycemia requiring ICU possibley due to liver/ESRD    Encounter for blood transfusion     ESRD on hemodialysis 2013    anuric    Eye abnormality right eye    injured as a child    Gastritis     Gastroparesis     HCV (hepatitis C virus)     ? h/o tattoo exposure    Hypertension     Mitral stenosis 07/24/2020    Moderate to severe mitral stenosis    PAD (peripheral artery disease)     RLE s/p R CFA endarterectomy    Pneumonia 2013    Spend 6weeks in hospital at [facility]    Stroke     Tuberculosis     treated       Past Surgical History:   Procedure Laterality Date    ANGIOGRAPHY OF LOWER EXTREMITY Right 7/28/2020    Procedure: Angiogram Extremity Unilateral;  Surgeon: MINO Vasquez II, MD;  Location: [facility] OR [address] [place] [address];  Service: Vascular;  Laterality: Right;  Left groin and rIght pedal artery access  15.5 min  247.25 mGy  68.5454 Gycm2  33ml contrast    AV FISTULA PLACEMENT      BACK SURGERY      CARDIAC SURGERY  2013    heart stent    CHOLECYSTECTOMY      EYE SURGERY      R eye    INCISION AND DRAINAGE OF ABSCESS Right 9/6/2018    Procedure: INCISION AND DRAINAGE, ABSCESS;  Surgeon: Chetan Rothman MD;  Location: [facility];  Service: General;  Laterality: Right;       Review of patient's allergies indicates:   Allergen Reactions    Antibiotic hc      Counter acts with methodone.          No current facility-administered medications on file prior to encounter.      Current Outpatient Medications on File Prior to Encounter   Medication Sig    amlodipine (NORVASC) 5 MG tablet Take 5 mg  by mouth 2 (two) times daily.    albuterol (PROAIR HFA) 90 mcg/actuation inhaler INHALE TWO PUFFS EVERY 4 TO 6 HOURS AS NEEDED    albuterol (PROAIR HFA) 90 mcg/actuation inhaler INHALE TWO PUFFS EVERY 4 TO 6 HOURS AS NEEDED    aspirin 325 MG tablet Take 1 tablet (325 mg total) by mouth once daily.    atorvastatin (LIPITOR) 10 MG tablet     atorvastatin (LIPITOR) 40 MG tablet TAKE ONE TABLET BY MOUTH DAILY    blood sugar diagnostic (TRUE METRIX GLUCOSE TEST STRIP) Strp     blood-glucose meter (PHARMACIST CHOICE GLUCOSE SYS) Misc 1 Device.    carvedilol (COREG) 3.125 MG tablet     ELIQUIS 2.5 mg Tab     ferric citrate (AURYXIA) 210 mg iron Tab Take 420 mg by mouth 3 (three) times daily.    fluconazole (DIFLUCAN) 100 MG tablet     fluticasone propionate (FLONASE) 50 mcg/actuation nasal spray 1 spray by Each Nostril route once daily.    fluticasone-salmeterol 250-50 mcg/dose (ADVAIR DISKUS) 250-50 mcg/dose diskus inhaler Inhale 1 puff into the lungs once daily.     gabapentin (NEURONTIN) 100 MG capsule Take 100 mg by mouth 2 (two) times daily.    hydrALAZINE (APRESOLINE) 50 MG tablet     levetiracetam (KEPPRA) 500 MG Tab Take 500 mg twice a day.  Take an extra tablet right after dialysis (making 3 tablets on your dialysis days)    methadone (DOLOPHINE) 10 MG tablet Take 9 tablets (90 mg total) by mouth once daily.    metroNIDAZOLE (FLAGYL) 500 MG tablet     minoxidil (LONITEN) 2.5 MG tablet Take 5 mg by mouth 2 (two) times daily.    MOVIPREP 100-7.5-2.691 gram solution     pantoprazole (PROTONIX) 40 MG tablet     sevelamer carbonate (RENVELA) 800 mg Tab Take 1,600 mg by mouth 3 (three) times daily with meals.     silver sulfADIAZINE 1% (SILVADENE) 1 % cream Apply topically once daily.     Family History     Problem Relation (Age of Onset)    Aneurysm Mother, Father (77)    Diabetes Brother    Heart disease Father, Paternal Grandmother    Kidney disease Brother        Tobacco Use    Smoking  status: Former Smoker     Years: 10.00     Quit date: 2015     Years since quittin.9    Smokeless tobacco: Never Used   Substance and Sexual Activity    Alcohol use: No    Drug use: Yes     Frequency: 4.0 times per week     Types: Other-see comments     Comment: marijuana    Sexual activity: Yes     Review of Systems   All other systems reviewed and are negative.    Objective:     Vital Signs (Most Recent):  Temp: 97.9 °F (36.6 °C) (20 1415)  Pulse: 69 (20 1700)  Resp: 18 (20 1700)  BP: 115/89 (20 1700)  SpO2: 99 % (20 1700) Vital Signs (24h Range):  Temp:  [97.3 °F (36.3 °C)-98 °F (36.7 °C)] 97.9 °F (36.6 °C)  Pulse:  [] 69  Resp:  [16-18] 18  SpO2:  [92 %-100 %] 99 %  BP: ()/(37-89) 115/89     Weight: 99.8 kg (220 lb)  Body mass index is 29.84 kg/m².    SpO2: 99 %  O2 Device (Oxygen Therapy): room air      Intake/Output Summary (Last 24 hours) at 2020 1716  Last data filed at 2020 1100  Gross per 24 hour   Intake 1550 ml   Output 455 ml   Net 1095 ml       Lines/Drains/Airways     Drain                 Hemodialysis AV Fistula Left forearm -- days         Hemodialysis AV Fistula Left forearm -- days         Hemodialysis AV Fistula Left forearm -- days          Peripheral Intravenous Line                 Peripheral IV - Single Lumen 20 0800 18 G Right Forearm less than 1 day                Physical Exam   Constitutional: He is oriented to person, place, and time. He appears well-developed and well-nourished. No distress.   HENT:   Head: Normocephalic and atraumatic.   Eyes:   Right eye blind   Neck: JVD present.   Cardiovascular: Normal rate, regular rhythm and intact distal pulses. Exam reveals no gallop and no friction rub.   Murmur (Grade III/VI systolic murmur, loudest at left sternal border, radiates to carotids) heard.  Pulmonary/Chest: Effort normal. No respiratory distress. He has no wheezes. He has rales.   Abdominal: Soft. Bowel sounds  are normal. He exhibits no distension. There is no abdominal tenderness.   Musculoskeletal:         General: Edema (trace) present.   Neurological: He is alert and oriented to person, place, and time.   Skin: He is not diaphoretic.       Significant Labs:     Recent Results (from the past 24 hour(s))   POCT glucose    Collection Time: 07/28/20  6:01 PM   Result Value Ref Range    POCT Glucose 71 70 - 110 mg/dL   CBC auto differential    Collection Time: 07/28/20  6:20 PM   Result Value Ref Range    WBC 9.59 3.90 - 12.70 K/uL    RBC 3.19 (L) 4.60 - 6.20 M/uL    Hemoglobin 10.5 (L) 14.0 - 18.0 g/dL    Hematocrit 32.8 (L) 40.0 - 54.0 %    Mean Corpuscular Volume 103 (H) 82 - 98 fL    Mean Corpuscular Hemoglobin 32.9 (H) 27.0 - 31.0 pg    Mean Corpuscular Hemoglobin Conc 32.0 32.0 - 36.0 g/dL    RDW 15.8 (H) 11.5 - 14.5 %    Platelets 125 (L) 150 - 350 K/uL    MPV 9.9 9.2 - 12.9 fL    Immature Granulocytes 0.6 (H) 0.0 - 0.5 %    Gran # (ANC) 6.2 1.8 - 7.7 K/uL    Immature Grans (Abs) 0.06 (H) 0.00 - 0.04 K/uL    Lymph # 1.8 1.0 - 4.8 K/uL    Mono # 0.9 0.3 - 1.0 K/uL    Eos # 0.5 0.0 - 0.5 K/uL    Baso # 0.07 0.00 - 0.20 K/uL    nRBC 0 0 /100 WBC    Gran% 65.0 38.0 - 73.0 %    Lymph% 19.1 18.0 - 48.0 %    Mono% 9.7 4.0 - 15.0 %    Eosinophil% 4.9 0.0 - 8.0 %    Basophil% 0.7 0.0 - 1.9 %    Differential Method Automated    Comprehensive metabolic panel    Collection Time: 07/28/20  6:20 PM   Result Value Ref Range    Sodium 138 136 - 145 mmol/L    Potassium 4.3 3.5 - 5.1 mmol/L    Chloride 100 95 - 110 mmol/L    CO2 27 23 - 29 mmol/L    Glucose 73 70 - 110 mg/dL    BUN, Bld 35 (H) 6 - 20 mg/dL    Creatinine 3.4 (H) 0.5 - 1.4 mg/dL    Calcium 9.2 8.7 - 10.5 mg/dL    Total Protein 7.4 6.0 - 8.4 g/dL    Albumin 2.9 (L) 3.5 - 5.2 g/dL    Total Bilirubin 0.5 0.1 - 1.0 mg/dL    Alkaline Phosphatase 122 55 - 135 U/L    AST 20 10 - 40 U/L    ALT 15 10 - 44 U/L    Anion Gap 11 8 - 16 mmol/L    eGFR if African American 22.4 (A)  >60 mL/min/1.73 m^2    eGFR if non  19.3 (A) >60 mL/min/1.73 m^2   Troponin I    Collection Time: 07/28/20  6:20 PM   Result Value Ref Range    Troponin I 0.180 (H) 0.000 - 0.026 ng/mL   ISTAT PROCEDURE    Collection Time: 07/28/20  6:21 PM   Result Value Ref Range    POC PH 7.398 7.35 - 7.45    POC PCO2 44.9 35 - 45 mmHg    POC PO2 80 (HH) 40 - 60 mmHg    POC HCO3 27.7 24 - 28 mmol/L    POC BE 3 -2 to 2 mmol/L    POC SATURATED O2 96 95 - 100 %    POC TCO2 29 24 - 29 mmol/L    Sample VENOUS     Site Other     Allens Test N/A     DelSys Nasal Can     Mode SPONT     Flow 2    Lactic acid, plasma    Collection Time: 07/28/20  6:42 PM   Result Value Ref Range    Lactate (Lactic Acid) 0.9 0.5 - 2.2 mmol/L   Magnesium    Collection Time: 07/28/20  6:42 PM   Result Value Ref Range    Magnesium 1.8 1.6 - 2.6 mg/dL   Phosphorus    Collection Time: 07/28/20  6:42 PM   Result Value Ref Range    Phosphorus 3.6 2.7 - 4.5 mg/dL   Potassium    Collection Time: 07/28/20  7:56 PM   Result Value Ref Range    Potassium 5.2 (H) 3.5 - 5.1 mmol/L   Basic metabolic panel    Collection Time: 07/28/20  7:56 PM   Result Value Ref Range    Sodium 136 136 - 145 mmol/L    Potassium 5.2 (H) 3.5 - 5.1 mmol/L    Chloride 101 95 - 110 mmol/L    CO2 19 (L) 23 - 29 mmol/L    Glucose 70 70 - 110 mg/dL    BUN, Bld 43 (H) 6 - 20 mg/dL    Creatinine 4.3 (H) 0.5 - 1.4 mg/dL    Calcium 9.1 8.7 - 10.5 mg/dL    Anion Gap 16 8 - 16 mmol/L    eGFR if African American 16.8 (A) >60 mL/min/1.73 m^2    eGFR if non  14.6 (A) >60 mL/min/1.73 m^2   Potassium    Collection Time: 07/29/20 12:24 AM   Result Value Ref Range    Potassium 6.1 (H) 3.5 - 5.1 mmol/L   Basic metabolic panel    Collection Time: 07/29/20 12:24 AM   Result Value Ref Range    Sodium 138 136 - 145 mmol/L    Potassium 6.1 (H) 3.5 - 5.1 mmol/L    Chloride 98 95 - 110 mmol/L    CO2 28 23 - 29 mmol/L    Glucose 68 (L) 70 - 110 mg/dL    BUN, Bld 43 (H) 6 - 20 mg/dL     Creatinine 4.5 (H) 0.5 - 1.4 mg/dL    Calcium 9.6 8.7 - 10.5 mg/dL    Anion Gap 12 8 - 16 mmol/L    eGFR if African American 15.9 (A) >60 mL/min/1.73 m^2    eGFR if non  13.8 (A) >60 mL/min/1.73 m^2   Basic metabolic panel    Collection Time: 07/29/20  4:19 AM   Result Value Ref Range    Sodium 136 136 - 145 mmol/L    Potassium 5.7 (H) 3.5 - 5.1 mmol/L    Chloride 99 95 - 110 mmol/L    CO2 23 23 - 29 mmol/L    Glucose 69 (L) 70 - 110 mg/dL    BUN, Bld 48 (H) 6 - 20 mg/dL    Creatinine 4.8 (H) 0.5 - 1.4 mg/dL    Calcium 8.9 8.7 - 10.5 mg/dL    Anion Gap 14 8 - 16 mmol/L    eGFR if African American 14.7 (A) >60 mL/min/1.73 m^2    eGFR if non  12.8 (A) >60 mL/min/1.73 m^2   CBC auto differential    Collection Time: 07/29/20  4:19 AM   Result Value Ref Range    WBC 8.78 3.90 - 12.70 K/uL    RBC 2.97 (L) 4.60 - 6.20 M/uL    Hemoglobin 9.6 (L) 14.0 - 18.0 g/dL    Hematocrit 30.5 (L) 40.0 - 54.0 %    Mean Corpuscular Volume 103 (H) 82 - 98 fL    Mean Corpuscular Hemoglobin 32.3 (H) 27.0 - 31.0 pg    Mean Corpuscular Hemoglobin Conc 31.5 (L) 32.0 - 36.0 g/dL    RDW 16.2 (H) 11.5 - 14.5 %    Platelets 148 (L) 150 - 350 K/uL    MPV 10.3 9.2 - 12.9 fL    Immature Granulocytes 0.7 (H) 0.0 - 0.5 %    Gran # (ANC) 5.8 1.8 - 7.7 K/uL    Immature Grans (Abs) 0.06 (H) 0.00 - 0.04 K/uL    Lymph # 1.4 1.0 - 4.8 K/uL    Mono # 1.0 0.3 - 1.0 K/uL    Eos # 0.5 0.0 - 0.5 K/uL    Baso # 0.06 0.00 - 0.20 K/uL    nRBC 0 0 /100 WBC    Gran% 66.2 38.0 - 73.0 %    Lymph% 16.4 (L) 18.0 - 48.0 %    Mono% 10.8 4.0 - 15.0 %    Eosinophil% 5.2 0.0 - 8.0 %    Basophil% 0.7 0.0 - 1.9 %    Differential Method Automated    Magnesium    Collection Time: 07/29/20  4:19 AM   Result Value Ref Range    Magnesium 2.2 1.6 - 2.6 mg/dL   Phosphorus    Collection Time: 07/29/20  4:19 AM   Result Value Ref Range    Phosphorus 6.2 (H) 2.7 - 4.5 mg/dL   Potassium    Collection Time: 07/29/20  4:19 AM   Result Value Ref Range     Potassium 5.7 (H) 3.5 - 5.1 mmol/L   Basic metabolic panel    Collection Time: 07/29/20  4:19 AM   Result Value Ref Range    Sodium 136 136 - 145 mmol/L    Potassium 5.7 (H) 3.5 - 5.1 mmol/L    Chloride 99 95 - 110 mmol/L    CO2 23 23 - 29 mmol/L    Glucose 69 (L) 70 - 110 mg/dL    BUN, Bld 48 (H) 6 - 20 mg/dL    Creatinine 4.8 (H) 0.5 - 1.4 mg/dL    Calcium 8.9 8.7 - 10.5 mg/dL    Anion Gap 14 8 - 16 mmol/L    eGFR if African American 14.7 (A) >60 mL/min/1.73 m^2    eGFR if non  12.8 (A) >60 mL/min/1.73 m^2   Troponin I    Collection Time: 07/29/20  4:19 AM   Result Value Ref Range    Troponin I 3.849 (H) 0.000 - 0.026 ng/mL   CK    Collection Time: 07/29/20  4:19 AM   Result Value Ref Range     20 - 200 U/L   POCT glucose    Collection Time: 07/29/20  5:14 AM   Result Value Ref Range    POCT Glucose 92 70 - 110 mg/dL   POCT glucose    Collection Time: 07/29/20  6:08 AM   Result Value Ref Range    POCT Glucose 241 (H) 70 - 110 mg/dL   POCT glucose    Collection Time: 07/29/20  6:31 AM   Result Value Ref Range    POCT Glucose 129 (H) 70 - 110 mg/dL   Basic metabolic panel    Collection Time: 07/29/20 10:09 AM   Result Value Ref Range    Sodium 135 (L) 136 - 145 mmol/L    Potassium 5.4 (H) 3.5 - 5.1 mmol/L    Chloride 98 95 - 110 mmol/L    CO2 25 23 - 29 mmol/L    Glucose 106 70 - 110 mg/dL    BUN, Bld 52 (H) 6 - 20 mg/dL    Creatinine 5.2 (H) 0.5 - 1.4 mg/dL    Calcium 9.0 8.7 - 10.5 mg/dL    Anion Gap 12 8 - 16 mmol/L    eGFR if African American 13.4 (A) >60 mL/min/1.73 m^2    eGFR if non African American 11.6 (A) >60 mL/min/1.73 m^2   Troponin I    Collection Time: 07/29/20 10:09 AM   Result Value Ref Range    Troponin I 5.368 (H) 0.000 - 0.026 ng/mL   POCT glucose    Collection Time: 07/29/20 11:20 AM   Result Value Ref Range    POCT Glucose 119 (H) 70 - 110 mg/dL   Lactic acid, plasma    Collection Time: 07/29/20  2:02 PM   Result Value Ref Range    Lactate (Lactic Acid) 1.4 0.5 - 2.2  mmol/L         Significant Imaging:     I have reviewed all pertinent imaging studies

## 2020-07-29 NOTE — NURSING
Patient latest potassium check back art 6.1; HR in the 80's-90's. Dr. Cruz notified. No new orders; possible dialysis in the AM. WCTM.

## 2020-07-29 NOTE — HPI
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 He presented to [facility] on 7/9/20 for substernal chest pain for 2 days. At that time, trop 0.15, BNP 1190, and EKG with NSR. Given his valvular disease, he was ultimately sent to [facility] 7/24 and originally admitted to [facility] ([facility]) for possible CT surgery. However, during evaluation, he was found to have right foot osteomyelitis from chronic wound and there was concern he could seed his blood and ultimately, any new valve. ID was consulted and has obtained records indicated right right foot chronic wounds have cultured Staph Cohnii and Mary Albicans from 6/2/20 and he has not completed this treatment. [facility] planned to transfer patient to medicine given infection concerns, however, on evening 7/28, he began having chest tightness radiating to neck and hypotension during HD session. HD stopped early and trop + EKG obtained. Trop = 0.18 and EKG showed AFib RVR. He was given metoprolol which helped. Follow up troponin elevated to 3.8. The morning of 7/29, the patient felt off and was found to have BP 86/55. Repeat SBP readings continued to be in 80/50s but eventually improved to 100/65. HR during this time also around 50. EKG with bradycardia and concerns of fascicular block. Repeat trop = 5.3 and lactate < 2. Cardiology consulted and recommending ACS protocol treatment and repeat TTE. Patient transferred to medicine service from [facility] with medicine assuming care care 7/30 at 7am.    Regarding his chest tightness, he states this has been present for 2 weeks and  getting more frequent. He states it occurs at rest or with activity. It is not associated with food but he thinks it is sometimes associated with stress. Sometimes he becomes SOB and experiences palpitations. He states it does not usually radiate to his neck but it did yesterday. He states he has stent but does not know much more of this history and denies having them placed for a heart attack.     Regarding his PAD, MRI of right foot this admission is consistent with osteomyelitis of distal 5th met head. Non-invasive vascular studies indicative of severe PAD and he underwent angiogram yesterday but vascular surgery was unable to intervene to restore flow to the right foot. Patient to be referred to Dr. Young ([facility]) for evaluation for limb salvage. Given lack of flow, patient is not a candidate for 5th metatarsal head amputation.      Regarding his liver disease, he has been diagnosed with HCV for years based on chart review. Also appears to have had ascites in the past and possible HRS. US abdomen here on 7/28/20 did not mention any ascites.    Regarding his ESRD, he has been on HD T/Th/Sat and nephrology is following. He has elevated potassium but no peaked T waves on EKG. CTS has relied on insulin for K shifts as patient is anuric.     Patient smokes cigars and marijuana occasionally. He denies current alcohol use.

## 2020-07-29 NOTE — PROGRESS NOTES
[facility]  Cardiothoracic Surgery  Progress Note    Patient Name: João Aguirre  MRN: [id number]  Admission Date: 7/24/2020  Hospital Length of Stay: 5 days  Code Status: Full Code   Attending Physician: Surendra Porars MD   Referring Provider: Ruben Weston MD  Principal Problem:Mitral stenosis      Subjective:     Post-Op Info:  Procedure(s) (LRB):  Angiogram Extremity Unilateral (Right)   1 Day Post-Op     Interval History: Chest pain with Afib with RVR during dialysis last night.  Troponin elevated to 0.18>>3.849. Will consult interventional cardiology for help with management.  US ABD done yesterday and shows cirrhosis. In the presence of cirrhosis and osteomyelitis, this patient is not a good surgical candidate at this time.  Will contact hospital medicine and attempt to transition his care to their team.  Hepatology consulted, would appreciate recommendations.  Infectious disease following.     Review of Systems   Constitution: Negative for decreased appetite, fever and malaise/fatigue.   Cardiovascular: Negative for chest pain, claudication, dyspnea on exertion, irregular heartbeat, leg swelling, palpitations and syncope.   Respiratory: Negative for cough and shortness of breath.    Hematologic/Lymphatic: Negative for bleeding problem.   Skin: Negative for rash.   Musculoskeletal: Negative for arthritis and myalgias.   Gastrointestinal: Negative for abdominal pain, diarrhea, melena, nausea and vomiting.   Genitourinary: Negative for dysuria.   Neurological: Negative for headaches, paresthesias and seizures.     Medications:  Continuous Infusions:  Scheduled Meds:   sodium chloride 0.9%   Intravenous Once    sodium chloride 0.9%   Intravenous Once    amLODIPine  5 mg Oral BID    aspirin  325 mg Oral Daily    atorvastatin  40 mg Oral Daily    gabapentin  100 mg Oral BID    levETIRAcetam  500 mg Oral BID    methadone  100 mg Oral Daily    pantoprazole  40 mg  Intravenous Daily    polyethylene glycol  17 g Oral Daily    sevelamer carbonate  1,600 mg Oral TID WM     PRN Meds:acetaminophen, albuterol-ipratropium, bisacodyL, [COMPLETED] calcium gluconate IVPB **AND** calcium gluconate IVPB, [COMPLETED] calcium gluconate IVPB **AND** calcium gluconate IVPB, [COMPLETED] dextrose 50% **AND** dextrose 50% **AND** [COMPLETED] insulin regular, ondansetron, oxyCODONE, oxyCODONE, promethazine, sodium chloride 0.9%     Objective:     Vital Signs (Most Recent):  Temp: 97.6 °F (36.4 °C) (07/29/20 0508)  Pulse: 101 (07/29/20 0718)  Resp: 18 (07/29/20 0508)  BP: 110/71 (07/29/20 0508)  SpO2: (!) 94 % (07/29/20 0508) Vital Signs (24h Range):  Temp:  [97.3 °F (36.3 °C)-98.8 °F (37.1 °C)] 97.6 °F (36.4 °C)  Pulse:  [] 101  Resp:  [14-21] 18  SpO2:  [88 %-100 %] 94 %  BP: ()/(49-78) 110/71     Weight: 99.8 kg (220 lb)  Body mass index is 29.84 kg/m².    SpO2: (!) 94 %  O2 Device (Oxygen Therapy): room air    Intake/Output - Last 3 Shifts       07/27 0700 - 07/28 0659 07/28 0700 - 07/29 0659 07/29 0700 - 07/30 0659    P.O. 476 480     Other  650     IV Piggyback       Total Intake(mL/kg) 476 (4.8) 1130 (11.3)     Urine (mL/kg/hr) 0 (0)      Other 1767 455     Total Output 1767 455     Net -1291 +675                  Lines/Drains/Airways     Drain                 Hemodialysis AV Fistula Left forearm -- days         Hemodialysis AV Fistula Left forearm -- days         Hemodialysis AV Fistula Left forearm -- days          Peripheral Intravenous Line                 Peripheral IV - Single Lumen 07/29/20 0800 18 G Right Forearm less than 1 day                Physical Exam  Constitutional:       Appearance: He is well-developed. He is obese.   Neck:      Musculoskeletal: Neck supple.   Cardiovascular:      Rate and Rhythm: Normal rate and regular rhythm.      Heart sounds: Normal heart sounds.   Pulmonary:      Effort: Pulmonary effort is normal.      Breath sounds: Normal breath  "sounds.   Abdominal:      Palpations: Abdomen is soft. There is hepatomegaly.   Musculoskeletal:      Comments: Venous stasis dermatitis bilateral lower extremities  Plantar ulcers x2. Palpable femoral pulses bilaterally.     Skin:     General: Skin is warm and dry.   Neurological:      Mental Status: He is alert and oriented to person, place, and time.   Psychiatric:      Comments: Patient was tearful today during assessment stating "I don't know if I want such a big surgery"          Significant Labs:  BMP:   Recent Labs   Lab 07/29/20  0419   GLU 69*  69*     136   K 5.7*  5.7*  5.7*   CL 99  99   CO2 23  23   BUN 48*  48*   CREATININE 4.8*  4.8*   CALCIUM 8.9  8.9   MG 2.2     CBC:   Recent Labs   Lab 07/29/20  0419   WBC 8.78   RBC 2.97*   HGB 9.6*   HCT 30.5*   *   *   MCH 32.3*   MCHC 31.5*     CMP:   Recent Labs   Lab 07/28/20  1820  07/29/20  0419   GLU 73   < > 69*  69*   CALCIUM 9.2   < > 8.9  8.9   ALBUMIN 2.9*  --   --    PROT 7.4  --   --       < > 136  136   K 4.3   < > 5.7*  5.7*  5.7*   CO2 27   < > 23  23      < > 99  99   BUN 35*   < > 48*  48*   CREATININE 3.4*   < > 4.8*  4.8*   ALKPHOS 122  --   --    ALT 15  --   --    AST 20  --   --    BILITOT 0.5  --   --     < > = values in this interval not displayed.     Coagulation: No results for input(s): PT, INR, APTT in the last 48 hours.    Significant Diagnostics:  US Abdomen Complete-7/28/2020  Impression:  Coarse appearance of the hepatic parenchyma with nodular contour suggesting underlying cirrhosis.  Advanced atrophy of the bilateral kidneys compatible with chronic medical renal disease.  Status post cholecystectomy.  I have reviewed all pertinent imaging results/findings within the past 24 hours.    Assessment/Plan:     * Mitral stenosis  Patient is a 55 year old who presented from [place] for second opinion regarding AVR and CABG. Medical conditions include ESRD on HD since 2013 (T/TH/Sat), " anemia, uncontrolled HTN, hx CVA, PCI, T2DM, and gastroparesis. States that for the past several weeks he has had some chest tightness and increasing SOB. Unable to quantify how far he can walk, but states not very far. Some occasional dizziness whenever beginning to walk. Endorses palpitations. Had a stroke 3 years ago but denies residual symptoms. History of seizures, last 3 years ago. Reports having two stents done, he thinks at [facility] 2-3 years ago. Smokes cigars occasionally. Denies current alcohol use. 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 Also with non-healing DM ulcer on bottom of right foot.   TTE ordered resulting with EF 50%, mild AS, mod-severe MS from extensive calcification (MG 15, may be elevated due to volume overload), PAP 62.     - ASA   - Statin   - Norvasc 5   - Keppra 500 BID   - Pantoprazole 40 QD  - Polyethylene Glycol daily   - Sevelamer 1600 TID   - Renal diet with 1L fluid restriction per nephrology   - HD today  - Disc with previous imaging received today, will review  - Patient is found to have cirrhosis and currently has osteomyelitis to the right foot, this greatly increases his risk for cardiac surgery.    Acute osteomyelitis of metatarsal bone, right  -Infectious disease following   -Recommendations include: Continue to hold antibiotics for now pending outcome of re-vascularization.  Follow up Vascular intervention.  If unable to re-vascularize, antibiotics alone may be of limited benefit for wound healing/treatment of osteo  Will follow up today with further recommendations pending ability to re-vascularize RLE.  If able to re-vascularize, may ask Podiatry to repeat bone biopsy (last cultures from over a month and a half ago) while patient off abx.        PVD (peripheral vascular disease)  -Vascular  following  -Per note will check CTA A/P with BLE runoff to better define surgical anatomy. Refer to Dr. Salgado for potential limb salvage.     ESRD (end stage renal disease) on dialysis  -Nephrology following  -Will receive dialysis today    Nonrheumatic aortic (valve) stenosis  -ECHO shows mild aortic valve stenosis. Aortic valve area is 1.57 cm2; peak velocity is 3.31 m/s; mean gradient is 24 mmHg. Mild aortic regurgitation.  -Patient currently high risk for cardiac surgery secondary to cirrhosis (plt 149) and osteomyelitis     Skin ulcer of right foot with fat layer exposed  -Vascular following      Hyperkalemia  -Monitor potassium every four hours for potassium greater than 5.4  -Will consider shifting potassium if continuing to elevate; patient needed potassium shifting overnight and will receive dialysis this morning    Seizure disorder  - Home Keppra     Benign hypertension with ESRD (end-stage renal disease)  -Continue Norvasc 5mg      Cirrhosis of liver with ascites  -patient report history of HCV and cirrhosis   -Confirmed on ultrasound yesterday  -Awaiting results from hepatitis panel and HCV RNA  -Hepatology consulted; appreciate recommendations    Elevated troponin I level  -Chest pain while receiving dialysis yesterday with hypotension (dialysis stopped) and acute onset of rapid afib.    -Troponin elevation from 0.18>3.849  -Consulting interventional cardiology; appreciate recommendations  -Will place old cath films in chart for their review    Anemia in chronic kidney disease  -Expected  -Nephrology following    ESRD (T,Th,Sat) dialysis onset 2013  - Nephrology following   -Dialysis yesterday        Courtney Jefferson NP  Cardiothoracic Surgery  [facility]

## 2020-07-29 NOTE — PLAN OF CARE
POC reviewed with patient. VSS. Patient free of injuries and falls. Patient has c/o back pain; gave PRN oxycodone with mild relief. Telemetry monitor showing NSR-sinus tachycardia. Patient came back from dialysis this shift; potassium at 5.2. Patient had no more c/o CP this shift. Patient on HD Tues/Thurs/Sat; patient anuric. Patient went for angiogram today and ultrasound this shift. All questions were addressed. WCTM.

## 2020-07-29 NOTE — ASSESSMENT & PLAN NOTE
Patient is a 55 year old who presented from [place] for second opinion regarding AVR and CABG. Medical conditions include ESRD on HD since 2013 (T/TH/Sat), anemia, uncontrolled HTN, hx CVA, PCI, T2DM, and gastroparesis. States that for the past several weeks he has had some chest tightness and increasing SOB. Unable to quantify how far he can walk, but states not very far. Some occasional dizziness whenever beginning to walk. Endorses palpitations. Had a stroke 3 years ago but denies residual symptoms. History of seizures, last 3 years ago. Reports having two stents done, he thinks at [facility] 2-3 years ago. Smokes cigars occasionally. Denies current alcohol use. 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 Also with non-healing DM ulcer on bottom of right foot.   TTE ordered resulting with EF 50%, mild AS, mod-severe MS from extensive calcification (MG 15, may be elevated due to volume overload), PAP 62.     - ASA   - Statin   - Norvasc 5   - Keppra 500 BID   - Pantoprazole 40 QD  - Polyethylene Glycol daily   - Sevelamer 1600 TID   - Renal diet with 1L fluid restriction per nephrology   - HD today  - Disc with previous imaging received today, will review  - Patient is found to have cirrhosis and currently has osteomyelitis to the right foot, this greatly increases his risk for cardiac surgery.

## 2020-07-29 NOTE — ASSESSMENT & PLAN NOTE
-patient report history of HCV and cirrhosis   -Confirmed on ultrasound yesterday  -Awaiting results from hepatitis panel and HCV RNA  -Hepatology consulted; appreciate recommendations

## 2020-07-29 NOTE — HOSPITAL COURSE
PT again assessed to be poor surgical candidate given concurrent history of ESRD, Cirrhosis, likely osteomyelitis diagnosed this admission. After dx of osteo made, vascular surgery consulted for severe PAD. Angiogram performed by vascular surgery with no intervention.     On 7/28/20, pt was undergoing HD and went into Afib with RVR, which resolved with Metop 5 mg IV push. While in afib with rvr, pt had repeat episode of CP with radiation to the neck. Troponin found to rise from 0.18 to 3.849 to 5.368. Once HR was controlled, pt stated symptoms resolved.

## 2020-07-29 NOTE — SUBJECTIVE & OBJECTIVE
Past Medical History:   Diagnosis Date    Acute osteomyelitis of metatarsal bone, right     PAD right    Anticoagulant long-term use     Arthritis     Asthma     Back pain     on methadone    C. difficile colitis 09/2018    Cirrhosis of liver without ascites 2016    by liver US. +HCV    Coronary artery disease 2013    stent.  h/o STEMI and NSTEMI    Diabetes mellitus     resolved, multiple admissions for hypoglycemia requiring ICU possibley due to liver/ESRD    Encounter for blood transfusion     ESRD on hemodialysis 2013    anuric    Eye abnormality right eye    injured as a child    Gastritis     Gastroparesis     HCV (hepatitis C virus)     ? h/o tattoo exposure    Hypertension     Mitral stenosis 07/24/2020    Moderate to severe mitral stenosis    PAD (peripheral artery disease)     RLE s/p R CFA endarterectomy    Pneumonia 2013    Spend 6weeks in hospital at [place]    Stroke     Tuberculosis     treated       Past Surgical History:   Procedure Laterality Date    ANGIOGRAPHY OF LOWER EXTREMITY Right 7/28/2020    Procedure: Angiogram Extremity Unilateral;  Surgeon: MINO Vasquez II, MD;  Location: [facility] OR [address] [place] [address];  Service: Vascular;  Laterality: Right;  Left groin and rIght pedal artery access  15.5 min  247.25 mGy  68.5454 Gycm2  33ml contrast    AV FISTULA PLACEMENT      BACK SURGERY      CARDIAC SURGERY  2013    heart stent    CHOLECYSTECTOMY      EYE SURGERY      R eye    INCISION AND DRAINAGE OF ABSCESS Right 9/6/2018    Procedure: INCISION AND DRAINAGE, ABSCESS;  Surgeon: Chetan Rothman MD;  Location: [facility];  Service: General;  Laterality: Right;       Review of patient's allergies indicates:   Allergen Reactions    Antibiotic hc      Counter acts with methodone.          PTA Medications   Medication Sig    amlodipine (NORVASC) 5 MG tablet Take 5 mg by mouth 2 (two) times daily.    albuterol (PROAIR HFA) 90 mcg/actuation inhaler INHALE TWO PUFFS EVERY 4 TO 6 HOURS  AS NEEDED    albuterol (PROAIR HFA) 90 mcg/actuation inhaler INHALE TWO PUFFS EVERY 4 TO 6 HOURS AS NEEDED    aspirin 325 MG tablet Take 1 tablet (325 mg total) by mouth once daily.    atorvastatin (LIPITOR) 10 MG tablet     atorvastatin (LIPITOR) 40 MG tablet TAKE ONE TABLET BY MOUTH DAILY    blood sugar diagnostic (TRUE METRIX GLUCOSE TEST STRIP) Strp     blood-glucose meter (PHARMACIST CHOICE GLUCOSE SYS) Misc 1 Device.    carvedilol (COREG) 3.125 MG tablet     ELIQUIS 2.5 mg Tab     ferric citrate (AURYXIA) 210 mg iron Tab Take 420 mg by mouth 3 (three) times daily.    fluconazole (DIFLUCAN) 100 MG tablet     fluticasone propionate (FLONASE) 50 mcg/actuation nasal spray 1 spray by Each Nostril route once daily.    fluticasone-salmeterol 250-50 mcg/dose (ADVAIR DISKUS) 250-50 mcg/dose diskus inhaler Inhale 1 puff into the lungs once daily.     gabapentin (NEURONTIN) 100 MG capsule Take 100 mg by mouth 2 (two) times daily.    hydrALAZINE (APRESOLINE) 50 MG tablet     levetiracetam (KEPPRA) 500 MG Tab Take 500 mg twice a day.  Take an extra tablet right after dialysis (making 3 tablets on your dialysis days)    methadone (DOLOPHINE) 10 MG tablet Take 9 tablets (90 mg total) by mouth once daily.    metroNIDAZOLE (FLAGYL) 500 MG tablet     minoxidil (LONITEN) 2.5 MG tablet Take 5 mg by mouth 2 (two) times daily.    MOVIPREP 100-7.5-2.691 gram solution     pantoprazole (PROTONIX) 40 MG tablet     sevelamer carbonate (RENVELA) 800 mg Tab Take 1,600 mg by mouth 3 (three) times daily with meals.     silver sulfADIAZINE 1% (SILVADENE) 1 % cream Apply topically once daily.     Family History     Problem Relation (Age of Onset)    Aneurysm Mother, Father (77)    Diabetes Brother    Heart disease Father, Paternal Grandmother    Kidney disease Brother        Tobacco Use    Smoking status: Former Smoker     Years: 10.00     Quit date: 2015     Years since quittin.9    Smokeless tobacco: Never  Used   Substance and Sexual Activity    Alcohol use: No    Drug use: Yes     Frequency: 4.0 times per week     Types: Other-see comments     Comment: marijuana    Sexual activity: Yes     Review of Systems   Constitution: Negative for chills, decreased appetite and fever.   HENT: Negative for congestion and sore throat.    Eyes: Negative for blurred vision and discharge.   Cardiovascular: Positive for chest pain (resolved at time of evaluation). Negative for claudication, cyanosis, dyspnea on exertion and leg swelling.   Respiratory: Negative for cough, hemoptysis and shortness of breath.    Endocrine: Negative for cold intolerance and heat intolerance.   Skin: Negative for color change.   Musculoskeletal: Negative for muscle weakness and myalgias.   Neurological: Negative for dizziness, focal weakness and weakness.   Psychiatric/Behavioral: Negative for altered mental status and depression.     Objective:     Vital Signs (Most Recent):  Temp: 97.9 °F (36.6 °C) (07/29/20 1415)  Pulse: 63 (07/29/20 1600)  Resp: 16 (07/29/20 1600)  BP: 93/60 (07/29/20 1600)  SpO2: 98 % (07/29/20 1600) Vital Signs (24h Range):  Temp:  [97.3 °F (36.3 °C)-98 °F (36.7 °C)] 97.9 °F (36.6 °C)  Pulse:  [] 63  Resp:  [16-18] 16  SpO2:  [92 %-100 %] 98 %  BP: ()/(37-78) 93/60     Weight: 99.8 kg (220 lb)  Body mass index is 29.84 kg/m².    SpO2: 98 %  O2 Device (Oxygen Therapy): room air      Intake/Output Summary (Last 24 hours) at 7/29/2020 1618  Last data filed at 7/29/2020 1100  Gross per 24 hour   Intake 1550 ml   Output 455 ml   Net 1095 ml       Lines/Drains/Airways     Drain                 Hemodialysis AV Fistula Left forearm -- days         Hemodialysis AV Fistula Left forearm -- days         Hemodialysis AV Fistula Left forearm -- days          Peripheral Intravenous Line                 Peripheral IV - Single Lumen 07/29/20 0800 18 G Right Forearm less than 1 day                Physical Exam   Constitutional: He is  oriented to person, place, and time. He appears well-developed and well-nourished.   HENT:   Head: Normocephalic and atraumatic.   Right Ear: External ear normal.   Left Ear: External ear normal.   Eyes: Left eye exhibits no discharge.   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 Neck supple.   Cardiovascular: Normal rate, regular rhythm, normal heart sounds and intact distal pulses.   Pulses:       Radial pulses are 2+ on the right side and 2+ on the left side.        Femoral pulses are 2+ on the right side and 2+ on the left side.  Pulmonary/Chest: Effort normal and breath sounds normal. No respiratory distress. He has no wheezes.   Abdominal: Soft. Bowel sounds are normal. He exhibits no distension. There is no abdominal tenderness.   Musculoskeletal: Normal range of motion.         General: No edema.      Comments: Left arm fistula  Right foot wrapped   Neurological: He is alert and oriented to person, place, and time.   Skin: Skin is warm and dry.   Psychiatric: He has a normal mood and affect. His behavior is normal.       Significant Labs:   CMP   Recent Labs   Lab 07/28/20  1820  07/29/20  0024 07/29/20  0419 07/29/20  1009      < > 138 136  136 135*   K 4.3   < > 6.1*  6.1* 5.7*  5.7*  5.7* 5.4*      < > 98 99  99 98   CO2 27   < > 28 23  23 25   GLU 73   < > 68* 69*  69* 106   BUN 35*   < > 43* 48*  48* 52*   CREATININE 3.4*   < > 4.5* 4.8*  4.8* 5.2*   CALCIUM 9.2   < > 9.6 8.9  8.9 9.0   PROT 7.4  --   --   --   --    ALBUMIN 2.9*  --   --   --   --    BILITOT 0.5  --   --   --   --    ALKPHOS 122  --   --   --   --    AST 20  --   --   --   --    ALT 15  --   --   --   --    ANIONGAP 11   < > 12 14  14 12   ESTGFRAFRICA 22.4*   < > 15.9* 14.7*  14.7* 13.4*   EGFRNONAA 19.3*   < > 13.8* 12.8*  12.8* 11.6*    < > = values in this interval not displayed.   , CBC   Recent Labs   Lab 07/28/20  0433 07/28/20  1820 07/29/20  0419   WBC 8.07  9.59 8.78   HGB 10.9* 10.5* 9.6*   HCT 35.8* 32.8* 30.5*    125* 148*   , Troponin   Recent Labs   Lab 07/28/20  1820 07/29/20  0419 07/29/20  1009   TROPONINI 0.180* 3.849* 5.368*    and All pertinent lab results from the last 24 hours have been reviewed.    Significant Imaging: Echocardiogram:   2D echo with color flow doppler:   Results for orders placed or performed during the hospital encounter of 05/02/16   2D echo with color flow doppler    Narrative    This study is in progress....

## 2020-07-29 NOTE — SUBJECTIVE & OBJECTIVE
Subjective:     Interval History:   S/p RLE angiogram. Vascular planning to refer patient to Dr. Anglin for potential limb salvage. Given that patient was unable to be revascularized, podiatry offering bone biopsy at this time for abx tailoring given patient not a candidate for 5th ray resection due to poor blood flow and inability to heal.         Scheduled Meds:   sodium chloride 0.9%   Intravenous Once    sodium chloride 0.9%   Intravenous Once    sodium chloride 0.9%   Intravenous Once    amLODIPine  5 mg Oral BID    aspirin  325 mg Oral Daily    atorvastatin  40 mg Oral Daily    gabapentin  100 mg Oral BID    levETIRAcetam  500 mg Oral BID    methadone  100 mg Oral Daily    pantoprazole  40 mg Intravenous Daily    polyethylene glycol  17 g Oral Daily    sevelamer carbonate  1,600 mg Oral TID WM     Continuous Infusions:  PRN Meds:acetaminophen, albuterol-ipratropium, bisacodyL, [COMPLETED] calcium gluconate IVPB **AND** calcium gluconate IVPB, [COMPLETED] calcium gluconate IVPB **AND** calcium gluconate IVPB, [COMPLETED] dextrose 50% **AND** dextrose 50% **AND** [COMPLETED] insulin regular, ondansetron, oxyCODONE, oxyCODONE, promethazine, sodium chloride 0.9%    Review of Systems   Constitutional: Negative for appetite change, chills and fever.   Gastrointestinal: Negative for nausea and vomiting.   Musculoskeletal:        Right foot pain   Skin: Positive for wound.   Neurological: Negative for weakness and numbness.   Psychiatric/Behavioral: Negative for confusion.     Objective:     Vital Signs (Most Recent):  Temp: 97.9 °F (36.6 °C) (07/29/20 1415)  Pulse: 60 (07/29/20 1440)  Resp: 18 (07/29/20 1440)  BP: 97/62 (07/29/20 1440)  SpO2: 97 % (07/29/20 1440) Vital Signs (24h Range):  Temp:  [97.3 °F (36.3 °C)-98.8 °F (37.1 °C)] 97.9 °F (36.6 °C)  Pulse:  [] 60  Resp:  [15-18] 18  SpO2:  [92 %-100 %] 97 %  BP: ()/(37-78) 97/62     Weight: 99.8 kg (220 lb)  Body mass index is 29.84  kg/m².    Foot Exam    General  General Appearance: appears stated age and healthy   Orientation: alert and oriented to person, place, and time   Affect: appropriate       Right Foot/Ankle     Inspection and Palpation  Ecchymosis: none  Tenderness: none   Swelling: lesser metatarsophalangeal joints   Skin Exam: callus, dry skin, skin changes, abnormal color, ulcer and erythema; no blister, no drainage and no cellulitis     Neurovascular  Dorsalis pedis: absent  Posterior tibial: absent  Saphenous nerve sensation: diminished  Tibial nerve sensation: diminished  Superficial peroneal nerve sensation: diminished  Deep peroneal nerve sensation: diminished  Sural nerve sensation: diminished    Comments  See wound description below    Left Foot/Ankle      Inspection and Palpation  Skin Exam: skin intact;     Neurovascular  Dorsalis pedis: absent  Posterior tibial: absent            Laboratory:  CBC:   Recent Labs   Lab 07/29/20  0419   WBC 8.78   RBC 2.97*   HGB 9.6*   HCT 30.5*   *   *   MCH 32.3*   MCHC 31.5*     CRP: No results for input(s): CRP in the last 168 hours.  ESR: No results for input(s): SEDRATE in the last 168 hours.  Wound Cultures: No results for input(s): LABAERO in the last 4320 hours.  Microbiology Results (last 7 days)     Procedure Component Value Units Date/Time    Aerobic culture [[id number]] Collected: 07/29/20 1415    Order Status: Sent Specimen: Bone from Foot, Right Updated: 07/29/20 1415    Gram stain [[id number]] Collected: 07/29/20 1415    Order Status: Sent Specimen: Bone from Foot, Right Updated: 07/29/20 1415    AFB Culture & Smear [[id number]] Collected: 07/29/20 1415    Order Status: Sent Specimen: Bone from Foot, Right Updated: 07/29/20 1415    Fungus culture [[id number]] Collected: 07/29/20 1415    Order Status: Sent Specimen: Bone from Foot, Right Updated: 07/29/20 1415    Culture, Anaerobe [[id number]] Collected: 07/29/20 1415    Order Status: Sent Specimen: Bone from Foot,  Right Updated: 07/29/20 1415        Specimen (12h ago, onward)    None          Diagnostic Results:  I have reviewed all pertinent imaging results/findings within the past 24 hours.    Clinical Findings:  Wound plantar 1st metatarsal head with hyperkeratotic border and overlying eschar, appears stable and dry with no drainage noted, no acute signs of infection noted, does not probe to bone, no signs of abscess. Wound plantar 5th metatarsal head with hyperkeratotic border and mixed granular necrotic base, serous drainage noted, no malodor. +probe to bone, no signs of abscess.

## 2020-07-29 NOTE — NURSING TRANSFER
Nursing Transfer Note      7/29/2020     Transfer to CT from     Transfer via wheelchair    Transfer with cardiac monitoring, O2    Transported by pt transport    Medicines sent: no    Chart send with patient: ticket to ride    Notified: Courtney Jefferson NP, at bedside

## 2020-07-29 NOTE — CONSULTS
[facility] Hospital Medicine  Consult Note    Patient Name: João Aguirre  MRN: [id number]  Admission Date: 7/24/2020  Hospital Length of Stay: 5 days  Attending Physician: Surendra Porras MD   Primary Care Provider: [facility] Medicine Team: Networked reference to record PCT  Cate Godinez MD      Patient information was obtained from patient and past medical records.     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 He presented to [place] on 7/9/20 for substernal chest pain for 2 days. At that time, trop 0.15, BNP 1190, and EKG with NSR. Given his valvular disease, he was ultimately sent to [facility] 7/24 and originally admitted to [place] ([facility]) for possible CT surgery. However, during evaluation, he was found to have right foot osteomyelitis from chronic wound and there was concern he could seed his blood and ultimately, any new valve. ID was consulted and has obtained records indicated right right foot chronic wounds have cultured Staph Cohnii and Mary Albicans from 6/2/20 and he has not completed this treatment. CTS planned to transfer patient to medicine given infection concerns, however, on evening 7/28, he began having chest tightness radiating to neck and hypotension during HD session. HD stopped early and trop + EKG obtained. Trop = 0.18 and  EKG showed AFib RVR. He was given metoprolol which helped. Follow up troponin elevated to 3.8. The morning of 7/29, the patient felt off and was found to have BP 86/55. Repeat SBP readings continued to be in 80/50s but eventually improved to 100/65. HR during this time also around 50. EKG with bradycardia and concerns of fascicular block. Repeat trop = 5.3 and lactate < 2. Cardiology consulted and recommending ACS protocol treatment and repeat TTE. Patient transferred to medicine service from [facility] with medicine assuming care care 7/30 at 7am.    Regarding his chest tightness, he states this has been present for 2 weeks and getting more frequent. He states it occurs at rest or with activity. It is not associated with food but he thinks it is sometimes associated with stress. Sometimes he becomes SOB and experiences palpitations. He states it does not usually radiate to his neck but it did yesterday. He states he has stent but does not know much more of this history and denies having them placed for a heart attack.     Regarding his PAD, MRI of right foot this admission is consistent with osteomyelitis of distal 5th met head. Non-invasive vascular studies indicative of severe PAD and he underwent angiogram yesterday but vascular surgery was unable to intervene to restore flow to the right foot. Patient to be referred to Dr. Young ([facility]) for evaluation for limb salvage. Given lack of flow, patient is not a candidate for 5th metatarsal head amputation.      Regarding his liver disease, he has been diagnosed with HCV for years based on chart review. Also appears to have had ascites in the past and possible HRS. US abdomen here on 7/28/20 did not mention any ascites.    Regarding his ESRD, he has been on HD T/Th/Sat and nephrology is following. He has elevated potassium but no peaked T waves on EKG. [facility] has relied on insulin for K shifts as patient is anuric.     Patient smokes cigars and marijuana  occasionally. He denies current alcohol use.       Past Medical History:   Diagnosis Date    Acute osteomyelitis of metatarsal bone, right     PAD right    Anticoagulant long-term use     Arthritis     Asthma     Back pain     on methadone    C. difficile colitis 09/2018    Cirrhosis of liver without ascites 2016    by liver US. +HCV    Coronary artery disease 2013    stent.  h/o STEMI and NSTEMI    Diabetes mellitus     resolved, multiple admissions for hypoglycemia requiring ICU possibley due to liver/ESRD    Encounter for blood transfusion     ESRD on hemodialysis 2013    anuric    Eye abnormality right eye    injured as a child    Gastritis     Gastroparesis     HCV (hepatitis C virus)     ? h/o tattoo exposure    Hypertension     Mitral stenosis 07/24/2020    Moderate to severe mitral stenosis    PAD (peripheral artery disease)     RLE s/p R CFA endarterectomy    Pneumonia 2013    Spend 6weeks in hospital at [place]    Stroke     Tuberculosis     treated       Past Surgical History:   Procedure Laterality Date    ANGIOGRAPHY OF LOWER EXTREMITY Right 7/28/2020    Procedure: Angiogram Extremity Unilateral;  Surgeon: MINO Vasquez II, MD;  Location: [facility] OR [address] [place] [address];  Service: Vascular;  Laterality: Right;  Left groin and rIght pedal artery access  15.5 min  247.25 mGy  68.5454 Gycm2  33ml contrast    AV FISTULA PLACEMENT      BACK SURGERY      CARDIAC SURGERY  2013    heart stent    CHOLECYSTECTOMY      EYE SURGERY      R eye    INCISION AND DRAINAGE OF ABSCESS Right 9/6/2018    Procedure: INCISION AND DRAINAGE, ABSCESS;  Surgeon: Chetan Rothman MD;  Location: [facility];  Service: General;  Laterality: Right;       Review of patient's allergies indicates:   Allergen Reactions    Antibiotic hc      Counter acts with methodone.          No current facility-administered medications on file prior to encounter.      Current Outpatient Medications on File Prior to Encounter   Medication  Sig    amlodipine (NORVASC) 5 MG tablet Take 5 mg by mouth 2 (two) times daily.    albuterol (PROAIR HFA) 90 mcg/actuation inhaler INHALE TWO PUFFS EVERY 4 TO 6 HOURS AS NEEDED    albuterol (PROAIR HFA) 90 mcg/actuation inhaler INHALE TWO PUFFS EVERY 4 TO 6 HOURS AS NEEDED    aspirin 325 MG tablet Take 1 tablet (325 mg total) by mouth once daily.    atorvastatin (LIPITOR) 10 MG tablet     atorvastatin (LIPITOR) 40 MG tablet TAKE ONE TABLET BY MOUTH DAILY    blood sugar diagnostic (TRUE METRIX GLUCOSE TEST STRIP) Strp     blood-glucose meter (PHARMACIST CHOICE GLUCOSE SYS) Misc 1 Device.    carvedilol (COREG) 3.125 MG tablet     ELIQUIS 2.5 mg Tab     ferric citrate (AURYXIA) 210 mg iron Tab Take 420 mg by mouth 3 (three) times daily.    fluconazole (DIFLUCAN) 100 MG tablet     fluticasone propionate (FLONASE) 50 mcg/actuation nasal spray 1 spray by Each Nostril route once daily.    fluticasone-salmeterol 250-50 mcg/dose (ADVAIR DISKUS) 250-50 mcg/dose diskus inhaler Inhale 1 puff into the lungs once daily.     gabapentin (NEURONTIN) 100 MG capsule Take 100 mg by mouth 2 (two) times daily.    hydrALAZINE (APRESOLINE) 50 MG tablet     levetiracetam (KEPPRA) 500 MG Tab Take 500 mg twice a day.  Take an extra tablet right after dialysis (making 3 tablets on your dialysis days)    methadone (DOLOPHINE) 10 MG tablet Take 9 tablets (90 mg total) by mouth once daily.    metroNIDAZOLE (FLAGYL) 500 MG tablet     minoxidil (LONITEN) 2.5 MG tablet Take 5 mg by mouth 2 (two) times daily.    MOVIPREP 100-7.5-2.691 gram solution     pantoprazole (PROTONIX) 40 MG tablet     sevelamer carbonate (RENVELA) 800 mg Tab Take 1,600 mg by mouth 3 (three) times daily with meals.     silver sulfADIAZINE 1% (SILVADENE) 1 % cream Apply topically once daily.     Family History     Problem Relation (Age of Onset)    Aneurysm Mother, Father (77)    Diabetes Brother    Heart disease Father, Paternal Grandmother    Kidney  disease Brother        Tobacco Use    Smoking status: Former Smoker     Years: 10.00     Quit date: 2015     Years since quittin.9    Smokeless tobacco: Never Used   Substance and Sexual Activity    Alcohol use: No    Drug use: Yes     Frequency: 4.0 times per week     Types: Other-see comments     Comment: marijuana    Sexual activity: Yes     Review of Systems   Constitutional: Negative for chills and fever.   HENT: Negative for sinus pain and sore throat.    Respiratory: Positive for chest tightness. Negative for wheezing.    Cardiovascular: Positive for chest pain and palpitations. Negative for leg swelling.   Gastrointestinal: Positive for abdominal distention. Negative for abdominal pain, blood in stool, diarrhea and nausea.   Endocrine: Negative for polyuria.   Genitourinary: Positive for enuresis. Negative for flank pain and frequency.   Musculoskeletal: Negative for back pain.   Skin: Negative for color change.   Neurological: Positive for light-headedness. Negative for weakness and headaches.   Psychiatric/Behavioral: Negative for agitation and confusion.     Objective:     Vital Signs (Most Recent):  Temp: 97.9 °F (36.6 °C) (20 1415)  Pulse: 64 (20 1800)  Resp: 18 (20 1745)  BP: 100/62 (20 1800)  SpO2: 100 % (20 1745) Vital Signs (24h Range):  Temp:  [97.3 °F (36.3 °C)-98 °F (36.7 °C)] 97.9 °F (36.6 °C)  Pulse:  [] 64  Resp:  [16-18] 18  SpO2:  [92 %-100 %] 100 %  BP: ()/(37-89) 100/62     Weight: 99.8 kg (220 lb)  Body mass index is 29.84 kg/m².    Physical Exam  Constitutional:       General: He is not in acute distress.     Appearance: He is not ill-appearing.   HENT:      Head: Normocephalic and atraumatic.      Right Ear: Tympanic membrane normal.      Left Ear: Tympanic membrane normal.      Mouth/Throat:      Mouth: Mucous membranes are dry.      Pharynx: No oropharyngeal exudate.   Eyes:      General: No scleral icterus.        Right eye: No  discharge.         Left eye: No discharge.   Cardiovascular:      Rate and Rhythm: Bradycardia present. Rhythm irregular.      Heart sounds: Murmur present.      Comments: Reduced LE pulses throughout  Pulmonary:      Effort: Pulmonary effort is normal. No respiratory distress.      Breath sounds: No stridor. No wheezing or rhonchi.   Abdominal:      General: Bowel sounds are normal.      Palpations: Abdomen is soft.      Tenderness: There is no abdominal tenderness. There is no guarding.   Musculoskeletal:         General: No swelling or tenderness.      Right lower leg: No edema.      Left lower leg: No edema.   Skin:     Capillary Refill: Capillary refill takes 2 to 3 seconds.      Coloration: Skin is not jaundiced.   Neurological:      General: No focal deficit present.      Mental Status: He is alert and oriented to person, place, and time.   Psychiatric:         Mood and Affect: Mood normal.         Behavior: Behavior normal.         Significant Labs:   CBC:   Recent Labs   Lab 07/28/20  0433 07/28/20  1820 07/29/20  0419   WBC 8.07 9.59 8.78   HGB 10.9* 10.5* 9.6*   HCT 35.8* 32.8* 30.5*    125* 148*     CMP:   Recent Labs   Lab 07/28/20  1820  07/29/20  0419 07/29/20  1009 07/29/20  1402      < > 136  136 135* 136   K 4.3   < > 5.7*  5.7*  5.7* 5.4* 6.0*      < > 99  99 98 99   CO2 27   < > 23  23 25 24   GLU 73   < > 69*  69* 106 101   BUN 35*   < > 48*  48* 52* 55*   CREATININE 3.4*   < > 4.8*  4.8* 5.2* 5.6*   CALCIUM 9.2   < > 8.9  8.9 9.0 9.1   PROT 7.4  --   --   --   --    ALBUMIN 2.9*  --   --   --   --    BILITOT 0.5  --   --   --   --    ALKPHOS 122  --   --   --   --    AST 20  --   --   --   --    ALT 15  --   --   --   --    ANIONGAP 11   < > 14  14 12 13   EGFRNONAA 19.3*   < > 12.8*  12.8* 11.6* 10.6*    < > = values in this interval not displayed.     Troponin:   Recent Labs   Lab 07/28/20  1820 07/29/20  0419 07/29/20  1009   TROPONINI 0.180* 3.849* 5.368*        Significant Imaging: I have reviewed all pertinent imaging results/findings within the past 24 hours.    Assessment/Plan:     NSTEMI (non-ST elevated myocardial infarction)  Troponin 7/9/20 = 0.015 at OSH  Trop 7/28 at 1830 with hypotension and chest tightness 0.18 --> repeat this AM 3.8 --> 5.3  EKG 7/28 at 1830 with AFib RVR that improved with metoprolol 5mg IV x1 --> EKG 7/29 with bradycardia, possible block  PLAN  - Cards consulted: starting ACS protocol with Plavix load and heparin gtts; f/u cards recs  - Repeat TTE when euvolemic   - hypotensive: hold BP meds for SBP < 120  - Telemetry  - Left heart cath 7/30     Atrial flutter  AFib RVR 7/28 that improved with metoprolol 5mg IV x 1  Previous notes indicate long term A/C at some point  CHADVASC = 6 (CHF, HTN hx, DM hx, CVA hx , valve dz)  Now on heparin drip for ACS protocol  Also with MV and AV disease but not a candidate for intervention at this time   PLAN  - if AFib overnight, please call cards, they saw patient today  - on heparin drip for ACS    Hyperkalemia  Peristently elevated potassium but EKG 7/29 10am without peaked T-waves  Anuric, utilizing HD and insulin shifts for management thus far.   PLAN  - Kayexalate 30g x 1 -- can redose if elevated again overnight to remove total body potassium  - Insulin IV and calcium if elevated to cardioprotect  - BMP q4h  - Telemetry    ESRD (end stage renal disease) on dialysis  ESRD (T,Th,Sat) dialysis onset 2013  Per nephrology  ESRD with HD T/Th/Sa via left AVF  Has been hypotensive on HD recently  HD was volume even 7/29 --> cards concerned volume up (see note 7/29 1730)    Hypotension  Benign hypertension with ESRD (end-stage renal disease)  ESRD with HD T/Th/Sa via left AVF  Has been hypotensive on HD recently  HD was volume even 7/29  Lactate negative, WBC normal  PLAN  - Careful IVF bolus 250-500mL if needed  - If not fluid responsive and symptomatic, low threshold to consult ICU    Acute  osteomyelitis of metatarsal bone, right  ID following  Podiatry follow; did bone biopsy for antibiotic tailoring 7/29  MRI with osteomyelitis of foot but vascular studies indicate poor blood flow so amputation not an option  Lactate negative 7/29  PLAN  - f/u ID recs; holding abx pending new culture data (bone bx 7/29)  - f/u Podiatry recs   - f/u Wound care recs    Cirrhosis of liver with ascites  HCV associated cirrhosis  No mention of varices or HE in medical record  H/o ascites; states he has had paracentesis in the past  Abd NT; US abd without obvious ascites  PLAN  - Monitor for ascites  - CMP daily  - INR with AM labs 7/30    Seizure disorder  Home med: Keppra 500 po BID  PLAN  - Continue Keppra    VTE Risk Mitigation (From admission, onward)         Ordered     heparin 25,000 units in dextrose 5% (100 units/ml) IV bolus from bag INITIAL BOLUS (max bolus 4000 units)  Once     Question:  Heparin Infusion Adjustment (DO NOT MODIFY ANSWER)  Answer:  \\[email or web address]\epic\Images\Pharmacy\HeparinInfusions\heparin LOW INTENSITY nomogram for [facility] [id number]    07/29/20 1618     heparin 25,000 units in dextrose 5% 250 mL (100 units/mL) infusion LOW INTENSITY nomogram - [facility]  Continuous     Question:  Heparin Infusion Adjustment (DO NOT MODIFY ANSWER)  Answer:  \[email or web address]\epic\Images\Pharmacy\HeparinInfusions\heparin LOW INTENSITY nomogram for [facility] [id number].pdf    07/29/20 1618     heparin 25,000 units in dextrose 5% (100 units/ml) IV bolus from bag - ADDITIONAL PRN BOLUS - 30 units/kg (max bolus 4000 units)  As needed (PRN)     Question:  Heparin Infusion Adjustment (DO NOT MODIFY ANSWER)  Answer:  \[employer]\epic\Images\Pharmacy\HeparinInfusions\heparin LOW INTENSITY nomogram for [facility] [id number].pdf    07/29/20 1618     heparin 25,000 units in dextrose 5% (100 units/ml) IV bolus from bag - ADDITIONAL PRN BOLUS - 60 units/kg (max bolus 4000 units)  As needed (PRN)     Question:  Heparin Infusion Adjustment (DO NOT MODIFY  ANSWER)  Answer:  [facility]\epic\Images\Pharmacy\HeparinInfusions\heparin LOW INTENSITY nomogram for [facility] [id number].pdf    07/29/20 1618     IP VTE HIGH RISK PATIENT  Once      07/24/20 0628     Place sequential compression device  Until discontinued      07/24/20 0628                Thank you for your consult. I will follow-up with patient. Please contact us if you have any additional questions.    Cate Godinez MD  Department of Hospital Medicine   [facility]

## 2020-07-29 NOTE — PROGRESS NOTES
[facility]  Podiatry  Progress Note    Patient Name: João Aguirre  MRN: [id number]  Admission Date: 7/24/2020  Hospital Length of Stay: 5 days  Attending Physician: Surendra Porras MD  Primary Care Provider: Primary Doctor No     Subjective:     Interval History:   S/p RLE angiogram. Vascular planning to refer patient to Dr. Anglin for potential limb salvage. Given that patient was unable to be revascularized, podiatry offering bone biopsy at this time for abx tailoring given patient not a candidate for 5th ray resection due to poor blood flow and inability to heal.         Scheduled Meds:   sodium chloride 0.9%   Intravenous Once    sodium chloride 0.9%   Intravenous Once    sodium chloride 0.9%   Intravenous Once    amLODIPine  5 mg Oral BID    aspirin  325 mg Oral Daily    atorvastatin  40 mg Oral Daily    gabapentin  100 mg Oral BID    levETIRAcetam  500 mg Oral BID    methadone  100 mg Oral Daily    pantoprazole  40 mg Intravenous Daily    polyethylene glycol  17 g Oral Daily    sevelamer carbonate  1,600 mg Oral TID WM     Continuous Infusions:  PRN Meds:acetaminophen, albuterol-ipratropium, bisacodyL, [COMPLETED] calcium gluconate IVPB **AND** calcium gluconate IVPB, [COMPLETED] calcium gluconate IVPB **AND** calcium gluconate IVPB, [COMPLETED] dextrose 50% **AND** dextrose 50% **AND** [COMPLETED] insulin regular, ondansetron, oxyCODONE, oxyCODONE, promethazine, sodium chloride 0.9%    Review of Systems   Constitutional: Negative for appetite change, chills and fever.   Gastrointestinal: Negative for nausea and vomiting.   Musculoskeletal:        Right foot pain   Skin: Positive for wound.   Neurological: Negative for weakness and numbness.   Psychiatric/Behavioral: Negative for confusion.     Objective:     Vital Signs (Most Recent):  Temp: 97.9 °F (36.6 °C) (07/29/20 1415)  Pulse: 60 (07/29/20 1440)  Resp: 18 (07/29/20 1440)  BP: 97/62 (07/29/20 1440)  SpO2: 97 %  (07/29/20 1440) Vital Signs (24h Range):  Temp:  [97.3 °F (36.3 °C)-98.8 °F (37.1 °C)] 97.9 °F (36.6 °C)  Pulse:  [] 60  Resp:  [15-18] 18  SpO2:  [92 %-100 %] 97 %  BP: ()/(37-78) 97/62     Weight: 99.8 kg (220 lb)  Body mass index is 29.84 kg/m².    Foot Exam    General  General Appearance: appears stated age and healthy   Orientation: alert and oriented to person, place, and time   Affect: appropriate       Right Foot/Ankle     Inspection and Palpation  Ecchymosis: none  Tenderness: none   Swelling: lesser metatarsophalangeal joints   Skin Exam: callus, dry skin, skin changes, abnormal color, ulcer and erythema; no blister, no drainage and no cellulitis     Neurovascular  Dorsalis pedis: absent  Posterior tibial: absent  Saphenous nerve sensation: diminished  Tibial nerve sensation: diminished  Superficial peroneal nerve sensation: diminished  Deep peroneal nerve sensation: diminished  Sural nerve sensation: diminished    Comments  See wound description below    Left Foot/Ankle      Inspection and Palpation  Skin Exam: skin intact;     Neurovascular  Dorsalis pedis: absent  Posterior tibial: absent            Laboratory:  CBC:   Recent Labs   Lab 07/29/20  0419   WBC 8.78   RBC 2.97*   HGB 9.6*   HCT 30.5*   *   *   MCH 32.3*   MCHC 31.5*     CRP: No results for input(s): CRP in the last 168 hours.  ESR: No results for input(s): SEDRATE in the last 168 hours.  Wound Cultures: No results for input(s): LABAERO in the last 4320 hours.  Microbiology Results (last 7 days)     Procedure Component Value Units Date/Time    Aerobic culture [[id number]] Collected: 07/29/20 1415    Order Status: Sent Specimen: Bone from Foot, Right Updated: 07/29/20 1415    Gram stain [[id number]] Collected: 07/29/20 1415    Order Status: Sent Specimen: Bone from Foot, Right Updated: 07/29/20 1415    AFB Culture & Smear [[id number]] Collected: 07/29/20 1415    Order Status: Sent Specimen: Bone from Foot, Right  Updated: 07/29/20 1415    Fungus culture [[id number]] Collected: 07/29/20 1415    Order Status: Sent Specimen: Bone from Foot, Right Updated: 07/29/20 1415    Culture, Anaerobe [[id number]] Collected: 07/29/20 1415    Order Status: Sent Specimen: Bone from Foot, Right Updated: 07/29/20 1415        Specimen (12h ago, onward)    None          Diagnostic Results:  I have reviewed all pertinent imaging results/findings within the past 24 hours.    Clinical Findings:  Wound plantar 1st metatarsal head with hyperkeratotic border and overlying eschar, appears stable and dry with no drainage noted, no acute signs of infection noted, does not probe to bone, no signs of abscess. Wound plantar 5th metatarsal head with hyperkeratotic border and mixed granular necrotic base, serous drainage noted, no malodor. +probe to bone, no signs of abscess.         07/29/2020  Procedure note:   Provider:  Dr. Brice Gastelum DPM  Assisting: Fide Roach DPM, PGY2  Pre op diagnosis: osteomyelitis   Post op diagnosis: same  Anesthesia: 5 mL of 1% lidocaine plain  Hemostasis: direct pressure.   EBL: < 1ml      Procedure: bone biopsy    Findings:  After consent was signed and witnessed 5ml of 1% lidocaine was administered locally infiltrating the 5th metatarsal head ulceration. Right foot was prepped and draped in a sterile manner.  A Jamshidi needle was used to biopsy the 5th metatarsal head. Bone was noted to be soft and brittle. Bone was sent for micro and pathology. Patient tolerated procedure well, and hemostasis was controlled with manual compression.  Foot was dressed with 4x4 gauze, kerlix, and secured with tape.     Assessment/Plan:     Acute osteomyelitis of metatarsal bone, right  Patient presented with ulceration noted to 1st and 5th metatarsal head. MRI right foot with osteomyelitis to 5th metatarsal head.      Plan:  -s/p RLE angiogram; unable to revascularize. Plan to refer to Dr. Anglin at [facility] for potential limb salvage.    -Given that patient has poor blood flow, not a candidate for amputation of 5th metatarsal head. Surgical site and wound would not heal.   -Obtained bone biopsy at bedside today for abx tailoring. Follow up bone cx.  -ID on board. Agree that antibiotics alone may be of limited benefit for wound healing/treatment of osteo  -Will continue local wound care to be done by nursing.   -Pt PWB to heel only in DARCO shoe  -Podiatry will follow            Fide Roach DPM  [facility]  Podiatry PGY2  Pager: [phone]

## 2020-07-29 NOTE — ASSESSMENT & PLAN NOTE
-Chest pain while receiving dialysis yesterday with hypotension (dialysis stopped) and acute onset of rapid afib.    -Troponin elevation from 0.18>3.849  -Consulting interventional cardiology; appreciate recommendations  -Will place old cath films in chart for their review

## 2020-07-29 NOTE — ASSESSMENT & PLAN NOTE
-Vascular following  -Per note will check CTA A/P with BLE runoff to better define surgical anatomy. Refer to Dr. Salgado for potential limb salvage.

## 2020-07-29 NOTE — NURSING TRANSFER
Nursing Transfer Note      7/29/2020     Transfer to Dialysis from     Transfer via bed    Transfer with cardiac monitoring, IVF infusing    Transported by pt transport x2    Medicines sent: yes    Chart send with patient: yes

## 2020-07-29 NOTE — ASSESSMENT & PLAN NOTE
LEVY 4  Acute rise in troponin from 0.18 - 3.8 - 5.3, however he is currently chest pain free  Cath films reviewed by interventional who plan for LHC tomorrow unless the patient develops worsening/refractory angina or clinically deteriorates he may need to be taken to the cath lab emergently  Treat medically for ACS with DAPT, heparin, high intensity statin, beta blocker unless contraindicated  Needs volume removal and AFib control

## 2020-07-29 NOTE — CONSULTS
[facility]  Hepatology  Consult Note    Patient Name: João Aguirre  MRN: [id number]  Admission Date: 7/24/2020  Hospital Length of Stay: 5 days  Code Status: Full Code   Attending Provider: Surendra Porras MD   Consulting Provider: Rowena Muñoz MD  Primary Care Physician: Primary Doctor No  Principal Problem:Mitral stenosis    Consults  Subjective:     HPI: João Aguirre is a 55 y.o. male with history of ESRD on HD, HTN, HCV, mitral stenosis, afib, who is admitted for chest pain found to have mitral stenosis. Hepatology consulted for surgical risk evaluation in the setting of suspected cirrhosis.    The patient reports that he was told he has liver cirrhosis 2 years ago during a hospital admission, but never followed with a hepatologist. He also has HCV that was never treated. He is not sure where he got HCV since he never did IVDU, but had unprofessionally done tattoos.   He denies any manifestations of decompensated cirrhosis, including ascites, hepatic encephalopathy or bleeding. He had EGD some years ago (not sure when). Last EGD at [facility] in 2016 showed no varices. The patient denies hx of a heavy alcohol use and haven't had a drink since he was told about his cirrhosis diagnosis.     LFTs unremarkable, normal INR, low albumin. HCV Ab +ve. U/S showing nodular liver contour concerning for cirrhosis.         Past Medical History:   Diagnosis Date    Anticoagulant long-term use     Arthritis     Asthma     Back pain     Coronary artery disease 2013    stent    Diabetes mellitus     Encounter for blood transfusion     Eye abnormality right eye    injured as a child    Gastritis     Gastroparesis     Hemodialysis patient     Hypertension     Pneumonia 2013    Spend 6weeks in hospital at [place]    Renal disorder     Stroke        Past Surgical History:   Procedure Laterality Date    ANGIOGRAPHY OF LOWER EXTREMITY Right 7/28/2020    Procedure: Angiogram  Extremity Unilateral;  Surgeon: MINO Vasquez II, MD;  Location: [facility] OR [address] [place] [address];  Service: Vascular;  Laterality: Right;  Left groin and rIght pedal artery access  15.5 min  247.25 mGy  68.5454 Gycm2  33ml contrast    AV FISTULA PLACEMENT      BACK SURGERY      CARDIAC SURGERY      heart stent    CHOLECYSTECTOMY      EYE SURGERY      R eye    INCISION AND DRAINAGE OF ABSCESS Right 2018    Procedure: INCISION AND DRAINAGE, ABSCESS;  Surgeon: Chetan Rothman MD;  Location: [facility] OR;  Service: General;  Laterality: Right;       Family History   Problem Relation Age of Onset    Aneurysm Mother         brain    Aneurysm Father 77        stomach     Heart disease Father     Kidney disease Brother     Diabetes Brother         hyperglycemia and HAYDER causseddeath    Heart disease Paternal Grandmother        Social History     Socioeconomic History    Marital status: Legally      Spouse name: Not on file    Number of children: Not on file    Years of education: Not on file    Highest education level: Not on file   Occupational History    Not on file   Social Needs    Financial resource strain: Not on file    Food insecurity     Worry: Not on file     Inability: Not on file    Transportation needs     Medical: Not on file     Non-medical: Not on file   Tobacco Use    Smoking status: Former Smoker     Years: 10.00     Quit date: 2015     Years since quittin.9    Smokeless tobacco: Never Used   Substance and Sexual Activity    Alcohol use: No    Drug use: Yes     Frequency: 4.0 times per week     Types: Other-see comments     Comment: marijuana    Sexual activity: Yes   Lifestyle    Physical activity     Days per week: Not on file     Minutes per session: Not on file    Stress: Not on file   Relationships    Social connections     Talks on phone: Not on file     Gets together: Not on file     Attends Yarsani service: Not on file     Active member of club or organization:  Not on file     Attends meetings of clubs or organizations: Not on file     Relationship status: Not on file   Other Topics Concern    Not on file   Social History Narrative    Not on file       No current facility-administered medications on file prior to encounter.      Current Outpatient Medications on File Prior to Encounter   Medication Sig Dispense Refill    amlodipine (NORVASC) 5 MG tablet Take 5 mg by mouth 2 (two) times daily.      albuterol (PROAIR HFA) 90 mcg/actuation inhaler INHALE TWO PUFFS EVERY 4 TO 6 HOURS AS NEEDED      albuterol (PROAIR HFA) 90 mcg/actuation inhaler INHALE TWO PUFFS EVERY 4 TO 6 HOURS AS NEEDED      aspirin 325 MG tablet Take 1 tablet (325 mg total) by mouth once daily. 30 tablet 1    atorvastatin (LIPITOR) 10 MG tablet       atorvastatin (LIPITOR) 40 MG tablet TAKE ONE TABLET BY MOUTH DAILY      blood sugar diagnostic (TRUE METRIX GLUCOSE TEST STRIP) Strp       blood-glucose meter (PHARMACIST CHOICE GLUCOSE SYS) Misc 1 Device.      carvedilol (COREG) 3.125 MG tablet       ELIQUIS 2.5 mg Tab       ferric citrate (AURYXIA) 210 mg iron Tab Take 420 mg by mouth 3 (three) times daily.      fluconazole (DIFLUCAN) 100 MG tablet       fluticasone propionate (FLONASE) 50 mcg/actuation nasal spray 1 spray by Each Nostril route once daily.      fluticasone-salmeterol 250-50 mcg/dose (ADVAIR DISKUS) 250-50 mcg/dose diskus inhaler Inhale 1 puff into the lungs once daily.       gabapentin (NEURONTIN) 100 MG capsule Take 100 mg by mouth 2 (two) times daily.      hydrALAZINE (APRESOLINE) 50 MG tablet       levetiracetam (KEPPRA) 500 MG Tab Take 500 mg twice a day.  Take an extra tablet right after dialysis (making 3 tablets on your dialysis days) 70 tablet 2    methadone (DOLOPHINE) 10 MG tablet Take 9 tablets (90 mg total) by mouth once daily.  0    metroNIDAZOLE (FLAGYL) 500 MG tablet       minoxidil (LONITEN) 2.5 MG tablet Take 5 mg by mouth 2 (two) times daily.       MOVIPREP 100-7.5-2.691 gram solution       pantoprazole (PROTONIX) 40 MG tablet       sevelamer carbonate (RENVELA) 800 mg Tab Take 1,600 mg by mouth 3 (three) times daily with meals.       silver sulfADIAZINE 1% (SILVADENE) 1 % cream Apply topically once daily. 50 g 2       Review of patient's allergies indicates:   Allergen Reactions    Antibiotic hc      Counter acts with methodone.          Review of Systems   Constitutional: Negative for fever.   HENT: Negative.    Eyes: Negative.    Respiratory: Positive for shortness of breath.    Cardiovascular: Positive for chest pain.   Gastrointestinal: Negative for abdominal pain, blood in stool, diarrhea, melena, nausea and vomiting.   Genitourinary: Negative.    Musculoskeletal:        Leg pain   Skin:        Ulcer   Neurological: Negative.    Psychiatric/Behavioral: Negative.         Objective:     Vitals:    07/29/20 0800   BP: 102/65   Pulse: 100   Resp: 18   Temp: 98 °F (36.7 °C)         Constitutional:  not in acute distress and well developed  HENT: Head: Normal, normocephalic, atraumatic.  Eyes: conjunctiva clear and right eye damaged, no iris seen  Cardiovascular: regular rate and rhythm and systolic murmur present  Respiratory: normal chest expansion & respiratory effort   and no accessory muscle use  GI: soft, non-tender, without masses or organomegaly  Musculoskeletal: no muscular tenderness noted  Skin: normal color, ischemic ulcer noted on plantar aspect of right foot, no edema  Neurological: alert, oriented x3  Psychiatric: mood and affect are within normal limits, pt is a good historian; no memory problems were noted    Significant Labs:  Recent Labs   Lab 07/28/20  0433 07/28/20  1820 07/29/20  0419   HGB 10.9* 10.5* 9.6*       Lab Results   Component Value Date    WBC 8.78 07/29/2020    HGB 9.6 (L) 07/29/2020    HCT 30.5 (L) 07/29/2020     (H) 07/29/2020     (L) 07/29/2020       Lab Results   Component Value Date     07/29/2020      07/29/2020    K 5.7 (H) 07/29/2020    K 5.7 (H) 07/29/2020    K 5.7 (H) 07/29/2020    CL 99 07/29/2020    CL 99 07/29/2020    CO2 23 07/29/2020    CO2 23 07/29/2020    BUN 48 (H) 07/29/2020    BUN 48 (H) 07/29/2020    CREATININE 4.8 (H) 07/29/2020    CREATININE 4.8 (H) 07/29/2020    CALCIUM 8.9 07/29/2020    CALCIUM 8.9 07/29/2020    ANIONGAP 14 07/29/2020    ANIONGAP 14 07/29/2020    ESTGFRAFRICA 14.7 (A) 07/29/2020    ESTGFRAFRICA 14.7 (A) 07/29/2020    EGFRNONAA 12.8 (A) 07/29/2020    EGFRNONAA 12.8 (A) 07/29/2020       Lab Results   Component Value Date    ALT 15 07/28/2020    AST 20 07/28/2020     (H) 08/30/2018    ALKPHOS 122 07/28/2020    BILITOT 0.5 07/28/2020       Lab Results   Component Value Date    INR 1.0 07/24/2020    INR 1.0 09/07/2018    INR 1.0 09/06/2018       Significant Imaging:  Reviewed pertinent radiology findings.       Assessment/Plan:     João Aguirre is a 55 y.o. male with history of ESRD on HD, HTN, HCV, mitral stenosis, afib, admitted for mitral stenosis and PAD, with possible plan for mitral valve replacement/repair. Hepatology consulted for history of cirrhosis and HCV.    MELD 21, overestimated due to ESRD and dialysis.    Problem List:  1. HCV cirrhosis, compensated  2. ESRD on HD  3. Mitral stenosis      Assessment/Plan:  Patient with compensated likely untreated HCV cirrhosis. No hx of heavy alcohol use. Unlikely to be autoimmune hepatitis. No evidence of ascites, HE, varices. The patient is currently compensated.    - Calculated mortality risk is as follows: 6% at 7 days, 23% at 30 days.  Risk is likely overestimated due to the patient's kidney failure which is included in the calculation. The patient is at risk of cirrhosis decompensation after the surgery, but no interventions can mitigate that risk. He is also at increased risk of infections and bleeding due to liver cirrhosis.  - Patient needs HCV treatment and f/u outpatient with hepatology.  -  Will f/u HCV RNA PCR titer.    Thank you for involving us in the care of João Aguirre. Please call with any additional questions, concerns or changes in the patient's clinical status. We will sign off.    Rowena Muñoz MD  Gastroenterology & Hepatology Fellow PGY IV   [facility]

## 2020-07-29 NOTE — ASSESSMENT & PLAN NOTE
HCV associated cirrhosis  No mention of varices or HE in medical record  H/o ascites; states he has had paracentesis in the past  Abd NT; US abd without obvious ascites  PLAN  - Monitor for ascites  - CMP daily  - INR with AM labs 7/30

## 2020-07-29 NOTE — ASSESSMENT & PLAN NOTE
Patient has a history of paroxysmal AF and now mitral stenosis which is at least moderate if not severe; needs anticoagulation with warfarin for valvular AFib  Developed atrial flutter, currently NSR  Continue home beta blocker for rate control

## 2020-07-29 NOTE — NURSING
Results for NAOMI ALLEN (MRN [id number]) as of 7/29/2020 07:05   Ref. Range 7/29/2020 04:19   Troponin I Latest Ref Range: 0.000 - 0.026 ng/mL 3.849 (H)     Troponin noted to be elevated compared to yesterday - 0.180. Pt denies chest pain at this time. Courtney Jefferson NP notified. Ordered STAT EKG. Will implement. Will continue to monitor.

## 2020-07-29 NOTE — ASSESSMENT & PLAN NOTE
Per nephrology  ESRD with HD T/Th/Sa via left AVF  Has been hypotensive on HD recently  HD was volume even 7/29

## 2020-07-29 NOTE — ASSESSMENT & PLAN NOTE
ID following  Podiatry follow; did bone biopsy for antibiotic tailoring 7/29  MRI with osteomyelitis of foot but vascular studies indicate poor blood flow so amputation not an option  PLAN  - f/u ID recs; holding abx pending new culture data (bone bx 7/29)  - f/u Podiatry recs   - f/u Wound care recs

## 2020-07-29 NOTE — PROGRESS NOTES
After 2 hours of dialysis, patient began to complain of chest tightness and increased pulse of 120's-130.  Patient rinsed back and dialysis stopped. Rapid Response team called and arrived at bedside.  .Dr. Wolfe of renal notified. Patient offered Morphine for pain, but patient refused. Chest pain relieved on its own. Metoprolol 5 mg ivp given for increased heart rate. Blood pressure back to baseline. Heart rate decreased to 107-108. Rapid response and  said patient ok to return to his room on floor. Report called to Brenda. Patient transported by bed per hospital escort and MIRANDA Burkett RN, charge nurse with monitor.

## 2020-07-29 NOTE — PROGRESS NOTES
[facility]     Patient currently on hemodialysis for removal of uremic toxins and volume.     Patient seen and evaluated on hemodialysis, tolerating treatment, see HD flowsheet for vitals and assessments.      Ultrafiltration goal is 500 ml     Labs have been reviewed and the dialysate bath has been adjusted.     Assessment/Plan:  2 hour HD treatment completed yesterday, rapid response called post treatment 2/2 RVR and chest tightness which resolved after metoprolol.  This morning, he had an increase in Troponin from 0.18 ~ 3.8.  Cardiology consulted and monitoring.    He denies any symptoms today, had episodes of hypotension on the floor and was ordered NS bolus.  Currently oxygenating well on RA w/o dyspnea.  Reports that he feels "overloaded" and above his EDW (~94.5 kg?)    Discussed with cardiology at bedside.    Will run on low K bath today for continued hyperkalemia.  CPK normal.  Will re-evaluate in AM and attempt more ultrafiltration tomorrow based on hemodynamic status.  Recommend repeating ECHO.    Phos elevated, low phos diet, continue VIOLA Lynch, FNP-BC  Nephrology  Pager:  [phone]           "

## 2020-07-29 NOTE — NURSING
Performed potassium shift on patient this AM for K of 6.1. BG before starting was 92; after dextrose BG was 241; after insulin BG was 124.  WCTM.

## 2020-07-29 NOTE — NURSING
Pt complaining of lightheadedness while sitting up on side of bed. HR sustaining 48-50 on telemetry.  BP 82/52 manual. Courtney Jefferson, NP notified, stated would come to bedside. Rapid Response RN x2 called, updated; stated would come to bedside. Will continue to monitor.

## 2020-07-29 NOTE — CONSULTS
[facility]  Cardiology  Consult Note    Patient Name: João Aguirre  MRN: [id number]  Admission Date: 7/24/2020  Hospital Length of Stay: 5 days  Code Status: Full Code   Attending Provider: Surendra Porras MD   Consulting Provider: Bert Garrett MD  Primary Care Physician: Primary Doctor No  Principal Problem:Mitral stenosis    Patient information was obtained from patient and ER records.     Inpatient consult to Cardiology  Consult performed by: Bert Garrett MD  Consult ordered by: Cate Godinez MD        Subjective:     Chief Complaint:  NSTEMI     HPI:   55 year old male with PMH CAD s/p RCA stent, paroxysmal AF, mod-severe Mitral stenosis, HCV cirrhosis, ESRD on HD, right foot osteomyelitis, PAD, HTN transferred to [facility] for evaluation of mitral stenosis, cardiology is consulted for NSTEMI.    According to the patient, he has had right foot osteomyelitis for the past 5 months. He initially presented to [facility] several weeks ago due to chest tightness that seemed pleuritic in nature. The chest tightness began at rest and was consistent. He had this chest tightness for 2 days prior to his initial admission; it was still present the following morning when he woke up and then decided to seek medical attention. Prior to his RCA stents he does not recall what symptoms he had. Apparently he had a LHC done in [place] and CTS has requested films.    While in dialysis yesterday the patient complained of chest tightness which was also pleuritic and the same pain he had prior to admission but more intense. Although volume was not being removed in dialysis he became hypotensive and was found to be in AF with RVR, troponin was 0.18. This morning his troponin increased to 3.8 and within 6 hours it joe to 5.4. He is chest pain free. He denies orthopnea or PND but has chronic edema. Denies light headedness, dizziness or syncope.    Past Medical History:   Diagnosis Date     Acute osteomyelitis of metatarsal bone, right     PAD right    Anticoagulant long-term use     Arthritis     Asthma     Back pain     on methadone    C. difficile colitis 09/2018    Cirrhosis of liver without ascites 2016    by liver US. +HCV    Coronary artery disease 2013    stent.  h/o STEMI and NSTEMI    Diabetes mellitus     resolved, multiple admissions for hypoglycemia requiring ICU possibley due to liver/ESRD    Encounter for blood transfusion     ESRD on hemodialysis 2013    anuric    Eye abnormality right eye    injured as a child    Gastritis     Gastroparesis     HCV (hepatitis C virus)     ? h/o tattoo exposure    Hypertension     Mitral stenosis 07/24/2020    Moderate to severe mitral stenosis    PAD (peripheral artery disease)     RLE s/p R CFA endarterectomy    Pneumonia 2013    Spend 6weeks in hospital at [place]    Stroke     Tuberculosis     treated       Past Surgical History:   Procedure Laterality Date    ANGIOGRAPHY OF LOWER EXTREMITY Right 7/28/2020    Procedure: Angiogram Extremity Unilateral;  Surgeon: MINO Vasquez II, MD;  Location: [facility] OR [address] [place] [address];  Service: Vascular;  Laterality: Right;  Left groin and rIght pedal artery access  15.5 min  247.25 mGy  68.5454 Gycm2  33ml contrast    AV FISTULA PLACEMENT      BACK SURGERY      CARDIAC SURGERY  2013    heart stent    CHOLECYSTECTOMY      EYE SURGERY      R eye    INCISION AND DRAINAGE OF ABSCESS Right 9/6/2018    Procedure: INCISION AND DRAINAGE, ABSCESS;  Surgeon: Chetan Rothman MD;  Location: [facility];  Service: General;  Laterality: Right;       Review of patient's allergies indicates:   Allergen Reactions    Antibiotic hc      Counter acts with methodone.          No current facility-administered medications on file prior to encounter.      Current Outpatient Medications on File Prior to Encounter   Medication Sig    amlodipine (NORVASC) 5 MG tablet Take 5 mg by mouth 2 (two) times daily.     albuterol (PROAIR HFA) 90 mcg/actuation inhaler INHALE TWO PUFFS EVERY 4 TO 6 HOURS AS NEEDED    albuterol (PROAIR HFA) 90 mcg/actuation inhaler INHALE TWO PUFFS EVERY 4 TO 6 HOURS AS NEEDED    aspirin 325 MG tablet Take 1 tablet (325 mg total) by mouth once daily.    atorvastatin (LIPITOR) 10 MG tablet     atorvastatin (LIPITOR) 40 MG tablet TAKE ONE TABLET BY MOUTH DAILY    blood sugar diagnostic (TRUE METRIX GLUCOSE TEST STRIP) Strp     blood-glucose meter (PHARMACIST CHOICE GLUCOSE SYS) Misc 1 Device.    carvedilol (COREG) 3.125 MG tablet     ELIQUIS 2.5 mg Tab     ferric citrate (AURYXIA) 210 mg iron Tab Take 420 mg by mouth 3 (three) times daily.    fluconazole (DIFLUCAN) 100 MG tablet     fluticasone propionate (FLONASE) 50 mcg/actuation nasal spray 1 spray by Each Nostril route once daily.    fluticasone-salmeterol 250-50 mcg/dose (ADVAIR DISKUS) 250-50 mcg/dose diskus inhaler Inhale 1 puff into the lungs once daily.     gabapentin (NEURONTIN) 100 MG capsule Take 100 mg by mouth 2 (two) times daily.    hydrALAZINE (APRESOLINE) 50 MG tablet     levetiracetam (KEPPRA) 500 MG Tab Take 500 mg twice a day.  Take an extra tablet right after dialysis (making 3 tablets on your dialysis days)    methadone (DOLOPHINE) 10 MG tablet Take 9 tablets (90 mg total) by mouth once daily.    metroNIDAZOLE (FLAGYL) 500 MG tablet     minoxidil (LONITEN) 2.5 MG tablet Take 5 mg by mouth 2 (two) times daily.    MOVIPREP 100-7.5-2.691 gram solution     pantoprazole (PROTONIX) 40 MG tablet     sevelamer carbonate (RENVELA) 800 mg Tab Take 1,600 mg by mouth 3 (three) times daily with meals.     silver sulfADIAZINE 1% (SILVADENE) 1 % cream Apply topically once daily.     Family History     Problem Relation (Age of Onset)    Aneurysm Mother, Father (77)    Diabetes Brother    Heart disease Father, Paternal Grandmother    Kidney disease Brother        Tobacco Use    Smoking status: Former Smoker     Years: 10.00      Quit date: 2015     Years since quittin.9    Smokeless tobacco: Never Used   Substance and Sexual Activity    Alcohol use: No    Drug use: Yes     Frequency: 4.0 times per week     Types: Other-see comments     Comment: marijuana    Sexual activity: Yes     Review of Systems   All other systems reviewed and are negative.    Objective:     Vital Signs (Most Recent):  Temp: 97.9 °F (36.6 °C) (20 1415)  Pulse: 69 (20 1700)  Resp: 18 (20 1700)  BP: 115/89 (20 1700)  SpO2: 99 % (20 1700) Vital Signs (24h Range):  Temp:  [97.3 °F (36.3 °C)-98 °F (36.7 °C)] 97.9 °F (36.6 °C)  Pulse:  [] 69  Resp:  [16-18] 18  SpO2:  [92 %-100 %] 99 %  BP: ()/(37-89) 115/89     Weight: 99.8 kg (220 lb)  Body mass index is 29.84 kg/m².    SpO2: 99 %  O2 Device (Oxygen Therapy): room air      Intake/Output Summary (Last 24 hours) at 2020 1716  Last data filed at 2020 1100  Gross per 24 hour   Intake 1550 ml   Output 455 ml   Net 1095 ml       Lines/Drains/Airways     Drain                 Hemodialysis AV Fistula Left forearm -- days         Hemodialysis AV Fistula Left forearm -- days         Hemodialysis AV Fistula Left forearm -- days          Peripheral Intravenous Line                 Peripheral IV - Single Lumen 20 0800 18 G Right Forearm less than 1 day                Physical Exam   Constitutional: He is oriented to person, place, and time. He appears well-developed and well-nourished. No distress.   HENT:   Head: Normocephalic and atraumatic.   Eyes:   Right eye blind   Neck: JVD present.   Cardiovascular: Normal rate, regular rhythm and intact distal pulses. Exam reveals no gallop and no friction rub.   Murmur (Grade III/VI systolic murmur, loudest at left sternal border, radiates to carotids) heard.  Pulmonary/Chest: Effort normal. No respiratory distress. He has no wheezes. He has rales.   Abdominal: Soft. Bowel sounds are normal. He exhibits no distension.  There is no abdominal tenderness.   Musculoskeletal:         General: Edema (trace) present.   Neurological: He is alert and oriented to person, place, and time.   Skin: He is not diaphoretic.       Significant Labs:     Recent Results (from the past 24 hour(s))   POCT glucose    Collection Time: 07/28/20  6:01 PM   Result Value Ref Range    POCT Glucose 71 70 - 110 mg/dL   CBC auto differential    Collection Time: 07/28/20  6:20 PM   Result Value Ref Range    WBC 9.59 3.90 - 12.70 K/uL    RBC 3.19 (L) 4.60 - 6.20 M/uL    Hemoglobin 10.5 (L) 14.0 - 18.0 g/dL    Hematocrit 32.8 (L) 40.0 - 54.0 %    Mean Corpuscular Volume 103 (H) 82 - 98 fL    Mean Corpuscular Hemoglobin 32.9 (H) 27.0 - 31.0 pg    Mean Corpuscular Hemoglobin Conc 32.0 32.0 - 36.0 g/dL    RDW 15.8 (H) 11.5 - 14.5 %    Platelets 125 (L) 150 - 350 K/uL    MPV 9.9 9.2 - 12.9 fL    Immature Granulocytes 0.6 (H) 0.0 - 0.5 %    Gran # (ANC) 6.2 1.8 - 7.7 K/uL    Immature Grans (Abs) 0.06 (H) 0.00 - 0.04 K/uL    Lymph # 1.8 1.0 - 4.8 K/uL    Mono # 0.9 0.3 - 1.0 K/uL    Eos # 0.5 0.0 - 0.5 K/uL    Baso # 0.07 0.00 - 0.20 K/uL    nRBC 0 0 /100 WBC    Gran% 65.0 38.0 - 73.0 %    Lymph% 19.1 18.0 - 48.0 %    Mono% 9.7 4.0 - 15.0 %    Eosinophil% 4.9 0.0 - 8.0 %    Basophil% 0.7 0.0 - 1.9 %    Differential Method Automated    Comprehensive metabolic panel    Collection Time: 07/28/20  6:20 PM   Result Value Ref Range    Sodium 138 136 - 145 mmol/L    Potassium 4.3 3.5 - 5.1 mmol/L    Chloride 100 95 - 110 mmol/L    CO2 27 23 - 29 mmol/L    Glucose 73 70 - 110 mg/dL    BUN, Bld 35 (H) 6 - 20 mg/dL    Creatinine 3.4 (H) 0.5 - 1.4 mg/dL    Calcium 9.2 8.7 - 10.5 mg/dL    Total Protein 7.4 6.0 - 8.4 g/dL    Albumin 2.9 (L) 3.5 - 5.2 g/dL    Total Bilirubin 0.5 0.1 - 1.0 mg/dL    Alkaline Phosphatase 122 55 - 135 U/L    AST 20 10 - 40 U/L    ALT 15 10 - 44 U/L    Anion Gap 11 8 - 16 mmol/L    eGFR if African American 22.4 (A) >60 mL/min/1.73 m^2    eGFR if non   19.3 (A) >60 mL/min/1.73 m^2   Troponin I    Collection Time: 07/28/20  6:20 PM   Result Value Ref Range    Troponin I 0.180 (H) 0.000 - 0.026 ng/mL   ISTAT PROCEDURE    Collection Time: 07/28/20  6:21 PM   Result Value Ref Range    POC PH 7.398 7.35 - 7.45    POC PCO2 44.9 35 - 45 mmHg    POC PO2 80 (HH) 40 - 60 mmHg    POC HCO3 27.7 24 - 28 mmol/L    POC BE 3 -2 to 2 mmol/L    POC SATURATED O2 96 95 - 100 %    POC TCO2 29 24 - 29 mmol/L    Sample VENOUS     Site Other     Allens Test N/A     DelSys Nasal Can     Mode SPONT     Flow 2    Lactic acid, plasma    Collection Time: 07/28/20  6:42 PM   Result Value Ref Range    Lactate (Lactic Acid) 0.9 0.5 - 2.2 mmol/L   Magnesium    Collection Time: 07/28/20  6:42 PM   Result Value Ref Range    Magnesium 1.8 1.6 - 2.6 mg/dL   Phosphorus    Collection Time: 07/28/20  6:42 PM   Result Value Ref Range    Phosphorus 3.6 2.7 - 4.5 mg/dL   Potassium    Collection Time: 07/28/20  7:56 PM   Result Value Ref Range    Potassium 5.2 (H) 3.5 - 5.1 mmol/L   Basic metabolic panel    Collection Time: 07/28/20  7:56 PM   Result Value Ref Range    Sodium 136 136 - 145 mmol/L    Potassium 5.2 (H) 3.5 - 5.1 mmol/L    Chloride 101 95 - 110 mmol/L    CO2 19 (L) 23 - 29 mmol/L    Glucose 70 70 - 110 mg/dL    BUN, Bld 43 (H) 6 - 20 mg/dL    Creatinine 4.3 (H) 0.5 - 1.4 mg/dL    Calcium 9.1 8.7 - 10.5 mg/dL    Anion Gap 16 8 - 16 mmol/L    eGFR if African American 16.8 (A) >60 mL/min/1.73 m^2    eGFR if non  14.6 (A) >60 mL/min/1.73 m^2   Potassium    Collection Time: 07/29/20 12:24 AM   Result Value Ref Range    Potassium 6.1 (H) 3.5 - 5.1 mmol/L   Basic metabolic panel    Collection Time: 07/29/20 12:24 AM   Result Value Ref Range    Sodium 138 136 - 145 mmol/L    Potassium 6.1 (H) 3.5 - 5.1 mmol/L    Chloride 98 95 - 110 mmol/L    CO2 28 23 - 29 mmol/L    Glucose 68 (L) 70 - 110 mg/dL    BUN, Bld 43 (H) 6 - 20 mg/dL    Creatinine 4.5 (H) 0.5 - 1.4 mg/dL     Calcium 9.6 8.7 - 10.5 mg/dL    Anion Gap 12 8 - 16 mmol/L    eGFR if African American 15.9 (A) >60 mL/min/1.73 m^2    eGFR if non  13.8 (A) >60 mL/min/1.73 m^2   Basic metabolic panel    Collection Time: 07/29/20  4:19 AM   Result Value Ref Range    Sodium 136 136 - 145 mmol/L    Potassium 5.7 (H) 3.5 - 5.1 mmol/L    Chloride 99 95 - 110 mmol/L    CO2 23 23 - 29 mmol/L    Glucose 69 (L) 70 - 110 mg/dL    BUN, Bld 48 (H) 6 - 20 mg/dL    Creatinine 4.8 (H) 0.5 - 1.4 mg/dL    Calcium 8.9 8.7 - 10.5 mg/dL    Anion Gap 14 8 - 16 mmol/L    eGFR if African American 14.7 (A) >60 mL/min/1.73 m^2    eGFR if non  12.8 (A) >60 mL/min/1.73 m^2   CBC auto differential    Collection Time: 07/29/20  4:19 AM   Result Value Ref Range    WBC 8.78 3.90 - 12.70 K/uL    RBC 2.97 (L) 4.60 - 6.20 M/uL    Hemoglobin 9.6 (L) 14.0 - 18.0 g/dL    Hematocrit 30.5 (L) 40.0 - 54.0 %    Mean Corpuscular Volume 103 (H) 82 - 98 fL    Mean Corpuscular Hemoglobin 32.3 (H) 27.0 - 31.0 pg    Mean Corpuscular Hemoglobin Conc 31.5 (L) 32.0 - 36.0 g/dL    RDW 16.2 (H) 11.5 - 14.5 %    Platelets 148 (L) 150 - 350 K/uL    MPV 10.3 9.2 - 12.9 fL    Immature Granulocytes 0.7 (H) 0.0 - 0.5 %    Gran # (ANC) 5.8 1.8 - 7.7 K/uL    Immature Grans (Abs) 0.06 (H) 0.00 - 0.04 K/uL    Lymph # 1.4 1.0 - 4.8 K/uL    Mono # 1.0 0.3 - 1.0 K/uL    Eos # 0.5 0.0 - 0.5 K/uL    Baso # 0.06 0.00 - 0.20 K/uL    nRBC 0 0 /100 WBC    Gran% 66.2 38.0 - 73.0 %    Lymph% 16.4 (L) 18.0 - 48.0 %    Mono% 10.8 4.0 - 15.0 %    Eosinophil% 5.2 0.0 - 8.0 %    Basophil% 0.7 0.0 - 1.9 %    Differential Method Automated    Magnesium    Collection Time: 07/29/20  4:19 AM   Result Value Ref Range    Magnesium 2.2 1.6 - 2.6 mg/dL   Phosphorus    Collection Time: 07/29/20  4:19 AM   Result Value Ref Range    Phosphorus 6.2 (H) 2.7 - 4.5 mg/dL   Potassium    Collection Time: 07/29/20  4:19 AM   Result Value Ref Range    Potassium 5.7 (H) 3.5 - 5.1 mmol/L   Basic  metabolic panel    Collection Time: 07/29/20  4:19 AM   Result Value Ref Range    Sodium 136 136 - 145 mmol/L    Potassium 5.7 (H) 3.5 - 5.1 mmol/L    Chloride 99 95 - 110 mmol/L    CO2 23 23 - 29 mmol/L    Glucose 69 (L) 70 - 110 mg/dL    BUN, Bld 48 (H) 6 - 20 mg/dL    Creatinine 4.8 (H) 0.5 - 1.4 mg/dL    Calcium 8.9 8.7 - 10.5 mg/dL    Anion Gap 14 8 - 16 mmol/L    eGFR if African American 14.7 (A) >60 mL/min/1.73 m^2    eGFR if non  12.8 (A) >60 mL/min/1.73 m^2   Troponin I    Collection Time: 07/29/20  4:19 AM   Result Value Ref Range    Troponin I 3.849 (H) 0.000 - 0.026 ng/mL   CK    Collection Time: 07/29/20  4:19 AM   Result Value Ref Range     20 - 200 U/L   POCT glucose    Collection Time: 07/29/20  5:14 AM   Result Value Ref Range    POCT Glucose 92 70 - 110 mg/dL   POCT glucose    Collection Time: 07/29/20  6:08 AM   Result Value Ref Range    POCT Glucose 241 (H) 70 - 110 mg/dL   POCT glucose    Collection Time: 07/29/20  6:31 AM   Result Value Ref Range    POCT Glucose 129 (H) 70 - 110 mg/dL   Basic metabolic panel    Collection Time: 07/29/20 10:09 AM   Result Value Ref Range    Sodium 135 (L) 136 - 145 mmol/L    Potassium 5.4 (H) 3.5 - 5.1 mmol/L    Chloride 98 95 - 110 mmol/L    CO2 25 23 - 29 mmol/L    Glucose 106 70 - 110 mg/dL    BUN, Bld 52 (H) 6 - 20 mg/dL    Creatinine 5.2 (H) 0.5 - 1.4 mg/dL    Calcium 9.0 8.7 - 10.5 mg/dL    Anion Gap 12 8 - 16 mmol/L    eGFR if African American 13.4 (A) >60 mL/min/1.73 m^2    eGFR if non African American 11.6 (A) >60 mL/min/1.73 m^2   Troponin I    Collection Time: 07/29/20 10:09 AM   Result Value Ref Range    Troponin I 5.368 (H) 0.000 - 0.026 ng/mL   POCT glucose    Collection Time: 07/29/20 11:20 AM   Result Value Ref Range    POCT Glucose 119 (H) 70 - 110 mg/dL   Lactic acid, plasma    Collection Time: 07/29/20  2:02 PM   Result Value Ref Range    Lactate (Lactic Acid) 1.4 0.5 - 2.2 mmol/L         Significant Imaging:     I have  reviewed all pertinent imaging studies      Assessment and Plan:     * Mitral stenosis  Mean gradient 15, may be due to volume overload  Will need repeat TTE when euvolemic  Controlling heart rate (partiularly if he converts to rapid AF/AFL) is important to allow for diastolic filling; resume beta blocker if BP will tolerate for rate control    Atrial flutter  Patient has a history of paroxysmal AF and now mitral stenosis which is at least moderate if not severe; needs anticoagulation with warfarin for valvular AFib  Developed atrial flutter, currently NSR  Continue home beta blocker for rate control    (HFpEF) heart failure with preserved ejection fraction  BNP 2948 on admit, continues to gain more volume and increase from his dry weight  Discussed with nephrology, aiming for volume removal    NSTEMI (non-ST elevated myocardial infarction)  LEVY 4  Acute rise in troponin from 0.18 - 3.8 - 5.3, however he is currently chest pain free  Cath films reviewed by interventional who plan for LHC tomorrow unless the patient develops worsening/refractory angina or clinically deteriorates he may need to be taken to the cath lab emergently  Treat medically for ACS with DAPT, heparin, high intensity statin, beta blocker unless contraindicated  Needs volume removal and AFib control        VTE Risk Mitigation (From admission, onward)         Ordered     heparin 25,000 units in dextrose 5% (100 units/ml) IV bolus from bag INITIAL BOLUS (max bolus 4000 units)  Once     Question:  Heparin Infusion Adjustment (DO NOT MODIFY ANSWER)  Answer:  \\[facility]\Images\Pharmacy\HeparinInfusions\heparin LOW INTENSITY nomogram for [facility] [id number]    07/29/20 1618     heparin 25,000 units in dextrose 5% 250 mL (100 units/mL) infusion LOW INTENSITY nomogram - [facility]  Continuous     Question:  Heparin Infusion Adjustment (DO NOT MODIFY ANSWER)  Answer:  \[employer]\Images\Pharmacy\HeparinInfusions\heparin LOW INTENSITY nomogram for [facility]  [id number]    07/29/20 1618     heparin 25,000 units in dextrose 5% (100 units/ml) IV bolus from bag - ADDITIONAL PRN BOLUS - 30 units/kg (max bolus 4000 units)  As needed (PRN)     Question:  Heparin Infusion Adjustment (DO NOT MODIFY ANSWER)  Answer:  \\"[email or web address]\Images\Pharmacy\HeparinInfusions\heparin LOW INTENSITY nomogram for [facility] [id number]    07/29/20 1618     heparin 25,000 units in dextrose 5% (100 units/ml) IV bolus from bag - ADDITIONAL PRN BOLUS - 60 units/kg (max bolus 4000 units)  As needed (PRN)     Question:  Heparin Infusion Adjustment (DO NOT MODIFY ANSWER)  Answer:  \\"[employer]\epic\Images\Pharmacy\HeparinInfusions\heparin LOW INTENSITY nomogram for [facility] [id number].pdf    07/29/20 1618     IP VTE HIGH RISK PATIENT  Once      07/24/20 0628     Place sequential compression device  Until discontinued      07/24/20 0628                  Bert Garrett MD  Cardiology   [facility]

## 2020-07-29 NOTE — ANESTHESIA POSTPROCEDURE EVALUATION
Anesthesia Post Evaluation    Patient: João Aguirre    Procedure(s) Performed: Procedure(s) (LRB):  Angiogram Extremity Unilateral (Right)    Final Anesthesia Type: MAC    Patient location during evaluation: PACU  Patient participation: Yes- Able to Participate  Level of consciousness: awake and alert  Post-procedure vital signs: reviewed and stable  Pain management: adequate  Airway patency: patent    PONV status at discharge: No PONV  Anesthetic complications: no      Cardiovascular status: blood pressure returned to baseline  Respiratory status: unassisted  Hydration status: euvolemic  Follow-up not needed.          Vitals Value Taken Time   /71 07/29/20 0508   Temp 36.4 °C (97.6 °F) 07/29/20 0508   Pulse 101 07/29/20 0718   Resp 18 07/29/20 0508   SpO2 94 % 07/29/20 0508         Event Time   Out of Recovery 07/28/2020 14:37:00         Pain/Natividad Score: Pain Rating Prior to Med Admin: 8 (7/28/2020  8:03 PM)  Pain Rating Post Med Admin: 2 (7/28/2020  9:03 PM)  Natividad Score: 10 (7/28/2020  2:37 PM)

## 2020-07-29 NOTE — CARE UPDATE
RAPID RESPONSE NURSE NOTE     Admit Date: 2020  LOS: 5  Code Status: Full Code   Date of Consult: 2020  : 1965  Age: 55 y.o.  Weight:   Wt Readings from Last 1 Encounters:   20 99.8 kg (220 lb)     Sex: male  Race: White   Bed: 324/324 A:   MRN: [id number]  Time Rapid Response Team page Received: 1840  Time Rapid Response Team at Bedside:   Time Rapid Response Team left Bedside:   Was the patient discharged from an ICU this admission?   no  Was the patient discharged from a PACU within last 24 hours?  no  Did the patient receive conscious sedation/general anesthesia within last 24 hours?  no  Was the patient in the ED within the past 24 hours?  no  Was the patient started on NIPPV within the past 24 hours?  no  Did this progress into an ARC or CPA:  no  Attending Physician: Surendra Porras MD  Primary Service: Networked reference to record PCT   Consult Requested By: Surendra Porras MD     SITUATION     Notified by bedside RN via phone call.  Reason for alert: hypotension and chest pain  Called to evaluate the patient for Dysrythmia    BACKGROUND     Why is the patient in the hospital?: Mitral stenosis    Patient has a past medical history of Anticoagulant long-term use, Arthritis, Asthma, Back pain, Coronary artery disease, Diabetes mellitus, Encounter for blood transfusion, Eye abnormality, Gastritis, Gastroparesis, Hemodialysis patient, Hypertension, Pneumonia, Renal disorder, and Stroke.    ASSESSMENT/INTERVENTIONS     /61 (BP Location: Right arm, Patient Position: Sitting)   Pulse 83   Temp 97.9 °F (36.6 °C) (Tympanic)   Resp 18   Ht 6' (1.829 m)   Wt 99.8 kg (220 lb)   SpO2 96%   BMI 29.84 kg/m²     What did you find: Upon arrival pt resting in bed, dialysis treatment rinsed back and ended early. Report received from day shift Helen GRECO. Pt became hypotensive with sudden c/o of chest tightness during dialysis treatment. Pt placed on telemetry and 12 lead EKG  obtained. Pt in new onset Afib, HR sustaining 130's-140's. 5 mg lopressor given x 1. HR now sustaining 90's-100's. Chest tightness resolved. Cardiac enzymes and CMP drawn. Dr. Cruz at bedside, ok for pt to return to CSU room.    RECOMMENDATIONS     We recommend: Monitor cardiac enzymes and electrolytes.     FOLLOW-UP/CONTINGENCY PLAN     Call the Rapid Response Nurse, Elva Carroll RN at x [phone] for additional questions or concerns.    PHYSICIAN ESCALATION     Orders received and case discussed with Dr. Cruz.    Disposition: Remain in room 324.

## 2020-07-30 NOTE — PROGRESS NOTES
[facility]  Interventional Cardiology  Progress Note    Patient Name: João Aguirre  MRN: [id number]  Admission Date: 7/24/2020  Hospital Length of Stay: 6 days  Code Status: Full Code   Attending Physician: Rony Ryan MD   Primary Care Physician: Primary Doctor No  Principal Problem:Mitral stenosis    Subjective:     Interval History:  Patient asymptomatic over past 24 hours and denies chest pain, shortness of breath, orthopnea, bleeding, etc. Started on ACS protocol. Planned for LHC + PCI today. Of note patient is a Plavix nonresponder based on elevated PRU level --> will switch to Brilinta.     Review of Systems   Constitution: Negative for chills, decreased appetite and fever.   HENT: Negative for congestion and sore throat.    Eyes: Negative for blurred vision and discharge.   Cardiovascular: Positive for chest pain (resolved). Negative for claudication, cyanosis, dyspnea on exertion and leg swelling.   Respiratory: Negative for cough, hemoptysis and shortness of breath.    Endocrine: Negative for cold intolerance and heat intolerance.   Skin: Negative for color change.   Musculoskeletal: Negative for muscle weakness and myalgias.   Neurological: Negative for dizziness, focal weakness and weakness.   Psychiatric/Behavioral: Negative for altered mental status and depression.     Objective:     Vital Signs (Most Recent):  Temp: 98.9 °F (37.2 °C) (07/30/20 0756)  Pulse: 64 (07/30/20 0756)  Resp: 18 (07/30/20 0756)  BP: 129/66 (07/30/20 0756)  SpO2: (!) 94 % (07/30/20 0422) Vital Signs (24h Range):  Temp:  [97.7 °F (36.5 °C)-98.9 °F (37.2 °C)] 98.9 °F (37.2 °C)  Pulse:  [56-79] 64  Resp:  [16-18] 18  SpO2:  [94 %-100 %] 94 %  BP: ()/(37-89) 129/66     Weight: 103.7 kg (228 lb 9.9 oz)  Body mass index is 31.01 kg/m².    SpO2: (!) 94 %  O2 Device (Oxygen Therapy): room air      Intake/Output Summary (Last 24 hours) at 7/30/2020 0911  Last data filed at 7/30/2020 0500  Gross per  24 hour   Intake 1428 ml   Output 650 ml   Net 778 ml       Lines/Drains/Airways     Drain                 Hemodialysis AV Fistula Left forearm -- days         Hemodialysis AV Fistula Left forearm -- days         Hemodialysis AV Fistula Left forearm -- days          Peripheral Intravenous Line                 Peripheral IV - Single Lumen 07/29/20 0800 18 G Right Forearm 1 day         Peripheral IV - Single Lumen 07/30/20 0458 20 G Anterior;Right Shoulder less than 1 day                Physical Exam   Constitutional: He is oriented to person, place, and time. He appears well-developed and well-nourished.   HENT:   Head: Normocephalic and atraumatic.   Right Ear: External ear normal.   Left Ear: External ear normal.   Eyes: Left eye exhibits no discharge.   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 Neck supple.   Cardiovascular: Normal rate, regular rhythm, normal heart sounds and intact distal pulses.   Pulses:       Radial pulses are 0 on the right side and 2+ on the left side.        Femoral pulses are 2+ on the right side and 2+ on the left side.  Pulmonary/Chest: Effort normal and breath sounds normal. No respiratory distress. He has no wheezes.   Abdominal: Soft. Bowel sounds are normal. He exhibits no distension. There is no abdominal tenderness.   Musculoskeletal: Normal range of motion.         General: No edema.      Comments: Left arm fistula  Right foot wrapped   Neurological: He is alert and oriented to person, place, and time.   Skin: Skin is warm and dry.   Psychiatric: He has a normal mood and affect. His behavior is normal.       Significant Labs:   CMP   Recent Labs   Lab 07/28/20  1820  07/29/20  2030 07/29/20  2334 07/30/20  0353      < > 138 139 139  138   K 4.3   < > 4.1  4.1 4.3  4.3 4.6  4.6  4.6      < > 104 103 103  103   CO2 27   < > 25 22* 26  25   GLU 73   < > 112* 102 89  90   BUN 35*   < > 21* 25* 30*  30*   CREATININE 3.4*    < > 2.9* 3.2* 3.6*  3.6*   CALCIUM 9.2   < > 8.7 9.2 9.2  8.8   PROT 7.4  --   --   --  6.5   ALBUMIN 2.9*  --   --   --  2.6*   BILITOT 0.5  --   --   --  0.4   ALKPHOS 122  --   --   --  120   AST 20  --   --   --  28   ALT 15  --   --   --  15   ANIONGAP 11   < > 9 14 10  10   ESTGFRAFRICA 22.4*   < > 27.1* 24.1* 20.9*  20.9*   EGFRNONAA 19.3*   < > 23.4* 20.8* 18.1*  18.1*    < > = values in this interval not displayed.   , CBC   Recent Labs   Lab 07/28/20  1820 07/29/20  0419 07/30/20  0353   WBC 9.59 8.78 6.93   HGB 10.5* 9.6* 8.5*   HCT 32.8* 30.5* 27.8*   * 148* 121*   , Troponin   Recent Labs   Lab 07/29/20  0419 07/29/20  1009 07/30/20  0353   TROPONINI 3.849* 5.368* 4.770*    and All pertinent lab results from the last 24 hours have been reviewed.    Significant Imaging: Echocardiogram:   2D echo with color flow doppler:   Results for orders placed or performed during the hospital encounter of 05/02/16   2D echo with color flow doppler    Narrative    This study is in progress....     Assessment and Plan:     Patient is a 55 y.o. male presenting with:       *NSTEMI (non-ST elevated myocardial infarction)  [facility] reviewed with subtotal mid LAD occlusion and severe prox RCA lesion ; trop peak 5.4  Started on ACS protocol --> remains chest pain free and hemodynamically stable   Proceed with [facility] + PCI today   Access: R femoral    Antiplatelets: ASA + Brilinta (switched to Brilinta from Plavix based on PRU level ; unable to use Prasugrel given hx CVA 2016)  Hold heparin one hour before  IVF not indicated due to ESRD status   Patient is a FARHEEN candidate   The risks, benefits & alternatives of the procedure were explained to the patient.    The risks of coronary angiography include but are not limited to:  Bleeding, infection, heart rhythm abnormalities, allergic reactions, kidney injury, stroke and death.    The patient has agreed to dual anti-platelet therapy for 1-consecutive year with a drug-eluting  stent and a minimum of 1-month with the use of a bare metal stent.    The risks of moderate sedation include hypotension, respiratory depression, arrhythmias, bronchospasm, & death.    Informed consent was obtained & the patient is agreeable to proceed with the procedure.     PVD (peripheral vascular disease)  Pt with severe PAD disease with collaterilzation seen on recent angio.  Pt reports being asymptomatic likely 2/2 diabetic neuropathy  Coronary intervention to be addressed more urgently. Pt likely amenable to peripheral intervention afterward     Mitral stenosis  Patient with significant gradient across mitral valve.   No therapeutic intervention for Mitral annular calcification at this time.        VTE Risk Mitigation (From admission, onward)         Ordered     heparin 25,000 units in dextrose 5% 250 mL (100 units/mL) infusion LOW INTENSITY nomogram - [facility]  Continuous     Question:  Heparin Infusion Adjustment (DO NOT MODIFY ANSWER)  Answer:  \[email or web address]\[email or web address]\Images\[email or web address]\HeparinInfusions\heparin LOW INTENSITY nomogram for [facility] [id number]    07/29/20 1618     heparin 25,000 units in dextrose 5% (100 units/ml) IV bolus from bag - ADDITIONAL PRN BOLUS - 30 units/kg (max bolus 4000 units)  As needed (PRN)     Question:  Heparin Infusion Adjustment (DO NOT MODIFY ANSWER)  Answer:  \[email or web address]\[email or web address]\Images\Pharmacy\HeparinInfusions\heparin LOW INTENSITY nomogram for [facility] [id number]    07/29/20 1618     heparin 25,000 units in dextrose 5% (100 units/ml) IV bolus from bag - ADDITIONAL PRN BOLUS - 60 units/kg (max bolus 4000 units)  As needed (PRN)     Question:  Heparin Infusion Adjustment (DO NOT MODIFY ANSWER)  Answer:  \[email or web address]\epic\Images\Pharmacy\HeparinInfusions\heparin LOW INTENSITY nomogram for [facility] [id number]    07/29/20 1618     IP VTE HIGH RISK PATIENT  Once      07/24/20 0628     Place sequential compression device  Until discontinued      07/24/20 0628                Cate Ching,  MD  Interventional Cardiology  [facility]

## 2020-07-30 NOTE — ASSESSMENT & PLAN NOTE
Patient found to have significant rise in troponin after episode of Afib with rvr.   Pt currently asymptomatic and hemodynamically stable.   Given findings of recent LHC, reviewed with staff, plan on taking to cath lab tomorrow for PCI to LAD for subtotal occlusion  Pt started on hep ggt and loaded with plavix. Continue dapt, hep ggt, bb, ace-inh, and high intensity statin as tolerated   LHC discussed with patient. Risks and benefits explained. Consents obtained  Please keep NPO at midnight, except medications.  Page on call Cardiology for any persistent chest pain concerning for angina along with stat ecg.

## 2020-07-30 NOTE — CONSULTS
Wound care consulted for Right foot.  Podiatry following for treatment of right foot. Wound care defers to Podiatry, signing off.   CARROLL Schmitt RN, [facility]  [phone],

## 2020-07-30 NOTE — CARE UPDATE
Rapid Response Nurse Chart Check     Chart check completed, abnormal VS noted. Please call [phone] for further concerns or assistance.

## 2020-07-30 NOTE — PROCEDURES
Post Cath Note  Referring Physician: Rony Ryan MD  Procedure: Left heart cath (N/A), ANGIOGRAM, CORONARY ARTERY (N/A), IVUS, Coronary, Stent, Drug Eluting, Single Vessel, Coronary       Access: Right CFA    Multivessel CAD    See full report for further details    Intervention:     S/P PCI to mid LAD with FAHREEN x 2 with good angiographic results    S/P PCI to ostial RCA with FARHEEN x 1 with good angiographic result    Closure device: Perclosure    Post Cath Exam:   /66 (BP Location: Right arm, Patient Position: Lying)   Pulse 73   Temp 98.9 °F (37.2 °C) (Oral)   Resp 18   Ht 6' (1.829 m)   Wt 103.7 kg (228 lb 9.9 oz)   SpO2 96%   BMI 31.01 kg/m²   No unusual pain, hematoma, thrill or bruit at vascular access site.  Distal pulse present without signs of ischemia.    Recommendations:   - Routine post-cath care  - IVF not indicated due to ESRD status  - Cardiac rehab referral  - Continue medical management  - Risk factor reduction  - Continue ASA 81 mg daily x 7 days and Brilinta 90 mg BID indefinitely  - Hold heparin for 4 hours - ok to resume heparin infusion at 4 hours post procedure - bridge to warfarin with goal INR 2-3 due to valvular AF and hx CVA    Signed:  Cate Ching MD  Interventional Cardiology   7/30/2020 12:52 PM

## 2020-07-30 NOTE — ASSESSMENT & PLAN NOTE
Patient has a history of paroxysmal AF and now mitral stenosis which is at least moderate if not severe; needs anticoagulation with warfarin for valvular AFib  Developed atrial flutter, currently NSR  Recommend home beta blocker for rate control

## 2020-07-30 NOTE — CARE UPDATE
Rapid Response Nurse Follow-up Note     Followed up with patient for proactive rounding. Patient hemodynamically stable throughout shift.  No acute issues or additional concerns at this time. Reviewed plan of care with charge Helen CORDON.   Please call Rapid Response Helen CORDON RN if any additional monitoring required or questions or concerns arise at [phone].

## 2020-07-30 NOTE — SUBJECTIVE & OBJECTIVE
Past Medical History:   Diagnosis Date    Acute osteomyelitis of metatarsal bone, right     PAD right    Anticoagulant long-term use     Arthritis     Asthma     Back pain     on methadone    C. difficile colitis 09/2018    Cirrhosis of liver without ascites 2016    by liver US. +HCV    Coronary artery disease 2013    stent.  h/o STEMI and NSTEMI    Diabetes mellitus     resolved, multiple admissions for hypoglycemia requiring ICU possibley due to liver/ESRD    Encounter for blood transfusion     ESRD on hemodialysis 2013    anuric    Eye abnormality right eye    injured as a child    Gastritis     Gastroparesis     HCV (hepatitis C virus)     ? h/o tattoo exposure    Hypertension     Mitral stenosis 07/24/2020    Moderate to severe mitral stenosis    PAD (peripheral artery disease)     RLE s/p R CFA endarterectomy    Pneumonia 2013    Spend 6weeks in hospital at [place]    Stroke     Tuberculosis     treated       Past Surgical History:   Procedure Laterality Date    ANGIOGRAPHY OF LOWER EXTREMITY Right 7/28/2020    Procedure: Angiogram Extremity Unilateral;  Surgeon: MINO Vasquez II, MD;  Location: [facility] OR [address] [place] [address];  Service: Vascular;  Laterality: Right;  Left groin and rIght pedal artery access  15.5 min  247.25 mGy  68.5454 Gycm2  33ml contrast    AV FISTULA PLACEMENT      BACK SURGERY      CARDIAC SURGERY  2013    heart stent    CHOLECYSTECTOMY      EYE SURGERY      R eye    INCISION AND DRAINAGE OF ABSCESS Right 9/6/2018    Procedure: INCISION AND DRAINAGE, ABSCESS;  Surgeon: Chetan Rothman MD;  Location: [facility];  Service: General;  Laterality: Right;       Review of patient's allergies indicates:   Allergen Reactions    Antibiotic hc      Counter acts with methodone.          No current facility-administered medications on file prior to encounter.      Current Outpatient Medications on File Prior to Encounter   Medication Sig    amlodipine (NORVASC) 5 MG tablet Take 5 mg  by mouth 2 (two) times daily.    albuterol (PROAIR HFA) 90 mcg/actuation inhaler INHALE TWO PUFFS EVERY 4 TO 6 HOURS AS NEEDED    albuterol (PROAIR HFA) 90 mcg/actuation inhaler INHALE TWO PUFFS EVERY 4 TO 6 HOURS AS NEEDED    aspirin 325 MG tablet Take 1 tablet (325 mg total) by mouth once daily.    atorvastatin (LIPITOR) 10 MG tablet     atorvastatin (LIPITOR) 40 MG tablet TAKE ONE TABLET BY MOUTH DAILY    blood sugar diagnostic (TRUE METRIX GLUCOSE TEST STRIP) Strp     blood-glucose meter (PHARMACIST CHOICE GLUCOSE SYS) Misc 1 Device.    carvedilol (COREG) 3.125 MG tablet     ELIQUIS 2.5 mg Tab     ferric citrate (AURYXIA) 210 mg iron Tab Take 420 mg by mouth 3 (three) times daily.    fluconazole (DIFLUCAN) 100 MG tablet     fluticasone propionate (FLONASE) 50 mcg/actuation nasal spray 1 spray by Each Nostril route once daily.    fluticasone-salmeterol 250-50 mcg/dose (ADVAIR DISKUS) 250-50 mcg/dose diskus inhaler Inhale 1 puff into the lungs once daily.     gabapentin (NEURONTIN) 100 MG capsule Take 100 mg by mouth 2 (two) times daily.    hydrALAZINE (APRESOLINE) 50 MG tablet     levetiracetam (KEPPRA) 500 MG Tab Take 500 mg twice a day.  Take an extra tablet right after dialysis (making 3 tablets on your dialysis days)    methadone (DOLOPHINE) 10 MG tablet Take 9 tablets (90 mg total) by mouth once daily.    metroNIDAZOLE (FLAGYL) 500 MG tablet     minoxidil (LONITEN) 2.5 MG tablet Take 5 mg by mouth 2 (two) times daily.    MOVIPREP 100-7.5-2.691 gram solution     pantoprazole (PROTONIX) 40 MG tablet     sevelamer carbonate (RENVELA) 800 mg Tab Take 1,600 mg by mouth 3 (three) times daily with meals.     silver sulfADIAZINE 1% (SILVADENE) 1 % cream Apply topically once daily.     Family History     Problem Relation (Age of Onset)    Aneurysm Mother, Father (77)    Diabetes Brother    Heart disease Father, Paternal Grandmother    Kidney disease Brother        Tobacco Use    Smoking  status: Former Smoker     Years: 10.00     Quit date: 2015     Years since quittin.9    Smokeless tobacco: Never Used   Substance and Sexual Activity    Alcohol use: No    Drug use: Yes     Frequency: 4.0 times per week     Types: Other-see comments     Comment: marijuana    Sexual activity: Yes     Review of Systems   All other systems reviewed and are negative.    Objective:     Vital Signs (Most Recent):  Temp: 98.9 °F (37.2 °C) (20 0756)  Pulse: 73 (20 1000)  Resp: 18 (20 0906)  BP: 129/66 (20 0756)  SpO2: 96 % (20 0756) Vital Signs (24h Range):  Temp:  [97.9 °F (36.6 °C)-98.9 °F (37.2 °C)] 98.9 °F (37.2 °C)  Pulse:  [58-73] 73  Resp:  [16-18] 18  SpO2:  [94 %-100 %] 96 %  BP: ()/(56-89) 129/66     Weight: 103.7 kg (228 lb 9.9 oz)  Body mass index is 31.01 kg/m².    SpO2: 96 %  O2 Device (Oxygen Therapy): nasal cannula      Intake/Output Summary (Last 24 hours) at 2020 1307  Last data filed at 2020 0900  Gross per 24 hour   Intake 1310 ml   Output 650 ml   Net 660 ml       Lines/Drains/Airways     Drain                 Hemodialysis AV Fistula Left forearm -- days         Hemodialysis AV Fistula Left forearm -- days         Hemodialysis AV Fistula Left forearm -- days          Peripheral Intravenous Line                 Peripheral IV - Single Lumen 20 0800 18 G Right Forearm 1 day         Peripheral IV - Single Lumen 20 0458 20 G Anterior;Right Shoulder less than 1 day                Physical Exam   Constitutional: He is oriented to person, place, and time. He appears well-developed and well-nourished. No distress.   HENT:   Head: Normocephalic and atraumatic.   Eyes:   Right eye blind   Neck: JVD present.   Cardiovascular: Normal rate, regular rhythm and intact distal pulses. Exam reveals no gallop and no friction rub.   Murmur (Grade III/VI systolic murmur, loudest at left sternal border, radiates to carotids) heard.  Pulmonary/Chest: Effort  normal. No respiratory distress. He has no wheezes. He has rales.   Abdominal: Soft. Bowel sounds are normal. He exhibits no distension. There is no abdominal tenderness.   Musculoskeletal:         General: Edema (trace) present.   Neurological: He is alert and oriented to person, place, and time.   Skin: He is not diaphoretic.       Significant Labs:     Recent Results (from the past 24 hour(s))   Basic metabolic panel    Collection Time: 07/29/20  2:02 PM   Result Value Ref Range    Sodium 136 136 - 145 mmol/L    Potassium 6.0 (H) 3.5 - 5.1 mmol/L    Chloride 99 95 - 110 mmol/L    CO2 24 23 - 29 mmol/L    Glucose 101 70 - 110 mg/dL    BUN, Bld 55 (H) 6 - 20 mg/dL    Creatinine 5.6 (H) 0.5 - 1.4 mg/dL    Calcium 9.1 8.7 - 10.5 mg/dL    Anion Gap 13 8 - 16 mmol/L    eGFR if African American 12.2 (A) >60 mL/min/1.73 m^2    eGFR if non African American 10.6 (A) >60 mL/min/1.73 m^2   Lactic acid, plasma    Collection Time: 07/29/20  2:02 PM   Result Value Ref Range    Lactate (Lactic Acid) 1.4 0.5 - 2.2 mmol/L   Culture, Anaerobe    Collection Time: 07/29/20  2:15 PM    Specimen: Foot, Right; Bone   Result Value Ref Range    Anaerobic Culture Culture in progress    Aerobic culture    Collection Time: 07/29/20  2:15 PM    Specimen: Foot, Right; Bone   Result Value Ref Range    Aerobic Bacterial Culture No growth    Gram stain    Collection Time: 07/29/20  2:15 PM    Specimen: Foot, Right; Bone   Result Value Ref Range    Gram Stain Result No WBC's     Gram Stain Result No organisms seen    APTT    Collection Time: 07/29/20  4:41 PM   Result Value Ref Range    aPTT 29.1 21.0 - 32.0 sec   Protime-INR    Collection Time: 07/29/20  4:41 PM   Result Value Ref Range    Prothrombin Time 10.8 9.0 - 12.5 sec    INR 1.0 0.8 - 1.2   POCT glucose    Collection Time: 07/29/20  5:49 PM   Result Value Ref Range    POCT Glucose 77 70 - 110 mg/dL   Potassium    Collection Time: 07/29/20  8:30 PM   Result Value Ref Range    Potassium 4.1  3.5 - 5.1 mmol/L   Basic metabolic panel    Collection Time: 07/29/20  8:30 PM   Result Value Ref Range    Sodium 138 136 - 145 mmol/L    Potassium 4.1 3.5 - 5.1 mmol/L    Chloride 104 95 - 110 mmol/L    CO2 25 23 - 29 mmol/L    Glucose 112 (H) 70 - 110 mg/dL    BUN, Bld 21 (H) 6 - 20 mg/dL    Creatinine 2.9 (H) 0.5 - 1.4 mg/dL    Calcium 8.7 8.7 - 10.5 mg/dL    Anion Gap 9 8 - 16 mmol/L    eGFR if African American 27.1 (A) >60 mL/min/1.73 m^2    eGFR if non  23.4 (A) >60 mL/min/1.73 m^2   COVID-19 Routine Screening    Collection Time: 07/29/20 10:53 PM   Result Value Ref Range    SARS-CoV2 (COVID-19) Qualitative PCR Not Detected Not Detected   Potassium    Collection Time: 07/29/20 11:34 PM   Result Value Ref Range    Potassium 4.3 3.5 - 5.1 mmol/L   Basic metabolic panel    Collection Time: 07/29/20 11:34 PM   Result Value Ref Range    Sodium 139 136 - 145 mmol/L    Potassium 4.3 3.5 - 5.1 mmol/L    Chloride 103 95 - 110 mmol/L    CO2 22 (L) 23 - 29 mmol/L    Glucose 102 70 - 110 mg/dL    BUN, Bld 25 (H) 6 - 20 mg/dL    Creatinine 3.2 (H) 0.5 - 1.4 mg/dL    Calcium 9.2 8.7 - 10.5 mg/dL    Anion Gap 14 8 - 16 mmol/L    eGFR if African American 24.1 (A) >60 mL/min/1.73 m^2    eGFR if non African American 20.8 (A) >60 mL/min/1.73 m^2   APTT    Collection Time: 07/30/20  2:43 AM   Result Value Ref Range    aPTT 42.9 (H) 21.0 - 32.0 sec   CBC auto differential    Collection Time: 07/30/20  3:53 AM   Result Value Ref Range    WBC 6.93 3.90 - 12.70 K/uL    RBC 2.63 (L) 4.60 - 6.20 M/uL    Hemoglobin 8.5 (L) 14.0 - 18.0 g/dL    Hematocrit 27.8 (L) 40.0 - 54.0 %    Mean Corpuscular Volume 106 (H) 82 - 98 fL    Mean Corpuscular Hemoglobin 32.3 (H) 27.0 - 31.0 pg    Mean Corpuscular Hemoglobin Conc 30.6 (L) 32.0 - 36.0 g/dL    RDW 16.2 (H) 11.5 - 14.5 %    Platelets 121 (L) 150 - 350 K/uL    MPV 10.3 9.2 - 12.9 fL    Immature Granulocytes 0.4 0.0 - 0.5 %    Gran # (ANC) 4.3 1.8 - 7.7 K/uL    Immature Grans  (Abs) 0.03 0.00 - 0.04 K/uL    Lymph # 1.3 1.0 - 4.8 K/uL    Mono # 0.8 0.3 - 1.0 K/uL    Eos # 0.5 0.0 - 0.5 K/uL    Baso # 0.04 0.00 - 0.20 K/uL    nRBC 0 0 /100 WBC    Gran% 62.2 38.0 - 73.0 %    Lymph% 19.2 18.0 - 48.0 %    Mono% 11.1 4.0 - 15.0 %    Eosinophil% 6.5 0.0 - 8.0 %    Basophil% 0.6 0.0 - 1.9 %    Differential Method Automated    Magnesium    Collection Time: 07/30/20  3:53 AM   Result Value Ref Range    Magnesium 2.0 1.6 - 2.6 mg/dL   Phosphorus    Collection Time: 07/30/20  3:53 AM   Result Value Ref Range    Phosphorus 4.9 (H) 2.7 - 4.5 mg/dL   Potassium    Collection Time: 07/30/20  3:53 AM   Result Value Ref Range    Potassium 4.6 3.5 - 5.1 mmol/L   Hemoglobin A1C    Collection Time: 07/30/20  3:53 AM   Result Value Ref Range    Hemoglobin A1C 4.6 4.0 - 5.6 %    Estimated Avg Glucose 85 68 - 131 mg/dL   APTT    Collection Time: 07/30/20  3:53 AM   Result Value Ref Range    aPTT 45.0 (H) 21.0 - 32.0 sec   Comprehensive metabolic panel    Collection Time: 07/30/20  3:53 AM   Result Value Ref Range    Sodium 138 136 - 145 mmol/L    Potassium 4.6 3.5 - 5.1 mmol/L    Chloride 103 95 - 110 mmol/L    CO2 25 23 - 29 mmol/L    Glucose 90 70 - 110 mg/dL    BUN, Bld 30 (H) 6 - 20 mg/dL    Creatinine 3.6 (H) 0.5 - 1.4 mg/dL    Calcium 8.8 8.7 - 10.5 mg/dL    Total Protein 6.5 6.0 - 8.4 g/dL    Albumin 2.6 (L) 3.5 - 5.2 g/dL    Total Bilirubin 0.4 0.1 - 1.0 mg/dL    Alkaline Phosphatase 120 55 - 135 U/L    AST 28 10 - 40 U/L    ALT 15 10 - 44 U/L    Anion Gap 10 8 - 16 mmol/L    eGFR if African American 20.9 (A) >60 mL/min/1.73 m^2    eGFR if non  18.1 (A) >60 mL/min/1.73 m^2   Protime-INR    Collection Time: 07/30/20  3:53 AM   Result Value Ref Range    Prothrombin Time 10.8 9.0 - 12.5 sec    INR 1.0 0.8 - 1.2   Troponin I    Collection Time: 07/30/20  3:53 AM   Result Value Ref Range    Troponin I 4.770 (H) 0.000 - 0.026 ng/mL   Basic metabolic panel    Collection Time: 07/30/20  3:53 AM    Result Value Ref Range    Sodium 139 136 - 145 mmol/L    Potassium 4.6 3.5 - 5.1 mmol/L    Chloride 103 95 - 110 mmol/L    CO2 26 23 - 29 mmol/L    Glucose 89 70 - 110 mg/dL    BUN, Bld 30 (H) 6 - 20 mg/dL    Creatinine 3.6 (H) 0.5 - 1.4 mg/dL    Calcium 9.2 8.7 - 10.5 mg/dL    Anion Gap 10 8 - 16 mmol/L    eGFR if African American 20.9 (A) >60 mL/min/1.73 m^2    eGFR if non  18.1 (A) >60 mL/min/1.73 m^2   Platelet Response Plavix    Collection Time: 07/30/20  7:11 AM   Result Value Ref Range    Platelet Response Plavix 264 194 - 418 PRU   Potassium    Collection Time: 07/30/20  9:03 AM   Result Value Ref Range    Potassium 4.9 3.5 - 5.1 mmol/L   Basic metabolic panel    Collection Time: 07/30/20  9:03 AM   Result Value Ref Range    Sodium 136 136 - 145 mmol/L    Potassium 4.9 3.5 - 5.1 mmol/L    Chloride 101 95 - 110 mmol/L    CO2 26 23 - 29 mmol/L    Glucose 79 70 - 110 mg/dL    BUN, Bld 36 (H) 6 - 20 mg/dL    Creatinine 4.1 (H) 0.5 - 1.4 mg/dL    Calcium 9.1 8.7 - 10.5 mg/dL    Anion Gap 9 8 - 16 mmol/L    eGFR if African American 17.8 (A) >60 mL/min/1.73 m^2    eGFR if non African American 15.4 (A) >60 mL/min/1.73 m^2   APTT    Collection Time: 07/30/20  9:03 AM   Result Value Ref Range    aPTT 44.5 (H) 21.0 - 32.0 sec         Significant Imaging:     I have reviewed all pertinent imaging studies

## 2020-07-30 NOTE — OP NOTE
VASCULAR SURGERY OPERATIVE REPORT    Date of Operation/Procedure: 7/28/20     Preoperative diagnosis:  atherosclerosis of native arteries of lower extremity with ulceration midfoot     Postoperative diagnosis: same    Operation/Procedure Performed:  1. Right lower extremity angiogram     Attending Surgeon: MINO Vasquez II, MD    Resident/Fellow: Ara Chavez MD PGY1     Anesthesia: local/MAC    Indications:  55-year-old male with end-stage renal disease and severe mitral valve stenosis.  He has osteomyelitis of his right 5th metatarsal and nonhealing ulceration on the plantar surface of his metatarsal head.  Vascular surgery is consulted for potential revascularization to allow healing of his ulceration and resection of his 5th metatarsal.  Preoperative toe pressures were 0 which suggested inadequate perfusion for healing a wound on the foot.  Without revascularization the patient would require below-the-knee amputation for resolution of the osteomyelitis.  Risks benefits and alternatives to right lower extremity angiogram with possible intervention were explained to the patient who expressed full understanding wished to proceed.     Description of the procedure:  After informed consent was obtained the patient was taken to the operating room in supine position. Appropriate hemodynamic monitors were put in place by the nurse. The bilateral groin was prepped and draped in normal sterile fashion. A time-out was performed to confirm the appropriate procedure, positioning and laterality. The left groin was anesthetized with 1% lidocaine injection. Using ultrasound and fluoroscopic guidance we obtained percutaneous access the left common femoral artery using a micropuncture access kit. Through a series of wire and sheath exchanges, a 5 Beninese sheath was advanced over a glide wire into the common femoral artery. The dilator was removed and the sheath flushed briskly with heparinized saline. An omniflush catheter  was advanced over a glide Glidewire to distal aorta.  An aortogram was performed through the catheter which showed patent distal aorta and bilateral common, internal and external iliac arteries.  A glide wire was advanced up and over the bifurcation and the catheter behind it to perform an angiogram of the right lower extremity.  Right lower extremity angiography showed flush occlusion of the SFA with a widely patent common femoral and profunda femoris artery.  There was an enormous amount of surgical clips seen around the common femoral and profunda femoris arteries.  The SFA reconstituted in the midthigh but then occluded at the popliteal artery behind the knee.  The below-knee popliteal artery, tibioperoneal trunk, and proximal AT, peroneal, and PT arteries were occluded. There was reconstitution of the anterior tibial and peroneal arteries via collaterals.  The anterior tibial and peroneal were patent down to the foot.  Feeling that we had no chances accessing the flush SFA occlusion, we felt that a retrograde approach give us the best chance of success.  5000 units of IV heparin were administered.  The right foot was prepped and draped with ChloraPrep.  A pedal access kit was and ultrasound were used to access the right dorsalis pedis artery just below the ankle.  Once the 4 Spanish pedal access sheath was in place we used a V14 wire and 2.8F catheter to attempt to cross the occlusion.  We were able to advance the wire and catheter across the anterior tibial artery occlusions and into the below-knee popliteal artery but were unable to cross the occlusion in the below-knee popliteal artery despite multiple attempts and adjunctive maneuvers.  The pedal access sheath and wire and catheter were all removed and pressure held over the pedal access site with a D Stat dry dressing.  50mg of protamine was administered.  The left groin sheath was removed and continuous manual pressure was held for 25 minutes. There was  minimal swelling at the left groin access site at the completion of the procedure with no apparent bleeding or hematoma.    EBL: 20cc     Complications: None    MINO Vasquez II, MD, RPVI  Vascular Surgeon  [facility]

## 2020-07-30 NOTE — ASSESSMENT & PLAN NOTE
Pt with severe PAD disease with collaterilzation seen on recent angio.  Pt reports being asymptomatic likely 2/2 diabetic neuropathy  Coronary intervention to be addressed more urgently. Pt likely amenable to peripheral intervention afterward     Impingement   Lymphadenopathy:     He has no cervical adenopathy. Neurological: He is alert and oriented to person, place, and time. He has normal strength and normal reflexes. No cranial nerve deficit. He displays a negative Romberg sign. Skin: Skin is warm and dry. No rash noted. Psychiatric: He has a normal mood and affect.  His behavior is normal.       Assessment:    well adult        Plan:    refill Adderall  Return in 6 mos

## 2020-07-30 NOTE — PLAN OF CARE
07/30/20 1245   Discharge Reassessment   Assessment Type Discharge Planning Reassessment   Provided patient/caregiver education on the expected discharge date and the discharge plan Yes   Discharge Plan A Skilled Nursing Facility   Discharge Plan B Home Health   DME Needed Upon Discharge  other (see comments)  (TBD)   Anticipated Discharge Disposition SNF     Pt's d/c dispo dependant upon patient's test results and treatments. Anticipate some level of post acute care.  Pt PHILIPP. Friend in room. No questions at this time. CM/SW will continue to follow and assist with dc needs.    Julie Haase RN  Case Management [phone]

## 2020-07-30 NOTE — SUBJECTIVE & OBJECTIVE
Interval History: No AEON. Afebrile. WBC WNL.  MRI consistent with osteo of distal 5th metatarsal head.  RLE angiogram recently,  unable to intervene.  Patient to be referred to Dr. Young for limb salvage. Bone cultures 7/29 NGTD.  S/P PCI (LAD and RCA) with stent this am. The patient denies any recent fever, chills, or sweats or significant foot pain.      Review of Systems   Constitutional: Negative for activity change, appetite change, chills, diaphoresis, fatigue and fever.   HENT: Negative.    Eyes: Positive for visual disturbance (right eye blindness).   Respiratory: Negative for chest tightness and shortness of breath.    Cardiovascular: Positive for chest pain.   Gastrointestinal: Negative for abdominal pain, diarrhea, nausea and vomiting.   Genitourinary: Negative for dysuria and hematuria.        HD   Musculoskeletal: Negative for arthralgias and myalgias.   Skin: Positive for wound.   Neurological: Negative for dizziness and headaches.   Psychiatric/Behavioral: Negative for agitation and behavioral problems.   All other systems reviewed and are negative.    Objective:     Vital Signs (Most Recent):  Temp: 98.9 °F (37.2 °C) (07/30/20 0756)  Pulse: 68 (07/30/20 1512)  Resp: 20 (07/30/20 1507)  BP: 116/73 (07/30/20 1507)  SpO2: 96 % (07/30/20 1507) Vital Signs (24h Range):  Temp:  [97.9 °F (36.6 °C)-98.9 °F (37.2 °C)] 98.9 °F (37.2 °C)  Pulse:  [62-73] 68  Resp:  [15-20] 20  SpO2:  [94 %-100 %] 96 %  BP: ()/(57-89) 116/73     Weight: 103.7 kg (228 lb 9.9 oz)  Body mass index is 31.01 kg/m².    Estimated Creatinine Clearance: 24.4 mL/min (A) (based on SCr of 4.3 mg/dL (H)).    Physical Exam  Vitals signs and nursing note reviewed.   Constitutional:       General: He is not in acute distress.     Appearance: He is not ill-appearing or toxic-appearing.   HENT:      Head: Normocephalic and atraumatic.   Eyes:      General: No scleral icterus.     Conjunctiva/sclera: Conjunctivae normal.      Comments:  Right eye blindness   Neck:      Musculoskeletal: Normal range of motion and neck supple.   Cardiovascular:      Rate and Rhythm: Normal rate.      Heart sounds: Murmur present.   Pulmonary:      Effort: Pulmonary effort is normal. No respiratory distress.      Breath sounds: Normal breath sounds.   Abdominal:      General: Abdomen is flat.      Palpations: Abdomen is soft.   Musculoskeletal: Normal range of motion.         General: No swelling or tenderness.   Skin:     General: Skin is warm and dry.      Comments: Wounds examined.  No drainage, periwound erythema, tenderness, fluctuance.   No ascending warmth/erythema.   Podiatry photos reviewed.  See below.     Dialysis access LUE    Neurological:      General: No focal deficit present.      Mental Status: He is alert and oriented to person, place, and time.   Psychiatric:         Mood and Affect: Mood normal.         Behavior: Behavior normal.       7/30 7/26                Significant Labs:   Blood Culture: No results for input(s): LABBLOO in the last 4320 hours.  CBC:   Recent Labs   Lab 07/28/20  1820 07/29/20  0419 07/30/20  0353   WBC 9.59 8.78 6.93   HGB 10.5* 9.6* 8.5*   HCT 32.8* 30.5* 27.8*   * 148* 121*     CMP:   Recent Labs   Lab 07/28/20  1820  07/30/20  0353 07/30/20  0903 07/30/20  1329      < > 139  138 136 137   K 4.3   < > 4.6  4.6  4.6 4.9  4.9 4.9  4.9      < > 103  103 101 101   CO2 27   < > 26  25 26 24   GLU 73   < > 89  90 79 64*   BUN 35*   < > 30*  30* 36* 37*   CREATININE 3.4*   < > 3.6*  3.6* 4.1* 4.3*   CALCIUM 9.2   < > 9.2  8.8 9.1 9.4   PROT 7.4  --  6.5  --   --    ALBUMIN 2.9*  --  2.6*  --   --    BILITOT 0.5  --  0.4  --   --    ALKPHOS 122  --  120  --   --    AST 20  --  28  --   --    ALT 15  --  15  --   --    ANIONGAP 11   < > 10  10 9 12   EGFRNONAA 19.3*   < > 18.1*  18.1* 15.4* 14.6*    < > = values in this interval not displayed.     Wound Culture:   Recent Labs   Lab  07/29/20  1415   LABAERO No growth       Significant Imaging: I have reviewed all pertinent imaging results/findings within the past 24 hours.   CTA Runoff ABD PEL Bilat Lower Ext [[id number]] Resulted: 07/29/20 1424   Order Status: Completed Updated: 07/29/20 1427   Narrative:     EXAMINATION:   CTA RUNOFF ABD PEL BILAT LOWER EXT     CLINICAL HISTORY:   Acute limb ischemia, leg;     TECHNIQUE:   CTA of abdomen, pelvis, and bilateral lower extremities performed with 125 mL Omnipaque 350 intravenous contrast.     COMPARISON:   Ultrasound abdomen complete 07/28/2020, CT abdomen pelvis with contrast 08/29/2018     FINDINGS:   Heart: No cardiomegaly or pericardial effusion. Coronary artery and aortic valve atherosclerotic calcification.     Lung Bases: There are bilateral linear bandlike opacities compatible with scarring versus atelectasis, mild traction bronchiectasis.  No large mass or consolidation.  No pneumothorax.  Trace left pleural effusion with associated consolidation and atelectasis within the left lower lobe.     Liver: Normal in size and attenuation without focal hepatic abnormality. Several lymph nodes noted at the ankit hepatis, stable compared to 2018     Gallbladder: Status post cholecystectomy.     Bile Ducts: No intra or extrahepatic biliary ductal dilation.     Pancreas: No pancreatic mass lesion or peripancreatic inflammatory change.     Spleen: Unremarkable     Adrenals: Unremarkable     Genitourinary: Native kidneys are atrophic bilaterally with cortical thinning.  Left cortical hypodensity, too small to characterize, likely a simple cyst.  No nephrolithiasis.  No hydronephrosis.     Reproductive organs: Dystrophic prostatic calcifications.     GI tract/Mesentery: Stomach is normal appearance. Visualized loops of small and large bowel are normal in caliber without evidence for inflammation or obstruction.  Prominent volume stool within the colon.     Peritoneal Space: No abdominopelvic ascites or  intraperitoneal free air.     Retroperitoneum: No significant adenopathy.     Abdominal wall: Unremarkable.     Bones: Degenerative changes without acute fracture or bony destructive process.     Aorta: Normal course and caliber.  No dissection, penetrating ulcer, or intramural hematoma.     The celiac artery is patent.  The splenic artery is patent noting peripheral calcification and a tortuous course.  The SMA is patent, noting up to 50% stenosis at its origin.  The bilateral renal arteries are patent, noting marked atherosclerotic calcification and high-grade stenosis of the bilateral renal artery origins.     Right:     Common iliac artery: Atherosclerotic calcification with moderate stenosis.     External iliac artery: Patent     Superficial femoral artery: Areas of high-grade stenosis with occlusion.  Reconstitutes distally.     Deep femoral artery: Patent with extensive calcification..     Popliteal artery: Extensive calcification with greater than 90% stenosis and areas of occlusion.     Anterior tibial artery: Extensive atherosclerotic calcification with areas of stenosis and intermittent occlusion.     Posterior tibial artery: Extensive atherosclerotic calcification with areas of stenosis and intermittent occlusion.     Peroneal artery: Extensive atherosclerotic calcification with areas of stenosis and intermittent occlusion.     Left:     Common iliac artery: Atherosclerotic calcification results in up to 50% stenosis.     External iliac artery: Patent without high-grade stenosis or occlusion.     Superficial femoral artery: Atherosclerotic calcified and noncalcified plaque results in greater than 90% stenosis with occlusion of the proximal and mid SFA with distal reconstitution.     Deep femoral artery: Patent with extensive calcification     Popliteal artery: Patent with areas of greater than 90% stenosis and potential occlusion.     Anterior tibial artery: Extensive atherosclerotic calcification with  areas of stenosis and intermittent occlusion.     Posterior tibial artery: Extensive atherosclerotic calcification with areas of stenosis and intermittent occlusion.     Peroneal artery: Extensive atherosclerotic calcification with areas of stenosis and intermittent occlusion.    Impression:       Extensive atherosclerotic calcification throughout the lower extremity arterial system, noting intermittent high-grade stenosis and occlusion throughout the bilateral calf vessels in addition to the bilateral superficial femoral arteries and popliteal arteries.     Bilateral renal atrophy.     Electronically signed by resident: Johanna Dumont   Date: 07/29/2020   Time: 11:51     Electronically signed by: Liset Rocha MD   Date: 07/29/2020   Time: 14:24   US Abdomen Complete [[id number]] Resulted: 07/29/20 0156   Order Status: Completed Updated: 07/29/20 0158   Narrative:     EXAMINATION:   US ABDOMEN COMPLETE     CLINICAL HISTORY:   HCV with possible cirrhosis;     TECHNIQUE:   Complete abdominal ultrasound (including pancreas, liver, gallbladder, common bile duct, spleen, aorta, IVC, and kidneys) was performed.     COMPARISON:   U/S abdomen complete 07/29/2016; CT abdomen pelvis 08/29/2018     FINDINGS:   Pancreas: Obscured by overlying bowel gas.     Aorta: Partially obscured by overlying bowel gas, noting no aneurysm within the visualized portions of the aorta.     Liver: Measures 17.2 cm, upper limits of normal in size and extending below the costal margin.  Coarse parenchymal echogenicity with a nodular contour suggesting cirrhosis. No focal lesions.     Gallbladder: The gallbladder is surgically absent.     Biliary system: 4 mm common bile duct.  No intrahepatic ductal dilatation.     Inferior vena cava: Normal in appearance.     Right kidney: Poorly visualized.  Small in size with cortical thinning measuring 7.7 cm. No hydronephrosis.     Left kidney: Poorly visualized.  Small in size with cortical thinning  measuring 7.7 cm. No hydronephrosis.     Spleen: Measures 11.3 x 4.2 cm.  Normal in size with homogeneous echotexture.     Miscellaneous: No ascites.    Impression:       Coarse appearance of the hepatic parenchyma with nodular contour suggesting underlying cirrhosis.     Advanced atrophy of the bilateral kidneys compatible with chronic medical renal disease.     Status post cholecystectomy.     Electronically signed by resident: Robin Jauregui   Date: 07/28/2020   Time: 23:10     Electronically signed by: Enoc Padron MD   Date: 07/29/2020   Time: 01:56   X-Ray Chest AP Portable [[id number]] Resulted: 07/28/20 1929   Order Status: Completed Updated: 07/28/20 1931   Narrative:     EXAMINATION:   XR CHEST AP PORTABLE     CLINICAL HISTORY:   Chest pain;     TECHNIQUE:   Single frontal view of the chest was performed.     COMPARISON:   07/24/2020     FINDINGS:   Suboptimal inspiration.     Cardiac silhouette is mildly enlarged.     Aortic atherosclerosis.     Slight blunting of the costophrenic angle on the right could represent trace effusion.     Mild bilateral interstitial and ground-glass changes could represent mild atelectasis or infiltrate.  This could represent improving pneumonitis or edema     Interval improvement from the prior study.     No acute osseous abnormality.    Impression:       Cardiomegaly with mild bilateral interstitial and ground-glass changes could represent mild atelectasis, infiltrate or minimal edema.  Interval improvement from the prior study.       Electronically signed by: Jose Maria Solorio   Date: 07/28/2020   Time: 19:29   MRI Foot (Forefoot) Right Without Contrast [[id number]] (Abnormal) Resulted: 07/27/20 1636   Order Status: Completed Updated: 07/27/20 1639   Narrative:     EXAMINATION:   MRI FOOT (FOREFOOT) RIGHT WITHOUT CONTRAST     CLINICAL HISTORY:   Osteomyelitis suspected, diabetic;     TECHNIQUE:   Multiplanar, multisequence MR imaging of the right forefoot without the use of  intravenous gadolinium IV contrast.     COMPARISON:   Right foot radiograph 07/26/2020.     FINDINGS:   Examination moderately degraded by patient motion artifact.     Bones: Postoperative changes of 2nd phalangeal and distal metatarsal amputation.  Hallux valgus deformity.  Prominent degenerative changes of the 1st metatarsophalangeal joint.     Osteomyelitis Evaluation:     Location: Abnormal signal involving the 5th metatarsal distal head.     T1 Signal: Confluent Decreased Signal     T2 Signal: Bone Marrow Edema     Cortical Margin: Indistinct     Secondary Signs: Ulcer is present adjacent to the abnormal osseous signal.     MRI Osteomyelitis Classification:Compatible with Osteomyelitis: Reticular CONFLUENT T1 and Secondary Signs ulcer/abcess/sinus tract     Tendons: Flexor and extensor tendons of the toes are within normal limits.     Ligaments: The ligaments of the foot, including the Lisfranc ligament, are intact.     Soft Tissues: Mild subcutaneous edema involving the dorsal foot.  No focal fluid collection.  Small soft tissue ulceration is present extending laterally from distal 5th metatarsal head.  Nonspecific superficial 0.7 cm cystic lesion along the plantar aspect of the 3rd phalanx.  Diffuse muscular atrophy.     Miscellaneous: No evidence of Simmons's neuroma.    Impression:       Findings consistent with osteomyelitis of the distal 5th metatarsal head with adjacent skin ulceration.  No focal fluid collection.     Postoperative changes of 2nd phalangeal/distal metatarsal amputation.     Prominent hallux valgus deformity and degenerative changes of the 1st metatarsophalangeal joint.     Nonspecific superficial cystic lesion along the plantar aspect of the 3rd phalanx.     This report was flagged in Epic as abnormal.     Electronically signed by resident: Tej April   Date: 07/27/2020   Time: 15:46     Electronically signed by: Jace Lyle MD   Date: 07/27/2020   Time: 16:36   VAS Ankle Brachial  Indices Resting [[id number]] Collected: 07/27/20 1239   Order Status: Completed Updated: 07/27/20 1435   Narrative:       Indication   ========     Peripheral Vascular Disease     Pressure Lower   ============     Rt brachial A syst BP  95 mmHg   Rt PTA BP  255 mmHg   Rt dors pedis A  mmHg   Rt toe BP  0 mmHg   Rt ADDI post tibial (rt post tib A BP / max brach A BP) 2.68   Rt ADDI (rt dors ped A BP / max brach A BP) 2.68   Rt ankle BP / max brach A BP   2.68   Rt TBI (rt toe BP / max brach A BP)    0.00   Lt PTA BP  133 mmHg   Lt dors pedis A  mmHg   Lt toe BP  0 mmHg   Lt ADDI (lt post tibial A BP / max brach A BP)  1.40   Lt ADDI dors ped (lt dors ped A BP / max brach A BP)    2.68   Lt ankle BP / max brach A BP   2.68   Lt TBI (lt toe BP / max brach A BP)    0.00     Impression   =========     Right Leg: Segmental pressures are unreliable due to medial calcinosis; however, PVR waveforms suggest severe peripheral arterial   occlusive disease. -TBI of 0 is noted.   Left Leg: Segmental pressures are unreliable due to medial calcinosis; however, PVR waveforms suggest severe peripheral arterial   occlusive disease. -TBI of 0 is noted.       DATE OF SERVICE: 07/27/2020                                                        Sonographer: Angela Pat   Electronically Signed by: Ron Hines M.D. at 07/27/2020-14:33   VAS US Arterial Legs Bilateral [[id number]] Collected: 07/27/20 1253   Order Status: Completed Updated: 07/27/20 1433   Narrative:       Lower Extremity Arterial Imaging   ========================     Right Lower Extremity   Rt mid CFA PSV 61 cm/s   Rt prox DFA PSV    51 cm/s   Rt prox SFA PSV    24 cm/s   Rt mid SFA PSV 44 cm/s   Rt distal SFA PSV  27 cm/s   Rt mid POP PSV 22 cm/s   Rt mid ERENDIRA PSV 19 cm/s   Rt mid PTA PSV 17 cm/s   Left Lower Extremity   Lt mid CFA PSV 19 cm/s   Lt prox DFA PSV    67 cm/s   Lt prox SFA PSV    64 cm/s   Lt mid SFA PSV 48 cm/s   Lt distal SFA PSV  45 cm/s   Lt mid POP PSV  24 cm/s   Lt mid ERENDIRA PSV 15 cm/s   Lt mid PTA PSV 20 cm/s     Comment   ========     Technically difficult study due to calcified vessels.     Impression   =========     Right leg: Color flow duplex of the right lower extremity reveals heavily calcified vessels with monophasic waveforms throughout. There   are collaterals throughout the extremity with no evidence of a hemodynamically significant stenosis.     Left leg: Color flow duplex of the left lower extremity reveals heavily calcified vessels with monophasic waveforms throughout. There   are collaterals throughout the extremity with no evidence of a hemodynamically significant stenosis.       DATE OF SERVICE: 07/27/2020                                                        Sonographer: RACHAEL MILLS   Electronically Signed by: Ron Hines M.D. at 07/27/2020-14:33   X-Ray Foot Complete Right [[id number]] Resulted: 07/26/20 1454   Order Status: Completed Updated: 07/26/20 1457   Narrative:     EXAMINATION:   XR FOOT COMPLETE 3 VIEW RIGHT     CLINICAL HISTORY:   foot wound;.     TECHNIQUE:   AP, lateral, and oblique views of the right foot were performed.     COMPARISON:   09/25/2019     FINDINGS:   No fracture or dislocation.  Lisfranc articulation is congruent.  Degenerative changes identified RIGHT 1st metatarsophalangeal joint.  Vascular calcifications.  Postoperative change identified level of the distal phalanx RIGHT 2nd toe.  Deformity at the level the proximal phalanx RIGHT 5th toe stable.  Osseous calcaneal spurs.  Suspect ulceration at the level of the RIGHT 5th metatarsal head.    Impression:       As above.  MRI may be helpful for further evaluation of potential osteomyelitis.       Electronically signed by: Jace Lyle MD   Date: 07/26/2020   Time: 14:54   US Carotid Bilateral [[id number]] Resulted: 07/24/20 1905   Order Status: Completed Updated: 07/24/20 1907   Narrative:     EXAMINATION:   US CAROTID BILATERAL     CLINICAL HISTORY:   cad;      TECHNIQUE:   Grayscale and color Doppler ultrasound examination of the carotid and vertebral artery systems bilaterally.  Stenosis estimates are per the NASCET measurement criteria.     COMPARISON:   05/02/2016     FINDINGS:   Right:     Internal Carotid Artery (ICA):     Peak systolic velocity 66 cm/sec     End diastolic velocity 21 cm/sec     ICA/CCA peak systolic ratio: 1.6     ICA/CCA end diastolic ratio: 2.6     Plaque formation: Moderate     Vertebral artery: Antegrade flow and normal waveform.     Left:     Internal Carotid Artery (ICA):     Peak systolic velocity 68 cm/sec     End diastolic velocity 22 cm/sec     ICA/CCA peak systolic ratio: 1.39     ICA/CCA end diastolic ratio: 1.69     Plaque formation: Moderate     Vertebral artery: Antegrade flow and normal waveform.    Impression:       1-39% stenosis bilaterally..       Electronically signed by: Jarrell Irvin MD   Date: 07/24/2020   Time: 19:05   X-Ray Chest AP Portable [[id number]] Resulted: 07/24/20 0608   Order Status: Completed Updated: 07/24/20 0611   Narrative:     EXAMINATION:   XR CHEST AP PORTABLE     CLINICAL HISTORY:   Chest Pain;     TECHNIQUE:   Single frontal view of the chest was performed.     COMPARISON:   08/29/2018     FINDINGS:   The cardiomediastinal silhouette is prominent in size, similar to prior examination.  The lungs are symmetrically expanded with diffuse increased interstitial and parenchymal attenuation which can be seen in the setting of pulmonary edema although correlation for underlying infectious process is advised.  The possible small component of right pleural fluid present.  No evidence of pneumothorax.  Visualized osseous structures are intact.    Impression:       Cardiomegaly and findings suggestive of possible pulmonary edema although correlation for underlying infectious process is advised.       Electronically signed by: Nena Padron MD   Date: 07/24/2020   Time: 06:08   Imaging History    2020  Date  Procedure Name Status Accession Number Location   07/29/20 09:59 AM CTA Runoff ABD PEL Bilat Lower Ext Final [id number] [facility]   07/28/20 11:04 PM US Abdomen Complete Final [id number] [facility]   07/28/20 07:01 PM X-Ray Chest AP Portable Final [id number] [facility]   07/27/20 03:39 PM MRI Foot (Forefoot) Right Without Contrast Final [id number] [facility]   07/26/20 12:01 PM VAS US Arterial Legs Bilateral Final [id number]    07/26/20 12:01 PM VAS Ankle Brachial Indices Resting Final [id number]    07/26/20 02:43 PM X-Ray Foot Complete Right Final [id number] [facility]   07/24/20 06:48 PM US Carotid Bilateral Final [id number] [facility]   07/24/20 06:02 AM X-Ray Chest AP Portable Final [id number] [facility]   07/24/20 09:54 AM Echo Color Flow Doppler? Yes Final [id number] [facility]   07/30/20 12:43 PM Cardiac catheterization Needs Review [id number] [facility]

## 2020-07-30 NOTE — PROGRESS NOTES
Pharmacokinetic Initial Assessment: IV Vancomycin    Assessment/Plan:    Initiate intravenous vancomycin with loading dose of 1500 mg once with subsequent doses when random concentrations are less than 20 mcg/mL.  Desired empiric serum trough concentration is 15 to 20 mcg/mL.  Patient scheduled to receive dialysis tomorrow.  Draw vancomycin random level after dialysis on 7/31/20 at 1200.  Pharmacy will continue to follow and monitor vancomycin.      Please contact pharmacy at extension [phone] with any questions regarding this assessment.     Thank you for the consult,   Lorena Galicia     Patient brief summary:  João Aguirre is a 55 y.o. male initiated on antimicrobial therapy with IV Vancomycin for treatment of suspected bone/joint infection.    Drug Allergies:   Review of patient's allergies indicates:   Allergen Reactions    Antibiotic hc      Counter acts with methodone.          Actual Body Weight:   103.7 kg    Renal Function:   Estimated Creatinine Clearance: 23.9 mL/min (A) (based on SCr of 4.4 mg/dL (H)).,     Dialysis Method (if applicable):  ESRD on HD.   Last received dialysis on 7/29/20.    CBC (last 72 hours):  Recent Labs   Lab Result Units 07/28/20  0433 07/28/20  1820 07/29/20  0419 07/30/20  0353   WBC K/uL 8.07 9.59 8.78 6.93   Hemoglobin g/dL 10.9* 10.5* 9.6* 8.5*   Hemoglobin A1C %  --   --   --  4.6   Hematocrit % 35.8* 32.8* 30.5* 27.8*   Platelets K/uL 162 125* 148* 121*   Gran% % 51.6 65.0 66.2 62.2   Lymph% % 25.8 19.1 16.4* 19.2   Mono% % 14.3 9.7 10.8 11.1   Eosinophil% % 6.8 4.9 5.2 6.5   Basophil% % 0.9 0.7 0.7 0.6   Differential Method  Automated Automated Automated Automated       Metabolic Panel (last 72 hours):  Recent Labs   Lab Result Units 07/27/20  1939 07/27/20  2354 07/28/20  0432 07/28/20  0806 07/28/20  1528 07/28/20  1820 07/28/20  1842 07/28/20  1956 07/29/20  0024 07/29/20  0419 07/29/20  1009 07/29/20  1402 07/29/20  2030 07/29/20  2334 07/30/20  0353  07/30/20  0903 07/30/20  1329 07/30/20  1546   Sodium mmol/L 137 138 142  142 140 138 138  --  136 138 136  136 135* 136 138 139 139  138 136 137 137   Potassium mmol/L 5.4*  5.4* 5.4*  5.4*  5.4* 5.5*  5.5*  5.5* 5.8*  5.8* 6.5*  6.5* 4.3  --  5.2*  5.2* 6.1*  6.1* 5.7*  5.7*  5.7* 5.4* 6.0* 4.1  4.1 4.3  4.3 4.6  4.6  4.6 4.9  4.9 4.9  4.9 4.9  4.9   Chloride mmol/L 100 101 102  102 103 102 100  --  101 98 99  99 98 99 104 103 103  103 101 101 101   CO2 mmol/L 25 26 25  25 21* 22* 27  --  19* 28 23  23 25 24 25 22* 26  25 26 24 24   Glucose mg/dL 86 94 92  92 85 94 73  --  70 68* 69*  69* 106 101 112* 102 89  90 79 64* 105   BUN, Bld mg/dL 43* 50* 56*  56* 58* 68* 35*  --  43* 43* 48*  48* 52* 55* 21* 25* 30*  30* 36* 37* 40*   Creatinine mg/dL 4.4* 4.8* 5.3*  5.3* 5.4* 5.8* 3.4*  --  4.3* 4.5* 4.8*  4.8* 5.2* 5.6* 2.9* 3.2* 3.6*  3.6* 4.1* 4.3* 4.4*   Albumin g/dL  --   --   --   --   --  2.9*  --   --   --   --   --   --   --   --  2.6*  --   --   --    Total Bilirubin mg/dL  --   --   --   --   --  0.5  --   --   --   --   --   --   --   --  0.4  --   --   --    Alkaline Phosphatase U/L  --   --   --   --   --  122  --   --   --   --   --   --   --   --  120  --   --   --    AST U/L  --   --   --   --   --  20  --   --   --   --   --   --   --   --  28  --   --   --    ALT U/L  --   --   --   --   --  15  --   --   --   --   --   --   --   --  15  --   --   --    Magnesium mg/dL  --   --  2.1  --   --   --  1.8  --   --  2.2  --   --   --   --  2.0  --   --   --    Phosphorus mg/dL  --   --  5.4*  --   --   --  3.6  --   --  6.2*  --   --   --   --  4.9*  --   --   --        Drug levels (last 3 results):  No results for input(s): VANCOMYCINRA, VANCOMYCINPE, VANCOMYCINTR in the last 72 hours.    Microbiologic Results:  Microbiology Results (last 7 days)     Procedure Component Value Units Date/Time    AFB Culture & Smear [[id number]] Collected: 07/29/20 1415    Order Status:  Completed Specimen: Bone from Foot, Right Updated: 07/30/20 1314     AFB CULTURE STAIN No acid fast bacilli seen.    Culture, Anaerobe [[id number]] Collected: 07/29/20 1415    Order Status: Completed Specimen: Bone from Foot, Right Updated: 07/30/20 0751     Anaerobic Culture Culture in progress    Aerobic culture [[id number]] Collected: 07/29/20 1415    Order Status: Completed Specimen: Bone from Foot, Right Updated: 07/30/20 0718     Aerobic Bacterial Culture No growth    Gram stain [[id number]] Collected: 07/29/20 1415    Order Status: Completed Specimen: Bone from Foot, Right Updated: 07/29/20 2051     Gram Stain Result No WBC's      No organisms seen    Fungus culture [[id number]] Collected: 07/29/20 1415    Order Status: Sent Specimen: Bone from Foot, Right Updated: 07/29/20 5855

## 2020-07-30 NOTE — ASSESSMENT & PLAN NOTE
Mean gradient 15, may be due to volume overload  Controlling heart rate (partiularly if he converts to rapid AF/AFL) is important to allow for diastolic filling    Plan:  -- repeat TTE when euvolemic  -- spoke w CT surgery NP, pt is not a candidate for open mitral repair d/t ESRD, cirrhosis, active osteomyelitis  -- recommend B-blocker if BP can tolerate for rate control/improved diastolic filling

## 2020-07-30 NOTE — NURSING TRANSFER
Nursing Transfer Note      7/30/2020     Transfer to Cath Lab from     Transfer via bed    Transfer with cardiac monitoring    Transported by RN x1    Medicines sent: no    Chart send with patient: yes    Heparin STOPPED upon pt transfer, will restart when pt arrives back to room. Will continue to monitor.

## 2020-07-30 NOTE — ASSESSMENT & PLAN NOTE
Pt has moderate aortic stenosis  No indication for intervention at this time.  Pt is not a surgical candidate for AVR/CABG.

## 2020-07-30 NOTE — SUBJECTIVE & OBJECTIVE
Subjective:     Interval History:   S/p bedside bone biopsy performed yesterday 7/29. Bone cx NGTD. Patient denies any pedal or calf pain. No new complaints.         Scheduled Meds:   sodium chloride 0.9%   Intravenous Once    sodium chloride 0.9%   Intravenous Once    sodium chloride 0.9%   Intravenous Once    amLODIPine  5 mg Oral BID    aspirin  81 mg Oral Daily    atorvastatin  40 mg Oral Daily    clopidogreL  75 mg Oral Daily    gabapentin  100 mg Oral BID    levETIRAcetam  500 mg Oral BID    methadone  100 mg Oral Daily    pantoprazole  40 mg Intravenous Daily    polyethylene glycol  17 g Oral Daily    sevelamer carbonate  1,600 mg Oral TID WM     Continuous Infusions:   heparin (porcine) in D5W 12 Units/kg/hr (07/29/20 1957)     PRN Meds:acetaminophen, albuterol-ipratropium, bisacodyL, calcium carbonate, [COMPLETED] calcium gluconate IVPB **AND** calcium gluconate IVPB, [COMPLETED] calcium gluconate IVPB **AND** calcium gluconate IVPB, heparin (PORCINE), heparin (PORCINE), ondansetron, oxyCODONE, oxyCODONE, promethazine, sodium chloride 0.9%    Review of Systems   Constitutional: Negative for appetite change, chills and fever.   Gastrointestinal: Negative for nausea and vomiting.   Musculoskeletal:        Right foot pain   Skin: Positive for wound.   Neurological: Negative for weakness and numbness.   Psychiatric/Behavioral: Negative for confusion.     Objective:     Vital Signs (Most Recent):  Temp: 98.9 °F (37.2 °C) (07/30/20 0756)  Pulse: 64 (07/30/20 0756)  Resp: 18 (07/30/20 0756)  BP: 129/66 (07/30/20 0756)  SpO2: (!) 94 % (07/30/20 0422) Vital Signs (24h Range):  Temp:  [97.7 °F (36.5 °C)-98.9 °F (37.2 °C)] 98.9 °F (37.2 °C)  Pulse:  [56-79] 64  Resp:  [16-18] 18  SpO2:  [94 %-100 %] 94 %  BP: ()/(37-89) 129/66     Weight: 103.7 kg (228 lb 9.9 oz)  Body mass index is 31.01 kg/m².    Foot Exam    General  General Appearance: appears stated age and healthy   Orientation: alert and  oriented to person, place, and time   Affect: appropriate       Right Foot/Ankle     Inspection and Palpation  Ecchymosis: none  Tenderness: none   Swelling: lesser metatarsophalangeal joints   Skin Exam: callus, dry skin, skin changes, abnormal color, ulcer and erythema; no blister, no drainage and no cellulitis     Neurovascular  Dorsalis pedis: absent  Posterior tibial: absent  Saphenous nerve sensation: diminished  Tibial nerve sensation: diminished  Superficial peroneal nerve sensation: diminished  Deep peroneal nerve sensation: diminished  Sural nerve sensation: diminished    Comments  See wound description below    Left Foot/Ankle      Inspection and Palpation  Skin Exam: skin intact;     Neurovascular  Dorsalis pedis: absent  Posterior tibial: absent            Laboratory:  CBC:   Recent Labs   Lab 07/30/20  0353   WBC 6.93   RBC 2.63*   HGB 8.5*   HCT 27.8*   *   *   MCH 32.3*   MCHC 30.6*     CRP: No results for input(s): CRP in the last 168 hours.  ESR: No results for input(s): SEDRATE in the last 168 hours.  Wound Cultures:   Recent Labs   Lab 07/29/20 1415   LABAERO No growth     Microbiology Results (last 7 days)     Procedure Component Value Units Date/Time    Culture, Anaerobe [[id number]] Collected: 07/29/20 1415    Order Status: Completed Specimen: Bone from Foot, Right Updated: 07/30/20 0751     Anaerobic Culture Culture in progress    Aerobic culture [[id number]] Collected: 07/29/20 1415    Order Status: Completed Specimen: Bone from Foot, Right Updated: 07/30/20 0718     Aerobic Bacterial Culture No growth    Gram stain [[id number]] Collected: 07/29/20 1415    Order Status: Completed Specimen: Bone from Foot, Right Updated: 07/29/20 2051     Gram Stain Result No WBC's      No organisms seen    Fungus culture [[id number]] Collected: 07/29/20 1415    Order Status: Sent Specimen: Bone from Foot, Right Updated: 07/29/20 1553    AFB Culture & Smear [[id number]] Collected: 07/29/20 1415     Order Status: Sent Specimen: Bone from Foot, Right Updated: 07/29/20 1552        Specimen (12h ago, onward)    None          Diagnostic Results:  I have reviewed all pertinent imaging results/findings within the past 24 hours.    Clinical Findings:  Wound plantar 1st metatarsal head with hyperkeratotic border and overlying eschar, appears stable and dry with no drainage noted, no acute signs of infection noted, does not probe to bone, no signs of abscess. Wound plantar 5th metatarsal head with hyperkeratotic border and mixed granular necrotic base, serous drainage noted, no malodor. +probe to bone, no signs of abscess.

## 2020-07-30 NOTE — PROGRESS NOTES
[facility]  Podiatry  Progress Note    Patient Name: João Aguirre  MRN: [id number]  Admission Date: 7/24/2020  Hospital Length of Stay: 6 days  Attending Physician: Rony Ryan MD  Primary Care Provider: Primary Doctor No     Subjective:     Interval History:   S/p bedside bone biopsy performed yesterday 7/29. Bone cx NGTD. Patient denies any pedal or calf pain. No new complaints.         Scheduled Meds:   sodium chloride 0.9%   Intravenous Once    sodium chloride 0.9%   Intravenous Once    sodium chloride 0.9%   Intravenous Once    amLODIPine  5 mg Oral BID    aspirin  81 mg Oral Daily    atorvastatin  40 mg Oral Daily    clopidogreL  75 mg Oral Daily    gabapentin  100 mg Oral BID    levETIRAcetam  500 mg Oral BID    methadone  100 mg Oral Daily    pantoprazole  40 mg Intravenous Daily    polyethylene glycol  17 g Oral Daily    sevelamer carbonate  1,600 mg Oral TID WM     Continuous Infusions:   heparin (porcine) in D5W 12 Units/kg/hr (07/29/20 1957)     PRN Meds:acetaminophen, albuterol-ipratropium, bisacodyL, calcium carbonate, [COMPLETED] calcium gluconate IVPB **AND** calcium gluconate IVPB, [COMPLETED] calcium gluconate IVPB **AND** calcium gluconate IVPB, heparin (PORCINE), heparin (PORCINE), ondansetron, oxyCODONE, oxyCODONE, promethazine, sodium chloride 0.9%    Review of Systems   Constitutional: Negative for appetite change, chills and fever.   Gastrointestinal: Negative for nausea and vomiting.   Musculoskeletal:        Right foot pain   Skin: Positive for wound.   Neurological: Negative for weakness and numbness.   Psychiatric/Behavioral: Negative for confusion.     Objective:     Vital Signs (Most Recent):  Temp: 98.9 °F (37.2 °C) (07/30/20 0756)  Pulse: 64 (07/30/20 0756)  Resp: 18 (07/30/20 0756)  BP: 129/66 (07/30/20 0756)  SpO2: (!) 94 % (07/30/20 0422) Vital Signs (24h Range):  Temp:  [97.7 °F (36.5 °C)-98.9 °F (37.2 °C)] 98.9 °F (37.2 °C)  Pulse:   [56-79] 64  Resp:  [16-18] 18  SpO2:  [94 %-100 %] 94 %  BP: ()/(37-89) 129/66     Weight: 103.7 kg (228 lb 9.9 oz)  Body mass index is 31.01 kg/m².    Foot Exam    General  General Appearance: appears stated age and healthy   Orientation: alert and oriented to person, place, and time   Affect: appropriate       Right Foot/Ankle     Inspection and Palpation  Ecchymosis: none  Tenderness: none   Swelling: lesser metatarsophalangeal joints   Skin Exam: callus, dry skin, skin changes, abnormal color, ulcer and erythema; no blister, no drainage and no cellulitis     Neurovascular  Dorsalis pedis: absent  Posterior tibial: absent  Saphenous nerve sensation: diminished  Tibial nerve sensation: diminished  Superficial peroneal nerve sensation: diminished  Deep peroneal nerve sensation: diminished  Sural nerve sensation: diminished    Comments  See wound description below    Left Foot/Ankle      Inspection and Palpation  Skin Exam: skin intact;     Neurovascular  Dorsalis pedis: absent  Posterior tibial: absent            Laboratory:  CBC:   Recent Labs   Lab 07/30/20  0353   WBC 6.93   RBC 2.63*   HGB 8.5*   HCT 27.8*   *   *   MCH 32.3*   MCHC 30.6*     CRP: No results for input(s): CRP in the last 168 hours.  ESR: No results for input(s): SEDRATE in the last 168 hours.  Wound Cultures:   Recent Labs   Lab 07/29/20 1415   LABAERO No growth     Microbiology Results (last 7 days)     Procedure Component Value Units Date/Time    Culture, Anaerobe [[id number]] Collected: 07/29/20 1415    Order Status: Completed Specimen: Bone from Foot, Right Updated: 07/30/20 0751     Anaerobic Culture Culture in progress    Aerobic culture [[id number]] Collected: 07/29/20 1415    Order Status: Completed Specimen: Bone from Foot, Right Updated: 07/30/20 0718     Aerobic Bacterial Culture No growth    Gram stain [[id number]] Collected: 07/29/20 1415    Order Status: Completed Specimen: Bone from Foot, Right Updated: 07/29/20  2051     Gram Stain Result No WBC's      No organisms seen    Fungus culture [[id number]] Collected: 07/29/20 1415    Order Status: Sent Specimen: Bone from Foot, Right Updated: 07/29/20 1553    AFB Culture & Smear [[id number]] Collected: 07/29/20 1415    Order Status: Sent Specimen: Bone from Foot, Right Updated: 07/29/20 1552        Specimen (12h ago, onward)    None          Diagnostic Results:  I have reviewed all pertinent imaging results/findings within the past 24 hours.    Clinical Findings:  Wound plantar 1st metatarsal head with hyperkeratotic border and overlying eschar, appears stable and dry with no drainage noted, no acute signs of infection noted, does not probe to bone, no signs of abscess. Wound plantar 5th metatarsal head with hyperkeratotic border and mixed granular necrotic base, serous drainage noted, no malodor. +probe to bone, no signs of abscess.           Assessment/Plan:     Acute osteomyelitis of metatarsal bone, right  Patient presented with ulceration noted to 1st and 5th metatarsal head. MRI right foot with osteomyelitis to 5th metatarsal head. 7/28: RLE angiogram; unable to revascularize. Plan to refer to Dr. Anglin at [facility] for potential limb salvage. Given that patient has poor blood flow, not a candidate for amputation of 5th metatarsal head. Surgical site and wound would not heal. 7/29: bedside bone biopsy of right 5th metatarsal head.    Plan:  -F/u bone cx. NGTD thus far.   -ID on board. Agree that antibiotics alone may be of limited benefit for wound healing/treatment of osteo  -Will continue local wound care MWF to be done by nursing. Orders placed.   -Pt PWB to heel only in DAR[employer] shoe  -Podiatry will follow while inpatient    DC Instructions:  Patient is to follow up with podiatry within 10 days of discharge. He can follow up with his previous podiatrist Dr. Rene Driver in [place].  Home health/facility to do dressing changes every MWF as follows:  Rinse right foot wound  with saline or wound cleanser, pat dry, apply iodosorb to wound followed by 4x4 gauze, kerlix, secure with tape.                Fide Roach DPM  [facility]  Podiatry PGY2  Pager: [phone]

## 2020-07-30 NOTE — SUBJECTIVE & OBJECTIVE
Interval History:  Patient asymptomatic and denies chest pain, shortness of breath, orthopnea, bleeding, etc. Planned for LHC + PCI today. Of note patient is a Plavix nonresponder based on AM PRU level --> will switch to Brilinta.     Review of Systems   Constitution: Negative for chills, decreased appetite and fever.   HENT: Negative for congestion and sore throat.    Eyes: Negative for blurred vision and discharge.   Cardiovascular: Positive for chest pain (resolved). Negative for claudication, cyanosis, dyspnea on exertion and leg swelling.   Respiratory: Negative for cough, hemoptysis and shortness of breath.    Endocrine: Negative for cold intolerance and heat intolerance.   Skin: Negative for color change.   Musculoskeletal: Negative for muscle weakness and myalgias.   Neurological: Negative for dizziness, focal weakness and weakness.   Psychiatric/Behavioral: Negative for altered mental status and depression.     Objective:     Vital Signs (Most Recent):  Temp: 98.9 °F (37.2 °C) (07/30/20 0756)  Pulse: 64 (07/30/20 0756)  Resp: 18 (07/30/20 0756)  BP: 129/66 (07/30/20 0756)  SpO2: (!) 94 % (07/30/20 0422) Vital Signs (24h Range):  Temp:  [97.7 °F (36.5 °C)-98.9 °F (37.2 °C)] 98.9 °F (37.2 °C)  Pulse:  [56-79] 64  Resp:  [16-18] 18  SpO2:  [94 %-100 %] 94 %  BP: ()/(37-89) 129/66     Weight: 103.7 kg (228 lb 9.9 oz)  Body mass index is 31.01 kg/m².    SpO2: (!) 94 %  O2 Device (Oxygen Therapy): room air      Intake/Output Summary (Last 24 hours) at 7/30/2020 0911  Last data filed at 7/30/2020 0500  Gross per 24 hour   Intake 1428 ml   Output 650 ml   Net 778 ml       Lines/Drains/Airways     Drain                 Hemodialysis AV Fistula Left forearm -- days         Hemodialysis AV Fistula Left forearm -- days         Hemodialysis AV Fistula Left forearm -- days          Peripheral Intravenous Line                 Peripheral IV - Single Lumen 07/29/20 0800 18 G Right Forearm 1 day         Peripheral IV -  Single Lumen 07/30/20 0458 20 G Anterior;Right Shoulder less than 1 day                Physical Exam   Constitutional: He is oriented to person, place, and time. He appears well-developed and well-nourished.   HENT:   Head: Normocephalic and atraumatic.   Right Ear: External ear normal.   Left Ear: External ear normal.   Eyes: Left eye exhibits no discharge.   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 Neck supple.   Cardiovascular: Normal rate, regular rhythm, normal heart sounds and intact distal pulses.   Pulses:       Radial pulses are 0 on the right side and 2+ on the left side.        Femoral pulses are 2+ on the right side and 2+ on the left side.  Pulmonary/Chest: Effort normal and breath sounds normal. No respiratory distress. He has no wheezes.   Abdominal: Soft. Bowel sounds are normal. He exhibits no distension. There is no abdominal tenderness.   Musculoskeletal: Normal range of motion.         General: No edema.      Comments: Left arm fistula  Right foot wrapped   Neurological: He is alert and oriented to person, place, and time.   Skin: Skin is warm and dry.   Psychiatric: He has a normal mood and affect. His behavior is normal.       Significant Labs:   CMP   Recent Labs   Lab 07/28/20  1820  07/29/20  2030 07/29/20  2334 07/30/20  0353      < > 138 139 139  138   K 4.3   < > 4.1  4.1 4.3  4.3 4.6  4.6  4.6      < > 104 103 103  103   CO2 27   < > 25 22* 26  25   GLU 73   < > 112* 102 89  90   BUN 35*   < > 21* 25* 30*  30*   CREATININE 3.4*   < > 2.9* 3.2* 3.6*  3.6*   CALCIUM 9.2   < > 8.7 9.2 9.2  8.8   PROT 7.4  --   --   --  6.5   ALBUMIN 2.9*  --   --   --  2.6*   BILITOT 0.5  --   --   --  0.4   ALKPHOS 122  --   --   --  120   AST 20  --   --   --  28   ALT 15  --   --   --  15   ANIONGAP 11   < > 9 14 10  10   ESTGFRAFRICA 22.4*   < > 27.1* 24.1* 20.9*  20.9*   EGFRNONAA 19.3*   < > 23.4* 20.8* 18.1*  18.1*    < > = values  in this interval not displayed.   , CBC   Recent Labs   Lab 07/28/20  1820 07/29/20  0419 07/30/20  0353   WBC 9.59 8.78 6.93   HGB 10.5* 9.6* 8.5*   HCT 32.8* 30.5* 27.8*   * 148* 121*   , Troponin   Recent Labs   Lab 07/29/20  0419 07/29/20  1009 07/30/20  0353   TROPONINI 3.849* 5.368* 4.770*    and All pertinent lab results from the last 24 hours have been reviewed.    Significant Imaging: Echocardiogram:   2D echo with color flow doppler:   Results for orders placed or performed during the hospital encounter of 05/02/16   2D echo with color flow doppler    Narrative    This study is in progress....

## 2020-07-30 NOTE — ASSESSMENT & PLAN NOTE
LEVY 4  Acute rise in troponin from 0.18 - 3.8 - 5.3, however he is currently chest pain free  Treat medically for ACS with DAPT, heparin, high intensity statin, beta blocker unless contraindicated  Needs volume removal and AFib control    Plan:  7/30  cath w FARHEEN x2 in mid LAD, FARHEEN x1 in ostial RCA  -- IVF not indicated d/t ESRD  -- cardiac rehab referral  -- continue ASA for 7d + Brilinta indefinitely   -- hold heparin; ok to resume 4hrs post-procedure   - plan for bridge to warfarin for valvular AF  -- risk factor reduction   - recommend lipid panel, A1c, TSH  -- other recs per interventional cardiology note

## 2020-07-30 NOTE — ASSESSMENT & PLAN NOTE
Mean gradient 15, may be due to volume overload  Will need repeat TTE when euvolemic  Controlling heart rate (partiularly if he converts to rapid AF/AFL) is important to allow for diastolic filling    Plan:  -- spoke w CT surgery NP, pt is not a candidate for open mitral repair d/t ESRD, cirrhosis, active osteomyelitis  -- recommend B-blocker if BP can tolerate for rate control/improved diastolic filling

## 2020-07-30 NOTE — PROGRESS NOTES
Hospital Medicine  Progress note    Team: [facility] A Rony Ryan MD  Admit Date: 7/24/2020  VICKY 8/7/2020  Length of Stay:  LOS: 6 days   Code status: Full Code    Principal Problem:  Mitral stenosis    HPI / Hospital Course     Interval hx:  7/30 Transfer from [facility], not a CABG candidate due to multiple co morbidities- HD, Hep C, Osteomyelitis, PVD   ID, Hep, Cards, podiatry, vascular surgery and nephrology are following. For cath and possible Stent placement today    ROS     Constitutional: Negative for chills and fever.   HENT: Negative for sinus pain and sore throat.    Respiratory: Positive for chest tightness. Negative for wheezing.    Cardiovascular: Positive for chest pain and palpitations. Negative for leg swelling.   Gastrointestinal: Positive for abdominal distention. Negative for abdominal pain, blood in stool, diarrhea and nausea.   Endocrine: Negative for polyuria.   Genitourinary: Positive for enuresis. Negative for flank pain and frequency.   Musculoskeletal: Negative for back pain.   Skin: Negative for color change.   Neurological: Positive for light-headedness. Negative for weakness and headaches.   Psychiatric/Behavioral: Negative for agitation and confusion.     PEx  Temp:  [97.7 °F (36.5 °C)-98.9 °F (37.2 °C)]   Pulse:  [56-79]   Resp:  [16-18]   BP: ()/(37-89)   SpO2:  [94 %-100 %]     Intake/Output Summary (Last 24 hours) at 7/30/2020 0915  Last data filed at 7/30/2020 0500  Gross per 24 hour   Intake 1428 ml   Output 650 ml   Net 778 ml       Constitutional:       General: He is not in acute distress.     Appearance: He is not ill-appearing.   HENT:      Head: Normocephalic and atraumatic.      Right Ear: Tympanic membrane normal.      Left Ear: Tympanic membrane normal.      Mouth/Throat:      Mouth: Mucous membranes are dry.      Pharynx: No oropharyngeal exudate.   Eyes:      General: No scleral icterus.        Right eye: No discharge.         Left eye: No discharge.    Cardiovascular:      Rate and Rhythm: Bradycardia present. Rhythm irregular.      Heart sounds: Murmur present.      Comments: Reduced LE pulses throughout  Pulmonary:      Effort: Pulmonary effort is normal. No respiratory distress.      Breath sounds: No stridor. No wheezing or rhonchi.   Abdominal:      General: Bowel sounds are normal.      Palpations: Abdomen is soft.      Tenderness: There is no abdominal tenderness. There is no guarding.   Musculoskeletal:         General: No swelling or tenderness.      Right lower leg: No edema.      Left lower leg: No edema.   Skin:     Capillary Refill: Capillary refill takes 2 to 3 seconds.      Coloration: Skin is not jaundiced.   Neurological:      General: No focal deficit present.      Mental Status: He is alert and oriented to person, place, and time.   Psychiatric:         Mood and Affect: Mood normal.         Behavior: Behavior normal.     Recent Labs   Lab 07/28/20  1820 07/29/20  0419 07/30/20  0353   WBC 9.59 8.78 6.93   HGB 10.5* 9.6* 8.5*   HCT 32.8* 30.5* 27.8*   * 148* 121*     Recent Labs   Lab 07/28/20  1842  07/29/20  0419  07/29/20  2030 07/29/20  2334 07/30/20  0353   NA  --    < > 136  136   < > 138 139 139  138   K  --    < > 5.7*  5.7*  5.7*   < > 4.1  4.1 4.3  4.3 4.6  4.6  4.6   CL  --    < > 99  99   < > 104 103 103  103   CO2  --    < > 23  23   < > 25 22* 26  25   BUN  --    < > 48*  48*   < > 21* 25* 30*  30*   CREATININE  --    < > 4.8*  4.8*   < > 2.9* 3.2* 3.6*  3.6*   GLU  --    < > 69*  69*   < > 112* 102 89  90   CALCIUM  --    < > 8.9  8.9   < > 8.7 9.2 9.2  8.8   MG 1.8  --  2.2  --   --   --  2.0   PHOS 3.6  --  6.2*  --   --   --  4.9*    < > = values in this interval not displayed.     Recent Labs   Lab 07/24/20  0617 07/24/20  2215 07/28/20  1820 07/29/20  1641 07/30/20  0353   ALKPHOS 127 137* 122  --  120   ALT 13 16 15  --  15   AST 22 22 20  --  28   ALBUMIN 2.8* 2.9* 2.9*  --  2.6*   PROT 7.2 7.7  7.4  --  6.5   BILITOT 0.5 0.6 0.5  --  0.4   INR 1.0  --   --  1.0 1.0        Recent Labs   Lab 07/28/20  1801 07/29/20  0514 07/29/20  0608 07/29/20  0631 07/29/20  1120 07/29/20  1749   POCTGLUCOSE 71 92 241* 129* 119* 77       Scheduled Meds:   sodium chloride 0.9%   Intravenous Once    sodium chloride 0.9%   Intravenous Once    sodium chloride 0.9%   Intravenous Once    amLODIPine  5 mg Oral BID    aspirin  81 mg Oral Daily    atorvastatin  40 mg Oral Daily    gabapentin  100 mg Oral BID    levETIRAcetam  500 mg Oral BID    methadone  100 mg Oral Daily    pantoprazole  40 mg Intravenous Daily    polyethylene glycol  17 g Oral Daily    sevelamer carbonate  1,600 mg Oral TID WM    ticagrelor  180 mg Oral Once    ticagrelor  90 mg Oral BID     Continuous Infusions:   heparin (porcine) in D5W 12 Units/kg/hr (07/29/20 1957)     As Needed:  acetaminophen, albuterol-ipratropium, bisacodyL, calcium carbonate, [COMPLETED] calcium gluconate IVPB **AND** calcium gluconate IVPB, [COMPLETED] calcium gluconate IVPB **AND** calcium gluconate IVPB, heparin (PORCINE), heparin (PORCINE), ondansetron, oxyCODONE, oxyCODONE, promethazine, sodium chloride 0.9%    ** update problem list    Active Hospital Problems    Diagnosis  POA    *Mitral stenosis [I05.0]  Yes    (HFpEF) heart failure with preserved ejection fraction [I50.30]  Unknown    Atrial flutter [I48.92]  Unknown    Hypotension [I95.9]  Unknown    PVD (peripheral vascular disease) [I73.9]  Yes    Atherosclerosis of native artery of right lower extremity with ulceration of midfoot [I70.234]  Yes    Acute osteomyelitis of metatarsal bone, right [M86.171]  Yes    Diabetic foot infection [E11.628, L08.9]  Yes    ESRD (end stage renal disease) on dialysis [N18.6, Z99.2]  Not Applicable    Nonrheumatic aortic (valve) stenosis [I35.0]  Yes    Chronic kidney disease-mineral and bone disorder [N18.9, E83.9, M89.9]  Yes    Skin ulcer of right foot with fat  layer exposed [L97.512]  Yes    Hyperkalemia [E87.5]  Yes    Seizure disorder [G40.909]  Yes    Benign hypertension with ESRD (end-stage renal disease) [I12.0, N18.6]  Yes    Cirrhosis of liver with ascites [K74.60, R18.8]  Yes    Anemia in chronic kidney disease [N18.9, D63.1]  Yes     Chronic    ESRD (T,Th,Sat) dialysis onset 2013 [N18.6]  Yes     Chronic    NSTEMI (non-ST elevated myocardial infarction) [I21.4]  Yes      Resolved Hospital Problems   No resolved problems to display.       Assessment and Plan  / Problems managed today    Mitral stenosis  Patient is a 55 year old who presented from [place] for second opinion regarding AVR and CABG. Medical conditions include ESRD on HD since 2013 (T/TH/Sat), anemia, uncontrolled HTN, hx CVA, PCI, T2DM, and gastroparesis. States that for the past several weeks he has had some chest tightness and increasing SOB. Unable to quantify how far he can walk, but states not very far. Some occasional dizziness whenever beginning to walk. Endorses palpitations. Had a stroke 3 years ago but denies residual symptoms. History of seizures, last 3 years ago. Reports having two stents done, he thinks at [facility] 2-3 years ago. Smokes cigars occasionally. Denies current alcohol use. 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 Also with non-healing DM ulcer on bottom of right foot.   TTE ordered resulting with EF 50%, mild AS, mod-severe MS from extensive calcification (MG 15, may be elevated due to volume overload), PAP 62.      - ASA   - Statin   - Norvasc 5   - Keppra 500 BID   - Pantoprazole 40 QD  - Polyethylene Glycol daily   - Sevelamer 1600 TID   - Renal diet with 1L fluid restriction per nephrology   - HD today  - Disc with previous imaging received today, will review  - Patient is found to have  cirrhosis and currently has osteomyelitis to the right foot, this greatly increases his risk for cardiac surgery.     Acute osteomyelitis of metatarsal bone, right  -Infectious disease following   -Recommendations include: Continue to hold antibiotics for now pending outcome of re-vascularization.  Follow up Vascular intervention.  If unable to re-vascularize, antibiotics alone may be of limited benefit for wound healing/treatment of osteo  Will follow up today with further recommendations pending ability to re-vascularize RLE.  If able to re-vascularize, may ask Podiatry to repeat bone biopsy (last cultures from over a month and a half ago) while patient off abx.          PVD (peripheral vascular disease)  -Vascular following  -Per note will check CTA A/P with BLE runoff to better define surgical anatomy. Refer to Dr. Salgado for potential limb salvage.      ESRD (end stage renal disease) on dialysis  -Nephrology following  -Will receive dialysis today     Nonrheumatic aortic (valve) stenosis  -ECHO shows mild aortic valve stenosis. Aortic valve area is 1.57 cm2; peak velocity is 3.31 m/s; mean gradient is 24 mmHg. Mild aortic regurgitation.  -Patient currently high risk for cardiac surgery secondary to cirrhosis (plt 149) and osteomyelitis      Skin ulcer of right foot with fat layer exposed  -Vascular following        Hyperkalemia  -Monitor potassium every four hours for potassium greater than 5.4  -Will consider shifting potassium if continuing to elevate; patient needed potassium shifting overnight and will receive dialysis this morning     Seizure disorder  - Home Keppra      Benign hypertension with ESRD (end-stage renal disease)  -Continue Norvasc 5mg        Cirrhosis of liver with ascites  -patient report history of HCV and cirrhosis   -Confirmed on ultrasound yesterday  -Awaiting results from hepatitis panel and HCV RNA  -Hepatology consulted; appreciate recommendations     Elevated troponin I  level  -Chest pain while receiving dialysis yesterday with hypotension (dialysis stopped) and acute onset of rapid afib.    -Troponin elevation from 0.18>3.849  -Consulting interventional cardiology; appreciate recommendations  -Will place old cath films in chart for their review     Anemia in chronic kidney disease  -Expected  -Nephrology following     ESRD (T,Th,Sat) dialysis onset 2013  - Nephrology following   -Dialysis yesterday             Goals of Care:  Return to prior functional status    Discharge plan:    Time (minutes) spent in care of the patient (Greater than 1/2 spent in direct face-to-face contact)  35 minutes    Rony Ryan MD

## 2020-07-30 NOTE — NURSING
R groin dressing noted to have moderate amount serosanguineous drainage. Dressing removed, new gauze applied and manual pressure held for 15 minutes. Dressing CDI. Will follow up. Will continue to monitor.

## 2020-07-30 NOTE — PROGRESS NOTES
[facility]  Cardiology  Progress Note    Patient Name: João Aguirre  MRN: [id number]  Admission Date: 7/24/2020  Hospital Length of Stay: 6 days  Code Status: Full Code   Attending Physician: Rony Ryan MD   Primary Care Physician: Primary Doctor No  Expected Discharge Date: 8/7/2020  Principal Problem:Mitral stenosis    Subjective:     Hospital Course:   7/30  cath today, w FARHEEN x2 in mid LAD, FARHEEN x1 in ostial RCA    Past Medical History:   Diagnosis Date    Acute osteomyelitis of metatarsal bone, right     PAD right    Anticoagulant long-term use     Arthritis     Asthma     Back pain     on methadone    C. difficile colitis 09/2018    Cirrhosis of liver without ascites 2016    by liver US. +HCV    Coronary artery disease 2013    stent.  h/o STEMI and NSTEMI    Diabetes mellitus     resolved, multiple admissions for hypoglycemia requiring ICU possibley due to liver/ESRD    Encounter for blood transfusion     ESRD on hemodialysis 2013    anuric    Eye abnormality right eye    injured as a child    Gastritis     Gastroparesis     HCV (hepatitis C virus)     ? h/o tattoo exposure    Hypertension     Mitral stenosis 07/24/2020    Moderate to severe mitral stenosis    PAD (peripheral artery disease)     RLE s/p R CFA endarterectomy    Pneumonia 2013    Spend 6weeks in hospital [facility]    Stroke     Tuberculosis     treated       Past Surgical History:   Procedure Laterality Date    ANGIOGRAPHY OF LOWER EXTREMITY Right 7/28/2020    Procedure: Angiogram Extremity Unilateral;  Surgeon: MINO Vasquez II, MD;  Location: [facility] OR [address] [place] [address];  Service: Vascular;  Laterality: Right;  Left groin and rIght pedal artery access  15.5 min  247.25 mGy  68.5454 Gycm2  33ml contrast    AV FISTULA PLACEMENT      BACK SURGERY      CARDIAC SURGERY  2013    heart stent    CHOLECYSTECTOMY      EYE SURGERY      R eye    INCISION AND DRAINAGE OF ABSCESS Right 9/6/2018     Procedure: INCISION AND DRAINAGE, ABSCESS;  Surgeon: Chetan Rothman MD;  Location: [facility] OR;  Service: General;  Laterality: Right;       Review of patient's allergies indicates:   Allergen Reactions    Antibiotic hc      Counter acts with methodone.          No current facility-administered medications on file prior to encounter.      Current Outpatient Medications on File Prior to Encounter   Medication Sig    amlodipine (NORVASC) 5 MG tablet Take 5 mg by mouth 2 (two) times daily.    albuterol (PROAIR HFA) 90 mcg/actuation inhaler INHALE TWO PUFFS EVERY 4 TO 6 HOURS AS NEEDED    albuterol (PROAIR HFA) 90 mcg/actuation inhaler INHALE TWO PUFFS EVERY 4 TO 6 HOURS AS NEEDED    aspirin 325 MG tablet Take 1 tablet (325 mg total) by mouth once daily.    atorvastatin (LIPITOR) 10 MG tablet     atorvastatin (LIPITOR) 40 MG tablet TAKE ONE TABLET BY MOUTH DAILY    blood sugar diagnostic (TRUE METRIX GLUCOSE TEST STRIP) Strp     blood-glucose meter (PHARMACIST CHOICE GLUCOSE SYS) Misc 1 Device.    carvedilol (COREG) 3.125 MG tablet     ELIQUIS 2.5 mg Tab     ferric citrate (AURYXIA) 210 mg iron Tab Take 420 mg by mouth 3 (three) times daily.    fluconazole (DIFLUCAN) 100 MG tablet     fluticasone propionate (FLONASE) 50 mcg/actuation nasal spray 1 spray by Each Nostril route once daily.    fluticasone-salmeterol 250-50 mcg/dose (ADVAIR DISKUS) 250-50 mcg/dose diskus inhaler Inhale 1 puff into the lungs once daily.     gabapentin (NEURONTIN) 100 MG capsule Take 100 mg by mouth 2 (two) times daily.    hydrALAZINE (APRESOLINE) 50 MG tablet     levetiracetam (KEPPRA) 500 MG Tab Take 500 mg twice a day.  Take an extra tablet right after dialysis (making 3 tablets on your dialysis days)    methadone (DOLOPHINE) 10 MG tablet Take 9 tablets (90 mg total) by mouth once daily.    metroNIDAZOLE (FLAGYL) 500 MG tablet     minoxidil (LONITEN) 2.5 MG tablet Take 5 mg by mouth 2 (two) times daily.    MOVIPREP  100-7.5-2.691 gram solution     pantoprazole (PROTONIX) 40 MG tablet     sevelamer carbonate (RENVELA) 800 mg Tab Take 1,600 mg by mouth 3 (three) times daily with meals.     silver sulfADIAZINE 1% (SILVADENE) 1 % cream Apply topically once daily.     Family History     Problem Relation (Age of Onset)    Aneurysm Mother, Father (77)    Diabetes Brother    Heart disease Father, Paternal Grandmother    Kidney disease Brother        Tobacco Use    Smoking status: Former Smoker     Years: 10.00     Quit date: 2015     Years since quittin.9    Smokeless tobacco: Never Used   Substance and Sexual Activity    Alcohol use: No    Drug use: Yes     Frequency: 4.0 times per week     Types: Other-see comments     Comment: marijuana    Sexual activity: Yes     Review of Systems   All other systems reviewed and are negative.    Objective:     Vital Signs (Most Recent):  Temp: 98.9 °F (37.2 °C) (20 0756)  Pulse: 73 (20 1000)  Resp: 18 (20 0906)  BP: 129/66 (20 0756)  SpO2: 96 % (20 0756) Vital Signs (24h Range):  Temp:  [97.9 °F (36.6 °C)-98.9 °F (37.2 °C)] 98.9 °F (37.2 °C)  Pulse:  [58-73] 73  Resp:  [16-18] 18  SpO2:  [94 %-100 %] 96 %  BP: ()/(56-89) 129/66     Weight: 103.7 kg (228 lb 9.9 oz)  Body mass index is 31.01 kg/m².    SpO2: 96 %  O2 Device (Oxygen Therapy): nasal cannula      Intake/Output Summary (Last 24 hours) at 2020 1307  Last data filed at 2020 0900  Gross per 24 hour   Intake 1310 ml   Output 650 ml   Net 660 ml       Lines/Drains/Airways     Drain                 Hemodialysis AV Fistula Left forearm -- days         Hemodialysis AV Fistula Left forearm -- days         Hemodialysis AV Fistula Left forearm -- days          Peripheral Intravenous Line                 Peripheral IV - Single Lumen 20 0800 18 G Right Forearm 1 day         Peripheral IV - Single Lumen 20 0458 20 G Anterior;Right Shoulder less than 1 day                Physical  Exam   Constitutional: He is oriented to person, place, and time. He appears well-developed and well-nourished. No distress.   HENT:   Head: Normocephalic and atraumatic.   Eyes:   Right eye blind   Neck: JVD present.   Cardiovascular: Normal rate, regular rhythm and intact distal pulses. Exam reveals no gallop and no friction rub.   Murmur (Grade III/VI systolic murmur, loudest at left sternal border, radiates to carotids) heard.  Pulmonary/Chest: Effort normal. No respiratory distress. He has no wheezes. He has rales.   Abdominal: Soft. Bowel sounds are normal. He exhibits no distension. There is no abdominal tenderness.   Musculoskeletal:         General: Edema (trace) present.   Neurological: He is alert and oriented to person, place, and time.   Skin: He is not diaphoretic.       Significant Labs:     Recent Results (from the past 24 hour(s))   Basic metabolic panel    Collection Time: 07/29/20  2:02 PM   Result Value Ref Range    Sodium 136 136 - 145 mmol/L    Potassium 6.0 (H) 3.5 - 5.1 mmol/L    Chloride 99 95 - 110 mmol/L    CO2 24 23 - 29 mmol/L    Glucose 101 70 - 110 mg/dL    BUN, Bld 55 (H) 6 - 20 mg/dL    Creatinine 5.6 (H) 0.5 - 1.4 mg/dL    Calcium 9.1 8.7 - 10.5 mg/dL    Anion Gap 13 8 - 16 mmol/L    eGFR if African American 12.2 (A) >60 mL/min/1.73 m^2    eGFR if non African American 10.6 (A) >60 mL/min/1.73 m^2   Lactic acid, plasma    Collection Time: 07/29/20  2:02 PM   Result Value Ref Range    Lactate (Lactic Acid) 1.4 0.5 - 2.2 mmol/L   Culture, Anaerobe    Collection Time: 07/29/20  2:15 PM    Specimen: Foot, Right; Bone   Result Value Ref Range    Anaerobic Culture Culture in progress    Aerobic culture    Collection Time: 07/29/20  2:15 PM    Specimen: Foot, Right; Bone   Result Value Ref Range    Aerobic Bacterial Culture No growth    Gram stain    Collection Time: 07/29/20  2:15 PM    Specimen: Foot, Right; Bone   Result Value Ref Range    Gram Stain Result No WBC's     Gram Stain Result  No organisms seen    APTT    Collection Time: 07/29/20  4:41 PM   Result Value Ref Range    aPTT 29.1 21.0 - 32.0 sec   Protime-INR    Collection Time: 07/29/20  4:41 PM   Result Value Ref Range    Prothrombin Time 10.8 9.0 - 12.5 sec    INR 1.0 0.8 - 1.2   POCT glucose    Collection Time: 07/29/20  5:49 PM   Result Value Ref Range    POCT Glucose 77 70 - 110 mg/dL   Potassium    Collection Time: 07/29/20  8:30 PM   Result Value Ref Range    Potassium 4.1 3.5 - 5.1 mmol/L   Basic metabolic panel    Collection Time: 07/29/20  8:30 PM   Result Value Ref Range    Sodium 138 136 - 145 mmol/L    Potassium 4.1 3.5 - 5.1 mmol/L    Chloride 104 95 - 110 mmol/L    CO2 25 23 - 29 mmol/L    Glucose 112 (H) 70 - 110 mg/dL    BUN, Bld 21 (H) 6 - 20 mg/dL    Creatinine 2.9 (H) 0.5 - 1.4 mg/dL    Calcium 8.7 8.7 - 10.5 mg/dL    Anion Gap 9 8 - 16 mmol/L    eGFR if African American 27.1 (A) >60 mL/min/1.73 m^2    eGFR if non  23.4 (A) >60 mL/min/1.73 m^2   COVID-19 Routine Screening    Collection Time: 07/29/20 10:53 PM   Result Value Ref Range    SARS-CoV2 (COVID-19) Qualitative PCR Not Detected Not Detected   Potassium    Collection Time: 07/29/20 11:34 PM   Result Value Ref Range    Potassium 4.3 3.5 - 5.1 mmol/L   Basic metabolic panel    Collection Time: 07/29/20 11:34 PM   Result Value Ref Range    Sodium 139 136 - 145 mmol/L    Potassium 4.3 3.5 - 5.1 mmol/L    Chloride 103 95 - 110 mmol/L    CO2 22 (L) 23 - 29 mmol/L    Glucose 102 70 - 110 mg/dL    BUN, Bld 25 (H) 6 - 20 mg/dL    Creatinine 3.2 (H) 0.5 - 1.4 mg/dL    Calcium 9.2 8.7 - 10.5 mg/dL    Anion Gap 14 8 - 16 mmol/L    eGFR if African American 24.1 (A) >60 mL/min/1.73 m^2    eGFR if non African American 20.8 (A) >60 mL/min/1.73 m^2   APTT    Collection Time: 07/30/20  2:43 AM   Result Value Ref Range    aPTT 42.9 (H) 21.0 - 32.0 sec   CBC auto differential    Collection Time: 07/30/20  3:53 AM   Result Value Ref Range    WBC 6.93 3.90 - 12.70 K/uL     RBC 2.63 (L) 4.60 - 6.20 M/uL    Hemoglobin 8.5 (L) 14.0 - 18.0 g/dL    Hematocrit 27.8 (L) 40.0 - 54.0 %    Mean Corpuscular Volume 106 (H) 82 - 98 fL    Mean Corpuscular Hemoglobin 32.3 (H) 27.0 - 31.0 pg    Mean Corpuscular Hemoglobin Conc 30.6 (L) 32.0 - 36.0 g/dL    RDW 16.2 (H) 11.5 - 14.5 %    Platelets 121 (L) 150 - 350 K/uL    MPV 10.3 9.2 - 12.9 fL    Immature Granulocytes 0.4 0.0 - 0.5 %    Gran # (ANC) 4.3 1.8 - 7.7 K/uL    Immature Grans (Abs) 0.03 0.00 - 0.04 K/uL    Lymph # 1.3 1.0 - 4.8 K/uL    Mono # 0.8 0.3 - 1.0 K/uL    Eos # 0.5 0.0 - 0.5 K/uL    Baso # 0.04 0.00 - 0.20 K/uL    nRBC 0 0 /100 WBC    Gran% 62.2 38.0 - 73.0 %    Lymph% 19.2 18.0 - 48.0 %    Mono% 11.1 4.0 - 15.0 %    Eosinophil% 6.5 0.0 - 8.0 %    Basophil% 0.6 0.0 - 1.9 %    Differential Method Automated    Magnesium    Collection Time: 07/30/20  3:53 AM   Result Value Ref Range    Magnesium 2.0 1.6 - 2.6 mg/dL   Phosphorus    Collection Time: 07/30/20  3:53 AM   Result Value Ref Range    Phosphorus 4.9 (H) 2.7 - 4.5 mg/dL   Potassium    Collection Time: 07/30/20  3:53 AM   Result Value Ref Range    Potassium 4.6 3.5 - 5.1 mmol/L   Hemoglobin A1C    Collection Time: 07/30/20  3:53 AM   Result Value Ref Range    Hemoglobin A1C 4.6 4.0 - 5.6 %    Estimated Avg Glucose 85 68 - 131 mg/dL   APTT    Collection Time: 07/30/20  3:53 AM   Result Value Ref Range    aPTT 45.0 (H) 21.0 - 32.0 sec   Comprehensive metabolic panel    Collection Time: 07/30/20  3:53 AM   Result Value Ref Range    Sodium 138 136 - 145 mmol/L    Potassium 4.6 3.5 - 5.1 mmol/L    Chloride 103 95 - 110 mmol/L    CO2 25 23 - 29 mmol/L    Glucose 90 70 - 110 mg/dL    BUN, Bld 30 (H) 6 - 20 mg/dL    Creatinine 3.6 (H) 0.5 - 1.4 mg/dL    Calcium 8.8 8.7 - 10.5 mg/dL    Total Protein 6.5 6.0 - 8.4 g/dL    Albumin 2.6 (L) 3.5 - 5.2 g/dL    Total Bilirubin 0.4 0.1 - 1.0 mg/dL    Alkaline Phosphatase 120 55 - 135 U/L    AST 28 10 - 40 U/L    ALT 15 10 - 44 U/L    Anion Gap  10 8 - 16 mmol/L    eGFR if African American 20.9 (A) >60 mL/min/1.73 m^2    eGFR if non  18.1 (A) >60 mL/min/1.73 m^2   Protime-INR    Collection Time: 07/30/20  3:53 AM   Result Value Ref Range    Prothrombin Time 10.8 9.0 - 12.5 sec    INR 1.0 0.8 - 1.2   Troponin I    Collection Time: 07/30/20  3:53 AM   Result Value Ref Range    Troponin I 4.770 (H) 0.000 - 0.026 ng/mL   Basic metabolic panel    Collection Time: 07/30/20  3:53 AM   Result Value Ref Range    Sodium 139 136 - 145 mmol/L    Potassium 4.6 3.5 - 5.1 mmol/L    Chloride 103 95 - 110 mmol/L    CO2 26 23 - 29 mmol/L    Glucose 89 70 - 110 mg/dL    BUN, Bld 30 (H) 6 - 20 mg/dL    Creatinine 3.6 (H) 0.5 - 1.4 mg/dL    Calcium 9.2 8.7 - 10.5 mg/dL    Anion Gap 10 8 - 16 mmol/L    eGFR if African American 20.9 (A) >60 mL/min/1.73 m^2    eGFR if non  18.1 (A) >60 mL/min/1.73 m^2   Platelet Response Plavix    Collection Time: 07/30/20  7:11 AM   Result Value Ref Range    Platelet Response Plavix 264 194 - 418 PRU   Potassium    Collection Time: 07/30/20  9:03 AM   Result Value Ref Range    Potassium 4.9 3.5 - 5.1 mmol/L   Basic metabolic panel    Collection Time: 07/30/20  9:03 AM   Result Value Ref Range    Sodium 136 136 - 145 mmol/L    Potassium 4.9 3.5 - 5.1 mmol/L    Chloride 101 95 - 110 mmol/L    CO2 26 23 - 29 mmol/L    Glucose 79 70 - 110 mg/dL    BUN, Bld 36 (H) 6 - 20 mg/dL    Creatinine 4.1 (H) 0.5 - 1.4 mg/dL    Calcium 9.1 8.7 - 10.5 mg/dL    Anion Gap 9 8 - 16 mmol/L    eGFR if African American 17.8 (A) >60 mL/min/1.73 m^2    eGFR if non African American 15.4 (A) >60 mL/min/1.73 m^2   APTT    Collection Time: 07/30/20  9:03 AM   Result Value Ref Range    aPTT 44.5 (H) 21.0 - 32.0 sec         Significant Imaging:     I have reviewed all pertinent imaging studies      Assessment and Plan:     * Mitral stenosis  Mean gradient 15, may be due to volume overload  Controlling heart rate (partiularly if he converts to  rapid AF/AFL) is important to allow for diastolic filling    Plan:  -- spoke w CT surgery NP, pt is not a candidate for open mitral repair d/t ESRD, cirrhosis, active osteomyelitis  -- recommend B-blocker if BP can tolerate for rate control/improved diastolic filling  -- repeat TTE when euvolemic      Atrial flutter  Patient has a history of paroxysmal AF and now mitral stenosis which is at least moderate if not severe; needs anticoagulation with warfarin for valvular AFib  Developed atrial flutter, currently NSR  Recommend home beta blocker for rate control    (HFpEF) heart failure with preserved ejection fraction  BNP 2948 on admit, continues to gain more volume and increase from his dry weight  Discussed with nephrology, aiming for volume removal    NSTEMI (non-ST elevated myocardial infarction)  LEVY 4  Acute rise in troponin from 0.18 - 3.8 - 5.3, however he is currently chest pain free  Treat medically for ACS with DAPT, heparin, high intensity statin, beta blocker unless contraindicated  Needs volume removal and AFib control    Plan:  7/30 LH cath w FARHEEN x2 in mid LAD, FARHEEN x1 in ostial RCA  -- IVF not indicated d/t ESRD  -- cardiac rehab referral  -- continue ASA for 7d + Brilinta indefinitely   -- hold heparin;    - unless other procedures necessary, ok to resume 4hrs post-LH cath   - plan for bridge to warfarin for valvular AF  -- risk factor reduction   - recommend lipid panel, A1c, TSH  -- other recs per interventional cardiology note      VTE Risk Mitigation (From admission, onward)         Ordered     heparin infusion 1,000 units/500 ml in 0.9% NaCl (pressure line flush)  Intra-op continuous PRN      07/30/20 1106     heparin 25,000 units in dextrose 5% 250 mL (100 units/mL) infusion LOW INTENSITY nomogram - [facility]  Continuous     Question:  Heparin Infusion Adjustment (DO NOT MODIFY ANSWER)  Answer:  [email or web address]\epic\Images\Pharmacy\HeparinInfusions\heparin LOW INTENSITY nomogram for [facility] [id number]     07/29/20 1618     heparin 25,000 units in dextrose 5% (100 units/ml) IV bolus from bag - ADDITIONAL PRN BOLUS - 30 units/kg (max bolus 4000 units)  As needed (PRN)     Question:  Heparin Infusion Adjustment (DO NOT MODIFY ANSWER)  Answer:  \\[email or web address]\epic\Images\Pharmacy\HeparinInfusions\heparin LOW INTENSITY nomogram for [facility] [id number]    07/29/20 1618     heparin 25,000 units in dextrose 5% (100 units/ml) IV bolus from bag - ADDITIONAL PRN BOLUS - 60 units/kg (max bolus 4000 units)  As needed (PRN)     Question:  Heparin Infusion Adjustment (DO NOT MODIFY ANSWER)  Answer:  \\[email or web address]\epic\Images\Pharmacy\HeparinInfusions\heparin LOW INTENSITY nomogram for [facility] [id number]    07/29/20 1618     IP VTE HIGH RISK PATIENT  Once      07/24/20 0628     Place sequential compression device  Until discontinued      07/24/20 0628                Baron Almonte MD  Cardiology  [facility]

## 2020-07-30 NOTE — ASSESSMENT & PLAN NOTE
Patient with significant gradient across mitral valve.   No therapeutic intervention for Mitral annular calcification at this time.

## 2020-07-30 NOTE — ASSESSMENT & PLAN NOTE
LEVY 4  Acute rise in troponin from 0.18 - 3.8 - 5.3, however he is currently chest pain free  Treat medically for ACS with DAPT, heparin, high intensity statin, beta blocker unless contraindicated  Needs volume removal and AFib control    Plan:  7/30  cath w FARHEEN x2 in mid LAD, FARHEEN x1 in ostial RCA  -- IVF not indicated d/t ESRD  -- cardiac rehab referral  -- continue ASA for 7d + Brilinta indefinitely   -- hold heparin   - unless other procedures necessary, ok to resume 4hrs post-LH cath   - prior to dispo, plan for bridge to warfarin for valvular AF  -- risk factor reduction   - f/u lipid panel, TSH   - HbA1c 4.6%  -- other recs per interventional cardiology note  -- plan for angioplasty of RLE on Monday

## 2020-07-30 NOTE — ASSESSMENT & PLAN NOTE
Patient presented with ulceration noted to 1st and 5th metatarsal head. MRI right foot with osteomyelitis to 5th metatarsal head. 7/28: RLE angiogram; unable to revascularize. Plan to refer to Dr. Anglin at [facility] for potential limb salvage. Given that patient has poor blood flow, not a candidate for amputation of 5th metatarsal head. Surgical site and wound would not heal. 7/29: bedside bone biopsy of right 5th metatarsal head.    Plan:  -F/u bone cx. NGTD thus far.   -ID on board. Agree that antibiotics alone may be of limited benefit for wound healing/treatment of osteo  -Will continue local wound care MWF to be done by nursing. Orders placed.   -Pt PWB to heel only in DARCO shoe  -Podiatry will follow while inpatient    DC Instructions:  Patient is to follow up with podiatry within 10 days of discharge. He can follow up with his previous podiatrist Dr. Rene Driver in [place].  [facility] to do dressing changes every MWF as follows:  Rinse right foot wound with saline or wound cleanser, pat dry, apply iodosorb to wound followed by 4x4 gauze, kerlix, secure with tape.

## 2020-07-30 NOTE — PLAN OF CARE
POC reviewed with patient. VSS. Patient free of injuries and falls. Patient has no c/o pain or discomfort. Telemetry monitor showing NSR. Patient came back from dialysis this shift; potassium at 4.1; still checking q 4 hours. Patient on HD Tues/Thurs/Sat; patient anuric. Held patient amlodipine PM dose per MD note to hold if SBP less than 120. Patient started on heparin gtt this shift; infusing per MAR and aPTT due to be checked at 0200. Patient NPO at midnight for LHC; patient clipped and prepped and tested for COVID. COVID test back negative. All questions were addressed. WCTM.

## 2020-07-30 NOTE — PROGRESS NOTES
[facility]  Infectious Disease  Progress Note    Patient Name: João Aguirre  MRN: [id number]  Admission Date: 7/24/2020  Length of Stay: 6 days  Attending Physician: Rony Ryan MD  Primary Care Provider: Primary Doctor No    Isolation Status: No active isolations  Assessment/Plan:      Diabetic foot infection     56 y/o male with a history blindness to right eye, DM, CAD, treated tuberculosis, cardiomyopathy and valvular heart disease.  He has had a chronic ulcer to the right foot, base of 5th digit for over 6 months now.  He also has had a smaller ulcer to the based of his right foot first digit.  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  He says that he was treated with IV vancomycin for 6-8 weeks and then it was stopped.  He continued to get wound care from a facility affiliated with [place], [place], and reports he has been receiving HBOT.    He states that he started having pus come out of the wound again.  Per patient cultures were obtained and revealed 'staph' and 'yeast' .  Patient reported an MRI showed bone infection.   Records now obtained from ID in [place] show he grew a candida albicans and Staph Cohnii from a culture of 6/2/20.  He was supposed to be started on IV vancomycin to be given after dialysis,  however he developed chest pain and was admitted to [facility] in [place].  He was evaluated and it was felt his chest pain was due to severe aortic stenosis and severe coronary artery disease.  He was  transferred to the CTS service of our facility for a higher lever of care for his cardiac disease and consideration of MV replacement.  ID consulted to assist with his diabetic foot infection.      MRI of right foot this admission is consistent with osteomyelitis of distal 5th met head.  Non-invasive vascular studies indicative of severe PAD and he underwent angiogram yesterday  Vascular surgery unable to intervene to restore flow to the right foot.    Patient to be referred to Dr. Young ([facility]) for evaluation for limb salvage.  Given lack of flow, patient is not a candidate for 5th metatarsal head amputation.       He is afebrile, no leukocytosis. Wound is stable, without acute signs of infection.   Podiatry obtained new bone cultures/biopsy done and cultures show NGTD.  Appreciate their assistance.      Plan/recommendations:  1.  Start Vanc and cefepime post HD - ordered- vanc trough goal 15-20   2.  Will plan on continuing antibiotic therapy pending new cultures and review of pathology.   Understand CTS concerns with risks of surgery with active osteomyelitis.  Vascular Surgery recommending referral to Dr. Young for evaluation and limb salvage.    If unable to re-vascularize, antibiotics alone may be of limited benefit for wound healing and treatment of osteomyelitis.    3.  Will follow up tomorrow with further recommendations.     Data reviewed and plan discussed with ID staff        Anticipated Disposition: tbd    Thank you for your consult. I will follow-up with patient. Please contact us if you have any additional questions.    JEFRY cMneal  Infectious Disease  [facility]    Subjective:     Principal Problem:Mitral stenosis    HPI: 56 y/o male with a history blindness to right eye, DM, CAD, treated tuberculosis, cardiomyopathy and valvular heart disease.  He has had a chronic ulcer to the right foot, base of 5th digit for over 6 months now.  He also has had a smaller ulcer to the based of his right foot first digit.  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  He says that he was treated with IV vancomycin for 6-8 weeks and then it was stopped.  He continued to get wound care from a facility affiliated with [facility] [place].  He states that he started  having puss come out of the wound again.  Per patient cultures were obtained and revealed 'staph' and 'yeast'.  He states that an MRI showed bone infection.  He was supposed to be started on IV vancomycin.  However he developed chest pain and was admitted to [facility] in [place].  He was evaluated and it was felt his chest pain was due to severe aortic stenosis and severe coronary artery disease.  He has been transferred to our facility for a higher lever of care for his cardiac disease.  I am consulted to assist with his diabetic foot infection.  Interval History: No AEON. Afebrile. WBC WNL.  MRI consistent with osteo of distal 5th metatarsal head.  RLE angiogram recently,  unable to intervene.  Patient to be referred to Dr. Young for limb salvage. Bone cultures 7/29 NGTD.  S/P PCI (LAD and RCA) with stent this am. The patient denies any recent fever, chills, or sweats or significant foot pain.      Review of Systems   Constitutional: Negative for activity change, appetite change, chills, diaphoresis, fatigue and fever.   HENT: Negative.    Eyes: Positive for visual disturbance (right eye blindness).   Respiratory: Negative for chest tightness and shortness of breath.    Cardiovascular: Positive for chest pain.   Gastrointestinal: Negative for abdominal pain, diarrhea, nausea and vomiting.   Genitourinary: Negative for dysuria and hematuria.        HD   Musculoskeletal: Negative for arthralgias and myalgias.   Skin: Positive for wound.   Neurological: Negative for dizziness and headaches.   Psychiatric/Behavioral: Negative for agitation and behavioral problems.   All other systems reviewed and are negative.    Objective:     Vital Signs (Most Recent):  Temp: 98.9 °F (37.2 °C) (07/30/20 0756)  Pulse: 68 (07/30/20 1512)  Resp: 20 (07/30/20 1507)  BP: 116/73 (07/30/20 1507)  SpO2: 96 % (07/30/20 1507) Vital Signs (24h Range):  Temp:  [97.9 °F (36.6 °C)-98.9 °F (37.2 °C)] 98.9 °F (37.2 °C)  Pulse:  [62-73]  68  Resp:  [15-20] 20  SpO2:  [94 %-100 %] 96 %  BP: ()/(57-89) 116/73     Weight: 103.7 kg (228 lb 9.9 oz)  Body mass index is 31.01 kg/m².    Estimated Creatinine Clearance: 24.4 mL/min (A) (based on SCr of 4.3 mg/dL (H)).    Physical Exam  Vitals signs and nursing note reviewed.   Constitutional:       General: He is not in acute distress.     Appearance: He is not ill-appearing or toxic-appearing.   HENT:      Head: Normocephalic and atraumatic.   Eyes:      General: No scleral icterus.     Conjunctiva/sclera: Conjunctivae normal.      Comments: Right eye blindness   Neck:      Musculoskeletal: Normal range of motion and neck supple.   Cardiovascular:      Rate and Rhythm: Normal rate.      Heart sounds: Murmur present.   Pulmonary:      Effort: Pulmonary effort is normal. No respiratory distress.      Breath sounds: Normal breath sounds.   Abdominal:      General: Abdomen is flat.      Palpations: Abdomen is soft.   Musculoskeletal: Normal range of motion.         General: No swelling or tenderness.   Skin:     General: Skin is warm and dry.      Comments: Wounds examined.  No drainage, periwound erythema, tenderness, fluctuance.   No ascending warmth/erythema.   Podiatry photos reviewed.  See below.     Dialysis access LUE    Neurological:      General: No focal deficit present.      Mental Status: He is alert and oriented to person, place, and time.   Psychiatric:         Mood and Affect: Mood normal.         Behavior: Behavior normal.       7/30 7/26                Significant Labs:   Blood Culture: No results for input(s): LABBLOO in the last 4320 hours.  CBC:   Recent Labs   Lab 07/28/20  1820 07/29/20  0419 07/30/20  0353   WBC 9.59 8.78 6.93   HGB 10.5* 9.6* 8.5*   HCT 32.8* 30.5* 27.8*   * 148* 121*     CMP:   Recent Labs   Lab 07/28/20  1820  07/30/20  0353 07/30/20  0903 07/30/20  1329      < > 139  138 136 137   K 4.3   < > 4.6  4.6  4.6 4.9  4.9 4.9  4.9      < >  103  103 101 101   CO2 27   < > 26  25 26 24   GLU 73   < > 89  90 79 64*   BUN 35*   < > 30*  30* 36* 37*   CREATININE 3.4*   < > 3.6*  3.6* 4.1* 4.3*   CALCIUM 9.2   < > 9.2  8.8 9.1 9.4   PROT 7.4  --  6.5  --   --    ALBUMIN 2.9*  --  2.6*  --   --    BILITOT 0.5  --  0.4  --   --    ALKPHOS 122  --  120  --   --    AST 20  --  28  --   --    ALT 15  --  15  --   --    ANIONGAP 11   < > 10  10 9 12   EGFRNONAA 19.3*   < > 18.1*  18.1* 15.4* 14.6*    < > = values in this interval not displayed.     Wound Culture:   Recent Labs   Lab 07/29/20  1415   LABAERO No growth       Significant Imaging: I have reviewed all pertinent imaging results/findings within the past 24 hours.   CTA Runoff ABD PEL Bilat Lower Ext [[id number]] Resulted: 07/29/20 1424   Order Status: Completed Updated: 07/29/20 1427   Narrative:     EXAMINATION:   CTA RUNOFF ABD PEL BILAT LOWER EXT     CLINICAL HISTORY:   Acute limb ischemia, leg;     TECHNIQUE:   CTA of abdomen, pelvis, and bilateral lower extremities performed with 125 mL Omnipaque 350 intravenous contrast.     COMPARISON:   Ultrasound abdomen complete 07/28/2020, CT abdomen pelvis with contrast 08/29/2018     FINDINGS:   Heart: No cardiomegaly or pericardial effusion. Coronary artery and aortic valve atherosclerotic calcification.     Lung Bases: There are bilateral linear bandlike opacities compatible with scarring versus atelectasis, mild traction bronchiectasis.  No large mass or consolidation.  No pneumothorax.  Trace left pleural effusion with associated consolidation and atelectasis within the left lower lobe.     Liver: Normal in size and attenuation without focal hepatic abnormality. Several lymph nodes noted at the ankit hepatis, stable compared to 2018     Gallbladder: Status post cholecystectomy.     Bile Ducts: No intra or extrahepatic biliary ductal dilation.     Pancreas: No pancreatic mass lesion or peripancreatic inflammatory change.     Spleen: Unremarkable      Adrenals: Unremarkable     Genitourinary: Native kidneys are atrophic bilaterally with cortical thinning.  Left cortical hypodensity, too small to characterize, likely a simple cyst.  No nephrolithiasis.  No hydronephrosis.     Reproductive organs: Dystrophic prostatic calcifications.     GI tract/Mesentery: Stomach is normal appearance. Visualized loops of small and large bowel are normal in caliber without evidence for inflammation or obstruction.  Prominent volume stool within the colon.     Peritoneal Space: No abdominopelvic ascites or intraperitoneal free air.     Retroperitoneum: No significant adenopathy.     Abdominal wall: Unremarkable.     Bones: Degenerative changes without acute fracture or bony destructive process.     Aorta: Normal course and caliber.  No dissection, penetrating ulcer, or intramural hematoma.     The celiac artery is patent.  The splenic artery is patent noting peripheral calcification and a tortuous course.  The SMA is patent, noting up to 50% stenosis at its origin.  The bilateral renal arteries are patent, noting marked atherosclerotic calcification and high-grade stenosis of the bilateral renal artery origins.     Right:     Common iliac artery: Atherosclerotic calcification with moderate stenosis.     External iliac artery: Patent     Superficial femoral artery: Areas of high-grade stenosis with occlusion.  Reconstitutes distally.     Deep femoral artery: Patent with extensive calcification..     Popliteal artery: Extensive calcification with greater than 90% stenosis and areas of occlusion.     Anterior tibial artery: Extensive atherosclerotic calcification with areas of stenosis and intermittent occlusion.     Posterior tibial artery: Extensive atherosclerotic calcification with areas of stenosis and intermittent occlusion.     Peroneal artery: Extensive atherosclerotic calcification with areas of stenosis and intermittent occlusion.     Left:     Common iliac artery:  Atherosclerotic calcification results in up to 50% stenosis.     External iliac artery: Patent without high-grade stenosis or occlusion.     Superficial femoral artery: Atherosclerotic calcified and noncalcified plaque results in greater than 90% stenosis with occlusion of the proximal and mid SFA with distal reconstitution.     Deep femoral artery: Patent with extensive calcification     Popliteal artery: Patent with areas of greater than 90% stenosis and potential occlusion.     Anterior tibial artery: Extensive atherosclerotic calcification with areas of stenosis and intermittent occlusion.     Posterior tibial artery: Extensive atherosclerotic calcification with areas of stenosis and intermittent occlusion.     Peroneal artery: Extensive atherosclerotic calcification with areas of stenosis and intermittent occlusion.    Impression:       Extensive atherosclerotic calcification throughout the lower extremity arterial system, noting intermittent high-grade stenosis and occlusion throughout the bilateral calf vessels in addition to the bilateral superficial femoral arteries and popliteal arteries.     Bilateral renal atrophy.     Electronically signed by resident: Johanna Dumont   Date: 07/29/2020   Time: 11:51     Electronically signed by: Liset Rocha MD   Date: 07/29/2020   Time: 14:24   US Abdomen Complete [[id number]] Resulted: 07/29/20 0156   Order Status: Completed Updated: 07/29/20 0158   Narrative:     EXAMINATION:   US ABDOMEN COMPLETE     CLINICAL HISTORY:   HCV with possible cirrhosis;     TECHNIQUE:   Complete abdominal ultrasound (including pancreas, liver, gallbladder, common bile duct, spleen, aorta, IVC, and kidneys) was performed.     COMPARISON:   U/S abdomen complete 07/29/2016; CT abdomen pelvis 08/29/2018     FINDINGS:   Pancreas: Obscured by overlying bowel gas.     Aorta: Partially obscured by overlying bowel gas, noting no aneurysm within the visualized portions of the aorta.     Liver:  Measures 17.2 cm, upper limits of normal in size and extending below the costal margin.  Coarse parenchymal echogenicity with a nodular contour suggesting cirrhosis. No focal lesions.     Gallbladder: The gallbladder is surgically absent.     Biliary system: 4 mm common bile duct.  No intrahepatic ductal dilatation.     Inferior vena cava: Normal in appearance.     Right kidney: Poorly visualized.  Small in size with cortical thinning measuring 7.7 cm. No hydronephrosis.     Left kidney: Poorly visualized.  Small in size with cortical thinning measuring 7.7 cm. No hydronephrosis.     Spleen: Measures 11.3 x 4.2 cm.  Normal in size with homogeneous echotexture.     Miscellaneous: No ascites.    Impression:       Coarse appearance of the hepatic parenchyma with nodular contour suggesting underlying cirrhosis.     Advanced atrophy of the bilateral kidneys compatible with chronic medical renal disease.     Status post cholecystectomy.     Electronically signed by resident: Robin Jauregui   Date: 07/28/2020   Time: 23:10     Electronically signed by: Enoc Padron MD   Date: 07/29/2020   Time: 01:56   X-Ray Chest AP Portable [[id number]] Resulted: 07/28/20 1929   Order Status: Completed Updated: 07/28/20 1931   Narrative:     EXAMINATION:   XR CHEST AP PORTABLE     CLINICAL HISTORY:   Chest pain;     TECHNIQUE:   Single frontal view of the chest was performed.     COMPARISON:   07/24/2020     FINDINGS:   Suboptimal inspiration.     Cardiac silhouette is mildly enlarged.     Aortic atherosclerosis.     Slight blunting of the costophrenic angle on the right could represent trace effusion.     Mild bilateral interstitial and ground-glass changes could represent mild atelectasis or infiltrate.  This could represent improving pneumonitis or edema     Interval improvement from the prior study.     No acute osseous abnormality.    Impression:       Cardiomegaly with mild bilateral interstitial and ground-glass changes could  represent mild atelectasis, infiltrate or minimal edema.  Interval improvement from the prior study.       Electronically signed by: Jose Maria Solorio   Date: 07/28/2020   Time: 19:29   MRI Foot (Forefoot) Right Without Contrast [[id number]] (Abnormal) Resulted: 07/27/20 1636   Order Status: Completed Updated: 07/27/20 1637   Narrative:     EXAMINATION:   MRI FOOT (FOREFOOT) RIGHT WITHOUT CONTRAST     CLINICAL HISTORY:   Osteomyelitis suspected, diabetic;     TECHNIQUE:   Multiplanar, multisequence MR imaging of the right forefoot without the use of intravenous gadolinium IV contrast.     COMPARISON:   Right foot radiograph 07/26/2020.     FINDINGS:   Examination moderately degraded by patient motion artifact.     Bones: Postoperative changes of 2nd phalangeal and distal metatarsal amputation.  Hallux valgus deformity.  Prominent degenerative changes of the 1st metatarsophalangeal joint.     Osteomyelitis Evaluation:     Location: Abnormal signal involving the 5th metatarsal distal head.     T1 Signal: Confluent Decreased Signal     T2 Signal: Bone Marrow Edema     Cortical Margin: Indistinct     Secondary Signs: Ulcer is present adjacent to the abnormal osseous signal.     MRI Osteomyelitis Classification:Compatible with Osteomyelitis: Reticular CONFLUENT T1 and Secondary Signs ulcer/abcess/sinus tract     Tendons: Flexor and extensor tendons of the toes are within normal limits.     Ligaments: The ligaments of the foot, including the Lisfranc ligament, are intact.     Soft Tissues: Mild subcutaneous edema involving the dorsal foot.  No focal fluid collection.  Small soft tissue ulceration is present extending laterally from distal 5th metatarsal head.  Nonspecific superficial 0.7 cm cystic lesion along the plantar aspect of the 3rd phalanx.  Diffuse muscular atrophy.     Miscellaneous: No evidence of Simmons's neuroma.    Impression:       Findings consistent with osteomyelitis of the distal 5th metatarsal head with  adjacent skin ulceration.  No focal fluid collection.     Postoperative changes of 2nd phalangeal/distal metatarsal amputation.     Prominent hallux valgus deformity and degenerative changes of the 1st metatarsophalangeal joint.     Nonspecific superficial cystic lesion along the plantar aspect of the 3rd phalanx.     This report was flagged in Epic as abnormal.     Electronically signed by resident: Tej Lane   Date: 07/27/2020   Time: 15:46     Electronically signed by: Jace Lyle MD   Date: 07/27/2020   Time: 16:36   VAS Ankle Brachial Indices Resting [[id number]] Collected: 07/27/20 1239   Order Status: Completed Updated: 07/27/20 5945   Narrative:       Indication   ========     Peripheral Vascular Disease     Pressure Lower   ============     Rt brachial A syst BP  95 mmHg   Rt PTA BP  255 mmHg   Rt dors pedis A  mmHg   Rt toe BP  0 mmHg   Rt ADDI post tibial (rt post tib A BP / max brach A BP) 2.68   Rt ADDI (rt dors ped A BP / max brach A BP) 2.68   Rt ankle BP / max brach A BP   2.68   Rt TBI (rt toe BP / max brach A BP)    0.00   Lt PTA BP  133 mmHg   Lt dors pedis A  mmHg   Lt toe BP  0 mmHg   Lt ADDI (lt post tibial A BP / max brach A BP)  1.40   Lt ADDI dors ped (lt dors ped A BP / max brach A BP)    2.68   Lt ankle BP / max brach A BP   2.68   Lt TBI (lt toe BP / max brach A BP)    0.00     Impression   =========     Right Leg: Segmental pressures are unreliable due to medial calcinosis; however, PVR waveforms suggest severe peripheral arterial   occlusive disease. -TBI of 0 is noted.   Left Leg: Segmental pressures are unreliable due to medial calcinosis; however, PVR waveforms suggest severe peripheral arterial   occlusive disease. -TBI of 0 is noted.       DATE OF SERVICE: 07/27/2020                                                        Sonographer: Angela Pat   Electronically Signed by: Ron Hines M.D. at 07/27/2020-14:33   VAS US Arterial Legs Bilateral [[id number]] Collected:  07/27/20 1253   Order Status: Completed Updated: 07/27/20 1433   Narrative:       Lower Extremity Arterial Imaging   ========================     Right Lower Extremity   Rt mid CFA PSV 61 cm/s   Rt prox DFA PSV    51 cm/s   Rt prox SFA PSV    24 cm/s   Rt mid SFA PSV 44 cm/s   Rt distal SFA PSV  27 cm/s   Rt mid POP PSV 22 cm/s   Rt mid ERENDIRA PSV 19 cm/s   Rt mid PTA PSV 17 cm/s   Left Lower Extremity   Lt mid CFA PSV 19 cm/s   Lt prox DFA PSV    67 cm/s   Lt prox SFA PSV    64 cm/s   Lt mid SFA PSV 48 cm/s   Lt distal SFA PSV  45 cm/s   Lt mid POP PSV 24 cm/s   Lt mid ERENDIRA PSV 15 cm/s   Lt mid PTA PSV 20 cm/s     Comment   ========     Technically difficult study due to calcified vessels.     Impression   =========     Right leg: Color flow duplex of the right lower extremity reveals heavily calcified vessels with monophasic waveforms throughout. There   are collaterals throughout the extremity with no evidence of a hemodynamically significant stenosis.     Left leg: Color flow duplex of the left lower extremity reveals heavily calcified vessels with monophasic waveforms throughout. There   are collaterals throughout the extremity with no evidence of a hemodynamically significant stenosis.       DATE OF SERVICE: 07/27/2020                                                        Sonographer: RACHAEL PAULA RVS   Electronically Signed by: Ron Hines M.D. at 07/27/2020-14:33   X-Ray Foot Complete Right [[id number]] Resulted: 07/26/20 1454   Order Status: Completed Updated: 07/26/20 1457   Narrative:     EXAMINATION:   XR FOOT COMPLETE 3 VIEW RIGHT     CLINICAL HISTORY:   foot wound;.     TECHNIQUE:   AP, lateral, and oblique views of the right foot were performed.     COMPARISON:   09/25/2019     FINDINGS:   No fracture or dislocation.  Lisfranc articulation is congruent.  Degenerative changes identified RIGHT 1st metatarsophalangeal joint.  Vascular calcifications.  Postoperative change identified level of the distal  phalanx RIGHT 2nd toe.  Deformity at the level the proximal phalanx RIGHT 5th toe stable.  Osseous calcaneal spurs.  Suspect ulceration at the level of the RIGHT 5th metatarsal head.    Impression:       As above.  MRI may be helpful for further evaluation of potential osteomyelitis.       Electronically signed by: Jace Lyle MD   Date: 07/26/2020   Time: 14:54   US Carotid Bilateral [[id number]] Resulted: 07/24/20 1905   Order Status: Completed Updated: 07/24/20 1907   Narrative:     EXAMINATION:   US CAROTID BILATERAL     CLINICAL HISTORY:   cad;     TECHNIQUE:   Grayscale and color Doppler ultrasound examination of the carotid and vertebral artery systems bilaterally.  Stenosis estimates are per the NASCET measurement criteria.     COMPARISON:   05/02/2016     FINDINGS:   Right:     Internal Carotid Artery (ICA):     Peak systolic velocity 66 cm/sec     End diastolic velocity 21 cm/sec     ICA/CCA peak systolic ratio: 1.6     ICA/CCA end diastolic ratio: 2.6     Plaque formation: Moderate     Vertebral artery: Antegrade flow and normal waveform.     Left:     Internal Carotid Artery (ICA):     Peak systolic velocity 68 cm/sec     End diastolic velocity 22 cm/sec     ICA/CCA peak systolic ratio: 1.39     ICA/CCA end diastolic ratio: 1.69     Plaque formation: Moderate     Vertebral artery: Antegrade flow and normal waveform.    Impression:       1-39% stenosis bilaterally..       Electronically signed by: Jarrell Irvin MD   Date: 07/24/2020   Time: 19:05   X-Ray Chest AP Portable [[id number]] Resulted: 07/24/20 0608   Order Status: Completed Updated: 07/24/20 0611   Narrative:     EXAMINATION:   XR CHEST AP PORTABLE     CLINICAL HISTORY:   Chest Pain;     TECHNIQUE:   Single frontal view of the chest was performed.     COMPARISON:   08/29/2018     FINDINGS:   The cardiomediastinal silhouette is prominent in size, similar to prior examination.  The lungs are symmetrically expanded with diffuse increased  interstitial and parenchymal attenuation which can be seen in the setting of pulmonary edema although correlation for underlying infectious process is advised.  The possible small component of right pleural fluid present.  No evidence of pneumothorax.  Visualized osseous structures are intact.    Impression:       Cardiomegaly and findings suggestive of possible pulmonary edema although correlation for underlying infectious process is advised.       Electronically signed by: Nena Padron MD   Date: 07/24/2020   Time: 06:08   Imaging History    2020  Date Procedure Name Status Accession Number Location   07/29/20 09:59 AM CTA Runoff ABD PEL Bilat Lower Ext Final [id number] [facility]   07/28/20 11:04 PM US Abdomen Complete Final [id number] [facility]   07/28/20 07:01 PM X-Ray Chest AP Portable Final [id number] [facility]   07/27/20 03:39 PM MRI Foot (Forefoot) Right Without Contrast Final [id number] [facility]   07/26/20 12:01 PM VAS US Arterial Legs Bilateral Final [id number]    07/26/20 12:01 PM VAS Ankle Brachial Indices Resting Final [id number]    07/26/20 02:43 PM X-Ray Foot Complete Right Final [id number] [facility]   07/24/20 06:48 PM US Carotid Bilateral Final [id number] [facility]   07/24/20 06:02 AM X-Ray Chest AP Portable Final [id number] [facility]   07/24/20 09:54 AM Echo Color Flow Doppler? Yes Final [id number] [facility]   07/30/20 12:43 PM Cardiac catheterization Needs Review [id number] [facility]

## 2020-07-31 NOTE — PROGRESS NOTES
Pharmacokinetic Assessment Follow Up: IV Vancomycin    Vancomycin serum concentration assessment(s):    The random level was drawn correctly and can be used to guide therapy at this time. The measurement is below the desired definitive target range of 15 to 20 mcg/mL.    Vancomycin Regimen Plan:    Give Vancomycin 1000mg IV once.   Re-dose when the random level is less than 20 mcg/mL, next level to be drawn at 0500 on 8/1    Drug levels (last 3 results):  Recent Labs   Lab Result Units 07/31/20  1200   Vancomycin, Random ug/mL 14.6       Pharmacy will continue to follow and monitor vancomycin.    Please contact pharmacy at extension [phone] for questions regarding this assessment.    Thank you for the consult,   Rufina Mercado       Patient brief summary:  João Aguirre is a 55 y.o. male initiated on antimicrobial therapy with IV Vancomycin for treatment of bone/joint infection    The patient's current regimen is Vancomycin 1000mg IV pulse dosing    Drug Allergies:   Review of patient's allergies indicates:   Allergen Reactions    Antibiotic hc      Counter acts with methodone.          Actual Body Weight:   106.6 kg    Renal Function:   Estimated Creatinine Clearance: 46.3 mL/min (A) (based on SCr of 2.3 mg/dL (H)).,     Dialysis Method (if applicable):  intermittent HD    CBC (last 72 hours):  Recent Labs   Lab Result Units 07/28/20  1820 07/29/20  0419 07/30/20  0353 07/31/20  0422   WBC K/uL 9.59 8.78 6.93 7.21   Hemoglobin g/dL 10.5* 9.6* 8.5* 8.3*   Hemoglobin A1C %  --   --  4.6  --    Hematocrit % 32.8* 30.5* 27.8* 27.6*   Platelets K/uL 125* 148* 121* 129*   Gran% % 65.0 66.2 62.2 69.2   Lymph% % 19.1 16.4* 19.2 13.7*   Mono% % 9.7 10.8 11.1 9.4   Eosinophil% % 4.9 5.2 6.5 6.8   Basophil% % 0.7 0.7 0.6 0.6   Differential Method  Automated Automated Automated Automated       Metabolic Panel (last 72 hours):  Recent Labs   Lab Result Units 07/28/20  1528 07/28/20  1820 07/28/20  1842 07/28/20  1956  07/29/20  0024 07/29/20  0419 07/29/20  1009 07/29/20  1402 07/29/20  2030 07/29/20  2334 07/30/20  0353 07/30/20  0903 07/30/20  1329 07/30/20  1546 07/30/20  2022 07/30/20  2309 07/31/20  0422 07/31/20  1200   Sodium mmol/L 138 138  --  136 138 136  136 135* 136 138 139 139  138 136 137 137 136 134* 134*  134* 137   Potassium mmol/L 6.5*  6.5* 4.3  --  5.2*  5.2* 6.1*  6.1* 5.7*  5.7*  5.7* 5.4* 6.0* 4.1  4.1 4.3  4.3 4.6  4.6  4.6 4.9  4.9 4.9  4.9 4.9  4.9 4.8  4.8 5.2*  5.2* 5.5*  5.5*  5.5* 3.8  3.8   Chloride mmol/L 102 100  --  101 98 99  99 98 99 104 103 103  103 101 101 101 101 99 99  99 102   CO2 mmol/L 22* 27  --  19* 28 23  23 25 24 25 22* 26  25 26 24 24 23 23 22*  22* 26   Glucose mg/dL 94 73  --  70 68* 69*  69* 106 101 112* 102 89  90 79 64* 105 93 93 73  75 100   BUN, Bld mg/dL 68* 35*  --  43* 43* 48*  48* 52* 55* 21* 25* 30*  30* 36* 37* 40* 45* 45* 52*  52* 17   Creatinine mg/dL 5.8* 3.4*  --  4.3* 4.5* 4.8*  4.8* 5.2* 5.6* 2.9* 3.2* 3.6*  3.6* 4.1* 4.3* 4.4* 4.8* 5.0* 5.3*  5.1* 2.3*   Albumin g/dL  --  2.9*  --   --   --   --   --   --   --   --  2.6*  --   --   --   --   --  2.6*  --    Total Bilirubin mg/dL  --  0.5  --   --   --   --   --   --   --   --  0.4  --   --   --   --   --  0.5  --    Alkaline Phosphatase U/L  --  122  --   --   --   --   --   --   --   --  120  --   --   --   --   --  131  --    AST U/L  --  20  --   --   --   --   --   --   --   --  28  --   --   --   --   --  25  --    ALT U/L  --  15  --   --   --   --   --   --   --   --  15  --   --   --   --   --  9*  --    Magnesium mg/dL  --   --  1.8  --   --  2.2  --   --   --   --  2.0  --   --   --   --   --  2.1  --    Phosphorus mg/dL  --   --  3.6  --   --  6.2*  --   --   --   --  4.9*  --   --   --   --   --  6.5*  --        Vancomycin Administrations:  vancomycin given in the last 96 hours                   vancomycin 1.5 g in dextrose 5 % 250 mL IVPB (ready to mix) (mg) 1,500  mg New Bag 07/30/20 1936                Microbiologic Results:  Microbiology Results (last 7 days)     Procedure Component Value Units Date/Time    Culture, Anaerobe [[id number]] Collected: 07/29/20 1415    Order Status: Completed Specimen: Bone from Foot, Right Updated: 07/31/20 1113     Anaerobic Culture Culture in progress    Aerobic culture [[id number]]  (Abnormal) Collected: 07/29/20 1415    Order Status: Completed Specimen: Bone from Foot, Right Updated: 07/31/20 0957     Aerobic Bacterial Culture GRAM NEGATIVE EDYTA  Moderate  Identification and susceptibility pending      AFB Culture & Smear [[id number]] Collected: 07/29/20 1415    Order Status: Completed Specimen: Bone from Foot, Right Updated: 07/30/20 2127     AFB Culture & Smear Culture in progress     AFB CULTURE STAIN No acid fast bacilli seen.    Gram stain [[id number]] Collected: 07/29/20 1415    Order Status: Completed Specimen: Bone from Foot, Right Updated: 07/29/20 2051     Gram Stain Result No WBC's      No organisms seen    Fungus culture [[id number]] Collected: 07/29/20 1415    Order Status: Sent Specimen: Bone from Foot, Right Updated: 07/29/20 0493

## 2020-07-31 NOTE — PHYSICIAN QUERY
PT Name: João Aguirre  MR #: [id number]    Atrial Flutter Specificity Clarification     CDS/: Alem Diamond               Contact information: [email or web address]    This form is a permanent document in the medical record.     Query Date: July 31, 2020    By submitting this query, we are merely seeking further clarification of documentation. Please utilize your independent clinical judgment when addressing the question(s) below.    The medical record contains the following:   Indicators Supporting Clinical Findings Location in Medical Record   x Atrial Flutter Atrial flutter  Patient has a history of paroxysmal AF and now mitral stenosis which is at least moderate if not severe; needs anticoagulation with warfarin for valvular AFib  Developed atrial flutter, currently NSR  Recommend home beta blocker for rate control Cards note 7/30    x EKG results Atrial flutter with 2 to 1 block   Intraventricular conduction delay   inferior infarct   possible anterior infarct   Abnormal ECG   When compared with ECG of 28-JUL-2020 18:09,   Atrial flutter has replaced Atrial fibrillation  EKG 7/29 @ 8:07     Medication      Treatment      Other       Provider, please further specify the atrial flutter.    [  x ] Atypical   [   ] Type I   [   ] Type II   [   ] Typical   [   ] Other (please specify): ______________   [  ] Clinically Undetermined         Present on admission (POA) status:   [   ] Yes (Y)                      [  ] Clinically Undetermined (W)  [  x ] No (N)                       [   ] Documentation insufficient to determine if condition is POA (U)       Please document in your progress notes daily for the duration of treatment until resolved, and include in your discharge summary.

## 2020-07-31 NOTE — SUBJECTIVE & OBJECTIVE
Interval History: No AEON. Afebrile. WBC WNL.  MRI consistent with osteo of distal 5th metatarsal head.  RLE angiogram recently,  unable to intervene.  Revasc to be attempted with Dr. Phillip next week.   Bone cultures 7/29 now with GNR.   The patient denies any recent fever, chills, or sweats or significant foot pain.      Review of Systems   Constitutional: Negative for activity change, appetite change, chills, diaphoresis, fatigue and fever.   HENT: Negative.    Eyes: Positive for visual disturbance (right eye blindness).   Respiratory: Negative for chest tightness and shortness of breath.    Cardiovascular: Positive for chest pain.   Gastrointestinal: Negative for abdominal pain, diarrhea, nausea and vomiting.   Genitourinary: Negative for dysuria and hematuria.        HD   Musculoskeletal: Negative for arthralgias and myalgias.   Skin: Positive for wound.   Neurological: Negative for dizziness and headaches.   Psychiatric/Behavioral: Negative for agitation and behavioral problems.   All other systems reviewed and are negative.    Objective:     Vital Signs (Most Recent):  Temp: 98.3 °F (36.8 °C) (07/31/20 0735)  Pulse: 69 (07/31/20 0915)  Resp: 18 (07/31/20 0735)  BP: 134/67 (07/31/20 0915)  SpO2: 97 % (07/31/20 0356) Vital Signs (24h Range):  Temp:  [98.2 °F (36.8 °C)-98.6 °F (37 °C)] 98.3 °F (36.8 °C)  Pulse:  [63-75] 69  Resp:  [14-20] 18  SpO2:  [95 %-99 %] 97 %  BP: ()/(62-87) 134/67     Weight: 106.6 kg (235 lb 0.2 oz)  Body mass index is 31.87 kg/m².    Estimated Creatinine Clearance: 20.1 mL/min (A) (based on SCr of 5.3 mg/dL (H)).    Physical Exam  Vitals signs and nursing note reviewed.   Constitutional:       General: He is not in acute distress.     Appearance: He is not ill-appearing or toxic-appearing.   HENT:      Head: Normocephalic and atraumatic.   Eyes:      General: No scleral icterus.     Conjunctiva/sclera: Conjunctivae normal.      Comments: Right eye blindness   Neck:       Musculoskeletal: Normal range of motion and neck supple.   Cardiovascular:      Rate and Rhythm: Normal rate.      Heart sounds: Murmur present.   Pulmonary:      Effort: Pulmonary effort is normal. No respiratory distress.      Breath sounds: Normal breath sounds.   Abdominal:      General: Abdomen is flat.      Palpations: Abdomen is soft.   Musculoskeletal: Normal range of motion.         General: No swelling or tenderness.   Skin:     General: Skin is warm and dry.      Comments: Wounds examined.  No drainage, periwound erythema, tenderness, fluctuance.   No ascending warmth/erythema.   Podiatry photos reviewed.  See below.     Dialysis access LUE    Neurological:      General: No focal deficit present.      Mental Status: He is alert and oriented to person, place, and time.   Psychiatric:         Mood and Affect: Mood normal.         Behavior: Behavior normal.       7/30 7/26                Significant Labs:   Blood Culture: No results for input(s): LABBLOO in the last 4320 hours.  CBC:   Recent Labs   Lab 07/30/20  0353 07/31/20  0422   WBC 6.93 7.21   HGB 8.5* 8.3*   HCT 27.8* 27.6*   * 129*     CMP:   Recent Labs   Lab 07/30/20  0353  07/30/20 2022 07/30/20  2309 07/31/20  0422     138   < > 136 134* 134*  134*   K 4.6  4.6  4.6   < > 4.8  4.8 5.2*  5.2* 5.5*  5.5*  5.5*     103   < > 101 99 99  99   CO2 26  25   < > 23 23 22*  22*   GLU 89  90   < > 93 93 73  75   BUN 30*  30*   < > 45* 45* 52*  52*   CREATININE 3.6*  3.6*   < > 4.8* 5.0* 5.3*  5.1*   CALCIUM 9.2  8.8   < > 9.2 9.0 9.2  9.2   PROT 6.5  --   --   --  6.6   ALBUMIN 2.6*  --   --   --  2.6*   BILITOT 0.4  --   --   --  0.5   ALKPHOS 120  --   --   --  131   AST 28  --   --   --  25   ALT 15  --   --   --  9*   ANIONGAP 10  10   < > 12 12 13  13   EGFRNONAA 18.1*  18.1*   < > 12.8* 12.1* 11.3*  11.8*    < > = values in this interval not displayed.     Wound Culture:   Recent Labs   Lab  07/29/20  1415   LABAERO No growth       Significant Imaging: I have reviewed all pertinent imaging results/findings within the past 24 hours.   CTA Runoff ABD PEL Bilat Lower Ext [[id number]] Resulted: 07/29/20 1424   Order Status: Completed Updated: 07/29/20 1427   Narrative:     EXAMINATION:   CTA RUNOFF ABD PEL BILAT LOWER EXT     CLINICAL HISTORY:   Acute limb ischemia, leg;     TECHNIQUE:   CTA of abdomen, pelvis, and bilateral lower extremities performed with 125 mL Omnipaque 350 intravenous contrast.     COMPARISON:   Ultrasound abdomen complete 07/28/2020, CT abdomen pelvis with contrast 08/29/2018     FINDINGS:   Heart: No cardiomegaly or pericardial effusion. Coronary artery and aortic valve atherosclerotic calcification.     Lung Bases: There are bilateral linear bandlike opacities compatible with scarring versus atelectasis, mild traction bronchiectasis.  No large mass or consolidation.  No pneumothorax.  Trace left pleural effusion with associated consolidation and atelectasis within the left lower lobe.     Liver: Normal in size and attenuation without focal hepatic abnormality. Several lymph nodes noted at the ankit hepatis, stable compared to 2018     Gallbladder: Status post cholecystectomy.     Bile Ducts: No intra or extrahepatic biliary ductal dilation.     Pancreas: No pancreatic mass lesion or peripancreatic inflammatory change.     Spleen: Unremarkable     Adrenals: Unremarkable     Genitourinary: Native kidneys are atrophic bilaterally with cortical thinning.  Left cortical hypodensity, too small to characterize, likely a simple cyst.  No nephrolithiasis.  No hydronephrosis.     Reproductive organs: Dystrophic prostatic calcifications.     GI tract/Mesentery: Stomach is normal appearance. Visualized loops of small and large bowel are normal in caliber without evidence for inflammation or obstruction.  Prominent volume stool within the colon.     Peritoneal Space: No abdominopelvic ascites or  intraperitoneal free air.     Retroperitoneum: No significant adenopathy.     Abdominal wall: Unremarkable.     Bones: Degenerative changes without acute fracture or bony destructive process.     Aorta: Normal course and caliber.  No dissection, penetrating ulcer, or intramural hematoma.     The celiac artery is patent.  The splenic artery is patent noting peripheral calcification and a tortuous course.  The SMA is patent, noting up to 50% stenosis at its origin.  The bilateral renal arteries are patent, noting marked atherosclerotic calcification and high-grade stenosis of the bilateral renal artery origins.     Right:     Common iliac artery: Atherosclerotic calcification with moderate stenosis.     External iliac artery: Patent     Superficial femoral artery: Areas of high-grade stenosis with occlusion.  Reconstitutes distally.     Deep femoral artery: Patent with extensive calcification..     Popliteal artery: Extensive calcification with greater than 90% stenosis and areas of occlusion.     Anterior tibial artery: Extensive atherosclerotic calcification with areas of stenosis and intermittent occlusion.     Posterior tibial artery: Extensive atherosclerotic calcification with areas of stenosis and intermittent occlusion.     Peroneal artery: Extensive atherosclerotic calcification with areas of stenosis and intermittent occlusion.     Left:     Common iliac artery: Atherosclerotic calcification results in up to 50% stenosis.     External iliac artery: Patent without high-grade stenosis or occlusion.     Superficial femoral artery: Atherosclerotic calcified and noncalcified plaque results in greater than 90% stenosis with occlusion of the proximal and mid SFA with distal reconstitution.     Deep femoral artery: Patent with extensive calcification     Popliteal artery: Patent with areas of greater than 90% stenosis and potential occlusion.     Anterior tibial artery: Extensive atherosclerotic calcification with  areas of stenosis and intermittent occlusion.     Posterior tibial artery: Extensive atherosclerotic calcification with areas of stenosis and intermittent occlusion.     Peroneal artery: Extensive atherosclerotic calcification with areas of stenosis and intermittent occlusion.    Impression:       Extensive atherosclerotic calcification throughout the lower extremity arterial system, noting intermittent high-grade stenosis and occlusion throughout the bilateral calf vessels in addition to the bilateral superficial femoral arteries and popliteal arteries.     Bilateral renal atrophy.     Electronically signed by resident: Johanna Dumont   Date: 07/29/2020   Time: 11:51     Electronically signed by: Liset Rocha MD   Date: 07/29/2020   Time: 14:24   US Abdomen Complete [[id number]] Resulted: 07/29/20 0156   Order Status: Completed Updated: 07/29/20 0158   Narrative:     EXAMINATION:   US ABDOMEN COMPLETE     CLINICAL HISTORY:   HCV with possible cirrhosis;     TECHNIQUE:   Complete abdominal ultrasound (including pancreas, liver, gallbladder, common bile duct, spleen, aorta, IVC, and kidneys) was performed.     COMPARISON:   U/S abdomen complete 07/29/2016; CT abdomen pelvis 08/29/2018     FINDINGS:   Pancreas: Obscured by overlying bowel gas.     Aorta: Partially obscured by overlying bowel gas, noting no aneurysm within the visualized portions of the aorta.     Liver: Measures 17.2 cm, upper limits of normal in size and extending below the costal margin.  Coarse parenchymal echogenicity with a nodular contour suggesting cirrhosis. No focal lesions.     Gallbladder: The gallbladder is surgically absent.     Biliary system: 4 mm common bile duct.  No intrahepatic ductal dilatation.     Inferior vena cava: Normal in appearance.     Right kidney: Poorly visualized.  Small in size with cortical thinning measuring 7.7 cm. No hydronephrosis.     Left kidney: Poorly visualized.  Small in size with cortical thinning  measuring 7.7 cm. No hydronephrosis.     Spleen: Measures 11.3 x 4.2 cm.  Normal in size with homogeneous echotexture.     Miscellaneous: No ascites.    Impression:       Coarse appearance of the hepatic parenchyma with nodular contour suggesting underlying cirrhosis.     Advanced atrophy of the bilateral kidneys compatible with chronic medical renal disease.     Status post cholecystectomy.     Electronically signed by resident: Robin Jauregui   Date: 07/28/2020   Time: 23:10     Electronically signed by: Enoc Padron MD   Date: 07/29/2020   Time: 01:56   X-Ray Chest AP Portable [[id number]] Resulted: 07/28/20 1929   Order Status: Completed Updated: 07/28/20 1931   Narrative:     EXAMINATION:   XR CHEST AP PORTABLE     CLINICAL HISTORY:   Chest pain;     TECHNIQUE:   Single frontal view of the chest was performed.     COMPARISON:   07/24/2020     FINDINGS:   Suboptimal inspiration.     Cardiac silhouette is mildly enlarged.     Aortic atherosclerosis.     Slight blunting of the costophrenic angle on the right could represent trace effusion.     Mild bilateral interstitial and ground-glass changes could represent mild atelectasis or infiltrate.  This could represent improving pneumonitis or edema     Interval improvement from the prior study.     No acute osseous abnormality.    Impression:       Cardiomegaly with mild bilateral interstitial and ground-glass changes could represent mild atelectasis, infiltrate or minimal edema.  Interval improvement from the prior study.       Electronically signed by: Jose Maria Solorio   Date: 07/28/2020   Time: 19:29   MRI Foot (Forefoot) Right Without Contrast [[id number]] (Abnormal) Resulted: 07/27/20 1636   Order Status: Completed Updated: 07/27/20 1639   Narrative:     EXAMINATION:   MRI FOOT (FOREFOOT) RIGHT WITHOUT CONTRAST     CLINICAL HISTORY:   Osteomyelitis suspected, diabetic;     TECHNIQUE:   Multiplanar, multisequence MR imaging of the right forefoot without the use of  intravenous gadolinium IV contrast.     COMPARISON:   Right foot radiograph 07/26/2020.     FINDINGS:   Examination moderately degraded by patient motion artifact.     Bones: Postoperative changes of 2nd phalangeal and distal metatarsal amputation.  Hallux valgus deformity.  Prominent degenerative changes of the 1st metatarsophalangeal joint.     Osteomyelitis Evaluation:     Location: Abnormal signal involving the 5th metatarsal distal head.     T1 Signal: Confluent Decreased Signal     T2 Signal: Bone Marrow Edema     Cortical Margin: Indistinct     Secondary Signs: Ulcer is present adjacent to the abnormal osseous signal.     MRI Osteomyelitis Classification:Compatible with Osteomyelitis: Reticular CONFLUENT T1 and Secondary Signs ulcer/abcess/sinus tract     Tendons: Flexor and extensor tendons of the toes are within normal limits.     Ligaments: The ligaments of the foot, including the Lisfranc ligament, are intact.     Soft Tissues: Mild subcutaneous edema involving the dorsal foot.  No focal fluid collection.  Small soft tissue ulceration is present extending laterally from distal 5th metatarsal head.  Nonspecific superficial 0.7 cm cystic lesion along the plantar aspect of the 3rd phalanx.  Diffuse muscular atrophy.     Miscellaneous: No evidence of Simmons's neuroma.    Impression:       Findings consistent with osteomyelitis of the distal 5th metatarsal head with adjacent skin ulceration.  No focal fluid collection.     Postoperative changes of 2nd phalangeal/distal metatarsal amputation.     Prominent hallux valgus deformity and degenerative changes of the 1st metatarsophalangeal joint.     Nonspecific superficial cystic lesion along the plantar aspect of the 3rd phalanx.     This report was flagged in Epic as abnormal.     Electronically signed by resident: Tej April   Date: 07/27/2020   Time: 15:46     Electronically signed by: Jace Lyle MD   Date: 07/27/2020   Time: 16:36   VAS Ankle Brachial  Indices Resting [[id number]] Collected: 07/27/20 1239   Order Status: Completed Updated: 07/27/20 1435   Narrative:       Indication   ========     Peripheral Vascular Disease     Pressure Lower   ============     Rt brachial A syst BP  95 mmHg   Rt PTA BP  255 mmHg   Rt dors pedis A  mmHg   Rt toe BP  0 mmHg   Rt ADDI post tibial (rt post tib A BP / max brach A BP) 2.68   Rt ADDI (rt dors ped A BP / max brach A BP) 2.68   Rt ankle BP / max brach A BP   2.68   Rt TBI (rt toe BP / max brach A BP)    0.00   Lt PTA BP  133 mmHg   Lt dors pedis A  mmHg   Lt toe BP  0 mmHg   Lt ADDI (lt post tibial A BP / max brach A BP)  1.40   Lt ADDI dors ped (lt dors ped A BP / max brach A BP)    2.68   Lt ankle BP / max brach A BP   2.68   Lt TBI (lt toe BP / max brach A BP)    0.00     Impression   =========     Right Leg: Segmental pressures are unreliable due to medial calcinosis; however, PVR waveforms suggest severe peripheral arterial   occlusive disease. -TBI of 0 is noted.   Left Leg: Segmental pressures are unreliable due to medial calcinosis; however, PVR waveforms suggest severe peripheral arterial   occlusive disease. -TBI of 0 is noted.       DATE OF SERVICE: 07/27/2020                                                        Sonographer: Angela Pat   Electronically Signed by: Ron Hines M.D. at 07/27/2020-14:33   VAS US Arterial Legs Bilateral [[id number]] Collected: 07/27/20 1253   Order Status: Completed Updated: 07/27/20 1433   Narrative:       Lower Extremity Arterial Imaging   ========================     Right Lower Extremity   Rt mid CFA PSV 61 cm/s   Rt prox DFA PSV    51 cm/s   Rt prox SFA PSV    24 cm/s   Rt mid SFA PSV 44 cm/s   Rt distal SFA PSV  27 cm/s   Rt mid POP PSV 22 cm/s   Rt mid ERENDIRA PSV 19 cm/s   Rt mid PTA PSV 17 cm/s   Left Lower Extremity   Lt mid CFA PSV 19 cm/s   Lt prox DFA PSV    67 cm/s   Lt prox SFA PSV    64 cm/s   Lt mid SFA PSV 48 cm/s   Lt distal SFA PSV  45 cm/s   Lt mid POP PSV  24 cm/s   Lt mid ERENDIRA PSV 15 cm/s   Lt mid PTA PSV 20 cm/s     Comment   ========     Technically difficult study due to calcified vessels.     Impression   =========     Right leg: Color flow duplex of the right lower extremity reveals heavily calcified vessels with monophasic waveforms throughout. There   are collaterals throughout the extremity with no evidence of a hemodynamically significant stenosis.     Left leg: Color flow duplex of the left lower extremity reveals heavily calcified vessels with monophasic waveforms throughout. There   are collaterals throughout the extremity with no evidence of a hemodynamically significant stenosis.       DATE OF SERVICE: 07/27/2020                                                        Sonographer: RACHAEL MILLS   Electronically Signed by: Ron Hines M.D. at 07/27/2020-14:33   X-Ray Foot Complete Right [[id number]] Resulted: 07/26/20 1454   Order Status: Completed Updated: 07/26/20 1457   Narrative:     EXAMINATION:   XR FOOT COMPLETE 3 VIEW RIGHT     CLINICAL HISTORY:   foot wound;.     TECHNIQUE:   AP, lateral, and oblique views of the right foot were performed.     COMPARISON:   09/25/2019     FINDINGS:   No fracture or dislocation.  Lisfranc articulation is congruent.  Degenerative changes identified RIGHT 1st metatarsophalangeal joint.  Vascular calcifications.  Postoperative change identified level of the distal phalanx RIGHT 2nd toe.  Deformity at the level the proximal phalanx RIGHT 5th toe stable.  Osseous calcaneal spurs.  Suspect ulceration at the level of the RIGHT 5th metatarsal head.    Impression:       As above.  MRI may be helpful for further evaluation of potential osteomyelitis.       Electronically signed by: Jace Lyle MD   Date: 07/26/2020   Time: 14:54   US Carotid Bilateral [[id number]] Resulted: 07/24/20 1905   Order Status: Completed Updated: 07/24/20 1907   Narrative:     EXAMINATION:   US CAROTID BILATERAL     CLINICAL HISTORY:   cad;      TECHNIQUE:   Grayscale and color Doppler ultrasound examination of the carotid and vertebral artery systems bilaterally.  Stenosis estimates are per the NASCET measurement criteria.     COMPARISON:   05/02/2016     FINDINGS:   Right:     Internal Carotid Artery (ICA):     Peak systolic velocity 66 cm/sec     End diastolic velocity 21 cm/sec     ICA/CCA peak systolic ratio: 1.6     ICA/CCA end diastolic ratio: 2.6     Plaque formation: Moderate     Vertebral artery: Antegrade flow and normal waveform.     Left:     Internal Carotid Artery (ICA):     Peak systolic velocity 68 cm/sec     End diastolic velocity 22 cm/sec     ICA/CCA peak systolic ratio: 1.39     ICA/CCA end diastolic ratio: 1.69     Plaque formation: Moderate     Vertebral artery: Antegrade flow and normal waveform.    Impression:       1-39% stenosis bilaterally..       Electronically signed by: Jarrell Irvin MD   Date: 07/24/2020   Time: 19:05   X-Ray Chest AP Portable [[id number]] Resulted: 07/24/20 0608   Order Status: Completed Updated: 07/24/20 0611   Narrative:     EXAMINATION:   XR CHEST AP PORTABLE     CLINICAL HISTORY:   Chest Pain;     TECHNIQUE:   Single frontal view of the chest was performed.     COMPARISON:   08/29/2018     FINDINGS:   The cardiomediastinal silhouette is prominent in size, similar to prior examination.  The lungs are symmetrically expanded with diffuse increased interstitial and parenchymal attenuation which can be seen in the setting of pulmonary edema although correlation for underlying infectious process is advised.  The possible small component of right pleural fluid present.  No evidence of pneumothorax.  Visualized osseous structures are intact.    Impression:       Cardiomegaly and findings suggestive of possible pulmonary edema although correlation for underlying infectious process is advised.       Electronically signed by: Nena Padron MD   Date: 07/24/2020   Time: 06:08   Imaging History    2020  Date  Procedure Name Status Accession Number Location   07/29/20 09:59 AM CTA Runoff ABD PEL Bilat Lower Ext Final [id number] [facility]   07/28/20 11:04 PM US Abdomen Complete Final [id number] [facility]   07/28/20 07:01 PM X-Ray Chest AP Portable Final [id number] [facility]   07/27/20 03:39 PM MRI Foot (Forefoot) Right Without Contrast Final [id number] [facility]   07/26/20 12:01 PM VAS US Arterial Legs Bilateral Final [id number]    07/26/20 12:01 PM VAS Ankle Brachial Indices Resting Final [id number]    07/26/20 02:43 PM X-Ray Foot Complete Right Final [id number] [facility]   07/24/20 06:48 PM US Carotid Bilateral Final [id number] [facility]   07/24/20 06:02 AM X-Ray Chest AP Portable Final [id number] [facility]   07/24/20 09:54 AM Echo Color Flow Doppler? Yes Final [id number] [facility]   07/30/20 12:43 PM Cardiac catheterization Needs Review [id number] CATH

## 2020-07-31 NOTE — ASSESSMENT & PLAN NOTE
55 y.o. male with right diabetic right foot infection with MRI evidence of osteomyelitis. Non-invasive vascular labs suggest severe tibial and microvascular disease. The nature and severity of these blockages were apparent on angiogram and not ammendable to endoscopic repair. Due to lack of adequate blood flow and the ongoing osteomyelitic infection we have recommended a BKA. This was discussed with Mr. Aguirre today and he is not ready for that step. He would like to wait and consider his options.    - Recommend Right BKA at this time  - Will continue to follow along and further discuss Mr. Aguirre's options

## 2020-07-31 NOTE — PROGRESS NOTES
[facility]  Interventional Cardiology  Progress Note    Patient Name: João Aguirre  MRN: [id number]  Admission Date: 7/24/2020  Hospital Length of Stay: 7 days  Code Status: Full Code   Attending Physician: Rony Ryan MD   Primary Care Physician: Primary Doctor No  Principal Problem:Mitral stenosis    Subjective:     Interval History:  S/P LHC + PCI to mid LAD and RCA yesterday. Tolerated procedure well. No events overnight. R femoral access site c/d/i. Patient examined in dialysis this morning - tolerating fluid removal without issue. Continues to reports shortness of breath but otherwise remains chest pain free.    Review of Systems   All other systems reviewed and are negative.    Objective:     Vital Signs (Most Recent):  Temp: 98.3 °F (36.8 °C) (07/31/20 0735)  Pulse: 76 (07/31/20 1015)  Resp: 18 (07/31/20 0735)  BP: (!) 153/78 (07/31/20 1015)  SpO2: 97 % (07/31/20 0356) Vital Signs (24h Range):  Temp:  [98.2 °F (36.8 °C)-98.6 °F (37 °C)] 98.3 °F (36.8 °C)  Pulse:  [63-76] 76  Resp:  [14-20] 18  SpO2:  [95 %-99 %] 97 %  BP: ()/(62-87) 153/78     Weight: 106.6 kg (235 lb 0.2 oz)  Body mass index is 31.87 kg/m².    SpO2: 97 %  O2 Device (Oxygen Therapy): nasal cannula      Intake/Output Summary (Last 24 hours) at 7/31/2020 1024  Last data filed at 7/31/2020 0400  Gross per 24 hour   Intake --   Output 300 ml   Net -300 ml       Lines/Drains/Airways     Drain                 Hemodialysis AV Fistula Left forearm -- days         Hemodialysis AV Fistula Left forearm -- days         Hemodialysis AV Fistula Left forearm -- days          Peripheral Intravenous Line                 Peripheral IV - Single Lumen 07/30/20 0458 20 G Anterior;Right Shoulder 1 day         Peripheral IV - Single Lumen 07/30/20 2056 22 G Anterior;Proximal;Right Forearm less than 1 day                Physical Exam  Constitutional: He is oriented to person, place, and time. He appears well-developed and  well-nourished.   HENT:   Head: Normocephalic and atraumatic.   Right Ear: External ear normal.   Left Ear: External ear normal.   Eyes: Left eye exhibits no discharge.   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 Neck supple.   Cardiovascular: Normal rate, regular rhythm, normal heart sounds and intact distal pulses.   Pulses: Femoral pulses are 2+ on the right side and 2+ on the left side.  Pulmonary/Chest: Effort normal and breath sounds normal. No respiratory distress. He has no wheezes.   Abdominal: Soft. Bowel sounds are normal. He exhibits no distension. There is no abdominal tenderness.   Musculoskeletal: Normal range of motion.         General: No edema.      Comments: Left arm fistula, Right foot wrapped   Neurological: He is alert and oriented to person, place, and time.   Skin: Skin is warm and dry.   Psychiatric: He has a normal mood and affect. His behavior is normal.            Significant Labs:   CMP   Recent Labs   Lab 07/30/20  0353  07/30/20 2022 07/30/20  2309 07/31/20  0422     138   < > 136 134* 134*  134*   K 4.6  4.6  4.6   < > 4.8  4.8 5.2*  5.2* 5.5*  5.5*  5.5*     103   < > 101 99 99  99   CO2 26  25   < > 23 23 22*  22*   GLU 89  90   < > 93 93 73  75   BUN 30*  30*   < > 45* 45* 52*  52*   CREATININE 3.6*  3.6*   < > 4.8* 5.0* 5.3*  5.1*   CALCIUM 9.2  8.8   < > 9.2 9.0 9.2  9.2   PROT 6.5  --   --   --  6.6   ALBUMIN 2.6*  --   --   --  2.6*   BILITOT 0.4  --   --   --  0.5   ALKPHOS 120  --   --   --  131   AST 28  --   --   --  25   ALT 15  --   --   --  9*   ANIONGAP 10  10   < > 12 12 13  13   ESTGFRAFRICA 20.9*  20.9*   < > 14.7* 14.0* 13.1*  13.7*   EGFRNONAA 18.1*  18.1*   < > 12.8* 12.1* 11.3*  11.8*    < > = values in this interval not displayed.   , CBC   Recent Labs   Lab 07/30/20  0353 07/31/20  0422   WBC 6.93 7.21   HGB 8.5* 8.3*   HCT 27.8* 27.6*   * 129*   , Troponin   Recent Labs    Lab 07/30/20  0353   TROPONINI 4.770*    and All pertinent lab results from the last 24 hours have been reviewed.    Significant Imaging: Echocardiogram:   2D echo with color flow doppler:   Results for orders placed or performed during the hospital encounter of 05/02/16   2D echo with color flow doppler    Narrative    This study is in progress....     Assessment and Plan:     Patient is a 55 y.o. male presenting with:     1- NSTEMI (non-ST elevated myocardial infarction)  S/P successful PCI to mid LAD with FARHEEN x2 and prox RCA with FARHEEN x1 on 7/30/20  Continue triple therapy for 1 week with ASA 81 mg daily, Brilinta 90 mg BID (Plavix non-responder), and warfarin (INR 2-3)   After 1 week stop ASA and continue with Brilinta indefinitely and warfarin for valvular AF with hx CVA  Hemet beta blocker to achieve HR 50-60s as blood pressures tolerate  Continue high intensity statin   Risk factor modification     2- PVD (peripheral vascular disease)  Severe PAD disease based on CTA runoff and recent peripheral angio; patient asymptomatic due to diabetic neuropathy however has nonhealing right foot ulcer associated with osteomyelitis of distal 5th metatarsal head as seen on MRI. Seen by Vascular Sx who recommends BKA however patient would like to proceed with minimally invasive options.   Plan for peripheral intervention on Tuesday, 8/4/20 (please ensure NPO after MN on Monday into Tuesday)  Continue triple therapy for 1 week as detailed above (ok to do procedure on therapeutic warfarin)     3- Mitral stenosis      Patient with significant gradient across mitral valve. No therapeutic intervention for Mitral annular calcification at this time.       Discussed with Dr Marie. Primary team verbally informed of above.      Cate Ching MD  Interventional Cardiology  [facility]

## 2020-07-31 NOTE — PROGRESS NOTES
[facility] NOTE     Patient currently on hemodialysis for removal of uremic toxins and volume.     Patient seen and evaluated on hemodialysis, tolerating treatment, see HD flowsheet for vitals and assessments.      Ultrafiltration goal is 3L     Labs have been reviewed and the dialysate bath has been adjusted.     Assessment/Plan:  Seen on dialysis this morning, tolerating well.  LHC performed yesterday with successful PCI.  Blood pressures improved, able to tolerate ultrafiltration today.  No complaints of CP, + SOB.  Pre-weight of 106.3 kg this morning with a reported EDW of 94.5 kg.  Will increase treatment time by 30 minutes and attempt an extra liter for a net total of 3L today.  ERNESTO with dialysis today  Renal diet  Continue Renvela  Dialysis again tomorrow for volume optimization.    VIOLA Maldonado, FNP-BC  Nephrology  Pager:  [phone]

## 2020-07-31 NOTE — SUBJECTIVE & OBJECTIVE
Past Medical History:   Diagnosis Date    Acute osteomyelitis of metatarsal bone, right     PAD right    Anticoagulant long-term use     Arthritis     Asthma     Back pain     on methadone    C. difficile colitis 09/2018    Cirrhosis of liver without ascites 2016    by liver US. +HCV    Coronary artery disease 2013    stent.  h/o STEMI and NSTEMI    Diabetes mellitus     resolved, multiple admissions for hypoglycemia requiring ICU possibley due to liver/ESRD    Encounter for blood transfusion     ESRD on hemodialysis 2013    anuric    Eye abnormality right eye    injured as a child    Gastritis     Gastroparesis     HCV (hepatitis C virus)     ? h/o tattoo exposure    Hypertension     Mitral stenosis 07/24/2020    Moderate to severe mitral stenosis    PAD (peripheral artery disease)     RLE s/p R CFA endarterectomy    Pneumonia 2013    Spend 6weeks in hospital at [place]    Stroke     Tuberculosis     treated       Past Surgical History:   Procedure Laterality Date    ANGIOGRAPHY OF LOWER EXTREMITY Right 7/28/2020    Procedure: Angiogram Extremity Unilateral;  Surgeon: MINO Vasquez II, MD;  Location: [address];  Service: Vascular;  Laterality: Right;  Left groin and rIght pedal artery access  15.5 min  247.25 mGy  68.5454 Gycm2  33ml contrast    AV FISTULA PLACEMENT      BACK SURGERY      CARDIAC SURGERY  2013    heart stent    CHOLECYSTECTOMY      CORONARY ANGIOGRAPHY N/A 7/30/2020    Procedure: ANGIOGRAM, CORONARY ARTERY;  Surgeon: Alexandro Terry MD;  Location: [facility] CATH LAB;  Service: Cardiology;  Laterality: N/A;    EYE SURGERY      R eye    INCISION AND DRAINAGE OF ABSCESS Right 9/6/2018    Procedure: INCISION AND DRAINAGE, ABSCESS;  Surgeon: Chetan Rothman MD;  [facility];  Service: General;  Laterality: Right;    LEFT HEART CATHETERIZATION N/A 7/30/2020    Procedure: Left heart cath;  Surgeon: Alexandro Terry MD;  Location: [facility] CATH LAB;  Service:  Cardiology;  Laterality: N/A;       Review of patient's allergies indicates:   Allergen Reactions    Antibiotic hc      Counter acts with methodone.          No current facility-administered medications on file prior to encounter.      Current Outpatient Medications on File Prior to Encounter   Medication Sig    amlodipine (NORVASC) 5 MG tablet Take 5 mg by mouth 2 (two) times daily.    albuterol (PROAIR HFA) 90 mcg/actuation inhaler INHALE TWO PUFFS EVERY 4 TO 6 HOURS AS NEEDED    albuterol (PROAIR HFA) 90 mcg/actuation inhaler INHALE TWO PUFFS EVERY 4 TO 6 HOURS AS NEEDED    aspirin 325 MG tablet Take 1 tablet (325 mg total) by mouth once daily.    atorvastatin (LIPITOR) 10 MG tablet     atorvastatin (LIPITOR) 40 MG tablet TAKE ONE TABLET BY MOUTH DAILY    blood sugar diagnostic (TRUE METRIX GLUCOSE TEST STRIP) Strp     blood-glucose meter (PHARMACIST CHOICE GLUCOSE SYS) Misc 1 Device.    carvedilol (COREG) 3.125 MG tablet     ELIQUIS 2.5 mg Tab     ferric citrate (AURYXIA) 210 mg iron Tab Take 420 mg by mouth 3 (three) times daily.    fluconazole (DIFLUCAN) 100 MG tablet     fluticasone propionate (FLONASE) 50 mcg/actuation nasal spray 1 spray by Each Nostril route once daily.    fluticasone-salmeterol 250-50 mcg/dose (ADVAIR DISKUS) 250-50 mcg/dose diskus inhaler Inhale 1 puff into the lungs once daily.     gabapentin (NEURONTIN) 100 MG capsule Take 100 mg by mouth 2 (two) times daily.    hydrALAZINE (APRESOLINE) 50 MG tablet     levetiracetam (KEPPRA) 500 MG Tab Take 500 mg twice a day.  Take an extra tablet right after dialysis (making 3 tablets on your dialysis days)    methadone (DOLOPHINE) 10 MG tablet Take 9 tablets (90 mg total) by mouth once daily.    metroNIDAZOLE (FLAGYL) 500 MG tablet     minoxidil (LONITEN) 2.5 MG tablet Take 5 mg by mouth 2 (two) times daily.    MOVIPREP 100-7.5-2.691 gram solution     pantoprazole (PROTONIX) 40 MG tablet     sevelamer carbonate (RENVELA) 800  Patient is a 29 y/o male presenting to the ED with head injury.  Patient presents with friend who states he witnessed the fall. Per friend patient tripped and hit his head backward on a corner of [facility]. Patient was drinking alcohol before the fall and is currently intoxicated. Patient is not currently on blood thinners. Patient denies headache, dizziness, visual changes, nausea, vomiting, syncope, neck pain or stiffness. mg Tab Take 1,600 mg by mouth 3 (three) times daily with meals.     silver sulfADIAZINE 1% (SILVADENE) 1 % cream Apply topically once daily.     Family History     Problem Relation (Age of Onset)    Aneurysm Mother, Father (77)    Diabetes Brother    Heart disease Father, Paternal Grandmother    Kidney disease Brother        Tobacco Use    Smoking status: Former Smoker     Years: 10.00     Quit date: 2015     Years since quittin.9    Smokeless tobacco: Never Used   Substance and Sexual Activity    Alcohol use: No    Drug use: Yes     Frequency: 4.0 times per week     Types: Other-see comments     Comment: marijuana    Sexual activity: Yes     Review of Systems   All other systems reviewed and are negative.    Objective:     Vital Signs (Most Recent):  Temp: 99.3 °F (37.4 °C) (20)  Pulse: 65 (20)  Resp: 18 (20)  BP: (!) 122/58 (20)  SpO2: 96 % (20) Vital Signs (24h Range):  Temp:  [98.2 °F (36.8 °C)-99.3 °F (37.4 °C)] 99.3 °F (37.4 °C)  Pulse:  [63-76] 65  Resp:  [14-19] 18  SpO2:  [95 %-99 %] 96 %  BP: ()/(58-87) 122/58     Weight: 106.6 kg (235 lb 0.2 oz)  Body mass index is 31.87 kg/m².    SpO2: 96 %  O2 Device (Oxygen Therapy): nasal cannula      Intake/Output Summary (Last 24 hours) at 2020 1615  Last data filed at 2020 1145  Gross per 24 hour   Intake 700 ml   Output 4000 ml   Net -3300 ml       Lines/Drains/Airways     Drain                 Hemodialysis AV Fistula Left forearm -- days         Hemodialysis AV Fistula Left forearm -- days         Hemodialysis AV Fistula Left forearm -- days          Peripheral Intravenous Line                 Peripheral IV - Single Lumen 20 0458 20 G Anterior;Right Shoulder 1 day         Peripheral IV - Single Lumen 20 22 G Anterior;Proximal;Right Forearm less than 1 day                Physical Exam   Constitutional: He is oriented to person, place, and time. He appears  well-developed and well-nourished. No distress.   HENT:   Head: Normocephalic and atraumatic.   Eyes:   Right eye blind   Neck: JVD present.   Cardiovascular: Normal rate, regular rhythm and intact distal pulses. Exam reveals no gallop and no friction rub.   Murmur (Grade III/VI systolic murmur, loudest at left sternal border, radiates to carotids) heard.  Pulmonary/Chest: Effort normal. No respiratory distress. He has no wheezes. He has rales.   Abdominal: Soft. Bowel sounds are normal. He exhibits no distension. There is no abdominal tenderness.   Musculoskeletal:         General: Edema (trace) present.   Neurological: He is alert and oriented to person, place, and time.   Skin: He is not diaphoretic.       Significant Labs:     Recent Results (from the past 24 hour(s))   POCT glucose    Collection Time: 07/30/20  6:08 PM   Result Value Ref Range    POCT Glucose 124 (H) 70 - 110 mg/dL   Potassium    Collection Time: 07/30/20  8:22 PM   Result Value Ref Range    Potassium 4.8 3.5 - 5.1 mmol/L   Basic metabolic panel    Collection Time: 07/30/20  8:22 PM   Result Value Ref Range    Sodium 136 136 - 145 mmol/L    Potassium 4.8 3.5 - 5.1 mmol/L    Chloride 101 95 - 110 mmol/L    CO2 23 23 - 29 mmol/L    Glucose 93 70 - 110 mg/dL    BUN, Bld 45 (H) 6 - 20 mg/dL    Creatinine 4.8 (H) 0.5 - 1.4 mg/dL    Calcium 9.2 8.7 - 10.5 mg/dL    Anion Gap 12 8 - 16 mmol/L    eGFR if African American 14.7 (A) >60 mL/min/1.73 m^2    eGFR if non  12.8 (A) >60 mL/min/1.73 m^2   Potassium    Collection Time: 07/30/20 11:09 PM   Result Value Ref Range    Potassium 5.2 (H) 3.5 - 5.1 mmol/L   Basic metabolic panel    Collection Time: 07/30/20 11:09 PM   Result Value Ref Range    Sodium 134 (L) 136 - 145 mmol/L    Potassium 5.2 (H) 3.5 - 5.1 mmol/L    Chloride 99 95 - 110 mmol/L    CO2 23 23 - 29 mmol/L    Glucose 93 70 - 110 mg/dL    BUN, Bld 45 (H) 6 - 20 mg/dL    Creatinine 5.0 (H) 0.5 - 1.4 mg/dL    Calcium 9.0 8.7 -  10.5 mg/dL    Anion Gap 12 8 - 16 mmol/L    eGFR if African American 14.0 (A) >60 mL/min/1.73 m^2    eGFR if non  12.1 (A) >60 mL/min/1.73 m^2   APTT    Collection Time: 07/30/20 11:09 PM   Result Value Ref Range    aPTT 51.0 (H) 21.0 - 32.0 sec   POCT glucose    Collection Time: 07/31/20 12:00 AM   Result Value Ref Range    POCT Glucose 93 70 - 110 mg/dL   CBC auto differential    Collection Time: 07/31/20  4:22 AM   Result Value Ref Range    WBC 7.21 3.90 - 12.70 K/uL    RBC 2.59 (L) 4.60 - 6.20 M/uL    Hemoglobin 8.3 (L) 14.0 - 18.0 g/dL    Hematocrit 27.6 (L) 40.0 - 54.0 %    Mean Corpuscular Volume 107 (H) 82 - 98 fL    Mean Corpuscular Hemoglobin 32.0 (H) 27.0 - 31.0 pg    Mean Corpuscular Hemoglobin Conc 30.1 (L) 32.0 - 36.0 g/dL    RDW 16.2 (H) 11.5 - 14.5 %    Platelets 129 (L) 150 - 350 K/uL    MPV 10.1 9.2 - 12.9 fL    Immature Granulocytes 0.3 0.0 - 0.5 %    Gran # (ANC) 5.0 1.8 - 7.7 K/uL    Immature Grans (Abs) 0.02 0.00 - 0.04 K/uL    Lymph # 1.0 1.0 - 4.8 K/uL    Mono # 0.7 0.3 - 1.0 K/uL    Eos # 0.5 0.0 - 0.5 K/uL    Baso # 0.04 0.00 - 0.20 K/uL    nRBC 0 0 /100 WBC    Gran% 69.2 38.0 - 73.0 %    Lymph% 13.7 (L) 18.0 - 48.0 %    Mono% 9.4 4.0 - 15.0 %    Eosinophil% 6.8 0.0 - 8.0 %    Basophil% 0.6 0.0 - 1.9 %    Differential Method Automated    Magnesium    Collection Time: 07/31/20  4:22 AM   Result Value Ref Range    Magnesium 2.1 1.6 - 2.6 mg/dL   Phosphorus    Collection Time: 07/31/20  4:22 AM   Result Value Ref Range    Phosphorus 6.5 (H) 2.7 - 4.5 mg/dL   Potassium    Collection Time: 07/31/20  4:22 AM   Result Value Ref Range    Potassium 5.5 (H) 3.5 - 5.1 mmol/L   APTT    Collection Time: 07/31/20  4:22 AM   Result Value Ref Range    aPTT 48.7 (H) 21.0 - 32.0 sec   Comprehensive metabolic panel    Collection Time: 07/31/20  4:22 AM   Result Value Ref Range    Sodium 134 (L) 136 - 145 mmol/L    Potassium 5.5 (H) 3.5 - 5.1 mmol/L    Chloride 99 95 - 110 mmol/L    CO2 22 (L)  23 - 29 mmol/L    Glucose 73 70 - 110 mg/dL    BUN, Bld 52 (H) 6 - 20 mg/dL    Creatinine 5.3 (H) 0.5 - 1.4 mg/dL    Calcium 9.2 8.7 - 10.5 mg/dL    Total Protein 6.6 6.0 - 8.4 g/dL    Albumin 2.6 (L) 3.5 - 5.2 g/dL    Total Bilirubin 0.5 0.1 - 1.0 mg/dL    Alkaline Phosphatase 131 55 - 135 U/L    AST 25 10 - 40 U/L    ALT 9 (L) 10 - 44 U/L    Anion Gap 13 8 - 16 mmol/L    eGFR if African American 13.1 (A) >60 mL/min/1.73 m^2    eGFR if non  11.3 (A) >60 mL/min/1.73 m^2   Basic metabolic panel    Collection Time: 07/31/20  4:22 AM   Result Value Ref Range    Sodium 134 (L) 136 - 145 mmol/L    Potassium 5.5 (H) 3.5 - 5.1 mmol/L    Chloride 99 95 - 110 mmol/L    CO2 22 (L) 23 - 29 mmol/L    Glucose 75 70 - 110 mg/dL    BUN, Bld 52 (H) 6 - 20 mg/dL    Creatinine 5.1 (H) 0.5 - 1.4 mg/dL    Calcium 9.2 8.7 - 10.5 mg/dL    Anion Gap 13 8 - 16 mmol/L    eGFR if African American 13.7 (A) >60 mL/min/1.73 m^2    eGFR if non  11.8 (A) >60 mL/min/1.73 m^2   POCT glucose    Collection Time: 07/31/20 11:42 AM   Result Value Ref Range    POCT Glucose 94 70 - 110 mg/dL   Potassium    Collection Time: 07/31/20 12:00 PM   Result Value Ref Range    Potassium 3.8 3.5 - 5.1 mmol/L   Basic metabolic panel    Collection Time: 07/31/20 12:00 PM   Result Value Ref Range    Sodium 137 136 - 145 mmol/L    Potassium 3.8 3.5 - 5.1 mmol/L    Chloride 102 95 - 110 mmol/L    CO2 26 23 - 29 mmol/L    Glucose 100 70 - 110 mg/dL    BUN, Bld 17 6 - 20 mg/dL    Creatinine 2.3 (H) 0.5 - 1.4 mg/dL    Calcium 8.1 (L) 8.7 - 10.5 mg/dL    Anion Gap 9 8 - 16 mmol/L    eGFR if African American 35.9 (A) >60 mL/min/1.73 m^2    eGFR if non  31.0 (A) >60 mL/min/1.73 m^2   Vancomycin, random    Collection Time: 07/31/20 12:00 PM   Result Value Ref Range    Vancomycin, Random 14.6 Not established ug/mL         Significant Imaging:     I have reviewed all pertinent imaging studies

## 2020-07-31 NOTE — ASSESSMENT & PLAN NOTE
54 y/o male with a history blindness to right eye, DM, CAD, treated tuberculosis, cardiomyopathy and valvular heart disease.  He has had a chronic ulcer to the right foot, base of 5th digit for over 6 months now.  He also has had a smaller ulcer to the based of his right foot first digit.  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  He says that he was treated with IV vancomycin for 6-8 weeks and then it was stopped.  He continued to get wound care from a facility affiliated with [place], [place], and reports he has been receiving HBOT.    He states that he started having pus come out of the wound again.  Per patient cultures were obtained and revealed 'staph' and 'yeast' .  Patient reported an MRI showed bone infection.   Records now obtained from ID in [place] show he grew a candida albicans and Staph Cohnii from a culture of 6/2/20.  He was supposed to be started on IV vancomycin to be given after dialysis,  however he developed chest pain and was admitted to [facility] in [place].  He was evaluated and it was felt his chest pain was due to severe aortic stenosis and severe coronary artery disease.  He was  transferred to the CTS service of our facility for a higher lever of care for his cardiac disease and consideration of MV replacement.  ID consulted to assist with his diabetic foot infection.      MRI of right foot this admission is consistent with osteomyelitis of distal 5th met head.  Non-invasive vascular studies indicative of severe PAD and he underwent angiogram yesterday Vascular surgery unable to intervene to restore flow to the right foot.    Patient to be referred to Dr. Young ([facility]) for evaluation for limb salvage.  Given lack of flow, patient is not a candidate for 5th metatarsal head amputation.       He is afebrile, no leukocytosis. Wound is stable, without acute signs of infection.    Podiatry obtained new bone cultures/biopsy done and cultures show GNR.  Path pending. Appreciate their assistance.      Plan/recommendations:  1.  Continue Vanc and cefepime post HD - ordered- vanc trough goal 15-20   2. FU cultures  3. If no GPC growth then dc vanc  4. U with [facility] for revascularization  5. Will follow     Call [phone] with questions during day and ID on call after hours    Infection:     Discharge antibiotics:   1. Vancomycin 1g IV post HD MWF - can dc if no GPC growth on cultures  2. Cefepime 2g IV post HD MWF  - If pathology negative and cultures are no growth then consider stopping IV abx    End date of IV antibiotics: 9/11/20 - tenatively 6 weeks    Weekly outpatient laboratory on Monday or Tuesday while on IV antibiotics.    CBC   CMP or BMP   ESR and CRP   Vancomycin trough. Target 15-20 prior to HD on MWF -     If vancomycin trough is not at target (15-20) prior to discharge, the please perform vancomycin trough before their fourth outpatient dose.    Fax laboratory results to [facility] at [phone] with attn: Ronnie Sinclair PA-C    Outpatient Infectious Diseases clinic follow up will be arranged and found in patient calendar.    Prior to discharge, please ensure the patient's follow-up has been scheduled.  If there is still no follow-up scheduled in Infectious Diseases clinic, please send an EPIC message to Carisa Estes LPN in Infectious Diseases.

## 2020-07-31 NOTE — PROGRESS NOTES
HD tx complete, 3L removed in a 4 hour tx, tolerated well. Blood returned via LLA AVF, 15g needles removed x2, gauze and tape applied, pressure held to each site for 10 minutes, hemostasis achieved. Report given to Nicci. Transferred from unit via stretcher by transport back to room

## 2020-07-31 NOTE — PROGRESS NOTES
[facility]  Cardiology  Progress Note    Patient Name: João Aguirre  MRN: [id number]  Admission Date: 7/24/2020  Hospital Length of Stay: 7 days  Code Status: Full Code   Attending Physician: Rony Ryan MD   Primary Care Physician: Primary Doctor No  Expected Discharge Date: 8/7/2020  Principal Problem:Mitral stenosis    Subjective:     Hospital Course:   7/30  cath today, w FARHEEN x2 in mid LAD, FARHEEN x1 in ostial RCA    Past Medical History:   Diagnosis Date    Acute osteomyelitis of metatarsal bone, right     PAD right    Anticoagulant long-term use     Arthritis     Asthma     Back pain     on methadone    C. difficile colitis 09/2018    Cirrhosis of liver without ascites 2016    by liver US. +HCV    Coronary artery disease 2013    stent.  h/o STEMI and NSTEMI    Diabetes mellitus     resolved, multiple admissions for hypoglycemia requiring ICU possibley due to liver/ESRD    Encounter for blood transfusion     ESRD on hemodialysis 2013    anuric    Eye abnormality right eye    injured as a child    Gastritis     Gastroparesis     HCV (hepatitis C virus)     ? h/o tattoo exposure    Hypertension     Mitral stenosis 07/24/2020    Moderate to severe mitral stenosis    PAD (peripheral artery disease)     RLE s/p R CFA endarterectomy    Pneumonia 2013    Spend 6weeks in hospital at [place]    Stroke     Tuberculosis     treated       Past Surgical History:   Procedure Laterality Date    ANGIOGRAPHY OF LOWER EXTREMITY Right 7/28/2020    Procedure: Angiogram Extremity Unilateral;  Surgeon: MINO Vasquez II, MD;  Location: [facility] OR [address] [place] [address];  Service: Vascular;  Laterality: Right;  Left groin and rIght pedal artery access  15.5 min  247.25 mGy  68.5454 Gycm2  33ml contrast    AV FISTULA PLACEMENT      BACK SURGERY      CARDIAC SURGERY  2013    heart stent    CHOLECYSTECTOMY      CORONARY ANGIOGRAPHY N/A 7/30/2020    Procedure: ANGIOGRAM, CORONARY ARTERY;   Surgeon: Alexandro Terry MD;  Location: [facility] CATH LAB;  Service: Cardiology;  Laterality: N/A;    EYE SURGERY      R eye    INCISION AND DRAINAGE OF ABSCESS Right 9/6/2018    Procedure: INCISION AND DRAINAGE, ABSCESS;  Surgeon: Chetan Rothman MD;  Location: [facility] OR;  Service: General;  Laterality: Right;    LEFT HEART CATHETERIZATION N/A 7/30/2020    Procedure: Left heart cath;  Surgeon: Alexandro Terry MD;  Location: [facility] CATH LAB;  Service: Cardiology;  Laterality: N/A;       Review of patient's allergies indicates:   Allergen Reactions    Antibiotic hc      Counter acts with methodone.          No current facility-administered medications on file prior to encounter.      Current Outpatient Medications on File Prior to Encounter   Medication Sig    amlodipine (NORVASC) 5 MG tablet Take 5 mg by mouth 2 (two) times daily.    albuterol (PROAIR HFA) 90 mcg/actuation inhaler INHALE TWO PUFFS EVERY 4 TO 6 HOURS AS NEEDED    albuterol (PROAIR HFA) 90 mcg/actuation inhaler INHALE TWO PUFFS EVERY 4 TO 6 HOURS AS NEEDED    aspirin 325 MG tablet Take 1 tablet (325 mg total) by mouth once daily.    atorvastatin (LIPITOR) 10 MG tablet     atorvastatin (LIPITOR) 40 MG tablet TAKE ONE TABLET BY MOUTH DAILY    blood sugar diagnostic (TRUE METRIX GLUCOSE TEST STRIP) Strp     blood-glucose meter (PHARMACIST CHOICE GLUCOSE SYS) Misc 1 Device.    carvedilol (COREG) 3.125 MG tablet     ELIQUIS 2.5 mg Tab     ferric citrate (AURYXIA) 210 mg iron Tab Take 420 mg by mouth 3 (three) times daily.    fluconazole (DIFLUCAN) 100 MG tablet     fluticasone propionate (FLONASE) 50 mcg/actuation nasal spray 1 spray by Each Nostril route once daily.    fluticasone-salmeterol 250-50 mcg/dose (ADVAIR DISKUS) 250-50 mcg/dose diskus inhaler Inhale 1 puff into the lungs once daily.     gabapentin (NEURONTIN) 100 MG capsule Take 100 mg by mouth 2 (two) times daily.    hydrALAZINE (APRESOLINE) 50 MG tablet     levetiracetam  (KEPPRA) 500 MG Tab Take 500 mg twice a day.  Take an extra tablet right after dialysis (making 3 tablets on your dialysis days)    methadone (DOLOPHINE) 10 MG tablet Take 9 tablets (90 mg total) by mouth once daily.    metroNIDAZOLE (FLAGYL) 500 MG tablet     minoxidil (LONITEN) 2.5 MG tablet Take 5 mg by mouth 2 (two) times daily.    MOVIPREP 100-7.5-2.691 gram solution     pantoprazole (PROTONIX) 40 MG tablet     sevelamer carbonate (RENVELA) 800 mg Tab Take 1,600 mg by mouth 3 (three) times daily with meals.     silver sulfADIAZINE 1% (SILVADENE) 1 % cream Apply topically once daily.     Family History     Problem Relation (Age of Onset)    Aneurysm Mother, Father (77)    Diabetes Brother    Heart disease Father, Paternal Grandmother    Kidney disease Brother        Tobacco Use    Smoking status: Former Smoker     Years: 10.00     Quit date: 2015     Years since quittin.9    Smokeless tobacco: Never Used   Substance and Sexual Activity    Alcohol use: No    Drug use: Yes     Frequency: 4.0 times per week     Types: Other-see comments     Comment: marijuana    Sexual activity: Yes     Review of Systems   All other systems reviewed and are negative.    Objective:     Vital Signs (Most Recent):  Temp: 99.3 °F (37.4 °C) (20)  Pulse: 65 (20)  Resp: 18 (20)  BP: (!) 122/58 (20)  SpO2: 96 % (20) Vital Signs (24h Range):  Temp:  [98.2 °F (36.8 °C)-99.3 °F (37.4 °C)] 99.3 °F (37.4 °C)  Pulse:  [63-76] 65  Resp:  [14-19] 18  SpO2:  [95 %-99 %] 96 %  BP: ()/(58-87) 122/58     Weight: 106.6 kg (235 lb 0.2 oz)  Body mass index is 31.87 kg/m².    SpO2: 96 %  O2 Device (Oxygen Therapy): nasal cannula      Intake/Output Summary (Last 24 hours) at 2020 1615  Last data filed at 2020 1145  Gross per 24 hour   Intake 700 ml   Output 4000 ml   Net -3300 ml       Lines/Drains/Airways     Drain                 Hemodialysis AV Fistula Left  forearm -- days         Hemodialysis AV Fistula Left forearm -- days         Hemodialysis AV Fistula Left forearm -- days          Peripheral Intravenous Line                 Peripheral IV - Single Lumen 07/30/20 0458 20 G Anterior;Right Shoulder 1 day         Peripheral IV - Single Lumen 07/30/20 2056 22 G Anterior;Proximal;Right Forearm less than 1 day                Physical Exam   Constitutional: He is oriented to person, place, and time. He appears well-developed and well-nourished. No distress.   HENT:   Head: Normocephalic and atraumatic.   Eyes:   Right eye blind   Neck: JVD present.   Cardiovascular: Normal rate, regular rhythm and intact distal pulses. Exam reveals no gallop and no friction rub.   Murmur (Grade III/VI systolic murmur, loudest at left sternal border, radiates to carotids) heard.  Pulmonary/Chest: Effort normal. No respiratory distress. He has no wheezes. He has rales.   Abdominal: Soft. Bowel sounds are normal. He exhibits no distension. There is no abdominal tenderness.   Musculoskeletal:         General: Edema (trace) present.   Neurological: He is alert and oriented to person, place, and time.   Skin: He is not diaphoretic.       Significant Labs:     Recent Results (from the past 24 hour(s))   POCT glucose    Collection Time: 07/30/20  6:08 PM   Result Value Ref Range    POCT Glucose 124 (H) 70 - 110 mg/dL   Potassium    Collection Time: 07/30/20  8:22 PM   Result Value Ref Range    Potassium 4.8 3.5 - 5.1 mmol/L   Basic metabolic panel    Collection Time: 07/30/20  8:22 PM   Result Value Ref Range    Sodium 136 136 - 145 mmol/L    Potassium 4.8 3.5 - 5.1 mmol/L    Chloride 101 95 - 110 mmol/L    CO2 23 23 - 29 mmol/L    Glucose 93 70 - 110 mg/dL    BUN, Bld 45 (H) 6 - 20 mg/dL    Creatinine 4.8 (H) 0.5 - 1.4 mg/dL    Calcium 9.2 8.7 - 10.5 mg/dL    Anion Gap 12 8 - 16 mmol/L    eGFR if African American 14.7 (A) >60 mL/min/1.73 m^2    eGFR if non  12.8 (A) >60  mL/min/1.73 m^2   Potassium    Collection Time: 07/30/20 11:09 PM   Result Value Ref Range    Potassium 5.2 (H) 3.5 - 5.1 mmol/L   Basic metabolic panel    Collection Time: 07/30/20 11:09 PM   Result Value Ref Range    Sodium 134 (L) 136 - 145 mmol/L    Potassium 5.2 (H) 3.5 - 5.1 mmol/L    Chloride 99 95 - 110 mmol/L    CO2 23 23 - 29 mmol/L    Glucose 93 70 - 110 mg/dL    BUN, Bld 45 (H) 6 - 20 mg/dL    Creatinine 5.0 (H) 0.5 - 1.4 mg/dL    Calcium 9.0 8.7 - 10.5 mg/dL    Anion Gap 12 8 - 16 mmol/L    eGFR if African American 14.0 (A) >60 mL/min/1.73 m^2    eGFR if non  12.1 (A) >60 mL/min/1.73 m^2   APTT    Collection Time: 07/30/20 11:09 PM   Result Value Ref Range    aPTT 51.0 (H) 21.0 - 32.0 sec   POCT glucose    Collection Time: 07/31/20 12:00 AM   Result Value Ref Range    POCT Glucose 93 70 - 110 mg/dL   CBC auto differential    Collection Time: 07/31/20  4:22 AM   Result Value Ref Range    WBC 7.21 3.90 - 12.70 K/uL    RBC 2.59 (L) 4.60 - 6.20 M/uL    Hemoglobin 8.3 (L) 14.0 - 18.0 g/dL    Hematocrit 27.6 (L) 40.0 - 54.0 %    Mean Corpuscular Volume 107 (H) 82 - 98 fL    Mean Corpuscular Hemoglobin 32.0 (H) 27.0 - 31.0 pg    Mean Corpuscular Hemoglobin Conc 30.1 (L) 32.0 - 36.0 g/dL    RDW 16.2 (H) 11.5 - 14.5 %    Platelets 129 (L) 150 - 350 K/uL    MPV 10.1 9.2 - 12.9 fL    Immature Granulocytes 0.3 0.0 - 0.5 %    Gran # (ANC) 5.0 1.8 - 7.7 K/uL    Immature Grans (Abs) 0.02 0.00 - 0.04 K/uL    Lymph # 1.0 1.0 - 4.8 K/uL    Mono # 0.7 0.3 - 1.0 K/uL    Eos # 0.5 0.0 - 0.5 K/uL    Baso # 0.04 0.00 - 0.20 K/uL    nRBC 0 0 /100 WBC    Gran% 69.2 38.0 - 73.0 %    Lymph% 13.7 (L) 18.0 - 48.0 %    Mono% 9.4 4.0 - 15.0 %    Eosinophil% 6.8 0.0 - 8.0 %    Basophil% 0.6 0.0 - 1.9 %    Differential Method Automated    Magnesium    Collection Time: 07/31/20  4:22 AM   Result Value Ref Range    Magnesium 2.1 1.6 - 2.6 mg/dL   Phosphorus    Collection Time: 07/31/20  4:22 AM   Result Value Ref Range     Phosphorus 6.5 (H) 2.7 - 4.5 mg/dL   Potassium    Collection Time: 07/31/20  4:22 AM   Result Value Ref Range    Potassium 5.5 (H) 3.5 - 5.1 mmol/L   APTT    Collection Time: 07/31/20  4:22 AM   Result Value Ref Range    aPTT 48.7 (H) 21.0 - 32.0 sec   Comprehensive metabolic panel    Collection Time: 07/31/20  4:22 AM   Result Value Ref Range    Sodium 134 (L) 136 - 145 mmol/L    Potassium 5.5 (H) 3.5 - 5.1 mmol/L    Chloride 99 95 - 110 mmol/L    CO2 22 (L) 23 - 29 mmol/L    Glucose 73 70 - 110 mg/dL    BUN, Bld 52 (H) 6 - 20 mg/dL    Creatinine 5.3 (H) 0.5 - 1.4 mg/dL    Calcium 9.2 8.7 - 10.5 mg/dL    Total Protein 6.6 6.0 - 8.4 g/dL    Albumin 2.6 (L) 3.5 - 5.2 g/dL    Total Bilirubin 0.5 0.1 - 1.0 mg/dL    Alkaline Phosphatase 131 55 - 135 U/L    AST 25 10 - 40 U/L    ALT 9 (L) 10 - 44 U/L    Anion Gap 13 8 - 16 mmol/L    eGFR if African American 13.1 (A) >60 mL/min/1.73 m^2    eGFR if non  11.3 (A) >60 mL/min/1.73 m^2   Basic metabolic panel    Collection Time: 07/31/20  4:22 AM   Result Value Ref Range    Sodium 134 (L) 136 - 145 mmol/L    Potassium 5.5 (H) 3.5 - 5.1 mmol/L    Chloride 99 95 - 110 mmol/L    CO2 22 (L) 23 - 29 mmol/L    Glucose 75 70 - 110 mg/dL    BUN, Bld 52 (H) 6 - 20 mg/dL    Creatinine 5.1 (H) 0.5 - 1.4 mg/dL    Calcium 9.2 8.7 - 10.5 mg/dL    Anion Gap 13 8 - 16 mmol/L    eGFR if African American 13.7 (A) >60 mL/min/1.73 m^2    eGFR if non  11.8 (A) >60 mL/min/1.73 m^2   POCT glucose    Collection Time: 07/31/20 11:42 AM   Result Value Ref Range    POCT Glucose 94 70 - 110 mg/dL   Potassium    Collection Time: 07/31/20 12:00 PM   Result Value Ref Range    Potassium 3.8 3.5 - 5.1 mmol/L   Basic metabolic panel    Collection Time: 07/31/20 12:00 PM   Result Value Ref Range    Sodium 137 136 - 145 mmol/L    Potassium 3.8 3.5 - 5.1 mmol/L    Chloride 102 95 - 110 mmol/L    CO2 26 23 - 29 mmol/L    Glucose 100 70 - 110 mg/dL    BUN, Bld 17 6 - 20 mg/dL     Creatinine 2.3 (H) 0.5 - 1.4 mg/dL    Calcium 8.1 (L) 8.7 - 10.5 mg/dL    Anion Gap 9 8 - 16 mmol/L    eGFR if African American 35.9 (A) >60 mL/min/1.73 m^2    eGFR if non  31.0 (A) >60 mL/min/1.73 m^2   Vancomycin, random    Collection Time: 07/31/20 12:00 PM   Result Value Ref Range    Vancomycin, Random 14.6 Not established ug/mL         Significant Imaging:     I have reviewed all pertinent imaging studies      Assessment and Plan:     * Mitral stenosis  Mean gradient 15, may be due to volume overload  Controlling heart rate (partiularly if he converts to rapid AF/AFL) is important to allow for diastolic filling    Plan:  -- repeat TTE when euvolemic  -- spoke w CT surgery NP, pt is not a candidate for open mitral repair d/t ESRD, cirrhosis, active osteomyelitis  -- recommend B-blocker if BP can tolerate for rate control/improved diastolic filling        Atrial flutter  Patient has a history of paroxysmal AF and now mitral stenosis which is at least moderate if not severe; needs anticoagulation with warfarin for valvular AFib  Developed atrial flutter, currently NSR  Recommend home beta blocker for rate control    (HFpEF) heart failure with preserved ejection fraction  BNP 2948 on admit, continues to gain more volume and increase from his dry weight  Discussed with nephrology, aiming for volume removal w HD    NSTEMI (non-ST elevated myocardial infarction)  LEVY 4  Acute rise in troponin from 0.18 - 3.8 - 5.3, however he is currently chest pain free  Treat medically for ACS with DAPT, heparin, high intensity statin, beta blocker unless contraindicated  Needs volume removal and AFib control    Plan:  7/30 LH cath w FARHEEN x2 in mid LAD, FARHEEN x1 in ostial RCA  -- IVF not indicated d/t ESRD  -- cardiac rehab referral  -- continue ASA for 7d + Brilinta indefinitely   -- hold heparin   - unless other procedures necessary, ok to resume 4hrs post-LH cath   - prior to dispo, plan for bridge to warfarin for  valvular AF  -- risk factor reduction   - f/u lipid panel, TSH   - HbA1c 4.6%  -- other recs per interventional cardiology note  -- plan for angioplasty of RLE on Monday      VTE Risk Mitigation (From admission, onward)         Ordered     warfarin (COUMADIN) tablet 5 mg  Daily      07/31/20 1020     heparin 25,000 units in dextrose 5% 250 mL (100 units/mL) infusion LOW INTENSITY nomogram - [facility]  Continuous     Question:  Heparin Infusion Adjustment (DO NOT MODIFY ANSWER)  Answer:  \\[email or web address]\Images\Pharmacy\HeparinInfusions\heparin LOW INTENSITY nomogram for [facility] [id number]    07/29/20 1618     heparin 25,000 units in dextrose 5% (100 units/ml) IV bolus from bag - ADDITIONAL PRN BOLUS - 30 units/kg (max bolus 4000 units)  As needed (PRN)     Question:  Heparin Infusion Adjustment (DO NOT MODIFY ANSWER)  Answer:  \[email or web address]\[email or web address]\Images\Pharmacy\HeparinInfusions\heparin LOW INTENSITY nomogram for [facility] [id number]    07/29/20 1618     heparin 25,000 units in dextrose 5% (100 units/ml) IV bolus from bag - ADDITIONAL PRN BOLUS - 60 units/kg (max bolus 4000 units)  As needed (PRN)     Question:  Heparin Infusion Adjustment (DO NOT MODIFY ANSWER)  Answer:  \[email or web address]\epic\Images\Pharmacy\HeparinInfusions\heparin LOW INTENSITY nomogram for [facility] [id number]    07/29/20 1618     IP VTE HIGH RISK PATIENT  Once      07/24/20 0628     Place sequential compression device  Until discontinued      07/24/20 0628                Baron Almonte MD PGY1  Cardiology  [facility]

## 2020-07-31 NOTE — PROGRESS NOTES
Hospital Medicine  Progress note    Team: [facility] Rony Ryan MD  Admit Date: 7/24/2020  VICKY 8/7/2020  Length of Stay:  LOS: 7 days   Code status: Full Code    Principal Problem:  Mitral stenosis    HPI / Hospital Course     Interval hx:  7/31 Cath was done yesterday with stent placement. Vascular surgery recomends BKA but patient declined Plan is for balloon angioplasty on Tuesday-for now on heparin drip. Coumadin started today, on Eliquis prior.   7/30 Transfer from [facility], not a CABG candidate due to multiple co morbidities- HD, Hep C, Osteomyelitis, PVD   ID, Hep, Cards, podiatry, vascular surgery and nephrology are following. For cath and possible Stent placement today    ROS     Constitutional: Negative for chills and fever.   HENT: Negative for sinus pain and sore throat.    Respiratory: Positive for chest tightness. Negative for wheezing.    Cardiovascular: Positive for chest pain and palpitations. Negative for leg swelling.   Gastrointestinal: Positive for abdominal distention. Negative for abdominal pain, blood in stool, diarrhea and nausea.   Endocrine: Negative for polyuria.   Genitourinary: Positive for enuresis. Negative for flank pain and frequency.   Musculoskeletal: Negative for back pain.   Skin: Negative for color change.   Neurological: Positive for light-headedness. Negative for weakness and headaches.   Psychiatric/Behavioral: Negative for agitation and confusion.     PEx  Temp:  [98.2 °F (36.8 °C)-98.6 °F (37 °C)]   Pulse:  [63-75]   Resp:  [14-20]   BP: ()/(62-87)   SpO2:  [95 %-99 %]     Intake/Output Summary (Last 24 hours) at 7/31/2020 0823  Last data filed at 7/31/2020 0400  Gross per 24 hour   Intake 180 ml   Output 300 ml   Net -120 ml       Constitutional:       General: He is not in acute distress.     Appearance: He is not ill-appearing.   HENT:      Head: Normocephalic and atraumatic.      Right Ear: Tympanic membrane normal.      Left Ear: Tympanic membrane normal.       Mouth/Throat:      Mouth: Mucous membranes are dry.      Pharynx: No oropharyngeal exudate.   Eyes:      General: No scleral icterus.        Right eye: No discharge.         Left eye: No discharge.   Cardiovascular:      Rate and Rhythm: Bradycardia present. Rhythm irregular.      Heart sounds: Murmur present.      Comments: Reduced LE pulses throughout  Pulmonary:      Effort: Pulmonary effort is normal. No respiratory distress.      Breath sounds: No stridor. No wheezing or rhonchi.   Abdominal:      General: Bowel sounds are normal.      Palpations: Abdomen is soft.      Tenderness: There is no abdominal tenderness. There is no guarding.   Musculoskeletal:         General: No swelling or tenderness.      Right lower leg: No edema.      Left lower leg: No edema.   Skin:     Capillary Refill: Capillary refill takes 2 to 3 seconds.      Coloration: Skin is not jaundiced.   Neurological:      General: No focal deficit present.      Mental Status: He is alert and oriented to person, place, and time.   Psychiatric:         Mood and Affect: Mood normal.         Behavior: Behavior normal.     Recent Labs   Lab 07/29/20 0419 07/30/20 0353 07/31/20  0422   WBC 8.78 6.93 7.21   HGB 9.6* 8.5* 8.3*   HCT 30.5* 27.8* 27.6*   * 121* 129*     Recent Labs   Lab 07/29/20 0419 07/30/20 0353  07/30/20 2022 07/30/20  2309 07/31/20  0422     136   < > 139  138   < > 136 134* 134*  134*   K 5.7*  5.7*  5.7*   < > 4.6  4.6  4.6   < > 4.8  4.8 5.2*  5.2* 5.5*  5.5*  5.5*   CL 99  99   < > 103  103   < > 101 99 99  99   CO2 23  23   < > 26  25   < > 23 23 22*  22*   BUN 48*  48*   < > 30*  30*   < > 45* 45* 52*  52*   CREATININE 4.8*  4.8*   < > 3.6*  3.6*   < > 4.8* 5.0* 5.3*  5.1*   GLU 69*  69*   < > 89  90   < > 93 93 73  75   CALCIUM 8.9  8.9   < > 9.2  8.8   < > 9.2 9.0 9.2  9.2   MG 2.2  --  2.0  --   --   --  2.1   PHOS 6.2*  --  4.9*  --   --   --  6.5*    < > = values in this  interval not displayed.     Recent Labs   Lab 07/28/20  1820 07/29/20  1641 07/30/20  0353 07/31/20  0422   ALKPHOS 122  --  120 131   ALT 15  --  15 9*   AST 20  --  28 25   ALBUMIN 2.9*  --  2.6* 2.6*   PROT 7.4  --  6.5 6.6   BILITOT 0.5  --  0.4 0.5   INR  --  1.0 1.0  --         Recent Labs   Lab 07/29/20  0631 07/29/20  1120 07/29/20  1749 07/30/20  1322 07/30/20  1808 07/31/20  0000   POCTGLUCOSE 129* 119* 77 69* 124* 93       Scheduled Meds:   sodium chloride 0.9%   Intravenous Once    sodium chloride 0.9%   Intravenous Once    sodium chloride 0.9%   Intravenous Once    sodium chloride 0.9%   Intravenous Once    amLODIPine  5 mg Oral BID    aspirin  81 mg Oral Daily    atorvastatin  40 mg Oral Daily    cadexomer iodine   Topical (Top) Every Mon, Wed, Fri    ceFEPime (MAXIPIME) IVPB  2 g Intravenous Every Mon, Wed, Fri    gabapentin  100 mg Oral BID    levETIRAcetam  500 mg Oral BID    methadone  100 mg Oral Daily    pantoprazole  40 mg Intravenous Daily    polyethylene glycol  17 g Oral Daily    sevelamer carbonate  1,600 mg Oral TID WM    ticagrelor  90 mg Oral BID     Continuous Infusions:   heparin (porcine) in D5W 12 Units/kg/hr (07/30/20 1713)    heparin (porcine)       As Needed:  acetaminophen, acetaminophen, albuterol-ipratropium, bisacodyL, calcium carbonate, [COMPLETED] calcium gluconate IVPB **AND** calcium gluconate IVPB, [COMPLETED] calcium gluconate IVPB **AND** calcium gluconate IVPB, heparin (PORCINE), heparin (PORCINE), heparin (porcine), ondansetron, ondansetron, oxyCODONE, oxyCODONE, promethazine, sodium chloride 0.9%, Pharmacy to dose Vancomycin consult **AND** vancomycin - pharmacy to dose    ** update problem list    Active Hospital Problems    Diagnosis  POA    *Mitral stenosis [I05.0]  Yes    (HFpEF) heart failure with preserved ejection fraction [I50.30]  Unknown    Atrial flutter [I48.92]  Unknown    Hypotension [I95.9]  Unknown    PVD (peripheral vascular  disease) [I73.9]  Yes    Atherosclerosis of native artery of right lower extremity with ulceration of midfoot [I70.234]  Yes    Acute osteomyelitis of metatarsal bone, right [M86.171]  Yes    Diabetic foot infection [E11.628, L08.9]  Yes    ESRD (end stage renal disease) on dialysis [N18.6, Z99.2]  Not Applicable    Nonrheumatic aortic (valve) stenosis [I35.0]  Yes    Chronic kidney disease-mineral and bone disorder [N18.9, E83.9, M89.9]  Yes    Skin ulcer of right foot with fat layer exposed [L97.512]  Yes    Hyperkalemia [E87.5]  Yes    Seizure disorder [G40.909]  Yes    Benign hypertension with ESRD (end-stage renal disease) [I12.0, N18.6]  Yes    Cirrhosis of liver with ascites [K74.60, R18.8]  Yes    Anemia in chronic kidney disease [N18.9, D63.1]  Yes     Chronic    ESRD (T,Th,Sat) dialysis onset 2013 [N18.6]  Yes     Chronic    NSTEMI (non-ST elevated myocardial infarction) [I21.4]  Yes      Resolved Hospital Problems   No resolved problems to display.       Assessment and Plan  / Problems managed today    Mitral stenosis  Patient is a 55 year old who presented from [place] for second opinion regarding AVR and CABG. Medical conditions include ESRD on HD since 2013 (T/TH/Sat), anemia, uncontrolled HTN, hx CVA, PCI, T2DM, and gastroparesis. States that for the past several weeks he has had some chest tightness and increasing SOB. Unable to quantify how far he can walk, but states not very far. Some occasional dizziness whenever beginning to walk. Endorses palpitations. Had a stroke 3 years ago but denies residual symptoms. History of seizures, last 3 years ago. Reports having two stents done, he thinks at [facility] 2-3 years ago. Smokes cigars occasionally. Denies current alcohol use. 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 Also  with non-healing DM ulcer on bottom of right foot.   TTE ordered resulting with EF 50%, mild AS, mod-severe MS from extensive calcification (MG 15, may be elevated due to volume overload), PAP 62.      - ASA   - Statin   - Norvasc 5   - Keppra 500 BID   - Pantoprazole 40 QD  - Polyethylene Glycol daily   - Sevelamer 1600 TID   - Renal diet with 1L fluid restriction per nephrology   - HD today  - Disc with previous imaging received today, will review  - Patient is found to have cirrhosis and currently has osteomyelitis to the right foot, this greatly increases his risk for cardiac surgery.     Acute osteomyelitis of metatarsal bone, right  -Infectious disease following   -Recommendations include: Continue to hold antibiotics for now pending outcome of re-vascularization.  Follow up Vascular intervention.  If unable to re-vascularize, antibiotics alone may be of limited benefit for wound healing/treatment of osteo  Will follow up today with further recommendations pending ability to re-vascularize RLE.  If able to re-vascularize, may ask Podiatry to repeat bone biopsy (last cultures from over a month and a half ago) while patient off abx.          PVD (peripheral vascular disease)  -Vascular following  -Per note will check CTA A/P with BLE runoff to better define surgical anatomy. Refer to Dr. Salgado for potential limb salvage.      ESRD (end stage renal disease) on dialysis  -Nephrology following  -Will receive dialysis today     Nonrheumatic aortic (valve) stenosis  -ECHO shows mild aortic valve stenosis. Aortic valve area is 1.57 cm2; peak velocity is 3.31 m/s; mean gradient is 24 mmHg. Mild aortic regurgitation.  -Patient currently high risk for cardiac surgery secondary to cirrhosis (plt 149) and osteomyelitis      Skin ulcer of right foot with fat layer exposed  -Vascular following        Hyperkalemia  -Monitor potassium every four hours for potassium greater than 5.4  -Will consider shifting potassium if  continuing to elevate; patient needed potassium shifting overnight and will receive dialysis this morning     Seizure disorder  - Home Keppra      Benign hypertension with ESRD (end-stage renal disease)  -Continue Norvasc 5mg        Cirrhosis of liver with ascites  -patient report history of HCV and cirrhosis   -Confirmed on ultrasound yesterday  -Awaiting results from hepatitis panel and HCV RNA  -Hepatology consulted; appreciate recommendations     Elevated troponin I level  -Chest pain while receiving dialysis yesterday with hypotension (dialysis stopped) and acute onset of rapid afib.    -Troponin elevation from 0.18>3.849  -Consulting interventional cardiology; appreciate recommendations  -Will place old cath films in chart for their review     Anemia in chronic kidney disease  -Expected  -Nephrology following     ESRD (T,Th,Sat) dialysis onset 2013  - Nephrology following   -Dialysis yesterday             Goals of Care:  Return to prior functional status    Discharge plan:    Time (minutes) spent in care of the patient (Greater than 1/2 spent in direct face-to-face contact)  35 minutes    Rony Ryan MD

## 2020-07-31 NOTE — SUBJECTIVE & OBJECTIVE
Interval History:  S/P LHC + PCI to mid LAD and RCA yesterday. Tolerated procedure well. No events overnight. R femoral access site c/d/i. Patient examined in dialysis this morning - tolerating fluid removal without issue.     Review of Systems   All other systems reviewed and are negative.    Objective:     Vital Signs (Most Recent):  Temp: 98.3 °F (36.8 °C) (07/31/20 0735)  Pulse: 76 (07/31/20 1015)  Resp: 18 (07/31/20 0735)  BP: (!) 153/78 (07/31/20 1015)  SpO2: 97 % (07/31/20 0356) Vital Signs (24h Range):  Temp:  [98.2 °F (36.8 °C)-98.6 °F (37 °C)] 98.3 °F (36.8 °C)  Pulse:  [63-76] 76  Resp:  [14-20] 18  SpO2:  [95 %-99 %] 97 %  BP: ()/(62-87) 153/78     Weight: 106.6 kg (235 lb 0.2 oz)  Body mass index is 31.87 kg/m².    SpO2: 97 %  O2 Device (Oxygen Therapy): nasal cannula      Intake/Output Summary (Last 24 hours) at 7/31/2020 1024  Last data filed at 7/31/2020 0400  Gross per 24 hour   Intake --   Output 300 ml   Net -300 ml       Lines/Drains/Airways     Drain                 Hemodialysis AV Fistula Left forearm -- days         Hemodialysis AV Fistula Left forearm -- days         Hemodialysis AV Fistula Left forearm -- days          Peripheral Intravenous Line                 Peripheral IV - Single Lumen 07/30/20 0458 20 G Anterior;Right Shoulder 1 day         Peripheral IV - Single Lumen 07/30/20 2056 22 G Anterior;Proximal;Right Forearm less than 1 day                Physical Exam  Constitutional: He is oriented to person, place, and time. He appears well-developed and well-nourished.   HENT:   Head: Normocephalic and atraumatic.   Right Ear: External ear normal.   Left Ear: External ear normal.   Eyes: Left eye exhibits no discharge.   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 Neck supple.   Cardiovascular: Normal rate, regular rhythm, normal heart sounds and intact distal pulses.   Pulses: Femoral pulses are 2+ on the right side and 2+ on the left  side.  Pulmonary/Chest: Effort normal and breath sounds normal. No respiratory distress. He has no wheezes.   Abdominal: Soft. Bowel sounds are normal. He exhibits no distension. There is no abdominal tenderness.   Musculoskeletal: Normal range of motion.         General: No edema.      Comments: Left arm fistula, Right foot wrapped   Neurological: He is alert and oriented to person, place, and time.   Skin: Skin is warm and dry.   Psychiatric: He has a normal mood and affect. His behavior is normal.            Significant Labs:   CMP   Recent Labs   Lab 07/30/20 0353 07/30/20 2022 07/30/20 2309 07/31/20 0422     138   < > 136 134* 134*  134*   K 4.6  4.6  4.6   < > 4.8  4.8 5.2*  5.2* 5.5*  5.5*  5.5*     103   < > 101 99 99  99   CO2 26  25   < > 23 23 22*  22*   GLU 89  90   < > 93 93 73  75   BUN 30*  30*   < > 45* 45* 52*  52*   CREATININE 3.6*  3.6*   < > 4.8* 5.0* 5.3*  5.1*   CALCIUM 9.2  8.8   < > 9.2 9.0 9.2  9.2   PROT 6.5  --   --   --  6.6   ALBUMIN 2.6*  --   --   --  2.6*   BILITOT 0.4  --   --   --  0.5   ALKPHOS 120  --   --   --  131   AST 28  --   --   --  25   ALT 15  --   --   --  9*   ANIONGAP 10  10   < > 12 12 13  13   ESTGFRAFRICA 20.9*  20.9*   < > 14.7* 14.0* 13.1*  13.7*   EGFRNONAA 18.1*  18.1*   < > 12.8* 12.1* 11.3*  11.8*    < > = values in this interval not displayed.   , CBC   Recent Labs   Lab 07/30/20 0353 07/31/20 0422   WBC 6.93 7.21   HGB 8.5* 8.3*   HCT 27.8* 27.6*   * 129*   , Troponin   Recent Labs   Lab 07/30/20 0353   TROPONINI 4.770*    and All pertinent lab results from the last 24 hours have been reviewed.    Significant Imaging: Echocardiogram:   2D echo with color flow doppler:   Results for orders placed or performed during the hospital encounter of 05/02/16   2D echo with color flow doppler    Narrative    This study is in progress....

## 2020-07-31 NOTE — PROGRESS NOTES
[facility]  Vascular Surgery  Progress Note    Patient Name: João Aguirre  MRN: [id number]  Admission Date: 7/24/2020  Primary Care Provider: Primary Doctor No    Subjective:     Interval History: NAEO. Received [facility] yesterday with stent placement.     Post-Op Info:  Procedure(s) (LRB):  Left heart cath (N/A)  ANGIOGRAM, CORONARY ARTERY (N/A)  IVUS, Coronary  Stent, Drug Eluting, Single Vessel, Coronary   1 Day Post-Op       Medications:  Continuous Infusions:   heparin (porcine) in D5W 12 Units/kg/hr (07/30/20 1713)    heparin (porcine)       Scheduled Meds:   sodium chloride 0.9%   Intravenous Once    sodium chloride 0.9%   Intravenous Once    sodium chloride 0.9%   Intravenous Once    sodium chloride 0.9%   Intravenous Once    amLODIPine  5 mg Oral BID    aspirin  81 mg Oral Daily    atorvastatin  40 mg Oral Daily    cadexomer iodine   Topical (Top) Every Mon, Wed, Fri    ceFEPime (MAXIPIME) IVPB  2 g Intravenous Every Mon, Wed, Fri    gabapentin  100 mg Oral BID    levETIRAcetam  500 mg Oral BID    methadone  100 mg Oral Daily    pantoprazole  40 mg Intravenous Daily    polyethylene glycol  17 g Oral Daily    sevelamer carbonate  1,600 mg Oral TID WM    ticagrelor  90 mg Oral BID     PRN Meds:acetaminophen, acetaminophen, albuterol-ipratropium, bisacodyL, calcium carbonate, [COMPLETED] calcium gluconate IVPB **AND** calcium gluconate IVPB, [COMPLETED] calcium gluconate IVPB **AND** calcium gluconate IVPB, heparin (PORCINE), heparin (PORCINE), heparin (porcine), ondansetron, ondansetron, oxyCODONE, oxyCODONE, promethazine, sodium chloride 0.9%, Pharmacy to dose Vancomycin consult **AND** vancomycin - pharmacy to dose     Objective:     Vital Signs (Most Recent):  Temp: 98.6 °F (37 °C) (07/31/20 0356)  Pulse: 63 (07/31/20 0356)  Resp: 16 (07/31/20 0356)  BP: 126/77 (07/31/20 0356)  SpO2: 97 % (07/31/20 0356) Vital Signs (24h Range):  Temp:  [98.2 °F (36.8 °C)-98.9 °F  (37.2 °C)] 98.6 °F (37 °C)  Pulse:  [63-75] 63  Resp:  [14-20] 16  SpO2:  [95 %-99 %] 97 %  BP: ()/(62-87) 126/77         Physical Exam  Vitals signs and nursing note reviewed.   Constitutional:       Appearance: Normal appearance.   HENT:      Head: Normocephalic and atraumatic.   Musculoskeletal:      Comments: Right foot with kerlex dressing in place   Neurological:      General: No focal deficit present.      Mental Status: He is alert and oriented to person, place, and time.         Significant Labs:  BMP:   Recent Labs   Lab 07/31/20 0422   GLU 73  75   *  134*   K 5.5*  5.5*  5.5*   CL 99  99   CO2 22*  22*   BUN 52*  52*   CREATININE 5.3*  5.1*   CALCIUM 9.2  9.2   MG 2.1     CBC:   Recent Labs   Lab 07/31/20 0422   WBC 7.21   RBC 2.59*   HGB 8.3*   HCT 27.6*   *   *   MCH 32.0*   MCHC 30.1*     CMP:   Recent Labs   Lab 07/31/20 0422   GLU 73  75   CALCIUM 9.2  9.2   ALBUMIN 2.6*   PROT 6.6   *  134*   K 5.5*  5.5*  5.5*   CO2 22*  22*   CL 99  99   BUN 52*  52*   CREATININE 5.3*  5.1*   ALKPHOS 131   ALT 9*   AST 25   BILITOT 0.5       Significant Diagnostics:  None    Assessment/Plan:     Skin ulcer of right foot with fat layer exposed  55 y.o. male with right diabetic right foot infection with MRI evidence of osteomyelitis. Non-invasive vascular labs suggest severe tibial and microvascular disease. The nature and severity of these blockages were apparent on angiogram and not ammendable to endoscopic repair. Due to lack of adequate blood flow and the ongoing osteomyelitic infection we have recommended a BKA. This was discussed with Mr. Aguirre today and he is not ready for that step. He would like to wait and consider his options.    - Recommend Right BKA at this time  - Will continue to follow along and further discuss Mr. Aguirre's options           Ara Chavez MD  Vascular Surgery  [facility]

## 2020-07-31 NOTE — SUBJECTIVE & OBJECTIVE
Medications:  Continuous Infusions:   heparin (porcine) in D5W 12 Units/kg/hr (07/30/20 1713)    heparin (porcine)       Scheduled Meds:   sodium chloride 0.9%   Intravenous Once    sodium chloride 0.9%   Intravenous Once    sodium chloride 0.9%   Intravenous Once    sodium chloride 0.9%   Intravenous Once    amLODIPine  5 mg Oral BID    aspirin  81 mg Oral Daily    atorvastatin  40 mg Oral Daily    cadexomer iodine   Topical (Top) Every Mon, Wed, Fri    ceFEPime (MAXIPIME) IVPB  2 g Intravenous Every Mon, Wed, Fri    gabapentin  100 mg Oral BID    levETIRAcetam  500 mg Oral BID    methadone  100 mg Oral Daily    pantoprazole  40 mg Intravenous Daily    polyethylene glycol  17 g Oral Daily    sevelamer carbonate  1,600 mg Oral TID WM    ticagrelor  90 mg Oral BID     PRN Meds:acetaminophen, acetaminophen, albuterol-ipratropium, bisacodyL, calcium carbonate, [COMPLETED] calcium gluconate IVPB **AND** calcium gluconate IVPB, [COMPLETED] calcium gluconate IVPB **AND** calcium gluconate IVPB, heparin (PORCINE), heparin (PORCINE), heparin (porcine), ondansetron, ondansetron, oxyCODONE, oxyCODONE, promethazine, sodium chloride 0.9%, Pharmacy to dose Vancomycin consult **AND** vancomycin - pharmacy to dose     Objective:     Vital Signs (Most Recent):  Temp: 98.6 °F (37 °C) (07/31/20 0356)  Pulse: 63 (07/31/20 0356)  Resp: 16 (07/31/20 0356)  BP: 126/77 (07/31/20 0356)  SpO2: 97 % (07/31/20 0356) Vital Signs (24h Range):  Temp:  [98.2 °F (36.8 °C)-98.9 °F (37.2 °C)] 98.6 °F (37 °C)  Pulse:  [63-75] 63  Resp:  [14-20] 16  SpO2:  [95 %-99 %] 97 %  BP: ()/(62-87) 126/77         Physical Exam  Vitals signs and nursing note reviewed.   Constitutional:       Appearance: Normal appearance.   HENT:      Head: Normocephalic and atraumatic.   Musculoskeletal:      Comments: Right foot with kerlex dressing in place   Neurological:      General: No focal deficit present.      Mental Status: He is alert and  oriented to person, place, and time.         Significant Labs:  BMP:   Recent Labs   Lab 07/31/20 0422   GLU 73  75   *  134*   K 5.5*  5.5*  5.5*   CL 99  99   CO2 22*  22*   BUN 52*  52*   CREATININE 5.3*  5.1*   CALCIUM 9.2  9.2   MG 2.1     CBC:   Recent Labs   Lab 07/31/20 0422   WBC 7.21   RBC 2.59*   HGB 8.3*   HCT 27.6*   *   *   MCH 32.0*   MCHC 30.1*     CMP:   Recent Labs   Lab 07/31/20 0422   GLU 73  75   CALCIUM 9.2  9.2   ALBUMIN 2.6*   PROT 6.6   *  134*   K 5.5*  5.5*  5.5*   CO2 22*  22*   CL 99  99   BUN 52*  52*   CREATININE 5.3*  5.1*   ALKPHOS 131   ALT 9*   AST 25   BILITOT 0.5       Significant Diagnostics:  None

## 2020-07-31 NOTE — PROGRESS NOTES
[facility]  Infectious Disease  Progress Note    Patient Name: João Aguirre  MRN: [id number]  Admission Date: 7/24/2020  Length of Stay: 7 days  Attending Physician: Rony Ryan MD  Primary Care Provider: Primary Doctor No    Isolation Status: No active isolations  Assessment/Plan:      Diabetic foot infection     56 y/o male with a history blindness to right eye, DM, CAD, treated tuberculosis, cardiomyopathy and valvular heart disease.  He has had a chronic ulcer to the right foot, base of 5th digit for over 6 months now.  He also has had a smaller ulcer to the based of his right foot first digit.  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  He says that he was treated with IV vancomycin for 6-8 weeks and then it was stopped.  He continued to get wound care from a facility affiliated with [place], [place], and reports he has been receiving HBOT.    He states that he started having pus come out of the wound again.  Per patient cultures were obtained and revealed 'staph' and 'yeast' .  Patient reported an MRI showed bone infection.   Records now obtained from ID in [place] show he grew a candida albicans and Staph Cohnii from a culture of 6/2/20.  He was supposed to be started on IV vancomycin to be given after dialysis,  however he developed chest pain and was admitted to [facility] in [place].  He was evaluated and it was felt his chest pain was due to severe aortic stenosis and severe coronary artery disease.  He was  transferred to the CTS service of our facility for a higher lever of care for his cardiac disease and consideration of MV replacement.  ID consulted to assist with his diabetic foot infection.      MRI of right foot this admission is consistent with osteomyelitis of distal 5th met head.  Non-invasive vascular studies indicative of severe PAD and he underwent angiogram yesterday  Vascular surgery unable to intervene to restore flow to the right foot.    Patient to be referred to Dr. Young ([facility]) for evaluation for limb salvage.  Given lack of flow, patient is not a candidate for 5th metatarsal head amputation.       He is afebrile, no leukocytosis. Wound is stable, without acute signs of infection.   Podiatry obtained new bone cultures/biopsy done and cultures show GNR.  Path pending. Appreciate their assistance.      Plan/recommendations:  1.  Continue Vanc and cefepime post HD - ordered- vanc trough goal 15-20   2. FU cultures  3. If no GPC growth then dc vanc  4. U with [facility] for revascularization  5. Will follow     Call [phone] with questions during day and ID on call after hours    Infection:     Discharge antibiotics:   1. Vancomycin 1g IV post HD MWF - can dc if no GPC growth on cultures  2. Cefepime 2g IV post HD MWF  - If pathology negative and cultures are no growth then consider stopping IV abx    End date of IV antibiotics: 9/11/20 - tenatively 6 weeks    Weekly outpatient laboratory on Monday or Tuesday while on IV antibiotics.    CBC   CMP or BMP   ESR and CRP   Vancomycin trough. Target 15-20 prior to HD on MWF -     If vancomycin trough is not at target (15-20) prior to discharge, the please perform vancomycin trough before their fourth outpatient dose.    Fax laboratory results to [facility] at [phone] with attn: Ronnie Sinclair PA-C    Outpatient Infectious Diseases clinic follow up will be arranged and found in patient calendar.    Prior to discharge, please ensure the patient's follow-up has been scheduled.  If there is still no follow-up scheduled in Infectious Diseases clinic, please send an EPIC message to Carisa Estes LPN in Infectious Diseases.                  Anticipated Disposition: tbd    Thank you for your consult. I will follow-up with patient. Please contact us if you have any additional questions.    Ronnie GARZA  JEFRY Sinclair  Infectious Disease  [facility]    Subjective:     Principal Problem:Mitral stenosis    HPI: 56 y/o male with a history blindness to right eye, DM, CAD, treated tuberculosis, cardiomyopathy and valvular heart disease.  He has had a chronic ulcer to the right foot, base of 5th digit for over 6 months now.  He also has had a smaller ulcer to the based of his right foot first digit.  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  He says that he was treated with IV vancomycin for 6-8 weeks and then it was stopped.  He continued to get wound care from a facility affiliated with [place].  He states that he started having puss come out of the wound again.  Per patient cultures were obtained and revealed 'staph' and 'yeast'.  He states that an MRI showed bone infection.  He was supposed to be started on IV vancomycin.  However he developed chest pain and was admitted to [facility] in [place].  He was evaluated and it was felt his chest pain was due to severe aortic stenosis and severe coronary artery disease.  He has been transferred to our facility for a higher lever of care for his cardiac disease.  I am consulted to assist with his diabetic foot infection.  Interval History: No AEON. Afebrile. WBC WNL.  MRI consistent with osteo of distal 5th metatarsal head.  RLE angiogram recently,  unable to intervene.  Revasc to be attempted with Dr. Phillip next week.   Bone cultures 7/29 now with GNR.   The patient denies any recent fever, chills, or sweats or significant foot pain.      Review of Systems   Constitutional: Negative for activity change, appetite change, chills, diaphoresis, fatigue and fever.   HENT: Negative.    Eyes: Positive for visual disturbance (right eye blindness).   Respiratory: Negative for chest tightness and shortness of breath.    Cardiovascular: Positive for chest pain.    Gastrointestinal: Negative for abdominal pain, diarrhea, nausea and vomiting.   Genitourinary: Negative for dysuria and hematuria.        HD   Musculoskeletal: Negative for arthralgias and myalgias.   Skin: Positive for wound.   Neurological: Negative for dizziness and headaches.   Psychiatric/Behavioral: Negative for agitation and behavioral problems.   All other systems reviewed and are negative.    Objective:     Vital Signs (Most Recent):  Temp: 98.3 °F (36.8 °C) (07/31/20 0735)  Pulse: 69 (07/31/20 0915)  Resp: 18 (07/31/20 0735)  BP: 134/67 (07/31/20 0915)  SpO2: 97 % (07/31/20 0356) Vital Signs (24h Range):  Temp:  [98.2 °F (36.8 °C)-98.6 °F (37 °C)] 98.3 °F (36.8 °C)  Pulse:  [63-75] 69  Resp:  [14-20] 18  SpO2:  [95 %-99 %] 97 %  BP: ()/(62-87) 134/67     Weight: 106.6 kg (235 lb 0.2 oz)  Body mass index is 31.87 kg/m².    Estimated Creatinine Clearance: 20.1 mL/min (A) (based on SCr of 5.3 mg/dL (H)).    Physical Exam  Vitals signs and nursing note reviewed.   Constitutional:       General: He is not in acute distress.     Appearance: He is not ill-appearing or toxic-appearing.   HENT:      Head: Normocephalic and atraumatic.   Eyes:      General: No scleral icterus.     Conjunctiva/sclera: Conjunctivae normal.      Comments: Right eye blindness   Neck:      Musculoskeletal: Normal range of motion and neck supple.   Cardiovascular:      Rate and Rhythm: Normal rate.      Heart sounds: Murmur present.   Pulmonary:      Effort: Pulmonary effort is normal. No respiratory distress.      Breath sounds: Normal breath sounds.   Abdominal:      General: Abdomen is flat.      Palpations: Abdomen is soft.   Musculoskeletal: Normal range of motion.         General: No swelling or tenderness.   Skin:     General: Skin is warm and dry.      Comments: Wounds examined.  No drainage, periwound erythema, tenderness, fluctuance.   No ascending warmth/erythema.   Podiatry photos reviewed.  See below.     Dialysis  access LUE    Neurological:      General: No focal deficit present.      Mental Status: He is alert and oriented to person, place, and time.   Psychiatric:         Mood and Affect: Mood normal.         Behavior: Behavior normal.       7/30 7/26                Significant Labs:   Blood Culture: No results for input(s): LABBLOO in the last 4320 hours.  CBC:   Recent Labs   Lab 07/30/20 0353 07/31/20  0422   WBC 6.93 7.21   HGB 8.5* 8.3*   HCT 27.8* 27.6*   * 129*     CMP:   Recent Labs   Lab 07/30/20  0353  07/30/20  2022 07/30/20  2309 07/31/20  0422     138   < > 136 134* 134*  134*   K 4.6  4.6  4.6   < > 4.8  4.8 5.2*  5.2* 5.5*  5.5*  5.5*     103   < > 101 99 99  99   CO2 26  25   < > 23 23 22*  22*   GLU 89  90   < > 93 93 73  75   BUN 30*  30*   < > 45* 45* 52*  52*   CREATININE 3.6*  3.6*   < > 4.8* 5.0* 5.3*  5.1*   CALCIUM 9.2  8.8   < > 9.2 9.0 9.2  9.2   PROT 6.5  --   --   --  6.6   ALBUMIN 2.6*  --   --   --  2.6*   BILITOT 0.4  --   --   --  0.5   ALKPHOS 120  --   --   --  131   AST 28  --   --   --  25   ALT 15  --   --   --  9*   ANIONGAP 10  10   < > 12 12 13  13   EGFRNONAA 18.1*  18.1*   < > 12.8* 12.1* 11.3*  11.8*    < > = values in this interval not displayed.     Wound Culture:   Recent Labs   Lab 07/29/20  1415   LABAERO No growth       Significant Imaging: I have reviewed all pertinent imaging results/findings within the past 24 hours.   CTA Runoff ABD PEL Bilat Lower Ext [[id number]] Resulted: 07/29/20 1424   Order Status: Completed Updated: 07/29/20 1427   Narrative:     EXAMINATION:   CTA RUNOFF ABD PEL BILAT LOWER EXT     CLINICAL HISTORY:   Acute limb ischemia, leg;     TECHNIQUE:   CTA of abdomen, pelvis, and bilateral lower extremities performed with 125 mL Omnipaque 350 intravenous contrast.     COMPARISON:   Ultrasound abdomen complete 07/28/2020, CT abdomen pelvis with contrast 08/29/2018     FINDINGS:   Heart: No cardiomegaly or  pericardial effusion. Coronary artery and aortic valve atherosclerotic calcification.     Lung Bases: There are bilateral linear bandlike opacities compatible with scarring versus atelectasis, mild traction bronchiectasis.  No large mass or consolidation.  No pneumothorax.  Trace left pleural effusion with associated consolidation and atelectasis within the left lower lobe.     Liver: Normal in size and attenuation without focal hepatic abnormality. Several lymph nodes noted at the ankit hepatis, stable compared to 2018     Gallbladder: Status post cholecystectomy.     Bile Ducts: No intra or extrahepatic biliary ductal dilation.     Pancreas: No pancreatic mass lesion or peripancreatic inflammatory change.     Spleen: Unremarkable     Adrenals: Unremarkable     Genitourinary: Native kidneys are atrophic bilaterally with cortical thinning.  Left cortical hypodensity, too small to characterize, likely a simple cyst.  No nephrolithiasis.  No hydronephrosis.     Reproductive organs: Dystrophic prostatic calcifications.     GI tract/Mesentery: Stomach is normal appearance. Visualized loops of small and large bowel are normal in caliber without evidence for inflammation or obstruction.  Prominent volume stool within the colon.     Peritoneal Space: No abdominopelvic ascites or intraperitoneal free air.     Retroperitoneum: No significant adenopathy.     Abdominal wall: Unremarkable.     Bones: Degenerative changes without acute fracture or bony destructive process.     Aorta: Normal course and caliber.  No dissection, penetrating ulcer, or intramural hematoma.     The celiac artery is patent.  The splenic artery is patent noting peripheral calcification and a tortuous course.  The SMA is patent, noting up to 50% stenosis at its origin.  The bilateral renal arteries are patent, noting marked atherosclerotic calcification and high-grade stenosis of the bilateral renal artery origins.     Right:     Common iliac artery:  Atherosclerotic calcification with moderate stenosis.     External iliac artery: Patent     Superficial femoral artery: Areas of high-grade stenosis with occlusion.  Reconstitutes distally.     Deep femoral artery: Patent with extensive calcification..     Popliteal artery: Extensive calcification with greater than 90% stenosis and areas of occlusion.     Anterior tibial artery: Extensive atherosclerotic calcification with areas of stenosis and intermittent occlusion.     Posterior tibial artery: Extensive atherosclerotic calcification with areas of stenosis and intermittent occlusion.     Peroneal artery: Extensive atherosclerotic calcification with areas of stenosis and intermittent occlusion.     Left:     Common iliac artery: Atherosclerotic calcification results in up to 50% stenosis.     External iliac artery: Patent without high-grade stenosis or occlusion.     Superficial femoral artery: Atherosclerotic calcified and noncalcified plaque results in greater than 90% stenosis with occlusion of the proximal and mid SFA with distal reconstitution.     Deep femoral artery: Patent with extensive calcification     Popliteal artery: Patent with areas of greater than 90% stenosis and potential occlusion.     Anterior tibial artery: Extensive atherosclerotic calcification with areas of stenosis and intermittent occlusion.     Posterior tibial artery: Extensive atherosclerotic calcification with areas of stenosis and intermittent occlusion.     Peroneal artery: Extensive atherosclerotic calcification with areas of stenosis and intermittent occlusion.    Impression:       Extensive atherosclerotic calcification throughout the lower extremity arterial system, noting intermittent high-grade stenosis and occlusion throughout the bilateral calf vessels in addition to the bilateral superficial femoral arteries and popliteal arteries.     Bilateral renal atrophy.     Electronically signed by resident: Johanna Dumont   Date:  07/29/2020   Time: 11:51     Electronically signed by: Liset Rocha MD   Date: 07/29/2020   Time: 14:24   US Abdomen Complete [[id number]] Resulted: 07/29/20 0156   Order Status: Completed Updated: 07/29/20 0158   Narrative:     EXAMINATION:   US ABDOMEN COMPLETE     CLINICAL HISTORY:   HCV with possible cirrhosis;     TECHNIQUE:   Complete abdominal ultrasound (including pancreas, liver, gallbladder, common bile duct, spleen, aorta, IVC, and kidneys) was performed.     COMPARISON:   U/S abdomen complete 07/29/2016; CT abdomen pelvis 08/29/2018     FINDINGS:   Pancreas: Obscured by overlying bowel gas.     Aorta: Partially obscured by overlying bowel gas, noting no aneurysm within the visualized portions of the aorta.     Liver: Measures 17.2 cm, upper limits of normal in size and extending below the costal margin.  Coarse parenchymal echogenicity with a nodular contour suggesting cirrhosis. No focal lesions.     Gallbladder: The gallbladder is surgically absent.     Biliary system: 4 mm common bile duct.  No intrahepatic ductal dilatation.     Inferior vena cava: Normal in appearance.     Right kidney: Poorly visualized.  Small in size with cortical thinning measuring 7.7 cm. No hydronephrosis.     Left kidney: Poorly visualized.  Small in size with cortical thinning measuring 7.7 cm. No hydronephrosis.     Spleen: Measures 11.3 x 4.2 cm.  Normal in size with homogeneous echotexture.     Miscellaneous: No ascites.    Impression:       Coarse appearance of the hepatic parenchyma with nodular contour suggesting underlying cirrhosis.     Advanced atrophy of the bilateral kidneys compatible with chronic medical renal disease.     Status post cholecystectomy.     Electronically signed by resident: Robin Jauregui   Date: 07/28/2020   Time: 23:10     Electronically signed by: Enoc Padron MD   Date: 07/29/2020   Time: 01:56   X-Ray Chest AP Portable [[id number]] Resulted: 07/28/20 1929   Order Status: Completed  Updated: 07/28/20 1931   Narrative:     EXAMINATION:   XR CHEST AP PORTABLE     CLINICAL HISTORY:   Chest pain;     TECHNIQUE:   Single frontal view of the chest was performed.     COMPARISON:   07/24/2020     FINDINGS:   Suboptimal inspiration.     Cardiac silhouette is mildly enlarged.     Aortic atherosclerosis.     Slight blunting of the costophrenic angle on the right could represent trace effusion.     Mild bilateral interstitial and ground-glass changes could represent mild atelectasis or infiltrate.  This could represent improving pneumonitis or edema     Interval improvement from the prior study.     No acute osseous abnormality.    Impression:       Cardiomegaly with mild bilateral interstitial and ground-glass changes could represent mild atelectasis, infiltrate or minimal edema.  Interval improvement from the prior study.       Electronically signed by: Jose Maria Solorio   Date: 07/28/2020   Time: 19:29   MRI Foot (Forefoot) Right Without Contrast [[id number]] (Abnormal) Resulted: 07/27/20 1636   Order Status: Completed Updated: 07/27/20 1639   Narrative:     EXAMINATION:   MRI FOOT (FOREFOOT) RIGHT WITHOUT CONTRAST     CLINICAL HISTORY:   Osteomyelitis suspected, diabetic;     TECHNIQUE:   Multiplanar, multisequence MR imaging of the right forefoot without the use of intravenous gadolinium IV contrast.     COMPARISON:   Right foot radiograph 07/26/2020.     FINDINGS:   Examination moderately degraded by patient motion artifact.     Bones: Postoperative changes of 2nd phalangeal and distal metatarsal amputation.  Hallux valgus deformity.  Prominent degenerative changes of the 1st metatarsophalangeal joint.     Osteomyelitis Evaluation:     Location: Abnormal signal involving the 5th metatarsal distal head.     T1 Signal: Confluent Decreased Signal     T2 Signal: Bone Marrow Edema     Cortical Margin: Indistinct     Secondary Signs: Ulcer is present adjacent to the abnormal osseous signal.     MRI  Osteomyelitis Classification:Compatible with Osteomyelitis: Reticular CONFLUENT T1 and Secondary Signs ulcer/abcess/sinus tract     Tendons: Flexor and extensor tendons of the toes are within normal limits.     Ligaments: The ligaments of the foot, including the Lisfranc ligament, are intact.     Soft Tissues: Mild subcutaneous edema involving the dorsal foot.  No focal fluid collection.  Small soft tissue ulceration is present extending laterally from distal 5th metatarsal head.  Nonspecific superficial 0.7 cm cystic lesion along the plantar aspect of the 3rd phalanx.  Diffuse muscular atrophy.     Miscellaneous: No evidence of Simmons's neuroma.    Impression:       Findings consistent with osteomyelitis of the distal 5th metatarsal head with adjacent skin ulceration.  No focal fluid collection.     Postoperative changes of 2nd phalangeal/distal metatarsal amputation.     Prominent hallux valgus deformity and degenerative changes of the 1st metatarsophalangeal joint.     Nonspecific superficial cystic lesion along the plantar aspect of the 3rd phalanx.     This report was flagged in Epic as abnormal.     Electronically signed by resident: Tej Lane   Date: 07/27/2020   Time: 15:46     Electronically signed by: Jace Lyle MD   Date: 07/27/2020   Time: 16:36   VAS Ankle Brachial Indices Resting [[id number]] Collected: 07/27/20 1239   Order Status: Completed Updated: 07/27/20 1435   Narrative:       Indication   ========     Peripheral Vascular Disease     Pressure Lower   ============     Rt brachial A syst BP  95 mmHg   Rt PTA BP  255 mmHg   Rt dors pedis A  mmHg   Rt toe BP  0 mmHg   Rt ADDI post tibial (rt post tib A BP / max brach A BP) 2.68   Rt ADDI (rt dors ped A BP / max brach A BP) 2.68   Rt ankle BP / max brach A BP   2.68   Rt TBI (rt toe BP / max brach A BP)    0.00   Lt PTA BP  133 mmHg   Lt dors pedis A  mmHg   Lt toe BP  0 mmHg   Lt ADDI (lt post tibial A BP / max brach A BP)  1.40   Lt  ADDI dors ped (lt dors ped A BP / max brach A BP)    2.68   Lt ankle BP / max brach A BP   2.68   Lt TBI (lt toe BP / max brach A BP)    0.00     Impression   =========     Right Leg: Segmental pressures are unreliable due to medial calcinosis; however, PVR waveforms suggest severe peripheral arterial   occlusive disease. -TBI of 0 is noted.   Left Leg: Segmental pressures are unreliable due to medial calcinosis; however, PVR waveforms suggest severe peripheral arterial   occlusive disease. -TBI of 0 is noted.       DATE OF SERVICE: 07/27/2020                                                        Sonographer: Angela Pat   Electronically Signed by: Ron Hines M.D. at 07/27/2020-14:33   VAS US Arterial Legs Bilateral [[id number]] Collected: 07/27/20 1253   Order Status: Completed Updated: 07/27/20 1433   Narrative:       Lower Extremity Arterial Imaging   ========================     Right Lower Extremity   Rt mid CFA PSV 61 cm/s   Rt prox DFA PSV    51 cm/s   Rt prox SFA PSV    24 cm/s   Rt mid SFA PSV 44 cm/s   Rt distal SFA PSV  27 cm/s   Rt mid POP PSV 22 cm/s   Rt mid ERENDIRA PSV 19 cm/s   Rt mid PTA PSV 17 cm/s   Left Lower Extremity   Lt mid CFA PSV 19 cm/s   Lt prox DFA PSV    67 cm/s   Lt prox SFA PSV    64 cm/s   Lt mid SFA PSV 48 cm/s   Lt distal SFA PSV  45 cm/s   Lt mid POP PSV 24 cm/s   Lt mid ERENDIRA PSV 15 cm/s   Lt mid PTA PSV 20 cm/s     Comment   ========     Technically difficult study due to calcified vessels.     Impression   =========     Right leg: Color flow duplex of the right lower extremity reveals heavily calcified vessels with monophasic waveforms throughout. There   are collaterals throughout the extremity with no evidence of a hemodynamically significant stenosis.     Left leg: Color flow duplex of the left lower extremity reveals heavily calcified vessels with monophasic waveforms throughout. There   are collaterals throughout the extremity with no evidence of a hemodynamically significant  stenosis.       DATE OF SERVICE: 07/27/2020                                                        Sonographer: RACHAEL PAULA S   Electronically Signed by: Ron Hines M.D. at 07/27/2020-14:33   X-Ray Foot Complete Right [[id number]] Resulted: 07/26/20 1454   Order Status: Completed Updated: 07/26/20 1457   Narrative:     EXAMINATION:   XR FOOT COMPLETE 3 VIEW RIGHT     CLINICAL HISTORY:   foot wound;.     TECHNIQUE:   AP, lateral, and oblique views of the right foot were performed.     COMPARISON:   09/25/2019     FINDINGS:   No fracture or dislocation.  Lisfranc articulation is congruent.  Degenerative changes identified RIGHT 1st metatarsophalangeal joint.  Vascular calcifications.  Postoperative change identified level of the distal phalanx RIGHT 2nd toe.  Deformity at the level the proximal phalanx RIGHT 5th toe stable.  Osseous calcaneal spurs.  Suspect ulceration at the level of the RIGHT 5th metatarsal head.    Impression:       As above.  MRI may be helpful for further evaluation of potential osteomyelitis.       Electronically signed by: Jace Lyle MD   Date: 07/26/2020   Time: 14:54   US Carotid Bilateral [[id number]] Resulted: 07/24/20 1905   Order Status: Completed Updated: 07/24/20 1907   Narrative:     EXAMINATION:   US CAROTID BILATERAL     CLINICAL HISTORY:   cad;     TECHNIQUE:   Grayscale and color Doppler ultrasound examination of the carotid and vertebral artery systems bilaterally.  Stenosis estimates are per the NASCET measurement criteria.     COMPARISON:   05/02/2016     FINDINGS:   Right:     Internal Carotid Artery (ICA):     Peak systolic velocity 66 cm/sec     End diastolic velocity 21 cm/sec     ICA/CCA peak systolic ratio: 1.6     ICA/CCA end diastolic ratio: 2.6     Plaque formation: Moderate     Vertebral artery: Antegrade flow and normal waveform.     Left:     Internal Carotid Artery (ICA):     Peak systolic velocity 68 cm/sec     End diastolic velocity 22 cm/sec     ICA/CCA  peak systolic ratio: 1.39     ICA/CCA end diastolic ratio: 1.69     Plaque formation: Moderate     Vertebral artery: Antegrade flow and normal waveform.    Impression:       1-39% stenosis bilaterally..       Electronically signed by: Jarrell Irvin MD   Date: 07/24/2020   Time: 19:05   X-Ray Chest AP Portable [[id number]] Resulted: 07/24/20 0608   Order Status: Completed Updated: 07/24/20 0611   Narrative:     EXAMINATION:   XR CHEST AP PORTABLE     CLINICAL HISTORY:   Chest Pain;     TECHNIQUE:   Single frontal view of the chest was performed.     COMPARISON:   08/29/2018     FINDINGS:   The cardiomediastinal silhouette is prominent in size, similar to prior examination.  The lungs are symmetrically expanded with diffuse increased interstitial and parenchymal attenuation which can be seen in the setting of pulmonary edema although correlation for underlying infectious process is advised.  The possible small component of right pleural fluid present.  No evidence of pneumothorax.  Visualized osseous structures are intact.    Impression:       Cardiomegaly and findings suggestive of possible pulmonary edema although correlation for underlying infectious process is advised.       Electronically signed by: Nena Padron MD   Date: 07/24/2020   Time: 06:08   Imaging History    2020  Date Procedure Name Status Accession Number Location   07/29/20 09:59 AM CTA Runoff ABD PEL Bilat Lower Ext Final [id number] [facility]   07/28/20 11:04 PM US Abdomen Complete Final [id number] [facility]   07/28/20 07:01 PM X-Ray Chest AP Portable Final [id number] [facility]   07/27/20 03:39 PM MRI Foot (Forefoot) Right Without Contrast Final [id number] [facility]   07/26/20 12:01 PM VAS US Arterial Legs Bilateral Final [id number]    07/26/20 12:01 PM VAS Ankle Brachial Indices Resting Final [id number]    07/26/20 02:43 PM X-Ray Foot Complete Right Final [id number] [facility]   07/24/20 06:48 PM US Carotid Bilateral Final [id number] [facility]   07/24/20 06:02 AM X-Ray Chest AP  Portable Final [id number] [facility]   07/24/20 09:54 AM Echo Color Flow Doppler? Yes Final [id number] [facility]   07/30/20 12:43 PM Cardiac catheterization Needs Review [id number] [facility]

## 2020-07-31 NOTE — PROGRESS NOTES
Hospital Medicine  Progress note    [facility] [facility] Maira Shanks MD  Admit Date: 7/24/2020  VICKY 8/7/2020  Length of Stay:  LOS: 8 days   Code status: Full Code    Principal Problem:  Mitral stenosis    HPI / Hospital Course     Interval hx:  On dialysis now, reports no major issues, pain controlled. Hg stable. Last BM was yesterday. He reports last night that Dr Marie reported that he actually thinks that Dr Myles in [place] can help salvage his PVD further as the plan initially per 1 pm note was for peripheral angio here on Tuesday but he states he came back to room around 6 pm to update on a [place] transfer for this to pursue. MD prior to me Dr Ryan confirmed this in email also to me, reported DR Bernard will be accepting MD in [place] for a transfer to start pursuing over weekend for a peripheral limb salvage with specialist Dr Myles planned for next week. Will reach out to transfer center today to start process.    ROS     Constitutional: Negative for chills and fever.   HENT: Negative for sinus pain and sore throat.    Respiratory: Positive for chest tightness. Negative for wheezing.    Cardiovascular: Positive for chest pain and palpitations. Negative for leg swelling.   Gastrointestinal: Positive for abdominal distention. Negative for abdominal pain, blood in stool, diarrhea and nausea.   Endocrine: Negative for polyuria.   Genitourinary: Positive for enuresis. Negative for flank pain and frequency.   Musculoskeletal: Negative for back pain.   Skin: Negative for color change.   Neurological: Positive for light-headedness. Negative for weakness and headaches.   Psychiatric/Behavioral: Negative for agitation and confusion.     PEx  Temp:  [96.5 °F (35.8 °C)-99.3 °F (37.4 °C)]   Pulse:  [62-76]   Resp:  [14-20]   BP: (104-138)/(58-78)   SpO2:  [95 %-100 %]     Intake/Output Summary (Last 24 hours) at 8/1/2020 1059  Last data filed at 8/1/2020 0500  Gross per 24 hour   Intake 940 ml   Output 3700 ml    Net -2760 ml       Constitutional:       General: He is not in acute distress.     Appearance: He is not ill-appearing.   HENT:      Head: Normocephalic and atraumatic.      Right Ear: Tympanic membrane normal.      Left Ear: Tympanic membrane normal.      Mouth/Throat:      Mouth: Mucous membranes are dry.      Pharynx: No oropharyngeal exudate.   Eyes:      General: No scleral icterus.        Right eye: No discharge.         Left eye: No discharge.   Cardiovascular:      Rate and Rhythm:  Normal rate present. Rhythm irregular.      Heart sounds: Murmur present.      Comments: Reduced LE pulses throughout  Pulmonary:      Effort: Pulmonary effort is normal. No respiratory distress.      Breath sounds: No stridor. No wheezing or rhonchi.   Abdominal:      General: Bowel sounds are normal.      Palpations: Abdomen is soft.      Tenderness: There is no abdominal tenderness. There is no guarding.   Musculoskeletal:         General: No swelling or tenderness.      Right lower leg: No edema.      Left lower leg: No edema.   Skin:     Capillary Refill: Capillary refill takes 2 to 3 seconds.      Coloration: Skin is not jaundiced.   Chronic RLE ulceration in lateral and medial surfaces in plantar surface with open areas worse on lateral side.  Neurological:      General: No focal deficit present.      Mental Status: He is alert and oriented to person, place, and time.   Psychiatric:         Mood and Affect: Mood normal.         Behavior: Behavior normal.     Recent Labs   Lab 07/30/20  0353 07/31/20  0422 08/01/20  0532   WBC 6.93 7.21 6.63   HGB 8.5* 8.3* 8.4*   HCT 27.8* 27.6* 27.8*   * 129* 123*     Recent Labs   Lab 07/29/20  0419  07/30/20  0353  07/31/20  0422 07/31/20  1200 08/01/20  0532     136   < > 139  138   < > 134*  134* 137 138   K 5.7*  5.7*  5.7*   < > 4.6  4.6  4.6   < > 5.5*  5.5*  5.5* 3.8  3.8 4.7   CL 99  99   < > 103  103   < > 99  99 102 103   CO2 23  23   < > 26  25    < > 22*  22* 26 24   BUN 48*  48*   < > 30*  30*   < > 52*  52* 17 39*   CREATININE 4.8*  4.8*   < > 3.6*  3.6*   < > 5.3*  5.1* 2.3* 4.2*   GLU 69*  69*   < > 89  90   < > 73  75 100 100   CALCIUM 8.9  8.9   < > 9.2  8.8   < > 9.2  9.2 8.1* 9.2   MG 2.2  --  2.0  --  2.1  --   --    PHOS 6.2*  --  4.9*  --  6.5*  --   --     < > = values in this interval not displayed.     Recent Labs   Lab 07/29/20  1641 07/30/20  0353 07/31/20  0422 08/01/20  0532   ALKPHOS  --  120 131 131   ALT  --  15 9* 5*   AST  --  28 25 22   ALBUMIN  --  2.6* 2.6* 2.5*   PROT  --  6.5 6.6 6.6   BILITOT  --  0.4 0.5 0.4   INR 1.0 1.0  --  1.0        Recent Labs   Lab 07/29/20  1749 07/30/20  1322 07/30/20  1808 07/31/20  0000 07/31/20  1142 07/31/20  1728   POCTGLUCOSE 77 69* 124* 93 94 109       Scheduled Meds:   sodium chloride 0.9%   Intravenous Once    sodium chloride 0.9%   Intravenous Once    sodium chloride 0.9%   Intravenous Once    amLODIPine  10 mg Oral Daily    aspirin  81 mg Oral Daily    atorvastatin  40 mg Oral Daily    cadexomer iodine   Topical (Top) Every Mon, Wed, Fri    [START ON 8/3/2020] ceFEPime (MAXIPIME) IVPB  2 g Intravenous Once per day on Mon Wed Fri    epoetin harshad-epbx  10,000 Units Intravenous Every Mon, Wed, Fri    gabapentin  100 mg Oral BID    levETIRAcetam  500 mg Oral BID    methadone  100 mg Oral Daily    pantoprazole  40 mg Oral Before breakfast    polyethylene glycol  17 g Oral Daily    sevelamer carbonate  1,600 mg Oral TID WM    ticagrelor  90 mg Oral BID    warfarin  5 mg Oral Daily     Continuous Infusions:   heparin (porcine) in D5W 12 Units/kg/hr (07/31/20 1337)     As Needed:  acetaminophen, albuterol-ipratropium, bisacodyL, calcium carbonate, [COMPLETED] calcium gluconate IVPB **AND** calcium gluconate IVPB, [COMPLETED] calcium gluconate IVPB **AND** calcium gluconate IVPB, heparin (PORCINE), heparin (PORCINE), ondansetron, oxyCODONE, oxyCODONE, promethazine,  sodium chloride 0.9%, Pharmacy to dose Vancomycin consult **AND** vancomycin - pharmacy to dose        Active Hospital Problems    Diagnosis  POA    *Mitral stenosis [I05.0]  Yes    (HFpEF) heart failure with preserved ejection fraction [I50.30]  Unknown    Atrial flutter [I48.92]  Unknown    Hypotension [I95.9]  Unknown    PVD (peripheral vascular disease) [I73.9]  Yes    Atherosclerosis of native artery of right lower extremity with ulceration of midfoot [I70.234]  Yes    Acute osteomyelitis of metatarsal bone, right [M86.171]  Yes    Diabetic foot infection [E11.628, L08.9]  Yes    ESRD (end stage renal disease) on dialysis [N18.6, Z99.2]  Not Applicable    Nonrheumatic aortic (valve) stenosis [I35.0]  Yes    Chronic kidney disease-mineral and bone disorder [N18.9, E83.9, M89.9]  Yes    Skin ulcer of right foot with fat layer exposed [L97.512]  Yes    Hyperkalemia [E87.5]  Yes    Seizure disorder [G40.909]  Yes    Benign hypertension with ESRD (end-stage renal disease) [I12.0, N18.6]  Yes    Cirrhosis of liver with ascites [K74.60, R18.8]  Yes    Anemia in chronic kidney disease [N18.9, D63.1]  Yes     Chronic    ESRD (T,Th,Sat) dialysis onset 2013 [N18.6]  Yes     Chronic    NSTEMI (non-ST elevated myocardial infarction) [I21.4]  Yes      Resolved Hospital Problems   No resolved problems to display.       Assessment and Plan  / Problems managed today    Mitral stenosis  -transfer for 2nd opinion, CT surgery reports no surgery option now, so placed on med team for other med management  -cards recs BB for rate control to help diastolic filling (Robyn when goes into pafib, currently in NSR without BB, on home coreg). Keep HR goal 50-60s, get fluid off with HD and when euvolemic repeat echo  -echo now showing severe MS< EF 50, mild AS, BNP on admit 2900.   -per Dr aMrie may be candidate for Valve-in MAC- in future if foot infectoin clears. Revascularization in interim for coronary symptoms (See  CAD)    Coronary Artery Disease   -s/p stent on 7/30 with FARHEEN x 2 to the mid LAD and FARHEEN x 1 to the RCA.  -plavix nonresponder as elevated PRU levels, cannot so prasugrel due to DVA history  -do asa, brillinta, and start coumadin now, after 7 days stop asa and continue the other 2 indefinitely per cards recs (stop asa on aug 6th)  -cont lipitor  -on coreg at home, not on BB currently    Seizure history  - cont homeKeppra 500 BID    GERD   - Pantoprazole 40 QD       Acute osteomyelitis of metatarsal bone, right  -Infectious disease following, history of Candida and Staph Cohnii in [place] in June  -current recs: Vancomycin, Cefepime on HD days  -6 weeks recs now, end date 9/11 expected, weekly CBC CMP, ESR, CRP vanc trough  -if no GPC on cultures, can likely dc vanc, if cultures and path ultimately have nothing significant, ID may consider stopping all antibiotics per note 7/31.   Will need ID follow up  -Cultures 7/28 NG from podiatry.   -Continue cefpime, vanc with HD   -ID following         PVD (peripheral vascular disease)  -Vascular following, recommends RLE BKA, patient defers  -interventional cards feels balloon angio will help lower the level of amputation in future but believe is unlikely to heel ulcers, as multilevel disease of  with unsuccessul revscularization by vascular earlier in stay.  iniitally the Plan for Tuesday with cards, heparin drip in interim, okay to bridge to coumadin in between per cards note. However late last PM Dr Marie decided Dr Anglin and [facility] transfer would be better option to pursue, will try to pursue today to start process for angio next week for limb salvage, dr larson reported accepting MD        ESRD (end stage renal disease) on dialysis  -Nephrology following. Tues/Thurs/Sat at home  -renal diet, cont Renvela. 1L fluid restrict  -HD 7/31 and 8/1, on HD today.     Nonrheumatic aortic (valve) stenosis  -ECHO shows mild aortic valve stenosis. Aortic valve area is 1.57  cm2; peak velocity is 3.31 m/s; mean gradient is 24 mmHg. Mild aortic regurgitation.  -Patient currently high risk for cardiac surgery secondary to cirrhosis (plt 149) and osteomyelitis   -as considered valvular paroxysmal AFib, he is stopped on his home eliquis and coumadin started per cards recs. Daily INR, today was 1.0 but just reinitiated. Titrate up to goal.       Skin ulcer of right foot with fat layer exposed  -Vascular following  -see above (osteo)    History of CVA  - no residual deficits, no prasugrel due to this. Cont statin, brillinta    Gastroparesis   -no acute issues     Hyperkalemia  -improved now with HD, can deescalate labs to daily  -monitor closely     Seizure disorder  - Home Keppra      Benign hypertension with ESRD (end-stage renal disease)  -Continue Norvasc 5mg    Paroxysmal Atrial Fibrillation  -was on eliquis at home and coreg 3.125, both held now  -convert to coumadin as cards feel he has valvular afib warranting this  -[id number] of 6    Type 2 DM history  -A1C 4.6 now, not on meds and now seems likely resolved        Cirrhosis of liver with ascites  -patient report history of HCV and cirrhosis   -Confirmed on ultrasound  -hepatology consulted, needs hepatology follow up to consider HCV tx, 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 Trend.  -Nephrology following       Goals of Care:  Return to prior functional status    Discharge plan: home with HH once interventions complete, likely earliest later next week,   Per cards recs, will pursue [place] transfer for specialist Dr Myles, start process today, accepting MD opal boogie, will reach out    Maira Shanks MD

## 2020-07-31 NOTE — ASSESSMENT & PLAN NOTE
BNP 2948 on admit, continues to gain more volume and increase from his dry weight  Discussed with nephrology, aiming for volume removal w HD

## 2020-07-31 NOTE — PHARMACY MED REC
"    Admission Medication Reconciliation - Pharmacy Consult Note    The home medication history was taken by Kellen Steven, Pharmacy Technician.  Based on information gathered and subsequent review by the clinical pharmacist, the items below may need attention.     You may go to "Admission" then "Reconcile Home Medications" tabs to review and/or act upon these items.     Potentially problematic discrepancies with current MAR  o Patient IS taking the following which was not ordered upon admit  o Carvedilol 3.125mg daily - unclear why patient is taking carvedilol daily versus twice daily dosing  o Ferric citrate (AURYXIA) 210-420mg by mouth three times daily - this is non formulary can manage phosphorus with sevelamer while inpatient   o Fluticasone nasal spraye 1 spray in each nostril daily     o Patient IS NOT taking the following which was ordered upon admit  o Amlodipine 10mg by mouth daily       Potential issues to be addressed PRIOR TO DISCHARGE  o Patient reports not taking amlodipine 5mg BID or minoidil 5mg BID. These were removed from the home med list.  o Patient reports taking hydralazine 50mg as need for hypertension which may not be the best HTN management.   o Patient reports alternating between 1-2 tablets of sevelamer and 1-2 tablets of ferric citrate TID with meals.Unclear how patient determines which/how many he takes.  If indication for both is hyperphosphatemia may need to reassess therapy.   o Patient takes carvedilol 3.125mg daily - unclear why patient is taking carvedilol daily versus twice daily dosing. He also ran out of this medication, if he requires carvedilol he will need a new prescription.     Please address this information as you see fit.  Feel free to contact us if you have any questions or require assistance.    Cate Randle, PharmD, BCPS, Cardiology Clinical Pharmacy Specialist  [phone]                .  .          "

## 2020-07-31 NOTE — PLAN OF CARE
Plan of care discussed with patient, no new questions at this time. VSS, denies SOB, no complaint of pain. Heparin gtt continued. APTT checked per orders. Plan for dialysis today. Safety precautions taken, no falls/trauma during the shift. Will continue to monitor.

## 2020-07-31 NOTE — PLAN OF CARE
Plan of care discussed with patient. Patient is free of fall/trauma/injury. Denies CP, SOB, or pain/discomfort. Patient went to dialysis today for 4 hours; 3L removed. Cefepime IVP administered after dialysis. Continuous heparin gtt infusing at 12units/kg/hr x 86.5 kg; PTT therapeutic. Warfarin 5mg to be initiated. Vancomycin administered as ordered. Patient scheduled for cath lab intervention on 8/4/20. BG monitored q6h. All questions addressed. Will continue to monitor

## 2020-08-01 NOTE — PROGRESS NOTES
Pt arrived to [facility] via stretcher.  Alert and oriented x4.  Dialysis started to left arm fistula with 15 gauge needles.  Pt tolerated with out difficuly

## 2020-08-01 NOTE — PROGRESS NOTES
Hospital Medicine  Progress note    Team: [facility] MED A Maira Shanks MD  Admit Date: 7/24/2020  VICKY 8/7/2020  Length of Stay:  LOS: 9 days   Code status: Full Code    Principal Problem:  Mitral stenosis    HPI / Hospital Course     Interval hx:  Reports he feels like the heparin drip is making him a little volume overloaded, has some SOB when laying flat, agree as soon as we can get the drip with extra fluids off this would be prudent, INR now 1.5 from 1.0 with coumadin just initiated the day prior so working on this now. Pending INR tomorrow would consider adjusting further if not continuing on track now in right direction. Sounds like fluid really contributing as on low flow oxygen but really just when laying flat he feels it, discussed that he had a new bug (Stenotrophomonoas) grow yesterday which was reason for antibiotic change and yesenia likely need to continue until September per ID recs here but will have ID reassess end date in [place] while getting intervention with Dr Stevenson. Would recommend HD tomorrow AM for him (was planned for tomorrow here if he was to be here) before procedure as likely wouldn't be able to lay flat with some orthopnea now for procedure so would ensure has HD early in AM schedule with renal in [place] on transfer. Labs stable on exam today, no other major issues.    Received HD on 8/1. ID adjusted antibiotics yesterday and d/c vancomycin, changed to ceftazidime 1 grams with HD on MWF, will cont to follow here and rec. Consulting [facility] at [place] for further adjustements pending foot Cx data as it results as well as renal in [place]. Plan for transfer today (Sunday) to [facility] per Dr Bernard for peripheral angiogram with Dr Myles for difficult PVD that was unable to be revascularized by vascular here and per discussion with DR Marie here with interventional cardiology, likely will not salvage completely but will lower level of amputation suspected needed in future, patient  aware of this and can give me very good details of conversation he had with cards here and knowing those interventions and the risk/benefits of these as opposed to BKA that vascular recommended which he is adament he does not want to pursue at this time.    ROS     Constitutional: Negative for chills and fever.   HENT: Negative for sinus pain and sore throat.    Respiratory: Positive for chest tightness. Negative for wheezing.  +orthopnea  Cardiovascular: Positive for chest pain and palpitations. Negative for leg swelling.   Gastrointestinal: Positive for abdominal distention. Negative for abdominal pain, blood in stool, diarrhea and nausea.   Endocrine: Negative for polyuria.   Genitourinary: Positive for enuresis. Negative for flank pain and frequency.   Musculoskeletal: Negative for back pain.   Skin: Negative for color change.   Neurological: Positive for light-headedness. Negative for weakness and headaches.   Psychiatric/Behavioral: Negative for agitation and confusion.     PEx  Temp:  [97.9 °F (36.6 °C)-100.4 °F (38 °C)]   Pulse:  [60-86]   Resp:  [16-20]   BP: (112-141)/(56-68)   SpO2:  [96 %-98 %]     Intake/Output Summary (Last 24 hours) at 8/2/2020 1138  Last data filed at 8/2/2020 0400  Gross per 24 hour   Intake 1214 ml   Output 3650 ml   Net -2436 ml       Constitutional:       General: He is not in acute distress.     Appearance: He is not ill-appearing.   HENT:      Head: Normocephalic and atraumatic.      Right Ear: Tympanic membrane normal.      Left Ear: Tympanic membrane normal.      Mouth/Throat:      Mouth: Mucous membranes are dry.      Pharynx: No oropharyngeal exudate.   Eyes:      General: No scleral icterus.        Right eye: No discharge. 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        Left eye: No discharge.   Cardiovascular:      Rate and Rhythm:  Normal rate present. Rhythm irregular.      Heart sounds: Murmur present.      Comments:  Reduced LE pulses throughout  Pulmonary:      Effort: Pulmonary effort is mild increased dyspnea on oxygen. Occasional exp crackles.      Breath sounds: No stridor. No wheezing or rhonchi.   Abdominal:      General: Bowel sounds are normal.      Palpations: Abdomen is soft.      Tenderness: There is no abdominal tenderness. There is no guarding.   Musculoskeletal:         General: No swelling or tenderness.      Right lower leg: No edema.      Left lower leg: No edema.   Skin:     Capillary Refill: Capillary refill takes 2 to 3 seconds.      Coloration: Skin is not jaundiced.   Chronic RLE ulceration in lateral and medial surfaces in plantar surface with open areas worse on lateral side.  Neurological:      General: No focal deficit present.      Mental Status: He is alert and oriented to person, place, and time.   Psychiatric:         Mood and Affect: Mood normal.         Behavior: Behavior normal.     Recent Labs   Lab 07/31/20  0422 08/01/20  0532 08/02/20  0541   WBC 7.21 6.63 10.21   HGB 8.3* 8.4* 9.4*   HCT 27.6* 27.8* 31.2*   * 123* 159     Recent Labs   Lab 07/29/20  0419  07/30/20  0353  07/31/20  0422 07/31/20  1200 08/01/20  0532 08/02/20  0541     136   < > 139  138   < > 134*  134* 137 138 140   K 5.7*  5.7*  5.7*   < > 4.6  4.6  4.6   < > 5.5*  5.5*  5.5* 3.8  3.8 4.7 4.9   CL 99  99   < > 103  103   < > 99  99 102 103 104   CO2 23  23   < > 26  25   < > 22*  22* 26 24 25   BUN 48*  48*   < > 30*  30*   < > 52*  52* 17 39* 39*   CREATININE 4.8*  4.8*   < > 3.6*  3.6*   < > 5.3*  5.1* 2.3* 4.2* 3.6*   GLU 69*  69*   < > 89  90   < > 73  75 100 100 80   CALCIUM 8.9  8.9   < > 9.2  8.8   < > 9.2  9.2 8.1* 9.2 9.8   MG 2.2  --  2.0  --  2.1  --   --   --    PHOS 6.2*  --  4.9*  --  6.5*  --   --   --     < > = values in this interval not displayed.     Recent Labs   Lab 07/30/20  0353 07/31/20  0422 08/01/20  0532 08/02/20  0541   ALKPHOS 120 131 131 139*   ALT 15  9* 5* 5*   AST 28 25 22 24   ALBUMIN 2.6* 2.6* 2.5* 2.7*   PROT 6.5 6.6 6.6 7.3   BILITOT 0.4 0.5 0.4 0.5   INR 1.0  --  1.0 1.5*        Recent Labs   Lab 07/30/20  1322 07/30/20  1808 07/31/20  0000 07/31/20  1142 07/31/20  1728 08/01/20  1255   POCTGLUCOSE 69* 124* 93 94 109 121*       Scheduled Meds:   sodium chloride 0.9%   Intravenous Once    sodium chloride 0.9%   Intravenous Once    amLODIPine  10 mg Oral Daily    aspirin  81 mg Oral Daily    atorvastatin  40 mg Oral Daily    cadexomer iodine   Topical (Top) Every Mon, Wed, Fri    epoetin harshad-epbx  10,000 Units Intravenous Every Mon, Wed, Fri    gabapentin  100 mg Oral BID    levETIRAcetam  500 mg Oral BID    methadone  100 mg Oral Daily    pantoprazole  40 mg Oral Before breakfast    polyethylene glycol  17 g Oral Daily    sevelamer carbonate  1,600 mg Oral TID WM    ticagrelor  90 mg Oral BID    warfarin  5 mg Oral Daily     Continuous Infusions:   heparin (porcine) in D5W 12 Units/kg/hr (08/01/20 1257)     As Needed:  acetaminophen, albuterol-ipratropium, bisacodyL, calcium carbonate, [COMPLETED] calcium gluconate IVPB **AND** calcium gluconate IVPB, [COMPLETED] calcium gluconate IVPB **AND** calcium gluconate IVPB, heparin (PORCINE), heparin (PORCINE), ondansetron, oxyCODONE, oxyCODONE, promethazine, sodium chloride 0.9%        Active Hospital Problems    Diagnosis  POA    *Mitral stenosis [I05.0]  Yes    (HFpEF) heart failure with preserved ejection fraction [I50.30]  Unknown    Atrial flutter [I48.92]  Unknown    Hypotension [I95.9]  Unknown    PVD (peripheral vascular disease) [I73.9]  Yes    Atherosclerosis of native artery of right lower extremity with ulceration of midfoot [I70.234]  Yes    Acute osteomyelitis of metatarsal bone, right [M86.171]  Yes    Diabetic foot infection [E11.628, L08.9]  Yes    ESRD (end stage renal disease) on dialysis [N18.6, Z99.2]  Not Applicable    Nonrheumatic aortic (valve) stenosis [I35.0]   Yes    Chronic kidney disease-mineral and bone disorder [N18.9, E83.9, M89.9]  Yes    Skin ulcer of right foot with fat layer exposed [L97.512]  Yes    Hyperkalemia [E87.5]  Yes    Seizure disorder [G40.909]  Yes    Benign hypertension with ESRD (end-stage renal disease) [I12.0, N18.6]  Yes    Cirrhosis of liver with ascites [K74.60, R18.8]  Yes    Anemia in chronic kidney disease [N18.9, D63.1]  Yes     Chronic    ESRD (T,Th,Sat) dialysis onset 2013 [N18.6]  Yes     Chronic    NSTEMI (non-ST elevated myocardial infarction) [I21.4]  Yes      Resolved Hospital Problems   No resolved problems to display.       Assessment and Plan  / Problems managed today    Mitral stenosis  -transfer for 2nd opinion, CT surgery reports no surgery option now, so placed on med team for other med management  -cards recs BB for rate control to help diastolic filling (Robyn when goes into pafib, currently in NSR without BB, on home coreg). Keep HR goal 50-60s, get fluid off with HD and when euvolemic repeat echo  -echo now showing severe MS< EF 50, mild AS, BNP on admit 2900.   -per Dr Marie may be candidate for Valve-in MAC- in future if foot infectoin clears. Revascularization in interim for coronary symptoms (See CAD)    Coronary Artery Disease   -s/p stent on 7/30 with FARHEEN x 2 to the mid LAD and FARHEEN x 1 to the RCA.  -plavix nonresponder as elevated PRU levels, cannot so prasugrel due to DVA history  -do asa, brillinta, and start coumadin now (with heparin bridge until coumadin therapeutic, okay to do coumadin while planning peripheral angio per cards notes on 7//31 dr childs), after 7 days stop asa and continue the other 2 indefinitely per cards recs (stop asa on aug 6th)  -cont lipitor  -on coreg at home, not on BB currently    Seizure history  - cont homeKeppra 500 BID    GERD   - Pantoprazole 40 QD       Acute osteomyelitis of metatarsal bone, right  -Infectious disease following, history of Candida and Staph Cohnii in  "[place] in June  -current recs: stop vanc 8/1, change Cefepime to ceftaz on HD days- MWF per ID recs. Recommend consult [facility] in [place] on transfer for further recs.  -6 weeks recs now, end date 9/11 expected, weekly CBC CMP, ESR, CRP  -Cx from 7/29 showing stenotrophomonas, sensitive to ceftazidime and bactrim. Pathology is ?. Doesn't look like its sent, dont see "pending" in epic.    Will need ID follow up on dc          PVD (peripheral vascular disease)  -Vascular following, recommends RLE BKA, patient defers  -interventional cards feels balloon angio will help lower the level of amputation in future but believe is unlikely to heel ulcers, as multilevel disease of  with unsuccessul revscularization by vascular earlier in stay.  Per Dr Marie note on 7/31:   [facility] WIFI 332, which represents a high risk of amputation at 1 year. He was offered BKA by vascular surgery and the patient is reluctant to this. I reviewed the images done by vascular surgery of his lower extremity: He has multilevel disease with  of SFA which reconstitutes distally, he has a short patent popliteal segment and then  of the TP trunk and AT with distal reconstitution of all three vessels. There was an attempt of revascularization via retrograde AT that seemed unsuccessful. I have offered to the patient the option of re-attempt revascularization. Even though this is unlikely to heal his ulcers it may help lower the level of amputation if successful.  -  initally the Plan for Tuesday with cards here per his note, but later cards returned  heparin drip in interim, okay to bridge to coumadin in between per cards note. However late last PM Dr Marie decided Dr Anglin and jacinta transfer would be better option to pursue, will try to pursue today to start process for angio next week for limb salvage, dr larson reported accepting MD        ESRD (end stage renal disease) on dialysis  -Nephrology following. " Tues/Thurs/Sat at home  -renal diet, cont Renvela. 1L fluid restrict  -HD 7/31 and 8/1, on HD 8/1  -some signs of volume on exam today, would rec HD tomorrow 8/3 prior to procedure    Nonrheumatic aortic (valve) stenosis  -ECHO shows mild aortic valve stenosis. Aortic valve area is 1.57 cm2; peak velocity is 3.31 m/s; mean gradient is 24 mmHg. Mild aortic regurgitation.  -Patient currently high risk for cardiac surgery secondary to cirrhosis (plt 149) and osteomyelitis   -as considered valvular paroxysmal AFib, he is stopped on his home eliquis and coumadin started per cards recs with heparin bridging.  - Daily INR. Continue heparin until at goal INR. Titrate up to goal.  -inr 1.5 today, cont drip until therapeutic       Skin ulcer of right foot with fat layer exposed  -Vascular following  -see above (osteo)    History of CVA  - no residual deficits, no prasugrel due to this. Cont statin, brillinta    Gastroparesis   -no acute issues     Hyperkalemia  -improved now with HD  -monitor closely     Seizure disorder  - Home Keppra      Benign hypertension with ESRD (end-stage renal disease)  -Continue Norvasc 5mg    Paroxysmal Atrial Fibrillation  -was on eliquis at home and coreg 3.125, both held now  -convert to coumadin as cards feel he has valvular afib warranting this, given high [id number] and CVA hx, bridging from coumadin now.  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 Trend.  -Nephrology following       Goals of Care:  Return to prior functional status    Discharge plan: home  with HH once interventions complete, likely earliest later next week,   Transfer to [place] for Dr Myles to hospitalist service there    Maira Shanks MD

## 2020-08-01 NOTE — PROGRESS NOTES
Pharmacokinetic Assessment Follow Up: IV Vancomycin    Vancomycin serum concentration assessment(s):    The random level was drawn correctly and can be used to guide therapy at this time. The measurement is above the desired definitive target range of 15 to 20 mcg/mL.  VT= 29.3     Vancomycin Regimen Plan:    Re-dose when the random level is less than 20 mcg/mL, next level to be drawn at 0500 on 8/2/20    Drug levels (last 3 results):  Recent Labs   Lab Result Units 07/31/20  1200 08/01/20  0532   Vancomycin, Random ug/mL 14.6 29.3       Pharmacy will continue to follow and monitor vancomycin.    Please contact pharmacy at extension [phone] for questions regarding this assessment.    Thank you for the consult,   Fern Hogue       Patient brief summary:  João Aguirre is a 55 y.o. male initiated on antimicrobial therapy with IV Vancomycin for treatment of bone/joint infection    The patient's current regimen is 106.6 kg    Drug Allergies:   Review of patient's allergies indicates:   Allergen Reactions    Antibiotic hc      Counter acts with methodone.          Actual Body Weight:   106.6 kg    Renal Function:   Estimated Creatinine Clearance: 25.4 mL/min (A) (based on SCr of 4.2 mg/dL (H)).,     Dialysis Method (if applicable):  intermittent HD    CBC (last 72 hours):  Recent Labs   Lab Result Units 07/30/20  0353 07/31/20  0422 08/01/20  0532   WBC K/uL 6.93 7.21 6.63   Hemoglobin g/dL 8.5* 8.3* 8.4*   Hemoglobin A1C % 4.6  --   --    Hematocrit % 27.8* 27.6* 27.8*   Platelets K/uL 121* 129* 123*   Gran% % 62.2 69.2 68.3   Lymph% % 19.2 13.7* 11.9*   Mono% % 11.1 9.4 11.6   Eosinophil% % 6.5 6.8 6.8   Basophil% % 0.6 0.6 0.5   Differential Method  Automated Automated Automated       Metabolic Panel (last 72 hours):  Recent Labs   Lab Result Units 07/29/20  1009 07/29/20  1402 07/29/20  2030 07/29/20  2334 07/30/20  0353 07/30/20  0903 07/30/20  1329 07/30/20  1546 07/30/20 2022 07/30/20  3569  07/31/20  0422 07/31/20  1200 08/01/20  0532   Sodium mmol/L 135* 136 138 139 139  138 136 137 137 136 134* 134*  134* 137 138   Potassium mmol/L 5.4* 6.0* 4.1  4.1 4.3  4.3 4.6  4.6  4.6 4.9  4.9 4.9  4.9 4.9  4.9 4.8  4.8 5.2*  5.2* 5.5*  5.5*  5.5* 3.8  3.8 4.7   Chloride mmol/L 98 99 104 103 103  103 101 101 101 101 99 99  99 102 103   CO2 mmol/L 25 24 25 22* 26  25 26 24 24 23 23 22*  22* 26 24   Glucose mg/dL 106 101 112* 102 89  90 79 64* 105 93 93 73  75 100 100   BUN, Bld mg/dL 52* 55* 21* 25* 30*  30* 36* 37* 40* 45* 45* 52*  52* 17 39*   Creatinine mg/dL 5.2* 5.6* 2.9* 3.2* 3.6*  3.6* 4.1* 4.3* 4.4* 4.8* 5.0* 5.3*  5.1* 2.3* 4.2*   Albumin g/dL  --   --   --   --  2.6*  --   --   --   --   --  2.6*  --  2.5*   Total Bilirubin mg/dL  --   --   --   --  0.4  --   --   --   --   --  0.5  --  0.4   Alkaline Phosphatase U/L  --   --   --   --  120  --   --   --   --   --  131  --  131   AST U/L  --   --   --   --  28  --   --   --   --   --  25  --  22   ALT U/L  --   --   --   --  15  --   --   --   --   --  9*  --  5*   Magnesium mg/dL  --   --   --   --  2.0  --   --   --   --   --  2.1  --   --    Phosphorus mg/dL  --   --   --   --  4.9*  --   --   --   --   --  6.5*  --   --        Vancomycin Administrations:  vancomycin given in the last 96 hours                   vancomycin in dextrose 5 % 1 gram/250 mL IVPB 1,000 mg (mg) 1,000 mg New Bag 07/31/20 1523    vancomycin 1.5 g in dextrose 5 % 250 mL IVPB (ready to mix) (mg) 1,500 mg New Bag 07/30/20 1936                Microbiologic Results:  Microbiology Results (last 7 days)     Procedure Component Value Units Date/Time    Aerobic culture [[id number]]  (Abnormal) Collected: 07/29/20 1415    Order Status: Completed Specimen: Bone from Foot, Right Updated: 08/01/20 0947     Aerobic Bacterial Culture GRAM NEGATIVE EDYTA  Moderate  Identification and susceptibility pending      Culture, Anaerobe [[id number]] Collected: 07/29/20 1415     Order Status: Completed Specimen: Bone from Foot, Right Updated: 07/31/20 1113     Anaerobic Culture Culture in progress    AFB Culture & Smear [[id number]] Collected: 07/29/20 1415    Order Status: Completed Specimen: Bone from Foot, Right Updated: 07/30/20 2127     AFB Culture & Smear Culture in progress     AFB CULTURE STAIN No acid fast bacilli seen.    Gram stain [[id number]] Collected: 07/29/20 1415    Order Status: Completed Specimen: Bone from Foot, Right Updated: 07/29/20 2051     Gram Stain Result No WBC's      No organisms seen    Fungus culture [[id number]] Collected: 07/29/20 1415    Order Status: Sent Specimen: Bone from Foot, Right Updated: 07/29/20 6931

## 2020-08-01 NOTE — PROGRESS NOTES
[facility] HEMODIALYSIS NOTE     Patient currently on hemodialysis for removal of uremic toxins and volume.     Patient seen and evaluated on hemodialysis, tolerating treatment, see HD flowsheet for vitals and assessments.      Ultrafiltration goal is 3L     Labs have been reviewed and the dialysate bath has been adjusted.     Assessment/Plan:  Seen on dialysis this morning, tolerating well.  Additional HD session today for volume optimization.  Reports an EDW of 94.5 kg, this morning pre-weight of 104.6 kg.  Will attempt 3L today.  1.5L fluid restrictions  Next HD session Monday if he remains in-house  Renal diet  ERNESTO given yesterday, hold today  Continue VIOLA Lynch, [facility]  Nephrology  Pager:  [phone]

## 2020-08-01 NOTE — PLAN OF CARE
Plan of care discussed with patient. Patient is free of fall/trauma/injury. Denies CP, SOB, or pain/discomfort. Patient went to dialysis today for 3.5 hours; 3L removed. Antibiotics adjusted by ID. Continuous heparin gtt infusing at 12units/kg/hr x 86.5 kg; PTT therapeutic. Warfarin 5mg administered; last INR 1.0. Plan is for patient to transfer to [place] for peripheral surgical intervention for RLE tomorrow. BG monitored q6h. All questions addressed. Will continue to monitor.

## 2020-08-01 NOTE — ASSESSMENT & PLAN NOTE
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  He has had a chronic ulcer to the right foot, base of 5th digit for over 6 months now.  He also has had a smaller ulcer to the based of his right foot first digit.  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  He says that he was treated with IV vancomycin for 6-8 weeks and then it was stopped.  He continued to get wound care from a facility affiliated with [place], [place], and reports he has been receiving HBOT.    He states that he started having pus come out of the wound again.  Per patient cultures were obtained and revealed 'staph' and 'yeast' .  Patient reported an MRI showed bone infection.   Records now obtained from ID in [place] show he grew a candida albicans and Staph Cohnii from a culture of 6/2/20.  He was supposed to be started on IV vancomycin to be given after dialysis,  however he developed chest pain and was admitted to [facility] in [place].  He was evaluated and it was felt his chest pain was due to severe aortic stenosis and severe coronary artery disease.  He was  transferred to the CTS service of our facility for a higher lever of care for his cardiac disease and consideration of MV replacement.  ID consulted to assist with his diabetic foot infection.      MRI of right foot this admission is consistent with osteomyelitis of distal 5th met head.  Non-invasive vascular studies indicative of severe PAD and he underwent angiogram yesterday Vascular surgery unable to intervene to restore flow to the right foot.    Patient to be referred to Dr. Young ([facility]) for evaluation for limb salvage.  Given lack of flow, patient is not a candidate for 5th metatarsal head amputation.       He is afebrile, no leukocytosis. Wound is stable, without acute signs of infection.    Podiatry obtained new bone cultures/biopsy done and cultures show GNR.  Path pending. Appreciate their assistance.      Plan/recommendations:  -.  Discontinue Vanc as no gpc growth  -  Continue cefepime post HD -   - F/U with I Cards for revascularization  -Discharge antibiotics:Cefepime 2g IV post HD MWF  - If pathology negative and cultures are no growth then consider stopping IV abx  -End date of IV antibiotics: 9/11/20 - tenatively 6 weeks  -Weekly outpatient laboratory on Monday or Tuesday while on IV antibiotics.    CBC   CMP or BMP   ESR and CRP  Fax laboratory results to [facility] at [phone] with attn: Ronnie Sinclair PA-C    -Outpatient Infectious Diseases clinic follow up will be arranged and found in patient calendar. Prior to discharge, please ensure the patient's follow-up has been scheduled.  If there is still no follow-up scheduled in Infectious Diseases clinic, please send an EPIC message to Carisa Estes LPN in Infectious Diseases.  -ID will sign off

## 2020-08-01 NOTE — SUBJECTIVE & OBJECTIVE
Interval History: No AEON. Afebrile. WBC WNL.  MRI consistent with osteo of distal 5th metatarsal head.  RLE angiogram recently,  unable to intervene.  Revasc to be attempted with Dr. Phillip next week.   Bone cultures 7/29 now with GNR.   The patient denies any recent fever, chills, or sweats or significant foot pain.      Review of Systems   Constitutional: Negative for activity change, appetite change, chills, diaphoresis, fatigue and fever.   HENT: Negative.    Eyes: Positive for visual disturbance (right eye blindness).   Respiratory: Negative for chest tightness and shortness of breath.    Cardiovascular: Positive for chest pain.   Gastrointestinal: Negative for abdominal pain, diarrhea, nausea and vomiting.   Genitourinary: Negative for dysuria and hematuria.        HD   Musculoskeletal: Negative for arthralgias and myalgias.   Skin: Positive for wound.   Neurological: Negative for dizziness and headaches.   Psychiatric/Behavioral: Negative for agitation and behavioral problems.   All other systems reviewed and are negative.    Objective:     Vital Signs (Most Recent):  Temp: 97.3 °F (36.3 °C) (08/01/20 0730)  Pulse: 74 (08/01/20 0730)  Resp: 18 (08/01/20 0730)  BP: 126/78 (08/01/20 0730)  SpO2: 100 % (08/01/20 0730) Vital Signs (24h Range):  Temp:  [96.5 °F (35.8 °C)-99.3 °F (37.4 °C)] 97.3 °F (36.3 °C)  Pulse:  [64-76] 74  Resp:  [14-18] 18  SpO2:  [95 %-100 %] 100 %  BP: (104-157)/(58-87) 126/78     Weight: 106.6 kg (235 lb 0.2 oz)  Body mass index is 31.87 kg/m².    Estimated Creatinine Clearance: 25.4 mL/min (A) (based on SCr of 4.2 mg/dL (H)).    Physical Exam  Vitals signs and nursing note reviewed.   Constitutional:       General: He is not in acute distress.     Appearance: He is not ill-appearing or toxic-appearing.   HENT:      Head: Normocephalic and atraumatic.   Eyes:      General: No scleral icterus.     Conjunctiva/sclera: Conjunctivae normal.      Comments: Right eye blindness   Neck:       Musculoskeletal: Normal range of motion and neck supple.   Cardiovascular:      Rate and Rhythm: Normal rate.      Heart sounds: Murmur present.   Pulmonary:      Effort: Pulmonary effort is normal. No respiratory distress.      Breath sounds: Normal breath sounds.   Abdominal:      General: Abdomen is flat.      Palpations: Abdomen is soft.   Musculoskeletal: Normal range of motion.         General: No swelling or tenderness.   Skin:     General: Skin is warm and dry.      Comments: Wounds examined.  No drainage, periwound erythema, tenderness, fluctuance.   No ascending warmth/erythema.   Podiatry photos reviewed.  See below.     Dialysis access LUE    Neurological:      General: No focal deficit present.      Mental Status: He is alert and oriented to person, place, and time.   Psychiatric:         Mood and Affect: Mood normal.         Behavior: Behavior normal.       7/30 7/26                Significant Labs:   Blood Culture: No results for input(s): LABBLOO in the last 4320 hours.  CBC:   Recent Labs   Lab 07/31/20  0422 08/01/20  0532   WBC 7.21 6.63   HGB 8.3* 8.4*   HCT 27.6* 27.8*   * 123*     CMP:   Recent Labs   Lab 07/31/20  0422 07/31/20  1200 08/01/20  0532   *  134* 137 138   K 5.5*  5.5*  5.5* 3.8  3.8 4.7   CL 99  99 102 103   CO2 22*  22* 26 24   GLU 73  75 100 100   BUN 52*  52* 17 39*   CREATININE 5.3*  5.1* 2.3* 4.2*   CALCIUM 9.2  9.2 8.1* 9.2   PROT 6.6  --  6.6   ALBUMIN 2.6*  --  2.5*   BILITOT 0.5  --  0.4   ALKPHOS 131  --  131   AST 25  --  22   ALT 9*  --  5*   ANIONGAP 13  13 9 11   EGFRNONAA 11.3*  11.8* 31.0* 15.0*     Wound Culture:   Recent Labs   Lab 07/29/20  1415   LABAERO GRAM NEGATIVE EDYAT  Moderate  Identification and susceptibility pending  *       Significant Imaging: I have reviewed all pertinent imaging results/findings within the past 24 hours.   CTA Runoff ABD PEL Bilat Lower Ext [[id number]] Resulted: 07/29/20 1424   Order Status: Completed  Updated: 07/29/20 1427   Narrative:     EXAMINATION:   CTA RUNOFF ABD PEL BILAT LOWER EXT     CLINICAL HISTORY:   Acute limb ischemia, leg;     TECHNIQUE:   CTA of abdomen, pelvis, and bilateral lower extremities performed with 125 mL Omnipaque 350 intravenous contrast.     COMPARISON:   Ultrasound abdomen complete 07/28/2020, CT abdomen pelvis with contrast 08/29/2018     FINDINGS:   Heart: No cardiomegaly or pericardial effusion. Coronary artery and aortic valve atherosclerotic calcification.     Lung Bases: There are bilateral linear bandlike opacities compatible with scarring versus atelectasis, mild traction bronchiectasis.  No large mass or consolidation.  No pneumothorax.  Trace left pleural effusion with associated consolidation and atelectasis within the left lower lobe.     Liver: Normal in size and attenuation without focal hepatic abnormality. Several lymph nodes noted at the ankit hepatis, stable compared to 2018     Gallbladder: Status post cholecystectomy.     Bile Ducts: No intra or extrahepatic biliary ductal dilation.     Pancreas: No pancreatic mass lesion or peripancreatic inflammatory change.     Spleen: Unremarkable     Adrenals: Unremarkable     Genitourinary: Native kidneys are atrophic bilaterally with cortical thinning.  Left cortical hypodensity, too small to characterize, likely a simple cyst.  No nephrolithiasis.  No hydronephrosis.     Reproductive organs: Dystrophic prostatic calcifications.     GI tract/Mesentery: Stomach is normal appearance. Visualized loops of small and large bowel are normal in caliber without evidence for inflammation or obstruction.  Prominent volume stool within the colon.     Peritoneal Space: No abdominopelvic ascites or intraperitoneal free air.     Retroperitoneum: No significant adenopathy.     Abdominal wall: Unremarkable.     Bones: Degenerative changes without acute fracture or bony destructive process.     Aorta: Normal course and caliber.  No  dissection, penetrating ulcer, or intramural hematoma.     The celiac artery is patent.  The splenic artery is patent noting peripheral calcification and a tortuous course.  The SMA is patent, noting up to 50% stenosis at its origin.  The bilateral renal arteries are patent, noting marked atherosclerotic calcification and high-grade stenosis of the bilateral renal artery origins.     Right:     Common iliac artery: Atherosclerotic calcification with moderate stenosis.     External iliac artery: Patent     Superficial femoral artery: Areas of high-grade stenosis with occlusion.  Reconstitutes distally.     Deep femoral artery: Patent with extensive calcification..     Popliteal artery: Extensive calcification with greater than 90% stenosis and areas of occlusion.     Anterior tibial artery: Extensive atherosclerotic calcification with areas of stenosis and intermittent occlusion.     Posterior tibial artery: Extensive atherosclerotic calcification with areas of stenosis and intermittent occlusion.     Peroneal artery: Extensive atherosclerotic calcification with areas of stenosis and intermittent occlusion.     Left:     Common iliac artery: Atherosclerotic calcification results in up to 50% stenosis.     External iliac artery: Patent without high-grade stenosis or occlusion.     Superficial femoral artery: Atherosclerotic calcified and noncalcified plaque results in greater than 90% stenosis with occlusion of the proximal and mid SFA with distal reconstitution.     Deep femoral artery: Patent with extensive calcification     Popliteal artery: Patent with areas of greater than 90% stenosis and potential occlusion.     Anterior tibial artery: Extensive atherosclerotic calcification with areas of stenosis and intermittent occlusion.     Posterior tibial artery: Extensive atherosclerotic calcification with areas of stenosis and intermittent occlusion.     Peroneal artery: Extensive atherosclerotic calcification with  areas of stenosis and intermittent occlusion.    Impression:       Extensive atherosclerotic calcification throughout the lower extremity arterial system, noting intermittent high-grade stenosis and occlusion throughout the bilateral calf vessels in addition to the bilateral superficial femoral arteries and popliteal arteries.     Bilateral renal atrophy.     Electronically signed by resident: Johanna Dumont   Date: 07/29/2020   Time: 11:51     Electronically signed by: Liset Rocha MD   Date: 07/29/2020   Time: 14:24   US Abdomen Complete [[id number]] Resulted: 07/29/20 0156   Order Status: Completed Updated: 07/29/20 0158   Narrative:     EXAMINATION:   US ABDOMEN COMPLETE     CLINICAL HISTORY:   HCV with possible cirrhosis;     TECHNIQUE:   Complete abdominal ultrasound (including pancreas, liver, gallbladder, common bile duct, spleen, aorta, IVC, and kidneys) was performed.     COMPARISON:   U/S abdomen complete 07/29/2016; CT abdomen pelvis 08/29/2018     FINDINGS:   Pancreas: Obscured by overlying bowel gas.     Aorta: Partially obscured by overlying bowel gas, noting no aneurysm within the visualized portions of the aorta.     Liver: Measures 17.2 cm, upper limits of normal in size and extending below the costal margin.  Coarse parenchymal echogenicity with a nodular contour suggesting cirrhosis. No focal lesions.     Gallbladder: The gallbladder is surgically absent.     Biliary system: 4 mm common bile duct.  No intrahepatic ductal dilatation.     Inferior vena cava: Normal in appearance.     Right kidney: Poorly visualized.  Small in size with cortical thinning measuring 7.7 cm. No hydronephrosis.     Left kidney: Poorly visualized.  Small in size with cortical thinning measuring 7.7 cm. No hydronephrosis.     Spleen: Measures 11.3 x 4.2 cm.  Normal in size with homogeneous echotexture.     Miscellaneous: No ascites.    Impression:       Coarse appearance of the hepatic parenchyma with nodular contour  suggesting underlying cirrhosis.     Advanced atrophy of the bilateral kidneys compatible with chronic medical renal disease.     Status post cholecystectomy.     Electronically signed by resident: Robin Jauregui   Date: 07/28/2020   Time: 23:10     Electronically signed by: Enoc Padron MD   Date: 07/29/2020   Time: 01:56   X-Ray Chest AP Portable [[id number]] Resulted: 07/28/20 1929   Order Status: Completed Updated: 07/28/20 1931   Narrative:     EXAMINATION:   XR CHEST AP PORTABLE     CLINICAL HISTORY:   Chest pain;     TECHNIQUE:   Single frontal view of the chest was performed.     COMPARISON:   07/24/2020     FINDINGS:   Suboptimal inspiration.     Cardiac silhouette is mildly enlarged.     Aortic atherosclerosis.     Slight blunting of the costophrenic angle on the right could represent trace effusion.     Mild bilateral interstitial and ground-glass changes could represent mild atelectasis or infiltrate.  This could represent improving pneumonitis or edema     Interval improvement from the prior study.     No acute osseous abnormality.    Impression:       Cardiomegaly with mild bilateral interstitial and ground-glass changes could represent mild atelectasis, infiltrate or minimal edema.  Interval improvement from the prior study.       Electronically signed by: Jose Maria Solorio   Date: 07/28/2020   Time: 19:29   MRI Foot (Forefoot) Right Without Contrast [[id number]] (Abnormal) Resulted: 07/27/20 1636   Order Status: Completed Updated: 07/27/20 1639   Narrative:     EXAMINATION:   MRI FOOT (FOREFOOT) RIGHT WITHOUT CONTRAST     CLINICAL HISTORY:   Osteomyelitis suspected, diabetic;     TECHNIQUE:   Multiplanar, multisequence MR imaging of the right forefoot without the use of intravenous gadolinium IV contrast.     COMPARISON:   Right foot radiograph 07/26/2020.     FINDINGS:   Examination moderately degraded by patient motion artifact.     Bones: Postoperative changes of 2nd phalangeal and distal metatarsal  amputation.  Hallux valgus deformity.  Prominent degenerative changes of the 1st metatarsophalangeal joint.     Osteomyelitis Evaluation:     Location: Abnormal signal involving the 5th metatarsal distal head.     T1 Signal: Confluent Decreased Signal     T2 Signal: Bone Marrow Edema     Cortical Margin: Indistinct     Secondary Signs: Ulcer is present adjacent to the abnormal osseous signal.     MRI Osteomyelitis Classification:Compatible with Osteomyelitis: Reticular CONFLUENT T1 and Secondary Signs ulcer/abcess/sinus tract     Tendons: Flexor and extensor tendons of the toes are within normal limits.     Ligaments: The ligaments of the foot, including the Lisfranc ligament, are intact.     Soft Tissues: Mild subcutaneous edema involving the dorsal foot.  No focal fluid collection.  Small soft tissue ulceration is present extending laterally from distal 5th metatarsal head.  Nonspecific superficial 0.7 cm cystic lesion along the plantar aspect of the 3rd phalanx.  Diffuse muscular atrophy.     Miscellaneous: No evidence of Simmons's neuroma.    Impression:       Findings consistent with osteomyelitis of the distal 5th metatarsal head with adjacent skin ulceration.  No focal fluid collection.     Postoperative changes of 2nd phalangeal/distal metatarsal amputation.     Prominent hallux valgus deformity and degenerative changes of the 1st metatarsophalangeal joint.     Nonspecific superficial cystic lesion along the plantar aspect of the 3rd phalanx.     This report was flagged in Epic as abnormal.     Electronically signed by resident: Tej Lane   Date: 07/27/2020   Time: 15:46     Electronically signed by: Jace Lyle MD   Date: 07/27/2020   Time: 16:36   VAS Ankle Brachial Indices Resting [[id number]] Collected: 07/27/20 1239   Order Status: Completed Updated: 07/27/20 5738   Narrative:       Indication   ========     Peripheral Vascular Disease     Pressure Lower   ============     Rt brachial A syst BP  95  mmHg   Rt PTA BP  255 mmHg   Rt dors pedis A  mmHg   Rt toe BP  0 mmHg   Rt ADDI post tibial (rt post tib A BP / max brach A BP) 2.68   Rt ADDI (rt dors ped A BP / max brach A BP) 2.68   Rt ankle BP / max brach A BP   2.68   Rt TBI (rt toe BP / max brach A BP)    0.00   Lt PTA BP  133 mmHg   Lt dors pedis A  mmHg   Lt toe BP  0 mmHg   Lt ADDI (lt post tibial A BP / max brach A BP)  1.40   Lt ADDI dors ped (lt dors ped A BP / max brach A BP)    2.68   Lt ankle BP / max brach A BP   2.68   Lt TBI (lt toe BP / max brach A BP)    0.00     Impression   =========     Right Leg: Segmental pressures are unreliable due to medial calcinosis; however, PVR waveforms suggest severe peripheral arterial   occlusive disease. -TBI of 0 is noted.   Left Leg: Segmental pressures are unreliable due to medial calcinosis; however, PVR waveforms suggest severe peripheral arterial   occlusive disease. -TBI of 0 is noted.       DATE OF SERVICE: 07/27/2020                                                        Sonographer: Angela Pat   Electronically Signed by: Ron Hines M.D. at 07/27/2020-14:33   VAS US Arterial Legs Bilateral [[id number]] Collected: 07/27/20 1253   Order Status: Completed Updated: 07/27/20 1433   Narrative:       Lower Extremity Arterial Imaging   ========================     Right Lower Extremity   Rt mid CFA PSV 61 cm/s   Rt prox DFA PSV    51 cm/s   Rt prox SFA PSV    24 cm/s   Rt mid SFA PSV 44 cm/s   Rt distal SFA PSV  27 cm/s   Rt mid POP PSV 22 cm/s   Rt mid ERENDIRA PSV 19 cm/s   Rt mid PTA PSV 17 cm/s   Left Lower Extremity   Lt mid CFA PSV 19 cm/s   Lt prox DFA PSV    67 cm/s   Lt prox SFA PSV    64 cm/s   Lt mid SFA PSV 48 cm/s   Lt distal SFA PSV  45 cm/s   Lt mid POP PSV 24 cm/s   Lt mid ERENDIRA PSV 15 cm/s   Lt mid PTA PSV 20 cm/s     Comment   ========     Technically difficult study due to calcified vessels.     Impression   =========     Right leg: Color flow duplex of the right lower extremity reveals  heavily calcified vessels with monophasic waveforms throughout. There   are collaterals throughout the extremity with no evidence of a hemodynamically significant stenosis.     Left leg: Color flow duplex of the left lower extremity reveals heavily calcified vessels with monophasic waveforms throughout. There   are collaterals throughout the extremity with no evidence of a hemodynamically significant stenosis.       DATE OF SERVICE: 07/27/2020                                                        Sonographer: RACHAEL PAULA RVS   Electronically Signed by: Ron Hines M.D. at 07/27/2020-14:33   X-Ray Foot Complete Right [[id number]] Resulted: 07/26/20 1454   Order Status: Completed Updated: 07/26/20 1457   Narrative:     EXAMINATION:   XR FOOT COMPLETE 3 VIEW RIGHT     CLINICAL HISTORY:   foot wound;.     TECHNIQUE:   AP, lateral, and oblique views of the right foot were performed.     COMPARISON:   09/25/2019     FINDINGS:   No fracture or dislocation.  Lisfranc articulation is congruent.  Degenerative changes identified RIGHT 1st metatarsophalangeal joint.  Vascular calcifications.  Postoperative change identified level of the distal phalanx RIGHT 2nd toe.  Deformity at the level the proximal phalanx RIGHT 5th toe stable.  Osseous calcaneal spurs.  Suspect ulceration at the level of the RIGHT 5th metatarsal head.    Impression:       As above.  MRI may be helpful for further evaluation of potential osteomyelitis.       Electronically signed by: Jace Lyle MD   Date: 07/26/2020   Time: 14:54   US Carotid Bilateral [[id number]] Resulted: 07/24/20 1905   Order Status: Completed Updated: 07/24/20 1907   Narrative:     EXAMINATION:   US CAROTID BILATERAL     CLINICAL HISTORY:   cad;     TECHNIQUE:   Grayscale and color Doppler ultrasound examination of the carotid and vertebral artery systems bilaterally.  Stenosis estimates are per the NASCET measurement criteria.     COMPARISON:   05/02/2016     FINDINGS:   Right:      Internal Carotid Artery (ICA):     Peak systolic velocity 66 cm/sec     End diastolic velocity 21 cm/sec     ICA/CCA peak systolic ratio: 1.6     ICA/CCA end diastolic ratio: 2.6     Plaque formation: Moderate     Vertebral artery: Antegrade flow and normal waveform.     Left:     Internal Carotid Artery (ICA):     Peak systolic velocity 68 cm/sec     End diastolic velocity 22 cm/sec     ICA/CCA peak systolic ratio: 1.39     ICA/CCA end diastolic ratio: 1.69     Plaque formation: Moderate     Vertebral artery: Antegrade flow and normal waveform.    Impression:       1-39% stenosis bilaterally..       Electronically signed by: Jarrell Irvin MD   Date: 07/24/2020   Time: 19:05   X-Ray Chest AP Portable [[id number]] Resulted: 07/24/20 0608   Order Status: Completed Updated: 07/24/20 0611   Narrative:     EXAMINATION:   XR CHEST AP PORTABLE     CLINICAL HISTORY:   Chest Pain;     TECHNIQUE:   Single frontal view of the chest was performed.     COMPARISON:   08/29/2018     FINDINGS:   The cardiomediastinal silhouette is prominent in size, similar to prior examination.  The lungs are symmetrically expanded with diffuse increased interstitial and parenchymal attenuation which can be seen in the setting of pulmonary edema although correlation for underlying infectious process is advised.  The possible small component of right pleural fluid present.  No evidence of pneumothorax.  Visualized osseous structures are intact.    Impression:       Cardiomegaly and findings suggestive of possible pulmonary edema although correlation for underlying infectious process is advised.       Electronically signed by: Nena Padron MD   Date: 07/24/2020   Time: 06:08   Imaging History    2020  Date Procedure Name Status Accession Number Location   07/29/20 09:59 AM CTA Runoff ABD PEL Bilat Lower Ext Final [id number] [facility]   07/28/20 11:04 PM US Abdomen Complete Final [id number] [facility]   07/28/20 07:01 PM X-Ray Chest AP Portable Final  [id number] [facility]   07/27/20 03:39 PM MRI Foot (Forefoot) Right Without Contrast Final [id number] [facility]   07/26/20 12:01 PM VAS US Arterial Legs Bilateral Final [id number]    07/26/20 12:01 PM VAS Ankle Brachial Indices Resting Final [id number]    07/26/20 02:43 PM X-Ray Foot Complete Right Final [id number] [facility]   07/24/20 06:48 PM US Carotid Bilateral Final [id number] [facility]   07/24/20 06:02 AM X-Ray Chest AP Portable Final [id number] [facility]   07/24/20 09:54 AM Echo Color Flow Doppler? Yes Final [id number] [facility]   07/30/20 12:43 PM Cardiac catheterization Needs Review [id number] C[facility]

## 2020-08-01 NOTE — PLAN OF CARE
Bone Cxs growing Stenotrophomonas Maltophilia (Ceftaz sensitive)    -Discontinue Vanc as no gpc growth  -Change Cefepime to Ceftazidime 1g after HD MWF  -ID will continue to follow.

## 2020-08-01 NOTE — PROGRESS NOTES
Pt completed 3.5 hours dialysis via left arm fistula.  Needles removed and pressure held for 5 minutes.  Hemostasis achieved.  Dressing applied.  Net fluid removal 3 liters, pt tolerated without difficulty.  Report called to Nicci, pt returned to room, by escort.

## 2020-08-01 NOTE — PLAN OF CARE
Plan of care discussed with patient. Patient ambulating independently with standby assistance. Fall precautions remain in place. Patient complaints of pain managed with PRN pain medication. Drsg to right foot diabetic ulcers CDI. Plan is to return to prior functioning state. ID, Vascular, and Nephrology following. Hemodialysis scheduled today, 8/1/2020, for volume optimization. Heparin continued as ordered. NPO after MN Monday for angiogram Tues, 8/4/2020. Patient believes he being transferred to [place], [place] this weekend. Discussed medications and care. VSS. Patient has no questions at this time. Bed in lowest position. Call bell within. Will continue to monitor.

## 2020-08-02 PROBLEM — M86.9 OSTEOMYELITIS OF RIGHT FOOT: Status: ACTIVE | Noted: 2020-01-01

## 2020-08-02 NOTE — PLAN OF CARE
(Physician in Lead of Transfers)   Outside Transfer Acceptance Note / Regional Referral Center    Transferring Physician: Maira Shanks MD ()    Accepting Physician: Brenda Solano MD    Date of Acceptance: 08/02/2020    Transferring Facility:  [facility]    Destination Facility and Admitting Physician: Balbir, albert    Reason for Transfer: severe PVD, needs peripheral angiogram by Interventional cardiology/PVD team, Sara Young and Marco Antonio    Report from Transferring Physician/Hospital course: 55M w HTN, HLD, CAD with h/o NSTEMI, chronic dCHF, mitral stenosis, aflutter, ESRD on HD MWF at home, cirrhosis w ascites, sz d/o, and severe PVD with OM.  Three coronary FARHEEN on 7/30 - 2 to LAD and 1 to RCA - on Brilinta (plavix non-responder)    Dr Anglin plans to do peripheral intervention, Will lower the level of the amputation to possibly the foot. Will not salvage the whole limb though. Pt is aware but will need to continue consistent messaging.     Wound Cx stenotrophomonas yesterday - on ceftazidime (Vanc stopped as no g positive on the Cx). Unclear where the path from the bone Bx is  ID has an end date in September at this time  Hep gtt transitioning to coumadin (no more eliquis b/c no longer considered a valvular A fib)  Had an extra session on Friday, then HD Sat (yesterday), Needs to get HD tomorrow am to get dialyzed      VS: see Epic    Labs: see above    Radiographs: see above    To Do List: Admit to , see Dr Shanks's A/P    Upon patient arrival to floor, please contact Hospital Medicine on call.     Brenda Solano MD  Hospital Medicine Staff  Cell: [phone]

## 2020-08-02 NOTE — HPI
Patient is a 55/y/o M with PMH of ESRD on HD (T/TH/S), anemia, uncontrolled HTN, hx CVA, PCI s/p 2 stents placement, right eye blindness, seizure, marijuana use, non-healing DM right foot ulcer DM II and gastroparesis, initially presented to [facility] from [place] for second opinion regarding AVR and CABG. Patient noted several weeks history of chest tightness with dizziness, palpitation, and increasing SOB. Denies fever, chills, nausea, vomiting. Seen by CTS and recommended patient not a CABG candidate due to multiple co morbidities- HD, Hep C, Osteomyelitis, PVD. s/p cath with stent placement on 7/31. Vascular surgery recommends BKA but patient declined, patient started on heparin drip and bridging with Warfarin (on Eliquis prior) with plan for balloon angioplasty on Tuesday

## 2020-08-02 NOTE — DISCHARGE SUMMARY
DISCHARGE SUMMARY  Hospital Medicine    Team: [facility] HOSP MED A    Patient Name: João Aguirre  YOB: 1965    Admit Date: 7/24/2020    Discharge Date: 08/02/2020    Discharge Attending Physician: Maira Shanks MD    Principal Diagnoses:  Active Hospital Problems    Diagnosis  POA    *Mitral stenosis [I05.0]  Yes    (HFpEF) heart failure with preserved ejection fraction [I50.30]  Unknown    Atrial flutter [I48.92]  Unknown    Hypotension [I95.9]  Unknown    PVD (peripheral vascular disease) [I73.9]  Yes    Atherosclerosis of native artery of right lower extremity with ulceration of midfoot [I70.234]  Yes    Acute osteomyelitis of metatarsal bone, right [M86.171]  Yes    Diabetic foot infection [E11.628, L08.9]  Yes    ESRD (end stage renal disease) on dialysis [N18.6, Z99.2]  Not Applicable    Nonrheumatic aortic (valve) stenosis [I35.0]  Yes    Chronic kidney disease-mineral and bone disorder [N18.9, E83.9, M89.9]  Yes    Skin ulcer of right foot with fat layer exposed [L97.512]  Yes    Hyperkalemia [E87.5]  Yes    Seizure disorder [G40.909]  Yes    Benign hypertension with ESRD (end-stage renal disease) [I12.0, N18.6]  Yes    Cirrhosis of liver with ascites [K74.60, R18.8]  Yes    Anemia in chronic kidney disease [N18.9, D63.1]  Yes     Chronic    ESRD (T,Th,Sat) dialysis onset 2013 [N18.6]  Yes     Chronic    NSTEMI (non-ST elevated myocardial infarction) [I21.4]  Yes      Resolved Hospital Problems   No resolved problems to display.       Discharged Condition: stable     Transferred to [place] for Dr Anglin.  Discussed the case with [place] hospitalist Dr Avalos on  line with Dr Solano today.     [facility] rec on DC: ALL meds hes on here, except will need heparin drip resumed + all of these restarted.  Daily INR. Daily labs  Will need HD tomorrow- renal consult, ID consult, interventional cards consult on admit in [place].    HOSPITAL COURSE:      Initial  Presentation:    Patient is a 55 year old male with PMHx of CAD, 70%EF, HTN, HLD, DM2, ESRD on dialysis (T/TH/SAT), asthma, gastroparesis, and hx of CVA. He presents to the ED via EMS for COVID screening. Patient was transferred from [facility] for hospital admission for nonrheumatic aortic valve stenosis and multi vessel coronary artery disease. He reports having sternal chest pain for approximately two weeks. Describes pain as intermittent and tightness. Rates pain 7/10. Reports hx of MI and hx of cardiac stents. Reports associated SOB. Patient last dialyzed Wednesday for three hours without complication. He denies fever,chills, nausea, vomiting, abd pain, dysuria, diarrhea, or constipation. He is a former smoker and denies alcohol use.       Course of Principle Problem for Admission:    Mitral stenosis  -transfer for 2nd opinion, admitted to CT surgery on admission.   -of note there is no H and P from the admitting team on discharge here, so coders, please message the CT surgery team for the H and P you yesenia need to close this case for billing, not medicine team as he was not admitted primary to medicine on admit for the initial H and P.  - CT surgery reports no surgery option now, so placed on med team for other med management  -cards recs BB for rate control to help diastolic filling (Robyn when goes into pafib, currently in NSR without BB, on home coreg). Keep HR goal 50-60s, get fluid off with HD and when euvolemic repeat echo  -echo now showing severe MS< EF 50, mild AS, BNP on admit 2900.   -per Dr Marie may be candidate for Valve-in MAC- in future if foot infectoin clears. Revascularization in interim for coronary symptoms (See CAD)    Other Medical Problems Addressed in the Hospital:    Coronary Artery Disease   -s/p stent on 7/30 with FARHEEN x 2 to the mid LAD and FARHEEN x 1 to the RCA.  -plavix nonresponder as elevated PRU levels, cannot so prasugrel due to DVA history  -do asa, brillinta, and start  "coumadin now (with heparin bridge until coumadin therapeutic, okay to do coumadin while planning peripheral angio per cards notes on 7//31 dr childs), after 7 days stop asa and continue the other 2 indefinitely per cards recs (stop asa on aug 6th)  -cont lipitor  -on coreg at home, not on BB currently     Seizure history  - cont homeKeppra 500 BID     GERD   - Pantoprazole 40 QD        Acute osteomyelitis of metatarsal bone, right  -Infectious disease following, history of Candida and Staph Cohnii in [place] in June  -current recs: stop vanc 8/1, change Cefepime to ceftaz on HD days- MWF per ID recs. Recommend consult [facility] ID in [place] on transfer for further recs.  -6 weeks recs now, end date 9/11 expected, weekly CBC CMP, ESR, CRP  -Cx from 7/29 showing stenotrophomonas, sensitive to ceftazidime and bactrim. Pathology is ?. Doesn't look like its sent, dont see "pending" in epic.    Will need ID follow up on dc           PVD (peripheral vascular disease)  -Vascular following, recommends RLE BKA, patient defers  -interventional cards feels balloon angio will help lower the level of amputation in future but believe is unlikely to heel ulcers, as multilevel disease of  with unsuccessul revscularization by vascular earlier in stay.  Per Dr Marie note on 7/31:   He has Michael catagory 5 ulcer, WIFI 332, which represents a high risk of amputation at 1 year. He was offered BKA by vascular surgery and the patient is reluctant to this. I reviewed the images done by vascular surgery of his lower extremity: He has multilevel disease with  of SFA which reconstitutes distally, he has a short patent popliteal segment and then  of the TP trunk and AT with distal reconstitution of all three vessels. There was an attempt of revascularization via retrograde AT that seemed unsuccessful. I have offered to the patient the option of re-attempt revascularization. Even though this is unlikely to heal his ulcers it may help " lower the level of amputation if successful.  -  initally the Plan for Tuesday with cards here per his note, but later cards returned  heparin drip in interim, okay to bridge to coumadin in between per cards note. However late last PM Dr Marie decided Dr Anglin and jacinta transfer would be better option to pursue, will try to pursue today to start process for angio next week for limb salvage, dr larson reported accepting MD         ESRD (end stage renal disease) on dialysis  -Nephrology following. Tues/Thurs/Sat at home  -renal diet, cont Renvela. 1L fluid restrict  -HD 7/31 and 8/1, on HD 8/1  -some signs of volume on exam today, would rec HD tomorrow 8/3 prior to procedure     Nonrheumatic aortic (valve) stenosis  -ECHO shows mild aortic valve stenosis. Aortic valve area is 1.57 cm2; peak velocity is 3.31 m/s; mean gradient is 24 mmHg. Mild aortic regurgitation.  -Patient currently high risk for cardiac surgery secondary to cirrhosis (plt 149) and osteomyelitis   -as considered valvular paroxysmal AFib, he is stopped on his home eliquis and coumadin started per cards recs with heparin bridging.  - Daily INR. Continue heparin until at goal INR. Titrate up to goal.  -inr 1.5 today, cont drip until therapeutic        Skin ulcer of right foot with fat layer exposed  -Vascular following  -see above (osteo)     History of CVA  - no residual deficits, no prasugrel due to this. Cont statin, brillinta     Gastroparesis   -no acute issues     Hyperkalemia  -improved now with HD  -monitor closely     Seizure disorder  - Home Keppra      Benign hypertension with ESRD (end-stage renal disease)  -Continue Norvasc 5mg     Paroxysmal Atrial Fibrillation  -was on eliquis at home and coreg 3.125, both held now  -convert to coumadin as cards feel he has valvular afib warranting this, given high [id number] and CVA hx, bridging from coumadin now.  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 Trend.  -Nephrology following      Consults: ID, cards, interventional cards nephro vascular     Last CBC/BMP:    CBC/Anemia Labs: Coags:    Recent Labs   Lab 07/31/20  0422 08/01/20  0532 08/02/20  0541   WBC 7.21 6.63 10.21   HGB 8.3* 8.4* 9.4*   HCT 27.6* 27.8* 31.2*   * 123* 159   * 106* 107*   RDW 16.2* 16.5* 16.6*    Recent Labs   Lab 07/30/20  0353  07/31/20  0422 08/01/20  0532 08/01/20  1724 08/02/20  0541   INR 1.0  --   --  1.0  --  1.5*   APTT 45.0*   < > 48.7*  --  30.2 44.9*    < > = values in this interval not displayed.        Chemistries:   Recent Labs   Lab 07/29/20  0419  07/30/20  0353  07/31/20  0422 07/31/20  1200 08/01/20  0532 08/02/20  0541     136   < > 139  138   < > 134*  134* 137 138 140   K 5.7*  5.7*  5.7*   < > 4.6  4.6  4.6   < > 5.5*  5.5*  5.5* 3.8  3.8 4.7 4.9   CL 99  99   < > 103  103   < > 99  99 102 103 104   CO2 23  23   < > 26  25   < > 22*  22* 26 24 25   BUN 48*  48*   < > 30*  30*   < > 52*  52* 17 39* 39*   CREATININE 4.8*  4.8*   < > 3.6*  3.6*   < > 5.3*  5.1* 2.3* 4.2* 3.6*   CALCIUM 8.9  8.9   < > 9.2  8.8   < > 9.2  9.2 8.1* 9.2 9.8   PROT  --   --  6.5  --  6.6  --  6.6 7.3   BILITOT  --   --  0.4  --  0.5  --  0.4 0.5   ALKPHOS  --   --  120  --  131  --  131 139*   ALT  --   --  15  --  9*  --  5* 5*   AST  --   --  28  --  25  --  22 24   MG 2.2  --  2.0  --  2.1  --   --   --    PHOS 6.2*  --  4.9*  --  6.5*  --   --   --     < > = values in this interval not displayed.            Special Treatments/Procedures:    Procedure(s) (LRB):  Left heart cath (N/A)  ANGIOGRAM, CORONARY ARTERY (N/A)  IVUS, Coronary  Stent, Drug Eluting, Single Vessel, Coronary  Stent, Drug Eluting, Multi Vessel, Coronary     Disposition: Discharged to Other Facility      Future Scheduled Appointments:  Future Appointments   Date Time Provider Department Center   8/3/2020  6:45 AM [facility]           Discharge Medication List:  + heparin Romel Santana   Home Medication Instructions TIFFANY:[id number]    Printed on:08/02/20 6307   Medication Information                      acetaminophen (TYLENOL) 325 MG tablet  Take 2 tablets (650 mg total) by mouth every 4 (four) hours as needed.             albuterol (PROAIR HFA) 90 mcg/actuation inhaler  INHALE TWO PUFFS EVERY 4 TO 6 HOURS AS NEEDED             albuterol-ipratropium (DUO-NEB) 2.5 mg-0.5 mg/3 mL nebulizer solution  Take 3 mLs by nebulization every 6 (six) hours as needed for Wheezing. Rescue             amLODIPine (NORVASC) 10 MG tablet  Take 1 tablet (10 mg total) by mouth once daily.             aspirin 81 MG Chew  Take 1 tablet (81 mg total) by mouth once daily.             atorvastatin (LIPITOR) 40 MG tablet  Take 1 tablet (40 mg total) by mouth once daily.             bisacodyL (DULCOLAX) 10 mg Supp  Place 1 suppository (10 mg total) rectally daily as needed (Until bowel movement if patient has no bowel movement for 2 days).             blood-glucose meter (PHARMACIST CHOICE GLUCOSE SYS) Misc  1 Device.             cadexomer iodine (IODOSORB) 0.9 % gel  Apply topically every Mon, Wed, Fri.             calcium carbonate (TUMS) 200 mg calcium (500 mg) chewable tablet  Take 1 tablet (500 mg total) by mouth 2 (two) times daily as needed.             gabapentin (NEURONTIN) 100 MG capsule  Take 1 capsule (100 mg total) by mouth 2 (two) times daily.             levETIRAcetam (KEPPRA) 500 MG Tab  Take 1 tablet (500 mg total) by mouth 2 (two) times daily.              methadone (DOLOPHINE) 10 MG tablet  Take 10 tablets (100 mg total) by mouth once daily.             ondansetron (ZOFRAN-ODT) 8 MG TbDL  Take 1 tablet (8 mg total) by mouth every 8 (eight) hours as needed.             oxyCODONE (ROXICODONE) 10 mg Tab immediate release tablet  Take 1 tablet (10 mg total) by mouth every 4 (four) hours as needed.             oxyCODONE (ROXICODONE) 5 MG immediate release tablet  Take 1 tablet (5 mg total) by mouth every 4 (four) hours as needed.             pantoprazole (PROTONIX) 40 MG tablet  Take 1 tablet (40 mg total) by mouth before breakfast.             polyethylene glycol (GLYCOLAX) 17 gram PwPk  Take 17 g by mouth once daily.             sevelamer carbonate (RENVELA) 800 mg Tab  Take 2 tablets (1,600 mg total) by mouth 3 (three) times daily with meals.             ticagrelor (BRILINTA) 90 mg tablet  Take 1 tablet (90 mg total) by mouth 2 (two) times a day.             ticagrelor (BRILINTA) 90 mg tablet  Take 1 tablet (90 mg total) by mouth 2 (two) times a day.             warfarin (COUMADIN) 5 MG tablet  Take 1 tablet (5 mg total) by mouth Daily.                 Patient Instructions:  No discharge procedures on file.    At the time of discharge patient was told to take all medications as prescribed, to keep all followup appointments, and to call their primary care physician or return to the emergency room if they have any worsening or concerning symptoms.    Signing Physician:  Maira Shanks MD

## 2020-08-02 NOTE — ASSESSMENT & PLAN NOTE
History of coronary artery stent placement  ECHO shows mild aortic valve stenosis.   currently high risk for cardiac surgery secondary to cirrhosis (plt 149) and osteomyelitis   [id number] of 6   Was on Eliquis now switched to Warfarin   Continue heparin with coumadin bridge  Continue Brilinta   Continue ASA x 1 week end date

## 2020-08-02 NOTE — NURSING
Patient arrived to room via stretcher. AAOx4. VSS. Denies of pain, SOB, chest discomform, N/V at this time. Oriented to room. Tele box on. POC reviewed with patient and verbal reported of understanding. Fall risk reviewed with patient and and safety maintained. Dr. Avalos of patient's arrival. Bed in lowest position. Bed alarm on. Call  Hernandez within reach and educated to call for assistance. No acute distress noted and will continue to monitor.

## 2020-08-02 NOTE — Clinical Note
The site was marked. Prepped: groin and right foot. Prepped with: ChloraPrep. The site was clipped. The patient was draped.

## 2020-08-02 NOTE — PLAN OF CARE
Plan of care discussed with patient. Patient ambulating independently with standby assistance. Fall precautions remain in place. Drsg to right foot diabetic ulcers CDI. Plan is to transfer for second opinion for Mitral Stenosis. 3L removal achieved with hemodialysis yesterday, 8/1/2020. Heparin continued as ordered. Coumadin therapy in progress. Vanc discontinued. Cefepime changed to Ceftazidime 1g after HD M-W-F. Discussed medications and care. VSS. Patient has no questions at this time. Bed in lowest position. Call bell within. Will continue to monitor.

## 2020-08-02 NOTE — Clinical Note
Night shift receiving nurse Moon CORDON ok to receive bedside report per Margarita RN  receive bedside report

## 2020-08-02 NOTE — NURSING
VIRTUAL NURSE:  Cued into patient's room.  Permission received per patient to turn camera to view patient. Pt in bed at this time, NC in place, nurse at bedside. Introduced as VN for night shift that will be working with floor nurse and nursing assistant.  Educated patient on VN's role in patient care and  V[facility] model.  Plan of care reviewed with patient.  Education per flowsheet.   Informed patient that staff will round on them every 2 hours but to use call light for any other needs they may have; informed of fall risk and fall precautions.  Patient verbalized understanding.  Call light within reach; bed siderails up x2.  Opportunity given for questions and questions answered.  Admission assessment questions answered.  Patient denies complaints or any needs at this time. Instructed to call for assistance.  Will cont to monitor and intervene as needed.    Labs, notes, orders, and careplan reviewed.

## 2020-08-02 NOTE — PLAN OF CARE
"    I have set all his current inpatient medications from today to "no print" on discharge except heparin drip I had to stop  On arrival to [place] you can continue all his home meds on admit there and start a heparin drip to match all the medications he was currently on here.      "

## 2020-08-02 NOTE — Clinical Note
The site was marked. Prepped: groin and right foot. Prepped with: ChloraPrep. The site was clipped. The patient was draped. Entire Rt leg

## 2020-08-02 NOTE — SUBJECTIVE & OBJECTIVE
Past Medical History:   Diagnosis Date    Acute osteomyelitis of metatarsal bone, right     PAD right    Anticoagulant long-term use     Arthritis     Asthma     Back pain     on methadone    C. difficile colitis 09/2018    Cirrhosis of liver without ascites 2016    by liver US. +HCV    Coronary artery disease 2013    stent.  h/o STEMI and NSTEMI    Diabetes mellitus     resolved, multiple admissions for hypoglycemia requiring ICU possibley due to liver/ESRD    Encounter for blood transfusion     ESRD on hemodialysis 2013    anuric    Eye abnormality right eye    injured as a child    Gastritis     Gastroparesis     HCV (hepatitis C virus)     ? h/o tattoo exposure    Hypertension     Mitral stenosis 07/24/2020    Moderate to severe mitral stenosis    PAD (peripheral artery disease)     RLE s/p R CFA endarterectomy    Pneumonia 2013    Spend 6weeks in hospital at [facility]    Stroke     Tuberculosis     treated       Past Surgical History:   Procedure Laterality Date    ANGIOGRAPHY OF LOWER EXTREMITY Right 7/28/2020    Procedure: Angiogram Extremity Unilateral;  Surgeon: MINO Vasquez II, MD;  Location: [address];  Service: Vascular;  Laterality: Right;  Left groin and rIght pedal artery access  15.5 min  247.25 mGy  68.5454 Gycm2  33ml contrast    AV FISTULA PLACEMENT      BACK SURGERY      CARDIAC SURGERY  2013    heart stent    CHOLECYSTECTOMY      CORONARY ANGIOGRAPHY N/A 7/30/2020    Procedure: ANGIOGRAM, CORONARY ARTERY;  Surgeon: Alexandro Terry MD;  Location: [facility] CATH LAB;  Service: Cardiology;  Laterality: N/A;    EYE SURGERY      R eye    INCISION AND DRAINAGE OF ABSCESS Right 9/6/2018    Procedure: INCISION AND DRAINAGE, ABSCESS;  Surgeon: Chetan Rothman MD;  Location: [facility];  Service: General;  Laterality: Right;    LEFT HEART CATHETERIZATION N/A 7/30/2020    Procedure: Left heart cath;  Surgeon: Alexandro Terry MD;  Location: [facility] CATH LAB;  Service:  Cardiology;  Laterality: N/A;       Review of patient's allergies indicates:   Allergen Reactions    Antibiotic hc      Counter acts with methodone.          Current Facility-Administered Medications on File Prior to Encounter   Medication    0.9%  NaCl infusion    0.9%  NaCl infusion    acetaminophen tablet 650 mg    albuterol-ipratropium 2.5 mg-0.5 mg/3 mL nebulizer solution 3 mL    amLODIPine tablet 10 mg    aspirin chewable tablet 81 mg    atorvastatin tablet 40 mg    bisacodyL suppository 10 mg    cadexomer iodine 0.9 % gel    calcium carbonate 200 mg calcium (500 mg) chewable tablet 500 mg    calcium gluconate 1g in dextrose 5% 100mL (ready to mix system)    calcium gluconate 1g in dextrose 5% 100mL (ready to mix system)    epoetin harshad-epbx injection 10,000 Units    gabapentin capsule 100 mg    heparin 25,000 units in dextrose 5% (100 units/ml) IV bolus from bag - ADDITIONAL PRN BOLUS - 30 units/kg (max bolus 4000 units)    heparin 25,000 units in dextrose 5% (100 units/ml) IV bolus from bag - ADDITIONAL PRN BOLUS - 60 units/kg (max bolus 4000 units)    heparin 25,000 units in dextrose 5% 250 mL (100 units/mL) infusion LOW INTENSITY nomogram - [facility]    levETIRAcetam tablet 500 mg    methadone tablet 100 mg    ondansetron disintegrating tablet 8 mg    oxyCODONE immediate release tablet 10 mg    oxyCODONE immediate release tablet 5 mg    pantoprazole EC tablet 40 mg    polyethylene glycol packet 17 g    promethazine tablet 25 mg    sevelamer carbonate tablet 1,600 mg    sodium chloride 0.9% flush 10 mL    ticagrelor tablet 90 mg    warfarin (COUMADIN) tablet 5 mg     Current Outpatient Medications on File Prior to Encounter   Medication Sig    acetaminophen (TYLENOL) 325 MG tablet Take 2 tablets (650 mg total) by mouth every 4 (four) hours as needed.    albuterol (PROAIR HFA) 90 mcg/actuation inhaler INHALE TWO PUFFS EVERY 4 TO 6 HOURS AS NEEDED    albuterol-ipratropium (DUO-NEB)  2.5 mg-0.5 mg/3 mL nebulizer solution Take 3 mLs by nebulization every 6 (six) hours as needed for Wheezing. Rescue    [START ON 8/3/2020] amLODIPine (NORVASC) 10 MG tablet Take 1 tablet (10 mg total) by mouth once daily.    [START ON 8/3/2020] aspirin 81 MG Chew Take 1 tablet (81 mg total) by mouth once daily.    atorvastatin (LIPITOR) 40 MG tablet Take 1 tablet (40 mg total) by mouth once daily.    bisacodyL (DULCOLAX) 10 mg Supp Place 1 suppository (10 mg total) rectally daily as needed (Until bowel movement if patient has no bowel movement for 2 days).    blood-glucose meter (PHARMACIST CHOICE GLUCOSE SYS) Misc 1 Device.    [START ON 8/3/2020] cadexomer iodine (IODOSORB) 0.9 % gel Apply topically every Mon, Wed, Fri.    calcium carbonate (TUMS) 200 mg calcium (500 mg) chewable tablet Take 1 tablet (500 mg total) by mouth 2 (two) times daily as needed.    gabapentin (NEURONTIN) 100 MG capsule Take 1 capsule (100 mg total) by mouth 2 (two) times daily.    levETIRAcetam (KEPPRA) 500 MG Tab Take 1 tablet (500 mg total) by mouth 2 (two) times daily.    [START ON 8/3/2020] methadone (DOLOPHINE) 10 MG tablet Take 10 tablets (100 mg total) by mouth once daily.    ondansetron (ZOFRAN-ODT) 8 MG TbDL Take 1 tablet (8 mg total) by mouth every 8 (eight) hours as needed.    oxyCODONE (ROXICODONE) 10 mg Tab immediate release tablet Take 1 tablet (10 mg total) by mouth every 4 (four) hours as needed.    oxyCODONE (ROXICODONE) 5 MG immediate release tablet Take 1 tablet (5 mg total) by mouth every 4 (four) hours as needed.    [START ON 8/3/2020] pantoprazole (PROTONIX) 40 MG tablet Take 1 tablet (40 mg total) by mouth before breakfast.    [START ON 8/3/2020] polyethylene glycol (GLYCOLAX) 17 gram PwPk Take 17 g by mouth once daily.    sevelamer carbonate (RENVELA) 800 mg Tab Take 2 tablets (1,600 mg total) by mouth 3 (three) times daily with meals.    ticagrelor (BRILINTA) 90 mg tablet Take 1 tablet (90 mg  total) by mouth 2 (two) times a day.    ticagrelor (BRILINTA) 90 mg tablet Take 1 tablet (90 mg total) by mouth 2 (two) times a day.    warfarin (COUMADIN) 5 MG tablet Take 1 tablet (5 mg total) by mouth Daily.    [DISCONTINUED] aspirin (ECOTRIN) 81 MG EC tablet Take 81 mg by mouth once daily.    [DISCONTINUED] atorvastatin (LIPITOR) 10 MG tablet Take 10 mg by mouth once daily.     [DISCONTINUED] blood sugar diagnostic (TRUE METRIX GLUCOSE TEST STRIP) Strp     [DISCONTINUED] carvedilol (COREG) 3.125 MG tablet Take 3.125 mg by mouth once daily.     [DISCONTINUED] ELIQUIS 2.5 mg Tab Take 2.5 mg by mouth once daily.     [DISCONTINUED] ferric citrate (AURYXIA) 210 mg iron Tab Take 210-420 mg by mouth 3 (three) times daily.     [DISCONTINUED] fluticasone propionate (FLONASE) 50 mcg/actuation nasal spray 1 spray by Each Nostril route once daily.    [DISCONTINUED] fluticasone-salmeterol 250-50 mcg/dose (ADVAIR DISKUS) 250-50 mcg/dose diskus inhaler Inhale 1 puff into the lungs once daily.     [DISCONTINUED] gabapentin (NEURONTIN) 100 MG capsule Take 100 mg by mouth daily as needed.     [DISCONTINUED] hydrALAZINE (APRESOLINE) 50 MG tablet Take 50 mg by mouth as needed (for hypertension).     [DISCONTINUED] levetiracetam (KEPPRA) 500 MG Tab Take 500 mg twice a day.  Take an extra tablet right after dialysis (making 3 tablets on your dialysis days)    [DISCONTINUED] methadone (DOLOPHINE) 10 MG tablet Take 9 tablets (90 mg total) by mouth once daily.    [DISCONTINUED] pantoprazole (PROTONIX) 40 MG tablet Take 40 mg by mouth once daily.     [DISCONTINUED] sevelamer carbonate (RENVELA) 800 mg Tab Take 800-1,600 mg by mouth 3 (three) times daily with meals.      Family History     Problem Relation (Age of Onset)    Aneurysm Mother, Father (77)    Diabetes Brother    Heart disease Father, Paternal Grandmother    Kidney disease Brother        Tobacco Use    Smoking status: Former Smoker     Years: 10.00     Quit  date: 2015     Years since quittin.9    Smokeless tobacco: Never Used   Substance and Sexual Activity    Alcohol use: No    Drug use: Yes     Frequency: 4.0 times per week     Types: Other-see comments     Comment: marijuana    Sexual activity: Yes     Review of Systems   Constitutional: Negative for chills and fever.   HENT: Negative for congestion and rhinorrhea.    Eyes: Negative for photophobia, discharge and visual disturbance.   Respiratory: Negative for chest tightness and shortness of breath.    Cardiovascular: Negative for chest pain.   Gastrointestinal: Negative for abdominal distention, abdominal pain, blood in stool, nausea and vomiting.   Endocrine: Negative for polyphagia and polyuria.   Genitourinary: Negative for dysuria and hematuria.   Musculoskeletal: Positive for myalgias. Negative for arthralgias.   Skin: Positive for color change and wound. Negative for rash.   Neurological: Negative for dizziness, weakness and headaches.   Psychiatric/Behavioral: Negative for agitation.     Objective:     Vital Signs (Most Recent):    Vital Signs (24h Range):  Temp:  [98.4 °F (36.9 °C)-100.4 °F (38 °C)] 98.9 °F (37.2 °C)  Pulse:  [69-86] 85  Resp:  [16-20] 18  SpO2:  [96 %-98 %] 97 %  BP: (112-137)/(59-68) 119/59        There is no height or weight on file to calculate BMI.    Physical Exam  Constitutional:       Appearance: He is well-developed.   HENT:      Head: Normocephalic and atraumatic.      Nose: Nose normal.      Mouth/Throat:      Mouth: Mucous membranes are moist.   Eyes:      Pupils: Pupils are equal, round, and reactive to light.   Neck:      Musculoskeletal: Neck supple.   Cardiovascular:      Rate and Rhythm: Normal rate and regular rhythm.      Heart sounds: Normal heart sounds. No murmur. No friction rub. No gallop.    Pulmonary:      Effort: Pulmonary effort is normal.      Breath sounds: Normal breath sounds.      Comments: NC   Chest:      Chest wall: No tenderness.    Abdominal:      General: Bowel sounds are normal. There is no distension.      Palpations: Abdomen is soft.      Tenderness: There is no abdominal tenderness.      Comments: obese   Musculoskeletal:      Comments: R foot with dressing   Skin:     General: Skin is warm and dry.      Capillary Refill: Capillary refill takes 2 to 3 seconds.      Comments: Wound dressing clean and dry   Neurological:      General: No focal deficit present.      Mental Status: He is alert and oriented to person, place, and time.   Psychiatric:         Mood and Affect: Mood normal.           CRANIAL NERVES     CN III, IV, VI   Pupils are equal, round, and reactive to light.       Significant Labs:   A1C:   Recent Labs   Lab 07/30/20  0353   HGBA1C 4.6     Blood Culture: No results for input(s): LABBLOO in the last 48 hours.  BMP:   Recent Labs   Lab 08/02/20  0541   GLU 80      K 4.9      CO2 25   BUN 39*   CREATININE 3.6*   CALCIUM 9.8     CBC:   Recent Labs   Lab 08/01/20  0532 08/02/20  0541   WBC 6.63 10.21   HGB 8.4* 9.4*   HCT 27.8* 31.2*   * 159     CMP:   Recent Labs   Lab 08/01/20  0532 08/02/20  0541    140   K 4.7 4.9    104   CO2 24 25    80   BUN 39* 39*   CREATININE 4.2* 3.6*   CALCIUM 9.2 9.8   PROT 6.6 7.3   ALBUMIN 2.5* 2.7*   BILITOT 0.4 0.5   ALKPHOS 131 139*   AST 22 24   ALT 5* 5*   ANIONGAP 11 11   EGFRNONAA 15.0* 18.1*     Lactic Acid: No results for input(s): LACTATE in the last 48 hours.  Lipase: No results for input(s): LIPASE in the last 48 hours.  Lipid Panel: No results for input(s): CHOL, HDL, LDLCALC, TRIG, CHOLHDL in the last 48 hours.  Magnesium: No results for input(s): MG in the last 48 hours.  Troponin: No results for input(s): TROPONINI in the last 48 hours.  TSH: No results for input(s): TSH in the last 4320 hours.  Urine Culture: No results for input(s): LABURIN in the last 48 hours.  Urine Studies: No results for input(s): COLORU, APPEARANCEUA, PHUR, SPECGRAV,  PROTEINUA, GLUCUA, KETONESU, BILIRUBINUA, OCCULTUA, NITRITE, UROBILINOGEN, LEUKOCYTESUR, RBCUA, WBCUA, BACTERIA, SQUAMEPITHEL, HYALINECASTS in the last 48 hours.    Invalid input(s): CK  All pertinent labs within the past 24 hours have been reviewed.    Significant Imaging: I have reviewed all pertinent imaging results/findings within the past 24 hours.

## 2020-08-02 NOTE — Clinical Note
Angiography of the right lower extremity performed with the catheter   and hand injected with 40 mL of contrast.

## 2020-08-02 NOTE — ASSESSMENT & PLAN NOTE
PVD (peripheral vascular disease)  Atherosclerosis of native artery of right lower extremity with ulceration of midfoot    CTA A/P with BLE runoff to better define surgical anatomy  Consult cardiology- for possible revascularization and potential limb salvage  Wound cx with stenotrophomonas  Continue Ceftazidime x 6 weeks recs now, end date 9/11  Consult wound care  Consult cardiology  Consult podiatry  Consult ID

## 2020-08-03 NOTE — PLAN OF CARE
"TN attemped to complete DCA at bedside. Pt ambulating without assist to toilet. TN informed I would return to discuss discharge.     This  put name on white board and explained blue discharge folder to patient. Discharge planning brochure and/or business card given to patient.  Patient verbalized understanding.    DCA done from Medical record.    From H&P:    "HPI: Patient is a 55/y/o M with PMH of ESRD on HD (T/TH/S), anemia, uncontrolled HTN, hx CVA, PCI s/p 2 stents placement, right eye blindness, seizure, marijuana use, non-healing DM right foot ulcer DM II and gastroparesis, initially presented to [facility] from [place] for second opinion regarding AVR and CABG. Patient noted several weeks history of chest tightness with dizziness, palpitation, and increasing SOB. Denies fever, chills, nausea, vomiting. Seen by CTS and recommended patient not a CABG candidate due to multiple co morbidities- HD, Hep C, Osteomyelitis, PVD. s/p cath with stent placement on 7/31. Vascular surgery recommends BKA but patient declined, patient started on heparin drip and bridging with Warfarin (on Eliquis prior) with plan for balloon angioplasty on Tuesday"    No future appointments.       08/03/20 1231   Discharge Assessment   Assessment Type Discharge Planning Assessment   Confirmed/corrected address and phone number on facesheet? Yes   Assessment information obtained from? Medical Record   Prior to hospitilization cognitive status: Alert/Oriented;No Deficits   Prior to hospitalization functional status: Independent;Assistive Equipment   Current cognitive status: Alert/Oriented;No Deficits   Current Functional Status: Independent;Assistive Equipment   Facility Arrived From: [facility].   Lives With alone   Is patient able to care for self after discharge? Unable to determine at this time (comments)   Who are your caregiver(s) and their phone number(s)? Soledad Aguirre [phone]   Name and contact number of " agency or person providing outside services [facility] in the past   Equipment Currently Used at Home other (see comments)  (TBD)   Discharge Plan A Skilled Nursing Facility   Discharge Plan B Home Health;Home   DME Needed Upon Discharge  other (see comments)  (TBD)   Patient/Family in Agreement with Plan yes     Helen Wood, RN  RN Transition Navigator  [phone]

## 2020-08-03 NOTE — TELEPHONE ENCOUNTER
----- Message from Sarah Johnson sent at 8/3/2020  9:46 AM CDT -----  Contact: Kelly WOOTEN/ [facility] [phone]  CONSULTS:     Patient: João Aguirre      : 1965    Clinic#: [id number]    Room number: 432     Referring MD: Dr. Newman     Diagnosis: Cli     Person calling: Gina W/ [facility]

## 2020-08-03 NOTE — ASSESSMENT & PLAN NOTE
History of coronary artery stent placement  ECHO shows mild aortic valve stenosis.   currently high risk for cardiac surgery secondary to cirrhosis (plt 149) and osteomyelitis   [id number] of 6   Was on Eliquis now switched to Warfarin   Continue heparin with coumadin bridge. D/c heparin on 8/3. Decrease Warfarin dose to 2.5  INR goal 2.5-3.5  Continue Brilinta   Continue ASA x 1 week end date

## 2020-08-03 NOTE — H&P
[facility] Medicine  History & Physical    Patient Name: João Aguirre  MRN: [id number]  Admission Date: 8/2/2020  Attending Physician: Cherrie Velasquez*   Primary Care Provider: Primary Doctor No         Patient information was obtained from patient, past medical records and ER records.     Subjective:     Principal Problem:Acute osteomyelitis of metatarsal bone, right    Chief Complaint:   Chief Complaint   Patient presents with    Wound Infection        HPI: Patient is a 55/y/o M with PMH of ESRD on HD (T/TH/S), anemia, uncontrolled HTN, hx CVA, PCI s/p 2 stents placement, right eye blindness, seizure, marijuana use, non-healing DM right foot ulcer DM II and gastroparesis, initially presented to [facility] from [place] for second opinion regarding AVR and CABG. Patient noted several weeks history of chest tightness with dizziness, palpitation, and increasing SOB. Denies fever, chills, nausea, vomiting. Seen by CTS and recommended patient not a CABG candidate due to multiple co morbidities- HD, Hep C, Osteomyelitis, PVD. s/p cath with stent placement on 7/31. Vascular surgery recommends BKA but patient declined, patient started on heparin drip and bridging with Warfarin (on Eliquis prior) with plan for balloon angioplasty on Tuesday    Past Medical History:   Diagnosis Date    Acute osteomyelitis of metatarsal bone, right     PAD right    Anticoagulant long-term use     Arthritis     Asthma     Back pain     on methadone    C. difficile colitis 09/2018    Cirrhosis of liver without ascites 2016    by liver US. +HCV    Coronary artery disease 2013    stent.  h/o STEMI and NSTEMI    Diabetes mellitus     resolved, multiple admissions for hypoglycemia requiring ICU possibley due to liver/ESRD    Encounter for blood transfusion     ESRD on hemodialysis 2013    anuric    Eye abnormality right eye    injured as a child    Gastritis     Gastroparesis      HCV (hepatitis C virus)     ? h/o tattoo exposure    Hypertension     Mitral stenosis 07/24/2020    Moderate to severe mitral stenosis    PAD (peripheral artery disease)     RLE s/p R CFA endarterectomy    Pneumonia 2013    Spend 6weeks in hospital at [place]    Stroke     Tuberculosis     treated       Past Surgical History:   Procedure Laterality Date    ANGIOGRAPHY OF LOWER EXTREMITY Right 7/28/2020    Procedure: Angiogram Extremity Unilateral;  Surgeon: MINO Vasquez II, MD;  Location: [address];  Service: Vascular;  Laterality: Right;  Left groin and rIght pedal artery access  15.5 min  247.25 mGy  68.5454 Gycm2  33ml contrast    AV FISTULA PLACEMENT      BACK SURGERY      CARDIAC SURGERY  2013    heart stent    CHOLECYSTECTOMY      CORONARY ANGIOGRAPHY N/A 7/30/2020    Procedure: ANGIOGRAM, CORONARY ARTERY;  Surgeon: Alexandro Terry MD;  Location: [facility] CATH LAB;  Service: Cardiology;  Laterality: N/A;    EYE SURGERY      R eye    INCISION AND DRAINAGE OF ABSCESS Right 9/6/2018    Procedure: INCISION AND DRAINAGE, ABSCESS;  Surgeon: Chetan Rothman MD;  Location: [facility];  Service: General;  Laterality: Right;    LEFT HEART CATHETERIZATION N/A 7/30/2020    Procedure: Left heart cath;  Surgeon: Alexandro Terry MD;  Location: [facility] CATH LAB;  Service: Cardiology;  Laterality: N/A;       Review of patient's allergies indicates:   Allergen Reactions    Antibiotic hc      Counter acts with methodone.          Current Facility-Administered Medications on File Prior to Encounter   Medication    0.9%  NaCl infusion    0.9%  NaCl infusion    acetaminophen tablet 650 mg    albuterol-ipratropium 2.5 mg-0.5 mg/3 mL nebulizer solution 3 mL    amLODIPine tablet 10 mg    aspirin chewable tablet 81 mg    atorvastatin tablet 40 mg    bisacodyL suppository 10 mg    cadexomer iodine 0.9 % gel    calcium carbonate 200 mg calcium (500 mg) chewable tablet 500 mg    calcium gluconate 1g  in dextrose 5% 100mL (ready to mix system)    calcium gluconate 1g in dextrose 5% 100mL (ready to mix system)    epoetin harshad-epbx injection 10,000 Units    gabapentin capsule 100 mg    heparin 25,000 units in dextrose 5% (100 units/ml) IV bolus from bag - ADDITIONAL PRN BOLUS - 30 units/kg (max bolus 4000 units)    heparin 25,000 units in dextrose 5% (100 units/ml) IV bolus from bag - ADDITIONAL PRN BOLUS - 60 units/kg (max bolus 4000 units)    heparin 25,000 units in dextrose 5% 250 mL (100 units/mL) infusion LOW INTENSITY nomogram - [facility]    levETIRAcetam tablet 500 mg    methadone tablet 100 mg    ondansetron disintegrating tablet 8 mg    oxyCODONE immediate release tablet 10 mg    oxyCODONE immediate release tablet 5 mg    pantoprazole EC tablet 40 mg    polyethylene glycol packet 17 g    promethazine tablet 25 mg    sevelamer carbonate tablet 1,600 mg    sodium chloride 0.9% flush 10 mL    ticagrelor tablet 90 mg    warfarin (COUMADIN) tablet 5 mg     Current Outpatient Medications on File Prior to Encounter   Medication Sig    acetaminophen (TYLENOL) 325 MG tablet Take 2 tablets (650 mg total) by mouth every 4 (four) hours as needed.    albuterol (PROAIR HFA) 90 mcg/actuation inhaler INHALE TWO PUFFS EVERY 4 TO 6 HOURS AS NEEDED    albuterol-ipratropium (DUO-NEB) 2.5 mg-0.5 mg/3 mL nebulizer solution Take 3 mLs by nebulization every 6 (six) hours as needed for Wheezing. Rescue    [START ON 8/3/2020] amLODIPine (NORVASC) 10 MG tablet Take 1 tablet (10 mg total) by mouth once daily.    [START ON 8/3/2020] aspirin 81 MG Chew Take 1 tablet (81 mg total) by mouth once daily.    atorvastatin (LIPITOR) 40 MG tablet Take 1 tablet (40 mg total) by mouth once daily.    bisacodyL (DULCOLAX) 10 mg Supp Place 1 suppository (10 mg total) rectally daily as needed (Until bowel movement if patient has no bowel movement for 2 days).    blood-glucose meter (PHARMACIST CHOICE GLUCOSE SYS) Misc 1 Device.     [START ON 8/3/2020] cadexomer iodine (IODOSORB) 0.9 % gel Apply topically every Mon, Wed, Fri.    calcium carbonate (TUMS) 200 mg calcium (500 mg) chewable tablet Take 1 tablet (500 mg total) by mouth 2 (two) times daily as needed.    gabapentin (NEURONTIN) 100 MG capsule Take 1 capsule (100 mg total) by mouth 2 (two) times daily.    levETIRAcetam (KEPPRA) 500 MG Tab Take 1 tablet (500 mg total) by mouth 2 (two) times daily.    [START ON 8/3/2020] methadone (DOLOPHINE) 10 MG tablet Take 10 tablets (100 mg total) by mouth once daily.    ondansetron (ZOFRAN-ODT) 8 MG TbDL Take 1 tablet (8 mg total) by mouth every 8 (eight) hours as needed.    oxyCODONE (ROXICODONE) 10 mg Tab immediate release tablet Take 1 tablet (10 mg total) by mouth every 4 (four) hours as needed.    oxyCODONE (ROXICODONE) 5 MG immediate release tablet Take 1 tablet (5 mg total) by mouth every 4 (four) hours as needed.    [START ON 8/3/2020] pantoprazole (PROTONIX) 40 MG tablet Take 1 tablet (40 mg total) by mouth before breakfast.    [START ON 8/3/2020] polyethylene glycol (GLYCOLAX) 17 gram PwPk Take 17 g by mouth once daily.    sevelamer carbonate (RENVELA) 800 mg Tab Take 2 tablets (1,600 mg total) by mouth 3 (three) times daily with meals.    ticagrelor (BRILINTA) 90 mg tablet Take 1 tablet (90 mg total) by mouth 2 (two) times a day.    ticagrelor (BRILINTA) 90 mg tablet Take 1 tablet (90 mg total) by mouth 2 (two) times a day.    warfarin (COUMADIN) 5 MG tablet Take 1 tablet (5 mg total) by mouth Daily.    [DISCONTINUED] aspirin (ECOTRIN) 81 MG EC tablet Take 81 mg by mouth once daily.    [DISCONTINUED] atorvastatin (LIPITOR) 10 MG tablet Take 10 mg by mouth once daily.     [DISCONTINUED] blood sugar diagnostic (TRUE METRIX GLUCOSE TEST STRIP) Strp     [DISCONTINUED] carvedilol (COREG) 3.125 MG tablet Take 3.125 mg by mouth once daily.     [DISCONTINUED] ELIQUIS 2.5 mg Tab Take 2.5 mg by mouth once daily.      [DISCONTINUED] ferric citrate (AURYXIA) 210 mg iron Tab Take 210-420 mg by mouth 3 (three) times daily.     [DISCONTINUED] fluticasone propionate (FLONASE) 50 mcg/actuation nasal spray 1 spray by Each Nostril route once daily.    [DISCONTINUED] fluticasone-salmeterol 250-50 mcg/dose (ADVAIR DISKUS) 250-50 mcg/dose diskus inhaler Inhale 1 puff into the lungs once daily.     [DISCONTINUED] gabapentin (NEURONTIN) 100 MG capsule Take 100 mg by mouth daily as needed.     [DISCONTINUED] hydrALAZINE (APRESOLINE) 50 MG tablet Take 50 mg by mouth as needed (for hypertension).     [DISCONTINUED] levetiracetam (KEPPRA) 500 MG Tab Take 500 mg twice a day.  Take an extra tablet right after dialysis (making 3 tablets on your dialysis days)    [DISCONTINUED] methadone (DOLOPHINE) 10 MG tablet Take 9 tablets (90 mg total) by mouth once daily.    [DISCONTINUED] pantoprazole (PROTONIX) 40 MG tablet Take 40 mg by mouth once daily.     [DISCONTINUED] sevelamer carbonate (RENVELA) 800 mg Tab Take 800-1,600 mg by mouth 3 (three) times daily with meals.      Family History     Problem Relation (Age of Onset)    Aneurysm Mother, Father (77)    Diabetes Brother    Heart disease Father, Paternal Grandmother    Kidney disease Brother        Tobacco Use    Smoking status: Former Smoker     Years: 10.00     Quit date: 2015     Years since quittin.9    Smokeless tobacco: Never Used   Substance and Sexual Activity    Alcohol use: No    Drug use: Yes     Frequency: 4.0 times per week     Types: Other-see comments     Comment: marijuana    Sexual activity: Yes     Review of Systems   Constitutional: Negative for chills and fever.   HENT: Negative for congestion and rhinorrhea.    Eyes: Negative for photophobia, discharge and visual disturbance.   Respiratory: Negative for chest tightness and shortness of breath.    Cardiovascular: Negative for chest pain.   Gastrointestinal: Negative for abdominal distention, abdominal pain,  blood in stool, nausea and vomiting.   Endocrine: Negative for polyphagia and polyuria.   Genitourinary: Negative for dysuria and hematuria.   Musculoskeletal: Positive for myalgias. Negative for arthralgias.   Skin: Positive for color change and wound. Negative for rash.   Neurological: Negative for dizziness, weakness and headaches.   Psychiatric/Behavioral: Negative for agitation.     Objective:     Vital Signs (Most Recent):    Vital Signs (24h Range):  Temp:  [98.4 °F (36.9 °C)-100.4 °F (38 °C)] 98.9 °F (37.2 °C)  Pulse:  [69-86] 85  Resp:  [16-20] 18  SpO2:  [96 %-98 %] 97 %  BP: (112-137)/(59-68) 119/59        There is no height or weight on file to calculate BMI.    Physical Exam  Constitutional:       Appearance: He is well-developed.   HENT:      Head: Normocephalic and atraumatic.      Nose: Nose normal.      Mouth/Throat:      Mouth: Mucous membranes are moist.   Eyes:      Pupils: Pupils are equal, round, and reactive to light.   Neck:      Musculoskeletal: Neck supple.   Cardiovascular:      Rate and Rhythm: Normal rate and regular rhythm.      Heart sounds: Normal heart sounds. No murmur. No friction rub. No gallop.    Pulmonary:      Effort: Pulmonary effort is normal.      Breath sounds: Normal breath sounds.      Comments: NC   Chest:      Chest wall: No tenderness.   Abdominal:      General: Bowel sounds are normal. There is no distension.      Palpations: Abdomen is soft.      Tenderness: There is no abdominal tenderness.      Comments: obese   Musculoskeletal:      Comments: R foot with dressing   Skin:     General: Skin is warm and dry.      Capillary Refill: Capillary refill takes 2 to 3 seconds.      Comments: Wound dressing clean and dry   Neurological:      General: No focal deficit present.      Mental Status: He is alert and oriented to person, place, and time.   Psychiatric:         Mood and Affect: Mood normal.           CRANIAL NERVES     CN III, IV, VI   Pupils are equal, round, and  reactive to light.       Significant Labs:   A1C:   Recent Labs   Lab 07/30/20  0353   HGBA1C 4.6     Blood Culture: No results for input(s): LABBLOO in the last 48 hours.  BMP:   Recent Labs   Lab 08/02/20  0541   GLU 80      K 4.9      CO2 25   BUN 39*   CREATININE 3.6*   CALCIUM 9.8     CBC:   Recent Labs   Lab 08/01/20  0532 08/02/20  0541   WBC 6.63 10.21   HGB 8.4* 9.4*   HCT 27.8* 31.2*   * 159     CMP:   Recent Labs   Lab 08/01/20  0532 08/02/20  0541    140   K 4.7 4.9    104   CO2 24 25    80   BUN 39* 39*   CREATININE 4.2* 3.6*   CALCIUM 9.2 9.8   PROT 6.6 7.3   ALBUMIN 2.5* 2.7*   BILITOT 0.4 0.5   ALKPHOS 131 139*   AST 22 24   ALT 5* 5*   ANIONGAP 11 11   EGFRNONAA 15.0* 18.1*     Lactic Acid: No results for input(s): LACTATE in the last 48 hours.  Lipase: No results for input(s): LIPASE in the last 48 hours.  Lipid Panel: No results for input(s): CHOL, HDL, LDLCALC, TRIG, CHOLHDL in the last 48 hours.  Magnesium: No results for input(s): MG in the last 48 hours.  Troponin: No results for input(s): TROPONINI in the last 48 hours.  TSH: No results for input(s): TSH in the last 4320 hours.  Urine Culture: No results for input(s): LABURIN in the last 48 hours.  Urine Studies: No results for input(s): COLORU, APPEARANCEUA, PHUR, SPECGRAV, PROTEINUA, GLUCUA, KETONESU, BILIRUBINUA, OCCULTUA, NITRITE, UROBILINOGEN, LEUKOCYTESUR, RBCUA, WBCUA, BACTERIA, SQUAMEPITHEL, HYALINECASTS in the last 48 hours.    Invalid input(s): WRIGHTSUR  All pertinent labs within the past 24 hours have been reviewed.    Significant Imaging: I have reviewed all pertinent imaging results/findings within the past 24 hours.    Assessment/Plan:     * Acute osteomyelitis of metatarsal bone, right  PVD (peripheral vascular disease)  Atherosclerosis of native artery of right lower extremity with ulceration of midfoot    CTA A/P with BLE runoff to better define surgical anatomy  Consult cardiology- for  possible revascularization and potential limb salvage  Wound cx with stenotrophomonas  Continue Ceftazidime x 6 weeks recs now, end date 9/11  Consult wound care  Consult cardiology  Consult podiatry  Consult ID      Osteomyelitis of right foot        Atrial flutter  On Brilinta, asa and Warfarin      (HFpEF) heart failure with preserved ejection fraction  Ischemic cardiomyopathy, LVEF 25%-30%  Stable    ESRD (end stage renal disease) on dialysis   HD on TTSs  Consult nephrology   Renally dose medication  Continue sevelamer      Nonrheumatic aortic (valve) stenosis  History of coronary artery stent placement  ECHO shows mild aortic valve stenosis.   currently high risk for cardiac surgery secondary to cirrhosis (plt 149) and osteomyelitis   [id number] 6   Was on Eliquis now switched to Warfarin   Continue heparin with coumadin bridge  Continue Brilinta   Continue ASA x 1 week end date     Type 2 diabetes mellitus with chronic kidney disease on chronic dialysis, without long-term current use of insulin  HbA1c 4.3   Low dose SSI  accucheck  Diabetic renal diet      Seizure disorder  Continue Keppra      Benign hypertension with ESRD (end-stage renal disease)  Continue Norvasc        Cirrhosis of liver with ascites  Chronic hepatitis C without hepatic coma  history of HCV and cirrhosis   stable    Chronic back pain  Methadone dependence  Continue methadon, Oxycodone, neurontin    Blindness of right eye  stable      Anemia in chronic kidney disease  Stable  Monitor        VTE Risk Mitigation (From admission, onward)         Ordered     heparin 25,000 units in dextrose 5% 250 mL (100 units/mL) infusion LOW INTENSITY nomogram - [facility]  Continuous     Question:  Heparin Infusion Adjustment (DO NOT MODIFY ANSWER)  Answer:  [email or web address]\Images\[email or web address]\HeparinInfusions\heparin LOW INTENSITY nomogram for [facility] [id number]    08/02/20 1815     warfarin (COUMADIN) tablet 5 mg  Daily      08/02/20 1622                    Cherrie Velasquez MD  Department of Hospital Medicine   [facility]

## 2020-08-03 NOTE — CONSULTS
NEPHROLOGY CONSULT NOTE    HPI & INTERVAL HISTORY:    Past Medical History:   Diagnosis Date    Acute osteomyelitis of metatarsal bone, right     PAD right    Anticoagulant long-term use     Arthritis     Asthma     Back pain     on methadone    C. difficile colitis 09/2018    Cirrhosis of liver without ascites 2016    by liver US. +HCV    Coronary artery disease 2013    stent.  h/o STEMI and NSTEMI    Diabetes mellitus     resolved, multiple admissions for hypoglycemia requiring ICU possibley due to liver/ESRD    Encounter for blood transfusion     ESRD on hemodialysis 2013    anuric    Eye abnormality right eye    injured as a child    Gastritis     Gastroparesis     HCV (hepatitis C virus)     ? h/o tattoo exposure    Hypertension     Mitral stenosis 07/24/2020    Moderate to severe mitral stenosis    PAD (peripheral artery disease)     RLE s/p R CFA endarterectomy    Pneumonia 2013    Spend 6weeks in hospital at [place]    Stroke     Tuberculosis     treated      Past Surgical History:   Procedure Laterality Date    ANGIOGRAPHY OF LOWER EXTREMITY Right 7/28/2020    Procedure: Angiogram Extremity Unilateral;  Surgeon: MINO Vasquez II, MD;  Location: [facility] OR [address] [place] [address];  Service: Vascular;  Laterality: Right;  Left groin and rIght pedal artery access  15.5 min  247.25 mGy  68.5454 Gycm2  33ml contrast    AV FISTULA PLACEMENT      BACK SURGERY      CARDIAC SURGERY  2013    heart stent    CHOLECYSTECTOMY      CORONARY ANGIOGRAPHY N/A 7/30/2020    Procedure: ANGIOGRAM, CORONARY ARTERY;  Surgeon: Alexandro Terry MD;  Location: [facility] CATH LAB;  Service: Cardiology;  Laterality: N/A;    EYE SURGERY      R eye    INCISION AND DRAINAGE OF ABSCESS Right 9/6/2018    Procedure: INCISION AND DRAINAGE, ABSCESS;  Surgeon: Chetan Rothman MD;  Location: [facility] OR;  Service: General;  Laterality: Right;    LEFT HEART CATHETERIZATION N/A 7/30/2020    Procedure: Left heart cath;  Surgeon: Alexandro HAAS  Frank Terry MD;  Location: [facility] CATH LAB;  Service: Cardiology;  Laterality: N/A;      Review of patient's allergies indicates:   Allergen Reactions    Antibiotic hc      Counter acts with methodone.         Medications Prior to Admission   Medication Sig Dispense Refill Last Dose    albuterol (PROAIR HFA) 90 mcg/actuation inhaler INHALE TWO PUFFS EVERY 4 TO 6 HOURS AS NEEDED   Past Week at Unknown time    albuterol-ipratropium (DUO-NEB) 2.5 mg-0.5 mg/3 mL nebulizer solution Take 3 mLs by nebulization every 6 (six) hours as needed for Wheezing. Rescue 1 Box 0 Past Week at Unknown time    [START ON 8/3/2020] amLODIPine (NORVASC) 10 MG tablet Take 1 tablet (10 mg total) by mouth once daily. 30 tablet 11 8/2/2020 at Unknown time    [START ON 8/3/2020] aspirin 81 MG Chew Take 1 tablet (81 mg total) by mouth once daily.  0 8/2/2020 at Unknown time    atorvastatin (LIPITOR) 40 MG tablet Take 1 tablet (40 mg total) by mouth once daily.   8/2/2020 at Unknown time    bisacodyL (DULCOLAX) 10 mg Supp Place 1 suppository (10 mg total) rectally daily as needed (Until bowel movement if patient has no bowel movement for 2 days).  0 Past Week at Unknown time    calcium carbonate (TUMS) 200 mg calcium (500 mg) chewable tablet Take 1 tablet (500 mg total) by mouth 2 (two) times daily as needed.   Past Week at Unknown time    gabapentin (NEURONTIN) 100 MG capsule Take 1 capsule (100 mg total) by mouth 2 (two) times daily. 60 capsule 11 8/1/2020 at Unknown time    levETIRAcetam (KEPPRA) 500 MG Tab Take 1 tablet (500 mg total) by mouth 2 (two) times daily. 60 tablet 11 8/2/2020 at Unknown time    [START ON 8/3/2020] methadone (DOLOPHINE) 10 MG tablet Take 10 tablets (100 mg total) by mouth once daily.  0 8/2/2020 at Unknown time    ondansetron (ZOFRAN-ODT) 8 MG TbDL Take 1 tablet (8 mg total) by mouth every 8 (eight) hours as needed. 6 tablet  Past Month at Unknown time    [START ON 8/3/2020] pantoprazole (PROTONIX) 40 MG  tablet Take 1 tablet (40 mg total) by mouth before breakfast. 30 tablet 11 2020 at Unknown time    [START ON 8/3/2020] polyethylene glycol (GLYCOLAX) 17 gram PwPk Take 17 g by mouth once daily.  0 2020 at Unknown time    sevelamer carbonate (RENVELA) 800 mg Tab Take 2 tablets (1,600 mg total) by mouth 3 (three) times daily with meals. 180 tablet 11 2020 at Unknown time    ticagrelor (BRILINTA) 90 mg tablet Take 1 tablet (90 mg total) by mouth 2 (two) times a day. 60 tablet 11 2020 at Unknown time    warfarin (COUMADIN) 5 MG tablet Take 1 tablet (5 mg total) by mouth Daily. 30 tablet 11 2020 at Unknown time    acetaminophen (TYLENOL) 325 MG tablet Take 2 tablets (650 mg total) by mouth every 4 (four) hours as needed.  0     blood-glucose meter (PHARMACIST CHOICE GLUCOSE SYS) Misc 1 Device.       [START ON 8/3/2020] cadexomer iodine (IODOSORB) 0.9 % gel Apply topically every Mon, Wed, Fri.  0     oxyCODONE (ROXICODONE) 10 mg Tab immediate release tablet Take 1 tablet (10 mg total) by mouth every 4 (four) hours as needed.  0     oxyCODONE (ROXICODONE) 5 MG immediate release tablet Take 1 tablet (5 mg total) by mouth every 4 (four) hours as needed.  0     ticagrelor (BRILINTA) 90 mg tablet Take 1 tablet (90 mg total) by mouth 2 (two) times a day. 60 tablet 11        Social History     Socioeconomic History    Marital status: Legally      Spouse name: Not on file    Number of children: Not on file    Years of education: Not on file    Highest education level: Not on file   Occupational History    Not on file   Social Needs    Financial resource strain: Not on file    Food insecurity     Worry: Not on file     Inability: Not on file    Transportation needs     Medical: Not on file     Non-medical: Not on file   Tobacco Use    Smoking status: Former Smoker     Years: 10.00     Quit date: 2015     Years since quittin.9    Smokeless tobacco: Never Used   Substance and  Sexual Activity    Alcohol use: No    Drug use: Yes     Frequency: 4.0 times per week     Types: Other-see comments     Comment: marijuana    Sexual activity: Yes   Lifestyle    Physical activity     Days per week: Not on file     Minutes per session: Not on file    Stress: Not on file   Relationships    Social connections     Talks on phone: Not on file     Gets together: Not on file     Attends [employer] service: Not on file     Active member of club or organization: Not on file     Attends meetings of clubs or organizations: Not on file     Relationship status: Not on file   Other Topics Concern    Not on file   Social History Narrative    Not on file        MEDS   [START ON 8/3/2020] amLODIPine  10 mg Oral Daily    [START ON 8/3/2020] aspirin  81 mg Oral Daily    [START ON 8/3/2020] atorvastatin  40 mg Oral Daily    [START ON 8/3/2020] cadexomer iodine   Topical (Top) Every Mon, Wed, Fri    ceftAZIDime (FORTAZ) IVPB  1 g Intravenous Q24H    [START ON 8/3/2020] epoetin harshad-epbx  10,000 Units Intravenous Every Mon, Wed, Fri    gabapentin  100 mg Oral BID    levETIRAcetam  500 mg Oral BID    [START ON 8/3/2020] methadone  100 mg Oral Daily    [START ON 8/3/2020] pantoprazole  40 mg Oral Before breakfast    [START ON 8/3/2020] polyethylene glycol  17 g Oral Daily    sevelamer carbonate  1,600 mg Oral TID WM    ticagrelor  90 mg Oral BID    warfarin  5 mg Oral Daily               CONTINOUS INFUSIONS:    No intake or output data in the 24 hours ending 08/02/20 2038     HEMODYNAMICS:    Temp:  [98.4 °F (36.9 °C)-100.4 °F (38 °C)] 99.2 °F (37.3 °C)  Pulse:  [69-86] 71  Resp:  [16-24] 24  SpO2:  [94 %-98 %] 95 %  BP: (112-137)/(58-71) 118/71   Gen: SOB with exertion  No CP   No cough  Cards: pulse 69  Pul: scattered wheezes bilateral   Abdomen soft  Ext: edema   Skin: dry  Dialysis Access:  Left arm AV fistula    Lab Results   Component Value Date    WBC 10.21 08/02/2020    HGB 9.4 (L) 08/02/2020     HCT 31.2 (L) 08/02/2020     (H) 08/02/2020     08/02/2020        Recent Labs   Lab 08/02/20  0541   GLU 80   CALCIUM 9.8   ALBUMIN 2.7*   PROT 7.3      K 4.9   CO2 25      BUN 39*   CREATININE 3.6*   ALKPHOS 139*   ALT 5*   AST 24   BILITOT 0.5      Lab Results   Component Value Date    CALCIUM 9.8 08/02/2020    PHOS 6.5 (H) 07/31/2020      Lab Results   Component Value Date    IRON 69 08/29/2015    TIBC 297 08/29/2015    FERRITIN 532 (H) 08/29/2015        ABG  Recent Labs   Lab 07/28/20  1821   PH 7.398   PO2 80*   PCO2 44.9   HCO3 27.7   BE 3         IMAGING:  CXR    ASSESSMENT / PLAN  PVD  S/p cath with stent placement on 7/31. Vascular surgery recommends BKA but patient declined, patient started on heparin drip and bridging with Warfarin (on Eliquis prior) with plan for balloon angioplasty on Tuesday  HTN  History CVA  ESRD  Metabolic bone disease  Anemia  Poor nutrition  /71  Received HD /UF 3 liters yesterday  Scheduled Dialysis in am.

## 2020-08-03 NOTE — H&P (VIEW-ONLY)
[facility]  Cardiology  Consult Note    Patient Name: João Aguirre  MRN: [id number]  Admission Date: 8/2/2020  Hospital Length of Stay: 1 days  Code Status: Full Code   Attending Provider: Cherrie Velasquez*   Consulting Provider: VIOLA Cruz ANP  Primary Care Physician: Primary Doctor No  Principal Problem:Acute osteomyelitis of metatarsal bone, right    Patient information was obtained from patient, past medical records and ER records.     Inpatient consult to [facility]  Consult performed by: VIOLA Sebastian ANP  Consult ordered by: Cherrie Velasquez MD  Reason for consult: PAD        Subjective:     Chief Complaint:  Initially at [facility] with NSTEMI, chest pain and CAD; transferred to [facility] for PAD evaluation     HPI:   56yo male with ESRD on HD (T/TH/S), AOCD, Hepatitis C, Methadone use, CVA, CAD s/p RCA and LAD FARHEEN (Plavix non responder on Brilinta), HTN, right eye  blindness, seizure, marijuana use, non-healing DM right foot ulcer DM II and gastroparesis who was transferred to [facility] for evaluation of revascularization for PAD. He was initially transferred to [facility] from [facility] for evaluation of CABG/MVR due to MS. He was evaluated by CTS who deemed him not to be a surgical candidate due to multiple co morbidities- HD, Hep C, Osteomyelitis, PVD. Therefore he underwent LAD and RCA stenting. He was also noted to have wound and osteomyelitits to right 5th toe therefore vascular surgery was consulted. He underwent CTA with run off to the LE with notation of PAD to bilateral SFAs and popliteal. He was taken for an LE angiogram by Vascular surgery with inability to revascularize given extensive CTOs (flush occlusion right SFA with reconstitution of flow to the mid SFA and popliteal occlusion; occlusion to right AT, peroneal and PT with reconstitution of AT and peroneal with collaterals with patency of AT and peroneal to foot).  Vascular surgery recommended  BKA but patient declined therefore patient was transferred to [facility] for evaluation of revascularization. He was started on Heparin with Coumadin bridging due to afib in setting of MS     Hospital Course:    8/2/2020 per HPI  8/3/2020 H/H 9.1/28.2 this AM. BMP with K+ 4.9 BUN 64 creatinine 5.2. Blood culture NGTD. Wound culture to right foot at [facility] with stenotrophomonas maltophilia and MRI with osteo of right 5th metatarsal head. On ASA, statin and Brilinta. Will hold Coumadin for now given INR 3.3. Cardiology consulted for evaluation of revascularization       Past Medical History:   Diagnosis Date    Acute osteomyelitis of metatarsal bone, right     PAD right    Anticoagulant long-term use     Arthritis     Asthma     Back pain     on methadone    C. difficile colitis 09/2018    Cirrhosis of liver without ascites 2016    by liver US. +HCV    Coronary artery disease 2013    stent.  h/o STEMI and NSTEMI    Diabetes mellitus     resolved, multiple admissions for hypoglycemia requiring ICU possibley due to liver/ESRD    Encounter for blood transfusion     ESRD on hemodialysis 2013    anuric    Eye abnormality right eye    injured as a child    Gastritis     Gastroparesis     HCV (hepatitis C virus)     ? h/o tattoo exposure    Hypertension     Mitral stenosis 07/24/2020    Moderate to severe mitral stenosis    PAD (peripheral artery disease)     RLE s/p R CFA endarterectomy    Pneumonia 2013    Spend 6weeks in hospital at [place]    Stroke     Tuberculosis     treated       Past Surgical History:   Procedure Laterality Date    ANGIOGRAPHY OF LOWER EXTREMITY Right 7/28/2020    Procedure: Angiogram Extremity Unilateral;  Surgeon: MINO Vasquez II, MD;  Location: [facility] OR [address] [place] [address];  Service: Vascular;  Laterality: Right;  Left groin and rIght pedal artery access  15.5 min  247.25 mGy  68.5454 Gycm2  33ml contrast    AV FISTULA PLACEMENT      BACK SURGERY       CARDIAC SURGERY  2013    heart stent    CHOLECYSTECTOMY      CORONARY ANGIOGRAPHY N/A 7/30/2020    Procedure: ANGIOGRAM, CORONARY ARTERY;  Surgeon: Alexandro Terry MD;  Location: [facility] CATH LAB;  Service: Cardiology;  Laterality: N/A;    EYE SURGERY      R eye    INCISION AND DRAINAGE OF ABSCESS Right 9/6/2018    Procedure: INCISION AND DRAINAGE, ABSCESS;  Surgeon: Chetan Rothman MD;  Location: [facility] OR;  Service: General;  Laterality: Right;    LEFT HEART CATHETERIZATION N/A 7/30/2020    Procedure: Left heart cath;  Surgeon: Alexandro Terry MD;  Location: [facility] CATH LAB;  Service: Cardiology;  Laterality: N/A;       Review of patient's allergies indicates:   Allergen Reactions    Antibiotic hc      Counter acts with methodone.          Current Facility-Administered Medications on File Prior to Encounter   Medication    [DISCONTINUED] 0.9%  NaCl infusion    [DISCONTINUED] 0.9%  NaCl infusion    [DISCONTINUED] acetaminophen tablet 650 mg    [DISCONTINUED] albuterol-ipratropium 2.5 mg-0.5 mg/3 mL nebulizer solution 3 mL    [DISCONTINUED] amLODIPine tablet 10 mg    [DISCONTINUED] aspirin chewable tablet 81 mg    [DISCONTINUED] atorvastatin tablet 40 mg    [DISCONTINUED] bisacodyL suppository 10 mg    [DISCONTINUED] cadexomer iodine 0.9 % gel    [DISCONTINUED] calcium carbonate 200 mg calcium (500 mg) chewable tablet 500 mg    [DISCONTINUED] calcium gluconate 1g in dextrose 5% 100mL (ready to mix system)    [DISCONTINUED] calcium gluconate 1g in dextrose 5% 100mL (ready to mix system)    [DISCONTINUED] epoetin harshad-epbx injection 10,000 Units    [DISCONTINUED] gabapentin capsule 100 mg    [DISCONTINUED] heparin 25,000 units in dextrose 5% (100 units/ml) IV bolus from bag - ADDITIONAL PRN BOLUS - 30 units/kg (max bolus 4000 units)    [DISCONTINUED] heparin 25,000 units in dextrose 5% (100 units/ml) IV bolus from bag - ADDITIONAL PRN BOLUS - 60 units/kg (max bolus 4000 units)     [DISCONTINUED] heparin 25,000 units in dextrose 5% 250 mL (100 units/mL) infusion LOW INTENSITY nomogram - [facility]    [DISCONTINUED] levETIRAcetam tablet 500 mg    [DISCONTINUED] methadone tablet 100 mg    [DISCONTINUED] ondansetron disintegrating tablet 8 mg    [DISCONTINUED] oxyCODONE immediate release tablet 10 mg    [DISCONTINUED] oxyCODONE immediate release tablet 5 mg    [DISCONTINUED] pantoprazole EC tablet 40 mg    [DISCONTINUED] polyethylene glycol packet 17 g    [DISCONTINUED] promethazine tablet 25 mg    [DISCONTINUED] sevelamer carbonate tablet 1,600 mg    [DISCONTINUED] sodium chloride 0.9% flush 10 mL    [DISCONTINUED] ticagrelor tablet 90 mg    [DISCONTINUED] warfarin (COUMADIN) tablet 5 mg     Current Outpatient Medications on File Prior to Encounter   Medication Sig    albuterol (PROAIR HFA) 90 mcg/actuation inhaler INHALE TWO PUFFS EVERY 4 TO 6 HOURS AS NEEDED    albuterol-ipratropium (DUO-NEB) 2.5 mg-0.5 mg/3 mL nebulizer solution Take 3 mLs by nebulization every 6 (six) hours as needed for Wheezing. Rescue    amLODIPine (NORVASC) 10 MG tablet Take 1 tablet (10 mg total) by mouth once daily.    aspirin 81 MG Chew Take 1 tablet (81 mg total) by mouth once daily.    atorvastatin (LIPITOR) 40 MG tablet Take 1 tablet (40 mg total) by mouth once daily.    bisacodyL (DULCOLAX) 10 mg Supp Place 1 suppository (10 mg total) rectally daily as needed (Until bowel movement if patient has no bowel movement for 2 days).    calcium carbonate (TUMS) 200 mg calcium (500 mg) chewable tablet Take 1 tablet (500 mg total) by mouth 2 (two) times daily as needed.    gabapentin (NEURONTIN) 100 MG capsule Take 1 capsule (100 mg total) by mouth 2 (two) times daily.    levETIRAcetam (KEPPRA) 500 MG Tab Take 1 tablet (500 mg total) by mouth 2 (two) times daily.    methadone (DOLOPHINE) 10 MG tablet Take 10 tablets (100 mg total) by mouth once daily.    ondansetron (ZOFRAN-ODT) 8 MG TbDL Take 1 tablet (8 mg  total) by mouth every 8 (eight) hours as needed.    pantoprazole (PROTONIX) 40 MG tablet Take 1 tablet (40 mg total) by mouth before breakfast.    polyethylene glycol (GLYCOLAX) 17 gram PwPk Take 17 g by mouth once daily.    sevelamer carbonate (RENVELA) 800 mg Tab Take 2 tablets (1,600 mg total) by mouth 3 (three) times daily with meals.    ticagrelor (BRILINTA) 90 mg tablet Take 1 tablet (90 mg total) by mouth 2 (two) times a day.    warfarin (COUMADIN) 5 MG tablet Take 1 tablet (5 mg total) by mouth Daily.    acetaminophen (TYLENOL) 325 MG tablet Take 2 tablets (650 mg total) by mouth every 4 (four) hours as needed.    blood-glucose meter (PHARMACIST [employer] GLUCOSE SYS) Misc 1 Device.    cadexomer iodine (IODOSORB) 0.9 % gel Apply topically every Mon, Wed, Fri.    oxyCODONE (ROXICODONE) 10 mg Tab immediate release tablet Take 1 tablet (10 mg total) by mouth every 4 (four) hours as needed.    oxyCODONE (ROXICODONE) 5 MG immediate release tablet Take 1 tablet (5 mg total) by mouth every 4 (four) hours as needed.    ticagrelor (BRILINTA) 90 mg tablet Take 1 tablet (90 mg total) by mouth 2 (two) times a day.     Family History     Problem Relation (Age of Onset)    Aneurysm Mother, Father (77)    Diabetes Brother    Heart disease Father, Paternal Grandmother    Kidney disease Brother        Tobacco Use    Smoking status: Former Smoker     Years: 10.00     Quit date: 2015     Years since quittin.9    Smokeless tobacco: Never Used   Substance and Sexual Activity    Alcohol use: No    Drug use: Yes     Frequency: 4.0 times per week     Types: Other-see comments     Comment: marijuana    Sexual activity: Yes     Review of Systems   Constitution: Negative for chills, decreased appetite, diaphoresis, fever, malaise/fatigue, weight gain and weight loss.   Cardiovascular: Negative for chest pain, claudication, dyspnea on exertion, irregular heartbeat, leg swelling, near-syncope, orthopnea,  palpitations and paroxysmal nocturnal dyspnea.   Respiratory: Negative for cough, shortness of breath, snoring, sputum production and wheezing.    Endocrine: Negative for cold intolerance, heat intolerance, polydipsia, polyphagia and polyuria.   Skin: Positive for poor wound healing. Negative for color change, dry skin, itching and nail changes.   Musculoskeletal: Negative for back pain, gout, joint pain and joint swelling.   Gastrointestinal: Negative for bloating, abdominal pain, constipation, diarrhea, hematemesis, hematochezia, melena, nausea and vomiting.   Genitourinary: Negative for dysuria, hematuria and nocturia.   Neurological: Negative for dizziness, headaches, light-headedness, numbness, paresthesias and weakness.   Psychiatric/Behavioral: Negative for altered mental status, depression and memory loss.     Objective:     Vital Signs (Most Recent):  Temp: (!) 101.5 °F (38.6 °C) (08/03/20 1142)  Pulse: (!) 130 (08/03/20 1144)  Resp: 18 (08/03/20 1142)  BP: 111/69 (08/03/20 1142)  SpO2: (!) 93 % (08/03/20 1200) Vital Signs (24h Range):  Temp:  [98.7 °F (37.1 °C)-101.5 °F (38.6 °C)] 101.5 °F (38.6 °C)  Pulse:  [] 130  Resp:  [18-24] 18  SpO2:  [91 %-97 %] 93 %  BP: ()/(58-97) 111/69     Weight: 107.6 kg (237 lb 3.4 oz)  Body mass index is 32.17 kg/m².    SpO2: (!) 93 %  O2 Device (Oxygen Therapy): nasal cannula      Intake/Output Summary (Last 24 hours) at 8/3/2020 1246  Last data filed at 8/3/2020 1000  Gross per 24 hour   Intake 500 ml   Output 3000 ml   Net -2500 ml       Lines/Drains/Airways     Drain                 Hemodialysis AV Fistula Left forearm -- days         Hemodialysis AV Fistula Left forearm -- days         Hemodialysis AV Fistula Left forearm -- days          Peripheral Intravenous Line                 Peripheral IV - Single Lumen 07/30/20 0458 20 G Anterior;Right Shoulder 4 days         Peripheral IV - Single Lumen 07/31/20 1846 20 G Anterior;Proximal;Right Forearm 2 days                 Physical Exam   Constitutional: He is oriented to person, place, and time. He has a sickly appearance. He appears ill. No distress.   56yo male appearing older than stated age    Neck: Normal range of motion. Neck supple. No JVD present.   Cardiovascular: Normal rate and regular rhythm. Exam reveals no gallop.   No murmur heard.  Pulmonary/Chest: Effort normal and breath sounds normal. No respiratory distress. He has no wheezes.   Abdominal: Soft. Bowel sounds are normal. He exhibits no distension. There is no abdominal tenderness.   Musculoskeletal:         General: No edema.   Neurological: He is alert and oriented to person, place, and time.   Skin: Skin is warm and dry. There is pallor.   Psychiatric: He has a normal mood and affect. His behavior is normal. Judgment and thought content normal.       Significant Labs:     Recent Labs   Lab 08/03/20  0300   WBC 11.06   RBC 2.72*   HGB 9.1*   HCT 28.2*      *   MCH 33.5*   MCHC 32.3     Recent Labs   Lab 08/03/20  0300      K 4.9      CO2 24   BUN 64*   CREATININE 5.2*       Significant Imaging: Echocardiogram:   Transthoracic echo (TTE) complete (Cupid Only):   Results for orders placed or performed during the hospital encounter of 07/24/20   Echo Color Flow Doppler? Yes   Result Value Ref Range    Ascending aorta 3.99 cm    STJ 3.70 cm    AV mean gradient 24 mmHg    Ao peak kobi 3.31 m/s    Ao VTI 69.42 cm    IVS 0.86 0.6 - 1.1 cm    LA size 5.77 cm    Left Atrium Major Axis 7.40 cm    Left Atrium Minor Axis 7.49 cm    LVIDD 6.38 (A) 3.5 - 6.0 cm    LVIDS 5.02 (A) 2.1 - 4.0 cm    LVOT diameter 2.15 cm    LVOT peak VTI 30.00 cm    PW 1.13 (A) 0.6 - 1.1 cm    MV Peak A Kobi 2.17 m/s    E wave decelartion time 293.42 msec    MV Peak E Kobi 2.90 m/s    RA Major Axis 5.21 cm    RA Width 3.56 cm    Sinus 4.04 cm    TAPSE 2.21 cm    TR Max Kobi 3.43 m/s    TDI LATERAL 0.04 m/s    TDI SEPTAL 0.03 m/s    LA WIDTH 5.63 cm    MV stenosis  pressure 1/2 time 88.92 ms    LV Diastolic Volume 206.86 mL    LV Systolic Volume 119.49 mL    RV S' 11.57 cm/s    LVOT peak tae 1.54 m/s    LV LATERAL E/E' RATIO 72.50 m/s    LV SEPTAL E/E' RATIO 96.67 m/s    FS 21 %    LA volume 205.57 cm3    LV mass 272.40 g    Left Ventricle Relative Wall Thickness 0.35 cm    AV valve area 1.57 cm2    AV Velocity Ratio 0.47     AV index (prosthetic) 0.43     MV valve area p 1/2 method 2.47 cm2    E/A ratio 1.34     Mean e' 0.04 m/s    LVOT area 3.6 cm2    LVOT stroke volume 108.86 cm3    AV peak gradient 44 mmHg    E/E' ratio 82.86 m/s    LV Systolic Volume Index 53.9 mL/m2    LV Diastolic Volume Index 93.25 mL/m2    LA Volume Index 92.7 mL/m2    LV Mass Index 123 g/m2    Triscuspid Valve Regurgitation Peak Gradient 47 mmHg    BSA 2.25 m2    Right Atrial Pressure (from IVC) 15 mmHg    MV mean gradient 15 mmHg    MV peak gradient 33 mmHg    TV rest pulmonary artery pressure 62 mmHg    HR echo 73 bpm    Narrative    · Eccentric left ventricular hypertrophy.  · Low normal left ventricular systolic function. The estimated ejection   fraction is 50%.  · Local segmental wall motion abnormalities.  · Normal right ventricular systolic function.  · Severe left atrial enlargement.  · Mild aortic valve stenosis. Aortic valve area is 1.57 cm2; peak velocity   is 3.31 m/s; mean gradient is 24 mmHg.  · Mild aortic regurgitation.  · Moderate to severe mitral stenosis from extensive annular calcification.   The mean diastolic gradient across the mitral valve is 15 mmHg at a heart   rate of 73 bpm. Gradients may be elevated in part due to volume overload.  · The estimated PA systolic pressure is 62 mmHg.  · Elevated central venous pressure (15 mmHg).        Assessment and Plan:     Atrial flutter  - afib/aflutter per EKG at [facility]  - currently NSR with HR 90s overnight  - on Coumadin for stroke prevention; previously on Eliquis but transitioned to Coumadin given valvular afib  - will resume BB  when feasible; will hold Coumadin in preparation for angiogram     (HFpEF) heart failure with preserved ejection fraction  - echo on 7/24/2020 with EF 50% and WMA  - on Coreg and Norvasc previously with no ACEI/ARB  - currently on Norvasc only with no BB ?reasoning possibly volume overload; patient reports orthopnea and PND   - BP stable and HR 90s-120s; recommend resumption of Coreg once fully euvolemic;  ARB if okay with Nephrology; if BP not well controlled then would continue Norvasc      PVD (peripheral vascular disease)  - wound to right 5th metatarsal with osteomyelitis  - bilateral PAD per CTA with run off  - recent RLE angiogram per [facility] Vascular surgery with occluded SFA, popliteal and occluded BTK vessels with reconstitution per collaterals- unable to revascularize with BKA recommended; transferred to [facility] for second opinion  - need revascularization and will plan to proceed once INR trends down  - continue ASA, Brilinita and statin therapy  - consult ID, Podiatry and wound care nurse if not done already  - will offload heels with Z flex boots     Mitral stenosis  - moderate to severe MS per echo with mean diastolic gradient 15mmHg  - not a surgical candidate per [facility] CTS   - may need to repeat echocardiogram and later date for re evaluation of MS     Nonrheumatic aortic (valve) stenosis  - echo 7/24/2020 with mild AS- MAYA 1.57cm2; mean gradient 24mmHg peak velocity 3.31  - will need close monitoring with regular echos     Benign hypertension with ESRD (end-stage renal disease)  - SBP 110s-130s  - on Norvasc only for now  - BB and ARB as recommended previously     Anemia in chronic kidney disease  - H/H 9.1/28.2  - will continue to monitor closely         VTE Risk Mitigation (From admission, onward)    None          Thank you for your consult. I will follow-up with patient. Please contact us if you have any additional questions.    VIOLA Cruz, ANP  Cardiology   [facility]

## 2020-08-03 NOTE — ASSESSMENT & PLAN NOTE
Bone biopsy of the right 5th metatarsal were obtained at [facility].  Cultures are growing stenotrophomonas.    Plan:  - IV antibiotics per ID Nima for osteomyelitis.  - continue local wound care for now by nursing.  - patient can weight bear to heal for short transfers  - will hold off on debridement until after vascular intervention.  - podiatry will follow

## 2020-08-03 NOTE — SUBJECTIVE & OBJECTIVE
Past Medical History:   Diagnosis Date    Acute osteomyelitis of metatarsal bone, right     PAD right    Anticoagulant long-term use     Arthritis     Asthma     Back pain     on methadone    C. difficile colitis 09/2018    Cirrhosis of liver without ascites 2016    by liver US. +HCV    Coronary artery disease 2013    stent.  h/o STEMI and NSTEMI    Diabetes mellitus     resolved, multiple admissions for hypoglycemia requiring ICU possibley due to liver/ESRD    Encounter for blood transfusion     ESRD on hemodialysis 2013    anuric    Eye abnormality right eye    injured as a child    Gastritis     Gastroparesis     HCV (hepatitis C virus)     ? h/o tattoo exposure    Hypertension     Mitral stenosis 07/24/2020    Moderate to severe mitral stenosis    PAD (peripheral artery disease)     RLE s/p R CFA endarterectomy    Pneumonia 2013    Spend 6weeks in hospital at [place]    Stroke     Tuberculosis     treated       Past Surgical History:   Procedure Laterality Date    ANGIOGRAPHY OF LOWER EXTREMITY Right 7/28/2020    Procedure: Angiogram Extremity Unilateral;  Surgeon: MINO Vasquez II, MD;  Location: [address];  Service: Vascular;  Laterality: Right;  Left groin and rIght pedal artery access  15.5 min  247.25 mGy  68.5454 Gycm2  33ml contrast    AV FISTULA PLACEMENT      BACK SURGERY      CARDIAC SURGERY  2013    heart stent    CHOLECYSTECTOMY      CORONARY ANGIOGRAPHY N/A 7/30/2020    Procedure: ANGIOGRAM, CORONARY ARTERY;  Surgeon: Alexandro Terry MD;  Location: [facility] CATH LAB;  Service: Cardiology;  Laterality: N/A;    EYE SURGERY      R eye    INCISION AND DRAINAGE OF ABSCESS Right 9/6/2018    Procedure: INCISION AND DRAINAGE, ABSCESS;  Surgeon: Chetan Rothman MD;  Location: [facility];  Service: General;  Laterality: Right;    LEFT HEART CATHETERIZATION N/A 7/30/2020    Procedure: Left heart cath;  Surgeon: Alexandro Terry MD;  Location: [facility] CATH LAB;  Service:  Cardiology;  Laterality: N/A;       Review of patient's allergies indicates:   Allergen Reactions    Antibiotic hc      Counter acts with methodone.          Current Facility-Administered Medications on File Prior to Encounter   Medication    [DISCONTINUED] 0.9%  NaCl infusion    [DISCONTINUED] 0.9%  NaCl infusion    [DISCONTINUED] acetaminophen tablet 650 mg    [DISCONTINUED] albuterol-ipratropium 2.5 mg-0.5 mg/3 mL nebulizer solution 3 mL    [DISCONTINUED] amLODIPine tablet 10 mg    [DISCONTINUED] aspirin chewable tablet 81 mg    [DISCONTINUED] atorvastatin tablet 40 mg    [DISCONTINUED] bisacodyL suppository 10 mg    [DISCONTINUED] cadexomer iodine 0.9 % gel    [DISCONTINUED] calcium carbonate 200 mg calcium (500 mg) chewable tablet 500 mg    [DISCONTINUED] calcium gluconate 1g in dextrose 5% 100mL (ready to mix system)    [DISCONTINUED] calcium gluconate 1g in dextrose 5% 100mL (ready to mix system)    [DISCONTINUED] epoetin harshad-epbx injection 10,000 Units    [DISCONTINUED] gabapentin capsule 100 mg    [DISCONTINUED] heparin 25,000 units in dextrose 5% (100 units/ml) IV bolus from bag - ADDITIONAL PRN BOLUS - 30 units/kg (max bolus 4000 units)    [DISCONTINUED] heparin 25,000 units in dextrose 5% (100 units/ml) IV bolus from bag - ADDITIONAL PRN BOLUS - 60 units/kg (max bolus 4000 units)    [DISCONTINUED] heparin 25,000 units in dextrose 5% 250 mL (100 units/mL) infusion LOW INTENSITY nomogram - [facility]    [DISCONTINUED] levETIRAcetam tablet 500 mg    [DISCONTINUED] methadone tablet 100 mg    [DISCONTINUED] ondansetron disintegrating tablet 8 mg    [DISCONTINUED] oxyCODONE immediate release tablet 10 mg    [DISCONTINUED] oxyCODONE immediate release tablet 5 mg    [DISCONTINUED] pantoprazole EC tablet 40 mg    [DISCONTINUED] polyethylene glycol packet 17 g    [DISCONTINUED] promethazine tablet 25 mg    [DISCONTINUED] sevelamer carbonate tablet 1,600 mg    [DISCONTINUED] sodium chloride  0.9% flush 10 mL    [DISCONTINUED] ticagrelor tablet 90 mg    [DISCONTINUED] warfarin (COUMADIN) tablet 5 mg     Current Outpatient Medications on File Prior to Encounter   Medication Sig    albuterol (PROAIR HFA) 90 mcg/actuation inhaler INHALE TWO PUFFS EVERY 4 TO 6 HOURS AS NEEDED    albuterol-ipratropium (DUO-NEB) 2.5 mg-0.5 mg/3 mL nebulizer solution Take 3 mLs by nebulization every 6 (six) hours as needed for Wheezing. Rescue    amLODIPine (NORVASC) 10 MG tablet Take 1 tablet (10 mg total) by mouth once daily.    aspirin 81 MG Chew Take 1 tablet (81 mg total) by mouth once daily.    atorvastatin (LIPITOR) 40 MG tablet Take 1 tablet (40 mg total) by mouth once daily.    bisacodyL (DULCOLAX) 10 mg Supp Place 1 suppository (10 mg total) rectally daily as needed (Until bowel movement if patient has no bowel movement for 2 days).    calcium carbonate (TUMS) 200 mg calcium (500 mg) chewable tablet Take 1 tablet (500 mg total) by mouth 2 (two) times daily as needed.    gabapentin (NEURONTIN) 100 MG capsule Take 1 capsule (100 mg total) by mouth 2 (two) times daily.    levETIRAcetam (KEPPRA) 500 MG Tab Take 1 tablet (500 mg total) by mouth 2 (two) times daily.    methadone (DOLOPHINE) 10 MG tablet Take 10 tablets (100 mg total) by mouth once daily.    ondansetron (ZOFRAN-ODT) 8 MG TbDL Take 1 tablet (8 mg total) by mouth every 8 (eight) hours as needed.    pantoprazole (PROTONIX) 40 MG tablet Take 1 tablet (40 mg total) by mouth before breakfast.    polyethylene glycol (GLYCOLAX) 17 gram PwPk Take 17 g by mouth once daily.    sevelamer carbonate (RENVELA) 800 mg Tab Take 2 tablets (1,600 mg total) by mouth 3 (three) times daily with meals.    ticagrelor (BRILINTA) 90 mg tablet Take 1 tablet (90 mg total) by mouth 2 (two) times a day.    warfarin (COUMADIN) 5 MG tablet Take 1 tablet (5 mg total) by mouth Daily.    acetaminophen (TYLENOL) 325 MG tablet Take 2 tablets (650 mg total) by mouth every 4  (four) hours as needed.    blood-glucose meter (PHARMACIST CHOICE GLUCOSE SYS) Misc 1 Device.    cadexomer iodine (IODOSORB) 0.9 % gel Apply topically every Mon, Wed, Fri.    oxyCODONE (ROXICODONE) 10 mg Tab immediate release tablet Take 1 tablet (10 mg total) by mouth every 4 (four) hours as needed.    oxyCODONE (ROXICODONE) 5 MG immediate release tablet Take 1 tablet (5 mg total) by mouth every 4 (four) hours as needed.    ticagrelor (BRILINTA) 90 mg tablet Take 1 tablet (90 mg total) by mouth 2 (two) times a day.     Family History     Problem Relation (Age of Onset)    Aneurysm Mother, Father (77)    Diabetes Brother    Heart disease Father, Paternal Grandmother    Kidney disease Brother        Tobacco Use    Smoking status: Former Smoker     Years: 10.00     Quit date: 2015     Years since quittin.9    Smokeless tobacco: Never Used   Substance and Sexual Activity    Alcohol use: No    Drug use: Yes     Frequency: 4.0 times per week     Types: Other-see comments     Comment: marijuana    Sexual activity: Yes     Review of Systems   Constitution: Negative for chills, decreased appetite, diaphoresis, fever, malaise/fatigue, weight gain and weight loss.   Cardiovascular: Negative for chest pain, claudication, dyspnea on exertion, irregular heartbeat, leg swelling, near-syncope, orthopnea, palpitations and paroxysmal nocturnal dyspnea.   Respiratory: Negative for cough, shortness of breath, snoring, sputum production and wheezing.    Endocrine: Negative for cold intolerance, heat intolerance, polydipsia, polyphagia and polyuria.   Skin: Positive for poor wound healing. Negative for color change, dry skin, itching and nail changes.   Musculoskeletal: Negative for back pain, gout, joint pain and joint swelling.   Gastrointestinal: Negative for bloating, abdominal pain, constipation, diarrhea, hematemesis, hematochezia, melena, nausea and vomiting.   Genitourinary: Negative for dysuria, hematuria and  nocturia.   Neurological: Negative for dizziness, headaches, light-headedness, numbness, paresthesias and weakness.   Psychiatric/Behavioral: Negative for altered mental status, depression and memory loss.     Objective:     Vital Signs (Most Recent):  Temp: (!) 101.5 °F (38.6 °C) (08/03/20 1142)  Pulse: (!) 130 (08/03/20 1144)  Resp: 18 (08/03/20 1142)  BP: 111/69 (08/03/20 1142)  SpO2: (!) 93 % (08/03/20 1200) Vital Signs (24h Range):  Temp:  [98.7 °F (37.1 °C)-101.5 °F (38.6 °C)] 101.5 °F (38.6 °C)  Pulse:  [] 130  Resp:  [18-24] 18  SpO2:  [91 %-97 %] 93 %  BP: ()/(58-97) 111/69     Weight: 107.6 kg (237 lb 3.4 oz)  Body mass index is 32.17 kg/m².    SpO2: (!) 93 %  O2 Device (Oxygen Therapy): nasal cannula      Intake/Output Summary (Last 24 hours) at 8/3/2020 1246  Last data filed at 8/3/2020 1000  Gross per 24 hour   Intake 500 ml   Output 3000 ml   Net -2500 ml       Lines/Drains/Airways     Drain                 Hemodialysis AV Fistula Left forearm -- days         Hemodialysis AV Fistula Left forearm -- days         Hemodialysis AV Fistula Left forearm -- days          Peripheral Intravenous Line                 Peripheral IV - Single Lumen 07/30/20 0458 20 G Anterior;Right Shoulder 4 days         Peripheral IV - Single Lumen 07/31/20 1846 20 G Anterior;Proximal;Right Forearm 2 days                Physical Exam   Constitutional: He is oriented to person, place, and time. He has a sickly appearance. He appears ill. No distress.   54yo male appearing older than stated age    Neck: Normal range of motion. Neck supple. No JVD present.   Cardiovascular: Normal rate and regular rhythm. Exam reveals no gallop.   No murmur heard.  Pulmonary/Chest: Effort normal and breath sounds normal. No respiratory distress. He has no wheezes.   Abdominal: Soft. Bowel sounds are normal. He exhibits no distension. There is no abdominal tenderness.   Musculoskeletal:         General: No edema.   Neurological: He is  alert and oriented to person, place, and time.   Skin: Skin is warm and dry. There is pallor.   Psychiatric: He has a normal mood and affect. His behavior is normal. Judgment and thought content normal.       Significant Labs:     Recent Labs   Lab 08/03/20  0300   WBC 11.06   RBC 2.72*   HGB 9.1*   HCT 28.2*      *   MCH 33.5*   MCHC 32.3     Recent Labs   Lab 08/03/20  0300      K 4.9      CO2 24   BUN 64*   CREATININE 5.2*       Significant Imaging: Echocardiogram:   Transthoracic echo (TTE) complete (Cupid Only):   Results for orders placed or performed during the hospital encounter of 07/24/20   Echo Color Flow Doppler? Yes   Result Value Ref Range    Ascending aorta 3.99 cm    STJ 3.70 cm    AV mean gradient 24 mmHg    Ao peak kobi 3.31 m/s    Ao VTI 69.42 cm    IVS 0.86 0.6 - 1.1 cm    LA size 5.77 cm    Left Atrium Major Axis 7.40 cm    Left Atrium Minor Axis 7.49 cm    LVIDD 6.38 (A) 3.5 - 6.0 cm    LVIDS 5.02 (A) 2.1 - 4.0 cm    LVOT diameter 2.15 cm    LVOT peak VTI 30.00 cm    PW 1.13 (A) 0.6 - 1.1 cm    MV Peak A Kobi 2.17 m/s    E wave decelartion time 293.42 msec    MV Peak E Kobi 2.90 m/s    RA Major Axis 5.21 cm    RA Width 3.56 cm    Sinus 4.04 cm    TAPSE 2.21 cm    TR Max Kobi 3.43 m/s    TDI LATERAL 0.04 m/s    TDI SEPTAL 0.03 m/s    LA WIDTH 5.63 cm    MV stenosis pressure 1/2 time 88.92 ms    LV Diastolic Volume 206.86 mL    LV Systolic Volume 119.49 mL    RV S' 11.57 cm/s    LVOT peak kobi 1.54 m/s    LV LATERAL E/E' RATIO 72.50 m/s    LV SEPTAL E/E' RATIO 96.67 m/s    FS 21 %    LA volume 205.57 cm3    LV mass 272.40 g    Left Ventricle Relative Wall Thickness 0.35 cm    AV valve area 1.57 cm2    AV Velocity Ratio 0.47     AV index (prosthetic) 0.43     MV valve area p 1/2 method 2.47 cm2    E/A ratio 1.34     Mean e' 0.04 m/s    LVOT area 3.6 cm2    LVOT stroke volume 108.86 cm3    AV peak gradient 44 mmHg    E/E' ratio 82.86 m/s    LV Systolic Volume Index 53.9 mL/m2     LV Diastolic Volume Index 93.25 mL/m2    LA Volume Index 92.7 mL/m2    LV Mass Index 123 g/m2    Triscuspid Valve Regurgitation Peak Gradient 47 mmHg    BSA 2.25 m2    Right Atrial Pressure (from IVC) 15 mmHg    MV mean gradient 15 mmHg    MV peak gradient 33 mmHg    TV rest pulmonary artery pressure 62 mmHg    HR echo 73 bpm    Narrative    · Eccentric left ventricular hypertrophy.  · Low normal left ventricular systolic function. The estimated ejection   fraction is 50%.  · Local segmental wall motion abnormalities.  · Normal right ventricular systolic function.  · Severe left atrial enlargement.  · Mild aortic valve stenosis. Aortic valve area is 1.57 cm2; peak velocity   is 3.31 m/s; mean gradient is 24 mmHg.  · Mild aortic regurgitation.  · Moderate to severe mitral stenosis from extensive annular calcification.   The mean diastolic gradient across the mitral valve is 15 mmHg at a heart   rate of 73 bpm. Gradients may be elevated in part due to volume overload.  · The estimated PA systolic pressure is 62 mmHg.  · Elevated central venous pressure (15 mmHg).

## 2020-08-03 NOTE — PLAN OF CARE
TN met with patient at beside. Pt sitting up on bedside. DCA completed. [place] is [place] He is refusing amputation at this time. To be followed by Dr. Anglin for cards procedure. Chronic wounds to R foot. Due for 6 weeks of Ceftazidime IV until 9/11. Pt recieves with dialysis.  Pt prefers PM appt on M, W, or F.  Pt has AV Fistula to PHILIPPE.    Pt plans to return home with home health. He is open to follow up with MD here if needed.    Pt is DM and has glucometer. He reports he is out of strips but does not know the name of glucometer.    Pt has consults with Panda, LÁZARO, Podiatry, ID.       08/03/20 2745   Discharge Assessment   Assessment Type Discharge Planning Assessment   Confirmed/corrected address and phone number on facesheet? Yes   Assessment information obtained from? Patient   Prior to hospitilization cognitive status: Alert/Oriented;No Deficits   Prior to hospitalization functional status: Independent  (lost cane)   Current cognitive status: Alert/Oriented;No Deficits   Current Functional Status: Independent   [facility] [place]   Lives With child(suni), adult  (20yr old son)   Able to Return to Prior Arrangements yes   Is patient able to care for self after discharge? Yes   Who are your caregiver(s) and their phone number(s)? Soledad Aguirre [phone]   Patient's perception of discharge disposition home or selfcare;home health   Readmission Within the Last 30 Days planned readmission   If yes, most recent facility [facility]   Patient currently being followed by outpatient case management? No   Patient currently receives any other outside agency services? Yes   Name and contact number of agency or person providing outside services [facility] and goes to woundcare at [facility]   Is it the patient/care giver preference to resume care with the current outside agency? Yes   Equipment Currently Used at Home   (Pt reports he had a cane but lost it.)    Do you have any problems affording any of your prescribed medications? No   Is the patient taking medications as prescribed? no   If no, which medications is patient not taking? Pt reports he takes his BP meds as needed due to him doing dialysis   Does the patient have transportation home? Yes   Transportation Anticipated family or friend will provide  (He reports it depends on when it is)   [facility]   Does the patient receive services at the [facility]madin Clinic? No   Discharge Plan A Home with family;Home Health   Discharge Plan B Skilled Nursing Facility   Patient/Family in Agreement with Plan yes     Helen Wood RN  RN Transition Navigator  [phone]

## 2020-08-03 NOTE — ASSESSMENT & PLAN NOTE
- afib/aflutter per EKG at [facility]  - currently NSR with HR 90s overnight  - on Coumadin for stroke prevention; previously on Eliquis but transitioned to Coumadin given valvular afib  - will resume BB when feasible; will hold Coumadin in preparation for angiogram

## 2020-08-03 NOTE — ASSESSMENT & PLAN NOTE
- wound to right 5th metatarsal with osteomyelitis  - bilateral PAD per CTA with run off  - recent RLE angiogram per [facility] with occluded SFA, popliteal and occluded BTK vessels with reconstitution per collaterals- unable to revascularize with BKA recommended; transferred to [facility] for second opinion  - need revascularization and will plan to proceed once INR trends down  - continue ASA, Brilinita and statin therapy  - consult ID, Podiatry and wound care nurse if not done already  - will offload heels with Z flex boots

## 2020-08-03 NOTE — NURSING
Notified MINO Corley RN that MD requested patient to receive dialysis at 6 am (1st shift). Request for patient to be brought to dailysis at this time. RN stated that the tech will be bring patient at 550 am.

## 2020-08-03 NOTE — TELEPHONE ENCOUNTER
I mailed a letter to the pt to schedule a consult. I also put in a recall reminder.    ----- Message from Bella Bateman MA sent at 7/31/2020  4:32 PM CDT -----  Regarding: FW: Hospital discharge    ----- Message -----  From: Jeana Lopez RN  Sent: 7/31/2020   4:04 PM CDT  To: Carolina Grubbs Staff  Subject: Hospital discharge                               Consulted for + Hep C.  No viral load.  Dr. TIDWELL wants him seen in hepatology in 2 months.  He is a dialysis patient.   Thanks  Isela

## 2020-08-03 NOTE — PLAN OF CARE
Patient stable. Plan of care reviewed with patient. Verbal reported of understanding. NSR on tele monitoring with no red alarms noted. Due meds given per ordered. No acute distress noted. Bed in lowest position. Wheels locked. Bed alarm on. Head of bed elevated. Side rails x 2 are up. Call bell within reach. Patient will be monitored overnight.

## 2020-08-03 NOTE — PROGRESS NOTES
[facility] Medicine  Progress Note    Patient Name: João Aguirre  MRN: [id number]  Patient Class: IP- Inpatient   Admission Date: 8/2/2020  Length of Stay: 1 days  Attending Physician: Cherrie Rayo*  Primary Care Provider: Primary Doctor No        Subjective:     Principal Problem:Acute osteomyelitis of metatarsal bone, right        HPI:  Patient is a 55/y/o M with PMH of ESRD on HD (T/TH/S), anemia, uncontrolled HTN, hx CVA, PCI s/p 2 stents placement, right eye blindness, seizure, marijuana use, non-healing DM right foot ulcer DM II and gastroparesis, initially presented to [facility] for second opinion regarding AVR and CABG. Patient noted several weeks history of chest tightness with dizziness, palpitation, and increasing SOB. Denies fever, chills, nausea, vomiting. Seen by CTS and recommended patient not a CABG candidate due to multiple co morbidities- HD, Hep C, Osteomyelitis, PVD. s/p cath with stent placement on 7/31. Vascular surgery recommends BKA but patient declined, patient started on heparin drip and bridging with Warfarin (on Eliquis prior) with plan for balloon angioplasty on Tuesday    Overview/Hospital Course:  No notes on file    Interval History:   Awake and alert, just made it back from HD complained of back pain and had just received his pain meds.   Reported tachycardia and a fever of 101.3.- pan culture- ID on board  INR 3.3- d/c Heparin, decrease coumadin dose to 2.5 - ( INR goal is 2.5-3.5      Review of Systems   Constitutional: Negative for chills and fever.   Respiratory: Negative for chest tightness and shortness of breath.    Cardiovascular: Negative for chest pain.   Gastrointestinal: Negative for abdominal distention, abdominal pain, blood in stool, nausea and vomiting.   Genitourinary: Negative for dysuria and hematuria.   Musculoskeletal: Positive for myalgias. Negative for arthralgias.   Skin: Positive for color  change and wound. Negative for rash.   Neurological: Negative for dizziness, weakness and headaches.   Psychiatric/Behavioral: Negative for agitation.     Objective:     Vital Signs (Most Recent):  Temp: 98.7 °F (37.1 °C) (08/03/20 0600)  Pulse: 77 (08/03/20 0845)  Resp: 20 (08/03/20 0600)  BP: 114/84 (08/03/20 0845)  SpO2: (!) 92 % (08/03/20 0508) Vital Signs (24h Range):  Temp:  [98.4 °F (36.9 °C)-99.5 °F (37.5 °C)] 98.7 °F (37.1 °C)  Pulse:  [] 77  Resp:  [18-24] 20  SpO2:  [92 %-97 %] 92 %  BP: ()/(58-95) 114/84     Weight: 107.6 kg (237 lb 3.4 oz)  Body mass index is 32.17 kg/m².    Intake/Output Summary (Last 24 hours) at 8/3/2020 0921  Last data filed at 8/3/2020 0600  Gross per 24 hour   Intake --   Output 0 ml   Net 0 ml      Physical Exam  Constitutional:       Appearance: He is well-developed.   HENT:      Head: Normocephalic and atraumatic.   Neck:      Musculoskeletal: Neck supple.   Cardiovascular:      Rate and Rhythm: Normal rate and regular rhythm.      Heart sounds: Normal heart sounds. No murmur. No friction rub. No gallop.    Pulmonary:      Effort: Pulmonary effort is normal.      Breath sounds: Normal breath sounds.      Comments: NC   Chest:      Chest wall: No tenderness.   Abdominal:      General: Bowel sounds are normal. There is no distension.      Palpations: Abdomen is soft.      Tenderness: There is no abdominal tenderness.      Comments: obese   Musculoskeletal:      Comments: R foot with dressing   Skin:     General: Skin is warm and dry.      Capillary Refill: Capillary refill takes 2 to 3 seconds.      Comments: Wound dressing clean and dry   Neurological:      Mental Status: He is alert and oriented to person, place, and time.   Psychiatric:         Mood and Affect: Mood normal.         Significant Labs:   Blood Culture:   Recent Labs   Lab 08/02/20  0541   LABBLOO No Growth to date  No Growth to date     CBC:   Recent Labs   Lab 08/02/20  0541 08/03/20  0300   WBC  10.21 11.06   HGB 9.4* 9.1*   HCT 31.2* 28.2*    173     CMP:   Recent Labs   Lab 08/02/20  0541 08/03/20  0300    137   K 4.9 4.9    100   CO2 25 24   GLU 80 107   BUN 39* 64*   CREATININE 3.6* 5.2*   CALCIUM 9.8 9.8   PROT 7.3 7.2   ALBUMIN 2.7* 2.6*   BILITOT 0.5 0.5   ALKPHOS 139* 120   AST 24 20   ALT 5* <5*   ANIONGAP 11 13   EGFRNONAA 18.1* 12*     Lactic Acid: No results for input(s): LACTATE in the last 48 hours.  Magnesium: No results for input(s): MG in the last 48 hours.  Troponin: No results for input(s): TROPONINI in the last 48 hours.  TSH: No results for input(s): TSH in the last 4320 hours.  Urine Culture: No results for input(s): LABURIN in the last 48 hours.  Urine Studies: No results for input(s): COLORU, APPEARANCEUA, PHUR, SPECGRAV, PROTEINUA, GLUCUA, KETONESU, BILIRUBINUA, OCCULTUA, NITRITE, UROBILINOGEN, LEUKOCYTESUR, RBCUA, WBCUA, BACTERIA, SQUAMEPITHEL, HYALINECASTS in the last 48 hours.    Invalid input(s): WRIGHTSUR    Significant Imaging: I have reviewed and interpreted all pertinent imaging results/findings within the past 24 hours.      Assessment/Plan:      * Acute osteomyelitis of metatarsal bone, right  PVD (peripheral vascular disease)  Atherosclerosis of native artery of right lower extremity with ulceration of midfoot    CTA A/P with BLE runoff to better define surgical anatomy  Consult cardiology- for possible revascularization and potential limb salvage  Wound cx with stenotrophomonas  Continue Ceftazidime x 6 weeks recs now, end date 9/11  Continue wound care  Consult cardiology  Consult podiatry  Consult ID  Pan culture on 8/3      Osteomyelitis of right foot        Atrial flutter  On Brilinta, asa and Warfarin      (HFpEF) heart failure with preserved ejection fraction  Ischemic cardiomyopathy, LVEF 25%-30%  Stable    ESRD (end stage renal disease) on dialysis   HD on TTSs  Consult nephrology   Renally dose medication  Continue sevelamer      Nonrheumatic  aortic (valve) stenosis  History of coronary artery stent placement  ECHO shows mild aortic valve stenosis.   currently high risk for cardiac surgery secondary to cirrhosis (plt 149) and osteomyelitis   [id number] 6   Was on Eliquis now switched to Warfarin   Continue heparin with coumadin bridge. D/c heparin on 8/3. Decrease Warfarin dose to 2.5  INR goal 2.5-3.5  Continue Brilinta   Continue ASA x 1 week end date     Type 2 diabetes mellitus with chronic kidney disease on chronic dialysis, without long-term current use of insulin  HbA1c 4.3   Low dose SSI  accucheck  Diabetic renal diet      Seizure disorder  Continue Keppra      Benign hypertension with ESRD (end-stage renal disease)  Continue Norvasc        Cirrhosis of liver with ascites  Chronic hepatitis C without hepatic coma  history of HCV and cirrhosis   stable    Chronic back pain  Methadone dependence  Continue methadon, Oxycodone, neurontin    Blindness of right eye  stable      Anemia in chronic kidney disease  Stable  Monitor        VTE Risk Mitigation (From admission, onward)    None                Cherrie N MD Eva  Department of Hospital Medicine   [facility]

## 2020-08-03 NOTE — HPI
João Aguirre is a 55 y.o. male who  has a past medical history of Acute osteomyelitis of metatarsal bone, right, Anticoagulant long-term use, Arthritis, Asthma, Back pain, C. difficile colitis, Cirrhosis of liver without ascites, Coronary artery disease, Diabetes mellitus, Encounter for blood transfusion, ESRD on hemodialysis, Eye abnormality, Gastritis, Gastroparesis, HCV (hepatitis C virus), Hypertension, Mitral stenosis, PAD (peripheral artery disease), Pneumonia, Stroke, and Tuberculosis.    Patient has had chronic right foot wounds and has been following at the [facility] in [place]. He was in transferred to [facility] for higher level of care. While there, his cardiac issues were resolved and a bone biopsy was taken of his R foot. He also had endovascular intervention that was unsuccessful. He was transferred to [place] for evaluation by [place]. Patient denies any F/N/V/C or pedal pain at this time. He is ambulatory at baseline. Denies any claudication.

## 2020-08-03 NOTE — ASSESSMENT & PLAN NOTE
- moderate to severe MS per echo with mean diastolic gradient 15mmHg  - not a surgical candidate per [facility] CTS   - may need to repeat echocardiogram and later date for re evaluation of MS

## 2020-08-03 NOTE — ASSESSMENT & PLAN NOTE
Transfer to [place] for evaluation by interventional Cardiology.  Appears that procedure plan for tomorrow.

## 2020-08-03 NOTE — CONSULTS
[facility]  Cardiology  Consult Note    Patient Name: João Aguirre  MRN: [id number]  Admission Date: 8/2/2020  Hospital Length of Stay: 1 days  Code Status: Full Code   Attending Provider: Cherrie Velasquez*   Consulting Provider: VIOLA Cruz ANP  Primary Care Physician: Primary Doctor No  Principal Problem:Acute osteomyelitis of metatarsal bone, right    Patient information was obtained from patient, past medical records and ER records.     Inpatient consult to [facility]  Consult performed by: VIOLA Sebastian ANP  Consult ordered by: Cherrie Velasquez MD  Reason for consult: PAD        Subjective:     Chief Complaint:  Initially at [facility] with NSTEMI, chest pain and CAD; transferred to [facility] for PAD evaluation     HPI:   54yo male with ESRD on HD (T/TH/S), AOCD, Hepatitis C, Methadone use, CVA, CAD s/p RCA and LAD FARHEEN (Plavix non responder on Brilinta), HTN, right eye  blindness, seizure, marijuana use, non-healing DM right foot ulcer DM II and gastroparesis who was transferred to [facility] for evaluation of revascularization for PAD. He was initially transferred to [facility] from [facility] for evaluation of CABG/MVR due to MS. He was evaluated by CTS who deemed him not to be a surgical candidate due to multiple co morbidities- HD, Hep C, Osteomyelitis, PVD. Therefore he underwent LAD and RCA stenting. He was also noted to have wound and osteomyelitits to right 5th toe therefore vascular surgery was consulted. He underwent CTA with run off to the LE with notation of PAD to bilateral SFAs and popliteal. He was taken for an LE angiogram by Vascular surgery with inability to revascularize given extensive CTOs (flush occlusion right SFA with reconstitution of flow to the mid SFA and popliteal occlusion; occlusion to right AT, peroneal and PT with reconstitution of AT and peroneal with collaterals with patency of AT and peroneal to foot).  Vascular surgery recommended  BKA but patient declined therefore patient was transferred to [facility] for evaluation of revascularization. He was started on Heparin with Coumadin bridging due to afib in setting of MS     Hospital Course:    8/2/2020 per HPI  8/3/2020 H/H 9.1/28.2 this AM. BMP with K+ 4.9 BUN 64 creatinine 5.2. Blood culture NGTD. Wound culture to right foot at [facility] with stenotrophomonas maltophilia and MRI with osteo of right 5th metatarsal head. On ASA, statin and Brilinta. Will hold Coumadin for now given INR 3.3. Cardiology consulted for evaluation of revascularization       Past Medical History:   Diagnosis Date    Acute osteomyelitis of metatarsal bone, right     PAD right    Anticoagulant long-term use     Arthritis     Asthma     Back pain     on methadone    C. difficile colitis 09/2018    Cirrhosis of liver without ascites 2016    by liver US. +HCV    Coronary artery disease 2013    stent.  h/o STEMI and NSTEMI    Diabetes mellitus     resolved, multiple admissions for hypoglycemia requiring ICU possibley due to liver/ESRD    Encounter for blood transfusion     ESRD on hemodialysis 2013    anuric    Eye abnormality right eye    injured as a child    Gastritis     Gastroparesis     HCV (hepatitis C virus)     ? h/o tattoo exposure    Hypertension     Mitral stenosis 07/24/2020    Moderate to severe mitral stenosis    PAD (peripheral artery disease)     RLE s/p R CFA endarterectomy    Pneumonia 2013    Spend 6weeks in hospital at [place]    Stroke     Tuberculosis     treated       Past Surgical History:   Procedure Laterality Date    ANGIOGRAPHY OF LOWER EXTREMITY Right 7/28/2020    Procedure: Angiogram Extremity Unilateral;  Surgeon: MINO Vasquez II, MD;  Location: [facility] OR [address] [place] [address];  Service: Vascular;  Laterality: Right;  Left groin and rIght pedal artery access  15.5 min  247.25 mGy  68.5454 Gycm2  33ml contrast    AV FISTULA PLACEMENT      BACK SURGERY       CARDIAC SURGERY  2013    heart stent    CHOLECYSTECTOMY      CORONARY ANGIOGRAPHY N/A 7/30/2020    Procedure: ANGIOGRAM, CORONARY ARTERY;  Surgeon: Alexandro Terry MD;  Location: [facility] CATH LAB;  Service: Cardiology;  Laterality: N/A;    EYE SURGERY      R eye    INCISION AND DRAINAGE OF ABSCESS Right 9/6/2018    Procedure: INCISION AND DRAINAGE, ABSCESS;  Surgeon: Chetan Rothman MD;  Location: [facility] OR;  Service: General;  Laterality: Right;    LEFT HEART CATHETERIZATION N/A 7/30/2020    Procedure: Left heart cath;  Surgeon: Alexandro Terry MD;  Location: [facility] CATH LAB;  Service: Cardiology;  Laterality: N/A;       Review of patient's allergies indicates:   Allergen Reactions    Antibiotic hc      Counter acts with methodone.          Current Facility-Administered Medications on File Prior to Encounter   Medication    [DISCONTINUED] 0.9%  NaCl infusion    [DISCONTINUED] 0.9%  NaCl infusion    [DISCONTINUED] acetaminophen tablet 650 mg    [DISCONTINUED] albuterol-ipratropium 2.5 mg-0.5 mg/3 mL nebulizer solution 3 mL    [DISCONTINUED] amLODIPine tablet 10 mg    [DISCONTINUED] aspirin chewable tablet 81 mg    [DISCONTINUED] atorvastatin tablet 40 mg    [DISCONTINUED] bisacodyL suppository 10 mg    [DISCONTINUED] cadexomer iodine 0.9 % gel    [DISCONTINUED] calcium carbonate 200 mg calcium (500 mg) chewable tablet 500 mg    [DISCONTINUED] calcium gluconate 1g in dextrose 5% 100mL (ready to mix system)    [DISCONTINUED] calcium gluconate 1g in dextrose 5% 100mL (ready to mix system)    [DISCONTINUED] epoetin harshad-epbx injection 10,000 Units    [DISCONTINUED] gabapentin capsule 100 mg    [DISCONTINUED] heparin 25,000 units in dextrose 5% (100 units/ml) IV bolus from bag - ADDITIONAL PRN BOLUS - 30 units/kg (max bolus 4000 units)    [DISCONTINUED] heparin 25,000 units in dextrose 5% (100 units/ml) IV bolus from bag - ADDITIONAL PRN BOLUS - 60 units/kg (max bolus 4000 units)     [DISCONTINUED] heparin 25,000 units in dextrose 5% 250 mL (100 units/mL) infusion LOW INTENSITY nomogram - [facility]    [DISCONTINUED] levETIRAcetam tablet 500 mg    [DISCONTINUED] methadone tablet 100 mg    [DISCONTINUED] ondansetron disintegrating tablet 8 mg    [DISCONTINUED] oxyCODONE immediate release tablet 10 mg    [DISCONTINUED] oxyCODONE immediate release tablet 5 mg    [DISCONTINUED] pantoprazole EC tablet 40 mg    [DISCONTINUED] polyethylene glycol packet 17 g    [DISCONTINUED] promethazine tablet 25 mg    [DISCONTINUED] sevelamer carbonate tablet 1,600 mg    [DISCONTINUED] sodium chloride 0.9% flush 10 mL    [DISCONTINUED] ticagrelor tablet 90 mg    [DISCONTINUED] warfarin (COUMADIN) tablet 5 mg     Current Outpatient Medications on File Prior to Encounter   Medication Sig    albuterol (PROAIR HFA) 90 mcg/actuation inhaler INHALE TWO PUFFS EVERY 4 TO 6 HOURS AS NEEDED    albuterol-ipratropium (DUO-NEB) 2.5 mg-0.5 mg/3 mL nebulizer solution Take 3 mLs by nebulization every 6 (six) hours as needed for Wheezing. Rescue    amLODIPine (NORVASC) 10 MG tablet Take 1 tablet (10 mg total) by mouth once daily.    aspirin 81 MG Chew Take 1 tablet (81 mg total) by mouth once daily.    atorvastatin (LIPITOR) 40 MG tablet Take 1 tablet (40 mg total) by mouth once daily.    bisacodyL (DULCOLAX) 10 mg Supp Place 1 suppository (10 mg total) rectally daily as needed (Until bowel movement if patient has no bowel movement for 2 days).    calcium carbonate (TUMS) 200 mg calcium (500 mg) chewable tablet Take 1 tablet (500 mg total) by mouth 2 (two) times daily as needed.    gabapentin (NEURONTIN) 100 MG capsule Take 1 capsule (100 mg total) by mouth 2 (two) times daily.    levETIRAcetam (KEPPRA) 500 MG Tab Take 1 tablet (500 mg total) by mouth 2 (two) times daily.    methadone (DOLOPHINE) 10 MG tablet Take 10 tablets (100 mg total) by mouth once daily.    ondansetron (ZOFRAN-ODT) 8 MG TbDL Take 1 tablet (8 mg  total) by mouth every 8 (eight) hours as needed.    pantoprazole (PROTONIX) 40 MG tablet Take 1 tablet (40 mg total) by mouth before breakfast.    polyethylene glycol (GLYCOLAX) 17 gram PwPk Take 17 g by mouth once daily.    sevelamer carbonate (RENVELA) 800 mg Tab Take 2 tablets (1,600 mg total) by mouth 3 (three) times daily with meals.    ticagrelor (BRILINTA) 90 mg tablet Take 1 tablet (90 mg total) by mouth 2 (two) times a day.    warfarin (COUMADIN) 5 MG tablet Take 1 tablet (5 mg total) by mouth Daily.    acetaminophen (TYLENOL) 325 MG tablet Take 2 tablets (650 mg total) by mouth every 4 (four) hours as needed.    blood-glucose meter (PHARMACIST [email or web address] GLUCOSE SYS) Misc 1 Device.    cadexomer iodine (IODOSORB) 0.9 % gel Apply topically every Mon, Wed, Fri.    oxyCODONE (ROXICODONE) 10 mg Tab immediate release tablet Take 1 tablet (10 mg total) by mouth every 4 (four) hours as needed.    oxyCODONE (ROXICODONE) 5 MG immediate release tablet Take 1 tablet (5 mg total) by mouth every 4 (four) hours as needed.    ticagrelor (BRILINTA) 90 mg tablet Take 1 tablet (90 mg total) by mouth 2 (two) times a day.     Family History     Problem Relation (Age of Onset)    Aneurysm Mother, Father (77)    Diabetes Brother    Heart disease Father, Paternal Grandmother    Kidney disease Brother        Tobacco Use    Smoking status: Former Smoker     Years: 10.00     Quit date: 2015     Years since quittin.9    Smokeless tobacco: Never Used   Substance and Sexual Activity    Alcohol use: No    Drug use: Yes     Frequency: 4.0 times per week     Types: Other-see comments     Comment: marijuana    Sexual activity: Yes     Review of Systems   Constitution: Negative for chills, decreased appetite, diaphoresis, fever, malaise/fatigue, weight gain and weight loss.   Cardiovascular: Negative for chest pain, claudication, dyspnea on exertion, irregular heartbeat, leg swelling, near-syncope, orthopnea,  palpitations and paroxysmal nocturnal dyspnea.   Respiratory: Negative for cough, shortness of breath, snoring, sputum production and wheezing.    Endocrine: Negative for cold intolerance, heat intolerance, polydipsia, polyphagia and polyuria.   Skin: Positive for poor wound healing. Negative for color change, dry skin, itching and nail changes.   Musculoskeletal: Negative for back pain, gout, joint pain and joint swelling.   Gastrointestinal: Negative for bloating, abdominal pain, constipation, diarrhea, hematemesis, hematochezia, melena, nausea and vomiting.   Genitourinary: Negative for dysuria, hematuria and nocturia.   Neurological: Negative for dizziness, headaches, light-headedness, numbness, paresthesias and weakness.   Psychiatric/Behavioral: Negative for altered mental status, depression and memory loss.     Objective:     Vital Signs (Most Recent):  Temp: (!) 101.5 °F (38.6 °C) (08/03/20 1142)  Pulse: (!) 130 (08/03/20 1144)  Resp: 18 (08/03/20 1142)  BP: 111/69 (08/03/20 1142)  SpO2: (!) 93 % (08/03/20 1200) Vital Signs (24h Range):  Temp:  [98.7 °F (37.1 °C)-101.5 °F (38.6 °C)] 101.5 °F (38.6 °C)  Pulse:  [] 130  Resp:  [18-24] 18  SpO2:  [91 %-97 %] 93 %  BP: ()/(58-97) 111/69     Weight: 107.6 kg (237 lb 3.4 oz)  Body mass index is 32.17 kg/m².    SpO2: (!) 93 %  O2 Device (Oxygen Therapy): nasal cannula      Intake/Output Summary (Last 24 hours) at 8/3/2020 1246  Last data filed at 8/3/2020 1000  Gross per 24 hour   Intake 500 ml   Output 3000 ml   Net -2500 ml       Lines/Drains/Airways     Drain                 Hemodialysis AV Fistula Left forearm -- days         Hemodialysis AV Fistula Left forearm -- days         Hemodialysis AV Fistula Left forearm -- days          Peripheral Intravenous Line                 Peripheral IV - Single Lumen 07/30/20 0458 20 G Anterior;Right Shoulder 4 days         Peripheral IV - Single Lumen 07/31/20 1846 20 G Anterior;Proximal;Right Forearm 2 days                 Physical Exam   Constitutional: He is oriented to person, place, and time. He has a sickly appearance. He appears ill. No distress.   54yo male appearing older than stated age    Neck: Normal range of motion. Neck supple. No JVD present.   Cardiovascular: Normal rate and regular rhythm. Exam reveals no gallop.   No murmur heard.  Pulmonary/Chest: Effort normal and breath sounds normal. No respiratory distress. He has no wheezes.   Abdominal: Soft. Bowel sounds are normal. He exhibits no distension. There is no abdominal tenderness.   Musculoskeletal:         General: No edema.   Neurological: He is alert and oriented to person, place, and time.   Skin: Skin is warm and dry. There is pallor.   Psychiatric: He has a normal mood and affect. His behavior is normal. Judgment and thought content normal.       Significant Labs:     Recent Labs   Lab 08/03/20  0300   WBC 11.06   RBC 2.72*   HGB 9.1*   HCT 28.2*      *   MCH 33.5*   MCHC 32.3     Recent Labs   Lab 08/03/20  0300      K 4.9      CO2 24   BUN 64*   CREATININE 5.2*       Significant Imaging: Echocardiogram:   Transthoracic echo (TTE) complete (Cupid Only):   Results for orders placed or performed during the hospital encounter of 07/24/20   Echo Color Flow Doppler? Yes   Result Value Ref Range    Ascending aorta 3.99 cm    STJ 3.70 cm    AV mean gradient 24 mmHg    Ao peak kobi 3.31 m/s    Ao VTI 69.42 cm    IVS 0.86 0.6 - 1.1 cm    LA size 5.77 cm    Left Atrium Major Axis 7.40 cm    Left Atrium Minor Axis 7.49 cm    LVIDD 6.38 (A) 3.5 - 6.0 cm    LVIDS 5.02 (A) 2.1 - 4.0 cm    LVOT diameter 2.15 cm    LVOT peak VTI 30.00 cm    PW 1.13 (A) 0.6 - 1.1 cm    MV Peak A Kobi 2.17 m/s    E wave decelartion time 293.42 msec    MV Peak E Kobi 2.90 m/s    RA Major Axis 5.21 cm    RA Width 3.56 cm    Sinus 4.04 cm    TAPSE 2.21 cm    TR Max Kobi 3.43 m/s    TDI LATERAL 0.04 m/s    TDI SEPTAL 0.03 m/s    LA WIDTH 5.63 cm    MV stenosis  pressure 1/2 time 88.92 ms    LV Diastolic Volume 206.86 mL    LV Systolic Volume 119.49 mL    RV S' 11.57 cm/s    LVOT peak tae 1.54 m/s    LV LATERAL E/E' RATIO 72.50 m/s    LV SEPTAL E/E' RATIO 96.67 m/s    FS 21 %    LA volume 205.57 cm3    LV mass 272.40 g    Left Ventricle Relative Wall Thickness 0.35 cm    AV valve area 1.57 cm2    AV Velocity Ratio 0.47     AV index (prosthetic) 0.43     MV valve area p 1/2 method 2.47 cm2    E/A ratio 1.34     Mean e' 0.04 m/s    LVOT area 3.6 cm2    LVOT stroke volume 108.86 cm3    AV peak gradient 44 mmHg    E/E' ratio 82.86 m/s    LV Systolic Volume Index 53.9 mL/m2    LV Diastolic Volume Index 93.25 mL/m2    LA Volume Index 92.7 mL/m2    LV Mass Index 123 g/m2    Triscuspid Valve Regurgitation Peak Gradient 47 mmHg    BSA 2.25 m2    Right Atrial Pressure (from IVC) 15 mmHg    MV mean gradient 15 mmHg    MV peak gradient 33 mmHg    TV rest pulmonary artery pressure 62 mmHg    HR echo 73 bpm    Narrative    · Eccentric left ventricular hypertrophy.  · Low normal left ventricular systolic function. The estimated ejection   fraction is 50%.  · Local segmental wall motion abnormalities.  · Normal right ventricular systolic function.  · Severe left atrial enlargement.  · Mild aortic valve stenosis. Aortic valve area is 1.57 cm2; peak velocity   is 3.31 m/s; mean gradient is 24 mmHg.  · Mild aortic regurgitation.  · Moderate to severe mitral stenosis from extensive annular calcification.   The mean diastolic gradient across the mitral valve is 15 mmHg at a heart   rate of 73 bpm. Gradients may be elevated in part due to volume overload.  · The estimated PA systolic pressure is 62 mmHg.  · Elevated central venous pressure (15 mmHg).        Assessment and Plan:     Atrial flutter  - afib/aflutter per EKG at [facility]  - currently NSR with HR 90s overnight  - on Coumadin for stroke prevention; previously on Eliquis but transitioned to Coumadin given valvular afib  - will resume BB  when feasible; will hold Coumadin in preparation for angiogram     (HFpEF) heart failure with preserved ejection fraction  - echo on 7/24/2020 with EF 50% and WMA  - on Coreg and Norvasc previously with no ACEI/ARB  - currently on Norvasc only with no BB ?reasoning possibly volume overload; patient reports orthopnea and PND   - BP stable and HR 90s-120s; recommend resumption of Coreg once fully euvolemic;  ARB if okay with Nephrology; if BP not well controlled then would continue Norvasc      PVD (peripheral vascular disease)  - wound to right 5th metatarsal with osteomyelitis  - bilateral PAD per CTA with run off  - recent [place] [facility] with occluded SFA, popliteal and occluded BTK vessels with reconstitution per collaterals- unable to revascularize with BKA recommended; transferred to [facility] for second opinion  - need revascularization and will plan to proceed once INR trends down  - continue ASA, Brilinita and statin therapy  - consult ID, Podiatry and wound care nurse if not done already  - will offload heels with Z flex boots     Mitral stenosis  - moderate to severe MS per echo with mean diastolic gradient 15mmHg  - not a surgical candidate per [facility] CTS   - may need to repeat echocardiogram and later date for re evaluation of MS     Nonrheumatic aortic (valve) stenosis  - echo 7/24/2020 with mild AS- MAYA 1.57cm2; mean gradient 24mmHg peak velocity 3.31  - will need close monitoring with regular echos     Benign hypertension with ESRD (end-stage renal disease)  - SBP 110s-130s  - on Norvasc only for now  - BB and ARB as recommended previously     Anemia in chronic kidney disease  - H/H 9.1/28.2  - will continue to monitor closely         VTE Risk Mitigation (From admission, onward)    None          Thank you for your consult. I will follow-up with patient. Please contact us if you have any additional questions.    VIOLA Cruz, ANP  Cardiology   [facility]

## 2020-08-03 NOTE — CONSULTS
[facility]  Podiatry  Consult Note    Patient Name: João Aguirre  MRN: [id number]  Admission Date: 8/2/2020  Hospital Length of Stay: 1 days  Attending Physician: Cherrie Velasquez*  Primary Care Provider: Primary Doctor No     Inpatient consult to Podiatry  Consult performed by: Bert Li MD  Consult ordered by: Cherrie Velasquez MD        Subjective:     History of Present Illness:  João Aguirre is a 55 y.o. male who  has a past medical history of Acute osteomyelitis of metatarsal bone, right, Anticoagulant long-term use, Arthritis, Asthma, Back pain, C. difficile colitis, Cirrhosis of liver without ascites, Coronary artery disease, Diabetes mellitus, Encounter for blood transfusion, ESRD on hemodialysis, Eye abnormality, Gastritis, Gastroparesis, HCV (hepatitis C virus), Hypertension, Mitral stenosis, PAD (peripheral artery disease), Pneumonia, Stroke, and Tuberculosis.    Patient has had chronic right foot wounds and has been following at the [facility] in [place]. He was in transferred to [facility] for higher level of care. While there, his cardiac issues were resolved and a bone biopsy was taken of his R foot. He also had endovascular intervention that was unsuccessful. He was transferred to [place] for evaluation by [facility]. Patient denies any F/N/V/C or pedal pain at this time. He is ambulatory at baseline. Denies any claudication.     Scheduled Meds:   sodium chloride 0.9%   Intravenous Once    amLODIPine  10 mg Oral Daily    aspirin  81 mg Oral Daily    atorvastatin  40 mg Oral Daily    cadexomer iodine   Topical (Top) Every Mon, Wed, Fri    ceftAZIDime (FORTAZ) IVPB  1 g Intravenous Q24H    epoetin harshad-epbx  10,000 Units Intravenous Every Mon, Wed, Fri    gabapentin  100 mg Oral BID    levETIRAcetam  500 mg Oral BID    methadone  100 mg Oral Daily    mupirocin   Nasal BID    pantoprazole  40 mg Oral Before breakfast    polyethylene  glycol  17 g Oral Daily    sevelamer carbonate  1,600 mg Oral TID WM    ticagrelor  90 mg Oral BID     Continuous Infusions:  PRN Meds:sodium chloride 0.9%, acetaminophen, albuterol-ipratropium, bisacodyL, calcium carbonate, calcium gluconate IVPB, calcium gluconate IVPB, ondansetron, oxyCODONE, oxyCODONE, promethazine, sodium chloride 0.9%    Review of patient's allergies indicates:   Allergen Reactions    Antibiotic hc      Counter acts with methodone.           Past Medical History:   Diagnosis Date    Acute osteomyelitis of metatarsal bone, right     PAD right    Anticoagulant long-term use     Arthritis     Asthma     Back pain     on methadone    C. difficile colitis 09/2018    Cirrhosis of liver without ascites 2016    by liver US. +HCV    Coronary artery disease 2013    stent.  h/o STEMI and NSTEMI    Diabetes mellitus     resolved, multiple admissions for hypoglycemia requiring ICU possibley due to liver/ESRD    Encounter for blood transfusion     ESRD on hemodialysis 2013    anuric    Eye abnormality right eye    injured as a child    Gastritis     Gastroparesis     HCV (hepatitis C virus)     ? h/o tattoo exposure    Hypertension     Mitral stenosis 07/24/2020    Moderate to severe mitral stenosis    PAD (peripheral artery disease)     RLE s/p R CFA endarterectomy    Pneumonia 2013    Spend 6weeks in hospital at [facility]    Stroke     Tuberculosis     treated     Past Surgical History:   Procedure Laterality Date    ANGIOGRAPHY OF LOWER EXTREMITY Right 7/28/2020    Procedure: Angiogram Extremity Unilateral;  Surgeon: MINO Vasquez II, MD;  Location: [facility] OR [address] [place] [address];  Service: Vascular;  Laterality: Right;  Left groin and rIght pedal artery access  15.5 min  247.25 mGy  68.5454 Gycm2  33ml contrast    AV FISTULA PLACEMENT      BACK SURGERY      CARDIAC SURGERY  2013    heart stent    CHOLECYSTECTOMY      CORONARY ANGIOGRAPHY N/A 7/30/2020    Procedure: ANGIOGRAM, CORONARY  ARTERY;  Surgeon: Alexandro Terry MD;  Location: [facility] CATH LAB;  Service: Cardiology;  Laterality: N/A;    EYE SURGERY      R eye    INCISION AND DRAINAGE OF ABSCESS Right 2018    Procedure: INCISION AND DRAINAGE, ABSCESS;  Surgeon: Chetan Rothman MD;  Location: [facility] OR;  Service: General;  Laterality: Right;    LEFT HEART CATHETERIZATION N/A 2020    Procedure: Left heart cath;  Surgeon: Alexandro Terry MD;  Location: [facility] CATH LAB;  Service: Cardiology;  Laterality: N/A;       Family History     Problem Relation (Age of Onset)    Aneurysm Mother, Father (77)    Diabetes Brother    Heart disease Father, Paternal Grandmother    Kidney disease Brother        Tobacco Use    Smoking status: Former Smoker     Years: 10.00     Quit date: 2015     Years since quittin.9    Smokeless tobacco: Never Used   Substance and Sexual Activity    Alcohol use: No    Drug use: Yes     Frequency: 4.0 times per week     Types: Other-see comments     Comment: marijuana    Sexual activity: Yes     Review of Systems   Constitutional: Negative for chills and fever.   Gastrointestinal: Negative for nausea and vomiting.   Musculoskeletal: Positive for gait problem. Negative for back pain.   Skin: Positive for color change and wound.   Neurological: Negative for dizziness, numbness and headaches.   Psychiatric/Behavioral: Negative for agitation, behavioral problems and confusion.     Objective:     Vital Signs (Most Recent):  Temp: (!) 101.5 °F (38.6 °C) (20 1142)  Pulse: (!) 130 (20 1144)  Resp: 18 (20 1142)  BP: 111/69 (20 1142)  SpO2: (!) 93 % (20 1200) Vital Signs (24h Range):  Temp:  [98.7 °F (37.1 °C)-101.5 °F (38.6 °C)] 101.5 °F (38.6 °C)  Pulse:  [] 130  Resp:  [18-24] 18  SpO2:  [91 %-95 %] 93 %  BP: ()/(58-97) 111/69     Weight: 107.6 kg (237 lb 3.4 oz)  Body mass index is 32.17 kg/m².    Foot Exam    Right Foot/Ankle     Inspection and Palpation  Ecchymosis:  none  Tenderness: none   Swelling: none     Neurovascular  Dorsalis pedis: absent  Posterior tibial: absent  Saphenous nerve sensation: diminished  Tibial nerve sensation: diminished  Superficial peroneal nerve sensation: diminished  Deep peroneal nerve sensation: diminished  Sural nerve sensation: diminished      Left Foot/Ankle      Inspection and Palpation  Ecchymosis: none  Tenderness: none   Swelling: none     Neurovascular  Dorsalis pedis: absent  Posterior tibial: absent  Saphenous nerve sensation: diminished  Tibial nerve sensation: diminished  Superficial peroneal nerve sensation: diminished  Deep peroneal nerve sensation: diminished  Sural nerve sensation: diminished    Comments  LE are darkened and cool to touch b/l.       Laboratory:  CBC:   Recent Labs   Lab 08/03/20  0300   WBC 11.06   RBC 2.72*   HGB 9.1*   HCT 28.2*      *   MCH 33.5*   MCHC 32.3     CMP:   Recent Labs   Lab 08/03/20  0300      CALCIUM 9.8   ALBUMIN 2.6*   PROT 7.2      K 4.9   CO2 24      BUN 64*   CREATININE 5.2*   ALKPHOS 120   ALT <5*   AST 20   BILITOT 0.5       Diagnostic Results:  I have reviewed all pertinent imaging results/findings within the past 24 hours.    Clinical Findings:  Patient has 1.0x1.0x0.3 cm wound to the plantar right foot, submet 5. The wound probes to bone and is slightly tender. No acute signs of infection currently. The periwound skin is hyperkeratotic.           Assessment/Plan:     * Acute osteomyelitis of metatarsal bone, right  Bone biopsy of the right 5th metatarsal were obtained at [facility].  Cultures are growing stenotrophomonas.    Plan:  - IV antibiotics per ID Nima for osteomyelitis.  - continue local wound care for now by nursing.  - patient can weight bear to heal for short transfers  - will hold off on debridement until after vascular intervention.  - podiatry will follow    PVD (peripheral vascular disease)  Transfer to [place] for evaluation by  interventional Cardiology.  Appears that procedure plan for tomorrow.    ESRD (end stage renal disease) on dialysis  Managed by primary    Type 2 diabetes mellitus with chronic kidney disease on chronic dialysis, without long-term current use of insulin  Managed by primary    Methadone dependence  per primary    Anemia in chronic kidney disease  Per primary        Thank you for your consult. I will follow-up with patient. Please contact us if you have any additional questions.    Bert Li MD  Podiatry  [facility]

## 2020-08-03 NOTE — ASSESSMENT & PLAN NOTE
- echo on 7/24/2020 with EF 50% and WMA  - on Coreg and Norvasc previously with no ACEI/ARB  - currently on Norvasc only with no BB ?reasoning possibly volume overload; patient reports orthopnea and PND   - BP stable and HR 90s-120s; recommend resumption of Coreg once fully euvolemic;  ARB if okay with Nephrology; if BP not well controlled then would continue Norvasc

## 2020-08-03 NOTE — SUBJECTIVE & OBJECTIVE
Scheduled Meds:   sodium chloride 0.9%   Intravenous Once    amLODIPine  10 mg Oral Daily    aspirin  81 mg Oral Daily    atorvastatin  40 mg Oral Daily    cadexomer iodine   Topical (Top) Every Mon, Wed, Fri    ceftAZIDime (FORTAZ) IVPB  1 g Intravenous Q24H    epoetin harshad-epbx  10,000 Units Intravenous Every Mon, Wed, Fri    gabapentin  100 mg Oral BID    levETIRAcetam  500 mg Oral BID    methadone  100 mg Oral Daily    mupirocin   Nasal BID    pantoprazole  40 mg Oral Before breakfast    polyethylene glycol  17 g Oral Daily    sevelamer carbonate  1,600 mg Oral TID WM    ticagrelor  90 mg Oral BID     Continuous Infusions:  PRN Meds:sodium chloride 0.9%, acetaminophen, albuterol-ipratropium, bisacodyL, calcium carbonate, calcium gluconate IVPB, calcium gluconate IVPB, ondansetron, oxyCODONE, oxyCODONE, promethazine, sodium chloride 0.9%    Review of patient's allergies indicates:   Allergen Reactions    Antibiotic hc      Counter acts with methodone.           Past Medical History:   Diagnosis Date    Acute osteomyelitis of metatarsal bone, right     PAD right    Anticoagulant long-term use     Arthritis     Asthma     Back pain     on methadone    C. difficile colitis 09/2018    Cirrhosis of liver without ascites 2016    by liver US. +HCV    Coronary artery disease 2013    stent.  h/o STEMI and NSTEMI    Diabetes mellitus     resolved, multiple admissions for hypoglycemia requiring ICU possibley due to liver/ESRD    Encounter for blood transfusion     ESRD on hemodialysis 2013    anuric    Eye abnormality right eye    injured as a child    Gastritis     Gastroparesis     HCV (hepatitis C virus)     ? h/o tattoo exposure    Hypertension     Mitral stenosis 07/24/2020    Moderate to severe mitral stenosis    PAD (peripheral artery disease)     RLE s/p R CFA endarterectomy    Pneumonia 2013    Spend 6weeks in hospital at [place]    Stroke     Tuberculosis     treated     Past  Surgical History:   Procedure Laterality Date    ANGIOGRAPHY OF LOWER EXTREMITY Right 2020    Procedure: Angiogram Extremity Unilateral;  Surgeon: MINO Vasquez II, MD;  Location: [address];  Service: Vascular;  Laterality: Right;  Left groin and rIght pedal artery access  15.5 min  247.25 mGy  68.5454 Gycm2  33ml contrast    AV FISTULA PLACEMENT      BACK SURGERY      CARDIAC SURGERY      heart stent    CHOLECYSTECTOMY      CORONARY ANGIOGRAPHY N/A 2020    Procedure: ANGIOGRAM, CORONARY ARTERY;  Surgeon: Alexandro Terry MD;  Location: [facility] CATH LAB;  Service: Cardiology;  Laterality: N/A;    EYE SURGERY      R eye    INCISION AND DRAINAGE OF ABSCESS Right 2018    Procedure: INCISION AND DRAINAGE, ABSCESS;  Surgeon: Chetan Rothman MD;  Location: [facility];  Service: General;  Laterality: Right;    LEFT HEART CATHETERIZATION N/A 2020    Procedure: Left heart cath;  Surgeon: Alexandro Terry MD;  Location: [facility] CATH LAB;  Service: Cardiology;  Laterality: N/A;       Family History     Problem Relation (Age of Onset)    Aneurysm Mother, Father (77)    Diabetes Brother    Heart disease Father, Paternal Grandmother    Kidney disease Brother        Tobacco Use    Smoking status: Former Smoker     Years: 10.00     Quit date: 2015     Years since quittin.9    Smokeless tobacco: Never Used   Substance and Sexual Activity    Alcohol use: No    Drug use: Yes     Frequency: 4.0 times per week     Types: Other-see comments     Comment: marijuana    Sexual activity: Yes     Review of Systems   Constitutional: Negative for chills and fever.   Gastrointestinal: Negative for nausea and vomiting.   Musculoskeletal: Positive for gait problem. Negative for back pain.   Skin: Positive for color change and wound.   Neurological: Negative for dizziness, numbness and headaches.   Psychiatric/Behavioral: Negative for agitation, behavioral problems and confusion.     Objective:      Vital Signs (Most Recent):  Temp: (!) 101.5 °F (38.6 °C) (08/03/20 1142)  Pulse: (!) 130 (08/03/20 1144)  Resp: 18 (08/03/20 1142)  BP: 111/69 (08/03/20 1142)  SpO2: (!) 93 % (08/03/20 1200) Vital Signs (24h Range):  Temp:  [98.7 °F (37.1 °C)-101.5 °F (38.6 °C)] 101.5 °F (38.6 °C)  Pulse:  [] 130  Resp:  [18-24] 18  SpO2:  [91 %-95 %] 93 %  BP: ()/(58-97) 111/69     Weight: 107.6 kg (237 lb 3.4 oz)  Body mass index is 32.17 kg/m².    Foot Exam    Right Foot/Ankle     Inspection and Palpation  Ecchymosis: none  Tenderness: none   Swelling: none     Neurovascular  Dorsalis pedis: absent  Posterior tibial: absent  Saphenous nerve sensation: diminished  Tibial nerve sensation: diminished  Superficial peroneal nerve sensation: diminished  Deep peroneal nerve sensation: diminished  Sural nerve sensation: diminished      Left Foot/Ankle      Inspection and Palpation  Ecchymosis: none  Tenderness: none   Swelling: none     Neurovascular  Dorsalis pedis: absent  Posterior tibial: absent  Saphenous nerve sensation: diminished  Tibial nerve sensation: diminished  Superficial peroneal nerve sensation: diminished  Deep peroneal nerve sensation: diminished  Sural nerve sensation: diminished    Comments  LE are darkened and cool to touch b/l.       Laboratory:  CBC:   Recent Labs   Lab 08/03/20  0300   WBC 11.06   RBC 2.72*   HGB 9.1*   HCT 28.2*      *   MCH 33.5*   MCHC 32.3     CMP:   Recent Labs   Lab 08/03/20  0300      CALCIUM 9.8   ALBUMIN 2.6*   PROT 7.2      K 4.9   CO2 24      BUN 64*   CREATININE 5.2*   ALKPHOS 120   ALT <5*   AST 20   BILITOT 0.5       Diagnostic Results:  I have reviewed all pertinent imaging results/findings within the past 24 hours.    Clinical Findings:  Patient has 1.0x1.0x0.3 cm wound to the plantar right foot, submet 5. The wound probes to bone and is slightly tender. No acute signs of infection currently. The periwound skin is hyperkeratotic.

## 2020-08-03 NOTE — HPI
56yo male with ESRD on HD (T/TH/S), AOCD, Hepatitis C, Methadone use, CVA, CAD s/p RCA and LAD FARHEEN (Plavix non responder on Brilinta), HTN, right eye  blindness, seizure, marijuana use, non-healing DM right foot ulcer DM II and gastroparesis who was transferred to [facility] for evaluation of revascularization for PAD. He was initially transferred to [facility] from [facility] for evaluation of CABG/MVR due to [place]. He was evaluated by CTS who deemed him not to be a surgical candidate due to multiple co morbidities- HD, Hep C, Osteomyelitis, PVD. Therefore he underwent LAD and RCA stenting. He was also noted to have wound and osteomyelitits to right 5th toe therefore vascular surgery was consulted. He underwent CTA with run off to the LE with notation of PAD to bilateral SFAs and popliteal. He was taken for an LE angiogram by Vascular surgery with inability to revascularize given extensive CTOs (flush occlusion right SFA with reconstitution of flow to the mid SFA and popliteal occlusion; occlusion to right AT, peroneal and PT with reconstitution of AT and peroneal with collaterals with patency of AT and peroneal to foot). Vascular surgery recommended  BKA but patient declined therefore patient was transferred to [facility] for evaluation of revascularization. He was started on Heparin with Coumadin bridging due to afib in setting of [place]

## 2020-08-03 NOTE — PLAN OF CARE
08/03/20 0920   Final Note   Assessment Type Final Discharge Note   Anticipated Discharge Disposition Admitted   Hospital Follow Up  Appt(s) scheduled? No   Discharge plans and expectations educations in teach back method with documentation complete? Yes     Pt d/c to [facility] on 8/2.     Julie Haase RN  Case Management [phone]

## 2020-08-03 NOTE — HOSPITAL COURSE
8/2/2020 per HPI  8/3/2020 H/H 9.1/28.2 this AM. BMP with K+ 4.9 BUN 64 creatinine 5.2. Blood culture NGTD. Wound culture to right foot at [facility] with stenotrophomonas maltophilia and MRI with osteo of right 5th metatarsal head. On ASA, statin and Brilinta. Will hold Coumadin for now given INR 3.3. Cardiology consulted for evaluation of revascularization   8/5/2020 INR elevated yesterday at 7-8 with total of 15mg of Vitamin K given. INR down to 1.4 this AM. Will place on IV Heparin given afib and risk for stroke. Will hold BB for now given marginal BP. HD yesterday with 3.5 liters removed-SOB improved.  Needs LE angiogram but deferred given elevated INR, SOB with volume overload and need for records from [facility]. Records received and to be reviewed by staff. Hopeful to proceed soon with LE angiogram   8/6/2020 H/H 9.5/30.9 INR 1.1.BMP with K+ 4.3. Intermittent atrial flutter overnight with no RVR. Remains on oral Metoprolol BID as well as IV Heparin. Outside records extensively reviewed and hopeful to proceed with angiogram tomorrow   8/7/2020: Patient was brought to the cath lab for revascularization procedure was done under general anesthesia in view of these high doses of methadone.  Patient became hypotensive soon after he was intubated. Procedure was aborted.  Patient recovered well. He was sent back to his room  8/8/2020:  Patient was seen this morning in dialysis.  Patient is clinically stable.  No complaints.  Blood pressure 90/56.  We discussed events from 8/7/2020. We will try revascularization on Monday 08/10/2020 with a femoral nerve block and minimal RN sedation.

## 2020-08-03 NOTE — SUBJECTIVE & OBJECTIVE
Interval History:   Awake and alert, just made it back from HD complained of back pain and had just received his pain meds.   Reported tachycardia and a fever of 101.3.- pan culture- ID on board  INR 3.3- d/c Heparin, decrease coumadin dose to 2.5 - ( INR goal is 2.5-3.5      Review of Systems   Constitutional: Negative for chills and fever.   Respiratory: Negative for chest tightness and shortness of breath.    Cardiovascular: Negative for chest pain.   Gastrointestinal: Negative for abdominal distention, abdominal pain, blood in stool, nausea and vomiting.   Genitourinary: Negative for dysuria and hematuria.   Musculoskeletal: Positive for myalgias. Negative for arthralgias.   Skin: Positive for color change and wound. Negative for rash.   Neurological: Negative for dizziness, weakness and headaches.   Psychiatric/Behavioral: Negative for agitation.     Objective:     Vital Signs (Most Recent):  Temp: 98.7 °F (37.1 °C) (08/03/20 0600)  Pulse: 77 (08/03/20 0845)  Resp: 20 (08/03/20 0600)  BP: 114/84 (08/03/20 0845)  SpO2: (!) 92 % (08/03/20 0508) Vital Signs (24h Range):  Temp:  [98.4 °F (36.9 °C)-99.5 °F (37.5 °C)] 98.7 °F (37.1 °C)  Pulse:  [] 77  Resp:  [18-24] 20  SpO2:  [92 %-97 %] 92 %  BP: ()/(58-95) 114/84     Weight: 107.6 kg (237 lb 3.4 oz)  Body mass index is 32.17 kg/m².    Intake/Output Summary (Last 24 hours) at 8/3/2020 0921  Last data filed at 8/3/2020 0600  Gross per 24 hour   Intake --   Output 0 ml   Net 0 ml      Physical Exam  Constitutional:       Appearance: He is well-developed.   HENT:      Head: Normocephalic and atraumatic.   Neck:      Musculoskeletal: Neck supple.   Cardiovascular:      Rate and Rhythm: Normal rate and regular rhythm.      Heart sounds: Normal heart sounds. No murmur. No friction rub. No gallop.    Pulmonary:      Effort: Pulmonary effort is normal.      Breath sounds: Normal breath sounds.      Comments: NC   Chest:      Chest wall: No tenderness.    Abdominal:      General: Bowel sounds are normal. There is no distension.      Palpations: Abdomen is soft.      Tenderness: There is no abdominal tenderness.      Comments: obese   Musculoskeletal:      Comments: R foot with dressing   Skin:     General: Skin is warm and dry.      Capillary Refill: Capillary refill takes 2 to 3 seconds.      Comments: Wound dressing clean and dry   Neurological:      Mental Status: He is alert and oriented to person, place, and time.   Psychiatric:         Mood and Affect: Mood normal.         Significant Labs:   Blood Culture:   Recent Labs   Lab 08/02/20  0541   LABBLOO No Growth to date  No Growth to date     CBC:   Recent Labs   Lab 08/02/20 0541 08/03/20  0300   WBC 10.21 11.06   HGB 9.4* 9.1*   HCT 31.2* 28.2*    173     CMP:   Recent Labs   Lab 08/02/20 0541 08/03/20  0300    137   K 4.9 4.9    100   CO2 25 24   GLU 80 107   BUN 39* 64*   CREATININE 3.6* 5.2*   CALCIUM 9.8 9.8   PROT 7.3 7.2   ALBUMIN 2.7* 2.6*   BILITOT 0.5 0.5   ALKPHOS 139* 120   AST 24 20   ALT 5* <5*   ANIONGAP 11 13   EGFRNONAA 18.1* 12*     Lactic Acid: No results for input(s): LACTATE in the last 48 hours.  Magnesium: No results for input(s): MG in the last 48 hours.  Troponin: No results for input(s): TROPONINI in the last 48 hours.  TSH: No results for input(s): TSH in the last 4320 hours.  Urine Culture: No results for input(s): LABURIN in the last 48 hours.  Urine Studies: No results for input(s): COLORU, APPEARANCEUA, PHUR, SPECGRAV, PROTEINUA, GLUCUA, KETONESU, BILIRUBINUA, OCCULTUA, NITRITE, UROBILINOGEN, LEUKOCYTESUR, RBCUA, WBCUA, BACTERIA, SQUAMEPITHEL, HYALINECASTS in the last 48 hours.    Invalid input(s): WRIGHTSUR    Significant Imaging: I have reviewed and interpreted all pertinent imaging results/findings within the past 24 hours.

## 2020-08-03 NOTE — ASSESSMENT & PLAN NOTE
- echo 7/24/2020 with mild AS- MAYA 1.57cm2; mean gradient 24mmHg peak velocity 3.31  - will need close monitoring with regular echos

## 2020-08-03 NOTE — ASSESSMENT & PLAN NOTE
PVD (peripheral vascular disease)  Atherosclerosis of native artery of right lower extremity with ulceration of midfoot    CTA A/P with BLE runoff to better define surgical anatomy  Consult cardiology- for possible revascularization and potential limb salvage  Wound cx with stenotrophomonas  Continue Ceftazidime x 6 weeks recs now, end date 9/11  Continue wound care  Consult cardiology  Consult podiatry  Consult ID  Pan culture on 8/3

## 2020-08-03 NOTE — PLAN OF CARE
VN rounds:  VN cued into pt's room with pt's permission, role of VN explained to pt.  Pt resting in bed In low position with bed alarm in place, call bell within reach.  VN instructed to call for assistance.   Pt is alert, reports fatigue,  Pain reported to right foot, pt received Oxycodone 1 hour earlier,  Reports slight relief of pain ad denies nausea or dyspnea, Oxygen infusing per NC>   No acute distress noted.  Pt's chart, labs and vital signs reviewed.  Allowed time for questions.  Dr. Avalos informed of Severe Sepsis alert and plans for blood cultures.  Will continue to be available and intervene as needed.

## 2020-08-04 PROBLEM — R79.1 ELEVATED INR: Status: ACTIVE | Noted: 2020-01-01

## 2020-08-04 NOTE — PLAN OF CARE
Notified by nurse that INR is 7, no signs of active bleeding.   Continue to monitor, ordered repeat INR.

## 2020-08-04 NOTE — PROGRESS NOTES
[facility] Infectious Disease Progress Note     Primary Team:   Consultant Attending: Dr. Burden  Date of Admit: 8/2/2020    Summary of history     João Aguirre is a 55 y.o. male with a relevant history of ESRD (T,Th,Sat), anema, HTN, CVA, PCI s/p 2 stents ([id number]), R eye blindness, non-healing ulcer of R foot 2/2 DM, gastroparesis.     The patient was transferred [facility] to [facility] for evaluation of possible revascularization of severe PAD. Pt was originally seen at [facility] in [place] for chest pain & evaluation for CABG/MVR in the setting of [place]. Due to his multiple co-morbidities he was deemed not a surgical candidate and instead had two stents placed and was sent to [facility]. At the same time pt also found to have MRI evidence of osteomyelitis of the R 5th metatarsal bone. Bone cultures were positive for Stenotrophomonas maltophila.    Interval events     In the interim patient was in dialysis today. Temp of 101.4 noted yesterday morning and blood cultures were obtained that are NGTD. Attempt at RLE revascularization planned for Wednesday.    Subjective     Unable to obtain due to patient being out of room    Current Medications:     Infusions:       Scheduled:   sodium chloride 0.9%   Intravenous Once    amLODIPine  10 mg Oral Daily    aspirin  81 mg Oral Daily    atorvastatin  40 mg Oral Daily    cadexomer iodine   Topical (Top) Every Mon, Wed, Fri    ceftAZIDime (FORTAZ) IVPB  1 g Intravenous Q24H    epoetin harshad-epbx  10,000 Units Intravenous Every Mon, Wed, Fri    gabapentin  100 mg Oral BID    levETIRAcetam  500 mg Oral BID    methadone  100 mg Oral Daily    mupirocin   Nasal BID    pantoprazole  40 mg Oral Before breakfast    phytonadione  10 mg Oral Once    polyethylene glycol  17 g Oral Daily    sevelamer carbonate  1,600 mg Oral TID WM    ticagrelor  90 mg Oral BID        PRN:  sodium chloride 0.9%, acetaminophen,  albuterol-ipratropium, bisacodyL, calcium carbonate, calcium gluconate IVPB, calcium gluconate IVPB, ondansetron, oxyCODONE, oxyCODONE, promethazine, sodium chloride 0.9%    Antibiotics and Day Number of Therapy:  Vanc -  Cafazolin   Cefepime   Ceftazadime -current    Objective   Last 24 Hour Vital Signs:  BP  Min: 90/77  Max: 155/69  Temp  Av °F (36.7 °C)  Min: 97.3 °F (36.3 °C)  Max: 99.2 °F (37.3 °C)  Pulse  Av.1  Min: 59  Max: 80  Resp  Av.1  Min: 16  Max: 18  SpO2  Av %  Min: 97 %  Max: 100 %    Lines, drains, airway:  Peripheral Intravenous Line         Peripheral IV - Single Lumen 20 0458 20 G Anterior;Right Shoulder 5 days        Peripheral IV - Single Lumen 20 1846 20 G Anterior;Proximal;Right Forearm 3 days   Drain         Hemodialysis AV Fistula Left forearm -- days       Physical Examination:    Patient out of room, could not examine today    Lab data:  CBC:   Lab Results   Component Value Date    WBC 9.71 2020    HGB 8.8 (L) 2020     2020     (H) 2020    RDW 17.0 (H) 2020       Estimated Creatinine Clearance: 25.5 mL/min (A) (based on SCr of 4.2 mg/dL (H)).    Microbiology Data  Procedure Component Value Units Date/Time   Blood culture [[id number]] Collected: 20 1358   Order Status: Completed Specimen: Blood Updated: 20 0115    Blood Culture, Routine No Growth to date   Blood culture [[id number]] Collected: 20   Order Status: Completed Specimen: Blood Updated: 20 1212    Blood Culture, Routine No Growth to date     No Growth to date     No Growth to date   Blood culture [[id number]] Collected: 20   Order Status: Completed Specimen: Blood Updated: 20 1212    Blood Culture, Routine No Growth to date     No Growth to date     No Growth to date   Culture, Anaerobe [[id number]] Collected: 20 1415   Order Status: Completed Specimen: Bone from Foot, Right Updated:  08/03/20 1012    Anaerobic Culture No anaerobes isolated   Aerobic culture [[id number]] (Abnormal)  Collected: 07/29/20 1415   Order Status: Completed Specimen: Bone from Foot, Right Updated: 08/01/20 1144    Aerobic Bacterial Culture STENOTROPHOMONAS (X.) MALTOPHILIA   Moderate   Abnormal    Susceptibility     Stenotrophomonas (X.) Maltophilia     CULTURE, AEROBIC  (SPECIFY SOURCE)     Ceftazidime 8 mcg/mL Sensitive     Trimeth/Sulfa <=2/38 mcg/mL Sensitive            Linear View         Gram stain [[id number]] Collected: 07/29/20 1415   Order Status: Completed Specimen: Bone from Foot, Right Updated: 07/29/20 2051    Gram Stain Result No WBC's     No organisms seen   AFB Culture & Smear [[id number]] Collected: 07/29/20 1415   Order Status: Completed Specimen: Bone from Foot, Right Updated: 07/30/20 2127    AFB Culture & Smear Culture in progress    AFB CULTURE STAIN No acid fast bacilli seen.   Fungus culture [[id number]] Collected: 07/29/20 1415   Order Status: Sent Specimen: Bone from Foot, Right Updated: 07/29/20 1553     Assessment     João Aguirre is a 55 y.o. male with osteomyelitis of the R 5th metatarsal bone, extensive bilateral PAD & atherosclerosis of RLE. Cardiology planning revascularization on 8/5. He will need to continue a 6 week course of ceftazidime for current osteo with tentative end date of of 9/12.       Recommendations     Osteomyelitis of right foot 2/2 stenotrophomonas  - Continue ceftazidime, current start date is 8/1, will need 6 weeks with tentative end date of 9/12    Fever  - Follow up 8/3 blood cultures  - 8/3 CXR with increasing bilateral opacification which may be related to fluid overload, this may resolve with dialysis, if fever reoccurs recommend repeating CXR  - Obtain UA with reflex to culture if patient febrile again    Thank you for this consult. We will follow.    Ryne Edwards DO  [facility] ID Fellow

## 2020-08-04 NOTE — CARE UPDATE
INR 7.3 this AM with repeat INR 7.7. Coumadin on hold. Will give oral Vitamin K 5mg po today and repeat INR. WIll need INR to be less than 1.5 in order to proceed with LE angiogram. Once INR trends down may need to be started on IV Heparin.     SOB noted on exam and reports orthopnea and PND yesterday. ESRD patient with Nephrology on board and feel fluid removal needed with HD.

## 2020-08-04 NOTE — PLAN OF CARE
VN rounds:  VN cued into pt's room with pt's permission, pt is alert, sitting on edge of bed with bed alarm in place, non-skid socks and call bell within reach.  Pt denies pain, anxiety or dyspnea.  No acute distress noted.  Pt's chart, labs and vital signs reviewed.  Allowed time for questions.  Will continue to be available and intervene as needed.

## 2020-08-04 NOTE — SUBJECTIVE & OBJECTIVE
Interval History: f/u INR level    Review of Systems   Constitutional: Positive for activity change, fatigue and fever.   HENT: Negative for congestion.    Respiratory: Negative for cough and shortness of breath.    Cardiovascular: Negative for chest pain and palpitations.   Gastrointestinal: Negative for abdominal distention.   Neurological: Negative for dizziness and headaches.     Objective:     Vital Signs (Most Recent):  Temp: 97.3 °F (36.3 °C) (08/04/20 1542)  Pulse: 65 (08/04/20 1547)  Resp: 16 (08/04/20 1800)  BP: 115/70 (08/04/20 1542)  SpO2: 100 % (08/04/20 1542) Vital Signs (24h Range):  Temp:  [97.3 °F (36.3 °C)-98.2 °F (36.8 °C)] 97.3 °F (36.3 °C)  Pulse:  [59-80] 65  Resp:  [16-18] 16  SpO2:  [97 %-100 %] 100 %  BP: ()/(52-80) 115/70     Weight: 107.6 kg (237 lb 3.4 oz)  Body mass index is 32.17 kg/m².    Intake/Output Summary (Last 24 hours) at 8/4/2020 1838  Last data filed at 8/4/2020 1430  Gross per 24 hour   Intake 500 ml   Output 3400 ml   Net -2900 ml      Physical Exam  Vitals signs and nursing note reviewed.   Constitutional:       Appearance: Normal appearance.   HENT:      Head: Normocephalic and atraumatic.   Eyes:      Extraocular Movements: Extraocular movements intact.   Neck:      Musculoskeletal: Normal range of motion.   Cardiovascular:      Rate and Rhythm: Normal rate.   Pulmonary:      Effort: Pulmonary effort is normal.   Neurological:      Mental Status: He is alert and oriented to person, place, and time.   Psychiatric:         Behavior: Behavior normal.         Thought Content: Thought content normal.         Significant Labs:   Recent Labs   Lab 08/02/20  0541 08/03/20  0300 08/04/20  0306   WBC 10.21 11.06 9.71   HGB 9.4* 9.1* 8.8*   HCT 31.2* 28.2* 28.7*    173 176     Recent Labs   Lab 07/29/20  0419  07/30/20  0353  07/31/20  0422  08/02/20  0541 08/03/20  0300 08/04/20  0306     136   < > 139  138   < > 134*  134*   < > 140 137 137   K 5.7*  5.7*   5.7*   < > 4.6  4.6  4.6   < > 5.5*  5.5*  5.5*   < > 4.9 4.9 4.8   CL 99  99   < > 103  103   < > 99  99   < > 104 100 101   CO2 23  23   < > 26  25   < > 22*  22*   < > 25 24 26   BUN 48*  48*   < > 30*  30*   < > 52*  52*   < > 39* 64* 48*   CREATININE 4.8*  4.8*   < > 3.6*  3.6*   < > 5.3*  5.1*   < > 3.6* 5.2* 4.2*   GLU 69*  69*   < > 89  90   < > 73  75   < > 80 107 98   CALCIUM 8.9  8.9   < > 9.2  8.8   < > 9.2  9.2   < > 9.8 9.8 9.8   MG 2.2  --  2.0  --  2.1  --   --   --   --    PHOS 6.2*  --  4.9*  --  6.5*  --   --   --   --     < > = values in this interval not displayed.     Recent Labs   Lab 08/02/20  0541 08/03/20  0300 08/04/20  0306 08/04/20  0533 08/04/20  0819 08/04/20  1408   ALKPHOS 139* 120 112  --   --   --    ALT 5* <5* <5*  --   --   --    AST 24 20 19  --   --   --    ALBUMIN 2.7* 2.6* 2.5*  --   --   --    PROT 7.3 7.2 7.3  --   --   --    BILITOT 0.5 0.5 0.5  --   --   --    INR 1.5* 3.3* 7.3* 7.5* 7.6* 8.3*      No results for input(s): CPK, CPKMB, MB, TROPONINI in the last 72 hours.  Recent Labs   Lab 08/03/20  0507 08/03/20  1141 08/03/20  1654 08/03/20  1941 08/04/20  0531 08/04/20  1607   POCTGLUCOSE 104 101 94 143* 93 110     Hemoglobin A1C   Date Value Ref Range Status   07/30/2020 4.6 4.0 - 5.6 % Final     Comment:     ADA Screening Guidelines:  5.7-6.4%  Consistent with prediabetes  >or=6.5%  Consistent with diabetes  High levels of fetal hemoglobin interfere with the HbA1C  assay. Heterozygous hemoglobin variants (HbS, HgC, etc)do  not significantly interfere with this assay.   However, presence of multiple variants may affect accuracy.     08/27/2018 <4.0 (A) 4.0 - 5.6 % Final     Comment:     ADA Screening Guidelines:  5.7-6.4%  Consistent with prediabetes  >or=6.5%  Consistent with diabetes  High levels of fetal hemoglobin interfere with the HbA1C  assay. Heterozygous hemoglobin variants (HbS, HgC, etc)do  not significantly interfere with this assay.    However, presence of multiple variants may affect accuracy.     03/01/2017 4.4 (L) 4.5 - 6.2 % Final     Comment:     According to ADA guidelines, hemoglobin A1C <7.0% represents  optimal control in non-pregnant diabetic patients.  Different  metrics may apply to specific populations.   Standards of Medical Care in Diabetes - 2016.  For the purpose of screening for the presence of diabetes:  <5.7%     Consistent with the absence of diabetes  5.7-6.4%  Consistent with increasing risk for diabetes   (prediabetes)  >or=6.5%  Consistent with diabetes  Currently no consensus exists for use of hemoglobin A1C  for diagnosis of diabetes for children.         No results for input(s): COLORU, CLARITYU, SPECGRAV, PHUR, PROTEINUA, GLUCOSEU, BLOODU, WBCU, RBCU, BACTERIA, MUCUS in the last 24 hours.    Invalid input(s):  BILIRUBINCON    Microbiology Results (last 7 days)     Procedure Component Value Units Date/Time    Blood culture [[id number]] Collected: 08/03/20 1358    Order Status: Completed Specimen: Blood Updated: 08/04/20 0115     Blood Culture, Routine No Growth to date          Scheduled Meds:   sodium chloride 0.9%   Intravenous Once    amLODIPine  10 mg Oral Daily    aspirin  81 mg Oral Daily    atorvastatin  40 mg Oral Daily    cadexomer iodine   Topical (Top) Every Mon, Wed, Fri    ceftAZIDime (FORTAZ) IVPB  1 g Intravenous Q24H    epoetin harshad-epbx  10,000 Units Intravenous Every Mon, Wed, Fri    gabapentin  100 mg Oral BID    levETIRAcetam  500 mg Oral BID    methadone  100 mg Oral Daily    mupirocin   Nasal BID    pantoprazole  40 mg Oral Before breakfast    polyethylene glycol  17 g Oral Daily    sevelamer carbonate  1,600 mg Oral TID WM    ticagrelor  90 mg Oral BID     Continuous Infusions:  As Needed: sodium chloride 0.9%, acetaminophen, albuterol-ipratropium, bisacodyL, calcium carbonate, calcium gluconate IVPB, calcium gluconate IVPB, ondansetron, oxyCODONE, oxyCODONE, promethazine, sodium  chloride 0.9%    Significant Imaging:   No new imaging

## 2020-08-04 NOTE — NURSING
Shift uneventful  Vital signs stable  Oxycodone administered x1, relief achieved.  Currently running afib per cardiac monitor  Notified per lab of critical PT/INR 70.5/7.31.Informed Betty Brooks. Instructed to notify cardiology at 7am  No other significant changes. Prepared to transfer care to [facility] day nurse

## 2020-08-04 NOTE — HOSPITAL COURSE
Admitted for second opinion regarding AVR and CABG, vascular surgery at [facility] recommends BKA but patient declined, s/p cath with stent placement on 7/31 and started on heparin and then transfer to [facility] with plan for balloon angioplasty. On arrival, patient with elevated INR. Revascularization rescheduled for 8/7 but aborted once due to hypotension in the OR, started on Midodrine and rescheduled for 8/10. Patient continue with routine HD. He is s/p revascularization of the LE with nerve block on 8/10 and continue on IV antibiotics   8/11 patient with persistent hypoglycemia, had multiple Dex 50%, dex 10% drip, meals with marginal increase, reported afib , then bradycardic with subsequent emergent intubation. Patient declined clinically with severe hypotension and was maxed out on three pressors- vaso, Edwar and levophed. Started on CRRT with worsening acidosis and hyperkalemia. Family- spouse (Soledad) and mother (Andreia) updated over the phone and then visited. Treatment team (primary and cardiology) with palliative care had a family (spouse and son) meeting to address goals of care. Disease education and prognosis provided, questions and concerns addressed.  Given patient extensive comorbidities and declining clinical status family decided to transition to comfort measures

## 2020-08-04 NOTE — PROGRESS NOTES
[facility] Hospital Medicine  Progress Note    Patient Name: João Aguirre  MRN: [id number]  Patient Class: IP- Inpatient   Admission Date: 8/2/2020  Length of Stay: 2 days  Attending Physician: Jose Bai DO  Primary Care Provider: Primary Doctor No        Subjective:     Principal Problem:Acute osteomyelitis of metatarsal bone, right        HPI:  Patient is a 55/y/o M with PMH of ESRD on HD (T/TH/S), anemia, uncontrolled HTN, hx CVA, PCI s/p 2 stents placement, right eye blindness, seizure, marijuana use, non-healing DM right foot ulcer DM II and gastroparesis, initially presented to [facility] from [place] for second opinion regarding AVR and CABG. Patient noted several weeks history of chest tightness with dizziness, palpitation, and increasing SOB. Denies fever, chills, nausea, vomiting. Seen by CTS and recommended patient not a CABG candidate due to multiple co morbidities- HD, Hep C, Osteomyelitis, PVD. s/p cath with stent placement on 7/31. Vascular surgery recommends BKA but patient declined, patient started on heparin drip and bridging with Warfarin (on Eliquis prior) with plan for balloon angioplasty on Tuesday    Overview/Hospital Course:  8/4 pt seen in HD, pt INR is elevated, given vit K, appreciate cards inputs    Interval History: f/u INR level    Review of Systems   Constitutional: Positive for activity change, fatigue and fever.   HENT: Negative for congestion.    Respiratory: Negative for cough and shortness of breath.    Cardiovascular: Negative for chest pain and palpitations.   Gastrointestinal: Negative for abdominal distention.   Neurological: Negative for dizziness and headaches.     Objective:     Vital Signs (Most Recent):  Temp: 97.3 °F (36.3 °C) (08/04/20 1542)  Pulse: 65 (08/04/20 1547)  Resp: 16 (08/04/20 1800)  BP: 115/70 (08/04/20 1542)  SpO2: 100 % (08/04/20 1542) Vital Signs (24h Range):  Temp:  [97.3 °F (36.3 °C)-98.2 °F (36.8 °C)] 97.3 °F  (36.3 °C)  Pulse:  [59-80] 65  Resp:  [16-18] 16  SpO2:  [97 %-100 %] 100 %  BP: ()/(52-80) 115/70     Weight: 107.6 kg (237 lb 3.4 oz)  Body mass index is 32.17 kg/m².    Intake/Output Summary (Last 24 hours) at 8/4/2020 1838  Last data filed at 8/4/2020 1430  Gross per 24 hour   Intake 500 ml   Output 3400 ml   Net -2900 ml      Physical Exam  Vitals signs and nursing note reviewed.   Constitutional:       Appearance: Normal appearance.   HENT:      Head: Normocephalic and atraumatic.   Eyes:      Extraocular Movements: Extraocular movements intact.   Neck:      Musculoskeletal: Normal range of motion.   Cardiovascular:      Rate and Rhythm: Normal rate.   Pulmonary:      Effort: Pulmonary effort is normal.   Neurological:      Mental Status: He is alert and oriented to person, place, and time.   Psychiatric:         Behavior: Behavior normal.         Thought Content: Thought content normal.         Significant Labs:   Recent Labs   Lab 08/02/20  0541 08/03/20  0300 08/04/20  0306   WBC 10.21 11.06 9.71   HGB 9.4* 9.1* 8.8*   HCT 31.2* 28.2* 28.7*    173 176     Recent Labs   Lab 07/29/20  0419  07/30/20  0353  07/31/20  0422  08/02/20  0541 08/03/20  0300 08/04/20  0306     136   < > 139  138   < > 134*  134*   < > 140 137 137   K 5.7*  5.7*  5.7*   < > 4.6  4.6  4.6   < > 5.5*  5.5*  5.5*   < > 4.9 4.9 4.8   CL 99  99   < > 103  103   < > 99  99   < > 104 100 101   CO2 23  23   < > 26  25   < > 22*  22*   < > 25 24 26   BUN 48*  48*   < > 30*  30*   < > 52*  52*   < > 39* 64* 48*   CREATININE 4.8*  4.8*   < > 3.6*  3.6*   < > 5.3*  5.1*   < > 3.6* 5.2* 4.2*   GLU 69*  69*   < > 89  90   < > 73  75   < > 80 107 98   CALCIUM 8.9  8.9   < > 9.2  8.8   < > 9.2  9.2   < > 9.8 9.8 9.8   MG 2.2  --  2.0  --  2.1  --   --   --   --    PHOS 6.2*  --  4.9*  --  6.5*  --   --   --   --     < > = values in this interval not displayed.     Recent Labs   Lab 08/02/20  0568  08/03/20  0300 08/04/20  0306 08/04/20  0533 08/04/20  0819 08/04/20  1408   ALKPHOS 139* 120 112  --   --   --    ALT 5* <5* <5*  --   --   --    AST 24 20 19  --   --   --    ALBUMIN 2.7* 2.6* 2.5*  --   --   --    PROT 7.3 7.2 7.3  --   --   --    BILITOT 0.5 0.5 0.5  --   --   --    INR 1.5* 3.3* 7.3* 7.5* 7.6* 8.3*      No results for input(s): CPK, CPKMB, MB, TROPONINI in the last 72 hours.  Recent Labs   Lab 08/03/20  0507 08/03/20  1141 08/03/20  1654 08/03/20  1941 08/04/20  0531 08/04/20  1607   POCTGLUCOSE 104 101 94 143* 93 110     Hemoglobin A1C   Date Value Ref Range Status   07/30/2020 4.6 4.0 - 5.6 % Final     Comment:     ADA Screening Guidelines:  5.7-6.4%  Consistent with prediabetes  >or=6.5%  Consistent with diabetes  High levels of fetal hemoglobin interfere with the HbA1C  assay. Heterozygous hemoglobin variants (HbS, HgC, etc)do  not significantly interfere with this assay.   However, presence of multiple variants may affect accuracy.     08/27/2018 <4.0 (A) 4.0 - 5.6 % Final     Comment:     ADA Screening Guidelines:  5.7-6.4%  Consistent with prediabetes  >or=6.5%  Consistent with diabetes  High levels of fetal hemoglobin interfere with the HbA1C  assay. Heterozygous hemoglobin variants (HbS, HgC, etc)do  not significantly interfere with this assay.   However, presence of multiple variants may affect accuracy.     03/01/2017 4.4 (L) 4.5 - 6.2 % Final     Comment:     According to ADA guidelines, hemoglobin A1C <7.0% represents  optimal control in non-pregnant diabetic patients.  Different  metrics may apply to specific populations.   Standards of Medical Care in Diabetes - 2016.  For the purpose of screening for the presence of diabetes:  <5.7%     Consistent with the absence of diabetes  5.7-6.4%  Consistent with increasing risk for diabetes   (prediabetes)  >or=6.5%  Consistent with diabetes  Currently no consensus exists for use of hemoglobin A1C  for diagnosis of diabetes for  children.         No results for input(s): COLORU, CLARITYU, SPECGRAV, PHUR, PROTEINUA, GLUCOSEU, BLOODU, WBCU, RBCU, BACTERIA, MUCUS in the last 24 hours.    Invalid input(s):  BILIRUBINCON    Microbiology Results (last 7 days)     Procedure Component Value Units Date/Time    Blood culture [[id number]] Collected: 08/03/20 1357    Order Status: Completed Specimen: Blood Updated: 08/04/20 0115     Blood Culture, Routine No Growth to date          Scheduled Meds:   sodium chloride 0.9%   Intravenous Once    amLODIPine  10 mg Oral Daily    aspirin  81 mg Oral Daily    atorvastatin  40 mg Oral Daily    cadexomer iodine   Topical (Top) Every Mon, Wed, Fri    ceftAZIDime (FORTAZ) IVPB  1 g Intravenous Q24H    epoetin harshad-epbx  10,000 Units Intravenous Every Mon, Wed, Fri    gabapentin  100 mg Oral BID    levETIRAcetam  500 mg Oral BID    methadone  100 mg Oral Daily    mupirocin   Nasal BID    pantoprazole  40 mg Oral Before breakfast    polyethylene glycol  17 g Oral Daily    sevelamer carbonate  1,600 mg Oral TID WM    ticagrelor  90 mg Oral BID     Continuous Infusions:  As Needed: sodium chloride 0.9%, acetaminophen, albuterol-ipratropium, bisacodyL, calcium carbonate, calcium gluconate IVPB, calcium gluconate IVPB, ondansetron, oxyCODONE, oxyCODONE, promethazine, sodium chloride 0.9%    Significant Imaging:   No new imaging        Assessment/Plan:      * Acute osteomyelitis of metatarsal bone, right  PVD (peripheral vascular disease)  Atherosclerosis of native artery of right lower extremity with ulceration of midfoot    CTA A/P with BLE runoff to better define surgical anatomy  Consult cardiology- for possible revascularization and potential limb salvage  Wound cx with stenotrophomonas  Continue Ceftazidime x 6 weeks recs now, end date 9/11  Continue wound care  Consult cardiology  Consult podiatry  Consult ID  Pan culture on 8/3      Elevated INR  8/4 cards ordered vit K  F/u labs  No procedures at  this time      Osteomyelitis of right foot        Atrial flutter  On Brilinta, asa and Warfarin      (HFpEF) heart failure with preserved ejection fraction  Ischemic cardiomyopathy, LVEF 25%-30%  Stable    ESRD (end stage renal disease) on dialysis   HD on TTSs  Consult nephrology   Renally dose medication  Continue sevelamer      Nonrheumatic aortic (valve) stenosis  History of coronary artery stent placement  ECHO shows mild aortic valve stenosis.   currently high risk for cardiac surgery secondary to cirrhosis (plt 149) and osteomyelitis   [id number]   Was on Eliquis now switched to Warfarin   Continue heparin with coumadin bridge. D/c heparin on 8/3. Decrease Warfarin dose to 2.5  INR goal 2.5-3.5  Continue Brilinta   Continue ASA x 1 week end date     Type 2 diabetes mellitus with chronic kidney disease on chronic dialysis, without long-term current use of insulin  HbA1c 4.3   Low dose SSI  accucheck  Diabetic renal diet      Seizure disorder  Continue Keppra      Benign hypertension with ESRD (end-stage renal disease)  Continue Norvasc        Cirrhosis of liver with ascites  Chronic hepatitis C without hepatic coma  history of HCV and cirrhosis   stable    Chronic back pain  Methadone dependence  Continue methadon, Oxycodone, neurontin    Blindness of right eye  stable      Anemia in chronic kidney disease  Stable  Monitor        VTE Risk Mitigation (From admission, onward)    None                Jose Bai DO  Department of Hospital Medicine   [facility]

## 2020-08-05 PROBLEM — I48.91 ATRIAL FIBRILLATION: Status: ACTIVE | Noted: 2020-01-01

## 2020-08-05 NOTE — PROGRESS NOTES
[facility]  Cardiology  Progress Note    Patient Name: João Aguirre  MRN: [id number]  Admission Date: 8/2/2020  Hospital Length of Stay: 3 days  Code Status: Full Code   Attending Physician: Jose Bai DO   Primary Care Physician: Primary Doctor No  Expected Discharge Date: 8/7/2020  Principal Problem:Acute osteomyelitis of metatarsal bone, right    Subjective:     Hospital Course:   8/2/2020 per HPI  8/3/2020 H/H 9.1/28.2 this AM. BMP with K+ 4.9 BUN 64 creatinine 5.2. Blood culture NGTD. Wound culture to right foot at [facility] with stenotrophomonas maltophilia and MRI with osteo of right 5th metatarsal head. On ASA, statin and Brilinta. Will hold Coumadin for now given INR 3.3. Cardiology consulted for evaluation of revascularization   8/5/2020 INR elevated yesterday at 7-8 with total of 15mg of Vitamin K given. INR down to 1.4 this AM. Will place on IV Heparin given afib and risk for stroke. Will hold BB for now given marginal BP. HD yesterday with 3.5 liters removed-SOB improved.  Needs LE angiogram but deferred given elevated INR, SOB with volume overload and need for records from [facility]. Records received and to be reviewed by staff. Hopeful to proceed soon with LE angiogram         Review of Systems   Constitution: Positive for malaise/fatigue. Negative for chills, decreased appetite, diaphoresis, fever, weight gain and weight loss.   Cardiovascular: Positive for dyspnea on exertion. Negative for chest pain, claudication, irregular heartbeat, leg swelling, near-syncope, orthopnea, palpitations, paroxysmal nocturnal dyspnea and syncope.   Respiratory: Negative for cough, shortness of breath, snoring, sputum production and wheezing.    Endocrine: Negative for cold intolerance, heat intolerance, polydipsia, polyphagia and polyuria.   Skin: Negative for color change, dry skin, itching, nail changes and poor wound healing.   Musculoskeletal: Negative for back  pain, gout, joint pain and joint swelling.   Gastrointestinal: Negative for bloating, abdominal pain, constipation, diarrhea, hematemesis, hematochezia, melena, nausea and vomiting.   Genitourinary: Negative for dysuria, hematuria and nocturia.   Neurological: Negative for dizziness, headaches, light-headedness, numbness, paresthesias and weakness.   Psychiatric/Behavioral: Negative for altered mental status, depression and memory loss.     Objective:     Vital Signs (Most Recent):  Temp: 97.5 °F (36.4 °C) (08/05/20 1220)  Pulse: (!) 138 (08/05/20 1220)  Resp: 18 (08/05/20 1220)  BP: (!) 86/66 (08/05/20 0750)  SpO2: 100 % (08/05/20 1220) Vital Signs (24h Range):  Temp:  [96.7 °F (35.9 °C)-98.4 °F (36.9 °C)] 97.5 °F (36.4 °C)  Pulse:  [] 138  Resp:  [16-20] 18  SpO2:  [97 %-100 %] 100 %  BP: ()/(58-78) 86/66     Weight: 107.6 kg (237 lb 3.4 oz)  Body mass index is 32.17 kg/m².     SpO2: 100 %  O2 Device (Oxygen Therapy): nasal cannula      Intake/Output Summary (Last 24 hours) at 8/5/2020 1237  Last data filed at 8/4/2020 1800  Gross per 24 hour   Intake 860 ml   Output 3400 ml   Net -2540 ml       Lines/Drains/Airways     Drain                 Hemodialysis AV Fistula Left forearm -- days         Hemodialysis AV Fistula Left forearm -- days         Hemodialysis AV Fistula Left forearm -- days          Peripheral Intravenous Line                 Peripheral IV - Single Lumen 07/30/20 0458 20 G Anterior;Right Shoulder 6 days         Peripheral IV - Single Lumen 07/31/20 1846 20 G Anterior;Proximal;Right Forearm 4 days                Physical Exam   Constitutional: He is oriented to person, place, and time. He has a sickly appearance. He appears ill.   Cardiovascular: Normal rate. An irregularly irregular rhythm present. Exam reveals no gallop.   No murmur heard.  Pulmonary/Chest: Effort normal and breath sounds normal. No respiratory distress. He has no wheezes.   Abdominal: Soft. Bowel sounds are normal. He  exhibits no distension. There is no abdominal tenderness.   Neurological: He is alert and oriented to person, place, and time.   Skin: Skin is warm and dry.       Significant Labs:     Recent Labs   Lab 08/05/20  0437   WBC 9.68   RBC 3.01*   HGB 10.0*   HCT 31.4*      *   MCH 33.2*   MCHC 31.8*     Recent Labs   Lab 08/05/20  0436      K 4.8      CO2 21*   BUN 37*   CREATININE 4.1*       Significant Imaging: Echocardiogram:   Transthoracic echo (TTE) complete (Cupid Only):   Results for orders placed or performed during the hospital encounter of 07/24/20   Echo Color Flow Doppler? Yes   Result Value Ref Range    Ascending aorta 3.99 cm    STJ 3.70 cm    AV mean gradient 24 mmHg    Ao peak kobi 3.31 m/s    Ao VTI 69.42 cm    IVS 0.86 0.6 - 1.1 cm    LA size 5.77 cm    Left Atrium Major Axis 7.40 cm    Left Atrium Minor Axis 7.49 cm    LVIDD 6.38 (A) 3.5 - 6.0 cm    LVIDS 5.02 (A) 2.1 - 4.0 cm    LVOT diameter 2.15 cm    LVOT peak VTI 30.00 cm    PW 1.13 (A) 0.6 - 1.1 cm    MV Peak A Kobi 2.17 m/s    E wave decelartion time 293.42 msec    MV Peak E Kobi 2.90 m/s    RA Major Axis 5.21 cm    RA Width 3.56 cm    Sinus 4.04 cm    TAPSE 2.21 cm    TR Max Kobi 3.43 m/s    TDI LATERAL 0.04 m/s    TDI SEPTAL 0.03 m/s    LA WIDTH 5.63 cm    MV stenosis pressure 1/2 time 88.92 ms    LV Diastolic Volume 206.86 mL    LV Systolic Volume 119.49 mL    RV S' 11.57 cm/s    LVOT peak kobi 1.54 m/s    LV LATERAL E/E' RATIO 72.50 m/s    LV SEPTAL E/E' RATIO 96.67 m/s    FS 21 %    LA volume 205.57 cm3    LV mass 272.40 g    Left Ventricle Relative Wall Thickness 0.35 cm    AV valve area 1.57 cm2    AV Velocity Ratio 0.47     AV index (prosthetic) 0.43     MV valve area p 1/2 method 2.47 cm2    E/A ratio 1.34     Mean e' 0.04 m/s    LVOT area 3.6 cm2    LVOT stroke volume 108.86 cm3    AV peak gradient 44 mmHg    E/E' ratio 82.86 m/s    LV Systolic Volume Index 53.9 mL/m2    LV Diastolic Volume Index 93.25 mL/m2     LA Volume Index 92.7 mL/m2    LV Mass Index 123 g/m2    Triscuspid Valve Regurgitation Peak Gradient 47 mmHg    BSA 2.25 m2    Right Atrial Pressure (from IVC) 15 mmHg    MV mean gradient 15 mmHg    MV peak gradient 33 mmHg    TV rest pulmonary artery pressure 62 mmHg    HR echo 73 bpm    Narrative    · Eccentric left ventricular hypertrophy.  · Low normal left ventricular systolic function. The estimated ejection   fraction is 50%.  · Local segmental wall motion abnormalities.  · Normal right ventricular systolic function.  · Severe left atrial enlargement.  · Mild aortic valve stenosis. Aortic valve area is 1.57 cm2; peak velocity   is 3.31 m/s; mean gradient is 24 mmHg.  · Mild aortic regurgitation.  · Moderate to severe mitral stenosis from extensive annular calcification.   The mean diastolic gradient across the mitral valve is 15 mmHg at a heart   rate of 73 bpm. Gradients may be elevated in part due to volume overload.  · The estimated PA systolic pressure is 62 mmHg.  · Elevated central venous pressure (15 mmHg).        Assessment and Plan:     Brief HPI: Seen this morning on AM NP rounds and again on rounds with Dr. Bernard. Discussed POC as detailed below-verbalized understanding and agrees with POC     Elevated INR  - INR 3.3 upon transfer with sharp rise up to 7-8; given Vitamin K with INR 1.4  - continue to hold Coumadin; INRs daily for now     Atrial flutter  - afib/aflutter per EKG at [facility]  - recurrent afib since 2200 8/4/2020  - previously  on Coumadin for stroke prevention given valvular afib; will place on IV Heparin since INR trended down; will need resumption of Coumadin once procedures completed   - RVR today with response to IV Metoprolol 2.5mg- HR improved and down to 90s; will start low dose BBstart     (HFpEF) heart failure with preserved ejection fraction  - echo on 7/24/2020 with EF 50% and WMA  - on Coreg and Norvasc previously with no ACEI/ARB  - currently on Norvasc only; will change  to BB today   - BP marginal; HR 90s-120s    PVD (peripheral vascular disease)  - wound to right 5th metatarsal with osteomyelitis  - bilateral PAD per CTA with run off  - recent [place] [facility] with occluded SFA, popliteal and occluded BTK vessels with reconstitution per collaterals- unable to revascularize with BKA recommended; [place] [facility] [place] opinion  - need revascularization but deferred secondary to elevated INR; INR down to 1.4 after vitamin K hopeful to proceed soon   - continue ASA, Brilinita and statin therapy  - ID, Podiatry and wound care on board  - will offload heels with Z flex boots     Mitral stenosis  - moderate to severe MS per echo with mean diastolic gradient 15mmHg  - not a surgical candidate per [facility]   - may need to repeat echocardiogram and later date for re evaluation of MS     Nonrheumatic aortic (valve) stenosis  - echo 7/24/2020 with mild AS- MAYA 1.57cm2; mean gradient 24mmHg peak velocity 3.31  - will need close monitoring with regular echos     Benign hypertension with ESRD (end-stage renal disease)  - SBP 90s-110s  - on Norvasc only for now  - on BB and ARB previously but held due to marginal BP; will change CCB to BB given afib     Anemia in chronic kidney disease  - H/H 10.0/31.4  - will continue to monitor closely         VTE Risk Mitigation (From admission, onward)         Ordered     heparin 25,000 units in dextrose 5% 250 mL (100 units/mL) infusion LOW INTENSITY nomogram - [facility]  Continuous     Question:  Heparin Infusion Adjustment (DO NOT MODIFY ANSWER)  Answer:  [email or web address]\Images\Pharmacy\HeparinInfusions\heparin LOW INTENSITY nomogram for [facility] [id number]    08/05/20 1207     heparin 25,000 units in dextrose 5% (100 units/ml) IV bolus from bag - ADDITIONAL PRN BOLUS - 60 units/kg  As needed (PRN)     Question:  Heparin Infusion Adjustment (DO NOT MODIFY ANSWER)  Answer:   [email or web address]\epic\Images\Pharmacy\HeparinInfusions\heparin LOW INTENSITY nomogram for [facility] [id number]    08/05/20 1207     heparin 25,000 units in dextrose 5% (100 units/ml) IV bolus from bag - ADDITIONAL PRN BOLUS - 30 units/kg  As needed (PRN)     Question:  Heparin Infusion Adjustment (DO NOT MODIFY ANSWER)  Answer:  [email or web address]\epic\Images\Pharmacy\HeparinInfusions\heparin LOW INTENSITY nomogram for [facility] [id number]    08/05/20 1207                Mayuri Erickson, APRN, ANP  Cardiology  [facility]

## 2020-08-05 NOTE — ASSESSMENT & PLAN NOTE
- wound to right 5th metatarsal with osteomyelitis  - bilateral PAD per CTA with run off  - recent [place] [facility] with occluded SFA, popliteal and occluded BTK vessels with reconstitution per collaterals- unable to revascularize with BKA recommended; [place] [facility] [place] opinion  - need revascularization but deferred secondary to elevated INR; INR down to 1.4 after vitamin K hopeful to proceed soon   - continue ASA, Brilinita and statin therapy  - ID, Podiatry and wound care on board  - will offload heels with Z flex boots

## 2020-08-05 NOTE — PHYSICIAN QUERY
PT Name: João Aguirre  MR #: [id number]    Non-Pressure Ulcer Clarification      CDS: Radha Mitchell RN, CCDS  Contact information: [email or web address]    This form is a permanent document in the medical record.     Query Date: August 5, 2020    By submitting this query, we are merely seeking further clarification of documentation. Please utilize your independent clinical judgment when addressing the question(s) below.    The medical record contains the following:   Indicator   Supporting Clinical Findings Location in Medical Record   x Non-pressure Ulcer Documented non-healing DM right foot ulcer   Atherosclerosis of native artery of right lower extremity with ulceration of midfoot.      Clinical Findings:  Patient has 1.0x1.0x0.3 cm wound to the plantar right foot, submet 5. The wound probes to bone and is slightly tender. No acute signs of infection currently. The periwound skin is hyperkeratotic.    Acute osteomyelitis of metatarsal bone, right  Bone biopsy of the right 5th metatarsal were obtained at [facility].  Cultures are growing stenotrophomonas.   H&P 8/2          Podiatry consult 8/3    Wound Care Consult      Radiology Findings      Acute/Chronic Illness      Treatment:      Other:        Provider, Please specify the depth of the non-pressure ulcer:    [ x ] Bone involvement without evidence of necrosis   [   ] Bone necrosis   [   ] Other depth (please specify): ____________   [   ] Other Integumentary Diagnosis (please specify): ___________   [  ] Clinically Undetermined       Please document in your progress notes daily for the duration of treatment, until resolved, and include in your discharge summary.

## 2020-08-05 NOTE — PLAN OF CARE
VN cued into pt's room for introduction. VN informed pt that VN would be working along side bedside nurse and PCT throughout shift. Level of present pain assessed. At present no distress noted. O2 cont to be in use via nasal cannula. Thoroughly discussed with patient the plan of care.Discussed with patient High fall risk protocol and interventions that have been initiated and cont be in place for safety. Patient verbalized clear understanding and cooperation using teach back method. Bed alarm presently activated and in use.   Notified Dr Bai, bedside nurse, Radha, and charge nurse, Waqar of patient having MEWS score= 5. Heart rate Afib 80-130s. BP= 86/66.   Will cont to be available to patient and intervene prn.

## 2020-08-05 NOTE — SUBJECTIVE & OBJECTIVE
Review of Systems   Constitution: Positive for malaise/fatigue. Negative for chills, decreased appetite, diaphoresis, fever, weight gain and weight loss.   Cardiovascular: Positive for dyspnea on exertion. Negative for chest pain, claudication, irregular heartbeat, leg swelling, near-syncope, orthopnea, palpitations, paroxysmal nocturnal dyspnea and syncope.   Respiratory: Negative for cough, shortness of breath, snoring, sputum production and wheezing.    Endocrine: Negative for cold intolerance, heat intolerance, polydipsia, polyphagia and polyuria.   Skin: Negative for color change, dry skin, itching, nail changes and poor wound healing.   Musculoskeletal: Negative for back pain, gout, joint pain and joint swelling.   Gastrointestinal: Negative for bloating, abdominal pain, constipation, diarrhea, hematemesis, hematochezia, melena, nausea and vomiting.   Genitourinary: Negative for dysuria, hematuria and nocturia.   Neurological: Negative for dizziness, headaches, light-headedness, numbness, paresthesias and weakness.   Psychiatric/Behavioral: Negative for altered mental status, depression and memory loss.     Objective:     Vital Signs (Most Recent):  Temp: 97.5 °F (36.4 °C) (08/05/20 1220)  Pulse: (!) 138 (08/05/20 1220)  Resp: 18 (08/05/20 1220)  BP: (!) 86/66 (08/05/20 0750)  SpO2: 100 % (08/05/20 1220) Vital Signs (24h Range):  Temp:  [96.7 °F (35.9 °C)-98.4 °F (36.9 °C)] 97.5 °F (36.4 °C)  Pulse:  [] 138  Resp:  [16-20] 18  SpO2:  [97 %-100 %] 100 %  BP: ()/(58-78) 86/66     Weight: 107.6 kg (237 lb 3.4 oz)  Body mass index is 32.17 kg/m².     SpO2: 100 %  O2 Device (Oxygen Therapy): nasal cannula      Intake/Output Summary (Last 24 hours) at 8/5/2020 1237  Last data filed at 8/4/2020 1800  Gross per 24 hour   Intake 860 ml   Output 3400 ml   Net -2540 ml       Lines/Drains/Airways     Drain                 Hemodialysis AV Fistula Left forearm -- days         Hemodialysis AV Fistula Left  forearm -- days         Hemodialysis AV Fistula Left forearm -- days          Peripheral Intravenous Line                 Peripheral IV - Single Lumen 07/30/20 0458 20 G Anterior;Right Shoulder 6 days         Peripheral IV - Single Lumen 07/31/20 1846 20 G Anterior;Proximal;Right Forearm 4 days                Physical Exam   Constitutional: He is oriented to person, place, and time. He has a sickly appearance. He appears ill.   Cardiovascular: Normal rate. An irregularly irregular rhythm present. Exam reveals no gallop.   No murmur heard.  Pulmonary/Chest: Effort normal and breath sounds normal. No respiratory distress. He has no wheezes.   Abdominal: Soft. Bowel sounds are normal. He exhibits no distension. There is no abdominal tenderness.   Neurological: He is alert and oriented to person, place, and time.   Skin: Skin is warm and dry.       Significant Labs:     Recent Labs   Lab 08/05/20  0437   WBC 9.68   RBC 3.01*   HGB 10.0*   HCT 31.4*      *   MCH 33.2*   MCHC 31.8*     Recent Labs   Lab 08/05/20 0436      K 4.8      CO2 21*   BUN 37*   CREATININE 4.1*       Significant Imaging: Echocardiogram:   Transthoracic echo (TTE) complete (Cupid Only):   Results for orders placed or performed during the hospital encounter of 07/24/20   Echo Color Flow Doppler? Yes   Result Value Ref Range    Ascending aorta 3.99 cm    STJ 3.70 cm    AV mean gradient 24 mmHg    Ao peak kobi 3.31 m/s    Ao VTI 69.42 cm    IVS 0.86 0.6 - 1.1 cm    LA size 5.77 cm    Left Atrium Major Axis 7.40 cm    Left Atrium Minor Axis 7.49 cm    LVIDD 6.38 (A) 3.5 - 6.0 cm    LVIDS 5.02 (A) 2.1 - 4.0 cm    LVOT diameter 2.15 cm    LVOT peak VTI 30.00 cm    PW 1.13 (A) 0.6 - 1.1 cm    MV Peak A Kobi 2.17 m/s    E wave decelartion time 293.42 msec    MV Peak E Kobi 2.90 m/s    RA Major Axis 5.21 cm    RA Width 3.56 cm    Sinus 4.04 cm    TAPSE 2.21 cm    TR Max Kobi 3.43 m/s    TDI LATERAL 0.04 m/s    TDI SEPTAL 0.03 m/s    LA  WIDTH 5.63 cm    MV stenosis pressure 1/2 time 88.92 ms    LV Diastolic Volume 206.86 mL    LV Systolic Volume 119.49 mL    RV S' 11.57 cm/s    LVOT peak tae 1.54 m/s    LV LATERAL E/E' RATIO 72.50 m/s    LV SEPTAL E/E' RATIO 96.67 m/s    FS 21 %    LA volume 205.57 cm3    LV mass 272.40 g    Left Ventricle Relative Wall Thickness 0.35 cm    AV valve area 1.57 cm2    AV Velocity Ratio 0.47     AV index (prosthetic) 0.43     MV valve area p 1/2 method 2.47 cm2    E/A ratio 1.34     Mean e' 0.04 m/s    LVOT area 3.6 cm2    LVOT stroke volume 108.86 cm3    AV peak gradient 44 mmHg    E/E' ratio 82.86 m/s    LV Systolic Volume Index 53.9 mL/m2    LV Diastolic Volume Index 93.25 mL/m2    LA Volume Index 92.7 mL/m2    LV Mass Index 123 g/m2    Triscuspid Valve Regurgitation Peak Gradient 47 mmHg    BSA 2.25 m2    Right Atrial Pressure (from IVC) 15 mmHg    MV mean gradient 15 mmHg    MV peak gradient 33 mmHg    TV rest pulmonary artery pressure 62 mmHg    HR echo 73 bpm    Narrative    · Eccentric left ventricular hypertrophy.  · Low normal left ventricular systolic function. The estimated ejection   fraction is 50%.  · Local segmental wall motion abnormalities.  · Normal right ventricular systolic function.  · Severe left atrial enlargement.  · Mild aortic valve stenosis. Aortic valve area is 1.57 cm2; peak velocity   is 3.31 m/s; mean gradient is 24 mmHg.  · Mild aortic regurgitation.  · Moderate to severe mitral stenosis from extensive annular calcification.   The mean diastolic gradient across the mitral valve is 15 mmHg at a heart   rate of 73 bpm. Gradients may be elevated in part due to volume overload.  · The estimated PA systolic pressure is 62 mmHg.  · Elevated central venous pressure (15 mmHg).

## 2020-08-05 NOTE — PLAN OF CARE
VN rounding: VN cued into patient's room to complete  rounding. Patient sittiung up in bed resting without difficulties. No distress verbalized. AAO x4. At this time patient denies any questions, concerns and needs. Will cont to be available to patient and intervene prn.

## 2020-08-05 NOTE — ASSESSMENT & PLAN NOTE
- SBP 90s-110s  - on Norvasc only for now  - on BB and ARB previously but held due to marginal BP; will change CCB to BB given afib

## 2020-08-05 NOTE — ASSESSMENT & PLAN NOTE
- echo on 7/24/2020 with EF 50% and WMA  - on Coreg and Norvasc previously with no ACEI/ARB  - currently on Norvasc only; will change to BB today   - BP marginal; HR 90s-120s

## 2020-08-05 NOTE — PROGRESS NOTES
[facility] Hospital Medicine  Progress Note    Patient Name: João Aguirre  MRN: [id number]  Patient Class: IP- Inpatient   Admission Date: 8/2/2020  Length of Stay: 3 days  Attending Physician: Jose Bai DO  Primary Care Provider: Primary Doctor No        Subjective:     Principal Problem:Acute osteomyelitis of metatarsal bone, right        HPI:  Patient is a 55/y/o M with PMH of ESRD on HD (T/TH/S), anemia, uncontrolled HTN, hx CVA, PCI s/p 2 stents placement, right eye blindness, seizure, marijuana use, non-healing DM right foot ulcer DM II and gastroparesis, initially presented to [facility] from [place] for second opinion regarding AVR and CABG. Patient noted several weeks history of chest tightness with dizziness, palpitation, and increasing SOB. Denies fever, chills, nausea, vomiting. Seen by CTS and recommended patient not a CABG candidate due to multiple co morbidities- HD, Hep C, Osteomyelitis, PVD. s/p cath with stent placement on 7/31. Vascular surgery recommends BKA but patient declined, patient started on heparin drip and bridging with Warfarin (on Eliquis prior) with plan for balloon angioplasty on Tuesday    Overview/Hospital Course:  8/4 pt seen in HD, pt INR is elevated, given vit K, appreciate cards inputs  8/5 the pt is doing ok, ha episode of afib, discussed with cards, he is now on bb LOW DOSE AND HEPARIN DRIP. WILL HAVE le ANGIOGRAM PROBABLY ON Friday.    Interval History:IN a FIB    Review of Systems   Constitutional: Positive for fatigue. Negative for activity change and fever.   HENT: Negative for congestion.    Respiratory: Negative for cough and shortness of breath.    Cardiovascular: Negative for chest pain and palpitations.   Gastrointestinal: Negative for abdominal distention.   Neurological: Negative for dizziness and headaches.   Psychiatric/Behavioral: Negative for agitation and decreased concentration.     Objective:     Vital Signs (Most  Recent):  Temp: 97.5 °F (36.4 °C) (08/05/20 1220)  Pulse: 97 (08/05/20 1359)  Resp: 18 (08/05/20 1359)  BP: 106/60 (08/05/20 1359)  SpO2: 100 % (08/05/20 1315) Vital Signs (24h Range):  Temp:  [96.7 °F (35.9 °C)-98.4 °F (36.9 °C)] 97.5 °F (36.4 °C)  Pulse:  [] 97  Resp:  [16-20] 18  SpO2:  [97 %-100 %] 100 %  BP: ()/(40-82) 106/60     Weight: 107.6 kg (237 lb 3.4 oz)  Body mass index is 32.17 kg/m².    Intake/Output Summary (Last 24 hours) at 8/5/2020 1432  Last data filed at 8/5/2020 1334  Gross per 24 hour   Intake 485 ml   Output --   Net 485 ml      Physical Exam  Vitals signs and nursing note reviewed.   Constitutional:       Appearance: Normal appearance.   HENT:      Head: Normocephalic and atraumatic.   Eyes:      Extraocular Movements: Extraocular movements intact.   Neck:      Musculoskeletal: Normal range of motion.   Cardiovascular:      Rate and Rhythm: Tachycardia present. Rhythm irregular.   Pulmonary:      Effort: Pulmonary effort is normal.   Neurological:      Mental Status: He is alert and oriented to person, place, and time.   Psychiatric:         Behavior: Behavior normal.         Thought Content: Thought content normal.         Significant Labs:   Recent Labs   Lab 08/03/20  0300 08/04/20  0306 08/05/20  0437   WBC 11.06 9.71 9.68   HGB 9.1* 8.8* 10.0*   HCT 28.2* 28.7* 31.4*    176 189     Recent Labs   Lab 07/30/20  0353  07/31/20  0422  08/03/20  0300 08/04/20  0306 08/05/20  0436     138   < > 134*  134*   < > 137 137 136   K 4.6  4.6  4.6   < > 5.5*  5.5*  5.5*   < > 4.9 4.8 4.8     103   < > 99  99   < > 100 101 105   CO2 26  25   < > 22*  22*   < > 24 26 21*   BUN 30*  30*   < > 52*  52*   < > 64* 48* 37*   CREATININE 3.6*  3.6*   < > 5.3*  5.1*   < > 5.2* 4.2* 4.1*   GLU 89  90   < > 73  75   < > 107 98 103   CALCIUM 9.2  8.8   < > 9.2  9.2   < > 9.8 9.8 9.9   MG 2.0  --  2.1  --   --   --   --    PHOS 4.9*  --  6.5*  --   --   --   --      < > = values in this interval not displayed.     Recent Labs   Lab 08/03/20  0300 08/04/20  0306  08/04/20  0819 08/04/20  1408 08/05/20  0436   ALKPHOS 120 112  --   --   --  110   ALT <5* <5*  --   --   --  7*   AST 20 19  --   --   --  23   ALBUMIN 2.6* 2.5*  --   --   --  2.6*   PROT 7.2 7.3  --   --   --  7.7   BILITOT 0.5 0.5  --   --   --  0.5   INR 3.3* 7.3*   < > 7.6* 8.3* 1.4*    < > = values in this interval not displayed.      No results for input(s): CPK, CPKMB, MB, TROPONINI in the last 72 hours.  Recent Labs   Lab 08/03/20  1941 08/04/20  0531 08/04/20  1607 08/04/20  2018 08/05/20  0506 08/05/20  1117   POCTGLUCOSE 143* 93 110 84 107 115*     Hemoglobin A1C   Date Value Ref Range Status   07/30/2020 4.6 4.0 - 5.6 % Final     Comment:     ADA Screening Guidelines:  5.7-6.4%  Consistent with prediabetes  >or=6.5%  Consistent with diabetes  High levels of fetal hemoglobin interfere with the HbA1C  assay. Heterozygous hemoglobin variants (HbS, HgC, etc)do  not significantly interfere with this assay.   However, presence of multiple variants may affect accuracy.     08/27/2018 <4.0 (A) 4.0 - 5.6 % Final     Comment:     ADA Screening Guidelines:  5.7-6.4%  Consistent with prediabetes  >or=6.5%  Consistent with diabetes  High levels of fetal hemoglobin interfere with the HbA1C  assay. Heterozygous hemoglobin variants (HbS, HgC, etc)do  not significantly interfere with this assay.   However, presence of multiple variants may affect accuracy.     03/01/2017 4.4 (L) 4.5 - 6.2 % Final     Comment:     According to ADA guidelines, hemoglobin A1C <7.0% represents  optimal control in non-pregnant diabetic patients.  Different  metrics may apply to specific populations.   Standards of Medical Care in Diabetes - 2016.  For the purpose of screening for the presence of diabetes:  <5.7%     Consistent with the absence of diabetes  5.7-6.4%  Consistent with increasing risk for diabetes   (prediabetes)  >or=6.5%   Consistent with diabetes  Currently no consensus exists for use of hemoglobin A1C  for diagnosis of diabetes for children.         No results for input(s): COLORU, CLARITYU, SPECGRAV, PHUR, PROTEINUA, GLUCOSEU, BLOODU, WBCU, RBCU, BACTERIA, MUCUS in the last 24 hours.    Invalid input(s):  BILIRUBINCON    Microbiology Results (last 7 days)     Procedure Component Value Units Date/Time    Blood culture [[id number]] Collected: 08/03/20 1358    Order Status: Completed Specimen: Blood Updated: 08/04/20 2012     Blood Culture, Routine No Growth to date      No Growth to date          Scheduled Meds:   sodium chloride 0.9%   Intravenous Once    aspirin  81 mg Oral Daily    atorvastatin  40 mg Oral Daily    cadexomer iodine   Topical (Top) Every Mon, Wed, Fri    ceftAZIDime (FORTAZ) IVPB  1 g Intravenous Q24H    diclofenac sodium  2 g Topical (Top) BID    epoetin harshad-epbx  10,000 Units Intravenous Every Mon, Wed, Fri    gabapentin  100 mg Oral BID    levETIRAcetam  500 mg Oral BID    methadone  100 mg Oral Daily    metoprolol tartrate  12.5 mg Oral BID    mupirocin   Nasal BID    pantoprazole  40 mg Oral Before breakfast    polyethylene glycol  17 g Oral Daily    sevelamer carbonate  1,600 mg Oral TID WM    ticagrelor  90 mg Oral BID     Continuous Infusions:   heparin (porcine) in D5W 12 Units/kg/hr (08/05/20 1338)     As Needed: sodium chloride 0.9%, acetaminophen, albuterol-ipratropium, bisacodyL, calcium carbonate, calcium gluconate IVPB, calcium gluconate IVPB, heparin (PORCINE), heparin (PORCINE), ondansetron, oxyCODONE, oxyCODONE, promethazine, sodium chloride 0.9%    Significant Imaging:   No new imaging        Assessment/Plan:      * Acute osteomyelitis of metatarsal bone, right  PVD (peripheral vascular disease)  Atherosclerosis of native artery of right lower extremity with ulceration of midfoot    CTA A/P with BLE runoff to better define surgical anatomy  Consult cardiology- for possible  revascularization and potential limb salvage  Wound cx with stenotrophomonas  Continue Ceftazidime x 6 weeks recs now, end date 9/11  Continue wound care  Consult cardiology  Consult podiatry  Consult ID  Pan culture on 8/3      Atrial fibrillation    8/5 start lopressor  Started on heparin drip by cards  No amio given his liver dzs      Elevated INR  8/4 cards ordered vit K  F/u labs  No procedures at this time  8/5 INR down to 1.4  monitor    Osteomyelitis of right foot        Atrial flutter  On Brilinta, asa and Warfarin      (HFpEF) heart failure with preserved ejection fraction  Ischemic cardiomyopathy, LVEF 25%-30%  Stable    ESRD (end stage renal disease) on dialysis   HD on TTSs  Consult nephrology   Renally dose medication  Continue sevelamer      Nonrheumatic aortic (valve) stenosis  History of coronary artery stent placement  ECHO shows mild aortic valve stenosis.   currently high risk for cardiac surgery secondary to cirrhosis (plt 149) and osteomyelitis   [id number] of 6   Was on Eliquis now switched to Warfarin   Continue heparin with coumadin bridge. D/c heparin on 8/3. Decrease Warfarin dose to 2.5  INR goal 2.5-3.5  Continue Brilinta   Continue ASA x 1 week end date     Type 2 diabetes mellitus with chronic kidney disease on chronic dialysis, without long-term current use of insulin  HbA1c 4.3   Low dose SSI  accucheck  Diabetic renal diet      Seizure disorder  Continue Keppra      Benign hypertension with ESRD (end-stage renal disease)  Continue Norvasc        Cirrhosis of liver with ascites  Chronic hepatitis C without hepatic coma  history of HCV and cirrhosis   stable    Chronic back pain  Methadone dependence  Continue methadon, Oxycodone, neurontin    Blindness of right eye  stable      Ischemic cardiomyopathy, LVEF 25%-30%  8/5 appreciate cards input        Anemia in chronic kidney disease  Stable  Monitor        VTE Risk Mitigation (From admission, onward)         Ordered     heparin 25,000  units in dextrose 5% 250 mL (100 units/mL) infusion LOW INTENSITY nomogram - [facility]  Continuous     Question:  Heparin Infusion Adjustment (DO NOT MODIFY ANSWER)  Answer:  [email or web address]\Images\Pharmacy\HeparinInfusions\heparin LOW INTENSITY nomogram for [facility] [id number]    08/05/20 1207     heparin 25,000 units in dextrose 5% (100 units/ml) IV bolus from bag - ADDITIONAL PRN BOLUS - 60 units/kg  As needed (PRN)     Question:  Heparin Infusion Adjustment (DO NOT MODIFY ANSWER)  Answer:  [email or web address]\epic\Images\Pharmacy\HeparinInfusions\heparin LOW INTENSITY nomogram for [facility] [id number]    08/05/20 1207     heparin 25,000 units in dextrose 5% (100 units/ml) IV bolus from bag - ADDITIONAL PRN BOLUS - 30 units/kg  As needed (PRN)     Question:  Heparin Infusion Adjustment (DO NOT MODIFY ANSWER)  Answer:  [email or web address]\Images\Pharmacy\HeparinInfusions\heparin LOW INTENSITY nomogram for [facility] [id number]    08/05/20 1207                      Jose Bai DO  Department of Hospital Medicine   [facility]

## 2020-08-05 NOTE — ASSESSMENT & PLAN NOTE
- INR 3.3 upon transfer with sharp rise up to 7-8; given Vitamin K with INR 1.4  - continue to hold Coumadin; INRs daily for now

## 2020-08-05 NOTE — PROGRESS NOTES
[facility] Infectious Disease Progress Note     Primary Team:   Consultant Attending: Dr. Burden  Date of Admit: 8/2/2020    Summary of history     João Aguirre is a 55 y.o. male with a relevant history of ESRD (T,Th,Sat), anema, HTN, CVA, PCI s/p 2 stents ([id number]), R eye blindness, non-healing ulcer of R foot 2/2 DM, gastroparesis.     The patient was transferred from [facility] to [facility] for evaluation of possible revascularization of severe PAD. Pt was originally seen at [facility] in [place] for chest pain & evaluation for CABG/MVR in the setting of [place]. Due to his multiple co-morbidities he was deemed not a surgical candidate and instead had two stents placed and was sent to [facility]. At the same time pt also found to have MRI evidence of osteomyelitis of the R 5th metatarsal bone. Bone cultures were positive for Stenotrophomonas maltophila.    Interval events     In the interim, patient was seen in his room today. Afebrile overnight. Blood cultures remain NGTD. Pt's only complaint was fatigue from his recent dialysis. RLE angiogram planned for today was deferred due to an elevated INR at dialysis yesterday.     Subjective     Patient sitting upright in bed this AM eating breakfast. States his only complaint overnight/this morning was fatigue from his dialysis session yesterday. No SOB, CP, N/V, Headache at this time.    Current Medications:     Infusions:   heparin (porcine) in D5W 12 Units/kg/hr (08/05/20 1338)        Scheduled:   sodium chloride 0.9%   Intravenous Once    aspirin  81 mg Oral Daily    atorvastatin  40 mg Oral Daily    cadexomer iodine   Topical (Top) Every Mon, Wed, Fri    ceftAZIDime (FORTAZ) IVPB  1 g Intravenous Q24H    diclofenac sodium  2 g Topical (Top) BID    epoetin harshad-epbx  10,000 Units Intravenous Every Mon, Wed, Fri    gabapentin  100 mg Oral BID    levETIRAcetam  500 mg Oral BID    methadone  100 mg Oral Daily     metoprolol tartrate  12.5 mg Oral BID    mupirocin   Nasal BID    pantoprazole  40 mg Oral Before breakfast    polyethylene glycol  17 g Oral Daily    sevelamer carbonate  1,600 mg Oral TID WM    ticagrelor  90 mg Oral BID        PRN:  sodium chloride 0.9%, acetaminophen, albuterol-ipratropium, bisacodyL, calcium carbonate, calcium gluconate IVPB, calcium gluconate IVPB, heparin (PORCINE), heparin (PORCINE), ondansetron, oxyCODONE, oxyCODONE, promethazine, sodium chloride 0.9%    Antibiotics and Day Number of Therapy:  Vanc -  Cafazolin   Cefepime   Ceftazadime -current    Objective   Last 24 Hour Vital Signs:  BP  Min: 79/65  Max: 133/82  Temp  Av.6 °F (36.4 °C)  Min: 96.7 °F (35.9 °C)  Max: 98.4 °F (36.9 °C)  Pulse  Av.9  Min: 60  Max: 144  Resp  Av.6  Min: 16  Max: 20  SpO2  Av.3 %  Min: 97 %  Max: 100 %    Lines, drains, airway:    Peripheral Intravenous Line         Peripheral IV - Single Lumen 20 0458 20 G Anterior;Right Shoulder 6 days        Peripheral IV - Single Lumen 20 1846 20 G Anterior;Proximal;Right Forearm 4 days     Drain         Hemodialysis AV Fistula Left forearm -- days         Physical Examination:  Examination  General: Sitting upright in bed. Appears older than stated age of 55. Slightly ill-appearing.  HEENT: normal oral mucosa, pupils normal, extraocular motion normal  Neck: no thyromegaly, no JVD   Cardiac: no murmurs, irregularly irregular rhythm    Pulmonary/Chest: chest clear, no respiratory distress   GI/Rectal: no organomegaly, no masses, non tender, normal bowel sounds  Msk: normal motor screening exam  Skin/ Extremities: Skin is warm and dry.   Neurology/ Mental status: alert oriented     Lab data:  CBC:   Lab Results   Component Value Date    WBC 9.68 2020    HGB 10.0 (L) 2020     2020     (H) 2020    RDW 16.8 (H) 2020       Estimated Creatinine Clearance: 26.1 mL/min (A) (based on  SCr of 4.1 mg/dL (H)).    Microbiology Data    Procedure Component Value Units Date/Time    Blood culture [[id number]] Collected: 08/03/20 1358    Order Status: Completed Specimen: Blood Updated: 08/04/20 0115      Blood Culture, Routine No Growth to date    Blood culture [[id number]] Collected: 08/02/20 0541    Order Status: Completed Specimen: Blood Updated: 08/04/20 1212      Blood Culture, Routine No Growth to date        No Growth to date        No Growth to date    Blood culture [[id number]] Collected: 08/02/20 0541    Order Status: Completed Specimen: Blood Updated: 08/04/20 1212      Blood Culture, Routine No Growth to date        No Growth to date        No Growth to date    Culture, Anaerobe [[id number]] Collected: 07/29/20 1415    Order Status: Completed Specimen: Bone from Foot, Right Updated: 08/03/20 1012      Anaerobic Culture No anaerobes isolated    Aerobic culture [[id number]] (Abnormal)  Collected: 07/29/20 1415    Order Status: Completed Specimen: Bone from Foot, Right Updated: 08/01/20 1144      Aerobic Bacterial Culture STENOTROPHOMONAS (X.) MALTOPHILIA   Moderate   Abnormal     Susceptibility               Stenotrophomonas (X.) Maltophilia        CULTURE, AEROBIC  (SPECIFY SOURCE)        Ceftazidime 8 mcg/mL Sensitive        Trimeth/Sulfa <=2/38 mcg/mL Sensitive                     Linear View         Gram stain [[id number]] Collected: 07/29/20 1415    Order Status: Completed Specimen: Bone from Foot, Right Updated: 07/29/20 2051      Gram Stain Result No WBC's        No organisms seen    AFB Culture & Smear [[id number]] Collected: 07/29/20 1415    Order Status: Completed Specimen: Bone from Foot, Right Updated: 07/30/20 2127      AFB Culture & Smear Culture in progress      AFB CULTURE STAIN No acid fast bacilli seen.    Fungus culture [[id number]] Collected: 07/29/20 1415    Order Status: Sent Specimen: Bone from Foot, Right Updated: 07/29/20 1553          Assessment     João Aguirre is a  55 y.o. male with osteomyelitis of the R 5th metatarsal bone, extensive bilateral PAD & atherosclerosis of RLE. Cardiology planning revascularization on 8/5. He will need to continue a 6 week course of ceftazidime for current osteo with tentative end date of of 9/12.       Recommendations     Osteomyelitis of right foot 2/2 stenotrophomonas  - Continue ceftazidime, current start date is 8/1, will need 6 weeks with tentative end date of 9/12    Fever  -8/3 blood cultures NGTD  - 8/3 CXR with increasing bilateral opacification which may be related to fluid overload, this may resolve with dialysis, if fever reoccurs recommend repeating CXR  - Obtain UA with reflex to culture if patient febrile again    Thank you for this consult. We will follow.    Francicso Javier Gordon MD  [facility] Internal Medicine [facility]

## 2020-08-05 NOTE — SUBJECTIVE & OBJECTIVE
Interval History:IN a FIB    Review of Systems   Constitutional: Positive for fatigue. Negative for activity change and fever.   HENT: Negative for congestion.    Respiratory: Negative for cough and shortness of breath.    Cardiovascular: Negative for chest pain and palpitations.   Gastrointestinal: Negative for abdominal distention.   Neurological: Negative for dizziness and headaches.   Psychiatric/Behavioral: Negative for agitation and decreased concentration.     Objective:     Vital Signs (Most Recent):  Temp: 97.5 °F (36.4 °C) (08/05/20 1220)  Pulse: 97 (08/05/20 1359)  Resp: 18 (08/05/20 1359)  BP: 106/60 (08/05/20 1359)  SpO2: 100 % (08/05/20 1315) Vital Signs (24h Range):  Temp:  [96.7 °F (35.9 °C)-98.4 °F (36.9 °C)] 97.5 °F (36.4 °C)  Pulse:  [] 97  Resp:  [16-20] 18  SpO2:  [97 %-100 %] 100 %  BP: ()/(40-82) 106/60     Weight: 107.6 kg (237 lb 3.4 oz)  Body mass index is 32.17 kg/m².    Intake/Output Summary (Last 24 hours) at 8/5/2020 1432  Last data filed at 8/5/2020 1334  Gross per 24 hour   Intake 485 ml   Output --   Net 485 ml      Physical Exam  Vitals signs and nursing note reviewed.   Constitutional:       Appearance: Normal appearance.   HENT:      Head: Normocephalic and atraumatic.   Eyes:      Extraocular Movements: Extraocular movements intact.   Neck:      Musculoskeletal: Normal range of motion.   Cardiovascular:      Rate and Rhythm: Tachycardia present. Rhythm irregular.   Pulmonary:      Effort: Pulmonary effort is normal.   Neurological:      Mental Status: He is alert and oriented to person, place, and time.   Psychiatric:         Behavior: Behavior normal.         Thought Content: Thought content normal.         Significant Labs:   Recent Labs   Lab 08/03/20  0300 08/04/20  0306 08/05/20  0437   WBC 11.06 9.71 9.68   HGB 9.1* 8.8* 10.0*   HCT 28.2* 28.7* 31.4*    176 189     Recent Labs   Lab 07/30/20  0353  07/31/20  0422  08/03/20  0300 08/04/20  0306  08/05/20  0436     138   < > 134*  134*   < > 137 137 136   K 4.6  4.6  4.6   < > 5.5*  5.5*  5.5*   < > 4.9 4.8 4.8     103   < > 99  99   < > 100 101 105   CO2 26  25   < > 22*  22*   < > 24 26 21*   BUN 30*  30*   < > 52*  52*   < > 64* 48* 37*   CREATININE 3.6*  3.6*   < > 5.3*  5.1*   < > 5.2* 4.2* 4.1*   GLU 89  90   < > 73  75   < > 107 98 103   CALCIUM 9.2  8.8   < > 9.2  9.2   < > 9.8 9.8 9.9   MG 2.0  --  2.1  --   --   --   --    PHOS 4.9*  --  6.5*  --   --   --   --     < > = values in this interval not displayed.     Recent Labs   Lab 08/03/20  0300 08/04/20  0306  08/04/20  0819 08/04/20  1408 08/05/20  0436   ALKPHOS 120 112  --   --   --  110   ALT <5* <5*  --   --   --  7*   AST 20 19  --   --   --  23   ALBUMIN 2.6* 2.5*  --   --   --  2.6*   PROT 7.2 7.3  --   --   --  7.7   BILITOT 0.5 0.5  --   --   --  0.5   INR 3.3* 7.3*   < > 7.6* 8.3* 1.4*    < > = values in this interval not displayed.      No results for input(s): CPK, CPKMB, MB, TROPONINI in the last 72 hours.  Recent Labs   Lab 08/03/20  1941 08/04/20  0531 08/04/20  1607 08/04/20  2018 08/05/20  0506 08/05/20  1117   POCTGLUCOSE 143* 93 110 84 107 115*     Hemoglobin A1C   Date Value Ref Range Status   07/30/2020 4.6 4.0 - 5.6 % Final     Comment:     ADA Screening Guidelines:  5.7-6.4%  Consistent with prediabetes  >or=6.5%  Consistent with diabetes  High levels of fetal hemoglobin interfere with the HbA1C  assay. Heterozygous hemoglobin variants (HbS, HgC, etc)do  not significantly interfere with this assay.   However, presence of multiple variants may affect accuracy.     08/27/2018 <4.0 (A) 4.0 - 5.6 % Final     Comment:     ADA Screening Guidelines:  5.7-6.4%  Consistent with prediabetes  >or=6.5%  Consistent with diabetes  High levels of fetal hemoglobin interfere with the HbA1C  assay. Heterozygous hemoglobin variants (HbS, HgC, etc)do  not significantly interfere with this assay.   However,  presence of multiple variants may affect accuracy.     03/01/2017 4.4 (L) 4.5 - 6.2 % Final     Comment:     According to ADA guidelines, hemoglobin A1C <7.0% represents  optimal control in non-pregnant diabetic patients.  Different  metrics may apply to specific populations.   Standards of Medical Care in Diabetes - 2016.  For the purpose of screening for the presence of diabetes:  <5.7%     Consistent with the absence of diabetes  5.7-6.4%  Consistent with increasing risk for diabetes   (prediabetes)  >or=6.5%  Consistent with diabetes  Currently no consensus exists for use of hemoglobin A1C  for diagnosis of diabetes for children.         No results for input(s): COLORU, CLARITYU, SPECGRAV, PHUR, PROTEINUA, GLUCOSEU, BLOODU, WBCU, RBCU, BACTERIA, MUCUS in the last 24 hours.    Invalid input(s):  BILIRUBINCON    Microbiology Results (last 7 days)     Procedure Component Value Units Date/Time    Blood culture [[id number]] Collected: 08/03/20 1358    Order Status: Completed Specimen: Blood Updated: 08/04/20 2012     Blood Culture, Routine No Growth to date      No Growth to date          Scheduled Meds:   sodium chloride 0.9%   Intravenous Once    aspirin  81 mg Oral Daily    atorvastatin  40 mg Oral Daily    cadexomer iodine   Topical (Top) Every Mon, Wed, Fri    ceftAZIDime (FORTAZ) IVPB  1 g Intravenous Q24H    diclofenac sodium  2 g Topical (Top) BID    epoetin harshad-epbx  10,000 Units Intravenous Every Mon, Wed, Fri    gabapentin  100 mg Oral BID    levETIRAcetam  500 mg Oral BID    methadone  100 mg Oral Daily    metoprolol tartrate  12.5 mg Oral BID    mupirocin   Nasal BID    pantoprazole  40 mg Oral Before breakfast    polyethylene glycol  17 g Oral Daily    sevelamer carbonate  1,600 mg Oral TID WM    ticagrelor  90 mg Oral BID     Continuous Infusions:   heparin (porcine) in D5W 12 Units/kg/hr (08/05/20 1401)     As Needed: sodium chloride 0.9%, acetaminophen, albuterol-ipratropium,  bisacodyL, calcium carbonate, calcium gluconate IVPB, calcium gluconate IVPB, heparin (PORCINE), heparin (PORCINE), ondansetron, oxyCODONE, oxyCODONE, promethazine, sodium chloride 0.9%    Significant Imaging:   No new imaging

## 2020-08-05 NOTE — PLAN OF CARE
Pt had elevated INR and was given Vit K. Pt was reported to have hypervolemia and AFib. Beta Blocker started. Angiogram held to possibly Friday.  Updated home health [facility] with [facility]. Pt receives IV ABX during dialysis and will need until 9/11.  Depending on what is found in Angio and what PD plans. Pt could possibly need placement if he is agreeable. If not, return home with home health in [place].       08/05/20 3886   Discharge Reassessment   Assessment Type Discharge Planning Reassessment   Provided patient/caregiver education on the expected discharge date and the discharge plan Yes   Do you have any problems affording any of your prescribed medications? No   Discharge Plan A Skilled Nursing Facility;Long-term acute care facility (LTAC)   Discharge Plan B Home with family;Home Health   DME Needed Upon Discharge  other (see comments)  (TBD)   Patient choice form signed by patient/caregiver N/A   Can the patient/caregiver answer the patient profile reliably? Yes, cognitively intact   Describe the patient's ability to walk at the present time. Minor restrictions or changes     Helen Wood RN  RN Transition Navigator  [phone]

## 2020-08-05 NOTE — ASSESSMENT & PLAN NOTE
- moderate to severe MS per echo with mean diastolic gradient 15mmHg  - not a surgical candidate per [facility] CTS   - may need to repeat echocardiogram and later date for re evaluation of MS

## 2020-08-05 NOTE — ASSESSMENT & PLAN NOTE
- afib/aflutter per EKG at [facility]  - recurrent afib since 2200 8/4/2020  - previously  on Coumadin for stroke prevention given valvular afib; will place on IV Heparin since INR trended down; will need resumption of Coumadin once procedures completed   - RVR today with response to IV Metoprolol 2.5mg- HR improved and down to 90s; will start low dose BBstart

## 2020-08-05 NOTE — PLAN OF CARE
"VN rounds completed.  The patient has complaints of being "drained and having a headacche from having dialysis. Patient reminded of fall precautions and verbalized understanding.  "

## 2020-08-05 NOTE — PLAN OF CARE
"POC reviewed, patient verbalize understanding. Patient complained of stomach "cramping", administered pain med and tums as charted.  Fall precaution maintained: bed on lowest position, call light within reach, bed alarm set, side rail up x 2, non skid sock on. Will continue to monitor.    "

## 2020-08-06 NOTE — PROGRESS NOTES
[facility] Infectious Disease Progress Note     Primary Team:   Consultant Attending: Dr. Burden  Date of Admit: 8/2/2020    Summary of history     João Aguirre is a 55 y.o. male with a relevant history of ESRD (T,Th,Sat), anema, HTN, CVA, PCI s/p 2 stents ([id number]), R eye blindness, non-healing ulcer of R foot 2/2 DM, gastroparesis.     [place] [facility] for evaluation of possible revascularization of severe PAD. Pt was originally seen at [facility] in [place] for chest pain & evaluation for CABG/MVR in the setting of [place]. Due to his multiple co-morbidities he was deemed not a surgical candidate and instead had two stents placed and was sent [place] [facility]. At the same time pt also found to have MRI evidence of osteomyelitis of the R 5th metatarsal bone. Bone cultures were positive for Stenotrophomonas maltophila.    Interval events     In the interim, patient afebrile overnight. Blood cultures remain NGTD. RLE angiogram tentatively planned for tomorrow.    Subjective     Unable to obtain. Patient was in the middle of a bath when rounds were made.    Current Medications:     Infusions:   heparin (porcine) in D5W 14 Units/kg/hr (08/06/20 0523)        Scheduled:   sodium chloride 0.9%   Intravenous Once    aspirin  81 mg Oral Daily    atorvastatin  40 mg Oral Daily    cadexomer iodine   Topical (Top) Every Mon, Wed, Fri    ceftAZIDime (FORTAZ) IVPB  1 g Intravenous Q24H    diclofenac sodium  2 g Topical (Top) BID    epoetin harshad-epbx  10,000 Units Intravenous Every Mon, Wed, Fri    gabapentin  100 mg Oral BID    levETIRAcetam  500 mg Oral BID    methadone  100 mg Oral Daily    metoprolol tartrate  12.5 mg Oral BID    mupirocin   Nasal BID    pantoprazole  40 mg Oral Before breakfast    polyethylene glycol  17 g Oral Daily    sevelamer carbonate  1,600 mg Oral TID WM    ticagrelor  90 mg Oral BID        PRN:  sodium  chloride 0.9%, acetaminophen, albuterol-ipratropium, bisacodyL, calcium carbonate, calcium gluconate IVPB, calcium gluconate IVPB, heparin (PORCINE), heparin (PORCINE), ondansetron, oxyCODONE, oxyCODONE, promethazine, sodium chloride 0.9%    Antibiotics and Day Number of Therapy:  Vanc -  Cafazolin   Cefepime   Ceftazadime -current    Objective   Last 24 Hour Vital Signs:  BP  Min: 80/59  Max: 127/63  Temp  Av.3 °F (36.3 °C)  Min: 96.4 °F (35.8 °C)  Max: 98.5 °F (36.9 °C)  Pulse  Av.8  Min: 56  Max: 86  Resp  Av.7  Min: 17  Max: 20  SpO2  Av.1 %  Min: 96 %  Max: 100 %    Lines, drains, airway:    Peripheral Intravenous Line         Peripheral IV - Single Lumen 20 0458 20 G Anterior;Right Shoulder 7 days        Peripheral IV - Single Lumen 20 1846 20 G Anterior;Proximal;Right Forearm 5 days   Drain         Hemodialysis AV Fistula Left forearm -- days       Physical Examination:  Examination  Unable to obtain. Patient was in the middle of a bath during rounds.    Lab data:  CBC:   Lab Results   Component Value Date    WBC 9.13 2020    HGB 9.5 (L) 2020     2020     (H) 2020    RDW 16.5 (H) 2020       Estimated Creatinine Clearance: 24.9 mL/min (A) (based on SCr of 4.3 mg/dL (H)).    Microbiology Data    Procedure Component Value Units Date/Time   Blood culture [[id number]] Collected: 20 1358   Order Status: Completed Specimen: Blood Updated: 20 2012    Blood Culture, Routine No Growth to date     No Growth to date     No Growth to date   Blood culture [[id number]] Collected: 20 0541   Order Status: Completed Specimen: Blood Updated: 20 1212    Blood Culture, Routine No Growth to date     No Growth to date     No Growth to date     No Growth to date     No Growth to date   Blood culture [[id number]] Collected: 20 0541   Order Status: Completed Specimen: Blood Updated: 20 1212    Blood Culture,  Routine No Growth to date     No Growth to date     No Growth to date     No Growth to date     No Growth to date   Culture, Anaerobe [[id number]] Collected: 07/29/20 1415   Order Status: Completed Specimen: Bone from Foot, Right Updated: 08/03/20 1012    Anaerobic Culture No anaerobes isolated   Aerobic culture [[id number]] (Abnormal)  Collected: 07/29/20 1415   Order Status: Completed Specimen: Bone from Foot, Right Updated: 08/01/20 1144    Aerobic Bacterial Culture STENOTROPHOMONAS (X.) MALTOPHILIA   Moderate   Abnormal    Susceptibility     Stenotrophomonas (X.) Maltophilia     CULTURE, AEROBIC  (SPECIFY SOURCE)     Ceftazidime 8 mcg/mL Sensitive     Trimeth/Sulfa <=2/38 mcg/mL Sensitive                  Assessment     João Aguirre is a 55 y.o. male with osteomyelitis of the R 5th metatarsal bone, extensive bilateral PAD & atherosclerosis of RLE. Cardiology planning revascularization on 8/5. He will need to continue a 6 week course of ceftazidime for current osteo with tentative end date of of 9/17.       Recommendations     Osteomyelitis of right foot 2/2 stenotrophomonas  - Continue ceftazidime, will need 6 weeks with tentative end date of 9/17  - plan for outpatient ID follow up in 4-5 weeks with Dr. Daniele Burden    Fever  -8/3 blood cultures NGTD  - 8/3 CXR with increasing bilateral opacification which may be related to fluid overload, this may resolve with dialysis, if fever reoccurs recommend repeating CXR  - Obtain UA with reflex to culture if patient febrile again    Thank you for this consult. We will be signing off at this time. Please feel free to reach out to us if there are any further questions.    Francisco Javier Gordon MD  [facility] Internal Medicine [facility]

## 2020-08-06 NOTE — PLAN OF CARE
1915H- Received patient upon rounds last night, patient seen in bed on side lying position. Conscious , coherent to time, date, place, person and situation. With saline lock right arm gauge 20 with ongoing heparin drip at 12 units/kg/hr, infusing well, with clean and dry dressing. Left arm precaution, left AV fistula positive with thrill and bruit, with clean and dry dressing. Telemetry controlled atrial fibrillation 100's. Oxygen saturation 99% on room air. Right foot with clean and dry dressing. Bilateral Z-boots on. Patient verbalized 8/10 bilateral back pain, moderately relieved with oxycodone. Maintaining blood pressure.   Advised patient to call for any assistance. Call bell within reach. Bed alarm on. Safety fall  . precaution maintained. Will continue to monitor patient.    0631H- Patient stable VS over the night, afebrile. No untoward signs and symptoms noted over the night. Telemetry no ectopy. Free from fall. IV line patent. Heparin drip currently on 14 unit/kg/hr, first therapeutic level. Next Aptt at 10am. Will endorse patient to day shift Nurse.

## 2020-08-06 NOTE — ASSESSMENT & PLAN NOTE
- wound to right 5th metatarsal with osteomyelitis  - bilateral PAD per CTA with run off  - recent RLE angiogram per [facility] surgery with occluded SFA, popliteal and occluded BTK vessels with reconstitution per collaterals- unable to revascularize with BKA recommended; transferred to [facility] for second opinion; LE bypass surgery at [facility] in 9/2019 with endarectomy to CFA/PFA with bypass  - needs revascularization but initially deferred secondary to elevated INR; INR trended down to 1.1 after vitamin K hopeful to proceed with revascularization tomorrow  - continue ASA, Brilinita and statin therapy  - ID, Podiatry and wound care on board  - will offload heels with Z flex boots

## 2020-08-06 NOTE — PHYSICIAN QUERY
PT Name: João Aguirre  MR #: [id number]     ACUITY OF CONDITION CLARIFICATION    CDS: Radha Mitchell RN, CCDS  Contact information: [email or web address]  This form is a permanent document in the medical record.     Query Date: August 6, 2020    By submitting this query, we are merely seeking further clarification of documentation to reflect the severity of illness of your patient. Please utilize your independent clinical judgment when addressing the question(s) below.    The Medical record reflects the following:     Indicators   Supporting Clinical Findings Location in Medical Record   x Documentation of condition (HFpEF) heart failure with preserved ejection fraction  - echo on 7/24/2020 with EF 50% and [facility] PN 8/3    Lab Value(s)     x Radiology Findings Mild bibasilar airspace disease slightly increased from the prior study could represent mild consolidation, infiltrate or atelectasis.  Probable small bibasilar pleural effusions also.     Mild cardiomegaly.  Mild pulmonary edema is not excluded.  Follow-up recommended. CXR 8/3   x Treatment/Medication - on Coreg and Norvasc previously with no ACEI/ARB  - currently on Norvasc only with no BB ?reasoning possibly volume overload; patient reports orthopnea and PND   - BP stable and HR 90s-120s; recommend resumption of Coreg once fully euvolemic;  ARB if okay with Nephrology; if BP not well controlled then would continue Norvasc Cardiology PN 8/3   x Other SOB noted on exam and reports orthopnea and PND yesterday. ESRD patient with Nephrology on board and feel fluid removal needed with HD.    Positive for dyspnea on exertion  HD yesterday with 3.5 liters removed-SOB improved  Needs LE angiogram but deferred given elevated INR, SOB with volume overload and need for records from [facility] Care update 8/4  VIOLA Erickson, ANP        Cardiology PN 8/5      Provider, please specify the acuity/chronicity of (HFpEF) heart failure  with preserved ejection fraction:    [  x ] Chronic   [   ] Acute on chronic   [   ]  Clinically Undetermined       Present on admission (POA) status:   [  x] Yes (Y)                           [   ] Clinically Undetermined (W)  [   ] No (N)                            [   ] Documentation insufficient to determine if condition is POA (U)       Please document in your progress notes daily for the duration of treatment until resolved, and include in your discharge summary.

## 2020-08-06 NOTE — ASSESSMENT & PLAN NOTE
- afib/aflutter per EKG at [facility]  - RVR with MET called yesterday; responded to IV Metoprolol 2.5mg with improvement in HR; placed on low dose Metoprolol 12.5 mg po BID; intermittent aflutter overnight but no RVR noted   - previously  on Coumadin for stroke prevention given valvular afib; placed on IV Heparin since INR trended down and given stroke risk with intermittent afib/aflutter; will plan to resume Coumadin once revascularization completed

## 2020-08-06 NOTE — SUBJECTIVE & OBJECTIVE
Interval History: improving from yesterday, INR is 1.1    Review of Systems   Constitutional: Positive for fatigue. Negative for activity change and fever.   HENT: Negative for congestion.    Respiratory: Negative for cough and shortness of breath.    Cardiovascular: Negative for chest pain and palpitations.   Gastrointestinal: Negative for abdominal distention.   Neurological: Negative for dizziness and headaches.   Psychiatric/Behavioral: Negative for agitation and decreased concentration.     Objective:     Vital Signs (Most Recent):  Temp: 97.2 °F (36.2 °C) (08/06/20 1630)  Pulse: (!) 53 (08/06/20 1630)  Resp: (!) 22 (08/06/20 1630)  BP: 112/70 (08/06/20 1630)  SpO2: 100 % (08/06/20 1325) Vital Signs (24h Range):  Temp:  [96.4 °F (35.8 °C)-98.5 °F (36.9 °C)] 97.2 °F (36.2 °C)  Pulse:  [52-78] 53  Resp:  [18-22] 22  SpO2:  [99 %-100 %] 100 %  BP: ()/(54-70) 112/70     Weight: 107.6 kg (237 lb 3.4 oz)  Body mass index is 32.17 kg/m².    Intake/Output Summary (Last 24 hours) at 8/6/2020 1753  Last data filed at 8/6/2020 1200  Gross per 24 hour   Intake 1445.42 ml   Output --   Net 1445.42 ml      Physical Exam  Vitals signs and nursing note reviewed.   Constitutional:       Appearance: Normal appearance.   HENT:      Head: Normocephalic and atraumatic.   Eyes:      Extraocular Movements: Extraocular movements intact.   Neck:      Musculoskeletal: Normal range of motion.   Cardiovascular:      Rate and Rhythm: Normal rate.   Pulmonary:      Effort: Pulmonary effort is normal.   Neurological:      Mental Status: He is alert and oriented to person, place, and time.   Psychiatric:         Behavior: Behavior normal.         Thought Content: Thought content normal.         Significant Labs:   Recent Labs   Lab 08/04/20  0306 08/05/20  0437 08/06/20  0420   WBC 9.71 9.68 9.13   HGB 8.8* 10.0* 9.5*   HCT 28.7* 31.4* 30.9*    189 231     Recent Labs   Lab 07/31/20  0422  08/04/20  0306 08/05/20  0436  08/06/20  0420   *  134*   < > 137 136 137   K 5.5*  5.5*  5.5*   < > 4.8 4.8 4.7   CL 99  99   < > 101 105 105   CO2 22*  22*   < > 26 21* 18*   BUN 52*  52*   < > 48* 37* 46*   CREATININE 5.3*  5.1*   < > 4.2* 4.1* 4.3*   GLU 73  75   < > 98 103 129*   CALCIUM 9.2  9.2   < > 9.8 9.9 9.7   MG 2.1  --   --   --   --    PHOS 6.5*  --   --   --   --     < > = values in this interval not displayed.     Recent Labs   Lab 08/04/20  0306  08/04/20  1408 08/05/20  0436 08/06/20  0420   ALKPHOS 112  --   --  110 121   ALT <5*  --   --  7* 8*   AST 19  --   --  23 23   ALBUMIN 2.5*  --   --  2.6* 2.6*   PROT 7.3  --   --  7.7 7.6   BILITOT 0.5  --   --  0.5 0.5   INR 7.3*   < > 8.3* 1.4* 1.1    < > = values in this interval not displayed.      No results for input(s): CPK, CPKMB, MB, TROPONINI in the last 72 hours.  Recent Labs   Lab 08/05/20  0506 08/05/20  1117 08/05/20  1739 08/05/20  2056 08/06/20  0543 08/06/20  1322   POCTGLUCOSE 107 115* 97 117* 124* 121*     Hemoglobin A1C   Date Value Ref Range Status   07/30/2020 4.6 4.0 - 5.6 % Final     Comment:     ADA Screening Guidelines:  5.7-6.4%  Consistent with prediabetes  >or=6.5%  Consistent with diabetes  High levels of fetal hemoglobin interfere with the HbA1C  assay. Heterozygous hemoglobin variants (HbS, HgC, etc)do  not significantly interfere with this assay.   However, presence of multiple variants may affect accuracy.     08/27/2018 <4.0 (A) 4.0 - 5.6 % Final     Comment:     ADA Screening Guidelines:  5.7-6.4%  Consistent with prediabetes  >or=6.5%  Consistent with diabetes  High levels of fetal hemoglobin interfere with the HbA1C  assay. Heterozygous hemoglobin variants (HbS, HgC, etc)do  not significantly interfere with this assay.   However, presence of multiple variants may affect accuracy.     03/01/2017 4.4 (L) 4.5 - 6.2 % Final     Comment:     According to ADA guidelines, hemoglobin A1C <7.0% represents  optimal control in non-pregnant  diabetic patients.  Different  metrics may apply to specific populations.   Standards of Medical Care in Diabetes - 2016.  For the purpose of screening for the presence of diabetes:  <5.7%     Consistent with the absence of diabetes  5.7-6.4%  Consistent with increasing risk for diabetes   (prediabetes)  >or=6.5%  Consistent with diabetes  Currently no consensus exists for use of hemoglobin A1C  for diagnosis of diabetes for children.         No results for input(s): COLORU, CLARITYU, SPECGRAV, PHUR, PROTEINUA, GLUCOSEU, BLOODU, WBCU, RBCU, BACTERIA, MUCUS in the last 24 hours.    Invalid input(s):  BILIRUBINCON    Microbiology Results (last 7 days)     Procedure Component Value Units Date/Time    Blood culture [[id number]] Collected: 08/03/20 1358    Order Status: Completed Specimen: Blood Updated: 08/05/20 2012     Blood Culture, Routine No Growth to date      No Growth to date      No Growth to date          Scheduled Meds:   sodium chloride 0.9%   Intravenous Once    aspirin  81 mg Oral Daily    atorvastatin  40 mg Oral Daily    cadexomer iodine   Topical (Top) Every Mon, Wed, Fri    ceftAZIDime (FORTAZ) IVPB  1 g Intravenous Q24H    diclofenac sodium  2 g Topical (Top) BID    epoetin harshad-epbx  10,000 Units Intravenous Every Mon, Wed, Fri    gabapentin  100 mg Oral BID    levETIRAcetam  500 mg Oral BID    methadone  100 mg Oral Daily    metoprolol tartrate  12.5 mg Oral BID    mupirocin   Nasal BID    pantoprazole  40 mg Oral Before breakfast    polyethylene glycol  17 g Oral Daily    sevelamer carbonate  1,600 mg Oral TID WM    ticagrelor  90 mg Oral BID     Continuous Infusions:   heparin (porcine) in D5W 14 Units/kg/hr (08/06/20 0523)     As Needed: sodium chloride 0.9%, acetaminophen, albuterol-ipratropium, bisacodyL, calcium carbonate, calcium gluconate IVPB, calcium gluconate IVPB, heparin (PORCINE), heparin (PORCINE), ondansetron, oxyCODONE, oxyCODONE, promethazine, sodium chloride  0.9%    Significant Imaging:   No new imaging

## 2020-08-06 NOTE — ASSESSMENT & PLAN NOTE
8/4 cards ordered vit K  F/u labs  No procedures at this time  8/5 INR down to 1.4  Monitor  8/6 INR 1.1

## 2020-08-06 NOTE — ASSESSMENT & PLAN NOTE
- SBP 90s-120s overnight with periods down to 80s  - changed from Norvasc to low dose BB  - continue to monitor BP closely and adjust meds prn

## 2020-08-06 NOTE — ASSESSMENT & PLAN NOTE
- moderate to severe MS per echo with mean diastolic gradient 15mmHg  - not a surgical candidate per [facility] CTS   - may need to repeat echocardiogram and later date for re evaluation of MS

## 2020-08-06 NOTE — PLAN OF CARE
Patient was wearing NC 3L, sats were 100%.  Decreased to 2L, sats remained 99.  Patient tolerated well.  Prn treatments not required at this time.  Will continue to monitor.

## 2020-08-06 NOTE — SUBJECTIVE & OBJECTIVE
Review of Systems   Constitution: Negative for chills, decreased appetite, diaphoresis, fever, malaise/fatigue, weight gain and weight loss.   Cardiovascular: Negative for chest pain, claudication, dyspnea on exertion, irregular heartbeat, leg swelling, near-syncope, orthopnea, palpitations and paroxysmal nocturnal dyspnea.   Respiratory: Negative for cough, shortness of breath, snoring, sputum production and wheezing.    Endocrine: Negative for cold intolerance, heat intolerance, polydipsia, polyphagia and polyuria.   Skin: Positive for poor wound healing. Negative for color change, dry skin, itching and nail changes.   Musculoskeletal: Negative for back pain, gout, joint pain and joint swelling.   Gastrointestinal: Negative for bloating, abdominal pain, constipation, diarrhea, hematemesis, hematochezia, melena, nausea and vomiting.   Genitourinary: Negative for dysuria, hematuria and nocturia.   Neurological: Negative for dizziness, headaches, light-headedness, numbness, paresthesias and weakness.   Psychiatric/Behavioral: Negative for altered mental status, depression and memory loss.     Objective:     Vital Signs (Most Recent):  Temp: 96.9 °F (36.1 °C) (08/06/20 0822)  Pulse: (!) 59 (08/06/20 0822)  Resp: 18 (08/06/20 0937)  BP: (!) 96/57 (08/06/20 0822)  SpO2: 99 % (08/06/20 0822) Vital Signs (24h Range):  Temp:  [96.6 °F (35.9 °C)-98.5 °F (36.9 °C)] 96.9 °F (36.1 °C)  Pulse:  [] 59  Resp:  [17-20] 18  SpO2:  [96 %-100 %] 99 %  BP: ()/(40-82) 96/57     Weight: 107.6 kg (237 lb 3.4 oz)  Body mass index is 32.17 kg/m².     SpO2: 99 %  O2 Device (Oxygen Therapy): nasal cannula      Intake/Output Summary (Last 24 hours) at 8/6/2020 1043  Last data filed at 8/6/2020 0900  Gross per 24 hour   Intake 2050.42 ml   Output 1000 ml   Net 1050.42 ml       Lines/Drains/Airways     Drain                 Hemodialysis AV Fistula Left forearm -- days         Hemodialysis AV Fistula Left forearm -- days          Hemodialysis AV Fistula Left forearm -- days          Peripheral Intravenous Line                 Peripheral IV - Single Lumen 07/30/20 0458 20 G Anterior;Right Shoulder 7 days         Peripheral IV - Single Lumen 07/31/20 1846 20 G Anterior;Proximal;Right Forearm 5 days                Physical Exam   Constitutional: He is oriented to person, place, and time. He appears well-developed and well-nourished. He has a sickly appearance. He appears ill. No distress.   Neck: Normal range of motion. Neck supple. No JVD present.   Cardiovascular: Normal rate and regular rhythm. Exam reveals no gallop.   No murmur heard.  Weak biphasic dopplerable right DP and PT pulse;    Pulmonary/Chest: Effort normal and breath sounds normal. No respiratory distress. He has no wheezes.   Abdominal: Soft. Bowel sounds are normal. He exhibits no distension. There is no abdominal tenderness.   Musculoskeletal:         General: No edema.   Neurological: He is alert and oriented to person, place, and time.   Skin: Skin is warm and dry.   Discoloration to bilateral lower extremities consistent with venous disease    Psychiatric: He has a normal mood and affect. His behavior is normal. Judgment and thought content normal.       Significant Labs:     Recent Labs   Lab 08/06/20  0420   WBC 9.13   RBC 2.94*   HGB 9.5*   HCT 30.9*      *   MCH 32.3*   MCHC 30.7*     Recent Labs   Lab 08/06/20  0420      K 4.7      CO2 18*   BUN 46*   CREATININE 4.3*       Significant Imaging: Echocardiogram:   Transthoracic echo (TTE) complete ([facility]upid Only):   Results for orders placed or performed during the hospital encounter of 07/24/20   Echo Color Flow Doppler? Yes   Result Value Ref Range    Ascending aorta 3.99 cm    STJ 3.70 cm    AV mean gradient 24 mmHg    Ao peak tae 3.31 m/s    Ao VTI 69.42 cm    IVS 0.86 0.6 - 1.1 cm    LA size 5.77 cm    Left Atrium Major Axis 7.40 cm    Left Atrium Minor Axis 7.49 cm    LVIDD 6.38 (A) 3.5 - 6.0  cm    LVIDS 5.02 (A) 2.1 - 4.0 cm    LVOT diameter 2.15 cm    LVOT peak VTI 30.00 cm    PW 1.13 (A) 0.6 - 1.1 cm    MV Peak A Kobi 2.17 m/s    E wave decelartion time 293.42 msec    MV Peak E Kobi 2.90 m/s    RA Major Axis 5.21 cm    RA Width 3.56 cm    Sinus 4.04 cm    TAPSE 2.21 cm    TR Max Kobi 3.43 m/s    TDI LATERAL 0.04 m/s    TDI SEPTAL 0.03 m/s    LA WIDTH 5.63 cm    MV stenosis pressure 1/2 time 88.92 ms    LV Diastolic Volume 206.86 mL    LV Systolic Volume 119.49 mL    RV S' 11.57 cm/s    LVOT peak kobi 1.54 m/s    LV LATERAL E/E' RATIO 72.50 m/s    LV SEPTAL E/E' RATIO 96.67 m/s    FS 21 %    LA volume 205.57 cm3    LV mass 272.40 g    Left Ventricle Relative Wall Thickness 0.35 cm    AV valve area 1.57 cm2    AV Velocity Ratio 0.47     AV index (prosthetic) 0.43     MV valve area p 1/2 method 2.47 cm2    E/A ratio 1.34     Mean e' 0.04 m/s    LVOT area 3.6 cm2    LVOT stroke volume 108.86 cm3    AV peak gradient 44 mmHg    E/E' ratio 82.86 m/s    LV Systolic Volume Index 53.9 mL/m2    LV Diastolic Volume Index 93.25 mL/m2    LA Volume Index 92.7 mL/m2    LV Mass Index 123 g/m2    Triscuspid Valve Regurgitation Peak Gradient 47 mmHg    BSA 2.25 m2    Right Atrial Pressure (from IVC) 15 mmHg    MV mean gradient 15 mmHg    MV peak gradient 33 mmHg    TV rest pulmonary artery pressure 62 mmHg    HR echo 73 bpm    Narrative    · Eccentric left ventricular hypertrophy.  · Low normal left ventricular systolic function. The estimated ejection   fraction is 50%.  · Local segmental wall motion abnormalities.  · Normal right ventricular systolic function.  · Severe left atrial enlargement.  · Mild aortic valve stenosis. Aortic valve area is 1.57 cm2; peak velocity   is 3.31 m/s; mean gradient is 24 mmHg.  · Mild aortic regurgitation.  · Moderate to severe mitral stenosis from extensive annular calcification.   The mean diastolic gradient across the mitral valve is 15 mmHg at a heart   rate of 73 bpm. Gradients may be  elevated in part due to volume overload.  · The estimated PA systolic pressure is 62 mmHg.  · Elevated central venous pressure (15 mmHg).

## 2020-08-06 NOTE — ASSESSMENT & PLAN NOTE
8/5 start lopressor  Started on heparin drip by cards  No amio given his liver dzs  8/6 better controlled

## 2020-08-06 NOTE — PROGRESS NOTES
Progress Note  Nephrology      Consult Requested By: Jose Bai DO      SUBJECTIVE:     Overnight events  Patient is a 55 y.o. male     Patient Active Problem List   Diagnosis    Infected ulcer of skin    Acute non-ST-elevation myocardial infarction    NSTEMI (non-ST elevated myocardial infarction)    Smoker    Poorly-controlled hypertension    Tachycardia    Coronary artery disease involving native coronary artery of native heart without angina pectoris    ESRD (T,Th,Sat) dialysis onset 2013    Anemia in chronic kidney disease    Ischemic cardiomyopathy, LVEF 25%-30%    Elevated brain natriuretic peptide (BNP) level    Blindness of right eye    Chronic back pain    Work place accident, 2003    Pneumonia, organism unspecified(486)    Intractable vomiting with nausea    Dehydration    Slurring of speech    H/O: CVA (cerebrovascular accident)    Hypoglycemia associated with type 2 diabetes mellitus    Hypophosphatemia    Possible Insulinoma    Hypervolemia    Hypertensive urgency    Metabolic acidosis    Cirrhosis of liver with ascites    Chronic hepatitis C without hepatic coma    Methadone dependence    Cellulitis    Benign hypertension with ESRD (end-stage renal disease)    Noncompliance with renal dialysis    Post-traumatic seizures    Mixed hyperlipidemia    Seizure disorder    Convulsions    Syncope    Cough    Malignant HTN with heart disease, w/o CHF, with chronic kidney disease    Hyperkalemia    Chest pain    Elevated troponin    History of coronary artery stent placement    Paroxysmal atrial fibrillation with RVR    Thrombocytopenia    Type 2 diabetes mellitus with chronic kidney disease on chronic dialysis, without long-term current use of insulin    Septic shock    Hypoglycemia    Right foot ulcer    Hyponatremia    C. difficile colitis    Cellulitis and abscess of right lower extremity    Severe protein-calorie malnutrition    Hammer toes of both  feet    Skin ulcer of toe of right foot, limited to breakdown of skin    Neuritis    Abscess of fifth toe of right foot    Plantar fasciitis    Gangrene of toe of right foot    Skin ulcer of right foot with fat layer exposed    Nonrheumatic aortic (valve) stenosis    Chronic kidney disease-mineral and bone disorder    Mitral stenosis    ESRD (end stage renal disease) on dialysis    Diabetic foot infection    PVD (peripheral vascular disease)    Atherosclerosis of native artery of right lower extremity with ulceration of midfoot    Acute osteomyelitis of metatarsal bone, right    (HFpEF) heart failure with preserved ejection fraction    Atrial flutter    Hypotension    Osteomyelitis of right foot    Elevated INR    Atrial fibrillation     Past Medical History:   Diagnosis Date    Acute osteomyelitis of metatarsal bone, right     PAD right    Anticoagulant long-term use     Arthritis     Asthma     Back pain     on methadone    C. difficile colitis 09/2018    Cirrhosis of liver without ascites 2016    by liver US. +HCV    Coronary artery disease 2013    stent.  h/o STEMI and NSTEMI    Diabetes mellitus     resolved, multiple admissions for hypoglycemia requiring ICU possibley due to liver/ESRD    Encounter for blood transfusion     ESRD on hemodialysis 2013    anuric    Eye abnormality right eye    injured as a child    Gastritis     Gastroparesis     HCV (hepatitis C virus)     ? h/o tattoo exposure    Hypertension     Mitral stenosis 07/24/2020    Moderate to severe mitral stenosis    PAD (peripheral artery disease)     RLE s/p R CFA endarterectomy    Pneumonia 2013    Spend 6weeks in hospital at [facility]    Stroke     Tuberculosis     treated              OBJECTIVE:     Vitals:    08/06/20 1153 08/06/20 1325 08/06/20 1600 08/06/20 1630   BP:  (!) 90/55  112/70   BP Location:       Patient Position:       Pulse: (!) 56 (!) 57 (!) 52 (!) 53   Resp:  18  (!) 22   Temp:  96.4 °F  (35.8 °C)  97.2 °F (36.2 °C)   TempSrc:       SpO2:  100%     Weight:       Height:           Temp: 97.2 °F (36.2 °C) (08/06/20 1630)  Pulse: (!) 53 (08/06/20 1630)  Resp: (!) 22 (08/06/20 1630)  BP: 112/70 (08/06/20 1630)  SpO2: 100 % (08/06/20 1325)    Date 08/06/20 0700 - 08/07/20 0659   Shift 7504-4713 6305-6337 2186-6532 24 Hour Total   INTAKE   P.O. 250   250   I.V.(mL/kg) 75(0.7)   75(0.7)   Shift Total(mL/kg) 325(3)   325(3)   OUTPUT   Shift Total(mL/kg)       Weight (kg) 107.6 107.6 107.6 107.6             Medications:   sodium chloride 0.9%   Intravenous Once    aspirin  81 mg Oral Daily    atorvastatin  40 mg Oral Daily    cadexomer iodine   Topical (Top) Every Mon, Wed, Fri    ceftAZIDime (FORTAZ) IVPB  1 g Intravenous Q24H    diclofenac sodium  2 g Topical (Top) BID    epoetin harshad-epbx  10,000 Units Intravenous Every Mon, Wed, Fri    gabapentin  100 mg Oral BID    levETIRAcetam  500 mg Oral BID    methadone  100 mg Oral Daily    metoprolol tartrate  12.5 mg Oral BID    mupirocin   Nasal BID    pantoprazole  40 mg Oral Before breakfast    polyethylene glycol  17 g Oral Daily    sevelamer carbonate  1,600 mg Oral TID WM    ticagrelor  90 mg Oral BID      heparin (porcine) in D5W 14 Units/kg/hr (08/06/20 0523)               Physical Exam:  General appearance: NAD  Weak  Lungs: diminished breath sounds  Heart: pulse 53  Abdomen: soft  Extremities: edema  Laboratory:  ABG  Labs reviewed  Recent Results (from the past 336 hour(s))   Basic metabolic panel    Collection Time: 07/31/20 12:00 PM   Result Value Ref Range    Sodium 137 136 - 145 mmol/L    Potassium 3.8 3.5 - 5.1 mmol/L    Chloride 102 95 - 110 mmol/L    CO2 26 23 - 29 mmol/L    BUN, Bld 17 6 - 20 mg/dL    Creatinine 2.3 (H) 0.5 - 1.4 mg/dL    Calcium 8.1 (L) 8.7 - 10.5 mg/dL    Anion Gap 9 8 - 16 mmol/L   Basic metabolic panel    Collection Time: 07/31/20  4:22 AM   Result Value Ref Range    Sodium 134 (L) 136 - 145 mmol/L     Potassium 5.5 (H) 3.5 - 5.1 mmol/L    Chloride 99 95 - 110 mmol/L    CO2 22 (L) 23 - 29 mmol/L    BUN, Bld 52 (H) 6 - 20 mg/dL    Creatinine 5.1 (H) 0.5 - 1.4 mg/dL    Calcium 9.2 8.7 - 10.5 mg/dL    Anion Gap 13 8 - 16 mmol/L   Basic metabolic panel    Collection Time: 07/30/20 11:09 PM   Result Value Ref Range    Sodium 134 (L) 136 - 145 mmol/L    Potassium 5.2 (H) 3.5 - 5.1 mmol/L    Chloride 99 95 - 110 mmol/L    CO2 23 23 - 29 mmol/L    BUN, Bld 45 (H) 6 - 20 mg/dL    Creatinine 5.0 (H) 0.5 - 1.4 mg/dL    Calcium 9.0 8.7 - 10.5 mg/dL    Anion Gap 12 8 - 16 mmol/L     Recent Results (from the past 336 hour(s))   CBC auto differential    Collection Time: 08/06/20  4:20 AM   Result Value Ref Range    WBC 9.13 3.90 - 12.70 K/uL    Hemoglobin 9.5 (L) 14.0 - 18.0 g/dL    Hematocrit 30.9 (L) 40.0 - 54.0 %    Platelets 231 150 - 350 K/uL   CBC auto differential    Collection Time: 08/05/20  4:37 AM   Result Value Ref Range    WBC 9.68 3.90 - 12.70 K/uL    Hemoglobin 10.0 (L) 14.0 - 18.0 g/dL    Hematocrit 31.4 (L) 40.0 - 54.0 %    Platelets 189 150 - 350 K/uL   CBC auto differential    Collection Time: 08/04/20  3:06 AM   Result Value Ref Range    WBC 9.71 3.90 - 12.70 K/uL    Hemoglobin 8.8 (L) 14.0 - 18.0 g/dL    Hematocrit 28.7 (L) 40.0 - 54.0 %    Platelets 176 150 - 350 K/uL     Urinalysis  No results for input(s): COLORU, CLARITYU, SPECGRAV, PHUR, PROTEINUA, GLUCOSEU, BILIRUBINCON, BLOODU, WBCU, RBCU, BACTERIA, MUCUS, NITRITE, LEUKOCYTESUR, UROBILINOGEN, HYALINECASTS in the last 24 hours.    Diagnostic Results:  X-Ray: Reviewed  US: Reviewed  Echo: Reviewed  ACCESS    ASSESSMENT/PLAN:   ESRD  Metabolic bone disease  Anemia  Poor nutrition  /70  Dialysis in am

## 2020-08-06 NOTE — PROGRESS NOTES
[facility] Medicine  Progress Note    Patient Name: João Aguirre  MRN: [id number]  Patient Class: IP- Inpatient   Admission Date: 8/2/2020  Length of Stay: 4 days  Attending Physician: Jose Bai DO  Primary Care Provider: Primary Doctor No        Subjective:     Principal Problem:Acute osteomyelitis of metatarsal bone, right        HPI:  Patient is a 55/y/o M with PMH of ESRD on HD (T/TH/S), anemia, uncontrolled HTN, hx CVA, PCI s/p 2 stents placement, right eye blindness, seizure, marijuana use, non-healing DM right foot ulcer DM II and gastroparesis, initially presented to [facility] from [place] for second opinion regarding AVR and CABG. Patient noted several weeks history of chest tightness with dizziness, palpitation, and increasing SOB. Denies fever, chills, nausea, vomiting. Seen by CTS and recommended patient not a CABG candidate due to multiple co morbidities- HD, Hep C, Osteomyelitis, PVD. s/p cath with stent placement on 7/31. Vascular surgery recommends BKA but patient declined, patient started on heparin drip and bridging with Warfarin (on Eliquis prior) with plan for balloon angioplasty on Tuesday    Overview/Hospital Course:  8/4 pt seen in HD, pt INR is elevated, given vit K, appreciate cards inputs  8/5 the pt is doing ok, ha episode of afib, discussed with cards, he is now on bb LOW DOSE AND HEPARIN DRIP. WILL HAVE le ANGIOGRAM PROBABLY ON Friday.  8/6 pt is doing ok, no acute events, BP and HR improving anf more stable    Interval History: improving from yesterday, INR is 1.1    Review of Systems   Constitutional: Positive for fatigue. Negative for activity change and fever.   HENT: Negative for congestion.    Respiratory: Negative for cough and shortness of breath.    Cardiovascular: Negative for chest pain and palpitations.   Gastrointestinal: Negative for abdominal distention.   Neurological: Negative for dizziness and headaches.    Psychiatric/Behavioral: Negative for agitation and decreased concentration.     Objective:     Vital Signs (Most Recent):  Temp: 97.2 °F (36.2 °C) (08/06/20 1630)  Pulse: (!) 53 (08/06/20 1630)  Resp: (!) 22 (08/06/20 1630)  BP: 112/70 (08/06/20 1630)  SpO2: 100 % (08/06/20 1325) Vital Signs (24h Range):  Temp:  [96.4 °F (35.8 °C)-98.5 °F (36.9 °C)] 97.2 °F (36.2 °C)  Pulse:  [52-78] 53  Resp:  [18-22] 22  SpO2:  [99 %-100 %] 100 %  BP: ()/(54-70) 112/70     Weight: 107.6 kg (237 lb 3.4 oz)  Body mass index is 32.17 kg/m².    Intake/Output Summary (Last 24 hours) at 8/6/2020 1753  Last data filed at 8/6/2020 1200  Gross per 24 hour   Intake 1445.42 ml   Output --   Net 1445.42 ml      Physical Exam  Vitals signs and nursing note reviewed.   Constitutional:       Appearance: Normal appearance.   HENT:      Head: Normocephalic and atraumatic.   Eyes:      Extraocular Movements: Extraocular movements intact.   Neck:      Musculoskeletal: Normal range of motion.   Cardiovascular:      Rate and Rhythm: Normal rate.   Pulmonary:      Effort: Pulmonary effort is normal.   Neurological:      Mental Status: He is alert and oriented to person, place, and time.   Psychiatric:         Behavior: Behavior normal.         Thought Content: Thought content normal.         Significant Labs:   Recent Labs   Lab 08/04/20  0306 08/05/20  0437 08/06/20  0420   WBC 9.71 9.68 9.13   HGB 8.8* 10.0* 9.5*   HCT 28.7* 31.4* 30.9*    189 231     Recent Labs   Lab 07/31/20  0422  08/04/20  0306 08/05/20  0436 08/06/20  0420   *  134*   < > 137 136 137   K 5.5*  5.5*  5.5*   < > 4.8 4.8 4.7   CL 99  99   < > 101 105 105   CO2 22*  22*   < > 26 21* 18*   BUN 52*  52*   < > 48* 37* 46*   CREATININE 5.3*  5.1*   < > 4.2* 4.1* 4.3*   GLU 73  75   < > 98 103 129*   CALCIUM 9.2  9.2   < > 9.8 9.9 9.7   MG 2.1  --   --   --   --    PHOS 6.5*  --   --   --   --     < > = values in this interval not displayed.     Recent Labs    Lab 08/04/20  0306  08/04/20  1408 08/05/20  0436 08/06/20  0420   ALKPHOS 112  --   --  110 121   ALT <5*  --   --  7* 8*   AST 19  --   --  23 23   ALBUMIN 2.5*  --   --  2.6* 2.6*   PROT 7.3  --   --  7.7 7.6   BILITOT 0.5  --   --  0.5 0.5   INR 7.3*   < > 8.3* 1.4* 1.1    < > = values in this interval not displayed.      No results for input(s): CPK, CPKMB, MB, TROPONINI in the last 72 hours.  Recent Labs   Lab 08/05/20  0506 08/05/20  1117 08/05/20  1739 08/05/20  2056 08/06/20  0543 08/06/20  1322   POCTGLUCOSE 107 115* 97 117* 124* 121*     Hemoglobin A1C   Date Value Ref Range Status   07/30/2020 4.6 4.0 - 5.6 % Final     Comment:     ADA Screening Guidelines:  5.7-6.4%  Consistent with prediabetes  >or=6.5%  Consistent with diabetes  High levels of fetal hemoglobin interfere with the HbA1C  assay. Heterozygous hemoglobin variants (HbS, HgC, etc)do  not significantly interfere with this assay.   However, presence of multiple variants may affect accuracy.     08/27/2018 <4.0 (A) 4.0 - 5.6 % Final     Comment:     ADA Screening Guidelines:  5.7-6.4%  Consistent with prediabetes  >or=6.5%  Consistent with diabetes  High levels of fetal hemoglobin interfere with the HbA1C  assay. Heterozygous hemoglobin variants (HbS, HgC, etc)do  not significantly interfere with this assay.   However, presence of multiple variants may affect accuracy.     03/01/2017 4.4 (L) 4.5 - 6.2 % Final     Comment:     According to ADA guidelines, hemoglobin A1C <7.0% represents  optimal control in non-pregnant diabetic patients.  Different  metrics may apply to specific populations.   Standards of Medical Care in Diabetes - 2016.  For the purpose of screening for the presence of diabetes:  <5.7%     Consistent with the absence of diabetes  5.7-6.4%  Consistent with increasing risk for diabetes   (prediabetes)  >or=6.5%  Consistent with diabetes  Currently no consensus exists for use of hemoglobin A1C  for diagnosis of diabetes for  children.         No results for input(s): COLORU, CLARITYU, SPECGRAV, PHUR, PROTEINUA, GLUCOSEU, BLOODU, WBCU, RBCU, BACTERIA, MUCUS in the last 24 hours.    Invalid input(s):  BILIRUBINCON    Microbiology Results (last 7 days)     Procedure Component Value Units Date/Time    Blood culture [[id number]] Collected: 08/03/20 1358    Order Status: Completed Specimen: Blood Updated: 08/05/20 2012     Blood Culture, Routine No Growth to date      No Growth to date      No Growth to date          Scheduled Meds:   sodium chloride 0.9%   Intravenous Once    aspirin  81 mg Oral Daily    atorvastatin  40 mg Oral Daily    cadexomer iodine   Topical (Top) Every Mon, Wed, Fri    ceftAZIDime (FORTAZ) IVPB  1 g Intravenous Q24H    diclofenac sodium  2 g Topical (Top) BID    epoetin harshad-epbx  10,000 Units Intravenous Every Mon, Wed, Fri    gabapentin  100 mg Oral BID    levETIRAcetam  500 mg Oral BID    methadone  100 mg Oral Daily    metoprolol tartrate  12.5 mg Oral BID    mupirocin   Nasal BID    pantoprazole  40 mg Oral Before breakfast    polyethylene glycol  17 g Oral Daily    sevelamer carbonate  1,600 mg Oral TID WM    ticagrelor  90 mg Oral BID     Continuous Infusions:   heparin (porcine) in D5W 14 Units/kg/hr (08/06/20 0523)     As Needed: sodium chloride 0.9%, acetaminophen, albuterol-ipratropium, bisacodyL, calcium carbonate, calcium gluconate IVPB, calcium gluconate IVPB, heparin (PORCINE), heparin (PORCINE), ondansetron, oxyCODONE, oxyCODONE, promethazine, sodium chloride 0.9%    Significant Imaging:   No new imaging        Assessment/Plan:      * Acute osteomyelitis of metatarsal bone, right  PVD (peripheral vascular disease)  Atherosclerosis of native artery of right lower extremity with ulceration of midfoot    CTA A/P with BLE runoff to better define surgical anatomy  Consult cardiology- for possible revascularization and potential limb salvage  Wound cx with stenotrophomonas  Continue  Ceftazidime x 6 weeks recs now, end date 9/11  Continue wound care  Consult cardiology  Consult podiatry  Consult ID  Pan culture on 8/3      Atrial fibrillation    8/5 start lopressor  Started on heparin drip by cards  No amio given his liver dzs  8/6 better controlled    Elevated INR  8/4 cards ordered vit K  F/u labs  No procedures at this time  8/5 INR down to 1.4  Monitor  8/6 INR 1.1    Osteomyelitis of right foot        Atrial flutter  On Brilinta, asa and Warfarin      (HFpEF) heart failure with preserved ejection fraction  Ischemic cardiomyopathy, LVEF 25%-30%  Stable    ESRD (end stage renal disease) on dialysis   HD on TTSs  Consult nephrology   Renally dose medication  Continue sevelamer      Nonrheumatic aortic (valve) stenosis  History of coronary artery stent placement  ECHO shows mild aortic valve stenosis.   currently high risk for cardiac surgery secondary to cirrhosis (plt 149) and osteomyelitis   [id number] of 6   Was on Eliquis now switched to Warfarin   Continue heparin with coumadin bridge. D/c heparin on 8/3. Decrease Warfarin dose to 2.5  INR goal 2.5-3.5  Continue Brilinta   Continue ASA x 1 week end date     Type 2 diabetes mellitus with chronic kidney disease on chronic dialysis, without long-term current use of insulin  HbA1c 4.3   Low dose SSI  accucheck  Diabetic renal diet      Seizure disorder  Continue Keppra      Benign hypertension with ESRD (end-stage renal disease)  Continue Norvasc        Cirrhosis of liver with ascites  Chronic hepatitis C without hepatic coma  history of HCV and cirrhosis   stable    Chronic back pain  Methadone dependence  Continue methadon, Oxycodone, neurontin    Blindness of right eye  stable      Ischemic cardiomyopathy, LVEF 25%-30%  8/5 appreciate cards input        Anemia in chronic kidney disease  Stable  Monitor        VTE Risk Mitigation (From admission, onward)         Ordered     heparin 25,000 units in dextrose 5% 250 mL (100 units/mL) infusion  LOW INTENSITY nomogram - [facility]  Continuous     Question:  Heparin Infusion Adjustment (DO NOT MODIFY ANSWER)  Answer:  [email or web address]\Images\Pharmacy\HeparinInfusions\heparin LOW INTENSITY nomogram for [facility] [id number]    08/05/20 1207     heparin 25,000 units in dextrose 5% (100 units/ml) IV bolus from bag - ADDITIONAL PRN BOLUS - 60 units/kg  As needed (PRN)     Question:  Heparin Infusion Adjustment (DO NOT MODIFY ANSWER)  Answer:  [email or web address]\epic\Images\Pharmacy\HeparinInfusions\heparin LOW INTENSITY nomogram for [facility] [id number]    08/05/20 1207     heparin 25,000 units in dextrose 5% (100 units/ml) IV bolus from bag - ADDITIONAL PRN BOLUS - 30 units/kg  As needed (PRN)     Question:  Heparin Infusion Adjustment (DO NOT MODIFY ANSWER)  Answer:  [email or web address]\Images\Pharmacy\HeparinInfusions\heparin LOW INTENSITY nomogram for [facility] [id number]    08/05/20 1207                      Jose Bai DO  Department of Hospital Medicine   [facility]

## 2020-08-06 NOTE — PROGRESS NOTES
[facility]  Cardiology  Progress Note    Patient Name: João Aguirre  MRN: [id number]  Admission Date: 8/2/2020  Hospital Length of Stay: 4 days  Code Status: Full Code   Attending Physician: Jose Bai DO   Primary Care Physician: Primary Doctor No  Expected Discharge Date: 8/7/2020  Principal Problem:Acute osteomyelitis of metatarsal bone, right    Subjective:     Hospital Course:   8/2/2020 per HPI  8/3/2020 H/H 9.1/28.2 this AM. BMP with K+ 4.9 BUN 64 creatinine 5.2. Blood culture NGTD. Wound culture to right foot at [facility] with stenotrophomonas maltophilia and MRI with osteo of right 5th metatarsal head. On ASA, statin and Brilinta. Will hold Coumadin for now given INR 3.3. Cardiology consulted for evaluation of revascularization   8/5/2020 INR elevated yesterday at 7-8 with total of 15mg of Vitamin K given. INR down to 1.4 this AM. Will place on IV Heparin given afib and risk for stroke. Will hold BB for now given marginal BP. HD yesterday with 3.5 liters removed-SOB improved.  Needs LE angiogram but deferred given elevated INR, SOB with volume overload and need for records from [facility]. Records received and to be reviewed by staff. Hopeful to proceed soon with LE angiogram   8/6/2020 H/H 9.5/30.9 INR 1.1.BMP with K+ 4.3. Intermittent atrial flutter overnight with no RVR. Remains on oral Metoprolol BID as well as IV Heparin. Outside records extensively reviewed and hopeful to proceed with angiogram tomorrow         Review of Systems   Constitution: Negative for chills, decreased appetite, diaphoresis, fever, malaise/fatigue, weight gain and weight loss.   Cardiovascular: Negative for chest pain, claudication, dyspnea on exertion, irregular heartbeat, leg swelling, near-syncope, orthopnea, palpitations and paroxysmal nocturnal dyspnea.   Respiratory: Negative for cough, shortness of breath, snoring, sputum production and wheezing.    Endocrine: Negative for  cold intolerance, heat intolerance, polydipsia, polyphagia and polyuria.   Skin: Positive for poor wound healing. Negative for color change, dry skin, itching and nail changes.   Musculoskeletal: Negative for back pain, gout, joint pain and joint swelling.   Gastrointestinal: Negative for bloating, abdominal pain, constipation, diarrhea, hematemesis, hematochezia, melena, nausea and vomiting.   Genitourinary: Negative for dysuria, hematuria and nocturia.   Neurological: Negative for dizziness, headaches, light-headedness, numbness, paresthesias and weakness.   Psychiatric/Behavioral: Negative for altered mental status, depression and memory loss.     Objective:     Vital Signs (Most Recent):  Temp: 96.9 °F (36.1 °C) (08/06/20 0822)  Pulse: (!) 59 (08/06/20 0822)  Resp: 18 (08/06/20 0937)  BP: (!) 96/57 (08/06/20 0822)  SpO2: 99 % (08/06/20 0822) Vital Signs (24h Range):  Temp:  [96.6 °F (35.9 °C)-98.5 °F (36.9 °C)] 96.9 °F (36.1 °C)  Pulse:  [] 59  Resp:  [17-20] 18  SpO2:  [96 %-100 %] 99 %  BP: ()/(40-82) 96/57     Weight: 107.6 kg (237 lb 3.4 oz)  Body mass index is 32.17 kg/m².     SpO2: 99 %  O2 Device (Oxygen Therapy): nasal cannula      Intake/Output Summary (Last 24 hours) at 8/6/2020 1043  Last data filed at 8/6/2020 0900  Gross per 24 hour   Intake 2050.42 ml   Output 1000 ml   Net 1050.42 ml       Lines/Drains/Airways     Drain                 Hemodialysis AV Fistula Left forearm -- days         Hemodialysis AV Fistula Left forearm -- days         Hemodialysis AV Fistula Left forearm -- days          Peripheral Intravenous Line                 Peripheral IV - Single Lumen 07/30/20 0458 20 G Anterior;Right Shoulder 7 days         Peripheral IV - Single Lumen 07/31/20 1846 20 G Anterior;Proximal;Right Forearm 5 days                Physical Exam   Constitutional: He is oriented to person, place, and time. He appears well-developed and well-nourished. He has a sickly appearance. He appears ill. No  distress.   Neck: Normal range of motion. Neck supple. No JVD present.   Cardiovascular: Normal rate and regular rhythm. Exam reveals no gallop.   No murmur heard.  Weak biphasic dopplerable right DP and PT pulse;    Pulmonary/Chest: Effort normal and breath sounds normal. No respiratory distress. He has no wheezes.   Abdominal: Soft. Bowel sounds are normal. He exhibits no distension. There is no abdominal tenderness.   Musculoskeletal:         General: No edema.   Neurological: He is alert and oriented to person, place, and time.   Skin: Skin is warm and dry.   Discoloration to bilateral lower extremities consistent with venous disease    Psychiatric: He has a normal mood and affect. His behavior is normal. Judgment and thought content normal.       Significant Labs:     Recent Labs   Lab 08/06/20  0420   WBC 9.13   RBC 2.94*   HGB 9.5*   HCT 30.9*      *   MCH 32.3*   MCHC 30.7*     Recent Labs   Lab 08/06/20  0420      K 4.7      CO2 18*   BUN 46*   CREATININE 4.3*       Significant Imaging: Echocardiogram:   Transthoracic echo (TTE) complete (Cupid Only):   Results for orders placed or performed during the hospital encounter of 07/24/20   Echo Color Flow Doppler? Yes   Result Value Ref Range    Ascending aorta 3.99 cm    STJ 3.70 cm    AV mean gradient 24 mmHg    Ao peak kobi 3.31 m/s    Ao VTI 69.42 cm    IVS 0.86 0.6 - 1.1 cm    LA size 5.77 cm    Left Atrium Major Axis 7.40 cm    Left Atrium Minor Axis 7.49 cm    LVIDD 6.38 (A) 3.5 - 6.0 cm    LVIDS 5.02 (A) 2.1 - 4.0 cm    LVOT diameter 2.15 cm    LVOT peak VTI 30.00 cm    PW 1.13 (A) 0.6 - 1.1 cm    MV Peak A Kobi 2.17 m/s    E wave decelartion time 293.42 msec    MV Peak E Kobi 2.90 m/s    RA Major Axis 5.21 cm    RA Width 3.56 cm    Sinus 4.04 cm    TAPSE 2.21 cm    TR Max Kobi 3.43 m/s    TDI LATERAL 0.04 m/s    TDI SEPTAL 0.03 m/s    LA WIDTH 5.63 cm    MV stenosis pressure 1/2 time 88.92 ms    LV Diastolic Volume 206.86 mL    LV  Systolic Volume 119.49 mL    RV S' 11.57 cm/s    LVOT peak tae 1.54 m/s    LV LATERAL E/E' RATIO 72.50 m/s    LV SEPTAL E/E' RATIO 96.67 m/s    FS 21 %    LA volume 205.57 cm3    LV mass 272.40 g    Left Ventricle Relative Wall Thickness 0.35 cm    AV valve area 1.57 cm2    AV Velocity Ratio 0.47     AV index (prosthetic) 0.43     MV valve area p 1/2 method 2.47 cm2    E/A ratio 1.34     Mean e' 0.04 m/s    LVOT area 3.6 cm2    LVOT stroke volume 108.86 cm3    AV peak gradient 44 mmHg    E/E' ratio 82.86 m/s    LV Systolic Volume Index 53.9 mL/m2    LV Diastolic Volume Index 93.25 mL/m2    LA Volume Index 92.7 mL/m2    LV Mass Index 123 g/m2    Triscuspid Valve Regurgitation Peak Gradient 47 mmHg    BSA 2.25 m2    Right Atrial Pressure (from IVC) 15 mmHg    MV mean gradient 15 mmHg    MV peak gradient 33 mmHg    TV rest pulmonary artery pressure 62 mmHg    HR echo 73 bpm    Narrative    · Eccentric left ventricular hypertrophy.  · Low normal left ventricular systolic function. The estimated ejection   fraction is 50%.  · Local segmental wall motion abnormalities.  · Normal right ventricular systolic function.  · Severe left atrial enlargement.  · Mild aortic valve stenosis. Aortic valve area is 1.57 cm2; peak velocity   is 3.31 m/s; mean gradient is 24 mmHg.  · Mild aortic regurgitation.  · Moderate to severe mitral stenosis from extensive annular calcification.   The mean diastolic gradient across the mitral valve is 15 mmHg at a heart   rate of 73 bpm. Gradients may be elevated in part due to volume overload.  · The estimated PA systolic pressure is 62 mmHg.  · Elevated central venous pressure (15 mmHg).        Assessment and Plan:     Brief HPI: Seen on AM rounds with Dr. Young while resting in bed. Denies any complaints. Discussed POC as detailed below-verbalized understanding and agrees with POC     Elevated INR  - INR 3.3 upon transfer with sharp rise up to 7-8; given Vitamin K with INR 1.1 this AM   -  continue to hold Coumadin; INRs daily for now     Atrial flutter  - afib/aflutter per EKG at [facility]  - RVR with MET called yesterday; responded to IV Metoprolol 2.5mg with improvement in HR; placed on low dose Metoprolol 12.5 mg po BID; intermittent aflutter overnight but no RVR noted   - previously  on Coumadin for stroke prevention given valvular afib; placed on IV Heparin since INR trended down and given stroke risk with intermittent afib/aflutter; will plan to resume Coumadin once revascularization completed       (HFpEF) heart failure with preserved ejection fraction  - echo on 7/24/2020 with EF 50% and WMA  - on Coreg and Norvasc previously with no ACEI/ARB  - transitioned to low dose BB only with discontinuation of Norvasc only  - BP marginal; HR 90s-120s  - negative 4.1 liters since admission; SOB improved; appears euvolemic on exam     PVD (peripheral vascular disease)  - wound to right 5th metatarsal with osteomyelitis  - bilateral PAD per CTA with run off  - recent RLE angiogram per [facility] with occluded SFA, popliteal and occluded BTK vessels with reconstitution per collaterals- unable to revascularize with BKA recommended; transferred to [facility] for second opinion; LE bypass surgery at [facility] in 9/2019 with endarectomy to CFA/PFA with bypass  - needs revascularization but initially deferred secondary to elevated INR; INR trended down to 1.1 after vitamin K hopeful to proceed with revascularization tomorrow  - continue ASA, Brilinita and statin therapy  - ID, Podiatry and wound care on board  - will offload heels with Z flex boots     Mitral stenosis  - moderate to severe MS per echo with mean diastolic gradient 15mmHg  - not a surgical candidate per [facility] CTS   - may need to repeat echocardiogram and later date for re evaluation of MS     Nonrheumatic aortic (valve) stenosis  - echo 7/24/2020 with mild AS- MAYA 1.57cm2; mean gradient 24mmHg peak velocity 3.31  - will need  close monitoring with regular echos     Benign hypertension with ESRD (end-stage renal disease)  - SBP 90s-120s overnight with periods down to 80s  - changed from Norvasc to low dose BB  - continue to monitor BP closely and adjust meds prn     Anemia in chronic kidney disease  - H/H 9.5/30.9 this AM; stable at baseline  - will continue to monitor closely         VTE Risk Mitigation (From admission, onward)         Ordered     heparin 25,000 units in dextrose 5% 250 mL (100 units/mL) infusion LOW INTENSITY nomogram - [facility]  Continuous     Question:  Heparin Infusion Adjustment (DO NOT MODIFY ANSWER)  Answer:  [email or web address]\epic\Images\Pharmacy\HeparinInfusions\heparin LOW INTENSITY nomogram for [facility] [id number]    08/05/20 1207     heparin 25,000 units in dextrose 5% (100 units/ml) IV bolus from bag - ADDITIONAL PRN BOLUS - 60 units/kg  As needed (PRN)     Question:  Heparin Infusion Adjustment (DO NOT MODIFY ANSWER)  Answer:  [email or web address]\epic\Images\Pharmacy\HeparinInfusions\heparin LOW INTENSITY nomogram for [facility] [id number]    08/05/20 1207     heparin 25,000 units in dextrose 5% (100 units/ml) IV bolus from bag - ADDITIONAL PRN BOLUS - 30 units/kg  As needed (PRN)     Question:  Heparin Infusion Adjustment (DO NOT MODIFY ANSWER)  Answer:  [email or web address]\epic\Images\Pharmacy\HeparinInfusions\heparin LOW INTENSITY nomogram for [facility] [id number]    08/05/20 1207                Mayuri Erickson, APRN, ANP  Cardiology  [facility]

## 2020-08-06 NOTE — ASSESSMENT & PLAN NOTE
- echo on 7/24/2020 with EF 50% and WMA  - on Coreg and Norvasc previously with no ACEI/ARB  - transitioned to low dose BB only with discontinuation of Norvasc only  - BP marginal; HR 90s-120s  - negative 4.1 liters since admission; SOB improved; appears euvolemic on exam

## 2020-08-07 NOTE — PROGRESS NOTES
[facility] Hospital Medicine  Progress Note    Patient Name: João Aguirre  MRN: [id number]  Patient Class: IP- Inpatient   Admission Date: 8/2/2020  Length of Stay: 5 days  Attending Physician: Jose Bai DO  Primary Care Provider: Primary Doctor No        Subjective:     Principal Problem:Acute osteomyelitis of metatarsal bone, right        HPI:  Patient is a 55/y/o M with PMH of ESRD on HD (T/TH/S), anemia, uncontrolled HTN, hx CVA, PCI s/p 2 stents placement, right eye blindness, seizure, marijuana use, non-healing DM right foot ulcer DM II and gastroparesis, initially presented to [facility] from [place] for second opinion regarding AVR and CABG. Patient noted several weeks history of chest tightness with dizziness, palpitation, and increasing SOB. Denies fever, chills, nausea, vomiting. Seen by CTS and recommended patient not a CABG candidate due to multiple co morbidities- HD, Hep C, Osteomyelitis, PVD. s/p cath with stent placement on 7/31. Vascular surgery recommends BKA but patient declined, patient started on heparin drip and bridging with Warfarin (on Eliquis prior) with plan for balloon angioplasty on Tuesday    Overview/Hospital Course:  8/4 pt seen in HD, pt INR is elevated, given vit K, appreciate cards inputs  8/5 the pt is doing ok, ha episode of afib, discussed with cards, he is now on bb LOW DOSE AND HEPARIN DRIP. WILL HAVE le ANGIOGRAM PROBABLY ON Friday.  8/6 pt is doing ok, no acute events, BP and HR improving anf more stable  8/7 pt evaluated earlier today , he underwent later a revascularization of the LE. And then HD is set up    Interval History:  INR is 1.2, to OR and HD today    Review of Systems   Constitutional: Negative for activity change, fatigue and fever.   HENT: Negative for congestion.    Respiratory: Negative for cough and shortness of breath.    Cardiovascular: Negative for chest pain and palpitations.   Gastrointestinal: Negative for  abdominal distention.   Neurological: Negative for dizziness and headaches.   Psychiatric/Behavioral: Negative for agitation and decreased concentration.     Objective:     Vital Signs (Most Recent):  Temp: 99.8 °F (37.7 °C) (08/07/20 0814)  Pulse: (!) 53 (08/07/20 1545)  Resp: (!) 23 (08/07/20 1545)  BP: (!) 100/52 (08/07/20 1545)  SpO2: 100 % (08/07/20 1545) Vital Signs (24h Range):  Temp:  [97.8 °F (36.6 °C)-99.8 °F (37.7 °C)] 99.8 °F (37.7 °C)  Pulse:  [1-75] 53  Resp:  [16-38] 23  SpO2:  [82 %-100 %] 100 %  BP: ()/(45-71) 100/52     Weight: 107.6 kg (237 lb 3.4 oz)  Body mass index is 32.17 kg/m².    Intake/Output Summary (Last 24 hours) at 8/7/2020 1734  Last data filed at 8/7/2020 1205  Gross per 24 hour   Intake 700 ml   Output --   Net 700 ml      Physical Exam  Vitals signs and nursing note reviewed.   Constitutional:       Appearance: Normal appearance.   HENT:      Head: Normocephalic and atraumatic.   Eyes:      Extraocular Movements: Extraocular movements intact.   Neck:      Musculoskeletal: Normal range of motion.   Cardiovascular:      Rate and Rhythm: Normal rate.   Pulmonary:      Effort: Pulmonary effort is normal. No respiratory distress.   Neurological:      Mental Status: He is alert and oriented to person, place, and time.   Psychiatric:         Behavior: Behavior normal.         Thought Content: Thought content normal.         Significant Labs:   Recent Labs   Lab 08/05/20  0437 08/06/20  0420 08/07/20  0342   WBC 9.68 9.13 10.14   HGB 10.0* 9.5* 9.0*   HCT 31.4* 30.9* 29.5*    231 210     Recent Labs   Lab 08/05/20  0436 08/06/20  0420 08/07/20  0342    137 139   K 4.8 4.7 5.1    105 103   CO2 21* 18* 20*   BUN 37* 46* 71*   CREATININE 4.1* 4.3* 5.8*    129* 90   CALCIUM 9.9 9.7 9.5     Recent Labs   Lab 08/05/20  0436 08/06/20  0420 08/07/20  0342   ALKPHOS 110 121 121   ALT 7* 8* 9*   AST 23 23 24   ALBUMIN 2.6* 2.6* 2.5*   PROT 7.7 7.6 7.3   BILITOT 0.5  0.5 0.5   INR 1.4* 1.1 1.2      No results for input(s): CPK, CPKMB, MB, TROPONINI in the last 72 hours.  Recent Labs   Lab 08/05/20 2056 08/06/20  0543 08/06/20  1322 08/06/20  2107 08/07/20  0738 08/07/20  1731   POCTGLUCOSE 117* 124* 121* 132* 90 119*     Hemoglobin A1C   Date Value Ref Range Status   07/30/2020 4.6 4.0 - 5.6 % Final     Comment:     ADA Screening Guidelines:  5.7-6.4%  Consistent with prediabetes  >or=6.5%  Consistent with diabetes  High levels of fetal hemoglobin interfere with the HbA1C  assay. Heterozygous hemoglobin variants (HbS, HgC, etc)do  not significantly interfere with this assay.   However, presence of multiple variants may affect accuracy.     08/27/2018 <4.0 (A) 4.0 - 5.6 % Final     Comment:     ADA Screening Guidelines:  5.7-6.4%  Consistent with prediabetes  >or=6.5%  Consistent with diabetes  High levels of fetal hemoglobin interfere with the HbA1C  assay. Heterozygous hemoglobin variants (HbS, HgC, etc)do  not significantly interfere with this assay.   However, presence of multiple variants may affect accuracy.     03/01/2017 4.4 (L) 4.5 - 6.2 % Final     Comment:     According to ADA guidelines, hemoglobin A1C <7.0% represents  optimal control in non-pregnant diabetic patients.  Different  metrics may apply to specific populations.   Standards of Medical Care in Diabetes - 2016.  For the purpose of screening for the presence of diabetes:  <5.7%     Consistent with the absence of diabetes  5.7-6.4%  Consistent with increasing risk for diabetes   (prediabetes)  >or=6.5%  Consistent with diabetes  Currently no consensus exists for use of hemoglobin A1C  for diagnosis of diabetes for children.         No results for input(s): COLORU, CLARITYU, SPECGRAV, PHUR, PROTEINUA, GLUCOSEU, BLOODU, WBCU, RBCU, BACTERIA, MUCUS in the last 24 hours.    Invalid input(s):  BILIRUBINCON    Microbiology Results (last 7 days)     Procedure Component Value Units Date/Time    Blood culture  [[id number]] Collected: 08/03/20 1358    Order Status: Completed Specimen: Blood Updated: 08/06/20 2016     Blood Culture, Routine No Growth to date      No Growth to date      No Growth to date      No Growth to date          Scheduled Meds:   sodium chloride 0.9%   Intravenous Once    sodium chloride 0.9%   Intravenous Once    aspirin  81 mg Oral Daily    atorvastatin  40 mg Oral Daily    cadexomer iodine   Topical (Top) Every Mon, Wed, Fri    ceftAZIDime (FORTAZ) IVPB  1 g Intravenous Q24H    diclofenac sodium  2 g Topical (Top) BID    epoetin harshad-epbx  10,000 Units Intravenous Every Mon, Wed, Fri    levETIRAcetam  500 mg Oral BID    methadone  100 mg Oral Daily    mupirocin   Nasal BID    pantoprazole  40 mg Oral Before breakfast    polyethylene glycol  17 g Oral Daily    sevelamer carbonate  1,600 mg Oral TID WM    ticagrelor  90 mg Oral BID     Continuous Infusions:   heparin (porcine) in D5W 14 Units/kg/hr (08/07/20 0217)    heparin (porcine)       As Needed: sodium chloride 0.9%, acetaminophen, albuterol-ipratropium, bisacodyL, calcium carbonate, calcium gluconate IVPB, calcium gluconate IVPB, heparin (PORCINE), heparin (PORCINE), heparin (porcine), ondansetron, oxyCODONE, oxyCODONE, promethazine, sodium chloride 0.9%    Significant Imaging:   No new imaging        Assessment/Plan:      * Acute osteomyelitis of metatarsal bone, right  PVD (peripheral vascular disease)  Atherosclerosis of native artery of right lower extremity with ulceration of midfoot    CTA A/P with BLE runoff to better define surgical anatomy  Consult cardiology- for possible revascularization and potential limb salvage  Wound cx with stenotrophomonas  Continue Ceftazidime x 6 weeks recs now, end date 9/11  Continue wound care  Consult cardiology  Consult podiatry  Consult ID  Pan culture on 8/3    8/7 revascularization today by cards      Atrial fibrillation    8/5 start lopressor  Started on heparin drip by cards  No  amio given his liver dzs  8/6 better controlled  8/7 no events overnight      Elevated INR  8/4 cards ordered vit K  F/u labs  No procedures at this time  8/5 INR down to 1.4  Monitor  8/6 INR 1.1    Osteomyelitis of right foot        Atrial flutter  On Brilinta, asa and Warfarin      (HFpEF) heart failure with preserved ejection fraction  Ischemic cardiomyopathy, LVEF 25%-30%  Stable    Atherosclerosis of native artery of right lower extremity with ulceration of midfoot        ESRD (end stage renal disease) on dialysis   HD on TTSs  Consult nephrology   Renally dose medication  Continue sevelamer      Nonrheumatic aortic (valve) stenosis  History of coronary artery stent placement  ECHO shows mild aortic valve stenosis.   currently high risk for cardiac surgery secondary to cirrhosis (plt 149) and osteomyelitis   [id number] of 6   Was on Eliquis now switched to Warfarin   Continue heparin with coumadin bridge. D/c heparin on 8/3. Decrease Warfarin dose to 2.5  INR goal 2.5-3.5  Continue Brilinta   Continue ASA x 1 week end date     Type 2 diabetes mellitus with chronic kidney disease on chronic dialysis, without long-term current use of insulin  HbA1c 4.3   Low dose SSI  accucheck  Diabetic renal diet      Seizure disorder  Continue Keppra      Benign hypertension with ESRD (end-stage renal disease)  Continue Norvasc        Cirrhosis of liver with ascites  Chronic hepatitis C without hepatic coma  history of HCV and cirrhosis   stable    Chronic back pain  Methadone dependence  Continue methadon, Oxycodone, neurontin    Blindness of right eye  stable      Ischemic cardiomyopathy, LVEF 25%-30%  8/5 appreciate cards input        Anemia in chronic kidney disease  Stable  Monitor        VTE Risk Mitigation (From admission, onward)         Ordered     heparin infusion 1,000 units/500 ml in 0.9% NaCl (pressure line flush)  Intra-op continuous PRN      08/07/20 1116     heparin 25,000 units in dextrose 5% 250 mL (100  units/mL) infusion LOW INTENSITY nomogram - [facility]  Continuous     Question:  Heparin Infusion Adjustment (DO NOT MODIFY ANSWER)  Answer:  [email or web address]\epic\Images\Pharmacy\HeparinInfusions\heparin LOW INTENSITY nomogram for [facility] [id number]    08/05/20 1207     heparin 25,000 units in dextrose 5% (100 units/ml) IV bolus from bag - ADDITIONAL PRN BOLUS - 60 units/kg  As needed (PRN)     Question:  Heparin Infusion Adjustment (DO NOT MODIFY ANSWER)  Answer:  [email or web address]\epic\Images\Pharmacy\HeparinInfusions\heparin LOW INTENSITY nomogram for [facility] [id number]    08/05/20 1207     heparin 25,000 units in dextrose 5% (100 units/ml) IV bolus from bag - ADDITIONAL PRN BOLUS - 30 units/kg  As needed (PRN)     Question:  Heparin Infusion Adjustment (DO NOT MODIFY ANSWER)  Answer:  [email or web address]\epic\Images\Pharmacy\HeparinInfusions\heparin LOW INTENSITY nomogram for [facility] [id number]    08/05/20 1207                      Jose Bai DO  Department of Hospital Medicine   [facility]

## 2020-08-07 NOTE — ANESTHESIA PREPROCEDURE EVALUATION
08/06/2020  João Aguirre is a 55 y.o., male with ESRD on HD (T/Th/S), anemia, uncontrolled HTN, hx of CVA, PCI s/p 2 stent placements, afib, CHF (EF 50%), mitral stenosis, aortic stenosis, CAD s/p PCI 7/30/20, DM2, HCV, right eye blindness, seizures, gastroparesis, with osteomyelitis of the right foot scheduled for the following procedure.     Of note, patient has had multiple attempts at revascularization and BKA recommended though patient declines BKA. Patient is also taking methadone.     Pre-operative evaluation for Procedure(s) (LRB):  PTA, PERIPHERAL VESSEL (N/A)    LDA:  20 G right anterior shoulder PIV   20 G anterior right forearm PIV    Patient Active Problem List   Diagnosis    Infected ulcer of skin    Acute non-ST-elevation myocardial infarction    NSTEMI (non-ST elevated myocardial infarction)    Smoker    Poorly-controlled hypertension    Tachycardia    Coronary artery disease involving native coronary artery of native heart without angina pectoris    ESRD (T,Th,Sat) dialysis onset 2013    Anemia in chronic kidney disease    Ischemic cardiomyopathy, LVEF 25%-30%    Elevated brain natriuretic peptide (BNP) level    Blindness of right eye    Chronic back pain    Work place accident, 2003    Pneumonia, organism unspecified(486)    Intractable vomiting with nausea    Dehydration    Slurring of speech    H/O: CVA (cerebrovascular accident)    Hypoglycemia associated with type 2 diabetes mellitus    Hypophosphatemia    Possible Insulinoma    Hypervolemia    Hypertensive urgency    Metabolic acidosis    Cirrhosis of liver with ascites    Chronic hepatitis C without hepatic coma    Methadone dependence    Cellulitis    Benign hypertension with ESRD (end-stage renal disease)    Noncompliance with renal dialysis    Post-traumatic seizures    Mixed  hyperlipidemia    Seizure disorder    Convulsions    Syncope    Cough    Malignant HTN with heart disease, w/o CHF, with chronic kidney disease    Hyperkalemia    Chest pain    Elevated troponin    History of coronary artery stent placement    Paroxysmal atrial fibrillation with RVR    Thrombocytopenia    Type 2 diabetes mellitus with chronic kidney disease on chronic dialysis, without long-term current use of insulin    Septic shock    Hypoglycemia    Right foot ulcer    Hyponatremia    C. difficile colitis    Cellulitis and abscess of right lower extremity    Severe protein-calorie malnutrition    Hammer toes of both feet    Skin ulcer of toe of right foot, limited to breakdown of skin    Neuritis    Abscess of fifth toe of right foot    Plantar fasciitis    Gangrene of toe of right foot    Skin ulcer of right foot with fat layer exposed    Nonrheumatic aortic (valve) stenosis    Chronic kidney disease-mineral and bone disorder    Mitral stenosis    ESRD (end stage renal disease) on dialysis    Diabetic foot infection    PVD (peripheral vascular disease)    Atherosclerosis of native artery of right lower extremity with ulceration of midfoot    Acute osteomyelitis of metatarsal bone, right    (HFpEF) heart failure with preserved ejection fraction    Atrial flutter    Hypotension    Osteomyelitis of right foot    Elevated INR    Atrial fibrillation       Review of patient's allergies indicates:   Allergen Reactions    Antibiotic hc      Counter acts with methodone.           No current facility-administered medications on file prior to encounter.      Current Outpatient Medications on File Prior to Encounter   Medication Sig Dispense Refill    albuterol (PROAIR HFA) 90 mcg/actuation inhaler INHALE TWO PUFFS EVERY 4 TO 6 HOURS AS NEEDED      albuterol-ipratropium (DUO-NEB) 2.5 mg-0.5 mg/3 mL nebulizer solution Take 3 mLs by nebulization every 6 (six) hours as needed for  Wheezing. Rescue 1 Box 0    amLODIPine (NORVASC) 10 MG tablet Take 1 tablet (10 mg total) by mouth once daily. 30 tablet 11    aspirin 81 MG Chew Take 1 tablet (81 mg total) by mouth once daily.  0    atorvastatin (LIPITOR) 40 MG tablet Take 1 tablet (40 mg total) by mouth once daily.      bisacodyL (DULCOLAX) 10 mg Supp Place 1 suppository (10 mg total) rectally daily as needed (Until bowel movement if patient has no bowel movement for 2 days).  0    calcium carbonate (TUMS) 200 mg calcium (500 mg) chewable tablet Take 1 tablet (500 mg total) by mouth 2 (two) times daily as needed.      gabapentin (NEURONTIN) 100 MG capsule Take 1 capsule (100 mg total) by mouth 2 (two) times daily. 60 capsule 11    levETIRAcetam (KEPPRA) 500 MG Tab Take 1 tablet (500 mg total) by mouth 2 (two) times daily. 60 tablet 11    methadone (DOLOPHINE) 10 MG tablet Take 10 tablets (100 mg total) by mouth once daily.  0    ondansetron (ZOFRAN-ODT) 8 MG TbDL Take 1 tablet (8 mg total) by mouth every 8 (eight) hours as needed. 6 tablet     pantoprazole (PROTONIX) 40 MG tablet Take 1 tablet (40 mg total) by mouth before breakfast. 30 tablet 11    polyethylene glycol (GLYCOLAX) 17 gram PwPk Take 17 g by mouth once daily.  0    sevelamer carbonate (RENVELA) 800 mg Tab Take 2 tablets (1,600 mg total) by mouth 3 (three) times daily with meals. 180 tablet 11    ticagrelor (BRILINTA) 90 mg tablet Take 1 tablet (90 mg total) by mouth 2 (two) times a day. 60 tablet 11    warfarin (COUMADIN) 5 MG tablet Take 1 tablet (5 mg total) by mouth Daily. 30 tablet 11    acetaminophen (TYLENOL) 325 MG tablet Take 2 tablets (650 mg total) by mouth every 4 (four) hours as needed.  0    blood-glucose meter (PHARMACIST CHOICE GLUCOSE SYS) Misc 1 Device.      cadexomer iodine (IODOSORB) 0.9 % gel Apply topically every Mon, Wed, Fri.  0    oxyCODONE (ROXICODONE) 10 mg Tab immediate release tablet Take 1 tablet (10 mg total) by mouth every 4 (four)  hours as needed.  0    oxyCODONE (ROXICODONE) 5 MG immediate release tablet Take 1 tablet (5 mg total) by mouth every 4 (four) hours as needed.  0    ticagrelor (BRILINTA) 90 mg tablet Take 1 tablet (90 mg total) by mouth 2 (two) times a day. 60 tablet 11       Past Surgical History:   Procedure Laterality Date    ANGIOGRAPHY OF LOWER EXTREMITY Right 2020    Procedure: Angiogram Extremity Unilateral;  Surgeon: MINO Vasquez II, MD;  Location: [address];  Service: Vascular;  Laterality: Right;  Left groin and rIght pedal artery access  15.5 min  247.25 mGy  68.5454 Gycm2  33ml contrast    AV FISTULA PLACEMENT      BACK SURGERY      CARDIAC SURGERY      heart stent    CHOLECYSTECTOMY      CORONARY ANGIOGRAPHY N/A 2020    Procedure: ANGIOGRAM, CORONARY ARTERY;  Surgeon: Alexandro Terry MD;  Location: [facility] CATH LAB;  Service: Cardiology;  Laterality: N/A;    EYE SURGERY      R eye    INCISION AND DRAINAGE OF ABSCESS Right 2018    Procedure: INCISION AND DRAINAGE, ABSCESS;  Surgeon: Chetan Rothman MD;  Location: [facility];  Service: General;  Laterality: Right;    LEFT HEART CATHETERIZATION N/A 2020    Procedure: Left heart cath;  Surgeon: Alexandro Terry MD;  Location: [facility] CATH LAB;  Service: Cardiology;  Laterality: N/A;       Social History     Socioeconomic History    Marital status: Legally      Spouse name: Not on file    Number of children: Not on file    Years of education: Not on file    Highest education level: Not on file   Occupational History    Not on file   Social Needs    Financial resource strain: Not on file    Food insecurity     Worry: Not on file     Inability: Not on file    Transportation needs     Medical: Not on file     Non-medical: Not on file   Tobacco Use    Smoking status: Former Smoker     Years: 10.00     Quit date: 2015     Years since quittin.9    Smokeless tobacco: Never Used   Substance and Sexual Activity     Alcohol use: No    Drug use: Yes     Frequency: 4.0 times per week     Types: Other-see comments     Comment: marijuana    Sexual activity: Yes   Lifestyle    Physical activity     Days per week: Not on file     Minutes per session: Not on file    Stress: Not on file   Relationships    Social connections     Talks on phone: Not on file     Gets together: Not on file     Attends [employer] service: Not on file     Active member of club or organization: Not on file     Attends meetings of clubs or organizations: Not on file     Relationship status: Not on file   Other Topics Concern    Not on file   Social History Narrative    Not on file         Vital Signs Range (Last 24H):  Temp:  [35.8 °C (96.4 °F)-36.9 °C (98.5 °F)]   Pulse:  [52-74]   Resp:  [16-22]   BP: ()/(54-70)   SpO2:  [96 %-100 %]       CBC:   Recent Labs     20   WBC 9.68 9.13   RBC 3.01* 2.94*   HGB 10.0* 9.5*   HCT 31.4* 30.9*    231   * 105*   MCH 33.2* 32.3*   MCHC 31.8* 30.7*       CMP:   Recent Labs     20  042    137   K 4.8 4.7    105   CO2 21* 18*   BUN 37* 46*   CREATININE 4.1* 4.3*    129*   CALCIUM 9.9 9.7   ALBUMIN 2.6* 2.6*   PROT 7.7 7.6   ALKPHOS 110 121   ALT 7* 8*   AST 23 23   BILITOT 0.5 0.5       INR  Recent Labs     20  1408 20  0436  20  0420 20  1141   INR 8.3* 1.4*  --   --  1.1  --    APTT  --   --    < > 38.9* 42.1*  42.1* 49.3*    < > = values in this interval not displayed.           Diagnostic Studies:      EK20   Atrial fibrillation with rapid ventricular response   Left axis deviation   Anteroseptal infarct ,age undetermined   Abnormal ECG   When compared with ECG of 2020 13:37,   Significant changes have occurred     2D Echo:  20  · Eccentric left ventricular hypertrophy.  · Low normal left ventricular systolic function. The estimated ejection fraction is  50%.  · Local segmental wall motion abnormalities.  · Normal right ventricular systolic function.  · Severe left atrial enlargement.  · Mild aortic valve stenosis. Aortic valve area is 1.57 cm2; peak velocity is 3.31 m/s; mean gradient is 24 mmHg.  · Mild aortic regurgitation.  · Moderate to severe mitral stenosis from extensive annular calcification. The mean diastolic gradient across the mitral valve is 15 mmHg at a heart rate of 73 bpm. Gradients may be elevated in part due to volume overload.  · The estimated PA systolic pressure is 62 mmHg.  · Elevated central venous pressure (15 mmHg).        Pre-op Assessment    I have reviewed the Patient Summary Reports.    I have reviewed the Nursing Notes. I have reviewed the NPO Status.      Review of Systems  Anesthesia Hx:  History of prior surgery of interest to airway management or planning:   Cardiovascular:   Hypertension Past MI CAD      Renal/:   Chronic Renal Disease, ESRD, Dialysis    Hepatic/GI:   Liver Disease, Hepatitis, C    Neurological:   CVA Seizures    Endocrine:   Diabetes, type 2        Physical Exam  General:  Well nourished    Airway/Jaw/Neck:  Airway Findings: Tongue: Normal Mallampati: II  Improves to II with phonation.  TM Distance: 4 - 6 cm                 Anesthesia Plan  Type of Anesthesia, risks & benefits discussed:  Anesthesia Type:  general, MAC, regional  Patient's Preference:   Intra-op Monitoring Plan: standard ASA monitors  Intra-op Monitoring Plan Comments:   Post Op Pain Control Plan: per primary service following discharge from PACU  Post Op Pain Control Plan Comments:   Induction:   IV  Beta Blocker:  Patient is on a Beta-Blocker and has received one dose within the past 24 hours (No further documentation required).       Informed Consent:    ASA Score: 4     Day of Surgery Review of History & Physical:        Anesthesia Plan Notes: Patient needs updated H&P and consent

## 2020-08-07 NOTE — ASSESSMENT & PLAN NOTE
PVD (peripheral vascular disease)  Atherosclerosis of native artery of right lower extremity with ulceration of midfoot    CTA A/P with BLE runoff to better define surgical anatomy  Consult cardiology- for possible revascularization and potential limb salvage  Wound cx with stenotrophomonas  Continue Ceftazidime x 6 weeks recs now, end date 9/11  Continue wound care  Consult cardiology  Consult podiatry  Consult ID  Pan culture on 8/3    8/7 revascularization today by cards

## 2020-08-07 NOTE — NURSING
Dr. Hu called to bedside. Pt tolerating 40% o2 on vent. Pt awake and alert and able to follow commands. Pt extubated and placed on 10L simple mask, sats in high 90s. Will continue to monitor. OK to go back to tele room as long as vitals are stable.

## 2020-08-07 NOTE — TRANSFER OF CARE
Anesthesia Transfer of Care Note    Patient: João Mann [place]    Procedure(s) Performed: Procedure(s) (LRB):  PTA, PERIPHERAL VESSEL (N/A)    Patient location: Cath Lab    Anesthesia Type: general    Transport from OR: Continuous ECG monitoring in transport. Continuous SpO2 monitoring in transport. Upon arrival to PACU/ICU, patient attached to ventilator and auscultated to confirm bilateral breath sounds and adequate TV. Transported from OR intubated on 100% O2 by AMBU with assisted ventilation    Post pain: adequate analgesia    Post assessment: no apparent anesthetic complications    Post vital signs: stable    Level of consciousness: awake and alert    Nausea/Vomiting: no nausea/vomiting    Complications: none    Transfer of care protocol was followed      Last vitals:   Visit Vitals  BP (!) 87/52   Pulse (!) 49   Temp 37.7 °C (99.8 °F)   Resp (!) 22   Ht 6' (1.829 m)   Wt 107.6 kg (237 lb 3.4 oz)   SpO2 (!) 94%   BMI 32.17 kg/m²

## 2020-08-07 NOTE — SUBJECTIVE & OBJECTIVE
Interval History:  INR is 1.2, to OR and HD today    Review of Systems   Constitutional: Negative for activity change, fatigue and fever.   HENT: Negative for congestion.    Respiratory: Negative for cough and shortness of breath.    Cardiovascular: Negative for chest pain and palpitations.   Gastrointestinal: Negative for abdominal distention.   Neurological: Negative for dizziness and headaches.   Psychiatric/Behavioral: Negative for agitation and decreased concentration.     Objective:     Vital Signs (Most Recent):  Temp: 99.8 °F (37.7 °C) (08/07/20 0814)  Pulse: (!) 53 (08/07/20 1545)  Resp: (!) 23 (08/07/20 1545)  BP: (!) 100/52 (08/07/20 1545)  SpO2: 100 % (08/07/20 1545) Vital Signs (24h Range):  Temp:  [97.8 °F (36.6 °C)-99.8 °F (37.7 °C)] 99.8 °F (37.7 °C)  Pulse:  [1-75] 53  Resp:  [16-38] 23  SpO2:  [82 %-100 %] 100 %  BP: ()/(45-71) 100/52     Weight: 107.6 kg (237 lb 3.4 oz)  Body mass index is 32.17 kg/m².    Intake/Output Summary (Last 24 hours) at 8/7/2020 1734  Last data filed at 8/7/2020 1205  Gross per 24 hour   Intake 700 ml   Output --   Net 700 ml      Physical Exam  Vitals signs and nursing note reviewed.   Constitutional:       Appearance: Normal appearance.   HENT:      Head: Normocephalic and atraumatic.   Eyes:      Extraocular Movements: Extraocular movements intact.   Neck:      Musculoskeletal: Normal range of motion.   Cardiovascular:      Rate and Rhythm: Normal rate.   Pulmonary:      Effort: Pulmonary effort is normal. No respiratory distress.   Neurological:      Mental Status: He is alert and oriented to person, place, and time.   Psychiatric:         Behavior: Behavior normal.         Thought Content: Thought content normal.         Significant Labs:   Recent Labs   Lab 08/05/20  0437 08/06/20  0420 08/07/20  0342   WBC 9.68 9.13 10.14   HGB 10.0* 9.5* 9.0*   HCT 31.4* 30.9* 29.5*    231 210     Recent Labs   Lab 08/05/20  0436 08/06/20  0420 08/07/20  0342     137 139   K 4.8 4.7 5.1    105 103   CO2 21* 18* 20*   BUN 37* 46* 71*   CREATININE 4.1* 4.3* 5.8*    129* 90   CALCIUM 9.9 9.7 9.5     Recent Labs   Lab 08/05/20  0436 08/06/20  0420 08/07/20  0342   ALKPHOS 110 121 121   ALT 7* 8* 9*   AST 23 23 24   ALBUMIN 2.6* 2.6* 2.5*   PROT 7.7 7.6 7.3   BILITOT 0.5 0.5 0.5   INR 1.4* 1.1 1.2      No results for input(s): CPK, CPKMB, MB, TROPONINI in the last 72 hours.  Recent Labs   Lab 08/05/20 2056 08/06/20  0543 08/06/20  1322 08/06/20  2107 08/07/20  0738 08/07/20  1731   POCTGLUCOSE 117* 124* 121* 132* 90 119*     Hemoglobin A1C   Date Value Ref Range Status   07/30/2020 4.6 4.0 - 5.6 % Final     Comment:     ADA Screening Guidelines:  5.7-6.4%  Consistent with prediabetes  >or=6.5%  Consistent with diabetes  High levels of fetal hemoglobin interfere with the HbA1C  assay. Heterozygous hemoglobin variants (HbS, HgC, etc)do  not significantly interfere with this assay.   However, presence of multiple variants may affect accuracy.     08/27/2018 <4.0 (A) 4.0 - 5.6 % Final     Comment:     ADA Screening Guidelines:  5.7-6.4%  Consistent with prediabetes  >or=6.5%  Consistent with diabetes  High levels of fetal hemoglobin interfere with the HbA1C  assay. Heterozygous hemoglobin variants (HbS, HgC, etc)do  not significantly interfere with this assay.   However, presence of multiple variants may affect accuracy.     03/01/2017 4.4 (L) 4.5 - 6.2 % Final     Comment:     According to ADA guidelines, hemoglobin A1C <7.0% represents  optimal control in non-pregnant diabetic patients.  Different  metrics may apply to specific populations.   Standards of Medical Care in Diabetes - 2016.  For the purpose of screening for the presence of diabetes:  <5.7%     Consistent with the absence of diabetes  5.7-6.4%  Consistent with increasing risk for diabetes   (prediabetes)  >or=6.5%  Consistent with diabetes  Currently no consensus exists for use of hemoglobin A1C  for  diagnosis of diabetes for children.         No results for input(s): COLORU, CLARITYU, SPECGRAV, PHUR, PROTEINUA, GLUCOSEU, BLOODU, WBCU, RBCU, BACTERIA, MUCUS in the last 24 hours.    Invalid input(s):  BILIRUBINCON    Microbiology Results (last 7 days)     Procedure Component Value Units Date/Time    Blood culture [[id number]] Collected: 08/03/20 1358    Order Status: Completed Specimen: Blood Updated: 08/06/20 2016     Blood Culture, Routine No Growth to date      No Growth to date      No Growth to date      No Growth to date          Scheduled Meds:   sodium chloride 0.9%   Intravenous Once    sodium chloride 0.9%   Intravenous Once    aspirin  81 mg Oral Daily    atorvastatin  40 mg Oral Daily    cadexomer iodine   Topical (Top) Every Mon, Wed, Fri    ceftAZIDime (FORTAZ) IVPB  1 g Intravenous Q24H    diclofenac sodium  2 g Topical (Top) BID    epoetin harshad-epbx  10,000 Units Intravenous Every Mon, Wed, Fri    levETIRAcetam  500 mg Oral BID    methadone  100 mg Oral Daily    mupirocin   Nasal BID    pantoprazole  40 mg Oral Before breakfast    polyethylene glycol  17 g Oral Daily    sevelamer carbonate  1,600 mg Oral TID WM    ticagrelor  90 mg Oral BID     Continuous Infusions:   heparin (porcine) in D5W 14 Units/kg/hr (08/07/20 0217)    heparin (porcine)       As Needed: sodium chloride 0.9%, acetaminophen, albuterol-ipratropium, bisacodyL, calcium carbonate, calcium gluconate IVPB, calcium gluconate IVPB, heparin (PORCINE), heparin (PORCINE), heparin (porcine), ondansetron, oxyCODONE, oxyCODONE, promethazine, sodium chloride 0.9%    Significant Imaging:   No new imaging

## 2020-08-07 NOTE — PLAN OF CARE
Patient transferred to floor on cardiac tele monitor.  VSS. No c/o pain.   Patient attached to tele monitor upon arrival in room. No procedure performed. No sites to review. Pt is stable. BP has maintained >95/54. Pt maintains > 95% on 4L O2 per NC. Report updated to BRAVO Garza at bedside.  All questions answered.

## 2020-08-07 NOTE — PLAN OF CARE
1930H- Received patient upon rounds last night, patient seen in bed on side lying position. Conscious , coherent to time, date, place, person and situation. Right eye blindness. With saline lock right arm gauge 20 with ongoing heparin drip at 14 units/kg/hr, infusing well, and right shoulder saline lock gauge 20, flushed, patent, with clean and dry dressing. Left arm precaution, left AV fistula positive with thrill and bruit, with clean and dry dressing.     Telemetry Sinus bradycardic 50's.  Oxygen saturation 96% on 3 lpm via nc. Right foot with clean and dry dressing. Bilateral Z-boots on. Patient verbalized 8/10 bilateral back pain, moderately relieved with oxycodone. Maintaining blood pressure. Advised to be on NPO. Noted understanding. Advised patient to call for any assistance. Call bell within reach. Bed alarm on. Safety fall  . precaution maintained. Will continue to monitor patient.     0454H- Patient stable VS over the night, afebrile. No untoward signs and symptoms noted over the night. Telemetry no ectopy. Free from fall. IV line patent. Heparin drip currently on 14 unit/kg/hr, therapeutic level.Kept Npo post midnight. Will endorse patient to day shift Nurse.

## 2020-08-07 NOTE — ANESTHESIA PROCEDURE NOTES
Intubation  Performed by: Pam Samuel CRNA  Authorized by: Cookie Lopes MD     Intubation:     Induction:  Intravenous    Intubated:  Postinduction    Mask Ventilation:  Easy mask    Attempts:  1    Attempted By:  Student (Severiano Peter)    Method of Intubation:  Direct    Blade:  Tiffanie 3    Laryngeal View Grade: Grade I - full view of chords      Difficult Airway Encountered?: No      Airway Device Size:  7.5    Style/Cuff Inflation:  Cuffed    Inflation Amount (mL):  7    Tube secured:  22    Secured at:  The lips    Placement Verified By:  Capnometry    Complicating Factors:  None    Findings Post-Intubation:  BS equal bilateral

## 2020-08-07 NOTE — PROGRESS NOTES
Progress Note  Nephrology      Consult Requested By: Jose Bai DO      SUBJECTIVE:     Overnight events  Patient is a 55 y.o. male     Patient Active Problem List   Diagnosis    Infected ulcer of skin    Acute non-ST-elevation myocardial infarction    NSTEMI (non-ST elevated myocardial infarction)    Smoker    Poorly-controlled hypertension    Tachycardia    Coronary artery disease involving native coronary artery of native heart without angina pectoris    ESRD (T,Th,Sat) dialysis onset 2013    Anemia in chronic kidney disease    Ischemic cardiomyopathy, LVEF 25%-30%    Elevated brain natriuretic peptide (BNP) level    Blindness of right eye    Chronic back pain    Work place accident, 2003    Pneumonia, organism unspecified(486)    Intractable vomiting with nausea    Dehydration    Slurring of speech    H/O: CVA (cerebrovascular accident)    Hypoglycemia associated with type 2 diabetes mellitus    Hypophosphatemia    Possible Insulinoma    Hypervolemia    Hypertensive urgency    Metabolic acidosis    Cirrhosis of liver with ascites    Chronic hepatitis C without hepatic coma    Methadone dependence    Cellulitis    Benign hypertension with ESRD (end-stage renal disease)    Noncompliance with renal dialysis    Post-traumatic seizures    Mixed hyperlipidemia    Seizure disorder    Convulsions    Syncope    Cough    Malignant HTN with heart disease, w/o CHF, with chronic kidney disease    Hyperkalemia    Chest pain    Elevated troponin    History of coronary artery stent placement    Paroxysmal atrial fibrillation with RVR    Thrombocytopenia    Type 2 diabetes mellitus with chronic kidney disease on chronic dialysis, without long-term current use of insulin    Septic shock    Hypoglycemia    Right foot ulcer    Hyponatremia    C. difficile colitis    Cellulitis and abscess of right lower extremity    Severe protein-calorie malnutrition    Hammer toes of both  feet    Skin ulcer of toe of right foot, limited to breakdown of skin    Neuritis    Abscess of fifth toe of right foot    Plantar fasciitis    Gangrene of toe of right foot    Skin ulcer of right foot with fat layer exposed    Nonrheumatic aortic (valve) stenosis    Chronic kidney disease-mineral and bone disorder    Mitral stenosis    ESRD (end stage renal disease) on dialysis    Diabetic foot infection    PVD (peripheral vascular disease)    Atherosclerosis of native artery of right lower extremity with ulceration of midfoot    Acute osteomyelitis of metatarsal bone, right    (HFpEF) heart failure with preserved ejection fraction    Atrial flutter    Hypotension    Osteomyelitis of right foot    Elevated INR    Atrial fibrillation     Past Medical History:   Diagnosis Date    Acute osteomyelitis of metatarsal bone, right     PAD right    Anticoagulant long-term use     Arthritis     Asthma     Back pain     on methadone    C. difficile colitis 09/2018    Cirrhosis of liver without ascites 2016    by liver US. +HCV    Coronary artery disease 2013    stent.  h/o STEMI and NSTEMI    Diabetes mellitus     resolved, multiple admissions for hypoglycemia requiring ICU possibley due to liver/ESRD    Encounter for blood transfusion     ESRD on hemodialysis 2013    anuric    Eye abnormality right eye    injured as a child    Gastritis     Gastroparesis     HCV (hepatitis C virus)     ? h/o tattoo exposure    Hypertension     Mitral stenosis 07/24/2020    Moderate to severe mitral stenosis    PAD (peripheral artery disease)     RLE s/p R CFA endarterectomy    Pneumonia 2013    Spend 6weeks in hospital at [place]    Stroke     Tuberculosis     treated              OBJECTIVE:     Vitals:    08/07/20 1320 08/07/20 1325 08/07/20 1330 08/07/20 1335   BP: (!) 82/50 (!) 85/53 (!) 88/54 (!) 94/51   BP Location:   Right arm    Patient Position:   Lying    Pulse: (!) 55 (!) 54 (!) 55 (!) 52    Resp: (!) 21 (!) 25 17 (!) 21   Temp:       TempSrc:       SpO2: 97% 97% 99% 100%   Weight:       Height:           Temp: 99.8 °F (37.7 °C) (08/07/20 0814)  Pulse: (!) 52 (08/07/20 1335)  Resp: (!) 21 (08/07/20 1335)  BP: (!) 94/51 (08/07/20 1335)  SpO2: 100 % (08/07/20 1335)    Date 08/07/20 0700 - 08/08/20 0659   Shift 7254-0416 6104-7581 6467-0125 24 Hour Total   INTAKE   I.V.(mL/kg) 150(1.4)   150(1.4)   Shift Total(mL/kg) 150(1.4)   150(1.4)   OUTPUT   Shift Total(mL/kg)       Weight (kg) 107.6 107.6 107.6 107.6             Medications:   sodium chloride 0.9%   Intravenous Once    sodium chloride 0.9%   Intravenous Once    aspirin  81 mg Oral Daily    atorvastatin  40 mg Oral Daily    cadexomer iodine   Topical (Top) Every Mon, Wed, Fri    ceftAZIDime (FORTAZ) IVPB  1 g Intravenous Q24H    diclofenac sodium  2 g Topical (Top) BID    epoetin harshad-epbx  10,000 Units Intravenous Every Mon, Wed, Fri    levETIRAcetam  500 mg Oral BID    methadone  100 mg Oral Daily    metoprolol tartrate  12.5 mg Oral BID    mupirocin   Nasal BID    pantoprazole  40 mg Oral Before breakfast    polyethylene glycol  17 g Oral Daily    sevelamer carbonate  1,600 mg Oral TID WM    ticagrelor  90 mg Oral BID      heparin (porcine) in D5W 14 Units/kg/hr (08/07/20 0217)    heparin (porcine)                 Physical Exam:  General appearance:  Lungs:     Heart:   Abdomen:   Extremities:   Skin:  Asterexis      Laboratory:  ABG  Labs reviewed  Recent Results (from the past 336 hour(s))   Basic metabolic panel    Collection Time: 07/31/20 12:00 PM   Result Value Ref Range    Sodium 137 136 - 145 mmol/L    Potassium 3.8 3.5 - 5.1 mmol/L    Chloride 102 95 - 110 mmol/L    CO2 26 23 - 29 mmol/L    BUN, Bld 17 6 - 20 mg/dL    Creatinine 2.3 (H) 0.5 - 1.4 mg/dL    Calcium 8.1 (L) 8.7 - 10.5 mg/dL    Anion Gap 9 8 - 16 mmol/L   Basic metabolic panel    Collection Time: 07/31/20  4:22 AM   Result Value Ref Range    Sodium 134 (L)  136 - 145 mmol/L    Potassium 5.5 (H) 3.5 - 5.1 mmol/L    Chloride 99 95 - 110 mmol/L    CO2 22 (L) 23 - 29 mmol/L    BUN, Bld 52 (H) 6 - 20 mg/dL    Creatinine 5.1 (H) 0.5 - 1.4 mg/dL    Calcium 9.2 8.7 - 10.5 mg/dL    Anion Gap 13 8 - 16 mmol/L   Basic metabolic panel    Collection Time: 07/30/20 11:09 PM   Result Value Ref Range    Sodium 134 (L) 136 - 145 mmol/L    Potassium 5.2 (H) 3.5 - 5.1 mmol/L    Chloride 99 95 - 110 mmol/L    CO2 23 23 - 29 mmol/L    BUN, Bld 45 (H) 6 - 20 mg/dL    Creatinine 5.0 (H) 0.5 - 1.4 mg/dL    Calcium 9.0 8.7 - 10.5 mg/dL    Anion Gap 12 8 - 16 mmol/L     Recent Results (from the past 336 hour(s))   CBC auto differential    Collection Time: 08/07/20  3:42 AM   Result Value Ref Range    WBC 10.14 3.90 - 12.70 K/uL    Hemoglobin 9.0 (L) 14.0 - 18.0 g/dL    Hematocrit 29.5 (L) 40.0 - 54.0 %    Platelets 210 150 - 350 K/uL   CBC auto differential    Collection Time: 08/06/20  4:20 AM   Result Value Ref Range    WBC 9.13 3.90 - 12.70 K/uL    Hemoglobin 9.5 (L) 14.0 - 18.0 g/dL    Hematocrit 30.9 (L) 40.0 - 54.0 %    Platelets 231 150 - 350 K/uL   CBC auto differential    Collection Time: 08/05/20  4:37 AM   Result Value Ref Range    WBC 9.68 3.90 - 12.70 K/uL    Hemoglobin 10.0 (L) 14.0 - 18.0 g/dL    Hematocrit 31.4 (L) 40.0 - 54.0 %    Platelets 189 150 - 350 K/uL     Urinalysis  No results for input(s): COLORU, CLARITYU, SPECGRAV, PHUR, PROTEINUA, GLUCOSEU, BILIRUBINCON, BLOODU, WBCU, RBCU, BACTERIA, MUCUS, NITRITE, LEUKOCYTESUR, UROBILINOGEN, HYALINECASTS in the last 24 hours.    Diagnostic Results:  X-Ray: Reviewed  US: Reviewed  Echo: Reviewed  ACCESS    ASSESSMENT/PLAN:     ESRD.  Metabolic acidosis.  K 5.1.  Azotemia.  BUN 71.  Creatinine 7.1.  Anemia Hb 9.  Poor nutrition.  Albumin 2.5.  Hypotension.  Blood Pressure  94/51.  EF 50 %

## 2020-08-07 NOTE — ASSESSMENT & PLAN NOTE
8/5 start lopressor  Started on heparin drip by cards  No amio given his liver dzs  8/6 better controlled  8/7 no events overnight

## 2020-08-07 NOTE — NURSING
"0840: Placed patient into transport for 2hr HD treatment before cath lab procedure. Pt originally refused d/t "not receiving medications yet".    0925 : Pt was again put into transport after medication administration; received a call from cath lab that they would be taking patient down for procedure.   "

## 2020-08-07 NOTE — PLAN OF CARE
TN met with patient and wife at bedside. Pt is NPO and awaiting Cards procedure. Pt reports his plans were to go home upon discharge but he has changed his mind and thinks it would be better for him to go to a facility.  Pt reports he is heel touch weight bearing but live on second story of home.  He goes to dialysis 3 days a week. He prefers facilities near his home near [place], [place].  SHASHI Rodriguez informed that pt will need info done for SNF placement in [place].  Spoke with Dr. Bai on unit to pts discharge preference. Spoke with Dr. Camacho she does not plan to do any procedures post vascularization procedure.    PT/OT eval to be ordered to evaluate for SNF appropriateness.    No future appointments.       08/07/20 1703   Discharge Reassessment   Assessment Type Discharge Planning Reassessment   Provided patient/caregiver education on the expected discharge date and the discharge plan Yes   Do you have any problems affording any of your prescribed medications? No   Discharge Plan A Skilled Nursing Facility   Can the patient/caregiver answer the patient profile reliably? Yes, cognitively intact   Describe the patient's ability to walk at the present time. Minor restrictions or changes   Post-Acute Status   Post-Acute Authorization Placement   Post-Acute Placement Status Awaiting Internal Medical Clearance     Helen Wood RN  RN Transition Navigator  [phone]

## 2020-08-07 NOTE — NURSING
Patient kept NPO for angiogram. Procedural area shaved, hibaclens wash done and clean gown provided. Meds given. Held BP meds because patient was originally going for dialysis.

## 2020-08-08 NOTE — PLAN OF CARE
VN cued into pt's room, pt is alert, lying in bed in low position with call bell within reach, Oxygen infusing per NC, pt denies pain, anxiety or dyspnea.  Bedside nurse Kristan informed SBP 80/53 and Temp 100.4,  Pt's chart, labs and vital signs reviewed, will be available to intervene if needed.

## 2020-08-08 NOTE — NURSING
"Pt with productive cough, states " I feel a burning in my chest and throat" Denies SOB , current sats 99% on 3L 02, Denies chest pain in general , just overall burning when taking a deep breath or coughing, states "It's never felt like this before"  BP 99/56  Currently tachycardic 129 Dr. Merino made aware of above. Will end tx at this time. Pt completed three hours and 1.5L of fluid removed. Needles pulled and pressure held until hemostasis achieved, gauze and paper tape applied. Pt back to room via transport. AAOx4.  "

## 2020-08-08 NOTE — SUBJECTIVE & OBJECTIVE
Interval History:     ROS  Objective:     Vital Signs (Most Recent):  Temp: 98.4 °F (36.9 °C) (08/08/20 0830)  Pulse: (!) 136 (08/08/20 1125)  Resp: 20 (08/08/20 1125)  BP: (!) 95/54 (08/08/20 1100)  SpO2: 99 % (08/08/20 1125) Vital Signs (24h Range):  Temp:  [97.9 °F (36.6 °C)-98.7 °F (37.1 °C)] 98.4 °F (36.9 °C)  Pulse:  [] 136  Resp:  [16-38] 20  SpO2:  [78 %-100 %] 99 %  BP: ()/(45-70) 95/54     Weight: 107.6 kg (237 lb 3.4 oz)  Body mass index is 32.17 kg/m².     SpO2: 99 %  O2 Device (Oxygen Therapy): nasal cannula      Intake/Output Summary (Last 24 hours) at 8/8/2020 1132  Last data filed at 8/7/2020 1205  Gross per 24 hour   Intake 150 ml   Output --   Net 150 ml       Lines/Drains/Airways     Drain                 Hemodialysis AV Fistula Left forearm -- days         Hemodialysis AV Fistula Left forearm -- days         Hemodialysis AV Fistula Left forearm -- days          Airway                 Airway - Non-Surgical 08/07/20 1115 Endotracheal Tube 1 day          Peripheral Intravenous Line                 Peripheral IV - Single Lumen 07/30/20 0458 20 G Anterior;Right Shoulder 9 days         Peripheral IV - Single Lumen 07/31/20 1846 20 G Anterior;Proximal;Right Forearm 7 days                Physical Exam     No new changes         Significant Labs:     LABS  CBC  Recent Labs   Lab 08/06/20  0420 08/07/20  0342 08/08/20  0336   WBC 9.13 10.14 11.16   RBC 2.94* 2.78* 2.69*   HGB 9.5* 9.0* 8.9*   HCT 30.9* 29.5* 28.8*    210 231   * 106* 107*   MCH 32.3* 32.4* 33.1*   MCHC 30.7* 30.5* 30.9*     BMP  Recent Labs   Lab 08/06/20  0420 08/07/20  0342 08/08/20  0443    139 135*   K 4.7 5.1 6.1*   CO2 18* 20* 16*    103 101   BUN 46* 71* 86*   CREATININE 4.3* 5.8* 7.1*   * 90 96       POCT-Glucose  POCT Glucose   Date Value Ref Range Status   08/08/2020 86 70 - 110 mg/dL Final   08/07/2020 118 (H) 70 - 110 mg/dL Final   08/07/2020 119 (H) 70 - 110 mg/dL Final   08/07/2020  90 70 - 110 mg/dL Final   08/06/2020 132 (H) 70 - 110 mg/dL Final   08/06/2020 121 (H) 70 - 110 mg/dL Final   08/06/2020 124 (H) 70 - 110 mg/dL Final   08/05/2020 117 (H) 70 - 110 mg/dL Final   08/05/2020 97 70 - 110 mg/dL Final       Recent Labs   Lab 08/06/20  0420 08/07/20  0342 08/08/20  0443   CALCIUM 9.7 9.5 9.3     LFT  Recent Labs   Lab 08/06/20  0420 08/07/20  0342 08/08/20  0443   PROT 7.6 7.3 7.4   ALBUMIN 2.6* 2.5* 2.5*   BILITOT 0.5 0.5 0.6   AST 23 24 17   ALKPHOS 121 121 118   ALT 8* 9* 9*     GFR     COAGS  Recent Labs   Lab 08/06/20  0420 08/06/20  1141 08/07/20  0342 08/08/20  0443   INR 1.1  --  1.2 1.2   APTT 42.1*  42.1* 49.3* 47.6* 39.3*  39.3*       LAST HbA1c  Lab Results   Component Value Date    HGBA1C 4.6 07/30/2020       Lipid panel  Lab Results   Component Value Date    CHOL 89 (L) 08/26/2018    CHOL 88 (L) 05/22/2016    CHOL 91 (L) 05/03/2016     Lab Results   Component Value Date    HDL <5 (L) 08/26/2018    HDL 19 (L) 05/22/2016    HDL 24 (L) 05/03/2016     Lab Results   Component Value Date    LDLCALC Unable to calculate 08/26/2018    LDLCALC 46.4 (L) 05/22/2016    LDLCALC 51.4 (L) 05/03/2016     Lab Results   Component Value Date    TRIG 234 (H) 08/26/2018    TRIG 113 05/22/2016    TRIG 78 05/03/2016     Lab Results   Component Value Date    CHOLHDL Unable to calculate 08/26/2018    CHOLHDL 21.6 05/22/2016    CHOLHDL 26.4 05/03/2016          Significant Imaging:     None

## 2020-08-08 NOTE — NURSING
"Patient converted back to atrial fibrillation with HR in 130-40s. B/P 104/70. Dr. Bai and Dr. Young notified. Verbal order obtained from Dr. Young to restart 12.5 mg Metoprolol- medication admin. Daily medications also admin at this time.Patient states he "feels a little better" since returning from dialysis. Will continue to monitor patient.   "

## 2020-08-08 NOTE — PROGRESS NOTES
[facility]  Cardiology  Progress Note    Patient Name: João Aguirre  MRN: [id number]  Admission Date: 8/2/2020  Hospital Length of Stay: 6 days  Code Status: Full Code   Attending Physician: Jose Bai DO   Primary Care Physician: Primary Doctor No  Expected Discharge Date: 8/7/2020  Principal Problem:Acute osteomyelitis of metatarsal bone, right    Subjective:     Hospital Course:   8/2/2020 per HPI  8/3/2020 H/H 9.1/28.2 this AM. BMP with K+ 4.9 BUN 64 creatinine 5.2. Blood culture NGTD. Wound culture to right foot at [facility] with stenotrophomonas maltophilia and MRI with osteo of right 5th metatarsal head. On ASA, statin and Brilinta. Will hold Coumadin for now given INR 3.3. Cardiology consulted for evaluation of revascularization   8/5/2020 INR elevated yesterday at 7-8 with total of 15mg of Vitamin K given. INR down to 1.4 this AM. Will place on IV Heparin given afib and risk for stroke. Will hold BB for now given marginal BP. HD yesterday with 3.5 liters removed-SOB improved.  Needs LE angiogram but deferred given elevated INR, SOB with volume overload and need for records from [facility]. Records received and to be reviewed by staff. Hopeful to proceed soon with LE angiogram   8/6/2020 H/H 9.5/30.9 INR 1.1.BMP with K+ 4.3. Intermittent atrial flutter overnight with no RVR. Remains on oral Metoprolol BID as well as IV Heparin. Outside records extensively reviewed and hopeful to proceed with angiogram tomorrow   8/7/2020: Patient was brought to the cath lab for revascularization procedure was done under general anesthesia in view of these high doses of methadone.  Patient became hypotensive soon after he was intubated. Procedure was aborted.  Patient recovered well. He was sent back to his room  8/8/2020:  Patient was seen this morning in dialysis.  Patient is clinically stable.  No complaints.  Blood pressure 90/56.  We discussed events from 8/7/2020. We  will try revascularization on Monday 08/10/2020 with a femoral nerve block and minimal RN sedation.      Interval History:     ROS  Objective:     Vital Signs (Most Recent):  Temp: 98.4 °F (36.9 °C) (08/08/20 0830)  Pulse: (!) 136 (08/08/20 1125)  Resp: 20 (08/08/20 1125)  BP: (!) 95/54 (08/08/20 1100)  SpO2: 99 % (08/08/20 1125) Vital Signs (24h Range):  Temp:  [97.9 °F (36.6 °C)-98.7 °F (37.1 °C)] 98.4 °F (36.9 °C)  Pulse:  [] 136  Resp:  [16-38] 20  SpO2:  [78 %-100 %] 99 %  BP: ()/(45-70) 95/54     Weight: 107.6 kg (237 lb 3.4 oz)  Body mass index is 32.17 kg/m².     SpO2: 99 %  O2 Device (Oxygen Therapy): nasal cannula      Intake/Output Summary (Last 24 hours) at 8/8/2020 1132  Last data filed at 8/7/2020 1205  Gross per 24 hour   Intake 150 ml   Output --   Net 150 ml       Lines/Drains/Airways     Drain                 Hemodialysis AV Fistula Left forearm -- days         Hemodialysis AV Fistula Left forearm -- days         Hemodialysis AV Fistula Left forearm -- days          Airway                 Airway - Non-Surgical 08/07/20 1115 Endotracheal Tube 1 day          Peripheral Intravenous Line                 Peripheral IV - Single Lumen 07/30/20 0458 20 G Anterior;Right Shoulder 9 days         Peripheral IV - Single Lumen 07/31/20 1846 20 G Anterior;Proximal;Right Forearm 7 days                Physical Exam     No new changes         Significant Labs:     LABS  CBC  Recent Labs   Lab 08/06/20  0420 08/07/20  0342 08/08/20  0336   WBC 9.13 10.14 11.16   RBC 2.94* 2.78* 2.69*   HGB 9.5* 9.0* 8.9*   HCT 30.9* 29.5* 28.8*    210 231   * 106* 107*   MCH 32.3* 32.4* 33.1*   MCHC 30.7* 30.5* 30.9*     BMP  Recent Labs   Lab 08/06/20  0420 08/07/20  0342 08/08/20  0443    139 135*   K 4.7 5.1 6.1*   CO2 18* 20* 16*    103 101   BUN 46* 71* 86*   CREATININE 4.3* 5.8* 7.1*   * 90 96       POCT-Glucose  POCT Glucose   Date Value Ref Range Status   08/08/2020 86 70 - 110  mg/dL Final   08/07/2020 118 (H) 70 - 110 mg/dL Final   08/07/2020 119 (H) 70 - 110 mg/dL Final   08/07/2020 90 70 - 110 mg/dL Final   08/06/2020 132 (H) 70 - 110 mg/dL Final   08/06/2020 121 (H) 70 - 110 mg/dL Final   08/06/2020 124 (H) 70 - 110 mg/dL Final   08/05/2020 117 (H) 70 - 110 mg/dL Final   08/05/2020 97 70 - 110 mg/dL Final       Recent Labs   Lab 08/06/20  0420 08/07/20  0342 08/08/20  0443   CALCIUM 9.7 9.5 9.3     LFT  Recent Labs   Lab 08/06/20  0420 08/07/20  0342 08/08/20  0443   PROT 7.6 7.3 7.4   ALBUMIN 2.6* 2.5* 2.5*   BILITOT 0.5 0.5 0.6   AST 23 24 17   ALKPHOS 121 121 118   ALT 8* 9* 9*     GFR     COAGS  Recent Labs   Lab 08/06/20  0420 08/06/20  1141 08/07/20  0342 08/08/20  0443   INR 1.1  --  1.2 1.2   APTT 42.1*  42.1* 49.3* 47.6* 39.3*  39.3*       LAST HbA1c  Lab Results   Component Value Date    HGBA1C 4.6 07/30/2020       Lipid panel  Lab Results   Component Value Date    CHOL 89 (L) 08/26/2018    CHOL 88 (L) 05/22/2016    CHOL 91 (L) 05/03/2016     Lab Results   Component Value Date    HDL <5 (L) 08/26/2018    HDL 19 (L) 05/22/2016    HDL 24 (L) 05/03/2016     Lab Results   Component Value Date    LDLCALC Unable to calculate 08/26/2018    LDLCALC 46.4 (L) 05/22/2016    LDLCALC 51.4 (L) 05/03/2016     Lab Results   Component Value Date    TRIG 234 (H) 08/26/2018    TRIG 113 05/22/2016    TRIG 78 05/03/2016     Lab Results   Component Value Date    CHOLHDL Unable to calculate 08/26/2018    CHOLHDL 21.6 05/22/2016    CHOLHDL 26.4 05/03/2016          Significant Imaging:     None     Assessment and Plan:     Brief HPI:     Elevated INR  - INR 3.3 upon transfer with sharp rise up to 7-8; given Vitamin K with INR 1.4  - continue to hold Coumadin; INRs daily for now     Atrial flutter  - afib/aflutter per EKG at [facility] [place] [facility]  - RVR with MET called yesterday; responded to IV Metoprolol 2.5mg with improvement in HR; placed on low dose Metoprolol 12.5 mg po BID; intermittent aflutter overnight  but no RVR noted   - previously  on Coumadin for stroke prevention given valvular afib; placed on IV Heparin since INR trended down and given stroke risk with intermittent afib/aflutter; will plan to resume Coumadin once revascularization completed       (HFpEF) heart failure with preserved ejection fraction  - echo on 7/24/2020 with EF 50% and WMA  - on Coreg and Norvasc previously with no ACEI/ARB  - transitioned to low dose BB only with discontinuation of Norvasc only  - BP marginal; HR 90s-120s  - negative 4.1 liters since admission; SOB improved; appears euvolemic on exam     PVD (peripheral vascular disease)  - wound to right 5th metatarsal with osteomyelitis  - bilateral PAD per CTA with run off  - recent RLE angiogram per [facility] Vascular surgery with occluded SFA, popliteal and occluded BTK vessels with reconstitution per collaterals- unable to revascularize with BKA recommended; transferred to [facility] for second opinion; LE bypass surgery at [facility] in 9/2019 with endarectomy to CFA/PFA with bypass  - needs revascularization but initially deferred secondary to elevated INR; INR trended down to 1.1 after vitamin K hopeful to proceed with revascularization tomorrow  - continue ASA, Brilinita and statin therapy  - ID, Podiatry and wound care on board  - will offload heels with Z flex boots     Mitral stenosis  - moderate to severe MS per echo with mean diastolic gradient 15mmHg  - not a surgical candidate per [facility] CTS   - may need to repeat echocardiogram and later date for re evaluation of MS     Nonrheumatic aortic (valve) stenosis  - echo 7/24/2020 with mild AS- MAYA 1.57cm2; mean gradient 24mmHg peak velocity 3.31  - will need close monitoring with regular echos     Benign hypertension with ESRD (end-stage renal disease)  - SBP 90s-120s overnight with periods down to 80s  - changed from Norvasc to low dose BB  - continue to monitor BP closely and adjust meds prn     Anemia in chronic kidney  disease  - H/H 9.5/30.9 this AM; stable at baseline  - will continue to monitor closely         VTE Risk Mitigation (From admission, onward)         Ordered     heparin infusion 1,000 units/500 ml in 0.9% NaCl (pressure line flush)  Intra-op continuous PRN      08/07/20 1116     heparin 25,000 units in dextrose 5% 250 mL (100 units/mL) infusion LOW INTENSITY nomogram - [facility]  Continuous     Question:  Heparin Infusion Adjustment (DO NOT MODIFY ANSWER)  Answer:  [email or web address]\Images\Pharmacy\HeparinInfusions\heparin LOW INTENSITY nomogram for [facility] [id number].pdf    08/05/20 1207     heparin 25,000 units in dextrose 5% (100 units/ml) IV bolus from bag - ADDITIONAL PRN BOLUS - 60 units/kg  As needed (PRN)     Question:  Heparin Infusion Adjustment (DO NOT MODIFY ANSWER)  Answer:  [email or web address]\epic\Images\Pharmacy\HeparinInfusions\heparin LOW INTENSITY nomogram for [facility] [id number].pdf    08/05/20 1207     heparin 25,000 units in dextrose 5% (100 units/ml) IV bolus from bag - ADDITIONAL PRN BOLUS - 30 units/kg  As needed (PRN)     Question:  Heparin Infusion Adjustment (DO NOT MODIFY ANSWER)  Answer:  [email or web address]\epic\Images\Pharmacy\HeparinInfusions\heparin LOW INTENSITY nomogram for [facility] [id number].pdf    08/05/20 1207                  Procedure 8/10/2020 with femoral nerve block + minimal RN sedation   Start midodrine 5 tid for hypotension   Continue DAPT + IV heparin           Carlos Young MD  Cardiology  [facility]

## 2020-08-08 NOTE — PROGRESS NOTES
[facility] Hospital Medicine  Progress Note    Patient Name: João Aguirre  MRN: [id number]  Patient Class: IP- Inpatient   Admission Date: 8/2/2020  Length of Stay: 6 days  Attending Physician: Jose Bai DO  Primary Care Provider: Primary Doctor No        Subjective:     Principal Problem:Acute osteomyelitis of metatarsal bone, right        HPI:  Patient is a 55/y/o M with PMH of ESRD on HD (T/TH/S), anemia, uncontrolled HTN, hx CVA, PCI s/p 2 stents placement, right eye blindness, seizure, marijuana use, non-healing DM right foot ulcer DM II and gastroparesis, initially presented to [facility] from [place] for second opinion regarding AVR and CABG. Patient noted several weeks history of chest tightness with dizziness, palpitation, and increasing SOB. Denies fever, chills, nausea, vomiting. Seen by CTS and recommended patient not a CABG candidate due to multiple co morbidities- HD, Hep C, Osteomyelitis, PVD. s/p cath with stent placement on 7/31. Vascular surgery recommends BKA but patient declined, patient started on heparin drip and bridging with Warfarin (on Eliquis prior) with plan for balloon angioplasty on Tuesday    Overview/Hospital Course:  8/4 pt seen in HD, pt INR is elevated, given vit K, appreciate cards inputs  8/5 the pt is doing ok, ha episode of afib, discussed with cards, he is now on bb LOW DOSE AND HEPARIN DRIP. WILL HAVE le ANGIOGRAM PROBABLY ON Friday.  8/6 pt is doing ok, no acute events, BP and HR improving anf more stable  8/7 pt evaluated earlier today , he underwent later a revascularization of the LE. And then HD is set up  8/8 pt evaluated, his BP is on low side, HR elevated and in a fib, appreciate cards input. Will have revascularization on Monday with nerv block    Interval History:  INR is 1.2, \ HD today, revadcularization on onday by cards    Review of Systems   Constitutional: Negative for activity change, fatigue and fever.   HENT: Negative  for congestion.    Respiratory: Negative for cough and shortness of breath.    Cardiovascular: Negative for chest pain and palpitations.   Gastrointestinal: Negative for abdominal distention.   Neurological: Negative for dizziness and headaches.   Psychiatric/Behavioral: Negative for agitation and decreased concentration.     Objective:     Vital Signs (Most Recent):  Temp: 98.4 °F (36.9 °C) (08/08/20 0830)  Pulse: (!) 132 (08/08/20 1218)  Resp: 20 (08/08/20 1209)  BP: 104/70 (08/08/20 1218)  SpO2: 99 % (08/08/20 1125) Vital Signs (24h Range):  Temp:  [97.9 °F (36.6 °C)-98.7 °F (37.1 °C)] 98.4 °F (36.9 °C)  Pulse:  [] 132  Resp:  [16-23] 20  SpO2:  [78 %-100 %] 99 %  BP: ()/(45-70) 104/70     Weight: 107.6 kg (237 lb 3.4 oz)  Body mass index is 32.17 kg/m².    Intake/Output Summary (Last 24 hours) at 8/8/2020 1452  Last data filed at 8/8/2020 1150  Gross per 24 hour   Intake 350 ml   Output 1821 ml   Net -1471 ml      Physical Exam  Vitals signs and nursing note reviewed.   Constitutional:       Appearance: Normal appearance.   HENT:      Head: Normocephalic and atraumatic.   Eyes:      Extraocular Movements: Extraocular movements intact.   Neck:      Musculoskeletal: Normal range of motion.   Cardiovascular:      Rate and Rhythm: Normal rate.   Pulmonary:      Effort: Pulmonary effort is normal. No respiratory distress.   Neurological:      Mental Status: He is alert and oriented to person, place, and time.   Psychiatric:         Behavior: Behavior normal.         Thought Content: Thought content normal.         Significant Labs:   Recent Labs   Lab 08/06/20  0420 08/07/20  0342 08/08/20  0336   WBC 9.13 10.14 11.16   HGB 9.5* 9.0* 8.9*   HCT 30.9* 29.5* 28.8*    210 231     Recent Labs   Lab 08/06/20  0420 08/07/20  0342 08/08/20  0443    139 135*   K 4.7 5.1 6.1*    103 101   CO2 18* 20* 16*   BUN 46* 71* 86*   CREATININE 4.3* 5.8* 7.1*   * 90 96   CALCIUM 9.7 9.5 9.3      Recent Labs   Lab 08/06/20  0420 08/07/20  0342 08/08/20  0443   ALKPHOS 121 121 118   ALT 8* 9* 9*   AST 23 24 17   ALBUMIN 2.6* 2.5* 2.5*   PROT 7.6 7.3 7.4   BILITOT 0.5 0.5 0.6   INR 1.1 1.2 1.2      No results for input(s): CPK, CPKMB, MB, TROPONINI in the last 72 hours.  Recent Labs   Lab 08/06/20  1322 08/06/20  2107 08/07/20  0738 08/07/20  1731 08/07/20  1955 08/08/20  0543   POCTGLUCOSE 121* 132* 90 119* 118* 86     Hemoglobin A1C   Date Value Ref Range Status   07/30/2020 4.6 4.0 - 5.6 % Final     Comment:     ADA Screening Guidelines:  5.7-6.4%  Consistent with prediabetes  >or=6.5%  Consistent with diabetes  High levels of fetal hemoglobin interfere with the HbA1C  assay. Heterozygous hemoglobin variants (HbS, HgC, etc)do  not significantly interfere with this assay.   However, presence of multiple variants may affect accuracy.     08/27/2018 <4.0 (A) 4.0 - 5.6 % Final     Comment:     ADA Screening Guidelines:  5.7-6.4%  Consistent with prediabetes  >or=6.5%  Consistent with diabetes  High levels of fetal hemoglobin interfere with the HbA1C  assay. Heterozygous hemoglobin variants (HbS, HgC, etc)do  not significantly interfere with this assay.   However, presence of multiple variants may affect accuracy.     03/01/2017 4.4 (L) 4.5 - 6.2 % Final     Comment:     According to ADA guidelines, hemoglobin A1C <7.0% represents  optimal control in non-pregnant diabetic patients.  Different  metrics may apply to specific populations.   Standards of Medical Care in Diabetes - 2016.  For the purpose of screening for the presence of diabetes:  <5.7%     Consistent with the absence of diabetes  5.7-6.4%  Consistent with increasing risk for diabetes   (prediabetes)  >or=6.5%  Consistent with diabetes  Currently no consensus exists for use of hemoglobin A1C  for diagnosis of diabetes for children.         No results for input(s): COLORU, CLARITYU, SPECGRAV, PHUR, PROTEINUA, GLUCOSEU, BLOODU, WBCU, RBCU,  BACTERIA, MUCUS in the last 24 hours.    Invalid input(s):  [facility]    Microbiology Results (last 7 days)     Procedure Component Value Units Date/Time    Blood culture [[id number]] Collected: 08/03/20 1358    Order Status: Completed Specimen: Blood Updated: 08/07/20 2012     Blood Culture, Routine No Growth to date      No Growth to date      No Growth to date      No Growth to date      No Growth to date          Scheduled Meds:   sodium chloride 0.9%   Intravenous Once    sodium chloride 0.9%   Intravenous Once    aspirin  81 mg Oral Daily    atorvastatin  40 mg Oral Daily    cadexomer iodine   Topical (Top) Every Mon, Wed, Fri    ceftAZIDime (FORTAZ) IVPB  1 g Intravenous Q24H    diclofenac sodium  2 g Topical (Top) BID    epoetin harshad-epbx  10,000 Units Intravenous Every Tues, Thurs, Sat    levETIRAcetam  500 mg Oral BID    methadone  100 mg Oral Daily    metoprolol tartrate  12.5 mg Oral BID    midodrine  5 mg Oral TID WM    pantoprazole  40 mg Oral Before breakfast    polyethylene glycol  17 g Oral Daily    sevelamer carbonate  1,600 mg Oral TID WM    ticagrelor  90 mg Oral BID     Continuous Infusions:   heparin (porcine) in D5W 14 Units/kg/hr (08/08/20 0450)    heparin (porcine)       As Needed: sodium chloride 0.9%, acetaminophen, albuterol-ipratropium, bisacodyL, calcium carbonate, calcium gluconate IVPB, calcium gluconate IVPB, heparin (PORCINE), heparin (PORCINE), heparin (porcine), ondansetron, oxyCODONE, oxyCODONE, promethazine, sodium chloride 0.9%    Significant Imaging:   No new imaging        Assessment/Plan:      * Acute osteomyelitis of metatarsal bone, right  PVD (peripheral vascular disease)  Atherosclerosis of native artery of right lower extremity with ulceration of midfoot    CTA A/P with BLE runoff to better define surgical anatomy  Consult cardiology- for possible revascularization and potential limb salvage  Wound cx with stenotrophomonas  Continue Ceftazidime x 6  weeks recs now, end date 9/11  Continue wound care  Consult cardiology  Consult podiatry  Consult ID  Pan culture on 8/3    8/7 revascularization today by cards      Atrial fibrillation    8/5 start lopressor  Started on heparin drip by cards  No amio given his liver dzs  8/6 better controlled  8/7 no events overnight      Elevated INR  8/4 cards ordered vit K  F/u labs  No procedures at this time  8/5 INR down to 1.4  Monitor  8/6 INR 1.1    Osteomyelitis of right foot        Atrial flutter  On Brilinta, asa and Warfarin      (HFpEF) heart failure with preserved ejection fraction  Ischemic cardiomyopathy, LVEF 25%-30%  Stable    Atherosclerosis of native artery of right lower extremity with ulceration of midfoot        ESRD (end stage renal disease) on dialysis   HD on TTSs  Consult nephrology   Renally dose medication  Continue sevelamer      Nonrheumatic aortic (valve) stenosis  History of coronary artery stent placement  ECHO shows mild aortic valve stenosis.   currently high risk for cardiac surgery secondary to cirrhosis (plt 149) and osteomyelitis   [id number]   Was on Eliquis now switched to Warfarin   Continue heparin with coumadin bridge. D/c heparin on 8/3. Decrease Warfarin dose to 2.5  INR goal 2.5-3.5  Continue Brilinta   Continue ASA x 1 week end date     Type 2 diabetes mellitus with chronic kidney disease on chronic dialysis, without long-term current use of insulin  HbA1c 4.3   Low dose SSI  accucheck  Diabetic renal diet      Seizure disorder  Continue Keppra      Benign hypertension with ESRD (end-stage renal disease)  Continue Norvasc        Cirrhosis of liver with ascites  Chronic hepatitis C without hepatic coma  history of HCV and cirrhosis   stable    Chronic back pain  Methadone dependence  Continue methadon, Oxycodone, neurontin    Blindness of right eye  stable      Ischemic cardiomyopathy, LVEF 25%-30%  8/5 appreciate cards input        Anemia in chronic kidney  disease  Stable  Monitor        VTE Risk Mitigation (From admission, onward)         Ordered     heparin infusion 1,000 units/500 ml in 0.9% NaCl (pressure line flush)  Intra-op continuous PRN      08/07/20 1116     heparin 25,000 units in dextrose 5% 250 mL (100 units/mL) infusion LOW INTENSITY nomogram - [facility]  Continuous     Question:  Heparin Infusion Adjustment (DO NOT MODIFY ANSWER)  Answer:  [email or web address]\epic\Images\Pharmacy\HeparinInfusions\heparin LOW INTENSITY nomogram for [facility] [id number]    08/05/20 1207     heparin 25,000 units in dextrose 5% (100 units/ml) IV bolus from bag - ADDITIONAL PRN BOLUS - 60 units/kg  As needed (PRN)     Question:  Heparin Infusion Adjustment (DO NOT MODIFY ANSWER)  Answer:  [email or web address]\epic\Images\Pharmacy\HeparinInfusions\heparin LOW INTENSITY nomogram for [facility] [id number].pdf    08/05/20 1207     heparin 25,000 units in dextrose 5% (100 units/ml) IV bolus from bag - ADDITIONAL PRN BOLUS - 30 units/kg  As needed (PRN)     Question:  Heparin Infusion Adjustment (DO NOT MODIFY ANSWER)  Answer:  [email or web address]\epic\Images\Pharmacy\HeparinInfusions\heparin LOW INTENSITY nomogram for [facility] [id number].pdf    08/05/20 1207                      Jose Bai DO  Department of Hospital Medicine   [facility]

## 2020-08-08 NOTE — SUBJECTIVE & OBJECTIVE
Interval History:  INR is 1.2, \ HD today, revadcularization on onday by cards    Review of Systems   Constitutional: Negative for activity change, fatigue and fever.   HENT: Negative for congestion.    Respiratory: Negative for cough and shortness of breath.    Cardiovascular: Negative for chest pain and palpitations.   Gastrointestinal: Negative for abdominal distention.   Neurological: Negative for dizziness and headaches.   Psychiatric/Behavioral: Negative for agitation and decreased concentration.     Objective:     Vital Signs (Most Recent):  Temp: 98.4 °F (36.9 °C) (08/08/20 0830)  Pulse: (!) 132 (08/08/20 1218)  Resp: 20 (08/08/20 1209)  BP: 104/70 (08/08/20 1218)  SpO2: 99 % (08/08/20 1125) Vital Signs (24h Range):  Temp:  [97.9 °F (36.6 °C)-98.7 °F (37.1 °C)] 98.4 °F (36.9 °C)  Pulse:  [] 132  Resp:  [16-23] 20  SpO2:  [78 %-100 %] 99 %  BP: ()/(45-70) 104/70     Weight: 107.6 kg (237 lb 3.4 oz)  Body mass index is 32.17 kg/m².    Intake/Output Summary (Last 24 hours) at 8/8/2020 1452  Last data filed at 8/8/2020 1150  Gross per 24 hour   Intake 350 ml   Output 1821 ml   Net -1471 ml      Physical Exam  Vitals signs and nursing note reviewed.   Constitutional:       Appearance: Normal appearance.   HENT:      Head: Normocephalic and atraumatic.   Eyes:      Extraocular Movements: Extraocular movements intact.   Neck:      Musculoskeletal: Normal range of motion.   Cardiovascular:      Rate and Rhythm: Normal rate.   Pulmonary:      Effort: Pulmonary effort is normal. No respiratory distress.   Neurological:      Mental Status: He is alert and oriented to person, place, and time.   Psychiatric:         Behavior: Behavior normal.         Thought Content: Thought content normal.         Significant Labs:   Recent Labs   Lab 08/06/20  0420 08/07/20  0342 08/08/20  0336   WBC 9.13 10.14 11.16   HGB 9.5* 9.0* 8.9*   HCT 30.9* 29.5* 28.8*    210 231     Recent Labs   Lab 08/06/20  0428  08/07/20  0342 08/08/20  0443    139 135*   K 4.7 5.1 6.1*    103 101   CO2 18* 20* 16*   BUN 46* 71* 86*   CREATININE 4.3* 5.8* 7.1*   * 90 96   CALCIUM 9.7 9.5 9.3     Recent Labs   Lab 08/06/20  0420 08/07/20  0342 08/08/20  0443   ALKPHOS 121 121 118   ALT 8* 9* 9*   AST 23 24 17   ALBUMIN 2.6* 2.5* 2.5*   PROT 7.6 7.3 7.4   BILITOT 0.5 0.5 0.6   INR 1.1 1.2 1.2      No results for input(s): CPK, CPKMB, MB, TROPONINI in the last 72 hours.  Recent Labs   Lab 08/06/20  1322 08/06/20  2107 08/07/20  0738 08/07/20  1731 08/07/20  1955 08/08/20  0543   POCTGLUCOSE 121* 132* 90 119* 118* 86     Hemoglobin A1C   Date Value Ref Range Status   07/30/2020 4.6 4.0 - 5.6 % Final     Comment:     ADA Screening Guidelines:  5.7-6.4%  Consistent with prediabetes  >or=6.5%  Consistent with diabetes  High levels of fetal hemoglobin interfere with the HbA1C  assay. Heterozygous hemoglobin variants (HbS, HgC, etc)do  not significantly interfere with this assay.   However, presence of multiple variants may affect accuracy.     08/27/2018 <4.0 (A) 4.0 - 5.6 % Final     Comment:     ADA Screening Guidelines:  5.7-6.4%  Consistent with prediabetes  >or=6.5%  Consistent with diabetes  High levels of fetal hemoglobin interfere with the HbA1C  assay. Heterozygous hemoglobin variants (HbS, HgC, etc)do  not significantly interfere with this assay.   However, presence of multiple variants may affect accuracy.     03/01/2017 4.4 (L) 4.5 - 6.2 % Final     Comment:     According to ADA guidelines, hemoglobin A1C <7.0% represents  optimal control in non-pregnant diabetic patients.  Different  metrics may apply to specific populations.   Standards of Medical Care in Diabetes - 2016.  For the purpose of screening for the presence of diabetes:  <5.7%     Consistent with the absence of diabetes  5.7-6.4%  Consistent with increasing risk for diabetes   (prediabetes)  >or=6.5%  Consistent with diabetes  Currently no consensus  exists for use of hemoglobin A1C  for diagnosis of diabetes for children.         No results for input(s): COLORU, CLARITYU, SPECGRAV, PHUR, PROTEINUA, GLUCOSEU, BLOODU, WBCU, RBCU, BACTERIA, MUCUS in the last 24 hours.    Invalid input(s):  [facility]    Microbiology Results (last 7 days)     Procedure Component Value Units Date/Time    Blood culture [[id number]] Collected: 08/03/20 1358    Order Status: Completed Specimen: Blood Updated: 08/07/20 2012     Blood Culture, Routine No Growth to date      No Growth to date      No Growth to date      No Growth to date      No Growth to date          Scheduled Meds:   sodium chloride 0.9%   Intravenous Once    sodium chloride 0.9%   Intravenous Once    aspirin  81 mg Oral Daily    atorvastatin  40 mg Oral Daily    cadexomer iodine   Topical (Top) Every Mon, Wed, Fri    ceftAZIDime (FORTAZ) IVPB  1 g Intravenous Q24H    diclofenac sodium  2 g Topical (Top) BID    epoetin harshad-epbx  10,000 Units Intravenous Every Tues, Thurs, Sat    levETIRAcetam  500 mg Oral BID    methadone  100 mg Oral Daily    metoprolol tartrate  12.5 mg Oral BID    midodrine  5 mg Oral TID WM    pantoprazole  40 mg Oral Before breakfast    polyethylene glycol  17 g Oral Daily    sevelamer carbonate  1,600 mg Oral TID WM    ticagrelor  90 mg Oral BID     Continuous Infusions:   heparin (porcine) in D5W 14 Units/kg/hr (08/08/20 0450)    heparin (porcine)       As Needed: sodium chloride 0.9%, acetaminophen, albuterol-ipratropium, bisacodyL, calcium carbonate, calcium gluconate IVPB, calcium gluconate IVPB, heparin (PORCINE), heparin (PORCINE), heparin (porcine), ondansetron, oxyCODONE, oxyCODONE, promethazine, sodium chloride 0.9%    Significant Imaging:   No new imaging

## 2020-08-08 NOTE — PLAN OF CARE
VN rounds:  VN cued into pt's room with pt's permission, role of VN explained to pt.  Pt resting in bed In low position with bed alarm in place, call bell within reach.  Pt denies pain, anxiety or dyspnea, Oxygen infusing per NC>   No acute distress noted.  Pt's chart, labs and vital signs reviewed.  Allowed time for questions.  Will continue to be available and intervene as needed.

## 2020-08-08 NOTE — NURSING
Arrived bedside to transfer patient to scheduled HD. Noted orange juice bedside, pts K+ this am 6.1. Educated on importance of renal diet and compliance, has been on dialysis for 5 years and is aware of restrictions pertaining to a renal diet. Patient acknowledged. POC discussed with patient. HD with UF today for clearance and volume removal.

## 2020-08-08 NOTE — PROCEDURES
Pt seen and examined on HD, tolerating procedure well  Review of Systems   Constitutional: Negative for chills and fever.   Respiratory: Negative for cough and shortness of breath.    Cardiovascular: Negative for chest pain and leg swelling.   Gastrointestinal: Negative for nausea.     Vitals:    08/08/20 0400 08/08/20 0726 08/08/20 0733 08/08/20 0800   BP:   (!) 99/57    BP Location:   Right arm    Patient Position:   Lying    Pulse: (!) 48 63 (!) 58    Resp:   19    Temp:   97.9 °F (36.6 °C)    TempSrc:   Oral    SpO2:   96% 98%   Weight:       Height:         Physical Exam   Constitutional: He is oriented to person, place, and time and well-developed, well-nourished, and in no distress. No distress.   HENT:   Head: Atraumatic.   Mouth/Throat: Oropharynx is clear and moist.   Eyes: EOM are normal. No scleral icterus.   Neck: Neck supple. No JVD present.   Cardiovascular: Normal rate, regular rhythm, normal heart sounds and intact distal pulses. Exam reveals no friction rub.   No murmur heard.  Pulmonary/Chest: Effort normal and breath sounds normal. No respiratory distress. He has no wheezes. He has no rales.   Abdominal: Soft. Bowel sounds are normal. He exhibits no distension. There is no abdominal tenderness.   Musculoskeletal:         General: Edema present.   Neurological: He is alert and oriented to person, place, and time.   Skin: Skin is warm and dry. No rash noted. No erythema.   Shin hyperpigmentation       Recent Labs   Lab 08/07/20  0342 08/08/20  0336   WBC 10.14 11.16   HGB 9.0* 8.9*   HCT 29.5* 28.8*    231     Recent Labs   Lab 08/07/20  0342 08/08/20  0443    135*   K 5.1 6.1*    101   CO2 20* 16*   BUN 71* 86*   CREATININE 5.8* 7.1*   CALCIUM 9.5 9.3     A/P  1. ESRD (N18.6 Z99.2) - usual HD on hospitals, in [facility] [place],  this week due to volume   tx rcived TUE, WEd and Thursday, today due to hyperkalemia    2. Chronic hypotension - Midodrine 10 before HD  3. Anemia of chronic  kidney disease treated with ERNESTO (N18.9 D63.1) - EPogen 10K with each HD  Recent Labs   Lab 08/06/20  0420 08/07/20  0342 08/08/20  0336   HGB 9.5* 9.0* 8.9*   HCT 30.9* 29.5* 28.8*    210 231       Lab Results   Component Value Date    IRON 69 08/29/2015    TIBC 297 08/29/2015    FERRITIN 532 (H) 08/29/2015       4. MBD (E88.9 M90.80) - check phos  Recent Labs   Lab 08/08/20  0443   CALCIUM 9.3     No results for input(s): MG in the last 168 hours.  No results found for: OFLEBRRN95MK  Lab Results   Component Value Date    CO2 16 (L) 08/08/2020     5. Hemodialysis Access (Z99.2 V45.11)- LLA AVF  6. Nutrition/Hypoalbuminemia (E88.09) -   Recent Labs   Lab 08/07/20  0342 08/08/20  0443   LABPROT 12.3 12.7*   ALBUMIN 2.5* 2.5*     Nepro with meals TID. Renal vitamins daily        Thank you for allowing me to participate in care of your patient  With any question please call [phone]  Caroline Merino    [facility]  MCKENNA Kendrick MD, MIRANDA GIVENS MD,   MD ANJUM Elise, NP  [address]  [place], [address]  [phone]  After hours answering service: [phone]

## 2020-08-08 NOTE — PLAN OF CARE
Patient lying in bed, A/O. PRN pain management medication admin for lower back pain and right knee pain. NADN. Verbal, able to make needs known. Answers questions appropriately. Heparin gtt infusing @ 14 units/kg/hour, @ 12.5 mL/hour. No S/S of bleeding at this time. Tele Afib, 110s. Plan of care and medications reviewed with patient- verbalizes understanding. Bed in lowest position, side rails up x 2, call light within reach. Will endorse patient to oncoming nurse.

## 2020-08-09 NOTE — PLAN OF CARE
VN attempted to visit pt, pt with therapy at this time, chart, labs and vital signs reviewed, will be available to intervene if needed.

## 2020-08-09 NOTE — PLAN OF CARE
Plan of care reviewed patient verbalized understanding. Medications administered. IV heparin 13 u/kg/hr 8.1 mL/hr. Aptt due for 2233. Cardiac monitoring NSR. Dressing right foot intact. Blood glucose monitoring per sliding scale. Safety maintained bed in the lowest position, call bell within reach, bed alarm on.

## 2020-08-09 NOTE — PLAN OF CARE
AL ventura performed, report to follow    Ongoing assessment of post acute needs following revasc. Procedure Monday      Problem: Occupational Therapy Goal  Goal: Occupational Therapy Goal  Description: Goals to be met by: 9/9     Patient will increase functional independence with ADLs by performing:    UE Dressing with Modified [place].  LE Dressing with Modified [place].  Grooming while standing at sink with Modified [place].  Toileting from toilet with Modified [place] for hygiene and clothing management.   Toilet transfer to toilet with Modified [place].    Outcome: Ongoing, Progressing

## 2020-08-09 NOTE — SUBJECTIVE & OBJECTIVE
Interval History:  INR is 1.2, \ HD today, revadcularization on onday by cards    Review of Systems   Constitutional: Negative for activity change, fatigue and fever.   HENT: Negative for congestion.    Respiratory: Negative for cough and shortness of breath.    Cardiovascular: Negative for chest pain and palpitations.   Gastrointestinal: Negative for abdominal distention.   Neurological: Negative for dizziness and headaches.   Psychiatric/Behavioral: Negative for agitation and decreased concentration.     Objective:     Vital Signs (Most Recent):  Temp: 96.9 °F (36.1 °C) (08/09/20 0747)  Pulse: 105 (08/09/20 1145)  Resp: 16 (08/09/20 1010)  BP: (!) 82/70 (08/09/20 1146)  SpO2: 100 % (08/09/20 0900) Vital Signs (24h Range):  Temp:  [96.9 °F (36.1 °C)-100.4 °F (38 °C)] 96.9 °F (36.1 °C)  Pulse:  [] 105  Resp:  [16-22] 16  SpO2:  [98 %-100 %] 100 %  BP: ()/(50-74) 82/70     Weight: 107.6 kg (237 lb 3.4 oz)  Body mass index is 32.17 kg/m².    Intake/Output Summary (Last 24 hours) at 8/9/2020 1438  Last data filed at 8/8/2020 2300  Gross per 24 hour   Intake 500 ml   Output --   Net 500 ml      Physical Exam  Vitals signs and nursing note reviewed.   Constitutional:       Appearance: Normal appearance.   HENT:      Head: Normocephalic and atraumatic.   Eyes:      Extraocular Movements: Extraocular movements intact.   Neck:      Musculoskeletal: Normal range of motion.   Cardiovascular:      Rate and Rhythm: Normal rate.   Pulmonary:      Effort: Pulmonary effort is normal. No respiratory distress.   Neurological:      Mental Status: He is alert and oriented to person, place, and time.   Psychiatric:         Behavior: Behavior normal.         Thought Content: Thought content normal.         Significant Labs:   Recent Labs   Lab 08/07/20  0342 08/08/20  0336 08/09/20  0502   WBC 10.14 11.16 8.98   HGB 9.0* 8.9* 8.3*   HCT 29.5* 28.8* 26.5*    231 229     Recent Labs   Lab 08/07/20  0342 08/08/20  0443  08/09/20  0502    135* 135*   K 5.1 6.1* 5.0    101 95   CO2 20* 16* 24   BUN 71* 86* 57*   CREATININE 5.8* 7.1* 5.4*   GLU 90 96 122*   CALCIUM 9.5 9.3 9.3     Recent Labs   Lab 08/07/20  0342 08/08/20  0443 08/09/20  0502   ALKPHOS 121 118 115   ALT 9* 9* 9*   AST 24 17 18   ALBUMIN 2.5* 2.5* 2.7*   PROT 7.3 7.4 7.3   BILITOT 0.5 0.6 0.5   INR 1.2 1.2 1.1      No results for input(s): CPK, CPKMB, MB, TROPONINI in the last 72 hours.  Recent Labs   Lab 08/07/20  1731 08/07/20 1955 08/08/20  0543 08/08/20 1959 08/09/20  0644 08/09/20  1117   POCTGLUCOSE 119* 118* 86 100 135* 168*     Hemoglobin A1C   Date Value Ref Range Status   07/30/2020 4.6 4.0 - 5.6 % Final     Comment:     ADA Screening Guidelines:  5.7-6.4%  Consistent with prediabetes  >or=6.5%  Consistent with diabetes  High levels of fetal hemoglobin interfere with the HbA1C  assay. Heterozygous hemoglobin variants (HbS, HgC, etc)do  not significantly interfere with this assay.   However, presence of multiple variants may affect accuracy.     08/27/2018 <4.0 (A) 4.0 - 5.6 % Final     Comment:     ADA Screening Guidelines:  5.7-6.4%  Consistent with prediabetes  >or=6.5%  Consistent with diabetes  High levels of fetal hemoglobin interfere with the HbA1C  assay. Heterozygous hemoglobin variants (HbS, HgC, etc)do  not significantly interfere with this assay.   However, presence of multiple variants may affect accuracy.     03/01/2017 4.4 (L) 4.5 - 6.2 % Final     Comment:     According to ADA guidelines, hemoglobin A1C <7.0% represents  optimal control in non-pregnant diabetic patients.  Different  metrics may apply to specific populations.   Standards of Medical Care in Diabetes - 2016.  For the purpose of screening for the presence of diabetes:  <5.7%     Consistent with the absence of diabetes  5.7-6.4%  Consistent with increasing risk for diabetes   (prediabetes)  >or=6.5%  Consistent with diabetes  Currently no consensus exists for use of  hemoglobin A1C  for diagnosis of diabetes for children.         No results for input(s): COLORU, CLARITYU, SPECGRAV, PHUR, PROTEINUA, GLUCOSEU, BLOODU, WBCU, RBCU, BACTERIA, MUCUS in the last 24 hours.    Invalid input(s):  BILIRUBINCON    Microbiology Results (last 7 days)     Procedure Component Value Units Date/Time    Blood culture [[id number]] Collected: 08/03/20 1358    Order Status: Completed Specimen: Blood Updated: 08/08/20 2012     Blood Culture, Routine No growth after 5 days.          Scheduled Meds:   sodium chloride 0.9%   Intravenous Once    sodium chloride 0.9%   Intravenous Once    aspirin  81 mg Oral Daily    atorvastatin  40 mg Oral Daily    cadexomer iodine   Topical (Top) Every Mon, Wed, Fri    ceftAZIDime (FORTAZ) IVPB  1 g Intravenous Q24H    diclofenac sodium  2 g Topical (Top) BID    epoetin harshad-epbx  10,000 Units Intravenous Every Tues, Thurs, Sat    guaiFENesin  600 mg Oral BID    levETIRAcetam  500 mg Oral BID    methadone  100 mg Oral Daily    midodrine  10 mg Oral TID WM    pantoprazole  40 mg Oral Before breakfast    polyethylene glycol  17 g Oral Daily    sevelamer carbonate  1,600 mg Oral TID WM    ticagrelor  90 mg Oral BID     Continuous Infusions:   heparin (porcine) in D5W 16 Units/kg/hr (08/09/20 0633)    heparin (porcine)       As Needed: sodium chloride 0.9%, acetaminophen, albuterol-ipratropium, bisacodyL, calcium carbonate, calcium gluconate IVPB, calcium gluconate IVPB, heparin (PORCINE), heparin (PORCINE), heparin (porcine), ondansetron, oxyCODONE, oxyCODONE, promethazine, sodium chloride 0.9%    Significant Imaging:   No new imaging

## 2020-08-09 NOTE — PROGRESS NOTES
Seen this am  No acute changes  Give lopressor for afib + rvr last night  Asymptomatic hypotension this am       Vitals:    08/09/20 0900 08/09/20 1010 08/09/20 1145 08/09/20 1146   BP:    (!) 82/70   Pulse:   105    Resp:  16     Temp:       TempSrc:       SpO2: 100%      Weight:       Height:               LABS  CBC  Recent Labs   Lab 08/07/20  0342 08/08/20  0336 08/09/20  0502   WBC 10.14 11.16 8.98   RBC 2.78* 2.69* 2.51*   HGB 9.0* 8.9* 8.3*   HCT 29.5* 28.8* 26.5*    231 229   * 107* 106*   MCH 32.4* 33.1* 33.1*   MCHC 30.5* 30.9* 31.3*     BMP  Recent Labs   Lab 08/07/20  0342 08/08/20  0443 08/09/20  0502    135* 135*   K 5.1 6.1* 5.0   CO2 20* 16* 24    101 95   BUN 71* 86* 57*   CREATININE 5.8* 7.1* 5.4*   GLU 90 96 122*       POCT-Glucose  POCT Glucose   Date Value Ref Range Status   08/09/2020 168 (H) 70 - 110 mg/dL Final   08/09/2020 135 (H) 70 - 110 mg/dL Final   08/08/2020 100 70 - 110 mg/dL Final   08/08/2020 86 70 - 110 mg/dL Final   08/07/2020 118 (H) 70 - 110 mg/dL Final   08/07/2020 119 (H) 70 - 110 mg/dL Final   08/07/2020 90 70 - 110 mg/dL Final   08/06/2020 132 (H) 70 - 110 mg/dL Final   08/06/2020 121 (H) 70 - 110 mg/dL Final       Recent Labs   Lab 08/07/20  0342 08/08/20  0443 08/09/20  0502   CALCIUM 9.5 9.3 9.3     LFT  Recent Labs   Lab 08/07/20  0342 08/08/20  0443 08/09/20  0502   PROT 7.3 7.4 7.3   ALBUMIN 2.5* 2.5* 2.7*   BILITOT 0.5 0.6 0.5   AST 24 17 18   ALKPHOS 121 118 115   ALT 9* 9* 9*     GFR   COAGS  Recent Labs   Lab 08/07/20  0342 08/08/20  0443 08/09/20  0502 08/09/20  1100   INR 1.2 1.2 1.1  --    APTT 47.6* 39.3*  39.3* 34.3* 79.6*     CE    LAST HbA1c  Lab Results   Component Value Date    HGBA1C 4.6 07/30/2020       Lipid panel  Lab Results   Component Value Date    CHOL 89 (L) 08/26/2018    CHOL 88 (L) 05/22/2016    CHOL 91 (L) 05/03/2016     Lab Results   Component Value Date    HDL <5 (L) 08/26/2018    HDL 19 (L) 05/22/2016    HDL 24  (L) 05/03/2016     Lab Results   Component Value Date    LDLCALC Unable to calculate 08/26/2018    LDLCALC 46.4 (L) 05/22/2016    LDLCALC 51.4 (L) 05/03/2016     Lab Results   Component Value Date    TRIG 234 (H) 08/26/2018    TRIG 113 05/22/2016    TRIG 78 05/03/2016     Lab Results   Component Value Date    CHOLHDL Unable to calculate 08/26/2018    CHOLHDL 21.6 05/22/2016    CHOLHDL 26.4 05/03/2016          Patient Active Problem List    Diagnosis Date Noted    Atrial fibrillation 08/05/2020    Elevated INR 08/04/2020    Osteomyelitis of right foot 08/02/2020    (HFpEF) heart failure with preserved ejection fraction 07/29/2020    Atrial flutter 07/29/2020    Hypotension 07/29/2020    PVD (peripheral vascular disease)     Atherosclerosis of native artery of right lower extremity with ulceration of midfoot     Acute osteomyelitis of metatarsal bone, right     Diabetic foot infection 07/26/2020    ESRD (end stage renal disease) on dialysis     Nonrheumatic aortic (valve) stenosis 07/24/2020    Chronic kidney disease-mineral and bone disorder 07/24/2020    Mitral stenosis 07/24/2020    Skin ulcer of right foot with fat layer exposed 01/03/2020    Gangrene of toe of right foot 09/28/2019    Plantar fasciitis 09/21/2019    Hammer toes of both feet 12/03/2018    Skin ulcer of toe of right foot, limited to breakdown of skin 12/03/2018    Neuritis 12/03/2018    Abscess of fifth toe of right foot 12/03/2018    Severe protein-calorie malnutrition 09/08/2018    Cellulitis and abscess of right lower extremity 09/05/2018    Hyponatremia 09/04/2018    C. difficile colitis 09/04/2018    Right foot ulcer 08/31/2018    Type 2 diabetes mellitus with chronic kidney disease on chronic dialysis, without long-term current use of insulin 08/29/2018    Septic shock 08/29/2018    Hypoglycemia 08/29/2018    Paroxysmal atrial fibrillation with RVR 08/27/2018    Thrombocytopenia 08/27/2018    Chest pain  08/26/2018    Elevated troponin 08/26/2018    History of coronary artery stent placement 08/26/2018    Hyperkalemia 10/17/2017    Malignant HTN with heart disease, w/o CHF, with chronic kidney disease 05/27/2017    Cough 04/04/2017    Syncope 04/03/2017    Mixed hyperlipidemia 04/02/2017    Seizure disorder 04/02/2017    Convulsions 04/02/2017    Post-traumatic seizures 04/01/2017    Benign hypertension with ESRD (end-stage renal disease) 03/01/2017    Noncompliance with renal dialysis 03/01/2017    Cellulitis 02/24/2017    Metabolic acidosis 02/21/2017    Cirrhosis of liver with ascites 02/21/2017    Chronic hepatitis C without hepatic coma 02/21/2017    Methadone dependence 02/21/2017    Hypervolemia 07/28/2016    Hypertensive urgency 07/28/2016    Possible Insulinoma 05/23/2016    Hypophosphatemia 05/19/2016    Hypoglycemia associated with type 2 diabetes mellitus 05/16/2016    H/O: CVA (cerebrovascular accident) 05/03/2016    Slurring of speech 05/02/2016    Intractable vomiting with nausea 10/26/2015    Dehydration 10/26/2015    Pneumonia, organism unspecified(486) 10/18/2015    Blindness of right eye 08/30/2015    Chronic back pain 08/30/2015    Work place accident, 2003 08/30/2015    Acute non-ST-elevation myocardial infarction 08/29/2015    NSTEMI (non-ST elevated myocardial infarction) 08/29/2015    Smoker 08/29/2015    Poorly-controlled hypertension 08/29/2015    Tachycardia 08/29/2015    Coronary artery disease involving native coronary artery of native heart without angina pectoris 08/29/2015    ESRD (T,Th,Sat) dialysis onset 2013 08/29/2015    Anemia in chronic kidney disease 08/29/2015    Ischemic cardiomyopathy, LVEF 25%-30% 08/29/2015    Elevated brain natriuretic peptide (BNP) level 08/29/2015    Infected ulcer of skin 06/04/2015                   Procedure 8/10/2020 with femoral nerve block + minimal RN sedation   Increase midodrine 10 tid for hypotension    Continue DAPT + IV heparin   Hold lopressor   Unable to tolerate amiodarone because of liver disease

## 2020-08-09 NOTE — PLAN OF CARE
1925H- Received patient upon rounds last night, patient seen in bed on a sitting position. Conscious , coherent to time, date, place, person and situation. With saline lock right arm gauge 20 with ongoing heparin drip at 14 units/kg/hr, infusing well, with clean and dry dressing. Right shoulder saline due to change. Right anterior chest bruising. Left arm precaution, left AV fistula positive with thrill and bruit, with clean and dry dressing. Telemetry atrial fibrillation 100-120's. Oxygen saturation 99% on 3 lpm via Right foot with clean and dry dressing. Bilateral Z-boots on. Patient verbalized 6/10 bilateral back pain, oxycodone not given last night as BP on the low side, patient cooperative not to have oxycodone dose due to his BP.Advised patient to call for any assistance. Call bell within reach. Bed alarm on. Safety fall precaution maintained. Will continue to monitor patient.    2002H- BP 82/58 mmHg asymptomatic.Awake alert comfortable in bed. Two IV line removed due to change, 2 gauge 20 inserted left hand, patent. Antibiotics infused. Patient sat on the chair for awhile ate his snacks. Chair alarm on. Asymptomatic with his low BP. No dizziness,no chest pain, no difficulty of breathing, patient complaining of dry cough, mucinex given.     2133H- BP rechecked 106/64 mmHg sitting, patient transferred back to bed with minimal assist.      0209H- BP 80/50 manually, asymptomatic.No complains of  any discomfort.  SHEILA Del Real updated, early dose of midodrine 5 mg given. Will continue to monitor patient.    0302H- BP 90/60 mmHg, patient asleep.    0615H-Latest /64 mmHg.Patient awake alert, comfortable in bed. Telemetry no ectopy. Free from fall. IV line patent. Heparin drip currently on 14 unit/kg/hr, Aptt at 34.3, will increase heparin dose to  16 units/kg/hr. Next Aptt at 1200H. Will endorse patient to day shift Nurse

## 2020-08-09 NOTE — PT/OT/SLP EVAL
"Occupational Therapy   Evaluation    Name: João Aguirre  MRN: [id number]  Admitting Diagnosis:  Acute osteomyelitis of metatarsal bone, right 2 Days Post-Op    Recommendations:     Discharge Recommendations: other (see comments)(TBD post cath)  Discharge Equipment Recommendations:  other (see comments)(TBD)  Barriers to discharge:  None    Assessment:     João Aguirre is a 55 y.o. male with a medical diagnosis of Acute osteomyelitis of metatarsal bone, right.  He presents with performance deficits affecting function: weakness, impaired balance, decreased safety awareness, impaired skin, impaired endurance, pain, impaired sensation, impaired cardiopulmonary response to activity, impaired self care skills, decreased upper extremity function, impaired functional mobilty, decreased lower extremity function, gait instability, orthopedic precautions.      Rehab Prognosis: Good; patient would benefit from acute skilled OT services to address these deficits and reach maximum level of function.       Plan:     Patient to be seen   to address the above listed problems via self-care/home management, therapeutic activities, therapeutic exercises  · Plan of Care Expires: 09/09/20  · Plan of Care Reviewed with: patient    Subjective     Chief Complaint: Everything hurts and I am so short of breath.  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  Patient found HOB elevated with oxygen, peripheral IV, telemetry, pressure relief boots upon OT entry to room.    General Precautions: Standard, fall   Orthopedic Precautions:    Braces:       Occupational Performance:    Bed Mobility:    · Patient completed Rolling/Turning to Right with modified independence  · Patient completed Scooting/Bridging with modified independence  · Patient completed Supine to Sit with modified independence  · Patient completed Sit to Supine with modified independence    Functional Mobility/Transfers:  · Patient completed Sit <> Stand Transfer with stand by assistance and contact guard assistance  with  rolling walker   · Functional Mobility: NT    Activities of Daily Living:  · Feeding:  independence      Cognitive/Visual Perceptual:  AO4    Physical Exam:  BUE AROM/strength WFL  Good sit balance, fair+ stand balance    AMPAC 6 Click ADL:  AMPA Total Score: 17    Treatment & Education:  Pt educated on role of OT/POC , PLB, use of RW to unweight RLE  Education:    Patient left HOB elevated with all lines intact, call button in reach, bed alarm on and nurse notified    GOALS:   Multidisciplinary Problems     Occupational Therapy Goals        Problem: Occupational Therapy Goal    Goal Priority Disciplines Outcome Interventions   Occupational Therapy Goal     OT, PT/OT Ongoing, Progressing    Description: Goals to be met by: 9/9     Patient will increase functional independence with ADLs by performing:    UE Dressing with Modified [place].  LE Dressing with Modified [place].  Grooming while standing at sink with Modified [place].  Toileting from toilet with Modified [place] for hygiene and clothing management.   Toilet transfer to  toilet with Modified [place].                     History:     Past Medical History:   Diagnosis Date    Acute osteomyelitis of metatarsal bone, right     PAD right    Anticoagulant long-term use     Arthritis     Asthma     Back pain     on methadone    C. difficile colitis 09/2018    Cirrhosis of liver without ascites 2016    by liver US. +HCV    Coronary artery disease 2013    stent.  h/o STEMI and NSTEMI    Diabetes mellitus     resolved, multiple admissions for hypoglycemia requiring ICU possibley due to liver/ESRD    Encounter for blood transfusion     ESRD on hemodialysis 2013    anuric    Eye abnormality right eye    injured as a child    Gastritis     Gastroparesis     HCV (hepatitis C virus)     ? h/o tattoo exposure    Hypertension     Mitral stenosis 07/24/2020    Moderate to severe mitral stenosis    PAD (peripheral artery disease)     RLE s/p R CFA endarterectomy    Pneumonia 2013    Spend 6weeks in hospital at [place]    Stroke     Tuberculosis     treated       Past Surgical History:   Procedure Laterality Date    ANGIOGRAPHY OF LOWER EXTREMITY Right 7/28/2020    Procedure: Angiogram Extremity Unilateral;  Surgeon: MINO Vasquez II, MD;  Location: [facility] OR [address] [place] [address];  Service: Vascular;  Laterality: Right;  Left groin and rIght pedal artery access  15.5 min  247.25 mGy  68.5454 Gycm2  33ml contrast    AV FISTULA PLACEMENT      BACK SURGERY      CARDIAC SURGERY  2013    heart stent    CHOLECYSTECTOMY      CORONARY ANGIOGRAPHY N/A 7/30/2020    Procedure: ANGIOGRAM, CORONARY ARTERY;  Surgeon: Alexandro Terry MD;  Location: [facility] CATH LAB;  Service: Cardiology;  Laterality: N/A;    EYE SURGERY      R eye    INCISION AND DRAINAGE OF ABSCESS Right 9/6/2018    Procedure: INCISION AND DRAINAGE, ABSCESS;  Surgeon: Chetan Rothman MD;  Location: [facility] OR;  Service: General;  Laterality: Right;    LEFT HEART CATHETERIZATION N/A 7/30/2020    Procedure: Left heart cath;   Surgeon: Alexandro Terry MD;  Location: [facility] CATH LAB;  Service: Cardiology;  Laterality: N/A;       Time Tracking:     OT Date of Treatment: 08/09/20  OT Start Time: 0820  OT Stop Time: 0833  OT Total Time (min): 13 min    Billable Minutes:Evaluation 13    Arsenio Zuñiga OT  8/9/2020

## 2020-08-09 NOTE — PROGRESS NOTES
[facility] Hospital Medicine  Progress Note    Patient Name: João Aguirre  MRN: [id number]  Patient Class: IP- Inpatient   Admission Date: 8/2/2020  Length of Stay: 7 days  Attending Physician: Jose Bai DO  Primary Care Provider: Primary Doctor No        Subjective:     Principal Problem:Acute osteomyelitis of metatarsal bone, right        HPI:  Patient is a 55/y/o M with PMH of ESRD on HD (T/TH/S), anemia, uncontrolled HTN, hx CVA, PCI s/p 2 stents placement, right eye blindness, seizure, marijuana use, non-healing DM right foot ulcer DM II and gastroparesis, initially presented to [facility] from [place] for second opinion regarding AVR and CABG. Patient noted several weeks history of chest tightness with dizziness, palpitation, and increasing SOB. Denies fever, chills, nausea, vomiting. Seen by CTS and recommended patient not a CABG candidate due to multiple co morbidities- HD, Hep C, Osteomyelitis, PVD. s/p cath with stent placement on 7/31. Vascular surgery recommends BKA but patient declined, patient started on heparin drip and bridging with Warfarin (on Eliquis prior) with plan for balloon angioplasty on Tuesday    Overview/Hospital Course:  8/4 pt seen in HD, pt INR is elevated, given vit K, appreciate cards inputs  8/5 the pt is doing ok, ha episode of afib, discussed with cards, he is now on bb LOW DOSE AND HEPARIN DRIP. WILL HAVE le ANGIOGRAM PROBABLY ON Friday.  8/6 pt is doing ok, no acute events, BP and HR improving anf more stable  8/7 pt evaluated earlier today , he underwent later a revascularization of the LE. And then HD is set up  8/8 pt evaluated, his BP is on low side, HR elevated and in a fib, appreciate cards input. Will have revascularization on Monday with nerv block  8/9 pt is feeling tired but no complaints, his BP is on the low end, cards increased his midodrine    Interval History:  INR is 1.2, \ HD today, revadcularization on onday by  cards    Review of Systems   Constitutional: Negative for activity change, fatigue and fever.   HENT: Negative for congestion.    Respiratory: Negative for cough and shortness of breath.    Cardiovascular: Negative for chest pain and palpitations.   Gastrointestinal: Negative for abdominal distention.   Neurological: Negative for dizziness and headaches.   Psychiatric/Behavioral: Negative for agitation and decreased concentration.     Objective:     Vital Signs (Most Recent):  Temp: 96.9 °F (36.1 °C) (08/09/20 0747)  Pulse: 105 (08/09/20 1145)  Resp: 16 (08/09/20 1010)  BP: (!) 82/70 (08/09/20 1146)  SpO2: 100 % (08/09/20 0900) Vital Signs (24h Range):  Temp:  [96.9 °F (36.1 °C)-100.4 °F (38 °C)] 96.9 °F (36.1 °C)  Pulse:  [] 105  Resp:  [16-22] 16  SpO2:  [98 %-100 %] 100 %  BP: ()/(50-74) 82/70     Weight: 107.6 kg (237 lb 3.4 oz)  Body mass index is 32.17 kg/m².    Intake/Output Summary (Last 24 hours) at 8/9/2020 1438  Last data filed at 8/8/2020 2300  Gross per 24 hour   Intake 500 ml   Output --   Net 500 ml      Physical Exam  Vitals signs and nursing note reviewed.   Constitutional:       Appearance: Normal appearance.   HENT:      Head: Normocephalic and atraumatic.   Eyes:      Extraocular Movements: Extraocular movements intact.   Neck:      Musculoskeletal: Normal range of motion.   Cardiovascular:      Rate and Rhythm: Normal rate.   Pulmonary:      Effort: Pulmonary effort is normal. No respiratory distress.   Neurological:      Mental Status: He is alert and oriented to person, place, and time.   Psychiatric:         Behavior: Behavior normal.         Thought Content: Thought content normal.         Significant Labs:   Recent Labs   Lab 08/07/20  0342 08/08/20  0336 08/09/20  0502   WBC 10.14 11.16 8.98   HGB 9.0* 8.9* 8.3*   HCT 29.5* 28.8* 26.5*    231 229     Recent Labs   Lab 08/07/20  0342 08/08/20  0443 08/09/20  0502    135* 135*   K 5.1 6.1* 5.0    101 95   CO2  20* 16* 24   BUN 71* 86* 57*   CREATININE 5.8* 7.1* 5.4*   GLU 90 96 122*   CALCIUM 9.5 9.3 9.3     Recent Labs   Lab 08/07/20  0342 08/08/20  0443 08/09/20  0502   ALKPHOS 121 118 115   ALT 9* 9* 9*   AST 24 17 18   ALBUMIN 2.5* 2.5* 2.7*   PROT 7.3 7.4 7.3   BILITOT 0.5 0.6 0.5   INR 1.2 1.2 1.1      No results for input(s): CPK, CPKMB, MB, TROPONINI in the last 72 hours.  Recent Labs   Lab 08/07/20  1731 08/07/20 1955 08/08/20  0543 08/08/20 1959 08/09/20  0644 08/09/20  1117   POCTGLUCOSE 119* 118* 86 100 135* 168*     Hemoglobin A1C   Date Value Ref Range Status   07/30/2020 4.6 4.0 - 5.6 % Final     Comment:     ADA Screening Guidelines:  5.7-6.4%  Consistent with prediabetes  >or=6.5%  Consistent with diabetes  High levels of fetal hemoglobin interfere with the HbA1C  assay. Heterozygous hemoglobin variants (HbS, HgC, etc)do  not significantly interfere with this assay.   However, presence of multiple variants may affect accuracy.     08/27/2018 <4.0 (A) 4.0 - 5.6 % Final     Comment:     ADA Screening Guidelines:  5.7-6.4%  Consistent with prediabetes  >or=6.5%  Consistent with diabetes  High levels of fetal hemoglobin interfere with the HbA1C  assay. Heterozygous hemoglobin variants (HbS, HgC, etc)do  not significantly interfere with this assay.   However, presence of multiple variants may affect accuracy.     03/01/2017 4.4 (L) 4.5 - 6.2 % Final     Comment:     According to ADA guidelines, hemoglobin A1C <7.0% represents  optimal control in non-pregnant diabetic patients.  Different  metrics may apply to specific populations.   Standards of Medical Care in Diabetes - 2016.  For the purpose of screening for the presence of diabetes:  <5.7%     Consistent with the absence of diabetes  5.7-6.4%  Consistent with increasing risk for diabetes   (prediabetes)  >or=6.5%  Consistent with diabetes  Currently no consensus exists for use of hemoglobin A1C  for diagnosis of diabetes for children.         No results  for input(s): COLORU, CLARITYU, SPECGRAV, PHUR, PROTEINUA, GLUCOSEU, BLOODU, WBCU, RBCU, BACTERIA, MUCUS in the last 24 hours.    Invalid input(s):  BILIRUBINCON    Microbiology Results (last 7 days)     Procedure Component Value Units Date/Time    Blood culture [[id number]] Collected: 08/03/20 1358    Order Status: Completed Specimen: Blood Updated: 08/08/20 2012     Blood Culture, Routine No growth after 5 days.          Scheduled Meds:   sodium chloride 0.9%   Intravenous Once    sodium chloride 0.9%   Intravenous Once    aspirin  81 mg Oral Daily    atorvastatin  40 mg Oral Daily    cadexomer iodine   Topical (Top) Every Mon, Wed, Fri    ceftAZIDime (FORTAZ) IVPB  1 g Intravenous Q24H    diclofenac sodium  2 g Topical (Top) BID    epoetin harshad-epbx  10,000 Units Intravenous Every Tues, Thurs, Sat    guaiFENesin  600 mg Oral BID    levETIRAcetam  500 mg Oral BID    methadone  100 mg Oral Daily    midodrine  10 mg Oral TID WM    pantoprazole  40 mg Oral Before breakfast    polyethylene glycol  17 g Oral Daily    sevelamer carbonate  1,600 mg Oral TID WM    ticagrelor  90 mg Oral BID     Continuous Infusions:   heparin (porcine) in D5W 16 Units/kg/hr (08/09/20 0633)    heparin (porcine)       As Needed: sodium chloride 0.9%, acetaminophen, albuterol-ipratropium, bisacodyL, calcium carbonate, calcium gluconate IVPB, calcium gluconate IVPB, heparin (PORCINE), heparin (PORCINE), heparin (porcine), ondansetron, oxyCODONE, oxyCODONE, promethazine, sodium chloride 0.9%    Significant Imaging:   No new imaging        Assessment/Plan:      * Acute osteomyelitis of metatarsal bone, right  PVD (peripheral vascular disease)  Atherosclerosis of native artery of right lower extremity with ulceration of midfoot    CTA A/P with BLE runoff to better define surgical anatomy  Consult cardiology- for possible revascularization and potential limb salvage  Wound cx with stenotrophomonas  Continue Ceftazidime x 6 weeks  recs now, end date 9/11  Continue wound care  Consult cardiology  Consult podiatry  Consult ID  Pan culture on 8/3    8/7 revascularization today by cards      Atrial fibrillation    8/5 start lopressor  Started on heparin drip by cards  No amio given his liver dzs  8/6 better controlled  8/7 no events overnight      Elevated INR  8/4 cards ordered vit K  F/u labs  No procedures at this time  8/5 INR down to 1.4  Monitor  8/6 INR 1.1    Osteomyelitis of right foot        Atrial flutter  On Brilinta, asa and Warfarin      (HFpEF) heart failure with preserved ejection fraction  Ischemic cardiomyopathy, LVEF 25%-30%  Stable    Atherosclerosis of native artery of right lower extremity with ulceration of midfoot        ESRD (end stage renal disease) on dialysis   HD on TTSs  Consult nephrology   Renally dose medication  Continue sevelamer      Nonrheumatic aortic (valve) stenosis  History of coronary artery stent placement  ECHO shows mild aortic valve stenosis.   currently high risk for cardiac surgery secondary to cirrhosis (plt 149) and osteomyelitis   [id number]   Was on Eliquis now switched to Warfarin   Continue heparin with coumadin bridge. D/c heparin on 8/3. Decrease Warfarin dose to 2.5  INR goal 2.5-3.5  Continue Brilinta   Continue ASA x 1 week end date     Type 2 diabetes mellitus with chronic kidney disease on chronic dialysis, without long-term current use of insulin  HbA1c 4.3   Low dose SSI  accucheck  Diabetic renal diet      Seizure disorder  Continue Keppra      Benign hypertension with ESRD (end-stage renal disease)  Continue Norvasc        Cirrhosis of liver with ascites  Chronic hepatitis C without hepatic coma  history of HCV and cirrhosis   stable    Chronic back pain  Methadone dependence  Continue methadon, Oxycodone, neurontin    Blindness of right eye  stable      Ischemic cardiomyopathy, LVEF 25%-30%  8/5 appreciate cards input        Anemia in chronic kidney  disease  Stable  Monitor        VTE Risk Mitigation (From admission, onward)         Ordered     heparin infusion 1,000 units/500 ml in 0.9% NaCl (pressure line flush)  Intra-op continuous PRN      08/07/20 1116     heparin 25,000 units in dextrose 5% 250 mL (100 units/mL) infusion LOW INTENSITY nomogram - [facility]  Continuous     Question:  Heparin Infusion Adjustment (DO NOT MODIFY ANSWER)  Answer:  [email or web address]\epic\Images\Pharmacy\HeparinInfusions\heparin LOW INTENSITY nomogram for [facility] [id number]    08/05/20 1207     heparin 25,000 units in dextrose 5% (100 units/ml) IV bolus from bag - ADDITIONAL PRN BOLUS - 60 units/kg  As needed (PRN)     Question:  Heparin Infusion Adjustment (DO NOT MODIFY ANSWER)  Answer:  [email or web address]\epic\Images\Pharmacy\HeparinInfusions\heparin LOW INTENSITY nomogram for [facility] [id number]    08/05/20 1207     heparin 25,000 units in dextrose 5% (100 units/ml) IV bolus from bag - ADDITIONAL PRN BOLUS - 30 units/kg  As needed (PRN)     Question:  Heparin Infusion Adjustment (DO NOT MODIFY ANSWER)  Answer:  [email or web address]\epic\Images\Pharmacy\HeparinInfusions\heparin LOW INTENSITY nomogram for [facility] [id number]    08/05/20 1207                      Jose Bai DO  Department of Hospital Medicine   [facility]

## 2020-08-09 NOTE — PLAN OF CARE
Problem: Physical Therapy Goal  Goal: Physical Therapy Goal  Description: Goals to be met by: 20     Patient will increase functional independence with mobility by performin. Sit to stand transfer with Modified [place]  2. Bed to chair transfer with Modified [place] using Rolling Walker or no AD.  3. Gait  x 150 feet with Modified [place] using Rolling Walker or no AD.  4. Ascend/descend 5 stair with no Handrails Stand-by Assistance using no AD or Rolling Walker.  5. Lower extremity exercise program x30 reps per handout, with independence    Outcome: Ongoing, Progressing       PT eval complete. Discharge recommendations pending re-vascularization. Will determine upon re-eval. Patient Edil-SBA for bed mobility and transfers.       [place], PT, DPT  2020

## 2020-08-09 NOTE — PROGRESS NOTES
Progress Note  Nephrology      Consult Requested By: Jose Bai DO  Reason for Consult: ESRD    SUBJECTIVE:     Review of Systems   Constitutional: Negative for chills and fever.   Respiratory: Negative for cough and shortness of breath.    Cardiovascular: Negative for chest pain and leg swelling.   Gastrointestinal: Negative for nausea.   Musculoskeletal: Positive for back pain.     Patient Active Problem List   Diagnosis    Infected ulcer of skin    Acute non-ST-elevation myocardial infarction    NSTEMI (non-ST elevated myocardial infarction)    Smoker    Poorly-controlled hypertension    Tachycardia    Coronary artery disease involving native coronary artery of native heart without angina pectoris    ESRD (T,Th,Sat) dialysis onset 2013    Anemia in chronic kidney disease    Ischemic cardiomyopathy, LVEF 25%-30%    Elevated brain natriuretic peptide (BNP) level    Blindness of right eye    Chronic back pain    Work place accident, 2003    Pneumonia, organism unspecified(486)    Intractable vomiting with nausea    Dehydration    Slurring of speech    H/O: CVA (cerebrovascular accident)    Hypoglycemia associated with type 2 diabetes mellitus    Hypophosphatemia    Possible Insulinoma    Hypervolemia    Hypertensive urgency    Metabolic acidosis    Cirrhosis of liver with ascites    Chronic hepatitis C without hepatic coma    Methadone dependence    Cellulitis    Benign hypertension with ESRD (end-stage renal disease)    Noncompliance with renal dialysis    Post-traumatic seizures    Mixed hyperlipidemia    Seizure disorder    Convulsions    Syncope    Cough    Malignant HTN with heart disease, w/o CHF, with chronic kidney disease    Hyperkalemia    Chest pain    Elevated troponin    History of coronary artery stent placement    Paroxysmal atrial fibrillation with RVR    Thrombocytopenia    Type 2 diabetes mellitus with chronic kidney disease on chronic dialysis,  without long-term current use of insulin    Septic shock    Hypoglycemia    Right foot ulcer    Hyponatremia    C. difficile colitis    Cellulitis and abscess of right lower extremity    Severe protein-calorie malnutrition    Hammer toes of both feet    Skin ulcer of toe of right foot, limited to breakdown of skin    Neuritis    Abscess of fifth toe of right foot    Plantar fasciitis    Gangrene of toe of right foot    Skin ulcer of right foot with fat layer exposed    Nonrheumatic aortic (valve) stenosis    Chronic kidney disease-mineral and bone disorder    Mitral stenosis    ESRD (end stage renal disease) on dialysis    Diabetic foot infection    PVD (peripheral vascular disease)    Atherosclerosis of native artery of right lower extremity with ulceration of midfoot    Acute osteomyelitis of metatarsal bone, right    (HFpEF) heart failure with preserved ejection fraction    Atrial flutter    Hypotension    Osteomyelitis of right foot    Elevated INR    Atrial fibrillation       OBJECTIVE:     Medications:   sodium chloride 0.9%   Intravenous Once    sodium chloride 0.9%   Intravenous Once    aspirin  81 mg Oral Daily    atorvastatin  40 mg Oral Daily    cadexomer iodine   Topical (Top) Every Mon, Wed, Fri    ceftAZIDime (FORTAZ) IVPB  1 g Intravenous Q24H    diclofenac sodium  2 g Topical (Top) BID    epoetin harshad-epbx  10,000 Units Intravenous Every Tues, Thurs, Sat    guaiFENesin  600 mg Oral BID    levETIRAcetam  500 mg Oral BID    methadone  100 mg Oral Daily    metoprolol tartrate  12.5 mg Oral BID    midodrine  10 mg Oral TID WM    pantoprazole  40 mg Oral Before breakfast    polyethylene glycol  17 g Oral Daily    sevelamer carbonate  1,600 mg Oral TID WM    ticagrelor  90 mg Oral BID      heparin (porcine) in D5W 16 Units/kg/hr (08/09/20 0633)    heparin (porcine)       Vitals:    08/09/20 1146   BP: (!) 82/70   Pulse:    Resp:    Temp:      I/O last 3  completed shifts:  In: 850 [P.O.:400; Other:350; IV Piggyback:100]  Out: 1821 [Other:1821]  Physical Exam  Constitutional:       General: He is not in acute distress.     Appearance: He is well-developed. He is not diaphoretic.   HENT:      Head: Normocephalic and atraumatic.   Eyes:      General: No scleral icterus.  Neck:      Musculoskeletal: Neck supple.      Vascular: No JVD.   Cardiovascular:      Rate and Rhythm: Regular rhythm.      Heart sounds: No murmur. No friction rub.   Pulmonary:      Effort: Pulmonary effort is normal. No respiratory distress.      Breath sounds: Normal breath sounds. No wheezing or rales.   Abdominal:      General: Bowel sounds are normal. There is no distension.      Palpations: Abdomen is soft.      Tenderness: There is no abdominal tenderness.   Musculoskeletal:         General: Swelling present. Deformity: 2+ BLE.   Skin:     General: Skin is warm and dry.      Findings: No erythema or rash.   Neurological:      Mental Status: He is alert and oriented to person, place, and time.       Laboratory:  Recent Labs   Lab 08/07/20  0342 08/08/20  0336 08/09/20  0502   WBC 10.14 11.16 8.98   HGB 9.0* 8.9* 8.3*   HCT 29.5* 28.8* 26.5*    231 229   MONO 13.5  1.4* 14.7  1.6* 12.5  1.1*     Recent Labs   Lab 08/07/20  0342 08/08/20  0443 08/09/20  0502    135* 135*   K 5.1 6.1* 5.0    101 95   CO2 20* 16* 24   BUN 71* 86* 57*   CREATININE 5.8* 7.1* 5.4*   CALCIUM 9.5 9.3 9.3     Labs reviewed  Diagnostic Results:  X-Ray: Reviewed  US: Reviewed  Echo: Reviewed      ASSESSMENT/PLAN:   1. ESRD (N18.6 Z99.2) - [place] [facility] [place]  this [place] due to volume   tx rcived TUE, WEd and Thursday, Sat due to hyperkalemia   K today is 5. Plan for HD tomorrow before angio  2. Chronic hypotension - Midodrine 10 TID, on lopressor PRN afib HR >100  3. Anemia of chronic kidney disease treated with ERNESTO (N18.9 D63.1) - EPogen 10K with each HD    Recent Labs   Lab  08/07/20  0342 08/08/20  0336 08/09/20  0502   WBC 10.14 11.16 8.98   HGB 9.0* 8.9* 8.3*   HCT 29.5* 28.8* 26.5*    231 229       Lab Results   Component Value Date    IRON 69 08/29/2015    TIBC 297 08/29/2015    FERRITIN 532 (H) 08/29/2015     4. MBD (E88.9 M90.80) - check phos  Lab Results   Component Value Date    CALCIUM 9.3 08/09/2020    PHOS 6.5 (H) 07/31/2020     No results for input(s): MG in the last 168 hours.  No results found for: [id number]  Lab Results   Component Value Date    CO2 24 08/09/2020         5. Hemodialysis Access (Z99.2 V45.11)- LLA AVF  6. Nutrition/Hypoalbuminemia (E88.09) -   Lab Results   Component Value Date    LABPROT 12.3 08/09/2020    ALBUMIN 2.7 (L) 08/09/2020     [place] with meals TID. Renal vitamins daily      Thank you for allowing me to participate in the care of your patients  With any question please call [phone]  Caroline Merino    [facility]  MCKENNA Kendrick MD, FACDEVAN,   MIRANDA Cabrera MD,   MD ANJUM Elise, NP  [address]  [place], [address]  [phone]

## 2020-08-09 NOTE — PT/OT/SLP EVAL
"Physical Therapy Evaluation    Patient Name:  João Aguirre   MRN:  [id number]    Recommendations:     Discharge Recommendations:  other (see comments)(TBD pending re-vascularization)   Discharge Equipment Recommendations: walker, rolling   Barriers to discharge: Inaccessible home and Decreased caregiver support    Assessment:     João Aguirre is a 55 y.o. male admitted with a medical diagnosis of Acute osteomyelitis of metatarsal bone, right.  He presents with the following impairments/functional limitations:  weakness, impaired endurance, impaired functional mobilty, gait instability, pain, impaired skin, impaired cardiopulmonary response to activity, orthopedic precautions Patient willing to participate in therapy and expresses motivation to return to PLOF but demonstrates concern and anxiety for dyspnea upon exertion. Patient requiring frequent, prolonged rest breaks for SOB. Edil for bed  Mobility. SBA for sit to stand, 2-3 steps with RW, and chair transfer. L Darco shoe donned and patient instructed to maintain RLE NWB. Discharge recommendations to be determined upon re-evaluation after revascularization procedure.     Rehab Prognosis: Fair; patient would benefit from acute skilled PT services to address these deficits and reach maximum level of function.    Recent Surgery: Procedure(s) (LRB):  PTA, PERIPHERAL VESSEL (N/A) 2 Days Post-Op    Plan:     During this hospitalization, patient to be seen 4 x/week to address the identified rehab impairments via gait training, therapeutic activities, therapeutic exercises, neuromuscular re-education and progress toward the following goals:    · Plan of Care Expires:  09/08/20    Subjective     Chief Complaint: dyspnea  Patient/Family Comments/goals: "I wasn't short of breath like this before. I don't understand why I am like this"  Pain/Comfort:  · Pain Rating 1: other (see comments)(did not rate)  · Location - Orientation 1: generalized  · Location " "1: other (see comments)(all over)  · Pain Addressed 1: Pre-medicate for activity, Reposition  · Pain Rating Post-Intervention 1: other (see comments)    Patients cultural, spiritual, Adventism conflicts given the current situation: no    Living Environment:  Lives in mobile with 20yr old son, 5STE, no HR. Tub/shower combo.   Prior to admission, patients level of function was Independent, driving.  Equipment used at home: none.  DME owned (not currently used): none.  Upon discharge, patient will have assistance from son who works.    Objective:     Communicated with nurse prior to session.  Patient found HOB elevated with oxygen, peripheral IV  upon PT entry to room.    General Precautions: Standard, fall   Orthopedic Precautions:N/A   Braces: N/A     Exams:  · RLE ROM: WFL  · RLE Strength: WFL  · LLE ROM: WFL  · LLE Strength: WFL    Functional Mobility:  · Bed Mobility:     · Rolling Left:  modified independence, increased time and effort, use of bed rail.  · Rolling Right: modified independence, increased time and effort, use of bed rail.  · Scooting: modified independence, increased time and effort  · Supine to Sit: modified independence, increased time and effort, use of bed rail.  · Sit to Supine: modified independence, increased time and effort, use of bed rail.  · Transfers:     · Sit to Stand:  stand by assistance with rolling walker  · Bed to Chair: stand by assistance with  no AD  using  Squat Pivot  · Gait: 2-3 steps wtih RW, SBA. Patient did not demonstrate understanding of RLE NWB. Increase of dyspnea. Patient stating "I can't do all of this" , reporting mild dizziness and returning to sitting EOB.     Therapeutic Activities and Exercises:   Patient educated on role of PT in the hospital. Pt educated on plan and goals with physical therapy. Pt educated on importance of OOB activity to decrease the risks associated with bed rest.    Patient sat EOB with PT ~10 min during session, independent sitting " "balance without UE support.  Instruction provided for safety and technique for gait and transfers with RW.  Patient educated on use of RW with RLE NWB.   Pt educated on activity pacing. Patient educated on luz dyspnea scale   -Seated marching, LAQ, Glute squeezes, chest expansion "angels" - patient performed 1 set of each to fatigue. Instructed to complete 2 sets each, 3-4x/day. Provided verbal instruction, demonstration and written material.  -Increased time for frequent and prolonged rest breaks due to SOB. Patient instructed on pursed lip breathing, decreased RR and Luz Dyspnea scale.  -All questions and concerns addressed within PT scope of practice.     AM-PAC 6 CLICK MOBILITY  Total Score:16     Patient left up in chair with all lines intact, call button in reach, chair alarm on and nurse present.    GOALS:   Multidisciplinary Problems     Physical Therapy Goals        Problem: Physical Therapy Goal    Goal Priority Disciplines Outcome Goal Variances Interventions   Physical Therapy Goal     PT, PT/OT Ongoing, Progressing     Description: Goals to be met by: 20     Patient will increase functional independence with mobility by performin. Sit to stand transfer with Modified [place]  2. Bed to chair transfer with Modified [place] using Rolling Walker or no AD.  3. Gait  x 150 feet with Modified [place] using Rolling Walker or no AD.  4. Ascend/descend 5 stair with no Handrails Stand-by Assistance using no AD or Rolling Walker.  5. Lower extremity exercise program x30 reps per handout, with independence                     History:     Past Medical History:   Diagnosis Date    Acute osteomyelitis of metatarsal bone, right     PAD right    Anticoagulant long-term use     Arthritis     Asthma     Back pain     on methadone    C. difficile colitis 2018    Cirrhosis of liver without ascites 2016    by liver US. +HCV    Coronary artery disease 2013    stent.  h/o STEMI and " NSTEMI    Diabetes mellitus     resolved, multiple admissions for hypoglycemia requiring ICU possibley due to liver/ESRD    Encounter for blood transfusion     ESRD on hemodialysis 2013    anuric    Eye abnormality right eye    injured as a child    Gastritis     Gastroparesis     HCV (hepatitis C virus)     ? h/o tattoo exposure    Hypertension     Mitral stenosis 07/24/2020    Moderate to severe mitral stenosis    PAD (peripheral artery disease)     RLE s/p R CFA endarterectomy    Pneumonia 2013    Spend 6weeks in hospital at [facility]    Stroke     Tuberculosis     treated       Past Surgical History:   Procedure Laterality Date    ANGIOGRAPHY OF LOWER EXTREMITY Right 7/28/2020    Procedure: Angiogram Extremity Unilateral;  Surgeon: MINO Vasquez II, MD;  Location: [address];  Service: Vascular;  Laterality: Right;  Left groin and rIght pedal artery access  15.5 min  247.25 mGy  68.5454 Gycm2  33ml contrast    AV FISTULA PLACEMENT      BACK SURGERY      CARDIAC SURGERY  2013    heart stent    CHOLECYSTECTOMY      CORONARY ANGIOGRAPHY N/A 7/30/2020    Procedure: ANGIOGRAM, CORONARY ARTERY;  Surgeon: Alexandro Terry MD;  Location: [facility] CATH LAB;  Service: Cardiology;  Laterality: N/A;    EYE SURGERY      R eye    INCISION AND DRAINAGE OF ABSCESS Right 9/6/2018    Procedure: INCISION AND DRAINAGE, ABSCESS;  Surgeon: Chetan Rothman MD;  Location: [facility] OR;  Service: General;  Laterality: Right;    LEFT HEART CATHETERIZATION N/A 7/30/2020    Procedure: Left heart cath;  Surgeon: Alexandro Terry MD;  Location: [facility] CATH LAB;  Service: Cardiology;  Laterality: N/A;       Time Tracking:     PT Received On: 08/09/20  PT Start Time: 0935     PT Stop Time: 1015  PT Total Time (min): 40 min     Billable Minutes: Evaluation 10, Therapeutic Activity 15 and Therapeutic Exercise 15      [place], PT  08/09/2020

## 2020-08-10 NOTE — NURSING
Just received report from Cath Lab that patient will be going to ICU post procedure. Will give report to ICU nurse and belongings will be brought up to 5th floor.

## 2020-08-10 NOTE — PLAN OF CARE
Patient on oxygen with documented flow.  Will attempt to wean per O2 order protocol. The proper method of use, as well as anticipated side effects, of this aerosol treatment are discussed and demonstrated to the patient.  Will continue to monitor.

## 2020-08-10 NOTE — PROCEDURES
Pt seen and examined on HD, tolerating procedure well  Review of Systems   Constitutional: Negative for chills and fever.   HENT: Negative for congestion and sore throat.    Eyes: Negative for blurred vision, double vision and photophobia.   Respiratory: Negative for cough and shortness of breath.    Cardiovascular: Negative for chest pain, palpitations and leg swelling.   Gastrointestinal: Negative for abdominal pain, diarrhea, nausea and vomiting.   Genitourinary: Negative for dysuria and urgency.   Musculoskeletal: Positive for back pain. Negative for joint pain and myalgias.   Skin: Negative for itching and rash.   Neurological: Negative for dizziness, sensory change, weakness and headaches.   Endo/Heme/Allergies: Negative for polydipsia. Does not bruise/bleed easily.   Psychiatric/Behavioral: Negative for depression.     Vitals:    08/10/20 0700 08/10/20 0709 08/10/20 0731 08/10/20 0817   BP:   115/63    BP Location:   Right arm    Patient Position:   Lying    Pulse: 73 68 86    Resp: 18  20 18   Temp:   97.9 °F (36.6 °C)    TempSrc:   Oral    SpO2: 100%      Weight:       Height:         Physical Exam   Constitutional: He is oriented to person, place, and time and well-developed, well-nourished, and in no distress. No distress.   HENT:   Head: Normocephalic and atraumatic.   Mouth/Throat: No oropharyngeal exudate.   Eyes: Pupils are equal, round, and reactive to light. Conjunctivae and EOM are normal. No scleral icterus.   Neck: Normal range of motion. Neck supple.   Cardiovascular: Normal rate, regular rhythm and normal heart sounds.   No murmur heard.  Pulmonary/Chest: Effort normal and breath sounds normal. No respiratory distress.   Abdominal: Soft. Bowel sounds are normal. He exhibits no distension. There is no abdominal tenderness.   Musculoskeletal: Normal range of motion.         General: No edema.   Neurological: He is alert and oriented to person, place, and time. No cranial nerve deficit.   Skin:  Skin is warm and dry. He is not diaphoretic. No erythema.   Psychiatric: Memory, affect and judgment normal.   Nursing note and vitals reviewed.    Recent Labs   Lab 08/09/20  0502 08/10/20  0427   WBC 8.98 10.43   HGB 8.3* 8.4*   HCT 26.5* 26.6*    220     Recent Labs   Lab 08/09/20  0502 08/10/20  0427   * 135*   K 5.0 6.4*   CL 95 92*   CO2 24 23   BUN 57* 75*   CREATININE 5.4* 6.8*   CALCIUM 9.3 9.5     A/P  1. ESRD (N18.6 Z99.2) - [place] [facility] [place]  this [place] due to volume   tx rcived TUE, WEd and Thursday, Sat due to hyperkalemia   K today is 6.4.HD today then  Angio  - Resume tomorrow per schedule   2. Chronic hypotension - Midodrine 10 TID, on lopressor PRN afib HR >100  3. Anemia of chronic kidney disease treated with ERNESTO (N18.9 D63.1) - EPogen 10K with each HD  Recent Labs   Lab 08/08/20  0336 08/09/20  0502 08/10/20  0427   HGB 8.9* 8.3* 8.4*   HCT 28.8* 26.5* 26.6*    229 220       Iron   Lab Results   Component Value Date    IRON 69 08/29/2015    TIBC 297 08/29/2015    FERRITIN 532 (H) 08/29/2015   - Check Iron Panel   - Ferritin     4. MBD (E88.9 M90.80) -  Recent Labs   Lab 08/10/20  0427   CALCIUM 9.5     No results for input(s): MG in the last 168 hours.  No results found for: [id number]  Lab Results   Component Value Date    CO2 23 08/10/2020     5. Hemodialysis Access (Z99.2 V45.11)-  LLA AVF   6. Nutrition/Hypoalbuminemia (E88.09) - Nepro TID WM   Recent Labs   Lab 08/08/20  0443 08/09/20  0502 08/10/20  0427   LABPROT 12.7* 12.3  --    ALBUMIN 2.5* 2.7* 2.8*   - Renal vitamins daily        Thank you for allowing me to participate in care of your patient  With any question please call [phone]   Goyo Mariscal     [facility]  MCKENNA Kendrick MD, MIRANDA GIVENS MD,   MD JANAK Elise MD E. V. Harmon, NP  [address]  [place], [address]  [phone]  After hours answering service: [phone]

## 2020-08-10 NOTE — BRIEF OP NOTE
S/p revascularization of R leg intervention for CLI        Historically had R EIA to DFA PFTE graft placement with exclusion of SFA\   Patent DFA with reconstitution of diffusely diseased SFA   Distal POP  with reconstitution of 3  BTK vessels   Distal PT + Lateral plantar artery are diffusely diseased           Crossed R POP  with both retrograde AT + PT wires   Atherectomy of POP + AT with 1.5 CSI catheter   PTA of SFA + POP with 6.0 x 300 Scoring balloon   PTA of AT with 4.0 x 200 Scoring balloon   Unable to cross distal PT with balloon        Results: AT + DP perfuse the foot from robust DFA collaterals             L CFA closed with a perclose   R AT access closed with 3.0 x 100 mm balloon inserted via retrograde DP access   2 R PT accesses closed with manual pressure        He tolerated procedure well  Case was done with femoral nerve block + minimal RN sedation         Plan:         Wound care   IV antibiotics for osteomyelitis    DAPT    Statin    Coumadin for PAF           Full report to follow

## 2020-08-10 NOTE — PLAN OF CARE
TN met with pt at bedside in dialysis. Reconfirmed number for [facility]. Updated to referral facilites. Will update once we know more. Pt is under review with [facility]. Pt last Covid was 8/10 for preprocedure and resulted as Negative. For acceptance to SNF, pt will need a Covid result at least 72hrs prior to transfer.    Pt had unsuccessful revascularization on 8/7. Plans to repeat an attempt. Pt declining BKA.       08/10/20 1159   Discharge Reassessment   Assessment Type Discharge Planning Reassessment   Provided patient/caregiver education on the expected discharge date and the discharge plan Yes   Do you have any problems affording any of your prescribed medications? No   Discharge Plan A Skilled Nursing Facility;Rehab   Discharge Plan B Home with family;Home Health   DME Needed Upon Discharge  other (see comments)   Patient choice form signed by patient/caregiver N/A   Anticipated Discharge Disposition SNF   Can the patient/caregiver answer the patient profile reliably? Yes, cognitively intact   How does the patient rate their overall health at the present time? Fair   Describe the patient's ability to walk at the present time. Minor restrictions or changes   How often would a person be available to care for the patient? Often   Number of comorbid conditions (as recorded on the chart) Five or more   Post-Acute Status   Post-Acute Authorization Placement;Home Health;Dialysis   Post-Acute Placement Status Awaiting Therapy Documentation  (Pt prefers to go to [facility] due to barriers at his home. He lives on the 2nd floor and is heel touch weightbearing to R foot. [facility] to [facility])   Home Health Status Awaiting Therapy Documentation  (Pt had [facility] at home.)   Diaylsis Status   (Pt set up with [facility]     Helen Wood RN  RN Transition Navigator  [phone]

## 2020-08-10 NOTE — SUBJECTIVE & OBJECTIVE
Interval History:  Provide midodrine, HD and revasularization L E today  Review of Systems   Constitutional: Positive for activity change and fatigue. Negative for fever.   HENT: Negative for congestion.    Respiratory: Negative for cough and shortness of breath.    Cardiovascular: Negative for chest pain and palpitations.   Gastrointestinal: Negative for abdominal distention, nausea and vomiting.   Neurological: Negative for dizziness and headaches.   Psychiatric/Behavioral: Negative for agitation and decreased concentration.     Objective:     Vital Signs (Most Recent):  Temp: 97.6 °F (36.4 °C) (08/10/20 1240)  Pulse: 61 (08/10/20 1240)  Resp: 18 (08/10/20 1240)  BP: (!) 144/77 (08/10/20 1320)  SpO2: 100 % (08/10/20 0731) Vital Signs (24h Range):  Temp:  [96.8 °F (36 °C)-98.8 °F (37.1 °C)] 97.6 °F (36.4 °C)  Pulse:  [47-86] 61  Resp:  [15-20] 18  SpO2:  [95 %-100 %] 100 %  BP: ()/(55-77) 144/77     Weight: 107.6 kg (237 lb 3.4 oz)  Body mass index is 32.17 kg/m².    Intake/Output Summary (Last 24 hours) at 8/10/2020 1337  Last data filed at 8/10/2020 1240  Gross per 24 hour   Intake 850 ml   Output 2449 ml   Net -1599 ml      Physical Exam  Vitals signs and nursing note reviewed.   Constitutional:       Appearance: Normal appearance.   HENT:      Head: Normocephalic and atraumatic.   Eyes:      Extraocular Movements: Extraocular movements intact.   Neck:      Musculoskeletal: Normal range of motion.   Cardiovascular:      Rate and Rhythm: Normal rate.   Pulmonary:      Effort: Pulmonary effort is normal. No respiratory distress.   Neurological:      Mental Status: He is alert and oriented to person, place, and time.   Psychiatric:         Behavior: Behavior normal.         Thought Content: Thought content normal.         Significant Labs:   Recent Labs   Lab 08/08/20  0336 08/09/20  0502 08/10/20  0427   WBC 11.16 8.98 10.43   HGB 8.9* 8.3* 8.4*   HCT 28.8* 26.5* 26.6*    229 220     Recent Labs   Lab  08/08/20  0443 08/09/20  0502 08/10/20  0427   * 135* 135*   K 6.1* 5.0 6.4*    95 92*   CO2 16* 24 23   BUN 86* 57* 75*   CREATININE 7.1* 5.4* 6.8*   GLU 96 122* 62*   CALCIUM 9.3 9.3 9.5     Recent Labs   Lab 08/08/20  0443 08/09/20  0502 08/10/20  0427 08/10/20  0428   ALKPHOS 118 115 111  --    ALT 9* 9* 12  --    AST 17 18 31  --    ALBUMIN 2.5* 2.7* 2.8*  --    PROT 7.4 7.3 7.6  --    BILITOT 0.6 0.5 0.7  --    INR 1.2 1.1  --  1.3*      No results for input(s): CPK, CPKMB, MB, TROPONINI in the last 72 hours.  Recent Labs   Lab 08/09/20  0644 08/09/20  1117 08/09/20  1600 08/09/20  1944 08/10/20  0449 08/10/20  0743   POCTGLUCOSE 135* 168* 124* 98 77 68*     Hemoglobin A1C   Date Value Ref Range Status   07/30/2020 4.6 4.0 - 5.6 % Final     Comment:     ADA Screening Guidelines:  5.7-6.4%  Consistent with prediabetes  >or=6.5%  Consistent with diabetes  High levels of fetal hemoglobin interfere with the HbA1C  assay. Heterozygous hemoglobin variants (HbS, HgC, etc)do  not significantly interfere with this assay.   However, presence of multiple variants may affect accuracy.     08/27/2018 <4.0 (A) 4.0 - 5.6 % Final     Comment:     ADA Screening Guidelines:  5.7-6.4%  Consistent with prediabetes  >or=6.5%  Consistent with diabetes  High levels of fetal hemoglobin interfere with the HbA1C  assay. Heterozygous hemoglobin variants (HbS, HgC, etc)do  not significantly interfere with this assay.   However, presence of multiple variants may affect accuracy.     03/01/2017 4.4 (L) 4.5 - 6.2 % Final     Comment:     According to ADA guidelines, hemoglobin A1C <7.0% represents  optimal control in non-pregnant diabetic patients.  Different  metrics may apply to specific populations.   Standards of Medical Care in Diabetes - 2016.  For the purpose of screening for the presence of diabetes:  <5.7%     Consistent with the absence of diabetes  5.7-6.4%  Consistent with increasing risk for diabetes    (prediabetes)  >or=6.5%  Consistent with diabetes  Currently no consensus exists for use of hemoglobin A1C  for diagnosis of diabetes for children.         No results for input(s): COLORU, CLARITYU, SPECGRAV, PHUR, PROTEINUA, GLUCOSEU, BLOODU, WBCU, RBCU, BACTERIA, MUCUS in the last 24 hours.    Invalid input(s):  BILIRUBINCON    Microbiology Results (last 7 days)     Procedure Component Value Units Date/Time    Blood culture [[id number]] Collected: 08/03/20 1358    Order Status: Completed Specimen: Blood Updated: 08/08/20 2012     Blood Culture, Routine No growth after 5 days.          Scheduled Meds:   sodium chloride 0.9%   Intravenous Once    sodium chloride 0.9%   Intravenous Once    aspirin  81 mg Oral Daily    atorvastatin  40 mg Oral Daily    cadexomer iodine   Topical (Top) Every Mon, Wed, Fri    ceftAZIDime (FORTAZ) IVPB  1 g Intravenous Q24H    diclofenac sodium  2 g Topical (Top) BID    diphenhydrAMINE  50 mg Oral Once    epoetin harshad-epbx  10,000 Units Intravenous Every Tues, Thurs, Sat    guaiFENesin  600 mg Oral BID    levETIRAcetam  500 mg Oral BID    methadone  100 mg Oral Daily    midodrine  10 mg Oral TID WM    pantoprazole  40 mg Oral Before breakfast    polyethylene glycol  17 g Oral Daily    sevelamer carbonate  1,600 mg Oral TID WM    ticagrelor  90 mg Oral BID     Continuous Infusions:   heparin (porcine) in D5W 18 Units/kg/hr (08/10/20 1314)    heparin (porcine)       As Needed: sodium chloride 0.9%, acetaminophen, albuterol-ipratropium, bisacodyL, calcium carbonate, calcium gluconate IVPB, calcium gluconate IVPB, dextrose 50%, heparin (PORCINE), heparin (PORCINE), heparin (porcine), ondansetron, oxyCODONE, oxyCODONE, promethazine, sodium chloride 0.9%    Significant Imaging:   No new imaging

## 2020-08-10 NOTE — PROGRESS NOTES
[facility] Hospital Medicine  Progress Note    Patient Name: João Aguirre  MRN: [id number]  Patient Class: IP- Inpatient   Admission Date: 8/2/2020  Length of Stay: 8 days  Attending Physician: Jose Bai DO  Primary Care Provider: Primary Doctor No        Subjective:     Principal Problem:Acute osteomyelitis of metatarsal bone, right        HPI:  Patient is a 55/y/o M with PMH of ESRD on HD (T/TH/S), anemia, uncontrolled HTN, hx CVA, PCI s/p 2 stents placement, right eye blindness, seizure, marijuana use, non-healing DM right foot ulcer DM II and gastroparesis, initially presented to [facility] from [place] for second opinion regarding AVR and CABG. Patient noted several weeks history of chest tightness with dizziness, palpitation, and increasing SOB. Denies fever, chills, nausea, vomiting. Seen by CTS and recommended patient not a CABG candidate due to multiple co morbidities- HD, Hep C, Osteomyelitis, PVD. s/p cath with stent placement on 7/31. Vascular surgery recommends BKA but patient declined, patient started on heparin drip and bridging with Warfarin (on Eliquis prior) with plan for balloon angioplasty on Tuesday    Overview/Hospital Course:  8/4 pt seen in HD, pt INR is elevated, given vit K, appreciate cards inputs  8/5 the pt is doing ok, ha episode of afib, discussed with cards, he is now on bb LOW DOSE AND HEPARIN DRIP. WILL HAVE le ANGIOGRAM PROBABLY ON Friday.  8/6 pt is doing ok, no acute events, BP and HR improving anf more stable  8/7 pt evaluated earlier today , he underwent later a revascularization of the LE. And then HD is set up  8/8 pt evaluated, his BP is on low side, HR elevated and in a fib, appreciate cards input. Will have revascularization on Monday with nerv block  8/9 pt is feeling tired but no complaints, his BP is on the low end, cards increased his midodrine.  8/10 pt evaluate, his BP is low during HD but asymptomatic. Provided midodrine 10mg  . F/u with cards after the revascularization    Interval History:  Provide midodrine, HD and revasularization L E today  Review of Systems   Constitutional: Positive for activity change and fatigue. Negative for fever.   HENT: Negative for congestion.    Respiratory: Negative for cough and shortness of breath.    Cardiovascular: Negative for chest pain and palpitations.   Gastrointestinal: Negative for abdominal distention, nausea and vomiting.   Neurological: Negative for dizziness and headaches.   Psychiatric/Behavioral: Negative for agitation and decreased concentration.     Objective:     Vital Signs (Most Recent):  Temp: 97.6 °F (36.4 °C) (08/10/20 1240)  Pulse: 61 (08/10/20 1240)  Resp: 18 (08/10/20 1240)  BP: (!) 144/77 (08/10/20 1320)  SpO2: 100 % (08/10/20 0731) Vital Signs (24h Range):  Temp:  [96.8 °F (36 °C)-98.8 °F (37.1 °C)] 97.6 °F (36.4 °C)  Pulse:  [47-86] 61  Resp:  [15-20] 18  SpO2:  [95 %-100 %] 100 %  BP: ()/(55-77) 144/77     Weight: 107.6 kg (237 lb 3.4 oz)  Body mass index is 32.17 kg/m².    Intake/Output Summary (Last 24 hours) at 8/10/2020 1337  Last data filed at 8/10/2020 1240  Gross per 24 hour   Intake 850 ml   Output 2449 ml   Net -1599 ml      Physical Exam  Vitals signs and nursing note reviewed.   Constitutional:       Appearance: Normal appearance.   HENT:      Head: Normocephalic and atraumatic.   Eyes:      Extraocular Movements: Extraocular movements intact.   Neck:      Musculoskeletal: Normal range of motion.   Cardiovascular:      Rate and Rhythm: Normal rate.   Pulmonary:      Effort: Pulmonary effort is normal. No respiratory distress.   Neurological:      Mental Status: He is alert and oriented to person, place, and time.   Psychiatric:         Behavior: Behavior normal.         Thought Content: Thought content normal.         Significant Labs:   Recent Labs   Lab 08/08/20  0336 08/09/20  0502 08/10/20  0427   WBC 11.16 8.98 10.43   HGB 8.9* 8.3* 8.4*   HCT 28.8*  26.5* 26.6*    229 220     Recent Labs   Lab 08/08/20  0443 08/09/20  0502 08/10/20  0427   * 135* 135*   K 6.1* 5.0 6.4*    95 92*   CO2 16* 24 23   BUN 86* 57* 75*   CREATININE 7.1* 5.4* 6.8*   GLU 96 122* 62*   CALCIUM 9.3 9.3 9.5     Recent Labs   Lab 08/08/20  0443 08/09/20  0502 08/10/20  0427 08/10/20  0428   ALKPHOS 118 115 111  --    ALT 9* 9* 12  --    AST 17 18 31  --    ALBUMIN 2.5* 2.7* 2.8*  --    PROT 7.4 7.3 7.6  --    BILITOT 0.6 0.5 0.7  --    INR 1.2 1.1  --  1.3*      No results for input(s): CPK, CPKMB, MB, TROPONINI in the last 72 hours.  Recent Labs   Lab 08/09/20  0644 08/09/20  1117 08/09/20  1600 08/09/20  1944 08/10/20  0449 08/10/20  0743   POCTGLUCOSE 135* 168* 124* 98 77 68*     Hemoglobin A1C   Date Value Ref Range Status   07/30/2020 4.6 4.0 - 5.6 % Final     Comment:     ADA Screening Guidelines:  5.7-6.4%  Consistent with prediabetes  >or=6.5%  Consistent with diabetes  High levels of fetal hemoglobin interfere with the HbA1C  assay. Heterozygous hemoglobin variants (HbS, HgC, etc)do  not significantly interfere with this assay.   However, presence of multiple variants may affect accuracy.     08/27/2018 <4.0 (A) 4.0 - 5.6 % Final     Comment:     ADA Screening Guidelines:  5.7-6.4%  Consistent with prediabetes  >or=6.5%  Consistent with diabetes  High levels of fetal hemoglobin interfere with the HbA1C  assay. Heterozygous hemoglobin variants (HbS, HgC, etc)do  not significantly interfere with this assay.   However, presence of multiple variants may affect accuracy.     03/01/2017 4.4 (L) 4.5 - 6.2 % Final     Comment:     According to ADA guidelines, hemoglobin A1C <7.0% represents  optimal control in non-pregnant diabetic patients.  Different  metrics may apply to specific populations.   Standards of Medical Care in Diabetes - 2016.  For the purpose of screening for the presence of diabetes:  <5.7%     Consistent with the absence of diabetes  5.7-6.4%   Consistent with increasing risk for diabetes   (prediabetes)  >or=6.5%  Consistent with diabetes  Currently no consensus exists for use of hemoglobin A1C  for diagnosis of diabetes for children.         No results for input(s): COLORU, CLARITYU, SPECGRAV, PHUR, PROTEINUA, GLUCOSEU, BLOODU, WBCU, RBCU, BACTERIA, MUCUS in the last 24 hours.    Invalid input(s):  BILIRUBINCON    Microbiology Results (last 7 days)     Procedure Component Value Units Date/Time    Blood culture [[id number]] Collected: 08/03/20 1358    Order Status: Completed Specimen: Blood Updated: 08/08/20 2012     Blood Culture, Routine No growth after 5 days.          Scheduled Meds:   sodium chloride 0.9%   Intravenous Once    sodium chloride 0.9%   Intravenous Once    aspirin  81 mg Oral Daily    atorvastatin  40 mg Oral Daily    cadexomer iodine   Topical (Top) Every Mon, Wed, Fri    ceftAZIDime (FORTAZ) IVPB  1 g Intravenous Q24H    diclofenac sodium  2 g Topical (Top) BID    diphenhydrAMINE  50 mg Oral Once    epoetin harshad-epbx  10,000 Units Intravenous Every Tues, Thurs, Sat    guaiFENesin  600 mg Oral BID    levETIRAcetam  500 mg Oral BID    methadone  100 mg Oral Daily    midodrine  10 mg Oral TID WM    pantoprazole  40 mg Oral Before breakfast    polyethylene glycol  17 g Oral Daily    sevelamer carbonate  1,600 mg Oral TID WM    ticagrelor  90 mg Oral BID     Continuous Infusions:   heparin (porcine) in D5W 18 Units/kg/hr (08/10/20 1314)    heparin (porcine)       As Needed: sodium chloride 0.9%, acetaminophen, albuterol-ipratropium, bisacodyL, calcium carbonate, calcium gluconate IVPB, calcium gluconate IVPB, dextrose 50%, heparin (PORCINE), heparin (PORCINE), heparin (porcine), ondansetron, oxyCODONE, oxyCODONE, promethazine, sodium chloride 0.9%    Significant Imaging:   No new imaging        Assessment/Plan:      * Acute osteomyelitis of metatarsal bone, right  PVD (peripheral vascular disease)  Atherosclerosis of  native artery of right lower extremity with ulceration of midfoot    CTA A/P with BLE runoff to better define surgical anatomy  Consult cardiology- for possible revascularization and potential limb salvage  Wound cx with stenotrophomonas  Continue Ceftazidime x 6 weeks recs now, end date 9/11  Continue wound care  Consult cardiology  Consult podiatry  Consult ID  Pan culture on 8/3    8/7 revascularization today by cards      Atrial fibrillation    8/5 start lopressor  Started on heparin drip by cards  No amio given his liver dzs  8/6 better controlled  8/7 no events overnight      Elevated INR  8/4 cards ordered vit K  F/u labs  No procedures at this time  8/5 INR down to 1.4  Monitor  8/6 INR 1.1    Osteomyelitis of right foot        Atrial flutter  On Brilinta, asa and Warfarin      (HFpEF) heart failure with preserved ejection fraction  Ischemic cardiomyopathy, LVEF 25%-30%  Stable    Atherosclerosis of native artery of right lower extremity with ulceration of midfoot        ESRD (end stage renal disease) on dialysis   HD on TTSs  Consult nephrology   Renally dose medication  Continue sevelamer      Nonrheumatic aortic (valve) stenosis  History of coronary artery stent placement  ECHO shows mild aortic valve stenosis.   currently high risk for cardiac surgery secondary to cirrhosis (plt 149) and osteomyelitis   [id number] of 6   Was on Eliquis now switched to Warfarin   Continue heparin with coumadin bridge. D/c heparin on 8/3. Decrease Warfarin dose to 2.5  INR goal 2.5-3.5  Continue Brilinta   Continue ASA x 1 week end date     Type 2 diabetes mellitus with chronic kidney disease on chronic dialysis, without long-term current use of insulin  HbA1c 4.3   Low dose SSI  accucheck  Diabetic renal diet      Seizure disorder  Continue Keppra      Benign hypertension with ESRD (end-stage renal disease)  Continue Norvasc        Cirrhosis of liver with ascites  Chronic hepatitis C without hepatic coma  history of HCV  and cirrhosis   stable    Chronic back pain  Methadone dependence  Continue methadon, Oxycodone, neurontin    Blindness of right eye  stable      Ischemic cardiomyopathy, LVEF 25%-30%  8/5 appreciate cards input        Anemia in chronic kidney disease  Stable  Monitor        VTE Risk Mitigation (From admission, onward)         Ordered     heparin infusion 1,000 units/500 ml in 0.9% NaCl (pressure line flush)  Intra-op continuous PRN      08/07/20 1116     heparin 25,000 units in dextrose 5% 250 mL (100 units/mL) infusion LOW INTENSITY nomogram - [facility]  Continuous     Question:  Heparin Infusion Adjustment (DO NOT MODIFY ANSWER)  Answer:  [email or web address]\Images\Pharmacy\HeparinInfusions\heparin LOW INTENSITY nomogram for [facility] [id number]    08/05/20 1207     heparin 25,000 units in dextrose 5% (100 units/ml) IV bolus from bag - ADDITIONAL PRN BOLUS - 60 units/kg  As needed (PRN)     Question:  Heparin Infusion Adjustment (DO NOT MODIFY ANSWER)  Answer:  [email or web address]\epic\Images\Pharmacy\HeparinInfusions\heparin LOW INTENSITY nomogram for [facility] [id number]    08/05/20 1207     heparin 25,000 units in dextrose 5% (100 units/ml) IV bolus from bag - ADDITIONAL PRN BOLUS - 30 units/kg  As needed (PRN)     Question:  Heparin Infusion Adjustment (DO NOT MODIFY ANSWER)  Answer:  [email or web address]\Images\Pharmacy\HeparinInfusions\heparin LOW INTENSITY nomogram for [facility] [id number].pdf    08/05/20 1207                      Jose Bai DO  Department of Hospital Medicine   [facility]

## 2020-08-10 NOTE — PLAN OF CARE
1935H- Received patient upon rounds last night, patient seen in bed on a sitting position. Conscious , coherent to time, date, place, person and situation. With saline lock right arm gauge 20 with ongoing heparin drip at 13 units/kg/hr, infusing well, with clean and dry dressing.Right anterior chest bruising. Left arm precaution, left AV fistula positive with thrill and bruit, with clean and dry dressing. Telemetry Normal sinus rhythm 90's. Oxygen saturation 99% on 4 lpm via Right foot off, dressing changed with  clean and dry dressing. Bilateral Z-boots on. Patient verbalized 4/10 bilateral back pain, oxycodone not given last night as BP on the low side, patient cooperative not to have oxycodone dose due to his BP.Advised patient to call for any assistance. Call bell within reach. Bed alarm on. Safety fall precaution maintained. Advised to be on NPO post midnight, noted understanding. Will continue to monitor patient.    2340H- Aptt drawn by lab had a delayed in results due to lab machine problem and   delayed. Sample sent to [facility]. SHEILA Del Real updated.     0140H- Patient complaints of mild nausea, phenergan PRN po given. Noted full relief.    0430H- Aptt resulted at (29.8), additional bolus given, dose increased to 16 units/kg/hr. Heparin infusing well. EKG done by RT Grace.     0544H-Latest /59 mmHg. BP maintaining over the night. Patient awake alert, comfortable in bed. Kept NPO. Telemetry no ectopy. Free from fall. IV line patent. Heparin drip currently on 16 unit/kg/hr. Next Aptt at 1030H. Two IV line patent.  Will endorse patient to day shift Nurse    0626H- Serum potasium 6.4 this morning, SHEILA Del Real updated, will give correction.

## 2020-08-10 NOTE — PLAN OF CARE
Social wokrer called [place] [employer] at [phone], spoke with [place], completed LOCET.  faxed [facility], awaiting form 142 from State.       08/10/20 1651   Post-Acute Status   Post-Acute Authorization Placement   Post-Acute Placement Status Pending State Certification

## 2020-08-10 NOTE — INTERVAL H&P NOTE
The patient has been examined and the H&P has been reviewed:      Risks, benefits and alternatives of peripheral catheterization and possible intervention were discussed with the patient. All questions were answered and informed consent obtained.     I discussed the importance of compliance with dual antiplatelet therapy with the patient to prevent acute or late stent thrombosis with premature discontinuation of the therapy.          Active Hospital Problems    Diagnosis  POA    *Acute osteomyelitis of metatarsal bone, right [M86.171]  Yes    Atrial fibrillation [I48.91]  Clinically Undetermined    Elevated INR [R79.1]  Yes    (HFpEF) heart failure with preserved ejection fraction [I50.30]  Yes    Atrial flutter [I48.92]  Yes    PVD (peripheral vascular disease) [I73.9]  Yes    Atherosclerosis of native artery of right lower extremity with ulceration of midfoot [I70.234]  Yes    ESRD (end stage renal disease) on dialysis [N18.6, Z99.2]  Not Applicable    Nonrheumatic aortic (valve) stenosis [I35.0]  Yes    Mitral stenosis [I05.0]  Yes    Type 2 diabetes mellitus with chronic kidney disease on chronic dialysis, without long-term current use of insulin [E11.22, N18.6, Z99.2]  Not Applicable    History of coronary artery stent placement [Z95.5]  Not Applicable    Seizure disorder [G40.909]  Yes    Benign hypertension with ESRD (end-stage renal disease) [I12.0, N18.6]  Yes    Chronic hepatitis C without hepatic coma [B18.2]  Yes     Chronic    Methadone dependence [F11.20]  Yes     Chronic    Cirrhosis of liver with ascites [K74.60, R18.8]  Yes    Blindness of right eye [H54.40]  Yes    Chronic back pain [M54.9, G89.29]  Yes    Anemia in chronic kidney disease [N18.9, D63.1]  Yes     Chronic    Ischemic cardiomyopathy, LVEF 25%-30% [I25.5]  Yes      Resolved Hospital Problems   No resolved problems to display.

## 2020-08-11 NOTE — PROGRESS NOTES
Progress Note  Nephrology      Consult Requested By: Cherrie Velasquez*  Reason for Consult: ESRD    SUBJECTIVE:     This AM was hypotensive but MAP>65 tryied UF based on CRIT-LINE didn't tolerate  well, UF was turned off.     Review of Systems   Constitutional: Negative for chills and fever.   Respiratory: Negative for cough and shortness of breath.    Cardiovascular: Negative for chest pain and leg swelling.   Gastrointestinal: Negative for nausea.   Musculoskeletal: Positive for back pain.     Patient Active Problem List   Diagnosis    Infected ulcer of skin    Acute non-ST-elevation myocardial infarction    NSTEMI (non-ST elevated myocardial infarction)    Smoker    Poorly-controlled hypertension    Tachycardia    Coronary artery disease involving native coronary artery of native heart without angina pectoris    ESRD (T,Th,Sat) dialysis onset 2013    Anemia in chronic kidney disease    Ischemic cardiomyopathy, LVEF 25%-30%    Elevated brain natriuretic peptide (BNP) level    Blindness of right eye    Chronic back pain    Work place accident, 2003    Pneumonia, organism unspecified(486)    Intractable vomiting with nausea    Dehydration    Slurring of speech    H/O: CVA (cerebrovascular accident)    Hypoglycemia associated with type 2 diabetes mellitus    Hypophosphatemia    Possible Insulinoma    Hypervolemia    Hypertensive urgency    Metabolic acidosis    Cirrhosis of liver with ascites    Chronic hepatitis C without hepatic coma    Methadone dependence    Cellulitis    Benign hypertension with ESRD (end-stage renal disease)    Noncompliance with renal dialysis    Post-traumatic seizures    Mixed hyperlipidemia    Seizure disorder    Convulsions    Syncope    Cough    Malignant HTN with heart disease, w/o CHF, with chronic kidney disease    Hyperkalemia    Chest pain    Elevated troponin    History of coronary artery stent placement    Paroxysmal atrial  fibrillation with RVR    Thrombocytopenia    Type 2 diabetes mellitus with chronic kidney disease on chronic dialysis, without long-term current use of insulin    Septic shock    Hypoglycemia    Right foot ulcer    Hyponatremia    C. difficile colitis    Cellulitis and abscess of right lower extremity    Severe protein-calorie malnutrition    Hammer toes of both feet    Skin ulcer of toe of right foot, limited to breakdown of skin    Neuritis    Abscess of fifth toe of right foot    Plantar fasciitis    Gangrene of toe of right foot    Skin ulcer of right foot with fat layer exposed    Nonrheumatic aortic (valve) stenosis    Chronic kidney disease-mineral and bone disorder    Mitral stenosis    ESRD (end stage renal disease) on dialysis    Diabetic foot infection    PVD (peripheral vascular disease)    Atherosclerosis of native artery of right lower extremity with ulceration of midfoot    Acute osteomyelitis of metatarsal bone, right    (HFpEF) heart failure with preserved ejection fraction    Atrial flutter    Hypotension    Osteomyelitis of right foot    Elevated INR    Atrial fibrillation       OBJECTIVE:     Medications:   sodium chloride 0.9%   Intravenous Once    sodium chloride 0.9%   Intravenous Once    aspirin  81 mg Oral Daily    atorvastatin  40 mg Oral Daily    cadexomer iodine   Topical (Top) Every Mon, Wed, Fri    ceftAZIDime (FORTAZ) IVPB  1 g Intravenous Q24H    diclofenac sodium  2 g Topical (Top) BID    epoetin harshad-epbx  10,000 Units Intravenous Every Tues, Thurs, Sat    guaiFENesin  600 mg Oral BID    levETIRAcetam  500 mg Oral BID    methadone  100 mg Oral Daily    midodrine  10 mg Oral TID WM    mupirocin   Nasal BID    pantoprazole  40 mg Oral Before breakfast    polyethylene glycol  17 g Oral Daily    sevelamer carbonate  1,600 mg Oral TID WM    ticagrelor  90 mg Oral BID      heparin (porcine) in D5W 12 Units/kg/hr (08/11/20 1012)    heparin  (porcine)       Vitals:    08/11/20 1500   BP: (!) 108/59   Pulse: (!) 124   Resp: 18   Temp:      I/O last 3 completed shifts:  In: 1260 [P.O.:680; I.V.:80; Other:400; IV Piggyback:100]  Out: 2448 [Other:2448]  Physical Exam  Constitutional:       General: He is not in acute distress.     Appearance: He is well-developed. He is not diaphoretic.   HENT:      Head: Normocephalic and atraumatic.   Eyes:      General: No scleral icterus.  Neck:      Musculoskeletal: Neck supple.      Vascular: No JVD.   Cardiovascular:      Rate and Rhythm: Regular rhythm.      Heart sounds: No murmur. No friction rub.   Pulmonary:      Effort: Pulmonary effort is normal. No respiratory distress.      Breath sounds: Normal breath sounds. No wheezing or rales.   Abdominal:      General: Bowel sounds are normal. There is no distension.      Palpations: Abdomen is soft.      Tenderness: There is no abdominal tenderness.   Musculoskeletal:         General: Swelling present. Deformity: 1+ BLE    Skin:     General: Skin is warm and dry.      Findings: No erythema or rash.   Neurological:      Mental Status: He is alert and oriented to person, place, and time.       Laboratory:  Recent Labs   Lab 08/10/20  0427 08/11/20  0433 08/11/20  0924   WBC 10.43 10.87 10.18   HGB 8.4* 7.8* 7.5*   HCT 26.6* 25.2* 23.9*    181 175   MONO 11.0  1.2* 10.3  1.1* 12.1  1.2*     Recent Labs   Lab 08/10/20  0428 08/10/20  1354 08/11/20  0433 08/11/20  0525   NA  --  137  137 134* 133*   K  --  4.5  4.5 7.2* 6.9*   CL  --  97  97 98 96   CO2  --  25  25 18* 19*   BUN  --  36*  36* 43* 45*   CREATININE  --  4.1*  4.1* 5.1* 5.2*   CALCIUM  --  10.0  10.0 9.5 9.5   PHOS 10.9*  --   --   --      Labs reviewed  Diagnostic Results:  X-Ray: Reviewed  US: Reviewed  Echo: Reviewed      ASSESSMENT/PLAN:   1. ESRD (N18.6 Z99.2) - usual [place] [facility] [place]  this [place] due to volume   tx rcived TUE, WEd and Thursday, Sat due to hyperkalemia   K  today is 7. HD today, discussed with Dr [facility] Cardiology for AVF evaluation - concern for recirculation   2. Chronic hypotension - Midodrine 10 TID, on lopressor PRN afib HR >100  3. Anemia of chronic kidney disease treated with ERNESTO (N18.9 D63.1) - EPogen 10K with each HD  -- eval tomorrow for HD need     Recent Labs   Lab 08/10/20  0427 08/11/20  0433 08/11/20  0924   WBC 10.43 10.87 10.18   HGB 8.4* 7.8* 7.5*   HCT 26.6* 25.2* 23.9*    181 175       Lab Results   Component Value Date    IRON 151 08/11/2020    TIBC 317 08/11/2020    FERRITIN 9,098 (H) 08/11/2020     4. MBD (E88.9 M90.80) - check phos  Lab Results   Component Value Date    CALCIUM 9.5 08/11/2020    PHOS 10.9 (HH) 08/10/2020     Recent Labs   Lab 08/10/20  0428   MG 2.4     No results found for: [id number]  Lab Results   Component Value Date    CO2 19 (L) 08/11/2020         5. Hemodialysis Access (Z99.2 V45.11) - LLA AVF - see above   6. Nutrition/Hypoalbuminemia (E88.09) -   Lab Results   Component Value Date    LABPROT 16.5 (H) 08/11/2020    ALBUMIN 3.0 (L) 08/11/2020     Nepro with meals TID. Renal vitamins daily      Thank you for consult, will follow  With any question please call [phone]  Goyo Mariscal MD    [facility]  MCKENNA Kendrick MD, FACP,   MIRANDA Cabrera MD,   MD PHILLIP Elise MD E. V. Harmon, NP    [address]  [place], [address] [phone]

## 2020-08-11 NOTE — NURSING
Phone calls made to patient's mother and wife to update them on his new room number and condition, discussed current plan of care with family and they verbalized understanding, all questions and concerns addressed at this time, both their phone numbers can be found in sticky note

## 2020-08-11 NOTE — PROGRESS NOTES
[facility] ICU 5th Floor  Podiatry  Progress Note    Patient Name: João Aguirre  MRN: [id number]  Admission Date: 8/2/2020  Hospital Length of Stay: 9 days  Attending Physician: Cherrie Velasquez*  Primary Care Provider: Primary Doctor No     Interval History: Pt seen at bedside, no pedal complaints at this time.    Scheduled Meds:   sodium chloride 0.9%   Intravenous Once    sodium chloride 0.9%   Intravenous Once    amiodarone in dextrose  150 mg Intravenous Once    aspirin  81 mg Oral Daily    atorvastatin  40 mg Oral Daily    cadexomer iodine   Topical (Top) Every Mon, Wed, Fri    ceftAZIDime (FORTAZ) IVPB  1 g Intravenous Q24H    diclofenac sodium  2 g Topical (Top) BID    epoetin harshad-epbx  10,000 Units Intravenous Every Tues, Thurs, Sat    guaiFENesin  600 mg Oral BID    levETIRAcetam  500 mg Oral BID    methadone  100 mg Oral Daily    metoprolol tartrate  25 mg Oral BID    midodrine  10 mg Oral TID WM    mupirocin   Nasal BID    pantoprazole  40 mg Oral Before breakfast    polyethylene glycol  17 g Oral Daily    sevelamer carbonate  1,600 mg Oral TID WM    ticagrelor  90 mg Oral BID     Continuous Infusions:   amiodarone in dextrose 5% 1 mg/min (08/11/20 1555)    amiodarone in dextrose 5%      heparin (porcine) in D5W 12 Units/kg/hr (08/11/20 1012)    heparin (porcine)       PRN Meds:sodium chloride, sodium chloride, sodium chloride 0.9%, sodium chloride 0.9%, acetaminophen, albuterol-ipratropium, bisacodyL, calcium carbonate, calcium gluconate IVPB, calcium gluconate IVPB, dextrose 50%, dextrose 50%, heparin (PORCINE), heparin (PORCINE), heparin (porcine), ondansetron, oxyCODONE, oxyCODONE, promethazine, sodium chloride 0.9%    Review of patient's allergies indicates:   Allergen Reactions    Antibiotic hc      Counter acts with methodone.           Past Medical History:   Diagnosis Date    Acute osteomyelitis of metatarsal bone, right     PAD  right    Anticoagulant long-term use     Arthritis     Asthma     Back pain     on methadone    C. difficile colitis 09/2018    Cirrhosis of liver without ascites 2016    by liver US. +HCV    Coronary artery disease 2013    stent.  h/o STEMI and NSTEMI    Diabetes mellitus     resolved, multiple admissions for hypoglycemia requiring ICU possibley due to liver/ESRD    Encounter for blood transfusion     ESRD on hemodialysis 2013    anuric    Eye abnormality right eye    injured as a child    Gastritis     Gastroparesis     HCV (hepatitis C virus)     ? h/o tattoo exposure    Hypertension     Mitral stenosis 07/24/2020    Moderate to severe mitral stenosis    PAD (peripheral artery disease)     RLE s/p R CFA endarterectomy    Pneumonia 2013    Spend 6weeks in hospital at [place]    Stroke     Tuberculosis     treated     Past Surgical History:   Procedure Laterality Date    ANGIOGRAPHY OF LOWER EXTREMITY Right 7/28/2020    Procedure: Angiogram Extremity Unilateral;  Surgeon: MINO Vasquez II, MD;  Location: [address];  Service: Vascular;  Laterality: Right;  Left groin and rIght pedal artery access  15.5 min  247.25 mGy  68.5454 Gycm2  33ml contrast    AV FISTULA PLACEMENT      BACK SURGERY      CARDIAC SURGERY  2013    heart stent    CHOLECYSTECTOMY      CORONARY ANGIOGRAPHY N/A 7/30/2020    Procedure: ANGIOGRAM, CORONARY ARTERY;  Surgeon: Alexandro Terry MD;  Location: [facility] CATH LAB;  Service: Cardiology;  Laterality: N/A;    EYE SURGERY      R eye    INCISION AND DRAINAGE OF ABSCESS Right 9/6/2018    Procedure: INCISION AND DRAINAGE, ABSCESS;  Surgeon: Chetan Rothman MD;  Location: [facility];  Service: General;  Laterality: Right;    LEFT HEART CATHETERIZATION N/A 7/30/2020    Procedure: Left heart cath;  Surgeon: Alexandro Terry MD;  Location: [facility] CATH LAB;  Service: Cardiology;  Laterality: N/A;       Family History     Problem Relation (Age of Onset)    Aneurysm  Mother, Father (77)    Diabetes Brother    Heart disease Father, Paternal Grandmother    Kidney disease Brother        Tobacco Use    Smoking status: Former Smoker     Years: 10.00     Quit date: 2015     Years since quittin.9    Smokeless tobacco: Never Used   Substance and Sexual Activity    Alcohol use: No    Drug use: Yes     Frequency: 4.0 times per week     Types: Other-see comments     Comment: marijuana    Sexual activity: Yes     Review of Systems   Constitutional: Negative for chills, diaphoresis and fever.   Cardiovascular: Negative for leg swelling.   Gastrointestinal: Negative for nausea and vomiting.   Musculoskeletal: Positive for myalgias. Negative for back pain.   Skin: Positive for wound. Negative for color change, pallor and rash.   Neurological: Positive for weakness.   Psychiatric/Behavioral: Negative for agitation. The patient is not nervous/anxious.      Objective:     Vital Signs (Most Recent):  Temp: 97.9 °F (36.6 °C) (20 1045)  Pulse: (!) 124 (20 1500)  Resp: 18 (20 1500)  BP: (!) 108/59 (20 1500)  SpO2: 95 % (20 1500) Vital Signs (24h Range):  Temp:  [97.9 °F (36.6 °C)-98.2 °F (36.8 °C)] 97.9 °F (36.6 °C)  Pulse:  [] 124  Resp:  [13-29] 18  SpO2:  [92 %-100 %] 95 %  BP: ()/(47-75) 108/59     Weight: 103.5 kg (228 lb 2.8 oz)  Body mass index is 30.95 kg/m².    Foot Exam    Right Foot/Ankle     Inspection and Palpation  Ecchymosis: none  Tenderness: none   Swelling: none     Neurovascular  Dorsalis pedis: absent  Posterior tibial: absent  Saphenous nerve sensation: diminished  Tibial nerve sensation: diminished  Superficial peroneal nerve sensation: diminished  Deep peroneal nerve sensation: diminished  Sural nerve sensation: diminished      Left Foot/Ankle      Inspection and Palpation  Ecchymosis: none  Tenderness: none   Swelling: none     Neurovascular  Dorsalis pedis: absent  Posterior tibial: absent  Saphenous nerve sensation:  diminished  Tibial nerve sensation: diminished  Superficial peroneal nerve sensation: diminished  Deep peroneal nerve sensation: diminished  Sural nerve sensation: diminished    Comments  LE are darkened and cool to touch b/l.       Laboratory:  A1C:   Recent Labs   Lab 07/30/20  0353   HGBA1C 4.6     Blood Cultures: No results for input(s): LABBLOO in the last 48 hours.  CBC:   Recent Labs   Lab 08/11/20  1544   WBC 10.66   RBC 2.38*   HGB 7.8*   HCT 25.3*      *   MCH 32.8*   MCHC 30.8*     CMP:   Recent Labs   Lab 08/11/20  1544   GLU 59*   CALCIUM 8.8   ALBUMIN 3.1*   PROT 7.4      K 4.4   CO2 21*   CL 91*   BUN 19   CREATININE 2.8*   ALKPHOS 111   *   *   BILITOT 0.8     ESR: No results for input(s): SEDRATE in the last 168 hours.    Diagnostic Results:  I have reviewed all pertinent imaging results/findings within the past 24 hours.    Clinical Findings:    Patient has 1.0x1.0x0.3 cm wound to the plantar right foot, submet 5. The wound probes to bone and is slightly tender. No acute signs of infection currently. The periwound skin is hyperkeratotic.               Left leg with lateral dry eschar noted, no SOI noted, appears stable.           Assessment/Plan:     * Acute osteomyelitis of metatarsal bone, right  - Bone biopsy of the right 5th metatarsal previously obtained  - IV antibiotics per ID recs for osteomyelitis.  - Continue local wound care for now by nursing. Recommend z-flex boots to offload heels.  - Patient can weight bear to heel for short transfers in [employer] shoe  - Debridement can be done as outpatient  - Podiatry will follow    Atrial fibrillation  Per primary    Elevated INR  Per primary    Atrial flutter  Per primary    (HFpEF) heart failure with preserved ejection fraction  Per primary    Atherosclerosis of native artery of right lower extremity with ulceration of midfoot  See above    PVD (peripheral vascular disease)  Per Interventional Cardiology.     ESRD  (end stage renal disease) on dialysis  Managed by primary    Mitral stenosis  Per primary    Nonrheumatic aortic (valve) stenosis  Per primary    Type 2 diabetes mellitus with chronic kidney disease on chronic dialysis, without long-term current use of insulin  Managed by primary    History of coronary artery stent placement  Per primary    Seizure disorder  Per primary    Benign hypertension with ESRD (end-stage renal disease)  Per primary    Methadone dependence  Per primary    Chronic hepatitis C without hepatic coma  Per primary    Cirrhosis of liver with ascites  Per primary    Chronic back pain  Per primary    Blindness of right eye  Per primary    Ischemic cardiomyopathy, LVEF 25%-30%  Per primary    Anemia in chronic kidney disease  Per primary        Tomeka Camacho DPM  Podiatry  [facility] ICU 5th Floor

## 2020-08-11 NOTE — EICU
eICU Physician Virtual/Remote Brief Evaluation Note    Blood pressure noted to down to 87/51  Patient observed  Discussed with RN  Doppler pulses with good dorsalis pedis bilaterally but decreased posterior tibial and right  He is not having any IV fluid.  Had 2 L taken off dialysis today and required albumin for hypotension  Will give 12 and 0.5 g of albumin for volume expansion      This report has been created through the use of [employer]-Grad[employer] dictation software. Typographical and content errors may occur with this process. While efforts are made to detect and correct such errors, in some cases errors will persist. For this reason, wording in this document should be considered in the proper context and not strictly verbatim

## 2020-08-11 NOTE — SUBJECTIVE & OBJECTIVE
Interval History:  Awake and alert, complained regarding his diet.   S/p revascularization on 8/10, cardiology by bedside with concern with limited doppler of RLE.   Hyper K- shifted  Hold Norvac due to hypotension  Patient in afib, - hypotensive    Review of Systems   Constitutional: Positive for activity change and fatigue. Negative for fever.   HENT: Negative for congestion.    Respiratory: Negative for cough and shortness of breath.    Cardiovascular: Negative for chest pain and palpitations.   Gastrointestinal: Negative for abdominal distention, nausea and vomiting.   Neurological: Negative for dizziness and headaches.   Psychiatric/Behavioral: Negative for agitation and decreased concentration.     Objective:     Vital Signs (Most Recent):  Temp: 97.9 °F (36.6 °C) (08/11/20 1045)  Pulse: (!) 126 (08/11/20 1437)  Resp: 18 (08/11/20 1437)  BP: (!) 90/52 (08/11/20 1436)  SpO2: 97 % (08/11/20 1437) Vital Signs (24h Range):  Temp:  [97.9 °F (36.6 °C)-98.2 °F (36.8 °C)] 97.9 °F (36.6 °C)  Pulse:  [] 126  Resp:  [13-29] 18  SpO2:  [92 %-100 %] 97 %  BP: ()/(47-75) 90/52     Weight: 103.5 kg (228 lb 2.8 oz)  Body mass index is 30.95 kg/m².    Intake/Output Summary (Last 24 hours) at 8/11/2020 1446  Last data filed at 8/11/2020 0600  Gross per 24 hour   Intake 610 ml   Output --   Net 610 ml      Physical Exam  Vitals signs and nursing note reviewed.   Constitutional:       Appearance: Normal appearance.   HENT:      Head: Normocephalic and atraumatic.   Eyes:      Extraocular Movements: Extraocular movements intact.   Neck:      Musculoskeletal: Normal range of motion.   Cardiovascular:      Rate and Rhythm: Normal rate.   Pulmonary:      Effort: Pulmonary effort is normal. No respiratory distress.   Neurological:      Mental Status: He is alert and oriented to person, place, and time.   Psychiatric:         Behavior: Behavior normal.         Thought Content: Thought content normal.         Significant  Labs:   Recent Labs   Lab 08/10/20  0427 08/11/20  0433 08/11/20  0924   WBC 10.43 10.87 10.18   HGB 8.4* 7.8* 7.5*   HCT 26.6* 25.2* 23.9*    181 175     Recent Labs   Lab 08/10/20  0428 08/10/20  1354 08/11/20  0433 08/11/20  0525   NA  --  137  137 134* 133*   K  --  4.5  4.5 7.2* 6.9*   CL  --  97  97 98 96   CO2  --  25  25 18* 19*   BUN  --  36*  36* 43* 45*   CREATININE  --  4.1*  4.1* 5.1* 5.2*   GLU  --  67*  67* 40* 260*   CALCIUM  --  10.0  10.0 9.5 9.5   MG 2.4  --   --   --    PHOS 10.9*  --   --   --      Recent Labs   Lab 08/10/20  0427 08/10/20  0428 08/11/20  0433 08/11/20  0525 08/11/20  0924   ALKPHOS 111  --  104 102  --    ALT 12  --  64* 69*  --    AST 31  --  171* 182*  --    ALBUMIN 2.8*  --  3.0* 3.0*  --    PROT 7.6  --  7.3 7.1  --    BILITOT 0.7  --  0.7 0.7  --    INR  --  1.3* 1.6*  --  1.6*      No results for input(s): CPK, CPKMB, MB, TROPONINI in the last 72 hours.  Recent Labs   Lab 08/10/20  1701 08/10/20  2259 08/10/20  2354 08/11/20  0509 08/11/20  0820 08/11/20  1149   POCTGLUCOSE 133* 62* 128* 30* 107 52*     Hemoglobin A1C   Date Value Ref Range Status   07/30/2020 4.6 4.0 - 5.6 % Final     Comment:     ADA Screening Guidelines:  5.7-6.4%  Consistent with prediabetes  >or=6.5%  Consistent with diabetes  High levels of fetal hemoglobin interfere with the HbA1C  assay. Heterozygous hemoglobin variants (HbS, HgC, etc)do  not significantly interfere with this assay.   However, presence of multiple variants may affect accuracy.     08/27/2018 <4.0 (A) 4.0 - 5.6 % Final     Comment:     ADA Screening Guidelines:  5.7-6.4%  Consistent with prediabetes  >or=6.5%  Consistent with diabetes  High levels of fetal hemoglobin interfere with the HbA1C  assay. Heterozygous hemoglobin variants (HbS, HgC, etc)do  not significantly interfere with this assay.   However, presence of multiple variants may affect accuracy.     03/01/2017 4.4 (L) 4.5 - 6.2 % Final     Comment:      According to ADA guidelines, hemoglobin A1C <7.0% represents  optimal control in non-pregnant diabetic patients.  Different  metrics may apply to specific populations.   Standards of Medical Care in Diabetes - 2016.  For the purpose of screening for the presence of diabetes:  <5.7%     Consistent with the absence of diabetes  5.7-6.4%  Consistent with increasing risk for diabetes   (prediabetes)  >or=6.5%  Consistent with diabetes  Currently no consensus exists for use of hemoglobin A1C  for diagnosis of diabetes for children.         No results for input(s): COLORU, CLARITYU, SPECGRAV, PHUR, PROTEINUA, GLUCOSEU, BLOODU, WBCU, RBCU, BACTERIA, MUCUS in the last 24 hours.    Invalid input(s):  BILIRUBINCON    Microbiology Results (last 7 days)     Procedure Component Value Units Date/Time    Blood culture [[id number]] Collected: 08/03/20 1358    Order Status: Completed Specimen: Blood Updated: 08/08/20 2012     Blood Culture, Routine No growth after 5 days.          Scheduled Meds:   sodium chloride 0.9%   Intravenous Once    sodium chloride 0.9%   Intravenous Once    aspirin  81 mg Oral Daily    atorvastatin  40 mg Oral Daily    cadexomer iodine   Topical (Top) Every Mon, Wed, Fri    ceftAZIDime (FORTAZ) IVPB  1 g Intravenous Q24H    diclofenac sodium  2 g Topical (Top) BID    epoetin harshad-epbx  10,000 Units Intravenous Every Tues, Thurs, Sat    guaiFENesin  600 mg Oral BID    levETIRAcetam  500 mg Oral BID    methadone  100 mg Oral Daily    metoprolol tartrate  25 mg Oral Once    midodrine  10 mg Oral TID WM    mupirocin   Nasal BID    pantoprazole  40 mg Oral Before breakfast    polyethylene glycol  17 g Oral Daily    sevelamer carbonate  1,600 mg Oral TID WM    ticagrelor  90 mg Oral BID     Continuous Infusions:   heparin (porcine) in D5W 12 Units/kg/hr (08/11/20 1012)    heparin (porcine)       As Needed: sodium chloride, sodium chloride 0.9%, sodium chloride 0.9%, acetaminophen,  albuterol-ipratropium, bisacodyL, calcium carbonate, calcium gluconate IVPB, calcium gluconate IVPB, dextrose 50%, dextrose 50%, heparin (PORCINE), heparin (PORCINE), heparin (porcine), ondansetron, oxyCODONE, oxyCODONE, promethazine, sodium chloride 0.9%    Significant Imaging:   No new imaging

## 2020-08-11 NOTE — EICU
eICU Physician Virtual/Remote Brief Evaluation Note    EICU Physician Virtual/Remote Brief Evaluation Note     Chart, Labs and investigations reviewed.   Current medications reviewed.   Patient observed  Discussed with RN    55-year-old gentleman with end-stage renal disease, history of hypertension, CVA.  Presented on to [facility] with chest pain and underwent PTCA.  Transferred VIII to to [place] for right leg revascularization which he underwent today.      /75, P 64, R 23 O2 sat 100  WBC 10.43, hemoglobin 8.4, platelets 220  Sodium 137, potassium 4.5, bicarb 25, BUN 36, creatinine 4.1, glucose 67, calcium 10  COVID negative x2    Right foot bone culture done 7/29 positive for stenotrophomonas maltophilia sensitive to ceftazidime and trimethoprim sulfa  Chest x-ray 8/7 suggestive pulmonary edema  EKG 8/10 sinus bradycardia with first-degree AV block, Prolonged QTC 50  T 7/24 low-normal LV function, ejection fraction 50, positive for segmental wall motion abnormalities, left atrial enlargement, mild aortic stenosis and regurg, moderate to severe mitral stenosis, pulmonary artery pressure 62  Underwent cardiac catheterization with 2 stents for NSTEMI on 07/30  Underwent attempted right leg percutaneous revascularization on 08/07 which was aborted due to hypotension  Underwent right leg percutaneous revascularization on 08/10    Assessment/plan:  Stable status post percutaneous revascularization right leg  Continue ICU monitoring, continue antibiotics osteomyelitis    DVT prophylaxis On hold - postop  GI prophylaxis Pantoprazole       This report has been created through the use of M-Modal dictation software. Typographical and content errors may occur with this process. While efforts are made to detect and correct such errors, in some cases errors will persist. For this reason, wording in this document should be considered in the proper context and not strictly verbatim

## 2020-08-11 NOTE — PLAN OF CARE
sent CXR and updated notes to Verna with [facility]. Patient currently under review. SW will continue to follow up.       08/11/20 1003   Post-Acute Status   Post-Acute Authorization Placement   Post-Acute Placement Status Additional Clinical Requested

## 2020-08-11 NOTE — NURSING
Notified dr schmidt of pt now in afib rvr up to 150s post dialysis with lots of PVCs and runs of vtach- longest noted 14 beats. Aware of Map in the 60s. New order for 5 mg lopressor IV. If presser holds, 25 mg of lopressor PO.

## 2020-08-11 NOTE — NURSING
Arrived bedside for scheduled HD. POC discussed with patient. 3hr treatment initiated at this time.

## 2020-08-11 NOTE — PROGRESS NOTES
[facility] ICU 5th Floor  Hospital Medicine  Progress Note    Patient Name: João Aguirre  MRN: [id number]  Patient Class: IP- Inpatient   Admission Date: 8/2/2020  Length of Stay: 9 days  Attending Physician: Cherrie Velasquez*  Primary Care Provider: Primary Doctor No        Subjective:     Principal Problem:Acute osteomyelitis of metatarsal bone, right        HPI:  Patient is a 55/y/o M with PMH of ESRD on HD (T/TH/S), anemia, uncontrolled HTN, hx CVA, PCI s/p 2 stents placement, right eye blindness, seizure, marijuana use, non-healing DM right foot ulcer DM II and gastroparesis, initially presented to [facility] from [place] for second opinion regarding AVR and CABG. Patient noted several weeks history of chest tightness with dizziness, palpitation, and increasing SOB. Denies fever, chills, nausea, vomiting. Seen by CTS and recommended patient not a CABG candidate due to multiple co morbidities- HD, Hep C, Osteomyelitis, PVD. s/p cath with stent placement on 7/31. Vascular surgery recommends BKA but patient declined, patient started on heparin drip and bridging with Warfarin (on Eliquis prior) with plan for balloon angioplasty on Tuesday    Overview/Hospital Course:  8/4 pt seen in HD, pt INR is elevated, given vit K, appreciate cards inputs  8/5 the pt is doing ok, ha episode of afib, discussed with cards, he is now on bb LOW DOSE AND HEPARIN DRIP. WILL HAVE le ANGIOGRAM PROBABLY ON Friday.  8/6 pt is doing ok, no acute events, BP and HR improving anf more stable  8/7 pt evaluated earlier today , he underwent later a revascularization of the LE. And then HD is set up  8/8 pt evaluated, his BP is on low side, HR elevated and in a fib, appreciate cards input. Will have revascularization on Monday with nerv block  8/9 pt is feeling tired but no complaints, his BP is on the low end, cards increased his midodrine.  8/10 pt evaluate, his BP is low during HD but asymptomatic.  Provided midodrine 10mg . F/u with cards after the revascularization    Interval History:  Awake and alert, complained regarding his diet.   S/p revascularization on 8/10, cardiology by bedside with concern with limited doppler of RLE.   Hyper K- shifted  Hold Norvac due to hypotension  Patient in afib, - hypotensive    Review of Systems   Constitutional: Positive for activity change and fatigue. Negative for fever.   HENT: Negative for congestion.    Respiratory: Negative for cough and shortness of breath.    Cardiovascular: Negative for chest pain and palpitations.   Gastrointestinal: Negative for abdominal distention, nausea and vomiting.   Neurological: Negative for dizziness and headaches.   Psychiatric/Behavioral: Negative for agitation and decreased concentration.     Objective:     Vital Signs (Most Recent):  Temp: 97.9 °F (36.6 °C) (08/11/20 1045)  Pulse: (!) 126 (08/11/20 1437)  Resp: 18 (08/11/20 1437)  BP: (!) 90/52 (08/11/20 1436)  SpO2: 97 % (08/11/20 1437) Vital Signs (24h Range):  Temp:  [97.9 °F (36.6 °C)-98.2 °F (36.8 °C)] 97.9 °F (36.6 °C)  Pulse:  [] 126  Resp:  [13-29] 18  SpO2:  [92 %-100 %] 97 %  BP: ()/(47-75) 90/52     Weight: 103.5 kg (228 lb 2.8 oz)  Body mass index is 30.95 kg/m².    Intake/Output Summary (Last 24 hours) at 8/11/2020 1446  Last data filed at 8/11/2020 0600  Gross per 24 hour   Intake 610 ml   Output --   Net 610 ml      Physical Exam  Vitals signs and nursing note reviewed.   Constitutional:       Appearance: Normal appearance.   HENT:      Head: Normocephalic and atraumatic.   Eyes:      Extraocular Movements: Extraocular movements intact.   Neck:      Musculoskeletal: Normal range of motion.   Cardiovascular:      Rate and Rhythm: Normal rate.   Pulmonary:      Effort: Pulmonary effort is normal. No respiratory distress.   Neurological:      Mental Status: He is alert and oriented to person, place, and time.   Psychiatric:         Behavior: Behavior normal.          Thought Content: Thought content normal.         Significant Labs:   Recent Labs   Lab 08/10/20  0427 08/11/20  0433 08/11/20  0924   WBC 10.43 10.87 10.18   HGB 8.4* 7.8* 7.5*   HCT 26.6* 25.2* 23.9*    181 175     Recent Labs   Lab 08/10/20  0428 08/10/20  1354 08/11/20  0433 08/11/20  0525   NA  --  137  137 134* 133*   K  --  4.5  4.5 7.2* 6.9*   CL  --  97  97 98 96   CO2  --  25  25 18* 19*   BUN  --  36*  36* 43* 45*   CREATININE  --  4.1*  4.1* 5.1* 5.2*   GLU  --  67*  67* 40* 260*   CALCIUM  --  10.0  10.0 9.5 9.5   MG 2.4  --   --   --    PHOS 10.9*  --   --   --      Recent Labs   Lab 08/10/20  0427 08/10/20  0428 08/11/20  0433 08/11/20  0525 08/11/20  0924   ALKPHOS 111  --  104 102  --    ALT 12  --  64* 69*  --    AST 31  --  171* 182*  --    ALBUMIN 2.8*  --  3.0* 3.0*  --    PROT 7.6  --  7.3 7.1  --    BILITOT 0.7  --  0.7 0.7  --    INR  --  1.3* 1.6*  --  1.6*      No results for input(s): CPK, CPKMB, MB, TROPONINI in the last 72 hours.  Recent Labs   Lab 08/10/20  1701 08/10/20  2259 08/10/20  2354 08/11/20  0509 08/11/20  0820 08/11/20  1149   POCTGLUCOSE 133* 62* 128* 30* 107 52*     Hemoglobin A1C   Date Value Ref Range Status   07/30/2020 4.6 4.0 - 5.6 % Final     Comment:     ADA Screening Guidelines:  5.7-6.4%  Consistent with prediabetes  >or=6.5%  Consistent with diabetes  High levels of fetal hemoglobin interfere with the HbA1C  assay. Heterozygous hemoglobin variants (HbS, HgC, etc)do  not significantly interfere with this assay.   However, presence of multiple variants may affect accuracy.     08/27/2018 <4.0 (A) 4.0 - 5.6 % Final     Comment:     ADA Screening Guidelines:  5.7-6.4%  Consistent with prediabetes  >or=6.5%  Consistent with diabetes  High levels of fetal hemoglobin interfere with the HbA1C  assay. Heterozygous hemoglobin variants (HbS, HgC, etc)do  not significantly interfere with this assay.   However, presence of multiple variants may affect  accuracy.     03/01/2017 4.4 (L) 4.5 - 6.2 % Final     Comment:     According to ADA guidelines, hemoglobin A1C <7.0% represents  optimal control in non-pregnant diabetic patients.  Different  metrics may apply to specific populations.   Standards of Medical Care in Diabetes - 2016.  For the purpose of screening for the presence of diabetes:  <5.7%     Consistent with the absence of diabetes  5.7-6.4%  Consistent with increasing risk for diabetes   (prediabetes)  >or=6.5%  Consistent with diabetes  Currently no consensus exists for use of hemoglobin A1C  for diagnosis of diabetes for children.         No results for input(s): COLORU, CLARITYU, SPECGRAV, PHUR, PROTEINUA, GLUCOSEU, BLOODU, WBCU, RBCU, BACTERIA, MUCUS in the last 24 hours.    Invalid input(s):  BILIRUBINCON    Microbiology Results (last 7 days)     Procedure Component Value Units Date/Time    Blood culture [[id number]] Collected: 08/03/20 1358    Order Status: Completed Specimen: Blood Updated: 08/08/20 2012     Blood Culture, Routine No growth after 5 days.          Scheduled Meds:   sodium chloride 0.9%   Intravenous Once    sodium chloride 0.9%   Intravenous Once    aspirin  81 mg Oral Daily    atorvastatin  40 mg Oral Daily    cadexomer iodine   Topical (Top) Every Mon, Wed, Fri    ceftAZIDime (FORTAZ) IVPB  1 g Intravenous Q24H    diclofenac sodium  2 g Topical (Top) BID    epoetin harshad-epbx  10,000 Units Intravenous Every Tues, Thurs, Sat    guaiFENesin  600 mg Oral BID    levETIRAcetam  500 mg Oral BID    methadone  100 mg Oral Daily    metoprolol tartrate  25 mg Oral Once    midodrine  10 mg Oral TID WM    mupirocin   Nasal BID    pantoprazole  40 mg Oral Before breakfast    polyethylene glycol  17 g Oral Daily    sevelamer carbonate  1,600 mg Oral TID WM    ticagrelor  90 mg Oral BID     Continuous Infusions:   heparin (porcine) in D5W 12 Units/kg/hr (08/11/20 1012)    heparin (porcine)       As Needed: sodium chloride,  sodium chloride 0.9%, sodium chloride 0.9%, acetaminophen, albuterol-ipratropium, bisacodyL, calcium carbonate, calcium gluconate IVPB, calcium gluconate IVPB, dextrose 50%, dextrose 50%, heparin (PORCINE), heparin (PORCINE), heparin (porcine), ondansetron, oxyCODONE, oxyCODONE, promethazine, sodium chloride 0.9%    Significant Imaging:   No new imaging        Assessment/Plan:      * Acute osteomyelitis of metatarsal bone, right  PVD (peripheral vascular disease)  Atherosclerosis of native artery of right lower extremity with ulceration of midfoot    CTA A/P with BLE runoff to better define surgical anatomy  Consult cardiology- for possible revascularization and potential limb salvage  Wound cx with stenotrophomonas  Consult ID- continue Ceftazidime x 6 weeks recs now, end date 9/11  Continue wound care  Consult cardiology- S/p revascularization on 8/10,  Consult podiatry        Atrial fibrillation  No amio given his liver disease  Started on heparin drip by cards        Elevated INR  8/4 cards ordered vit K  F/u labs  No procedures at this time  8/5 INR down to 1.4  Monitor  8/6 INR 1.1    Osteomyelitis of right foot        Atrial flutter  On Brilinta, asa and Warfarin      (HFpEF) heart failure with preserved ejection fraction  Ischemic cardiomyopathy, LVEF 25%-30%  Stable    Atherosclerosis of native artery of right lower extremity with ulceration of midfoot        ESRD (end stage renal disease) on dialysis   HD on TTSs  Consult nephrology   Renally dose medication  Continue sevelamer      Nonrheumatic aortic (valve) stenosis  History of coronary artery stent placement  ECHO shows mild aortic valve stenosis.   currently high risk for cardiac surgery secondary to cirrhosis (plt 149) and osteomyelitis   [id number] of 6   Was on Eliquis now switched to Warfarin   Continue heparin with coumadin bridge. D/c heparin on 8/3. Decrease Warfarin dose to 2.5  INR goal 2.5-3.5  Continue Brilinta   Continue ASA x 1 week end  date     Type 2 diabetes mellitus with chronic kidney disease on chronic dialysis, without long-term current use of insulin  HbA1c 4.3   Low dose SSI  accucheck  Diabetic renal diet      Seizure disorder  Continue Keppra      Benign hypertension with ESRD (end-stage renal disease)  Continue Norvasc        Cirrhosis of liver with ascites  Chronic hepatitis C without hepatic coma  history of HCV and cirrhosis   stable    Chronic back pain  Methadone dependence  Continue methadon, Oxycodone, neurontin    Blindness of right eye  stable      Ischemic cardiomyopathy, LVEF 25%-30%  Stable, cards on board        Anemia in chronic kidney disease  Stable  Monitor        VTE Risk Mitigation (From admission, onward)         Ordered     heparin 25,000 units in dextrose 5% 250 mL (100 units/mL) infusion LOW INTENSITY nomogram - [facility]  Continuous     Question:  Heparin Infusion Adjustment (DO NOT MODIFY ANSWER)  Answer:  \\[email or web address]\epic\Images\Pharmacy\HeparinInfusions\heparin LOW INTENSITY nomogram for [facility] [id number].pdf    08/11/20 0901     heparin 25,000 units in dextrose 5% (100 units/ml) IV bolus from bag - ADDITIONAL PRN BOLUS - 60 units/kg  As needed (PRN)     Question:  Heparin Infusion Adjustment (DO NOT MODIFY ANSWER)  Answer:  \\[email or web address]\epic\Images\Pharmacy\HeparinInfusions\heparin LOW INTENSITY nomogram for [facility] [id number].pdf    08/11/20 0901     heparin 25,000 units in dextrose 5% (100 units/ml) IV bolus from bag - ADDITIONAL PRN BOLUS - 30 units/kg  As needed (PRN)     Question:  Heparin Infusion Adjustment (DO NOT MODIFY ANSWER)  Answer:  \\[email or web address]\epic\Images\Pharmacy\HeparinInfusions\heparin LOW INTENSITY nomogram for [facility] [id number].pdf    08/11/20 0901     heparin infusion 1,000 units/500 ml in 0.9% NaCl (pressure line flush)  Intra-op continuous PRN      08/07/20 1116                      Cherrie Velasquez MD  Department of Hospital Medicine   [facility] ICU 5th Floor

## 2020-08-11 NOTE — NURSING
Dr. Kacie Steve called to get a few updates on this patient, discussed current condition and plan of care, new orders received and carried out

## 2020-08-11 NOTE — NURSING
Called and spoke with MD Mariscal with Nephrology and reported K 7.2. Per MD Hester (EICU) and Gt NP, unable to shift patient due to low BG's. MD Mariscal states patient will be dialyzed this AM.

## 2020-08-11 NOTE — NURSING
Dr Young at bedside for rounds, pt c/o pain to right foot, unable to doppler pedal pulse in right foot, heparin gtt ordered

## 2020-08-11 NOTE — NURSING
Dr. Young paged again since initial page was not returned, informed him that both posterior tibial pulses are absent but dorsalis pedis pulses are able to be dopplered, he suggested a different spot to check for posterior tibial pulses but they remain absent bilaterally, darlyn gaelgger applied to BLE as they are cool, will continue to check pulses during shift per protocol, pt remains in supine position per post cath lab protocol to protect groin site where dressing remains CDI and no drainage or swelling is noted, pt remains slightly drowsy but arouses easily

## 2020-08-11 NOTE — SUBJECTIVE & OBJECTIVE
Scheduled Meds:   sodium chloride 0.9%   Intravenous Once    sodium chloride 0.9%   Intravenous Once    amiodarone in dextrose  150 mg Intravenous Once    aspirin  81 mg Oral Daily    atorvastatin  40 mg Oral Daily    cadexomer iodine   Topical (Top) Every Mon, Wed, Fri    ceftAZIDime (FORTAZ) IVPB  1 g Intravenous Q24H    diclofenac sodium  2 g Topical (Top) BID    epoetin harshad-epbx  10,000 Units Intravenous Every Tues, Thurs, Sat    guaiFENesin  600 mg Oral BID    levETIRAcetam  500 mg Oral BID    methadone  100 mg Oral Daily    metoprolol tartrate  25 mg Oral BID    midodrine  10 mg Oral TID WM    mupirocin   Nasal BID    pantoprazole  40 mg Oral Before breakfast    polyethylene glycol  17 g Oral Daily    sevelamer carbonate  1,600 mg Oral TID WM    ticagrelor  90 mg Oral BID     Continuous Infusions:   amiodarone in dextrose 5% 1 mg/min (08/11/20 1555)    amiodarone in dextrose 5%      heparin (porcine) in D5W 12 Units/kg/hr (08/11/20 1012)    heparin (porcine)       PRN Meds:sodium chloride, sodium chloride, sodium chloride 0.9%, sodium chloride 0.9%, acetaminophen, albuterol-ipratropium, bisacodyL, calcium carbonate, calcium gluconate IVPB, calcium gluconate IVPB, dextrose 50%, dextrose 50%, heparin (PORCINE), heparin (PORCINE), heparin (porcine), ondansetron, oxyCODONE, oxyCODONE, promethazine, sodium chloride 0.9%    Review of patient's allergies indicates:   Allergen Reactions    Antibiotic hc      Counter acts with methodone.           Past Medical History:   Diagnosis Date    Acute osteomyelitis of metatarsal bone, right     PAD right    Anticoagulant long-term use     Arthritis     Asthma     Back pain     on methadone    C. difficile colitis 09/2018    Cirrhosis of liver without ascites 2016    by liver US. +HCV    Coronary artery disease 2013    stent.  h/o STEMI and NSTEMI    Diabetes mellitus     resolved, multiple admissions for hypoglycemia requiring ICU possibley  due to liver/ESRD    Encounter for blood transfusion     ESRD on hemodialysis     anuric    Eye abnormality right eye    injured as a child    Gastritis     Gastroparesis     HCV (hepatitis C virus)     ? h/o tattoo exposure    Hypertension     Mitral stenosis 2020    Moderate to severe mitral stenosis    PAD (peripheral artery disease)     RLE s/p R CFA endarterectomy    Pneumonia     Spend 6weeks in hospital at [place]    Stroke     Tuberculosis     treated     Past Surgical History:   Procedure Laterality Date    ANGIOGRAPHY OF LOWER EXTREMITY Right 2020    Procedure: Angiogram Extremity Unilateral;  Surgeon: MINO Vasquez II, MD;  Location: [address];  Service: Vascular;  Laterality: Right;  Left groin and rIght pedal artery access  15.5 min  247.25 mGy  68.5454 Gycm2  33ml contrast    AV FISTULA PLACEMENT      BACK SURGERY      CARDIAC SURGERY      heart stent    CHOLECYSTECTOMY      CORONARY ANGIOGRAPHY N/A 2020    Procedure: ANGIOGRAM, CORONARY ARTERY;  Surgeon: Alexandro Terry MD;  Location: [facility] CATH LAB;  Service: Cardiology;  Laterality: N/A;    EYE SURGERY      R eye    INCISION AND DRAINAGE OF ABSCESS Right 2018    Procedure: INCISION AND DRAINAGE, ABSCESS;  Surgeon: Chetan Rothman MD;  Location: [facility] OR;  Service: General;  Laterality: Right;    LEFT HEART CATHETERIZATION N/A 2020    Procedure: Left heart cath;  Surgeon: Alexandro Terry MD;  Location: [facility] CATH LAB;  Service: Cardiology;  Laterality: N/A;       Family History     Problem Relation (Age of Onset)    Aneurysm Mother, Father (77)    Diabetes Brother    Heart disease Father, Paternal Grandmother    Kidney disease Brother        Tobacco Use    Smoking status: Former Smoker     Years: 10.00     Quit date: 2015     Years since quittin.9    Smokeless tobacco: Never Used   Substance and Sexual Activity    Alcohol use: No    Drug use: Yes     Frequency: 4.0  times per week     Types: Other-see comments     Comment: marijuana    Sexual activity: Yes     Review of Systems   Constitutional: Negative for chills, diaphoresis and fever.   Cardiovascular: Negative for leg swelling.   Gastrointestinal: Negative for nausea and vomiting.   Musculoskeletal: Positive for myalgias. Negative for back pain.   Skin: Positive for wound. Negative for color change, pallor and rash.   Neurological: Positive for weakness.   Psychiatric/Behavioral: Negative for agitation. The patient is not nervous/anxious.      Objective:     Vital Signs (Most Recent):  Temp: 97.9 °F (36.6 °C) (08/11/20 1045)  Pulse: (!) 124 (08/11/20 1500)  Resp: 18 (08/11/20 1500)  BP: (!) 108/59 (08/11/20 1500)  SpO2: 95 % (08/11/20 1500) Vital Signs (24h Range):  Temp:  [97.9 °F (36.6 °C)-98.2 °F (36.8 °C)] 97.9 °F (36.6 °C)  Pulse:  [] 124  Resp:  [13-29] 18  SpO2:  [92 %-100 %] 95 %  BP: ()/(47-75) 108/59     Weight: 103.5 kg (228 lb 2.8 oz)  Body mass index is 30.95 kg/m².    Foot Exam    Right Foot/Ankle     Inspection and Palpation  Ecchymosis: none  Tenderness: none   Swelling: none     Neurovascular  Dorsalis pedis: absent  Posterior tibial: absent  Saphenous nerve sensation: diminished  Tibial nerve sensation: diminished  Superficial peroneal nerve sensation: diminished  Deep peroneal nerve sensation: diminished  Sural nerve sensation: diminished      Left Foot/Ankle      Inspection and Palpation  Ecchymosis: none  Tenderness: none   Swelling: none     Neurovascular  Dorsalis pedis: absent  Posterior tibial: absent  Saphenous nerve sensation: diminished  Tibial nerve sensation: diminished  Superficial peroneal nerve sensation: diminished  Deep peroneal nerve sensation: diminished  Sural nerve sensation: diminished    Comments  LE are darkened and cool to touch b/l.       Laboratory:  A1C:   Recent Labs   Lab 07/30/20  0353   HGBA1C 4.6     Blood Cultures: No results for input(s): LABBLOO in the last  48 hours.  CBC:   Recent Labs   Lab 08/11/20  1544   WBC 10.66   RBC 2.38*   HGB 7.8*   HCT 25.3*      *   MCH 32.8*   MCHC 30.8*     CMP:   Recent Labs   Lab 08/11/20  1544   GLU 59*   CALCIUM 8.8   ALBUMIN 3.1*   PROT 7.4      K 4.4   CO2 21*   CL 91*   BUN 19   CREATININE 2.8*   ALKPHOS 111   *   *   BILITOT 0.8     ESR: No results for input(s): SEDRATE in the last 168 hours.    Diagnostic Results:  I have reviewed all pertinent imaging results/findings within the past 24 hours.    Clinical Findings:    Patient has 1.0x1.0x0.3 cm wound to the plantar right foot, submet 5. The wound probes to bone and is slightly tender. No acute signs of infection currently. The periwound skin is hyperkeratotic.               Left leg with lateral dry eschar noted, no SOI noted, appears stable.

## 2020-08-11 NOTE — NURSING
Critical lab values for K+ 7.2 and Glucose of 40 resulted by lab, FSBG resulted as 30 per glucometer, patient is completely asymptomatic, D50 given per IV push and given another apple juice to drink, Gt SOSA paged at 0510 to relay critical values, at 0525 after no response from NP Power CASTANEDA was contacted to report critical values, new orders received and carried out, night shift charge nurse contacted Nephrology to inform them and see if patient can have dialysis that was planned for today early, new orders carried out, will continue to monitor closely

## 2020-08-11 NOTE — NURSING
Patient sitting up for the first time post cath lab procedure drinking some apple juice, groin site still CDI with no swelling or discomfort noted

## 2020-08-11 NOTE — NURSING
Pt arrived to ICU from Cath Lab via stretcherTracy RN gave bedside report on recent procedure, RN states that patient does not have pulse on left lower extremity and that is ok, patient assisted to a position of comfort in the bed, placed on monitor, and oriented to ICU environment

## 2020-08-11 NOTE — NURSING
Pt resting in bed, sleeping, vss, wakes easily, states pain is a 3 at this time, andreas le cool and discolored (reported as pt's normal color) doppler pulses very low but audible. Will continue to monitor.

## 2020-08-11 NOTE — NURSING
Dr Young notified of pt pain 9/10, pt writhing in bed c/o of right foot, no pedal pulse with doppler, heparin gtt infusing. Pt receiving dialysis at bedside and bp 89/63, asymptomatic.  md bernal with pt receiving oxycodone. Pt given pain medication, will continue to monitor.

## 2020-08-11 NOTE — ASSESSMENT & PLAN NOTE
- Bone biopsy of the right 5th metatarsal previously obtained  - IV antibiotics per ID recs for osteomyelitis.  - Continue local wound care for now by nursing. Recommend z-flex boots to offload heels.  - Patient can weight bear to heel for short transfers in [employer] shoe  - Debridement can be done as outpatient  - Podiatry will follow

## 2020-08-11 NOTE — ASSESSMENT & PLAN NOTE
PVD (peripheral vascular disease)  Atherosclerosis of native artery of right lower extremity with ulceration of midfoot    CTA A/P with BLE runoff to better define surgical anatomy  Consult cardiology- for possible revascularization and potential limb salvage  Wound cx with stenotrophomonas  Consult ID- continue Ceftazidime x 6 weeks recs now, end date 9/11  Continue wound care  Consult cardiology- S/p revascularization on 8/10,  Consult podiatry

## 2020-08-12 PROBLEM — E87.1 HYPONATREMIA: Status: RESOLVED | Noted: 2018-09-04 | Resolved: 2020-01-01

## 2020-08-12 PROBLEM — E11.22 TYPE 2 DIABETES MELLITUS WITH CHRONIC KIDNEY DISEASE ON CHRONIC DIALYSIS, WITHOUT LONG-TERM CURRENT USE OF INSULIN: Status: RESOLVED | Noted: 2018-08-29 | Resolved: 2020-01-01

## 2020-08-12 PROBLEM — M72.2 PLANTAR FASCIITIS: Status: RESOLVED | Noted: 2019-01-01 | Resolved: 2020-01-01

## 2020-08-12 PROBLEM — R79.1 ELEVATED INR: Status: RESOLVED | Noted: 2020-01-01 | Resolved: 2020-01-01

## 2020-08-12 PROBLEM — Z95.5 HISTORY OF CORONARY ARTERY STENT PLACEMENT: Status: RESOLVED | Noted: 2018-08-26 | Resolved: 2020-01-01

## 2020-08-12 PROBLEM — Z71.89 GOALS OF CARE, COUNSELING/DISCUSSION: Status: ACTIVE | Noted: 2020-01-01

## 2020-08-12 PROBLEM — A41.9 SEPTIC SHOCK: Status: RESOLVED | Noted: 2018-08-29 | Resolved: 2020-01-01

## 2020-08-12 PROBLEM — L97.511 SKIN ULCER OF TOE OF RIGHT FOOT, LIMITED TO BREAKDOWN OF SKIN: Status: RESOLVED | Noted: 2018-12-03 | Resolved: 2020-01-01

## 2020-08-12 PROBLEM — I35.0 NONRHEUMATIC AORTIC (VALVE) STENOSIS: Status: RESOLVED | Noted: 2020-01-01 | Resolved: 2020-01-01

## 2020-08-12 PROBLEM — A04.72 C. DIFFICILE COLITIS: Status: RESOLVED | Noted: 2018-09-04 | Resolved: 2020-01-01

## 2020-08-12 PROBLEM — M79.2 NEURITIS: Status: RESOLVED | Noted: 2018-12-03 | Resolved: 2020-01-01

## 2020-08-12 PROBLEM — I50.84 END STAGE HEART FAILURE: Status: RESOLVED | Noted: 2020-01-01 | Resolved: 2020-01-01

## 2020-08-12 PROBLEM — M20.42 HAMMER TOES OF BOTH FEET: Status: RESOLVED | Noted: 2018-12-03 | Resolved: 2020-01-01

## 2020-08-12 PROBLEM — Z51.5 PALLIATIVE CARE ENCOUNTER: Status: ACTIVE | Noted: 2020-01-01

## 2020-08-12 PROBLEM — Z71.89 GOALS OF CARE, COUNSELING/DISCUSSION: Status: RESOLVED | Noted: 2020-01-01 | Resolved: 2020-01-01

## 2020-08-12 PROBLEM — N18.6 TYPE 2 DIABETES MELLITUS WITH CHRONIC KIDNEY DISEASE ON CHRONIC DIALYSIS, WITHOUT LONG-TERM CURRENT USE OF INSULIN: Status: RESOLVED | Noted: 2018-08-29 | Resolved: 2020-01-01

## 2020-08-12 PROBLEM — Z91.158 NONCOMPLIANCE WITH RENAL DIALYSIS: Status: RESOLVED | Noted: 2017-03-01 | Resolved: 2020-01-01

## 2020-08-12 PROBLEM — L97.519 RIGHT FOOT ULCER: Status: RESOLVED | Noted: 2018-08-31 | Resolved: 2020-01-01

## 2020-08-12 PROBLEM — K74.60 CIRRHOSIS OF LIVER WITH ASCITES: Status: RESOLVED | Noted: 2017-02-21 | Resolved: 2020-01-01

## 2020-08-12 PROBLEM — R55 SYNCOPE: Status: RESOLVED | Noted: 2017-04-03 | Resolved: 2020-01-01

## 2020-08-12 PROBLEM — I05.0 MITRAL STENOSIS: Status: RESOLVED | Noted: 2020-01-01 | Resolved: 2020-01-01

## 2020-08-12 PROBLEM — R65.21 SEPTIC SHOCK: Status: RESOLVED | Noted: 2018-08-29 | Resolved: 2020-01-01

## 2020-08-12 PROBLEM — E43 SEVERE PROTEIN-CALORIE MALNUTRITION: Status: RESOLVED | Noted: 2018-09-08 | Resolved: 2020-01-01

## 2020-08-12 PROBLEM — L02.611 ABSCESS OF FIFTH TOE OF RIGHT FOOT: Status: RESOLVED | Noted: 2018-12-03 | Resolved: 2020-01-01

## 2020-08-12 PROBLEM — I13.10 MALIGNANT HTN WITH HEART DISEASE, W/O CHF, WITH CHRONIC KIDNEY DISEASE: Status: RESOLVED | Noted: 2017-05-27 | Resolved: 2020-01-01

## 2020-08-12 PROBLEM — Z99.2 TYPE 2 DIABETES MELLITUS WITH CHRONIC KIDNEY DISEASE ON CHRONIC DIALYSIS, WITHOUT LONG-TERM CURRENT USE OF INSULIN: Status: RESOLVED | Noted: 2018-08-29 | Resolved: 2020-01-01

## 2020-08-12 PROBLEM — Z71.89 COUNSELING REGARDING ADVANCE CARE PLANNING AND GOALS OF CARE: Status: ACTIVE | Noted: 2020-01-01

## 2020-08-12 PROBLEM — E16.2 HYPOGLYCEMIA: Status: RESOLVED | Noted: 2018-08-29 | Resolved: 2020-01-01

## 2020-08-12 PROBLEM — B18.2 CHRONIC HEPATITIS C WITHOUT HEPATIC COMA: Chronic | Status: RESOLVED | Noted: 2017-02-21 | Resolved: 2020-01-01

## 2020-08-12 PROBLEM — M86.9 OSTEOMYELITIS OF RIGHT FOOT: Status: RESOLVED | Noted: 2020-01-01 | Resolved: 2020-01-01

## 2020-08-12 PROBLEM — I50.30 (HFPEF) HEART FAILURE WITH PRESERVED EJECTION FRACTION: Status: RESOLVED | Noted: 2020-01-01 | Resolved: 2020-01-01

## 2020-08-12 PROBLEM — E83.9 CHRONIC KIDNEY DISEASE-MINERAL AND BONE DISORDER: Status: RESOLVED | Noted: 2020-01-01 | Resolved: 2020-01-01

## 2020-08-12 PROBLEM — L02.415 CELLULITIS AND ABSCESS OF RIGHT LOWER EXTREMITY: Status: RESOLVED | Noted: 2018-09-05 | Resolved: 2020-01-01

## 2020-08-12 PROBLEM — R05.9 COUGH: Status: RESOLVED | Noted: 2017-04-04 | Resolved: 2020-01-01

## 2020-08-12 PROBLEM — Z51.5 COMFORT MEASURES ONLY STATUS: Status: RESOLVED | Noted: 2020-01-01 | Resolved: 2020-01-01

## 2020-08-12 PROBLEM — N18.6 BENIGN HYPERTENSION WITH ESRD (END-STAGE RENAL DISEASE): Status: RESOLVED | Noted: 2017-03-01 | Resolved: 2020-01-01

## 2020-08-12 PROBLEM — I48.91 ATRIAL FIBRILLATION: Status: RESOLVED | Noted: 2020-01-01 | Resolved: 2020-01-01

## 2020-08-12 PROBLEM — D69.6 THROMBOCYTOPENIA: Status: RESOLVED | Noted: 2018-08-27 | Resolved: 2020-01-01

## 2020-08-12 PROBLEM — I95.9 HYPOTENSION: Status: RESOLVED | Noted: 2020-01-01 | Resolved: 2020-01-01

## 2020-08-12 PROBLEM — I50.84 END STAGE HEART FAILURE: Status: ACTIVE | Noted: 2020-01-01

## 2020-08-12 PROBLEM — M89.9 CHRONIC KIDNEY DISEASE-MINERAL AND BONE DISORDER: Status: RESOLVED | Noted: 2020-01-01 | Resolved: 2020-01-01

## 2020-08-12 PROBLEM — L97.512 SKIN ULCER OF RIGHT FOOT WITH FAT LAYER EXPOSED: Status: RESOLVED | Noted: 2020-01-01 | Resolved: 2020-01-01

## 2020-08-12 PROBLEM — L08.9 DIABETIC FOOT INFECTION: Status: RESOLVED | Noted: 2020-01-01 | Resolved: 2020-01-01

## 2020-08-12 PROBLEM — G40.909 SEIZURE DISORDER: Status: RESOLVED | Noted: 2017-04-02 | Resolved: 2020-01-01

## 2020-08-12 PROBLEM — E11.628 DIABETIC FOOT INFECTION: Status: RESOLVED | Noted: 2020-01-01 | Resolved: 2020-01-01

## 2020-08-12 PROBLEM — R79.89 ELEVATED TROPONIN: Status: RESOLVED | Noted: 2018-08-26 | Resolved: 2020-01-01

## 2020-08-12 PROBLEM — I48.92 ATRIAL FLUTTER: Status: RESOLVED | Noted: 2020-01-01 | Resolved: 2020-01-01

## 2020-08-12 PROBLEM — E87.20 METABOLIC ACIDOSIS: Status: RESOLVED | Noted: 2017-02-21 | Resolved: 2020-01-01

## 2020-08-12 PROBLEM — L03.90 CELLULITIS: Status: RESOLVED | Noted: 2017-02-24 | Resolved: 2020-01-01

## 2020-08-12 PROBLEM — I48.0 PAROXYSMAL ATRIAL FIBRILLATION WITH RVR: Status: RESOLVED | Noted: 2018-08-27 | Resolved: 2020-01-01

## 2020-08-12 PROBLEM — Z71.89 COUNSELING REGARDING ADVANCE CARE PLANNING AND GOALS OF CARE: Status: RESOLVED | Noted: 2020-01-01 | Resolved: 2020-01-01

## 2020-08-12 PROBLEM — R07.9 CHEST PAIN: Status: RESOLVED | Noted: 2018-08-26 | Resolved: 2020-01-01

## 2020-08-12 PROBLEM — L03.115 CELLULITIS AND ABSCESS OF RIGHT LOWER EXTREMITY: Status: RESOLVED | Noted: 2018-09-05 | Resolved: 2020-01-01

## 2020-08-12 PROBLEM — M20.41 HAMMER TOES OF BOTH FEET: Status: RESOLVED | Noted: 2018-12-03 | Resolved: 2020-01-01

## 2020-08-12 PROBLEM — N18.9 CHRONIC KIDNEY DISEASE-MINERAL AND BONE DISORDER: Status: RESOLVED | Noted: 2020-01-01 | Resolved: 2020-01-01

## 2020-08-12 PROBLEM — I12.0 BENIGN HYPERTENSION WITH ESRD (END-STAGE RENAL DISEASE): Status: RESOLVED | Noted: 2017-03-01 | Resolved: 2020-01-01

## 2020-08-12 PROBLEM — R56.9 CONVULSIONS: Status: RESOLVED | Noted: 2017-04-02 | Resolved: 2020-01-01

## 2020-08-12 PROBLEM — R56.1 POST-TRAUMATIC SEIZURES: Status: RESOLVED | Noted: 2017-04-01 | Resolved: 2020-01-01

## 2020-08-12 PROBLEM — E87.5 HYPERKALEMIA: Status: RESOLVED | Noted: 2017-10-17 | Resolved: 2020-01-01

## 2020-08-12 PROBLEM — E78.2 MIXED HYPERLIPIDEMIA: Chronic | Status: RESOLVED | Noted: 2017-04-02 | Resolved: 2020-01-01

## 2020-08-12 PROBLEM — I96 GANGRENE OF TOE OF RIGHT FOOT: Status: RESOLVED | Noted: 2019-01-01 | Resolved: 2020-01-01

## 2020-08-12 PROBLEM — R18.8 CIRRHOSIS OF LIVER WITH ASCITES: Status: RESOLVED | Noted: 2017-02-21 | Resolved: 2020-01-01

## 2020-08-12 PROBLEM — Z51.5 PALLIATIVE CARE ENCOUNTER: Status: RESOLVED | Noted: 2020-01-01 | Resolved: 2020-01-01

## 2020-08-12 PROBLEM — F11.20 METHADONE DEPENDENCE: Chronic | Status: RESOLVED | Noted: 2017-02-21 | Resolved: 2020-01-01

## 2020-08-12 PROBLEM — Z51.5 COMFORT MEASURES ONLY STATUS: Status: ACTIVE | Noted: 2020-01-01

## 2020-08-12 NOTE — PT/OT/SLP PROGRESS
Physical Therapy      Patient Name:  João Aguirre   MRN:  [id number]     PT attempted Eval at 11:45 but patient not seen today secondary to Dialysis. Will follow-up in the am.    Miracle Escamilla, PT 8/8/2020      
Occupational Therapy      Patient Name:  João Aguirre   MRN:  [id number]    Patient not seen today secondary to Dialysis then to cath lab Will follow-up .    Arsenio Zuñiga OT  8/10/2020  
Occupational Therapy      Patient Name:  João Aguirre   MRN:  [id number]    Occupational therapy orders received and acknowledged. Patient not seen today secondary to away at Dialysis x2 attempts at 10:38 am and 11:47 am. Will follow-up on 8/9/20.     Vania Molina, OT  8/8/2020  
Occupational Therapy      Patient Name:  João Aguirre   MRN:  [id number]    Patient not seen secondary to Dialysis. Will follow-up .    Arsenio Zuñiga OT  8/11/2020  
Occupational Therapy      Patient Name:  João Aguirre   MRN:  [id number]    Patient not seen today secondary to Other (Comment)(Afib RVR). Will follow-up .    Arsenio Zuñiga OT  8/11/2020  
Occupational Therapy  Discharge note    Patient Name:  João Aguirre   MRN:  [id number]    Patient with significant medical decline.  Will discontinue OT at this time.  Please re-consult if pt has improvement in status.    Arsenio Zuñiga OT  8/12/2020  
Physical Therapy      Patient Name:  João Aguirre   MRN:  [id number]    Patient not seen today secondary to being on hold per nursing. Will follow-up as able.    Geni Peralta, PT    
Physical Therapy      Patient Name:  João Aguirre   MRN:  [id number]    Patient not seen today secondary to Dialysis.  Pt is also scheduled for cath lab procedure today as well.  Will follow up tomorrow. .    Rufina Squires, PT    
Physical Therapy Missed Visit      Patient Name:  João Aguirre   MRN:  [id number]    Patient not seen today secondary to charge nurse placing patient on hold 2/2 new arythmia issues. Will follow-up as able.    Geni Peralta, PT    
Well nourished

## 2020-08-12 NOTE — PROGRESS NOTES
Emergent intubation while attempting a tlc placement   - PRVC 14/500/50/5+ ; get ABG, CXR   - sedation : Fentanyl + Propofol preferred   - B/L soft wrist restrain       10:21 PM   Unsuccessful in getting ABG as per RT   VBG reviewed   - will increase TV to 500   - start bicarb drip at 100 ml/hr   - VBG after 2 hours       11:19 AM   K 6.8   - IV Calcium 1 g   - Albuterol nebs, kayexalate 45 g ID   - s/p bicarb push, now on drip   - FSBS low; so will make it normal, then start D10 + Insulin drip   - call Nephrology for possible urgent HD       2:27 AM   K 5.1, CRRT started now   - will DC D10 + insulin drip which has not been started yet       4:40 AM   Maxed out on levophed + phenylephrine   - will add Vasopressin drip   - 1 amp of bicarb push

## 2020-08-12 NOTE — CONSULTS
Consult received for intubation. Per RN pt transitioning to inpatient hospice. Please re-consult if needed

## 2020-08-12 NOTE — ANESTHESIA PROCEDURE NOTES
Intubation  Performed by: Caleb Crespo MD  Authorized by: Caleb Crespo MD     Intubation:     Induction:  Rapid sequence induction    Intubated:  Postinduction    Mask Ventilation:  Easy mask    Attempts:  2    Attempted By:  Staff anesthesiologist    Method of Intubation:  Direct    Blade:  Tiffanie 4    Laryngeal View Grade: Grade I - full view of chords      Attempted By (2nd Attempt):  Staff anesthesiologist    Method of Intubation (2nd Attempt):  Direct    Blade (2nd Attempt):  Tiffanie 4    Laryngeal View Grade (2nd Attempt): Grade I - full view of cords      Difficult Airway Encountered?: No      Complications:  Esophageal intubation - immediately recognized and removed    Airway Device:  Oral endotracheal tube    Airway Device Size:  7.5    Style/Cuff Inflation:  Cuffed    Placement Verified By:  Chest x-ray and Colorimetric ETCO2 device    Complicating Factors:  None    Findings Post-Intubation:  BS equal bilateral

## 2020-08-12 NOTE — PLAN OF CARE
João Aguirre was identified as being in soft 2 point restraints within 24 hours of expiration.  This patient has been entered in the Restraint Mortality Log on 08/12/2020 @ 17:51    Lela Eldridge RN

## 2020-08-12 NOTE — PLAN OF CARE
Received call from ICU Nurse with critical lab K+ 6.8 acidosis bicarb 9  Patient become hypotensive required 2 pressors, poor IV access, Discussed with  [facility] Surgery he will place Won and start CVVHD to correct hyperkalemia and acidosis.  Appreciate  Surgery assistance.      With any question please call [phone]  Goyo Mariscal MD    [facility]  MCKENNA Kendrick MD, FACP,   MIRANDA Cabrera MD,   MD PHILLIP Elise MD E. V. Harmon, NP    [address]  [place], [address]  [phone]

## 2020-08-12 NOTE — NURSING
MD Mariscal paged at this time. Notified of K 4.2. Updated on current patient status:  Lactic acid >12. SBP 60-70 maxed on Vaso, Levo, and Edwar. MD does not want CRRT stopped. Orders to stop Bicarb gtt and change to 3K Dialysate bags

## 2020-08-12 NOTE — NURSING
Called Dr. Young regarding patient's pulses, informed him that the left lower extremity has a weak dopplered dorsalis pedis and there is no pulse present in the right lower extremity, also right lower extremity is cool and purple, per Dr. Young heparin PRN high bolus to be given to a max of 5,000 units and gtt started at 12 units/kg/hour with a aPTT drawn in 6 hours, will continue to monitor pt closely

## 2020-08-12 NOTE — ASSESSMENT & PLAN NOTE
Chronic hepatitis C without hepatic coma  history of HCV and cirrhosis   Transition to comfort measures

## 2020-08-12 NOTE — PROGRESS NOTES
Progress Note  Nephrology      Consult Requested By: Cherrie Velasquez*  Reason for Consult: ESRD    SUBJECTIVE:     Over the night worsened, respiratory status required intubation, acidosis, hyperkalemia, hypotension on 3 pressors, trialysis line was placed by Surgery and CRRT was started, clotted x2 restarted, now on 3 pressors, code status DNR.        Review of Systems   Unable to perform ROS: Acuity of condition     Patient Active Problem List   Diagnosis    Infected ulcer of skin    Acute non-ST-elevation myocardial infarction    NSTEMI (non-ST elevated myocardial infarction)    Smoker    Poorly-controlled hypertension    Tachycardia    Coronary artery disease involving native coronary artery of native heart without angina pectoris    ESRD (T,Th,Sat) dialysis onset 2013    Anemia in chronic kidney disease    Ischemic cardiomyopathy, LVEF 25%-30%    Elevated brain natriuretic peptide (BNP) level    Blindness of right eye    Chronic back pain    Work place accident, 2003    Pneumonia, organism unspecified(486)    Intractable vomiting with nausea    Dehydration    Slurring of speech    H/O: CVA (cerebrovascular accident)    Hypoglycemia associated with type 2 diabetes mellitus    Hypophosphatemia    Possible Insulinoma    Hypervolemia    Hypertensive urgency    Metabolic acidosis    Cirrhosis of liver with ascites    Chronic hepatitis C without hepatic coma    Methadone dependence    Cellulitis    Benign hypertension with ESRD (end-stage renal disease)    Noncompliance with renal dialysis    Post-traumatic seizures    Mixed hyperlipidemia    Seizure disorder    Convulsions    Syncope    Cough    Malignant HTN with heart disease, w/o CHF, with chronic kidney disease    Hyperkalemia    Chest pain    Elevated troponin    History of coronary artery stent placement    Paroxysmal atrial fibrillation with RVR    Thrombocytopenia    Type 2 diabetes mellitus with chronic  kidney disease on chronic dialysis, without long-term current use of insulin    Septic shock    Hypoglycemia    Right foot ulcer    Hyponatremia    C. difficile colitis    Cellulitis and abscess of right lower extremity    Severe protein-calorie malnutrition    Hammer toes of both feet    Skin ulcer of toe of right foot, limited to breakdown of skin    Neuritis    Abscess of fifth toe of right foot    Plantar fasciitis    Gangrene of toe of right foot    Skin ulcer of right foot with fat layer exposed    Nonrheumatic aortic (valve) stenosis    Chronic kidney disease-mineral and bone disorder    Mitral stenosis    ESRD (end stage renal disease) on dialysis    Diabetic foot infection    PVD (peripheral vascular disease)    Atherosclerosis of native artery of right lower extremity with ulceration of midfoot    Acute osteomyelitis of metatarsal bone, right    (HFpEF) heart failure with preserved ejection fraction    Atrial flutter    Hypotension    Osteomyelitis of right foot    Elevated INR    Atrial fibrillation       OBJECTIVE:     Medications:   sodium chloride 0.9%   Intravenous Once    sodium chloride 0.9%   Intravenous Once    albuterol sulfate  5 mg Nebulization Once    amiodarone in dextrose  150 mg Intravenous Once    aspirin  81 mg Oral Daily    atorvastatin  40 mg Oral Daily    cadexomer iodine   Topical (Top) Every Mon, Wed, Fri    ceftAZIDime (FORTAZ) IVPB  1 g Intravenous Q24H    chlorhexidine  15 mL Mouth/Throat BID    diclofenac sodium  2 g Topical (Top) BID    epoetin harshad-epbx  10,000 Units Intravenous Every Tues, Thurs, Sat    guaiFENesin  600 mg Oral BID    insulin regular  10 Units Intravenous Once    levETIRAcetam  500 mg Oral BID    methadone  100 mg Oral Daily    midodrine  10 mg Oral TID WM    mupirocin   Nasal BID    pantoprazole  40 mg Oral Before breakfast    polyethylene glycol  17 g Oral Daily    sevelamer carbonate  1,600 mg Oral TID WM     ticagrelor  90 mg Oral BID      sodium chloride 0.9%      amiodarone in dextrose 5% Stopped (08/11/20 2145)    fentanyl Stopped (08/11/20 2230)    heparin (porcine) in D5W 12 Units/kg/hr (08/11/20 2332)    heparin (porcine)      midazolam 1.5 mg/hr (08/12/20 0633)    norepinephrine bitartrate-D5W 3 mcg/kg/min (08/12/20 1031)    phenylephrine 5 mcg/kg/min (08/12/20 0825)    propofoL Stopped (08/11/20 2230)    sodium bicarbonate drip Stopped (08/12/20 0615)    vasopressin (PITRESSIN) infusion 0.04 Units/min (08/12/20 0511)     Vitals:    08/12/20 1100   BP: (!) 91/54   Pulse: 89   Resp: (!) 30   Temp: (!) 95.7 °F (35.4 °C)     I/O last 3 completed shifts:  In: 3611.5 [P.O.:480; I.V.:2421.5; Other:400; NG/GT:160; IV Piggyback:150]  Out: 1400 [Other:1400]  Physical Exam  Constitutional:       General: He is in acute distress.      Appearance: He is well-developed. He is ill-appearing and toxic-appearing. He is not diaphoretic.   HENT:      Head: Normocephalic and atraumatic.      Mouth/Throat:      Comments: Et tube  Eyes:      General: No scleral icterus.  Neck:      Musculoskeletal: Neck supple.      Vascular: No JVD.   Cardiovascular:      Rate and Rhythm: Regular rhythm.      Heart sounds: No murmur. No friction rub.   Pulmonary:      Effort: Respiratory distress present.      Breath sounds: Rales present. No wheezing.   Abdominal:      General: Bowel sounds are normal. There is no distension.      Palpations: Abdomen is soft.      Tenderness: There is no abdominal tenderness.   Musculoskeletal:         General: Swelling present. Deformity: 1+ BLE    Skin:     General: Skin is warm and dry.      Findings: No erythema or rash.       Laboratory:  Recent Labs   Lab 08/11/20  2213  08/12/20  0115 08/12/20  0505 08/12/20  0510   WBC 14.48*  --  19.37* 21.74*  --    HGB 8.3*  --  8.3* 8.2*  --    HCT 28.2*   < > 27.7* 27.1* 30*     --  179 130*  --    MONO 3.0*  Test Not Performed  --  9.0  1.8* 11.0   --     < > = values in this interval not displayed.     Recent Labs   Lab 08/10/20  0428  08/11/20 2213 08/12/20  0115 08/12/20  0505   NA  --    < > 133* 137 138   K  --    < > 6.8* 5.3* 4.2   CL  --    < > 89* 88* 92*   CO2  --    < > 9* 14* 20*   BUN  --    < > 23* 24* 18   CREATININE  --    < > 3.5* 3.6* 2.6*   CALCIUM  --    < > 9.4 9.0 9.1   PHOS 10.9*  --   --  10.1*  10.1* 6.4*    < > = values in this interval not displayed.     Labs reviewed  Diagnostic Results:  X-Ray: Reviewed  US: Reviewed  Echo: Reviewed      ASSESSMENT/PLAN:   1. ESRD (N18.6 Z99.2) - usual HD on TTS, in [facility] [place],  this week due to volume   tx rcived TUE, WEd and Thursday, Sat due to hyperkalemia   K today is 7. HD today, discussed with Dr Young Cardiology for AVF evaluation - concern for recirculation - however patient decompensated now in critical condition.   -- Over the night worsened, respiratory status required intubation, acidosis, hyperkalemia, hypotension on 3 pressors, trialysis line was placed by Surgery and CRRT was started, clotted x2 restarted, now on 3 pressors, code status DNR.  -- Continue CRRT for now - however poor prognosis considering that his BP on low end with 3 pressors and clotting on machine.   -- Re dose Ceftazidime given 1g in AM   -- Stop Bicarbonate drip to avoid additional volume now on continuous CRRT   -- In addition to metabolic acidosis has respiratory acidosis - need vent adjustment     2. Chronic hypotension - now on 3 pressors   3. Anemia of chronic kidney disease treated with ERNESTO (N18.9 D63.1) - EPogen 10K with each HD  -- Transfuse <7     Recent Labs   Lab 08/11/20 2213 08/12/20  0115 08/12/20  0505 08/12/20  0510   WBC 14.48*  --  19.37* 21.74*  --    HGB 8.3*  --  8.3* 8.2*  --    HCT 28.2*   < > 27.7* 27.1* 30*     --  179 130*  --     < > = values in this interval not displayed.       Lab Results   Component Value Date    IRON 151 08/11/2020    TIBC 317 08/11/2020     FERRITIN 9,098 (H) 08/11/2020     4. MBD (E88.9 M90.80) -    Lab Results   Component Value Date    CALCIUM 9.1 08/12/2020    PHOS 6.4 (H) 08/12/2020     Recent Labs   Lab 08/12/20  0115 08/12/20  0505 08/12/20  0901   MG 2.5 2.1 2.2     No results found for: [id number]  Lab Results   Component Value Date    CO2 20 (L) 08/12/2020         5. Hemodialysis Access (Z99.2 V45.11) - LLA AVF - see above  -- Now with LF catheter    6. Nutrition/Hypoalbuminemia (E88.09) -   Lab Results   Component Value Date    LABPROT 31.9 (H) 08/12/2020    ALBUMIN 2.9 (L) 08/12/2020            Thank you for consult, will follow  With any question please call [phone]  Goyo Mariscal MD    [facility]  MCKENNA Kendrick MD, FACP,   MIRANDA Cabrera MD,   MD PHILLIP Elise MD E. V. Harmon, NP    [address]  [place], [address] [phone]

## 2020-08-12 NOTE — HPI
"Patient is a 55/y/o M with PMH of ESRD on HD (T/TH/S), anemia, uncontrolled HTN, hx CVA, PCI s/p 2 stents placement, right eye blindness, seizure, marijuana use, non-healing DM right foot ulcer DM II and gastroparesis, initially presented to [facility] from [place] for second opinion regarding AVR and CABG. Patient noted several weeks history of chest tightness with dizziness, palpitation, and increasing SOB. Denies fever, chills, nausea, vomiting. Seen by CTS and recommended patient not a CABG candidate due to multiple co morbidities- HD, Hep C, Osteomyelitis, PVD. s/p cath with stent placement on 7/31. Vascular surgery recommends BKA but patient declined, patient started on heparin drip and bridging with Warfarin (on Eliquis prior) with plan for balloon angioplasty on Tuesday    Advance Care Planning     [facility]  I engaged the family and healthcare power of   in a conversation about advance care planning and we specifically addressed what the goals of care would be moving forward, in light of the patient's change in clinical status, patient currently on 3 pressors, CRRT, bradycardia. Cardiology and primary team spoke with family.  We did specifically address the patient's likely prognosis, which is poor.  We explored the patient's values and preferences for future care.  The patient and healthcare power of   endorses that what is most important right now is to focus on symptom/pain control and and comfort care.    Accordingly, we have decided that the best plan to meet the patient's goals includes discontinuing treatment and transition to comfort care.   I did explain the role for hospice care at this stage of the patient's illness, including its ability to help the patient live with the best quality of life possible.  Patient is unstable for transfer at this time. Palliative medicine will consult [facility] for inpatient services.  Wife very emotional, "I can't just sit here and watch " "him die.His mother is coming to see him." Wife has signed paper work with [facility] for the transition to comfort care.  I love him. Family has not elected to be present during the comfort care.      I spent a total of 45  minutes engaging the patient in this advance care planning discussion.         Code Status  In light of the patients advanced and life limiting illness,I engaged the the patient and healthcare power of   in a conversation about the patient's preferences for care  at the very end of life. The patient wishes to have a natural, peaceful death.  Along those lines, the patient does not wish to have CPR or other invasive treatments performed when his heart and/or breathing stops. I communicated to the patient and healthcare power of   that a LaPOST form was completed to reflect comfort measures.  I spent a total of 60 minutes engaging the patient in this advance care planning discussion.        "

## 2020-08-12 NOTE — PLAN OF CARE
08/12/20 1311   Final Note   Assessment Type Final Discharge Note   Anticipated Discharge Disposition Hospice   The Sw met with Nat from [facility] and the pt will be transitioned to  Hospice with [facility].

## 2020-08-12 NOTE — RESPIRATORY THERAPY
MD at bedside aware that ABG contained too much fluid and was unable to be read by istat. Only able to stick from right radial, patient is overloaded with fluid.  MD obtaining VBG from central line in neck.

## 2020-08-12 NOTE — RESPIRATORY THERAPY
RT and MD were unable to obtain arterial blood for ABG, so a venous blood gas was ran.  Reported results to Dr. Young.  Also gave VBg results to EICU MD, and vent changes were mad accordingly.  Tial volume was increased to 500.

## 2020-08-12 NOTE — PROGRESS NOTES
Pharmacist Renal Dose Adjustment Note    João Aguirre is a 55 y.o. male being treated with the medication ceftazidime    Patient Data:    Vital Signs (Most Recent):  Temp: (!) 95.7 °F (35.4 °C) (08/12/20 1100)  Pulse: 87 (08/12/20 1200)  Resp: (!) 28 (08/12/20 1200)  BP: 92/72 (08/12/20 1200)  SpO2: (!) 93 % (08/12/20 1100)   Vital Signs (72h Range):  Temp:  [81.3 °F (27.4 °C)-100.4 °F (38 °C)]   Pulse:  []   Resp:  [13-48]   BP: ()/(29-90)   SpO2:  [59 %-100 %]      Recent Labs   Lab 08/11/20  2213 08/12/20  0115 08/12/20  0505   CREATININE 3.5* 3.6* 2.6*     Serum creatinine: 2.6 mg/dL (H) 08/12/20 0505  Estimated creatinine clearance: 40.4 mL/min (A)  -Patient on CRRT    Medication:Ceftazidime dose: 1g  frequency q 24 hours will be changed to medication:ceftazidime dose:2g frequency:q 12 hours    Pharmacist's Name: Kathy Serna  Pharmacist's Extension: [phone]

## 2020-08-12 NOTE — NURSING
Pt received dialysis at bedside, 1L off, pt went back into afib towards end of treatment, hr elevated at 120-140, was given metoprolol 5mg IVP and metoprolol 25mg PO.   1530- pt started having multiple couplets and runs of vtach, one run of vtach sustaining for 30 seconds, pt reports feeling sob.   1540- Immanuel and Mayuri NP at bedside, amiodarone 300mg bolus given and amio gtt started. Stat labs obtained, bg 45, given amp d50    1615- pt converted to sinus rhythm, ekg obtained and notified Mayuri, qtc 530 on ekg, notified and ok to give metoprolol IVP and continue with amio gtt, orders to give lopressor PO at 1800.   1745- IV infiltrated where heparin gtt infusing, heparin gtt stopped.   1800-bg 36, given amp d50, Dr Avalos called and updated, new orders received for central line placement. Called consult to Dr Hu, anesthesia unable to come to place line at this time due to emergency surgery and c sections. Will send anesthesia resident to look for IV.   1850- anesthesia resident at bedside unable to obtain iv access, obtained consent for central line placement. BG 85   Lopressor po dose held due to low bp.

## 2020-08-12 NOTE — PROGRESS NOTES
Pt moving extremities in bed, with restraints on, but when nail bed pressure applied no response from patient. L pupil fixed and dilated, R eye blind. Pt with intact gag reflex, breathing over the vent currently. Able to doppler pulse in R radial, able to feel/hear bruit/thrill in L AV fistula. Unable to doppler pulses in lower extremities. Legs mottled and cool to touch. R arm swollen and cool to touch. Pt hypothermic with esophageal probe 36.0 C. Continuing on CRRT with 3K bath per Nephrology, but unable to pull any fluid off. Pt is currently receiving 247.7ml/hr through IV fluids. Pt is maxed on vaso, warren, and levo at this time. On versed for comfort. Pt in heel boots. NSR on monitor, but unable to get a BP. No malinda present, cuff not registering pressure. Dr. Avalos and Dr. Young at bedside. Wife called and she is on the way to hospital now. Will continue to monitor.

## 2020-08-12 NOTE — RESPIRATORY THERAPY
Patient started to decline, so MD decided to intubate.  Patient was intubated with 7.5 ETT secured at 23cm @ lip with a commercial tube sesay.  Vent settings are charted.  Alarms are set and functioning.  First intubation attempt was with stylet, but was unsuccessful.  Patient was intubated with a bougie.  Patient seems to tolerated intubation well.  Waiting for MD to pull blood with ultrasound for an ABG.  Will continue to monitor.

## 2020-08-12 NOTE — ASSESSMENT & PLAN NOTE
History of coronary artery stent placement  ECHO shows mild aortic valve stenosis.   currently high risk for cardiac surgery secondary to cirrhosis (plt 149) and osteomyelitis   [id number] of 6   Was on Eliquis now switched to Warfarin- d/c  INR goal 2.5-3.5  Continue Brilinta   Transition to comfort measures

## 2020-08-12 NOTE — PROGRESS NOTES
Pt now inpatient hospice. Will withdraw CRRT, pressors, and terminally extubate. Will keep family updated.

## 2020-08-12 NOTE — PROGRESS NOTES
Dr. Avalos and Dr. Young at bedside. Spoke with wife and son extensively about pt's hospital course and his expected course from here. Once MD left, wife expressed to me that in the event his heart should stop, she does not want compressions or anymore interventions done to him. I communicated this with Dr. Avalos. CRRT clotted in the midst of this conversation, restarted immediately. RT at bedside attempting ABG. Labs drawn per order. Pt becoming less responsive, no longer moving about in bed.

## 2020-08-12 NOTE — NURSING
MD Young called Soledad Aguirre (Spouse), who has now been updated about ALL events in the last 12 hours including absent pulses to RLE from popliteal distally, current status of being intubated, on multiple pressors, and need for emergent CRRT. Wife notified of how critical patient is currently.     I personally called Soledad (Spouse) after conversation with Dr. Young and reiterated critical status of patient. Wife states she lives in [place] and would be coming with Son at 0800 tomorrow morning to hospital. Will call and update if current status changes

## 2020-08-12 NOTE — EICU
Rounding (Video Assessment):  No    Intervention Initiated From:  Bedside    Raphael Communicated with Bedside Nurse regarding:  BP    Nurse Notified:  Yes    Doctor Notified:  Yes    Comments: bedside nurse called to get another pressor ordered for b/p pt maxed on Edwar and  On 2 mcg/kg/min of Levo. Informed Dr. Cody.

## 2020-08-12 NOTE — MEDICAL/APP STUDENT
"Consult Note  Palliative Care      Consult Requested By: Cherrie Velasquez*  Reason for Consult: Goals of care/ End of life    SUBJECTIVE:     History of Present Illness:  Disease Process: Advanced Respiratory Disease, Advanced Cardiac Disease, Failure to Thrive    João Aguirre is a 55 y.o. male PMH of cirrhosis, ESRD on HD (T/TH/S), anemia, uncontrolled HTN, hx CVA, PCI s/p 2 stents placement, right eye blindness, seizure, marijuana use, non-healing DM right foot ulcer DM II and gastroparesis, initially presented to [facility] from [place] for second opinion regarding AVR and CABG. Patient noted several weeks history of chest tightness with dizziness, palpitation, and increasing SOB. Denies fever, chills, nausea, vomiting. Seen by CTS and recommended patient not a CABG candidate due to multiple co morbidities- HD, Hep C, Osteomyelitis, PVD. s/p cath with stent placement on 7/31. Vascular surgery recommends BKA but patient declined, patient started on heparin drip and bridging with Warfarin (on Eliquis prior) with plan for balloon angioplasty.    Hospital Course:   Developed worsening RLE flow. Hypotensive, bradycardic, unresponisive and hyperkalemic. Underwent emergent intubation and started on pressors 8/11. LIJ TLC placed. RIJ occluded as well as right femoral vein. LUE dialysis AVF not functioning. Dialysis line needed for HD/CRRT.     Interval history:  Patient intubated and sedated, acidotic, on pressors. Potassium down to 4.5 this morning. Continuing CRRT for lactic acidosis. Waiting on repeat lactate and ABG.    D/w pt's wife and son at bedside with Dr. Bravo in detail about his condition and events leading to his current state. Mentioned lactic acidosis above 12 is not compatible with life. Wife voices understanding,however very tearfully she stated that she "usually sees him dead and he has always come back from it, hard to believe he won't this time". Spoke with family about " current plan; continuing CRRT and working to resolve metabolic acidosis, however informed them that preparation for end of life is necessary at this time. Goals of care were discussed and emotional support was provided. Family is amenable to transitioning patient to comfort care if he decompensates further.     MDs d/w pt's mother on phone per wife's request; mother became very emotional on the phone. Was difficult to understand due to her emotional state. MDs explained details of patients current condition and next steps. Emotional support was provided. Requested that wife and grandson call her back as soon as they are able.          Past Medical History:   Diagnosis Date    Acute osteomyelitis of metatarsal bone, right     PAD right    Anticoagulant long-term use     Arthritis     Asthma     Back pain     on methadone    C. difficile colitis 09/2018    Cirrhosis of liver without ascites 2016    by liver US. +HCV    Coronary artery disease 2013    stent.  h/o STEMI and NSTEMI    Diabetes mellitus     resolved, multiple admissions for hypoglycemia requiring ICU possibley due to liver/ESRD    Encounter for blood transfusion     ESRD on hemodialysis 2013    anuric    Eye abnormality right eye    injured as a child    Gastritis     Gastroparesis     HCV (hepatitis C virus)     ? h/o tattoo exposure    Hypertension     Mitral stenosis 07/24/2020    Moderate to severe mitral stenosis    PAD (peripheral artery disease)     RLE s/p R CFA endarterectomy    Pneumonia 2013    Spend 6weeks in hospital at [place]    Stroke     Tuberculosis     treated     Past Surgical History:   Procedure Laterality Date    ANGIOGRAPHY OF LOWER EXTREMITY Right 7/28/2020    Procedure: Angiogram Extremity Unilateral;  Surgeon: MINO Vasquez II, MD;  Location: [facility] OR [address] [place] [address];  Service: Vascular;  Laterality: Right;  Left groin and rIght pedal artery access  15.5 min  247.25 mGy  68.5454 Gycm2  33ml contrast    AV  FISTULA PLACEMENT      BACK SURGERY      CARDIAC SURGERY  2013    heart stent    CHOLECYSTECTOMY      CORONARY ANGIOGRAPHY N/A 2020    Procedure: ANGIOGRAM, CORONARY ARTERY;  Surgeon: Alexandro Terry MD;  Location: [facility] CATH LAB;  Service: Cardiology;  Laterality: N/A;    EYE SURGERY      R eye    INCISION AND DRAINAGE OF ABSCESS Right 2018    Procedure: INCISION AND DRAINAGE, ABSCESS;  Surgeon: Chetan Rothman MD;  Location: [facility] OR;  Service: General;  Laterality: Right;    LEFT HEART CATHETERIZATION N/A 2020    Procedure: Left heart cath;  Surgeon: Alexandro Terry MD;  Location: [facility] CATH LAB;  Service: Cardiology;  Laterality: N/A;     Family History   Problem Relation Age of Onset    Aneurysm Mother         brain    Aneurysm Father 77        stomach     Heart disease Father     Kidney disease Brother     Diabetes Brother         hyperglycemia and HAYDER causseddeath    Heart disease Paternal Grandmother      Social History     Tobacco Use    Smoking status: Former Smoker     Years: 10.00     Quit date: 2015     Years since quittin.9    Smokeless tobacco: Never Used   Substance Use Topics    Alcohol use: No    Drug use: Yes     Frequency: 4.0 times per week     Types: Other-see comments     Comment: marijuana       Mental Status: Non-responsive: sedated/ intubated    ECOG Performance Status Grade: 4 - Completely disabled: sedated/ intubated    Review of Systems:  Review of systems not obtained due to patient factors pateint is sedated and intubated.    OBJECTIVE:     Pain Assessment: unable to asses, pt sedated    Decision-Making Capacity: Family answered questions, Patient unable to communicate due to disease severity/cognitive impairment    Advanced Directives:  Living Will: No  Do Not Resuscitate Status: No  Medical Power of : No  Registered Organ Donor: No    Living Arrangements: Lives with spouse, Lives with family, Lives in home, Lives >50 miles from  [place]    Psychosocial, Spiritual, Cultural:  Patient's most important priorities:  N/A- sedated/ intubated    Patient's biggest concerns/fears:  N/A- sedated/ intubated    Previous death/end of life care history:  N/A- sedated/ intubated    Patient's goals/hopes:  N/A- sedated/ intubated    ASSESSMENT/PLAN:   Will continue current treatment. Monitor blood levels very closely.  Discussed with [facility] about preparing for transitioning patient to comfort care but will hold off for now.     Recommendations:  Medical: continue CRRT for metabolic acidosis  Symptom Management: continue ventilator and pain control regimen   Prognosis: very poor    Time Spent: 60    Delfina Lopez MS4

## 2020-08-12 NOTE — PLAN OF CARE
Patient is currently wearing 1L nasal cannula with documented SpO2.  Prn treatment not required at this time.  Will continue to monitor, and wean as tolerated.

## 2020-08-12 NOTE — NURSING
Upon assessment patient's left leg is increasingly more mottled and cool, unable to find dorsalis pedis pulse with doppler, Dr. Young notified of findings, no new orders received at this time

## 2020-08-12 NOTE — RESPIRATORY THERAPY
VBG obtained.  Results reported to [employer] CASTANEDA.  MD stated to maintain current vent settings.

## 2020-08-12 NOTE — CONSULTS
[facility]  INPATIENT H&P    REASON FOR CONSULT/ADMISSION: dialysis access    HPI: João Aguirre is a 55 y.o. male PMH of ESRD on HD (T/TH/S), anemia, uncontrolled HTN, hx CVA, PCI s/p 2 stents placement, right eye blindness, seizure, marijuana use, non-healing DM right foot ulcer DM II and gastroparesis, initially presented to [facility] from [place] for second opinion regarding AVR and CABG. Patient noted several weeks history of chest tightness with dizziness, palpitation, and increasing SOB. Denies fever, chills, nausea, vomiting. Seen by CTS and recommended patient not a CABG candidate due to multiple co morbidities- HD, Hep C, Osteomyelitis, PVD. s/p cath with stent placement on 7/31. Vascular surgery recommends BKA but patient declined, patient started on heparin drip and bridging with Warfarin (on Eliquis prior) with plan for balloon angioplasty.    Developed worsening RLE flow. Hypotensive, bradycardic, unresponisive and hyperkalemic. Underwent emergent intubation and started on pressors. LIJ TLC placed. RIJ occluded as well as right femoral vein. LUE dialysis AVF not functioning. Dialysis line needed for HD/CRRT.     Patient intubated and sedated, acidotic, on pressors.       ROS:   Review of Systems   Unable to perform ROS: Intubated       PROBLEM LIST:  Patient Active Problem List   Diagnosis    Infected ulcer of skin    Acute non-ST-elevation myocardial infarction    NSTEMI (non-ST elevated myocardial infarction)    Smoker    Poorly-controlled hypertension    Tachycardia    Coronary artery disease involving native coronary artery of native heart without angina pectoris    ESRD (T,Th,Sat) dialysis onset 2013    Anemia in chronic kidney disease    Ischemic cardiomyopathy, LVEF 25%-30%    Elevated brain natriuretic peptide (BNP) level    Blindness of right eye    Chronic back pain    Work place accident, 2003    Pneumonia, organism unspecified(486)     Intractable vomiting with nausea    Dehydration    Slurring of speech    H/O: CVA (cerebrovascular accident)    Hypoglycemia associated with type 2 diabetes mellitus    Hypophosphatemia    Possible Insulinoma    Hypervolemia    Hypertensive urgency    Metabolic acidosis    Cirrhosis of liver with ascites    Chronic hepatitis C without hepatic coma    Methadone dependence    Cellulitis    Benign hypertension with ESRD (end-stage renal disease)    Noncompliance with renal dialysis    Post-traumatic seizures    Mixed hyperlipidemia    Seizure disorder    Convulsions    Syncope    Cough    Malignant HTN with heart disease, w/o CHF, with chronic kidney disease    Hyperkalemia    Chest pain    Elevated troponin    History of coronary artery stent placement    Paroxysmal atrial fibrillation with RVR    Thrombocytopenia    Type 2 diabetes mellitus with chronic kidney disease on chronic dialysis, without long-term current use of insulin    Septic shock    Hypoglycemia    Right foot ulcer    Hyponatremia    C. difficile colitis    Cellulitis and abscess of right lower extremity    Severe protein-calorie malnutrition    Hammer toes of both feet    Skin ulcer of toe of right foot, limited to breakdown of skin    Neuritis    Abscess of fifth toe of right foot    Plantar fasciitis    Gangrene of toe of right foot    Skin ulcer of right foot with fat layer exposed    Nonrheumatic aortic (valve) stenosis    Chronic kidney disease-mineral and bone disorder    Mitral stenosis    ESRD (end stage renal disease) on dialysis    Diabetic foot infection    PVD (peripheral vascular disease)    Atherosclerosis of native artery of right lower extremity with ulceration of midfoot    Acute osteomyelitis of metatarsal bone, right    (HFpEF) heart failure with preserved ejection fraction    Atrial flutter    Hypotension    Osteomyelitis of right foot    Elevated INR    Atrial fibrillation          HISTORY  Past Medical History:   Diagnosis Date    Acute osteomyelitis of metatarsal bone, right     PAD right    Anticoagulant long-term use     Arthritis     Asthma     Back pain     on methadone    C. difficile colitis 09/2018    Cirrhosis of liver without ascites 2016    by liver US. +HCV    Coronary artery disease 2013    stent.  h/o STEMI and NSTEMI    Diabetes mellitus     resolved, multiple admissions for hypoglycemia requiring ICU possibley due to liver/ESRD    Encounter for blood transfusion     ESRD on hemodialysis 2013    anuric    Eye abnormality right eye    injured as a child    Gastritis     Gastroparesis     HCV (hepatitis C virus)     ? h/o tattoo exposure    Hypertension     Mitral stenosis 07/24/2020    Moderate to severe mitral stenosis    PAD (peripheral artery disease)     RLE s/p R CFA endarterectomy    Pneumonia 2013    Spend 6weeks in hospital at [place]    Stroke     Tuberculosis     treated       Past Surgical History:   Procedure Laterality Date    ANGIOGRAPHY OF LOWER EXTREMITY Right 7/28/2020    Procedure: Angiogram Extremity Unilateral;  Surgeon: MINO Vasquez II, MD;  Location: [address];  Service: Vascular;  Laterality: Right;  Left groin and rIght pedal artery access  15.5 min  247.25 mGy  68.5454 Gycm2  33ml contrast    AV FISTULA PLACEMENT      BACK SURGERY      CARDIAC SURGERY  2013    heart stent    CHOLECYSTECTOMY      CORONARY ANGIOGRAPHY N/A 7/30/2020    Procedure: ANGIOGRAM, CORONARY ARTERY;  Surgeon: Alexandro Terry MD;  Location: [facility] CATH LAB;  Service: Cardiology;  Laterality: N/A;    EYE SURGERY      R eye    INCISION AND DRAINAGE OF ABSCESS Right 9/6/2018    Procedure: INCISION AND DRAINAGE, ABSCESS;  Surgeon: Chetan Rothman MD;  Location: [facility];  Service: General;  Laterality: Right;    LEFT HEART CATHETERIZATION N/A 7/30/2020    Procedure: Left heart cath;  Surgeon: Alexandro Terry MD;  Location: [facility] CATH  LAB;  Service: Cardiology;  Laterality: N/A;       Social History     Tobacco Use    Smoking status: Former Smoker     Years: 10.00     Quit date: 2015     Years since quittin.9    Smokeless tobacco: Never Used   Substance Use Topics    Alcohol use: No    Drug use: Yes     Frequency: 4.0 times per week     Types: Other-see comments     Comment: marijuana       Family History   Problem Relation Age of Onset    Aneurysm Mother         brain    Aneurysm Father 77        stomach     Heart disease Father     Kidney disease Brother     Diabetes Brother         hyperglycemia and HAYDER causseddeath    Heart disease Paternal Grandmother          MEDS:  No current facility-administered medications on file prior to encounter.      Current Outpatient Medications on File Prior to Encounter   Medication Sig Dispense Refill    albuterol (PROAIR HFA) 90 mcg/actuation inhaler INHALE TWO PUFFS EVERY 4 TO 6 HOURS AS NEEDED      albuterol-ipratropium (DUO-NEB) 2.5 mg-0.5 mg/3 mL nebulizer solution Take 3 mLs by nebulization every 6 (six) hours as needed for Wheezing. Rescue 1 Box 0    amLODIPine (NORVASC) 10 MG tablet Take 1 tablet (10 mg total) by mouth once daily. 30 tablet 11    aspirin 81 MG Chew Take 1 tablet (81 mg total) by mouth once daily.  0    atorvastatin (LIPITOR) 40 MG tablet Take 1 tablet (40 mg total) by mouth once daily.      bisacodyL (DULCOLAX) 10 mg Supp Place 1 suppository (10 mg total) rectally daily as needed (Until bowel movement if patient has no bowel movement for 2 days).  0    calcium carbonate (TUMS) 200 mg calcium (500 mg) chewable tablet Take 1 tablet (500 mg total) by mouth 2 (two) times daily as needed.      gabapentin (NEURONTIN) 100 MG capsule Take 1 capsule (100 mg total) by mouth 2 (two) times daily. 60 capsule 11    levETIRAcetam (KEPPRA) 500 MG Tab Take 1 tablet (500 mg total) by mouth 2 (two) times daily. 60 tablet 11    methadone (DOLOPHINE) 10 MG tablet Take 10 tablets  (100 mg total) by mouth once daily.  0    ondansetron (ZOFRAN-ODT) 8 MG TbDL Take 1 tablet (8 mg total) by mouth every 8 (eight) hours as needed. 6 tablet     pantoprazole (PROTONIX) 40 MG tablet Take 1 tablet (40 mg total) by mouth before breakfast. 30 tablet 11    polyethylene glycol (GLYCOLAX) 17 gram PwPk Take 17 g by mouth once daily.  0    sevelamer carbonate (RENVELA) 800 mg Tab Take 2 tablets (1,600 mg total) by mouth 3 (three) times daily with meals. 180 tablet 11    ticagrelor (BRILINTA) 90 mg tablet Take 1 tablet (90 mg total) by mouth 2 (two) times a day. 60 tablet 11    warfarin (COUMADIN) 5 MG tablet Take 1 tablet (5 mg total) by mouth Daily. 30 tablet 11    acetaminophen (TYLENOL) 325 MG tablet Take 2 tablets (650 mg total) by mouth every 4 (four) hours as needed.  0    blood-glucose meter (PHARMACIST CHOICE GLUCOSE SYS) Misc 1 Device.      cadexomer iodine (IODOSORB) 0.9 % gel Apply topically every Mon, Wed, Fri.  0    oxyCODONE (ROXICODONE) 10 mg Tab immediate release tablet Take 1 tablet (10 mg total) by mouth every 4 (four) hours as needed.  0    oxyCODONE (ROXICODONE) 5 MG immediate release tablet Take 1 tablet (5 mg total) by mouth every 4 (four) hours as needed.  0    ticagrelor (BRILINTA) 90 mg tablet Take 1 tablet (90 mg total) by mouth 2 (two) times a day. 60 tablet 11       ALLERGIES:  Review of patient's allergies indicates:   Allergen Reactions    Antibiotic hc      Counter acts with methodone.            VITALS:  Temp:  [97.8 °F (36.6 °C)-100 °F (37.8 °C)] 100 °F (37.8 °C)  Pulse:  [] 46  Resp:  [13-40] 40  SpO2:  [90 %-100 %] 99 %  BP: ()/(44-87) 95/64    I/O last 3 completed shifts:  In: 1410 [P.O.:480; I.V.:80; Other:800; IV Piggyback:50]  Out: 3848 [Other:3848]      PHYSICAL EXAM:  Physical Exam  Vitals signs reviewed.   Constitutional:       Comments: Intubated and sedated   HENT:      Head: Normocephalic and atraumatic.   Neck:      Musculoskeletal: Normal  range of motion. No neck rigidity.      Comments: LIJ TLC in place  Cardiovascular:      Rate and Rhythm: Regular rhythm. Bradycardia present.      Comments: dopplerable left DP, no pulses in RLE  Pulmonary:      Comments: Intubated and ventilated  Abdominal:      General: There is no distension.      Palpations: Abdomen is soft.      Tenderness: There is no abdominal tenderness.   Musculoskeletal:         General: Swelling (BUE edema) present.      Right lower leg: No edema.      Left lower leg: No edema.   Skin:     Coloration: Skin is pale (right foot).           LABS:  Lab Results   Component Value Date    WBC 14.48 (H) 08/11/2020    RBC 2.50 (L) 08/11/2020    HGB 8.3 (L) 08/11/2020    HCT 30 (L) 08/12/2020     08/11/2020     Lab Results   Component Value Date    GLU 32 (LL) 08/11/2020     (L) 08/11/2020    K 6.8 (HH) 08/11/2020    CL 89 (L) 08/11/2020    CO2 9 (LL) 08/11/2020    BUN 23 (H) 08/11/2020    CREATININE 3.5 (H) 08/11/2020    CALCIUM 9.4 08/11/2020     Lab Results   Component Value Date     (H) 08/11/2020    AST 1,096 (H) 08/11/2020     (H) 08/30/2018    ALKPHOS 123 08/11/2020    BILITOT 1.0 08/11/2020     Lab Results   Component Value Date    MG 2.0 08/11/2020    MG 2.0 08/11/2020    PHOS 10.9 (HH) 08/10/2020       STUDIES:  CXR images and reports were personally reviewed.    FINDINGS:  Endotracheal tube terminates 5 cm proximal to the ed.  Orogastric 2 overlies the stomach.  Left internal jugular central venous catheter terminates in the high SVC.  Multiple lines and fibular pads overlie the chest.  The lungs are well expanded.  There are predominantly perihilar and bibasilar interstitial opacities with prominence of the pulmonary vasculature.  There are Kerley B lines.  The cardiac silhouette is enlarged.  The pleural spaces are clear.  Visualized osseous structures demonstrate degenerative changes.  Right upper quadrant surgical clips are  seen.     Impression:     Bilateral interstitial opacities concerning for mild pulmonary edema.      ASSESSMENT & PLAN:  55 y.o. male with ESRD, acute respiratory failure, hyperkalemia, acidosis, CAD/Afib/HF, RLE ischemia w/ osteo  - emergent bedside Trialysis to be placed  - RIJ and R Femoral are thrombosed according to Cardiology  - Currently with LIJ TLC which is being used to infuse multiple medications including pressors  - Left femoral therefore only open access site  - Verbal consent obtained form patient's spouse, Soledad Aguirre  - Line placed and ok to use

## 2020-08-12 NOTE — ASSESSMENT & PLAN NOTE
No amio given his liver disease  Started on heparin drip by cards  Transition to comfort measures

## 2020-08-12 NOTE — EICU
Rounding (Video Assessment):  No    Intervention Initiated From:  Bedside    Raphael Communicated with Bedside Nurse regarding:  Other    Nurse Notified:  No    Doctor Notified:  Yes    Comments: Bedside nurse elert for md to read the cxr and ngt to see if okay to use.

## 2020-08-12 NOTE — PLAN OF CARE
Advance Care Planning     [facility]  I engaged the family in a conversation about advance care planning and we specifically addressed what the goals of care would be moving forward, in light of the patient's change in clinical status, specifically his declining respiratory status.  We did specifically address the patient's likely prognosis, which is poor.  We explored the patient's values and preferences for future care.  The family- wife- Soledad and son endorses that what is most important right now is to focus on symptom/pain control   Family friend- Lynnn present with family at patient bedside    Accordingly, we have decided that the best plan to meet the patient's goals includes enrolling in hospice care. We will continue present medical treatment patients mother Andreia arrives before we finally transition to comfort measures.    I did explain the role for hospice care at this stage of the patient's illness, including its ability to help the patient live with the best quality of life possible.  We will be making a hospice referral.    I spent a total of 25 minutes engaging the patient in this advance care planning discussion.         Code Status  In light of the patients advanced and life limiting illness,I engaged the the family in a conversation about the patient's preferences for care  at the very end of life. The patient wishes to have a natural, peaceful death.  Along those lines, the family does not wish to have CPR or other invasive treatments performed when his heart and/or breathing stops. I communicated to the family that a DNR order would be placed in his medical record to reflect this preference.  I spent a total of 20 minutes engaging the patient in this advance care planning discussion.

## 2020-08-12 NOTE — EICU
Rounding (Video Assessment):  No    Intervention Initiated From:  COR / EICU    Raphael Communicated with Bedside Nurse regarding:  BP    Nurse Notified:  Yes    Doctor Notified:  No    Comments: reviewing vs and say that b/p is 51/31. I camered into room. Nurse reports she has increased Levo drip. She feels b/p may not be accurate. Will continue to monitor.

## 2020-08-12 NOTE — PROGRESS NOTES
Pt temperature continuing to decrease. Per MD will not try to re-warm. Wife ok with continuing treatment, but does not wish to add anything more. Will continue to monitor.

## 2020-08-12 NOTE — EICU
Rounding (Video Assessment):  Yes    Intervention Initiated From:  COR / EICU    Raphael Communicated with Bedside Nurse regarding:  Medication    Nurse Notified:  Yes    Doctor Notified:  Yes    Comments: bedside nurse asked to inform md that accu check 105. Nurse wanted to know if md wanted to d/c D10w and Insulin drip. Informed Dr. Cody.

## 2020-08-12 NOTE — ACP (ADVANCE CARE PLANNING)
.Advance Care Planning     [facility]  I engaged the family in a conversation about advance care planning and we specifically addressed what the goals of care would be moving forward, in light of the patient's change in clinical status, specifically his current condition and disease trajectory.  We did specifically address the patient's likely prognosis, which is poor. Explained that a lactic acidosis above 12 is incompatible with life. We explored the patient's values and preferences for future care.  The family endorses that what is most important right now is to focus on continuing current treatment unless pt decompensates further    Accordingly, we have decided that the best plan to meet the patient's goals includes continuing with treatment    I did explain the role for hospice care at this stage of the patient's illness, including its ability to help the patient live with the best quality of life possible.  We will be making a hospice referral.    I spent a total of 60 minutes engaging the patient in this advance care planning discussion.        Delfina Lopez MS4

## 2020-08-12 NOTE — SUBJECTIVE & OBJECTIVE
Interval History: reported decline overnight , bradycardia, VTach and patient was intubated, started and maxed out on three pressors- warren, levoepi and vaso. On heparin , lactic acid persistently > 12, hyperkalemia that improved on CRRT. Patient has     Family updated, GOC and hospice education provided. See note for details.   Plan is to transition to comfort measures    Review of Systems   Unable to perform ROS: Intubated     Objective:     Vital Signs (Most Recent):  Temp: 96.6 °F (35.9 °C) (08/12/20 0815)  Pulse: 95 (08/12/20 0815)  Resp: (!) 28 (08/12/20 0815)  BP: (!) 59/29 (08/12/20 0815)  SpO2: (!) 93 % (08/12/20 0815) Vital Signs (24h Range):  Temp:  [81.3 °F (27.4 °C)-100.4 °F (38 °C)] 96.6 °F (35.9 °C)  Pulse:  [] 95  Resp:  [13-48] 28  SpO2:  [59 %-100 %] 93 %  BP: ()/(29-90) 59/29     Weight: 103.5 kg (228 lb 2.8 oz)  Body mass index is 30.95 kg/m².    Intake/Output Summary (Last 24 hours) at 8/12/2020 0854  Last data filed at 8/12/2020 0800  Gross per 24 hour   Intake 3001.46 ml   Output 1400 ml   Net 1601.46 ml      Physical Exam  Vitals signs and nursing note reviewed.   Constitutional:       Appearance: Normal appearance.   HENT:      Head: Normocephalic and atraumatic.   Eyes:      Extraocular Movements: Extraocular movements intact.   Neck:      Musculoskeletal: Normal range of motion.   Cardiovascular:      Rate and Rhythm: Normal rate.   Pulmonary:      Effort: Pulmonary effort is normal. No respiratory distress.   Neurological:      Mental Status: He is alert and oriented to person, place, and time.   Psychiatric:         Behavior: Behavior normal.         Thought Content: Thought content normal.         Significant Labs:   CBC:   Recent Labs   Lab 08/11/20  2213  08/12/20  0115 08/12/20  0505 08/12/20  0510   WBC 14.48*  --  19.37* 21.74*  --    HGB 8.3*  --  8.3* 8.2*  --    HCT 28.2*   < > 27.7* 27.1* 30*     --  179 130*  --     < > = values in this interval not displayed.      CMP:   Recent Labs   Lab 08/11/20  1544 08/11/20  2213 08/12/20  0115 08/12/20  0505 08/12/20  1337    133* 137 138 133*   K 4.4 6.8* 5.3* 4.2 4.1   CL 91* 89* 88* 92* 97   CO2 21* 9* 14* 20* 14*   GLU 59* 32* 125* 51* 160*   BUN 19 23* 24* 18 13   CREATININE 2.8* 3.5* 3.6* 2.6* 1.9*   CALCIUM 8.8 9.4 9.0 9.1 8.5*   PROT 7.4 7.5  --  7.0  --    ALBUMIN 3.1* 3.0* 2.7* 2.9* 2.4*   BILITOT 0.8 1.0  --  1.1*  --    ALKPHOS 111 123  --  154*  --    * 1,096*  --  8,955*  --    * 394*  --  2,606*  --    ANIONGAP 24* 35* 35* 26* 22*   EGFRNONAA 24* 19* 18* 27* 39*     Lipase: No results for input(s): LIPASE in the last 48 hours.  Lipid Panel: No results for input(s): CHOL, HDL, LDLCALC, TRIG, CHOLHDL in the last 48 hours.  Troponin: No results for input(s): TROPONINI in the last 48 hours.  Urine Culture: No results for input(s): LABURIN in the last 48 hours.  Urine Studies: No results for input(s): COLORU, APPEARANCEUA, PHUR, SPECGRAV, PROTEINUA, GLUCUA, KETONESU, BILIRUBINUA, OCCULTUA, NITRITE, UROBILINOGEN, LEUKOCYTESUR, RBCUA, WBCUA, BACTERIA, SQUAMEPITHEL, HYALINECASTS in the last 48 hours.    Invalid input(s): CK    Significant Imaging: I have reviewed all pertinent imaging results/findings within the past 24 hours.

## 2020-08-12 NOTE — ASSESSMENT & PLAN NOTE
Transition to comfort care  Disease education   Terminal extubation   Consult [facility]  DNR

## 2020-08-12 NOTE — PROGRESS NOTES
Pt with no spontaneous respirations, asystole on monitor, no heart sounds heard upon auscultation. MD called to bedside. MD called to updated wife. TOD 1745.

## 2020-08-12 NOTE — CONSULTS
[facility] ICU 5th Floor  Palliative Medicine  Consult Note    Patient Name: João Aguirre  MRN: [id number]  Admission Date: 8/2/2020  Hospital Length of Stay: 10 days  Code Status: DNR   Attending Provider: Cherrie Velasquez*  Consulting Provider: Vanita Zuleta NP  Primary Care Physician: Primary Doctor No  Principal Problem:Acute osteomyelitis of metatarsal bone, right    Patient information was obtained from spouse/SO, relative(s), past medical records and ER records.      Inpatient consult to Palliative Care  Consult performed by: Vanita Zuleta NP  Consult ordered by: Cherrie Velasquez MD        Assessment/Plan:     Palliative care encounter  Transition to comfort care  Disease education   Terminal extubation   Consult [facility]  DNR        Thank you for your consult. I will follow-up with patient. Please contact us if you have any additional questions.    Subjective:     HPI:   Patient is a 55/y/o M with PMH of ESRD on HD (T/TH/S), anemia, uncontrolled HTN, hx CVA, PCI s/p 2 stents placement, right eye blindness, seizure, marijuana use, non-healing DM right foot ulcer DM II and gastroparesis, initially presented to [facility] from [place] for second opinion regarding AVR and CABG. Patient noted several weeks history of chest tightness with dizziness, palpitation, and increasing SOB. Denies fever, chills, nausea, vomiting. Seen by CTS and recommended patient not a CABG candidate due to multiple co morbidities- HD, Hep C, Osteomyelitis, PVD. s/p cath with stent placement on 7/31. Vascular surgery recommends BKA but patient declined, patient started on heparin drip and bridging with Warfarin (on Eliquis prior) with plan for balloon angioplasty on Tuesday    Advance Care Planning     GO  I engaged the family and healthcare power of   in a conversation about advance care planning and we specifically addressed what the goals of care would be  "moving forward, in light of the patient's change in clinical status, patient currently on 3 pressors, CRRT, bradycardia. Cardiology and primary team spoke with family.  We did specifically address the patient's likely prognosis, which is poor.  We explored the patient's values and preferences for future care.  The patient and healthcare power of   endorses that what is most important right now is to focus on symptom/pain control and and comfort care.    Accordingly, we have decided that the best plan to meet the patient's goals includes discontinuing treatment and transition to comfort care.   I did explain the role for hospice care at this stage of the patient's illness, including its ability to help the patient live with the best quality of life possible.  Patient is unstable for transfer at this time. Palliative medicine will consult [facility] for inpatient services.  Wife very emotional, "I can't just sit here and watch him die.His mother is coming to see him." Wife has signed paper work with [facility] for the transition to comfort care.  I love him. Family has not elected to be present during the comfort care.      I spent a total of 45  minutes engaging the patient in this advance care planning discussion.         Code Status  In light of the patients advanced and life limiting illness,I engaged the the patient and healthcare power of   in a conversation about the patient's preferences for care  at the very end of life. The patient wishes to have a natural, peaceful death.  Along those lines, the patient does not wish to have CPR or other invasive treatments performed when his heart and/or breathing stops. I communicated to the patient and healthcare power of   that a LaPOST form was completed to reflect comfort measures.  I spent a total of 60 minutes engaging the patient in this advance care planning discussion.          Hospital Course:  No notes on file        Past Medical " History:   Diagnosis Date    Acute osteomyelitis of metatarsal bone, right     PAD right    Anticoagulant long-term use     Arthritis     Asthma     Back pain     on methadone    C. difficile colitis 09/2018    Cirrhosis of liver without ascites 2016    by liver US. +HCV    Coronary artery disease 2013    stent.  h/o STEMI and NSTEMI    Diabetes mellitus     resolved, multiple admissions for hypoglycemia requiring ICU possibley due to liver/ESRD    Encounter for blood transfusion     ESRD on hemodialysis 2013    anuric    Eye abnormality right eye    injured as a child    Gastritis     Gastroparesis     HCV (hepatitis C virus)     ? h/o tattoo exposure    Hypertension     Mitral stenosis 07/24/2020    Moderate to severe mitral stenosis    PAD (peripheral artery disease)     RLE s/p R CFA endarterectomy    Pneumonia 2013    Spend 6weeks in hospital at [facility]    Stroke     Tuberculosis     treated       Past Surgical History:   Procedure Laterality Date    ANGIOGRAPHY OF LOWER EXTREMITY Right 7/28/2020    Procedure: Angiogram Extremity Unilateral;  Surgeon: MINO Vasquez II, MD;  Location: [address];  Service: Vascular;  Laterality: Right;  Left groin and rIght pedal artery access  15.5 min  247.25 mGy  68.5454 Gycm2  33ml contrast    AV FISTULA PLACEMENT      BACK SURGERY      CARDIAC SURGERY  2013    heart stent    CHOLECYSTECTOMY      CORONARY ANGIOGRAPHY N/A 7/30/2020    Procedure: ANGIOGRAM, CORONARY ARTERY;  Surgeon: Alexandro Terry MD;  Location: [facility] CATH LAB;  Service: Cardiology;  Laterality: N/A;    EYE SURGERY      R eye    INCISION AND DRAINAGE OF ABSCESS Right 9/6/2018    Procedure: INCISION AND DRAINAGE, ABSCESS;  Surgeon: Chetan Rothman MD;  [facility];  Service: General;  Laterality: Right;    LEFT HEART CATHETERIZATION N/A 7/30/2020    Procedure: Left heart cath;  Surgeon: Alexandro Terry MD;  Location: [facility] CATH LAB;  Service: Cardiology;   Laterality: N/A;       Review of patient's allergies indicates:   Allergen Reactions    Antibiotic hc      Counter acts with methodone.          Medications:  Continuous Infusions:   sodium chloride 0.9%      amiodarone in dextrose 5% Stopped (20)    fentanyl Stopped (20)    heparin (porcine) in D5W 12 Units/kg/hr (20 2332)    heparin (porcine)      midazolam 1.5 mg/hr (20 0633)    norepinephrine bitartrate-D5W 3 mcg/kg/min (20 115)    phenylephrine 5 mcg/kg/min (20 115)    propofoL Stopped (20)    sodium bicarbonate drip Stopped (20 0615)    vasopressin (PITRESSIN) infusion 0.04 Units/min (20 1215)     Scheduled Meds:   sodium chloride 0.9%   Intravenous Once    sodium chloride 0.9%   Intravenous Once    albuterol sulfate  5 mg Nebulization Once    amiodarone in dextrose  150 mg Intravenous Once    aspirin  81 mg Oral Daily    atorvastatin  40 mg Oral Daily    cadexomer iodine   Topical (Top) Every Mon, Wed, Fri    ceftAZIDime (FORTAZ) IVPB  2 g Intravenous Q12H    chlorhexidine  15 mL Mouth/Throat BID    diclofenac sodium  2 g Topical (Top) BID    epoetin harshad-epbx  10,000 Units Intravenous Every Tues, Thurs, Sat    guaiFENesin  600 mg Oral BID    insulin regular  10 Units Intravenous Once    levETIRAcetam  500 mg Oral BID    methadone  100 mg Oral Daily    midodrine  10 mg Oral TID WM    mupirocin   Nasal BID    pantoprazole  40 mg Oral Before breakfast    polyethylene glycol  17 g Oral Daily    sevelamer carbonate  1,600 mg Oral TID WM    ticagrelor  90 mg Oral BID     PRN Meds:sodium chloride, sodium chloride, sodium chloride 0.9%, sodium chloride 0.9%, acetaminophen, albuterol-ipratropium, bisacodyL, calcium carbonate, calcium gluconate IVPB, calcium gluconate IVPB, dextrose 50%, dextrose 50%, [] fentaNYL **FOLLOWED BY** fentaNYL, heparin (PORCINE), heparin (PORCINE), heparin (porcine), magnesium  sulfate IVPB, ondansetron, oxyCODONE, oxyCODONE, promethazine, sodium chloride 0.9%, sodium phosphate IVPB, sodium phosphate IVPB, sodium phosphate IVPB    Family History     Problem Relation (Age of Onset)    Aneurysm Mother, Father (77)    Diabetes Brother    Heart disease Father, Paternal Grandmother    Kidney disease Brother        Tobacco Use    Smoking status: Former Smoker     Years: 10.00     Quit date: 2015     Years since quittin.9    Smokeless tobacco: Never Used   Substance and Sexual Activity    Alcohol use: No    Drug use: Yes     Frequency: 4.0 times per week     Types: Other-see comments     Comment: marijuana    Sexual activity: Yes       Review of Systems   Unable to perform ROS: Intubated     Objective:     Vital Signs (Most Recent):  Temp: (!) 95 °F (35 °C) (20 1318)  Pulse: 83 (20 1318)  Resp: (!) 26 (20 1318)  BP: 103/65 (20 1300)  SpO2: (!) 76 % (20 1120) Vital Signs (24h Range):  Temp:  [81.3 °F (27.4 °C)-100.4 °F (38 °C)] 95 °F (35 °C)  Pulse:  [] 83  Resp:  [14-48] 26  SpO2:  [59 %-100 %] 76 %  BP: ()/(29-90) 103/65     Weight: 103.5 kg (228 lb 2.8 oz)  Body mass index is 30.95 kg/m².    Physical Exam  Vitals signs and nursing note reviewed.   Constitutional:       Appearance: Normal appearance. He is ill-appearing.      Interventions: He is intubated.   HENT:      Head: Normocephalic and atraumatic.   Neck:      Musculoskeletal: Normal range of motion.   Cardiovascular:      Rate and Rhythm: Bradycardia present.   Pulmonary:      Effort: Respiratory distress present. He is intubated.   Musculoskeletal:         General: Swelling present.   Skin:     General: Skin is cool.         Advance Care Planning   Review of Symptoms    Symptom Assessment (ESAS 0-10 Scale)     CAM / Delirium:  Negative  Constipation:  Negative  Diarrhea:  Negative    Comments:  Unable to asses symptom  Assessment due to intubation and non responsive.            Advanced Directives:  Living Will: No    LaPOST:  Yes    Do Not Resuscitate Status:  Yes    Medical Power of : Yes      Decision Making:  Patient unable to communicate due to disease severity/cognitive impairment and Family answered questions    Living Arrangements:  Lives with spouse and Lives with family    Psychosocial/Cultural:  Patient intubated and unresponsive    Spiritual:  F - [place] and Belief:  Patient intubated and unresponsive  I - Importance:  Patient intubated and unresponsive  C - Community:  Patient intubated and unresponsive  A - Address in Care:  Patient intubated and unresponsive         Significant Labs: All pertinent labs within the past 24 hours have been reviewed.  CBC:   Recent Labs   Lab 08/12/20  0505 08/12/20  0510   WBC 21.74*  --    HGB 8.2*  --    HCT 27.1* 30*   *  --    *  --      BMP:  Recent Labs   Lab 08/12/20  0505 08/12/20  0901   GLU 51*  --      --    K 4.2  --    CL 92*  --    CO2 20*  --    BUN 18  --    CREATININE 2.6*  --    CALCIUM 9.1  --    MG 2.1 2.2     LFT:  Lab Results   Component Value Date    AST 8,955 (H) 08/12/2020     (H) 08/30/2018    ALKPHOS 154 (H) 08/12/2020    BILITOT 1.1 (H) 08/12/2020     Albumin:   Albumin   Date Value Ref Range Status   08/12/2020 2.9 (L) 3.5 - 5.2 g/dL Final     Protein:   Total Protein   Date Value Ref Range Status   08/12/2020 7.0 6.0 - 8.4 g/dL Final     Lactic acid:   Lab Results   Component Value Date    LACTATE >12.0 (HH) 08/12/2020    LACTATE >12.0 (HH) 08/12/2020       Significant Imaging: I have reviewed all pertinent imaging results/findings within the past 24 hours.   Recommendations:  Transition to comfort care  Terminal extubation   DNR  [facility]ult [facility]        Vanita Zuleta, MSN, APRN, NP-C   Palliative Medicine   [facility]  [phone] or [phone]        >50% of 45  min visit spent in chart review, face to face discussion of goals of care with  patient, family, symptom assessment, coordination of care and emotional support.    Advance care planning-45min.

## 2020-08-12 NOTE — SUBJECTIVE & OBJECTIVE
Past Medical History:   Diagnosis Date    Acute osteomyelitis of metatarsal bone, right     PAD right    Anticoagulant long-term use     Arthritis     Asthma     Back pain     on methadone    C. difficile colitis 09/2018    Cirrhosis of liver without ascites 2016    by liver US. +HCV    Coronary artery disease 2013    stent.  h/o STEMI and NSTEMI    Diabetes mellitus     resolved, multiple admissions for hypoglycemia requiring ICU possibley due to liver/ESRD    Encounter for blood transfusion     ESRD on hemodialysis 2013    anuric    Eye abnormality right eye    injured as a child    Gastritis     Gastroparesis     HCV (hepatitis C virus)     ? h/o tattoo exposure    Hypertension     Mitral stenosis 07/24/2020    Moderate to severe mitral stenosis    PAD (peripheral artery disease)     RLE s/p R CFA endarterectomy    Pneumonia 2013    Spend 6weeks in hospital at [place]    Stroke     Tuberculosis     treated       Past Surgical History:   Procedure Laterality Date    ANGIOGRAPHY OF LOWER EXTREMITY Right 7/28/2020    Procedure: Angiogram Extremity Unilateral;  Surgeon: MINO Vasquez II, MD;  Location: [address];  Service: Vascular;  Laterality: Right;  Left groin and rIght pedal artery access  15.5 min  247.25 mGy  68.5454 Gycm2  33ml contrast    AV FISTULA PLACEMENT      BACK SURGERY      CARDIAC SURGERY  2013    heart stent    CHOLECYSTECTOMY      CORONARY ANGIOGRAPHY N/A 7/30/2020    Procedure: ANGIOGRAM, CORONARY ARTERY;  Surgeon: Alexandro Terry MD;  Location: [facility] CATH LAB;  Service: Cardiology;  Laterality: N/A;    EYE SURGERY      R eye    INCISION AND DRAINAGE OF ABSCESS Right 9/6/2018    Procedure: INCISION AND DRAINAGE, ABSCESS;  Surgeon: Chetan Rothman MD;  Location: [facility];  Service: General;  Laterality: Right;    LEFT HEART CATHETERIZATION N/A 7/30/2020    Procedure: Left heart cath;  Surgeon: Alexandro Terry MD;  Location: [facility] CATH LAB;   Service: Cardiology;  Laterality: N/A;       Review of patient's allergies indicates:   Allergen Reactions    Antibiotic hc      Counter acts with methodone.          Medications:  Continuous Infusions:   sodium chloride 0.9%      amiodarone in dextrose 5% Stopped (20)    fentanyl Stopped (20)    heparin (porcine) in D5W 12 Units/kg/hr (20)    heparin (porcine)      midazolam 1.5 mg/hr (20 06)    norepinephrine bitartrate-D5W 3 mcg/kg/min (20)    phenylephrine 5 mcg/kg/min (20)    propofoL Stopped (20)    sodium bicarbonate drip Stopped (20)    vasopressin (PITRESSIN) infusion 0.04 Units/min (20 121)     Scheduled Meds:   sodium chloride 0.9%   Intravenous Once    sodium chloride 0.9%   Intravenous Once    albuterol sulfate  5 mg Nebulization Once    amiodarone in dextrose  150 mg Intravenous Once    aspirin  81 mg Oral Daily    atorvastatin  40 mg Oral Daily    cadexomer iodine   Topical (Top) Every Mon, Wed, Fri    ceftAZIDime (FORTAZ) IVPB  2 g Intravenous Q12H    chlorhexidine  15 mL Mouth/Throat BID    diclofenac sodium  2 g Topical (Top) BID    epoetin harshad-epbx  10,000 Units Intravenous Every Tues, Thurs, Sat    guaiFENesin  600 mg Oral BID    insulin regular  10 Units Intravenous Once    levETIRAcetam  500 mg Oral BID    methadone  100 mg Oral Daily    midodrine  10 mg Oral TID WM    mupirocin   Nasal BID    pantoprazole  40 mg Oral Before breakfast    polyethylene glycol  17 g Oral Daily    sevelamer carbonate  1,600 mg Oral TID WM    ticagrelor  90 mg Oral BID     PRN Meds:sodium chloride, sodium chloride, sodium chloride 0.9%, sodium chloride 0.9%, acetaminophen, albuterol-ipratropium, bisacodyL, calcium carbonate, calcium gluconate IVPB, calcium gluconate IVPB, dextrose 50%, dextrose 50%, [] fentaNYL **FOLLOWED BY** fentaNYL, heparin (PORCINE), heparin (PORCINE), heparin  (porcine), magnesium sulfate IVPB, ondansetron, oxyCODONE, oxyCODONE, promethazine, sodium chloride 0.9%, sodium phosphate IVPB, sodium phosphate IVPB, sodium phosphate IVPB    Family History     Problem Relation (Age of Onset)    Aneurysm Mother, Father (77)    Diabetes Brother    Heart disease Father, Paternal Grandmother    Kidney disease Brother        Tobacco Use    Smoking status: Former Smoker     Years: 10.00     Quit date: 2015     Years since quittin.9    Smokeless tobacco: Never Used   Substance and Sexual Activity    Alcohol use: No    Drug use: Yes     Frequency: 4.0 times per week     Types: Other-see comments     Comment: marijuana    Sexual activity: Yes       Review of Systems   Unable to perform ROS: Intubated     Objective:     Vital Signs (Most Recent):  Temp: (!) 95 °F (35 °C) (20 1318)  Pulse: 83 (20 1318)  Resp: (!) 26 (20 1318)  BP: 103/65 (20 1300)  SpO2: (!) 76 % (20 1120) Vital Signs (24h Range):  Temp:  [81.3 °F (27.4 °C)-100.4 °F (38 °C)] 95 °F (35 °C)  Pulse:  [] 83  Resp:  [14-48] 26  SpO2:  [59 %-100 %] 76 %  BP: ()/(29-90) 103/65     Weight: 103.5 kg (228 lb 2.8 oz)  Body mass index is 30.95 kg/m².    Physical Exam  Vitals signs and nursing note reviewed.   Constitutional:       Appearance: Normal appearance. He is ill-appearing.      Interventions: He is intubated.   HENT:      Head: Normocephalic and atraumatic.   Neck:      Musculoskeletal: Normal range of motion.   Cardiovascular:      Rate and Rhythm: Bradycardia present.   Pulmonary:      Effort: Respiratory distress present. He is intubated.   Musculoskeletal:         General: Swelling present.   Skin:     General: Skin is cool.         Advance Care Planning   Review of Symptoms    Symptom Assessment (ESAS 0-10 Scale)     CAM / Delirium:  Negative  Constipation:  Negative  Diarrhea:  Negative    Comments:  Unable to asses symptom  Assessment due to intubation and non  responsive.           Advanced Directives:  Living Will: No    LaPOST:  Yes    Do Not Resuscitate Status:  Yes    Medical Power of : Yes      Decision Making:  Patient unable to communicate due to disease severity/cognitive impairment and Family answered questions    Living Arrangements:  Lives with spouse and Lives with family    Psychosocial/Cultural:  Patient intubated and unresponsive    Spiritual:  F - [place] and Belief:  Patient intubated and unresponsive  I - Importance:  Patient intubated and unresponsive  C - Community:  Patient intubated and unresponsive  A - Address in Care:  Patient intubated and unresponsive         Significant Labs: All pertinent labs within the past 24 hours have been reviewed.  CBC:   Recent Labs   Lab 08/12/20  0505 08/12/20  0510   WBC 21.74*  --    HGB 8.2*  --    HCT 27.1* 30*   *  --    *  --      BMP:  Recent Labs   Lab 08/12/20  0505 08/12/20  0901   GLU 51*  --      --    K 4.2  --    CL 92*  --    CO2 20*  --    BUN 18  --    CREATININE 2.6*  --    CALCIUM 9.1  --    MG 2.1 2.2     LFT:  Lab Results   Component Value Date    AST 8,955 (H) 08/12/2020     (H) 08/30/2018    ALKPHOS 154 (H) 08/12/2020    BILITOT 1.1 (H) 08/12/2020     Albumin:   Albumin   Date Value Ref Range Status   08/12/2020 2.9 (L) 3.5 - 5.2 g/dL Final     Protein:   Total Protein   Date Value Ref Range Status   08/12/2020 7.0 6.0 - 8.4 g/dL Final     Lactic acid:   Lab Results   Component Value Date    LACTATE >12.0 (HH) 08/12/2020    LACTATE >12.0 (HH) 08/12/2020       Significant Imaging: I have reviewed all pertinent imaging results/findings within the past 24 hours.   Recommendations:  Transition to comfort care  Terminal extubation   DNR  Consult [facility], MSN, APRN, NP-C   Palliative Medicine   [facility]  [phone] or [phone]        >50% of 45  min visit spent in chart review, face to face discussion of goals of care  with patient, family, symptom assessment, coordination of care and emotional support.    Advance care planning-45min.

## 2020-08-12 NOTE — ASSESSMENT & PLAN NOTE
PVD (peripheral vascular disease)  Atherosclerosis of native artery of right lower extremity with ulceration of midfoot    CTA A/P with BLE runoff to better define surgical anatomy  Consult cardiology- for possible revascularization and potential limb salvage  Wound cx with stenotrophomonas  Consult ID- continue Ceftazidime x 6 weeks rec- d/c1  Continue wound care  Consult cardiology- S/p revascularization on 8/10  Transition to comfort measures

## 2020-08-12 NOTE — NURSING
Call from blood bank regarding pt's order to receive a unit of PRBCs, per blood bank unit cannot be issued due to patient not qualifying for it, spoke with Dr. Young and he states that he still wants blood given, per blood bank MD must speak with pathologist before blood can be released, will inform Dr. Young

## 2020-08-12 NOTE — PROGRESS NOTES
Family at bedside. MD notified. Per  Innocent ok to hold all PO medications this morning. On her way to discuss pt status with family.

## 2020-08-12 NOTE — PROCEDURES
08/12/2020    João Aguirre  [id number]    PROCEDURE PERFORMED: Left Femoral Trialysis Catheter    PERFORMING SURGEON: Dillan Gregorio Jr    CONSENT:  The risks benefits and alternatives of the procedure were discussed with the patient's wife, all questions and concerns were addressed and verbal consent was provided for the procedure.    ANESTHESIA: Lidocaine 1%    INDICATION: Acute renal failure requiring hemodialysis    FINDINGS:   - left femoral found to be widely patent on ultrasound  - return of dark red, non-pulsatile blood returned and wire confirmed in vein on US  - 25 cm Trialysis placed  - all ports aspirated and flushed with ease    PROCEDURE IN DETAIL:  The left groin was prepped and draped in typical standard fashion. A time out was performed. The ultrasound was used to identify the vein which was found to be widely patent. Lidocaine 1% was injected over the planned incision site and a small skin knick was made. An 18 gauge needle was introduced under constant negative pressure into the vein under ultrasound guidance. There was a return of dark red, non-pulsatile blood and the wire was advanced but met resistance. The needle was withdrawn and reinserted into the vein under ultrasound guidance. Again with return of dark red, non-pulsatile blood. This time the wire easily passed and confirmed in the vein using ultrasound. The tract was then serially dilated and then the trialysis catheter was introduced. All ports were found to aspirate with ease then were flushed with injectable saline. A biopatch was placed and the catheter was secured to the skin with sutures. A sterile, occlusive dressing was then applied.    EBL: 5 cc    COMPLICATIONS: none    CONDITION: critical    DISPO: Post procedural CXR to be obtained.

## 2020-08-12 NOTE — EICU
Rounding (Video Assessment):  Yes    Intervention Initiated From:  Bedside    Raphael Communicated with Bedside Nurse regarding:  Time-Out    Nurse Notified:  Yes    Doctor Notified:  No    Comments: elert from bedside nurse for trialysis line placement per Dr. Gregorio. See time out flowsheet.

## 2020-08-12 NOTE — ANESTHESIA PROCEDURE NOTES
Central Line    Diagnosis: Hypotension  Patient location during procedure: ICU  Procedure start time: 8/11/2020 9:10 PM  Timeout: 8/11/2020 9:09 PM  Procedure end time: 8/11/2020 10:00 PM    Staffing  Authorizing Provider: Caleb Crespo MD  Performing Provider: Caleb Crespo MD    Staffing  Anesthesiologist: Caleb Crespo MD  Performed: anesthesiologist   Anesthesiologist was present at the time of the procedure.  Preanesthetic Checklist  Completed: patient identified, site marked, surgical consent, pre-op evaluation, timeout performed, IV checked, risks and benefits discussed, monitors and equipment checked and anesthesia consent given  Indication   Indication: hemodynamic monitoring, vascular access, med administration     Anesthesia   local infiltration    Central Line   Skin Prep: skin prepped with ChloraPrep, skin prep agent completely dried prior to procedure  maximum sterile barriers used during central venous catheter insertion  hand hygiene performed prior to central venous catheter insertion  Location: left, internal jugular.   Catheter type: triple lumen  Catheter Size: 7 Fr  Inserted Catheter Length: 16 cm  Ultrasound: vascular probe with ultrasound  Vessel Caliber: medium, patent, compressibility normal  Needle advanced into vessel with real time Ultrasound guidance.  Guidewire confirmed in vessel.  Image recorded and saved.   Manometry: Venous cannualation confirmed by visual estimation of blood vessel pressure using manometry.  Insertion Attempts: 2   Securement:line sutured, chlorhexidine patch, sterile dressing applied and blood return through all ports    Post-Procedure   X-Ray: no pneumothorax on x-ray and placement verified by x-ray  Adverse Events:none    Guidewire Guidewire removed intact. Guidewire removed intact, verified with nurse.  Additional Notes  Attempted TLC on right IV. Wire would not advance so aborted procedure  With sterile technique, prepped patient's left  neck, successfully placed TLC in Left IJ

## 2020-08-12 NOTE — NURSING
MD Mariscal on call for Nephrology. Called and updated on current patient status and condition change. Critical labs of CO2 9, K 6.8, and BG 32 reported to MD at this time.     200 mEq Bicarb IVP, 1g Calcium, 2 amps D50, and 150 mEq Bicarb gtt at 100/hr already given and started.     MD wants patient to have Trialysis placed. Will have Surgery try L Femoral for access first. If unable to place, L IJ CVL may have to be exchanged for Trialysis cath for CRRT to be started.     Hannah called at this time and updated on current status. MD Young agrees with treatment plan. He is calling Soledad Aguirre- (spouse) now to update on patient status change.

## 2020-08-12 NOTE — EICU
Rounding (Video Assessment):  No    Intervention Initiated From:  Bedside    Raphael Communicated with Bedside Nurse regarding:  Time-Out    Nurse Notified:  Yes    Doctor Notified:  No    Comments: elert from bedside nurse for TLC placement.  Once TLC was attempted. Pt HR down to 32/minute. Atropine 0.5mg  ivp given at 20:59.  HR up to 36/minute. 21:02- Atropine 0.5mg ivp .   21:09 pt had to be emergently intubated due to hypoxia.  Then TLC attempted on RIJ for first attempt. I had to be aborted due to claudation. On 2nd attempt, LIJ TLC was done. See time out flowsheets

## 2020-08-12 NOTE — PROGRESS NOTES
Pt medicated as order for comfort measures. Extubated per order by RT. Pressors turned off. RT and RN at bedside with patient. All family has left at this time.

## 2020-08-12 NOTE — PROGRESS NOTES
Seen this this afternoon in the ICU with the primary team and critical care team.  Patient is status post revascularization of right SFA, popliteal, anterior tibial, and proximal posterior tibial artery as a last option attempted for limb salvage.  Postprocedure patient had triphasic doppler right dorsalis pedis pulse signal without a posterior tibial pulse.  Patient was observed overnight in ICU.  Patient was chronically and intermittently hypotensive without any symptoms.  This morning we noted that the right dorsalis pedis pulse was absent.  Patient was started on IV heparin.  Patient was already on dual antiplatelet therapy with aspirin and Brilinta.  Called to the bedside this afternoon because of 40 beats of nonsustained ventricular tachycardia.  Patient is also had atrial fibrillation with a rapid ventricular response.  Patient was treated at IV Lopressor with extreme caution because of his marginal blood pressure.  Patient was started on IV amiodarone for atrial fibrillation with rapid ventricular response.  Patient complain discomfort in her right foot.  Patient denied any other cardiovascular symptoms i.e. chest discomfort, dyspnea, orthopnea, PND, dizziness, palpitations, and access site pain. Patient was treated with both IV and oral narcotics for comfort.  Arterial ultrasound was ordered and reviewed.  After reviewing the ultrasound it was noted that the flow through the SFA and popliteal and infrapopliteal vessel was reduced patient was not taken for either surgical or endovascular intervention at this point because his multiple comorbidities and minimal options.  The plan was discussed with the patient, primary team, and critical care team.

## 2020-08-13 LAB
BLD PROD TYP BPU: NORMAL
BLOOD UNIT EXPIRATION DATE: NORMAL
BLOOD UNIT TYPE CODE: 7300
BLOOD UNIT TYPE: NORMAL
CODING SYSTEM: NORMAL
DISPENSE STATUS: NORMAL
TRANS ERYTHROCYTES VOL PATIENT: NORMAL ML

## 2020-08-13 NOTE — H&P
[facility] ICU 5th Floor  Palliative Medicine  History & Physical      Patient Name: João Aguirre  MRN: [id number]  Admission Date: 8/12/2020  Attending Physician: Cherrie Velasquez MD  Primary Care Provider: Primary Doctor No         Patient information was obtained from patient, spouse/SO, relative(s), past medical records and ER records.     Subjective:     Principal Problem:Comfort care    Chief Complaint:    HPI: João Aguirre is a 56 y/o M with PMH of ESRD on HD (T/TH/S), anemia, uncontrolled HTN, hx CVA, PCI s/p 2 stents placement, right eye blindness, seizure, marijuana use, non-healing DM right foot ulcer DM II and gastroparesis, initially presented to [facility] from [place] for second opinion regarding AVR and CABG vascular surgery at [facility] recommends BKA but patient declined, s/p cath with stent placement on 7/31 and started on heparin and then transfer to [facility] with plan for balloon angioplasty. On arrival, patient with elevated INR. Revascularization rescheduled for 8/7 but aborted once due to hypotension in the OR, started on Midodrine and rescheduled for 8/10. s/p revascularization of right SFA, popliteal, anterior tibial, and proximal posterior tibial artery on 8/10. Started on heparin drip next morning due to concern decrease right dorsalis pedis pulse without a posterior tibial pulse. Repeat arterial USS showed flow through the SFA and popliteal and infrapopliteal vessel was reduced patient. Overnight patient clinically declined and was emergently intubated, maxed out on multiple pressors for hypotension, lactic persistent > 12 and did not respond to CRRT. Patient remained hypotensive with progressive decline. After goal of care discussion with family, patient was transitioned to comfort measures.  Patient unstable for transportation to inpatient hospital for hospice care. - [facility]     Accordingly, we have decided that  "the best plan to meet the patient's goals includes discontinuing treatment and transition to comfort care.   I did explain the role for hospice care at this stage of the patient's illness, including its ability to help the patient live with the best quality of life possible.  Patient is unstable for transfer at this time. Palliative medicine will consult [facility] for inpatient services.  Wife very emotional, "I can't just sit here and watch him die.His mother is coming to see him." Wife has signed paper work with [facility] for the transition to comfort care.  I love him. Family has not elected to be present during the comfort care    Past Medical History:   Diagnosis Date    Acute osteomyelitis of metatarsal bone, right     PAD right    Anticoagulant long-term use     Arthritis     Asthma     Back pain     on methadone    C. difficile colitis 09/2018    Cirrhosis of liver without ascites 2016    by liver US. +HCV    Coronary artery disease 2013    stent.  h/o STEMI and NSTEMI    Diabetes mellitus     resolved, multiple admissions for hypoglycemia requiring ICU possibley due to liver/ESRD    Encounter for blood transfusion     ESRD on hemodialysis 2013    anuric    Eye abnormality right eye    injured as a child    Gastritis     Gastroparesis     HCV (hepatitis C virus)     ? h/o tattoo exposure    Hypertension     Mitral stenosis 07/24/2020    Moderate to severe mitral stenosis    PAD (peripheral artery disease)     RLE s/p R CFA endarterectomy    Pneumonia 2013    Spend 6weeks in hospital at [facility]    Stroke     Tuberculosis     treated       Past Surgical History:   Procedure Laterality Date    ANGIOGRAPHY OF LOWER EXTREMITY Right 7/28/2020    Procedure: Angiogram Extremity Unilateral;  Surgeon: MINO Vasquez II, MD;  Location: [facility] OR [address] [place] [address];  Service: Vascular;  Laterality: Right;  Left groin and rIght pedal artery access  15.5 min  247.25 mGy  68.5454 Gycm2  33ml contrast "    AV FISTULA PLACEMENT      BACK SURGERY      CARDIAC SURGERY  2013    heart stent    CHOLECYSTECTOMY      CORONARY ANGIOGRAPHY N/A 7/30/2020    Procedure: ANGIOGRAM, CORONARY ARTERY;  Surgeon: Alexandro Terry MD;  Location: [facility] CATH LAB;  Service: Cardiology;  Laterality: N/A;    EYE SURGERY      R eye    INCISION AND DRAINAGE OF ABSCESS Right 9/6/2018    Procedure: INCISION AND DRAINAGE, ABSCESS;  Surgeon: Chetan Rothman MD;  Location: [facility];  Service: General;  Laterality: Right;    LEFT HEART CATHETERIZATION N/A 7/30/2020    Procedure: Left heart cath;  Surgeon: Alexandro Terry MD;  Location: [facility] CATH LAB;  Service: Cardiology;  Laterality: N/A;       Review of patient's allergies indicates:   Allergen Reactions    Antibiotic hc      Counter acts with methodone.          Current Facility-Administered Medications on File Prior to Encounter   Medication    lidocaine (PF) 20 mg/ml (2%) injection    lidocaine-EPINEPHrine 1%-1:100,000 injection    [DISCONTINUED] 0.9%  NaCl infusion (CRRT USE ONLY)    [DISCONTINUED] 0.9%  NaCl infusion (for blood administration)    [DISCONTINUED] 0.9%  NaCl infusion (for blood administration)    [DISCONTINUED] 0.9%  NaCl infusion    [DISCONTINUED] 0.9%  NaCl infusion    [DISCONTINUED] 0.9%  NaCl infusion    [DISCONTINUED] 0.9%  NaCl infusion    [DISCONTINUED] acetaminophen tablet 650 mg    [DISCONTINUED] albuterol sulfate nebulizer solution 5 mg    [DISCONTINUED] albuterol-ipratropium 2.5 mg-0.5 mg/3 mL nebulizer solution 3 mL    [DISCONTINUED] amiodarone 360 mg/200 mL (1.8 mg/mL) infusion    [DISCONTINUED] amiodarone in dextrose 150 mg/100 mL (1.5 mg/mL) loading dose 150 mg    [DISCONTINUED] aspirin EC tablet 81 mg    [DISCONTINUED] atorvastatin tablet 40 mg    [DISCONTINUED] bisacodyL suppository 10 mg    [DISCONTINUED] cadexomer iodine 0.9 % gel    [DISCONTINUED] calcium carbonate 200 mg calcium (500 mg) chewable tablet 500 mg     [DISCONTINUED] calcium gluconate 1g in normal saline 0.9 % 100mL (ready to mix system)    [DISCONTINUED] calcium gluconate 1g in normal saline 0.9 % 100mL (ready to mix system)    [DISCONTINUED] ceftAZIDime (FORTAZ) 1 g in dextrose 5 % 50 mL IVPB    [DISCONTINUED] ceftAZIDime (FORTAZ) 2 g in dextrose 5 % 50 mL IVPB    [DISCONTINUED] chlorhexidine 0.12 % solution 15 mL    [DISCONTINUED] dextrose 50% injection 25 g    [DISCONTINUED] dextrose 50% injection 25 g    [DISCONTINUED] diclofenac sodium 1 % gel 2 g    [DISCONTINUED] epoetin harshad-epbx injection 10,000 Units    [DISCONTINUED] fentaNYL 2500 mcg in 0.9% sodium chloride 250 mL infusion premix (titrating)    [DISCONTINUED] fentaNYL injection 50 mcg    [DISCONTINUED] guaiFENesin 12 hr tablet 600 mg    [DISCONTINUED] heparin 25,000 units in dextrose 5% (100 units/ml) IV bolus from bag - ADDITIONAL PRN BOLUS - 30 units/kg    [DISCONTINUED] heparin 25,000 units in dextrose 5% (100 units/ml) IV bolus from bag - ADDITIONAL PRN BOLUS - 60 units/kg    [DISCONTINUED] heparin 25,000 units in dextrose 5% 250 mL (100 units/mL) infusion LOW INTENSITY nomogram - [facility]    [DISCONTINUED] heparin infusion 1,000 units/500 ml in 0.9% NaCl (pressure line flush)    [DISCONTINUED] insulin regular injection 10 Units    [DISCONTINUED] levETIRAcetam tablet 500 mg    [DISCONTINUED] magnesium sulfate 2g in water 50mL IVPB (premix)    [DISCONTINUED] methadone tablet 100 mg    [DISCONTINUED] midazolam 100 mg/100 mL in dextrose 5% infusion    [DISCONTINUED] midodrine tablet 10 mg    [DISCONTINUED] mupirocin 2 % ointment    [DISCONTINUED] norepinephrine 32 mg in dextrose 5 % 250 mL infusion    [DISCONTINUED] ondansetron disintegrating tablet 8 mg    [DISCONTINUED] oxyCODONE immediate release tablet 10 mg    [DISCONTINUED] oxyCODONE immediate release tablet 5 mg    [DISCONTINUED] pantoprazole EC tablet 40 mg    [DISCONTINUED] phenylephrine (TERESITA-SYNEPHRINE) 100 mg in  sodium chloride 0.9% 250 mL infusion    [DISCONTINUED] polyethylene glycol packet 17 g    [DISCONTINUED] promethazine tablet 25 mg    [DISCONTINUED] propofol (DIPRIVAN) 10 mg/mL infusion    [DISCONTINUED] sevelamer carbonate tablet 1,600 mg    [DISCONTINUED] sodium bicarbonate 150 mEq in sterile water 1,000 mL infusion    [DISCONTINUED] sodium chloride 0.9% flush 10 mL    [DISCONTINUED] sodium phosphate 20.01 mmol in dextrose 5 % 250 mL IVPB    [DISCONTINUED] sodium phosphate 30 mmol in dextrose 5 % 250 mL IVPB    [DISCONTINUED] sodium phosphate 39.99 mmol in dextrose 5 % 250 mL IVPB    [DISCONTINUED] ticagrelor tablet 90 mg    [DISCONTINUED] vasopressin (PITRESSIN) 0.2 Units/mL in dextrose 5 % 100 mL infusion     No current outpatient medications on file prior to encounter.     Family History     Problem Relation (Age of Onset)    Aneurysm Mother, Father (77)    Diabetes Brother    Heart disease Father, Paternal Grandmother    Kidney disease Brother        Tobacco Use    Smoking status: Former Smoker     Years: 10.00     Quit date: 2015     Years since quittin.9    Smokeless tobacco: Never Used   Substance and Sexual Activity    Alcohol use: No    Drug use: Yes     Frequency: 4.0 times per week     Types: Other-see comments     Comment: marijuana    Sexual activity: Yes     Review of Systems  Objective:     Vital Signs (Most Recent):  Temp: (!) 93.7 °F (34.3 °C) (20 1715)  Pulse: (!) 0 (20 1745)  Resp: (!) 0 (20 1745)  BP: (!) 84/54 (20 1715) Vital Signs (24h Range):  Temp:  [93.7 °F (34.3 °C)-95.9 °F (35.5 °C)] 93.7 °F (34.3 °C)  Pulse:  [0-93] 0  Resp:  [0-30] 0  SpO2:  [76 %-95 %] 76 %  BP: ()/(47-79) 84/54        There is no height or weight on file to calculate BMI.    Physical Exam  Constitutional:       General: He is in acute distress.      Appearance: He is toxic-appearing.      Interventions: He is intubated.   HENT:      Head: Normocephalic.    Cardiovascular:      Rate and Rhythm: Bradycardia present. Rhythm irregular.   Pulmonary:      Effort: Respiratory distress present. He is intubated.      Breath sounds: Decreased breath sounds present.   Feet:      Right foot:      Skin integrity: No warmth.      Left foot:      Skin integrity: No warmth.   Skin:     General: Skin is cool.      Coloration: Skin is mottled.   Neurological:      Mental Status: He is unresponsive.            Significant Labs: All pertinent labs within the past 24 hours have been reviewed.    Significant Imaging: I have reviewed and interpreted all pertinent imaging results/findings within the past 24 hours.    Assessment/Plan:     Active Diagnoses:      Problems Resolved During this Admission:    Diagnosis Date Noted Date Resolved POA    End stage heart failure [I50.84] 08/12/2020 08/12/2020 Yes    Comfort measures only status [Z51.5] 08/12/2020 08/12/2020 Not Applicable     VTE Risk Mitigation (From admission, onward)    None        Recommendations  Terminal extubation  Comfort care  DNR  Consult [facility]osp[facility] Abimbola Zuleta NP  Palliative medicine-[facility] ICU 5th Floor  [phone]

## 2020-08-13 NOTE — DISCHARGE SUMMARY
[facility] ICU 5th Floor  Palliative Medicine  Discharge Summary      Patient Name: João Aguirre  MRN: [id number]  Admission Date: 8/12/2020  Hospital Length of Stay: 1 days  Discharge Date and Time: 8/12/2020  6:12 PM  Attending Physician: No att. providers found   Discharging Provider: Cherrie Velasquez MD  Primary Care Provider: Primary Doctor Domenica    HPI:   João Aguirre is a 54 y/o M with PMH of ESRD on HD (T/TH/S), anemia, uncontrolled HTN, hx CVA, PCI s/p 2 stents placement, right eye blindness, seizure, marijuana use, non-healing DM right foot ulcer DM II and gastroparesis, initially presented to [facility] from [place] for second opinion regarding AVR and CABG vascular surgery at [facility] recommends BKA but patient declined, s/p cath with stent placement on 7/31 and started on heparin and then transfer to [facility] with plan for balloon angioplasty. On arrival, patient with elevated INR. Revascularization rescheduled for 8/7 but aborted once due to hypotension in the OR, started on Midodrine and rescheduled for 8/10. s/p revascularization of right SFA, popliteal, anterior tibial, and proximal posterior tibial artery on 8/10. Started on heparin drip next morning due to concern decrease right dorsalis pedis pulse without a posterior tibial pulse. Repeat arterial USS showed flow through the SFA and popliteal and infrapopliteal vessel was reduced patient. Overnight patient clinically declined and was emergently intubated, maxed out on multiple pressors for hypotension, lactic persistent > 12 and did not respond to CRRT. Patient remained hypotensive with progressive decline. After goal of care discussion with family, patient was transitioned to comfort measures.  Patient unstable for transportation to inpatient hospital for hospice care. - consult [facility]    * No surgery found *      Hospital Course:   No notes on file   Admitted to Inpatient hospice  care, terminally extubated.   Called to see patient with no spontaneous respiration.  On examination patient has no spontaneous respiration, no heart sounds heard, left pupil dilated and non reactive. Patient pronounced 2020 at 5:45 p.m. Family called over over the phone and updated, emotional comfort     No new Assessment & Plan notes have been filed under this hospital service since the last note was generated.  Service: Palliative Medicine    Final Active Diagnoses:      Problems Resolved During this Admission:    Diagnosis Date Noted Date Resolved POA    End stage heart failure [I50.84] 2020 Yes    Comfort measures only status [Z51.5] 2020 Not Applicable       Discharged Condition:     Disposition:  in Medical Facil*        Patient Instructions:   No discharge procedures on file.      Pending Diagnostic Studies:     None         Medications:  None (patient  at medical facility)    Indwelling Lines/Drains at time of discharge:   Lines/Drains/Airways     Central Venous Catheter Line            Percutaneous Central Line Insertion/Assessment - Triple Lumen  20 2206 less than 1 day    Trialysis (Dialysis) Catheter 20 0124 left femoral less than 1 day          Drain                 Hemodialysis AV Fistula Left forearm -- days         Hemodialysis AV Fistula Left forearm -- days         Hemodialysis AV Fistula Left forearm -- days         NG/OG Tube 20 2200 orogastric Center mouth less than 1 day                Time spent on the discharge of patient: 20 minutes  Patient was seen and examined on the date of discharge and determined to be suitable for discharge to the [facility]      Cherrie Velasquez MD  Department of Palliative Medicine  [facility] ICU 5th Floor

## 2020-08-13 NOTE — DISCHARGE SUMMARY
[facility] ICU 5th Floor  Hospital Medicine  Discharge Summary      Patient Name: João Aguirre  MRN: [id number]  Admission Date: 8/2/2020  Hospital Length of Stay: 10 days  Discharge Date and Time: 8/12/2020  4:35 PM  Attending Physician: No att. providers found   Discharging Provider: Cherrie Velasquez MD  Primary Care Provider: Primary Doctor Domenica      HPI:   Patient is a 55/y/o M with PMH of ESRD on HD (T/TH/S), anemia, uncontrolled HTN, hx CVA, PCI s/p 2 stents placement, right eye blindness, seizure, marijuana use, non-healing DM right foot ulcer DM II and gastroparesis, initially presented to [facility] from [place] for second opinion regarding AVR and CABG. Patient noted several weeks history of chest tightness with dizziness, palpitation, and increasing SOB. Denies fever, chills, nausea, vomiting. Seen by CTS and recommended patient not a CABG candidate due to multiple co morbidities- HD, Hep C, Osteomyelitis, PVD. s/p cath with stent placement on 7/31. Vascular surgery recommends BKA but patient declined, patient started on heparin drip and bridging with Warfarin (on Eliquis prior) with plan for balloon angioplasty on Tuesday    Procedure(s) (LRB):  Repair, Chronic Total Occlusion, Peripheral (Right)  Atherectomy, Lower Arterial (Right)  ULTRASOUND, INTRAVASCULAR (Right)  PTA, Anterior Tibial (Right)  PTA, Superficial Femoral Artery (Right)  PTA, Popliteal (Right)      Hospital Course:   Admitted for second opinion regarding AVR and CABG, vascular surgery at [facility] recommends BKA but patient declined, s/p cath with stent placement on 7/31 and started on heparin and then transfer to [facility] with plan for balloon angioplasty. On arrival, patient with elevated INR. Revascularization rescheduled for 8/7 but aborted once due to hypotension in the OR, started on Midodrine and rescheduled for 8/10. Patient continue with routine HD. He is s/p revascularization of the LE  with nerve block on 8/10 and continue on IV antibiotics   8/11 patient with persistent hypoglycemia, had multiple Dex 50%, dex 10% drip, meals with marginal increase, reported afib , then bradycardic with subsequent emergent intubation. Patient declined clinically with severe hypotension and was maxed out on three pressors- vaso, Edwar and levophed. Started on CRRT with worsening acidosis and hyperkalemia. Family- spouse (Soledad) and mother (Andreia) updated over the phone and then visited. Treatment team (primary and cardiology) with palliative care had a family (spouse and son) meeting to address goals of care. Disease education and prognosis provided, questions and concerns addressed.  Given patient extensive comorbidities and declining clinical status family decided to transition to comfort measures     Consults:   Consults (From admission, onward)        Status Ordering Provider     Inpatient consult to [facility]  Once     Provider:  (Not yet assigned)    Completed INNOCENT-ITUAH, GABRIELA N.     Inpatient consult to Infectious Diseases  Once     Provider:  (Not yet assigned)    Completed INNOCENT-ITUAH, GABRIELA N.     Inpatient consult to Palliative Care  Once     Provider:  (Not yet assigned)    Completed INNOCENT-ITUAH, GABRIELA N.     Inpatient consult to Podiatry  Once     Provider:  (Not yet assigned)    Completed INNOCENT-ITUAH, GABRIELA N.     Inpatient consult to Registered Dietitian/Nutritionist  Once     Provider:  (Not yet assigned)    Completed JED BHATT          * Acute osteomyelitis of metatarsal bone, right  PVD (peripheral vascular disease)  Atherosclerosis of native artery of right lower extremity with ulceration of midfoot    CTA A/P with BLE runoff to better define surgical anatomy  Consult cardiology- for possible revascularization and potential limb salvage  Wound cx with stenotrophomonas  Consult ID- continue Ceftazidime x 6 weeks rec- d/c1  Continue wound care  Consult cardiology- S/p  revascularization on 8/10  Transition to comfort measures      Atrial fibrillation  No amio given his liver disease  Started on heparin drip by cards  Transition to comfort measures      Atrial flutter  Transition to comfort measures      (HFpEF) heart failure with preserved ejection fraction  Ischemic cardiomyopathy, LVEF 25%-30%  Transition to comfort measures    ESRD (end stage renal disease) on dialysis   HD on TTSs  Consult nephrology   On CRRT - Transition to comfort measures        Nonrheumatic aortic (valve) stenosis  History of coronary artery stent placement  ECHO shows mild aortic valve stenosis.   currently high risk for cardiac surgery secondary to cirrhosis (plt 149) and osteomyelitis   [id number] of 6   Was on Eliquis now switched to Warfarin- d/c  INR goal 2.5-3.5  Continue Brilinta- hold  Transition to comfort measures    Type 2 diabetes mellitus with chronic kidney disease on chronic dialysis, without long-term current use of insulin  HbA1c 4.3   Transition to comfort measures      Seizure disorder  D/c Keppra  Transition to comfort measures    Benign hypertension with ESRD (end-stage renal disease)  D/c Norvasc  Transition to comfort measures      Cirrhosis of liver with ascites  Chronic hepatitis C without hepatic coma  history of HCV and cirrhosis   Transition to comfort measures    Chronic back pain  Methadone dependence  hold methadon, Oxycodone, neurontin-     Blindness of right eye  unchanged      Ischemic cardiomyopathy, LVEF 25%-30%   cards on board        Anemia in chronic kidney disease  Transition to comfort measures        Final Active Diagnoses:    Diagnosis Date Noted POA    PRINCIPAL PROBLEM:  Acute osteomyelitis of metatarsal bone, right [M86.171]  Yes    Palliative care encounter [Z51.5] 08/12/2020 Not Applicable    Goals of care, counseling/discussion [Z71.89] 08/12/2020 Not Applicable    Counseling regarding advance care planning and goals of care [Z71.89] 08/12/2020 Not  Applicable    Atrial fibrillation [I48.91] 08/05/2020 Yes    Elevated INR [R79.1] 08/04/2020 Yes    (HFpEF) heart failure with preserved ejection fraction [I50.30] 07/29/2020 Yes    Atrial flutter [I48.92] 07/29/2020 Yes    PVD (peripheral vascular disease) [I73.9]  Yes    Atherosclerosis of native artery of right lower extremity with ulceration of midfoot [I70.234]  Yes    ESRD (end stage renal disease) on dialysis [N18.6, Z99.2]  Not Applicable    Nonrheumatic aortic (valve) stenosis [I35.0] 07/24/2020 Yes    Mitral stenosis [I05.0] 07/24/2020 Yes    Type 2 diabetes mellitus with chronic kidney disease on chronic dialysis, without long-term current use of insulin [E11.22, N18.6, Z99.2] 08/29/2018 Not Applicable    History of coronary artery stent placement [Z95.5] 08/26/2018 Not Applicable    Seizure disorder [G40.909] 04/02/2017 Yes    Benign hypertension with ESRD (end-stage renal disease) [I12.0, N18.6] 03/01/2017 Yes    Chronic hepatitis C without hepatic coma [B18.2] 02/21/2017 Yes     Chronic    Methadone dependence [F11.20] 02/21/2017 Yes     Chronic    Cirrhosis of liver with ascites [K74.60, R18.8] 02/21/2017 Yes    Blindness of right eye [H54.40] 08/30/2015 Yes    Chronic back pain [M54.9, G89.29] 08/30/2015 Yes    Anemia in chronic kidney disease [N18.9, D63.1] 08/29/2015 Yes     Chronic    Ischemic cardiomyopathy, LVEF 25%-30% [I25.5] 08/29/2015 Yes      Problems Resolved During this Admission:       Discharged Condition: critical    Disposition: Hospice/Home    Follow Up:  Follow-up Information     [facility]    Specialties: Hospice Services, Home Health Services  Contact information:  [address] [place] [address] [phone]                 Patient Instructions:   No discharge procedures on file.    Significant Diagnostic Studies:     Pending Diagnostic Studies:     Procedure Component Value Units Date/Time    Hepatitis B core antibody, total [[id number]]  Collected: 08/06/20 1140    Order Status: Sent Lab Status: In process Updated: 08/07/20 1143    Specimen: Blood          Medications:  Transfer Medications (for Discharge Readmit only):   No current facility-administered medications for this encounter.      No current outpatient medications on file.     Facility-Administered Medications Ordered in Other Encounters   Medication Dose Route Frequency Provider Last Rate Last Dose    acetaminophen suppository 650 mg  650 mg Rectal Q6H PRN Vanita Zuleta NP        bisacodyL suppository 10 mg  10 mg Rectal Daily PRN Vanita Zuleta NP        lidocaine (PF) 20 mg/ml (2%) injection    PRN Olivia Smith MD   20 mL at 08/10/20 1400    lidocaine-EPINEPHrine 1%-1:100,000 injection    PRN Olivia Smith MD   20 mL at 08/10/20 1400    lorazepam injection 2 mg  2 mg Intravenous Q1H PRN Vanita Zuleta NP        morphine injection 2 mg  2 mg Intravenous Q4H PRN Vanita Zuleta NP        morphine injection 4 mg  4 mg Intravenous Q2H PRN Vanita Zuleta NP        ondansetron injection 4 mg  4 mg Intravenous Q6H PRN Vanita Zuleta NP        scopolamine 1.3-1.5 mg (1 mg over 3 days) 1 patch  1 patch Transdermal Q3 Days Vanita Zuleta NP           Indwelling Lines/Drains at time of discharge:   Lines/Drains/Airways     Central Venous Catheter Line            Percutaneous Central Line Insertion/Assessment - Triple Lumen  08/11/20 2206 less than 1 day    Trialysis (Dialysis) Catheter 08/12/20 0124 left femoral less than 1 day          Drain                 Hemodialysis AV Fistula Left forearm -- days         Hemodialysis AV Fistula Left forearm -- days         Hemodialysis AV Fistula Left forearm -- days         NG/OG Tube 08/11/20 2200 orogastric Center mouth less than 1 day                Time spent on the discharge of patient: 55 minutes  Patient was seen and examined on the date of discharge and determined to be suitable for discharge to hospice           Cherrie Velasquez MD  Department of Hospital Medicine  [facility] ICU 5th Floor

## 2020-08-13 NOTE — ASSESSMENT & PLAN NOTE
History of coronary artery stent placement  ECHO shows mild aortic valve stenosis.   currently high risk for cardiac surgery secondary to cirrhosis (plt 149) and osteomyelitis   [id number] of 6   Was on Eliquis now switched to Warfarin- d/c  INR goal 2.5-3.5  Continue Brilinta- hold  Transition to comfort measures

## 2020-08-13 NOTE — HPI
João Aguirre is a 56 y/o M with PMH of ESRD on HD (T/TH/S), anemia, uncontrolled HTN, hx CVA, PCI s/p 2 stents placement, right eye blindness, seizure, marijuana use, non-healing DM right foot ulcer DM II and gastroparesis, initially presented to [facility] from [place] for second opinion regarding AVR and CABG vascular surgery at [facility] recommends BKA but patient declined, s/p cath with stent placement on 7/31 and started on heparin and then transfer to [facility] with plan for balloon angioplasty. On arrival, patient with elevated INR. Revascularization rescheduled for 8/7 but aborted once due to hypotension in the OR, started on Midodrine and rescheduled for 8/10. s/p revascularization of right SFA, popliteal, anterior tibial, and proximal posterior tibial artery on 8/10. Started on heparin drip next morning due to concern decrease right dorsalis pedis pulse without a posterior tibial pulse. Repeat arterial USS showed flow through the SFA and popliteal and infrapopliteal vessel was reduced patient. Overnight patient clinically declined and was emergently intubated, maxed out on multiple pressors for hypotension, lactic persistent > 12 and did not respond to CRRT. Patient remained hypotensive with progressive decline. After goal of care discussion with family, patient was transitioned to comfort measures.  Patient unstable for transportation to inpatient hospital for hospice care. - consult [facility]

## 2020-08-13 NOTE — PROGRESS NOTES
Seen this a.m. at the bedside with Cardiology team, primary team, critical care team.      Events from last night or early this morning reviewed.      Last night around 10:00 p.m. in the evening patient needed a subsequent IV placement.  Anesthesia arrived at bedside to assist.  Upon arrival patient became bradycardic and apneic.  Patient was emergently intubated.  Thereafter anesthesia team proceeded with placement of a central line. Patient became severely hypotensive.  Patient was started on Edwar-Synephrine.  Subsequently Levophed was added.  Due to hyperkalemia and acidosis with a lactic acid greater than 8 emergent CRRT was initiated by recommendation of the nephrology team.  Wife was called at home and notified of the clinical changes that occurred.  We discussed extensively his guarded prognosis.  This was a cardiovascular and pulmonary decompensation.      On assessment this a.m., patient was maxed out on Edwar-Synephrine, Levophed, and vasopressin.  CRRT was continued per nephrology recommendations.  Patient lactic acidosis was greater than 12.  Due to his overall comorbidities and extensive medical problems i.e. hypertension, hyperlipidemia, diabetes, obesity, cirrhosis of the liver, anemia, coronary disease status post multivessel PCI, severe mitral stenosis, ischemic cardiomyopathy, nonsustained VT, atrial fibrillation, critical limb ischemia, and atherosclerosis of all vascular beds the family opted for do not resuscitate order.  Wife, son and family friend were at the bedside discussing with the medical team the details of his prognosis and current clinical status.  We spoke with the mother as well.  Palliative care was also consulted to assist with the care of this critically ill patient.

## 2020-08-13 NOTE — SUBJECTIVE & OBJECTIVE
Called to see patient with no spontaneous respiration.  On examination patient has no spontaneous respiration, no heart sounds heard, left pupil dilated and non reactive. Patient pronounced 08/12/2020 at 5:45 p.m. Family called over over the phone and updated, emotional comfort

## 2020-08-16 LAB — POCT GLUCOSE: 81 MG/DL (ref 70–110)

## 2020-09-03 LAB — FUNGUS SPEC CULT: NORMAL

## 2020-09-30 LAB
ACID FAST MOD KINY STN SPEC: NORMAL
MYCOBACTERIUM SPEC QL CULT: NORMAL

## 2021-01-22 NOTE — ASSESSMENT & PLAN NOTE
- Home Keppra    Pharmacist Admission Medication Reconciliation Pending Note    Prior to Admission Medications were reviewed by the pharmacist and pended for provider review during admission medication reconciliation.    Medications were pended by the pharmacist at this time as follows:    Pended Admission Order Reconciliation Actions - Nubia Shah [facility] 1/22/2021  2:55 PM     Order Name Action Reordered As    Cholecalciferol (VITAMIN D) 2000 UNITS tablet Order for Admission cholecalciferol (VITAMIN D) tablet 2,000 Units    docusate sodium (COLACE) 100 MG capsule Order for Admission docusate sodium (COLACE) capsule 200 mg    polyethylene glycol (GLYCOLAX, MIRALAX) packet Do Not Order for Admission     furosemide (LASIX) 20 MG tablet Order for Admission furosemide (LASIX) tablet 20 mg    nitroGLYcerin (NITROSTAT) 0.4 MG sublingual tablet Do Not Order for Admission     Cinnamon 500 MG Cap Do Not Order for Admission     oxygen (O2) gas Do Not Order for Admission     B Complex Vitamins (VITAMIN B COMPLEX) tablet Order for Admission B complex-vitamin C-folic acid (NEPHRO-FLY) tablet 0.8 mg    calcium carbonate-vitamin D 600-200 MG-UNIT tablet Order for Admission calcium carbonate-vitamin D (CALTRATE+D) 600-400 MG-UNIT tablet 1 tablet    cetirizine (ZYRTEC) 10 MG tablet Order for Admission cetirizine (ZyrTEC) tablet 10 mg    fluticasone-umeclidinium-vilanterol (TRELEGY ELLIPTA) 100-62.5-25 MCG/INH inhaler Order for Admission fluticasone-umeclidin-vilanterol (TRELEGY ELLIPTA) 100-62.5-25 MCG/INH inhaler 1 puff    Multiple Vitamin (MULTI-VITAMINS) Tab Do Not Order for Admission     Probiotic Product (PROBIOTIC-10 PO) Do Not Order for Admission     Ascorbic Acid (vitamin C) 1000 MG tablet Do Not Order for Admission     predniSONE (DELTASONE) 5 MG tablet Order for Admission predniSONE (DELTASONE) tablet 5 mg    Insulin Pen Needle (B-D U/F PEN NEEDLE 5/16\") 31G X 8 MM Misc Do Not Order for Admission     albuterol 108 (90 Base) MCG/ACT inhaler  Order for Admission albuterol inhaler 2 puff    levothyroxine 150 MCG tablet Order for Admission levothyroxine (SYNTHROID, LEVOTHROID) tablet 150 mcg    rOPINIRole (REQUIP) 1 MG tablet Do Not Order for Admission     atorvastatin (LIPITOR) 80 MG tablet Order for Admission atorvastatin (LIPITOR) tablet 80 mg    alendronate (FOSAMAX) 70 MG tablet Do Not Order for Admission     montelukast (SINGULAIR) 10 MG tablet Order for Admission montelukast (SINGULAIR) tablet 10 mg    zolpidem (AMBIEN) 5 MG tablet Order for Admission zolpidem (AMBIEN) tablet 5 mg    dilTIAZem (CARDIZEM) 60 MG tablet Order for Admission dilTIAZem (CARDIZEM) tablet 60 mg    desipramine (NORPRAMIN) 25 MG tablet Order for Admission desipramine (NORPRAMIN) tablet 50 mg    DULoxetine (CYMBALTA) 60 MG capsule Order for Admission DULoxetine (CYMBALTA) capsule 60 mg    DULoxetine (CYMBALTA) 30 MG capsule Order for Admission DULoxetine (CYMBALTA) capsule 30 mg    OXcarbazepine (TRILEPTAL) 300 MG tablet Do Not Order for Admission     nystatin (MYCOSTATIN) 740083 UNIT/GM powder Order for Admission nystatin (MYCOSTATIN) powder    EPINEPHrine 0.3 MG/0.3ML auto-injector Do Not Order for Admission     tiZANidine (ZANAFLEX) 4 MG tablet Order for Admission tiZANidine (ZANAFLEX) tablet 4 mg    acetaminophen (TYLENOL) 325 MG tablet Do Not Order for Admission     ZINC SULFATE PO Do Not Order for Admission     apixaBAN (Eliquis) 5 MG Tab Order for Admission apixaBAN (ELIQUIS) tablet 5 mg    sucralfate (Carafate) 1 g tablet Order for Admission sucralfate (CARAFATE) tablet 1 g    vitamin - therapeutic multivitamins w/minerals tablet 1 tablet New at Admission     OXcarbazepine (TRILEPTAL) tablet 600 mg New at Admission     OXcarbazepine (TRILEPTAL) tablet 300 mg New at Admission     rOPINIRole (REQUIP) tablet 1 mg New at Admission     rOPINIRole (REQUIP) tablet 2 mg New at Admission             Orders Pended To Continue For Hospital Stay     ID Description Pended By When  Reason    [id number] albuterol inhaler 2 puff-EVERY 4 HOURS RESPIRATORY PRN Raymond Lever, [facility] 01/22/21 1455     [id number] apixaBAN (ELIQUIS) tablet 5 mg-EVERY 12 HOURS SCHEDULED Nubia Lever, [facility] 01/22/21 1455     [id number] atorvastatin (LIPITOR) tablet 80 mg-NIGHTLY Nubia Lever, [facility] 01/22/21 1455     [id number] cetirizine (ZyrTEC) tablet 10 mg-NIGHTLY Nubia Lever, [facility] 01/22/21 1455     [id number] B complex-vitamin C-folic acid (NEPHRO-FLY) tablet 0.8 mg-DAILY Raymond Lever, [facility] 01/22/21 1455     [id number] calcium carbonate-vitamin D (CALTRATE+D) 600-400 MG-UNIT tablet 1 tablet-DAILY WITH BREAKFAST Raymond Lever, [facility] 01/22/21 1455     [id number] cholecalciferol (VITAMIN D) tablet 2,000 Units-2 TIMES DAILY Raymond Lever, [facility] 01/22/21 1455     [id number] desipramine (NORPRAMIN) tablet 50 mg-EVERY MORNING Raymond Lever, [facility] 01/22/21 1455     [id number] dilTIAZem (CARDIZEM) tablet 60 mg-2 TIMES DAILY Raymond Lever, [facility] 01/22/21 1455     [id number] docusate sodium (COLACE) capsule 200 mg-DAILY Nubia Lever, [facility] 01/22/21 1455     [id number] DULoxetine (CYMBALTA) capsule 30 mg-EVERY EVENING Raymond Lever, [facility] 01/22/21 1455     [id number] DULoxetine (CYMBALTA) capsule 60 mg-EVERY EVENING Nubia Lever, [facility] 01/22/21 1455     [id number] fluticasone-umeclidin-vilanterol (TRELEGY ELLIPTA) 100-62.5-25 MCG/INH inhaler 1 puff-DAILY Nubia Lever, [facility] 01/22/21 1455     [id number] furosemide (LASIX) tablet 20 mg-DAILY Raymond Lever, [facility] 01/22/21 1455     [id number] levothyroxine (SYNTHROID, LEVOTHROID) tablet 150 mcg-DAILY BEFORE BREAKFAST [facility] 01/22/21 1455     [id number] montelukast (SINGULAIR) tablet 10 mg-NIGHTLY [facility] 01/22/21 1455     [id number] vitamin - therapeutic multivitamins w/minerals tablet 1 tablet-DAILY [facility] 01/22/21 1455     [id number] nystatin (MYCOSTATIN) powder-2 TIMES DAILY [facility] 01/22/21 1455     [id number] OXcarbazepine (TRILEPTAL) tablet 600 mg-EVERY 12 HOURS  SCHEDULED Ashley Pablo, [facility] 01/22/21 1455     [id number] OXcarbazepine (TRILEPTAL) tablet 300 mg-EVERY EVENING [facility] 1455     [id number] predniSONE (DELTASONE) tablet 5 mg-DAILY [facility], [facility] 01/22/21 1455     [id number] rOPINIRole (REQUIP) tablet 1 mg-2 TIMES DAILY [facility] 01/22/21 1455     [id number] rOPINIRole (REQUIP) tablet 2 mg-2 TIMES DAILY [facility] 01/22/21 1455     [id number] sucralfate (CARAFATE) tablet 1 g-4 TIMES DAILY [facility]21 1455     [id number] tiZANidine (ZANAFLEX) tablet 4 mg-EVERY 6 HOURS PRN [facility] 01/22/21 1455     [id number] zolpidem (AMBIEN) tablet 5 mg-NIGHTLY PRN [facility] 01/22/21 1455             Pharmacist Notations:     meds left unaddressed:   1. cephalexin- received IV abx in ER  2. Dupixent- nonformulary, also patient hasn't started using it yet  3. humalog- not using  4. duoneb- not using  5. lantus- defer insulin to provider  6. Xolair- restricted to outpatient use only  7. pepcid/protonix- pt taking both.  defer to provider if both are needed    Oxcarbazepine, Requip, multivitamin Orders are pended in the New Orders section of the Med Rec Navigator.    Last edited by Nubia Shah [facility] on 01/22/21 at 1455          Orders that are ultimately reconciled and signed during admission medication reconciliation may differ from the pended actions above.    Please contact the pharmacist for questions.    Nubia Shah RPH  1/22/2021 2:55 PM

## 2022-01-01 NOTE — PHYSICIAN QUERY
"PT Name: João Aguirre  MR #: [id number]    Physician Query Form - Procedure Clarification     Rufina Motley RN, CCDS  Contact Info: [phone]  [email or web address]    This form is a permanent document in the medical record.     Query Date: September 7, 2018  By submitting this query, we are merely seeking further clarification of documentation. Please utilize your independent clinical judgment when addressing the question(s) below.    The Medical record contains the following:     Indicators    Supporting Clinical Findings Location in Medical Record    Documentation of "Debridement"       x Documentation of "I & D" POSTOPERATIVE DIAGNOSIS:    Cellulitis and abscess of the right medial leg x4.   PROCEDURE:    I and D of multiple abscesses on the right medial thigh.    I made approximately 1 cm incision in each palpable lesion which had associated induration, cellulitis and fluctuance.      One was present in the mid thigh, one was just above the knee, one was just below the knee and another one just above the ankle.  Small incisions were made.  Purulent drainage was expressed from each of these.  Cultures were taken from both the proximal and distal lesions.  I then irrigated all wounds.  I ensured adequate hemostasis.  I packed the wound with 0.25% iodoform.      Blood Loss 10 ml Op Note 9/6    EBL =      Other:       Excisional debridement is a surgical removal of  nonvitalized tissue, necrosis or slough. The use of a sharp instrument does not always indicate that an excisional debridement was performed.  Non excisional debridement is the scraping, washing, irrigating, brushing away or removal of loose tissue fragments.    Provider, please specify type of procedure(s) performed:    [  ] Excisional Debridement (Specify site and depth of tissue removed)   * Site: (Specify)_____________________________________   * Depth of tissue excised:    [  ] Skin [  ]Subcutaneous Tissue/Fascia   [ ] Muscle [  ] " Tendon [ ] Bone     [  ] Non-excisional Debridement   * Depth of tissue excised:    [  ] Skin [  ]Subcutaneous Tissue/Fascia   [ ] Muscle [  ] Tendon [ ] Bone       [x  ] Incision and Drainage    [  ] Other Procedure (Specify) ________________________________    [  ] Clinically Undetermined               Calm/Appropriate

## 2023-01-07 NOTE — PROGRESS NOTES
Pt had HD today, VS stable, NAD noted, safe, will continue to monitor    DISPLAY PLAN FREE TEXT DISPLAY PLAN FREE TEXT The patient is a 27y Male complaining of chest pain. DISPLAY PLAN FREE TEXT DISPLAY PLAN FREE TEXT DISPLAY PLAN FREE TEXT DISPLAY PLAN FREE TEXT

## 2024-04-10 NOTE — CARE UPDATE
Patient arrived from Cath procedure via AMBU bag.  Patient placed on vent SIMV 12/450/5/+5/100%.  Patient starting to wake up and placed on CPAP 5/+5/100% per Dr. Hu.  Will continue to monitor.  '   Citizen of [place]

## 2024-10-21 NOTE — PROGRESS NOTES
HD:  Pt stable, tx complete, lines reinfused, needles removed, hemostasis achieved, + thrill and bruit.  Pt tolerated tx well.    NET UF:  3500 mL   Initiate Treatment: Clobetasol 0.05% cream BID PRN Detail Level: Zone Render In Strict Bullet Format?: No

## 2025-06-24 NOTE — SUBJECTIVE & OBJECTIVE
Interval History:   Pt seen/examined-reports leg/foot getting better -still has a lot of pain -usually takes a lot more methadone      Review of Systems   Constitutional: Negative for chills and fever.   Respiratory: Negative for cough and shortness of breath.    Cardiovascular: Negative for chest pain.   Gastrointestinal: Negative for abdominal pain, diarrhea, nausea and vomiting.   Musculoskeletal: Positive for arthralgias, back pain and gait problem.        Right foot pain      Objective:     Vital Signs (Most Recent):  Temp: 97.9 °F (36.6 °C) (09/03/18 0811)  Pulse: 69 (09/03/18 1139)  Resp: 16 (09/03/18 1139)  BP: (!) 165/82 (09/03/18 0811)  SpO2: 98 % (09/03/18 1139) Vital Signs (24h Range):  Temp:  [97.8 °F (36.6 °C)-98.4 °F (36.9 °C)] 97.9 °F (36.6 °C)  Pulse:  [67-77] 69  Resp:  [16-18] 16  SpO2:  [94 %-100 %] 98 %  BP: (164-201)/(79-91) 165/82     Weight: 92.9 kg (204 lb 12.9 oz)  Body mass index is 27.78 kg/m².    Intake/Output Summary (Last 24 hours) at 9/3/2018 1733  Last data filed at 9/3/2018 1510  Gross per 24 hour   Intake 1275.83 ml   Output --   Net 1275.83 ml      Physical Exam   Constitutional: He is oriented to person, place, and time. He appears well-developed and well-nourished.   Chronically ill appearing man in no acute distress, appears much better, non toxic   HENT:   Nose: Nose normal.   Mouth/Throat: Oropharynx is clear and moist.   Eyes: Conjunctivae are normal. No scleral icterus.   Cardiovascular: Normal rate and regular rhythm.   No murmur heard.  Pulmonary/Chest: Effort normal and breath sounds normal. He has no wheezes. He has no rales.   Abdominal: Soft. Bowel sounds are normal. He exhibits no distension. There is no tenderness.   Musculoskeletal:   rle with ace wrap dressing intact incision plantar prox to right 2nd toe -wound clean; no packing; slightly red around incision    Neurological: He is alert and oriented to person, place, and time.   Nursing note and vitals  Sonia Villalobos is a 63 year old female presenting for   Chief Complaint   Patient presents with    Office Visit    Physical     Denies Latex allergy or sensitivity.    Medication verified and med list updated  Refills needed today: No    Patient would like communication of their results via:    [employer]       Health Maintenance       Pneumococcal Vaccine 50+ (1 of 1 - PCV)  Never done    Colorectal Cancer Screening (Fecal Occult Blood - Yearly)  Due soon on 7/29/2025           Following review of the above:  Health maintenance topics up to date.    Note: Refer to final orders and clinician documentation.           Blood pressure 110/72, pulse 66, temperature 97.9 °F (36.6 °C), temperature source Oral, height 5' 5.95\" (1.675 m), weight 72.9 kg (160 lb 11.5 oz), last menstrual period 02/27/2020, SpO2 98%.   reviewed.      Significant Labs: All pertinent labs within the past 24 hours have been reviewed.    Significant Imaging: I have reviewed and interpreted all pertinent imaging results/findings within the past 24 hours.

## (undated) DEVICE — DRESSING TRANS 2X2 TEGADERM

## (undated) DEVICE — CATH TURNPIKE 150CM 2.9FR

## (undated) DEVICE — GUIDEWIRE HT COMMAND 25X300CM

## (undated) DEVICE — GUIDEWIRE FIELDER XT.014X300CM

## (undated) DEVICE — KIT LEFT HEART MANIFOLD CUSTOM

## (undated) DEVICE — CATH ULTRAVERSE 018 6X60X130

## (undated) DEVICE — GUIDEWIRE STD .035X180CM ANG

## (undated) DEVICE — Device

## (undated) DEVICE — GUIDEWIRE V14 CONTROLWIRE 300

## (undated) DEVICE — PACK CUSTOM UNIV BASIN SLI

## (undated) DEVICE — GUIDE VISTA 6FR H-STK

## (undated) DEVICE — SOL 9P NACL IRR PIC IL

## (undated) DEVICE — BLADE SURG STAINLESS STEEL #11

## (undated) DEVICE — INFLATOR ENCORE 26 BLLN INFL

## (undated) DEVICE — SHEATH MICROPUNCTURE PEDAL 4FR

## (undated) DEVICE — CATH IMA INFINITI 4FRX100CM

## (undated) DEVICE — SWAB CULTURETTE SINGLE

## (undated) DEVICE — VISE RADIFOCUS MULTI TORQUE

## (undated) DEVICE — PADS RADI PERIPHERAL SHIELD

## (undated) DEVICE — GUIDEWIRE V-18 CONTROL .18

## (undated) DEVICE — GUIDEWIRE EMERALD 150CM PTFE

## (undated) DEVICE — SPONGE DERMACEA 4X4IN 12PLY

## (undated) DEVICE — COVER INSTR ELASTIC BAND 40X20

## (undated) DEVICE — GUIDEWIRE SUPRA CORE 035 190CM

## (undated) DEVICE — SLEEVE SCD EXPRESS CALF MEDIUM

## (undated) DEVICE — SEE MEDLINE ITEM 152622

## (undated) DEVICE — SHEATH DESTINATION 6FR 45CM

## (undated) DEVICE — COLLECTOR SPECIMEN ANAEROBIC

## (undated) DEVICE — GLOVE PROTEXIS PI SYN SURG 7.5

## (undated) DEVICE — SHEATH INTRODUCER 6FR 11CM

## (undated) DEVICE — OMNIPAQUE 350 200ML

## (undated) DEVICE — SET MICRO PUNCT 4FR/MPIS-401

## (undated) DEVICE — CATH ADVANCE MICRO14 50X2.5X12

## (undated) DEVICE — COVERS PROBE NR-48 STERILE

## (undated) DEVICE — TUBING HPCIL ROT M/F ADPT 48IN

## (undated) DEVICE — GLIDESHEATH SLENDER SS 5FR10CM

## (undated) DEVICE — SET MICROPUNCTURE

## (undated) DEVICE — KIT MICROINTRO 4F .018X40X7CM

## (undated) DEVICE — STRAP OR TABLE 5IN X 72IN

## (undated) DEVICE — SPIKE CONTRAST CONTROLLER

## (undated) DEVICE — PACK BASIC

## (undated) DEVICE — GUIDE WIRE BMW 014 X190

## (undated) DEVICE — GUIDEWIRE ADVNTG 035X260CM ANG

## (undated) DEVICE — DRAPE INCISE IOBAN 2 23X17IN

## (undated) DEVICE — SEE MEDLINE ITEM 156894

## (undated) DEVICE — GUIDWIRE HI-TORQUE .018X300CM

## (undated) DEVICE — CATH DMNDBK SYS CLASS 1.50X145

## (undated) DEVICE — CATH CXI STR 2.6F .018IN 90CM

## (undated) DEVICE — CATH BLLN FG APEX MR 2.50X15MM

## (undated) DEVICE — CATH ULTRAVERSE 014 2.5X15X150

## (undated) DEVICE — CATH NC QUANTUM APEX MR 4.5X20

## (undated) DEVICE — INTRODUCER VASC RADPQ 5FRX10CM

## (undated) DEVICE — DEVICE PERCLOSE SUT CLSR 6FR

## (undated) DEVICE — PAD DEFIB CADENCE ADULT R2

## (undated) DEVICE — WIRE X-SUP CHOICE PT .014X182

## (undated) DEVICE — WIRE CHOICE PT X SUPP 014X300

## (undated) DEVICE — ELECTRODE REM PLYHSV RETURN 9

## (undated) DEVICE — CATH EAGLE EYE PLATINUM

## (undated) DEVICE — KIT INTRODUCER W/GUIDEWIRE

## (undated) DEVICE — CATH GLIDE ANGLED 5FR 65CM

## (undated) DEVICE — GUIDE VISTA XB 3.5

## (undated) DEVICE — DRESSING TRANS 4X4 TEGADERM

## (undated) DEVICE — COVER PROBE US 5.5X58L NON LTX

## (undated) DEVICE — SEE MEDLINE ITEM 157187

## (undated) DEVICE — SOL NS 1000CC

## (undated) DEVICE — GUIDEWIRE COMMAND .014X300CM

## (undated) DEVICE — TAPE MEDIPORE 3 X 10YD

## (undated) DEVICE — GUIDEWIRE VIPER FIRM .014

## (undated) DEVICE — NDL SAFETY 21G X 1 1/2 ECLPSE

## (undated) DEVICE — BANDAGE D-STAT DRY HEMOSTATIC

## (undated) DEVICE — SHEATH INTRODUCER 5FR 10CM

## (undated) DEVICE — CATH SUPERCROSS 45 DEG 130CM

## (undated) DEVICE — SYR MARK 7 ARTERION 150ML

## (undated) DEVICE — KIT CUSTOM MANIFOLD

## (undated) DEVICE — CATH ULTRAVERSE 018 4X300X150

## (undated) DEVICE — KIT CO-PILOT

## (undated) DEVICE — KIT INTRO MICRO NIT VSI 4FR

## (undated) DEVICE — GUIDEWIRE ALLSTAR .014 X 300

## (undated) DEVICE — WIRE BENTSON 035/180

## (undated) DEVICE — LINER SUCTION 3000CC

## (undated) DEVICE — CONTRAST VISIPAQUE 150ML

## (undated) DEVICE — SYS LABEL CORRECT MED

## (undated) DEVICE — KIT INTRODUCER MICROPUNCTR 4F

## (undated) DEVICE — CATH GUIDE LINER  V3 6F

## (undated) DEVICE — CATH CXI ANG 2.6F .018IN 150CM

## (undated) DEVICE — GUIDEWIRE CHOICE PT  XS 182CM

## (undated) DEVICE — SEE MEDLINE ITEM 146292

## (undated) DEVICE — SET DECANTER MEDICHOICE

## (undated) DEVICE — DRAPE INCISE IOBAN 2 23X33IN

## (undated) DEVICE — SEE MEDLINE ITEM 157131